# Patient Record
Sex: FEMALE | Race: WHITE | Employment: PART TIME | URBAN - METROPOLITAN AREA
[De-identification: names, ages, dates, MRNs, and addresses within clinical notes are randomized per-mention and may not be internally consistent; named-entity substitution may affect disease eponyms.]

---

## 2017-11-02 ENCOUNTER — OFFICE VISIT (OUTPATIENT)
Dept: FAMILY MEDICINE | Facility: CLINIC | Age: 47
End: 2017-11-02
Payer: COMMERCIAL

## 2017-11-02 VITALS
WEIGHT: 148 LBS | HEIGHT: 63 IN | OXYGEN SATURATION: 97 % | BODY MASS INDEX: 26.22 KG/M2 | HEART RATE: 120 BPM | TEMPERATURE: 98.5 F | SYSTOLIC BLOOD PRESSURE: 130 MMHG | DIASTOLIC BLOOD PRESSURE: 80 MMHG

## 2017-11-02 DIAGNOSIS — F41.1 GAD (GENERALIZED ANXIETY DISORDER): ICD-10-CM

## 2017-11-02 DIAGNOSIS — Z23 NEED FOR PROPHYLACTIC VACCINATION AND INOCULATION AGAINST INFLUENZA: ICD-10-CM

## 2017-11-02 DIAGNOSIS — E55.9 VITAMIN D DEFICIENCY: ICD-10-CM

## 2017-11-02 DIAGNOSIS — J45.30 MILD PERSISTENT ASTHMA WITHOUT COMPLICATION: Primary | ICD-10-CM

## 2017-11-02 DIAGNOSIS — F33.9 RECURRENT MAJOR DEPRESSIVE DISORDER, REMISSION STATUS UNSPECIFIED (H): ICD-10-CM

## 2017-11-02 PROCEDURE — 90471 IMMUNIZATION ADMIN: CPT | Performed by: FAMILY MEDICINE

## 2017-11-02 PROCEDURE — 99203 OFFICE O/P NEW LOW 30 MIN: CPT | Mod: 25 | Performed by: FAMILY MEDICINE

## 2017-11-02 PROCEDURE — 90686 IIV4 VACC NO PRSV 0.5 ML IM: CPT | Performed by: FAMILY MEDICINE

## 2017-11-02 RX ORDER — ALBUTEROL SULFATE 90 UG/1
2 AEROSOL, METERED RESPIRATORY (INHALATION) EVERY 6 HOURS
COMMUNITY
End: 2017-11-02

## 2017-11-02 RX ORDER — ALBUTEROL SULFATE 90 UG/1
2 AEROSOL, METERED RESPIRATORY (INHALATION) EVERY 6 HOURS
Qty: 1 INHALER | Refills: 3 | Status: SHIPPED | OUTPATIENT
Start: 2017-11-02 | End: 2018-03-06

## 2017-11-02 RX ORDER — BUSPIRONE HYDROCHLORIDE 5 MG/1
TABLET ORAL
Qty: 150 TABLET | Refills: 0 | Status: SHIPPED | OUTPATIENT
Start: 2017-11-02 | End: 2018-03-06

## 2017-11-02 RX ORDER — CHOLECALCIFEROL (VITAMIN D3) 50 MCG
2000 TABLET ORAL DAILY
Qty: 100 TABLET | Refills: 3 | Status: SHIPPED | OUTPATIENT
Start: 2017-11-02 | End: 2019-09-10

## 2017-11-02 RX ORDER — BUDESONIDE AND FORMOTEROL FUMARATE DIHYDRATE 160; 4.5 UG/1; UG/1
2 AEROSOL RESPIRATORY (INHALATION) 2 TIMES DAILY
Qty: 1 INHALER | Refills: 3 | Status: SHIPPED | OUTPATIENT
Start: 2017-11-02 | End: 2018-03-06

## 2017-11-02 ASSESSMENT — ANXIETY QUESTIONNAIRES
1. FEELING NERVOUS, ANXIOUS, OR ON EDGE: NEARLY EVERY DAY
5. BEING SO RESTLESS THAT IT IS HARD TO SIT STILL: NEARLY EVERY DAY
2. NOT BEING ABLE TO STOP OR CONTROL WORRYING: NEARLY EVERY DAY
IF YOU CHECKED OFF ANY PROBLEMS ON THIS QUESTIONNAIRE, HOW DIFFICULT HAVE THESE PROBLEMS MADE IT FOR YOU TO DO YOUR WORK, TAKE CARE OF THINGS AT HOME, OR GET ALONG WITH OTHER PEOPLE: EXTREMELY DIFFICULT
7. FEELING AFRAID AS IF SOMETHING AWFUL MIGHT HAPPEN: NEARLY EVERY DAY
GAD7 TOTAL SCORE: 21
3. WORRYING TOO MUCH ABOUT DIFFERENT THINGS: NEARLY EVERY DAY
6. BECOMING EASILY ANNOYED OR IRRITABLE: NEARLY EVERY DAY

## 2017-11-02 ASSESSMENT — PATIENT HEALTH QUESTIONNAIRE - PHQ9
5. POOR APPETITE OR OVEREATING: NEARLY EVERY DAY
SUM OF ALL RESPONSES TO PHQ QUESTIONS 1-9: 20

## 2017-11-02 NOTE — MR AVS SNAPSHOT
After Visit Summary   11/2/2017    Mojgan Jimenez    MRN: [id number]           Patient Information     Date Of Birth          1970        Visit Information        Provider Department      11/2/2017 10:20 AM Katie Hernandez MD [facility]        Today's Diagnoses     Mild persistent asthma without complication    -  1    Vitamin D deficiency        Recurrent major depressive disorder, remission status unspecified (H)        LINA (generalized anxiety disorder)          Care Instructions      Preventive Health Recommendations  Female Ages 40 to 49    Yearly exam:     See your health care provider every year in order to  1. Review health changes.   2. Discuss preventive care.    3. Review your medicines if your doctor prescribed any.      Get a Pap test every three years (unless you have an abnormal result and your provider advises testing more often).      If you get Pap tests with HPV test, you only need to test every 5 years, unless you have an abnormal result. You do not need a Pap test if your uterus was removed (hysterectomy) and you have not had cancer.      You should be tested each year for STDs (sexually transmitted diseases), if you're at risk.       Ask your doctor if you should have a mammogram.      Have a colonoscopy (test for colon cancer) if someone in your family has had colon cancer or polyps before age 50.       Have a cholesterol test every 5 years.       Have a diabetes test (fasting glucose) after age 45. If you are at risk for diabetes, you should have this test every 3 years.    Shots: Get a flu shot each year. Get a tetanus shot every 10 years.     Nutrition:     Eat at least 5 servings of fruits and vegetables each day.    Eat whole-grain bread, whole-wheat pasta and brown rice instead of white grains and rice.    Talk to your provider about Calcium and Vitamin D.     Lifestyle    Exercise at least 150 minutes a week (an average of 30 minutes a day, 5  "days a week). This will help you control your weight and prevent disease.    Limit alcohol to one drink per day.    No smoking.     Wear sunscreen to prevent skin cancer.    See your dentist every six months for an exam and cleaning.          Follow-ups after your visit        Who to contact     If you have questions or need follow up information about today's clinic visit or your schedule please contact [facility] directly at [phone].  Normal or non-critical lab and imaging results will be communicated to you by Deporvillagehart, letter or phone within 4 business days after the clinic has received the results. If you do not hear from us within 7 days, please contact the clinic through [employer] or phone. If you have a critical or abnormal lab result, we will notify you by phone as soon as possible.  Submit refill requests through Dig[employer]oo or call your pharmacy and they will forward the refill request to us. Please allow 3 business days for your refill to be completed.          Additional Information About Your Visit        Digiboo Information     Digiboo lets you send messages to your doctor, view your test results, renew your prescriptions, schedule appointments and more. To sign up, go to [email or web address] . Click on \"Log in\" on the left side of the screen, which will take you to the Welcome page. Then click on \"Sign up Now\" on the right side of the page.     You will be asked to enter the access code listed below, as well as some personal information. Please follow the directions to create your username and password.     Your access code is: [email or web address]  Expires: 2018 11:24 AM     Your access code will  in 90 days. If you need help or a new code, please call your [facility] clinic or [phone].        Care EveryWhere ID     This is your Care EveryWhere ID. This could be used by other organizations to access your [facility] medical records  [id number]        Your Vitals Were     " "Pulse Temperature Height Last Period Pulse Oximetry Breastfeeding?    120 98.5  F (36.9  C) (Oral) 5' 3\" (1.6 m) 11/01/2017 (Exact Date) 97% No    BMI (Body Mass Index)                   26.22 kg/m2            Blood Pressure from Last 3 Encounters:   11/02/17 130/80    Weight from Last 3 Encounters:   11/02/17 148 lb (67.1 kg)              Today, you had the following     No orders found for display         Today's Medication Changes          These changes are accurate as of: 11/2/17 11:24 AM.  If you have any questions, ask your nurse or doctor.               Start taking these medicines.        Dose/Directions    busPIRone 5 MG tablet   Commonly known as:  BUSPAR   Used for:  LINA (generalized anxiety disorder)   Started by:  Katie Hernandez MD        Start at 5 mg twice daily for 3 days, then 7.5 mg (1.5 tabs) twice daily for 3 days, then 10 mg (2 tabs) twice daily for 3 days, then 12.5 mg (2.5 tabs) twice daily for 3 days, then 15 mg (3 tabs) twice daily and stay at that dose   Quantity:  150 tablet   Refills:  0       vitamin D 2000 UNITS tablet   Used for:  Vitamin D deficiency   Started by:  Katie Hernandez MD        Dose:  2000 Units   Take 2,000 Units by mouth daily   Quantity:  100 tablet   Refills:  3         These medicines have changed or have updated prescriptions.        Dose/Directions    budesonide-formoterol 160-4.5 MCG/ACT Inhaler   Commonly known as:  SYMBICORT   This may have changed:    - medication strength  - how much to take  - when to take this   Used for:  Mild persistent asthma without complication   Changed by:  Katie Hernandez MD        Dose:  2 puff   Inhale 2 puffs into the lungs 2 times daily   Quantity:  1 Inhaler   Refills:  3         Stop taking these medicines if you haven't already. Please contact your care team if you have questions.     PROZAC PO   Stopped by:  Katie Hernandez MD                Where to get your medicines      These " medications were sent to [id number] - [place], [place] - [address] AT [facility] [address]  [address], [place] [address]     Phone:  [phone]     albuterol 108 (90 BASE) MCG/ACT Inhaler    budesonide-formoterol 160-4.5 MCG/ACT Inhaler    vitamin D 2000 UNITS tablet         Some of these will need a paper prescription and others can be bought over the counter.  Ask your nurse if you have questions.     Bring a paper prescription for each of these medications     busPIRone 5 MG tablet                Primary Care Provider Office Phone # Fax #    [facility] [phone]       [address] [address]        Equal Access to Services     TIM CHRISTIE : Caitlin lee Sotuyet, waaxda luqadaha, qaybta kaalmada adeegyada, magalie garza. So [facility] [phone].    ATENCIÓN: Si habla español, tiene a smith disposición servicios gratuitos de asistencia lingüística. Llame al [phone].    We comply with applicable federal civil rights laws and [place] laws. We do not discriminate on the basis of race, color, national origin, age, disability, sex, sexual orientation, or gender identity.            Thank you!     Thank you for choosing [facility]  for your care. Our goal is always to provide you with excellent care. Hearing back from our patients is one way we can continue to improve our services. Please take a few minutes to complete the written survey that you may receive in the mail after your visit with us. Thank you!             Your Updated Medication List - Protect others around you: Learn how to safely use, store and throw away your medicines at www.disposemymeds.org.          This list is accurate as of: 11/2/17 11:24 AM.  Always use your most recent med list.                   Brand Name Dispense Instructions for use Diagnosis    albuterol 108 (90 BASE)  MCG/ACT Inhaler    PROAIR HFA/PROVENTIL HFA/VENTOLIN HFA    1 Inhaler    Inhale 2 puffs into the lungs every 6 hours    Mild persistent asthma without complication       budesonide-formoterol 160-4.5 MCG/ACT Inhaler    SYMBICORT    1 Inhaler    Inhale 2 puffs into the lungs 2 times daily    Mild persistent asthma without complication       busPIRone 5 MG tablet    BUSPAR    150 tablet    Start at 5 mg twice daily for 3 days, then 7.5 mg (1.5 tabs) twice daily for 3 days, then 10 mg (2 tabs) twice daily for 3 days, then 12.5 mg (2.5 tabs) twice daily for 3 days, then 15 mg (3 tabs) twice daily and stay at that dose    LINA (generalized anxiety disorder)       LAMICTAL PO      Take 50 mg by mouth daily        vitamin D 2000 UNITS tablet     100 tablet    Take 2,000 Units by mouth daily    Vitamin D deficiency

## 2017-11-02 NOTE — PROGRESS NOTES
HPI        Review of Systems  Physical Exam      SUBJECTIVE:   Mojgan Jimenez is a 47 year old female who presents to clinic today for the following health issues:    Pt was initially scheduled for Physical exam however the visit was changed to Office visit to discuss about her depression and asthma.     Pt is here with her friend. Information about today's symptoms is given by pt and friend both.     Mood symptoms: pt used to go to [facility] for her care. She was on Prozac for many years for her depression.    Lamictal was added few months ago for severe depression, PMDD, LINA. Her dose was recently increased to 75 mg daily.   Per pt it helps with some of her symptoms.   Pt was not following with her provider as recommended hence she was dismissed from her previous clinic and needs to establish care at different location.   Today pt says that she does not think depression is much of her issue however anxiety goes out of control. She was told that Prozac should help with anxiety however it does not help. As she was taking prozac for many years she decided to stop taking prozac on her own few days ago. Denies any withdrawal symptoms today. Denies SI or HI.       Asthma: using her inhalers as prescribed. It has kept her asthma under good control. She needs me to listen to her lungs and refill her inhalers.     Requests flu shot.     Need refill on vitamin D.     Problem list and histories reviewed & adjusted, as indicated.  Additional history: as documented    Patient Active Problem List   Diagnosis     Mild persistent asthma without complication     Vitamin D deficiency     LINA (generalized anxiety disorder)     Recurrent major depressive disorder, remission status unspecified (H)     History reviewed. No pertinent surgical history.    Social History   Substance Use Topics     Smoking status: Never Smoker     Smokeless tobacco: Never Used     Alcohol use Yes      Comment: 1 drink a week     Family History  "  Problem Relation Age of Onset     CANCER Mother      Unknown/Adopted Father      DIABETES No family hx of      Hypertension No family hx of          Current Outpatient Prescriptions   Medication Sig Dispense Refill     budesonide-formoterol (SYMBICORT) 160-4.5 MCG/ACT Inhaler Inhale 2 puffs into the lungs 2 times daily 1 Inhaler 3     Cholecalciferol (VITAMIN D) 2000 UNITS tablet Take 2,000 Units by mouth daily 100 tablet 3     busPIRone (BUSPAR) 5 MG tablet Start at 5 mg twice daily for 3 days, then 7.5 mg (1.5 tabs) twice daily for 3 days, then 10 mg (2 tabs) twice daily for 3 days, then 12.5 mg (2.5 tabs) twice daily for 3 days, then 15 mg (3 tabs) twice daily and stay at that dose 150 tablet 0     albuterol (PROAIR HFA/PROVENTIL HFA/VENTOLIN HFA) 108 (90 BASE) MCG/ACT Inhaler Inhale 2 puffs into the lungs every 6 hours 1 Inhaler 3     LamoTRIgine (LAMICTAL PO) Take 50 mg by mouth daily       BP Readings from Last 3 Encounters:   11/02/17 130/80    Wt Readings from Last 3 Encounters:   11/02/17 148 lb (67.1 kg)                  Labs reviewed in EPIC        Reviewed and updated as needed this visit by clinical staffTobacco  Allergies  Med Hx  Surg Hx  Fam Hx  Soc Hx      Reviewed and updated as needed this visit by Provider         ROS:  Constitutional, HEENT, cardiovascular, pulmonary, gi and gu systems are negative, except as otherwise noted.      OBJECTIVE:   /80  Pulse 120  Temp 98.5  F (36.9  C) (Oral)  Ht 5' 3\" (1.6 m)  Wt 148 lb (67.1 kg)  LMP 11/01/2017 (Exact Date)  SpO2 97%  Breastfeeding? No  BMI 26.22 kg/m2  Body mass index is 26.22 kg/(m^2).  GENERAL: healthy, alert and no distress  NECK: no adenopathy, no asymmetry, masses, or scars and thyroid normal to palpation  RESP: lungs clear to auscultation - no rales, rhonchi or wheezes  CV: regular rate and rhythm, normal S1 S2, no S3 or S4  PSYCH: anxious mood, pressured speech, affect is congruent to mood.     PHQ-9 SCORE 11/2/2017 "   Total Score 20     LINA-7 SCORE 11/2/2017   Total Score 21       ASSESSMENT/PLAN:       ICD-10-CM    1. Mild persistent asthma without complication J45.30 budesonide-formoterol (SYMBICORT) 160-4.5 MCG/ACT Inhaler     albuterol (PROAIR HFA/PROVENTIL HFA/VENTOLIN HFA) 108 (90 BASE) MCG/ACT Inhaler   2. Vitamin D deficiency E55.9 Cholecalciferol (VITAMIN D) 2000 UNITS tablet   3. Recurrent major depressive disorder, remission status unspecified (H) F33.9    4. LINA (generalized anxiety disorder) F41.1 busPIRone (BUSPAR) 5 MG tablet   5. Need for prophylactic vaccination and inoculation against influenza Z23 FLU VAC, SPLIT VIRUS IM > 3 YO (QUADRIVALENT) [33951]     Vaccine Administration, Initial [42280]     New to me and clinic. MOY signed to get previous medical records.   F/u with me in 4 weeks on mood symptoms.      Katie Hernandez MD  [facility]

## 2017-11-02 NOTE — NURSING NOTE
"Chief Complaint   Patient presents with     Physical       Initial /80  Pulse 120  Temp 98.5  F (36.9  C) (Oral)  Ht 5' 3\" (1.6 m)  Wt 148 lb (67.1 kg)  LMP 11/01/2017 (Exact Date)  SpO2 97%  Breastfeeding? No  BMI 26.22 kg/m2 Estimated body mass index is 26.22 kg/(m^2) as calculated from the following:    Height as of this encounter: 5' 3\" (1.6 m).    Weight as of this encounter: 148 lb (67.1 kg).  Medication Reconciliation: complete    "

## 2017-11-03 ASSESSMENT — ASTHMA QUESTIONNAIRES: ACT_TOTALSCORE: 17

## 2017-11-03 ASSESSMENT — ANXIETY QUESTIONNAIRES: GAD7 TOTAL SCORE: 21

## 2018-03-06 ENCOUNTER — TELEPHONE (OUTPATIENT)
Dept: FAMILY MEDICINE | Facility: CLINIC | Age: 48
End: 2018-03-06

## 2018-03-06 ENCOUNTER — TELEPHONE (OUTPATIENT)
Dept: ADDICTION MEDICINE | Facility: CLINIC | Age: 48
End: 2018-03-06

## 2018-03-06 ENCOUNTER — OFFICE VISIT (OUTPATIENT)
Dept: FAMILY MEDICINE | Facility: CLINIC | Age: 48
End: 2018-03-06
Payer: COMMERCIAL

## 2018-03-06 VITALS
WEIGHT: 159 LBS | BODY MASS INDEX: 28.17 KG/M2 | SYSTOLIC BLOOD PRESSURE: 120 MMHG | HEART RATE: 100 BPM | TEMPERATURE: 98.4 F | DIASTOLIC BLOOD PRESSURE: 74 MMHG | HEIGHT: 63 IN

## 2018-03-06 DIAGNOSIS — Z12.31 VISIT FOR SCREENING MAMMOGRAM: ICD-10-CM

## 2018-03-06 DIAGNOSIS — Z23 NEED FOR PROPHYLACTIC VACCINATION WITH TETANUS-DIPHTHERIA (TD): ICD-10-CM

## 2018-03-06 DIAGNOSIS — Z12.4 SCREENING FOR MALIGNANT NEOPLASM OF CERVIX: ICD-10-CM

## 2018-03-06 DIAGNOSIS — F39 MOOD DISORDER (H): ICD-10-CM

## 2018-03-06 DIAGNOSIS — J45.30 MILD PERSISTENT ASTHMA WITHOUT COMPLICATION: ICD-10-CM

## 2018-03-06 DIAGNOSIS — F41.1 GAD (GENERALIZED ANXIETY DISORDER): ICD-10-CM

## 2018-03-06 DIAGNOSIS — F15.10 METHAMPHETAMINE ABUSE, EPISODIC (H): Primary | ICD-10-CM

## 2018-03-06 PROCEDURE — 99215 OFFICE O/P EST HI 40 MIN: CPT | Performed by: FAMILY MEDICINE

## 2018-03-06 RX ORDER — LOXAPINE SUCCINATE 25 MG/1
25 TABLET ORAL 3 TIMES DAILY
COMMUNITY
End: 2018-05-08

## 2018-03-06 RX ORDER — BUSPIRONE HYDROCHLORIDE 5 MG/1
TABLET ORAL
Qty: 90 TABLET | Refills: 0 | Status: SHIPPED | OUTPATIENT
Start: 2018-03-06 | End: 2019-04-02

## 2018-03-06 RX ORDER — LOXAPINE SUCCINATE 25 MG/1
25 TABLET ORAL 3 TIMES DAILY
Status: CANCELLED | OUTPATIENT
Start: 2018-03-06

## 2018-03-06 RX ORDER — BUDESONIDE AND FORMOTEROL FUMARATE DIHYDRATE 160; 4.5 UG/1; UG/1
2 AEROSOL RESPIRATORY (INHALATION) 2 TIMES DAILY
Qty: 1 INHALER | Refills: 3 | Status: SHIPPED | OUTPATIENT
Start: 2018-03-06 | End: 2019-04-02

## 2018-03-06 RX ORDER — ALBUTEROL SULFATE 90 UG/1
2 AEROSOL, METERED RESPIRATORY (INHALATION) EVERY 6 HOURS
Qty: 1 INHALER | Refills: 3 | Status: SHIPPED | OUTPATIENT
Start: 2018-03-06 | End: 2018-11-06

## 2018-03-06 ASSESSMENT — ANXIETY QUESTIONNAIRES
3. WORRYING TOO MUCH ABOUT DIFFERENT THINGS: NEARLY EVERY DAY
2. NOT BEING ABLE TO STOP OR CONTROL WORRYING: NEARLY EVERY DAY
1. FEELING NERVOUS, ANXIOUS, OR ON EDGE: NEARLY EVERY DAY
5. BEING SO RESTLESS THAT IT IS HARD TO SIT STILL: SEVERAL DAYS
7. FEELING AFRAID AS IF SOMETHING AWFUL MIGHT HAPPEN: NEARLY EVERY DAY
IF YOU CHECKED OFF ANY PROBLEMS ON THIS QUESTIONNAIRE, HOW DIFFICULT HAVE THESE PROBLEMS MADE IT FOR YOU TO DO YOUR WORK, TAKE CARE OF THINGS AT HOME, OR GET ALONG WITH OTHER PEOPLE: EXTREMELY DIFFICULT
6. BECOMING EASILY ANNOYED OR IRRITABLE: NEARLY EVERY DAY
GAD7 TOTAL SCORE: 18

## 2018-03-06 ASSESSMENT — PATIENT HEALTH QUESTIONNAIRE - PHQ9: 5. POOR APPETITE OR OVEREATING: MORE THAN HALF THE DAYS

## 2018-03-06 NOTE — MR AVS SNAPSHOT
After Visit Summary   3/6/2018    Mojgan Jimenez    MRN: [id number]           Patient Information     Date Of Birth          1970        Visit Information        Provider Department      3/6/2018 1:00 PM Chetan Pérez DO [facility]        Today's Diagnoses     Methamphetamine abuse, episodic    -  1    Visit for screening mammogram        Screening for malignant neoplasm of cervix        Need for prophylactic vaccination with tetanus-diphtheria (TD)        Mild persistent asthma without complication        Mood disorder (H)        LINA (generalized anxiety disorder)          Care Instructions    Please follow up with addiction medicine as planned/psych.  Restart Prozac and Buspar.      [facility]   Discharged by : Hortencia MAYS CMA (AAMA)    Paper scripts provided to patient : none      If you have any questions regarding your visit please contact your care team:     Team Gold                Clinic Hours Telephone Number     Dr. Jennifer Maier NP 7am-7pm  Monday - Thursday   7am-5pm  Fridays  [phone]   (Appointment scheduling available 24/7)     RN Line  [phone] option 2     [facility] [place] [facility] - 11am-9pm Monday-Friday Saturday-Sunday- 9am-5pm     [place] -   5pm-9pm Monday-Friday Saturday-Sunday- 9am-5pm    [phone] - [place]    [phone] - [place]       For a Price Quote for your services, please call our Consumer Price Line at [phone].     What options do I have for visits at the clinic other than the traditional office visit?     To expand how we care for you, many of our providers are utilizing electronic visits (e-visits) and telephone visits, when medically appropriate, for interactions with their patients rather than a visit in the clinic. We also offer nurse visits for many medical concerns. Just  like any other service, we will bill your insurance company for this type of visit based on time spent on the phone with your provider. Not all insurance companies cover these visits. Please check with your medical insurance if this type of visit is covered. You will be responsible for any charges that are not paid by your insurance.   E-visits via LyricFindhart: generally incur a $35.00 fee.     Telephone visits:   Time spent on the phone: *charged based on time that is spent on the phone in increments of 10 minutes. Estimated cost:   5-10 mins $30.00   11-20 mins. $59.00   21-30 mins. $85.00     Use Movaris (secure email communication and access to your chart) to send your primary care provider a message or make an appointment. Ask someone on your Team how to sign up for Movaris.     As always, Thank you for trusting us with your health care needs!      [place] [place]:    Scheduling Appointments  [facility]  Call: [phone]    [facility]  Call: [phone]    [facility]  Call: [phone]    For Gastroenterology referrals   [facility] Gastroenterology   Clinics and Surgery Center, 4th Floor   [address] [place] [address]   Appointments: [phone]    WHERE TO GO FOR CARE?  Clinic    Make an appointment if you:       Are sick (cold, cough, flu, sore throat, earache or in pain).       Have a small injury (sprain, small cut, burn or broken bone).       Need a physical exam, Pap smear, vaccine or prescription refill.       Have questions about your health or medicines.    To reach us:      Call [phone] ([phone]). Open 24 hours every day. (For counseling services, call [phone].)    Log into Movaris at [email or web address]. (Visit [email or web address] to create an account.) Hospital emergency room    An emergency is a serious or life- threatening problem that must be treated right away.    Call [phone]  or get to the hospital if you have:      Very bad or sudden:            - Chest pain or pressure         - Bleeding         - Head or belly pain         - Dizziness or trouble seeing, walking or                          Speaking      Problems breathing      Blood in your vomit or you are coughing up blood      A major injury (knocked out, loss of a finger or limb, rape, broken bone protruding from skin)    A mental health crisis. (Or call the Mental Health Crisis line at 1-190.611.4420 or Suicide Prevention Hotline at 1-202.998.3674.)    Open 24 hours every day. You don't need an appointment.     Urgent care    Visit urgent care for sickness or small injuries when the clinic is closed. You don't need an appointment. To check hours or find an urgent care near you, visit [email or web address]. Online care    Get online care from [facility] for more than 70 common problems, like colds, allergies and infections. Open 24 hours every day at:   [email or web address]   Need help deciding?    For advice about where to be seen, you may call your clinic and ask to speak with a nurse. We're here for you 24 hours every day.         If you are deaf or hard of hearing, please let us know. We provide many free services including sign language interpreters, oral interpreters, TTYs, telephone amplifiers, note takers and written materials.                   Follow-ups after your visit        Additional Services     ADDICTION MEDICINE REFERRAL       The Addiction Medicine Service is prepared to provide consultation for and, if necessary, ongoing care for patients with the disease of addiction, ages 16 and up.     For acute detox, please send your patient to the ER or contact [facility] at [phone].     Common problems that may warrant referral to the Addiction Medicine Service are:  Alcohol use disorder - diagnosis, treatment referral, acute and protracted withdrawal management, and ongoing medication assisted treatment including  Antabuse and Naltrexone.  Opioid Use Disorder - medication assisted treatment including Buprenorphine (Suboxone) or extended release Naltrexone (Vivitrol)  Benzodiazepine dependence - extended outpatient detoxification  Many other issues pertaining to addiction, relapse, and recovery    Please contact our scheduling staff at [phone] with any questions.    Referrals to the Addiction Medicine Service assume that the referring provider has discussed the referral with the patient.  Referral will be reviewed and if appropriate, the patient will be contacted to schedule appointment.  If not appropriate, the provider will be contacted with further information.    Please answer the following questions so we can better service your patient:    Drug of choice: street drugs  Do they have a [facility] PCP:  No(estblishing care now)     Please bring the following to your appointment:  >>   List of current medications   >>   Any relevant history                  Who to contact     If you have questions or need follow up information about today's clinic visit or your schedule please contact [facility] directly at [phone].  Normal or non-critical lab and imaging results will be communicated to you by Nanalihart, letter or phone within 4 business days after the clinic has received the results. If you do not hear from us within 7 days, please contact the clinic through Nanalihart or phone. If you have a critical or abnormal lab result, we will notify you by phone as soon as possible.  Submit refill requests through [employer] or call your pharmacy and they will forward the refill request to us. Please allow 3 business days for your refill to be completed.          Additional Information About Your Visit        NanaliharBiOxyDyn Information     [employer] lets you send messages to your doctor, view your test results, renew your prescriptions, schedule appointments and more. To sign up, go to [email or web address] . Click on  "\"Log in\" on the left side of the screen, which will take you to the Welcome page. Then click on \"Sign up Now\" on the right side of the page.     You will be asked to enter the access code listed below, as well as some personal information. Please follow the directions to create your username and password.     Your access code is: [email or web address]  Expires: 2018  6:29 PM     Your access code will  in 90 days. If you need help or a new code, please call your [place] clinic or [phone].        Care EveryWhere ID     This is your Care EveryWhere ID. This could be used by other organizations to access your [place] medical records  [id number]        Your Vitals Were     Pulse Temperature Height Last Period Breastfeeding? BMI (Body Mass Index)    100 98.4  F (36.9  C) (Oral) 5' 3\" (1.6 m) 2018 (Approximate) No 28.17 kg/m2       Blood Pressure from Last 3 Encounters:   18 120/74   17 130/80    Weight from Last 3 Encounters:   18 159 lb (72.1 kg)   17 148 lb (67.1 kg)              We Performed the Following     ADDICTION MEDICINE REFERRAL          Today's Medication Changes          These changes are accurate as of 3/6/18  1:49 PM.  If you have any questions, ask your nurse or doctor.               Start taking these medicines.        Dose/Directions    FLUoxetine 20 MG capsule   Commonly known as:  PROzac   Used for:  Mood disorder (H), LINA (generalized anxiety disorder)   Started by:  Chetan Pérez DO        Dose:  20 mg   Take 1 capsule (20 mg) by mouth daily   Quantity:  15 capsule   Refills:  0         These medicines have changed or have updated prescriptions.        Dose/Directions    busPIRone 5 MG tablet   Commonly known as:  BUSPAR   This may have changed:  additional instructions   Used for:  LINA (generalized anxiety disorder)   Changed by:  Chetan Pérez DO        Start at 5 mg TID   Quantity:  90 tablet   Refills:  0            Where to get your " medicines      These medications were sent to [id number] - [place], [place] - [address]  [address], [place] [address]     Phone:  [phone]     albuterol 108 (90 BASE) MCG/ACT Inhaler    budesonide-formoterol 160-4.5 MCG/ACT Inhaler    busPIRone 5 MG tablet    FLUoxetine 20 MG capsule                Primary Care Provider Office Phone # Fax #    [facility] [phone] [address] [facility] [address]        Equal Access to Services     TIM CHRISTIE : Hadii aad ku hadasho Sojayshreeali, waaxda luqadaha, qaybta kaalmada adeegyada, waxóscar fentonin amy garza. So [facility] [phone].    ATENCIÓN: Si habla español, tiene a smith disposición servicios gratuitos de asistencia lingüística. [facility] [phone].    We comply with applicable federal civil rights laws and [place] laws. We do not discriminate on the basis of race, color, national origin, age, disability, sex, sexual orientation, or gender identity.            Thank you!     Thank you for choosing [facility]  for your care. Our goal is always to provide you with excellent care. Hearing back from our patients is one way we can continue to improve our services. Please take a few minutes to complete the written survey that you may receive in the mail after your visit with us. Thank you!             Your Updated Medication List - Protect others around you: Learn how to safely use, store and throw away your medicines at www.disposemymeds.org.          This list is accurate as of 3/6/18  1:49 PM.  Always use your most recent med list.                   Brand Name Dispense Instructions for use Diagnosis    albuterol 108 (90 BASE) MCG/ACT Inhaler    PROAIR HFA/PROVENTIL HFA/VENTOLIN HFA    1 Inhaler    Inhale 2 puffs into the lungs every 6 hours    Mild persistent asthma without complication        budesonide-formoterol 160-4.5 MCG/ACT Inhaler    SYMBICORT    1 Inhaler    Inhale 2 puffs into the lungs 2 times daily    Mild persistent asthma without complication       busPIRone 5 MG tablet    BUSPAR    90 tablet    Start at 5 mg TID    LINA (generalized anxiety disorder)       FLUoxetine 20 MG capsule    PROzac    15 capsule    Take 1 capsule (20 mg) by mouth daily    Mood disorder (H), LINA (generalized anxiety disorder)       LAMICTAL PO      Take 50 mg by mouth daily        loxapine 25 MG capsule    LOXITANE     Take 25 mg by mouth 3 times daily        vitamin D 2000 UNITS tablet     100 tablet    Take 2,000 Units by mouth daily    Vitamin D deficiency

## 2018-03-06 NOTE — PATIENT INSTRUCTIONS
Please follow up with addiction medicine as planned/psych.  Restart Prozac and Buspar.      [facility]   Discharged by : Hortencia MAYS CMA ([facility])    Paper scripts provided to patient : none      If you have any questions regarding your visit please contact your care team:     Team Gold                Clinic Hours Telephone Number     Dr. Jennifer Maier, NP 7am-7pm  Monday - Thursday   7am-5pm  Fridays  [phone]   (Appointment scheduling available 24/7)     RN Line  [phone] option 2     Urgent Care - [place] - 11am-9pm Monday-Friday Saturday-Sunday- 9am-5pm     [place] -   5pm-9pm Monday-Friday Saturday-Sunday- 9am-5pm    [phone] - [place]    [phone] - [place]       For a Price Quote for your services, please call our Consumer Price Line at [phone].     What options do I have for visits at the clinic other than the traditional office visit?     To expand how we care for you, many of our providers are utilizing electronic visits (e-visits) and telephone visits, when medically appropriate, for interactions with their patients rather than a visit in the clinic. We also offer nurse visits for many medical concerns. Just like any other service, we will bill your insurance company for this type of visit based on time spent on the phone with your provider. Not all insurance companies cover these visits. Please check with your medical insurance if this type of visit is covered. You will be responsible for any charges that are not paid by your insurance.   E-visits via Solarmass: generally incur a $35.00 fee.     Telephone visits:   Time spent on the phone: *charged based on time that is spent on the phone in increments of 10 minutes. Estimated cost:   5-10 mins $30.00   11-20 mins. $59.00   21-30 mins. $85.00     Use Solarmass (secure email communication and access to  your chart) to send your primary care provider a message or make an appointment. Ask someone on your Team how to sign up for [employer].     As always, Thank you for trusting us with your health care needs!      [place] [facility]:    Scheduling Appointments  [place]  Call: [phone]    [facility]  Call: [phone]    [facility]  Call: [phone]    For Gastroenterology referrals   [facility] Gastroenterology   Clinics and Surgery [place], 4th Floor   [address] [place] [address]   Appointments: [phone]    WHERE TO GO FOR CARE?  Clinic    Make an appointment if you:       Are sick (cold, cough, flu, sore throat, earache or in pain).       Have a small injury (sprain, small cut, burn or broken bone).       Need a physical exam, Pap smear, vaccine or prescription refill.       Have questions about your health or medicines.    To reach us:      Call [phone] ([phone]). Open 24 hours every day. (For counseling services, call [phone].)    Log into [employer] at [email or web address]. (Visit [email or web address] to create an account.) Hospital emergency room    An emergency is a serious or life- threatening problem that must be treated right away.    Call 307 or get to the hospital if you have:      Very bad or sudden:            - Chest pain or pressure         - Bleeding         - Head or belly pain         - Dizziness or trouble seeing, walking or                          Speaking      Problems breathing      Blood in your vomit or you are coughing up blood      A major injury (knocked out, loss of a finger or limb, rape, broken bone protruding from skin)    A mental health crisis. (Or call the Mental Health Crisis line at 1-264.243.5181 or Suicide Prevention Hotline at 1-224.285.2402.)    Open 24 hours every day. You don't need an appointment.     Urgent care    Visit urgent care for sickness or  small injuries when the clinic is closed. You don't need an appointment. To check hours or find an urgent care near you, visit [email or web address]. Online care    Get online care from [facility] for more than 70 common problems, like colds, allergies and infections. Open 24 hours every day at:   [email or web address]   Need help deciding?    For advice about where to be seen, you may call your clinic and ask to speak with a nurse. We're here for you 24 hours every day.         If you are deaf or hard of hearing, please let us know. We provide many free services including sign language interpreters, oral interpreters, TTYs, telephone amplifiers, note takers and written materials.

## 2018-03-06 NOTE — PROGRESS NOTES
SUBJECTIVE:   Mojgan Jimenez is a 47 year old female who presents to clinic today for the following health issues:      Anxiety Follow-Up    Status since last visit: Worsened     Other associated symptoms: panic attacks, worries a lot    Complicating factors:   Significant life event: No   Current substance abuse: None  Depression symptoms: Yes-  Would like to discuss meds   LINA-7 SCORE 11/2/2017   Total Score 21       LINA-7  Asthma Follow-Up    Was ACT completed today?    Yes    ACT Total Scores 11/2/2017   ACT TOTAL SCORE (Goal Greater than or Equal to 20) 17   In the past 12 months, how many times did you visit the emergency room for your asthma without being admitted to the hospital? 0   In the past 12 months, how many times were you hospitalized overnight because of your asthma? 0       Recent asthma triggers that patient is dealing with: wheezing in the middle of the night         Amount of exercise or physical activity: None    Problems taking medications regularly: No    Medication side effects: none    Diet: regular (no restrictions)    New to this clinic , no records, no   Patient has history of mood disorder, meth abuse and has been in and out of treatements, she reports that she can not see psych at [facility] as she has missed too many appoint, she was also let go of [facility] for missing appointments  She has had rule 25 done and is waiting for treatment center  She denies paranoia and meth use for the last month  She wants to be back on her depression and anxiety med  She has done well in the past with prozac and buspar  She will try to establish care here  She is treatful today and wants her boyfriend in the room to understand what is going on from today's visit  Denies suicidal or homicidal ideation      She has history of drug use-meth  Clean for a month  Hard time to remember  Loxapine in the hospital has not been on it for months  [facility]  She is set up to  "go into inpatient clinic        Problem list and histories reviewed & adjusted, as indicated.  Additional history: as documented    Patient Active Problem List   Diagnosis     Mild persistent asthma without complication     Vitamin D deficiency     LINA (generalized anxiety disorder)     Recurrent major depressive disorder, remission status unspecified (H)     Methamphetamine abuse, episodic     History reviewed. No pertinent surgical history.    Social History   Substance Use Topics     Smoking status: Never Smoker     Smokeless tobacco: Never Used     Alcohol use Yes      Comment: 1 drink a week     Family History   Problem Relation Age of Onset     CANCER Mother      Unknown/Adopted Father      DIABETES No family hx of      Hypertension No family hx of            Reviewed and updated as needed this visit by clinical staff  Tobacco  Allergies  Meds  Med Hx  Surg Hx  Fam Hx  Soc Hx      Reviewed and updated as needed this visit by Provider         ROS:  Constitutional, HEENT, cardiovascular, pulmonary, GI, , musculoskeletal, neuro, skin, endocrine and psych systems are negative, except as otherwise noted.    OBJECTIVE:     /74  Pulse 100  Temp 98.4  F (36.9  C) (Oral)  Ht 5' 3\" (1.6 m)  Wt 159 lb (72.1 kg)  LMP 02/06/2018 (Approximate)  Breastfeeding? No  BMI 28.17 kg/m2  Body mass index is 28.17 kg/(m^2).  GENERAL: healthy, alert and no distress  NECK: no adenopathy, no asymmetry, masses, or scars and thyroid normal to palpation  RESP: lungs clear to auscultation - no rales, rhonchi or wheezes  CV: regular rate and rhythm, normal S1 S2, no S3 or S4, no murmur, click or rub, no peripheral edema and peripheral pulses strong  ABDOMEN: soft, nontender, no hepatosplenomegaly, no masses and bowel sounds normal  MS: no gross musculoskeletal defects noted, no edema    Diagnostic Test Results:  none     ASSESSMENT/PLAN:       ICD-10-CM    1. Methamphetamine abuse, episodic F15.10 ADDICTION MEDICINE " REFERRAL   2. Visit for screening mammogram Z12.31    3. Screening for malignant neoplasm of cervix Z12.4    4. Need for prophylactic vaccination with tetanus-diphtheria (TD) Z23    5. Mild persistent asthma without complication J45.30 budesonide-formoterol (SYMBICORT) 160-4.5 MCG/ACT Inhaler     albuterol (PROAIR HFA/PROVENTIL HFA/VENTOLIN HFA) 108 (90 BASE) MCG/ACT Inhaler   6. Mood disorder (H) F39 FLUoxetine (PROZAC) 20 MG capsule     ADDICTION MEDICINE REFERRAL   7. LINA (generalized anxiety disorder) F41.1 busPIRone (BUSPAR) 5 MG tablet     FLUoxetine (PROZAC) 20 MG capsule     ADDICTION MEDICINE REFERRAL     New patient here to refill meds  Reviewed chart in care everywhere  Patient with history of meth use, depression, anxiety and mood disorder  No suicidal ideation,   Please follow up with addiction medicine as planned/psych.  Restart Prozac and Buspar.  Follow up in one week here    Spent  45 min greater than 50% of counseling and coordination of care for the conditions documented above.        See Patient Instructions    Chetan Pérez DO  [facility]

## 2018-03-06 NOTE — LETTER
[facility]  [address] [place] [address]  [phone]                                                                                                March 27, 2018    Mojgan Jimenez  [address]   [facility] [facility] [address]        Dear Ms. Jimenez,    The clinic has been trying to contact you. Please call the clinic in regards to insurance coverage of your Symbicort, thank you.       Sincerely,      Chetan Pérez DO/hl

## 2018-03-07 ASSESSMENT — PATIENT HEALTH QUESTIONNAIRE - PHQ9: SUM OF ALL RESPONSES TO PHQ QUESTIONS 1-9: 22

## 2018-03-07 ASSESSMENT — ANXIETY QUESTIONNAIRES: GAD7 TOTAL SCORE: 18

## 2018-03-07 ASSESSMENT — ASTHMA QUESTIONNAIRES: ACT_TOTALSCORE: 14

## 2018-03-07 NOTE — TELEPHONE ENCOUNTER
Prior Authorization Retail Medication Request    Updated Specialty Medication:  Medication/Dose:  ICD code (if different than what is on RX):  Previously Tried and Failed:    Important Lab Values:  Rationale:    Insurance Name: Unk?  Insurance ID: [id number]  Insurance Phone Number: [phone]    Pharmacy Information (if different than what is on RX)  Name:  Phone:      Updated Retail Medication Request:  Medication/Dose:  ICD code (if different than what is on RX):  Previously Tried and Failed:  Rationale:    Insurance Name:   Insurance ID:       Pharmacy Information (if different than what is on RX)  Name:  Phone:

## 2018-03-08 NOTE — TELEPHONE ENCOUNTER
----- Message from Jose Arana sent at 3/6/2018  4:46 PM CST -----  Regarding: Addiction Med Referral   Please review.   
----- Message from Jose Arana sent at 3/6/2018  4:46 PM CST -----  Regarding: Addiction Med Referral   Please review.   
Please schedule appointment for patient with Addiction Medicine provider for methamphetamine use.   
Pt is scheduled to see Dr. Apple 3/22/18 @ 9:40 am.  Closing encounter no follow up needed.     Jose Arana         
Writer attempted to reach pt; pt was unavailable. Spoke with pt's friend as pt's phone is not service. Left a non-detailed message to have pt call us back at [phone]. Two more attempts will be made.     Светлана Lopez       
145.2 pounds/current

## 2018-03-12 NOTE — TELEPHONE ENCOUNTER
Central Prior Authorization Team   Phone: [phone]    PA Initiation    Medication: budesonide-formoterol (SYMBICORT) 160-4.5 MCG/ACT Inhaler  Insurance Company: [facility] - Phone [phone] Fax [phone]  Pharmacy Filling the Rx: [id number] - [place], [place] - [address] AT [facility] [address] [facility]  Filling Pharmacy Phone: [phone]  Filling Pharmacy Fax:    Start Date: 3/12/2018

## 2018-03-13 NOTE — TELEPHONE ENCOUNTER
See message below-- Okay for alternative Air Duo or do you want to Appeal?    Kristina Whittaker MA

## 2018-03-13 NOTE — TELEPHONE ENCOUNTER
PRIOR AUTHORIZATION DENIED    Medication: budesonide-formoterol (SYMBICORT) 160-4.5 MCG/ACT Inhaler    Denial Date: 3/13/2018    Denial Rational: PATIENT MUST TRY/FAIL AIR DUO IN LAST 180 DAYS TO BE COVERED           Appeal Information:  If provider would like to appeal please provide a letter of medical necessity and route back to PA team.

## 2018-03-19 NOTE — TELEPHONE ENCOUNTER
Patient new to this clinic, please all her and ask her, it was a refill for her.   Chetan Pérez D.O.

## 2018-03-19 NOTE — TELEPHONE ENCOUNTER
Called patient and left message with friend that answered. She stated she will have Mojgan call clinic when she is able.     Aneta Buitrago MA

## 2018-03-21 NOTE — TELEPHONE ENCOUNTER
Called again, no answer. Did not leave voicemail, last time I contacted patient at this number a friend answered phone and she stated patient gave her phone number as contact.     Aneta Buitrago MA

## 2018-05-08 ENCOUNTER — OFFICE VISIT (OUTPATIENT)
Dept: FAMILY MEDICINE | Facility: CLINIC | Age: 48
End: 2018-05-08
Payer: COMMERCIAL

## 2018-05-08 ENCOUNTER — RADIANT APPOINTMENT (OUTPATIENT)
Dept: GENERAL RADIOLOGY | Facility: CLINIC | Age: 48
End: 2018-05-08
Attending: FAMILY MEDICINE
Payer: COMMERCIAL

## 2018-05-08 ENCOUNTER — PATIENT OUTREACH (OUTPATIENT)
Dept: CARE COORDINATION | Facility: CLINIC | Age: 48
End: 2018-05-08

## 2018-05-08 VITALS
TEMPERATURE: 98.2 F | BODY MASS INDEX: 32.85 KG/M2 | HEART RATE: 81 BPM | WEIGHT: 185.4 LBS | HEIGHT: 63 IN | RESPIRATION RATE: 20 BRPM | SYSTOLIC BLOOD PRESSURE: 126 MMHG | OXYGEN SATURATION: 98 % | DIASTOLIC BLOOD PRESSURE: 80 MMHG

## 2018-05-08 DIAGNOSIS — R60.0 LOWER EXTREMITY EDEMA: ICD-10-CM

## 2018-05-08 DIAGNOSIS — J14 PNEUMONIA DUE TO HAEMOPHILUS INFLUENZAE, UNSPECIFIED LATERALITY, UNSPECIFIED PART OF LUNG (H): ICD-10-CM

## 2018-05-08 DIAGNOSIS — R60.0 LOWER EXTREMITY EDEMA: Primary | ICD-10-CM

## 2018-05-08 DIAGNOSIS — T50.904D: ICD-10-CM

## 2018-05-08 DIAGNOSIS — J45.30 MILD PERSISTENT ASTHMA WITHOUT COMPLICATION: ICD-10-CM

## 2018-05-08 LAB
BASOPHILS # BLD AUTO: 0 10E9/L (ref 0–0.2)
BASOPHILS NFR BLD AUTO: 0 %
BETA HCG QUAL IFA URINE: NEGATIVE
DIFFERENTIAL METHOD BLD: ABNORMAL
EOSINOPHIL # BLD AUTO: 0.4 10E9/L (ref 0–0.7)
EOSINOPHIL NFR BLD AUTO: 6.6 %
ERYTHROCYTE [DISTWIDTH] IN BLOOD BY AUTOMATED COUNT: 12.8 % (ref 10–15)
HCT VFR BLD AUTO: 33.1 % (ref 35–47)
HGB BLD-MCNC: 10.9 G/DL (ref 11.7–15.7)
LYMPHOCYTES # BLD AUTO: 1.1 10E9/L (ref 0.8–5.3)
LYMPHOCYTES NFR BLD AUTO: 18.8 %
MCH RBC QN AUTO: 30.7 PG (ref 26.5–33)
MCHC RBC AUTO-ENTMCNC: 32.9 G/DL (ref 31.5–36.5)
MCV RBC AUTO: 93 FL (ref 78–100)
MONOCYTES # BLD AUTO: 0.7 10E9/L (ref 0–1.3)
MONOCYTES NFR BLD AUTO: 12 %
NEUTROPHILS # BLD AUTO: 3.6 10E9/L (ref 1.6–8.3)
NEUTROPHILS NFR BLD AUTO: 62.6 %
PLATELET # BLD AUTO: 205 10E9/L (ref 150–450)
RBC # BLD AUTO: 3.55 10E12/L (ref 3.8–5.2)
WBC # BLD AUTO: 5.7 10E9/L (ref 4–11)

## 2018-05-08 PROCEDURE — 99495 TRANSJ CARE MGMT MOD F2F 14D: CPT | Performed by: FAMILY MEDICINE

## 2018-05-08 PROCEDURE — 80053 COMPREHEN METABOLIC PANEL: CPT | Performed by: FAMILY MEDICINE

## 2018-05-08 PROCEDURE — 71046 X-RAY EXAM CHEST 2 VIEWS: CPT | Mod: FY

## 2018-05-08 PROCEDURE — 84443 ASSAY THYROID STIM HORMONE: CPT | Performed by: FAMILY MEDICINE

## 2018-05-08 PROCEDURE — 36415 COLL VENOUS BLD VENIPUNCTURE: CPT | Performed by: FAMILY MEDICINE

## 2018-05-08 PROCEDURE — 85025 COMPLETE CBC W/AUTO DIFF WBC: CPT | Performed by: FAMILY MEDICINE

## 2018-05-08 PROCEDURE — 84703 CHORIONIC GONADOTROPIN ASSAY: CPT | Performed by: FAMILY MEDICINE

## 2018-05-08 RX ORDER — TRAZODONE HYDROCHLORIDE 50 MG/1
50 TABLET, FILM COATED ORAL
COMMUNITY
Start: 2018-05-05 | End: 2019-04-02

## 2018-05-08 RX ORDER — CEFDINIR 300 MG/1
300 CAPSULE ORAL 2 TIMES DAILY
COMMUNITY
Start: 2018-05-05 | End: 2018-05-13

## 2018-05-08 RX ORDER — BUSPIRONE HYDROCHLORIDE 5 MG/1
5 TABLET ORAL
COMMUNITY
Start: 2018-05-05 | End: 2018-05-08

## 2018-05-08 ASSESSMENT — ENCOUNTER SYMPTOMS
SORE THROAT: 1
CONSTIPATION: 0
PALPITATIONS: 0
NAUSEA: 0
FEVER: 0
CHILLS: 0
CONFUSION: 1
APPETITE CHANGE: 0
DIARRHEA: 1
NERVOUS/ANXIOUS: 1
ABDOMINAL PAIN: 0
BLOOD IN STOOL: 0
SHORTNESS OF BREATH: 1
LIGHT-HEADEDNESS: 0
VOMITING: 0
HEADACHES: 1
CHEST TIGHTNESS: 1
DIZZINESS: 0
DYSURIA: 0

## 2018-05-08 ASSESSMENT — ANXIETY QUESTIONNAIRES
2. NOT BEING ABLE TO STOP OR CONTROL WORRYING: NEARLY EVERY DAY
1. FEELING NERVOUS, ANXIOUS, OR ON EDGE: NEARLY EVERY DAY
IF YOU CHECKED OFF ANY PROBLEMS ON THIS QUESTIONNAIRE, HOW DIFFICULT HAVE THESE PROBLEMS MADE IT FOR YOU TO DO YOUR WORK, TAKE CARE OF THINGS AT HOME, OR GET ALONG WITH OTHER PEOPLE: EXTREMELY DIFFICULT
6. BECOMING EASILY ANNOYED OR IRRITABLE: NEARLY EVERY DAY
7. FEELING AFRAID AS IF SOMETHING AWFUL MIGHT HAPPEN: NEARLY EVERY DAY
3. WORRYING TOO MUCH ABOUT DIFFERENT THINGS: NEARLY EVERY DAY
5. BEING SO RESTLESS THAT IT IS HARD TO SIT STILL: NEARLY EVERY DAY
GAD7 TOTAL SCORE: 21

## 2018-05-08 ASSESSMENT — PATIENT HEALTH QUESTIONNAIRE - PHQ9: 5. POOR APPETITE OR OVEREATING: NEARLY EVERY DAY

## 2018-05-08 NOTE — MR AVS SNAPSHOT
"              After Visit Summary   5/8/2018    Mojgan Jimenez    MRN: [id number]           Patient Information     Date Of Birth          1970        Visit Information        Provider Department      5/8/2018 12:20 PM Aneta Reynolds MD [facility]        Today's Diagnoses     Lower extremity edema    -  1    Polysubstance overdose, undetermined intent, subsequent encounter        Pneumonia due to Haemophilus influenzae, unspecified laterality, unspecified part of lung (H)        Mild persistent asthma without complication           Follow-ups after your visit        Who to contact     If you have questions or need follow up information about today's clinic visit or your schedule please contact [facility] directly at [phone].  Normal or non-critical lab and imaging results will be communicated to you by MyChart, letter or phone within 4 business days after the clinic has received the results. If you do not hear from us within 7 days, please contact the clinic through MyChart or phone. If you have a critical or abnormal lab result, we will notify you by phone as soon as possible.  Submit refill requests through [employer] or call your pharmacy and they will forward the refill request to us. Please allow 3 business days for your refill to be completed.          Additional Information About Your Visit        MyChart Information     [employer] lets you send messages to your doctor, view your test results, renew your prescriptions, schedule appointments and more. To sign up, go to [email or web address] . Click on \"Log in\" on the left side of the screen, which will take you to the Welcome page. Then click on \"Sign up Now\" on the right side of the page.     You will be asked to enter the access code listed below, as well as some personal information. Please follow the directions to create your username and password.     Your access code is: " "[email or web address]  Expires: 2018  7:29 PM     Your access code will  in 90 days. If you need help or a new code, please call your [facility] or [phone].        Care EveryWhere ID     This is your Care EveryWhere ID. This could be used by other organizations to access your [place] medical records  [id number]        Your Vitals Were     Pulse Temperature Respirations Height Last Period Pulse Oximetry    81 98.2  F (36.8  C) (Oral) 20 5' 3\" (1.6 m) 2018 98%    BMI (Body Mass Index)                   32.84 kg/m2            Blood Pressure from Last 3 Encounters:   18 126/80   18 120/74   17 130/80    Weight from Last 3 Encounters:   18 185 lb 6.4 oz (84.1 kg)   18 159 lb (72.1 kg)   17 148 lb (67.1 kg)              We Performed the Following     Beta HCG qual IFA urine     CBC with platelets differential     Comprehensive metabolic panel     TSH with free T4 reflex          Today's Medication Changes          These changes are accurate as of 18 11:59 PM.  If you have any questions, ask your nurse or doctor.               These medicines have changed or have updated prescriptions.        Dose/Directions    FLUoxetine 20 MG capsule   Commonly known as:  PROzac   This may have changed:  how much to take   Changed by:  Aneta Reynolds MD        Dose:  40 mg   Take 2 capsules (40 mg) by mouth daily   Refills:  0                Primary Care Provider Office Phone # Fax #    [facility] [phone]       [facility] [facility] [address]        Goals        Lifestyle    Patient to be seen at [facility] for crisis needs, if medications needed before 5/10/2018 psychiatry visit      Notes - Note created  2018  4:03 PM by Neisha Edward BSW    Goal Statement: Patient to be seen at [facility] for crisis needs, if medications needed before 5/10/2018 " psychiatry visit   Measure of Success: Patient will seek crisis resources if in crisis, will attend scheduled psychiatry visit   Supportive Steps to Achieve: Attend scheduled psychiatry visit  Barriers: Patient has no current medications due to overdose attempt, she cannot have filled until end of the month   Strengths: Patient states she has a supportive family, friend/significant other   Date to Achieve By: 5/10/2018         Equal Access to Services     TIM CHRISTIE AH: Hadii aad ku hadkelsiesaul Hobbs, waaxda luqadaha, qaybta kaalmada faiza, magalie hiltonstefaniepreston salcido . So [facility] [phone].    ATENCIÓN: Si habla español, tiene a smith disposición servicios gratuitos de asistencia lingüística. Llame al [phone].    We comply with applicable federal civil rights laws and [place] laws. We do not discriminate on the basis of race, color, national origin, age, disability, sex, sexual orientation, or gender identity.            Thank you!     Thank you for choosing [facility]  for your care. Our goal is always to provide you with excellent care. Hearing back from our patients is one way we can continue to improve our services. Please take a few minutes to complete the written survey that you may receive in the mail after your visit with us. Thank you!             Your Updated Medication List - Protect others around you: Learn how to safely use, store and throw away your medicines at www.disposemymeds.org.          This list is accurate as of 5/8/18 11:59 PM.  Always use your most recent med list.                   Brand Name Dispense Instructions for use Diagnosis    albuterol 108 (90 Base) MCG/ACT Inhaler    PROAIR HFA/PROVENTIL HFA/VENTOLIN HFA    1 Inhaler    Inhale 2 puffs into the lungs every 6 hours    Mild persistent asthma without complication       budesonide-formoterol 160-4.5 MCG/ACT Inhaler    SYMBICORT    1 Inhaler    Inhale 2 puffs into the lungs 2 times daily    Mild  persistent asthma without complication       busPIRone 5 MG tablet    BUSPAR    90 tablet    Start at 5 mg TID    LINA (generalized anxiety disorder)       cefdinir 300 MG capsule    OMNICEF     Take 300 mg by mouth 2 times daily        FLUoxetine 20 MG capsule    PROzac     Take 2 capsules (40 mg) by mouth daily        traZODone 50 MG tablet    DESYREL     Take 50 mg by mouth nightly as needed        vitamin D 2000 units tablet     100 tablet    Take 2,000 Units by mouth daily    Vitamin D deficiency

## 2018-05-08 NOTE — PROGRESS NOTES
"Clinic Care Coordination Contact--Social Work Initial Call/Assessment  Care Team Conversations    Psychosocial: Provider requested SW see Patient to assess needs after recent suicide attempt via overdose.  SW met with Patient to introduce self and clinic role.  Patient is very pleasant and willing to engage in conversation with SW.  Patient was dressed appropriately and made good eye contact.  Patient was very fidgety in visit, she could not stop wringing her hands throughout the visit.  Patient reports her awareness of this, stating this is an \"after effect\" of using meth.  Patient reports having filled her medications (Trazodone, Lamictal, Prozac, and Buspar) on 4/26/2018 and took all of them in effort to end her life.  Patient reported due to fairly recent meth use, she was hallucinating that others wanted to kill her, so she did this herself.  Patient reports her friend/significant other Shaq found her and call the ambulance who transported her to [facility] ED.  Patient states she cannot recall being taken by ambulance or anything regarding her hospital stay.  Per chart review, Patient also drank 409 with her medications.  Patient reports having completed CD treatment at [facility] 2 months ago for meth use.   She reports process of starting outpatient CD/MH program at [facility] and [facility].  She reports having an upcoming psychiatry appointment at [facility] om 5/10/2018.  Patient reports she cannot fill medications until the end of the month per her insurance.  Patient reports having lost her license due to previous drug use.  Family member brought her to appointment today.  Patient also has medical transport.  Patient reports family have recently assisted her to apply for General Assistance.  Patient reports having emotional support from best friend, aunt, sister and friend/significant other Shaq.  SW offered Patient option of being seen at [facility] to discuss resources in interim of " "psychiatry visit and as unable to fill most recent medications as too early for insurance.  Patient declined but accepted this brochure.  ARTEMIO inquired if Patient has a primary physician as Patient is seeing interim clinic physician today.  Patient states she does not, desires a female and someone very calm who will listen to her.  ARTEMIO discussed with MA, who assisted Patient to coordinate future appointment with Joy Maier NP.  This is scheduled for 5/11/2018.  ARTEMIO will attend this visit.  ARTEMIO provided her contact information to Patient.  Patient is accepting of return call from ARTEMIO.  Patient states she is not suicidal and stated she \"would never put her family through that again\".  Patient states having obtained crisis number from recent ED visit, she would call # if needed.  Patient stated someone is always with her as they were very worried about her and did not realize she would \"go to that extreme\".  ARTEMIO discussed the importance of Patient attending upcoming psychiatry appointment.  We agreed on goal that Patient will attend this scheduled appointment.    ARTEMIO contacted [facility] ([phone]) and spoke with Sagrario.  ARTEMIO updated Sagrario on above with Patient's medications, to please relay to the psychiatrist for Patient's upcoming appointment this week.  ARTEMIO provided her contact information should this be needed.  Per Sagrario, Patient is scheduled to see Calixto Godfrey for psychiatry on 5/10/2018.      ARTEMIO discussed this Patient with provider after visit.      Plan:   1) RATEMIO will send introduction letter and Access Plan to Patient  2) ARTEMIO will see Patient in clinic on 5/11/2018 for further assessment and update, ARTEMIO will update provider that is scheduled to see Patient on 5/11/2018    HILDA Pichardo, CLOTILDE   [facility]   [phone]  5/9/2018 5:08 PM          "

## 2018-05-08 NOTE — PROGRESS NOTES
"  SUBJECTIVE:   Mojgan Jimenez is a 47 year old female presenting with a chief complaint of   Chief Complaint   Patient presents with     Hospital F/U     [facility]     She is an established patient of [place].    Note:  Patient was originally scheduled for a routine physical, but this was changed to a Hospital Follow-up, based on her recent hospitalization and current symptoms/concerns.     Hospital Follow-up Visit:    Hospital: [facility]  Date of Admission: 5/1/18  Date of Discharge: 5/5/18  Reason(s) for Admission: Polysubstance overdose (\"I took all of my medications and 409 ,\" per patient.)  Boyfriend called 911 after he found her unconscious.  Discharge Diagnosis:  Polysubstance overdose, Acute Encephalopathy, Acute Respiratory Failure with Hypoxia, and Haemophilus Influenza Pneumonia.      Problems taking medications regularly:  Yes.  Patient is out of medications, as she took them all during her overdose.  Strattera and Zyprexa are being held, pending her Psychiatry follow up on 5/10/18.    Medication changes since discharge: Updated the patient's med list today (verified on [facility]), but patient only has her inhalers available currently.         Summary of hospitalization:  [facility] information was obtained and reviewed.  Labs, medications, and tests were reviewed with patient at time of exam.  Discharge summary was reviewed 5/9/18, which was not available at time of patient visit.  Patient was intubated due to respiratory distress and hypoxia, with concern for aspiration pneumonia, per discharge summary.  Patient was treated empirically with Ceftriaxone and Metronidazole, discharged on Omnicef x 7 days to cover for Haemophilus Influenza.    Diagnostic Tests/Treatments reviewed in Care Everywhere--CBC, BMP, Magnesium, Phosphorus, EKG, Chest X-ray's.  Potassium normalized prior to discharge.    Other Healthcare Providers Involved in Patient s Care:         [facility] - " "Appointment 5/10/18.    Update since discharge:     Patient plans to follow up with Psychiatry Thursday, 5/10/18, 2 days from now.  Patient is concerned that she is off her medications, as \"I took them all\".  Patient states that her insurance won't allow her to obtain early refills.  Off Prozac, Buspar, and Trazodone, per patient.  Patient notes edema involving her legs and abdomen since discharge, mild.  Calves are painful with walking (5/10) occasionally, but no calf erythema or history of DVT reported.  Toes look red with prolonged standing, but patient denies this currently.  Cough (\"likes there's dust back there\"), but no significant sputum.  No orthopnea or hemoptysis reported.  Throat feels sore with swallowing.  No current thoughts of suicide or suicide plan.  Verified Question #9 on the PHQ-9 form, which references symptoms in the past 2 weeks.  Patient is able to contract for safety, with family monitoring her condition currently.  Feels anxious and jittery, with an occasional \"numb and raw\" sensation involving her cheeks.  Words seem \"jittery when I try to talk\", per patient.  Recently in treatment for Methamphetamine Abuse.  ACT Score is 21 today, as noted in the Flowsheet section of Epic.  See ROS below.     Coding guidelines for this visit:  Type of Medical   Decision Making Face-to-Face Visit       within 7 Days of discharge Face-to-Face Visit        within 14 days of discharge   Moderate Complexity [id number]   High Complexity [id number]            Review of Systems   Constitutional: Negative for appetite change, chills and fever.   HENT: Positive for sore throat.    Respiratory: Positive for chest tightness (Slight x 2-3 days, resolves with Albuterol.  Used Albuterol once in the past 2 days.) and shortness of breath (Slight x 2-3 days, resolves with Albuterol.).    Cardiovascular: Positive for leg swelling. Negative for palpitations.   Gastrointestinal: Positive for diarrhea. Negative for " "abdominal pain, blood in stool, constipation, nausea and vomiting.   Genitourinary: Negative for dysuria.   Neurological: Positive for headaches. Negative for dizziness and light-headedness.   Psychiatric/Behavioral: Positive for confusion (improving-\"feels like I lost a few days\"). The patient is nervous/anxious.      Patient Active Problem List   Diagnosis     Mild persistent asthma without complication     Vitamin D deficiency     LINA (generalized anxiety disorder)     Recurrent major depressive disorder, remission status unspecified (H)     Methamphetamine abuse, episodic     Family History   Problem Relation Age of Onset     CANCER Mother      Unknown/Adopted Father      DIABETES No family hx of      Hypertension No family hx of      Current Outpatient Prescriptions   Medication Sig Dispense Refill     albuterol (PROAIR HFA/PROVENTIL HFA/VENTOLIN HFA) 108 (90 BASE) MCG/ACT Inhaler Inhale 2 puffs into the lungs every 6 hours 1 Inhaler 3     budesonide-formoterol (SYMBICORT) 160-4.5 MCG/ACT Inhaler Inhale 2 puffs into the lungs 2 times daily 1 Inhaler 3     cefdinir (OMNICEF) 300 MG capsule Take 300 mg by mouth 2 times daily       FLUoxetine (PROZAC) 20 MG capsule Take 2 capsules (40 mg) by mouth daily  0     busPIRone (BUSPAR) 5 MG tablet Start at 5 mg TID (Patient not taking: Reported on 5/8/2018) 90 tablet 0     Cholecalciferol (VITAMIN D) 2000 UNITS tablet Take 2,000 Units by mouth daily (Patient not taking: Reported on 5/8/2018) 100 tablet 3     traZODone (DESYREL) 50 MG tablet Take 50 mg by mouth nightly as needed       Social History   Substance Use Topics     Smoking status: Never Smoker     Smokeless tobacco: Never Used     Alcohol use Yes      Comment: 1 drink a week       OBJECTIVE  /80 (BP Location: Right arm, Patient Position: Chair, Cuff Size: Adult Regular)  Pulse 81  Temp 98.2  F (36.8  C) (Oral)  Resp 20  Ht 5' 3\" (1.6 m)  Wt 185 lb 6.4 oz (84.1 kg)  LMP 04/08/2018  SpO2 98%  BMI " 32.84 kg/m2    Physical Exam    GENERAL APPEARANCE:  Awake, alert, and oriented x 3.  PSYCHIATRIC:  Anxious affect and somewhat rapid speech initially, but calms over the course of the appointment.  Smiles at times throughout visit.  No suicidal ideation, homicidal ideation, or yasemin.  Able to contract for safety.  PHQ-9 form answers verified, as noted above.  HEENT:  Sclera anicteric.  No conjunctivitis.  PERRLA.  Extraocular movements are intact.  Bilateral TM's and canals are within normal limits.  No obvious nasal congestion.  No erythema, edema, or exudates of the oral mucosa or posterior pharynx.  Mucous membranes moist.  NECK:  Spontaneous full range of motion.  No thyromegaly or mass.  No lymphadenopathy.  HEART:  Normal S1, S2.  Regular rate and rhythm.  No murmurs, rubs, or gallops.  LUNGS:  No respiratory distress.  No wheezes, rales, or rhonchi.  ABDOMEN:  Not distended.  Soft.  Not tender.  No mass.  EXTREMITIES:  Moves 4 extremities.   1+ edema to mid calf level.  Calves are symmetric, without tenderness or erythema.  NEUROLOGIC:  Gait within normal limits.  No facial droop or acute neurologic deficits.  SKIN:  No rash, pallor, or diaphoresis.    Labs:  Results for orders placed or performed in visit on 05/08/18 (from the past 24 hour(s))   CBC with platelets differential   Result Value Ref Range    WBC 5.7 4.0 - 11.0 10e9/L    RBC Count 3.55 (L) 3.8 - 5.2 10e12/L    Hemoglobin 10.9 (L) 11.7 - 15.7 g/dL    Hematocrit 33.1 (L) 35.0 - 47.0 %    MCV 93 78 - 100 fl    MCH 30.7 26.5 - 33.0 pg    MCHC 32.9 31.5 - 36.5 g/dL    RDW 12.8 10.0 - 15.0 %    Platelet Count 205 150 - 450 10e9/L    Diff Method Automated Method     % Neutrophils 62.6 %    % Lymphocytes 18.8 %    % Monocytes 12.0 %    % Eosinophils 6.6 %    % Basophils 0.0 %    Absolute Neutrophil 3.6 1.6 - 8.3 10e9/L    Absolute Lymphocytes 1.1 0.8 - 5.3 10e9/L    Absolute Monocytes 0.7 0.0 - 1.3 10e9/L    Absolute Eosinophils 0.4 0.0 - 0.7 10e9/L     Absolute Basophils 0.0 0.0 - 0.2 10e9/L   Beta HCG qual IFA urine   Result Value Ref Range    Beta HCG Qual IFA Urine Negative NEG^Negative        Patient declines D-Dimer post risks and benefits discussion.    Chest X-ray negative for acute changes, infiltrate, effusion, or pneumothorax.  Formal Radiology report was reviewed in Epic.    ASSESSMENT:      ICD-10-CM    1. Lower extremity edema (mild) post recent polysubstance overdose and ingestion of 409 .  Doubt DVT/PE, given the: bilateral edema, stable vitals, and exam, but this is on the differential, given the patient's recent hospitalization.  Repeat Chest X-ray was negative today.  Labs are pending at the time of discharge, as noted. R60.0 Beta HCG qual IFA urine     Comprehensive metabolic panel     CBC with platelets differential     TSH with free T4 reflex     XR Chest 2 Views   2. Polysubstance overdose, undetermined intent, subsequent encounter, currently off her usual depression and anxiety medications.  Patient is followed by [facility]. T50.904D    3. Pneumonia due to Haemophilus influenzae, unspecified laterality, unspecified part of lung (H), on Omnicef post recent hospitalization.  Chest X-ray today is within normal limits. J14 XR Chest 2 Views   4. Mild persistent asthma without complication, with current ACT score 21.  Patient is using Albuterol as needed, with good symptom control. J45.30         PLAN:    -Care Coordination met with patient (per provider request), as patient had questions about how to obtain her medications.   -Appreciate Care Coordination seeing patient today.   -Patient declines referral to [facility] for further evaluation of her anxiety symptoms today, as recommended.   -Patient prefers to follow up with Psychiatry in 2 days, as previously scheduled.   -Patient will establish care with Dr. Maier Friday, 5/11/18, as arranged today.  -Complete Omnicef, as discussed.  -Keep extremities elevated  and consider compression socks/stockings, only as appropriate.  -Patient declines D-Dimer or further evaluation of her lower extremity edema today, but she agrees to follow up in the ER immediately should she develop:  worsening edema, hemoptysis, chest pain/shortness unresponsive to Albuterol, increasing Albuterol use, escalating anxiety, or suicidal ideation/plan.  -Patient was able to contract for safety with examiner and Care Coordination today, so she was discharged ambulatory and in stable condition post discussion of recommended follow up.  -Care Coordination confirms that patient has a good social support system in place.    Aneta Reynolds MD  [facility] [place]

## 2018-05-09 ENCOUNTER — TELEPHONE (OUTPATIENT)
Dept: FAMILY MEDICINE | Facility: CLINIC | Age: 48
End: 2018-05-09

## 2018-05-09 LAB
ALBUMIN SERPL-MCNC: 3.5 G/DL (ref 3.4–5)
ALP SERPL-CCNC: 63 U/L (ref 40–150)
ALT SERPL W P-5'-P-CCNC: 71 U/L (ref 0–50)
ANION GAP SERPL CALCULATED.3IONS-SCNC: 4 MMOL/L (ref 3–14)
AST SERPL W P-5'-P-CCNC: 55 U/L (ref 0–45)
BILIRUB SERPL-MCNC: 0.2 MG/DL (ref 0.2–1.3)
BUN SERPL-MCNC: 10 MG/DL (ref 7–30)
CALCIUM SERPL-MCNC: 8.7 MG/DL (ref 8.5–10.1)
CHLORIDE SERPL-SCNC: 105 MMOL/L (ref 94–109)
CO2 SERPL-SCNC: 31 MMOL/L (ref 20–32)
CREAT SERPL-MCNC: 0.57 MG/DL (ref 0.52–1.04)
GFR SERPL CREATININE-BSD FRML MDRD: >90 ML/MIN/1.7M2
GLUCOSE SERPL-MCNC: 67 MG/DL (ref 70–99)
POTASSIUM SERPL-SCNC: 4.1 MMOL/L (ref 3.4–5.3)
PROT SERPL-MCNC: 7.3 G/DL (ref 6.8–8.8)
SODIUM SERPL-SCNC: 140 MMOL/L (ref 133–144)
TSH SERPL DL<=0.005 MIU/L-ACNC: 1.63 MU/L (ref 0.4–4)

## 2018-05-09 ASSESSMENT — ANXIETY QUESTIONNAIRES: GAD7 TOTAL SCORE: 21

## 2018-05-09 ASSESSMENT — ASTHMA QUESTIONNAIRES: ACT_TOTALSCORE: 21

## 2018-05-09 ASSESSMENT — PATIENT HEALTH QUESTIONNAIRE - PHQ9: SUM OF ALL RESPONSES TO PHQ QUESTIONS 1-9: 24

## 2018-05-09 NOTE — TELEPHONE ENCOUNTER
Please call patient.  Her glucose is slightly low (67), and her ALT/AST (liver tests) are slightly increased, as compared with her labs from 5/2/18.  The patient's electrolytes have normalized, and her Creatinine (kidney test) remains stable.  Her TSH (thyroid) test is within normal limits.  Patient should follow up later this week, as advised at the time of her visit.  Patient should be seen in the ER with any worsening swelling or symptoms (e.g. shortness of breath, not responsive to Albuterol), as discussed during her appointment yesterday.  Thank you.     Forwarding to Team for RN to call.       Jesica AMAYA

## 2018-05-10 NOTE — TELEPHONE ENCOUNTER
Called patient and provided message below as per Dr. Delarosa.  Patient verbalized understanding.  She reports swelling is better today and breathing improving also.  Reviewed warning signs and when to seek urgent medical attention.  Patient verbalized understanding.  Follow up as planned with Joy Maier NP tomorrow.    Angelito Freedman RN

## 2018-05-15 ENCOUNTER — PATIENT OUTREACH (OUTPATIENT)
Dept: CARE COORDINATION | Facility: CLINIC | Age: 48
End: 2018-05-15

## 2018-05-15 PROBLEM — F33.9 RECURRENT MAJOR DEPRESSIVE DISORDER, REMISSION STATUS UNSPECIFIED (H): Status: ACTIVE | Noted: 2017-11-02

## 2018-05-15 NOTE — LETTER
Health Care Home - Access Care Plan    About Me  Patient Name:  Mojgan Jimenez    YOB: 1970  Age:                             47 year old   [place] MRN:            [id number] Telephone Information:     Home Phone [phone]   Mobile [phone]       Address:    [address]   [place] [place] [address] Email address:  No e-mail address on record      Emergency Contact(s)  Name Relationship Lgl Grd Work Phone Home Phone Mobile Phone   1. SEPIDEH KELSEY Friend   [phone]             Health Maintenance:  Not assessed     My Access Plan  Medical Emergency 911   Questions or concerns during clinic hours Primary Clinic Line, I will call the clinic directly: [facility] [phone]   24 Hour Appointment Line [phone] or  [phone]) (toll free)   24 Hour Nurse Line [phone] (toll free)   Questions or concerns outside clinic hours 24 Hour Appointment Line, I will call the after-hours on-call line:   [facility] [phone] or [phone]) (toll-free)   Preferred Urgent Care     Preferred Hospital     Preferred Pharmacy [id number] - [place], [place] - [address] AT [facility] & [facility]     Behavioral Health Crisis Line The National Suicide Prevention Lifeline at 1-224.636.6334 or 911     My Care Team Members  Patient Care Team       Relationship Specialty Notifications Start End    [facility] PCP - General Clinic  10/30/17     Phone: [phone] Fax: [phone]         [address] [facility] [address]    Neisha Edward BSW Clinic Care Coordinator Primary Care - CC  5/8/18     Phone: [phone] Fax: [phone]               My Medical and Care Information  Problem List   Patient Active Problem List   Diagnosis     Mild persistent asthma without complication     Vitamin D deficiency     LINA (generalized anxiety disorder)     Recurrent major  depressive disorder, remission status unspecified (H)     Methamphetamine abuse, episodic      Current Medications and Allergies:  See printed Medication Report

## 2018-05-15 NOTE — LETTER
[facility] Home  Complex Care Plan  About Me  Patient Name:  Mojgan Jimenez    YOB: 1970  Age:     47 year old   [place] MRN:   [id number] Telephone Information:    Home Phone [phone]   Mobile [phone]       Address:    [address]   [facility] [address] Email address:  No e-mail address on record      Emergency Contact(s)  Name Relationship Lgl Grd Work Phone Home Phone Mobile Phone   1. SEPIDEH KELSEY Friend   [phone]           Primary language:  English     needed? No   [place] [facility]:  [phone] op. 1  Other communication barriers:  Needs further assessment   Preferred Method of Communication: In person, phone   Current living arrangement:  Not assessed   Mobility Status/ Medical Equipment:  None    Health Maintenance  Health Maintenance Reviewed:      My Access Plan  Medical Emergency 911   Primary Clinic Line [facility] [phone]   24 Hour Appointment Line [phone] or  [phone]) (toll-free)   24 Hour Nurse Line [phone] (toll-free)   Preferred Urgent Care  Not assessed    Preferred Hospital  [facility]   Preferred Pharmacy [id number] - [place], [place] - [address] AT [facility] & [facility]     Behavioral Health Crisis Line The National Suicide Prevention Lifeline at 1-508.363.9027 or 911     My Care Team Members    Patient Care Team       Relationship Specialty Notifications Start End    Clinic, [facility] PCP - General Clinic  10/30/17     Phone: [phone] Fax: [phone]         [address] [facility] [address]    Neisha Edward BSW Clinic Care Coordinator Primary Care - CC  5/8/18     Phone: [phone] Fax: [phone]                My Care Plans  Self Management and Treatment Plan  Goals and (Comments)  Goals        General    I will attend upcoming scheduled psychiatry appointment  (pt-stated)      Notes - Note created  5/15/2018 11:05 AM by Neisha Edward BSW      Goal Statement: I will attend upcoming scheduled psychiatry appointment  Measure of Success:  Patient will attend above appointment   Supportive Steps to Achieve: Coordinate transportation and attend appointment   Barriers: Transportation, Patient may not attend as cannot fill prescriptions til end of month  Strengths: Patient appears motivated to attend psychiatry appointment   Date to Achieve By: May 10, 2018      Medication 1 (pt-stated)     Mental Health Management (pt-stated)        Lifestyle    Patient to be seen at [facility] for crisis needs, if medications needed before 5/10/2018 psychiatry visit      Notes - Note created  5/8/2018  4:03 PM by Neisha Edward BSW    Goal Statement: Patient to be seen at [facility] for crisis needs, if medications needed before 5/10/2018 psychiatry visit   Measure of Success: Patient will seek crisis resources if in crisis, will attend scheduled psychiatry visit   Supportive Steps to Achieve: Attend scheduled psychiatry visit  Barriers: Patient has no current medications due to overdose attempt, she cannot have filled until end of the month   Strengths: Patient states she has a supportive family, friend/significant other   Date to Achieve By: 5/10/2018       Patient will take medications as prescribed      Notes - Note created  5/15/2018 11:15 AM by Neisha Edward BSW    Goal Statement: Patient will take medications as prescribed   Measure of Success: Patient will take as prescribed   Supportive Steps to Achieve: Patient will take only prescribed amount, will set up reminder system as needed   Barriers: Patient took all medications for the month in overdose attempt, she cannot fill again until end of the month.  She currently has no medications related to mood   Strengths: Patient appears motivated, she expressed being regretful for having take all  medications and to understand the importance of taking as prescribed  Date to Achieve By: Ongoing              Action Plans on File: None       Advance Care Plans/Directives Type:        My Medical and Care Information  Problem List   Patient Active Problem List   Diagnosis     Mild persistent asthma without complication     Vitamin D deficiency     LINA (generalized anxiety disorder)     Recurrent major depressive disorder, remission status unspecified (H)     Methamphetamine abuse, episodic      Current Medications and Allergies:  See printed Medication Report.    Care Coordination Start Date: 5/8/2018   Frequency of Care Coordination: 2 weeks   Form Last Updated: 05/15/2018

## 2018-05-15 NOTE — LETTER
[facility] CARE COORDINATION    May 15, 2018    Mojgan Jimenez  [address]   [facility] [place] [facility] [address]      Dear Mojgan,    Thank you for meeting with me recently in the clinic.  I left a message today with your emergency contact Teressa requesting you call and provide an update.  This is the only listed phone number.      The clinic care coordinator is a registered nurse and/or  who understand the health care system. The goal of clinic care coordination is to help you manage your health and improve access to the [facility] system in the most efficient manner. The registered nurse can assist you in meeting your health care goals by providing education, coordinating services, and strengthening the communication among your providers. The  can assist you with financial, behavioral, psychosocial, chemical dependency, counseling, and/or psychiatric resources.    Please feel free to contact me at [phone] with any questions or concerns. We at [facility] are focused on providing you with the highest-quality healthcare experience possible and that all starts with you.     Sincerely,     Neisha Edward, WILLIAMW, MSW    Clinical Care Coordination  [facility]  [phone]    Enclosed: I have enclosed a copy of the Complex Care Plan. This has helpful information and goals that we have talked about. Please keep this in an easy to access place to use as needed.

## 2018-05-15 NOTE — LETTER
[facility] Home  Complex Care Plan  About Me  Patient Name:  Mojgan Jimenez    YOB: 1970  Age:     47 year old   [place] MRN:   [id number] Telephone Information:    Home Phone [phone]   Mobile [phone]       Address:    [address]   [facility] [address] Email address:  No e-mail address on record      Emergency Contact(s)  Name Relationship Lgl Grd Work Phone Home Phone Mobile Phone   1. SEPIDEH KELSEY Friend   [phone]           Primary language:  English     needed? No   [place] [employer]:  [phone] op. 1  Other communication barriers:    Preferred Method of Communication:  Mail  Current living arrangement:    Mobility Status/ Medical Equipment:      Health Maintenance  Health Maintenance Reviewed:  Not assessed     My Access Plan  Medical Emergency 911   Primary Clinic Line [facility] [phone]   24 Hour Appointment Line [phone] or  [phone] (toll-free)   24 Hour Nurse Line [phone] (toll-free)   Preferred Urgent Care     Preferred Hospital     Preferred Pharmacy [facility] [id number] - [place], [place] - [address] AT [facility] & [facility]     Behavioral Health Crisis Line The National Suicide Prevention Lifeline at 1-131.284.5164 or 911     My Care Team Members    Patient Care Team       Relationship Specialty Notifications Start End    [facility] PCP - General Clinic  10/30/17     Phone: [phone] Fax: [phone]         [address] [facility] [address]    Neisha Edward BSW Clinic Care Coordinator Primary Care - CC  5/8/18     Phone: [phone] Fax: [phone]                My Care Plans  Self Management and Treatment Plan  Goals and (Comments)  Goals        General    I will attend upcoming scheduled psychiatry appointment  (pt-stated)     Notes - Note created  5/15/2018 11:05 AM by Neisha Edward BSW       Goal Statement: I will attend upcoming scheduled psychiatry appointment  Measure of Success:  Patient will attend above appointment   Supportive Steps to Achieve: Coordinate transportation and attend appointment   Barriers: Transportation, Patient may not attend as cannot fill prescriptions til end of month  Strengths: Patient appears motivated to attend psychiatry appointment   Date to Achieve By: May 10, 2018      Medication 1 (pt-stated)     Mental Health Management (pt-stated)        Lifestyle    Patient to be seen at [facility] for crisis needs, if medications needed before 5/10/2018 psychiatry visit      Notes - Note created  5/8/2018  4:03 PM by Neisha Edwrad BSW    Goal Statement: Patient to be seen at [facility] for crisis needs, if medications needed before 5/10/2018 psychiatry visit   Measure of Success: Patient will seek crisis resources if in crisis, will attend scheduled psychiatry visit   Supportive Steps to Achieve: Attend scheduled psychiatry visit  Barriers: Patient has no current medications due to overdose attempt, she cannot have filled until end of the month   Strengths: Patient states she has a supportive family, friend/significant other   Date to Achieve By: 5/10/2018       Patient will take medications as prescribed      Notes - Note created  5/15/2018 11:15 AM by Neisha Edward BSW    Goal Statement: Patient will take medications as prescribed   Measure of Success: Patient will take as prescribed   Supportive Steps to Achieve: Patient will take only prescribed amount, will set up reminder system as needed   Barriers: Patient took all medications for the month in overdose attempt, she cannot fill again until end of the month.  She currently has no medications related to mood   Strengths: Patient appears motivated, she expressed being regretful for having take all medications and to understand the importance of taking as prescribed  Date  to Achieve By: Ongoing              Action Plans on File:       Advance Care Plans/Directives Type:        My Medical and Care Information  Problem List   Patient Active Problem List   Diagnosis     Mild persistent asthma without complication     Vitamin D deficiency     LINA (generalized anxiety disorder)     Recurrent major depressive disorder, remission status unspecified (H)     Methamphetamine abuse, episodic      Current Medications and Allergies:  See printed Medication Report.    Care Coordination Start Date: No linked episodes   Frequency of Care Coordination: 2 weeks   Form Last Updated: 05/15/2018

## 2018-05-20 ENCOUNTER — HEALTH MAINTENANCE LETTER (OUTPATIENT)
Age: 48
End: 2018-05-20

## 2018-06-20 ENCOUNTER — PATIENT OUTREACH (OUTPATIENT)
Dept: CARE COORDINATION | Facility: CLINIC | Age: 48
End: 2018-06-20

## 2018-06-20 NOTE — PROGRESS NOTES
Clinic Care Coordination Contact--Social Work Follow up Call/Assessment  Care Team Conversations    Psychosocial: SW previously met with Patient.  Patient informed SW she took all medications in effort to end her life.  Patient cannot fill medications until end of the month. Goal established with Patient in initial visit that she attend scheduled psychiatry appointment on 5/10/2018. SW called Patient for update on this and current status.  SW called [facility] ([phone])  spoke with Massiel, who informed that Patient did attend scheduled psychiatry visit.  Per chart review, Patient is scheduled to see Joy Livier on 5/24/2018 to establish care.     Call to Patient for update.  She reports she is attending day treatment at [facility] 3 days per week and meets 1:1 with addiction therapist once per week.  She plans to start therapy in July.  She reports having her medications were filled at previous [facility] appointment (see above).  Patient states plan to establish care with Joy Maier but had to cancel previously scheduled appointment as was on house arrest.  She reports having completed house arrest and plan to call to schedule a physical at the clinic.  SW offered warm transfer.  Patient declined as would rather call once she looks at her schedule.  She also must coordinate transport with her sister.  Patient reports her mood has been stable but is aware of crisis resource if needed and would utilize.      Plan:   1) Patient to utilize crisis resources when in crisis   2) SW will update PCP and call Patient in 2 weeks     Neisha Edward, HILDA, MSW   [facility]   [phone]  6/20/2018 4:53 PM      Clinic Care Coordination Contact--Social Work Follow up Call/Assessment  Care Team Conversations    Psychosocial: SW previously met with Patient.  Patient informed SW she took all medications in effort to end her life.  Patient cannot fill  medications until end of the month. Goal established with Patient in initial visit that she attend scheduled psychiatry appointment on 5/10/2018. SW called Patient for update on this and current status.  SW called [facility] ([phone])  spoke with Massiel, who informed that Patient did attend scheduled psychiatry visit.  Per chart review, Patient is scheduled to see Joy Maier on 5/24/2018 to establish care.     Call to Patient for update.  She reports she is attending day treatment at [facility] 3 days per week and meets 1:1 with addiction therapist once per week.  She plans to start therapy in July.  She reports having her medications were filled at previous [facility] appointment (see above).  Patient states plan to establish care with Joy Maier but had to cancel previously scheduled appointment as was on house arrest.  She reports having completed house arrest and plan to call to schedule a physical at the clinic.  SW offered warm transfer.  Patient declined as would rather call once she looks at her schedule.  She also must coordinate transport with her sister.  Patient reports her mood has been stable but is aware of crisis resource if needed and would utilize.      SW will close as could not previously reach Patient.  It has also been several months since last outreach attempt with Patient.    Neisha Edward, WILLIAMW, MSW   [facility]   [phone]  9/5/2018 4:31 PM

## 2018-11-06 DIAGNOSIS — J45.30 MILD PERSISTENT ASTHMA WITHOUT COMPLICATION: ICD-10-CM

## 2018-11-06 NOTE — TELEPHONE ENCOUNTER
"Requested Prescriptions   Pending Prescriptions Disp Refills     VENTOLIN  (90 Base) MCG/ACT inhaler [Pharmacy Med Name: VENTOLIN HFA  AER]  Last Written Prescription Date:  3/6/2018  Last Fill Quantity: 1 inhaler,  # refills: 3   Last office visit: 5/8/2018 with prescribing provider:  LEW Pérez   Future Office Visit:     18 g      Sig: INHALE 2 PUFFS INTO LUNGS EVERY SIX HOURS    Asthma Maintenance Inhalers - Anticholinergics Failed    11/6/2018 12:00 PM       Failed - Asthma control assessment score within normal limits in last 6 months    Please review ACT score.   ACT Total Scores 11/2/2017 3/6/2018 5/8/2018   ACT TOTAL SCORE (Goal Greater than or Equal to 20) 17 14 21   In the past 12 months, how many times did you visit the emergency room for your asthma without being admitted to the hospital? 0 0 0   In the past 12 months, how many times were you hospitalized overnight because of your asthma? 0 0 0          Failed - Recent (6 mo) or future (30 days) visit within the authorizing provider's specialty    Patient had office visit in the last 6 months or has a visit in the next 30 days with authorizing provider or within the authorizing provider's specialty.  See \"Patient Info\" tab in inbasket, or \"Choose Columns\" in Meds & Orders section of the refill encounter.           Passed - Patient is age 12 years or older          "

## 2018-11-09 RX ORDER — ALBUTEROL SULFATE 90 UG/1
AEROSOL, METERED RESPIRATORY (INHALATION)
Qty: 18 G | Refills: 0 | Status: SHIPPED | OUTPATIENT
Start: 2018-11-09 | End: 2019-04-02

## 2018-11-09 NOTE — TELEPHONE ENCOUNTER
Route refill request for ventolin to provider. You last saw patient on 3/6/18, and patient was new to you at that time, then she saw Dr. Delarosa in May.  RN notified patient that she is due for a visit.  Would you like to provide a emilia?    Brandie Piper RN

## 2018-11-09 NOTE — TELEPHONE ENCOUNTER
I have only seen her once, Ok to refill, but needs to establish care with a new provider as planned  Chetan Pérez D.O.

## 2018-11-30 ENCOUNTER — MEDICAL CORRESPONDENCE (OUTPATIENT)
Dept: HEALTH INFORMATION MANAGEMENT | Facility: CLINIC | Age: 48
End: 2018-11-30

## 2018-12-03 DIAGNOSIS — F10.20 ALCOHOLISM (H): Primary | ICD-10-CM

## 2018-12-03 DIAGNOSIS — F33.0 MILD EPISODE OF RECURRENT MAJOR DEPRESSIVE DISORDER (H): ICD-10-CM

## 2018-12-03 DIAGNOSIS — F41.1 GENERALIZED ANXIETY DISORDER: ICD-10-CM

## 2018-12-03 DIAGNOSIS — F33.3 MAJOR DEPRESSIVE DISORDER, RECURRENT EPISODE, SEVERE, WITH PSYCHOSIS (H): ICD-10-CM

## 2018-12-03 DIAGNOSIS — F15.20: ICD-10-CM

## 2018-12-03 LAB
BASOPHILS # BLD AUTO: 0 10E9/L (ref 0–0.2)
BASOPHILS NFR BLD AUTO: 0 %
DIFFERENTIAL METHOD BLD: NORMAL
EOSINOPHIL # BLD AUTO: 0.4 10E9/L (ref 0–0.7)
EOSINOPHIL NFR BLD AUTO: 7.5 %
ERYTHROCYTE [DISTWIDTH] IN BLOOD BY AUTOMATED COUNT: 12.9 % (ref 10–15)
HBA1C MFR BLD: 4.9 % (ref 0–5.6)
HCT VFR BLD AUTO: 38.7 % (ref 35–47)
HGB BLD-MCNC: 12.6 G/DL (ref 11.7–15.7)
LYMPHOCYTES # BLD AUTO: 1.5 10E9/L (ref 0.8–5.3)
LYMPHOCYTES NFR BLD AUTO: 27.2 %
MCH RBC QN AUTO: 29.7 PG (ref 26.5–33)
MCHC RBC AUTO-ENTMCNC: 32.6 G/DL (ref 31.5–36.5)
MCV RBC AUTO: 91 FL (ref 78–100)
MONOCYTES # BLD AUTO: 0.4 10E9/L (ref 0–1.3)
MONOCYTES NFR BLD AUTO: 7.5 %
NEUTROPHILS # BLD AUTO: 3.1 10E9/L (ref 1.6–8.3)
NEUTROPHILS NFR BLD AUTO: 57.8 %
PLATELET # BLD AUTO: 230 10E9/L (ref 150–450)
RBC # BLD AUTO: 4.24 10E12/L (ref 3.8–5.2)
WBC # BLD AUTO: 5.4 10E9/L (ref 4–11)

## 2018-12-03 PROCEDURE — 80053 COMPREHEN METABOLIC PANEL: CPT

## 2018-12-03 PROCEDURE — 84443 ASSAY THYROID STIM HORMONE: CPT

## 2018-12-03 PROCEDURE — 85025 COMPLETE CBC W/AUTO DIFF WBC: CPT

## 2018-12-03 PROCEDURE — 80307 DRUG TEST PRSMV CHEM ANLYZR: CPT | Mod: 90

## 2018-12-03 PROCEDURE — 83036 HEMOGLOBIN GLYCOSYLATED A1C: CPT

## 2018-12-03 PROCEDURE — 84439 ASSAY OF FREE THYROXINE: CPT

## 2018-12-03 PROCEDURE — 99000 SPECIMEN HANDLING OFFICE-LAB: CPT

## 2018-12-03 PROCEDURE — 36415 COLL VENOUS BLD VENIPUNCTURE: CPT

## 2018-12-03 PROCEDURE — 80061 LIPID PANEL: CPT

## 2018-12-04 LAB
ALBUMIN SERPL-MCNC: 3.6 G/DL (ref 3.4–5)
ALP SERPL-CCNC: 77 U/L (ref 40–150)
ALT SERPL W P-5'-P-CCNC: 25 U/L (ref 0–50)
AMPHETAMINES UR QL: NEGATIVE NG/ML
ANION GAP SERPL CALCULATED.3IONS-SCNC: 10 MMOL/L (ref 3–14)
AST SERPL W P-5'-P-CCNC: 22 U/L (ref 0–45)
BARBITURATES SERPLBLD QL: NEGATIVE UG/ML
BENZODIAZ SERPL QL: NEGATIVE NG/ML
BILIRUB SERPL-MCNC: 0.3 MG/DL (ref 0.2–1.3)
BUN SERPL-MCNC: 16 MG/DL (ref 7–30)
CALCIUM SERPL-MCNC: 8.8 MG/DL (ref 8.5–10.1)
CANNABINOIDS SERPL QL: NEGATIVE NG/ML
CHLORIDE SERPL-SCNC: 106 MMOL/L (ref 94–109)
CHOLEST SERPL-MCNC: 217 MG/DL
CO2 SERPL-SCNC: 22 MMOL/L (ref 20–32)
COCAINE SERPL QL: NEGATIVE NG/ML
CREAT SERPL-MCNC: 0.65 MG/DL (ref 0.52–1.04)
ETHANOL BLD GC-MCNC: NEGATIVE GM/DL
GFR SERPL CREATININE-BSD FRML MDRD: >90 ML/MIN/1.7M2
GLUCOSE SERPL-MCNC: 91 MG/DL (ref 70–99)
HDLC SERPL-MCNC: 48 MG/DL
LDLC SERPL CALC-MCNC: 152 MG/DL
METHADONE SERPL QL: NEGATIVE NG/ML
NONHDLC SERPL-MCNC: 169 MG/DL
OPIATES SERPL QL: NEGATIVE NG/ML
OXYCODONE SERPL QL: NEGATIVE NG/ML
PCP SERPL QL: NEGATIVE NG/ML
POTASSIUM SERPL-SCNC: 4.1 MMOL/L (ref 3.4–5.3)
PROPOXYPH SERPL QL: NEGATIVE NG/ML
PROT SERPL-MCNC: 7.4 G/DL (ref 6.8–8.8)
SODIUM SERPL-SCNC: 138 MMOL/L (ref 133–144)
T4 FREE SERPL-MCNC: 0.82 NG/DL (ref 0.76–1.46)
TRIGL SERPL-MCNC: 83 MG/DL
TSH SERPL DL<=0.005 MIU/L-ACNC: 2.64 MU/L (ref 0.4–4)

## 2019-01-20 PROBLEM — E78.5 HYPERLIPIDEMIA LDL GOAL <160: Status: ACTIVE | Noted: 2019-01-20

## 2019-04-02 ENCOUNTER — OFFICE VISIT (OUTPATIENT)
Dept: FAMILY MEDICINE | Facility: CLINIC | Age: 49
End: 2019-04-02
Payer: COMMERCIAL

## 2019-04-02 VITALS
HEIGHT: 63 IN | DIASTOLIC BLOOD PRESSURE: 76 MMHG | WEIGHT: 240.2 LBS | TEMPERATURE: 98.4 F | OXYGEN SATURATION: 97 % | BODY MASS INDEX: 42.56 KG/M2 | HEART RATE: 86 BPM | SYSTOLIC BLOOD PRESSURE: 126 MMHG

## 2019-04-02 DIAGNOSIS — F33.9 RECURRENT MAJOR DEPRESSIVE DISORDER, REMISSION STATUS UNSPECIFIED (H): ICD-10-CM

## 2019-04-02 DIAGNOSIS — E66.01 MORBID OBESITY (H): ICD-10-CM

## 2019-04-02 DIAGNOSIS — Z00.00 ROUTINE HISTORY AND PHYSICAL EXAMINATION OF ADULT: Primary | ICD-10-CM

## 2019-04-02 DIAGNOSIS — Z12.4 SCREENING FOR MALIGNANT NEOPLASM OF CERVIX: ICD-10-CM

## 2019-04-02 DIAGNOSIS — J45.30 MILD PERSISTENT ASTHMA WITHOUT COMPLICATION: ICD-10-CM

## 2019-04-02 DIAGNOSIS — Z12.31 VISIT FOR SCREENING MAMMOGRAM: ICD-10-CM

## 2019-04-02 DIAGNOSIS — F41.1 GAD (GENERALIZED ANXIETY DISORDER): ICD-10-CM

## 2019-04-02 PROBLEM — T50.901A POLYSUBSTANCE OVERDOSE: Status: ACTIVE | Noted: 2018-05-01

## 2019-04-02 PROBLEM — F15.950 AMPHETAMINE AND PSYCHOSTIMULANT-INDUCED PSYCHOSIS WITH DELUSIONS (H): Status: ACTIVE | Noted: 2018-07-10

## 2019-04-02 PROBLEM — F29 PSYCHOSIS (H): Status: ACTIVE | Noted: 2018-03-21

## 2019-04-02 PROBLEM — F22 PARANOIA (H): Status: ACTIVE | Noted: 2017-11-28

## 2019-04-02 PROBLEM — F33.3 DEPRESSION, MAJOR, RECURRENT, SEVERE WITH PSYCHOSIS (H): Status: ACTIVE | Noted: 2017-11-02

## 2019-04-02 PROBLEM — R45.851 SUICIDAL IDEATION: Status: ACTIVE | Noted: 2018-05-05

## 2019-04-02 PROCEDURE — G0145 SCR C/V CYTO,THINLAYER,RESCR: HCPCS | Performed by: FAMILY MEDICINE

## 2019-04-02 PROCEDURE — G0476 HPV COMBO ASSAY CA SCREEN: HCPCS | Performed by: FAMILY MEDICINE

## 2019-04-02 PROCEDURE — 99396 PREV VISIT EST AGE 40-64: CPT | Performed by: FAMILY MEDICINE

## 2019-04-02 PROCEDURE — G0124 SCREEN C/V THIN LAYER BY MD: HCPCS | Performed by: FAMILY MEDICINE

## 2019-04-02 RX ORDER — FLUOXETINE 40 MG/1
40 CAPSULE ORAL DAILY
COMMUNITY
End: 2019-04-02

## 2019-04-02 RX ORDER — ALBUTEROL SULFATE 90 UG/1
2 AEROSOL, METERED RESPIRATORY (INHALATION) EVERY 4 HOURS PRN
Qty: 18 G | Refills: 11 | Status: SHIPPED | OUTPATIENT
Start: 2019-04-02 | End: 2020-01-02

## 2019-04-02 RX ORDER — COPPER 313.4 MG/1
1 INTRAUTERINE DEVICE INTRAUTERINE ONCE
COMMUNITY
Start: 2019-04-02 | End: 2023-01-27

## 2019-04-02 RX ORDER — FLUOXETINE 40 MG/1
40 CAPSULE ORAL DAILY
Qty: 90 CAPSULE | Refills: 0 | Status: SHIPPED | OUTPATIENT
Start: 2019-04-02 | End: 2019-08-09

## 2019-04-02 RX ORDER — TRAZODONE HYDROCHLORIDE 50 MG/1
50 TABLET, FILM COATED ORAL
Qty: 90 TABLET | Refills: 0 | Status: SHIPPED | OUTPATIENT
Start: 2019-04-02 | End: 2019-08-09

## 2019-04-02 SDOH — HEALTH STABILITY: MENTAL HEALTH: HOW OFTEN DO YOU HAVE A DRINK CONTAINING ALCOHOL?: NEVER

## 2019-04-02 ASSESSMENT — ANXIETY QUESTIONNAIRES
IF YOU CHECKED OFF ANY PROBLEMS ON THIS QUESTIONNAIRE, HOW DIFFICULT HAVE THESE PROBLEMS MADE IT FOR YOU TO DO YOUR WORK, TAKE CARE OF THINGS AT HOME, OR GET ALONG WITH OTHER PEOPLE: EXTREMELY DIFFICULT
7. FEELING AFRAID AS IF SOMETHING AWFUL MIGHT HAPPEN: MORE THAN HALF THE DAYS
1. FEELING NERVOUS, ANXIOUS, OR ON EDGE: NEARLY EVERY DAY
2. NOT BEING ABLE TO STOP OR CONTROL WORRYING: NEARLY EVERY DAY
5. BEING SO RESTLESS THAT IT IS HARD TO SIT STILL: SEVERAL DAYS
GAD7 TOTAL SCORE: 17
6. BECOMING EASILY ANNOYED OR IRRITABLE: MORE THAN HALF THE DAYS
3. WORRYING TOO MUCH ABOUT DIFFERENT THINGS: NEARLY EVERY DAY

## 2019-04-02 ASSESSMENT — MIFFLIN-ST. JEOR: SCORE: 1688.67

## 2019-04-02 ASSESSMENT — PATIENT HEALTH QUESTIONNAIRE - PHQ9
SUM OF ALL RESPONSES TO PHQ QUESTIONS 1-9: 15
5. POOR APPETITE OR OVEREATING: NEARLY EVERY DAY

## 2019-04-02 NOTE — PROGRESS NOTES
SUBJECTIVE:   CC: Mojgan Jimenez is an 48 year old woman who presents for preventive health visit.     Healthy Habits:    Do you get at least three servings of calcium containing foods daily (dairy, green leafy vegetables, etc.)? no, taking calcium and/or vitamin D supplement: no    Amount of exercise or daily activities, outside of work: 2-3 day(s) per week    Problems taking medications regularly No    Medication side effects: No    Have you had an eye exam in the past two years? no    Do you see a dentist twice per year? no    Do you have sleep apnea, excessive snoring or daytime drowsiness? no    Depression and Anxiety Follow-Up    Status since last visit: No change    Other associated symptoms: Anxiety    Complicating factors: None    Significant life event: Yes-       Current substance abuse: None.  History of methamphetamine abuse    Patient previously seen at [facility].  Recently dismissed 2/2 missing appts.  Currently in the appeal process to get back in with them.  Needs refills of trazodone and prozac until she can get back in    Patient is experiencing lack of motivation.  Finds it difficult to motivate herself to go to work     PHQ 3/6/2018 5/8/2018 4/2/2019   PHQ-9 Total Score 22 24 15   Q9: Thoughts of better off dead/self-harm past 2 weeks Several days Nearly every day Not at all     LINA-7 SCORE 3/6/2018 5/8/2018 4/2/2019   Total Score 18 21 17     In the past two weeks have you had thoughts of suicide or self-harm?  No.    Do you have concerns about your personal safety or the safety of others?   No  PHQ-9  English  PHQ-9   Any Language  LINA-7  Suicide Assessment Five-step Evaluation and Treatment (SAFE-T)    Asthma Follow-Up    Was ACT completed today?    Yes    ACT Total Scores 4/2/2019   ACT TOTAL SCORE (Goal Greater than or Equal to 20) 18   In the past 12 months, how many times did you visit the emergency room for your asthma without being admitted to the hospital? 0   In the past 12 months, how  many times were you hospitalized overnight because of your asthma? 0       Recent asthma triggers that patient is dealing with: upper respiratory infections        Today's PHQ-2 Score:   PHQ-2 ( 1999 Pfizer) 5/8/2018 11/2/2017   Q1: Little interest or pleasure in doing things 3 1   Q2: Feeling down, depressed or hopeless 3 1   PHQ-2 Score 6 2       Abuse: Current or Past(Physical, Sexual or Emotional)- No  Do you feel safe in your environment? Yes    Social History     Tobacco Use     Smoking status: Never Smoker     Smokeless tobacco: Never Used   Substance Use Topics     Alcohol use: No     Frequency: Never     Comment: Sober x 8 months     If you drink alcohol do you typically have >3 drinks per day or >7 drinks per week? No                     Reviewed orders with patient.  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 No pertinent surgical history.    Social History     Tobacco Use     Smoking status: Never Smoker     Smokeless tobacco: Never Used   Substance Use Topics     Alcohol use: No     Frequency: Never     Comment: Sober x 8 months     Family History   Problem Relation Age of Onset     Cancer Mother      Unknown/Adopted Father      Diabetes No family hx of      Hypertension No family hx of            Mammogram Screening: Patient under age 50, mutual  "decision reflected in health maintenance.      Pertinent mammograms are reviewed under the imaging tab.  History of abnormal Pap smear: NO - age 30-65 PAP every 5 years with negative HPV co-testing recommended     Reviewed and updated as needed this visit by clinical staff  Tobacco  Allergies  Meds  Med Hx  Surg Hx  Fam Hx  Soc Hx        Reviewed and updated as needed this visit by Provider            ROS:  CONSTITUTIONAL: NEGATIVE for fever, chills, change in weight  INTEGUMENTARU/SKIN: NEGATIVE for worrisome rashes, moles or lesions  EYES: NEGATIVE for vision changes or irritation  ENT: NEGATIVE for ear, mouth and throat problems  RESP: NEGATIVE for significant cough or SOB  BREAST: NEGATIVE for masses, tenderness or discharge  CV: NEGATIVE for chest pain, palpitations or peripheral edema  GI: NEGATIVE for nausea, abdominal pain, heartburn, or change in bowel habits  : NEGATIVE for unusual urinary or vaginal symptoms. Periods are regular.  MUSCULOSKELETAL: NEGATIVE for significant arthralgias or myalgia  NEURO: NEGATIVE for weakness, dizziness or paresthesias  PSYCHIATRIC: NEGATIVE for changes in mood or affect    OBJECTIVE:   /76 (BP Location: Right arm, Patient Position: Sitting, Cuff Size: Adult Large)   Pulse 86   Temp 98.4  F (36.9  C) (Oral)   Ht 1.6 m (5' 3\")   Wt 109 kg (240 lb 3.2 oz)   LMP 03/26/2019   SpO2 97%   BMI 42.55 kg/m    EXAM:  GENERAL: healthy, alert and no distress  EYES: Eyes grossly normal to inspection, PERRL and conjunctivae and sclerae normal  HENT: ear canals and TM's normal, nose and mouth without ulcers or lesions  NECK: no adenopathy, no asymmetry, masses, or scars and thyroid normal to palpation  RESP: lungs clear to auscultation - no rales, rhonchi or wheezes  BREAST: normal without masses, tenderness or nipple discharge and no palpable axillary masses or adenopathy  CV: regular rate and rhythm, normal S1 S2, no S3 or S4, no murmur, click or rub, no peripheral " edema and peripheral pulses strong  ABDOMEN: soft, nontender, no hepatosplenomegaly, no masses and bowel sounds normal   (female): normal female external genitalia, normal urethral meatus, vaginal mucosa pink, moist, well rugated, and normal cervix/adnexa/uterus without masses or discharge  MS: no gross musculoskeletal defects noted, no edema  SKIN: no suspicious lesions or rashes  NEURO: Normal strength and tone, mentation intact and speech normal  PSYCH: mentation appears normal, affect normal/bright    ASSESSMENT/PLAN:   1. Routine history and physical examination of adult    2. Visit for screening mammogram  - MA SCREENING DIGITAL BILAT - Future  (s+30); Future    3. Screening for malignant neoplasm of cervix  - Pap imaged thin layer screen with HPV - recommended age 30 - 65 years (select HPV order below)  - HPV High Risk Types DNA Cervical    4. Mild persistent asthma without complication  - Previously used Symbicort, but has been out due to insurance coverage  - Will see if Breo is covered   - fluticasone-vilanterol (BREO ELLIPTA) 100-25 MCG/INH inhaler; Inhale 1 puff into the lungs daily  Dispense: 1 Inhaler; Refill: 11  - albuterol (VENTOLIN HFA) 108 (90 Base) MCG/ACT inhaler; Inhale 2 puffs into the lungs every 4 hours as needed for shortness of breath / dyspnea or wheezing  Dispense: 18 g; Refill: 11    5. Morbid obesity (H)  - Patient requesting referral for weight management   - BARIATRIC ADULT REFERRAL    6. Recurrent major depressive disorder, remission status unspecified (H)  - Ok with refilling meds temporarily while she appeals to get back into [facility]   - traZODone (DESYREL) 50 MG tablet; Take 1 tablet (50 mg) by mouth nightly as needed for sleep  Dispense: 90 tablet; Refill: 0  - FLUoxetine (PROZAC) 40 MG capsule; Take 1 capsule (40 mg) by mouth daily  Dispense: 90 capsule; Refill: 0    7. LINA (generalized anxiety disorder)  - traZODone (DESYREL) 50 MG tablet; Take 1 tablet (50 mg) by mouth  "nightly as needed for sleep  Dispense: 90 tablet; Refill: 0  - FLUoxetine (PROZAC) 40 MG capsule; Take 1 capsule (40 mg) by mouth daily  Dispense: 90 capsule; Refill: 0      COUNSELING:   Reviewed preventive health counseling, as reflected in patient instructions    BP Readings from Last 1 Encounters:   04/02/19 126/76     Estimated body mass index is 42.55 kg/m  as calculated from the following:    Height as of this encounter: 1.6 m (5' 3\").    Weight as of this encounter: 109 kg (240 lb 3.2 oz).      Weight management plan: Patient referred to endocrine and/or weight management specialty     reports that  has never smoked. she has never used smokeless tobacco.      Counseling Resources:  ATP IV Guidelines  Pooled Cohorts Equation Calculator  Breast Cancer Risk Calculator  FRAX Risk Assessment  ICSI Preventive Guidelines  Dietary Guidelines for Americans, 2010  USDA's MyPlate  ASA Prophylaxis  Lung CA Screening    Marlen Babin DO  [facility]  "

## 2019-04-03 ASSESSMENT — ANXIETY QUESTIONNAIRES: GAD7 TOTAL SCORE: 17

## 2019-04-03 ASSESSMENT — ASTHMA QUESTIONNAIRES: ACT_TOTALSCORE: 18

## 2019-04-05 LAB
COPATH REPORT: NORMAL
PAP: NORMAL

## 2019-04-08 LAB
FINAL DIAGNOSIS: NORMAL
HPV HR 12 DNA CVX QL NAA+PROBE: NEGATIVE
HPV16 DNA SPEC QL NAA+PROBE: NEGATIVE
HPV18 DNA SPEC QL NAA+PROBE: NEGATIVE
SPECIMEN DESCRIPTION: NORMAL
SPECIMEN SOURCE CVX/VAG CYTO: NORMAL

## 2019-05-14 ENCOUNTER — OFFICE VISIT (OUTPATIENT)
Dept: FAMILY MEDICINE | Facility: CLINIC | Age: 49
End: 2019-05-14
Payer: COMMERCIAL

## 2019-05-14 VITALS
WEIGHT: 231 LBS | TEMPERATURE: 98.8 F | HEART RATE: 77 BPM | DIASTOLIC BLOOD PRESSURE: 70 MMHG | SYSTOLIC BLOOD PRESSURE: 120 MMHG | OXYGEN SATURATION: 98 % | HEIGHT: 63 IN | BODY MASS INDEX: 40.93 KG/M2

## 2019-05-14 DIAGNOSIS — F33.9 RECURRENT MAJOR DEPRESSIVE DISORDER, REMISSION STATUS UNSPECIFIED (H): ICD-10-CM

## 2019-05-14 DIAGNOSIS — F41.1 GAD (GENERALIZED ANXIETY DISORDER): Primary | ICD-10-CM

## 2019-05-14 DIAGNOSIS — F15.10 METHAMPHETAMINE ABUSE, EPISODIC (H): ICD-10-CM

## 2019-05-14 DIAGNOSIS — J45.30 MILD PERSISTENT ASTHMA WITHOUT COMPLICATION: ICD-10-CM

## 2019-05-14 PROCEDURE — 99213 OFFICE O/P EST LOW 20 MIN: CPT | Performed by: FAMILY MEDICINE

## 2019-05-14 ASSESSMENT — ANXIETY QUESTIONNAIRES
6. BECOMING EASILY ANNOYED OR IRRITABLE: MORE THAN HALF THE DAYS
GAD7 TOTAL SCORE: 10
IF YOU CHECKED OFF ANY PROBLEMS ON THIS QUESTIONNAIRE, HOW DIFFICULT HAVE THESE PROBLEMS MADE IT FOR YOU TO DO YOUR WORK, TAKE CARE OF THINGS AT HOME, OR GET ALONG WITH OTHER PEOPLE: SOMEWHAT DIFFICULT
3. WORRYING TOO MUCH ABOUT DIFFERENT THINGS: MORE THAN HALF THE DAYS
1. FEELING NERVOUS, ANXIOUS, OR ON EDGE: SEVERAL DAYS
7. FEELING AFRAID AS IF SOMETHING AWFUL MIGHT HAPPEN: MORE THAN HALF THE DAYS
5. BEING SO RESTLESS THAT IT IS HARD TO SIT STILL: NOT AT ALL
2. NOT BEING ABLE TO STOP OR CONTROL WORRYING: MORE THAN HALF THE DAYS

## 2019-05-14 ASSESSMENT — ASTHMA QUESTIONNAIRES
QUESTION_3 LAST FOUR WEEKS HOW OFTEN DID YOUR ASTHMA SYMPTOMS (WHEEZING, COUGHING, SHORTNESS OF BREATH, CHEST TIGHTNESS OR PAIN) WAKE YOU UP AT NIGHT OR EARLIER THAN USUAL IN THE MORNING: ONCE OR TWICE
ACT_TOTALSCORE: 19
QUESTION_5 LAST FOUR WEEKS HOW WOULD YOU RATE YOUR ASTHMA CONTROL: SOMEWHAT CONTROLLED
QUESTION_2 LAST FOUR WEEKS HOW OFTEN HAVE YOU HAD SHORTNESS OF BREATH: ONCE OR TWICE A WEEK
QUESTION_1 LAST FOUR WEEKS HOW MUCH OF THE TIME DID YOUR ASTHMA KEEP YOU FROM GETTING AS MUCH DONE AT WORK, SCHOOL OR AT HOME: NONE OF THE TIME
QUESTION_4 LAST FOUR WEEKS HOW OFTEN HAVE YOU USED YOUR RESCUE INHALER OR NEBULIZER MEDICATION (SUCH AS ALBUTEROL): TWO OR THREE TIMES PER WEEK

## 2019-05-14 ASSESSMENT — MIFFLIN-ST. JEOR: SCORE: 1646.94

## 2019-05-14 ASSESSMENT — PATIENT HEALTH QUESTIONNAIRE - PHQ9
SUM OF ALL RESPONSES TO PHQ QUESTIONS 1-9: 8
5. POOR APPETITE OR OVEREATING: SEVERAL DAYS

## 2019-05-14 NOTE — Clinical Note
Wound you mind calling pts pharmacy regarding her Breo inhaler?  Patient states that her insurance wouldn't cover it.  Can we get a list of preferred inhalers?

## 2019-05-14 NOTE — PROGRESS NOTES
SUBJECTIVE:   Mojgan Jimenez is a 48 year old female who presents to clinic today for the following health issues:    Depression and Anxiety Follow-Up    Status since last visit: No change    Other associated symptoms: Too much on her mind- flash backs    Complicating factors:     Significant life event: No     Current substance abuse:  Meth relapse two weeks ago.  Her CD treatment place knows about this    Unable to to get back in with [facility], so needs a new psychiatrist.      PHQ 3/6/2018 5/8/2018 4/2/2019   PHQ-9 Total Score 22 24 15   Q9: Thoughts of better off dead/self-harm past 2 weeks Several days Nearly every day Not at all     LINA-7 SCORE 3/6/2018 5/8/2018 4/2/2019   Total Score 18 21 17     In the past two weeks have you had thoughts of suicide or self-harm?  No.    Do you have concerns about your personal safety or the safety of others?   No  PHQ-9  English  PHQ-9   Any Language  LINA-7  Suicide Assessment Five-step Evaluation and Treatment (SAFE-T)    Asthma Follow-Up    Was ACT completed today?    Yes    ACT Total Scores 5/14/2019   ACT TOTAL SCORE (Goal Greater than or Equal to 20) 19   In the past 12 months, how many times did you visit the emergency room for your asthma without being admitted to the hospital? 0   In the past 12 months, how many times were you hospitalized overnight because of your asthma? 0       Recent asthma triggers that patient is dealing with: None        Amount of exercise or physical activity: None    Problems taking medications regularly: No    Medication side effects: none    Diet: regular (no restrictions)    Patient Active Problem List   Diagnosis     Mild persistent asthma without complication     Vitamin D deficiency     LINA (generalized anxiety disorder)     Recurrent major depressive disorder, remission status unspecified (H)     Methamphetamine abuse, episodic (H)     Hyperlipidemia LDL goal <160     Amphetamine and psychostimulant-induced psychosis with delusions  "(H)     Depression, major, recurrent, severe with psychosis (H)     IUD (intrauterine device) in place     Paranoia (H)     PMDD (premenstrual dysphoric disorder)     Polysubstance overdose     Psychosis (H)     Suicidal ideation     Morbid obesity (H)     History reviewed. No pertinent surgical history.    Social History     Tobacco Use     Smoking status: Never Smoker     Smokeless tobacco: Never Used   Substance Use Topics     Alcohol use: No     Frequency: Never     Comment: Sober x 8 months     Family History   Problem Relation Age of Onset     Cancer Mother      Unknown/Adopted Father      Diabetes No family hx of      Hypertension No family hx of            ROS:  Constitutional, HEENT, cardiovascular, pulmonary, gi and gu systems are negative, except as otherwise noted.    OBJECTIVE:     /70 (BP Location: Right arm, Patient Position: Sitting, Cuff Size: Adult Large)   Pulse 77   Temp 98.8  F (37.1  C) (Oral)   Ht 1.6 m (5' 3\")   Wt 104.8 kg (231 lb)   LMP 04/23/2019   SpO2 98%   BMI 40.92 kg/m    Body mass index is 40.92 kg/m .  GENERAL: healthy, alert and no distress  RESP: lungs clear to auscultation - no rales, rhonchi or wheezes  CV: regular rate and rhythm, normal S1 S2, no S3 or S4, no murmur, click or rub, no peripheral edema and peripheral pulses strong  PSYCH: mentation appears normal and affect flat    ASSESSMENT/PLAN:     1. LINA (generalized anxiety disorder)  2. Recurrent major depressive disorder, remission status unspecified (H)  3. Methamphetamine abuse, episodic (H)  - Referred to a new psychiatrist.  Patient unable to get back in with [facility] 2/2 missed appts   - MENTAL HEALTH REFERRAL  - Adult; Psychiatry and Medication Management; Psychiatry; Other: [facility] [phone]; We will contact you to schedule the appointment or please call with any questions    4. Mild persistent asthma without complication  - Patient is unable to afford her inhalers  - Will " have RN call pharmacy to check on formulary options     Follow up in 3 months PRN     Marlen Babin,   [facility]

## 2019-05-15 ENCOUNTER — TELEPHONE (OUTPATIENT)
Dept: FAMILY MEDICINE | Facility: CLINIC | Age: 49
End: 2019-05-15

## 2019-05-15 DIAGNOSIS — J45.30 MILD PERSISTENT ASTHMA WITHOUT COMPLICATION: Primary | ICD-10-CM

## 2019-05-15 ASSESSMENT — ASTHMA QUESTIONNAIRES: ACT_TOTALSCORE: 19

## 2019-05-15 ASSESSMENT — ANXIETY QUESTIONNAIRES: GAD7 TOTAL SCORE: 10

## 2019-05-15 NOTE — TELEPHONE ENCOUNTER
Per Dr. Babin:          Wound you mind calling pts pharmacy regarding her Breo inhaler?  Patient states that her insurance wouldn't cover it.  Can we get a list of preferred inhalers?

## 2019-05-15 NOTE — TELEPHONE ENCOUNTER
Routing to Dr. Babin to advise.  Insurance will not cover Breo inhaler.       Per [facility] pharmacy, we need to send in an RX for one of the followin.  Advair     2   Dulera     3.  Symbicort     Then they will be able to see if it is covered by insurance.        Prabhjot Martinez RN

## 2019-05-16 NOTE — TELEPHONE ENCOUNTER
Sent [facility] since pt reported at a previous visit that Symbicort was not covered.  Please call pt to let her know.  Also remind her that sometimes drug companies provide coupons for the inhalers.  She should look on the Advair website to see if she can find one.

## 2019-05-16 NOTE — TELEPHONE ENCOUNTER
Called [facility] and spoke with Teressa. Teressa states [facility] is not available. I advised that we sent a medication and to have her call us.  Sana Dang RN

## 2019-05-17 NOTE — TELEPHONE ENCOUNTER
Called patient's phone and patient's friend, Teressa, answered.  No consent to communicate on file.      Teressa stated that Teressa is at work and cannot be reached.  Stated she is aware an RX was sent to her pharmacy.      Instructed Teressa that a coupon may be available on line for the RX.  Did not specify RX.    Prabhjot Martinez RN

## 2019-05-21 ENCOUNTER — TELEPHONE (OUTPATIENT)
Dept: FAMILY MEDICINE | Facility: CLINIC | Age: 49
End: 2019-05-21

## 2019-05-21 NOTE — TELEPHONE ENCOUNTER
Prior Authorization Retail Medication Request    Medication/Dose: fluticasone-vilanterol (BREO ELLIPTA) 100-25 MCG/INH inhaler  ICD code (if different than what is on RX):  alicia  Previously Tried and Failed:  alicia  Rationale:  na    Insurance Name:  alicia  Insurance ID:  [id number]      Pharmacy Information (if different than what is on RX)  Name:  [facility]  Phone:  alicia

## 2019-05-23 NOTE — TELEPHONE ENCOUNTER
Central Prior Authorization Team   Phone: [phone]      PA Initiation    Medication: fluticasone-vilanterol (BREO ELLIPTA) 100-25 MCG/INH inhaler  Insurance Company: [facility] - Phone [phone] Fax [phone]  Pharmacy Filling the Rx: [id number] - [place], [place] - [address] AT [facility] [address] [facility]  Filling Pharmacy Phone: [phone]  Filling Pharmacy Fax:    Start Date: 5/23/2019

## 2019-05-23 NOTE — TELEPHONE ENCOUNTER
PRIOR AUTHORIZATION DENIED    Medication: fluticasone-vilanterol (BREO ELLIPTA) 100-25 MCG/INH inhaler    Denial Date: 5/23/2019    Denial Rational:  Must try/fail one more covered alternative.     Appeal Information:    If you would like to appeal, please supply P/A team with a letter of medical necessity with clinical reason.

## 2019-05-28 NOTE — TELEPHONE ENCOUNTER
Patient previously used Symbicort, but it wasn't covered.  I tried sending Breo on 4/2 and they wouldn't cover that either.  So I sent in Advair based on what Pam told me.  No need to do a PA for the Breo

## 2019-05-28 NOTE — TELEPHONE ENCOUNTER
See message below-  Looks like Advair was sent in on 5/15/19- was this to replace the Breo?    Kristina Whittaker MA

## 2019-06-28 ENCOUNTER — TELEPHONE (OUTPATIENT)
Dept: FAMILY MEDICINE | Facility: CLINIC | Age: 49
End: 2019-06-28

## 2019-06-28 NOTE — TELEPHONE ENCOUNTER
Prior Authorization Retail Medication Request    Medication/Dose: WIXELA INHUB DISKUS 100/50MCG 60S  ICD code (if different than what is on RX):  alicia  Previously Tried and Failed:  alicia  Rationale:  na    Insurance Name:  na  Insurance ID:  [id number]      Pharmacy Information (if different than what is on RX)  Name:  [facility]  Phone:  na

## 2019-07-03 NOTE — TELEPHONE ENCOUNTER
Central Prior Authorization Team   Phone: [phone]      PA Initiation    Medication: WIXELA INHUB DISKUS 100/50MCG 60S  Insurance Company: [facility] - Phone [phone] Fax [phone]  Pharmacy Filling the Rx: [id number] - [place], [place] - [address] AT [facility] [address]  Filling Pharmacy Phone: [phone]  Filling Pharmacy Fax:    Start Date: 7/3/2019

## 2019-07-05 NOTE — TELEPHONE ENCOUNTER
PRIOR AUTHORIZATION DENIED    Medication: WIXELA INHUB DISKUS 100/50MCG 60S    Denial Date: 7/4/2019    Denial Rational:          Appeal Information:   If you would like to appeal, please supply P/A team with a letter of medical necessity with clinical reason.

## 2019-07-08 NOTE — TELEPHONE ENCOUNTER
I have never heard of Wixela before.  Can we check with the pharmacy on that?  Maybe the wrong Rx was sent?

## 2019-07-08 NOTE — TELEPHONE ENCOUNTER
See message below- Is Wixela the same as Advair? I see Advair on her med list but not Wixela. Is this something you want to appeal?    Kristina Whittaker MA

## 2019-07-09 NOTE — TELEPHONE ENCOUNTER
Spoke with [facility] pharmacy- apparently Wixela is the generic of Advair. It wasn't covered before- both Advair and the generic but BCBSformulary list just updated again July 1st so they ran the script again and it now went through- so they will let patient know. Nothing else needed from us at this time.    Kristina Whittaker MA

## 2019-08-05 ENCOUNTER — MYC MEDICAL ADVICE (OUTPATIENT)
Dept: FAMILY MEDICINE | Facility: CLINIC | Age: 49
End: 2019-08-05

## 2019-08-05 NOTE — TELEPHONE ENCOUNTER
Panel Management Review      Patient has the following on her problem list:     Asthma review     ACT Total Scores 5/14/2019   ACT TOTAL SCORE (Goal Greater than or Equal to 20) 19   In the past 12 months, how many times did you visit the emergency room for your asthma without being admitted to the hospital? 0   In the past 12 months, how many times were you hospitalized overnight because of your asthma? 0      1. Is Asthma diagnosis on the Problem List? Yes    2. Is Asthma listed on Health Maintenance? Yes    3. Patient is due for:  ACT and AAP      Composite cancer screening  Chart review shows that this patient is due/due soon for the following Mammogram  Summary:    Patient is due/failing the following:   ACT    Action needed:   Patient needs to do ACT.    Type of outreach:    Sent [employer] message. and Copy of ACT mailed to patient, will reach out in 5 days.    Questions for provider review:    None                                                                                                                                    Kristina Whittaker MA       Chart routed to Care Team .

## 2019-08-05 NOTE — LETTER
[address] [address]  [phone]                                                                                                August 19, 2019    Mojgan Barbara  [address]   [facility] [place] [facility] [address]        Dear Ms. Jimenez,    We have tried to contact you about your health, but have been unable to reach you.  Please call us as soon as possible so we can provide you with the best care possible.  We will continue to check in with you throughout the year to complete these items of care, if you are not able to complete these items at this time.  If you would like to complete the missing items for your care, please contact us at [phone].    We recommend the following:  -Complete and return the attached ASTHMA CONTROL TEST.  If your total score is 19 or less or you have been to the ER or urgent care for your asthma, then please schedule an asthma followup appointment.      Sincerely,     Your Care Team

## 2019-08-05 NOTE — LETTER
[address] [address]  [phone]                                                                                                August 5, 2019    Mojgan Barbara  [address]   [place] [address]        Dear Ms. Jimenez,    Your [facility] Team works hard to make sure that you and your family receive exceptional care. Enclosed you will find a copy of the Asthma Control Test (ACT) that our clinic uses to monitor and manage your asthma. This test is an assessment tool that we use to determine how well your asthma is controlled.      Please complete the enclosed questionnaire and mail it back to us in the self-addressed stamped envelope. We can also complete the questions over the phone if you keep a copy of the ACT for your reference when we call you.     Healthy regards,    Your [facility] Care Team

## 2019-08-09 ENCOUNTER — TELEPHONE (OUTPATIENT)
Dept: FAMILY MEDICINE | Facility: CLINIC | Age: 49
End: 2019-08-09

## 2019-08-09 DIAGNOSIS — J45.30 MILD PERSISTENT ASTHMA WITHOUT COMPLICATION: ICD-10-CM

## 2019-08-09 DIAGNOSIS — E55.9 VITAMIN D DEFICIENCY: ICD-10-CM

## 2019-08-09 DIAGNOSIS — F41.1 GAD (GENERALIZED ANXIETY DISORDER): ICD-10-CM

## 2019-08-09 DIAGNOSIS — F33.9 RECURRENT MAJOR DEPRESSIVE DISORDER, REMISSION STATUS UNSPECIFIED (H): ICD-10-CM

## 2019-08-09 RX ORDER — CHOLECALCIFEROL (VITAMIN D3) 50 MCG
2000 TABLET ORAL DAILY
Qty: 100 TABLET | Refills: 3 | Status: CANCELLED | OUTPATIENT
Start: 2019-08-09

## 2019-08-09 RX ORDER — FLUOXETINE 40 MG/1
40 CAPSULE ORAL DAILY
Qty: 30 CAPSULE | Refills: 0 | Status: SHIPPED | OUTPATIENT
Start: 2019-08-09 | End: 2019-09-10

## 2019-08-09 RX ORDER — TRAZODONE HYDROCHLORIDE 50 MG/1
50 TABLET, FILM COATED ORAL
Qty: 30 TABLET | Refills: 0 | Status: SHIPPED | OUTPATIENT
Start: 2019-08-09 | End: 2019-09-10

## 2019-08-09 RX ORDER — ALBUTEROL SULFATE 90 UG/1
2 AEROSOL, METERED RESPIRATORY (INHALATION) EVERY 4 HOURS PRN
Qty: 18 G | Refills: 11 | Status: CANCELLED | OUTPATIENT
Start: 2019-08-09

## 2019-08-09 RX ORDER — COPPER 313.4 MG/1
1 INTRAUTERINE DEVICE INTRAUTERINE ONCE
Qty: 1 EACH | Refills: 0 | Status: CANCELLED
Start: 2019-08-09 | End: 2019-08-09

## 2019-08-09 NOTE — TELEPHONE ENCOUNTER
Patient is due for an office visit for refills. She really only needs her antidepressants. Sent a 30 day supply to give her time to make an appointment.  Andres Martinez RN

## 2019-08-09 NOTE — TELEPHONE ENCOUNTER
Reason for Call:  Medication or medication refill:    Do you use a [facility]?  Name of the pharmacy and phone number for the current request:  [facility] [facility], [facility] [phone]    Name of the medication requested: All medications on med list    Other request: Requests refills of all medications    Can we leave a detailed message on this number? YES    Phone number patient can be reached at: Home number on file [phone] (home)    Best Time: any    Call taken on 8/9/2019 at 3:15 PM by Alicia Patel

## 2019-09-10 ENCOUNTER — OFFICE VISIT (OUTPATIENT)
Dept: FAMILY MEDICINE | Facility: CLINIC | Age: 49
End: 2019-09-10
Payer: COMMERCIAL

## 2019-09-10 VITALS
OXYGEN SATURATION: 96 % | SYSTOLIC BLOOD PRESSURE: 128 MMHG | BODY MASS INDEX: 41.11 KG/M2 | HEIGHT: 63 IN | DIASTOLIC BLOOD PRESSURE: 80 MMHG | TEMPERATURE: 98.9 F | WEIGHT: 232 LBS

## 2019-09-10 DIAGNOSIS — F41.1 GAD (GENERALIZED ANXIETY DISORDER): ICD-10-CM

## 2019-09-10 DIAGNOSIS — J45.30 MILD PERSISTENT ASTHMA WITHOUT COMPLICATION: ICD-10-CM

## 2019-09-10 DIAGNOSIS — F33.9 RECURRENT MAJOR DEPRESSIVE DISORDER, REMISSION STATUS UNSPECIFIED (H): ICD-10-CM

## 2019-09-10 DIAGNOSIS — H57.9 ITCHY EYES: Primary | ICD-10-CM

## 2019-09-10 DIAGNOSIS — E55.9 VITAMIN D DEFICIENCY: ICD-10-CM

## 2019-09-10 PROCEDURE — 99214 OFFICE O/P EST MOD 30 MIN: CPT | Performed by: FAMILY MEDICINE

## 2019-09-10 RX ORDER — FLUOXETINE 40 MG/1
40 CAPSULE ORAL DAILY
Qty: 90 CAPSULE | Refills: 0 | Status: SHIPPED | OUTPATIENT
Start: 2019-09-10 | End: 2019-12-10

## 2019-09-10 RX ORDER — CHOLECALCIFEROL (VITAMIN D3) 50 MCG
2000 TABLET ORAL DAILY
Qty: 100 TABLET | Refills: 3 | Status: SHIPPED | OUTPATIENT
Start: 2019-09-10 | End: 2019-12-10

## 2019-09-10 RX ORDER — TRAZODONE HYDROCHLORIDE 50 MG/1
50 TABLET, FILM COATED ORAL
Qty: 90 TABLET | Refills: 0 | Status: SHIPPED | OUTPATIENT
Start: 2019-09-10 | End: 2019-12-10

## 2019-09-10 RX ORDER — OLOPATADINE HYDROCHLORIDE 2 MG/ML
1 SOLUTION/ DROPS OPHTHALMIC DAILY
Qty: 1 BOTTLE | Refills: 1 | Status: SHIPPED | OUTPATIENT
Start: 2019-09-10 | End: 2019-12-10

## 2019-09-10 ASSESSMENT — ANXIETY QUESTIONNAIRES
6. BECOMING EASILY ANNOYED OR IRRITABLE: SEVERAL DAYS
1. FEELING NERVOUS, ANXIOUS, OR ON EDGE: SEVERAL DAYS
GAD7 TOTAL SCORE: 6
5. BEING SO RESTLESS THAT IT IS HARD TO SIT STILL: SEVERAL DAYS
7. FEELING AFRAID AS IF SOMETHING AWFUL MIGHT HAPPEN: NOT AT ALL
3. WORRYING TOO MUCH ABOUT DIFFERENT THINGS: SEVERAL DAYS
IF YOU CHECKED OFF ANY PROBLEMS ON THIS QUESTIONNAIRE, HOW DIFFICULT HAVE THESE PROBLEMS MADE IT FOR YOU TO DO YOUR WORK, TAKE CARE OF THINGS AT HOME, OR GET ALONG WITH OTHER PEOPLE: SOMEWHAT DIFFICULT
2. NOT BEING ABLE TO STOP OR CONTROL WORRYING: SEVERAL DAYS

## 2019-09-10 ASSESSMENT — PATIENT HEALTH QUESTIONNAIRE - PHQ9
5. POOR APPETITE OR OVEREATING: SEVERAL DAYS
SUM OF ALL RESPONSES TO PHQ QUESTIONS 1-9: 11

## 2019-09-10 ASSESSMENT — MIFFLIN-ST. JEOR: SCORE: 1646.48

## 2019-09-10 NOTE — PROGRESS NOTES
Subjective     Mojgan Jimenez is a 49 year old female who presents to clinic today for the following health issues:    HPI   Depression and Anxiety Follow-Up    How are you doing with your depression since your last visit? Improved     How are you doing with your anxiety since your last visit?  Pretty good     Are you having other symptoms that might be associated with depression or anxiety? Lazy, low energy. Going to menopause too hot flashes wieght gain bloating    Have you had a significant life event? No     Do you have any concerns with your use of alcohol or other drugs? No    Social History     Tobacco Use     Smoking status: Never Smoker     Smokeless tobacco: Never Used   Substance Use Topics     Alcohol use: No     Frequency: Never     Comment: Sober x 8 months     Drug use: No     Comment: in remision      PHQ 5/8/2018 4/2/2019 5/14/2019   PHQ-9 Total Score 24 15 8   Q9: Thoughts of better off dead/self-harm past 2 weeks Nearly every day Not at all Not at all     LINA-7 SCORE 5/8/2018 4/2/2019 5/14/2019   Total Score 21 17 10     No flowsheet data found.  none    Suicide Assessment Five-step Evaluation and Treatment (SAFE-T)      How many servings of fruits and vegetables do you eat daily?  2-3    On average, how many sweetened beverages do you drink each day (soda, juice, sweet tea, etc)?   2-3    How many days per week do you miss taking your medication? 0      She has history of meth  Clean for 2 months    Chem dep treatment, meetings mondays    trazadone nigthly and prozac, no hallucination, no psychosis  Doing well, does not want to see psych , wants to see us here      Eye(s) Problem  Onset: a month or 2     Description:   Location: more the right but would like to have both checked out  Pain: no   Redness: no   Itchy: yes    Accompanying Signs & Symptoms:  Discharge/mattering: YES- in the corner of the eye  Swelling: no   Visual changes: YES- will make it a little blurry when it acts up  Fever: no    Nasal Congestion: no   Bothered by bright lights: YES    History:   Trauma: no   Foreign body exposure: no     Precipitating factors:   Wearing contacts: no just glasses    Alleviating factors:  Improved by:     Therapies Tried and outcome: tried allergies drop really didn't work. 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    Social History     Tobacco Use     Smoking status: Never Smoker     Smokeless tobacco: Never Used   Substance Use Topics     Alcohol use: No     Frequency: Never     Comment: Sober x 8 months     Family History   Problem Relation Age of Onset     Cancer Mother      Unknown/Adopted Father      Diabetes No family hx of      Hypertension No family hx of            Reviewed and updated as needed this visit by Provider         Review of Systems   ROS COMP: Constitutional, HEENT, cardiovascular, pulmonary, GI, , musculoskeletal, neuro, skin, endocrine and psych systems are negative, except as otherwise noted.      Objective    There were no vitals taken for this visit.  There is no height or weight on file to calculate BMI.  Physical Exam   GENERAL: healthy, alert and no distress  EYES: Eyes grossly normal to inspection, PERRL and  "conjunctivae and sclerae normal  HENT: ear canals and TM's normal, nose and mouth without ulcers or lesions  NECK: no adenopathy, no asymmetry, masses, or scars and thyroid normal to palpation  RESP: lungs clear to auscultation - no rales, rhonchi or wheezes  CV: regular rate and rhythm, normal S1 S2, no S3 or S4, no murmur, click or rub, no peripheral edema and peripheral pulses strong  ABDOMEN: soft, nontender, no hepatosplenomegaly, no masses and bowel sounds normal  MS: no gross musculoskeletal defects noted, no edema  SKIN: no suspicious lesions or rashes  NEURO: Normal strength and tone, mentation intact and speech normal  PSYCH: mentation appears normal, affect normal/bright    Diagnostic Test Results:  Labs reviewed in Epic        Assessment & Plan       ICD-10-CM    1. Itchy eyes R68.89 OPHTHALMOLOGY ADULT REFERRAL     olopatadine (PATADAY) 0.2 % ophthalmic solution   2. Recurrent major depressive disorder, remission status unspecified (H) F33.9 FLUoxetine (PROZAC) 40 MG capsule     traZODone (DESYREL) 50 MG tablet   3. LINA (generalized anxiety disorder) F41.1 FLUoxetine (PROZAC) 40 MG capsule     traZODone (DESYREL) 50 MG tablet   4. Vitamin D deficiency E55.9 vitamin D3 (CHOLECALCIFEROL) 2000 units (50 mcg) tablet   5. Mild persistent asthma without complication J45.30      Itchy eyes-history of allergies-Use drops, if not resolving followup with eye care provider  Anxiety-stable, patient wants to follow up with pcp, does not want to see psych, refilled fro 3 months, Cont all meds  Chem dep-meth use, has seen addiction med, clean for 2 months now, goes to meetings  Asthma-stable per patient, uses inhalers  Follow up here in 3 months as advised     BMI:   Estimated body mass index is 41.1 kg/m  as calculated from the following:    Height as of this encounter: 1.6 m (5' 3\").    Weight as of this encounter: 105.2 kg (232 lb).   Weight management plan: Discussed healthy diet and exercise guidelines        See " Patient Instructions    Return in about 3 months (around 12/10/2019) for office visit.    Chetan Pérez DO  [facility]

## 2019-09-10 NOTE — PATIENT INSTRUCTIONS
Use drops, if not resolving followup with eye care provider  Cont all meds  Follow up here in 3 months as advised  Chetan Pérez D.O.    
The patient is a 56y Female complaining of fall.

## 2019-09-11 ASSESSMENT — ANXIETY QUESTIONNAIRES: GAD7 TOTAL SCORE: 6

## 2019-12-10 ENCOUNTER — OFFICE VISIT (OUTPATIENT)
Dept: FAMILY MEDICINE | Facility: CLINIC | Age: 49
End: 2019-12-10
Payer: COMMERCIAL

## 2019-12-10 VITALS
DIASTOLIC BLOOD PRESSURE: 90 MMHG | SYSTOLIC BLOOD PRESSURE: 142 MMHG | BODY MASS INDEX: 41.77 KG/M2 | TEMPERATURE: 98.5 F | WEIGHT: 235.8 LBS | HEART RATE: 80 BPM

## 2019-12-10 DIAGNOSIS — F15.10 METHAMPHETAMINE ABUSE, EPISODIC (H): ICD-10-CM

## 2019-12-10 DIAGNOSIS — E55.9 VITAMIN D DEFICIENCY: ICD-10-CM

## 2019-12-10 DIAGNOSIS — F33.9 RECURRENT MAJOR DEPRESSIVE DISORDER, REMISSION STATUS UNSPECIFIED (H): Primary | ICD-10-CM

## 2019-12-10 DIAGNOSIS — H57.9 ITCHY EYES: ICD-10-CM

## 2019-12-10 DIAGNOSIS — F15.950: ICD-10-CM

## 2019-12-10 DIAGNOSIS — F41.1 GAD (GENERALIZED ANXIETY DISORDER): ICD-10-CM

## 2019-12-10 DIAGNOSIS — F32.81 PMDD (PREMENSTRUAL DYSPHORIC DISORDER): ICD-10-CM

## 2019-12-10 PROCEDURE — 99214 OFFICE O/P EST MOD 30 MIN: CPT | Mod: 25 | Performed by: FAMILY MEDICINE

## 2019-12-10 PROCEDURE — 90686 IIV4 VACC NO PRSV 0.5 ML IM: CPT | Performed by: FAMILY MEDICINE

## 2019-12-10 PROCEDURE — 96127 BRIEF EMOTIONAL/BEHAV ASSMT: CPT | Mod: 59 | Performed by: FAMILY MEDICINE

## 2019-12-10 PROCEDURE — 90471 IMMUNIZATION ADMIN: CPT | Performed by: FAMILY MEDICINE

## 2019-12-10 RX ORDER — CHOLECALCIFEROL (VITAMIN D3) 50 MCG
2000 TABLET ORAL DAILY
Qty: 90 TABLET | Refills: 3 | Status: SHIPPED | OUTPATIENT
Start: 2019-12-10 | End: 2022-01-28

## 2019-12-10 RX ORDER — TRAZODONE HYDROCHLORIDE 100 MG/1
100 TABLET ORAL
Qty: 90 TABLET | Refills: 3 | Status: SHIPPED | OUTPATIENT
Start: 2019-12-10 | End: 2020-03-10

## 2019-12-10 RX ORDER — MULTIPLE VITAMINS W/ MINERALS TAB 9MG-400MCG
1 TAB ORAL DAILY
COMMUNITY
End: 2022-08-30

## 2019-12-10 RX ORDER — PAROXETINE 10 MG/1
TABLET, FILM COATED ORAL
Qty: 90 TABLET | Refills: 1 | Status: SHIPPED | OUTPATIENT
Start: 2019-12-10 | End: 2020-11-17 | Stop reason: ALTCHOICE

## 2019-12-10 RX ORDER — OLOPATADINE HYDROCHLORIDE 2 MG/ML
1 SOLUTION/ DROPS OPHTHALMIC DAILY
Qty: 1 BOTTLE | Refills: 11 | Status: SHIPPED | OUTPATIENT
Start: 2019-12-10 | End: 2020-10-29

## 2019-12-10 RX ORDER — FLUOXETINE 40 MG/1
40 CAPSULE ORAL DAILY
Qty: 90 CAPSULE | Refills: 3 | Status: SHIPPED | OUTPATIENT
Start: 2019-12-10 | End: 2020-10-06

## 2019-12-10 RX ORDER — FUROSEMIDE 20 MG
TABLET ORAL
Qty: 90 TABLET | Refills: 1 | Status: SHIPPED | OUTPATIENT
Start: 2019-12-10 | End: 2020-03-10

## 2019-12-10 ASSESSMENT — ANXIETY QUESTIONNAIRES
5. BEING SO RESTLESS THAT IT IS HARD TO SIT STILL: NOT AT ALL
3. WORRYING TOO MUCH ABOUT DIFFERENT THINGS: SEVERAL DAYS
7. FEELING AFRAID AS IF SOMETHING AWFUL MIGHT HAPPEN: NOT AT ALL
GAD7 TOTAL SCORE: 1
IF YOU CHECKED OFF ANY PROBLEMS ON THIS QUESTIONNAIRE, HOW DIFFICULT HAVE THESE PROBLEMS MADE IT FOR YOU TO DO YOUR WORK, TAKE CARE OF THINGS AT HOME, OR GET ALONG WITH OTHER PEOPLE: NOT DIFFICULT AT ALL
2. NOT BEING ABLE TO STOP OR CONTROL WORRYING: NOT AT ALL
6. BECOMING EASILY ANNOYED OR IRRITABLE: NOT AT ALL
1. FEELING NERVOUS, ANXIOUS, OR ON EDGE: NOT AT ALL

## 2019-12-10 ASSESSMENT — PATIENT HEALTH QUESTIONNAIRE - PHQ9
5. POOR APPETITE OR OVEREATING: NOT AT ALL
SUM OF ALL RESPONSES TO PHQ QUESTIONS 1-9: 2

## 2019-12-10 ASSESSMENT — PAIN SCALES - GENERAL: PAINLEVEL: NO PAIN (0)

## 2019-12-10 NOTE — PROGRESS NOTES
Subjective     Mojgan Jimenez is a 49 year old female who presents to clinic today for the following health issues:    HPI   Patient is getting swollen around when she gets her period and is wondering if there is something she can do to prevent that   Is feeling more anxious around when she gets her period and says she is getting paranoid around that time of month wondering if there is something she could take just during that time   Patient had period twice last month   Asthma symptoms are worse during her period she thinks due to all the boating in her abdomen     Depression Followup    How are you doing with your depression since your last visit? Improved prozac seems to be working well    Are you having other symptoms that might be associated with depression? No    Have you had a significant life event?  No     Are you feeling anxious or having panic attacks?   Yes:  around that time of the month     Do you have any concerns with your use of alcohol or other drugs? No     Patient was last seen by me in May.  At that time we were trying to get her established with a new psychiatric provider.  She decided that she doesn't want to see a psychiatrist because she's generally feeling better.   She has a complicated psych history, including depression, LINA and polysubstance abuse.  She admitted to using meth at her last visit.  No drug use since then.     Social History     Tobacco Use     Smoking status: Never Smoker     Smokeless tobacco: Never Used   Substance Use Topics     Alcohol use: No     Frequency: Never     Comment: Sober x 8 months     Drug use: No     Comment: in remision      PHQ 5/14/2019 9/10/2019 12/10/2019   PHQ-9 Total Score 8 11 2   Q9: Thoughts of better off dead/self-harm past 2 weeks Not at all Not at all Not at all     LINA-7 SCORE 5/14/2019 9/10/2019 12/10/2019   Total Score 10 6 1     In the past two weeks have you had thoughts of suicide or self-harm?  No.    Do you have concerns about your  personal safety or the safety of others?   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 No pertinent surgical history.    Social History     Tobacco Use     Smoking status: Never Smoker     Smokeless tobacco: Never Used   Substance Use Topics     Alcohol use: No     Frequency: Never     Comment: Sober x 8 months     Family History   Problem Relation Age of Onset     Cancer Mother      Unknown/Adopted Father      Diabetes No family hx of      Hypertension No family hx of            Review of Systems   ROS COMP: Constitutional, HEENT, cardiovascular, pulmonary, gi and gu systems are negative, except as otherwise noted.      Objective    BP (!) 142/90 (BP Location: Right arm, Patient Position: Sitting, Cuff Size: Adult Large)   Pulse 80   Temp 98.5  F (36.9  C) (Oral)   Wt 107 kg (235 lb 12.8 oz)   LMP 11/30/2019   BMI 41.77 kg/m    Body mass index is 41.77 kg/m .  Physical Exam   GENERAL: healthy, alert and no distress  RESP: lungs clear to auscultation - no rales, rhonchi or wheezes  CV: regular rates and rhythm, normal S1 S2, no S3 or S4 and no murmur, click or rub  PSYCH: mentation appears normal and affect flat          Assessment & Plan     1. Recurrent major depressive disorder, remission status unspecified (H)  2. LINA (generalized anxiety disorder)  3. Amphetamine and psychostimulant-induced psychosis with  delusions (H)  4. Methamphetamine abuse, episodic (H)  5. PMDD (premenstrual dysphoric disorder)  - Overall, her moods have been stable and she'd like to continue fluoxetine at her current dose  - However, she notes that her anxiety tends to worsen around her period and also notes a lot of bloating during that time  - Discussed considering an OCP like Heather to help with these symptoms, but she's hesitant to go on birth control  - She prefers to trial taking low dose Paxil only during her periods.  She has taken Paxil in the past and did well on it  - She may also use Lasix as needed during her period to help with bloating   - PARoxetine (PAXIL) 10 MG tablet; Take one tablet daily only when on your period  Dispense: 90 tablet; Refill: 1  - furosemide (LASIX) 20 MG tablet; Take 1 pill daily as needed for bloating during periods  Dispense: 90 tablet; Refill: 1  - traZODone (DESYREL) 100 MG tablet; Take 1 tablet (100 mg) by mouth nightly as needed for sleep  Dispense: 90 tablet; Refill: 3  - FLUoxetine (PROZAC) 40 MG capsule; Take 1 capsule (40 mg) by mouth daily  Dispense: 90 capsule; Refill: 3    6. Itchy eyes  - Stable, just needs refills   - olopatadine (PATADAY) 0.2 % ophthalmic solution; Place 1 drop into both eyes daily  Dispense: 1 Bottle; Refill: 11    7. Vitamin D deficiency  - Stable, just needs refills   - vitamin D3 (CHOLECALCIFEROL) 2000 units (50 mcg) tablet; Take 1 tablet (2,000 Units) by mouth daily  Dispense: 90 tablet; Refill: 3      Return in about 3 months (around 3/10/2020).    Marlen Babin,   [facility]

## 2019-12-11 ASSESSMENT — ASTHMA QUESTIONNAIRES: ACT_TOTALSCORE: 11

## 2019-12-11 ASSESSMENT — ANXIETY QUESTIONNAIRES: GAD7 TOTAL SCORE: 1

## 2019-12-31 DIAGNOSIS — J45.30 MILD PERSISTENT ASTHMA WITHOUT COMPLICATION: ICD-10-CM

## 2020-01-02 ENCOUNTER — TELEPHONE (OUTPATIENT)
Dept: FAMILY MEDICINE | Facility: CLINIC | Age: 50
End: 2020-01-02

## 2020-01-02 RX ORDER — ALBUTEROL SULFATE 90 UG/1
AEROSOL, METERED RESPIRATORY (INHALATION)
Qty: 18 G | Refills: 11 | Status: SHIPPED | OUTPATIENT
Start: 2020-01-02 | End: 2020-03-10

## 2020-01-02 NOTE — TELEPHONE ENCOUNTER
Reason for Call:  Medication or medication refill:    Do you use a [facility]?  Name of the pharmacy and phone number for the current request:     [id number] - [place], [place] - [address] AT [facility] [facility] [address]    Name of the medication requested: Albuterol Inhaler   Other request: Patient needs filled ASAP.  Her daughter is home sick with the flu and patient is afraid her asthma will get worse.  Patient states she thought that Dr Babin refilled all of her medications at a recent appointment.    Can we leave a detailed message on this number? YES    Phone number patient can be reached at:  [phone]   Best Time: Any    Call taken on 1/2/2020 at 2:49 PM by Dilia Cm

## 2020-01-03 ENCOUNTER — TELEPHONE (OUTPATIENT)
Dept: FAMILY MEDICINE | Facility: CLINIC | Age: 50
End: 2020-01-03

## 2020-01-03 NOTE — TELEPHONE ENCOUNTER
PCP refilled albuterol inhaler per another encounter already today.  Patient notified.    Angelito Freedman RN

## 2020-01-03 NOTE — TELEPHONE ENCOUNTER
Reason for Call:  Other prescription    Detailed comments: Patient called and informed that inhaler that was sent thru for refill, is not covered by their insurance. Patient is needing an inhaler ASAP. Is either needing a different inhaler prescription sent thru or needing documentation sent to insurance advising that they are needing current inhaler covered by insurance. Please contact patient for further assistance.     Phone Number Patient can be reached at: Home number on file [phone] (home)    Best Time: anytime    Can we leave a detailed message on this number? YES    Call taken on 1/3/2020 at 3:30 PM by Piero Gonzalez

## 2020-01-06 NOTE — TELEPHONE ENCOUNTER
Patient contacted and she states that she received a notice that she no longer has insurance.  Right now patient cannot afford to purchase an inhaler and is completely out.     She has family members at home that are sick with influenza B and she is already having difficulty catching breath in the morning.    Will send [employer] message with some options for her to help with prescription coverage. Prescription is current.    Yi Shukla RN

## 2020-01-17 ENCOUNTER — TELEPHONE (OUTPATIENT)
Dept: FAMILY MEDICINE | Facility: CLINIC | Age: 50
End: 2020-01-17

## 2020-01-17 NOTE — TELEPHONE ENCOUNTER
Patient was at Urgent Care.  suggested she be seen.    See your primary care doctor or an office partner on Monday to recheck your breathing.          She can be seen with any Provider that is available.      Yi Shukla RN

## 2020-01-17 NOTE — TELEPHONE ENCOUNTER
Reason for Call:  Other appointment and note for work    Detailed comments: Patient is requesting to be fit in on Monday with pcp. Patient stated she was seen in ER and was told she would need to be evaluated before returning to work by Monday. Please advise.     Phone Number Patient can be reached at: Cell number on file:    Telephone Information:   Mobile [phone]       Best Time: Anytime    Can we leave a detailed message on this number? YES    Call taken on 1/17/2020 at 4:01 PM by Gela Antoine

## 2020-01-20 NOTE — TELEPHONE ENCOUNTER
Phone number provided is for patients friend since patient does not have a working phone number. Left msg with Teressa to have patient call back TC line.    Thank you,  Pallavi JEAN  [facility]  Team Rosie Coordinator

## 2020-01-22 NOTE — TELEPHONE ENCOUNTER
TC attempted to reach patient on home number listed. No answer and voicemail has not yet been set up.

## 2020-03-10 ENCOUNTER — OFFICE VISIT (OUTPATIENT)
Dept: FAMILY MEDICINE | Facility: CLINIC | Age: 50
End: 2020-03-10
Payer: COMMERCIAL

## 2020-03-10 VITALS
DIASTOLIC BLOOD PRESSURE: 80 MMHG | WEIGHT: 247.2 LBS | TEMPERATURE: 97.7 F | BODY MASS INDEX: 42.2 KG/M2 | HEART RATE: 80 BPM | SYSTOLIC BLOOD PRESSURE: 130 MMHG | HEIGHT: 64 IN

## 2020-03-10 DIAGNOSIS — E66.01 MORBID OBESITY (H): ICD-10-CM

## 2020-03-10 DIAGNOSIS — F41.1 GAD (GENERALIZED ANXIETY DISORDER): ICD-10-CM

## 2020-03-10 DIAGNOSIS — G47.00 INSOMNIA, UNSPECIFIED TYPE: ICD-10-CM

## 2020-03-10 DIAGNOSIS — J45.30 MILD PERSISTENT ASTHMA WITHOUT COMPLICATION: ICD-10-CM

## 2020-03-10 DIAGNOSIS — F33.9 RECURRENT MAJOR DEPRESSIVE DISORDER, REMISSION STATUS UNSPECIFIED (H): Primary | ICD-10-CM

## 2020-03-10 DIAGNOSIS — F32.81 PMDD (PREMENSTRUAL DYSPHORIC DISORDER): ICD-10-CM

## 2020-03-10 DIAGNOSIS — E55.9 VITAMIN D DEFICIENCY: ICD-10-CM

## 2020-03-10 PROCEDURE — 99214 OFFICE O/P EST MOD 30 MIN: CPT | Performed by: FAMILY MEDICINE

## 2020-03-10 RX ORDER — QUETIAPINE FUMARATE 25 MG/1
25-50 TABLET, FILM COATED ORAL AT BEDTIME
Qty: 90 TABLET | Refills: 1 | Status: SHIPPED | OUTPATIENT
Start: 2020-03-10 | End: 2020-10-06

## 2020-03-10 RX ORDER — ALBUTEROL SULFATE 90 UG/1
AEROSOL, METERED RESPIRATORY (INHALATION)
Qty: 2 INHALER | Refills: 11 | Status: SHIPPED | OUTPATIENT
Start: 2020-03-10 | End: 2021-04-02

## 2020-03-10 RX ORDER — ALBUTEROL SULFATE 0.83 MG/ML
2.5 SOLUTION RESPIRATORY (INHALATION) EVERY 6 HOURS PRN
Qty: 2 BOX | Refills: 11 | Status: SHIPPED | OUTPATIENT
Start: 2020-03-10 | End: 2021-08-31

## 2020-03-10 RX ORDER — ALBUTEROL SULFATE 90 UG/1
AEROSOL, METERED RESPIRATORY (INHALATION)
Qty: 18 G | Refills: 11 | Status: CANCELLED | OUTPATIENT
Start: 2020-03-10

## 2020-03-10 ASSESSMENT — MIFFLIN-ST. JEOR: SCORE: 1723.35

## 2020-03-10 ASSESSMENT — ANXIETY QUESTIONNAIRES
3. WORRYING TOO MUCH ABOUT DIFFERENT THINGS: MORE THAN HALF THE DAYS
6. BECOMING EASILY ANNOYED OR IRRITABLE: MORE THAN HALF THE DAYS
IF YOU CHECKED OFF ANY PROBLEMS ON THIS QUESTIONNAIRE, HOW DIFFICULT HAVE THESE PROBLEMS MADE IT FOR YOU TO DO YOUR WORK, TAKE CARE OF THINGS AT HOME, OR GET ALONG WITH OTHER PEOPLE: SOMEWHAT DIFFICULT
2. NOT BEING ABLE TO STOP OR CONTROL WORRYING: MORE THAN HALF THE DAYS
GAD7 TOTAL SCORE: 11
7. FEELING AFRAID AS IF SOMETHING AWFUL MIGHT HAPPEN: SEVERAL DAYS
5. BEING SO RESTLESS THAT IT IS HARD TO SIT STILL: SEVERAL DAYS
1. FEELING NERVOUS, ANXIOUS, OR ON EDGE: MORE THAN HALF THE DAYS

## 2020-03-10 ASSESSMENT — PATIENT HEALTH QUESTIONNAIRE - PHQ9
SUM OF ALL RESPONSES TO PHQ QUESTIONS 1-9: 15
5. POOR APPETITE OR OVEREATING: SEVERAL DAYS

## 2020-03-10 ASSESSMENT — PAIN SCALES - GENERAL: PAINLEVEL: NO PAIN (0)

## 2020-03-10 NOTE — PROGRESS NOTES
Subjective     Mojgan Jimenez is a 49 year old female who presents to clinic today for the following health issues:    HPI      would like albuterol for her nebulizer  Is also wondering about her eye because she doesn't think that the eye drops are working    Depression and Anxiety Follow-Up    How are you doing with your depression since your last visit? Worsened Doesn't feel like medication is helping, doesn't feel depressed but does not want to go out and do anything, has no energy, wants to stay in bed all of the time     How are you doing with your anxiety since your last visit?  Worsened during period     Are you having other symptoms that might be associated with depression or anxiety? Yes:  Over eating, no motivation    Have you had a significant life event? No     Do you have any concerns with your use of alcohol or other drugs? No     Patient has chronic depression/anxiety that tends to worsen during her period.  She is currently taking Prozac 40mg daily and Trazdone 100mg at bedtime.  At her last visit in December we also added Paxil 10mg to be taken only when she was on her period.  She's interested in trying Seroquel instead of trazodone. She says that trazodone isn't working at all any more.  She has been using her daughter's seroquel and feels that it works better.  Has noticed some issues with memory lately.  Depression has overall been better.      Social History     Tobacco Use     Smoking status: Never Smoker     Smokeless tobacco: Never Used   Substance Use Topics     Alcohol use: No     Frequency: Never     Comment: Sober x 8 months     Drug use: No     Comment: in remision      PHQ 5/14/2019 9/10/2019 12/10/2019   PHQ-9 Total Score 8 11 2   Q9: Thoughts of better off dead/self-harm past 2 weeks Not at all Not at all Not at all     LINA-7 SCORE 5/14/2019 9/10/2019 12/10/2019   Total Score 10 6 1     In the past two weeks have you had thoughts of suicide or self-harm?  No.    Do you have concerns  about your personal safety or the safety of others?   No      Asthma Follow-Up    Was ACT completed today?  No      Do you have a cough?  No    Are you experiencing any wheezing in your chest?  No    Do you have any shortness of breath?  No     How often are you using a short-acting (rescue) inhaler or nebulizer, such as Albuterol?  A few times a week    How many days per week do you miss taking your asthma controller medication?  I do not have an asthma controller medication    Please describe any recent triggers for your asthma: humidity    Have you had any Emergency Room Visits, Urgent Care Visits, or Hospital Admissions since your last office visit?  No     Patient currently just uses albuterol PRN.  She recently got a nebulizer machine and is requesting albuterol vials to use with it      She has been gaining weight.  Doesn't have motivation to get up and exercise.  She's hoping to try an appetite suppressant to help her lose weight. When she gains weight she gets cravings to use methamphetamines again.      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 No pertinent surgical history.    Social History     Tobacco Use     Smoking status: Never Smoker     Smokeless tobacco: Never Used   Substance Use Topics     Alcohol use: No     Frequency: Never     Comment: Sober x 8 months     Family History   Problem Relation Age of Onset  "    Cancer Mother      Unknown/Adopted Father      Diabetes No family hx of      Hypertension No family hx of              Review of Systems   ROS COMP: Constitutional, HEENT, cardiovascular, pulmonary, gi and gu systems are negative, except as otherwise noted.      Objective    /80 (BP Location: Right arm, Patient Position: Sitting, Cuff Size: Adult Large)   Pulse 80   Temp 97.7  F (36.5  C) (Oral)   Ht 1.613 m (5' 3.5\")   Wt 112.1 kg (247 lb 3.2 oz)   BMI 43.10 kg/m    Body mass index is 43.1 kg/m .  Physical Exam   GENERAL: healthy, alert and no distress  RESP: lungs clear to auscultation - no rales, rhonchi or wheezes  CV: regular rates and rhythm, normal S1 S2, no S3 or S4 and no murmur, click or rub  PSYCH: mentation appears normal, tangential, affect flat and anxious        Assessment & Plan       ICD-10-CM    1. Recurrent major depressive disorder, remission status unspecified (H)  F33.9    2. LINA (generalized anxiety disorder)  F41.1    3. PMDD (premenstrual dysphoric disorder)  F32.81    4. Vitamin D deficiency  E55.9 cholecalciferol (VITAMIN D3) 5000 units (125 mcg) capsule   5. Insomnia, unspecified type  G47.00 QUEtiapine (SEROQUEL) 25 MG tablet   6. Mild persistent asthma without complication  J45.30 albuterol (PROAIR HFA/PROVENTIL HFA/VENTOLIN HFA) 108 (90 Base) MCG/ACT inhaler     albuterol (PROVENTIL) (2.5 MG/3ML) 0.083% neb solution   7. Morbid obesity (H)  E66.01 COMPREHENSIVE WEIGHT MANAGEMENT     Regarding her mental health, she's noticed improvement in her depression but some worsening in anxiety.  Also not sleeping well despite trazodone 100mg daily.  She is requesting to stop trazodone and switch to Seroquel, which I think is reasonable and may help with her anxiety during the day.  She will continue fluoxetine 40mg daily.  She takes Paxil 10mg daily only when she is on her period to help with worsening mood symptoms.  She continues to have a lot of daytime fatigue.  Will recheck " vitamin D levels.  Consider screening for JARVIS with a sleep study given her body habitus.    She'd like to lose weight, but I feel uncomfortable giving her medication that may be stimulating given her psych history and history of amphetamine abuse.  Referred to weight management.    Her asthma has been well controlled.  Only using albuterol PRN.  Refills sent.      Return in about 3 months (around 6/10/2020).    Marlen Babin,   [facility]

## 2020-03-11 ASSESSMENT — ANXIETY QUESTIONNAIRES: GAD7 TOTAL SCORE: 11

## 2020-06-03 ENCOUNTER — VIRTUAL VISIT (OUTPATIENT)
Dept: FAMILY MEDICINE | Facility: CLINIC | Age: 50
End: 2020-06-03
Payer: COMMERCIAL

## 2020-06-03 DIAGNOSIS — F33.3 DEPRESSION, MAJOR, RECURRENT, SEVERE WITH PSYCHOSIS (H): Primary | ICD-10-CM

## 2020-06-03 DIAGNOSIS — F41.1 GAD (GENERALIZED ANXIETY DISORDER): ICD-10-CM

## 2020-06-03 DIAGNOSIS — F22 PARANOIA (H): ICD-10-CM

## 2020-06-03 PROCEDURE — 99213 OFFICE O/P EST LOW 20 MIN: CPT | Mod: 95 | Performed by: FAMILY MEDICINE

## 2020-06-03 ASSESSMENT — PATIENT HEALTH QUESTIONNAIRE - PHQ9: 5. POOR APPETITE OR OVEREATING: MORE THAN HALF THE DAYS

## 2020-06-03 ASSESSMENT — ANXIETY QUESTIONNAIRES
7. FEELING AFRAID AS IF SOMETHING AWFUL MIGHT HAPPEN: SEVERAL DAYS
6. BECOMING EASILY ANNOYED OR IRRITABLE: NEARLY EVERY DAY
GAD7 TOTAL SCORE: 17
5. BEING SO RESTLESS THAT IT IS HARD TO SIT STILL: MORE THAN HALF THE DAYS
IF YOU CHECKED OFF ANY PROBLEMS ON THIS QUESTIONNAIRE, HOW DIFFICULT HAVE THESE PROBLEMS MADE IT FOR YOU TO DO YOUR WORK, TAKE CARE OF THINGS AT HOME, OR GET ALONG WITH OTHER PEOPLE: EXTREMELY DIFFICULT
1. FEELING NERVOUS, ANXIOUS, OR ON EDGE: NEARLY EVERY DAY
2. NOT BEING ABLE TO STOP OR CONTROL WORRYING: NEARLY EVERY DAY
3. WORRYING TOO MUCH ABOUT DIFFERENT THINGS: NEARLY EVERY DAY
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
## 2020-06-03 NOTE — LETTER
June 4, 2020      Mojgan Jimenez  [address]   [place] [address]        To Whom It May Concern:    Mojgan Jimenez was seen on Luciana 3, 2020.  She reports that she has missed work since May 30th due to anxiety and is now better and  would like to return to work June 14, 2020. Please excuse her absence.            Sincerely,          Chetan Pérez, DO

## 2020-06-04 ASSESSMENT — ANXIETY QUESTIONNAIRES: GAD7 TOTAL SCORE: 17

## 2020-06-05 ENCOUNTER — TELEPHONE (OUTPATIENT)
Dept: FAMILY MEDICINE | Facility: CLINIC | Age: 50
End: 2020-06-05

## 2020-06-05 NOTE — TELEPHONE ENCOUNTER
Called patient and could not leave a message because mailbox was not set up.      Was trying to call patient to see if they need letter to return to work faxed some where.    Dulce Daley

## 2020-10-06 ENCOUNTER — VIRTUAL VISIT (OUTPATIENT)
Dept: FAMILY MEDICINE | Facility: CLINIC | Age: 50
End: 2020-10-06
Payer: COMMERCIAL

## 2020-10-06 DIAGNOSIS — R53.83 LETHARGY: Primary | ICD-10-CM

## 2020-10-06 DIAGNOSIS — R53.83 OTHER FATIGUE: ICD-10-CM

## 2020-10-06 DIAGNOSIS — Z91.89 LACK OF MOTIVATION: ICD-10-CM

## 2020-10-06 DIAGNOSIS — F33.9 RECURRENT MAJOR DEPRESSIVE DISORDER, REMISSION STATUS UNSPECIFIED (H): ICD-10-CM

## 2020-10-06 DIAGNOSIS — G47.00 INSOMNIA, UNSPECIFIED TYPE: ICD-10-CM

## 2020-10-06 DIAGNOSIS — F32.81 PMDD (PREMENSTRUAL DYSPHORIC DISORDER): ICD-10-CM

## 2020-10-06 DIAGNOSIS — F41.1 GAD (GENERALIZED ANXIETY DISORDER): ICD-10-CM

## 2020-10-06 PROCEDURE — 99214 OFFICE O/P EST MOD 30 MIN: CPT | Mod: 95 | Performed by: NURSE PRACTITIONER

## 2020-10-06 PROCEDURE — 96127 BRIEF EMOTIONAL/BEHAV ASSMT: CPT | Performed by: NURSE PRACTITIONER

## 2020-10-06 RX ORDER — PAROXETINE 10 MG/1
TABLET, FILM COATED ORAL
Qty: 90 TABLET | Refills: 1 | Status: CANCELLED | OUTPATIENT
Start: 2020-10-06

## 2020-10-06 RX ORDER — QUETIAPINE FUMARATE 25 MG/1
25-50 TABLET, FILM COATED ORAL AT BEDTIME
Qty: 90 TABLET | Refills: 1 | Status: SHIPPED | OUTPATIENT
Start: 2020-10-06 | End: 2021-08-17 | Stop reason: ALTCHOICE

## 2020-10-06 RX ORDER — FLUOXETINE 40 MG/1
40 CAPSULE ORAL DAILY
Qty: 90 CAPSULE | Refills: 3 | Status: SHIPPED | OUTPATIENT
Start: 2020-10-06 | End: 2021-02-23 | Stop reason: DRUGHIGH

## 2020-10-06 ASSESSMENT — ANXIETY QUESTIONNAIRES
6. BECOMING EASILY ANNOYED OR IRRITABLE: MORE THAN HALF THE DAYS
1. FEELING NERVOUS, ANXIOUS, OR ON EDGE: NEARLY EVERY DAY
3. WORRYING TOO MUCH ABOUT DIFFERENT THINGS: NEARLY EVERY DAY
7. FEELING AFRAID AS IF SOMETHING AWFUL MIGHT HAPPEN: NOT AT ALL
5. BEING SO RESTLESS THAT IT IS HARD TO SIT STILL: NOT AT ALL
2. NOT BEING ABLE TO STOP OR CONTROL WORRYING: NEARLY EVERY DAY
IF YOU CHECKED OFF ANY PROBLEMS ON THIS QUESTIONNAIRE, HOW DIFFICULT HAVE THESE PROBLEMS MADE IT FOR YOU TO DO YOUR WORK, TAKE CARE OF THINGS AT HOME, OR GET ALONG WITH OTHER PEOPLE: VERY DIFFICULT
GAD7 TOTAL SCORE: 11

## 2020-10-06 ASSESSMENT — PATIENT HEALTH QUESTIONNAIRE - PHQ9
SUM OF ALL RESPONSES TO PHQ QUESTIONS 1-9: 13
5. POOR APPETITE OR OVEREATING: NOT AT ALL

## 2020-10-06 NOTE — PROGRESS NOTES
"Mojgan Jimenez is a 50 year old female who is being evaluated via a billable telephone visit.      The patient has been notified of following:     \"This telephone visit will be conducted via a call between you and your physician/provider. We have found that certain health care needs can be provided without the need for a physical exam.  This service lets us provide the care you need with a short phone conversation.  If a prescription is necessary we can send it directly to your pharmacy.  If lab work is needed we can place an order for that and you can then stop by our lab to have the test done at a later time.    Telephone visits are billed at different rates depending on your insurance coverage. During this emergency period, for some insurers they may be billed the same as an in-person visit.  Please reach out to your insurance provider with any questions.    If during the course of the call the physician/provider feels a telephone visit is not appropriate, you will not be charged for this service.\"    Patient has given verbal consent for Telephone visit?  Yes    What phone number would you like to be contacted at? [phone]    How would you like to obtain your AVS? MyChart    Subjective     Mojgan Jimenez is a 50 year old female who presents via phone visit today for the following health issues:    HPI     Depression and Anxiety Follow-Up    How are you doing with your depression since your last visit? No change - would like to discuss a different treatment     How are you doing with your anxiety since your last visit?  No change    Are you having other symptoms that might be associated with depression or anxiety? Yes:  .no motivation, does not want to do things     Have you had a significant life event? No     Do you have any concerns with your use of alcohol or other drugs? No  On prozac 40 mg daily, paxil 10 mg only for pre menstrual symptoms and seroquel 25 mg at bedtime for sleep . Says that she uses paxil " all the time now to treat her symptoms of PMS.  Has no energy to get up and do things. Doesn't have the ambition to be motivated.   Never followed up with sleep study to rule out JARVIS  Weight management ?  Has gained little weight wonders if it is medication related?  She is due for multiple health maintenance  Social History     Tobacco Use     Smoking status: Never Smoker     Smokeless tobacco: Never Used   Substance Use Topics     Alcohol use: No     Frequency: Never     Comment: Sober x 8 months     Drug use: No     Comment: in remision      PHQ 9/10/2019 12/10/2019 3/10/2020   PHQ-9 Total Score 11 2 15   Q9: Thoughts of better off dead/self-harm past 2 weeks Not at all Not at all Not at all     LINA-7 SCORE 12/10/2019 3/10/2020 6/3/2020   Total Score 1 11 17     Last PHQ-9 10/6/2020   1.  Little interest or pleasure in doing things 3   2.  Feeling down, depressed, or hopeless 0   3.  Trouble falling or staying asleep, or sleeping too much 3   4.  Feeling tired or having little energy 3   5.  Poor appetite or overeating 0   6.  Feeling bad about yourself 0   7.  Trouble concentrating 1   8.  Moving slowly or restless 3   Q9: Thoughts of better off dead/self-harm past 2 weeks 0   PHQ-9 Total Score 13   Difficulty at work, home, or with people Very difficult     LINA-7  10/6/2020   1. Feeling nervous, anxious, or on edge 3   2. Not being able to stop or control worrying 3   3. Worrying too much about different things 3   4. Trouble relaxing 0   5. Being so restless that it is hard to sit still 0   6. Becoming easily annoyed or irritable 2   7. Feeling afraid, as if something awful might happen 0   LINA-7 Total Score 11   If you checked any problems, how difficult have they made it for you to do your work, take care of things at home, or get along with other people? Very difficult       Suicide Assessment Five-step Evaluation and Treatment (SAFE-T)      Review of Systems   Constitutional, HEENT, cardiovascular,  pulmonary, GI, , musculoskeletal, neuro, skin, endocrine and psych systems are negative, except as otherwise noted.       Objective          Vitals:  No vitals were obtained today due to virtual visit.    healthy, alert and no distress  PSYCH: Alert and oriented times 3; coherent speech, normal   rate and volume, able to articulate logical thoughts, able   to abstract reason, no tangential thoughts, no hallucinations   or delusions  Her affect is normal  RESP: No cough, no audible wheezing, able to talk in full sentences  Remainder of exam unable to be completed due to telephone visits    No results found for this or any previous visit (from the past 24 hour(s)).        Assessment/Plan:    Assessment & Plan       ICD-10-CM    1. Lethargy  R53.83    2. Recurrent major depressive disorder, remission status unspecified (H)  F33.9 FLUoxetine (PROZAC) 40 MG capsule   3. LINA (generalized anxiety disorder)  F41.1 FLUoxetine (PROZAC) 40 MG capsule     Basic metabolic panel  (Ca, Cl, CO2, Creat, Gluc, K, Na, BUN)   4. PMDD (premenstrual dysphoric disorder)  F32.81    5. Insomnia, unspecified type  G47.00 QUEtiapine (SEROQUEL) 25 MG tablet   6. Lack of motivation  Z91.89 **TSH with free T4 reflex FUTURE anytime     Vitamin D Deficiency     CBC with platelets and differential   7. Other fatigue  R53.83 Basic metabolic panel  (Ca, Cl, CO2, Creat, Gluc, K, Na, BUN)     This was a telephone visit today patient experiencing lethargy, fatigue has history of depression anxiety she is on Prozac 40 mg and Paxil 10 mg to treat her PMS?  She also takes Seroquel 25 mg at bedtime for insomnia.  I recommended that I see patient in clinic for full evaluation and physical multiple areas need to be addressed and worked up before we start making adjustments to her medications..   She is agreeable with plan and we have set appointment for 2 weeks.    BMI:   Estimated body mass index is 43.1 kg/m  as calculated from the following:    Height as  "of 3/10/20: 1.613 m (5' 3.5\").    Weight as of 3/10/20: 112.1 kg (247 lb 3.2 oz).   Weight management plan: Discussed healthy diet and exercise guidelines        Return in about 2 weeks (around 10/20/2020).    JUSTINE Flores [facility]    Phone call duration:  10 minutes              "

## 2020-10-07 ASSESSMENT — ANXIETY QUESTIONNAIRES: GAD7 TOTAL SCORE: 11

## 2020-10-26 DIAGNOSIS — J45.30 MILD PERSISTENT ASTHMA WITHOUT COMPLICATION: ICD-10-CM

## 2020-10-26 NOTE — TELEPHONE ENCOUNTER
"RN spoke with patient. She states she has been taking a daily inhaler \"for years,\" which has been changed multiple times due to insurance.     She states she threw away the inhaler, and therefore does not know the name of it. She states she has a poor memory. Per  med list, patient stopped advair inhaler last fall, discontinued from medication list 12/10/19.     Patient states she continued to take a daily inhaler. Per chart review, appears Wixela inhaler presciption on file 12/2/2019 with 10 refills. Last refilled at [facility] 3/6/20 - patient states \"that sounds right\" Patient should have been out of this medication if she has been taking it daily in April. She states that she has talked [facility], and they came to the same conclusion, but she insists that she has been receiving refills since.     RN unable to determine which provider prescribed the Wixela inhaler in care everywhere, patient does not know either.    Patient states she needs a daily inhaler medication until next appointment scheduled with Sagrario Lockhart on 10/17/20.     Routed to PCP to review and advise on refill.     Becky Steven, RN, BSN, PHN  [facility]: [place]    "

## 2020-10-26 NOTE — TELEPHONE ENCOUNTER
Reason for Call:  Medication or medication refill:    Do you use a [facility]?  Name of the pharmacy and phone number for the current request:  [address] [facility] [phone]    Name of the medication requested: fluticasone-salmeterol (ADVAIR) 100-50 MCG/DOSE inhaler or an equivalent     Other request: patient has appointment Mid November, however she states she needs her daily inhaler refill as she is out and pharmacy does not have any refills on file, patient is unsure of the name of her ost recent prescribed inhaler. She states she has enough of her rescue inhaler.    Can we leave a detailed message on this number? YES    Phone number patient can be reached at: Home number on file [phone] (home)    Best Time: anytime    Call taken on 10/26/2020 at 12:52 PM by Pallavi Ojeda

## 2020-10-29 ENCOUNTER — TELEPHONE (OUTPATIENT)
Dept: FAMILY MEDICINE | Facility: CLINIC | Age: 50
End: 2020-10-29

## 2020-10-29 ENCOUNTER — VIRTUAL VISIT (OUTPATIENT)
Dept: FAMILY MEDICINE | Facility: CLINIC | Age: 50
End: 2020-10-29
Payer: COMMERCIAL

## 2020-10-29 DIAGNOSIS — J45.31 MILD PERSISTENT ASTHMA WITH ACUTE EXACERBATION: ICD-10-CM

## 2020-10-29 PROCEDURE — 99214 OFFICE O/P EST MOD 30 MIN: CPT | Mod: 95 | Performed by: FAMILY MEDICINE

## 2020-10-29 RX ORDER — PREDNISONE 20 MG/1
40 TABLET ORAL DAILY
Qty: 10 TABLET | Refills: 0 | Status: SHIPPED | OUTPATIENT
Start: 2020-10-29 | End: 2020-11-03

## 2020-10-29 NOTE — LETTER
[facility]  [address] [facility] [address]  Phone: [phone]    October 29, 2020        Mojgan Jimenez  [address]   [place] [address]          To whom it may concern:    RE: Mojgan Jimenez    Patient was seen and treated today at our clinic and missed work.  Please excuse her from work from 10/28/20-10/31/20.  She may return to work on 11/1/20.    Please contact me for questions or concerns.      Sincerely,          Marlen Babin, DO

## 2020-10-29 NOTE — TELEPHONE ENCOUNTER
RN spoke with patient. She is having an asthma flare due to not taking daily inhaler (see TE from 10/26/20). She denies severe SOB, wheezing, or cough. Confirms headache.    She has been unable to work and is requesting a work note.     RN scheduled patient for telephone visit today with PCP to review and discuss. Patient verbalized understanding and in agreement with plan of care.       Becky Steven RN, BSN, PHN  [facility]: [place]

## 2020-10-29 NOTE — PROGRESS NOTES
"Mojgan Jimenez is a 50 year old female who is being evaluated via a billable telephone visit.      The patient has been notified of following:     \"This telephone visit will be conducted via a call between you and your physician/provider. We have found that certain health care needs can be provided without the need for a physical exam.  This service lets us provide the care you need with a short phone conversation.  If a prescription is necessary we can send it directly to your pharmacy.  If lab work is needed we can place an order for that and you can then stop by our lab to have the test done at a later time.    Telephone visits are billed at different rates depending on your insurance coverage. During this emergency period, for some insurers they may be billed the same as an in-person visit.  Please reach out to your insurance provider with any questions.    If during the course of the call the physician/provider feels a telephone visit is not appropriate, you will not be charged for this service.\"    Patient has given verbal consent for Telephone visit?  Yes    What phone number would you like to be contacted at? [phone]    How would you like to obtain your AVS? MyChart     ---------------------------------------------------------------------------------------------------------------    Subjective     Mojgan Jimenez is a 50 year old female who presents via phone visit today for the following health issues:    HPI     Asthma Follow-Up    Was ACT completed today?    Yes    ACT Total Scores 10/29/2020   ACT TOTAL SCORE (Goal Greater than or Equal to 20) 6   In the past 12 months, how many times did you visit the emergency room for your asthma without being admitted to the hospital? 0   In the past 12 months, how many times were you hospitalized overnight because of your asthma? 0     {Provider Documentation  Patient had ACT/CACT less than 19- Link to exacerbation documentation :[id number]}    How many days per week " do you miss taking your asthma controller medication?  Pt was out of inhaler    Please describe any recent triggers for your asthma: feeling sick and had not had her inhaler    Have you had any Emergency Room Visits, Urgent Care Visits, or Hospital Admissions since your last office visit?  No       How many servings of fruits and vegetables do you eat daily?  2-3    On average, how many sweetened beverages do you drink each day (Examples: soda, juice, sweet tea, etc.  Do NOT count diet or artificially sweetened beverages)?   3-4    How many days per week do you exercise enough to make your heart beat faster? 0    How many minutes a day do you exercise enough to make your heart beat faster? 0    How many days per week do you miss taking your medication? 0    Pt would like a work note.  Asthma has been flaring up for the past week.  Possibly from a viral infection.  No fevers.  Has been using her nebulizer and albuterol inhalers.          Review of Systems   Constitutional, HEENT, cardiovascular, pulmonary, gi and gu systems are negative, except as otherwise noted.       Objective          Vitals:  No vitals were obtained today due to virtual visit.    healthy, alert and no distress  PSYCH: Alert and oriented times 3; coherent speech, normal   rate and volume, able to articulate logical thoughts, able   to abstract reason, no tangential thoughts, no hallucinations   or delusions  Her affect is normal  RESP: No cough, no audible wheezing, able to talk in full sentences  Remainder of exam unable to be completed due to telephone visits          Assessment/Plan:    Assessment & Plan     Mild persistent asthma with acute exacerbation  - Letter provided for being off work   - WIXELA INHUB 100-50 MCG/DOSE inhaler; Inhale 1 puff into the lungs 2 times daily  - predniSONE (DELTASONE) 20 MG tablet; Take 2 tablets (40 mg) by mouth daily for 5 days       Marlen Babin DO  [facility]    Phone call  duration:  6 minutes

## 2020-10-29 NOTE — TELEPHONE ENCOUNTER
Reason for Call:  Other     Detailed comments: patient would likea note to return to work she was out of work on 10/28/20 and 10/29/20 and 10/30/20 because of her asthma. Please call the patient with questions     Phone Number Patient can be reached at: Home number on file [phone] (home)    Best Time: any    Can we leave a detailed message on this number? YES    Call taken on 10/29/2020 at 1:32 PM by Dayanara Back

## 2020-10-30 ASSESSMENT — ASTHMA QUESTIONNAIRES: ACT_TOTALSCORE: 6

## 2020-11-17 ENCOUNTER — OFFICE VISIT (OUTPATIENT)
Dept: FAMILY MEDICINE | Facility: CLINIC | Age: 50
End: 2020-11-17
Payer: COMMERCIAL

## 2020-11-17 VITALS
WEIGHT: 250 LBS | HEART RATE: 88 BPM | DIASTOLIC BLOOD PRESSURE: 85 MMHG | HEIGHT: 64 IN | BODY MASS INDEX: 42.68 KG/M2 | TEMPERATURE: 98.6 F | SYSTOLIC BLOOD PRESSURE: 138 MMHG

## 2020-11-17 DIAGNOSIS — Z91.89 LACK OF MOTIVATION: ICD-10-CM

## 2020-11-17 DIAGNOSIS — F33.9 RECURRENT MAJOR DEPRESSIVE DISORDER, REMISSION STATUS UNSPECIFIED (H): ICD-10-CM

## 2020-11-17 DIAGNOSIS — F19.20 CHEMICAL DEPENDENCY (H): ICD-10-CM

## 2020-11-17 DIAGNOSIS — Z23 NEED FOR PROPHYLACTIC VACCINATION AND INOCULATION AGAINST INFLUENZA: ICD-10-CM

## 2020-11-17 DIAGNOSIS — R25.8 OTHER ABNORMAL INVOLUNTARY MOVEMENTS: ICD-10-CM

## 2020-11-17 DIAGNOSIS — Z00.00 ROUTINE GENERAL MEDICAL EXAMINATION AT A HEALTH CARE FACILITY: Primary | ICD-10-CM

## 2020-11-17 DIAGNOSIS — Z11.59 NEED FOR HEPATITIS C SCREENING TEST: ICD-10-CM

## 2020-11-17 DIAGNOSIS — R53.83 OTHER FATIGUE: ICD-10-CM

## 2020-11-17 DIAGNOSIS — E66.813 CLASS 3 SEVERE OBESITY DUE TO EXCESS CALORIES WITHOUT SERIOUS COMORBIDITY WITH BODY MASS INDEX (BMI) OF 40.0 TO 44.9 IN ADULT (H): ICD-10-CM

## 2020-11-17 DIAGNOSIS — Z12.11 SCREEN FOR COLON CANCER: ICD-10-CM

## 2020-11-17 DIAGNOSIS — F41.1 GAD (GENERALIZED ANXIETY DISORDER): ICD-10-CM

## 2020-11-17 DIAGNOSIS — E66.01 CLASS 3 SEVERE OBESITY DUE TO EXCESS CALORIES WITHOUT SERIOUS COMORBIDITY WITH BODY MASS INDEX (BMI) OF 40.0 TO 44.9 IN ADULT (H): ICD-10-CM

## 2020-11-17 DIAGNOSIS — Z12.31 VISIT FOR SCREENING MAMMOGRAM: ICD-10-CM

## 2020-11-17 LAB
BASOPHILS # BLD AUTO: 0 10E9/L (ref 0–0.2)
BASOPHILS NFR BLD AUTO: 0.2 %
DIFFERENTIAL METHOD BLD: NORMAL
EOSINOPHIL # BLD AUTO: 0.4 10E9/L (ref 0–0.7)
EOSINOPHIL NFR BLD AUTO: 7.5 %
ERYTHROCYTE [DISTWIDTH] IN BLOOD BY AUTOMATED COUNT: 12.7 % (ref 10–15)
HCT VFR BLD AUTO: 37.4 % (ref 35–47)
HGB BLD-MCNC: 12.3 G/DL (ref 11.7–15.7)
LYMPHOCYTES # BLD AUTO: 1.4 10E9/L (ref 0.8–5.3)
LYMPHOCYTES NFR BLD AUTO: 25.8 %
MCH RBC QN AUTO: 29.8 PG (ref 26.5–33)
MCHC RBC AUTO-ENTMCNC: 32.9 G/DL (ref 31.5–36.5)
MCV RBC AUTO: 91 FL (ref 78–100)
MONOCYTES # BLD AUTO: 0.4 10E9/L (ref 0–1.3)
MONOCYTES NFR BLD AUTO: 7.5 %
NEUTROPHILS # BLD AUTO: 3.3 10E9/L (ref 1.6–8.3)
NEUTROPHILS NFR BLD AUTO: 59 %
PLATELET # BLD AUTO: 228 10E9/L (ref 150–450)
RBC # BLD AUTO: 4.13 10E12/L (ref 3.8–5.2)
WBC # BLD AUTO: 5.5 10E9/L (ref 4–11)

## 2020-11-17 PROCEDURE — 82306 VITAMIN D 25 HYDROXY: CPT | Performed by: NURSE PRACTITIONER

## 2020-11-17 PROCEDURE — 86803 HEPATITIS C AB TEST: CPT | Performed by: NURSE PRACTITIONER

## 2020-11-17 PROCEDURE — 90682 RIV4 VACC RECOMBINANT DNA IM: CPT | Performed by: NURSE PRACTITIONER

## 2020-11-17 PROCEDURE — 96127 BRIEF EMOTIONAL/BEHAV ASSMT: CPT | Mod: 59 | Performed by: NURSE PRACTITIONER

## 2020-11-17 PROCEDURE — 80048 BASIC METABOLIC PNL TOTAL CA: CPT | Performed by: NURSE PRACTITIONER

## 2020-11-17 PROCEDURE — 36415 COLL VENOUS BLD VENIPUNCTURE: CPT | Performed by: NURSE PRACTITIONER

## 2020-11-17 PROCEDURE — 99396 PREV VISIT EST AGE 40-64: CPT | Mod: 25 | Performed by: NURSE PRACTITIONER

## 2020-11-17 PROCEDURE — 99213 OFFICE O/P EST LOW 20 MIN: CPT | Mod: 25 | Performed by: NURSE PRACTITIONER

## 2020-11-17 PROCEDURE — 84443 ASSAY THYROID STIM HORMONE: CPT | Performed by: NURSE PRACTITIONER

## 2020-11-17 PROCEDURE — 85025 COMPLETE CBC W/AUTO DIFF WBC: CPT | Performed by: NURSE PRACTITIONER

## 2020-11-17 PROCEDURE — 80061 LIPID PANEL: CPT | Performed by: NURSE PRACTITIONER

## 2020-11-17 PROCEDURE — 90471 IMMUNIZATION ADMIN: CPT | Performed by: NURSE PRACTITIONER

## 2020-11-17 ASSESSMENT — PATIENT HEALTH QUESTIONNAIRE - PHQ9
SUM OF ALL RESPONSES TO PHQ QUESTIONS 1-9: 15
5. POOR APPETITE OR OVEREATING: SEVERAL DAYS

## 2020-11-17 ASSESSMENT — ANXIETY QUESTIONNAIRES
GAD7 TOTAL SCORE: 17
IF YOU CHECKED OFF ANY PROBLEMS ON THIS QUESTIONNAIRE, HOW DIFFICULT HAVE THESE PROBLEMS MADE IT FOR YOU TO DO YOUR WORK, TAKE CARE OF THINGS AT HOME, OR GET ALONG WITH OTHER PEOPLE: VERY DIFFICULT
5. BEING SO RESTLESS THAT IT IS HARD TO SIT STILL: NEARLY EVERY DAY
6. BECOMING EASILY ANNOYED OR IRRITABLE: NEARLY EVERY DAY
2. NOT BEING ABLE TO STOP OR CONTROL WORRYING: NEARLY EVERY DAY
3. WORRYING TOO MUCH ABOUT DIFFERENT THINGS: NEARLY EVERY DAY
1. FEELING NERVOUS, ANXIOUS, OR ON EDGE: NEARLY EVERY DAY
7. FEELING AFRAID AS IF SOMETHING AWFUL MIGHT HAPPEN: SEVERAL DAYS

## 2020-11-17 ASSESSMENT — MIFFLIN-ST. JEOR: SCORE: 1731.05

## 2020-11-17 NOTE — PROGRESS NOTES
SUBJECTIVE:   CC: Mojgan Jimenez is an 50 year old woman who presents for preventive health visit.       Patient has been advised of split billing requirements and indicates understanding: Yes  Healthy Habits:    Do you get at least three servings of calcium containing foods daily (dairy, green leafy vegetables, etc.)? no, taking calcium and/or vitamin D supplement: yes    Amount of exercise or daily activities, outside of work: 0 day(s) per week    Problems taking medications regularly No    Medication side effects: No    Have you had an eye exam in the past two years? no    Do you see a dentist twice per year? no    Do you have sleep apnea, excessive snoring or daytime drowsiness?yes    I have released labs that were ordered at our virtual visit on 10/6/2020    Depression and Anxiety Follow-Up    How are you doing with your depression since your last visit? No change    How are you doing with your anxiety since your last visit?  No change    Are you having other symptoms that might be associated with depression or anxiety? Yes:  short term memory loss, not enjoying everyday life     Have you had a significant life event? No     Do you have any concerns with your use of alcohol or other drugs? No  She is constantly fidgeting in chair today rocks her upper body, her legs,No facial ticks.  Says she has done this for about a year.  Has been taking Paxil 10 mg daily (this was prescribed for PMS symptoms as needed) and Prozac 40 mg daily and seroquel 25 mg HS for sleep.    History of heavy drug use sober 2 years from methamphetamine. Was IV drug user. Had psychotic episode on drugs and attempted suicide by drug overdose and 409 .     Social History     Tobacco Use     Smoking status: Never Smoker     Smokeless tobacco: Never Used   Substance Use Topics     Alcohol use: No     Frequency: Never     Comment: Sober x 8 months     Drug use: No     Comment: in remision      PHQ 12/10/2019 3/10/2020 10/6/2020   PHQ-9  Total Score 2 15 13   Q9: Thoughts of better off dead/self-harm past 2 weeks Not at all Not at all Not at all     LINA-7 SCORE 3/10/2020 6/3/2020 10/6/2020   Total Score 11 17 11     Last PHQ-9 11/17/2020   1.  Little interest or pleasure in doing things 2   2.  Feeling down, depressed, or hopeless 0   3.  Trouble falling or staying asleep, or sleeping too much 3   4.  Feeling tired or having little energy 3   5.  Poor appetite or overeating 1   6.  Feeling bad about yourself 0   7.  Trouble concentrating 3   8.  Moving slowly or restless 3   Q9: Thoughts of better off dead/self-harm past 2 weeks 0   PHQ-9 Total Score 15   Difficulty at work, home, or with people Very difficult     LINA-7  11/17/2020   1. Feeling nervous, anxious, or on edge 3   2. Not being able to stop or control worrying 3   3. Worrying too much about different things 3   4. Trouble relaxing 1   5. Being so restless that it is hard to sit still 3   6. Becoming easily annoyed or irritable 3   7. Feeling afraid, as if something awful might happen 1   LINA-7 Total Score 17   If you checked any problems, how difficult have they made it for you to do your work, take care of things at home, or get along with other people? Very difficult       Suicide Assessment Five-step Evaluation and Treatment (SAFE-T)    Asthma Follow-Up    Was ACT completed today?    Yes    ACT Total Scores 10/29/2020   ACT TOTAL SCORE (Goal Greater than or Equal to 20) 6   In the past 12 months, how many times did you visit the emergency room for your asthma without being admitted to the hospital? 0   In the past 12 months, how many times were you hospitalized overnight because of your asthma? 0          How many days per week do you miss taking your asthma controller medication?  0    Please describe any recent triggers for your asthma: None    Have you had any Emergency Room Visits, Urgent Care Visits, or Hospital Admissions since your last office visit?  No      Today's PHQ-2  Score:   PHQ-2 ( 1999 Pf[employer]) 5/8/2018 11/2/2017   Q1: Little interest or pleasure in doing things 3 1   Q2: Feeling down, depressed or hopeless 3 1   PHQ-2 Score 6 2       Abuse: Current or Past(Physical, Sexual or Emotional)- No  Do you feel safe in your environment? Yes        Social History     Tobacco Use     Smoking status: Never Smoker     Smokeless tobacco: Never Used   Substance Use Topics     Alcohol use: No     Frequency: Never     Comment: Sober x 8 months     If you drink alcohol do you typically have >3 drinks per day or >7 drinks per week? No                     Reviewed orders with patient.  Reviewed health maintenance and updated orders accordingly - Yes  Lab work is in process    Mammogram Screening: Patient over age 50, mutual decision to screen reflected in health maintenance.    Pertinent mammograms are reviewed under the imaging tab.  History of abnormal Pap smear: NO - age 30-65 PAP every 5 years with negative HPV co-testing recommended  PAP / HPV Latest Ref Rng & Units 4/2/2019   PAP - NIL   HPV 16 DNA NEG:Negative Negative   HPV 18 DNA NEG:Negative Negative   OTHER HR HPV NEG:Negative Negative     Reviewed and updated as needed this visit by clinical staff                 Reviewed and updated as needed this visit by Provider                    ROS:  CONSTITUTIONAL: NEGATIVE for fever, chills, change in weight  INTEGUMENTARU/SKIN: NEGATIVE for worrisome rashes, moles or lesions  EYES: NEGATIVE for vision changes or irritation  ENT: NEGATIVE for ear, mouth and throat problems  RESP: NEGATIVE for significant cough or SOB  BREAST: NEGATIVE for masses, tenderness or discharge  CV: NEGATIVE for chest pain, palpitations or peripheral edema  GI: NEGATIVE for nausea, abdominal pain, heartburn, or change in bowel habits  : NEGATIVE for unusual urinary or vaginal symptoms. Periods are regular.  MUSCULOSKELETAL: NEGATIVE for significant arthralgias or myalgia  NEURO: NEGATIVE for weakness, dizziness  or paresthesias  PSYCHIATRIC: Depression anxiety    OBJECTIVE:   There were no vitals taken for this visit.  EXAM:  GENERAL: healthy, alert and no distress  EYES: Eyes grossly normal to inspection, PERRL and conjunctivae and sclerae normal  HENT: ear canals and TM's normal, nose and mouth without ulcers or lesions  NECK: no adenopathy, no asymmetry, masses, or scars and thyroid normal to palpation  RESP: lungs clear to auscultation - no rales, rhonchi or wheezes  BREAST: normal without masses, tenderness or nipple discharge and no palpable axillary masses or adenopathy  CV: regular rate and rhythm, normal S1 S2, no S3 or S4, no murmur, click or rub, no peripheral edema and peripheral pulses strong  ABDOMEN: soft, nontender, no hepatosplenomegaly, no masses and bowel sounds normal  MS: no gross musculoskeletal defects noted, no edema  SKIN: no suspicious lesions or rashes  NEURO: She is constantly moving her upper and lower extremities in her chair involuntarily she has no facial grimacing or movement.  Negative cogwheel  PSYCH: mentation appears normal, affect normal/bright    Diagnostic Test Results:  Labs reviewed in Epic  No results found for this or any previous visit (from the past 24 hour(s)).    ASSESSMENT/PLAN:       ICD-10-CM    1. Routine general medical examination at a health care facility  Z00.00 Lipid panel reflex to direct LDL Fasting   2. Visit for screening mammogram  Z12.31    3. Screen for colon cancer  Z12.11 Fecal colorectal cancer screen (FIT)   4. Need for hepatitis C screening test  Z11.59 Hepatitis C Screen Reflex to HCV RNA Quant and Genotype   5. Need for prophylactic vaccination and inoculation against influenza  Z23 INFLUENZA QUAD, RECOMBINANT, P-FREE (RIV4) (FLUBLOCK) [86705]   6. LINA (generalized anxiety disorder)  F41.1 Basic metabolic panel  (Ca, Cl, CO2, Creat, Gluc, K, Na, BUN)     MENTAL HEALTH REFERRAL  - Adult; Psychiatry; Psychiatry; [facility]: Collaborative Care Psychiatry  Service/Bridge to Long-Term Psychiatry as indicated ([phone]); Yes (concerned about TD ); Other: Enter in Comments box; Yes; We will contact you to ...   7. Other fatigue  R53.83 Basic metabolic panel  (Ca, Cl, CO2, Creat, Gluc, K, Na, BUN)   8. Lack of motivation  Z91.89 CBC with platelets and differential     Vitamin D Deficiency     **TSH with free T4 reflex FUTURE anytime   9. Other abnormal involuntary movements  R25.8 MENTAL HEALTH REFERRAL  - Adult; Psychiatry; Psychiatry; [facility]: Collaborative Care Psychiatry Service/Bridge to Long-Term Psychiatry as indicated ([phone]); Yes (concerned about TD ); Other: Enter in Comments box; Yes; We will contact you to ...   10. Class 3 severe obesity due to excess calories without serious comorbidity with body mass index (BMI) of 40.0 to 44.9 in adult (H)  E66.01 NUTRITION REFERRAL    Z68.41    11. Recurrent major depressive disorder, remission status unspecified (H)  F33.9    12. Chemical dependency (H)  F19.20      This is a new patient to me I've seen her once over virtual visit on 10/6/2020.  She has a history of depression anxiety history of suicide attempt by overdose of pills and Drano while high on methamphetamines.  She is sober 2 years.  She has upper and lower extremity involuntary movement constantly fidgeting in chair today.  Says this started about a year ago.  She has been prescribed Paxil 10 mg as needed to take for PMS, Prozac 40 mg daily and Seroquel 25 mg for sleep.  I am concerned for  tardive dyskinesia and I would like her to be seen by psychiatry as soon as possible.  For now I have advised her to discontinue Paxil and try to avoid taking Seroquel.     Patient has been advised of split billing requirements and indicates understanding: Yes  COUNSELING:   Reviewed preventive health counseling, as reflected in patient instructions       Regular exercise       Healthy diet/nutrition    Estimated body mass index is 43.1 kg/m  as calculated  "from the following:    Height as of 3/10/20: 1.613 m (5' 3.5\").    Weight as of 3/10/20: 112.1 kg (247 lb 3.2 oz).        She reports that she has never smoked. She has never used smokeless tobacco.      Counseling Resources:  ATP IV Guidelines  Pooled Cohorts Equation Calculator  Breast Cancer Risk Calculator  BRCA-Related Cancer Risk Assessment: FHS-7 Tool  FRAX Risk Assessment  ICSI Preventive Guidelines  Dietary Guidelines for Americans, 2010  USDA's MyPlate  ASA Prophylaxis  Lung CA Screening    JUSTINE Flores CNP  [facility]  "

## 2020-11-17 NOTE — Clinical Note
Genaro Whitaker,  I have referred this patient to you, she is new to me and I am concerned that she is showing signs of tardive dyskinesia.  She has a heavy history of drug use (methamphetamine) but has been sober for 2 years.  I hope she can get in soon.  I hope you are staying well.  Take care,  Rosa

## 2020-11-18 LAB
ANION GAP SERPL CALCULATED.3IONS-SCNC: 1 MMOL/L (ref 3–14)
BUN SERPL-MCNC: 9 MG/DL (ref 7–30)
CALCIUM SERPL-MCNC: 8.9 MG/DL (ref 8.5–10.1)
CHLORIDE SERPL-SCNC: 109 MMOL/L (ref 94–109)
CHOLEST SERPL-MCNC: 225 MG/DL
CO2 SERPL-SCNC: 31 MMOL/L (ref 20–32)
CREAT SERPL-MCNC: 0.79 MG/DL (ref 0.52–1.04)
DEPRECATED CALCIDIOL+CALCIFEROL SERPL-MC: 45 UG/L (ref 20–75)
GFR SERPL CREATININE-BSD FRML MDRD: 88 ML/MIN/{1.73_M2}
GLUCOSE SERPL-MCNC: 84 MG/DL (ref 70–99)
HCV AB SERPL QL IA: NONREACTIVE
HDLC SERPL-MCNC: 35 MG/DL
LDLC SERPL CALC-MCNC: 162 MG/DL
NONHDLC SERPL-MCNC: 190 MG/DL
POTASSIUM SERPL-SCNC: 4.1 MMOL/L (ref 3.4–5.3)
SODIUM SERPL-SCNC: 141 MMOL/L (ref 133–144)
TRIGL SERPL-MCNC: 139 MG/DL
TSH SERPL DL<=0.005 MIU/L-ACNC: 1.2 MU/L (ref 0.4–4)

## 2020-11-18 ASSESSMENT — ASTHMA QUESTIONNAIRES: ACT_TOTALSCORE: 20

## 2020-11-18 ASSESSMENT — ANXIETY QUESTIONNAIRES: GAD7 TOTAL SCORE: 17

## 2020-11-26 DIAGNOSIS — Z12.11 SCREEN FOR COLON CANCER: ICD-10-CM

## 2020-11-26 PROCEDURE — 82274 ASSAY TEST FOR BLOOD FECAL: CPT | Performed by: NURSE PRACTITIONER

## 2020-11-30 ENCOUNTER — TELEPHONE (OUTPATIENT)
Dept: FAMILY MEDICINE | Facility: CLINIC | Age: 50
End: 2020-11-30

## 2020-11-30 DIAGNOSIS — R25.9 ABNORMAL INVOLUNTARY MOVEMENT: ICD-10-CM

## 2020-11-30 DIAGNOSIS — F41.1 GAD (GENERALIZED ANXIETY DISORDER): ICD-10-CM

## 2020-11-30 DIAGNOSIS — F33.3 DEPRESSION, MAJOR, RECURRENT, SEVERE WITH PSYCHOSIS (H): Primary | ICD-10-CM

## 2020-11-30 NOTE — TELEPHONE ENCOUNTER
Reason for Call:  Other     Detailed comments: the patient would like to talk a nurse about some of her medications and getting in to see someone for metal health    Phone Number Patient can be reached at: Home number on file [phone] (home)    Best Time: any    Can we leave a detailed message on this number? YES    Call taken on 11/30/2020 at 1:17 PM by Dayanara Back

## 2020-11-30 NOTE — TELEPHONE ENCOUNTER
11/17/20 OV note: I am concerned for  tardive dyskinesia and I would like her to be seen by psychiatry as soon as possible.  For now I have advised her to discontinue Paxil and try to avoid taking Seroquel.     Patient states that she cannot get in with psychiatry until February 2021.     She is wondering if PCP can refer to somewhere else, where she could be seen sooner? If not, she is wondering if she can continue to take the Paxil medication until seen? She states her anxiety has been unmanageable without it these last two weeks.     Routing to PCP to review and advise.     Yolanda Johnson RN on 11/30/2020 at 1:47 PM

## 2020-12-01 LAB — HEMOCCULT STL QL IA: NEGATIVE

## 2020-12-01 NOTE — TELEPHONE ENCOUNTER
Please call patient to advise that I am very sorry to hear that she cannot get into our collaborative care psychiatry department until January.  I also try to get her in sooner.  I have placed another referral for the outside community mental health and I recommend she try calling [facility] that is near where she lives.

## 2020-12-01 NOTE — TELEPHONE ENCOUNTER
Routing to provider.  Please see below.  Is there anything patient can have for anxiety until she sees psychiatry       Spoke with patient.  Patient given information on additional referral placed to psychiatry.  Phone number given. Instructed patient per provider, she should avoid Paxil, but can use the Seroquel.  Patient has been off Paxil since visit 11-17  Patient questioning if there is anything for anxiety she is able to have until she is able to see a psychiatrist.  Having difficulty functioning (ie. Shopping, working) due to increase in anxiety since being off Paxil..  Increase in Prozac a number of years ago from 40 mg per day to 80 mg per day did not work well.  Patient denies any suicidal ideation or plan.  Instructed to call back to clinic or go to ER if symptoms worsen.

## 2020-12-01 NOTE — TELEPHONE ENCOUNTER
Ivan davison PCP.  Thanks for the response.  Would you still like her to discontinue the Paxil and avoid Seroquel considering it may be a bit before she can get in?  Please advise    Brock Johnson RN, BSN, PHN  [facility]

## 2020-12-02 RX ORDER — HYDROXYZINE HYDROCHLORIDE 25 MG/1
25 TABLET, FILM COATED ORAL EVERY 6 HOURS PRN
Qty: 30 TABLET | Refills: 0 | Status: SHIPPED | OUTPATIENT
Start: 2020-12-02 | End: 2021-02-23

## 2020-12-02 NOTE — TELEPHONE ENCOUNTER
I have prescribed hydroxyzine 25 mg q6h prn. I cannot do anything more. She needs to get in with psychiatry. If her symptoms are unmanageable and she is unable to function or is at risk of self harm she will need to go to the ED.

## 2020-12-02 NOTE — TELEPHONE ENCOUNTER
Called and spoke to patient.  Informed her provider had sent RX for Hydroxyzine to her pharmacy.    Reviewed medication use. Patient asked if she could take Zertec with it.  Instructed to review with pharmacist if there were contraindications.  Patient verbalized understanding. Also suggested contacting [facility] to schedule an appointment.  Stated she had been there in the past and they would not take her back due to missing appointments..  However,  It has been several years so she will contact them.  Also instructed if anxiety worsen or thoughts of suicide or a plan, she should go to the ER.  Patient verbalized understanding.    Brock Johnson, RN, BSN, PHN  [facility]

## 2020-12-26 ENCOUNTER — VIRTUAL VISIT (OUTPATIENT)
Dept: URGENT CARE | Facility: CLINIC | Age: 50
End: 2020-12-26
Payer: COMMERCIAL

## 2020-12-26 DIAGNOSIS — Z20.822 SUSPECTED COVID-19 VIRUS INFECTION: Primary | ICD-10-CM

## 2020-12-26 DIAGNOSIS — J06.9 VIRAL URI WITH COUGH: ICD-10-CM

## 2020-12-26 DIAGNOSIS — J45.31 MILD PERSISTENT ASTHMA WITH EXACERBATION: ICD-10-CM

## 2020-12-26 PROCEDURE — 99214 OFFICE O/P EST MOD 30 MIN: CPT | Mod: 95 | Performed by: INTERNAL MEDICINE

## 2020-12-26 ASSESSMENT — ENCOUNTER SYMPTOMS
FATIGUE: 0
CHILLS: 0
SLEEP DISTURBANCE: 0
HEADACHES: 1
EYE DISCHARGE: 1
MYALGIAS: 1
WHEEZING: 1
ADENOPATHY: 0
FEVER: 0
DIAPHORESIS: 0
COUGH: 1
SHORTNESS OF BREATH: 1
RHINORRHEA: 1

## 2020-12-26 NOTE — PROGRESS NOTES
SUBJECTIVE:   Mojgan Jimenez is a 50 year old female presenting with a chief complaint of asthma    She is an established patient of [facility].    URI Adult    Onset of symptoms was 2 day(s) ago.    Current and Associated symptoms:   Developing head & chest cold  Wheezing & sob  runny nose and cough - non-productive  Treatment measures tried include OTC Cough med.  Rescue inhaler was not working  Alb neb   Predisposing factors include ill contact: Work.   at [employer]. Covid has been at work      Review of Systems   Constitutional: Negative for chills, diaphoresis, fatigue and fever.   HENT: Positive for rhinorrhea. Sore throat: scratchy.         No Loss taste/smell   Eyes: Positive for discharge (r eye watery for months).   Respiratory: Positive for cough, shortness of breath and wheezing.         Shortness of breath & wheezing improved as neb & inhalers refilled.  Neb few times day.   Cardiovascular: Negative for chest pain.   Gastrointestinal:        No change in stools   Genitourinary: Negative for decreased urine volume.   Musculoskeletal: Positive for myalgias (little).   Skin: Negative for rash.   Neurological: Positive for headaches.   Hematological: Negative for adenopathy.   Psychiatric/Behavioral: Negative for sleep disturbance.       Past Medical History:   Diagnosis Date     LINA (generalized anxiety disorder) 11/2/2017     Hyperlipidemia LDL goal <160 1/20/2019     Major depressive disorder      Methanol abuse (H)      Mild persistent asthma without complication 11/2/2017     Vitamin D deficiency 11/2/2017     Family History   Problem Relation Age of Onset     Cancer Mother      Unknown/Adopted Father      Diabetes No family hx of      Hypertension No family hx of      Current Outpatient Medications   Medication Sig Dispense Refill     albuterol (PROAIR HFA/PROVENTIL HFA/VENTOLIN HFA) 108 (90 Base) MCG/ACT inhaler INHALE 2 PUFFS EVERY 4 HOURS AS NEEDED FOR SHORTNESS OF BREATH/ DYSPNEA/ WHEEZING 2  Inhaler 11     albuterol (PROVENTIL) (2.5 MG/3ML) 0.083% neb solution Take 1 vial (2.5 mg) by nebulization every 6 hours as needed for shortness of breath / dyspnea or wheezing 2 Box 11     cholecalciferol (VITAMIN D3) 5000 units (125 mcg) capsule Take 1 capsule (5,000 Units) by mouth daily 90 capsule 3     FLUoxetine (PROZAC) 40 MG capsule Take 1 capsule (40 mg) by mouth daily 90 capsule 3     hydrOXYzine (ATARAX) 25 MG tablet Take 1 tablet (25 mg) by mouth every 6 hours as needed for anxiety 30 tablet 0     multivitamin w/minerals (MULTI-VITAMIN) tablet Take 1 tablet by mouth daily       paragard intrauterine copper device 1 each by Intrauterine route once for 1 dose       QUEtiapine (SEROQUEL) 25 MG tablet Take 1-2 tablets (25-50 mg) by mouth At Bedtime 90 tablet 1     vitamin D3 (CHOLECALCIFEROL) 2000 units (50 mcg) tablet Take 1 tablet (2,000 Units) by mouth daily 90 tablet 3     WIXELA INHUB 100-50 MCG/DOSE inhaler Inhale 1 puff into the lungs 2 times daily 1 Inhaler 11     Social History     Tobacco Use     Smoking status: Never Smoker     Smokeless tobacco: Never Used   Substance Use Topics     Alcohol use: No     Frequency: Never     Comment: Sober x 8 months       OBJECTIVE    GENERAL: no distress over phone.   Hoarse voice   RESP: cough noted.  Able to speak in full sentences  NEURO:  Mentation and speech appropriate for age.  PSYCH: Mentation appears normal, affect normal/bright, judgement and insight intact, normal speech         ASSESSMENT:      ICD-10-CM    1. Suspected COVID-19 virus infection  Z20.828    2. Viral URI with cough  J06.9    3. Mild persistent asthma with exacerbation  J45.31         Medical Decision Making:  Patient mainly interested in return to work note.  She and her family members all have respiratory illnesses.  Covid has been at work at [employer] but she states she has not been around anyone with covid  She would like a return to work note as does not think she has Covid  She has a  mail-in saliva kit for Covid ordered off the Internet and I encouraged her to have our curbside PCR Covid test  She is concerned about missing work  I reviewed my work note with her over the phone that I drafted discussing 10 days of work absence with Covid test pending.  Patient decided she did not want a work note and stated she decided not to get Covid tested.    PLAN:  Alb neb up to 4 times  Consider oral prednisone but decided against this since she improved with albuterol neb fluids.      17 minutes telephone visit  CC PCP

## 2020-12-26 NOTE — LETTER
[facility]  [address] [address]  Phone: [phone]    December 26, 2020        Mojgan Jimenez  [address]   [facility] [address]          To whom it may concern:    RE: Mojgan Jimenez has been evaluated and needs to remain out of work to isolate for 10 days from when symptoms started AND 72 hours after fever resolves (without fever reducing medications) AND improvement of respiratory symptoms (whichever is longer).  Covid test will be done with home kit and will mailed in.  Results pending.    Please contact me for questions or concerns.      Sincerely,      Hellen Bar MD   Virtual Urgent Care

## 2021-01-20 ENCOUNTER — TELEPHONE (OUTPATIENT)
Dept: FAMILY MEDICINE | Facility: CLINIC | Age: 51
End: 2021-01-20

## 2021-01-20 NOTE — TELEPHONE ENCOUNTER
Patient works at [employer] as a . She states that she struggles more with her mental health when they schedule her different, various shifts without consistency.     Her employer is requestings a provider's note, that states patient needs to work consistent hours, in order to accommodate her schedule request.     Per last OV with PCP on 1/17/20, patient was to follow up with psychiatry. Patient is scheduled for soonest available appointment on 2/23/21.     She is wondering if PCP would be willing to provider letter with above request until she is able to be seen?     She also notes that she has discontinued the Paxil 10mg as directed at last visit, and her symptoms of fidgeting have greatly improved.     Routing to PCP to consider letter request, or advise telephone visit if needed.     Becky Steven, RN, BSN, PHN  [facility] [place]

## 2021-01-21 NOTE — TELEPHONE ENCOUNTER
Spoke to patient over the phone. Video visit with Rosa Lockhart scheduled for 1/22/2021.  Hortencia Mantilla CMA ([facility])

## 2021-01-22 ENCOUNTER — VIRTUAL VISIT (OUTPATIENT)
Dept: FAMILY MEDICINE | Facility: CLINIC | Age: 51
End: 2021-01-22
Payer: COMMERCIAL

## 2021-01-22 DIAGNOSIS — F41.1 GAD (GENERALIZED ANXIETY DISORDER): ICD-10-CM

## 2021-01-22 DIAGNOSIS — R25.9 ABNORMAL INVOLUNTARY MOVEMENT: ICD-10-CM

## 2021-01-22 DIAGNOSIS — F33.3 DEPRESSION, MAJOR, RECURRENT, SEVERE WITH PSYCHOSIS (H): Primary | ICD-10-CM

## 2021-01-22 PROCEDURE — 96127 BRIEF EMOTIONAL/BEHAV ASSMT: CPT | Mod: 59 | Performed by: NURSE PRACTITIONER

## 2021-01-22 PROCEDURE — 99214 OFFICE O/P EST MOD 30 MIN: CPT | Mod: 95 | Performed by: NURSE PRACTITIONER

## 2021-01-22 ASSESSMENT — ANXIETY QUESTIONNAIRES
IF YOU CHECKED OFF ANY PROBLEMS ON THIS QUESTIONNAIRE, HOW DIFFICULT HAVE THESE PROBLEMS MADE IT FOR YOU TO DO YOUR WORK, TAKE CARE OF THINGS AT HOME, OR GET ALONG WITH OTHER PEOPLE: SOMEWHAT DIFFICULT
6. BECOMING EASILY ANNOYED OR IRRITABLE: MORE THAN HALF THE DAYS
GAD7 TOTAL SCORE: 10
2. NOT BEING ABLE TO STOP OR CONTROL WORRYING: NEARLY EVERY DAY
3. WORRYING TOO MUCH ABOUT DIFFERENT THINGS: MORE THAN HALF THE DAYS
7. FEELING AFRAID AS IF SOMETHING AWFUL MIGHT HAPPEN: SEVERAL DAYS
5. BEING SO RESTLESS THAT IT IS HARD TO SIT STILL: NOT AT ALL
1. FEELING NERVOUS, ANXIOUS, OR ON EDGE: MORE THAN HALF THE DAYS

## 2021-01-22 ASSESSMENT — PATIENT HEALTH QUESTIONNAIRE - PHQ9
5. POOR APPETITE OR OVEREATING: NOT AT ALL
SUM OF ALL RESPONSES TO PHQ QUESTIONS 1-9: 7

## 2021-01-22 NOTE — PROGRESS NOTES
Mojgan is a 50 year old who is being evaluated via a billable telephone visit.      What phone number would you like to be contacted at? [phone]  How would you like to obtain your AVS? [facility]  Assessment & Plan   Problem List Items Addressed This Visit     LINA (generalized anxiety disorder)    Depression, major, recurrent, severe with psychosis (H) - Primary      Other Visit Diagnoses     Abnormal involuntary movement             Work accomodation written pt will pick it up today at .   TD has resolved with stopping paxil  Anxiety ongoing keep appointment with psych     10 minutes spent on the date of the encounter doing chart review, history and exam, documentation and further activities as noted above         No follow-ups on file.    JUSTINE Flores [facility]     Mojgan is a 50 year old who presents to clinic today for the following health issues   HPI       Depression and Anxiety Follow-Up    How are you doing with your depression since your last visit? Improved     How are you doing with your anxiety since your last visit?  No change    Are you having other symptoms that might be associated with depression or anxiety? No    Have you had a significant life event? No     Do you have any concerns with your use of alcohol or other drugs? No  Says that she is struggling with work as her schedule changes all the time and her employer would like a medial letter of necessity to keep the same routine and schedule.     She has also noticed that her fidgeting and movement disorder has improved since discontinuing paxil.     Says she is still getting bad anxiety but it has improved a bit. She is taking hydroxyzine PRN   Social History     Tobacco Use     Smoking status: Never Smoker     Smokeless tobacco: Never Used   Substance Use Topics     Alcohol use: No     Frequency: Never     Comment: Sober x 8 months     Drug use: No     Comment: in remision      PHQ  3/10/2020 10/6/2020 11/17/2020   PHQ-9 Total Score 15 13 15   Q9: Thoughts of better off dead/self-harm past 2 weeks Not at all Not at all Not at all     LINA-7 SCORE 6/3/2020 10/6/2020 11/17/2020   Total Score 17 11 17     Last PHQ-9 1/22/2021   1.  Little interest or pleasure in doing things 2   2.  Feeling down, depressed, or hopeless 0   3.  Trouble falling or staying asleep, or sleeping too much 2   4.  Feeling tired or having little energy 2   5.  Poor appetite or overeating 0   6.  Feeling bad about yourself 0   7.  Trouble concentrating 1   8.  Moving slowly or restless 0   Q9: Thoughts of better off dead/self-harm past 2 weeks 0   PHQ-9 Total Score 7   Difficulty at work, home, or with people Somewhat difficult     LINA-7  1/22/2021   1. Feeling nervous, anxious, or on edge 2   2. Not being able to stop or control worrying 3   3. Worrying too much about different things 2   4. Trouble relaxing 0   5. Being so restless that it is hard to sit still 0   6. Becoming easily annoyed or irritable 2   7. Feeling afraid, as if something awful might happen 1   LINA-7 Total Score 10   If you checked any problems, how difficult have they made it for you to do your work, take care of things at home, or get along with other people? Somewhat difficult       Suicide Assessment Five-step Evaluation and Treatment (SAFE-T)      Review of Systems   Constitutional, HEENT, cardiovascular, pulmonary, GI, , musculoskeletal, neuro, skin, endocrine and psych systems are negative, except as otherwise noted.      Objective           Vitals:  No vitals were obtained today due to virtual visit.    Physical Exam   healthy, alert and no distress  PSYCH: Alert and oriented times 3; coherent speech, normal   rate and volume, able to articulate logical thoughts, able   to abstract reason, no tangential thoughts, no hallucinations   or delusions  Her affect is normal  RESP: No cough, no audible wheezing, able to talk in full  sentences  Remainder of exam unable to be completed due to telephone visits    Orders Only on 11/26/2020   Component Date Value Ref Range Status     Occult Blood Scn FIT 11/26/2020 Negative  NEG^Negative Final             Phone call duration:  minutes

## 2021-01-22 NOTE — LETTER
[facility]  [address] [facility] [address]  Phone: [phone]    January 22, 2021        Mojgan Jimenez  [address]   [facility] [address]          To whom it may concern:    RE: Mojgan Ulrich is under my medical care. Please accommodate a structured and regular schedule for her so that she can excel at work.     Please contact me for questions or concerns.      Sincerely,        JUSTINE Flores CNP

## 2021-01-23 ASSESSMENT — ANXIETY QUESTIONNAIRES: GAD7 TOTAL SCORE: 10

## 2021-02-22 ENCOUNTER — TELEPHONE (OUTPATIENT)
Dept: FAMILY MEDICINE | Facility: CLINIC | Age: 51
End: 2021-02-22

## 2021-02-22 DIAGNOSIS — F32.81 PMDD (PREMENSTRUAL DYSPHORIC DISORDER): ICD-10-CM

## 2021-02-22 RX ORDER — FUROSEMIDE 20 MG
TABLET ORAL
Qty: 90 TABLET | Refills: 1 | OUTPATIENT
Start: 2021-02-22

## 2021-02-22 NOTE — TELEPHONE ENCOUNTER
Pam faxed Refill Request    furosemide (LASIX) 20 MG tablet   DISCONTINUED        Last Written Prescription Date:  12/10/2019  Last Fill Quantity: 90 tablet,   # refills: 1  Last Office Visit: 10/29/2020 DENNIS Cline    Future Office visit:    Next 5 appointments (look out 90 days)    Feb 23, 2021  2:45 PM  Telephone Visit with LELE Bess  [facility] ([facility] ) [address] [place] [address] [phone]   Feb 23, 2021  3:15 PM  Telephone Visit with Aditi Valenzuela DO  [facility] [place] ([address]  [place] [address]  [phone]           Routing refill request to provider for review/approval because:  Drug not active on patient's medication list

## 2021-02-22 NOTE — PROGRESS NOTES
"PATIENT'S NAME: Mojgan Jimenez  PREFERRED NAME: Mojgan   PREFERRED PRONOUNS:  she/hers  MRN:   [id number]  :   1970   ACCT. NUMBER: [id number]  DATE OF SERVICE: 21  START TIME: 245pm  END TIME: 312pm    BRIEF ADULT DIAGNOSTIC ASSESSMENT    Telemedicine Visit: The patient's condition can be safely assessed and treated via synchronous audio and visual telemedicine encounter.      Reason for Telemedicine Visit: Services only offered telehealth    Originating Site (Patient Location): Patient's home    Distant Site (Provider Location): [facility]: [place]    Consent:  The patient/guardian has verbally consented to: the potential risks and benefits of telemedicine (video visit) versus in person care; bill my insurance or make self-payment for services provided; and responsibility for payment of non-covered services.     Mode of Communication:  Telephone      The patient has been notified of the following:      \"We have found that certain health care needs can be provided without the need for a face to face visit.  This service lets us provide the care you need with a phone conversation.       I will have full access to your [place] medical record during this entire phone call.   I will be taking notes for your medical record.      Since this is like an office visit, we will bill your insurance company for this service.       There are potential benefits and risks of telephone visits (e.g. limits to patient confidentiality) that differ from in-person visits.?  Confidentiality still applies for telephone services, and nobody will record the visit.  It is important to be in a quiet, private space that is free of distractions (including cell phone or other devices) during the visit.??      If during the course of the call I believe a telephone visit is not appropriate, you will not be charged for this service\"     Consent has been obtained for this service by care team member: Yes     As the " "provider I attest to compliance with applicable laws and regulations related to telemedicine.    Identifying Information:  Patient is a 50 year old, .  The pronoun use throughout this assessment reflects the patient's chosen pronoun.  Patient was referred for an assessment by primary care provider.  Patient attended the session alone.     Chief Complaint:   The reason for seeking services at this time is: \"depression and major anxiety since 25 yo daughter was born \" depression is stable but anxiety is worse.  Symptoms correspond with when she would have her period (perimenopausal). Treated for PMDD? The problem(s) began many years ago. Patient has attempted to resolve these concerns in the past through medication.     Does the client have any condition that is currently presenting as a potential to harm themselves or others (severe withdrawal, serious medical condition, severe emotional/behavioral problem)? No.  Proceed with assessment.    Review of Symptoms per patient report:  Depression: Lack of interest, Change in energy level, Difficulties concentrating, Low self-worth, Ruminations, Irritability, Feeling sad, down, or depressed, Poor hygeine and Frequent crying  Rita:  No Symptoms  Psychosis: No Symptoms  Anxiety: Excessive worry, Nervousness, Physical complaints, such as headaches, stomachaches, muscle tension, Ruminations, Poor concentration and Irritability  Panic:  Tingling, Numbness and rocks when she's anxious  Post Traumatic Stress Disorder:  No Symptoms   Eating Disorder: No Symptoms  ADD / ADHD:  Inattentive, Difficulties listening, Poor task completion, Poor organizational skills, Distractibility and Forgetful  Conduct Disorder: No symptoms  Autism Spectrum Disorder: No symptoms  Obsessive Compulsive Disorder: No Symptoms    Sleep: takes meds to help with sleep    Caffeine: none  Tobacco: none    Current alcohol use: none  Current drug use: sober for 2 1/2 years +hx of meth use; used for " many years would have episodes of paranoia    Rating Scales:  PHQ-9:   [facility] Follow-up to PHQ 10/6/2020 11/17/2020 1/22/2021   PHQ-9 9. Suicide Ideation past 2 weeks Not at all Not at all Not at all      GAD7:    LINA-7 SCORE 10/6/2020 11/17/2020 1/22/2021   Total Score 11 17 10     CGI:  First:  No data recorded  Most recent:  No data recorded    WHODAS: No flowsheet data found.     AUDIT  No flowsheet data found.    Personal Medical History:  Past Medical History:   Diagnosis Date     LINA (generalized anxiety disorder) 11/2/2017     Hyperlipidemia LDL goal <160 1/20/2019     Major depressive disorder      Methanol abuse (H)      Mild persistent asthma without complication 11/2/2017     Vitamin D deficiency 11/2/2017       Patient has received mental health services in the past: after having her daughter 24 years ago.  Psychiatric Hospitalizations: multiple hospitalizations while using meth due to paranoia.  Patient denies a history of civil commitment. Currently, patient is not receiving other mental health services.  These include none.     Patient does not report a history of head injury / trauma / cognitive impairment / seizures.      Current Medications:  Current Outpatient Medications   Medication Sig Dispense Refill     albuterol (PROAIR HFA/PROVENTIL HFA/VENTOLIN HFA) 108 (90 Base) MCG/ACT inhaler INHALE 2 PUFFS EVERY 4 HOURS AS NEEDED FOR SHORTNESS OF BREATH/ DYSPNEA/ WHEEZING 2 Inhaler 11     albuterol (PROVENTIL) (2.5 MG/3ML) 0.083% neb solution Take 1 vial (2.5 mg) by nebulization every 6 hours as needed for shortness of breath / dyspnea or wheezing 2 Box 11     cholecalciferol (VITAMIN D3) 5000 units (125 mcg) capsule Take 1 capsule (5,000 Units) by mouth daily 90 capsule 3     hydrOXYzine (ATARAX) 25 MG tablet Take 1 tablet (25 mg) by mouth every 6 hours as needed for anxiety 30 tablet 0     multivitamin w/minerals (MULTI-VITAMIN) tablet Take 1 tablet by mouth daily       paragard intrauterine copper  device 1 each by Intrauterine route once for 1 dose       QUEtiapine (SEROQUEL) 25 MG tablet Take 1-2 tablets (25-50 mg) by mouth At Bedtime 90 tablet 1     vitamin D3 (CHOLECALCIFEROL) 2000 units (50 mcg) tablet Take 1 tablet (2,000 Units) by mouth daily 90 tablet 3     WIXELA INHUB 100-50 MCG/DOSE inhaler Inhale 1 puff into the lungs 2 times daily 1 Inhaler 11     FLUoxetine (PROZAC) 10 MG capsule Take one cap by mouth daily WITH your 40 mg cap for total daily dose of 50 mg. 30 capsule 1     FLUoxetine (PROZAC) 40 MG capsule Take 40 mg by mouth daily Take WITH 10 mg cap for total daily dose of 50 mg.           Allergies:  No Known Allergies    Family Psychiatric History:  Patient did not report a family history of mental health concerns.     Family History     Problem (# of Occurrences) Relation (Name,Age of Onset)    Alcoholism (1) Father    Cancer (1) Mother    Substance Abuse (1) Sister    Unknown/Adopted (1) Father       Negative family history of: Diabetes, Hypertension          Social/Family History:  Patient reported they grew up in [place], [place].  They were raised by biological mother.  when she was 3. No contact with father. Mother  16 years ago. Siblings: one sister. Patient reported that   childhood was good.  Patient denied experiencing childhood abuse/neglect. Patient described their current relationships with family of origin (sister) as okay but she's (sister) using.      The patient has been  and  1 times and has 1 children.   described the relationship with   spouse as, NA Patient reported having few good friends. Not part of recovering community    Cultural influences and impact on patient's life structure, values, norms, and healthcare: Racial or Ethnic Self-Identification white, Immigration History and Status: born in , citizen, Level of Acculturation: good, Time Orientation: US 12 hour clock, CST, Social Orientation: unable to assess, Verbal / Non-verbal  Communication Style: unremarkable, Locus of Control: unable to assess, Spiritual Beliefs: none identified, Health Beliefs and the endorsement of OR engagement in Culturally Specific Healing Practices: none and Cultural Bias none. Patient identified their preferred language to be English. Patient reported they does not need the assistance of an  or other support involved in treatment.       Educational/Occupational History:  Patient reported   highest education level was high school graduate. The patient did not serve in the . Patient is currently employed part time and reports they are not able to function appropriately at work.. Has missed a lot of work.      Social History     Socioeconomic History     Marital status: Single     Spouse name: Not on file     Number of children: Not on file     Years of education: Not on file     Highest education level: Not on file   Occupational History     Not on file   Social Needs     Financial resource strain: Not on file     Food insecurity     Worry: Not on file     Inability: Not on file     Transportation needs     Medical: Not on file     Non-medical: Not on file   Tobacco Use     Smoking status: Never Smoker     Smokeless tobacco: Never Used   Substance and Sexual Activity     Alcohol use: No     Frequency: Never     Comment: Sober x 8 months     Drug use: No     Comment: in remision      Sexual activity: Not Currently     Partners: Male   Lifestyle     Physical activity     Days per week: Not on file     Minutes per session: Not on file     Stress: Not on file   Relationships     Social connections     Talks on phone: Not on file     Gets together: Not on file     Attends Worship service: Not on file     Active member of club or organization: Not on file     Attends meetings of clubs or organizations: Not on file     Relationship status: Not on file     Intimate partner violence     Fear of current or ex partner: Not on file     Emotionally abused:  Not on file     Physically abused: Not on file     Forced sexual activity: Not on file   Other Topics Concern     Parent/sibling w/ CABG, MI or angioplasty before 65F 55M? No   Social History Narrative     Not on file       Patient reported that they have been involved with the legal system.  DUI in 2017 Patient denies being on probation / parole / under the jurisdiction of the court.    Current Mental Status Exam:   Appearance:   unable to assess (phone)    Eye Contact:   unable to assess (phone)   Psychomotor:   unable to assess (phone)   Attitude / Demeanor:  Cooperative   Speech      Rate / Production:  Normal/ Responsive      Volume:   Normal  volume      Language:   intact  Mood:    Normal  Affect:    unable to assess (phone)    Thought Content:  Clear   Thought Process:  Coherent  Logical       Associations:  No loosening of associations  Insight:    Good   Judgment:   Intact   Orientation:   All  Attention/concentration: Good      Safety Assessment:   Current Safety Concerns:  [place] Suicide Severity Rating Scale (Short Version)  [place] Suicide Severity Rating (Short Version) 2/24/2021   Over the past 2 weeks have you felt down, depressed, or hopeless? yes   Over the past 2 weeks have you had thoughts of killing yourself? no   Have you ever attempted to kill yourself? no     Patient denies current homicidal ideation and behaviors.  Patient denies current self-injurious ideation and behaviors.    Patient denied risk behaviors associated with substance use.  Patient denies any high risk behaviors associated with mental health symptoms.  Patient reports the following current concerns for their personal safety: None.  Patient reports there 0 firearms in the house.     History of Safety Concerns:  Patient denied a history of homicidal ideation.     Patient denied a history of personal safety concerns.    Patient denied a history of assaultive behaviors.    Patient denied a history of sexual assault behaviors.      Patient denied a history of risk behaviors associated with substance use.  Patient denies any history of high risk behaviors associated with mental health symptoms.  Patient reports the following protective factors: positive relationships positive social network and positive family connections, dedication to family/friends and abstinence from substances    Risk Plan:  See Preliminary Treatment Plan for Safety and Risk Management Plan    Diagnosis:  Diagnostic Criteria:   A. Excessive anxiety and worry about a number of events or activities (such as work or school performance).   B. The person finds it difficult to control the worry.  C. Select 3 or more symptoms (required for diagnosis). Only one item is required in children.   - Restlessness or feeling keyed up or on edge.    - Difficulty concentrating or mind going blank.    - Muscle tension.    - Sleep disturbance (difficulty falling or staying asleep, or restless unsatisfying sleep).   D. The focus of the anxiety and worry is not confined to features of an Axis I disorder.  E. The anxiety, worry, or physical symptoms cause clinically significant distress or impairment in social, occupational, or other important areas of functioning.   F. The disturbance is not due to the direct physiological effects of a substance (e.g., a drug of abuse, a medication) or a general medical condition (e.g., hyperthyroidism) and does not occur exclusively during a Mood Disorder, a Psychotic Disorder, or a Pervasive Developmental Disorder.  A) Recurrent episode(s) - symptoms have been present during the same 2-week period and represent a change from previous functioning 5 or more symptoms (required for diagnosis)   - Depressed mood. Note: In children and adolescents, can be irritable mood.     - Diminished interest or pleasure in all, or almost all, activities.    - Decreased sleep.    - Fatigue or loss of energy.    - Diminished ability to think or concentrate, or  indecisiveness.   B) The symptoms cause clinically significant distress or impairment in social, occupational, or other important areas of functioning  C) The episode is not attributable to the physiological effects of a substance or to another medical condition  D) The occurence of major depressive episode is not better explained by other thought / psychotic disorders  E) There has never been a manic episode or hypomanic episode      Patient's Strengths and Limitations:  Patient identified the following strengths or resources that will help them succeed in treatment: commitment to health and well being, family support and positive work environment. Things that may interfere with the patient's success in treatment include: few friends.     Recommendations:     1. Plan for Safety and Risk Management:Recommended that patient call 911 or go to the local ED should there be a change in any of these risk factors..  Report to child / adult protection services was NA.      2. Resources/Service Plan:       services are not indicated.     Modifications to assist communication are not indicated.     Additional disability accommodations are not indicated.      3. Collaboration:  Collaboration / coordination of treatment will be initiated with the following support professionals: none.      4.  Referrals:   The following referral(s) will be initiated: none.       Staff Name/Credentials:  Mary Bethea [facility]  February 23, 2021

## 2021-02-22 NOTE — TELEPHONE ENCOUNTER
Medication discontinued. Refused.     Becky Steven, RN, BSN, PHN  [facility] [place]

## 2021-02-23 ENCOUNTER — VIRTUAL VISIT (OUTPATIENT)
Dept: BEHAVIORAL HEALTH | Facility: CLINIC | Age: 51
End: 2021-02-23
Payer: COMMERCIAL

## 2021-02-23 ENCOUNTER — VIRTUAL VISIT (OUTPATIENT)
Dept: PSYCHIATRY | Facility: CLINIC | Age: 51
End: 2021-02-23
Attending: NURSE PRACTITIONER
Payer: COMMERCIAL

## 2021-02-23 DIAGNOSIS — F41.1 GAD (GENERALIZED ANXIETY DISORDER): ICD-10-CM

## 2021-02-23 DIAGNOSIS — F41.0 PANIC ATTACK: ICD-10-CM

## 2021-02-23 DIAGNOSIS — F33.0 DEPRESSION, MAJOR, RECURRENT, MILD (H): Primary | ICD-10-CM

## 2021-02-23 DIAGNOSIS — F41.1 GENERALIZED ANXIETY DISORDER: ICD-10-CM

## 2021-02-23 DIAGNOSIS — F15.21: ICD-10-CM

## 2021-02-23 DIAGNOSIS — F33.1 MODERATE EPISODE OF RECURRENT MAJOR DEPRESSIVE DISORDER (H): Primary | ICD-10-CM

## 2021-02-23 PROCEDURE — 99204 OFFICE O/P NEW MOD 45 MIN: CPT | Mod: 95 | Performed by: PSYCHIATRY & NEUROLOGY

## 2021-02-23 PROCEDURE — 90791 PSYCH DIAGNOSTIC EVALUATION: CPT | Mod: 95 | Performed by: SOCIAL WORKER

## 2021-02-23 RX ORDER — FLUOXETINE 40 MG/1
40 CAPSULE ORAL DAILY
COMMUNITY
End: 2021-04-06

## 2021-02-23 RX ORDER — HYDROXYZINE HYDROCHLORIDE 25 MG/1
25 TABLET, FILM COATED ORAL EVERY 6 HOURS PRN
Qty: 30 TABLET | Refills: 0 | Status: SHIPPED | OUTPATIENT
Start: 2021-02-23 | End: 2021-04-02

## 2021-02-23 RX ORDER — FLUOXETINE 10 MG/1
CAPSULE ORAL
Qty: 30 CAPSULE | Refills: 1 | Status: SHIPPED | OUTPATIENT
Start: 2021-02-23 | End: 2021-04-06 | Stop reason: DRUGHIGH

## 2021-02-23 NOTE — PROGRESS NOTES
"Mojgan Jimenez is a 50 year old who is being evaluated via a billable telephone visit.      What phone number would you like to be contacted at? See Epic     How would you like to obtain your AVS? [facility]                                                             Outpatient Psychiatric Evaluation- Standard  Adult    Name:  Mojgan Jimenez  : 1970    Source of Referral:  Primary Care Provider: JUSTINE Flores CNP   Current Psychotherapist: None     Identifying Data:  Patient is a 50 year old,   White  female  who presents for initial visit with me.  Patient is currently employed. Patient attended the phone/viseo session alone. Patient prefers to be called: \"Mojgan\"    Chief Complaint:  Consult    HPI:  Mojgan Jimenez is a 50 year old female with past history including depression, anxiety, and methamphetmaine use-in sustained remission (with hx of stimulant-induced psychotic episodes) who presents today for psychiatric assessment.     Patient with long history of mental health symptoms.  Suffered from depression and anxiety for over 20 years.  Also has long history of methamphetamine use, now sober for over 2 years.  While using meth she had multiple psychiatric hospitalizations for stimulant induced psychosis.  She would experience delusions and paranoia.  She reports no longer suffering from any of those symptoms since being sober.  She feels her depression really started to be bad after she gave birth to her daughter about 24 years ago.  She started Paxil which was helpful for a period of time but then had to be changed since it stopped working.  She was then taking fluoxetine and this is been the medication she has been on for years now.  Has been sober from all mood altering substances, including alcohol.    Patient presenting today with some worsening depression symptoms.  High anxiety at times, but anxiety seems to be more manageable than her depression at this time.  She has " "been missing some work due to her mental health symptoms.  Poor energy and poor motivation.  She is struggling to even shower.  Regarding self cares she reports it is \"so much work to do.\"  She is not doing as much around the house currently.  Reports a lot of irritability around the time she should be getting her menses.  She also reports that right before period will get a burst of energy but then seems to crash right back down.  She is currently perimenopausal.  Sleeping pretty good on half a tablet of Seroquel.  A full tablet will make her feel too tired in the morning.  She really has not taken hydroxyzine much since just started and so does not know how helpful it is going to be.  Does feel Prozac has been quite helpful, just does not feel like it is working as well at this time.  Has had some increased psychosocial stressors.  Denies any thoughts of suicide/self-harm.    Per [facility], [facility], during today's team-based visit:  The reason for seeking services at this time is: \"depression and major anxiety since 25 yo daughter was born \" depression is stable but anxiety is worse.  Symptoms correspond with when she would have her period (perimenopausal). Treated for PMDD? The problem(s) began many years ago. Patient has attempted to resolve these concerns in the past through medication.     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   Medication side effects: Denies  Current stressors include: Symptoms  Coping mechanisms and supports include: Family and Hobbies    Psychiatric Review of Symptoms Per [facility] " LELE Bethea, during today's team-based visit:  Depression:     Lack of interest, Change in energy level, Difficulties concentrating, Low self-worth, Ruminations, Irritability, Feeling sad, down, or depressed, Poor hygeine and Frequent crying  Rita:             No Symptoms  Psychosis:       No Symptoms  Anxiety:           Excessive worry, Nervousness, Physical complaints, such as headaches, stomachaches, muscle tension, Ruminations, Poor concentration and Irritability  Panic:              Tingling, Numbness and rocks when she's anxious  Post Traumatic Stress Disorder:  No Symptoms   Eating Disorder:          No Symptoms  ADD / ADHD:              Inattentive, Difficulties listening, Poor task completion, Poor organizational skills, Distractibility and Forgetful  Conduct Disorder:       No symptoms  Autism Spectrum Disorder:     No symptoms  Obsessive Compulsive Disorder:       No Symptoms     Sleep: takes meds to help with sleep  Caffeine: none    All other ROS negative.     PHQ-9 scores:   PHQ-9 SCORE 10/6/2020 11/17/2020 1/22/2021   PHQ-9 Total Score 13 15 7       LINA-7 scores:    LINA-7 SCORE 10/6/2020 11/17/2020 1/22/2021   Total Score 11 17 10       Medical Review of Systems:  10 systems (general, cardiovascular, respiratory, eyes, ENT, endocrine, GI, , M/S, neurological) were reviewed. Most pertinent finding(s) is/are: denies fever, cough, headaches, shortness of breath, chest pain, N/V, constipation/diarrhea, trouble urinating, aches and pains. The remaining systems are all unremarkable.    A 12-item WHODAS 2.0 assessment was not completed.    Psychiatric History:   Hospitalizations:  multiple hospitalizations while using meth due to paranoia.   History of Commitment? No   Past Treatment: counseling, inpatient mental health services, medication(s) from physician / PCP and psychiatry  Suicide Attempts: No   Current Suicide Risk: Suicide Assessment Completed Today.  Self-injurious Behavior:  Denies  Electroconvulsive Therapy (ECT) or Transcranial Magnetic Stimulation (TMS): No   GeneSight Genetic Testing: No     Past medication trials include but are not limited to:   Past:   Loxitane  Fluoxetine-current  buspar  Trazodone-ineffective  paxil-effective about 20 years ago, recent retrial with some movement side effects  Wellbutrin-paranoia    Substance Use History:  Current Use of Drugs/Alcohol: Denies   Past Use of Drugs/Alcohol: Hx of meth use-used many years, would have episodes of paranoia; did use IV; she did not necessarily plan on quitting drinking, but has not drank in months-had a couple beers on her birthday last August in 2020.  Patient reported the following problems as a result of drug use: Multiple psychiatric hospitalizations, relationship challenges, DUI 2017.   Patient has received chemical dependency treatment in the past at [facility] inpatient followed by [facility] outpatient a couple years ago  Recovery Programming Involvement: None    Tobacco use: No    Based on the clinical interview, there  are indications of drug or alcohol abuse. Historical, not current. Continue to monitor.   Discussed effect of substance use on overall health.     Past Medical History:  Past Medical History:   Diagnosis Date     LINA (generalized anxiety disorder) 11/2/2017     Hyperlipidemia LDL goal <160 1/20/2019     Major depressive disorder      Methanol abuse (H)      Mild persistent asthma without complication 11/2/2017     Vitamin D deficiency 11/2/2017      Surgery: No past surgical history on file.  Food and Medicine Allergies:   No Known Allergies  Seizures or Head Injury: No  Diet: No Restrictions  Exercise: No regular exercise program  Supplements: Reviewed per Electronic Medical Record Today    Current Medications:    Current Outpatient Medications:      albuterol (PROAIR HFA/PROVENTIL HFA/VENTOLIN HFA) 108 (90 Base) MCG/ACT inhaler, INHALE 2 PUFFS EVERY 4 HOURS AS NEEDED FOR  SHORTNESS OF BREATH/ DYSPNEA/ WHEEZING, Disp: 2 Inhaler, Rfl: 11     albuterol (PROVENTIL) (2.5 MG/3ML) 0.083% neb solution, Take 1 vial (2.5 mg) by nebulization every 6 hours as needed for shortness of breath / dyspnea or wheezing, Disp: 2 Box, Rfl: 11     cholecalciferol (VITAMIN D3) 5000 units (125 mcg) capsule, Take 1 capsule (5,000 Units) by mouth daily, Disp: 90 capsule, Rfl: 3     FLUoxetine (PROZAC) 40 MG capsule, Take 1 capsule (40 mg) by mouth daily, Disp: 90 capsule, Rfl: 3     hydrOXYzine (ATARAX) 25 MG tablet, Take 1 tablet (25 mg) by mouth every 6 hours as needed for anxiety, Disp: 30 tablet, Rfl: 0     multivitamin w/minerals (MULTI-VITAMIN) tablet, Take 1 tablet by mouth daily, Disp: , Rfl:      paragard intrauterine copper device, 1 each by Intrauterine route once for 1 dose, Disp: , Rfl:      QUEtiapine (SEROQUEL) 25 MG tablet, Take 1-2 tablets (25-50 mg) by mouth At Bedtime, Disp: 90 tablet, Rfl: 1     vitamin D3 (CHOLECALCIFEROL) 2000 units (50 mcg) tablet, Take 1 tablet (2,000 Units) by mouth daily, Disp: 90 tablet, Rfl: 3     WIXELA INHUB 100-50 MCG/DOSE inhaler, Inhale 1 puff into the lungs 2 times daily, Disp: 1 Inhaler, Rfl: 11    Vital Signs:  None since this is a phone/video visit.     Labs:  Most recent laboratory results reviewed and the pertinent results include:   Orders Only on 11/26/2020   Component Date Value Ref Range Status     Occult Blood Scn FIT 11/26/2020 Negative  NEG^Negative Final   Office Visit on 11/17/2020   Component Date Value Ref Range Status     Hepatitis C Antibody 11/17/2020 Nonreactive  NR^Nonreactive Final    Comment: Assay performance characteristics have not been established for newborns,   infants, and children       Sodium 11/17/2020 141  133 - 144 mmol/L Final     Potassium 11/17/2020 4.1  3.4 - 5.3 mmol/L Final     Chloride 11/17/2020 109  94 - 109 mmol/L Final     Carbon Dioxide 11/17/2020 31  20 - 32 mmol/L Final     Anion Gap 11/17/2020 1* 3 - 14 mmol/L  Final     Glucose 11/17/2020 84  70 - 99 mg/dL Final    Fasting specimen     Urea Nitrogen 11/17/2020 9  7 - 30 mg/dL Final     Creatinine 11/17/2020 0.79  0.52 - 1.04 mg/dL Final     GFR Estimate 11/17/2020 88  >60 mL/min/[1.73_m2] Final    Comment: Non  GFR Calc  Starting 12/18/2018, serum creatinine based estimated GFR (eGFR) will be   calculated using the Chronic Kidney Disease Epidemiology Collaboration   (CKD-EPI) equation.       GFR Estimate If Black 11/17/2020 >90  >60 mL/min/[1.73_m2] Final    Comment:  GFR Calc  Starting 12/18/2018, serum creatinine based estimated GFR (eGFR) will be   calculated using the Chronic Kidney Disease Epidemiology Collaboration   (CKD-EPI) equation.       Calcium 11/17/2020 8.9  8.5 - 10.1 mg/dL Final     WBC 11/17/2020 5.5  4.0 - 11.0 10e9/L Final     RBC Count 11/17/2020 4.13  3.8 - 5.2 10e12/L Final     Hemoglobin 11/17/2020 12.3  11.7 - 15.7 g/dL Final     Hematocrit 11/17/2020 37.4  35.0 - 47.0 % Final     MCV 11/17/2020 91  78 - 100 fl Final     MCH 11/17/2020 29.8  26.5 - 33.0 pg Final     MCHC 11/17/2020 32.9  31.5 - 36.5 g/dL Final     RDW 11/17/2020 12.7  10.0 - 15.0 % Final     Platelet Count 11/17/2020 228  150 - 450 10e9/L Final     % Neutrophils 11/17/2020 59.0  % Final     % Lymphocytes 11/17/2020 25.8  % Final     % Monocytes 11/17/2020 7.5  % Final     % Eosinophils 11/17/2020 7.5  % Final     % Basophils 11/17/2020 0.2  % Final     Absolute Neutrophil 11/17/2020 3.3  1.6 - 8.3 10e9/L Final     Absolute Lymphocytes 11/17/2020 1.4  0.8 - 5.3 10e9/L Final     Absolute Monocytes 11/17/2020 0.4  0.0 - 1.3 10e9/L Final     Absolute Eosinophils 11/17/2020 0.4  0.0 - 0.7 10e9/L Final     Absolute Basophils 11/17/2020 0.0  0.0 - 0.2 10e9/L Final     Diff Method 11/17/2020 Automated Method   Final     Vitamin D Deficiency screening 11/17/2020 45  20 - 75 ug/L Final    Comment: Season, race, dietary intake, and treatment affect the  concentration of   25-hydroxy-Vitamin D. Values may decrease during winter months and increase   during summer months. Values 20-29 ug/L may indicate Vitamin D insufficiency   and values <20 ug/L may indicate Vitamin D deficiency.  Vitamin D determination is routinely performed by an immunoassay specific for   25 hydroxyvitamin D3.  If an individual is on vitamin D2 (ergocalciferol)   supplementation, please specify 25 OH vitamin D2 and D3 level determination by   LCMSMS test [id number].       TSH 2020 1.20  0.40 - 4.00 mU/L Final     Cholesterol 2020 225* <200 mg/dL Final    Desirable:       <200 mg/dl     Triglycerides 2020 139  <150 mg/dL Final    Fasting specimen     HDL Cholesterol 2020 35* >49 mg/dL Final     LDL Cholesterol Calculated 2020 162* <100 mg/dL Final    Comment: Above desirable:  100-129 mg/dl  Borderline High:  130-159 mg/dL  High:             160-189 mg/dL  Very high:       >189 mg/dl       Non HDL Cholesterol 2020 190* <130 mg/dL Final    Comment: Above Desirable:  130-159 mg/dl  Borderline high:  160-189 mg/dl  High:             190-219 mg/dl  Very high:       >219 mg/dl       No EKG on file.     Family History:   Patient reported family history includes:   Family History   Problem Relation Age of Onset     Cancer Mother      Unknown/Adopted Father      Diabetes No family hx of      Hypertension No family hx of      Mental Illness History: Unknown  Substance Abuse History: Yes: sister, father  Suicide History: Unknown  Medications: Unknown     Social History Per [facility], [facility], during today's team-based visit:   Patient reported they grew up in [place], [place].  They were raised by biological mother.  when she was 3. No contact with father. Mother  16 years ago. Siblings: one sister. Patient reported that   childhood was good.  Patient denied experiencing childhood abuse/neglect. Patient described their current relationships with  family of origin as okay but she's using.       The patient has been  and  1 times and has 1 children.   described the relationship with   spouse as, NA Patient reported having few good friends. Not part of recovering community     Patient reported   highest education level was high school graduate. The patient did not serve in the . Patient is currently employed part time and reports they are not able to function appropriately at work.. Has missed a lot of work.    Firearms/Weapons Access: No: Patient denies   Service: No    Legal History:  Yes: HILARY in 2017    Significant Losses / Trauma / Abuse / Neglect Issues:  There are indications or report of significant loss, trauma, abuse or neglect issues related to: see HPI and social hx above.   Issues of possible neglect are not present.     Comprehensive Examination (limited due to virtual visit format, phone):  Vital Signs:  Vitals: There were no vitals taken for this visit.  General/Constitutional:  Appearance: unable to assess  Attitude:  Cooperative, pleasant   Eye Contact:  unable to assess  Musculoskeletal:  Muscle Strength and Tone: unable to assess  Psychomotor Behavior:  unable to assess  Gait and Station: unable to assess  Psychiatric:  Speech:  clear, coherent, regular rate, rhythm, and volume  Associations:  no loose associations  Thought Process:  Circumstantial   Thought Content:  no evidence of suicidal ideation or homicidal ideation, no evidence of psychotic thought, no auditory hallucinations present and no visual hallucinations present  Mood:  anxious and depressed  Affect:  Heightened energy, anxious  Insight:  good  Judgment:  intact, adequate for safety  Impulse Control:  intact  Neurological:  Oriented to:  person, place, time, and situation  Attention Span and Concentration:  A little distractible  Language: intact  Recent and Remote Memory:  Intact to interview. Not formally assessed. No amnesia.  [facility]  Knowledge: appropriate    Strengths and Opportunities:   Mojgan Jimenez identified the following strengths or resources that may help she succeed in counseling: commitment to health and well being, family support and motivation. Things that may interfere with the patient's success include:  none noted at this time.    There are no language or communication issues or need for modification in treatment.   There are no ethnic, cultural or Baptist factors that may be relevant for therapy.  Client identified their preferred language to be English.  Client does not need the assistance of an  or other support involved in therapy.    Suicide Risk Assessment:  Today Mojgan Jimenez reports no suicidal ideation. Based on all available evidence including the factors cited above, Mojgan Jimenez does not appear to be at imminent risk for self-harm, does not meet criteria for a 72-hr hold, and therefore remains appropriate for ongoing outpatient level of care.  A thorough assessment of risk factors related to suicide and self-harm have been reviewed and are noted above. The patient convincingly denies acute suicidality on several occasions. Local community safety resources reviewed and printed for patient to use if needed. There was no deceit detected, and the patient presented in a manner that was believable.     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) 07/10/2018      Priority: Medium     Suicidal ideation 05/05/2018     Priority: Medium     Polysubstance overdose 05/01/2018     Priority: Medium     Psychosis (H) 03/21/2018     Priority: Medium     Methamphetamine abuse, episodic (H) 03/06/2018     Priority: Medium     Day treatment at [facility]       Paranoia (H) 11/28/2017     Priority: Medium     Mild persistent asthma without complication 11/02/2017     Priority: Medium     Vitamin D deficiency 11/02/2017     Priority: Medium     LINA (generalized anxiety disorder) 11/02/2017     Priority: Medium     Recurrent major depressive disorder, remission status unspecified (H) 11/02/2017     Priority: Medium     5/10/18:  Initial Psychiatry appointment at [facility] ([phone])       Depression, major, recurrent, severe with psychosis (H) 11/02/2017     Priority: Medium     IUD (intrauterine device) in place 02/04/2014     Priority: Medium     Overview:   paragard placed 2/4/14       PMDD (premenstrual dysphoric disorder) 11/05/2010     Priority: Medium       Impression:  Mojgan Jimenez is a 50-year-old female with past psychiatric history including depression, anxiety, 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  The patient has been on psychiatric medications for over 20 years.  She initially started with Paxil but then transitioned to Prozac/fluoxetine when Paxil burned out.  She has been on a handful of other medication trials, but fluoxetine has seemed to work the best.  She is currently taking 40 mg daily.  Was inadvertently prescribed 80 mg a day which was a big jump from 40 mg daily and gave her severe anxiety and panic symptoms.  She is currently struggling with some depression symptoms.  Anxiety high at times but overall more manageable than the depression symptoms.  She has been sober from  methamphetamine for 2+ years.  Also sober from all other mood altering substances.      At this time I recommend further optimization of her fluoxetine.  We will increase her dose more slowly to ensure tolerability.  We will increase fluoxetine to 50 mg daily.  If some additional efficacy at 50 mg and good tolerability, we will further optimize as clinically indicated.  Unfortunately history of Wellbutrin use made her paranoid.  She seems to be sensitive to the stimulating properties of drugs and medications.  Could consider augmentation with Abilify, but currently taking Seroquel at bedtime for sleep.  Could also consider transition to venlafaxine/Effexor-XR which could potentially be more helpful for energy and also perimenopausal symptoms.  Patient could also consider individual psychotherapy for additional support and development of nonpharmacologic coping skills and strategies.  Patient currently with copper IUD.     Last lipid panel elevated and fasting glucose wnl in 11/2020. Vit D wnl. TSH wnl. Will need to continue to monitor while on Seroquel.     Medication side effects and alternatives reviewed. Health promotion activities recommended and reviewed today. All questions addressed. Education and counseling completed regarding risks and benefits of medications and psychotherapy options. Recommend therapy for additional support.     Treatment Plan:    Continue hydroxyzine 25-50 mg up to three times a day as needed for anxiety/sleep. Can break a tablet in half and take just 12.5 mg if 25 mg too sedating.     Continue Seroquel/quitiapine 12.5-25 mg at bedtime for sleep as needed.     Increase fluoxetine to 50 mg daily for mood, anxiety.     Consider therapy for additional support and development of nonmedicinal coping skills and strategies.    Continue to maintain sobriety from all mood altering substances.     Continue all other medications as reviewed per electronic medical record today.     Safety plan  reviewed. To the Emergency Department as needed or call after hours crisis line at [phone] or [phone]. [place] Crisis Text Line: Text [place] to 319742  or  Suicide LifeLine Chat: suicidepreGordianTec.org/chat    Schedule an appointment with me in 4-6 weeks or sooner as needed.  Call [facility] at [phone] to schedule.    Follow up with primary care provider as planned or sooner if needed for acute medical concerns.    Call the psychiatric nurse line with medication questions or concerns at [phone].    Fishidyhart may be used to communicate with your provider, but this is not intended to be used for emergencies.    Community Resources:    National Suicide Prevention Lifeline: 829.415.6200 (TTY: 910.772.8142). Call anytime for help.  (www.suicidepreventionlifeline.org)  National [place] on Mental Illness (www.rogerio.org): 870.389.6089 or 530-799-8789.   Mental Health Association (www.mentalhealth.org): 908.893.5243 or 123-720-2062.  [place] Crisis Text Line: Text [place] to 143862  Suicide LifeLine Chat: suicideCignis.org/chat    Administrative Billing:   Phone Call/Video Duration: 21 Minutes  Start: 3:25p  Stop: 3:46p    Complexity of Care: Patient with multiple psychiatric diagnoses and medication change adding to complexity of care.    Patient Status:  Patient will continue to be seen for ongoing consultation and stabilization.    Signed:   Aditi Valenzuela DO  [facility]

## 2021-02-23 NOTE — Clinical Note
Saw pt today for intake.  I am always very impressed by the resiliency of some folks when it comes to getting sober, particularly when it involves many years on methamphetamine. It was a pleasure to meet Mojgan today.  We are going to further titrate her fluoxetine.  If she does not tolerate or it is not effective, may consider transitioning to venlafaxine.  Venlafaxine could potentially be more beneficial for some of her perimenopausal symptoms.  I might also consider augmenting with Abilify.  Unfortunately she became paranoid on Wellbutrin.  She seems to be sensitive to stimulating properties/activation of dopamine receptors.  I will see her back for follow-up.  Let me know if you have any questions or concerns.  See my note/impression for more details.

## 2021-02-23 NOTE — PATIENT INSTRUCTIONS
Treatment Plan:    Continue hydroxyzine 25-50 mg up to three times a day as needed for anxiety/sleep. Can break a tablet in half and take just 12.5 mg if 25 mg too sedating.     Continue Seroquel/quitiapine 12.5-25 mg at bedtime for sleep as needed.     Increase fluoxetine to 50 mg daily for mood, anxiety.     Consider therapy for additional support and development of nonmedicinal coping skills and strategies.    Continue to maintain sobriety from all mood altering substances.     Continue all other medications as reviewed per electronic medical record today.     Safety plan reviewed. To the Emergency Department as needed or call after hours crisis line at [phone] or [phone]. [place] Crisis Text Line: Text [place] to 876618  or  Suicide LifeLine Chat: suicide[place].org/chat    Schedule an appointment with me in 4-6 weeks or sooner as needed.  Call [facility] at [phone] to schedule.    Follow up with primary care provider as planned or sooner if needed for acute medical concerns.    Call the psychiatric nurse line with medication questions or concerns at [phone].    [employer] may be used to communicate with your provider, but this is not intended to be used for emergencies.    Community Resources:    National Suicide Prevention Lifeline: 543.415.1609 (TTY: 197.664.2822). Call anytime for help.  (www.suicidepreventionlifeline.org)  National [place] on Mental Illness (www.rogerio.org): 714.930.8487 or 691-297-1894.   Mental Health Association (www.mentalhealth.org): 787.552.5694 or 370-032-7241.  [place] Crisis Text [place]: Text [place] to 735224  Suicide LifeLine Chat: suicide[place].org/chat

## 2021-02-23 NOTE — TELEPHONE ENCOUNTER
Pt is out of medication-- hydrOXYzine (ATARAX) 25 MG tablet      Was going to wait for psychiatrist to see if they wanted pt to take something different but she doesn't have an appointment yet so she needs refill for now.     Call back [phone] with any questions-- has to be after 3pm on 2/23/21  Or before 2pm on 2/24/21

## 2021-03-03 ENCOUNTER — TELEPHONE (OUTPATIENT)
Dept: FAMILY MEDICINE | Facility: CLINIC | Age: 51
End: 2021-03-03

## 2021-03-03 ENCOUNTER — VIRTUAL VISIT (OUTPATIENT)
Dept: FAMILY MEDICINE | Facility: CLINIC | Age: 51
End: 2021-03-03
Payer: COMMERCIAL

## 2021-03-03 DIAGNOSIS — F41.9 ANXIETY: Primary | ICD-10-CM

## 2021-03-03 PROCEDURE — 99213 OFFICE O/P EST LOW 20 MIN: CPT | Mod: 95 | Performed by: NURSE PRACTITIONER

## 2021-03-03 ASSESSMENT — PATIENT HEALTH QUESTIONNAIRE - PHQ9
SUM OF ALL RESPONSES TO PHQ QUESTIONS 1-9: 13
5. POOR APPETITE OR OVEREATING: NOT AT ALL

## 2021-03-03 ASSESSMENT — ANXIETY QUESTIONNAIRES
1. FEELING NERVOUS, ANXIOUS, OR ON EDGE: MORE THAN HALF THE DAYS
6. BECOMING EASILY ANNOYED OR IRRITABLE: NEARLY EVERY DAY
IF YOU CHECKED OFF ANY PROBLEMS ON THIS QUESTIONNAIRE, HOW DIFFICULT HAVE THESE PROBLEMS MADE IT FOR YOU TO DO YOUR WORK, TAKE CARE OF THINGS AT HOME, OR GET ALONG WITH OTHER PEOPLE: EXTREMELY DIFFICULT
2. NOT BEING ABLE TO STOP OR CONTROL WORRYING: MORE THAN HALF THE DAYS
5. BEING SO RESTLESS THAT IT IS HARD TO SIT STILL: NOT AT ALL
GAD7 TOTAL SCORE: 9
3. WORRYING TOO MUCH ABOUT DIFFERENT THINGS: MORE THAN HALF THE DAYS
7. FEELING AFRAID AS IF SOMETHING AWFUL MIGHT HAPPEN: NOT AT ALL

## 2021-03-03 NOTE — PROGRESS NOTES
"Mojgan is a 50 year old who is being evaluated via a billable telephone visit.      What phone number would you like to be contacted at? [phone]  How would you like to obtain your AVS? MyChart    Assessment & Plan     Anxiety  Note provided to excuse her from work today. Pt to follow up with psychiatry in the coming weeks.       Return in about 6 months (around 9/3/2021) for Medication Follow up Visit.    Mary Lopez NP  [facility]    Subjective   Mojgan is a 50 year old who presents for the following health issues     HPI       Depression and Anxiety Follow-Up    How are you doing with your depression since your last visit? Little down lately. Doesn't feel depressed however     How are you doing with your anxiety since your last visit? \"may rocketing\"     Are you having other symptoms that might be associated with depression or anxiety? sleeping too much & being more edgy/snappy. Lack of motivation.       Have you had a significant life event? No Daughter(24 years) trying to get a new job. daughter & grandson live with pt    Do you have any concerns with your use of alcohol or other drugs? No has been clean for the past 2.5 years now.     Social History     Tobacco Use     Smoking status: Never Smoker     Smokeless tobacco: Never Used   Substance Use Topics     Alcohol use: No     Frequency: Never     Comment: Sober x 8 months     Drug use: No     Comment: in remision      PHQ 11/17/2020 1/22/2021 3/3/2021   PHQ-9 Total Score 15 7 13   Q9: Thoughts of better off dead/self-harm past 2 weeks Not at all Not at all Not at all     LINA-7 SCORE 11/17/2020 1/22/2021 3/3/2021   Total Score 17 10 9       Needs work note so she doesn't get fired. She missed work today and is scheduled to work next on Friday. Works at [employer] as a . She states that she was on a 6 days stretch at work however work has been trying to get a more consistent schedule for her. She states there are a lot of " younger kids that work at her job and are on their phones a lot and are not being held accountable so work is not getting done, this frustrates her.     Has been on Prozac for 25 years. She is currently taking 50 mg total which was increased from 40mg by her psychiatrist on 2/23/21. She is to follow up with them in 4-6 weeks. She states she has been avoiding hydroxyzine recently as shes wanting to know if the increased dose of prozac is helping or not.       Review of Systems   Constitutional, HEENT, cardiovascular, pulmonary, gi and gu systems are negative, except anxious       Objective           Vitals:  No vitals were obtained today due to virtual visit.    Physical Exam   healthy, alert and no distress  PSYCH: Alert and oriented times 3; coherent speech, normal   rate and volume, able to articulate logical thoughts, able   to abstract reason, no tangential thoughts, no hallucinations   or delusions  Her affect is anxious   RESP: No cough, no audible wheezing, able to talk in full sentences  Remainder of exam unable to be completed due to telephone visits      Phone call duration: 10 minutes

## 2021-03-03 NOTE — LETTER
[facility]  [address] [address]  [phone]    March 3, 2021    Re: Mojgan Jimenez  [address]   [place] [address]  [phone] (home)     : 1970      To Whom It May Concern:      Please excuse Mojgan Jimenez from work on 3/3/21. She may return to work on 3/5/21 as scheduled.       Sincerely,        Mary Lopez NP

## 2021-03-03 NOTE — TELEPHONE ENCOUNTER
Reason for Call:  Other     Detailed comments: Patient needs to speak with provider regarding anxiety and missing work. Please advise    Phone Number Patient can be reached at: Home number on file [phone] (home)    Best Time:     Can we leave a detailed message on this number? YES    Call taken on 3/3/2021 at 11:32 AM by Smita Chatterjee

## 2021-03-03 NOTE — TELEPHONE ENCOUNTER
Called pt and she states she had to take off of work today d/t her anxiety and that she requires a letter from her doctor or she can lose her job. Sagrario Lockhart NP usually helps her with this. Notified pt that Sagrario is on medical leave and would need her to discuss this with another provider. Pt scheduled for a telephone visit with Mary Lopez NP this afternoon at 5:20. Pt was grateful for the help.    Deana Lucia RN

## 2021-03-04 ENCOUNTER — TELEPHONE (OUTPATIENT)
Dept: FAMILY MEDICINE | Facility: CLINIC | Age: 51
End: 2021-03-04

## 2021-03-04 ASSESSMENT — ANXIETY QUESTIONNAIRES: GAD7 TOTAL SCORE: 9

## 2021-03-05 ENCOUNTER — NURSE TRIAGE (OUTPATIENT)
Dept: FAMILY MEDICINE | Facility: CLINIC | Age: 51
End: 2021-03-05

## 2021-03-05 NOTE — TELEPHONE ENCOUNTER
"Disposition: To urgent care for intermittent back pain since last night.  Rating pain a 10 at times.    Patient states she will have daughter drive her to an  now.     Additional Information    Negative: Passed out (i.e., fainted, collapsed and was not responding)    Negative: Shock suspected (e.g., cold/pale/clammy skin, too weak to stand, low BP, rapid pulse)    Negative: Sounds like a life-threatening emergency to the triager    Negative: Major injury to the back (e.g., MVA, fall > 10 feet or 3 meters, penetrating injury, etc.)    Negative: Pain in the upper back over the ribs (rib cage) that radiates (travels) into the chest    Negative: Pain in the upper back over the ribs (rib cage) and worsened by coughing (or clearly increases with breathing)    Negative: SEVERE back pain of sudden onset and age > 60    Negative: SEVERE abdominal pain (e.g., excruciating)    Negative: Abdominal pain and age > 60    Negative: Unable to urinate (or only a few drops) and bladder feels very full    Negative: Loss of bladder or bowel control (urine or bowel incontinence; wetting self, leaking stool) of new onset    Negative: Numbness (loss of sensation) in groin or rectal area    Negative: Pain radiates into groin, scrotum    Negative: Blood in urine (red, pink, or tea-colored)    Negative: Vomiting and pain over lower ribs of back (i.e., flank - kidney area)    Negative: Weakness of a leg or foot (e.g., unable to bear weight, dragging foot)    Negative: Patient sounds very sick or weak to the triager    Negative: Fever > 100.5 F (38.1 C) and flank pain    Negative: Pain or burning with passing urine (urination)    SEVERE back pain (e.g., excruciating, unable to do any normal activities) and not improved after pain medicine and CARE ADVICE    Answer Assessment - Initial Assessment Questions  1. ONSET: \"When did the pain begin?\"       Last night  2. LOCATION: \"Where does it hurt?\" (upper, mid or lower back)      Upper back, " "bilateral.   3. SEVERITY: \"How bad is the pain?\"  (e.g., Scale 1-10; mild, moderate, or severe)    - MILD (1-3): doesn't interfere with normal activities     - MODERATE (4-7): interferes with normal activities or awakens from sleep     - SEVERE (8-10): excruciating pain, unable to do any normal activities       At times, pain is a 10/10.   4. PATTERN: \"Is the pain constant?\" (e.g., yes, no; constant, intermittent)       Comes and goes.   5. RADIATION: \"Does the pain shoot into your legs or elsewhere?\"      No.  6. CAUSE:  \"What do you think is causing the back pain?\"       Lifting grandson yesterday.    7. BACK OVERUSE:  \"Any recent lifting of heavy objects, strenuous work or exercise?\"      Yes, lifting grandson.   8. MEDICATIONS: \"What have you taken so far for the pain?\" (e.g., nothing, acetaminophen, NSAIDS)      Ibuprofen x 1.   9. NEUROLOGIC SYMPTOMS: \"Do you have any weakness, numbness, or problems with bowel/bladder control?\"     No.   10. OTHER SYMPTOMS: \"Do you have any other symptoms?\" (e.g., fever, abdominal pain, burning with urination, blood in urine)        No.   11. PREGNANCY: \"Is there any chance you are pregnant?\" (e.g., yes, no; LMP)        No.    Protocols used: BACK PAIN-A-OH    Prabhjot Martinez RN    "

## 2021-04-02 ENCOUNTER — TELEPHONE (OUTPATIENT)
Dept: FAMILY MEDICINE | Facility: CLINIC | Age: 51
End: 2021-04-02

## 2021-04-02 NOTE — TELEPHONE ENCOUNTER
Patient request:    Patient's daughter calling on behalf of mother. Patient would like message sent to Mary Lopez NP requesting letter for work stating she cannot come in today.     She has lost her voice and has difficulty speaking. She tried to speak on the phone with me but was very hoarse and daughter had to speak for her.     She has not been seen for this issue.     Aneta NUÑEZ RN  [facility]

## 2021-04-02 NOTE — LETTER
[facility]  [address] [address]  [phone]    2021    Re: Mojgan Jimenez  [address]   [place] [address]  [phone] (home)     : 1970      To Whom It May Concern:      Please excuse Mojgan Jimenez from work today 21.       Sincerely,        Mary Lopez NP

## 2021-04-02 NOTE — TELEPHONE ENCOUNTER
Called and notified pt. Either the pt or her daughter will come and pick it up at the  of [facility].    Deana Lucia RN

## 2021-04-02 NOTE — TELEPHONE ENCOUNTER
Note provided excusing pt from work today due to symptoms. Please inform pt that if symptoms persist she should schedule a virtual visit for further evaluation and if further work note is needed.

## 2021-04-05 ENCOUNTER — TELEPHONE (OUTPATIENT)
Dept: FAMILY MEDICINE | Facility: CLINIC | Age: 51
End: 2021-04-05

## 2021-04-05 NOTE — TELEPHONE ENCOUNTER
Routing to providers    Patient requesting letter to return to work today at 2pm.  Had letter to return Friday, but did not make it in    Brock Johnson, RN, BSN, PHN  [facility]

## 2021-04-05 NOTE — LETTER
[facility]  [address] [address]  [phone]          April 5, 2021    RE:  Mojgan COLLINS Kellensamantha                                                                                                                                                       [address]   [place] [address]            To whom it may concern:    Please excuse Mojgan from work for Friday (4/2) and for this morning - she will return at 2:00PM     Sincerely,        Jhon Antoine, MPAS, PA-C

## 2021-04-05 NOTE — TELEPHONE ENCOUNTER
RN called patient and relayed provider's message. Patient declined follow up, states she feels better today and symptoms have resolved.     RN informed patient that a visit will be needed for any further work excuse letter request. Patient verbalized understanding.     Becky Steven RN, BSN, PHN  [facility] [place]

## 2021-04-05 NOTE — PROGRESS NOTES
"Collaborative Care Psychiatry Service (CCPS)  April 6, 2021    Behavioral Health Clinician Progress Note    Patient Name: Mojgan Jimenez      Telemedicine Visit: The patient's condition can be safely assessed and treated via synchronous audio and visual telemedicine encounter.      Reason for Telemedicine Visit: Services only offered telehealth    Originating Site (Patient Location): Patient's home    Distant Site (Provider Location): [facility]: [place]    Consent:  The patient/guardian has verbally consented to: the potential risks and benefits of telemedicine (video visit) versus in person care; bill my insurance or make self-payment for services provided; and responsibility for payment of non-covered services.     Mode of Communication:  telephone         As the provider I attest to compliance with applicable laws and regulations related to telemedicine.         Service Type:  Individual      Service Location:   Phone call (patient / identified key support person reached)       The patient has been notified of the following:      \"We have found that certain health care needs can be provided without the need for a face to face visit.  This service lets us provide the care you need with a phone conversation.       I will have full access to your [facility] medical record during this entire phone call.   I will be taking notes for your medical record.      Since this is like an office visit, we will bill your insurance company for this service.       There are potential benefits and risks of telephone visits (e.g. limits to patient confidentiality) that differ from in-person visits.?  Confidentiality still applies for telephone services, and nobody will record the visit.  It is important to be in a quiet, private space that is free of distractions (including cell phone or other devices) during the visit.??      If during the course of the call I believe a telephone visit is not appropriate, you " "will not be charged for this service\"     Consent has been obtained for this service by care team member: Yes      Session Start Time: 915am  Session End Time: 933am      Session Length: 16 - 37      Attendees: Client    Visit Activities (Refresh list every visit): [facility] Only    Diagnostic Assessment Date: 02/23/2021  See Flowsheets for today's PHQ-9 and LINA-7 results  Previous PHQ-9:   PHQ-9 SCORE 11/17/2020 1/22/2021 3/3/2021   PHQ-9 Total Score 15 7 13       Previous LINA-7:   LINA-7 SCORE 11/17/2020 1/22/2021 3/3/2021   Total Score 17 10 9       WHODAS  WHODAS 2.0 Total Score 2/24/2021   Total Score 20        AUDIT  No flowsheet data found.    DATA  Extended Session (60+ minutes): No  Interactive Complexity: No  Crisis: No    Medication Compliance:  Yes      Chemical Use Review:   Substance Use: Chemical use reviewed, no active concerns identified      Tobacco Use: No current tobacco use.      Current Stressors / Issues:  Ongoing fatigue, moodiness, easily overwhelmed, poor short term memory. Historically has noticed that her mood changes coincide with her cycle but since starting menopause it's been worse. Taking hydroxyzine (1)  every day, decreased anxiety. Sleeping too much. She's concerned about weight gain. Has had a couple of panic attacks but  not as severe. Gets anxious prior to going to work but once she's there she's fine. Not seeing therapist but would like to begin when she gets her drivers license back. She's working on it. No urges to use. Denies any family or work conflicts. No ongoing 12 step programs but willing to go again.          Review of Symptoms per patient report: (02/23/2021)  Depression:     Lack of interest, Change in energy level, Difficulties concentrating, Low self-worth, Ruminations, Irritability, Feeling sad, down, or depressed, Poor hygeine and Frequent crying  Rita:             No Symptoms  Psychosis:       No Symptoms  Anxiety:           Excessive worry, Nervousness, Physical " complaints, such as headaches, stomachaches, muscle tension, Ruminations, Poor concentration and Irritability  Panic:              Tingling, Numbness and rocks when she's anxious  Post Traumatic Stress Disorder:  No Symptoms   Eating Disorder:          No Symptoms  ADD / ADHD:              Inattentive, Difficulties listening, Poor task completion, Poor organizational skills, Distractibility and Forgetful  Conduct Disorder:       No symptoms  Autism Spectrum Disorder:     No symptoms  Obsessive Compulsive Disorder:       No Symptoms    Changes in Health Issues:   None reported    Assessment: Current Emotional / Mental Status (status of significant symptoms):  Risk status (Self / Other harm or suicidal ideation)  Patient denies a history of suicidal ideation, suicide attempts, self-injurious behavior, homicidal ideation, homicidal behavior and and other safety concerns  Patient denies current fears or concerns for personal safety.  Patient denies current or recent suicidal ideation or behaviors.  Patient denies current or recent homicidal ideation or behaviors.  Patient denies current or recent self injurious behavior or ideation.  Patient denies other safety concerns.  A safety and risk management plan has not been developed at this time, however patient was encouraged to call [place] [phone] should there be a change in any of these risk factors.    Appearance:   unable to assess (phone)   Eye Contact:   unable to assess (phone)   Psychomotor Behavior: unable to assess (phone)   Attitude:   Cooperative   Orientation:   All  Speech   Rate / Production: Normal    Volume:  Normal   Mood:    Depressed   Affect:    unable to assess (phone)   Thought Content:  Clear   Thought Form:  Coherent  Logical   Insight:    Good     Diagnoses:  1. Depression, major, recurrent, mild (H)    2. Generalized anxiety disorder        Collateral Reports Completed:  Communicated with: Dr Valenzuela    Plan: (Homework, other):  Patient was  given information about behavioral services and encouraged to schedule a follow up appointment with the clinic [facility] in conjunction with next [facility]PS appointment.  She was also given information about mental health symptoms and treatment options .  CD Recommendations: No indications of CD issues.  Mary Bethea [facility]      Mary Bethea [facility]  April 6, 2021

## 2021-04-05 NOTE — TELEPHONE ENCOUNTER
Patient requesting note ok for her to return to work today. Letter was done for patient to return to work 4/2/2021.Patient was not able to go to work on Friday. Please call patient when letter is done. Patient starts work at 2 pm today.

## 2021-04-05 NOTE — TELEPHONE ENCOUNTER
Letter in epic.    I would suggest she have some kind of follow up - if her symptoms are so persistent that she needs continued excuse time from work she should be reevaluated.    Thanks.  Jhon Antoine, MPAS, PA-C

## 2021-04-06 ENCOUNTER — VIRTUAL VISIT (OUTPATIENT)
Dept: BEHAVIORAL HEALTH | Facility: CLINIC | Age: 51
End: 2021-04-06
Payer: COMMERCIAL

## 2021-04-06 ENCOUNTER — VIRTUAL VISIT (OUTPATIENT)
Dept: PSYCHIATRY | Facility: CLINIC | Age: 51
End: 2021-04-06
Payer: COMMERCIAL

## 2021-04-06 DIAGNOSIS — F33.1 MODERATE EPISODE OF RECURRENT MAJOR DEPRESSIVE DISORDER (H): ICD-10-CM

## 2021-04-06 DIAGNOSIS — F41.1 GAD (GENERALIZED ANXIETY DISORDER): Primary | ICD-10-CM

## 2021-04-06 DIAGNOSIS — F33.0 DEPRESSION, MAJOR, RECURRENT, MILD (H): Primary | ICD-10-CM

## 2021-04-06 DIAGNOSIS — F41.1 GENERALIZED ANXIETY DISORDER: ICD-10-CM

## 2021-04-06 PROCEDURE — 99214 OFFICE O/P EST MOD 30 MIN: CPT | Mod: 95 | Performed by: PSYCHIATRY & NEUROLOGY

## 2021-04-06 PROCEDURE — 90832 PSYTX W PT 30 MINUTES: CPT | Mod: 95 | Performed by: SOCIAL WORKER

## 2021-04-06 RX ORDER — VENLAFAXINE HYDROCHLORIDE 150 MG/1
150 CAPSULE, EXTENDED RELEASE ORAL DAILY
Qty: 30 CAPSULE | Refills: 1 | Status: SHIPPED | OUTPATIENT
Start: 2021-04-20 | End: 2021-06-28

## 2021-04-06 RX ORDER — GABAPENTIN 300 MG/1
300 CAPSULE ORAL 2 TIMES DAILY PRN
Qty: 60 CAPSULE | Refills: 0 | Status: SHIPPED | OUTPATIENT
Start: 2021-04-06 | End: 2021-05-19

## 2021-04-06 RX ORDER — VENLAFAXINE HYDROCHLORIDE 75 MG/1
75 CAPSULE, EXTENDED RELEASE ORAL DAILY
Qty: 14 CAPSULE | Refills: 0 | Status: SHIPPED | OUTPATIENT
Start: 2021-04-06 | End: 2021-06-15

## 2021-04-06 NOTE — PATIENT INSTRUCTIONS
Treatment Plan:    Discontinue fluoxetine: Take 20 mg daily for 1 week, then decrease to 10 mg daily for 1 week and then discontinue.    Start venlafaxine/Effexor-XR for mood and anxiety.  Start 75 mg daily for 2 weeks and then increase to 150 mg daily.  You can start this medication while decreasing/tapering off of fluoxetine.    Discontinue hydroxyzine since causing sedation and increased hunger    Start gabapentin 300 mg up to 2 times a day as needed for anxiety    Continue all other cares per primary care provider.     Continue all other medications as reviewed per electronic medical record today.     Safety plan reviewed. To the Emergency Department as needed or call after hours crisis line at [phone] or [phone]. [place] Crisis Text Line. Text [place] to 170958 or Suicide LifeLine Chat: suicidepreventionlifeline.org/chat    Continue to consider individual psychotherapy for additional support and development of nonpharmacologic coping skills and strategies.    Schedule an appointment with me in 3-4 weeks or sooner as needed. Call [facility] at [phone] to schedule.    Follow up with primary care provider as planned or for acute medical concerns.    Call the psychiatric nurse line with medication questions or concerns at [phone].    [employer] may be used to communicate with your provider, but this is not intended to be used for emergencies.

## 2021-04-06 NOTE — PROGRESS NOTES
"Mojgan Jimenez is a 50 year old who is being evaluated via a billable telephone visit.      What phone number would you like to be contacted at? See Epic     How would you like to obtain your AVS? [facility]        Outpatient Psychiatric Progress Note    Name: Mojgan Jimenez   : 1970                    Primary Care Provider: JUSTINE Flores CNP   Therapist: None     PHQ-9 scores:  PHQ-9 SCORE 2020 2021 3/3/2021   PHQ-9 Total Score 15 7 13       LINA-7 scores:  LINA-7 SCORE 2020 2021 3/3/2021   Total Score 17 10 9       Patient Identification:  Patient is a 50 year old,   White  female  who presents for return visit with me.  Patient is currently employed. Patient attended the phone/video session alone. Patient prefers to be called: \"Mojgan\".    Interim History:  Mojgan Jimenez is a 50 year old female with past history including depression, anxiety, and methamphetmaine use-in sustained remission (with hx of stimulant-induced psychotic episodes).     I last saw Mojgan Jimenez for outpatient psychiatry Consultation on 2021. During that appointment, w:      Continue hydroxyzine 25-50 mg up to three times a day as needed for anxiety/sleep. Can break a tablet in half and take just 12.5 mg if 25 mg too sedating.     Continue Seroquel/quitiapine 12.5-25 mg at bedtime for sleep as needed.     Increase fluoxetine to 50 mg daily for mood, anxiety.     Consider therapy for additional support and development of nonmedicinal coping skills and strategies.    Continue to maintain sobriety from all mood altering substances.     4/5: Pt reports she is still feeling about they same. Still feeling quite irritable and easily frustrated.  Continues to struggle with energy and motivation.  Feeling more tired taking hydroxyzine and increased dose of fluoxetine.  Feels like she is sleeping too much.  Worries about weight.  Does admit that panic attacks have not been as severe.  Denies " thoughts of suicide today.  Continues to maintain sobriety.    Per [facility], Mary Bethea, [facility], during today's team-based visit:  Ongoing fatigue, moodiness, easily overwhelmed, poor short term memory. Historically has noticed that her mood changes coincide with her cycle but since starting menopause it's been worse. Taking hydroxyzine (1)  every day, decreased anxiety. Sleeping too much. She's concerned about weight gain. Has had a couple of panic attacks but  not as severe. Gets anxious prior to going to work but once she's there she's fine. Not seeing therapist but would like to begin when she gets her drivers license back. She's working on it. No urges to use. Denies any family or work conflicts. No ongoing 12 step programs but willing to go again.     Psychiatric ROS:  Mojgan Jimenez reports mood has been: About the same, up and down  Anxiety has been: Only slightly improved, still quite severe at times  Sleep has been: Pretty good on Seroquel, if anything feels like she is sleeping too much  Rita sxs: None  Psychosis sxs: None  ADHD/ADD sxs: None  PTSD sxs: None  PHQ9 and GAD7 scores were reviewed today if completed today.   Medication side effects: Yes: Sedation and maybe some weight gain from hydroxyzine/Seroquel  Current stressors include: Symptoms and See HPI above  Coping mechanisms and supports include: Family, Hobbies and Friends    Current medications include:   Current Outpatient Medications   Medication Sig     albuterol (PROAIR HFA/PROVENTIL HFA/VENTOLIN HFA) 108 (90 Base) MCG/ACT inhaler INHALE 2 PUFFS EVERY 4 HOURS AS NEEDED FOR SHORTNESS OF BREATH/ DYSPNEA/ WHEEZING     albuterol (PROVENTIL) (2.5 MG/3ML) 0.083% neb solution Take 1 vial (2.5 mg) by nebulization every 6 hours as needed for shortness of breath / dyspnea or wheezing     cholecalciferol (VITAMIN D3) 5000 units (125 mcg) capsule Take 1 capsule (5,000 Units) by mouth daily     FLUoxetine (PROZAC) 10 MG capsule Take one cap by mouth  daily WITH your 40 mg cap for total daily dose of 50 mg.     FLUoxetine (PROZAC) 40 MG capsule Take 40 mg by mouth daily Take WITH 10 mg cap for total daily dose of 50 mg.      hydrOXYzine (ATARAX) 25 MG tablet Take 1 tablet (25 mg) by mouth every 6 hours as needed for anxiety     multivitamin w/minerals (MULTI-VITAMIN) tablet Take 1 tablet by mouth daily     paragard intrauterine copper device 1 each by Intrauterine route once for 1 dose     QUEtiapine (SEROQUEL) 25 MG tablet Take 1-2 tablets (25-50 mg) by mouth At Bedtime     vitamin D3 (CHOLECALCIFEROL) 2000 units (50 mcg) tablet Take 1 tablet (2,000 Units) by mouth daily     WIXELA INHUB 100-50 MCG/DOSE inhaler Inhale 1 puff into the lungs 2 times daily     No current facility-administered medications for this visit.      Past Medical/Surgical History:  Past Medical History:   Diagnosis Date     LINA (generalized anxiety disorder) 11/2/2017     Hyperlipidemia LDL goal <160 1/20/2019     Major depressive disorder      Methanol abuse (H)      Mild persistent asthma without complication 11/2/2017     Vitamin D deficiency 11/2/2017      has a past medical history of LINA (generalized anxiety disorder) (11/2/2017), Hyperlipidemia LDL goal <160 (1/20/2019), Major depressive disorder, Methanol abuse (H), Mild persistent asthma without complication (11/2/2017), and Vitamin D deficiency (11/2/2017).    Social History:  Reviewed. No changes to social history, except as noted above in HPI.    Vital Signs:   None. This is phone/video visit.     Labs:  Most recent laboratory results reviewed and no new labs.     Review of Systems:  10 systems (general, cardiovascular, respiratory, eyes, ENT, endocrine, GI, , M/S, neurological) were reviewed. Most pertinent finding(s) is/are: Chronic pain. The remaining systems are all unremarkable.    Mental Status Examination (limited as this is by phone/video):  Attitude:  cooperative   Oriented to:  person, place, time, and  situation  Attention Span and Concentration:  normal  Speech:  clear, coherent, regular rate, rhythm, and volume  Language: intact  Mood:  anxious and depressed  Affect:  appropriate and in normal range and mood congruent  Associations:  no loose associations  Thought Process:  logical, linear and goal oriented  Thought Content:  no evidence of suicidal ideation or homicidal ideation, no evidence of psychotic thought, no auditory hallucinations present and no visual hallucinations present  Recent and Remote Memory:  Intact to interview. Not formally assessed. No amnesia.  Fund of Knowledge: appropriate  Insight:  good  Judgment:  intact, adequate for safety  Impulse Control:  intact    Suicide Risk Assessment:  Today Mojgan Jimenez reports no suicidal ideation. Based on all available evidence including the factors cited above, Mojgan Jimenez does not appear to be at imminent risk for self-harm, does not meet criteria for a 72-hr hold, and therefore remains appropriate for ongoing outpatient level of care.  A thorough assessment of risk factors related to suicide and self-harm have been reviewed and are noted above. The patient convincingly denies suicidality on several occasions. Local community safety resources printed and reviewed for patient to use if needed. There was no deceit detected, and the patient presented in a manner that was believable.     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) 07/10/2018     Priority: Medium     Suicidal ideation 05/05/2018     Priority: Medium     Polysubstance overdose 05/01/2018     Priority: Medium     Psychosis (H) 03/21/2018     Priority: Medium     Methamphetamine abuse, episodic (H) 03/06/2018     Priority: Medium     Day treatment at [facility]       Paranoia (H) 11/28/2017     Priority: Medium     Mild persistent asthma without complication 11/02/2017     Priority: Medium     Vitamin D deficiency 11/02/2017     Priority: Medium     LINA (generalized anxiety disorder) 11/02/2017     Priority: Medium     Recurrent major depressive disorder, remission status unspecified (H) 11/02/2017     Priority: Medium     5/10/18:  Initial Psychiatry appointment at [facility] ([phone])       Depression, major, recurrent, severe with psychosis (H) 11/02/2017     Priority: Medium     IUD (intrauterine device) in place 02/04/2014     Priority: Medium     Overview:   paragard placed 2/4/14       PMDD (premenstrual dysphoric disorder) 11/05/2010     Priority: Medium       Psychosocial & Contextual Factors:  See HPI above    Assessment:  Mojgan Jimenez reports overall feeling about the same as she did at intake when we increased her fluoxetine.  Since there has been very limited to no improvement on increased dose of fluoxetine I recommend trialing a new agent.  She has had a few failed SSRI trials and so I recommend a trial of venlafaxine/Effexor-XR.  Could potentially be more helpful with energy and also her perimenopausal symptoms.  We will start a cross taper/titration.  Hydroxyzine has been quite sedating and she has needed to take it every day for her anxiety.  She also may be experiencing some increased hunger leading to weight gain.  She is agreeable to a trial with gabapentin to be used off label as discussed for her anxiety.  This may be more helpful for some of her irritability as well.    Patient could also consider  individual psychotherapy for additional support and development of nonpharmacologic coping skills and strategies.      Patient currently with copper IUD.     Medication side effects and alternatives were reviewed. Health promotion activities recommended and reviewed today. All questions addressed. Education and counseling completed regarding risks and benefits of medications and psychotherapy options. Recommend therapy for additional support.     Treatment Plan:    Discontinue fluoxetine: Take 20 mg daily for 1 week, then decrease to 10 mg daily for 1 week and then discontinue.    Start venlafaxine/Effexor-XR for mood and anxiety.  Start 75 mg daily for 2 weeks and then increase to 150 mg daily.  You can start this medication while decreasing/tapering off of fluoxetine.    Discontinue hydroxyzine since causing sedation and increased hunger    Start gabapentin 300 mg up to 2 times a day as needed for anxiety    Continue all other cares per primary care provider.     Continue all other medications as reviewed per electronic medical record today.     Safety plan reviewed. To the Emergency Department as needed or call after hours crisis line at [phone] or [phone]. [place] Crisis Text Line. Text [place] to 221386 or Suicide LifeLine Chat: suicidepreventionlifeline.org/chat    Continue to consider individual psychotherapy for additional support and development of nonpharmacologic coping skills and strategies.    Schedule an appointment with me in 3-4 weeks or sooner as needed. Call [facility] at [phone] to schedule.    Follow up with primary care provider as planned or for acute medical concerns.    Call the psychiatric nurse line with medication questions or concerns at [phone].    MyChart may be used to communicate with your provider, but this is not intended to be used for emergencies.    Administrative Billing:   Phone Call/Video Duration: 14 Minutes  Start: 9:49a  Stop:  10:03a    Time spent with patient was 14 minutes and greater than 50% of time or 8 minutes was spent in counseling and coordination of care regarding above diagnoses and treatment plan. Patient with multiple psychiatric diagnoses and multiple medication changes adding to complexity of care.    Patient Status:  Patient will continue to be seen for ongoing consultation and stabilization.    Signed:   Aditi Valenzuela DO  [facility]    Disclaimer: This note consists of symbols derived from keyboarding, dictation and/or voice recognition software. As a result, there may be errors in the script that have gone undetected. Please consider this when interpreting information found in this chart.

## 2021-04-06 NOTE — Clinical Note
Please call this patient to get them scheduled for a follow-up visit in 3-4 weeks. Please schedule with me and the [facility]. Thanks!

## 2021-04-23 ENCOUNTER — VIRTUAL VISIT (OUTPATIENT)
Dept: FAMILY MEDICINE | Facility: CLINIC | Age: 51
End: 2021-04-23
Payer: COMMERCIAL

## 2021-04-23 ENCOUNTER — TELEPHONE (OUTPATIENT)
Dept: FAMILY MEDICINE | Facility: CLINIC | Age: 51
End: 2021-04-23

## 2021-04-23 DIAGNOSIS — G44.209 ACUTE NON INTRACTABLE TENSION-TYPE HEADACHE: Primary | ICD-10-CM

## 2021-04-23 PROCEDURE — 99212 OFFICE O/P EST SF 10 MIN: CPT | Mod: 95 | Performed by: PHYSICIAN ASSISTANT

## 2021-04-23 NOTE — PROGRESS NOTES
Mojgan is a 50 year old who is being evaluated via a billable telephone visit.      What phone number would you like to be contacted at? [phone]  How would you like to obtain your AVS? Farhana        Subjective   Mojgan is a 50 year old who presents for the following health issuesHPI     Headaches  - missed work today due to headache.  Frontal and occipital ha this am more pressure-like. Band like . Not the worst headache she's ever had. No focal neuro symptoms. Some neck and shoulder pain.   Mild nausea. No cold symptoms. No loss of taste or smell.   - needs doctors note          Review of Systems   Constitutional, HEENT, cardiovascular, pulmonary, GI, , musculoskeletal, neuro, skin, endocrine and psych systems are negative, except as otherwise noted.      Objective           Vitals:  No vitals were obtained today due to virtual visit.    Physical Exam   healthy, alert and no distress  PSYCH: Alert and oriented times 3; coherent speech, normal   rate and volume, able to articulate logical thoughts, able   to abstract reason, no tangential thoughts, no hallucinations   or delusions  Her affect is normal  RESP: No cough, no audible wheezing, able to talk in full sentences  Remainder of exam unable to be completed due to telephone visits    Mojgan was seen today for headache.    Diagnoses and all orders for this visit:    Acute non intractable tension-type headache      Advised supportive and symptomatic treatment.  Follow up with Provider - if condition persists or worsens.   Letter written for patient         Phone call duration: 8 minutes

## 2021-04-23 NOTE — TELEPHONE ENCOUNTER
"Called and spoke with pt and reviewed Mary Lopez NP last telephone note \"Note provided excusing pt from work today due to symptoms. Please inform pt that if symptoms persist she should schedule a virtual visit for further evaluation and if further work note is needed.\"    Notified pt would need a visit to discuss symptoms for a letter. Not appts available at [facility]. Pt scheduled with Parish parekh today at 10:20am  Pt was thankful for the help.    Deana Lucia RN    "

## 2021-04-23 NOTE — LETTER
[facility]  [id number] [address] [facility]  [place] [address]  Phone: [phone]    April 23, 2021        Mojgan Jimenez  [address]   [place] [address]          To whom it may concern:    RE: Mojgan Jimenez    Patient was seen and treated today via a virtual visit. Please excuse her from work on 4/23/21. She may return to work on 4/24/21.    Please contact me for questions or concerns.      Sincerely,        Jayson Steven PA-C

## 2021-04-23 NOTE — TELEPHONE ENCOUNTER
Reason for Call:  Other      Detailed comments: Patient requesting left for missing work today for migraine and sick to stomach. Please advise   patient would like to  letter.  Phone Number Patient can be reached at: Home number on file [phone] (home)     Best Time:      Can we leave a detailed message on this number? voicemail not set up

## 2021-05-17 ENCOUNTER — NURSE TRIAGE (OUTPATIENT)
Dept: FAMILY MEDICINE | Facility: CLINIC | Age: 51
End: 2021-05-17

## 2021-05-17 ENCOUNTER — VIRTUAL VISIT (OUTPATIENT)
Dept: FAMILY MEDICINE | Facility: CLINIC | Age: 51
End: 2021-05-17
Payer: COMMERCIAL

## 2021-05-17 DIAGNOSIS — K59.00 CONSTIPATION, UNSPECIFIED CONSTIPATION TYPE: ICD-10-CM

## 2021-05-17 DIAGNOSIS — R10.84 ABDOMINAL PAIN, GENERALIZED: Primary | ICD-10-CM

## 2021-05-17 PROCEDURE — 99213 OFFICE O/P EST LOW 20 MIN: CPT | Mod: 95 | Performed by: FAMILY MEDICINE

## 2021-05-17 NOTE — PROGRESS NOTES
Mojgan is a 50 year old who is being evaluated via a billable telephone visit.      What phone number would you like to be contacted at? [phone]  How would you like to obtain your AVS? MyChart     =========================================================================    Assessment & Plan     Abdominal pain, generalized  Constipation, unspecified constipation type  - Intermittent abdominal pain and constipation for the past 2-3 days  - Now improving after BMs  - Patient needs a note for missed work due to these symptoms which was provided  - Advised her to call if pain returns or if she develops fevers or other concerning symptoms    Return in about 1 week (around 5/24/2021) for if symptoms worsen or fail to improve.    Marlen Noland, [facility]    ========================================================================    Subjective   Mojgan is a 50 year old who presents for the following health issues:    HPI     Patient is requesting work letter. She was having some abdomen pain that improved with having a bowel movement and did not go to work for 2 days.    Abdominal/Flank Pain  Onset/Duration: 2-3 days  Description:   Character: Stabbing, intermittent   Location: generalized  Radiation: None  Intensity: 8/10  Progression of Symptoms:  improving  Accompanying Signs & Symptoms:  Fever/Chills: no  Gas/Bloating: YES  Nausea: no  Vomiting: no  Diarrhea: no  Constipation: YES  Dysuria or Hematuria: no  History:   Trauma: no  Previous similar pain: YES  Previous tests done: none  Precipitating factors:   Does the pain change with:     Food: no    Bowel Movement: YES    Urination: no   Other factors:  no  Therapies tried and outcome: None  No LMP recorded. (Menstrual status: IUD).    Review of Systems   Constitutional, HEENT, cardiovascular, pulmonary, gi and gu systems are negative, except as otherwise noted.      Objective       Vitals:  No vitals were obtained today due to  virtual visit.    Physical Exam   healthy, alert and no distress  PSYCH: Alert and oriented times 3; coherent speech, normal   rate and volume, able to articulate logical thoughts, able   to abstract reason, no tangential thoughts, no hallucinations   or delusions  Her affect is normal  RESP: No cough, no audible wheezing, able to talk in full sentences  Remainder of exam unable to be completed due to telephone visits        Phone call duration: 6 minutes

## 2021-05-17 NOTE — LETTER
[facility]  [address] [address]  Phone: [phone]    May 17, 2021        Mojgan Jimenez  [address]   [place] [address]          To whom it may concern:    RE: Mojgan Jimenez    Patient was seen and treated today at our clinic and missed work.  Please excuse her from work on 5/16/21 and 5/17/21.      Please contact me for questions or concerns.      Sincerely,        Marlen Noland, DO

## 2021-05-17 NOTE — TELEPHONE ENCOUNTER
Patient called the clinic.  She reports intermittent abdominal pain to all over her abdomen that improves and/or resolves after a bowel movement.  Patient reports history of these symptoms for several years after giving birth.  Patient reports that the pain prevented her from going to work 5/16/21 and 5/17/21.  She continues to have the intermittent abdominal pain.  Patient denies any other symptoms or concerns at this time.    Patient is requesting a work note for missing work the last 2 days.  Assisted patient with scheduling an virtual visit for later today.    Patient was advised to call the clinic [phone] or seek medical assistance if the symptoms worsen before scheduled appointment.    Patient verbalized understanding and has no further questions or concerns at this time.        Reason for Disposition    MODERATE OR MILD pain that comes and goes (cramps) lasts > 24 hours    Additional Information    Negative: Passed out (i.e., fainted, collapsed and was not responding)    Negative: Shock suspected (e.g., cold/pale/clammy skin, too weak to stand, low BP, rapid pulse)    Negative: Sounds like a life-threatening emergency to the triager    Negative: Chest pain    Negative: Pain is mainly in upper abdomen (if needed ask: 'is it mainly above the belly button?')    Negative: Abdominal pain and pregnant > 20 weeks    Negative: Abdominal pain and pregnant < 20 weeks    Negative: SEVERE abdominal pain (e.g., excruciating)    Negative: Vomiting red blood or black (coffee ground) material    Negative: Bloody, black, or tarry bowel movements (Exception: chronic-unchanged black-grey bowel movements and is taking iron pills or Pepto-bismol)    Negative: Constant abdominal pain lasting > 2 hours    Negative: Vomiting bile (green color)    Negative: Patient sounds very sick or weak to the triager    Negative: Vomiting and abdomen looks much more swollen than usual    Negative: White of the eyes have turned yellow (i.e.,  "jaundice)    Negative: Blood in urine (red, pink, or tea-colored)    Negative: Fever > 103 F (39.4 C)    Negative: Fever > 101 F (38.3 C) and over 60 years of age    Negative: Fever > 100.0 F (37.8 C) and has diabetes mellitus or a weak immune system (e.g., HIV positive, cancer chemotherapy, organ transplant, splenectomy, chronic steroids)    Negative: Fever > 100.0 F (37.8 C) and bedridden (e.g., nursing home patient, stroke, chronic illness, recovering from surgery)    Negative: Pregnant or could be pregnant (i.e., missed last menstrual period)    Negative: Unusual vaginal discharge    Negative: Age > 60 years    Negative: Patient wants to be seen    Answer Assessment - Initial Assessment Questions  1. LOCATION: \"Where does it hurt?\"       All around  2. RADIATION: \"Does the pain shoot anywhere else?\" (e.g., chest, back)      No  3. ONSET: \"When did the pain begin?\" (e.g., minutes, hours or days ago)       5/16/21  4. SUDDEN: \"Gradual or sudden onset?\"     gradual  5. PATTERN \"Does the pain come and go, or is it constant?\"     - If constant: \"Is it getting better, staying the same, or worsening?\"       (Note: Constant means the pain never goes away completely; most serious pain is constant and it progresses)      - If intermittent: \"How long does it last?\" \"Do you have pain now?\"      (Note: Intermittent means the pain goes away completely between bouts)      intermittent  6. SEVERITY: \"How bad is the pain?\"  (e.g., Scale 1-10; mild, moderate, or severe)    - MILD (1-3): doesn't interfere with normal activities, abdomen soft and not tender to touch     - MODERATE (4-7): interferes with normal activities or awakens from sleep, tender to touch     - SEVERE (8-10): excruciating pain, doubled over, unable to do any normal activities       Moderate  7. RECURRENT SYMPTOM: \"Have you ever had this type of abdominal pain before?\" If so, ask: \"When was the last time?\" and \"What happened that time?\"       yes  8. CAUSE: " "\"What do you think is causing the abdominal pain?\"      unknown  9. RELIEVING/AGGRAVATING FACTORS: \"What makes it better or worse?\" (e.g., movement, antacids, bowel movement)      No  10. OTHER SYMPTOMS: \"Has there been any vomiting, diarrhea, constipation, or urine problems?\"        No  11. PREGNANCY: \"Is there any chance you are pregnant?\" \"When was your last menstrual period?\"        No    Protocols used: ABDOMINAL PAIN - FEMALE-A-OH    "

## 2021-05-19 DIAGNOSIS — F41.1 GAD (GENERALIZED ANXIETY DISORDER): ICD-10-CM

## 2021-05-19 RX ORDER — GABAPENTIN 300 MG/1
300 CAPSULE ORAL 2 TIMES DAILY PRN
Qty: 60 CAPSULE | Refills: 0 | Status: SHIPPED | OUTPATIENT
Start: 2021-05-19 | End: 2021-08-17

## 2021-05-19 NOTE — TELEPHONE ENCOUNTER
Date of Last Office Visit: 4-6-21  Date of Next Office Visit: 6-22-21  No shows since last visit: 1  Cancellations since last visit: 0    Medication requested: neurontin Date last ordered: 4-6-21 Qty: 60 Refills: 0         Lapse in medication adherence greater than 5 days?: yes    Medication refill request verified as identical to current order?: yes  Result of Last DAM, VPA, Li+ Level, CBC, or Carbamazepine Level (at or since last visit): NA    []Medication refilled per  Medication Refill in Ambulatory Care  policy.  [x]Medication unable to be refilled by RN due to criteria not met as indicated below:    []Eligibility - not seen in the last year   []Supervision - no future appointment   [x]Compliance - no shows, cancellations or lapse in therapy   []Verification - order discrepancy   []Controlled medication   []Medication not included in policy   []90-day supply request   []Other

## 2021-06-15 ENCOUNTER — VIRTUAL VISIT (OUTPATIENT)
Dept: FAMILY MEDICINE | Facility: CLINIC | Age: 51
End: 2021-06-15
Payer: COMMERCIAL

## 2021-06-15 DIAGNOSIS — M25.512 CHRONIC LEFT SHOULDER PAIN: ICD-10-CM

## 2021-06-15 DIAGNOSIS — G44.209 TENSION HEADACHE: Primary | ICD-10-CM

## 2021-06-15 DIAGNOSIS — M54.2 NECK PAIN: ICD-10-CM

## 2021-06-15 DIAGNOSIS — G89.29 CHRONIC LEFT SHOULDER PAIN: ICD-10-CM

## 2021-06-15 PROCEDURE — 99213 OFFICE O/P EST LOW 20 MIN: CPT | Mod: 95 | Performed by: NURSE PRACTITIONER

## 2021-06-15 NOTE — PROGRESS NOTES
Mojgan is a 50 year old who is being evaluated via a billable telephone visit.      What phone number would you like to be contacted at? [phone]  How would you like to obtain your AVS? MyChart    Assessment & Plan     Tension headache  Resolved. She states her headache was not the worst headache of her life and adds that it was similar to past headaches. No recent head injuries     Neck pain  Discussed referral to physical therapy, pt declined at this time as she doesn't know if insurance will cover it. Encouraged pt to check with her insurance to determine if this would be covered or not. She adds she may try icy hot as she has this at home.     Chronic left shoulder pain  See above       Return in about 1 week (around 6/22/2021) for If symptoms worsen or fail to improve.    Mary Lopez NP  [facility]   Mojgan is a 50 year old who presents for the following health issues     HPI     Headache  Onset/Duration: started Wed 06/09. worse thursday but she had the day off  Description  Location: frontal right side  Character: pounding pain  Frequency:  On/off   Duration:   Few days   Wake with headaches: not usually   Able to do daily activities when headache present: Depends on how bad they are  Intensity:    Progression of Symptoms:  Resolved   Accompanying signs and symptoms:  Stiff neck: no  Neck or upper back pain: no  Sinus or URI symptoms no   Fever: no  Nausea or vomiting: no  Dizziness: no  Numbness/tingling: YES tingling to left side of neck and shoulder down to mid upper arm, symptoms are worsening. This has been going on for a long time per report. She is right handed and works as a . She reports having tennis elbow which she wears a brace for at work which seems to help.   Weakness: no  Visual changes: none  History  Head trauma: no  Family history of migraines: no  Daily pain medication use: no  Previous tests for headaches: no  Neurologist evaluation:  no  Precipitating or Alleviating factors (light/sound/sleep/caffeine): stress causes headaches   Therapies tried and outcome: Ibuprofen            She states she tends to get headaches due to stress and around the time she would typically get her period. She states she has been postmenopausal for about 1 year, denies recent vaginal bleeding but states she continues to get other symptoms of period regularly. She adds she gets stressed/anxious with changes. She had been outside working around the flowers which was nice but then was told she would be having to go back inside to work which seemed to stress her out.     Patient is requesting a note excusing her for missing work the following days: 06/11-06/13. She wishes to pick this letter up at the clinic as she hasnt gotten my chart to work for her yet.     She adds that she has an appointment with Psychology on 06/22       Review of Systems   Constitutional, HEENT, cardiovascular, pulmonary, gi and gu systems are negative, except neck/left shoulder pain       Objective           Vitals:  No vitals were obtained today due to virtual visit.    Physical Exam   healthy, alert and no distress  PSYCH: Alert and oriented times 3; coherent speech, rapid   rate and volume, able to articulate logical thoughts, able   to abstract reason, no tangential thoughts, no hallucinations   or delusions  Her affect is normal  RESP: No cough, no audible wheezing, able to talk in full sentences  Remainder of exam unable to be completed due to telephone visits      Phone call duration: 9 minutes

## 2021-06-21 NOTE — PROGRESS NOTES
"Collaborative Care Psychiatry Service ([facility])  June 22, 2021      Behavioral Health Clinician Progress Note    Patient Name: Mojgan Jimenez is a 50 year old female who is being evaluated via a telephone visit.      The patient has been notified of the following:     \"We have found that certain health care needs can be provided without the need for a face to face visit.  This service lets us provide the care you need with a short phone conversation.      I will have full access to your [facility] medical record during this entire phone call.   I will be taking notes for your medical record.     Since this is like an office visit, we will bill your insurance company for this service.  Please check with your medical insurance if this type of telephone visit/virtual care is covered.  You may be responsible for the cost of this service if insurance coverage is denied.      There are potential benefits and risks of telephone visits (e.g. limits to patient confidentiality) that differ from in-person visits.?  Confidentiality still applies for telephone services, and nobody will record the visit.  It is important to be in a quiet, private space that is free of distractions (including cell phone or other devices) during the visit.??     If during the course of the call I believe a telephone visit is not appropriate, you will not be charged for this service\"    Consent has been obtained for this service by care team member: yes.       Service Type:  Individual      Service Location:   Phone call (patient / identified key support person reached)       The patient has been notified of the following:       Session Start Time: 11:15am  Session End Time: 11:35am      Session Length: 16 - 37      Attendees: Client    Visit Activities (Refresh list every visit): [facility] Only    Diagnostic Assessment Date: 02/23/2021  See Flowsheets for today's PHQ-9 and LINA-7 results  Previous PHQ-9:   PHQ-9 SCORE 11/17/2020 1/22/2021 " "3/3/2021   PHQ-9 Total Score 15 7 13       Previous LINA-7:   LINA-7 SCORE 11/17/2020 1/22/2021 3/3/2021   Total Score 17 10 9       WHODAS  WHODAS 2.0 Total Score 2/24/2021   Total Score 20        AUDIT  No flowsheet data found.    DATA  Extended Session (60+ minutes): No  Interactive Complexity: No  Crisis: No    Medication Compliance:  Yes      Chemical Use Review:   Substance Use: Chemical use reviewed, no active concerns identified      Tobacco Use: No current tobacco use.      Current Stressors / Issues:  \"I'm doing a lot better with these meds.\" She She noted that her mood is better and she is more motivated to complete tasks. She still has some anxiety but it is more manageable. She has been having some unusual experiences the last 10 days such as sore throat, mouth sores, headaches, has been fidgety, and has been rocking her body when . Yesterday, she felt like people were looking at her funny which is how she felt when she had been using. She has been having trouble sleeping and last night had a dream about watching someone using meth. She insists that she has maintained her sobriety for the last 3 years.       Review of Symptoms per patient report: (02/23/2021)  Depression:     Lack of interest, Change in energy level, Difficulties concentrating, Low self-worth, Ruminations, Irritability, Feeling sad, down, or depressed, Poor hygeine and Frequent crying  Rita:             No Symptoms  Psychosis:       No Symptoms  Anxiety:           Excessive worry, Nervousness, Physical complaints, such as headaches, stomachaches, muscle tension, Ruminations, Poor concentration and Irritability  Panic:              Tingling, Numbness and rocks when she's anxious  Post Traumatic Stress Disorder:  No Symptoms   Eating Disorder:          No Symptoms  ADD / ADHD:              Inattentive, Difficulties listening, Poor task completion, Poor organizational skills, Distractibility and Forgetful  Conduct Disorder:       No " symptoms  Autism Spectrum Disorder:     No symptoms  Obsessive Compulsive Disorder:       No Symptoms    Changes in Health Issues:   None reported    Assessment: Current Emotional / Mental Status (status of significant symptoms):  Risk status (Self / Other harm or suicidal ideation)  Patient denies a history of suicidal ideation, suicide attempts, self-injurious behavior, homicidal ideation, homicidal behavior and and other safety concerns  Patient denies current fears or concerns for personal safety.  Patient denies current or recent suicidal ideation or behaviors.  Patient denies current or recent homicidal ideation or behaviors.  Patient denies current or recent self injurious behavior or ideation.  Patient denies other safety concerns.  A safety and risk management plan has not been developed at this time, however patient was encouraged to call [place] [phone] should there be a change in any of these risk factors.    Appearance:   unable to assess (phone)   Eye Contact:   unable to assess (phone)   Psychomotor Behavior: unable to assess (phone)   Attitude:   Cooperative   Orientation:   All  Speech   Rate / Production: Hyperverbal  Pressured    Volume:  Normal   Mood:    Euphoric   Affect:    unable to assess (phone)   Thought Content:  Clear   Thought Form:  Coherent  Logical   Insight:    Good     Diagnoses:  1. LINA (generalized anxiety disorder)    2. Moderate episode of recurrent major depressive disorder (H)        Collateral Reports Completed:  Communicated with: Dr Valenzuela    Plan: (Homework, other):  Patient was given information about behavioral services and encouraged to schedule a follow up appointment with the clinic [facility] in conjunction with [facility].  She was also given information about mental health symptoms and treatment options .  CD Recommendations: No indications of CD issues.      Alan Cortez PsyD, LP      Zheng Cortez PsyD  June 22, 2021

## 2021-06-22 ENCOUNTER — OFFICE VISIT (OUTPATIENT)
Dept: FAMILY MEDICINE | Facility: CLINIC | Age: 51
End: 2021-06-22
Payer: COMMERCIAL

## 2021-06-22 ENCOUNTER — VIRTUAL VISIT (OUTPATIENT)
Dept: PSYCHIATRY | Facility: CLINIC | Age: 51
End: 2021-06-22
Payer: COMMERCIAL

## 2021-06-22 ENCOUNTER — VIRTUAL VISIT (OUTPATIENT)
Dept: PSYCHOLOGY | Facility: CLINIC | Age: 51
End: 2021-06-22
Payer: COMMERCIAL

## 2021-06-22 VITALS
SYSTOLIC BLOOD PRESSURE: 130 MMHG | HEART RATE: 85 BPM | WEIGHT: 267 LBS | DIASTOLIC BLOOD PRESSURE: 80 MMHG | BODY MASS INDEX: 45.58 KG/M2 | TEMPERATURE: 99.4 F | HEIGHT: 64 IN | OXYGEN SATURATION: 97 % | RESPIRATION RATE: 21 BRPM

## 2021-06-22 DIAGNOSIS — B37.0 THRUSH: Primary | ICD-10-CM

## 2021-06-22 DIAGNOSIS — F33.1 MODERATE EPISODE OF RECURRENT MAJOR DEPRESSIVE DISORDER (H): ICD-10-CM

## 2021-06-22 DIAGNOSIS — F15.950: ICD-10-CM

## 2021-06-22 DIAGNOSIS — E66.813 CLASS 3 SEVERE OBESITY DUE TO EXCESS CALORIES WITHOUT SERIOUS COMORBIDITY WITH BODY MASS INDEX (BMI) OF 40.0 TO 44.9 IN ADULT (H): ICD-10-CM

## 2021-06-22 DIAGNOSIS — Z12.31 ENCOUNTER FOR SCREENING MAMMOGRAM FOR BREAST CANCER: ICD-10-CM

## 2021-06-22 DIAGNOSIS — F29 PSYCHOSIS, UNSPECIFIED PSYCHOSIS TYPE (H): ICD-10-CM

## 2021-06-22 DIAGNOSIS — E66.01 CLASS 3 SEVERE OBESITY DUE TO EXCESS CALORIES WITHOUT SERIOUS COMORBIDITY WITH BODY MASS INDEX (BMI) OF 40.0 TO 44.9 IN ADULT (H): ICD-10-CM

## 2021-06-22 DIAGNOSIS — F39 MOOD DISORDER (H): Primary | ICD-10-CM

## 2021-06-22 DIAGNOSIS — G47.00 INSOMNIA, UNSPECIFIED TYPE: ICD-10-CM

## 2021-06-22 DIAGNOSIS — J04.0 LARYNGITIS: ICD-10-CM

## 2021-06-22 DIAGNOSIS — F15.21: ICD-10-CM

## 2021-06-22 DIAGNOSIS — F41.1 GAD (GENERALIZED ANXIETY DISORDER): Primary | ICD-10-CM

## 2021-06-22 DIAGNOSIS — F41.1 GAD (GENERALIZED ANXIETY DISORDER): ICD-10-CM

## 2021-06-22 PROCEDURE — 90834 PSYTX W PT 45 MINUTES: CPT | Mod: 95 | Performed by: PSYCHOLOGIST

## 2021-06-22 PROCEDURE — 99214 OFFICE O/P EST MOD 30 MIN: CPT | Performed by: NURSE PRACTITIONER

## 2021-06-22 PROCEDURE — 99214 OFFICE O/P EST MOD 30 MIN: CPT | Mod: 95 | Performed by: PSYCHIATRY & NEUROLOGY

## 2021-06-22 RX ORDER — PREDNISONE 20 MG/1
40 TABLET ORAL DAILY
Qty: 6 TABLET | Refills: 0 | Status: SHIPPED | OUTPATIENT
Start: 2021-06-22 | End: 2021-06-25

## 2021-06-22 RX ORDER — CETIRIZINE HYDROCHLORIDE 10 MG/1
10 TABLET ORAL DAILY
COMMUNITY
End: 2022-08-25

## 2021-06-22 RX ORDER — OLANZAPINE 10 MG/1
5-10 TABLET ORAL
Qty: 30 TABLET | Refills: 1 | Status: SHIPPED | OUTPATIENT
Start: 2021-06-22 | End: 2021-08-17

## 2021-06-22 RX ORDER — CLOTRIMAZOLE 10 MG/1
10 LOZENGE ORAL
Qty: 70 LOZENGE | Refills: 0 | Status: SHIPPED | OUTPATIENT
Start: 2021-06-22 | End: 2021-07-06

## 2021-06-22 ASSESSMENT — MIFFLIN-ST. JEOR: SCORE: 1808.16

## 2021-06-22 NOTE — PROGRESS NOTES
{PROVIDER CHARTING PREFERENCE:416670}    Subjective   Mojgan is a 50 year old who presents for the following health issues {ACCOMPANIED BY STATEMENT (Optional):849837}    HPI     Loss of Voice      {additonal problems for provider to add (Optional):632815}    Review of Systems   {ROS COMP (Optional):864697}      Objective    There were no vitals taken for this visit.  There is no height or weight on file to calculate BMI.  Physical Exam   {Exam List (Optional):565708}    {Diagnostic Test Results (Optional):670729}    {AMBULATORY ATTESTATION (Optional):924922}

## 2021-06-22 NOTE — PATIENT INSTRUCTIONS
Please call to schedule your screening Mammogram.  [facility] (xray, mammogram, bone density, and ultrasound) [phone]

## 2021-06-22 NOTE — Clinical Note
I think she might be a little manic.  She adamantly denies using any drugs.  I don't know her well, but I do believe her.  See my note for more details.  I gave her olanzapine to try to get her sleeping better and bring her down some.  I will also be completing [facility] paperwork for her.  Let me know if you have any questions or concerns.

## 2021-06-22 NOTE — PROGRESS NOTES
"Mojgan Jimenez is a 50 year old who is being evaluated via a billable telephone visit.      What phone number would you like to be contacted at? See Epic     How would you like to obtain your AVS? [facility]        Outpatient Psychiatric Progress Note    Name: Mojgan Jimenez   : 1970                    Primary Care Provider: JUSTINE Flores CNP   Therapist: None     PHQ-9 scores:  PHQ-9 SCORE 2020 2021 3/3/2021   PHQ-9 Total Score 15 7 13       LINA-7 scores:  LINA-7 SCORE 2020 2021 3/3/2021   Total Score 17 10 9       Patient Identification:  Patient is a 50 year old,   White  female  who presents for return visit with me.  Patient is currently employed. Patient attended the phone/video session alone. Patient prefers to be called: \"Mojgan\".    Interim History:  Mojgan Jimenez is a 50 year old female with past history including depression, anxiety, and methamphetmaine use-in sustained remission (with hx of stimulant-induced psychotic episodes).     I last saw Mojgan Jimenez for outpatient psychiatry return visit on 2021. During that appointment, we:      Discontinue fluoxetine: Take 20 mg daily for 1 week, then decrease to 10 mg daily for 1 week and then discontinue.    Start venlafaxine/Effexor-XR for mood and anxiety.  Start 75 mg daily for 2 weeks and then increase to 150 mg daily.  You can start this medication while decreasing/tapering off of fluoxetine.    Discontinue hydroxyzine since causing sedation and increased hunger    Start gabapentin 300 mg up to 2 times a day as needed for anxiety    : \"I've gotten so much better.\" More motivation. Goes to work 8-4p. \"I'm not in bed hardly at all anymore.\" Has had some struggles at work. Feels much more motivated on Effexor-XR. Has opened a checking account, cleaned more. Reports she has been rocking back and forth more. This last week has not been sleeping well. Daughter told pt she is acting the way she used " "to act when using meth. Only a few hours of sleep the past few days. Some paranoia. Fears losing her job. Would like to get on FMLA. Requested FMLA 5/5/21 but needs forms filled out by provider. No SI. No safety concerns. Denies any meth, drug, alcohol use.     Per [facility], Dr. Alan Cortez, during today's team-based visit:  \"I'm doing a lot better with these meds.\" She She noted that her mood is better and she is more motivated to complete tasks. She still has some anxiety but it is more manageable. She has been having some unusual experiences the last 10 days such as sore throat, mouth sores, headaches, has been fidgety, and has been rocking her body when . Yesterday, she felt like people were looking at her funny which is how she felt when she had been using. She has been having trouble sleeping and last night had a dream about watching someone using meth. She insists that she has maintained her sobriety for the last 3 years.     Per hospital discharge note 4/3/2018:  Medication started at time of admission to manage mood and psychosis. Combination of fluoxetine and olanzapine as well as buspirone for anxiety. Patient was concerned regarding increased hunger and possible weight gain with olanzapine, trial of Abilify. Patient reporting increased agitation and irritability with Abilify as well as increase in paranoid thoughts. DC Abilify and restarted olanzapine as this medication did appear to improve paranoid and delusional thoughts. Atomoxetine was started yesterday related to patient's concerns about difficulty concentrating, racing thoughts, difficulty staying on task.     Psychiatric ROS:  Mojgan Jimenez reports mood has been: a lot better  Anxiety has been: pretty intense lately  Sleep has been: see HPI above, sleep poor  Rita sxs: racing thoughts, increased anxiety, insomnia, increased activity, elevated mood  Psychosis sxs: paranoia  ADHD/ADD sxs: None  PTSD sxs: None  PHQ9 and GAD7 scores were reviewed today if " completed today.   Medication side effects: denies  Current stressors include: Symptoms and See HPI above  Coping mechanisms and supports include: Family, Hobbies and Friends    Current medications include:   Current Outpatient Medications   Medication Sig     albuterol (PROAIR HFA/PROVENTIL HFA/VENTOLIN HFA) 108 (90 Base) MCG/ACT inhaler INHALE 2 PUFFS EVERY 4 HOURS AS NEEDED FOR SHORTNESS OF BREATH/ DYSPNEA/ WHEEZING     albuterol (PROVENTIL) (2.5 MG/3ML) 0.083% neb solution Take 1 vial (2.5 mg) by nebulization every 6 hours as needed for shortness of breath / dyspnea or wheezing     cholecalciferol (VITAMIN D3) 5000 units (125 mcg) capsule Take 1 capsule (5,000 Units) by mouth daily     gabapentin (NEURONTIN) 300 MG capsule Take 1 capsule (300 mg) by mouth 2 times daily as needed (anxiety/agitation)     multivitamin w/minerals (MULTI-VITAMIN) tablet Take 1 tablet by mouth daily     paragard intrauterine copper device 1 each by Intrauterine route once for 1 dose     QUEtiapine (SEROQUEL) 25 MG tablet Take 1-2 tablets (25-50 mg) by mouth At Bedtime     venlafaxine (EFFEXOR-XR) 150 MG 24 hr capsule Take 1 capsule (150 mg) by mouth daily     vitamin D3 (CHOLECALCIFEROL) 2000 units (50 mcg) tablet Take 1 tablet (2,000 Units) by mouth daily     WIXELA INHUB 100-50 MCG/DOSE inhaler Inhale 1 puff into the lungs 2 times daily     No current facility-administered medications for this visit.      Past Medical/Surgical History:  Past Medical History:   Diagnosis Date     LINA (generalized anxiety disorder) 11/2/2017     Hyperlipidemia LDL goal <160 1/20/2019     Major depressive disorder      Methanol abuse (H)      Mild persistent asthma without complication 11/2/2017     Vitamin D deficiency 11/2/2017      has a past medical history of LINA (generalized anxiety disorder) (11/2/2017), Hyperlipidemia LDL goal <160 (1/20/2019), Major depressive disorder, Methanol abuse (H), Mild persistent asthma without complication  (11/2/2017), and Vitamin D deficiency (11/2/2017).    Social History:  Reviewed. No changes to social history, except as noted above in HPI.    Vital Signs:   None. This is phone/video visit.     Labs:  Most recent laboratory results reviewed and no new labs.     Review of Systems:  10 systems (general, cardiovascular, respiratory, eyes, ENT, endocrine, GI, , M/S, neurological) were reviewed. Most pertinent finding(s) is/are: Chronic pain, headaches, mouth sores. The remaining systems are all unremarkable.    Mental Status Examination (limited as this is by phone/video):  Attitude:  Cooperative, pleasant   Oriented to:  person, place, time, and situation  Attention Span and Concentration:  distractible  Speech:  Hyperverbal, pressured  Language: intact  Mood:  Mood better, more anxious  Affect:  Elevated, anxious  Associations:  no loose associations  Thought Process:  Rambling, tangential  Thought Content:  no evidence of suicidal ideation or homicidal ideation, reports feeling a little more paranoid than usual, no auditory hallucinations present and no visual hallucinations present  Recent and Remote Memory:  Intact to interview. Not formally assessed. No amnesia.  Fund of Knowledge: appropriate  Insight:  fair  Judgment:  intact, adequate for safety  Impulse Control:  fair    Suicide Risk Assessment:  Today Mojgan Jimenez reports no suicidal ideation. Based on all available evidence including the factors cited above, Mojgan Jimenez does not appear to be at imminent risk for self-harm, does not meet criteria for a 72-hr hold, and therefore remains appropriate for ongoing outpatient level of care.  A thorough assessment of risk factors related to suicide and self-harm have been reviewed and are noted above. The patient convincingly denies suicidality on several occasions. Local community safety resources printed and reviewed for patient to use if needed. There was no deceit detected, and the patient presented in  a manner that was believable.     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 and [facility]       Paranoia (H) 11/28/2017     Priority: Medium     Mild persistent asthma without complication 11/02/2017     Priority: Medium     Vitamin D deficiency 11/02/2017     Priority: Medium     LINA (generalized anxiety disorder) 11/02/2017     Priority: Medium     Recurrent major depressive disorder, remission status unspecified (H) 11/02/2017     Priority: Medium     5/10/18:  Initial Psychiatry appointment at [facility] and [facility] ([phone])       Depression, major, recurrent, severe with psychosis (H) 11/02/2017     Priority: Medium     IUD (intrauterine device) in place 02/04/2014     Priority: Medium     Overview:   paragard placed 2/4/14       PMDD (premenstrual dysphoric disorder) 11/05/2010     Priority: Medium       Psychosocial & Contextual Factors:  See HPI above    Assessment:  Mojgan Jimenez reports some significant improvement in her mood, motivation, energy but with some worsening anxiety, insomnia, and other concerning symptoms indicative of possible  yasemin.  Patient with increased goal-directed activity, decreased need for sleep/insomnia, hyperverbal and pressured in speech today, and also paranoid.  Patient adamantly denies using any stimulants.  Also concerning her daughter made the comment that the patient is acting like she would when using methamphetamine.  This makes me quite worried/suspicious about possible bipolar diagnosis.  When psychiatrically hospitalized in 2018 she had some similar symptoms.  However, that hospitalization was much more proximal to her methamphetamine use.  At this time, I am more than happy to fill out FMLA forms for the patient.  We also discussed starting olanzapine at bedtime to help with her current instability and to get her sleeping better.  We will need to consider the fact that venlafaxine might have precipitated this episode.  Might be able to stay on venlafaxine if also on a mood stabilizer because she is incredibly happy with venlafaxine.  She had been on venlafaxine 2 months before now and has been very pleased.  She continues to be hypomanic/manic and/or does not want to be on olanzapine, may need to get her off the SNRI.    Patient could also consider individual psychotherapy for additional support and development of nonpharmacologic coping skills and strategies.      Patient currently with copper IUD.     Medication side effects and alternatives were reviewed. Health promotion activities recommended and reviewed today. All questions addressed. Education and counseling completed regarding risks and benefits of medications and psychotherapy options. Recommend therapy for additional support.     Treatment Plan:    Continue venlafaxine/Effexor- mg daily for mood and anxiety.    Continue gabapentin 300 mg up to 2 times a day as needed for anxiety    Start olanzapine 5-10 mg at bedtime as needed for sleep and unstable mood, possible yasemin.  Discussed taking every night at bedtime for at least the next couple of  weeks.    Continue all other cares per primary care provider.     Continue all other medications as reviewed per electronic medical record today.     Safety plan reviewed. To the Emergency Department as needed or call after hours crisis line at [phone] or [phone]. [place] Crisis Text Line. Text [place] to 830931 or Suicide LifeLine Chat: suicidepreventionlifeline.org/chat    Continue to consider individual psychotherapy for additional support and development of nonpharmacologic coping skills and strategies.    Schedule an appointment with me in 3-4 weeks or sooner as needed. Call [facility] at [phone] to schedule.    Follow up with primary care provider as planned or for acute medical concerns.    Call the psychiatric nurse line with medication questions or concerns at [phone].    [employer] may be used to communicate with your provider, but this is not intended to be used for emergencies.    Administrative Billing:   Phone Call/Video Duration: 25 Minutes    Time spent with patient was 25 minutes and greater than 50% of time or 13 minutes was spent in counseling and coordination of care regarding above diagnoses and treatment plan. Patient with multiple psychiatric diagnoses and multiple medication changes adding to complexity of care.    Patient Status:  Patient will continue to be seen for ongoing consultation and stabilization.    Signed:   Aditi Valenzuela DO  [facility]    Disclaimer: This note consists of symbols derived from keyboarding, dictation and/or voice recognition software. As a result, there may be errors in the script that have gone undetected. Please consider this when interpreting information found in this chart.

## 2021-06-22 NOTE — Clinical Note
Please call this patient to get them scheduled for a follow-up visit in 3-4 weeks. Please schedule with me and the [facility]. Thanks!

## 2021-06-22 NOTE — PROGRESS NOTES
"    Assessment & Plan     Thrush  -Patient is on steroid controller inhaler and advised that she should rinse mouth thoroughly after inhaler use  - clotrimazole (MYCELEX) 10 MG lozenge; Place 1 lozenge (10 mg) inside cheek 5 times daily for 14 days    Laryngitis  Suspect this will clear up with time  - predniSONE (DELTASONE) 20 MG tablet; Take 2 tablets (40 mg) by mouth daily for 3 days    Encounter for screening mammogram for breast cancer  Encourage patient to call to get screening mammogram completed  - MA SCREENING DIGITAL BILAT - Future  (s+30); Future    Psychosis, unspecified psychosis type (H)  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  Patient followed closely by Psychiatry               BMI:   Estimated body mass index is 46.56 kg/m  as calculated from the following:    Height as of this encounter: 1.613 m (5' 3.5\").    Weight as of this encounter: 121.1 kg (267 lb).     Return in about 2 weeks (around 7/6/2021) for if symptoms worsen or fail to improve.    Joy Maier NP  [facility]    Curtis Ulrich is a 50 year old who presents for the following health issues     HPI     Acute Illness  Acute illness concerns: HA. Ear pain, mouth sores and jaw pain   Onset/Duration: 3 weeks   Symptoms: Mouth is sore-no lesions but she feels like the middle of her tongue is pink and the sides of her tongue is white.  Fever: no  Chills/Sweats: no  Headache (location?): YES, and jaw pain   Sinus Pressure: no  Conjunctivitis:  no  Ear Pain: YES: bilateral  Rhinorrhea: no  Congestion: no  Sore Throat: YES  Cough: no  Wheeze: no  Decreased Appetite: no  Nausea: YES  Vomiting: no  Diarrhea: no  Dysuria/Freq.: no  Dysuria or " "Hematuria: no  Fatigue/Achiness: YES  Sick/Strep Exposure: no  Therapies tried and outcome: nyquil, throat spray, peroxide, and salt water   She also has lost her voice off and on for the past 3 weeks.  Denies any fever.  No congestion, sneezing, cough.    Asthma-she was on Wixela inhaler but states the pharmacy changed her to a different one but she does not know the name of it.  Suspect she was switched to a generic.  She states that overall her asthma has been good, ACT completed today with a score of 19.      Review of Systems   Constitutional, HEENT, cardiovascular, pulmonary, gi and gu systems are negative, except as otherwise noted.      Objective    /80 (BP Location: Right arm)   Pulse 85   Temp 99.4  F (37.4  C) (Oral)   Resp 21   Ht 1.613 m (5' 3.5\")   Wt 121.1 kg (267 lb)   SpO2 97%   BMI 46.56 kg/m    Body mass index is 46.56 kg/m .  Physical Exam   GENERAL: healthy, alert, no distress and obese  EYES: Eyes grossly normal to inspection, PERRL and conjunctivae and sclerae normal  HENT: normal cephalic/atraumatic, ear canals and TM's normal, nose and mouth without ulcers or lesions, oral mucous membranes moist and tongue-dry and white plaques  No erythema or exudate to posterior pharynx or tonsils.  NECK: no adenopathy appreciated but patient does have mild tenderness with palpation under the jaw bilateral  RESP: lungs clear to auscultation - no rales, rhonchi or wheezes  CV: regular rates and rhythm, normal S1 S2, no S3 or S4 and no murmur, click or rub  PSYCH: mentation appears normal, anxious, speech pressured, judgement and insight intact and appearance well groomed         "

## 2021-06-22 NOTE — PATIENT INSTRUCTIONS
Treatment Plan:    Continue venlafaxine/Effexor- mg daily for mood and anxiety.    Continue gabapentin 300 mg up to 2 times a day as needed for anxiety    Start olanzapine 5-10 mg at bedtime as needed for sleep and unstable mood, possible yasemin.  Discussed taking every night at bedtime for at least the next couple of weeks.    Continue all other cares per primary care provider.     Continue all other medications as reviewed per electronic medical record today.     Safety plan reviewed. To the Emergency Department as needed or call after hours crisis line at [phone] or [phone]. [place] Crisis Text Line. Text [place] to 953234 or Suicide LifeLine Chat: suicidepreventionlifeline.org/chat    Continue to consider individual psychotherapy for additional support and development of nonpharmacologic coping skills and strategies.    Schedule an appointment with me in 3-4 weeks or sooner as needed. Call [facility] at [phone] to schedule.    Follow up with primary care provider as planned or for acute medical concerns.    Call the psychiatric nurse line with medication questions or concerns at [phone].    MyChart may be used to communicate with your provider, but this is not intended to be used for emergencies.

## 2021-06-24 ENCOUNTER — TELEPHONE (OUTPATIENT)
Dept: PSYCHOLOGY | Facility: CLINIC | Age: 51
End: 2021-06-24

## 2021-06-24 NOTE — TELEPHONE ENCOUNTER
Pt looking for update on [facility] paperwork pt had faxed into Dr. Valenzuela on 06/22. Stated she was given the fax# from Dr. Valenzuela and faxed it over same day. Pt needs it by tomorrow to hand into work. Looking for a call back to coordinate.

## 2021-06-28 DIAGNOSIS — F41.1 GAD (GENERALIZED ANXIETY DISORDER): ICD-10-CM

## 2021-06-28 DIAGNOSIS — F33.1 MODERATE EPISODE OF RECURRENT MAJOR DEPRESSIVE DISORDER (H): ICD-10-CM

## 2021-06-28 RX ORDER — VENLAFAXINE HYDROCHLORIDE 150 MG/1
150 CAPSULE, EXTENDED RELEASE ORAL DAILY
Qty: 30 CAPSULE | Refills: 0 | Status: SHIPPED | OUTPATIENT
Start: 2021-06-28 | End: 2021-07-30

## 2021-06-28 NOTE — TELEPHONE ENCOUNTER
Date of Last Office Visit: 6/22/21  Date of Next Office Visit: 7/22/21  No shows since last visit: 0  Cancellations since last visit: 0    Medication requested: venlafaxine 150 mg Date last ordered: 4/6/21 Qty: 30 Refills: 1     Review of [place] ?: N/A    Lapse in medication adherence greater than 5 days?: Yes  If yes, call patient and gather details: No answer.  Medication refill request verified as identical to current order?: Yes  Result of Last DAM, VPA, Li+ Level, CBC, or Carbamazepine Level (at or since last visit): N/A    []Medication refilled per  Medication Refill in Ambulatory Care  policy.  [x]Medication unable to be refilled by RN due to criteria not met as indicated below:    []Eligibility - not seen in the last year   []Supervision - no future appointment   [x]Compliance - no shows, cancellations or lapse in therapy   []Verification - order discrepancy   []Controlled medication   []Medication not included in policy   []90-day supply request   []Other

## 2021-07-07 DIAGNOSIS — J45.30 MILD PERSISTENT ASTHMA WITHOUT COMPLICATION: ICD-10-CM

## 2021-07-07 NOTE — TELEPHONE ENCOUNTER
Routing refill request to provider for review/approval because:  Asthma control assessment score WNL in last 6 months    ACT Total Scores 10/29/2020 11/17/2020 6/22/2021   ACT TOTAL SCORE (Goal Greater than or Equal to 20) 6 20 19   In the past 12 months, how many times did you visit the emergency room for your asthma without being admitted to the hospital? 0 0 -   In the past 12 months, how many times were you hospitalized overnight because of your asthma? 0 0 -     **Patient is asking for a Qty of 2 inhalers to be sent in    Deana Lucia RN

## 2021-07-07 NOTE — TELEPHONE ENCOUNTER
Reason for Call:  Medication refill: albuterol rescue inhaler    Name of the pharmacy and phone number for the current request:  [facility]  [phone]    Name of the medication requested: albuterol (PROAIR HFA/PROVENTIL HFA/VENTOLIN HFA) 108 (90 Base) MCG/ACT inhaler      Patient is asking for a Qty of 2 inhalers to be sent in    Phone number patient can be reached at: [phone]      Call taken on 7/7/2021 at 4:34 PM by ROCK ARCEO

## 2021-07-08 RX ORDER — ALBUTEROL SULFATE 90 UG/1
AEROSOL, METERED RESPIRATORY (INHALATION)
Qty: 18 G | Refills: 3 | Status: SHIPPED | OUTPATIENT
Start: 2021-07-08 | End: 2021-08-04

## 2021-07-29 ENCOUNTER — TELEPHONE (OUTPATIENT)
Dept: PSYCHOLOGY | Facility: CLINIC | Age: 51
End: 2021-07-29

## 2021-07-29 NOTE — TELEPHONE ENCOUNTER
Pt is out of med and need refill. Pt will like refill to be sent to [facility] on [address]. Her last prescription was send to her old pharmacy and the new one doesn't have any refill.   Also, pt will like her meds to be filled more than every month.

## 2021-07-30 DIAGNOSIS — F41.1 GAD (GENERALIZED ANXIETY DISORDER): ICD-10-CM

## 2021-07-30 DIAGNOSIS — F33.1 MODERATE EPISODE OF RECURRENT MAJOR DEPRESSIVE DISORDER (H): ICD-10-CM

## 2021-07-30 RX ORDER — VENLAFAXINE HYDROCHLORIDE 150 MG/1
150 CAPSULE, EXTENDED RELEASE ORAL DAILY
Qty: 30 CAPSULE | Refills: 0 | Status: SHIPPED | OUTPATIENT
Start: 2021-07-30 | End: 2021-08-17

## 2021-07-30 NOTE — TELEPHONE ENCOUNTER
Date of Last Office Visit: 6/22/2021  Date of Next Office Visit: 8/17/2021  No shows since last visit: none  Cancellations since last visit: 1    Medication requested: Venlafaxine 150 mg capsule Date last ordered: 6/28/2021 Qty: 30 Refills: 0     Review of [place] ?: n/a    Lapse in medication adherence greater than 5 days?: Patient is out of refill.  RD    7/29/21 5:58 PM  Kisha Tuttle routed this conversation to Aditi Valenzuela DO [place], Rose     RD    7/29/21 5:57 PM  Note     Pt is out of med and need refill. Pt will like refill to be sent to [facility] on [address]. Her last prescription was send to her old pharmacy and the new one doesn't have any refill.   Also, pt will like her meds to be filled more than every month          If yes, call patient and gather details: no  Medication refill request verified as identical to current order?: yes  Result of Last DAM, VPA, Li+ Level, CBC, or Carbamazepine Level (at or since last visit): N/A    []Medication refilled per  Medication Refill in Ambulatory Care  policy.  [x]Medication unable to be refilled by RN due to criteria not met as indicated below:    []Eligibility - not seen in the last year   []Supervision - no future appointment   []Compliance - no shows, cancellations or lapse in therapy   []Verification - order discrepancy   []Controlled medication   [x]Medication not included in policy   []90-day supply request   []Other

## 2021-08-03 DIAGNOSIS — J45.30 MILD PERSISTENT ASTHMA WITHOUT COMPLICATION: ICD-10-CM

## 2021-08-03 NOTE — TELEPHONE ENCOUNTER
Reason for Call:  Medication or medication refill:    Do you use a [facility]?  Name of the pharmacy and phone number for the current request: [facility] [place]    Name of the medication requested: gabapentin (NEURONTIN) 300 MG capsule    Patient just got a refill on her inhaler, unfortunately lost it yesterday and now needs a refill.  Is also requesting 2 inhalers so she has one at work and one at home.    Can we leave a detailed message on this number? Not able    Phone number patient can be reached at: Cell number on file:    Telephone Information:   Mobile [phone]     Best Time: anytime    Call taken on 8/3/2021 at 3:42 PM by Noa Barrow

## 2021-08-03 NOTE — LETTER
August 4, 2021      Mojgan Jimenez  [address] [facility] [address]  [facility] [place] [address]        Dear Mojgan,       We recently received a call from your pharmacy requesting a refill of your Albuterol medication.     A review of your chart indicates that an appointment is required with your provider. Please call the clinic at [phone] to schedule your appointment.     We have authorized one refill of your medication to allow time for you to schedule. If you have a history of diabetes or high cholesterol, please come fasting to the appointment. Fasting entails nothing to eat or drink 8 hours prior to your appointment; with the exception of water. You may take your medication the day of the appointment.      Sincerely,    Sagrario Lockhart NP

## 2021-08-04 RX ORDER — ALBUTEROL SULFATE 90 UG/1
AEROSOL, METERED RESPIRATORY (INHALATION)
Qty: 18 G | Refills: 0 | Status: SHIPPED | OUTPATIENT
Start: 2021-08-04 | End: 2021-08-24

## 2021-08-04 NOTE — TELEPHONE ENCOUNTER
Letter mailed to patient's home address to call back and schedule a follow-up appointment, per Sagrario Lockhart's recommendations.  Hortencia Mantilla CMA ([facility])

## 2021-08-04 NOTE — TELEPHONE ENCOUNTER
Routing refill request to provider for review/approval because:  Patient lost inhaler, refilled 3 weeks ago. Needs new Rx for insurance to cover refill.    Becky Steven, RN, BSN, PHN  [facility]: [place]

## 2021-08-17 ENCOUNTER — VIRTUAL VISIT (OUTPATIENT)
Dept: PSYCHOLOGY | Facility: CLINIC | Age: 51
End: 2021-08-17
Payer: COMMERCIAL

## 2021-08-17 ENCOUNTER — VIRTUAL VISIT (OUTPATIENT)
Dept: PSYCHIATRY | Facility: CLINIC | Age: 51
End: 2021-08-17
Payer: COMMERCIAL

## 2021-08-17 DIAGNOSIS — G47.00 INSOMNIA, UNSPECIFIED TYPE: ICD-10-CM

## 2021-08-17 DIAGNOSIS — F41.1 GAD (GENERALIZED ANXIETY DISORDER): Primary | ICD-10-CM

## 2021-08-17 DIAGNOSIS — F33.1 MODERATE EPISODE OF RECURRENT MAJOR DEPRESSIVE DISORDER (H): ICD-10-CM

## 2021-08-17 DIAGNOSIS — F39 MOOD DISORDER (H): Primary | ICD-10-CM

## 2021-08-17 DIAGNOSIS — F15.21: ICD-10-CM

## 2021-08-17 DIAGNOSIS — R06.83 SNORING: ICD-10-CM

## 2021-08-17 DIAGNOSIS — F41.1 GAD (GENERALIZED ANXIETY DISORDER): ICD-10-CM

## 2021-08-17 PROCEDURE — 99214 OFFICE O/P EST MOD 30 MIN: CPT | Mod: 95 | Performed by: PSYCHIATRY & NEUROLOGY

## 2021-08-17 PROCEDURE — 90832 PSYTX W PT 30 MINUTES: CPT | Mod: 95 | Performed by: PSYCHOLOGIST

## 2021-08-17 RX ORDER — VENLAFAXINE HYDROCHLORIDE 75 MG/1
225 CAPSULE, EXTENDED RELEASE ORAL DAILY
Qty: 90 CAPSULE | Refills: 2 | Status: SHIPPED | OUTPATIENT
Start: 2021-08-17 | End: 2021-12-14

## 2021-08-17 RX ORDER — VENLAFAXINE HYDROCHLORIDE 150 MG/1
150 CAPSULE, EXTENDED RELEASE ORAL DAILY
Qty: 90 CAPSULE | Refills: 0 | Status: SHIPPED | OUTPATIENT
Start: 2021-08-17 | End: 2021-08-17

## 2021-08-17 RX ORDER — GABAPENTIN 300 MG/1
300 CAPSULE ORAL 2 TIMES DAILY PRN
Qty: 180 CAPSULE | Refills: 1 | Status: SHIPPED | OUTPATIENT
Start: 2021-08-17 | End: 2021-09-21

## 2021-08-17 RX ORDER — OLANZAPINE 10 MG/1
5-10 TABLET ORAL
Qty: 30 TABLET | Refills: 1 | Status: SHIPPED | OUTPATIENT
Start: 2021-08-17 | End: 2021-09-21

## 2021-08-17 NOTE — Clinical Note
Please call this patient to get them scheduled for a follow-up visit in 4-6 weeks. Please schedule with me and the [facility]. Thanks!

## 2021-08-17 NOTE — Clinical Note
Just FYI. No action needed at this time.  Encouraged patient to reach out to you regarding possible weight management clinic referral.  She reports gaining around 100-150 pounds since giving up methamphetamine.  Weight gain not due to olanzapine since she is only taken 1 dose.  Some of the weight gain could be from Effexor but I would not expect 100+ pound weight gain from an SNRI.  Hoping she might be able to work with the weight clinic.:) Let me know if you have any questions or concerns.    Aditi Valenzuela, DO  [facility]  [facility]

## 2021-08-17 NOTE — PROGRESS NOTES
"Mojgan Jimenez is a 51 year old who is being evaluated via a billable telephone visit.      What phone number would you like to be contacted at? See Epic     How would you like to obtain your AVS? [facility]          Outpatient Psychiatric Progress Note    Name: Mojgan Jimenez   : 1970                    Primary Care Provider: JUSTINE Flores CNP   Therapist: None     PHQ-9 scores:  PHQ-9 SCORE 2020 2021 3/3/2021   PHQ-9 Total Score 15 7 13       LINA-7 scores:  LINA-7 SCORE 2020 2021 3/3/2021   Total Score 17 10 9       Patient Identification:  Patient is a 51 year old,   White Choose not to answer female  who presents for return visit with me.  Patient is currently employed. Patient attended the phone/video session alone. Patient prefers to be called: \"Mojgan\".    Interim History:  Mojgan Jimenez is a 50 year old female with past history including depression, anxiety, and methamphetmaine use-in sustained remission (with hx of stimulant-induced psychotic episodes).     I last saw Mojgan Jimenez for outpatient psychiatry return visit on 2021. During that appointment, we:      Continue venlafaxine/Effexor- mg daily for mood and anxiety.    Continue gabapentin 300 mg up to 2 times a day as needed for anxiety    Start olanzapine 5-10 mg at bedtime as needed for sleep and unstable mood, possible yasemin.  Discussed taking every night at bedtime for at least the next couple of weeks.    :Pt overall reports doing relatively well. Unfortunately ran out of gabapentin for the past couple of weeks. Hoping to get back to work once med situation straightened out. July was tough month since her mom passed away 17 years ago in the month of July.  Feels like she has been doing better now in August but still some residual symptoms from July.  Only took olanzapine 1 night but found it very helpful for sleep.  Questions if it would continue to be helpful if she kept taking it on a " nightly basis.  Hoping to be able to attend to work more consistently without having to take so many mental health days.  Overall though is feeling better than she did at her last visit with me.  No safety concerns or SI.  Maintain sobriety.    Per [facility], Dr. Alan Cortez, during today's team-based visit:  Reported that the most recent changes have been helpful. She was having difficulty getting a refill of gabapentin. She spoke about how she has noticed increased energy just prior to her menses. Now she is feeling down because of weight gain and would like to speak with a dietician. She noted that she needed some time off work during July because that was when her mother  and it is difficult for her during that time. She is now wanting to get back to work but needs her medications. She has been out of gabapentin.     Psychiatric ROS:  Mojgan Jimenez reports mood has been: up and down  Anxiety has been: up and down  Sleep has been: see HPI above, sleep poor - snoring, snort arrousals  Rita sxs: denies  Psychosis sxs: denies  ADHD/ADD sxs: None  PTSD sxs: None  PHQ9 and GAD7 scores were reviewed today if completed today.   Medication side effects: denies  Current stressors include: Symptoms and See HPI above  Coping mechanisms and supports include: Family, Hobbies and Friends    Current medications include:   Current Outpatient Medications   Medication Sig     albuterol (PROAIR HFA/PROVENTIL HFA/VENTOLIN HFA) 108 (90 Base) MCG/ACT inhaler INHALE 2 PUFFS EVERY 4 HOURS AS NEEDED FOR SHORTNESS OF BREATH/ DYSPNEA/ WHEEZING     albuterol (PROVENTIL) (2.5 MG/3ML) 0.083% neb solution Take 1 vial (2.5 mg) by nebulization every 6 hours as needed for shortness of breath / dyspnea or wheezing     cetirizine (ZYRTEC) 10 MG tablet Take 10 mg by mouth daily     cholecalciferol (VITAMIN D3) 5000 units (125 mcg) capsule Take 1 capsule (5,000 Units) by mouth daily     gabapentin (NEURONTIN) 300 MG capsule Take 1 capsule (300 mg) by  mouth 2 times daily as needed (anxiety/agitation)     multivitamin w/minerals (MULTI-VITAMIN) tablet Take 1 tablet by mouth daily     OLANZapine (ZYPREXA) 10 MG tablet Take 0.5-1 tablets (5-10 mg) by mouth nightly as needed (sleep, agitation, anxiety, mood)     paragard intrauterine copper device 1 each by Intrauterine route once for 1 dose     QUEtiapine (SEROQUEL) 25 MG tablet Take 1-2 tablets (25-50 mg) by mouth At Bedtime (Patient not taking: Reported on 6/22/2021)     venlafaxine (EFFEXOR-XR) 150 MG 24 hr capsule Take 1 capsule (150 mg) by mouth daily     vitamin D3 (CHOLECALCIFEROL) 2000 units (50 mcg) tablet Take 1 tablet (2,000 Units) by mouth daily     WIXELA INHUB 100-50 MCG/DOSE inhaler Inhale 1 puff into the lungs 2 times daily (Patient not taking: Reported on 6/22/2021)     No current facility-administered medications for this visit.     Past Medical/Surgical History:  Past Medical History:   Diagnosis Date     LINA (generalized anxiety disorder) 11/2/2017     Hyperlipidemia LDL goal <160 1/20/2019     Major depressive disorder      Methanol abuse (H)      Mild persistent asthma without complication 11/2/2017     Vitamin D deficiency 11/2/2017      has a past medical history of LINA (generalized anxiety disorder) (11/2/2017), Hyperlipidemia LDL goal <160 (1/20/2019), Major depressive disorder, Methanol abuse (H), Mild persistent asthma without complication (11/2/2017), and Vitamin D deficiency (11/2/2017).    Social History:  Reviewed. No changes to social history, except as noted above in HPI.    Vital Signs:   None. This is phone/video visit.     Labs:  Most recent laboratory results reviewed and no new labs.     Review of Systems:  10 systems (general, cardiovascular, respiratory, eyes, ENT, endocrine, GI, , M/S, neurological) were reviewed. Most pertinent finding(s) is/are: Chronic pain, headaches. The remaining systems are all unremarkable.    Mental Status Examination (limited as this is by  phone/video):  Attitude:  Cooperative, pleasant   Oriented to:  person, place, time, and situation  Attention Span and Concentration:  distractible  Speech:  Hyperverbal, but less pressured than previous visit  Language: intact  Mood:  Mood better  Affect:  A little elevated but less than previous visit  Associations:  no loose associations  Thought Process:  Overall linear and goal directed  Thought Content:  no evidence of suicidal ideation or homicidal ideation, reports feeling a little more paranoid than usual, no auditory hallucinations present and no visual hallucinations present  Recent and Remote Memory:  Intact to interview. Not formally assessed. No amnesia.  Fund of Knowledge: appropriate  Insight:  fair  Judgment:  intact, adequate for safety  Impulse Control:  fair    Suicide Risk Assessment:  Today Mojgan Jimenez reports no suicidal ideation. Based on all available evidence including the factors cited above, Mojgan Jimenez does not appear to be at imminent risk for self-harm, does not meet criteria for a 72-hr hold, and therefore remains appropriate for ongoing outpatient level of care.  A thorough assessment of risk factors related to suicide and self-harm have been reviewed and are noted above. The patient convincingly denies suicidality on several occasions. Local community safety resources printed and reviewed for patient to use if needed. There was no deceit detected, and the patient presented in a manner that was believable.     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) 07/10/2018     Priority: Medium     Suicidal ideation 05/05/2018     Priority: Medium     Polysubstance overdose 05/01/2018     Priority: Medium     Psychosis (H) 03/21/2018     Priority: Medium     Methamphetamine abuse, episodic (H) 03/06/2018     Priority: Medium     Day treatment at [facility]       Paranoia (H) 11/28/2017     Priority: Medium     Mild persistent asthma without complication 11/02/2017     Priority: Medium     Vitamin D deficiency 11/02/2017     Priority: Medium     LINA (generalized anxiety disorder) 11/02/2017     Priority: Medium     Recurrent major depressive disorder, remission status unspecified (H) 11/02/2017     Priority: Medium     5/10/18:  Initial Psychiatry appointment at [facility] ([phone])       Depression, major, recurrent, severe with psychosis (H) 11/02/2017     Priority: Medium     IUD (intrauterine device) in place 02/04/2014     Priority: Medium     Overview:   paragard placed 2/4/14       PMDD (premenstrual dysphoric disorder) 11/05/2010     Priority: Medium       Psychosocial & Contextual Factors:  See HPI above    Assessment:  6/22/2021:  Mojgan Jimenez reports some significant improvement in her mood, motivation, energy but with some worsening anxiety, insomnia, and other concerning symptoms indicative of possible yasemin.  Patient with increased goal-directed activity, decreased need for sleep/insomnia, hyperverbal and pressured in speech today, and also paranoid.  Patient adamantly denies using any stimulants.  Also concerning her daughter made the comment that the patient is acting like she would when using methamphetamine.  This makes me quite worried/suspicious about possible bipolar diagnosis.  When psychiatrically hospitalized in 2018 she had some similar symptoms.  However, that hospitalization  was much more proximal to her methamphetamine use.  At this time, I am more than happy to fill out FMLA forms for the patient.  We also discussed starting olanzapine at bedtime to help with her current instability and to get her sleeping better.  We will need to consider the fact that venlafaxine might have precipitated this episode.  Might be able to stay on venlafaxine if also on a mood stabilizer because she is incredibly happy with venlafaxine.  She had been on venlafaxine 2 months before now and has been very pleased.  She continues to be hypomanic/manic and/or does not want to be on olanzapine, may need to get her off the SNRI.    Patient could also consider individual psychotherapy for additional support and development of nonpharmacologic coping skills and strategies.      Patient currently with copper IUD.     8/17/2021:  Patient overall doing better than last visit.  July tougher month since that is the month her mom passed away 17 years ago.  Continues to struggle with this loss.  Declines to engage in therapy.  We will trial increased dose of venlafaxine ER/Effexor-XR.  She will watch for signs/symptoms of yasemin.  I did recommend if we increase this dose of Effexor-XR that she take her olanzapine on a nightly basis.  She will reach out to her primary care provider regarding weight concerns.  Some of weight gain could be due to Effexor-XR but she is only taken 1 dose of olanzapine so not due to neuroleptic.  She admits she has gained 100-150 pounds since sobriety from methamphetamine.  If she were taking olanzapine every night and having weight gain could consider Metformin use.  Sleep medicine referral placed due to snoring, snort arousals, weight gain, and likely poor sleep.  Other medications refilled.    Medication side effects and alternatives were reviewed. Health promotion activities recommended and reviewed today. All questions addressed. Education and counseling completed regarding risks and  benefits of medications and psychotherapy options. Recommend therapy for additional support.     Treatment Plan:    Increase venlafaxine/Effexor-XR to 225 mg daily for mood and anxiety.    Continue gabapentin 300 mg up to 2 times a day as needed for anxiety    Continue olanzapine 5-10 mg at bedtime as needed for sleep and unstable mood.      Sleep Medicine referral placed to rule out sleep apnea.     Talk with your primary care provider about a weight management clinic referral. We could also consider starting metformin if you were taking olanzapine every night. Will not start today since you have only tried olanzapine one night.     Continue all other cares per primary care provider.     Continue all other medications as reviewed per electronic medical record today.     Safety plan reviewed. To the Emergency Department as needed or call after hours crisis line at [phone] or [phone]. [place] Crisis Text Line. Text [place] to 390597 or Suicide LifeLine Chat: suicidepreventionDuraSweeperline.org/chat    Continue to consider individual psychotherapy for additional support and development of nonpharmacologic coping skills and strategies.    Schedule an appointment with me in 4-6 weeks or sooner as needed. Call [facility] at [phone] to schedule.    Follow up with primary care provider as planned or for acute medical concerns.    Call the psychiatric nurse line with medication questions or concerns at [phone].    [employer] may be used to communicate with your provider, but this is not intended to be used for emergencies.    Administrative Billing:   Phone Call/Video Duration: 21 Minutes    Time spent with patient was 21 minutes and greater than 50% of time or 12 minutes was spent in counseling and coordination of care regarding above diagnoses and treatment plan. Patient with multiple psychiatric diagnoses and multiple medication changes adding to complexity of care.    Patient Status:  Patient  will continue to be seen for ongoing consultation and stabilization.    Signed:   Aditi Valenzuela,   [facility]    Disclaimer: This note consists of symbols derived from keyboarding, dictation and/or voice recognition software. As a result, there may be errors in the script that have gone undetected. Please consider this when interpreting information found in this chart.

## 2021-08-17 NOTE — PATIENT INSTRUCTIONS
Treatment Plan:    Increase venlafaxine/Effexor-XR to 225 mg daily for mood and anxiety.    Continue gabapentin 300 mg up to 2 times a day as needed for anxiety    Continue olanzapine 5-10 mg at bedtime as needed for sleep and unstable mood.      Sleep Medicine referral placed to rule out sleep apnea.     Talk with your primary care provider about a weight management clinic referral. We could also consider starting metformin if you were taking olanzapine every night. Will not start today since you have only tried olanzapine one night.     Continue all other cares per primary care provider.     Continue all other medications as reviewed per electronic medical record today.     Safety plan reviewed. To the Emergency Department as needed or call after hours crisis line at [phone] or [phone]. [place] Crisis Text Line. Text [place] to 728719 or Suicide LifeLine Chat: suicidepreventionlifeline.org/chat    Continue to consider individual psychotherapy for additional support and development of nonpharmacologic coping skills and strategies.    Schedule an appointment with me in 4-6 weeks or sooner as needed. Call [facility] at [phone] to schedule.    Follow up with primary care provider as planned or for acute medical concerns.    Call the psychiatric nurse line with medication questions or concerns at [phone].    MyChart may be used to communicate with your provider, but this is not intended to be used for emergencies.

## 2021-08-17 NOTE — PROGRESS NOTES
"Collaborative Care Psychiatry Service (CCPS)  August 17, 2021      Behavioral Health Clinician Progress Note    Patient Name: Mojgan Jimenez is a 51 year old female who is being evaluated via a telephone visit.      The patient has been notified of the following:     \"We have found that certain health care needs can be provided without the need for a face to face visit.  This service lets us provide the care you need with a short phone conversation.      I will have full access to your [facility] medical record during this entire phone call.   I will be taking notes for your medical record.     Since this is like an office visit, we will bill your insurance company for this service.  Please check with your medical insurance if this type of telephone visit/virtual care is covered.  You may be responsible for the cost of this service if insurance coverage is denied.      There are potential benefits and risks of telephone visits (e.g. limits to patient confidentiality) that differ from in-person visits.?  Confidentiality still applies for telephone services, and nobody will record the visit.  It is important to be in a quiet, private space that is free of distractions (including cell phone or other devices) during the visit.??     If during the course of the call I believe a telephone visit is not appropriate, you will not be charged for this service\"    Consent has been obtained for this service by care team member: yes.    As the provider I attest to compliance with applicable laws and regulations related to telemedicine.         Service Type:  Individual      Service Location:   Phone call (patient / identified key support person reached)       The patient has been notified of the following:       Session Start Time: 11:00am  Session End Time: 11:30am      Session Length: 16 - 37      Attendees: Client    Visit Activities (Refresh list every visit): [facility] Only    Diagnostic Assessment Date: " 2021 by LELE Bess  See Flowsheets for today's PHQ-9 and LINA-7 results  Previous PHQ-9:   PHQ-9 SCORE 2020 2021 3/3/2021   PHQ-9 Total Score 15 7 13       Previous LINA-7:   LINA-7 SCORE 2020 2021 3/3/2021   Total Score 17 10 9       WHODAS  WHODAS 2.0 Total Score 2021   Total Score 20        AUDIT  No flowsheet data found.    DATA  Extended Session (60+ minutes): No  Interactive Complexity: No  Crisis: No    Medication Compliance:  Yes      Chemical Use Review:   Substance Use: Chemical use reviewed, no active concerns identified      Tobacco Use: No current tobacco use.      Current Stressors / Issues:  Reported that the most recent changes have been helpful. She was having difficulty getting a refill of gabapentin. She spoke about how she has noticed increased energy just prior to her menses. Now she is feeling down because of weight gain and would like to speak with a dietician. She noted that she needed some time off work during July because that was when her mother  and it is difficult for her during that time. She is now wanting to get back to work but needs her medications. She has been out of gabapentin.       Progress on Treatment Objective(s) / Homework:  Minimal progress - ACTION (Actively working towards change); Intervened by reinforcing change plan / affirming steps taken    Motivational Interviewing    MI Intervention: Expressed Empathy/Understanding, Supported Autonomy, Collaboration, Evocation, Permission to raise concern or advise, Open-ended questions and Reframe     Change Talk Expressed by the Patient: Activation Taking steps    Provider Response to Change Talk: E - Evoked more info from patient about behavior change, A - Affirmed patient's thoughts, decisions, or attempts at behavior change, R - Reflected patient's change talk and S - Summarized patient's change talk statements    Also provided psychoeducation about behavioral health condition,  symptoms, and treatment options      Review of Symptoms per patient report: (02/23/2021)  Depression:     Lack of interest, Change in energy level, Difficulties concentrating, Low self-worth, Ruminations, Irritability, Feeling sad, down, or depressed, Poor hygeine and Frequent crying  Rita:             No Symptoms  Psychosis:       No Symptoms  Anxiety:           Excessive worry, Nervousness, Physical complaints, such as headaches, stomachaches, muscle tension, Ruminations, Poor concentration and Irritability  Panic:              Tingling, Numbness and rocks when she's anxious  Post Traumatic Stress Disorder:  No Symptoms   Eating Disorder:          No Symptoms  ADD / ADHD:              Inattentive, Difficulties listening, Poor task completion, Poor organizational skills, Distractibility and Forgetful  Conduct Disorder:       No symptoms  Autism Spectrum Disorder:     No symptoms  Obsessive Compulsive Disorder:       No Symptoms    Changes in Health Issues:   None reported    Assessment: Current Emotional / Mental Status (status of significant symptoms):  Risk status (Self / Other harm or suicidal ideation)  Patient denies a history of suicidal ideation, suicide attempts, self-injurious behavior, homicidal ideation, homicidal behavior and and other safety concerns  Patient denies current fears or concerns for personal safety.  Patient denies current or recent suicidal ideation or behaviors.  Patient denies current or recent homicidal ideation or behaviors.  Patient denies current or recent self injurious behavior or ideation.  Patient denies other safety concerns.  A safety and risk management plan has not been developed at this time, however patient was encouraged to call [place] [phone] should there be a change in any of these risk factors.    Appearance:   unable to assess (phone)   Eye Contact:   unable to assess (phone)   Psychomotor Behavior: unable to assess (phone)   Attitude:   Cooperative    Orientation:   All  Speech   Rate / Production: Hyperverbal  Pressured    Volume:  Normal   Mood:    Euphoric   Affect:    unable to assess (phone)   Thought Content:  Clear   Thought Form:  Coherent  Logical   Insight:    Good     Diagnoses:  1. LINA (generalized anxiety disorder)    2. Moderate episode of recurrent major depressive disorder (H)        Collateral Reports Completed:  Communicated with: Dr Valenzuela    Plan: (Homework, other):  Patient was given information about behavioral services and encouraged to schedule a follow up appointment with the clinic [facility] in conjunction with next CCPS appointment.  She was also given information about mental health symptoms and treatment options .  CD Recommendations: No indications of CD issues.      Alan Cortez PsyD, LP      Zheng Cortez PsyD  August 17, 2021

## 2021-08-18 ENCOUNTER — TELEPHONE (OUTPATIENT)
Dept: FAMILY MEDICINE | Facility: CLINIC | Age: 51
End: 2021-08-18

## 2021-08-18 NOTE — TELEPHONE ENCOUNTER
Tried to call patient but she has a voicemail that is not set up.  Need to see if patient can move her appointment to 1:00 versus 1:20 and then make it a 40 minute appointment. .    STAR Sam  [facility]

## 2021-08-19 NOTE — TELEPHONE ENCOUNTER
Tried to call patient but she has a voicemail that is not set up.  Need to see if patient can move her appointment to 1:00 versus 1:20 and then make it a 40 minute appointment. .    STAR Sam  [facility]

## 2021-08-20 NOTE — TELEPHONE ENCOUNTER
Tried to call patient but she has a voicemail that is not set up.  Need to see if patient can move her appointment to 1:00 versus 1:20 and then make it a 40 minute appointment. .    STAR Sam  [facility]

## 2021-08-23 ENCOUNTER — TELEPHONE (OUTPATIENT)
Dept: PSYCHOLOGY | Facility: CLINIC | Age: 51
End: 2021-08-23

## 2021-08-23 NOTE — TELEPHONE ENCOUNTER
"Wtr attempted to call pt back at both [phone] and [phone] dt phone comment that pt's phone isn't working & to call \"Teressa's phone\" - wtr LVM encouraging Mojgan to call back.    Flaquita Martinez RN on 8/23/2021 at 11:37 AM    "

## 2021-08-23 NOTE — TELEPHONE ENCOUNTER
Pt stated her body is swelling up, she is not sure if it is the new meds. Pt will like a call back please.

## 2021-08-23 NOTE — TELEPHONE ENCOUNTER
"Wtr attempted to reach pt via phone again at both [phone] and [phone] dt phone comment that pt's phone isn't working & to call \"Teressa's phone\" - no answer on either line. Wtr sent MyChart msg encouraging pt to either call back or seek medical attention.    Flaquita Martinez RN on 8/23/2021 at 1:36 PM    "

## 2021-08-23 NOTE — TELEPHONE ENCOUNTER
"Pt called back ad reported her whole body is swollen a lot - that she \"has no knees\" and her skin feels very tight. She reported it's very uncomfortable, that her knees are \"cracking a lot.\" Wtr prompted her to go to Urgent Care right away to be assessed. She initially declined this and identified she doesn't have the patience for it. Wtr prompted her to call her PCP to see if she could get in today to be seen, reiterated if there is not an apt there that she should go to UC, that it's important for her to be assessed today. She asked if wtr thought this might be a se of her meds and wtr informed it does not seem likely as she's not started anything new and her current med regimen would likely not cause this, prompted her to get assessed today and reiterated importance which she verbalized understanding.    Flaquita Martinez RN on 8/23/2021 at 2:06 PM    "

## 2021-08-24 NOTE — TELEPHONE ENCOUNTER
Sent a [employer] message asking if patient can come on 8/31 at 1:00 versus 1:20 -- please make sure you make the appointment a 40 minute appointment.    STAR Sam  [facility]

## 2021-08-24 NOTE — TELEPHONE ENCOUNTER
Tried to call patient but she has a voicemail that is not set up.  Need to see if patient can move her appointment to 1:00 versus 1:20 and then make it a 40 minute appointment. .    STAR Sam  [facility]

## 2021-08-25 NOTE — TELEPHONE ENCOUNTER
Tried to call patient but she has a voicemail that is not set up.  Need to see if patient can move her appointment to 1:00 versus 1:20 and then make it a 40 minute appointment. .    Also checked [employer] messages and she did not read my [employer] message.    STAR Sam  [facility]

## 2021-08-27 NOTE — TELEPHONE ENCOUNTER
Changed appointment to 1pm.  Patient does get reminders to her phone, so she'll be reminded that her appointment is at 1pm.  Writer did schedule it a 40 minute exam     Jennifer Fernandez/  [place]

## 2021-08-31 ENCOUNTER — OFFICE VISIT (OUTPATIENT)
Dept: FAMILY MEDICINE | Facility: CLINIC | Age: 51
End: 2021-08-31
Payer: COMMERCIAL

## 2021-08-31 VITALS
BODY MASS INDEX: 49.85 KG/M2 | HEIGHT: 64 IN | TEMPERATURE: 98.8 F | DIASTOLIC BLOOD PRESSURE: 80 MMHG | HEART RATE: 80 BPM | SYSTOLIC BLOOD PRESSURE: 128 MMHG | WEIGHT: 292 LBS

## 2021-08-31 DIAGNOSIS — F33.9 RECURRENT MAJOR DEPRESSIVE DISORDER, REMISSION STATUS UNSPECIFIED (H): ICD-10-CM

## 2021-08-31 DIAGNOSIS — B37.2 CANDIDAL INTERTRIGO: ICD-10-CM

## 2021-08-31 DIAGNOSIS — E78.5 HYPERLIPIDEMIA LDL GOAL <160: ICD-10-CM

## 2021-08-31 DIAGNOSIS — J34.9 NOSE SYMPTOM: ICD-10-CM

## 2021-08-31 DIAGNOSIS — R49.0 HOARSENESS: ICD-10-CM

## 2021-08-31 DIAGNOSIS — J45.40 MODERATE PERSISTENT ASTHMA WITHOUT COMPLICATION: ICD-10-CM

## 2021-08-31 DIAGNOSIS — E66.01 MORBID OBESITY (H): ICD-10-CM

## 2021-08-31 DIAGNOSIS — Z00.00 ROUTINE GENERAL MEDICAL EXAMINATION AT A HEALTH CARE FACILITY: Primary | ICD-10-CM

## 2021-08-31 DIAGNOSIS — Z12.11 COLON CANCER SCREENING: ICD-10-CM

## 2021-08-31 LAB — HBA1C MFR BLD: 5.5 % (ref 0–5.6)

## 2021-08-31 PROCEDURE — 83036 HEMOGLOBIN GLYCOSYLATED A1C: CPT | Performed by: NURSE PRACTITIONER

## 2021-08-31 PROCEDURE — 99396 PREV VISIT EST AGE 40-64: CPT | Performed by: NURSE PRACTITIONER

## 2021-08-31 PROCEDURE — 36415 COLL VENOUS BLD VENIPUNCTURE: CPT | Performed by: NURSE PRACTITIONER

## 2021-08-31 PROCEDURE — 80061 LIPID PANEL: CPT | Performed by: NURSE PRACTITIONER

## 2021-08-31 PROCEDURE — 99213 OFFICE O/P EST LOW 20 MIN: CPT | Mod: 25 | Performed by: NURSE PRACTITIONER

## 2021-08-31 PROCEDURE — 96127 BRIEF EMOTIONAL/BEHAV ASSMT: CPT | Mod: 59 | Performed by: NURSE PRACTITIONER

## 2021-08-31 PROCEDURE — 80053 COMPREHEN METABOLIC PANEL: CPT | Performed by: NURSE PRACTITIONER

## 2021-08-31 RX ORDER — ALBUTEROL SULFATE 90 UG/1
1-2 AEROSOL, METERED RESPIRATORY (INHALATION) EVERY 4 HOURS PRN
Qty: 8.5 G | Refills: 3 | Status: SHIPPED | OUTPATIENT
Start: 2021-08-31 | End: 2022-01-25

## 2021-08-31 RX ORDER — ALBUTEROL SULFATE 0.83 MG/ML
2.5 SOLUTION RESPIRATORY (INHALATION) EVERY 6 HOURS PRN
Qty: 3 ML | Refills: 3 | Status: SHIPPED | OUTPATIENT
Start: 2021-08-31 | End: 2022-05-17

## 2021-08-31 RX ORDER — FLUTICASONE PROPIONATE AND SALMETEROL XINAFOATE 115; 21 UG/1; UG/1
2 AEROSOL, METERED RESPIRATORY (INHALATION) 2 TIMES DAILY
Qty: 8 G | Refills: 3 | Status: SHIPPED | OUTPATIENT
Start: 2021-08-31 | End: 2022-03-22

## 2021-08-31 RX ORDER — NYSTATIN 100000 [USP'U]/G
POWDER TOPICAL
Qty: 30 G | Refills: 1 | Status: SHIPPED | OUTPATIENT
Start: 2021-08-31 | End: 2023-08-09

## 2021-08-31 ASSESSMENT — ENCOUNTER SYMPTOMS
PARESTHESIAS: 0
CHILLS: 0
ABDOMINAL PAIN: 0
DIARRHEA: 0
HEARTBURN: 0
NERVOUS/ANXIOUS: 0
SORE THROAT: 0
WEAKNESS: 0
HEMATURIA: 0
CONSTIPATION: 0
NAUSEA: 0
FEVER: 0
DYSURIA: 0
PALPITATIONS: 0
MYALGIAS: 0
DIZZINESS: 0
ARTHRALGIAS: 0
BREAST MASS: 0
EYE PAIN: 0
JOINT SWELLING: 1
COUGH: 0
HEMATOCHEZIA: 0
SHORTNESS OF BREATH: 0
HEADACHES: 0
FREQUENCY: 0

## 2021-08-31 ASSESSMENT — ANXIETY QUESTIONNAIRES
6. BECOMING EASILY ANNOYED OR IRRITABLE: MORE THAN HALF THE DAYS
1. FEELING NERVOUS, ANXIOUS, OR ON EDGE: NEARLY EVERY DAY
3. WORRYING TOO MUCH ABOUT DIFFERENT THINGS: NOT AT ALL
GAD7 TOTAL SCORE: 8
2. NOT BEING ABLE TO STOP OR CONTROL WORRYING: MORE THAN HALF THE DAYS
7. FEELING AFRAID AS IF SOMETHING AWFUL MIGHT HAPPEN: NOT AT ALL
5. BEING SO RESTLESS THAT IT IS HARD TO SIT STILL: SEVERAL DAYS

## 2021-08-31 ASSESSMENT — MIFFLIN-ST. JEOR: SCORE: 1916.56

## 2021-08-31 ASSESSMENT — PATIENT HEALTH QUESTIONNAIRE - PHQ9
SUM OF ALL RESPONSES TO PHQ QUESTIONS 1-9: 9
5. POOR APPETITE OR OVEREATING: NOT AT ALL

## 2021-08-31 NOTE — PATIENT INSTRUCTIONS
Return cologuard   Get mammogram  Weight management referral   Nutritional referral   Labs today       Preventive Health Recommendations  Female Ages 50 - 64    Yearly exam: See your health care provider every year in order to  o Review health changes.   o Discuss preventive care.    o Review your medicines if your doctor has prescribed any.      Get a Pap test every three years (unless you have an abnormal result and your provider advises testing more often).    If you get Pap tests with HPV test, you only need to test every 5 years, unless you have an abnormal result.     You do not need a Pap test if your uterus was removed (hysterectomy) and you have not had cancer.    You should be tested each year for STDs (sexually transmitted diseases) if you're at risk.     Have a mammogram every 1 to 2 years.    Have a colonoscopy at age 50, or have a yearly FIT test (stool test). These exams screen for colon cancer.      Have a cholesterol test every 5 years, or more often if advised.    Have a diabetes test (fasting glucose) every three years. If you are at risk for diabetes, you should have this test more often.     If you are at risk for osteoporosis (brittle bone disease), think about having a bone density scan (DEXA).    Shots: Get a flu shot each year. Get a tetanus shot every 10 years.    Nutrition:     Eat at least 5 servings of fruits and vegetables each day.    Eat whole-grain bread, whole-wheat pasta and brown rice instead of white grains and rice.    Get adequate Calcium and Vitamin D.     Lifestyle    Exercise at least 150 minutes a week (30 minutes a day, 5 days a week). This will help you control your weight and prevent disease.    Limit alcohol to one drink per day.    No smoking.     Wear sunscreen to prevent skin cancer.     See your dentist every six months for an exam and cleaning.    See your eye doctor every 1 to 2 years.

## 2021-08-31 NOTE — PROGRESS NOTES
SUBJECTIVE:   CC: Mojgan Jimenez is an 51 year old woman who presents for preventive health visit.       Patient has been advised of split billing requirements and indicates understanding: Yes  Healthy Habits:     Getting at least 3 servings of Calcium per day:  NO    Bi-annual eye exam:  Yes    Dental care twice a year:  NO    Sleep apnea or symptoms of sleep apnea:  Daytime drowsiness and Excessive snoring    Diet:  Regular (no restrictions)    Frequency of exercise:  None    Taking medications regularly:  Yes    Medication side effects:  Not applicable and None    PHQ-2 Total Score: 2    Additional concerns today:  Yes      Mentions that she gets bilateral  lower extremity swelling at the end of the day, even in her hands. She works as a  at [employer].   Some numbness and tingling in her hands.     Referral for sleep medicine has appointment in November     Depression and Anxiety Follow-Up    How are you doing with your depression since your last visit? No change, doing well    How are you doing with your anxiety since your last visit?  No change    Are you having other symptoms that might be associated with depression or anxiety? No    Have you had a significant life event? No     Do you have any concerns with your use of alcohol or other drugs? No  Patient begins to cry says that she still unhappy with her appearance and her weight.  She is almost 300 pounds.  She has never been this heavy.  She is on olanzapine known to cause weight gain but she says she cannot stop eating.  Snatches caused by the medication.  She needs help.  She continues to cry.    Patient is horsed when speaking  Says that it comes and goes.   Never smoked cigarettes.   She can hardly catch her breath when she is speaking today.  She also mentions that last night when she had lasagna and she lied down she regurgitated the lasagna and it came out of her nose.    She says she is so overweight that she smells.  She does not wash as much  as she should because she has trouble washing herself in the shower.  She lives with her daughter.  Says that she gets a rash under her abdominal folds and in her groin area.    Social History     Tobacco Use     Smoking status: Never Smoker     Smokeless tobacco: Never Used   Substance Use Topics     Alcohol use: No     Comment: Sober x 8 months     Drug use: No     Comment: in remision      PHQ 11/17/2020 1/22/2021 3/3/2021   PHQ-9 Total Score 15 7 13   Q9: Thoughts of better off dead/self-harm past 2 weeks Not at all Not at all Not at all     LINA-7 SCORE 11/17/2020 1/22/2021 3/3/2021   Total Score 17 10 9     Last PHQ-9 8/31/2021   1.  Little interest or pleasure in doing things 1   2.  Feeling down, depressed, or hopeless 0   3.  Trouble falling or staying asleep, or sleeping too much 2   4.  Feeling tired or having little energy 1   5.  Poor appetite or overeating 3   6.  Feeling bad about yourself 0   7.  Trouble concentrating 2   8.  Moving slowly or restless 0   Q9: Thoughts of better off dead/self-harm past 2 weeks 0   PHQ-9 Total Score 9   Difficulty at work, home, or with people -     LINA-7  8/31/2021   1. Feeling nervous, anxious, or on edge 3   2. Not being able to stop or control worrying 2   3. Worrying too much about different things 0   4. Trouble relaxing 0   5. Being so restless that it is hard to sit still 1   6. Becoming easily annoyed or irritable 2   7. Feeling afraid, as if something awful might happen 0   LINA-7 Total Score 8   If you checked any problems, how difficult have they made it for you to do your work, take care of things at home, or get along with other people? -       Suicide Assessment Five-step Evaluation and Treatment (SAFE-T)    Asthma Follow-Up    Was ACT completed today?    Yes    ACT Total Scores 6/22/2021   ACT TOTAL SCORE (Goal Greater than or Equal to 20) 19   In the past 12 months, how many times did you visit the emergency room for your asthma without being admitted to  the hospital? -   In the past 12 months, how many times were you hospitalized overnight because of your asthma? -            How many days per week do you miss taking your asthma controller medication?  0    Please describe any recent triggers for your asthma: None    Have you had any Emergency Room Visits, Urgent Care Visits, or Hospital Admissions since your last office visit?  No      Today's PHQ-2 Score:   PHQ-2 ( 1999 [employer]) 8/31/2021   Q1: Little interest or pleasure in doing things 1   Q2: Feeling down, depressed or hopeless 1   PHQ-2 Score 2   Q1: Little interest or pleasure in doing things Several days   Q2: Feeling down, depressed or hopeless Several days   PHQ-2 Score 2       Abuse: Current or Past (Physical, Sexual or Emotional) - No  Do you feel safe in your environment? Yes    Have you ever done Advance Care Planning? (For example, a Health Directive, POLST, or a discussion with a medical provider or your loved ones about your wishes): No, advance care planning information given to patient to review.  Patient plans to discuss their wishes with loved ones or provider.      Social History     Tobacco Use     Smoking status: Never Smoker     Smokeless tobacco: Never Used   Substance Use Topics     Alcohol use: No     Comment: Sober x 8 months     If you drink alcohol do you typically have >3 drinks per day or >7 drinks per week? No    Alcohol Use 8/31/2021   Prescreen: >3 drinks/day or >7 drinks/week? Not Applicable   Prescreen: >3 drinks/day or >7 drinks/week? -   No flowsheet data found.    Reviewed orders with patient.  Reviewed health maintenance and updated orders accordingly - Yes  Lab work is in process    Breast Cancer Screening:  Any new diagnosis of family breast, ovarian, or bowel cancer? No    FHS-7: No flowsheet data found.    Mammogram Screening: Recommended annual mammography  Pertinent mammograms are reviewed under the imaging tab.    History of abnormal Pap smear: NO - age 30-65 PAP  "every 5 years with negative HPV co-testing recommended  PAP / HPV Latest Ref Rng & Units 4/2/2019   PAP (Historical) - NIL   HPV16 NEG:Negative Negative   HPV18 NEG:Negative Negative   HRHPV NEG:Negative Negative     Reviewed and updated as needed this visit by clinical staff  Tobacco  Allergies  Meds   Med Hx  Surg Hx  Fam Hx  Soc Hx        Reviewed and updated as needed this visit by Provider                    Review of Systems   Constitutional: Negative for chills and fever.   HENT: Negative for congestion, ear pain, hearing loss and sore throat.    Eyes: Negative for pain and visual disturbance.   Respiratory: Negative for cough and shortness of breath.    Cardiovascular: Positive for peripheral edema. Negative for chest pain and palpitations.   Gastrointestinal: Negative for abdominal pain, constipation, diarrhea, heartburn, hematochezia and nausea.   Breasts:  Negative for tenderness, breast mass and discharge.   Genitourinary: Negative for dysuria, frequency, genital sores, hematuria, pelvic pain, urgency, vaginal bleeding and vaginal discharge.   Musculoskeletal: Positive for joint swelling. Negative for arthralgias and myalgias.   Skin: Negative for rash.   Neurological: Negative for dizziness, weakness, headaches and paresthesias.   Psychiatric/Behavioral: Negative for mood changes. The patient is not nervous/anxious.           OBJECTIVE:   /80   Pulse 80   Temp 98.8  F (37.1  C) (Oral)   Ht 1.613 m (5' 3.5\")   Wt 132.5 kg (292 lb)   BMI 50.91 kg/m    Physical Exam  GENERAL: Morbidly obese, tearful throughout encounter, and hoarseness  EYES: Eyes grossly normal to inspection, PERRL and conjunctivae and sclerae normal  HENT: ear canals and TM's normal, nose and mouth without ulcers or lesions  NECK: no adenopathy, no asymmetry, masses, or scars and thyroid normal to palpation  RESP: lungs clear to auscultation - no rales, rhonchi or wheezes  BREAST: normal without masses, tenderness or " nipple discharge and no palpable axillary masses or adenopathy  CV: regular rate and rhythm, normal S1 S2, no S3 or S4, no murmur, click or rub, no peripheral edema and peripheral pulses strong  ABDOMEN: Obese, soft, nontender, no hepatosplenomegaly, no masses and bowel sounds normal  MS: no gross musculoskeletal defects noted, no edema  SKIN: no suspicious lesions or rashes  NEURO: Normal strength and tone, mentation intact and speech normal  PSYCH: Crying throughout encounter, sad, anxious    Diagnostic Test Results:  Labs reviewed in Epic  Results for orders placed or performed in visit on 08/31/21 (from the past 24 hour(s))   Hemoglobin A1c   Result Value Ref Range    Hemoglobin A1C 5.5 0.0 - 5.6 %       ASSESSMENT/PLAN:   (Z00.00) Routine general medical examination at a health care facility  (primary encounter diagnosis)  Comment: This was intended on being a physical patient has not been seen in clinic in some time multiple uncontrolled chronic and acute conditions addressed today  Plan: Lipid panel reflex to direct LDL Non-fasting,         *MA Screening Digital Bilateral, Comprehensive         Weight Management      (J45.40) Moderate persistent asthma without complication  Comment: Uncontrolled recommend daily inhaler plus rescue nebulizer solution refilled  Plan: albuterol (PROAIR HFA/PROVENTIL HFA/VENTOLIN         HFA) 108 (90 Base) MCG/ACT inhaler, albuterol         (PROVENTIL) (2.5 MG/3ML) 0.083% neb solution,         fluticasone-salmeterol (ADVAIR-HFA) 115-21         MCG/ACT inhaler      (E66.01) Morbid obesity (H)  Comment: Significant weight gain she is on olanzapine known for some weight gain but I would not attribute all of this to the medication.  Possible eating disorder.  I recommend a nutritional referral and refer to weight management clinic to start on medication assisted therapy.  Patient was very emotional during today's visit crying throughout.  Provider provided active listening and  "compassion.  Plan: Hemoglobin A1c, Nutrition Referral,         Comprehensive metabolic panel (BMP + Alb, Alk         Phos, ALT, AST, Total. Bili, TP), CANCELED:         Basic metabolic panel  (Ca, Cl, CO2, Creat,         Gluc, K, Na, BUN)      (F33.9) Recurrent major depressive disorder, remission status unspecified (H)  Comment: Worsened on account of her weight gain  Plan: Continue follow-up with psychiatry    (Z12.11) Colon cancer screening  Comment:  Plan: REVIEW OF HEALTH MAINTENANCE PROTOCOL ORDERS,         ESTHER(EX[employer] SCIENCES)      (B37.2) Candidal intertrigo  Comment: None seen on exam today recommend washing daily drying area well avoid tight garments  Plan: nystatin (MYCOSTATIN) [id number] UNIT/GM external         powder      (R49.0) Hoarseness  Comment: Extremely hoarsed today during encounter at times provider had trouble understanding patient..  No recent illness.  Plan: Otolaryngology Referral      (J34.9) Nose symptom  Comment: As above  Plan: Otolaryngology Referral        Patient has been advised of split billing requirements and indicates understanding: Yes  COUNSELING:  Reviewed preventive health counseling, as reflected in patient instructions       Regular exercise       Healthy diet/nutrition       Referrals as above    Estimated body mass index is 46.56 kg/m  as calculated from the following:    Height as of 6/22/21: 1.613 m (5' 3.5\").    Weight as of 6/22/21: 121.1 kg (267 lb).    Weight management plan: Patient referred to endocrine and/or weight management specialty    She reports that she has never smoked. She has never used smokeless tobacco.      Counseling Resources:  ATP IV Guidelines  Pooled Cohorts Equation Calculator  Breast Cancer Risk Calculator  BRCA-Related Cancer Risk Assessment: FHS-7 Tool  FRAX Risk Assessment  ICSI Preventive Guidelines  Dietary Guidelines for Americans, 2010  USDA's MyPlate  ASA Prophylaxis  Lung CA Screening    Additional 45 minutes spent with patient " discussing her uncontrolled chronic and acute conditions as well as providing active listening.  Patient was tearful throughout encounter.  JUSTINE Flores CNP [facility]

## 2021-09-01 LAB
ALBUMIN SERPL-MCNC: 3.5 G/DL (ref 3.4–5)
ALP SERPL-CCNC: 86 U/L (ref 40–150)
ALT SERPL W P-5'-P-CCNC: 31 U/L (ref 0–50)
ANION GAP SERPL CALCULATED.3IONS-SCNC: 3 MMOL/L (ref 3–14)
AST SERPL W P-5'-P-CCNC: 19 U/L (ref 0–45)
BILIRUB SERPL-MCNC: 0.4 MG/DL (ref 0.2–1.3)
BUN SERPL-MCNC: 16 MG/DL (ref 7–30)
CALCIUM SERPL-MCNC: 9.1 MG/DL (ref 8.5–10.1)
CHLORIDE BLD-SCNC: 107 MMOL/L (ref 94–109)
CHOLEST SERPL-MCNC: 277 MG/DL
CO2 SERPL-SCNC: 30 MMOL/L (ref 20–32)
CREAT SERPL-MCNC: 0.72 MG/DL (ref 0.52–1.04)
FASTING STATUS PATIENT QL REPORTED: NO
GFR SERPL CREATININE-BSD FRML MDRD: >90 ML/MIN/1.73M2
GLUCOSE BLD-MCNC: 90 MG/DL (ref 70–99)
HDLC SERPL-MCNC: 39 MG/DL
LDLC SERPL CALC-MCNC: 194 MG/DL
NONHDLC SERPL-MCNC: 238 MG/DL
POTASSIUM BLD-SCNC: 4.2 MMOL/L (ref 3.4–5.3)
PROT SERPL-MCNC: 7.7 G/DL (ref 6.8–8.8)
SODIUM SERPL-SCNC: 140 MMOL/L (ref 133–144)
TRIGL SERPL-MCNC: 220 MG/DL

## 2021-09-01 RX ORDER — ROSUVASTATIN CALCIUM 10 MG/1
10 TABLET, COATED ORAL DAILY
Qty: 90 TABLET | Refills: 3 | Status: SHIPPED | OUTPATIENT
Start: 2021-09-01 | End: 2022-03-22

## 2021-09-01 ASSESSMENT — ASTHMA QUESTIONNAIRES: ACT_TOTALSCORE: 17

## 2021-09-01 ASSESSMENT — ANXIETY QUESTIONNAIRES: GAD7 TOTAL SCORE: 8

## 2021-09-20 ASSESSMENT — ANXIETY QUESTIONNAIRES
GAD7 TOTAL SCORE: 19
3. WORRYING TOO MUCH ABOUT DIFFERENT THINGS: NEARLY EVERY DAY
7. FEELING AFRAID AS IF SOMETHING AWFUL MIGHT HAPPEN: SEVERAL DAYS
1. FEELING NERVOUS, ANXIOUS, OR ON EDGE: NEARLY EVERY DAY
2. NOT BEING ABLE TO STOP OR CONTROL WORRYING: NEARLY EVERY DAY
5. BEING SO RESTLESS THAT IT IS HARD TO SIT STILL: NEARLY EVERY DAY
GAD7 TOTAL SCORE: 19
7. FEELING AFRAID AS IF SOMETHING AWFUL MIGHT HAPPEN: SEVERAL DAYS
8. IF YOU CHECKED OFF ANY PROBLEMS, HOW DIFFICULT HAVE THESE MADE IT FOR YOU TO DO YOUR WORK, TAKE CARE OF THINGS AT HOME, OR GET ALONG WITH OTHER PEOPLE?: VERY DIFFICULT
GAD7 TOTAL SCORE: 19
6. BECOMING EASILY ANNOYED OR IRRITABLE: NEARLY EVERY DAY
4. TROUBLE RELAXING: NEARLY EVERY DAY

## 2021-09-20 ASSESSMENT — PATIENT HEALTH QUESTIONNAIRE - PHQ9
SUM OF ALL RESPONSES TO PHQ QUESTIONS 1-9: 22
10. IF YOU CHECKED OFF ANY PROBLEMS, HOW DIFFICULT HAVE THESE PROBLEMS MADE IT FOR YOU TO DO YOUR WORK, TAKE CARE OF THINGS AT HOME, OR GET ALONG WITH OTHER PEOPLE: EXTREMELY DIFFICULT
SUM OF ALL RESPONSES TO PHQ QUESTIONS 1-9: 22

## 2021-09-21 ENCOUNTER — VIRTUAL VISIT (OUTPATIENT)
Dept: BEHAVIORAL HEALTH | Facility: CLINIC | Age: 51
End: 2021-09-21
Payer: COMMERCIAL

## 2021-09-21 ENCOUNTER — VIRTUAL VISIT (OUTPATIENT)
Dept: PSYCHIATRY | Facility: CLINIC | Age: 51
End: 2021-09-21
Payer: COMMERCIAL

## 2021-09-21 DIAGNOSIS — F39 MOOD DISORDER (H): ICD-10-CM

## 2021-09-21 DIAGNOSIS — F15.21: ICD-10-CM

## 2021-09-21 DIAGNOSIS — F41.1 GAD (GENERALIZED ANXIETY DISORDER): Primary | ICD-10-CM

## 2021-09-21 DIAGNOSIS — F39 MOOD DISORDER (H): Primary | ICD-10-CM

## 2021-09-21 DIAGNOSIS — Z79.899 ENCOUNTER FOR LONG-TERM (CURRENT) USE OF MEDICATIONS: ICD-10-CM

## 2021-09-21 DIAGNOSIS — R06.83 SNORING: ICD-10-CM

## 2021-09-21 DIAGNOSIS — G47.00 INSOMNIA, UNSPECIFIED TYPE: ICD-10-CM

## 2021-09-21 DIAGNOSIS — F41.1 GAD (GENERALIZED ANXIETY DISORDER): ICD-10-CM

## 2021-09-21 PROCEDURE — 99213 OFFICE O/P EST LOW 20 MIN: CPT | Mod: 95 | Performed by: PSYCHIATRY & NEUROLOGY

## 2021-09-21 PROCEDURE — 90832 PSYTX W PT 30 MINUTES: CPT | Mod: 95 | Performed by: MARRIAGE & FAMILY THERAPIST

## 2021-09-21 RX ORDER — OLANZAPINE 10 MG/1
5-10 TABLET ORAL AT BEDTIME
Qty: 90 TABLET | Refills: 0 | Status: SHIPPED | OUTPATIENT
Start: 2021-09-21 | End: 2022-01-28

## 2021-09-21 RX ORDER — GABAPENTIN 300 MG/1
300 CAPSULE ORAL 2 TIMES DAILY PRN
Qty: 180 CAPSULE | Refills: 1 | Status: SHIPPED | OUTPATIENT
Start: 2021-09-21 | End: 2022-01-28

## 2021-09-21 ASSESSMENT — ANXIETY QUESTIONNAIRES: GAD7 TOTAL SCORE: 19

## 2021-09-21 ASSESSMENT — PATIENT HEALTH QUESTIONNAIRE - PHQ9: SUM OF ALL RESPONSES TO PHQ QUESTIONS 1-9: 22

## 2021-09-21 NOTE — Clinical Note
Pt is interested in [facility]. Struggles with paperwork.  I told her you were out but that when you got back you would reach out and explain the services and introduce yourself to her.  Viridiana

## 2021-09-21 NOTE — PROGRESS NOTES
"Mojgan Jimenez is a 51 year old who is being evaluated via a billable telephone visit.      What phone number would you like to be contacted at? See Epic     How would you like to obtain your AVS? [place]          Outpatient Psychiatric Progress Note    Name: Mojgan Jimenez   : 1970                    Primary Care Provider: JUSTINE Flores CNP   Therapist: None     PHQ-9 scores:  PHQ-9 SCORE 3/3/2021 2021 2021   PHQ-9 Total Score [facility] - - 22 (Severe depression)   PHQ-9 Total Score 13 9 22       LINA-7 scores:  LINA-7 SCORE 3/3/2021 2021 2021   Total Score - - 19 (severe anxiety)   Total Score 9 8 19       Patient Identification:  Patient is a 51 year old,   White Choose not to answer female  who presents for return visit with me.  Patient is currently employed. Patient attended the phone/video session alone. Patient prefers to be called: \"Mojgan\".    Interim History:  Mojgan Jimenez is a 51 year old female with past history including depression, anxiety, and methamphetmaine use-in sustained remission (with hx of stimulant-induced psychotic episodes).     I last saw Mojgan Jimenez for outpatient psychiatry return visit on 2021. During that appointment, we:      Increase venlafaxine/Effexor-XR to 225 mg daily for mood and anxiety.    Continue gabapentin 300 mg up to 2 times a day as needed for anxiety    Continue olanzapine 5-10 mg at bedtime as needed for sleep and unstable mood.      Sleep Medicine referral placed to rule out sleep apnea.     Talk with your primary care provider about a weight management clinic referral. We could also consider starting metformin if you were taking olanzapine every night. Will not start today since you have only tried olanzapine one night.     : Pt doing well. Taking olanzapine 10 mg at every bedtime. Mood and anxiety overall stable. Sometimes gets a little crabby at work but is able to change her mood and talk herself out of the " crabby feelings.  This last week of work went particularly well.  Sleeping really well on olanzapine.  Feels well rested in the morning.  Feels like she has had some swelling of her extremities.  Reports this was going on even before starting olanzapine.  Has been talking to her doctor.  Overall feels quite a bit better now than when she first started meeting with me.  No safety concerns or SI.  No drug or alcohol use.    Per [facility], BROOKE Shore, during today's team-based visit:  Feels like medication is working. Zyprexa is working. Is sleeping through the night. Does not feel drowsy on it. Wondering if she has to do the sleep study since medication is working. Reviewed safety with Patient. Patient denies any current SI,plans or intentions.   Denied any substance abuse problems. Daughter feels like she has been a little harrison. Patient states that she does not feel like that. She did state that it is her time of the month and is a little harrison but feels like it is normal. Does not see a therapist. Talked about how that might be helpful. Pt declined. States it causes her more anxiety.   She is going to be starting a weight management plan. Got the referrals. Struggling to get paperwork completed. Talked about [facility] services. Will have Carmencita reach out and talk about the service. Has attended work for 4 days in a row. Feeling overwhelmed with chores and work. She works at [employer].     Psychiatric ROS:  Mojgan Jimenez reports mood has been: Better  Anxiety has been: Better  Sleep has been: Better, but I feel she should still be seen by sleep medicine due to snoring and snort arousals; she is likely not noticing it as much now that she is taking olanzapine at bedtime; several risk factors for obstructive sleep apnea and also medical conditions that would benefit from treating any existing sleep apnea  Rita sxs: denies  Psychosis sxs: denies  ADHD/ADD sxs: None  PTSD sxs: None  PHQ9 and GAD7 scores were reviewed today if  completed today.   Medication side effects: denies  Current stressors include: Symptoms and See HPI above  Coping mechanisms and supports include: Family, Hobbies and Friends    Current medications include:   Current Outpatient Medications   Medication Sig     albuterol (PROAIR HFA/PROVENTIL HFA/VENTOLIN HFA) 108 (90 Base) MCG/ACT inhaler Inhale 1-2 puffs into the lungs every 4 hours as needed for shortness of breath / dyspnea or wheezing     albuterol (PROVENTIL) (2.5 MG/3ML) 0.083% neb solution Take 1 vial (2.5 mg) by nebulization every 6 hours as needed for shortness of breath / dyspnea or wheezing     cetirizine (ZYRTEC) 10 MG tablet Take 10 mg by mouth daily     cholecalciferol (VITAMIN D3) 5000 units (125 mcg) capsule Take 1 capsule (5,000 Units) by mouth daily     fluticasone-salmeterol (ADVAIR-HFA) 115-21 MCG/ACT inhaler Inhale 2 puffs into the lungs 2 times daily     gabapentin (NEURONTIN) 300 MG capsule Take 1 capsule (300 mg) by mouth 2 times daily as needed (anxiety/agitation)     multivitamin w/minerals (MULTI-VITAMIN) tablet Take 1 tablet by mouth daily     nystatin (MYCOSTATIN) 537774 UNIT/GM external powder Apply to affected area twice daily as needed     OLANZapine (ZYPREXA) 10 MG tablet Take 0.5-1 tablets (5-10 mg) by mouth nightly as needed (sleep, agitation, anxiety, mood)     paragard intrauterine copper device 1 each by Intrauterine route once for 1 dose     rosuvastatin (CRESTOR) 10 MG tablet Take 1 tablet (10 mg) by mouth daily     venlafaxine (EFFEXOR-XR) 75 MG 24 hr capsule Take 3 capsules (225 mg) by mouth daily     vitamin D3 (CHOLECALCIFEROL) 2000 units (50 mcg) tablet Take 1 tablet (2,000 Units) by mouth daily     WIXELA INHUB 100-50 MCG/DOSE inhaler Inhale 1 puff into the lungs 2 times daily (Patient not taking: Reported on 6/22/2021)     No current facility-administered medications for this visit.     Past Medical/Surgical History:  Past Medical History:   Diagnosis Date     LINA  (generalized anxiety disorder) 11/2/2017     Hyperlipidemia LDL goal <160 1/20/2019     Major depressive disorder      Methanol abuse (H)      Mild persistent asthma without complication 11/2/2017     Vitamin D deficiency 11/2/2017      has a past medical history of LINA (generalized anxiety disorder) (11/2/2017), Hyperlipidemia LDL goal <160 (1/20/2019), Major depressive disorder, Methanol abuse (H), Mild persistent asthma without complication (11/2/2017), and Vitamin D deficiency (11/2/2017).    Social History:  Reviewed. No changes to social history, except as noted above in HPI.    Vital Signs:   None. This is phone/video visit.     Labs:  Most recent laboratory results reviewed:   Last Comprehensive Metabolic Panel:  Sodium   Date Value Ref Range Status   08/31/2021 140 133 - 144 mmol/L Final   11/17/2020 141 133 - 144 mmol/L Final     Potassium   Date Value Ref Range Status   08/31/2021 4.2 3.4 - 5.3 mmol/L Final   11/17/2020 4.1 3.4 - 5.3 mmol/L Final     Chloride   Date Value Ref Range Status   08/31/2021 107 94 - 109 mmol/L Final   11/17/2020 109 94 - 109 mmol/L Final     Carbon Dioxide   Date Value Ref Range Status   11/17/2020 31 20 - 32 mmol/L Final     Carbon Dioxide (CO2)   Date Value Ref Range Status   08/31/2021 30 20 - 32 mmol/L Final     Anion Gap   Date Value Ref Range Status   08/31/2021 3 3 - 14 mmol/L Final   11/17/2020 1 (L) 3 - 14 mmol/L Final     Glucose   Date Value Ref Range Status   08/31/2021 90 70 - 99 mg/dL Final   11/17/2020 84 70 - 99 mg/dL Final     Comment:     Fasting specimen     Urea Nitrogen   Date Value Ref Range Status   08/31/2021 16 7 - 30 mg/dL Final   11/17/2020 9 7 - 30 mg/dL Final     Creatinine   Date Value Ref Range Status   08/31/2021 0.72 0.52 - 1.04 mg/dL Final   11/17/2020 0.79 0.52 - 1.04 mg/dL Final     GFR Estimate   Date Value Ref Range Status   08/31/2021 >90 >60 mL/min/1.73m2 Final     Comment:     As of July 11, 2021, eGFR is calculated by the CKD-EPI  creatinine equation, without race adjustment. eGFR can be influenced by muscle mass, exercise, and diet. The reported eGFR is an estimation only and is only applicable if the renal function is stable.   11/17/2020 88 >60 mL/min/[1.73_m2] Final     Comment:     Non  GFR Calc  Starting 12/18/2018, serum creatinine based estimated GFR (eGFR) will be   calculated using the Chronic Kidney Disease Epidemiology Collaboration   (CKD-EPI) equation.       Calcium   Date Value Ref Range Status   08/31/2021 9.1 8.5 - 10.1 mg/dL Final   11/17/2020 8.9 8.5 - 10.1 mg/dL Final     Bilirubin Total   Date Value Ref Range Status   08/31/2021 0.4 0.2 - 1.3 mg/dL Final   12/03/2018 0.3 0.2 - 1.3 mg/dL Final     Alkaline Phosphatase   Date Value Ref Range Status   08/31/2021 86 40 - 150 U/L Final   12/03/2018 77 40 - 150 U/L Final     ALT   Date Value Ref Range Status   08/31/2021 31 0 - 50 U/L Final   12/03/2018 25 0 - 50 U/L Final     AST   Date Value Ref Range Status   08/31/2021 19 0 - 45 U/L Final   12/03/2018 22 0 - 45 U/L Final     Recent Labs   Lab Test 08/31/21  1415 11/17/20  1317   CHOL 277* 225*   HDL 39* 35*   * 162*   TRIG 220* 139     Lab Results   Component Value Date    A1C 5.5 08/31/2021    A1C 4.9 12/03/2018     Review of Systems:  10 systems (general, cardiovascular, respiratory, eyes, ENT, endocrine, GI, , M/S, neurological) were reviewed. Most pertinent finding(s) is/are: Chronic pain, headaches. The remaining systems are all unremarkable.    Mental Status Examination (limited as this is by phone/video):  Attitude:  Cooperative, pleasant   Oriented to:  person, place, time, and situation  Attention Span and Concentration:  distractible  Speech: Overall normal volume, rate; not pressured  Language: intact  Mood:  Mood better  Affect: Overall appropriate and mood congruent  Associations:  no loose associations  Thought Process:  Overall linear and goal directed  Thought Content:  no evidence  of suicidal ideation or homicidal ideation, reports feeling a little more paranoid than usual, no auditory hallucinations present and no visual hallucinations present  Recent and Remote Memory:  Intact to interview. Not formally assessed. No amnesia.  Fund of Knowledge: appropriate  Insight:  fair  Judgment:  intact, adequate for safety  Impulse Control:  fair    Suicide Risk Assessment:  Today Mojgan Jimenez reports no suicidal ideation. Based on all available evidence including the factors cited above, Mojgan Jimenez does not appear to be at imminent risk for self-harm, does not meet criteria for a 72-hr hold, and therefore remains appropriate for ongoing outpatient level of care.  A thorough assessment of risk factors related to suicide and self-harm have been reviewed and are noted above. The patient convincingly denies suicidality on several occasions. Local community safety resources printed and reviewed for patient to use if needed. There was no deceit detected, and the patient presented in a manner that was believable.     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       Paranoia (H) 11/28/2017     Priority: Medium     Mild persistent asthma without complication 11/02/2017     Priority: Medium     Vitamin D deficiency 11/02/2017     Priority: Medium     LINA (generalized anxiety disorder) 11/02/2017     Priority: Medium     Recurrent major depressive disorder, remission status unspecified (H) 11/02/2017     Priority: Medium     5/10/18:  Initial Psychiatry appointment at [facility] and [facility] ([phone])       Depression, major, recurrent, severe with psychosis (H) 11/02/2017     Priority: Medium     IUD (intrauterine device) in place 02/04/2014     Priority: Medium     Overview:   paragard placed 2/4/14       PMDD (premenstrual dysphoric disorder) 11/05/2010     Priority: Medium       Psychosocial & Contextual Factors:  See HPI above    Assessment:  6/22/2021:  Mojgan Jimenez reports some significant improvement in her mood, motivation, energy but with some worsening anxiety, insomnia, and other concerning symptoms indicative of possible yasemin.  Patient with increased goal-directed activity, decreased need for sleep/insomnia, hyperverbal and pressured in speech today, and also paranoid.  Patient adamantly denies using any stimulants.  Also concerning her daughter made the comment that the patient is acting like she would when using methamphetamine.  This makes me quite worried/suspicious about possible bipolar diagnosis.  When psychiatrically hospitalized in 2018 she had some similar symptoms.  However, that hospitalization was much more proximal to her methamphetamine use.  At this time, I am more than happy to fill out FMLA forms for the patient.  We also discussed starting olanzapine at bedtime to help with her current instability and to get her sleeping better.  We will need to consider the fact that venlafaxine might have precipitated this episode.  Might be able to stay on venlafaxine if also on a mood stabilizer because she is  incredibly happy with venlafaxine.  She had been on venlafaxine 2 months before now and has been very pleased.  She continues to be hypomanic/manic and/or does not want to be on olanzapine, may need to get her off the SNRI.    Patient could also consider individual psychotherapy for additional support and development of nonpharmacologic coping skills and strategies.      Patient currently with copper IUD.     8/17/2021:  Patient overall doing better than last visit.  July tougher month since that is the month her mom passed away 17 years ago.  Continues to struggle with this loss.  Declines to engage in therapy.  We will trial increased dose of venlafaxine ER/Effexor-XR.  She will watch for signs/symptoms of yasemin.  I did recommend if we increase this dose of Effexor-XR that she take her olanzapine on a nightly basis.  She will reach out to her primary care provider regarding weight concerns.  Some of weight gain could be due to Effexor-XR but she is only taken 1 dose of olanzapine so not due to neuroleptic.  She admits she has gained 100-150 pounds since sobriety from methamphetamine.  If she were taking olanzapine every night and having weight gain could consider Metformin use.  Sleep medicine referral placed due to snoring, snort arousals, weight gain, and likely poor sleep.  Other medications refilled.    9/21/2021:  Overall mood and anxiety has stabilized significantly on current medication regimen.  We will need to keep an eye on cholesterol and weight while on olanzapine.  She is sleeping much better and there are no signs of yasemin today.  Paranoia improved as well.  New fasting labs ordered for 3 months from now.  I do still recommend she follow through with meeting with sleep medicine for possible sleep study to rule out sleep apnea.  She is quite obese and has been having snoring and snort arousals. She is likely not noticing it as much now that she is taking olanzapine at bedtime; several risk factors for  obstructive sleep apnea and also medical conditions that would benefit from treating any existing sleep apnea diagnosis.  No major safety concerns or SI.  No drug or alcohol use.    Medication side effects and alternatives were reviewed. Health promotion activities recommended and reviewed today. All questions addressed. Education and counseling completed regarding risks and benefits of medications and psychotherapy options. Recommend therapy for additional support.     Treatment Plan:    Continue venlafaxine/Effexor- mg daily for mood and anxiety.    Continue gabapentin 300 mg up to 2 times a day as needed for anxiety    Continue olanzapine 5-10 mg at bedtime for sleep and mood/possible bipolar disorder.    Keep sleep Medicine appointment to discuss ruling out sleep apnea.     Continue to work with weight management team/program.  We could also consider starting metformin if you were taking olanzapine every night.    Have fasting labs completed at any [facility] lab. Need to call your clinic ahead of time to schedule an appointment for lab due to limited hours and no walk-ins due to Covid-19 restrictions/changes.     Continue all other cares per primary care provider.     Continue all other medications as reviewed per electronic medical record today.     Safety plan reviewed. To the Emergency Department as needed or call after hours crisis line at [phone] or [phone]. [place] Crisis Text Line. Text [place] to 399532 or Suicide LifeLine Chat: suicidepreventionlifeline.org/chat    Continue to consider individual psychotherapy for additional support and development of nonpharmacologic coping skills and strategies.    Schedule an appointment with me in 6 weeks or sooner as needed. Call [facility] at [phone] to schedule.    Follow up with primary care provider as planned or for acute medical concerns.    Call the psychiatric nurse line with medication questions or concerns at  [phone].    Enefgyhart may be used to communicate with your provider, but this is not intended to be used for emergencies.    Administrative Billing:   Phone Call/Video Duration: 12 Minutes    Time spent with patient was 12 minutes and greater than 50% of time or 7 minutes was spent in counseling and coordination of care regarding above diagnoses and treatment plan.     Patient Status:  Patient will continue to be seen for ongoing consultation and stabilization.    Signed:   Aditi Valenzuela DO  [facility]    Disclaimer: This note consists of symbols derived from keyboarding, dictation and/or voice recognition software. As a result, there may be errors in the script that have gone undetected. Please consider this when interpreting information found in this chart.

## 2021-09-21 NOTE — PATIENT INSTRUCTIONS
Treatment Plan:    Continue venlafaxine/Effexor- mg daily for mood and anxiety.    Continue gabapentin 300 mg up to 2 times a day as needed for anxiety    Continue olanzapine 5-10 mg at bedtime for sleep and mood/possible bipolar disorder.    Keep sleep Medicine appointment to discuss ruling out sleep apnea.     Continue to work with weight management team/program.  We could also consider starting metformin if you were taking olanzapine every night.    Have fasting labs completed at any [facility] lab. Need to call your clinic ahead of time to schedule an appointment for lab due to limited hours and no walk-ins due to Covid-19 restrictions/changes.     Continue all other cares per primary care provider.     Continue all other medications as reviewed per electronic medical record today.     Safety plan reviewed. To the Emergency Department as needed or call after hours crisis line at [phone] or [phone]. [place] Crisis Text Line. Text [place] to 519511 or Suicide LifeLine Chat: suicidepreventionlifeline.org/chat    Continue to consider individual psychotherapy for additional support and development of nonpharmacologic coping skills and strategies.    Schedule an appointment with me in 6 weeks or sooner as needed. Call [facility] at [phone] to schedule.    Follow up with primary care provider as planned or for acute medical concerns.    Call the psychiatric nurse line with medication questions or concerns at [phone].    MyChart may be used to communicate with your provider, but this is not intended to be used for emergencies.

## 2021-09-21 NOTE — PROGRESS NOTES
Collaborative Care Psychiatry Service (CCPS)  September 21, 2021    Behavioral Health Clinician Progress Note    Patient Name: Mojgan Jimenez      Telemedicine Visit: The patient's condition can be safely assessed and treated via synchronous audio and visual telemedicine encounter.      Reason for Telemedicine Visit: Services only offered telehealth    Originating Site (Patient Location): Patient's home    Distant Site (Provider Location): [facility]: [place]    Consent:  The patient/guardian has verbally consented to: the potential risks and benefits of telemedicine (video visit) versus in person care; bill my insurance or make self-payment for services provided; and responsibility for payment of non-covered services.     Mode of Communication:  Video Conference via [employer]    As the provider I attest to compliance with applicable laws and regulations related to telemedicine.         Service Type:  Individual      Service Location:   Face to Face in Home / Community     Session Start Time: 11:04 am  Session End Time: 11:27 pm      Session Length: 16 - 37      Attendees: Client    Visit Activities (Refresh list every visit): [facility] Only    Diagnostic Assessment Date: : 02/23/2021 Mary Bethea  See Flowsheets for today's PHQ-9 and LINA-7 results  Previous PHQ-9:   PHQ-9 SCORE 3/3/2021 8/31/2021 9/20/2021   PHQ-9 Total Score MyChart - - 22 (Severe depression)   PHQ-9 Total Score 13 9 22       Previous LINA-7:   LINA-7 SCORE 3/3/2021 8/31/2021 9/20/2021   Total Score - - 19 (severe anxiety)   Total Score 9 8 19       WHODAS  WHODAS 2.0 Total Score 2/24/2021   Total Score 20        CAGE  CAGE-AID Flowsheet 2/24/2021   Have you ever felt you should Cut down on your drinking or drug use? 1   Have people Annoyed you by criticizing your drinking or drug use? 0   Have you ever felt bad or Guilty about your drinking or drug use? 1   Have you ever had a drink or used drugs first thing in the morning to  steady your nerves or to get rid of a hangover? (Eye opener) 0   CAGE-AID SCORE 2         DATA  Extended Session (60+ minutes): No  Interactive Complexity: No  Crisis: No    Medication Compliance:  Yes      Chemical Use Review:   Substance Use: Chemical use reviewed, no active concerns identified      Tobacco Use: No current tobacco use.      Current Stressors / Issues:  Feels like medication is working. Zyprexa is working. Is sleeping through the night. Does not feel drowsy on it. Wondering if she has to do the sleep study since medication is working. Reviewed safety with Patient. Patient denies any current SI,plans or intentions.   Denied any substance abuse problems. Daughter feels like she has been a little harrisno. Patient states that she does not feel like that. She did state that it is her time of the month and is a little harrison but feels like it is normal. Does not see a therapist. Talked about how that might be helpful. Pt declined. States it causes her more anxiety.   She is going to be starting a weight management plan. Got the referrals. Struggling to get paperwork completed. Talked about [facility] services. Will have Carmencita reach out and talk about the service. Has attended work for 4 days in a row. Feeling overwhelmed with chores and work. She works at [employer].     Progress on Treatment Objective(s) / Homework:  New Objective established this session - ACTION (Actively working towards change); Intervened by reinforcing change plan / affirming steps taken    Motivational Interviewing    MI Intervention: Expressed Empathy/Understanding, Supported Autonomy, Collaboration, Evocation, Permission to raise concern or advise, Open-ended questions and Reflections: simple and complex     Change Talk Expressed by the Patient: Desire to change    Provider Response to Change Talk: E - Evoked more info from patient about behavior change, A - Affirmed patient's thoughts, decisions, or attempts at behavior change, R - Reflected  patient's change talk and S - Summarized patient's change talk statements    Also provided psychoeducation about behavioral health condition, symptoms, and treatment options      Review of Symptoms per patient report:  Depression: Lack of interest, Excessive or inappropriate guilt, Change in energy level, Change in appetite, Feelings of hopelessness, Feelings of helplessness, Ruminations and Irritability  Rita:  No Symptoms  Psychosis: No Symptoms  Anxiety: Excessive worry, Nervousness, Psychomotor agitation, Ruminations, Poor concentration and Irritability  Panic:  No symptoms  Post Traumatic Stress Disorder:  No Symptoms   Eating Disorder: No Symptoms  ADD / ADHD:  No symptoms  Conduct Disorder: No symptoms  Autism Spectrum Disorder: No symptoms  Obsessive Compulsive Disorder: No Symptoms      Changes in Health Issues:   None reported    Assessment: Current Emotional / Mental Status (status of significant symptoms):  Risk status (Self / Other harm or suicidal ideation)  Patient denies a history of suicidal ideation, suicide attempts, self-injurious behavior, homicidal ideation, homicidal behavior and and other safety concerns  Patient denies current fears or concerns for personal safety.  Patient denies current or recent suicidal ideation or behaviors.  Patient denies current or recent homicidal ideation or behaviors.  Patient denies current or recent self injurious behavior or ideation.  Patient denies other safety concerns.  A safety and risk management plan has not been developed at this time, however patient was encouraged to call [place] [phone] should there be a change in any of these risk factors.    Appearance:   video did not work was red   Eye Contact:   video did not work was red    Psychomotor Behavior: video did not work was red    Attitude:   Cooperative   Orientation:   All  Speech   Rate / Production: Normal    Volume:  Normal   Mood:    Normal  Affect:    video did not work was red    Thought  Content:  Clear   Thought Form:  Coherent  Logical   Insight:    Fair     Diagnoses:  1. LINA (generalized anxiety disorder)    2. Mood disorder (H)        Collateral Reports Completed:  Communicated with: Dr. Valenzuela    Plan: (Homework, other):  Patient was given information about behavioral services and encouraged to schedule a follow up appointment with the clinic [facility].  She was also given information about mental health symptoms and treatment options .  CD Recommendations: Maintain Sobriety.  BROOKE Shore, [facility]      BROOKE Baez  September 21, 2021

## 2021-09-21 NOTE — Clinical Note
Please call this patient to get them scheduled for a follow-up visit in 6 weeks. Please schedule with me and the [facility]. Thanks!

## 2021-10-15 ENCOUNTER — VIRTUAL VISIT (OUTPATIENT)
Dept: FAMILY MEDICINE | Facility: CLINIC | Age: 51
End: 2021-10-15
Payer: COMMERCIAL

## 2021-10-15 DIAGNOSIS — J45.30 MILD PERSISTENT ASTHMA WITHOUT COMPLICATION: Primary | ICD-10-CM

## 2021-10-15 PROCEDURE — 99213 OFFICE O/P EST LOW 20 MIN: CPT | Mod: 95 | Performed by: PHYSICIAN ASSISTANT

## 2021-10-15 NOTE — LETTER
[facility]  [address] [address]  [phone]          October 15, 2021    RE:  Mojgan Jimenez                                                                                                                                                       [address]   [place] [address]            To whom it may concern:    Mojgan had a mild asthma attack this morning and needed to call in. She can return to work today this afternoon as she has recovered. She has no infectious concerns and does not pose a risk to her co-workers.     Sincerely,        Jhon Antoine

## 2021-10-15 NOTE — Clinical Note
Team. Please print off the letter I did today and have available for patient to  before her 230 work shift. Thank you!

## 2021-10-15 NOTE — PROGRESS NOTES
Mojgan is a 51 year old who is being evaluated via a billable telephone visit.      What phone number would you like to be contacted at? [phone]  How would you like to obtain your AVS? MyChart    Assessment & Plan     Mild persistent asthma without complication  Symptoms resolved with home therapies. I wrote the letter as requested. Symptomatic measures and suggestions for self care given.  Follow up if not improving or symptoms change as discussed.  All questions were answered.     No follow-ups on file.    SOFIA ANGELES PA-C  [facility]    Subjective   Mojgan is a 51 year old who presents for the following health issues     HPI     Patient is requesting return to work letter today at 2:30. Missed work this morning due to bad allergies but after using inhaler and neb, she feels better.     Patient is wanting to  letter today if possible. She feels completely fine she says. Was exposed to her daughter's cat. Notes that she has no concerns otherwise. No symptoms. No URI symptoms suggesting anything infectious.       Review of Systems   Constitutional, HEENT, cardiovascular, pulmonary, GI, , musculoskeletal, neuro, skin, endocrine and psych systems are negative, except as otherwise noted.      Objective           Vitals:  No vitals were obtained today due to virtual visit.    Physical Exam   healthy, alert and no distress  PSYCH: Alert and oriented times 3; coherent speech, normal   rate and volume, able to articulate logical thoughts, able   to abstract reason, no tangential thoughts, no hallucinations   or delusions  Her affect is normal  RESP: No cough, no audible wheezing, able to talk in full sentences  Remainder of exam unable to be completed due to telephone visits          Phone call duration: 6 minutes

## 2021-10-22 ENCOUNTER — TELEPHONE (OUTPATIENT)
Dept: BEHAVIORAL HEALTH | Facility: CLINIC | Age: 51
End: 2021-10-22

## 2021-10-22 NOTE — LETTER
October 22, 2021    [facility]  Behavioral Health Home  [address], [address]  [place] [address]    Dear Mojgan,    My name is Carmencita Bentley and I am a Behavioral Health Home ([facility])  at [facility]. Viridiana Pastor had requested I reach out to you to discuss the [facility] Program and see if you were interested in enrollment. I have provided a brief explanation of Behavioral Health Home ([facility]) services below. [facility] is a voluntary program that is free of charge to you; it is paid by your Medical Assistance.     Patients qualify for Behavioral Health Home ([facility]) services if they have all of the following:  - Medical Assistance/Medicaid  - They have an [facility] Primary Care Provider (PCP)  - They have at least one mental health diagnosis  - Are not receiving any other Social Work and/or Case Management Services such as Targeted/Mental Health Case Management - OR - ACT Team Services    If you are to enroll in [facility], I can provide a wide variety of support to you. Some of these examples include, but are not limited to, the following:  - Mental Health Resources and/or Referrals   - Housing Coordination (Housing Stabilization Services, Subsidized housing information, etc)  - Transportation Resources  - Financial Resources  - Coordination with the [facility] for Benefits (MA, SNAP benefits, etc)  - Community Services Information and Referrals (Waiver Services, [facility], etc)  - Disability Eligibility and Benefits  - Medical Appointments and Medication Costs  - Employment and Education Coordination  - Disability Related Information and Education Resources  - Food  - Clothing  - Have any other large stressors? Feel free to ask your Behavioral Health Home ([facility]) team for support. We will try and help directly or at least point you in the right direction to a resource that can help.      What does involvement look like once you are enrolled?  Once you are enrolled, it is a  requirement that the Behavioral Health Home ([facility]) team make contact with you on a monthly basis, either by phone,video/virtual visit, or in-person (not currently an option due to COVID19). Every six months, we would then review the goals that we have set, monitor our progress with those goals, and set new goals for the next six month time period. This program is designed to give you as much or as little support as you would like.    If you would be interested in enrolling into Behavioral Health Home ([facility]) services, please call me so that we can discuss more about how I can assist you. My contact information is listed below. I look forward to hearing from you!    Sincerely,          Carmencita Bentley, [facility]  Behavioral [facility] ([facility])   [phone]       Postop Diagnosis: same

## 2021-10-22 NOTE — TELEPHONE ENCOUNTER
Behavioral Health Home Services  No data recorded      Social Work Care Navigator Note      Patient: Mojgan Jimenez  Date: October 22, 2021  Preferred Name: Mojgan    Previous PHQ-9:   PHQ-9 SCORE 3/3/2021 8/31/2021 9/20/2021   PHQ-9 Total Score MyChart - - 22 (Severe depression)   PHQ-9 Total Score 13 9 22     Previous LINA-7:   LINA-7 SCORE 3/3/2021 8/31/2021 9/20/2021   Total Score - - 19 (severe anxiety)   Total Score 9 8 19     YULISA LEVEL:  No flowsheet data found.    Preferred Contact:  No data recorded    Type of Contact Today: Phone call (not reached/unavailable)      Data: (subjective / Objective):  Per Behavioral Health Clinician's request, [facility] called patient and attempted to leave a message regarding Behavioral Health Home ([facility]) services. Voicemail box had not been setup and worker was unable to leave a message.    [facility] sent patient the [facility] Offer Letter via mail requesting patient call this worker back if she is interested in enrollment.    HILDA Hardy  Behavioral Health Home ([facility])   Supporting the [facility] and [facility]  [phone]          Next 5 appointments (look out 90 days)    Nov 30, 2021  1:00 PM  SHORT with JUSTINE Addison CNP  [facility] ([facility] ) [address] [place] [id number] [phone]

## 2021-10-29 PROBLEM — F15.10 METHAMPHETAMINE ABUSE, EPISODIC (H): Chronic | Status: ACTIVE | Noted: 2018-03-06

## 2021-10-29 PROBLEM — J45.30 MILD PERSISTENT ASTHMA WITHOUT COMPLICATION: Chronic | Status: ACTIVE | Noted: 2017-11-02

## 2021-11-15 ENCOUNTER — TELEPHONE (OUTPATIENT)
Dept: FAMILY MEDICINE | Facility: CLINIC | Age: 51
End: 2021-11-15
Payer: COMMERCIAL

## 2021-11-15 NOTE — TELEPHONE ENCOUNTER
Central Prior Authorization Team   Phone: [phone]    PA Initiation    Medication: WIXELA INHUB 100-50 MCG/DOSE inhaler  Insurance Company: [facility] - Phone [phone] Fax [phone]  Pharmacy Filling the Rx: [facility] [address]  Filling Pharmacy Phone: [phone]  Filling Pharmacy Fax:    Start Date: 11/15/2021

## 2021-11-15 NOTE — TELEPHONE ENCOUNTER
Prior Authorization Retail Medication Request    Medication/Dose: WIXELA INHUB 100-50 MCG/DOSE inhaler    ICD code (if different than what is on RX):  na  Previously Tried and Failed:  na  Rationale:  na    Insurance Name:  na  Insurance ID:  key: [id number]      Pharmacy Information (if different than what is on RX)  Name:  same  Phone:  same    A Prior Authorization has been started.  To submit the PA, please follow the instructions below:    [email or web address]  key: [email or web address]

## 2021-11-16 NOTE — TELEPHONE ENCOUNTER
Prior Authorization Approval    Authorization Effective Date: 8/15/2021  Authorization Expiration Date: 11/15/2022  Medication: WIXELA INHUB 100-50 MCG/DOSE inhaler  Approved Dose/Quantity:   Reference #:     Insurance Company: [employer] Plus [employer]AP - Phone [phone] Fax [phone]  Expected CoPay:       CoPay Card Available:      Foundation Assistance Needed:    Which Pharmacy is filling the prescription (Not needed for infusion/clinic administered): [facility] [address]  Pharmacy Notified: Yes  Patient Notified: Yes

## 2021-11-17 ENCOUNTER — TELEPHONE (OUTPATIENT)
Dept: FAMILY MEDICINE | Facility: CLINIC | Age: 51
End: 2021-11-17
Payer: COMMERCIAL

## 2021-11-18 ENCOUNTER — VIRTUAL VISIT (OUTPATIENT)
Dept: FAMILY MEDICINE | Facility: CLINIC | Age: 51
End: 2021-11-18
Payer: COMMERCIAL

## 2021-11-18 DIAGNOSIS — R19.7 DIARRHEA, UNSPECIFIED TYPE: Primary | ICD-10-CM

## 2021-11-18 PROCEDURE — 99213 OFFICE O/P EST LOW 20 MIN: CPT | Mod: 95 | Performed by: FAMILY MEDICINE

## 2021-11-18 NOTE — PROGRESS NOTES
Mojgan is a 51 year old who is being evaluated via a billable telephone visit.      What phone number would you like to be contacted at? [phone]  How would you like to obtain your AVS? MyChart    Assessment & Plan     Diarrhea, unspecified type  The patient's diarrhea started after eating out.  She denies any nausea or vomiting.  She has no Covid contacts and she wears a mask at work.  I think it is reasonable not to test for Covid at this time.  As she has had 2 vaccines for Covid.  She is cleared to return to work.  I did suggest she get a booster shot for Covid and remember her flu shot.      7 ntin for last 1 day minutes spent on the date of the encounter doing chart review, patient visit and documentation          No follow-ups on file.    Beckie Rivers [facility]   Mojgan is a 51 year old who presents for the following health issues     HPI     Patient missed work on Wednesday- her next scheduled shift is tomorrow-Friday. She is doing much better and needs a note to return to work    Diarrhea  Onset/Duration: Tuesday  Description:       Consistency of stool: loose       Blood in stool: no       Number of loose stools past 24 hours: 1  Progression of Symptoms: improving  Accompanying signs and symptoms:       Fever: no       Nausea/Vomiting: no       Abdominal pain: no       Weight loss: no       Episodes of constipation: no  History   Ill contacts: no  Recent use of antibiotics: no  Recent travels: no  Recent medication-new or changes(Rx or OTC): no  Precipitating or alleviating factors: None  Therapies tried and outcome: None      Review of Systems   Constitutional, HEENT, cardiovascular, pulmonary, gi and gu systems are negative, except as otherwise noted.      Objective           Vitals:  No vitals were obtained today due to virtual visit.    Physical Exam   healthy, alert and no distress  PSYCH: Alert and oriented times 3; coherent speech, normal   rate and  volume, able to articulate logical thoughts, able   to abstract reason, no tangential thoughts, no hallucinations   or delusions  Her affect is normal  RESP: No cough, no audible wheezing, able to talk in full sentences  Remainder of exam unable to be completed due to telephone visits        Phone call duration: 8 minutes

## 2021-11-18 NOTE — LETTER
[facility]  [address] [address]  Phone: [phone]    November 18, 2021        Mojgan Jimenez  [address]   [place] [address]          To whom it may concern:    RE: Mojgan Jimenez    Patient was seen and treated today at our clinic and missed work 11/17/21.  She is okay to return on nov 19.      Please contact me for questions or concerns.      Sincerely,          Beckie Rivers, DO

## 2021-12-14 DIAGNOSIS — F41.1 GAD (GENERALIZED ANXIETY DISORDER): ICD-10-CM

## 2021-12-14 RX ORDER — VENLAFAXINE HYDROCHLORIDE 75 MG/1
225 CAPSULE, EXTENDED RELEASE ORAL DAILY
Qty: 90 CAPSULE | Refills: 0 | Status: SHIPPED | OUTPATIENT
Start: 2021-12-14 | End: 2022-01-28

## 2021-12-14 NOTE — TELEPHONE ENCOUNTER
Date of Last Office Visit: 9/21/21  Date of Next Office Visit: none--intake will be advised to set follow-up  No shows since last visit: no  Cancellations since last visit: no    Medication requested: venlafaxine (EFFEXOR-XR) 75 MG 24 hr capsule. Date last ordered: 8/17/21 Qty: 90 Refills: 3     Review of [place] ?: n/a  Lapse in medication adherence greater than 5 days?: yes  If yes, call patient and gather details: reports using left overs  Medication refill request verified as identical to current order?: yes  Result of Last DAM, VPA, Li+ Level, CBC, or Carbamazepine Level (at or since last visit): N/A    Last visit treatment plan:   Treatment Plan:    Continue venlafaxine/Effexor- mg daily for mood and anxiety.    Continue gabapentin 300 mg up to 2 times a day as needed for anxiety    Continue olanzapine 5-10 mg at bedtime for sleep and mood/possible bipolar disorder.    Keep sleep Medicine appointment to discuss ruling out sleep apnea.     Continue to work with weight management team/program.  We could also consider starting metformin if you were taking olanzapine every night.    []Medication refilled per  Medication Refill in Ambulatory Care  policy.  []Medication unable to be refilled by RN due to criteria not met as indicated below:    []Eligibility - not seen in the last year   [x]Supervision - no future appointment   []Compliance - no shows, cancellations or lapse in therapy   []Verification - order discrepancy   []Controlled medication   [x]Medication not included in policy   []90-day supply request   []Other

## 2021-12-15 NOTE — TELEPHONE ENCOUNTER
Reason for Call:  Other     Detailed comments: Patient needs a note for work that states she needs routine in her job. She stated that due to her anxiety she needs structure and her work needs this in writing. Please contact patient when this has been completed.    Phone Number Patient can be reached at: Home number on file [phone] (home)    Best Time:     Can we leave a detailed message on this number? YES    Call taken on 1/20/2021 at 1:16 PM by Smita Chatterjee       General

## 2022-01-22 DIAGNOSIS — J45.40 MODERATE PERSISTENT ASTHMA WITHOUT COMPLICATION: ICD-10-CM

## 2022-01-24 NOTE — TELEPHONE ENCOUNTER
Routing refill request to provider for review/approval because:  ACT above range.     ACT Total Scores 11/17/2020 6/22/2021 8/31/2021   ACT TOTAL SCORE (Goal Greater than or Equal to 20) 20 19 17   In the past 12 months, how many times did you visit the emergency room for your asthma without being admitted to the hospital? 0 - 0   In the past 12 months, how many times were you hospitalized overnight because of your asthma? 0 - 0       Becky Steven, RN, BSN, PHN  [facility]

## 2022-01-25 ENCOUNTER — TELEPHONE (OUTPATIENT)
Dept: PSYCHOLOGY | Facility: CLINIC | Age: 52
End: 2022-01-25
Payer: COMMERCIAL

## 2022-01-25 RX ORDER — ALBUTEROL SULFATE 90 UG/1
AEROSOL, METERED RESPIRATORY (INHALATION)
Qty: 8.5 G | Refills: 0 | Status: SHIPPED | OUTPATIENT
Start: 2022-01-25 | End: 2022-02-16

## 2022-01-25 NOTE — TELEPHONE ENCOUNTER
"Refill request r'cd from [facility] via fax for Venlafaxine 75 mg denied due to PER PROVIDER NOTE ON SCRIPT \"Need appt for refills.\"    . Faxed \"not authorized\" back to pharmacy.    Radha Gallardo RN January 25, 2022 10:51 AM      "

## 2022-01-27 DIAGNOSIS — F41.1 GAD (GENERALIZED ANXIETY DISORDER): ICD-10-CM

## 2022-01-27 RX ORDER — VENLAFAXINE HYDROCHLORIDE 75 MG/1
225 CAPSULE, EXTENDED RELEASE ORAL DAILY
Qty: 90 CAPSULE | Refills: 0 | OUTPATIENT
Start: 2022-01-27

## 2022-01-27 NOTE — TELEPHONE ENCOUNTER
LMTCB    Please ask patient questions below and send answers to provider as requested    Sent a [employer] message as well      Asthma Control Test    1) In the past 4 weeks, how much of the time did your asthma keep you from getting as much done at work, school or home?    All of the time (1)    Most of the time (2)    Some of the time (3)    A little of the time (4)    None of the time (5)    Score __    2) During the past 4 weeks, how often have you had shortness of breath?    More than once a day (1)    Once a day (2)    3 to 6 times per week (3)    Once or twice a week (4)    Not at all (5)    Score __      3) During the past 4 weeks, how often did your asthma symptoms (wheezing, coughing, shortness of breath, chest tightness or pain) wake you up at night or earlier than usual in the morning.     4 or more nights a week (1)    2 to 3 nights a week (2)    Once a week (3)    Once or twice (4)    Not at all (5)    Score __      4) During the past 4 weeks, how often have you used your rescue inhaler or nebulizer medication (such as albuterol)    3 or more times per day (1)    1 to 2 times per day (2)    2 or 3 times per day (3)    Once a week or less (4)    Not at all (5)    Score __    5) How would you rate your asthma control during the past four weeks?    Not controlled at all (1)    Poorly controlled (2)    Somewhat controlled (3)    Well controlled (4)    Completely controlled (5)    Score __    In the past 12 months, how many emergency department visits have you had due to asthma (that did not result in hospitalization)? ___    In the past 12 months, how many inpatient hospitalizations have you had due to asthma? __

## 2022-01-27 NOTE — TELEPHONE ENCOUNTER
Medication requested: venlafaxine (EFFEXOR-XR) 75 MG 24 hr capsule Date last ordered: 12/14/21 Qty: 90 Refills: 0    Deny. Provider requesting appointment. Last seen 8/23/21

## 2022-01-28 ENCOUNTER — VIRTUAL VISIT (OUTPATIENT)
Dept: PSYCHIATRY | Facility: CLINIC | Age: 52
End: 2022-01-28
Payer: COMMERCIAL

## 2022-01-28 ENCOUNTER — TELEPHONE (OUTPATIENT)
Dept: PSYCHOLOGY | Facility: CLINIC | Age: 52
End: 2022-01-28
Payer: COMMERCIAL

## 2022-01-28 ENCOUNTER — VIRTUAL VISIT (OUTPATIENT)
Dept: BEHAVIORAL HEALTH | Facility: CLINIC | Age: 52
End: 2022-01-28
Payer: COMMERCIAL

## 2022-01-28 DIAGNOSIS — F15.21: ICD-10-CM

## 2022-01-28 DIAGNOSIS — G47.00 INSOMNIA, UNSPECIFIED TYPE: ICD-10-CM

## 2022-01-28 DIAGNOSIS — F39 MOOD DISORDER (H): ICD-10-CM

## 2022-01-28 DIAGNOSIS — F39 MOOD DISORDER (H): Primary | ICD-10-CM

## 2022-01-28 DIAGNOSIS — E55.9 VITAMIN D DEFICIENCY: ICD-10-CM

## 2022-01-28 DIAGNOSIS — F41.1 GAD (GENERALIZED ANXIETY DISORDER): Primary | ICD-10-CM

## 2022-01-28 DIAGNOSIS — F41.1 GAD (GENERALIZED ANXIETY DISORDER): ICD-10-CM

## 2022-01-28 PROCEDURE — 99207 PR CDG-MDM COMPONENT: MEETS MODERATE - DOWN CODED: CPT | Performed by: PSYCHIATRY & NEUROLOGY

## 2022-01-28 PROCEDURE — 90832 PSYTX W PT 30 MINUTES: CPT | Mod: 95 | Performed by: SOCIAL WORKER

## 2022-01-28 PROCEDURE — 99213 OFFICE O/P EST LOW 20 MIN: CPT | Mod: 95 | Performed by: PSYCHIATRY & NEUROLOGY

## 2022-01-28 RX ORDER — OLANZAPINE 10 MG/1
5-10 TABLET ORAL AT BEDTIME
Qty: 90 TABLET | Refills: 0 | Status: SHIPPED | OUTPATIENT
Start: 2022-01-28 | End: 2022-06-15

## 2022-01-28 RX ORDER — CHOLECALCIFEROL (VITAMIN D3) 50 MCG
50 TABLET ORAL DAILY
Qty: 90 TABLET | Refills: 3 | Status: SHIPPED | OUTPATIENT
Start: 2022-01-28 | End: 2023-10-06

## 2022-01-28 RX ORDER — GABAPENTIN 300 MG/1
300 CAPSULE ORAL 2 TIMES DAILY PRN
Qty: 180 CAPSULE | Refills: 1 | Status: SHIPPED | OUTPATIENT
Start: 2022-01-28 | End: 2022-06-15

## 2022-01-28 RX ORDER — VENLAFAXINE HYDROCHLORIDE 75 MG/1
225 CAPSULE, EXTENDED RELEASE ORAL DAILY
Qty: 270 CAPSULE | Refills: 0 | Status: SHIPPED | OUTPATIENT
Start: 2022-01-28 | End: 2022-03-22

## 2022-01-28 NOTE — PROGRESS NOTES
"[facility] ([facility])  January 28th, 2022    Behavioral Health Clinician Progress Note    Patient Name: Mojgan Jimenez      Telemedicine Visit: The patient's condition can be safely assessed and treated via synchronous audio and visual telemedicine encounter.      Reason for Telemedicine Visit: Services only offered telehealth    Originating Site (Patient Location): Patient's home    Distant Site (Provider Location): Provider Remote Setting- Home Office    Consent:  The patient/guardian has verbally consented to: the potential risks and benefits of telemedicine (video visit) versus in person care; bill my insurance or make self-payment for services provided; and responsibility for payment of non-covered services.     Mode of Communication:  Video Conference via [employer]    As the provider I attest to compliance with applicable laws and regulations related to telemedicine.         Service Type:  Individual      Service Location:   Phone call (patient / identified key support person reached) Pt reports she is unable to do a video visit     Session Start Time: 12:30pm  Session End Time:12:50pm      Session Length: 16 - 37      Attendees: Client    Visit Activities (Refresh list every visit): [facility] Only    Diagnostic Assessment Date: : 02/23/2021 Mary Bethea  Treatment plan review date: 4/28/22    The patient has been notified of the following:      \"We have found that certain health care needs can be provided without the need for a face to face visit.  This service lets us provide the care you need with a phone conversation.       I will have full access to your [facility] medical record during this entire phone call.   I will be taking notes for your medical record.      Since this is like an office visit, we will bill your insurance company for this service.       There are potential benefits and risks of telephone visits (e.g. limits to patient confidentiality) that differ from in-person " "visits.?  Confidentiality still applies for telephone services, and nobody will record the visit.  It is important to be in a quiet, private space that is free of distractions (including cell phone or other devices) during the visit.??      If during the course of the call I believe a telephone visit is not appropriate, you will not be charged for this service\"     Consent has been obtained for this service by care team member: Yes     See Flowsheets for today's PHQ-9 and LINA-7 results  Previous PHQ-9:   PHQ-9 SCORE 3/3/2021 8/31/2021 9/20/2021   PHQ-9 Total Score MyChart - - 22 (Severe depression)   PHQ-9 Total Score 13 9 22       Previous LINA-7:   LINA-7 SCORE 3/3/2021 8/31/2021 9/20/2021   Total Score - - 19 (severe anxiety)   Total Score 9 8 19       WHODAS  WHODAS 2.0 Total Score 2/24/2021   Total Score 20        CAGE  CAGE-AID Flowsheet 2/24/2021   Have you ever felt you should Cut down on your drinking or drug use? 1   Have people Annoyed you by criticizing your drinking or drug use? 0   Have you ever felt bad or Guilty about your drinking or drug use? 1   Have you ever had a drink or used drugs first thing in the morning to steady your nerves or to get rid of a hangover? (Eye opener) 0   CAGE-AID SCORE 2         DATA  Extended Session (60+ minutes): No  Interactive Complexity: No  Crisis: No    Medication Compliance:  Yes      Chemical Use Review:   Substance Use: Chemical use reviewed, no active concerns identified      Tobacco Use: No current tobacco use.      Current Stressors / Issues:    Patient feels her medication is overall in a good spot and has helped stabilize her mood when she is taking it as prescribed.  She feels it has helped significantly with her depression but still is experiencing anxiety symptoms.  She reports that she is out of venlafaxine and needs a refill today and has stopped taking vitamin D for some time.  Patient feels that her mood has dropped since stopping her vitamin D, she " feels less motivation and increased exhaustion each day and feels adding Vitamin D is an important component of her medication regimen.  She did just  vitamin D and feels hopeful that this will get her back into a place that feels good.  Patient denies that she wants medication changes today, just a refill.  Patient feels that the venlafaxine decreased her depression significantly.  She reports that her appetite has increased slightly.  She has not followed through with weight management clinic due to it being virtual and wanting in person options.  Patient does share that she has concerns about her memory and became emotional when talking about it, stating that she will forget things right away after someone says something. Her daughter has noticed her memory is worse as well. Denies suicidal thoughts, contracts for safety.       Progress on Treatment Objective(s) / Homework:  Minimal progress - ACTION (Actively working towards change); Intervened by reinforcing change plan / affirming steps taken    Motivational Interviewing    MI Intervention: Expressed Empathy/Understanding, Supported Autonomy, Collaboration, Evocation, Permission to raise concern or advise, Open-ended questions and Reflections: simple and complex     Change Talk Expressed by the Patient: Desire to change Reasons to change Need to change Taking steps    Provider Response to Change Talk: E - Evoked more info from patient about behavior change, A - Affirmed patient's thoughts, decisions, or attempts at behavior change, R - Reflected patient's change talk and S - Summarized patient's change talk statements    Also provided psychoeducation about behavioral health condition, symptoms, and treatment options      Review of Symptoms per patient report:  Depression: Lack of interest, Excessive or inappropriate guilt, Change in energy level, Change in appetite, Feelings of hopelessness, Feelings of helplessness, Ruminations and  Irritability  Rita:  No Symptoms  Psychosis: No Symptoms  Anxiety: Excessive worry, Nervousness, Psychomotor agitation, Ruminations, Poor concentration and Irritability  Panic:  No symptoms  Post Traumatic Stress Disorder:  No Symptoms   Eating Disorder: No Symptoms  ADD / ADHD:  No symptoms  Conduct Disorder: No symptoms  Autism Spectrum Disorder: No symptoms  Obsessive Compulsive Disorder: No Symptoms      Changes in Health Issues:   None reported    Assessment: Current Emotional / Mental Status (status of significant symptoms):  Risk status (Self / Other harm or suicidal ideation)  Patient denies a history of suicidal ideation, suicide attempts, self-injurious behavior, homicidal ideation, homicidal behavior and and other safety concerns  Patient denies current fears or concerns for personal safety.  Patient denies current or recent suicidal ideation or behaviors.  Patient denies current or recent homicidal ideation or behaviors.  Patient denies current or recent self injurious behavior or ideation.  Patient denies other safety concerns.  A safety and risk management plan has not been developed at this time, however patient was encouraged to call [place] [phone] should there be a change in any of these risk factors.    Appearance:   Pt unable to do video visit, unable to assess    Eye Contact:   Pt unable to do video visit, unable to assess    Psychomotor Behavior: Pt unable to do video visit, unable to assess    Attitude:   Cooperative   Orientation:   All  Speech   Rate / Production: Normal    Volume:  Normal   Mood:    Anxious   Affect:    Pt unable to do video visit, unable to assess    Thought Content:  Clear   Thought Form:  Coherent  Logical   Insight:    Fair     Diagnoses:  1. LINA (generalized anxiety disorder)    2. Mood disorder (H)        Collateral Reports Completed:  Communicated with: Dr. Valenzuela    Plan: (Homework, other):  Patient was given information about behavioral services and encouraged  to schedule a follow up appointment with the clinic [facility] in conjunction with next [facility] appointment.  She was also given information about mental health symptoms and treatment options .  CD Recommendations: Maintain Sobriety.  LELE Bacon       MeasurableTreatment Goal(s) related to diagnosis / functional impairment(s)  Goal 1: Patient will stabilize and manage anxiety and depression symptoms    I will know I've met my goal when my symptoms no longer impact by ability to function with my daily activities.      Objective #A (Patient Action)    Patient will continue medication management with  .  Status: New - Date: 1/28/22     Objective #B  Patient will use at least 4 coping skills for anxiety management in the next 5 weeks.  Status: New - Date: 1/28/22     Intervention(s)  [facility] will teach emotional recognition/identification. to increase self awareness and implementation of coping skills.    Objective #C  Patient will sign up for weight management classes.  Status: New - Date: 1/28/22     Intervention(s)  Therapist will provide additional resources for weight management services if needed. .    Patient has reviewed and agreed to the above plan.      LELE Bacon  January 28, 2022

## 2022-01-28 NOTE — TELEPHONE ENCOUNTER
Reason for Call:  Medication refill:    Do you use a [facility]? No    Name of the pharmacy and phone number for the current request:  [facility] [phone]    Name of the medication requested: Zenlafaxine    Other request: Per patient, have been without medication for about 4 days.     Can we leave a detailed message on this number? Yes    Phone number patient can be reached at: [phone]    Best Time: Anytime     Call taken on 1/28/2022 at 11:07 AM by Cr Cortes

## 2022-01-28 NOTE — TELEPHONE ENCOUNTER
Pt has appt in 1 hour so this request can be addressed at appt.    Flaquita Martinez RN on 1/28/2022 at 11:34 AM

## 2022-01-28 NOTE — PROGRESS NOTES
"Mojgan Jimenez is a 51 year old who is being evaluated via a billable telephone visit.      What phone number would you like to be contacted at? See Epic     How would you like to obtain your AVS? [facility]          Outpatient Psychiatric Progress Note    Name: Mojgan Jimenez   : 1970                    Primary Care Provider: JUSTINE Flores CNP   Therapist: None     PHQ-9 scores:  PHQ-9 SCORE 3/3/2021 2021 2021   PHQ-9 Total Score [facility] - - 22 (Severe depression)   PHQ-9 Total Score 13 9 22       LINA-7 scores:  LINA-7 SCORE 3/3/2021 2021 2021   Total Score - - 19 (severe anxiety)   Total Score 9 8 19       Patient Identification:  Patient is a 51 year old,   White Choose not to answer female  who presents for return visit with me.  Patient is currently employed. Patient attended the phone/video session alone. Patient prefers to be called: \"Mojgan\".    Interim History:  Mojgan Jimenez is a 51 year old female with past history including depression, anxiety, and methamphetmaine use-in sustained remission (with hx of stimulant-induced psychotic episodes).     I last saw Mojgan Jimenez for outpatient psychiatry return visit on 2021. During that appointment, we:      Continue venlafaxine/Effexor- mg daily for mood and anxiety.    Continue gabapentin 300 mg up to 2 times a day as needed for anxiety    Continue olanzapine 5-10 mg at bedtime for sleep and mood/possible bipolar disorder.    Keep sleep Medicine appointment to discuss ruling out sleep apnea.     Continue to work with weight management team/program.  We could also consider starting metformin if you were taking olanzapine every night.    Have fasting labs completed at any [facility] [place] [facility] lab. Need to call your clinic ahead of time to schedule an appointment for lab due to limited hours and no walk-ins due to Covid-19 restrictions/changes.     Today is pt's 6th [facility] visit.     : Patient overall doing well.  " Has remained stable on current medication regimen.  Mood has been stable without any extreme ups and downs.  Work has been going okay.  Sleep relatively stable.  No acute safety concerns or SI.  Tolerating medications well.  No problematic drug or alcohol use.    Per [facility], Marlen Griffin [facility], during today's team-based visit:  Patient feels her medication is overall in a good spot and has helped stabilize her mood when she is taking it as prescribed.  She feels it has helped significantly with her depression but still is experiencing anxiety symptoms.  She reports that she is out of venlafaxine and needs a refill today and has stopped taking vitamin D for some time.  Patient feels that her mood has dropped since stopping her vitamin D, she feels less motivation and increased exhaustion each day and feels adding Vitamin D is an important component of her medication regimen.  She did just  vitamin D and feels hopeful that this will get her back into a place that feels good.  Patient denies that she wants medication changes today, just a refill.  Patient feels that the venlafaxine decreased her depression significantly.  She reports that her appetite has increased slightly.  She has not followed through with weight management clinic due to it being virtual and wanting in person options.  Patient does share that she has concerns about her memory and became emotional when talking about it, stating that she will forget things right away after someone says something. Her daughter has noticed her memory is worse as well. Denies suicidal thoughts, contracts for safety.     Psychiatric ROS:  Mojgan Jimenez reports mood has been: Better/stable for many months  Anxiety has been: Better/stable for many months  Sleep has been: Better/stable, but I feel she should still be seen by sleep medicine due to snoring and snort arousals; she is likely not noticing it as much now that she is taking olanzapine at bedtime; several risk  factors for obstructive sleep apnea and also medical conditions that would benefit from treating any existing sleep apnea  Rita sxs: denies  Psychosis sxs: denies  ADHD/ADD sxs: None  PTSD sxs: None  PHQ9 and GAD7 scores were reviewed today if completed today.   Medication side effects: denies  Current stressors include: Symptoms and See HPI above  Coping mechanisms and supports include: Family, Hobbies and Friends    Current medications include:   Current Outpatient Medications   Medication Sig     albuterol (PROVENTIL) (2.5 MG/3ML) 0.083% neb solution Take 1 vial (2.5 mg) by nebulization every 6 hours as needed for shortness of breath / dyspnea or wheezing     cetirizine (ZYRTEC) 10 MG tablet Take 10 mg by mouth daily     cholecalciferol (VITAMIN D3) 5000 units (125 mcg) capsule Take 1 capsule (5,000 Units) by mouth daily     fluticasone-salmeterol (ADVAIR-HFA) 115-21 MCG/ACT inhaler Inhale 2 puffs into the lungs 2 times daily     gabapentin (NEURONTIN) 300 MG capsule Take 1 capsule (300 mg) by mouth 2 times daily as needed (anxiety/agitation)     multivitamin w/minerals (MULTI-VITAMIN) tablet Take 1 tablet by mouth daily     nystatin (MYCOSTATIN) 578964 UNIT/GM external powder Apply to affected area twice daily as needed     OLANZapine (ZYPREXA) 10 MG tablet Take 0.5-1 tablets (5-10 mg) by mouth At Bedtime     paragard intrauterine copper device 1 each by Intrauterine route once for 1 dose     PROAIR  (90 Base) MCG/ACT inhaler Inhale 1-2 puffs into the lungs every 4 hours as needed for shortness of breath / dyspnea or wheezing     rosuvastatin (CRESTOR) 10 MG tablet Take 1 tablet (10 mg) by mouth daily     venlafaxine (EFFEXOR-XR) 75 MG 24 hr capsule Take 3 capsules (225 mg) by mouth daily Need appt for refills.     vitamin D3 (CHOLECALCIFEROL) 2000 units (50 mcg) tablet Take 1 tablet (2,000 Units) by mouth daily     WIXELA INHUB 100-50 MCG/DOSE inhaler Inhale 1 puff into the lungs 2 times daily     No  current facility-administered medications for this visit.     Past Medical/Surgical History:  Past Medical History:   Diagnosis Date     LINA (generalized anxiety disorder) 11/2/2017     Hyperlipidemia LDL goal <160 1/20/2019     Major depressive disorder      Methanol abuse (H)      Mild persistent asthma without complication 11/2/2017     Vitamin D deficiency 11/2/2017      has a past medical history of LINA (generalized anxiety disorder) (11/2/2017), Hyperlipidemia LDL goal <160 (1/20/2019), Major depressive disorder, Methanol abuse (H), Mild persistent asthma without complication (11/2/2017), and Vitamin D deficiency (11/2/2017).    Social History:  Reviewed. No changes to social history, except as noted above in HPI.    Vital Signs:   None. This is phone/video visit.     Labs:  Most recent laboratory results reviewed:   Last Comprehensive Metabolic Panel:  Sodium   Date Value Ref Range Status   08/31/2021 140 133 - 144 mmol/L Final   11/17/2020 141 133 - 144 mmol/L Final     Potassium   Date Value Ref Range Status   08/31/2021 4.2 3.4 - 5.3 mmol/L Final   11/17/2020 4.1 3.4 - 5.3 mmol/L Final     Chloride   Date Value Ref Range Status   08/31/2021 107 94 - 109 mmol/L Final   11/17/2020 109 94 - 109 mmol/L Final     Carbon Dioxide   Date Value Ref Range Status   11/17/2020 31 20 - 32 mmol/L Final     Carbon Dioxide (CO2)   Date Value Ref Range Status   08/31/2021 30 20 - 32 mmol/L Final     Anion Gap   Date Value Ref Range Status   08/31/2021 3 3 - 14 mmol/L Final   11/17/2020 1 (L) 3 - 14 mmol/L Final     Glucose   Date Value Ref Range Status   08/31/2021 90 70 - 99 mg/dL Final   11/17/2020 84 70 - 99 mg/dL Final     Comment:     Fasting specimen     Urea Nitrogen   Date Value Ref Range Status   08/31/2021 16 7 - 30 mg/dL Final   11/17/2020 9 7 - 30 mg/dL Final     Creatinine   Date Value Ref Range Status   08/31/2021 0.72 0.52 - 1.04 mg/dL Final   11/17/2020 0.79 0.52 - 1.04 mg/dL Final     GFR Estimate   Date  Value Ref Range Status   08/31/2021 >90 >60 mL/min/1.73m2 Final     Comment:     As of July 11, 2021, eGFR is calculated by the CKD-EPI creatinine equation, without race adjustment. eGFR can be influenced by muscle mass, exercise, and diet. The reported eGFR is an estimation only and is only applicable if the renal function is stable.   11/17/2020 88 >60 mL/min/[1.73_m2] Final     Comment:     Non  GFR Calc  Starting 12/18/2018, serum creatinine based estimated GFR (eGFR) will be   calculated using the Chronic Kidney Disease Epidemiology Collaboration   (CKD-EPI) equation.       Calcium   Date Value Ref Range Status   08/31/2021 9.1 8.5 - 10.1 mg/dL Final   11/17/2020 8.9 8.5 - 10.1 mg/dL Final     Bilirubin Total   Date Value Ref Range Status   08/31/2021 0.4 0.2 - 1.3 mg/dL Final   12/03/2018 0.3 0.2 - 1.3 mg/dL Final     Alkaline Phosphatase   Date Value Ref Range Status   08/31/2021 86 40 - 150 U/L Final   12/03/2018 77 40 - 150 U/L Final     ALT   Date Value Ref Range Status   08/31/2021 31 0 - 50 U/L Final   12/03/2018 25 0 - 50 U/L Final     AST   Date Value Ref Range Status   08/31/2021 19 0 - 45 U/L Final   12/03/2018 22 0 - 45 U/L Final     Recent Labs   Lab Test 08/31/21  1415 11/17/20  1317   CHOL 277* 225*   HDL 39* 35*   * 162*   TRIG 220* 139     Lab Results   Component Value Date    A1C 5.5 08/31/2021    A1C 4.9 12/03/2018     Review of Systems:  10 systems (general, cardiovascular, respiratory, eyes, ENT, endocrine, GI, , M/S, neurological) were reviewed. Most pertinent finding(s) is/are: Chronic pain, headaches. The remaining systems are all unremarkable.    Mental Status Examination (limited as this is by phone/video):  Attitude:  Cooperative, pleasant   Oriented to:  person, place, time, and situation  Attention Span and Concentration:  distractible  Speech: Overall normal volume, rate; not pressured  Language: intact  Mood: Good  Affect: Overall appropriate and mood  congruent  Associations:  no loose associations  Thought Process:  Overall linear and goal directed  Thought Content:  no evidence of suicidal ideation or homicidal ideation, no paranoia, no auditory hallucinations present and no visual hallucinations present  Recent and Remote Memory:  Intact to interview.  Reports subjective issues.  Not formally tested.  Fund of Knowledge: appropriate  Insight:  fair  Judgment:  intact, adequate for safety  Impulse Control:  fair    Suicide Risk Assessment:  Today Mojgan Jimenez reports no suicidal ideation. Based on all available evidence including the factors cited above, Mojgan Jimenez does not appear to be at imminent risk for self-harm, does not meet criteria for a 72-hr hold, and therefore remains appropriate for ongoing outpatient level of care.  A thorough assessment of risk factors related to suicide and self-harm have been reviewed and are noted above. The patient convincingly denies suicidality on several occasions. Local community safety resources printed and reviewed for patient to use if needed. There was no deceit detected, and the patient presented in a manner that was believable.     DSM5 Diagnosis:  Mood Disorder, Unspecified (Bipolar Disorder -high suspicions) -in remission  300.02 (F41.1) Generalized Anxiety Disorder  Substance-Related & Addictive Disorders Stimulant Use Disorder:  In sustained remission, Specify current severity:  Severe  304.40 (F15.20) Severe, Amphetamine type substance  Insomnia, Unspecified    Medical comorbidities include:   Patient Active Problem List    Diagnosis Date Noted     Mood disorder (H) 09/21/2021     Priority: Medium     Morbid obesity (H) 04/02/2019     Priority: Medium     Hyperlipidemia LDL goal <160 01/20/2019     Priority: Medium     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) 07/10/2018     Priority: Medium     Suicidal ideation 05/05/2018     Priority: Medium     Polysubstance overdose  05/01/2018     Priority: Medium     Psychosis (H) 03/21/2018     Priority: Medium     Methamphetamine abuse, episodic (H) 03/06/2018     Priority: Medium     Day treatment at [facility]       Paranoia (H) 11/28/2017     Priority: Medium     Mild persistent asthma without complication 11/02/2017     Priority: Medium     Vitamin D deficiency 11/02/2017     Priority: Medium     LINA (generalized anxiety disorder) 11/02/2017     Priority: Medium     Recurrent major depressive disorder, remission status unspecified (H) 11/02/2017     Priority: Medium     5/10/18:  Initial Psychiatry appointment at [facility] ([phone])       Depression, major, recurrent, severe with psychosis (H) 11/02/2017     Priority: Medium     IUD (intrauterine device) in place 02/04/2014     Priority: Medium     Overview:   paragard placed 2/4/14       PMDD (premenstrual dysphoric disorder) 11/05/2010     Priority: Medium       Psychosocial & Contextual Factors:  See HPI above    Assessment:  6/22/2021:  Mojgan Jimenez reports some significant improvement in her mood, motivation, energy but with some worsening anxiety, insomnia, and other concerning symptoms indicative of possible yasemin.  Patient with increased goal-directed activity, decreased need for sleep/insomnia, hyperverbal and pressured in speech today, and also paranoid.  Patient adamantly denies using any stimulants.  Also concerning her daughter made the comment that the patient is acting like she would when using methamphetamine.  This makes me quite worried/suspicious about possible bipolar diagnosis.  When psychiatrically hospitalized in 2018 she had some similar symptoms.  However, that hospitalization was much more proximal to her methamphetamine use.  At this time, I am more than happy to fill out FMLA forms for the patient.  We also discussed starting olanzapine at bedtime to help with her current instability and to get her sleeping better.  We will need  to consider the fact that venlafaxine might have precipitated this episode.  Might be able to stay on venlafaxine if also on a mood stabilizer because she is incredibly happy with venlafaxine.  She had been on venlafaxine 2 months before now and has been very pleased.  She continues to be hypomanic/manic and/or does not want to be on olanzapine, may need to get her off the SNRI.    Patient could also consider individual psychotherapy for additional support and development of nonpharmacologic coping skills and strategies.      Patient currently with copper IUD.     8/17/2021:  Patient overall doing better than last visit.  July tougher month since that is the month her mom passed away 17 years ago.  Continues to struggle with this loss.  Declines to engage in therapy.  We will trial increased dose of venlafaxine ER/Effexor-XR.  She will watch for signs/symptoms of yasemin.  I did recommend if we increase this dose of Effexor-XR that she take her olanzapine on a nightly basis.  She will reach out to her primary care provider regarding weight concerns.  Some of weight gain could be due to Effexor-XR but she is only taken 1 dose of olanzapine so not due to neuroleptic.  She admits she has gained 100-150 pounds since sobriety from methamphetamine.  If she were taking olanzapine every night and having weight gain could consider Metformin use.  Sleep medicine referral placed due to snoring, snort arousals, weight gain, and likely poor sleep.  Other medications refilled.    9/21/2021:  Overall mood and anxiety has stabilized significantly on current medication regimen.  We will need to keep an eye on cholesterol and weight while on olanzapine.  She is sleeping much better and there are no signs of yasemin today.  Paranoia improved as well.  New fasting labs ordered for 3 months from now.  I do still recommend she follow through with meeting with sleep medicine for possible sleep study to rule out sleep apnea.  She is quite  obese and has been having snoring and snort arousals. She is likely not noticing it as much now that she is taking olanzapine at bedtime; several risk factors for obstructive sleep apnea and also medical conditions that would benefit from treating any existing sleep apnea diagnosis.  No major safety concerns or SI.  No drug or alcohol use.    1/28/2022:   Patient overall has remained stable with current medication regimen.  She has had good stability of her symptoms now for several months.  Her care will be returned back to her primary care provider for ongoing management since there have been no medication changes for several months.  I still recommend sleep medicine referral/study if that has not yet been completed.  Could consider metformin if any weight gain or metabolic derailments suspected to be due from olanzapine.  There is significant evidence to show can be helpful when combined with neuroleptic use to prevent weight gain and improved metabolic status.  Watch for any signs of abnormal involuntary movements while on olanzapine.  This was difficult while I was seeing the patient since she has not been able to do video or in person visits.  If primary care provider uncomfortable managing the patient's psychiatric medication regimen, consideration should be given for a long-term psychiatric care referral.  Refills provided today.  No acute safety concerns today.  No problematic drug or alcohol use.    Medication side effects and alternatives were reviewed. Health promotion activities recommended and reviewed today. All questions addressed. Education and counseling completed regarding risks and benefits of medications and psychotherapy options. Recommend therapy for additional support.     Treatment Plan:    Continue venlafaxine/Effexor- mg daily for mood and anxiety.    Continue gabapentin 300 mg up to 2 times a day as needed for anxiety    Continue olanzapine 5-10 mg at bedtime for sleep and  mood/possible bipolar disorder.    Consider sleep medicine appointment to rule out obstructive sleep apnea.    Primary care provider could consider starting metformin if you continue to struggle with metabolic derailments/weight gain attributed to olanzapine.  There is a significant amount of literature to support the use of metformin with neuroleptics.  Also monitor yearly or bi-yearly lipid panel and fasting glucose/hemoglobin A1c.    Make sure to have fasting labs completed at any [facility] lab after today's visit within the next week or 2. Need to call your clinic ahead of time to schedule an appointment for lab due to limited hours and no walk-ins due to Covid-19 restrictions/changes.     Continue all other cares per primary care provider.     Continue all other medications as reviewed per electronic medical record today.     Safety plan reviewed. To the Emergency Department as needed or call after hours crisis line at [phone] or [phone]. [place] Crisis Text Line. Text [place] to 886708 or Suicide LifeLine Chat: suicidepreventionlifeline.org/chat    Continue to consider individual psychotherapy for additional support and development of nonpharmacologic coping skills and strategies.    Follow up with primary care provider in 2-3 months for follow-up visit, as planned, or for acute medical concerns.    [employer] may be used to communicate with your provider, but this is not intended to be used for emergencies.    Thank you for our work together in the Psychiatry Collaborative Care Model at [facility]. This is our last visit and I am returning your care back to your Primary Care Provider JUSTINE Flores CNP . If you are not doing well, please contact your Primary Care Provider office.     Administrative Billing:   Phone Call/Video Duration: 7 Minutes  1:04p- 1:11p    Time spent with patient was 7 minutes and greater than 50% of time or 5 minutes was spent in counseling and  coordination of care regarding above diagnoses and treatment plan.     Patient Status:  The patient is being returned to the referring provider for ongoing care and medication prescribing.  The patient can be re-referred back to this service for further consultation if needed in the future.    Signed:   Aditi Valenzuela DO  [facility]    Disclaimer: This note consists of symbols derived from keyboarding, dictation and/or voice recognition software. As a result, there may be errors in the script that have gone undetected. Please consider this when interpreting information found in this chart.

## 2022-01-28 NOTE — Clinical Note
Psychiatry Update/Consult. I am returning Mojgan Jimenez's psychiatric care back to you for ongoing management and medication prescribing.  [facility] to ongoing stability and readiness to return to primary care provider. Future medication refills will come from you (I gave 3 months today). I recommended that the patient follow up with you in about 2-3 months for a mental health visit. More details about treatment plan are in my note. If you have any questions or concerns, please don't hesitate to reach out. The patient can be re-referred back to this service for further consultation in the future if needed but a new referral will need to be placed.  If the patient is likely to have ongoing chronic and complex psychiatric needs, consider mental health referral for community/long-term psychiatric care vs [facility] referral.  Most patients are able to get scheduled with a community psychiatric provider within 3-6 weeks of referral.    Aditi Valenzuela, DO  [facility]

## 2022-01-28 NOTE — PATIENT INSTRUCTIONS
Treatment Plan:    Continue venlafaxine/Effexor- mg daily for mood and anxiety.    Continue gabapentin 300 mg up to 2 times a day as needed for anxiety    Continue olanzapine 5-10 mg at bedtime for sleep and mood/possible bipolar disorder.    Consider sleep medicine appointment to rule out obstructive sleep apnea.    Primary care provider could consider starting metformin if you continue to struggle with metabolic derailments/weight gain attributed to olanzapine.  There is a significant amount of literature to support the use of metformin with neuroleptics.  Also monitor yearly or bi-yearly lipid panel and fasting glucose/hemoglobin A1c.    Make sure to have fasting labs completed at any [facility] lab after today's visit within the next week or 2. Need to call your clinic ahead of time to schedule an appointment for lab due to limited hours and no walk-ins due to Covid-19 restrictions/changes.     Continue all other cares per primary care provider.     Continue all other medications as reviewed per electronic medical record today.     Safety plan reviewed. To the Emergency Department as needed or call after hours crisis line at [phone] or [phone]. [place] Crisis Text Line. Text [place] to 308185 or Suicide LifeLine Chat: suicidepreventionlifeline.org/chat    Continue to consider individual psychotherapy for additional support and development of nonpharmacologic coping skills and strategies.    Follow up with primary care provider in 2-3 months for follow-up visit, as planned, or for acute medical concerns.    [employer] may be used to communicate with your provider, but this is not intended to be used for emergencies.    Thank you for our work together in the Psychiatry Collaborative Care Model at [facility]. This is our last visit and I am returning your care back to your Primary Care Provider JUSTINE Flores CNP . If you are not doing well, please contact your Primary Care  Provider office.

## 2022-01-31 NOTE — TELEPHONE ENCOUNTER
LMTCB 2nd attempt      Please ask patient questions below and send answers to provider as requested     Sent a [facility] message as well      Asthma Control Test     1) In the past 4 weeks, how much of the time did your asthma keep you from getting as much done at work, school or home?     All of the time (1)     Most of the time (2)     Some of the time (3)     A little of the time (4)     None of the time (5)     Score __     2) During the past 4 weeks, how often have you had shortness of breath?     More than once a day (1)     Once a day (2)     3 to 6 times per week (3)     Once or twice a week (4)     Not at all (5)     Score __        3) During the past 4 weeks, how often did your asthma symptoms (wheezing, coughing, shortness of breath, chest tightness or pain) wake you up at night or earlier than usual in the morning.      4 or more nights a week (1)     2 to 3 nights a week (2)     Once a week (3)     Once or twice (4)     Not at all (5)     Score __        4) During the past 4 weeks, how often have you used your rescue inhaler or nebulizer medication (such as albuterol)     3 or more times per day (1)     1 to 2 times per day (2)     2 or 3 times per day (3)     Once a week or less (4)     Not at all (5)     Score __     5) How would you rate your asthma control during the past four weeks?     Not controlled at all (1)     Poorly controlled (2)     Somewhat controlled (3)     Well controlled (4)     Completely controlled (5)     Score __     In the past 12 months, how many emergency department visits have you had due to asthma (that did not result in hospitalization)? ___     In the past 12 months, how many inpatient hospitalizations have you had due to asthma? __

## 2022-02-13 DIAGNOSIS — J45.40 MODERATE PERSISTENT ASTHMA WITHOUT COMPLICATION: ICD-10-CM

## 2022-02-14 DIAGNOSIS — J45.31 MILD PERSISTENT ASTHMA WITH ACUTE EXACERBATION: ICD-10-CM

## 2022-02-14 NOTE — TELEPHONE ENCOUNTER
Routing refill request to provider for review/approval because:  ACT    ACT Total Scores 11/17/2020 6/22/2021 8/31/2021   ACT TOTAL SCORE (Goal Greater than or Equal to 20) 20 19 17   In the past 12 months, how many times did you visit the emergency room for your asthma without being admitted to the hospital? 0 - 0   In the past 12 months, how many times were you hospitalized overnight because of your asthma? 0 - 0              Pending Prescriptions:                       Disp   Refills    PROAIR  (90 Base) MCG/ACT inhaler [*8.5 g  0        Sig: Inhale 1-2 puffs into the lungs every 4 hours as           needed for shortness of breath / dyspnea or           wheezing        Kit JERO Martinez

## 2022-02-14 NOTE — TELEPHONE ENCOUNTER
Routing refill request to provider for review/approval because:   Asthma control assessment score within normal limits in last 6 months    Order for Serevent, Striverdi, or Foradil and pt has steroid inhaler     ACT Total Scores 11/17/2020 6/22/2021 8/31/2021   ACT TOTAL SCORE (Goal Greater than or Equal to 20) 20 19 17   In the past 12 months, how many times did you visit the emergency room for your asthma without being admitted to the hospital? 0 - 0   In the past 12 months, how many times were you hospitalized overnight because of your asthma? 0 - 0            Pending Prescriptions:                       Disp   Refills    WIXELA INHUB 100-50 MCG/DOSE inhaler       60 each0        Sig: Inhale 1 puff into the lungs 2 times daily        Prabhjot Martinez RN

## 2022-02-14 NOTE — TELEPHONE ENCOUNTER
Routing refill request to provider for review/approval because:  Labs out of range:  ACT  Kyle Henderson RN, BSN           14-Feb-2022 17:27

## 2022-02-16 RX ORDER — ALBUTEROL SULFATE 90 UG/1
AEROSOL, METERED RESPIRATORY (INHALATION)
Qty: 8.5 G | Refills: 0 | Status: SHIPPED | OUTPATIENT
Start: 2022-02-16 | End: 2022-03-14

## 2022-02-16 NOTE — TELEPHONE ENCOUNTER
Patient called to question why she is only getting a 1 month supply of her inhalers at a time.  She stated that she had also requested 2 of her scheduled inhaler previously so that she can keep 1 at work but she has only been getting 1 inhaler at a time.  She was without her inhaler today at work.  Explained that her inhalers were refilled today by covering providers.  Offered to send a message to provider to request a greater quantity.  Patient then decided to schedule an appointment as she also wants to talk to provider about her depression meds since she will no longer be following up with psychiatry.  Appointment scheduled with PCP for 3/1/2022.  Patient stated that she is fine with her current supply of inhalers until then    Ludwig Matias RN  [facility]

## 2022-03-03 ENCOUNTER — TELEPHONE (OUTPATIENT)
Dept: FAMILY MEDICINE | Facility: CLINIC | Age: 52
End: 2022-03-03
Payer: COMMERCIAL

## 2022-03-03 NOTE — TELEPHONE ENCOUNTER
Patient Quality Outreach    Patient is due for the following:   Asthma  -  ACT needed and AAP  Colon Cancer Screening -  FIT  Breast Cancer Screening - Mammogram  Depression  -  PHQ-9 Needed  Immunizations  -  Hepatitis B, Zoster and Influenza    NEXT STEPS:   Patient has upcoming appointment, these items will be addressed at that time.    Type of outreach:    Sent [email or web address] message.      Questions for provider review:    None     Boogie Whittaker MA  Chart routed to Care Team.

## 2022-03-14 DIAGNOSIS — J45.40 MODERATE PERSISTENT ASTHMA WITHOUT COMPLICATION: ICD-10-CM

## 2022-03-14 NOTE — LETTER
March 15, 2022      Mojgan Jimenez  [address]   [facility] [address]        Dear ,    We are writing to inform you of your test results.    {results letter list:[id number]}    No results found from the In Basket message.    If you have any questions or concerns, please call the clinic at the number listed above.       Sincerely,

## 2022-03-15 RX ORDER — ALBUTEROL SULFATE 90 UG/1
AEROSOL, METERED RESPIRATORY (INHALATION)
Qty: 8.5 G | Refills: 3 | Status: SHIPPED | OUTPATIENT
Start: 2022-03-15 | End: 2022-05-17

## 2022-03-15 NOTE — TELEPHONE ENCOUNTER
**Scheduled for next visit 3/22/22    Routing refill request to provider for review/approval because:  Asthma control assessment score within normal limits in last 6 months    ACT Total Scores 11/17/2020 6/22/2021 8/31/2021   ACT TOTAL SCORE (Goal Greater than or Equal to 20) 20 19 17   In the past 12 months, how many times did you visit the emergency room for your asthma without being admitted to the hospital? 0 - 0   In the past 12 months, how many times were you hospitalized overnight because of your asthma? 0 - 0     Deana Lucia RN

## 2022-03-22 ENCOUNTER — OFFICE VISIT (OUTPATIENT)
Dept: FAMILY MEDICINE | Facility: CLINIC | Age: 52
End: 2022-03-22
Payer: COMMERCIAL

## 2022-03-22 VITALS
DIASTOLIC BLOOD PRESSURE: 70 MMHG | HEIGHT: 64 IN | SYSTOLIC BLOOD PRESSURE: 130 MMHG | TEMPERATURE: 97 F | HEART RATE: 95 BPM | WEIGHT: 289 LBS | OXYGEN SATURATION: 96 % | RESPIRATION RATE: 20 BRPM | BODY MASS INDEX: 49.34 KG/M2

## 2022-03-22 DIAGNOSIS — Z12.11 SCREEN FOR COLON CANCER: ICD-10-CM

## 2022-03-22 DIAGNOSIS — Z23 FLU VACCINE NEED: ICD-10-CM

## 2022-03-22 DIAGNOSIS — E66.01 MORBID OBESITY (H): ICD-10-CM

## 2022-03-22 DIAGNOSIS — J45.40 MODERATE PERSISTENT ASTHMA WITHOUT COMPLICATION: ICD-10-CM

## 2022-03-22 DIAGNOSIS — J45.31 MILD PERSISTENT ASTHMA WITH ACUTE EXACERBATION: Primary | ICD-10-CM

## 2022-03-22 DIAGNOSIS — E78.5 HYPERLIPIDEMIA LDL GOAL <160: ICD-10-CM

## 2022-03-22 DIAGNOSIS — F41.1 GAD (GENERALIZED ANXIETY DISORDER): ICD-10-CM

## 2022-03-22 PROCEDURE — 90471 IMMUNIZATION ADMIN: CPT | Performed by: NURSE PRACTITIONER

## 2022-03-22 PROCEDURE — 99214 OFFICE O/P EST MOD 30 MIN: CPT | Mod: 25 | Performed by: NURSE PRACTITIONER

## 2022-03-22 PROCEDURE — 90686 IIV4 VACC NO PRSV 0.5 ML IM: CPT | Performed by: NURSE PRACTITIONER

## 2022-03-22 RX ORDER — ROSUVASTATIN CALCIUM 10 MG/1
10 TABLET, COATED ORAL DAILY
Qty: 90 TABLET | Refills: 9 | Status: SHIPPED | OUTPATIENT
Start: 2022-03-22

## 2022-03-22 RX ORDER — VENLAFAXINE HYDROCHLORIDE 75 MG/1
225 CAPSULE, EXTENDED RELEASE ORAL DAILY
Qty: 270 CAPSULE | Refills: 0 | Status: SHIPPED | OUTPATIENT
Start: 2022-03-22 | End: 2022-06-15

## 2022-03-22 ASSESSMENT — ANXIETY QUESTIONNAIRES
6. BECOMING EASILY ANNOYED OR IRRITABLE: NEARLY EVERY DAY
GAD7 TOTAL SCORE: 8
5. BEING SO RESTLESS THAT IT IS HARD TO SIT STILL: NOT AT ALL
1. FEELING NERVOUS, ANXIOUS, OR ON EDGE: NEARLY EVERY DAY
4. TROUBLE RELAXING: NOT AT ALL
GAD7 TOTAL SCORE: 8
2. NOT BEING ABLE TO STOP OR CONTROL WORRYING: SEVERAL DAYS
GAD7 TOTAL SCORE: 8
7. FEELING AFRAID AS IF SOMETHING AWFUL MIGHT HAPPEN: NOT AT ALL
7. FEELING AFRAID AS IF SOMETHING AWFUL MIGHT HAPPEN: NOT AT ALL
3. WORRYING TOO MUCH ABOUT DIFFERENT THINGS: SEVERAL DAYS

## 2022-03-22 ASSESSMENT — PATIENT HEALTH QUESTIONNAIRE - PHQ9
SUM OF ALL RESPONSES TO PHQ QUESTIONS 1-9: 13
10. IF YOU CHECKED OFF ANY PROBLEMS, HOW DIFFICULT HAVE THESE PROBLEMS MADE IT FOR YOU TO DO YOUR WORK, TAKE CARE OF THINGS AT HOME, OR GET ALONG WITH OTHER PEOPLE: VERY DIFFICULT
SUM OF ALL RESPONSES TO PHQ QUESTIONS 1-9: 13

## 2022-03-22 ASSESSMENT — ASTHMA QUESTIONNAIRES: ACT_TOTALSCORE: 19

## 2022-03-22 ASSESSMENT — PAIN SCALES - GENERAL: PAINLEVEL: NO PAIN (0)

## 2022-03-22 NOTE — PROGRESS NOTES
Assessment & Plan     (J45.31) Mild persistent asthma with acute exacerbation  (primary encounter diagnosis)  Comment: Uncontrolled but recent URI and was not taking daily inhaler medication appropriately.  Plan: Asthma Action Plan (AAP), Miscellaneous Order         for DME - ONLY FOR DME, WIXELA INHUB 100-50         MCG/DOSE inhaler, DISCONTINUED: WIXELA INHUB         100-50 MCG/DOSE inhaler  She will start Advair 1 puff twice daily and use albuterol as needed  Nebulizer DME ordered she already has neb albuterol at home  (Z12.11) Screen for colon cancer  Comment: Please return  Plan: COLOGUARD([employer]), Fecal colorectal         cancer screen FIT - Future (S+30), CANCELED:         Fecal colorectal cancer screen FIT - Future         (S+30)      (F41.1) LINA (generalized anxiety disorder)  Comment: Up-and-down a lot of stressors 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  She will monitor her symptoms over the next month if her anxiety continues she will message me and I will speak with her previous psychiatrist about options  Plan: venlafaxine (EFFEXOR-XR) 75 MG 24 hr capsule      (J45.40) Moderate persistent asthma without complication  Comment: As above        (E78.5) Hyperlipidemia LDL goal <160  Comment: She had not been taking her Crestor asked her to please start taking this.  She will return for fasting labs in 8 weeks  Plan: rosuvastatin (CRESTOR) 10 MG tablet, Lipid         panel reflex to direct LDL Fasting,         Comprehensive Weight Management, CANCELED:         Lipid panel reflex to direct LDL Fasting      (E66.01) Morbid obesity (H)  Comment: She has been referred to weight management center in the past says that they only had an online option?  She is interested in gastric sleeve.  She can discuss this further with them if she would be a candidate  Plan: Comprehensive metabolic panel (BMP + Alb, Alk         Phos, ALT, AST, Total. Bili, TP), Hemoglobin          "A1c, CBC with platelets and differential,         Comprehensive Weight Management, CANCELED:         Hemoglobin A1c, CANCELED: Comprehensive         metabolic panel (BMP + Alb, Alk Phos, ALT, AST,        Total. Bili, TP)      (Z23) Flu vaccine need  Comment: Today  Plan: FLU VACCINE, 3 YRS +, IM (FLUZONE)          Ordering of each unique test  Prescription drug management  25 minutes spent on the date of the encounter doing chart review, history and exam, documentation and further activities per the note       BMI:   Estimated body mass index is 50.39 kg/m  as calculated from the following:    Height as of this encounter: 1.613 m (5' 3.5\").    Weight as of this encounter: 131.1 kg (289 lb).   Weight management plan: Discussed healthy diet and exercise guidelines wants to get gstric sleeve         No follow-ups on file.    JUSTINE Flores [facility]    Curtis Ulrich is a 51 year old who presents for the following health issues  accompanied by her grandson.    History of Present Illness     Asthma:  She presents for follow up of asthma.  She has no cough, no wheezing, and no shortness of breath. She is using a relief medication a few times a week. She typically misses taking her controller medication 1 time(s) per week.Patient is aware of the following triggers: cold air and upper respiratory infections. The patient has not had a visit to the Emergency Room, Urgent Care or Hospital due to asthma since the last clinic visit.     Mental Health Follow-up:  Patient presents to follow-up on Depression & Anxiety.Patient's depression since last visit has been:  Medium  The patient is not having other symptoms associated with depression.  Patient's anxiety since last visit has been:  Medium  The patient is not having other symptoms associated with anxiety.  Any significant life events: job concerns and other  Patient is not feeling anxious or having panic attacks.  Patient has no " "concerns about alcohol or drug use.       Today's PHQ-9         PHQ-9 Total Score: 13  PHQ-9 Q9 Thoughts of better off dead/self-harm past 2 weeks :   (P) Not at all    How difficult have these problems made it for you to do your work, take care of things at home, or get along with other people: Very difficult    Today's LINA-7 Score: 8    She eats 0-1 servings of fruits and vegetables daily.She consumes 3 sweetened beverage(s) daily.She exercises with enough effort to increase her heart rate 9 or less minutes per day.  She exercises with enough effort to increase her heart rate 3 or less days per week.   She is taking medications regularly.     Depression/anxiety - psychiatrist discharged her and now she is to be followed by PCP    Says that she is more depressed now but this is because she will move to night shift at [employer] to help her daughter out with her son during the day.   Says she took another gabapentin pill by daiana and she felt awful.  She is on gabapentin 300 mg twice daily as needed for anxiety, venlafaxine 225 mg daily, Zyprexa 5 to 10 mg at at at bedtime  Clean and sober for 4 years     Hyperlipidemia  Not on Crestor didn't know she was suppose to take it     Review of Systems   Constitutional, HEENT, cardiovascular, pulmonary, GI, , musculoskeletal, neuro, skin, endocrine and psych systems are negative, except as otherwise noted.      Objective    BP (!) 142/90 (BP Location: Right arm, Patient Position: Chair, Cuff Size: Adult Large)   Pulse 95   Temp 97  F (36.1  C) (Tympanic)   Resp 20   Ht 1.613 m (5' 3.5\")   Wt 131.1 kg (289 lb)   SpO2 96%   Breastfeeding No   BMI 50.39 kg/m    There is no height or weight on file to calculate BMI.  Physical Exam   GENERAL: healthy, alert and no distress  NECK: no adenopathy, no asymmetry, masses, or scars and thyroid normal to palpation  RESP: lungs clear to auscultation - no rales, rhonchi or wheezes  CV: regular rate and rhythm, normal S1 S2, no S3 " or S4, no murmur, click or rub, no peripheral edema and peripheral pulses strong  ABDOMEN: soft, nontender, no hepatosplenomegaly, no masses and bowel sounds normal  MS: no gross musculoskeletal defects noted, no edema    Office Visit on 08/31/2021   Component Date Value Ref Range Status     Cholesterol 08/31/2021 277 (A) <200 mg/dL Final    Age 0-19 years  Desirable: <170 mg/dL  Borderline high:  170-199 mg/dl  High:            >199 mg/dl    Age 20 years and older  Desirable: <200 mg/dL     Triglycerides 08/31/2021 220 (A) <150 mg/dL Final    0-9 years:  Normal:    Less than 75 mg/dL  Borderline high:  75-99 mg/dL  High:             Greater than or equal to 100 mg/dL    0-19 years:  Normal:    Less than 90 mg/dL  Borderline high:   mg/dL  High:             Greater than or equal to 130 mg/dL    20 years and older:  Normal:    Less than 150 mg/dL  Borderline high:  150-199 mg/dL  High:             200-499 mg/dL  Very high:   Greater than or equal to 500 mg/dL     Direct Measure HDL 08/31/2021 39 (A) >=50 mg/dL Final    0-19 years:       Greater than or equal to 45 mg/dL   Low: Less than 40 mg/dL   Borderline low: 40-44 mg/dL     20 years and older:   Female: Greater than or equal to 50 mg/dL   Male:   Greater than or equal to 40 mg/dL          LDL Cholesterol Calculated 08/31/2021 194 (A) <=100 mg/dL Final    Age 0-19 years:  Desirable: 0-110 mg/dL   Borderline high: 110-129 mg/dL   High: >= 130 mg/dL    Age 20 years and older:  Desirable: <100mg/dL  Above desirable: 100-129 mg/dL   Borderline high: 130-159 mg/dL   High: 160-189 mg/dL   Very high: >= 190 mg/dL     Non HDL Cholesterol 08/31/2021 238 (A) <130 mg/dL Final    0-19 years:  Desirable:          Less than 120 mg/dL  Borderline high:   120-144 mg/dL  High:                   Greater than or equal to 145 mg/dL    20 years and older:  Desirable:          130 mg/dL  Above Desirable: 130-159 mg/dL  Borderline high:   160-189 mg/dL  High:                190-219 mg/dL  Very high:     Greater than or equal to 220 mg/dL     Patient Fasting > 8hrs? 08/31/2021 No   Final     Hemoglobin A1C 08/31/2021 5.5  0.0 - 5.6 % Final    Normal <5.7%   Prediabetes 5.7-6.4%    Diabetes 6.5% or higher     Note: Adopted from ADA consensus guidelines.     Sodium 08/31/2021 140  133 - 144 mmol/L Final     Potassium 08/31/2021 4.2  3.4 - 5.3 mmol/L Final     Chloride 08/31/2021 107  94 - 109 mmol/L Final     Carbon Dioxide (CO2) 08/31/2021 30  20 - 32 mmol/L Final     Anion Gap 08/31/2021 3  3 - 14 mmol/L Final     Urea Nitrogen 08/31/2021 16  7 - 30 mg/dL Final     Creatinine 08/31/2021 0.72  0.52 - 1.04 mg/dL Final     Calcium 08/31/2021 9.1  8.5 - 10.1 mg/dL Final     Glucose 08/31/2021 90  70 - 99 mg/dL Final     Alkaline Phosphatase 08/31/2021 86  40 - 150 U/L Final     AST 08/31/2021 19  0 - 45 U/L Final     ALT 08/31/2021 31  0 - 50 U/L Final     Protein Total 08/31/2021 7.7  6.8 - 8.8 g/dL Final     Albumin 08/31/2021 3.5  3.4 - 5.0 g/dL Final     Bilirubin Total 08/31/2021 0.4  0.2 - 1.3 mg/dL Final     GFR Estimate 08/31/2021 >90  >60 mL/min/1.73m2 Final    As of July 11, 2021, eGFR is calculated by the CKD-EPI creatinine equation, without race adjustment. eGFR can be influenced by muscle mass, exercise, and diet. The reported eGFR is an estimation only and is only applicable if the renal function is stable.

## 2022-03-23 ASSESSMENT — PATIENT HEALTH QUESTIONNAIRE - PHQ9: SUM OF ALL RESPONSES TO PHQ QUESTIONS 1-9: 13

## 2022-03-23 ASSESSMENT — ANXIETY QUESTIONNAIRES: GAD7 TOTAL SCORE: 8

## 2022-05-03 ENCOUNTER — TELEPHONE (OUTPATIENT)
Dept: FAMILY MEDICINE | Facility: CLINIC | Age: 52
End: 2022-05-03

## 2022-05-03 NOTE — TELEPHONE ENCOUNTER
Patient called and is coming to  a nebulizer machine.    Boogie is bring machine and DME order to sign to .    STAR Sam  [facility]

## 2022-05-17 ENCOUNTER — OFFICE VISIT (OUTPATIENT)
Dept: FAMILY MEDICINE | Facility: CLINIC | Age: 52
End: 2022-05-17
Payer: COMMERCIAL

## 2022-05-17 ENCOUNTER — ANCILLARY PROCEDURE (OUTPATIENT)
Dept: GENERAL RADIOLOGY | Facility: CLINIC | Age: 52
End: 2022-05-17
Attending: NURSE PRACTITIONER
Payer: COMMERCIAL

## 2022-05-17 VITALS
HEART RATE: 99 BPM | WEIGHT: 290.6 LBS | BODY MASS INDEX: 49.61 KG/M2 | DIASTOLIC BLOOD PRESSURE: 80 MMHG | OXYGEN SATURATION: 97 % | RESPIRATION RATE: 16 BRPM | TEMPERATURE: 98.1 F | HEIGHT: 64 IN | SYSTOLIC BLOOD PRESSURE: 122 MMHG

## 2022-05-17 DIAGNOSIS — R73.03 PRE-DIABETES: ICD-10-CM

## 2022-05-17 DIAGNOSIS — M25.562 ACUTE PAIN OF BOTH KNEES: ICD-10-CM

## 2022-05-17 DIAGNOSIS — E66.01 MORBID OBESITY (H): ICD-10-CM

## 2022-05-17 DIAGNOSIS — F33.9 RECURRENT MAJOR DEPRESSIVE DISORDER, REMISSION STATUS UNSPECIFIED (H): Primary | ICD-10-CM

## 2022-05-17 DIAGNOSIS — N95.1 PERIMENOPAUSAL SYMPTOMS: ICD-10-CM

## 2022-05-17 DIAGNOSIS — Z12.11 SCREEN FOR COLON CANCER: ICD-10-CM

## 2022-05-17 DIAGNOSIS — J45.40 MODERATE PERSISTENT ASTHMA WITHOUT COMPLICATION: ICD-10-CM

## 2022-05-17 DIAGNOSIS — E78.5 HYPERLIPIDEMIA LDL GOAL <160: ICD-10-CM

## 2022-05-17 DIAGNOSIS — M25.561 ACUTE PAIN OF BOTH KNEES: ICD-10-CM

## 2022-05-17 DIAGNOSIS — F39 MOOD DISORDER (H): ICD-10-CM

## 2022-05-17 DIAGNOSIS — M25.562 LEFT KNEE PAIN, UNSPECIFIED CHRONICITY: ICD-10-CM

## 2022-05-17 LAB
BASOPHILS # BLD AUTO: 0 10E3/UL (ref 0–0.2)
BASOPHILS NFR BLD AUTO: 0 %
EOSINOPHIL # BLD AUTO: 0.3 10E3/UL (ref 0–0.7)
EOSINOPHIL NFR BLD AUTO: 3 %
ERYTHROCYTE [DISTWIDTH] IN BLOOD BY AUTOMATED COUNT: 13.5 % (ref 10–15)
HBA1C MFR BLD: 5.7 % (ref 0–5.6)
HCT VFR BLD AUTO: 44 % (ref 35–47)
HGB BLD-MCNC: 14.3 G/DL (ref 11.7–15.7)
LYMPHOCYTES # BLD AUTO: 2.1 10E3/UL (ref 0.8–5.3)
LYMPHOCYTES NFR BLD AUTO: 22 %
MCH RBC QN AUTO: 29.7 PG (ref 26.5–33)
MCHC RBC AUTO-ENTMCNC: 32.5 G/DL (ref 31.5–36.5)
MCV RBC AUTO: 91 FL (ref 78–100)
MONOCYTES # BLD AUTO: 0.8 10E3/UL (ref 0–1.3)
MONOCYTES NFR BLD AUTO: 8 %
NEUTROPHILS # BLD AUTO: 6.4 10E3/UL (ref 1.6–8.3)
NEUTROPHILS NFR BLD AUTO: 67 %
PLATELET # BLD AUTO: 267 10E3/UL (ref 150–450)
RBC # BLD AUTO: 4.82 10E6/UL (ref 3.8–5.2)
WBC # BLD AUTO: 9.5 10E3/UL (ref 4–11)

## 2022-05-17 PROCEDURE — 85025 COMPLETE CBC W/AUTO DIFF WBC: CPT | Performed by: NURSE PRACTITIONER

## 2022-05-17 PROCEDURE — 36415 COLL VENOUS BLD VENIPUNCTURE: CPT | Performed by: NURSE PRACTITIONER

## 2022-05-17 PROCEDURE — 80053 COMPREHEN METABOLIC PANEL: CPT | Performed by: NURSE PRACTITIONER

## 2022-05-17 PROCEDURE — 73562 X-RAY EXAM OF KNEE 3: CPT | Mod: TC | Performed by: RADIOLOGY

## 2022-05-17 PROCEDURE — 83036 HEMOGLOBIN GLYCOSYLATED A1C: CPT | Performed by: NURSE PRACTITIONER

## 2022-05-17 PROCEDURE — 99215 OFFICE O/P EST HI 40 MIN: CPT | Performed by: NURSE PRACTITIONER

## 2022-05-17 PROCEDURE — 80061 LIPID PANEL: CPT | Performed by: NURSE PRACTITIONER

## 2022-05-17 RX ORDER — ALBUTEROL SULFATE 0.83 MG/ML
2.5 SOLUTION RESPIRATORY (INHALATION) EVERY 6 HOURS PRN
Qty: 3 ML | Refills: 3 | Status: SHIPPED | OUTPATIENT
Start: 2022-05-17 | End: 2022-08-25

## 2022-05-17 RX ORDER — ALBUTEROL SULFATE 90 UG/1
AEROSOL, METERED RESPIRATORY (INHALATION)
Qty: 8.5 G | Refills: 3 | Status: SHIPPED | OUTPATIENT
Start: 2022-05-17 | End: 2022-08-25

## 2022-05-17 ASSESSMENT — PAIN SCALES - GENERAL: PAINLEVEL: NO PAIN (0)

## 2022-05-17 NOTE — LETTER
[facility]  [address] [address]  Phone: [phone]    May 17, 2022        Mojgan Jimenez  [address]   [place] [address]          To whom it may concern:    RE: Mojgan Jimenez    Patient was seen and treated today at our clinic and missed work. She was ill from Friday May 13 th - May 17 th. Please excuse this absence.     Please contact me for questions or concerns.      Sincerely,        JUSTINE Flores CNP

## 2022-05-17 NOTE — PROGRESS NOTES
Pre diab  Assessment & Plan     Recurrent major depressive disorder, remission status unspecified (H)  Significantly worsened, missed work, lack of hygiene care, isolation. Recommend return to psych for medication review. Irene menopause may also being exacerbating her symptoms. We will try low dose BCP for short course to see if her mood swings improve. We reviewed No hormonal contraceptive risk factors:  Thrombophlebitis or thromboembolic disorder (DVT, PE, stroke); cerebrovascular or coronary artery disease; valvular heart disease with thrombogenic complications; ischemic heart disease; hypertension; migraine headaches; breast cancer or any other estrogen-dependent cancer; undiagnosed genital bleeding; acute or chronic liver disease; liver cancer; symptomatic gallbladder disease; vascular disease or diabetes of longer than 20 years duration; diabetes with complications of nephropathy/retinopathy/neuropathy; malabsorptive bariatric procedures; or lupus.    - Adult Mental Health  Referral; Future    Screen for colon cancer  Needs to return   - Fecal colorectal cancer screen FIT - Future (S+30); Future    Morbid obesity (H)  Also exacerbating MH  Recommend clean eating   - CBC with platelets and differential  - Hemoglobin A1c  - Comprehensive metabolic panel (BMP + Alb, Alk Phos, ALT, AST, Total. Bili, TP)    Hyperlipidemia LDL goal <160  Labs today diet discussed   - Lipid panel reflex to direct LDL Fasting    Moderate persistent asthma without complication  stable   - albuterol (PROVENTIL) (2.5 MG/3ML) 0.083% neb solution; Take 1 vial (2.5 mg) by nebulization every 6 hours as needed for shortness of breath / dyspnea or wheezing  - albuterol (PROAIR HFA) 108 (90 Base) MCG/ACT inhaler; Inhale 1-2 puffs into the lungs every 4 hours as needed for shortness of breath / dyspnea or wheezing    Acute pain of both knees  Right knee- Medial joint line tenderness on exam, mil effusion, concern for ligamentous injury.  Proceed with xray initially recommend MRI if no improvement with conservative management   For now RICE. Referral to ortho if no improvement.       XRAY                                                              IMPRESSION:  Mild narrowing and hypertrophic change in the medial  compartments. Minimal hypertrophic change of the patellofemoral  compartments. Normal patellar alignment. No fractures are evident.      SONAM POSEY MD     Pre-diabetes  A1c 5.7 -discussed diet and exercise.     Mood disorder (H)  Worsened as above plan to return to [facility] for medication evaluation       Ordering of each unique test  Prescription drug management  40 minutes spent on the date of the encounter doing chart review, history and exam, documentation and further activities per the note           No follow-ups on file.    JUSTINE Flores [facility]    Curtis Ulrich is a 51 year old who presents for the following health issues     HPI     Hyperlipidemia Follow-Up      Are you regularly taking any medication or supplement to lower your cholesterol?   Yes- Rosuvastatin    Are you having muscle aches or other side effects that you think could be caused by your cholesterol lowering medication?  No    Anxiety Follow-Up    How are you doing with your anxiety since your last visit? Worsened     Are you having other symptoms that might be associated with anxiety? Yes:  irritability, short tempered    Have you had a significant life event? OTHER: Hormones, medications     Are you feeling depressed? Yes:  patient thinks it is due to hormones    Do you have any concerns with your use of alcohol or other drugs? No     Patient has been out of work since Friday, increased irritability. Needs a doctor's note.  Says her MH is suffering. She is extremely irritable, depressed,  Didn't leave bedroom in days, didn't shower, no SI. She thinks it is her hormones. Irene menopause. Mild hot flashes. This is  not the issue. No quees to relapse, no craving but does have dreams.   Is no longer followed by psych clinic.     Has para guard IUD. She has a period every month. It did stop for a few months.       Patient has right knee, swelling behind the knee. Clicking sound inside. Doing ice and taking ibuprofen. No helping. Left knee also swells and has pain but not as bad as right.       Asthma is controlled.     She has cologuard kit at home needs to send this in.         Social History     Tobacco Use     Smoking status: Never Smoker     Smokeless tobacco: Never Used   Vaping Use     Vaping Use: Never used   Substance Use Topics     Alcohol use: No     Comment: Sober x 8 months     Drug use: No     Comment: in remision      LINA-7 SCORE 8/31/2021 9/20/2021 3/22/2022   Total Score - 19 (severe anxiety) 8 (mild anxiety)   Total Score 8 19 8     PHQ 8/31/2021 9/20/2021 3/22/2022   PHQ-9 Total Score 9 22 13   Q9: Thoughts of better off dead/self-harm past 2 weeks Not at all Not at all Not at all     Last PHQ-9 3/22/2022   1.  Little interest or pleasure in doing things 3   2.  Feeling down, depressed, or hopeless 1   3.  Trouble falling or staying asleep, or sleeping too much 2   4.  Feeling tired or having little energy 3   5.  Poor appetite or overeating 2   6.  Feeling bad about yourself 0   7.  Trouble concentrating 2   8.  Moving slowly or restless 0   Q9: Thoughts of better off dead/self-harm past 2 weeks 0   PHQ-9 Total Score 13   Difficulty at work, home, or with people -     LINA-7  3/22/2022   1. Feeling nervous, anxious, or on edge 3   2. Not being able to stop or control worrying 1   3. Worrying too much about different things 1   4. Trouble relaxing 0   5. Being so restless that it is hard to sit still 0   6. Becoming easily annoyed or irritable 3   7. Feeling afraid, as if something awful might happen 0   LINA-7 Total Score 8   If you checked any problems, how difficult have they made it for you to do your work,  "take care of things at home, or get along with other people? -         How many servings of fruits and vegetables do you eat daily?  0-1    On average, how many sweetened beverages do you drink each day (Examples: soda, juice, sweet tea, etc.  Do NOT count diet or artificially sweetened beverages)?   3    How many days per week do you exercise enough to make your heart beat faster? 3 or less    How many minutes a day do you exercise enough to make your heart beat faster? 9 or less    How many days per week do you miss taking your medication? 0        Review of Systems   Constitutional, HEENT, cardiovascular, pulmonary, GI, , musculoskeletal, neuro, skin, endocrine and psych systems are negative, except as otherwise noted.      Objective    /80 (BP Location: Right arm, Patient Position: Chair, Cuff Size: Adult Large)   Pulse 99   Temp 98.1  F (36.7  C) (Tympanic)   Resp 16   Ht 1.613 m (5' 3.5\")   Wt 131.8 kg (290 lb 9.6 oz)   SpO2 97%   Breastfeeding No   BMI 50.67 kg/m    There is no height or weight on file to calculate BMI.  Physical Exam   GENERAL: healthy, alert and no distress  EYES: Eyes grossly normal to inspection, PERRL and conjunctivae and sclerae normal  RESP: lungs clear to auscultation - no rales, rhonchi or wheezes  CV: regular rate and rhythm, normal S1 S2, no S3 or S4, no murmur, click or rub, no peripheral edema and peripheral pulses strong  MS: right knee - medial joint line tenderness.   SKIN: no suspicious lesions or rashes  NEURO: Normal strength and tone, mentation intact and speech normal  PSYCH: mentation appears normal, affect normal/bright, tearful, anxious and speech pressured    Office Visit on 08/31/2021   Component Date Value Ref Range Status     Cholesterol 08/31/2021 277 (A) <200 mg/dL Final    Age 0-19 years  Desirable: <170 mg/dL  Borderline high:  170-199 mg/dl  High:            >199 mg/dl    Age 20 years and older  Desirable: <200 mg/dL     Triglycerides " 08/31/2021 220 (A) <150 mg/dL Final    0-9 years:  Normal:    Less than 75 mg/dL  Borderline high:  75-99 mg/dL  High:             Greater than or equal to 100 mg/dL    0-19 years:  Normal:    Less than 90 mg/dL  Borderline high:   mg/dL  High:             Greater than or equal to 130 mg/dL    20 years and older:  Normal:    Less than 150 mg/dL  Borderline high:  150-199 mg/dL  High:             200-499 mg/dL  Very high:   Greater than or equal to 500 mg/dL     Direct Measure HDL 08/31/2021 39 (A) >=50 mg/dL Final    0-19 years:       Greater than or equal to 45 mg/dL   Low: Less than 40 mg/dL   Borderline low: 40-44 mg/dL     20 years and older:   Female: Greater than or equal to 50 mg/dL   Male:   Greater than or equal to 40 mg/dL          LDL Cholesterol Calculated 08/31/2021 194 (A) <=100 mg/dL Final    Age 0-19 years:  Desirable: 0-110 mg/dL   Borderline high: 110-129 mg/dL   High: >= 130 mg/dL    Age 20 years and older:  Desirable: <100mg/dL  Above desirable: 100-129 mg/dL   Borderline high: 130-159 mg/dL   High: 160-189 mg/dL   Very high: >= 190 mg/dL     Non HDL Cholesterol 08/31/2021 238 (A) <130 mg/dL Final    0-19 years:  Desirable:          Less than 120 mg/dL  Borderline high:   120-144 mg/dL  High:                   Greater than or equal to 145 mg/dL    20 years and older:  Desirable:          130 mg/dL  Above Desirable: 130-159 mg/dL  Borderline high:   160-189 mg/dL  High:               190-219 mg/dL  Very high:     Greater than or equal to 220 mg/dL     Patient Fasting > 8hrs? 08/31/2021 No   Final     Hemoglobin A1C 08/31/2021 5.5  0.0 - 5.6 % Final    Normal <5.7%   Prediabetes 5.7-6.4%    Diabetes 6.5% or higher     Note: Adopted from ADA consensus guidelines.     Sodium 08/31/2021 140  133 - 144 mmol/L Final     Potassium 08/31/2021 4.2  3.4 - 5.3 mmol/L Final     Chloride 08/31/2021 107  94 - 109 mmol/L Final     Carbon Dioxide (CO2) 08/31/2021 30  20 - 32 mmol/L Final     Anion Gap  08/31/2021 3  3 - 14 mmol/L Final     Urea Nitrogen 08/31/2021 16  7 - 30 mg/dL Final     Creatinine 08/31/2021 0.72  0.52 - 1.04 mg/dL Final     Calcium 08/31/2021 9.1  8.5 - 10.1 mg/dL Final     Glucose 08/31/2021 90  70 - 99 mg/dL Final     Alkaline Phosphatase 08/31/2021 86  40 - 150 U/L Final     AST 08/31/2021 19  0 - 45 U/L Final     ALT 08/31/2021 31  0 - 50 U/L Final     Protein Total 08/31/2021 7.7  6.8 - 8.8 g/dL Final     Albumin 08/31/2021 3.5  3.4 - 5.0 g/dL Final     Bilirubin Total 08/31/2021 0.4  0.2 - 1.3 mg/dL Final     GFR Estimate 08/31/2021 >90  >60 mL/min/1.73m2 Final    As of July 11, 2021, eGFR is calculated by the CKD-EPI creatinine equation, without race adjustment. eGFR can be influenced by muscle mass, exercise, and diet. The reported eGFR is an estimation only and is only applicable if the renal function is stable.

## 2022-05-17 NOTE — Clinical Note
Genaro Valenzuela, Mojgan is not doing well. She's really depressed and anxious, isolating, and lack of hygiene care.Could you see her for a tel/in clinic visit anytime soon to review her meds? Irene menopause is definitely a factor in this and we are trying a low dose BCP to see if this can help her moods as well.   Also to note, I am leaving , I am moving to the [facility]. I encouraged her to establish care right away with one of my colleagues given her vulnerable state.  Thanks, Rosa

## 2022-05-18 ENCOUNTER — TELEPHONE (OUTPATIENT)
Dept: FAMILY MEDICINE | Facility: CLINIC | Age: 52
End: 2022-05-18
Payer: COMMERCIAL

## 2022-05-18 LAB
ALBUMIN SERPL-MCNC: 4 G/DL (ref 3.4–5)
ALP SERPL-CCNC: 96 U/L (ref 40–150)
ALT SERPL W P-5'-P-CCNC: 22 U/L (ref 0–50)
ANION GAP SERPL CALCULATED.3IONS-SCNC: 2 MMOL/L (ref 3–14)
AST SERPL W P-5'-P-CCNC: 16 U/L (ref 0–45)
BILIRUB SERPL-MCNC: 0.6 MG/DL (ref 0.2–1.3)
BUN SERPL-MCNC: 16 MG/DL (ref 7–30)
CALCIUM SERPL-MCNC: 9.6 MG/DL (ref 8.5–10.1)
CHLORIDE BLD-SCNC: 102 MMOL/L (ref 94–109)
CHOLEST SERPL-MCNC: 176 MG/DL
CO2 SERPL-SCNC: 32 MMOL/L (ref 20–32)
CREAT SERPL-MCNC: 0.73 MG/DL (ref 0.52–1.04)
FASTING STATUS PATIENT QL REPORTED: YES
GFR SERPL CREATININE-BSD FRML MDRD: >90 ML/MIN/1.73M2
GLUCOSE BLD-MCNC: 110 MG/DL (ref 70–99)
HDLC SERPL-MCNC: 36 MG/DL
LDLC SERPL CALC-MCNC: 101 MG/DL
NONHDLC SERPL-MCNC: 140 MG/DL
POTASSIUM BLD-SCNC: 4.5 MMOL/L (ref 3.4–5.3)
PROT SERPL-MCNC: 8.2 G/DL (ref 6.8–8.8)
SODIUM SERPL-SCNC: 136 MMOL/L (ref 133–144)
TRIGL SERPL-MCNC: 196 MG/DL

## 2022-05-18 NOTE — TELEPHONE ENCOUNTER
Reason for Call:  Other     Detailed comments:  Patient saw Sagrario Lockhart yesterday and she had written a letter excusing her from work. Sagrario said that if she needed more time off that she would write a new letter. Patient is needing the letter to state that she is off from 5/13/22 to 5/19/22. Patient will be returning to work on Friday 5/20/22. Patient will  the letter at the  once it is ready.  Patient is needing the letter before she returns to work on Friday.  I explained that Rosa is out until next Tuesday.    Phone Number Patient can be reached at: Home number on file [phone] (home)    Best Time: any    Can we leave a detailed message on this number? YES    Call taken on 5/18/2022 at 9:52 AM by Dulce Daley

## 2022-05-18 NOTE — LETTER
[employer]  [address] [facility] [facility] [address]  Phone: [phone]    May 18, 2022        Mojgan Jimenez  [address]   [facility] [address]          To whom it may concern:    RE: Mojgan Jimenez    To whom it may concern:     RE: Mojgan Jimenez     Patient was seen and treated today at our clinic and missed work. She was ill from Friday May 13 th,, 2022 - May 19 th, 2022. Please excuse this absence.      Please contact me for questions or concerns.        Sincerely,      JUSTINE Flores CNP

## 2022-05-18 NOTE — TELEPHONE ENCOUNTER
Called patient and let her know that letter has been completed and she can  letter at .    Walked letter to  and logged into book.    STAR Sam  [facility]

## 2022-05-18 NOTE — TELEPHONE ENCOUNTER
Routing to PCP     Patient wanting return to work letter extended addend letter    Needing letter to state out from 5-13 to 5-19.  Returning to work Friday    Patient had visit 5/17    Brock Johnson, RN, BSN, PHN  [facility]

## 2022-05-24 ENCOUNTER — VIRTUAL VISIT (OUTPATIENT)
Dept: FAMILY MEDICINE | Facility: CLINIC | Age: 52
End: 2022-05-24
Payer: COMMERCIAL

## 2022-05-24 DIAGNOSIS — F41.1 GAD (GENERALIZED ANXIETY DISORDER): Primary | ICD-10-CM

## 2022-05-24 DIAGNOSIS — F33.9 RECURRENT MAJOR DEPRESSIVE DISORDER, REMISSION STATUS UNSPECIFIED (H): ICD-10-CM

## 2022-05-24 DIAGNOSIS — N95.1 PERIMENOPAUSAL SYMPTOMS: ICD-10-CM

## 2022-05-24 PROBLEM — F29 PSYCHOSIS (H): Status: RESOLVED | Noted: 2018-03-21 | Resolved: 2022-05-24

## 2022-05-24 PROBLEM — F15.950 AMPHETAMINE AND PSYCHOSTIMULANT-INDUCED PSYCHOSIS WITH DELUSIONS (H): Status: RESOLVED | Noted: 2018-07-10 | Resolved: 2022-05-24

## 2022-05-24 PROCEDURE — 99213 OFFICE O/P EST LOW 20 MIN: CPT | Mod: 95 | Performed by: NURSE PRACTITIONER

## 2022-05-24 NOTE — Clinical Note
Genaro Pennington, Thanks for your follow up and help! .  I spoke to Mojgan today, she was crying throughout our telephone visit she is having a real tough time.  I have placed a referral for you to see her one time and I  also discussed long-term psychiatry.  She does not want to return to [facility].  I thought when you saw her you could give her some suggestions on where to go long-term? Low, Rosa

## 2022-05-24 NOTE — PROGRESS NOTES
Mojgan is a 51 year old who is being evaluated via a billable telephone visit.      What phone number would you like to be contacted at? [phone]   How would you like to obtain your AVS? MyChart    Assessment & Plan       ICD-10-CM    1. LINA (generalized anxiety disorder)  F41.1 Adult Mental Health  Referral   2. Recurrent major depressive disorder, remission status unspecified (H)  F33.9 Adult Mental Health  Referral   3. Perimenopausal symptoms  N95.1      Her symptoms of irritability, crying, depressed mood, anxiety could be worsened by hormonal changes in perimenopause.  Patient has IUD in place I recommended we start a birth control pill last visit but she did not start this because she was still bleeding.  Advised her to start this today.  I did speak with Dr. Valenzuela and she is happy to see patient again for medication review there is a referral placed for this.  Dr. Valenzuela does recommend long-term psych involvement as do I.  She will need to establish care with a new PCP since I am moving out of [facility] and leaving [place] and she will need to establish care with a new psychiatrist.  We contracted for safety today.               No follow-ups on file.    JUSTINE Flores [facility]    Subjective   Mojgan is a 51 year old who presents for the following health issues     HPI     Birth control follow up, but patient has not started the contraception.     LMP: 05/04/2022, bleeding just stopped about yesterday.     Interval hx significant MH history.  Symptoms have worsened recently.  patient is crying on the phone today, irritable, depressed mood, anxious.  She missed work.  She was seen by Dr. Valenzuela in collaborative care psych for review medications I reached out to her she will see patient again I did place a referral at her last encounter I placed another 1 today she recommends long-term psychiatrist.  Patient does not want to return to [facility]  is that she has gone there in the past.  Advised her to discuss this with Dr. Valenzuela to see what her options are.    Contracted for safety today.      Review of Systems   Constitutional, HEENT, cardiovascular, pulmonary, gi and gu systems are negative, except as otherwise noted.      Objective           Vitals:  No vitals were obtained today due to virtual visit.    Physical Exam   alert and no distress  PSYCH: Alert and oriented times 3; coherent speech, normal   rate and volume, able to articulate logical thoughts, able   to abstract reason, no tangential thoughts, no hallucinations   or delusions  Her affect is normal  RESP: No cough, no audible wheezing, able to talk in full sentences  Remainder of exam unable to be completed due to telephone visits    Office Visit on 05/17/2022   Component Date Value Ref Range Status     Cholesterol 05/17/2022 176  <200 mg/dL Final     Triglycerides 05/17/2022 196 (A) <150 mg/dL Final     Direct Measure HDL 05/17/2022 36 (A) >=50 mg/dL Final     LDL Cholesterol Calculated 05/17/2022 101 (A) <=100 mg/dL Final     Non HDL Cholesterol 05/17/2022 140 (A) <130 mg/dL Final     Patient Fasting > 8hrs? 05/17/2022 Yes   Final     Hemoglobin A1C 05/17/2022 5.7 (A) 0.0 - 5.6 % Final    Normal <5.7%   Prediabetes 5.7-6.4%    Diabetes 6.5% or higher     Note: Adopted from ADA consensus guidelines.     Sodium 05/17/2022 136  133 - 144 mmol/L Final     Potassium 05/17/2022 4.5  3.4 - 5.3 mmol/L Final     Chloride 05/17/2022 102  94 - 109 mmol/L Final     Carbon Dioxide (CO2) 05/17/2022 32  20 - 32 mmol/L Final     Anion Gap 05/17/2022 2 (A) 3 - 14 mmol/L Final     Urea Nitrogen 05/17/2022 16  7 - 30 mg/dL Final     Creatinine 05/17/2022 0.73  0.52 - 1.04 mg/dL Final     Calcium 05/17/2022 9.6  8.5 - 10.1 mg/dL Final     Glucose 05/17/2022 110 (A) 70 - 99 mg/dL Final     Alkaline Phosphatase 05/17/2022 96  40 - 150 U/L Final     AST 05/17/2022 16  0 - 45 U/L Final     ALT 05/17/2022 22  0 - 50  U/L Final     Protein Total 05/17/2022 8.2  6.8 - 8.8 g/dL Final     Albumin 05/17/2022 4.0  3.4 - 5.0 g/dL Final     Bilirubin Total 05/17/2022 0.6  0.2 - 1.3 mg/dL Final     GFR Estimate 05/17/2022 >90  >60 mL/min/1.73m2 Final    Effective December 21, 2021 eGFRcr in adults is calculated using the 2021 CKD-EPI creatinine equation which includes age and gender (Jamal et al., NE, DOI: 10.1056/IQBPqe9803972)     WBC Count 05/17/2022 9.5  4.0 - 11.0 10e3/uL Final     RBC Count 05/17/2022 4.82  3.80 - 5.20 10e6/uL Final     Hemoglobin 05/17/2022 14.3  11.7 - 15.7 g/dL Final     Hematocrit 05/17/2022 44.0  35.0 - 47.0 % Final     MCV 05/17/2022 91  78 - 100 fL Final     MCH 05/17/2022 29.7  26.5 - 33.0 pg Final     MCHC 05/17/2022 32.5  31.5 - 36.5 g/dL Final     RDW 05/17/2022 13.5  10.0 - 15.0 % Final     Platelet Count 05/17/2022 267  150 - 450 10e3/uL Final     % Neutrophils 05/17/2022 67  % Final     % Lymphocytes 05/17/2022 22  % Final     % Monocytes 05/17/2022 8  % Final     % Eosinophils 05/17/2022 3  % Final     % Basophils 05/17/2022 0  % Final     Absolute Neutrophils 05/17/2022 6.4  1.6 - 8.3 10e3/uL Final     Absolute Lymphocytes 05/17/2022 2.1  0.8 - 5.3 10e3/uL Final     Absolute Monocytes 05/17/2022 0.8  0.0 - 1.3 10e3/uL Final     Absolute Eosinophils 05/17/2022 0.3  0.0 - 0.7 10e3/uL Final     Absolute Basophils 05/17/2022 0.0  0.0 - 0.2 10e3/uL Final               Phone call duration: 15 minutes

## 2022-06-08 ENCOUNTER — TELEPHONE (OUTPATIENT)
Dept: FAMILY MEDICINE | Facility: CLINIC | Age: 52
End: 2022-06-08
Payer: COMMERCIAL

## 2022-06-08 NOTE — TELEPHONE ENCOUNTER
Reason for Call:  Form, our goal is to have forms completed with 72 hours, however, some forms may require a visit or additional information.    Type of letter, form or note:  medical    Who is the form from?: Patient    Where did the form come from: Patient or family brought in       What clinic location was the form placed at?: [facility])    Where the form was placed: [employer] Box/Folder    What number is listed as a contact on the form?: [phone]       Additional comments: Please call patient to  completed forms .     Call taken on 6/8/2022 at 1:51 PM by Светлана Bartlett

## 2022-06-09 NOTE — TELEPHONE ENCOUNTER
"Called patient to let her know that she will need to establish care with a new provider so that they can complete FMLA form for her. Patient did not want to schedule an appointment right now to establish care.  I asked patient what she would want us to do with form and she said she would have to call her employer and ask them.  Placed form in [employer] \"holding forms folder\".    STAR Sam  [facility]    "

## 2022-06-09 NOTE — TELEPHONE ENCOUNTER
Patient called back and is asking that Dr Terrazas complete form at her upcoming visit with him.  Placed form in Dr Terrazas sign me folder.    STAR Sam  [facility]

## 2022-06-15 ENCOUNTER — OFFICE VISIT (OUTPATIENT)
Dept: FAMILY MEDICINE | Facility: CLINIC | Age: 52
End: 2022-06-15
Payer: COMMERCIAL

## 2022-06-15 ENCOUNTER — TELEPHONE (OUTPATIENT)
Dept: FAMILY MEDICINE | Facility: CLINIC | Age: 52
End: 2022-06-15
Payer: COMMERCIAL

## 2022-06-15 VITALS
RESPIRATION RATE: 24 BRPM | OXYGEN SATURATION: 97 % | TEMPERATURE: 98.1 F | SYSTOLIC BLOOD PRESSURE: 134 MMHG | WEIGHT: 291 LBS | HEART RATE: 105 BPM | BODY MASS INDEX: 49.68 KG/M2 | HEIGHT: 64 IN | DIASTOLIC BLOOD PRESSURE: 80 MMHG

## 2022-06-15 DIAGNOSIS — N95.1 PERIMENOPAUSAL SYMPTOMS: ICD-10-CM

## 2022-06-15 DIAGNOSIS — F41.1 GAD (GENERALIZED ANXIETY DISORDER): Primary | ICD-10-CM

## 2022-06-15 PROCEDURE — 99214 OFFICE O/P EST MOD 30 MIN: CPT | Performed by: FAMILY MEDICINE

## 2022-06-15 PROCEDURE — 96127 BRIEF EMOTIONAL/BEHAV ASSMT: CPT | Mod: 59 | Performed by: FAMILY MEDICINE

## 2022-06-15 RX ORDER — VENLAFAXINE HYDROCHLORIDE 75 MG/1
225 CAPSULE, EXTENDED RELEASE ORAL DAILY
Qty: 270 CAPSULE | Refills: 3 | Status: SHIPPED | OUTPATIENT
Start: 2022-06-15 | End: 2022-08-25

## 2022-06-15 RX ORDER — GABAPENTIN 300 MG/1
300 CAPSULE ORAL 2 TIMES DAILY PRN
Qty: 180 CAPSULE | Refills: 3 | Status: SHIPPED | OUTPATIENT
Start: 2022-06-15 | End: 2022-08-25

## 2022-06-15 ASSESSMENT — ANXIETY QUESTIONNAIRES
GAD7 TOTAL SCORE: 13
1. FEELING NERVOUS, ANXIOUS, OR ON EDGE: NEARLY EVERY DAY
7. FEELING AFRAID AS IF SOMETHING AWFUL MIGHT HAPPEN: SEVERAL DAYS
2. NOT BEING ABLE TO STOP OR CONTROL WORRYING: MORE THAN HALF THE DAYS
GAD7 TOTAL SCORE: 13
3. WORRYING TOO MUCH ABOUT DIFFERENT THINGS: NEARLY EVERY DAY
5. BEING SO RESTLESS THAT IT IS HARD TO SIT STILL: NOT AT ALL
6. BECOMING EASILY ANNOYED OR IRRITABLE: NEARLY EVERY DAY
IF YOU CHECKED OFF ANY PROBLEMS ON THIS QUESTIONNAIRE, HOW DIFFICULT HAVE THESE PROBLEMS MADE IT FOR YOU TO DO YOUR WORK, TAKE CARE OF THINGS AT HOME, OR GET ALONG WITH OTHER PEOPLE: VERY DIFFICULT

## 2022-06-15 ASSESSMENT — ASTHMA QUESTIONNAIRES: ACT_TOTALSCORE: 20

## 2022-06-15 ASSESSMENT — PATIENT HEALTH QUESTIONNAIRE - PHQ9
5. POOR APPETITE OR OVEREATING: SEVERAL DAYS
SUM OF ALL RESPONSES TO PHQ QUESTIONS 1-9: 18

## 2022-06-15 ASSESSMENT — PAIN SCALES - GENERAL: PAINLEVEL: NO PAIN (0)

## 2022-06-15 NOTE — COMMUNITY RESOURCES LIST (ENGLISH)
06/15/2022   [facility] - Outpatient Clinics  Kristina Whittaker  For questions about this resource list or additional care needs, please contact your primary care clinic or care manager.  Phone: [phone]   Email: N/A   Address: [facility] [place] [address]   Hours: N/A        Hotlines and Helplines       Hotline - Crisis help  1  [facility] Distance: 1.89 miles      COVID-19 Status: Phone/Virtual   [address] [facility] [address] [place] [address]  Language: English, Romansh, Indonesian  Hours: Mon - Sun 10:00 AM - 10:00 PM   Phone: (343) [phone] Email: [email or web address] Website: [email or web address]/     2  [facility] - Emergency  Communications Distance: 5.28 miles      COVID-19 Status: Phone/Virtual   [address] [place] [address]  Language: English, Irish  Hours: Mon - Sun Open 24 Hours   Phone: (478) [phone] Email: [email or web address] Website: [email or web address]://[email or web address]/local/mn-nd-sd/[email or web address]-us/locations/[email or web address].html          Mental Health       Individual counseling  3  [employer] [facility], [facility] - [facility] Distance: 1.3 miles      COVID-19 Status: Phone/Virtual   [address] [place] [address]  Language: English  Hours: Mon - Fri 9:00 AM - 5:00 PM Appt. Only  Fees: Insurance, Self Pay   Phone: (404) 204-6464 Email: [email or web address] Website: https://[email or web address]/     4  [facility] Distance: 1.45 miles      COVID-19 Status: Regular Operations, COVID-19 Status: Phone/Virtual   [address] [place] [address]  Language: English  Hours: Mon - Fri 8:00 AM - 5:00 PM  Fees: Insurance, Self Pay   Phone: (993) 659-8168 Website: [email or web address]://www.pediatricVidible.[email or web address]     Mental health crisis care  5  [facility] Distance: 5.09 miles      COVID-19 Status: Regular Operations, COVID-19 Status: Phone/Virtual   [address]  [facility] [address] [place] [address]  Language: English, Macanese  Hours: Mon - Fri 9:00 AM - 5:00 PM  Fees: Insurance, Self Pay   Phone: (447) 421-4154 Website: http://[employer]/index.php     6  [facility] Distance: 5.95 miles      COVID-19 Status: Regular Operations   [address] [place] [address]  Language: English  Hours: Mon - Sun Open 24 Hours  Fees: Insurance, Self Pay, Sliding Fee   Phone: (191) 242-7780 Website: [email or web address]/specialty/psychiatry/acute-psychiatry-services/     Mental health support group  7  [facility] Distance: 5.67 miles      COVID-19 Status: Phone/Virtual   [address] [facility] [address] [place] [address]  Language: English  Hours: Mon - Fri 9:00 AM - 5:00 PM  Fees: Free   Phone: (319) 681-4329 Email: info@mentalhealthmn.org Website: http://www.mentalhealthmn.org     8  [facility] Distance: 5.72 miles      COVID-19 Status: Regular Operations   [address] [place] [address]  Language: English  Hours: Tue 7:30 PM - 9:30 PM  Fees: Free   Phone: (539) 894-4666 Email: irina@[employer] Website: http://www.ppsupportmn.org/multiples          Important Numbers & Websites       Emergency Services   911  [facility]   311  Poison Control   (409) 658-7708  Suicide Prevention Lifeline   (655) 596-1563 (TALK)  Child Abuse Hotline   (897) 762-2146 (4-A-Child)  Sexual Assault Hotline   (567) 100-8040 (HOPE)  National Runaway Safeline   (957) 728-9083 (RUNAWAY)  All-Options Talkline   (298) 796-4754  Substance Abuse Referral   (316) 673-1596 (HELP)

## 2022-06-15 NOTE — PROGRESS NOTES
Assessment & Plan     LINA (generalized anxiety disorder)  Because of increased anxiety, patient is unable to work as a   She works as a  at a local grocery store.  Her increased Anxiety causes more stress, she is unable to focus at work.  Forms, she has 2 forms, both were filled, made a copy and give her back to her.    Continue with current medications,   If continue to have symptoms, then she may need to be referred to therapist and psychiatry.    She will be off work from 2 months fifth until August 1, 2020  - venlafaxine (EFFEXOR XR) 75 MG 24 hr capsule; Take 3 capsules (225 mg) by mouth daily  - gabapentin (NEURONTIN) 300 MG capsule; Take 1 capsule (300 mg) by mouth 2 times daily as needed (anxiety/agitation)    Perimenopausal symptoms    - norgestrel-ethinyl estradiol (LO/OVRAL) 0.3-30 MG-MCG tablet; Take 1 tablet by mouth daily    Spent over 20  minutes spent reviewing chart, reviewing test results, talking with  patient, formulating plan, filling FMLA forms, and documentation on the day of the encounter.      No follow-ups on file.    Chip Terrazas MD  [facility]    Curtis Ulrich is a 51 year old who presents for the following health issues     HPI     -- FMLA forms to be filled out    Depression and Anxiety Follow-Up    How are you doing with your depression since your last visit? No change    How are you doing with your anxiety since your last visit?  No change    Are you having other symptoms that might be associated with depression or anxiety? Yes:  anxiety    Have you had a significant life event? No     Do you have any concerns with your use of alcohol or other drugs? No    Social History     Tobacco Use     Smoking status: Never Smoker     Smokeless tobacco: Never Used   Vaping Use     Vaping Use: Never used   Substance Use Topics     Alcohol use: No     Comment: Sober x 8 months     Drug use: No     Comment: in remision      PHQ 9/20/2021 3/22/2022  6/15/2022   PHQ-9 Total Score 22 13 18   Q9: Thoughts of better off dead/self-harm past 2 weeks Not at all Not at all Not at all     LINA-7 SCORE 9/20/2021 3/22/2022 6/15/2022   Total Score 19 (severe anxiety) 8 (mild anxiety) -   Total Score 19 8 13     Last PHQ-9 6/15/2022   1.  Little interest or pleasure in doing things 3   2.  Feeling down, depressed, or hopeless 2   3.  Trouble falling or staying asleep, or sleeping too much 2   4.  Feeling tired or having little energy 3   5.  Poor appetite or overeating 2   6.  Feeling bad about yourself 2   7.  Trouble concentrating 3   8.  Moving slowly or restless 1   Q9: Thoughts of better off dead/self-harm past 2 weeks 0   PHQ-9 Total Score 18   Difficulty at work, home, or with people Very difficult     LINA-7  6/15/2022   1. Feeling nervous, anxious, or on edge 3   2. Not being able to stop or control worrying 2   3. Worrying too much about different things 3   4. Trouble relaxing 1   5. Being so restless that it is hard to sit still 0   6. Becoming easily annoyed or irritable 3   7. Feeling afraid, as if something awful might happen 1   LINA-7 Total Score 13   If you checked any problems, how difficult have they made it for you to do your work, take care of things at home, or get along with other people? Very difficult       Suicide Assessment Five-step Evaluation and Treatment (SAFE-T)          How many servings of fruits and vegetables do you eat daily?  0-1    On average, how many sweetened beverages do you drink each day (Examples: soda, juice, sweet tea, etc.  Do NOT count diet or artificially sweetened beverages)?   2    How many days per week do you exercise enough to make your heart beat faster? 3 or less    How many minutes a day do you exercise enough to make your heart beat faster? 9 or less    How many days per week do you miss taking your medication? 0    Review of Systems   Constitutional, HEENT, cardiovascular, pulmonary, GI, , musculoskeletal, neuro,  "skin, endocrine and psych systems are negative, except as otherwise noted.      Objective    /80 (BP Location: Right arm, Patient Position: Sitting, Cuff Size: Adult Large)   Pulse 105   Temp 98.1  F (36.7  C) (Oral)   Resp 24   Ht 1.613 m (5' 3.5\")   Wt 132 kg (291 lb)   SpO2 97%   BMI 50.74 kg/m    Body mass index is 50.74 kg/m .  Physical Exam   GENERAL: healthy, alert and no distress  PSYCH: mentation appears normal, affect normal/bright        Chip Terrazas MD        "

## 2022-07-15 ENCOUNTER — ANCILLARY PROCEDURE (OUTPATIENT)
Dept: MAMMOGRAPHY | Facility: CLINIC | Age: 52
End: 2022-07-15
Attending: NURSE PRACTITIONER
Payer: COMMERCIAL

## 2022-07-15 DIAGNOSIS — Z12.31 VISIT FOR SCREENING MAMMOGRAM: ICD-10-CM

## 2022-07-15 DIAGNOSIS — Z00.00 ROUTINE GENERAL MEDICAL EXAMINATION AT A HEALTH CARE FACILITY: ICD-10-CM

## 2022-07-15 PROCEDURE — 77067 SCR MAMMO BI INCL CAD: CPT | Mod: TC | Performed by: RADIOLOGY

## 2022-08-25 ENCOUNTER — OFFICE VISIT (OUTPATIENT)
Dept: FAMILY MEDICINE | Facility: CLINIC | Age: 52
End: 2022-08-25
Payer: COMMERCIAL

## 2022-08-25 VITALS
BODY MASS INDEX: 49.58 KG/M2 | DIASTOLIC BLOOD PRESSURE: 86 MMHG | HEART RATE: 111 BPM | RESPIRATION RATE: 24 BRPM | TEMPERATURE: 98.7 F | OXYGEN SATURATION: 96 % | HEIGHT: 64 IN | WEIGHT: 290.4 LBS | SYSTOLIC BLOOD PRESSURE: 132 MMHG

## 2022-08-25 DIAGNOSIS — N92.4 EXCESSIVE BLEEDING IN PREMENOPAUSAL PERIOD: Primary | ICD-10-CM

## 2022-08-25 DIAGNOSIS — Z12.11 SCREEN FOR COLON CANCER: ICD-10-CM

## 2022-08-25 DIAGNOSIS — J45.40 MODERATE PERSISTENT ASTHMA WITHOUT COMPLICATION: ICD-10-CM

## 2022-08-25 DIAGNOSIS — F41.1 GAD (GENERALIZED ANXIETY DISORDER): ICD-10-CM

## 2022-08-25 DIAGNOSIS — E66.01 MORBID OBESITY (H): Chronic | ICD-10-CM

## 2022-08-25 LAB — HGB BLD-MCNC: 13.4 G/DL (ref 11.7–15.7)

## 2022-08-25 PROCEDURE — 85018 HEMOGLOBIN: CPT

## 2022-08-25 PROCEDURE — 36415 COLL VENOUS BLD VENIPUNCTURE: CPT

## 2022-08-25 PROCEDURE — 99214 OFFICE O/P EST MOD 30 MIN: CPT

## 2022-08-25 RX ORDER — CETIRIZINE HYDROCHLORIDE 10 MG/1
10 TABLET ORAL DAILY
Qty: 90 TABLET | Refills: 3 | Status: SHIPPED | OUTPATIENT
Start: 2022-08-25 | End: 2023-08-20

## 2022-08-25 RX ORDER — GABAPENTIN 300 MG/1
300 CAPSULE ORAL 2 TIMES DAILY PRN
Qty: 60 CAPSULE | Refills: 5 | Status: SHIPPED | OUTPATIENT
Start: 2022-08-25 | End: 2023-10-06

## 2022-08-25 RX ORDER — ALBUTEROL SULFATE 0.83 MG/ML
2.5 SOLUTION RESPIRATORY (INHALATION) EVERY 6 HOURS PRN
Qty: 3 ML | Refills: 4 | Status: SHIPPED | OUTPATIENT
Start: 2022-08-25 | End: 2022-12-28

## 2022-08-25 RX ORDER — VENLAFAXINE HYDROCHLORIDE 75 MG/1
225 CAPSULE, EXTENDED RELEASE ORAL DAILY
Qty: 270 CAPSULE | Refills: 3 | Status: SHIPPED | OUTPATIENT
Start: 2022-08-25 | End: 2023-10-06

## 2022-08-25 RX ORDER — ALBUTEROL SULFATE 90 UG/1
AEROSOL, METERED RESPIRATORY (INHALATION)
Qty: 8.5 G | Refills: 11 | Status: SHIPPED | OUTPATIENT
Start: 2022-08-25 | End: 2023-05-24

## 2022-08-25 ASSESSMENT — ANXIETY QUESTIONNAIRES
GAD7 TOTAL SCORE: 6
2. NOT BEING ABLE TO STOP OR CONTROL WORRYING: SEVERAL DAYS
1. FEELING NERVOUS, ANXIOUS, OR ON EDGE: MORE THAN HALF THE DAYS
5. BEING SO RESTLESS THAT IT IS HARD TO SIT STILL: NOT AT ALL
3. WORRYING TOO MUCH ABOUT DIFFERENT THINGS: SEVERAL DAYS
7. FEELING AFRAID AS IF SOMETHING AWFUL MIGHT HAPPEN: NOT AT ALL
IF YOU CHECKED OFF ANY PROBLEMS ON THIS QUESTIONNAIRE, HOW DIFFICULT HAVE THESE PROBLEMS MADE IT FOR YOU TO DO YOUR WORK, TAKE CARE OF THINGS AT HOME, OR GET ALONG WITH OTHER PEOPLE: EXTREMELY DIFFICULT
6. BECOMING EASILY ANNOYED OR IRRITABLE: MORE THAN HALF THE DAYS
GAD7 TOTAL SCORE: 6

## 2022-08-25 ASSESSMENT — PATIENT HEALTH QUESTIONNAIRE - PHQ9
SUM OF ALL RESPONSES TO PHQ QUESTIONS 1-9: 15
5. POOR APPETITE OR OVEREATING: NOT AT ALL

## 2022-08-25 ASSESSMENT — PAIN SCALES - GENERAL: PAINLEVEL: NO PAIN (0)

## 2022-08-25 ASSESSMENT — ASTHMA QUESTIONNAIRES: ACT_TOTALSCORE: 13

## 2022-08-25 NOTE — PROGRESS NOTES
Assessment & Plan     Excessive bleeding in premenopausal period  Acute/exacerbation/medication affect. Pt requesting to take an oral estrogen alone instead of ELVIN,  Patient concerned about knowing when she is through menopause, wanting FSH level checked discussed possibility of continuous withdrawal bleeds while taking an OCP even after menopause would have occurred and variability of hormone levels.  Defer further management to OBGYN? Better options for menopause management with severe mood symptoms?   - change how you take LO/OVRAL (take placebo pills for 3 days to allow withdrawal bleed, then restart).  - Ob/Gyn Referral; Future  - Hemoglobin    LINA (generalized anxiety disorder)  Chronic, variable/exacerbated. Improved with ELVIN started in June but severe with placebo week of pills.  - gabapentin (NEURONTIN) 300 MG capsule; Take 1 capsule (300 mg) by mouth 2 times daily as needed (anxiety/agitation)  - venlafaxine (EFFEXOR XR) 75 MG 24 hr capsule; Take 3 capsules (225 mg) by mouth daily for 360 days    Morbid obesity (H)  Chronic. BMI > 50. No changes today, patient would like to work on weight loss. Increases risk of DVT/PE especially with use of ELVIN. Appreciate OBGYN input on recommendations for menopause management in this patient.     Moderate persistent asthma without complication  Meds refilled for 1 year. Not discussed in detail today due to complexity of anxiety/menopause symptom conversation.   - albuterol (PROAIR HFA) 108 (90 Base) MCG/ACT inhaler; Inhale 1-2 puffs into the lungs every 4 hours as needed for shortness of breath / dyspnea or wheezing  - albuterol (PROVENTIL) (2.5 MG/3ML) 0.083% neb solution; Take 1 vial (2.5 mg) by nebulization every 6 hours as needed for shortness of breath / dyspnea or wheezing  - cetirizine (ZYRTEC) 10 MG tablet; Take 1 tablet (10 mg) by mouth daily for 360 days    Screen for colon cancer  - Fecal colorectal cancer screen FIT - Future (S+30)             See Patient  Instructions    Return if symptoms worsen or fail to improve.    JUSTINE Chaves CNP  [facility]        Declined Covid booster, Zoster vaccine (edu about pharmacy administration given).    Curtis Ulrich is a 52 year old, presenting for the following health issues:  Establish Care, Abnormal Bleeding Problem, Depression (/), and Asthma      HPI     Establish care, Renew medications    Had covid first week of August.  Discuss Birth control, menopause     Allergy medication, takes generic zyrtec, allergies has been acting up, affecting asthma.    Advair inhaler     Menopause: Symptoms started with constant/daily increased anxiety, irritability, mood changes - severe/debilitating she was missing work/FMLA. + night sweats. Periods irregular and longer cycles in between with occasional light spotting in between - has Paragard IUD since 04/2019 for contraception.     Started OCPs (LO/OVRAL) 06/2022 to treat mood changes associated with hormonal changes of juancho-menopause. Since starting: mood symptoms, hot flashes,  significantly improved. Was taking placebo week as directed, had having heavy bleeding, severe fatigue, mood swings, anxiety and irritability during withdrawal week. Most recent change - skip withdrawal week at end of last pack and had 3 weeks of continual cramping, blood clots that are large, dark red, has to wear 2 maxi-pads to prevent leaking, and increasing fatigue lately.     Denies urinary symptoms.     Depression and Anxiety Follow-Up    How are you doing with your depression since your last visit? Worsened, pt is bleeding a lot and feels more depressed about it. Has been bleeding with clots for 3 weeks. Pt is going to through menopause. FSH labs? Irregular periods, pt reports previous hormones abnormal. Been on birth control for 4 months.    LMP: approx 08/08/22     How are you doing with your anxiety since your last visit?  Worsened     Are you having other symptoms  that might be associated with depression or anxiety? No    Have you had a significant life event? No     Do you have any concerns with your use of alcohol or other drugs? No    Social History     Tobacco Use     Smoking status: Never Smoker     Smokeless tobacco: Never Used   Vaping Use     Vaping Use: Never used   Substance Use Topics     Alcohol use: No     Comment: Sober x 8 months     Drug use: No     Comment: in remision      PHQ 9/20/2021 3/22/2022 6/15/2022   PHQ-9 Total Score 22 13 18   Q9: Thoughts of better off dead/self-harm past 2 weeks Not at all Not at all Not at all     LINA-7 SCORE 9/20/2021 3/22/2022 6/15/2022   Total Score 19 (severe anxiety) 8 (mild anxiety) -   Total Score 19 8 13         Suicide Assessment Five-step Evaluation and Treatment (SAFE-T)      Asthma Follow-Up    Was ACT completed today?    Yes    ACT Total Scores 8/25/2022   ACT TOTAL SCORE (Goal Greater than or Equal to 20) 13   In the past 12 months, how many times did you visit the emergency room for your asthma without being admitted to the hospital? 0   In the past 12 months, how many times were you hospitalized overnight because of your asthma? 0       How many days per week do you miss taking your asthma controller medication?  0    Please describe any recent triggers for your asthma: allergies    Have you had any Emergency Room Visits, Urgent Care Visits, or Hospital Admissions since your last office visit?  No      How many servings of fruits and vegetables do you eat daily?  0-1    On average, how many sweetened beverages do you drink each day (Examples: soda, juice, sweet tea, etc.  Do NOT count diet or artificially sweetened beverages)?   2    How many days per week do you exercise enough to make your heart beat faster? 3 or less    How many minutes a day do you exercise enough to make your heart beat faster? 9 or less    How many days per week do you miss taking your medication? 0        Review of Systems  "  Constitutional, HEENT, cardiovascular, pulmonary, gi and gu systems are negative, except as otherwise noted.      Objective    /86 (BP Location: Right arm, Patient Position: Chair, Cuff Size: Adult Large)   Pulse 111   Temp 98.7  F (37.1  C) (Oral)   Resp 24   Ht 1.613 m (5' 3.5\")   Wt 131.7 kg (290 lb 6.4 oz)   SpO2 96%   Breastfeeding No   BMI 50.64 kg/m    Body mass index is 50.64 kg/m .  Physical Exam   GENERAL: healthy, alert and no distress  CV/RESP: breathing unlabored, no cough, wheezing. No peripheral edema.  MS: no gross musculoskeletal defects noted, no edema  SKIN: no suspicious lesions or rashes  NEURO: Normal strength and tone, mentation intact and speech normal  PSYCH: POSITIVE for significant signs of anxiety - tearful, restless, varbalizing anxious thoughts. Otherwise mentation appears normal, affect normal/bright    Results for orders placed or performed in visit on 08/25/22 (from the past 24 hour(s))   Hemoglobin   Result Value Ref Range    Hemoglobin 13.4 11.7 - 15.7 g/dL                   .  ..  "

## 2022-08-30 ENCOUNTER — TELEPHONE (OUTPATIENT)
Dept: SURGERY | Facility: CLINIC | Age: 52
End: 2022-08-30

## 2022-08-30 ENCOUNTER — OFFICE VISIT (OUTPATIENT)
Dept: SURGERY | Facility: CLINIC | Age: 52
End: 2022-08-30
Attending: NURSE PRACTITIONER
Payer: COMMERCIAL

## 2022-08-30 ENCOUNTER — DOCUMENTATION ONLY (OUTPATIENT)
Dept: SURGERY | Facility: CLINIC | Age: 52
End: 2022-08-30

## 2022-08-30 VITALS
BODY MASS INDEX: 51.79 KG/M2 | WEIGHT: 292.3 LBS | SYSTOLIC BLOOD PRESSURE: 122 MMHG | HEIGHT: 63 IN | DIASTOLIC BLOOD PRESSURE: 74 MMHG

## 2022-08-30 DIAGNOSIS — E78.5 HYPERLIPIDEMIA LDL GOAL <160: ICD-10-CM

## 2022-08-30 DIAGNOSIS — E66.01 MORBID OBESITY (H): Primary | Chronic | ICD-10-CM

## 2022-08-30 DIAGNOSIS — N39.3 FEMALE STRESS INCONTINENCE: ICD-10-CM

## 2022-08-30 DIAGNOSIS — J45.30 MILD PERSISTENT ASTHMA WITHOUT COMPLICATION: Chronic | ICD-10-CM

## 2022-08-30 PROCEDURE — 99205 OFFICE O/P NEW HI 60 MIN: CPT | Performed by: FAMILY MEDICINE

## 2022-08-30 NOTE — LETTER
"    2022         RE: Mojgan Jimenez  [address]  [place] [address]        Dear Colleague,    Thank you for referring your patient, Mojgan Jimenez, to the [facility] [place]. Please see a copy of my visit note below.    New Bariatric Surgery Consultation Note    2022    RE: Mojgan Jimenez  MR#: [id number]  : 1970      Referring provider:       2022   Who referred you? Dr. Sagrario Lockhart       Chief Complaint/Reason for visit: evaluation for possible weight loss surgery    Dear No primary care provider on file.,    I had the pleasure of seeing your patient, Mojgan Jimenez, to evaluate her obesity and consider her for possible weight loss surgery. As you know, Mojgan Jimenez is 52 year old.  She has a height of 5' 3\", a weight of 292 lbs 4.8 oz, and calculated Body mass index is 51.78 kg/m .        HISTORY OF PRESENT ILLNESS:  Weight Loss History Reviewed with Patient 2022   How long have you been overweight? Since late 20's to early 40's   What is the most that you have ever weighed? 290   What is the most weight you have lost? 120   I have tried the following methods to lose weight Watching portions or calories, Weight Watchers, OTC supplements   I have tried the following weight loss medications? (Check all that apply) None   Have you ever had weight loss surgery? No       CO-MORBIDITIES OF OBESITY INCLUDE:     2022   I have the following health issues associated with obesity: High Cholesterol, Asthma       PAST MEDICAL HISTORY:  Past Medical History:   Diagnosis Date     LINA (generalized anxiety disorder) 2017     Hyperlipidemia LDL goal <160 2019     Major depressive disorder      Methanol abuse (H)      Mild persistent asthma without complication 2017     Vitamin D deficiency 2017       PAST SURGICAL HISTORY:  Past Surgical History:   Procedure Laterality Date     MAMMOPLASTY REDUCTION      " reduction       FAMILY HISTORY:   Family History   Problem Relation Age of Onset     Cancer Mother      Unknown/Adopted Father      Alcoholism Father      Substance Abuse Sister      Diabetes No family hx of      Hypertension No family hx of        SOCIAL HISTORY:   Social History Questions Reviewed With Patient 8/29/2022   Which best describes your employment status (select all that apply) I work full-time   If you work, what is your occupation?    Which best describes your marital status:    Do you have children? Yes   Who do you have in your support network that can be available to help you for the first 2 weeks after surgery? Teressa   Who can you count on for support throughout your weight loss surgery journey? Teressa   Can you afford 3 meals a day?  Yes   Can you afford 50-60 dollars a month for vitamins? No       HABITS:     8/29/2022   How often do you drink alcohol? Monthly or less   If you do drink alcohol, how many drinks might you have in a day? (one drink = 5 oz. wine, 1 can/bottle of beer, 1 shot liquor) 1 or 2   Have you ever used any of the following nicotine products? No   Have you or are you currently using street drugs or prescription strength medication for which you do not have a prescription for? Not currently- was using meth- clean for 4 years now   Do you have a history of chemical dependency (alcohol or drug abuse)? Yes       PSYCHOLOGICAL HISTORY:   Psychological History Reviewed With Patient 8/29/2022   Have you ever attempted suicide?  5 years ago   Have you had thoughts of suicide in the past year? No   Have you ever been hospitalized for mental illness or a suicide attempt? In the last 5 to 10 years.   Do you have a history of chronic pain? No   Have you ever been diagnosed with fibromyalgia? No   Are you currently being treated for any of the following? (select all that apply) Depression, Anxiety   Are you currently seeing a therapist or counselor?  No   Are you currently  seeing a psychiatrist? No       ROS:     8/29/2022   Skin:  None of the above   HEENT: 2 cracked molars, does not see the dentist   Musculoskeletal: Joint Pain   Cardiovascular: None of the above   Pulmonary: Shortness of breath with activity, weight, deconditioned   Gastrointestinal: None of the above   Genitourinary: Stress incontinence (losing urine when coughing, sneezing, etc.)   Hematological: None of the above   Neurological: None of the above   Female only: Excessive menstrual bleeding-clots, Irregular menstrual cycles, Birth control pill caused consistent bleeding       EATING BEHAVIORS:     8/29/2022   Have you or anyone else thought that you had an eating disorder? No   Do you currently binge eat (eat a large amount of food in a short time)? No   Are you an emotional eater? Yes   Do you get up to eat after falling asleep? Yes       EXERCISE:     8/29/2022   How often do you exercise? Never   What keeps you from being more active?  My ability to walk or move around is limited, Shortness of breath, Too tired       MEDICATIONS:  Current Outpatient Medications   Medication Sig Dispense Refill     albuterol (PROAIR HFA) 108 (90 Base) MCG/ACT inhaler Inhale 1-2 puffs into the lungs every 4 hours as needed for shortness of breath / dyspnea or wheezing 8.5 g 11     albuterol (PROVENTIL) (2.5 MG/3ML) 0.083% neb solution Take 1 vial (2.5 mg) by nebulization every 6 hours as needed for shortness of breath / dyspnea or wheezing 3 mL 4     cetirizine (ZYRTEC) 10 MG tablet Take 1 tablet (10 mg) by mouth daily for 360 days 90 tablet 3     gabapentin (NEURONTIN) 300 MG capsule Take 1 capsule (300 mg) by mouth 2 times daily as needed (anxiety/agitation) 60 capsule 5     norgestrel-ethinyl estradiol (LO/OVRAL) 0.3-30 MG-MCG tablet Take 1 tablet by mouth daily 28 tablet 6     nystatin (MYCOSTATIN) 414535 UNIT/GM external powder Apply to affected area twice daily as needed 30 g 1     paragard intrauterine copper device 1  "each by Intrauterine route once for 1 dose       rosuvastatin (CRESTOR) 10 MG tablet Take 1 tablet (10 mg) by mouth daily 90 tablet 9     venlafaxine (EFFEXOR XR) 75 MG 24 hr capsule Take 3 capsules (225 mg) by mouth daily for 360 days 270 capsule 3     vitamin D3 (CHOLECALCIFEROL) 50 mcg (2000 units) tablet Take 1 tablet (50 mcg) by mouth daily 90 tablet 3     WIXELA INHUB 100-50 MCG/DOSE inhaler Inhale 1 puff into the lungs 2 times daily 60 each 9       ALLERGIES:  No Known Allergies    LABS/IMAGING/MEDICAL RECORDS REVIEW: nl recent hgb, A1C 5.7.     PHYSICAL EXAM:  /74 (BP Location: Right arm)   Ht 1.6 m (5' 3\")   Wt 132.6 kg (292 lb 4.8 oz)   BMI 51.78 kg/m    Physical Exam:    GEN: Alert and oriented in no acute distress.   HEENT: mucous membranes moist, no missing teeth  NECK: supple with LAD or thyromegaly  LUNGS: CTA without wheezes or crackles, good air movement throughout  CV: RRR no MRG  ABDOMEN: moderate protuberance, soft, BT  SKIN: no rashes, no skin tags, no acanthosis nigricans      In summary, Mojgan Jimenez has Class III obesity with a body mass index of Body mass index is 51.78 kg/m . kg/m2 and the comorbidities stated above. She completed an informational seminar and is a candidate for the laparoscopic gastric sleeve.  She will have to complete the following pre-requisites:    Received weight loss goal of 292 lb prior to surgery.  Achieve clearance from dietitian to see surgeon.  Have preoperative laboratory tests drawn.  Psychological Evaluation with MMPI and clearance for weight loss surgery.  Sleep consult ordered.    Today in the office we discussed gastric sleeve surgery. Preoperative, perioperative, and postoperative processes, management, and follow up were addressed.  Risks and benefits were outlined including the risk of death, staple line leak (1-2%), PE, DVT, ulcer, worsening GERD, N/V, stricture, hernia, wound infection, weight regain, and vitamin deficiencies. I " emphasized exercise and activity along with appropriate food choice as the main foundation for weight loss with surgery providing surgical reinforcement of this.  All questions were answered.  A goal sheet and support group handout were given to the patient.    Once the patient has completed the requirements in their task list and there are no further recommendations, the pt will be allowed to see the surgeon of her choice for consultation on the laparoscopic gastric sleeve surgery. Patient verbalizes understanding of the process to surgery and expectations for the postoperative period including the need for lifelong lifestyle changes, vitamin supplementation, and laboratory monitoring.  Sincerely,     GIRISH Ash MD    Total time spent on the date of this encounter doing: chart review, review of test results, patient visit, physical exam, education, counseling, developing plan of care and documenting = 68 minutes.      Again, thank you for allowing me to participate in the care of your patient.        Sincerely,        GIRISH Ash MD

## 2022-08-30 NOTE — PROGRESS NOTES
Mojgan has [employer].  No specific SWL requirements.  Cleared by psych, nutrition and complete her needs list

## 2022-08-30 NOTE — PROGRESS NOTES
"New Bariatric Surgery Consultation Note    2022    RE: Mojgan Jimenez  MR#: [id number]  : 1970      Referring provider:       2022   Who referred you? Dr. Sagrario Lockhart       Chief Complaint/Reason for visit: evaluation for possible weight loss surgery    Dear No primary care provider on file.,    I had the pleasure of seeing your patient, Mojgan Jimenez, to evaluate her obesity and consider her for possible weight loss surgery. As you know, Mojgan Jimenez is 52 year old.  She has a height of 5' 3\", a weight of 292 lbs 4.8 oz, and calculated Body mass index is 51.78 kg/m .        HISTORY OF PRESENT ILLNESS:  Weight Loss History Reviewed with Patient 2022   How long have you been overweight? Since late 20's to early 40's   What is the most that you have ever weighed? 290   What is the most weight you have lost? 120   I have tried the following methods to lose weight Watching portions or calories, Weight Watchers, OTC supplements   I have tried the following weight loss medications? (Check all that apply) None   Have you ever had weight loss surgery? No       CO-MORBIDITIES OF OBESITY INCLUDE:     2022   I have the following health issues associated with obesity: High Cholesterol, Asthma       PAST MEDICAL HISTORY:  Past Medical History:   Diagnosis Date     LINA (generalized anxiety disorder) 2017     Hyperlipidemia LDL goal <160 2019     Major depressive disorder      Methanol abuse (H)      Mild persistent asthma without complication 2017     Vitamin D deficiency 2017       PAST SURGICAL HISTORY:  Past Surgical History:   Procedure Laterality Date     MAMMOPLASTY REDUCTION      reduction       FAMILY HISTORY:   Family History   Problem Relation Age of Onset     Cancer Mother      Unknown/Adopted Father      Alcoholism Father      Substance Abuse Sister      Diabetes No family hx of      Hypertension No family hx of        SOCIAL HISTORY:   Social History " Questions Reviewed With Patient 8/29/2022   Which best describes your employment status (select all that apply) I work full-time   If you work, what is your occupation?    Which best describes your marital status:    Do you have children? Yes   Who do you have in your support network that can be available to help you for the first 2 weeks after surgery? Teressa   Who can you count on for support throughout your weight loss surgery journey? Teressa   Can you afford 3 meals a day?  Yes   Can you afford 50-60 dollars a month for vitamins? No       HABITS:     8/29/2022   How often do you drink alcohol? Monthly or less   If you do drink alcohol, how many drinks might you have in a day? (one drink = 5 oz. wine, 1 can/bottle of beer, 1 shot liquor) 1 or 2   Have you ever used any of the following nicotine products? No   Have you or are you currently using street drugs or prescription strength medication for which you do not have a prescription for? Not currently- was using meth- clean for 4 years now   Do you have a history of chemical dependency (alcohol or drug abuse)? Yes       PSYCHOLOGICAL HISTORY:   Psychological History Reviewed With Patient 8/29/2022   Have you ever attempted suicide?  5 years ago   Have you had thoughts of suicide in the past year? No   Have you ever been hospitalized for mental illness or a suicide attempt? In the last 5 to 10 years.   Do you have a history of chronic pain? No   Have you ever been diagnosed with fibromyalgia? No   Are you currently being treated for any of the following? (select all that apply) Depression, Anxiety   Are you currently seeing a therapist or counselor?  No   Are you currently seeing a psychiatrist? No       ROS:     8/29/2022   Skin:  None of the above   HEENT: 2 cracked molars, does not see the dentist   Musculoskeletal: Joint Pain   Cardiovascular: None of the above   Pulmonary: Shortness of breath with activity, weight, deconditioned    Gastrointestinal: None of the above   Genitourinary: Stress incontinence (losing urine when coughing, sneezing, etc.)   Hematological: None of the above   Neurological: None of the above   Female only: Excessive menstrual bleeding-clots, Irregular menstrual cycles, Birth control pill caused consistent bleeding       EATING BEHAVIORS:     8/29/2022   Have you or anyone else thought that you had an eating disorder? No   Do you currently binge eat (eat a large amount of food in a short time)? No   Are you an emotional eater? Yes   Do you get up to eat after falling asleep? Yes       EXERCISE:     8/29/2022   How often do you exercise? Never   What keeps you from being more active?  My ability to walk or move around is limited, Shortness of breath, Too tired       MEDICATIONS:  Current Outpatient Medications   Medication Sig Dispense Refill     albuterol (PROAIR HFA) 108 (90 Base) MCG/ACT inhaler Inhale 1-2 puffs into the lungs every 4 hours as needed for shortness of breath / dyspnea or wheezing 8.5 g 11     albuterol (PROVENTIL) (2.5 MG/3ML) 0.083% neb solution Take 1 vial (2.5 mg) by nebulization every 6 hours as needed for shortness of breath / dyspnea or wheezing 3 mL 4     cetirizine (ZYRTEC) 10 MG tablet Take 1 tablet (10 mg) by mouth daily for 360 days 90 tablet 3     gabapentin (NEURONTIN) 300 MG capsule Take 1 capsule (300 mg) by mouth 2 times daily as needed (anxiety/agitation) 60 capsule 5     norgestrel-ethinyl estradiol (LO/OVRAL) 0.3-30 MG-MCG tablet Take 1 tablet by mouth daily 28 tablet 6     nystatin (MYCOSTATIN) 270440 UNIT/GM external powder Apply to affected area twice daily as needed 30 g 1     paragard intrauterine copper device 1 each by Intrauterine route once for 1 dose       rosuvastatin (CRESTOR) 10 MG tablet Take 1 tablet (10 mg) by mouth daily 90 tablet 9     venlafaxine (EFFEXOR XR) 75 MG 24 hr capsule Take 3 capsules (225 mg) by mouth daily for 360 days 270 capsule 3     vitamin D3  "(CHOLECALCIFEROL) 50 mcg (2000 units) tablet Take 1 tablet (50 mcg) by mouth daily 90 tablet 3     WIXELA INHUB 100-50 MCG/DOSE inhaler Inhale 1 puff into the lungs 2 times daily 60 each 9       ALLERGIES:  No Known Allergies    LABS/IMAGING/MEDICAL RECORDS REVIEW: nl recent hgb, A1C 5.7.     PHYSICAL EXAM:  /74 (BP Location: Right arm)   Ht 1.6 m (5' 3\")   Wt 132.6 kg (292 lb 4.8 oz)   BMI 51.78 kg/m    Physical Exam:    GEN: Alert and oriented in no acute distress.   HEENT: mucous membranes moist, no missing teeth  NECK: supple with LAD or thyromegaly  LUNGS: CTA without wheezes or crackles, good air movement throughout  CV: RRR no MRG  ABDOMEN: moderate protuberance, soft, BT  SKIN: no rashes, no skin tags, no acanthosis nigricans      In summary, Mojgan Jimenez has Class III obesity with a body mass index of Body mass index is 51.78 kg/m . kg/m2 and the comorbidities stated above. She completed an informational seminar and is a candidate for the laparoscopic gastric sleeve.  She will have to complete the following pre-requisites:    Received weight loss goal of 292 lb prior to surgery.  Achieve clearance from dietitian to see surgeon.  Have preoperative laboratory tests drawn.  Psychological Evaluation with MMPI and clearance for weight loss surgery.  Sleep consult ordered.    Today in the office we discussed gastric sleeve surgery. Preoperative, perioperative, and postoperative processes, management, and follow up were addressed.  Risks and benefits were outlined including the risk of death, staple line leak (1-2%), PE, DVT, ulcer, worsening GERD, N/V, stricture, hernia, wound infection, weight regain, and vitamin deficiencies. I emphasized exercise and activity along with appropriate food choice as the main foundation for weight loss with surgery providing surgical reinforcement of this.  All questions were answered.  A goal sheet and support group handout were given to the patient.    Once the " patient has completed the requirements in their task list and there are no further recommendations, the pt will be allowed to see the surgeon of her choice for consultation on the laparoscopic gastric sleeve surgery. Patient verbalizes understanding of the process to surgery and expectations for the postoperative period including the need for lifelong lifestyle changes, vitamin supplementation, and laboratory monitoring.  Sincerely,     GIRISH Ash MD    Total time spent on the date of this encounter doing: chart review, review of test results, patient visit, physical exam, education, counseling, developing plan of care and documenting = 68 minutes.

## 2022-08-30 NOTE — PATIENT INSTRUCTIONS
Before being submitted for insurance approval, you will need the following:    -Clearance by the Psychologist  -Clearance by the dietitian  -Routine Health Care Maintenance should be up to date (mammograms yearly after age 50, paps as recommended by your primary provider,  colonoscopy after age 50, earlier if high risk.  -Establish a Primary Care Provider if you do not already have one  -Pre Operative Lab work-ordered today  -Achieve weight loss goals prior to surgery if given  -Structured weight loss visits IF mandated by your insurance carrier  -Surgeon consult  -You will need to be using CPAP for at least one month before surgery if you have sleep apnea. Make sure to bring your CPAP or BiPAP to the hospital at the time of surgery.  -You will need to be tobacco free for 2 months before surgery and remain a non-smoker thereafter. If you are currently smoking or have recently quit, your urine will be evaluated for tobacco metabolites pre-operatively.  -If you are on insulin, you might be referred to an endocrinologist who will manage your insulin during the liquid diet and around the time of surgery. This endocrinologist does not replace your primary provider or your endocrinologist.   -You will need an exercise plan which includes MOVE, ie., walking and MUSCLE, ie.,calisthenics, bands, weight, machines, etc...  ______________________________________________________________________    Remember that after your bariatric surgery, vitamin supplementation is a lifelong need.    You will take:    B-12 1000mcg or higher sublingual (under the tongue) daily or by injection 1-2X/month  D3 5000U daily   Multivitamin containing 18mg of iron twice a day  Calcium citrate 1 or 2 daily  Iron with vitamin C if you are a menstruating female  B Complex 50 mg every day or thiamine 100 mg once a week may be indicated    To keep your weight off and your vitamin levels up, follow-up is important.    Your labs will be monitored every 6  months for the first two years and yearly thereafter.    To avoid ulcers in your stomach avoid tobacco forever, alcohol in excess, caffeine in excess and anti-inflammatories (NSAIDS)  (Aspirin, Ibuprofen, Naproxen and similar medications). Tylenol is fine.  If you are told by your physician take Aspirin to protect your heart or for another reason, make sure to take omeprazole or similar medication (protonix, nexium, prevacid) to protect your stomach.    Remember that alcohol affects you differently after bariatric surgery. If you have even ONE drink DO NOT DRIVE.      Bariatric Task List    Fax:  Please fax all paperwork to: [phone] -     Status:  Is patient a candidate for bariatric surgery?:  patient is a candidate for bariatric surgery -     Cleared to schedule surgeon consult?:  not cleared to schedule surgeon consult -     Status:  surgery evaluation in process -     Surgeon: Any -        Insurance: Insurance:  [facility] -     Other:  Please call Mandi Wheeler at [phone] with insurance coverage questions. -        Patient Info: Initial Weight:  292.3#, 5'3, BMI 51.78 - Face to Face   Date of Initial Weight/Height:  8/30/2022 -     Required Weight Loss:  No weight gain prior to surgery -     Surgery Type:  sleeve gastrectomy -        Dietician Visits: Structured weight loss required by insurance?:  no structured weight loss required -     Clearance from dietician to see surgeon?:  Needed -     Dietician Notes:    -        Psychological Evaluation: Psych eval:  Needed -     Other:    -        Lab Work: Comprehensive Metabolic Panel:  Needed -     Vitamin D:  Needed -     PTH:  Needed -      TSH ([facility] [place]): Needed -       Ferritin: Needed -       Folate: Needed -     Thiamine: Needed -     Vitamin A: Needed -     Vitamin B12: Needed -     Zinc: Needed -     Other:  Needed - routine labs - call nurses if need help scheduling this      Consults/ Clearance Sleep Medicine:  Needed - sleep consult ordered  "  Other:    -     Other:    -     Other:    -        Testing: Sleep Study: Needed - Please call [facility] at [phone] to set up appointment   Other:    -        PCP: Establish care with PCP:  Completed -        Stopping Smoking/ Alcohol Use: Quit tobacco use (3 months smoke free)?:    - never   Other: Meth -     Quit date:   - Quit 2018      Patient Education:  Information Session:  Completed - Online   Given \"Making your decision\" handout?:  Yes -     Given \"A Roadmap to you Weight Loss Surgery\" handout?: Yes -     Given support group information?:    -  Yes   Attended support group?:  Needed - must attend support group at least once prior to surgery   Support plan in place?:  Completed -        Additional Surgery Requirements: Review Coag plan:  Completed - standard   Birth control plan:  Completed -     Gallstone prevention plan (Actigall for 6 months postop): Needed -     Other:   -     Other: Work with gynecologist on getting vaginal bleeding under control. -        Final Tasks:  Before surgery online class:  Needed -     After surgery online class:  Needed -     History and Physical per clinic:   -  Needed -  Within one month of surgery once scheduled   Final labs per clinic: Needed -  Within one month of surgery once scheduled   Chest xray per clinic:   -  Needed -  Within one month of surgery once scheduled   Electrocardiogram (ECG) per clinic:   -  Needed -  Within one month of surgery once scheduled   Other:   -        Notes:   -            "

## 2022-09-07 ENCOUNTER — VIRTUAL VISIT (OUTPATIENT)
Dept: FAMILY MEDICINE | Facility: CLINIC | Age: 52
End: 2022-09-07
Payer: COMMERCIAL

## 2022-09-07 DIAGNOSIS — N92.4 EXCESSIVE BLEEDING IN PREMENOPAUSAL PERIOD: ICD-10-CM

## 2022-09-07 DIAGNOSIS — F41.1 GAD (GENERALIZED ANXIETY DISORDER): ICD-10-CM

## 2022-09-07 DIAGNOSIS — F33.9 RECURRENT MAJOR DEPRESSIVE DISORDER, REMISSION STATUS UNSPECIFIED (H): ICD-10-CM

## 2022-09-07 DIAGNOSIS — J45.31 MILD PERSISTENT ASTHMA WITH ACUTE EXACERBATION: Primary | ICD-10-CM

## 2022-09-07 PROCEDURE — 99213 OFFICE O/P EST LOW 20 MIN: CPT | Mod: 95

## 2022-09-07 NOTE — LETTER
September 7, 2022      Mojgan Jimenez  [address]   [place] [address]        To Whom It May Concern:    Mojgan Jimenez  was seen on 8/25 and again 9/7. Please excuse her from work 8/28/22-09/04/11 due to illness.        Sincerely,        JUSTINE Chaves CNP

## 2022-09-07 NOTE — PROGRESS NOTES
"Mojgan is a 52 year old who is being evaluated via a billable telephone visit.      What phone number would you like to be contacted at? [phone]  How would you like to obtain your AVS? [employer]    Assessment & Plan   Recommend continue current treatment plan for asthma, anxiety and depression. I am hopeful that improvement in the heavy bleeding she was experiencing will lead to better control of irritability/anxiety related to menopause.     Filled out work excuse note with dates reasonable to have been affected by her heavy bleeding/exacerbation of anxiety. Will leave at desk for patient to  if she cannot print from [employer].     Mild persistent asthma with acute exacerbation  Chronic, no changes to asthma plan. Controlling chronic allergies have improved her asthma symptoms.   - offered flonase for seasonal allergies, patient declined, she \"hates nose things\".     LINA (generalized anxiety disorder)  Chronic. No changes. Recent exacerbation caused by menopause / hormonal imbalance.     Recurrent major depressive disorder, remission status unspecified (H)  Chronic. No changes. Recent exacerbation caused by menopause.     Excessive bleeding in premenopausal period  Subacute/chronic. Improving since last visit.                See Patient Instructions    No follow-ups on file.    JUSTINE Chaves [facility]    Subjective   Mojgan is a 52 year old, presenting for the following health issues:  Forms (Letter to return to work/)      HPI     Patient will like to  letter for work.    Patient would like letter to return to work and for days off due to anxiety and asthma. Patient does not want to lose job.   Pt missed work days last week 08/28/22-09/04/22   Last Sunday, Monday, Tuesday (Normal off) Wednesday, Thursday (Normal off),  Friday and this past sunday.    Patient says anxiety and asthma has been improving.   Vaginal bleeding has improved.     Seen 8/25 for heavy vaginal " bleeding/severe anxiety related to menopause. Heavy bleeding was causing significant anxiety, cramping, pelvic pain. She has continued taking her oral contraceptive and took placebo pills for 3 days then started her next pack, she did have some moderate bleeding during the 3 day period of placebo, but that has improved.   She did schedule appointment with OB as discussed at last visit, in November, she is okay with continuing the plan of 3 days placebo pills until she sees OB.     She also had an asthma exacerbation recently due to summer allergies. She has been using her rescue inhaler more frequently and reports she is still taking her daily steroid inhaler. She started taking PO cetirizine and her allergy symptoms have now started to improve. She is back to using her rescue inhaler < 1-2 times per day, and continues improving.     She forgot to get a work note at her visit on 8/25.           Review of Systems   Constitutional, HEENT, cardiovascular, pulmonary, gi and gu systems are negative, except as otherwise noted.      Objective           Vitals:  No vitals were obtained today due to virtual visit.    Physical Exam   healthy, alert and no distress  PSYCH: Alert and oriented times 3; coherent speech, normal   rate and volume, able to articulate logical thoughts, able   to abstract reason, no tangential thoughts, no hallucinations   or delusions  Her affect is normal  RESP: No cough, no audible wheezing, able to talk in full sentences  Remainder of exam unable to be completed due to telephone visits                Phone call duration: 8 minutes    [unfilled]  [unfilled]

## 2022-09-07 NOTE — PATIENT INSTRUCTIONS
Continue daily Zyrtec for allergies - this will improve asthma.     Continue taking 3 days of placebo pills to have withdrawal bleed/period, this minimizes your time off hormones and minimizes the amount of time you will be bleeding/prevents long periods of spotting.     Follow up if other concerns arise.     Schedule routine preventative exam.

## 2022-09-15 ENCOUNTER — LAB (OUTPATIENT)
Dept: LAB | Facility: CLINIC | Age: 52
End: 2022-09-15
Payer: COMMERCIAL

## 2022-09-15 DIAGNOSIS — E66.01 MORBID OBESITY (H): Chronic | ICD-10-CM

## 2022-09-15 LAB
ALBUMIN SERPL BCG-MCNC: 3.8 G/DL (ref 3.5–5.2)
ALP SERPL-CCNC: 68 U/L (ref 35–104)
ALT SERPL W P-5'-P-CCNC: 13 U/L (ref 10–35)
ANION GAP SERPL CALCULATED.3IONS-SCNC: 7 MMOL/L (ref 7–15)
AST SERPL W P-5'-P-CCNC: 27 U/L (ref 10–35)
BILIRUB SERPL-MCNC: 0.3 MG/DL
BUN SERPL-MCNC: 9.4 MG/DL (ref 6–20)
CALCIUM SERPL-MCNC: 9 MG/DL (ref 8.6–10)
CHLORIDE SERPL-SCNC: 102 MMOL/L (ref 98–107)
CREAT SERPL-MCNC: 0.73 MG/DL (ref 0.51–0.95)
DEPRECATED HCO3 PLAS-SCNC: 28 MMOL/L (ref 22–29)
FERRITIN SERPL-MCNC: 26 NG/ML (ref 11–328)
FOLATE SERPL-MCNC: 12.6 NG/ML (ref 4.6–34.8)
GFR SERPL CREATININE-BSD FRML MDRD: >90 ML/MIN/1.73M2
GLUCOSE SERPL-MCNC: 96 MG/DL (ref 70–99)
POTASSIUM SERPL-SCNC: 4.3 MMOL/L (ref 3.4–5.3)
PROT SERPL-MCNC: 7.1 G/DL (ref 6.4–8.3)
PTH-INTACT SERPL-MCNC: 59 PG/ML (ref 15–65)
SODIUM SERPL-SCNC: 137 MMOL/L (ref 136–145)
TSH SERPL DL<=0.005 MIU/L-ACNC: 2.56 UIU/ML (ref 0.3–4.2)
VIT B12 SERPL-MCNC: 214 PG/ML (ref 232–1245)

## 2022-09-15 PROCEDURE — 84630 ASSAY OF ZINC: CPT | Mod: 90

## 2022-09-15 PROCEDURE — 82746 ASSAY OF FOLIC ACID SERUM: CPT

## 2022-09-15 PROCEDURE — 80053 COMPREHEN METABOLIC PANEL: CPT

## 2022-09-15 PROCEDURE — 82728 ASSAY OF FERRITIN: CPT

## 2022-09-15 PROCEDURE — 99000 SPECIMEN HANDLING OFFICE-LAB: CPT

## 2022-09-15 PROCEDURE — 84443 ASSAY THYROID STIM HORMONE: CPT

## 2022-09-15 PROCEDURE — 36415 COLL VENOUS BLD VENIPUNCTURE: CPT

## 2022-09-15 PROCEDURE — 83970 ASSAY OF PARATHORMONE: CPT

## 2022-09-15 PROCEDURE — 82306 VITAMIN D 25 HYDROXY: CPT

## 2022-09-15 PROCEDURE — 82607 VITAMIN B-12: CPT

## 2022-09-15 PROCEDURE — 84425 ASSAY OF VITAMIN B-1: CPT | Mod: 90

## 2022-09-15 PROCEDURE — 84590 ASSAY OF VITAMIN A: CPT | Mod: 90

## 2022-09-16 LAB — DEPRECATED CALCIDIOL+CALCIFEROL SERPL-MC: 37 UG/L (ref 20–75)

## 2022-09-17 LAB
ANNOTATION COMMENT IMP: NORMAL
RETINYL PALMITATE SERPL-MCNC: <0.02 MG/L
VIT A SERPL-MCNC: 0.55 MG/L

## 2022-09-18 LAB — ZINC SERPL-MCNC: 56.7 UG/DL

## 2022-09-20 LAB — VIT B1 PYROPHOSHATE BLD-SCNC: 114 NMOL/L

## 2022-10-26 ENCOUNTER — TELEPHONE (OUTPATIENT)
Dept: SURGERY | Facility: CLINIC | Age: 52
End: 2022-10-26

## 2022-10-26 NOTE — TELEPHONE ENCOUNTER
Mojgan was not present on the telehealth platform for 11 AM. Her 2nd appointment will need to be cancelled as well if we do not hear from her by the end of the day.

## 2022-11-01 ASSESSMENT — SLEEP AND FATIGUE QUESTIONNAIRES
HOW LIKELY ARE YOU TO NOD OFF OR FALL ASLEEP WHILE WATCHING TV: WOULD NEVER DOZE
HOW LIKELY ARE YOU TO NOD OFF OR FALL ASLEEP WHILE SITTING AND TALKING TO SOMEONE: WOULD NEVER DOZE
HOW LIKELY ARE YOU TO NOD OFF OR FALL ASLEEP WHILE SITTING INACTIVE IN A PUBLIC PLACE: WOULD NEVER DOZE
HOW LIKELY ARE YOU TO NOD OFF OR FALL ASLEEP WHEN YOU ARE A PASSENGER IN A CAR FOR AN HOUR WITHOUT A BREAK: WOULD NEVER DOZE
HOW LIKELY ARE YOU TO NOD OFF OR FALL ASLEEP WHILE LYING DOWN TO REST IN THE AFTERNOON WHEN CIRCUMSTANCES PERMIT: SLIGHT CHANCE OF DOZING
HOW LIKELY ARE YOU TO NOD OFF OR FALL ASLEEP IN A CAR, WHILE STOPPED FOR A FEW MINUTES IN TRAFFIC: WOULD NEVER DOZE
HOW LIKELY ARE YOU TO NOD OFF OR FALL ASLEEP WHILE SITTING AND READING: MODERATE CHANCE OF DOZING
HOW LIKELY ARE YOU TO NOD OFF OR FALL ASLEEP WHILE SITTING QUIETLY AFTER LUNCH WITHOUT ALCOHOL: WOULD NEVER DOZE

## 2022-11-07 ENCOUNTER — OFFICE VISIT (OUTPATIENT)
Dept: SLEEP MEDICINE | Facility: CLINIC | Age: 52
End: 2022-11-07
Attending: FAMILY MEDICINE
Payer: COMMERCIAL

## 2022-11-07 VITALS
SYSTOLIC BLOOD PRESSURE: 112 MMHG | BODY MASS INDEX: 50.5 KG/M2 | OXYGEN SATURATION: 95 % | HEART RATE: 92 BPM | HEIGHT: 63 IN | WEIGHT: 285 LBS | DIASTOLIC BLOOD PRESSURE: 73 MMHG

## 2022-11-07 DIAGNOSIS — G47.33 OBSTRUCTIVE SLEEP APNEA: Primary | ICD-10-CM

## 2022-11-07 DIAGNOSIS — E66.01 MORBID OBESITY (H): Chronic | ICD-10-CM

## 2022-11-07 PROCEDURE — 99203 OFFICE O/P NEW LOW 30 MIN: CPT | Performed by: STUDENT IN AN ORGANIZED HEALTH CARE EDUCATION/TRAINING PROGRAM

## 2022-11-07 RX ORDER — ZOLPIDEM TARTRATE 5 MG/1
5 TABLET ORAL
Qty: 1 TABLET | Refills: 0 | Status: SHIPPED | OUTPATIENT
Start: 2022-11-07 | End: 2023-08-09

## 2022-11-07 RX ORDER — BENZONATATE 200 MG/1
CAPSULE ORAL
COMMUNITY
Start: 2022-09-25 | End: 2023-01-27

## 2022-11-07 RX ORDER — BENZONATATE 200 MG/1
200 CAPSULE ORAL
COMMUNITY
Start: 2022-09-25 | End: 2023-01-27

## 2022-11-07 RX ORDER — ZOLPIDEM TARTRATE 5 MG/1
5 TABLET ORAL
Qty: 1 TABLET | Refills: 0 | Status: SHIPPED | OUTPATIENT
Start: 2022-11-07 | End: 2022-11-07

## 2022-11-07 NOTE — PATIENT INSTRUCTIONS
"MY INFORMATION ON SLEEP APNEA-  Mojgan Jimenez    DOCTOR : Jeronimo Dumont MD  SLEEP CENTER :      MY CONTACT NUMBER:    [facility]  [phone]        Christensen Points:  1. What is Obstructive Sleep Apnea (JARVIS)? JARVIS is the most common type of sleep apnea. Apnea literally means, \"without breath.\" It is characterized by repetitive pauses in breathing, despite continued effort to breathe, and is usually associated with a reduction in blood oxygen saturation. Apneas can last 10 to over 60 seconds. It is caused by narrowing or collapse of the upper airway as muscles relax during sleep.       2. What are the consequences of JARVIS? Symptoms include: daytime sleepiness- possibly increasing the risk of falling asleep while driving, unrefreshing/restless sleep, snoring, insomnia, waking frequently to urinate, waking with heartburn or reflux, reduced concentration and memory, and morning headaches. Other health consequences may include development of high blood pressure. Untreated JARVIS also can contribute to heart disease, stroke and diabetes.   3. What are the treatment options? In most situations, sleep apnea is a lifelong disease that must be managed with daily therapy. Continuous Positive Airway (CPAP) is the most reliable treatment. A mouthguard to hold your jaw forward is usually the next most reliable option. Other options include postioning devices (to keep you off your back), nasal valves, tongue retaining device, weight loss, surgery. There is more detail about these options toward the end of this document.  4. What are the most important things to remember about using CPAP?     WHERE CAN I FIND MORE INFORMATION?    American Academy of Sleep Medicine Patient information on sleep disorders:  http://yoursleep.aasmnet.org    CPAP -  WHY AND HOW?                 Continuous positive airway pressure, or CPAP, is the most effective treatment for obstructive sleep apnea. It works by blowing room air, " "through a mask, to hold your throat open. A decision to use CPAP is a major step forward in the pursuit of a healthier life. The successful use of CPAP will help you breathe easier, sleep better and live healthier. Using CPAP can be a positive experience if you keep these preston points in mind:  Commitment  CPAP is not a quick fix for your problem. It involves a long-term commitment to improve your sleep and your health.    Communication  Stay in close communication with both your sleep doctor and your CPAP supplier. Ask lots of questions and seek help when you need it.    Consistency  Use CPAP all night, every night and for every nap. You will receive the maximum health benefits from CPAP when you use it every time that you sleep. This will also make it easier for your body to adjust to the treatment.    Correction  The first machine and mask that you try may not be the best ones for you. Work with your sleep doctor and your CPAP supplier to make corrections to your equipment selection. Ask about trying a different type of machine or mask if you have ongoing problems. Make sure that your mask is a good fit and learn to use your equipment properly.    Challenge  Tell a family member or close friend to ask you each morning if you used your CPAP the previous night. Have someone to challenge you to give it your best effort.    Connection   Your adjustment to CPAP will be easier if you are able to connect with others who use the same treatment. Ask your sleep doctor if there is a support group in your area for people who have sleep apnea, or look for one on the Internet.  Comfort   Increase your level of comfort by using a saline spray, decongestant or heated humidifier if CPAP irritates your nose, mouth or throat. Use your unit's \"ramp\" setting to slowly get used to the air pressure level. There may be soft pads you can buy that will fit over your mask straps. Look on www.CPAP.com for accessories that can help make CPAP " use more comfortable.  Cleaning   Clean your mask, tubing and headgear on a regular basis. Put this time in your schedule so that you don't forget to do it. Check and replace the filters for your CPAP unit and humidifier.    Completion   Although you are never finished with CPAP therapy, you should reward yourself by celebrating the completion of your first month of treatment. Expect this first month to be your hardest period of adjustment. It will involve some trial and error as you find the machine, mask and pressure settings that are right for you.    Continuation  After your first month of treatment, continue to make a daily commitment to use your CPAP all night, every night and for every nap.    CPAP-Tips to starting with success:  Begin using your CPAP for short periods of time during the day while you watch TV or read.    Use CPAP every night and for every nap. Using it less often reduces the health benefits and makes it harder for your body to get used to it.    Newer CPAP models are virtually silent; however, if you find the sound of your CPAP machine to be bothersome, place the unit under your bed to dampen the sound.     Make small adjustments to your mask, tubing, straps and headgear until you get the right fit. Tightening the mask may actually worsen the leak.  If it leaves significant marks on your face or irritates the bridge of your nose, it may not be the best mask for you.  Speak with the person who supplied the mask and consider trying other masks. Insurances will allow you to try different masks during the first month of starting CPAP.  Insurance also covers a new mask, hose and filter about every 6 months.    Use a saline nasal spray to ease mild nasal congestion. Neti-Pot or saline nasal rinses may also help. Nasal gel sprays can help reduce nasal dryness.  Biotene mouthwash can be helpful to protect your teeth if you experience frequent dry mouth.  Dry mouth may be a sign of air escaping out  of your mouth or out of the mask in the case of a full face mask.  Speak with your provider if you expect that is the case.     Take a nasal decongestant to relieve more severe nasal or sinus congestion.  Do not use Afrin (oxymetazoline) nasal spray more than 3 days in a row.  Speak with your sleep doctor if your nasal congestion is chronic.    Use a heated humidifier that fits your CPAP model to enhance your breathing comfort. Adjust the heat setting up if you get a dry nose or throat, down if you get condensation in the hose or mask.  Position the CPAP lower than you so that any condensation in the hose drains back into the machine rather than towards the mask.    Try a system that uses nasal pillows if traditional masks give you problems.    Clean your mask, tubing and headgear once a week. Make sure the equipment dries fully.    Regularly check and replace the filters for your CPAP unit and humidifier.    Work closely with your sleep provider and your CPAP supplier to make sure that you have the machine, mask and air pressure setting that works best for you. It is better to stop using it and call your provider to solve problems than to lay awake all night frustrated with the device.    BESIDES CPAP, WHAT OTHER THERAPIES ARE THERE?  Postioning devices if you only have the snoring or apnea while on your back  Dental devices if your condition is mild  Nasal valves may be effective though experience is limited  Weight loss if you are overweight  Surgery in limited cases where devices are not acceptable or there are problems with structures in the nose and throat  If treated with one of these alternative options, further evaluation is necessary to ensure that the therapy is effective. This may require some form of repeat testing.      Healthy Lifestyle:  Healthy diet, exercise and limit alcohol: Not only will excessive alcohol increase your weight over time, but it irritates the throat tissues and make them swell,  shrinking the airway and causing snoring. Drinking alcohol should be limited and stopped within 3-4 hours before going to bed.   Stop smoking: (Red swollen throat, heat, nicotine), also irritates and swells the airway, among numerous other negative health consequences.    Positioning Device  This example shows a pillow that straps around the waist. It may be appropriate for those whose sleep study shows milder sleep apnea that occurs primarily when lying flat on one's back. Preliminary studies have shown benefit but effectiveness at home should be verified.                      Oral Appliance  These are examples of two of many custom-made devices that are more likely to work in mild sleep apnea   Oral appliances are dental mouth pieces that fit very much like a sports mouth guards or removable orthodontic retainers. They are used to treat snoring and obstructive sleep apnea . The device prevents the airway from collapsing by either supporting the jaw in a forward position. Since oral appliances are non-invasive and easy to use, they may be considered as an early treatment option. Oral appliance therapy (OAT) involves the customization, selection, fabrication, fitting, adjustments and long-term follow-up care.  Custom made oral appliances are proven to be more effective than over-the-counter devices. Therefore, the over-the-counter devices are recommended not to be used as a screening tool nor as a therapeutic option.     Who gets a dental device?  Oral appliance therapy can be used as an alternative to CPAP therapy for the treatment of mild to moderate sleep apnea and for those patients who prefer OAT to CPAP. Oral appliance therapy is a first line therapy for the treatment of primary snoring. Additionally, OAT is an option for those that cannot tolerate CPAP as therapy or who have experienced insufficient surgical results.                 Possible side effects?  Frequent but minor side effects include: excessive  salivation, dry mouth, discomfort of teeth and jaw and temporary changes in the patient s bite.  Potential complications include: jaw pain, permanent occlusal changes and TMJ symptoms.  The above mentioned side effects and complications can be recognized and managed by dentists trained in dental sleep medicine.   Finding a dentist that practices dental sleep medicine  Specific training is available through the American Academy of Dental Sleep Medicine for dentists interested in working in the field of sleep. To find a dentist who is educated in the field of sleep and the use of oral appliances, near you, visit the Web site of the American Academy of Dental Sleep Medicine; also see http://www.accpstorage.org/newOrganization/patients/oralAppliances.pdf   To search for a dentist certified in these practices:  Http://aadsm.org/FindADentist.aspx?1  Nasal Valves              Nasal valves may be an option for mild apnea if other options are not well tolerated. The efficacy of these devices is generally less than CPAP or oral appliances.They may not be effective if you have frequent nasal congestion or have difficulty breathing through your nose.   Weight Loss:    Weight loss decreases severity of sleep apnea in most people with obesity. For those with mild obesity who have developed snoring with weight gain, even 15-30 pound weight loss can improve and occasionally eliminate sleep apnea.  Structured and life-long dietary and health habits are necessary to lose weight and keep healthier weight levels.     Though there are significant health benefits from weight loss, long-term weight loss is very difficult to achieve- studies show success with dietary management in less than 10% of people. In addition, substantial weight loss may require years of dietary control and may be difficult if patients have severe obesity. In these cases, surgical management may be considered.    If you are interested in methods for weight loss,  you should review the options discussed at the National Institutes of Health patient information sites:     http://win.niddk.nih.gov/publications/index.htm  http:/www.health.nih.gov/topic/WeightLossDieting    Bariatric programs offer counseling in all methods of weight loss:    Http:/[email or web address]/htm    Your BMI is Body mass index is 50.49 kg/m .        Body mass index (BMI) is one way to tell whether you are at a healthy weight, overweight, or obese. It measures your weight in relation to your height.  A BMI of 18.5 to 24.9 is in the healthy range. A person with a BMI of 25 to 29.9 is considered overweight, and someone with a BMI of 30 or greater is considered obese.  Another way to find out if you are at risk for health problems caused by overweight and obesity is to measure your waist. If you are a woman and your waist is more than 35 inches, or if you are a man and your waist is more than 40 inches, your risk of disease may be higher.  More than two-thirds of American adults are considered overweight or obese. Being overweight or obese increases the risk for further weight gain.  Excess weight may lead to heart disease and diabetes. Creating and following plans for healthy eating and physical activity may help you improve your health.    Methods for maintaining or losing weight.    Weight control is part of healthy lifestyle and includes exercise, emotional health, and healthy eating habits.  Careful eating habits lifelong is the mainstay of weight control.  Though there are significant health benefits from weight loss, long-term weight loss with diet alone may be very difficult to achieve- studies show long-term success with dietary management in less than 10% of people. Attaining a healthy weight may be especially difficult to achieve in those with severe obesity. In some cases, medications, devices and surgical management might be considered.    What  "can you do?    If you are overweight or obese and are interested in methods for weight loss, you should discuss this with your provider. In addition, we recommend that you review healthy life styles and methods for weight loss available through the National Institutes of Health patient information sites:     http://win.niddk.nih.gov/publications/index.htm      Surgery:  There are a number of surgeries that have been attempted to treat apnea. In general, surgical options are usually reserved for cases in which there is a physical abnormality contributing to obstruction or other treatment options are ineffective or not tolerated. Most surgical options are either unreliable or quite invasive. One of the more common procedures is:  Uvulopalatopharyngoplasty: In this procedure, the uvula (the finger-like tissue that hangs in the back of the throat), part of the soft palate (the tissue that the uvula is attached to), and sometimes the tonsils or adenoids are removed. The efficacy of this surgery is around 30-50% .  After surgery, complications may include:  Sleepiness and sleep apnea related to post-surgery medication   Swelling, infection and bleeding   A sore throat and/or difficulty swallowing   Drainage of secretions into the nose and a nasal quality to the voice. English language speech does not seem to be affected by this surgery.   Narrowing of the airway in the nose and throat (hence constricting breathing) snoring and even iatrogenically caused sleep apnea. By cutting the tissues, excess scar tissue can \"tighten\" the airway and make it even smaller than it was before UPPP.  Patients who have had the uvula removed will become unable to correctly speak [place] or any other language that has a uvular 'r' phoneme.    Surgeries to help resolve nasal congestion may help reduce the severity of apnea slightly. Nasal congestion does not cause apnea on its own, so these surgeries are usually not performed just for JARVIS.  " They may be worth considering if the nasal congestion is significantly bothersome independent of apnea.

## 2022-11-07 NOTE — ASSESSMENT & PLAN NOTE
4 9We discussed weight management with healthy diet and exercise to reduce weight. We also dicussed the link between obesity and sleep related breathing disorders.  Additionally patient is at risk for obesity hypoventilation syndrome given her BMI of 50.5 and serum bicarbonate of 28 on CMP.    Patient follows with bariatric surgery.  This appointment is a part of her preop evaluation for gastric sleeve.    Recommend full night diagnostic PSG with TCM to evaluate for obesity hypoventilation syndrome in addition to obstructive sleep apnea

## 2022-11-07 NOTE — PROGRESS NOTES
[facility]  Consultation Note    Name: Mojgan Jimenez MRN#: [id number]   Age: 52 year old YOB: 1970     Date of Consultation: 11/07/22  Consultation is requested by: TABATHA Ash  Primary care provider: JUSTINE Chaves CNP    History of Present Illness:   Mojgan Jimenez is a 52 year old female patient with MDD, LINA, GERD, asthma, and morbid obesity  She is sent by TABATHA Ash for a sleep consultation regarding Sleep Study (Snores loud, Talks in sleep, wakes multiple times a night wakes with dry mouth sore throat and headaches, doesn't feel rested upon waking and daytime fatigue.)  .    Mojgan Jimenez main reason for visit: I need to do it for my sleeve appointment  Patient states problem(s) started: 6 months  Mojgan Jimenez's goals for this visit: To get answers    She has not had a previous sleep study.    CPAP: No    SLEEP-WAKE SCHEDULE:   Work/School Days: Patient goes to school/work: Yes   Usually gets into bed at 11pm  Takes patient about About 30min to fall asleep  Has trouble falling asleep None nights per week  Wakes up in the middle of the night 3 times a night or more times.  Wakes up due to External stimuli (bed partner, pets, noise, etc);Use the bathroom;Anxiety;Nightmares;Uncertain  She has trouble falling back asleep None times a week.   It usually takes 10mins to get back to sleep  Patient is usually up at 9am then go back to bed until 12:15  Uses alarm: Yes  Sleep Inertia: Yes    Weekends/Non-work Days/All Other Days:  Usually gets into bed at 11p   Takes patient about 30min to fall asleep  Patient is usually up at 9am then go back to bed and wake up at 12  Uses alarm: Yes    Sleep Need  Patient gets  A lot , like 12+ sleep on average   Patient thinks She needs about About 8 hours sleep    Mojgan Jimenez prefers to sleep in this position(s): Back;Side   Patient states they do the following activities in bed: Eat;Watch TV;Use phone, computer, or  tablet    Naps  Patient takes a purposeful nap Everyday times a week and naps are usually 2 hours in duration  She feels better after a nap: No  She dozes off unintentionally Never days per week  Patient has had a driving accident or near-miss due to sleepiness/drowsiness: No      SLEEP DISRUPTIONS:  Breathing/Snoring  Patient snores:Yes  Other people complain about Her snoring: Yes  Patient has been told She stops breathing in Her sleep:No  She has issues with the following: Morning headaches;Morning mouth dryness;Stuffy nose when you wake up;Getting up to urinate more than once      Movement:  Patient gets pain, discomfort, with an urge to move:  Yes  It happens when She is resting:  Yes  It happens more at night:  Yes  Patient has been told Mojgan Jimenez kicks Her legs at night:  Yes     Behaviors in Sleep:  Mojgan Jimenez has experienced the following behaviors while sleeping: Recurring Nightmares;Eating;Sleep-talking;Teeth grinding  She has experienced sudden muscle weakness during the day: Yes    Is there anything else you would like your sleep provider to know:        CAFFEINE AND OTHER SUBSTANCES:  Patient consumes caffeinated beverages per day:  2  Last caffeine use is usually: 7p  List of any prescribed or over the counter stimulants that patient takes: N/A  List of any prescribed or over the counter sleep medication patient takes: None  List of previous sleep medications that patient has tried: Dont remember the name of them  Patient drinks alcohol to help them sleep: No  Patient drinks alcohol near bedtime: No    Family History:  Patient has a family member been diagnosed with a sleep disorder: No            SCALES:    EPWORTH SLEEPINESS SCALE    [place] Sleepiness Scale ( SUSY Moss  [id number]<br>ESS - [place]/English - Final version - 21 Nov 07 - [facility] [place].) 11/1/2022   Sitting and reading Moderate chance of dozing   Watching TV Would never doze   Sitting, inactive in a public place (e.g. a  theatre or a meeting) Would never doze   As a passenger in a car for an hour without a break Would never doze   Lying down to rest in the afternoon when circumstances permit Slight chance of dozing   Sitting and talking to someone Would never doze   Sitting quietly after a lunch without alcohol Would never doze   In a car, while stopped for a few minutes in traffic Would never doze   Babbitt Score (MC) 3   Babbitt Score (Sleep) 3         INSOMNIA SEVERITY INDEX (ALETA)    Insomnia Severity Index (ALETA) 11/1/2022   Difficulty falling asleep 1   Difficulty staying asleep 2   Problems waking up too early 1   How SATISFIED/DISSATISFIED are you with your CURRENT sleep pattern? 2   How NOTICEABLE to others do you think your sleep problem is in terms of impairing the quality of your life? 4   How WORRIED/DISTRESSED are you about your current sleep problem? 2   To what extent do you consider your sleep problem to INTERFERE with your daily functioning (e.g. daytime fatigue, mood, ability to function at work/daily chores, concentration, memory, mood, etc.) CURRENTLY? 4   ALETA Total Score 16     Guidelines for Scoring/Interpretation:  Total score categories:  0-7 = No clinically significant insomnia   8-14 = Subthreshold insomnia   15-21 = Clinical insomnia (moderate severity)  22-28 = Clinical insomnia (severe)  Used via courtesy of www.Contorionealth.va.gov with permission from Alexander Mar PhD., [facility]      STOP BANG   STOP BANG Questionnaire (  2008, the American Society of Anesthesiologists, Inc. [employer] 11/7/2022   1. Snoring - Do you snore loudly (louder than talking or loud enough to be heard through closed doors)? -   2. Tired - Do you often feel tired, fatigued, or sleepy during daytime? -   3. Observed - Has anyone observed you stop breathing during your sleep? -   4. Blood pressure - Do you have or are you being treated for high blood pressure? -   5. BMI - BMI more than 35 kg/m2? -  "  6. Age - Age over 50 yr old? -   7. Neck circumference - Neck circumference greater than 40 cm? -   8. Gender - Gender male? -   STOP BANG Score (MC): -   STOP BANG Score (Sleep) 5   Neck Cir (cm) Clinic: 44   B/P Clinic: 112/73   BMI Clinic: 50.48   Some encounter information is confidential and restricted. Go to Review Flowsheets activity to see all data.         GAD7  LINA-7  8/25/2022   1. Feeling nervous, anxious, or on edge 2   2. Not being able to stop or control worrying 1   3. Worrying too much about different things 1   4. Trouble relaxing 0   5. Being so restless that it is hard to sit still 0   6. Becoming easily annoyed or irritable 2   7. Feeling afraid, as if something awful might happen 0   LINA-7 Total Score 6   If you checked any problems, how difficult have they made it for you to do your work, take care of things at home, or get along with other people? Extremely difficult         CAGE-AID  CAGE-AID Flowsheet 2/24/2021   Have you ever felt you should Cut down on your drinking or drug use? 1   Have people Annoyed you by criticizing your drinking or drug use? 0   Have you ever felt bad or Guilty about your drinking or drug use? 1   Have you ever had a drink or used drugs first thing in the morning to steady your nerves or to get rid of a hangover? (Eye opener) 0   CAGE-AID SCORE 2       CAGE-AID reprinted with permission from the [place] Medical Journal, DANIE Fernandez. and ADAMARIS Pulido, \"Conjoint screening questionnaires for alcohol and drug abuse\" [place] Medical Journal 94: 135-140, 1995.      PATIENT HEALTH QUESTIONNAIRE-9 (PHQ - 9)  PHQ-9 ([employer]) 8/25/2022   1.  Little interest or pleasure in doing things 3   2.  Feeling down, depressed, or hopeless 2   3.  Trouble falling or staying asleep, or sleeping too much 2   4.  Feeling tired or having little energy 3   5.  Poor appetite or overeating 1   6.  Feeling bad about yourself 0   7.  Trouble concentrating 2   8.  Moving slowly or restless 2 "   9.  Suicidal or self-harm thoughts 0   PHQ-9 Total Score 15   Difficulty at work, home, or with people Extremely dIfficult   1.  Little interest or pleasure in doing things -   2.  Feeling down, depressed, or hopeless -   3.  Trouble falling or staying asleep, or sleeping too much -   4.  Feeling tired or having little energy -   5.  Poor appetite or overeating -   6.  Feeling bad about yourself -   7.  Trouble concentrating -   8.  Moving slowly or restless -   9.  Suicidal or self-harm thoughts -   PHQ-9 via MyChart TOTAL SCORE-----> -   Difficulty at work, home, or with people -     Developed by Titi Dunbar, Joy Velasquez, Herman Atkins and colleagues, with an educational bree from [employer] No permission required to reproduce, translate, display or distribute.        Allergies:    No Known Allergies    Medications:    Current Outpatient Medications   Medication Sig Dispense Refill     albuterol (PROAIR HFA) 108 (90 Base) MCG/ACT inhaler Inhale 1-2 puffs into the lungs every 4 hours as needed for shortness of breath / dyspnea or wheezing 8.5 g 11     albuterol (PROVENTIL) (2.5 MG/3ML) 0.083% neb solution Take 1 vial (2.5 mg) by nebulization every 6 hours as needed for shortness of breath / dyspnea or wheezing 3 mL 4     benzonatate (TESSALON) 200 MG capsule Take 1 Capsule (200 mg) by mouth 3 times daily if needed for Cough.       benzonatate (TESSALON) 200 MG capsule Take 200 mg by mouth       cetirizine (ZYRTEC) 10 MG tablet Take 1 tablet (10 mg) by mouth daily for 360 days 90 tablet 3     gabapentin (NEURONTIN) 300 MG capsule Take 1 capsule (300 mg) by mouth 2 times daily as needed (anxiety/agitation) 60 capsule 5     norgestrel-ethinyl estradiol (LO/OVRAL) 0.3-30 MG-MCG tablet Take 1 tablet by mouth daily 28 tablet 6     nystatin (MYCOSTATIN) 276176 UNIT/GM external powder Apply to affected area twice daily as needed 30 g 1     paragard intrauterine copper device 1 each by Intrauterine  route once for 1 dose       rosuvastatin (CRESTOR) 10 MG tablet Take 1 tablet (10 mg) by mouth daily 90 tablet 9     venlafaxine (EFFEXOR XR) 75 MG 24 hr capsule Take 3 capsules (225 mg) by mouth daily for 360 days 270 capsule 3     vitamin D3 (CHOLECALCIFEROL) 50 mcg (2000 units) tablet Take 1 tablet (50 mcg) by mouth daily 90 tablet 3     zolpidem (AMBIEN) 5 MG tablet Take 1 tablet (5 mg) by mouth nightly as needed for sleep (for sleep study) 1 tablet 0     WIXELA INHUB 100-50 MCG/DOSE inhaler Inhale 1 puff into the lungs 2 times daily (Patient not taking: Reported on 11/7/2022) 60 each 9       Problem List:  Patient Active Problem List    Diagnosis Date Noted     Obstructive sleep apnea 11/07/2022     Priority: Medium     Mood disorder (H) 09/21/2021     Priority: Medium     Morbid obesity (H) 04/02/2019     Priority: Medium     Hyperlipidemia LDL goal <160 01/20/2019     Priority: Medium     Suicidal ideation 05/05/2018     Priority: Medium     Polysubstance overdose 05/01/2018     Priority: Medium     Methamphetamine abuse, episodic (H) 03/06/2018     Priority: Medium     Day treatment at [facility] and [facility]       Paranoia (H) 11/28/2017     Priority: Medium     Mild persistent asthma without complication 11/02/2017     Priority: Medium     Vitamin D deficiency 11/02/2017     Priority: Medium     LINA (generalized anxiety disorder) 11/02/2017     Priority: Medium     Recurrent major depressive disorder, remission status unspecified (H) 11/02/2017     Priority: Medium     5/10/18:  Initial Psychiatry appointment at [facility] and [facility] ([phone])       Depression, major, recurrent, severe with psychosis (H) 11/02/2017     Priority: Medium     IUD (intrauterine device) in place 02/04/2014     Priority: Medium     Overview:   paragard placed 2/4/14       PMDD (premenstrual dysphoric disorder) 11/05/2010     Priority: Medium        Past Medical/Surgical History:  Past Medical History:   Diagnosis Date      LINA (generalized anxiety disorder) 11/2/2017     Hyperlipidemia LDL goal <160 1/20/2019     Major depressive disorder      Methanol abuse (H)      Mild persistent asthma without complication 11/2/2017     Vitamin D deficiency 11/2/2017     Past Surgical History:   Procedure Laterality Date     MAMMOPLASTY REDUCTION      reduction       Social History:  Social History     Socioeconomic History     Marital status: Single     Spouse name: Not on file     Number of children: Not on file     Years of education: Not on file     Highest education level: Not on file   Occupational History     Not on file   Tobacco Use     Smoking status: Never     Smokeless tobacco: Never   Vaping Use     Vaping Use: Never used   Substance and Sexual Activity     Alcohol use: No     Comment: Sober x 8 months     Drug use: No     Comment: in remision      Sexual activity: Not Currently     Partners: Male   Other Topics Concern     Parent/sibling w/ CABG, MI or angioplasty before 65F 55M? No   Social History Narrative     Not on file     Social Determinants of Health     Financial Resource Strain: Not on file   Food Insecurity: Not on file   Transportation Needs: Not on file   Physical Activity: Not on file   Stress: Not on file   Social Connections: Not on file   Intimate Partner Violence: Not on file   Housing Stability: Not on file       Family History:  Family History   Problem Relation Age of Onset     Cancer Mother      Unknown/Adopted Father      Alcoholism Father      Substance Abuse Sister      Diabetes No family hx of      Hypertension No family hx of        Review of Systems:   A complete review of systems reviewed by me is negative with the exeption of what has been mentioned in the history of present illness.  In the last TWO WEEKS have you experienced any of the following symptoms?  Fevers: No  Night Sweats: Yes  Weight Gain: Yes  Pain at Night: Yes  Double Vision: No  Changes in Vision: No  Difficulty Breathing through Nose:  "Yes  Sore Throat in Morning: Yes  Dry Mouth in the Morning: Yes  Shortness of Breath Lying Flat: Yes  Shortness of Breath With Activity: Yes  Awakening with Shortness of Breath: Yes  Increased Cough: No  Heart Racing at Night: Yes  Swelling in Feet or Legs: No  Diarrhea at Night: No  Heartburn at Night: No  Urinating More than Once at Night: Yes  Losing Control of Urine at Night: No  Joint Pains at Night: Yes  Headaches in Morning: Yes  Weakness in Arms or Legs: No  Depressed Mood: No  Anxiety: Yes     Physical Exam:   Vitals: /73   Pulse 92   Ht 1.6 m (5' 3\")   Wt 129.3 kg (285 lb)   SpO2 95%   BMI 50.49 kg/m    BMI= Body mass index is 50.49 kg/m .  Neck Cir (cm): 44 cm  Mandibular profile: mild retrognathia  Mallampati Class: II.  Tonsillar Stage: 3  extending beyond pillars.  GENERAL: Healthy, alert and no distress  EYES: Eyes grossly normal to inspection.  No discharge or erythema, or obvious scleral/conjunctival abnormalities.  RESP: No audible wheeze, cough, or visible cyanosis.  No visible retractions or increased work of breathing.    SKIN: Visible skin clear. No significant rash, abnormal pigmentation or lesions.  NEURO: Cranial nerves grossly intact.  Mentation and speech appropriate for age.  PSYCH: Mentation appears normal, affect normal/bright, judgement and insight intact, normal speech and appearance well-groomed.         Data: All pertinent previous laboratory data reviewed     Recent Labs   Lab Test 09/15/22  1351 05/17/22  1052    136   POTASSIUM 4.3 4.5   CHLORIDE 102 102   CO2 28 32   ANIONGAP 7 2*   GLC 96 110*   BUN 9.4 16   CR 0.73 0.73   PALAK 9.0 9.6       Recent Labs   Lab Test 08/25/22  1420 05/17/22  1052   WBC  --  9.5   RBC  --  4.82   HGB 13.4 14.3   HCT  --  44.0   MCV  --  91   MCH  --  29.7   MCHC  --  32.5   RDW  --  13.5   PLT  --  267       Recent Labs   Lab Test 09/15/22  1351   PROTTOTAL 7.1   ALBUMIN 3.8   BILITOTAL 0.3   ALKPHOS 68   AST 27   ALT 13       TSH "   Date Value   09/15/2022 2.56 uIU/mL   11/17/2020 1.20 mU/L   12/03/2018 2.64 mU/L       Amphetamines Urine (no units)   Date Value   07/08/2018 (A)    Screen Positive (Confirmation available on request)     Barbiturates Urine (no units)   Date Value   07/08/2018 Screen Negative     Cannabinoids Urine (no units)   Date Value   07/08/2018 Screen Negative     Cocaine Urine (no units)   Date Value   07/08/2018 Screen Negative     Opiates Urine (no units)   Date Value   07/08/2018 Screen Negative     PCP Urine (no units)   Date Value   07/08/2018 Screen Negative       Ferritin   Date/Time Value Ref Range Status   09/15/2022 01:51 PM 26 11 - 328 ng/mL Final       No results found for: PH, PHARTERIAL, PO2, [id number], SAT, PCO2, HCO3, BASEEXCESS, LAUREN, BEB    Echocardiology: No results found for this or any previous visit (from the past 4320 hour(s)).    Chest x-ray: No results found for this or any previous visit from the past 365 days.      Chest CT: No results found for this or any previous visit from the past 365 days.      PFT: No results found for this or any previous visit from the past 365 days.      Assessment and Plan:     Problem List Items Addressed This Visit        Respiratory    Obstructive sleep apnea - Primary     STOP BANG score is 5/8. Patient likely has sleep apnea based on clinical history of Snoring, subjective daytime sleepiness, increased BMI, age, neck circumference, frequent nocturia, unexplained arousals, fragmented sleep, middle of the night alertness, sleep inertia, morning dry mouth, and morning headaches.   Additionally patient is at risk for obesity hypoventilation syndrome given her BMI of 50.5 and serum bicarbonate of 28 on CMP.  Obstructive sleep apnea reviewed. Complications of Untreated Sleep Apnea Reviewed.  HST/ Polysomnography reviewed. Information provided regarding treatment options for JARVIS.    Recommend in-lab sleep study for full night diagnostic PSG with TCM to evaluate for  obstructive sleep apnea and obesity hypoventilation syndrome           Relevant Medications    zolpidem (AMBIEN) 5 MG tablet    Other Relevant Orders    Comprehensive Sleep Study       Digestive    Morbid obesity (H) (Chronic)     4 9We discussed weight management with healthy diet and exercise to reduce weight. We also dicussed the link between obesity and sleep related breathing disorders.  Additionally patient is at risk for obesity hypoventilation syndrome given her BMI of 50.5 and serum bicarbonate of 28 on CMP.    Patient follows with bariatric surgery.  This appointment is a part of her preop evaluation for gastric sleeve.    Recommend full night diagnostic PSG with TCM to evaluate for obesity hypoventilation syndrome in addition to obstructive sleep apnea              Patient was strongly advised to avoid driving, operating any heavy machinery or other hazardous situations while drowsy or sleepy.  Patient was counseled on the importance of driving while alert, to pull over if drowsy, or nap before getting into the vehicle if sleepy.      Plan is for Mojgan Jimenez to follow up: TBD after PSG results.     The above note was dictated using voice recognition software. Although reviewed after completion, some word and grammatical error may remain . Please contact the author for any clarifications.    Total time spent reviewing medical records, history and physical examination, review of previous testing and interpretation as well as documentation on this date, 11/07/22: 41 Minutes    Jeronimo Dumont MD on 11/7/2022   [facility]   Floor 1, [address]   [address] [place], [place] [address]. [place] [address]   Appointments: [phone]    CC: TABATHA Ash

## 2022-11-07 NOTE — ASSESSMENT & PLAN NOTE
STOP BANG score is 5/8. Patient likely has sleep apnea based on clinical history of Snoring, subjective daytime sleepiness, increased BMI, age, neck circumference, frequent nocturia, unexplained arousals, fragmented sleep, middle of the night alertness, sleep inertia, morning dry mouth, and morning headaches.   Additionally patient is at risk for obesity hypoventilation syndrome given her BMI of 50.5 and serum bicarbonate of 28 on CMP.  Obstructive sleep apnea reviewed. Complications of Untreated Sleep Apnea Reviewed.  HST/ Polysomnography reviewed. Information provided regarding treatment options for JARVIS.    Recommend in-lab sleep study for full night diagnostic PSG with TCM to evaluate for obstructive sleep apnea and obesity hypoventilation syndrome

## 2022-11-07 NOTE — NURSING NOTE
Scheduled patient PSG diagnostic, results will be communicated through [facility].    Evan Antoine CMA

## 2022-11-10 ENCOUNTER — OFFICE VISIT (OUTPATIENT)
Dept: OBGYN | Facility: CLINIC | Age: 52
End: 2022-11-10
Payer: COMMERCIAL

## 2022-11-10 VITALS
TEMPERATURE: 98.5 F | DIASTOLIC BLOOD PRESSURE: 91 MMHG | HEART RATE: 106 BPM | SYSTOLIC BLOOD PRESSURE: 138 MMHG | WEIGHT: 285 LBS | BODY MASS INDEX: 50.49 KG/M2

## 2022-11-10 DIAGNOSIS — Z23 HIGH PRIORITY FOR 2019-NCOV VACCINE: ICD-10-CM

## 2022-11-10 DIAGNOSIS — N92.1 MENORRHAGIA WITH IRREGULAR CYCLE: Primary | ICD-10-CM

## 2022-11-10 DIAGNOSIS — Z23 NEED FOR PROPHYLACTIC VACCINATION AND INOCULATION AGAINST INFLUENZA: ICD-10-CM

## 2022-11-10 DIAGNOSIS — Z30.432 ENCOUNTER FOR IUD REMOVAL: ICD-10-CM

## 2022-11-10 PROCEDURE — 91313 COVID-19,PF,MODERNA BIVALENT: CPT | Performed by: OBSTETRICS & GYNECOLOGY

## 2022-11-10 PROCEDURE — 0134A COVID-19,PF,MODERNA BIVALENT: CPT | Performed by: OBSTETRICS & GYNECOLOGY

## 2022-11-10 PROCEDURE — 99202 OFFICE O/P NEW SF 15 MIN: CPT | Mod: 25 | Performed by: OBSTETRICS & GYNECOLOGY

## 2022-11-10 PROCEDURE — 58301 REMOVE INTRAUTERINE DEVICE: CPT | Performed by: OBSTETRICS & GYNECOLOGY

## 2022-11-10 PROCEDURE — 90471 IMMUNIZATION ADMIN: CPT | Performed by: OBSTETRICS & GYNECOLOGY

## 2022-11-10 PROCEDURE — 90682 RIV4 VACC RECOMBINANT DNA IM: CPT | Performed by: OBSTETRICS & GYNECOLOGY

## 2022-11-10 NOTE — PROGRESS NOTES
"  Assessment & Plan     Menorrhagia with irregular cycle  Recommend pelvic ultrasound, f/u after  - US Transvaginal Pelvic Non-OB; Future    Encounter for IUD removal  Recommend paragard removal as it  May be worsening bleeding.   - REMOVE INTRAUTERINE DEVICE    COVID and flu shots today.     Ordering of each unique test         BMI:   Estimated body mass index is 50.49 kg/m  as calculated from the following:    Height as of 11/7/22: 1.6 m (5' 3\").    Weight as of this encounter: 129.3 kg (285 lb).           No follow-ups on file.    Yi Steven MD  [facility]    Curtis Ulrich is a 52 year old, presenting for the following health issues:  Referral      HPI   Having menopausal symptoms   Started getting really harrison.   Started on birth control pills in August 8/22  Within first three months had some really heavy periods. Has been doing better with taking only 4 placebo pills.   Periods up until OCPs were started but getting more irregular.   Mood symptoms improving but wants to avoid heavy periods.   Paragard in place since 2014.     Past Medical History:   Diagnosis Date     LINA (generalized anxiety disorder) 11/2/2017     Hyperlipidemia LDL goal <160 1/20/2019     Major depressive disorder      Methanol abuse (H)      Mild persistent asthma without complication 11/2/2017     Vitamin D deficiency 11/2/2017       Past Surgical History:   Procedure Laterality Date     MAMMOPLASTY REDUCTION      reduction       Family History   Problem Relation Age of Onset     Cancer Mother      Unknown/Adopted Father      Alcoholism Father      Substance Abuse Sister      Diabetes No family hx of      Hypertension No family hx of        Social History     Socioeconomic History     Marital status: Single     Spouse name: Not on file     Number of children: Not on file     Years of education: Not on file     Highest education level: Not on file   Occupational History     Not on file   Tobacco " Use     Smoking status: Never     Smokeless tobacco: Never   Vaping Use     Vaping Use: Never used   Substance and Sexual Activity     Alcohol use: No     Comment: Sober x 8 months     Drug use: No     Comment: in remision      Sexual activity: Not Currently     Partners: Male   Other Topics Concern     Parent/sibling w/ CABG, MI or angioplasty before 65F 55M? No   Social History Narrative     Not on file     Social Determinants of Health     Financial Resource Strain: Not on file   Food Insecurity: Not on file   Transportation Needs: Not on file   Physical Activity: Not on file   Stress: Not on file   Social Connections: Not on file   Intimate Partner Violence: Not on file   Housing Stability: Not on file       Current Outpatient Medications   Medication     albuterol (PROAIR HFA) 108 (90 Base) MCG/ACT inhaler     albuterol (PROVENTIL) (2.5 MG/3ML) 0.083% neb solution     benzonatate (TESSALON) 200 MG capsule     benzonatate (TESSALON) 200 MG capsule     cetirizine (ZYRTEC) 10 MG tablet     gabapentin (NEURONTIN) 300 MG capsule     norgestrel-ethinyl estradiol (LO/OVRAL) 0.3-30 MG-MCG tablet     nystatin (MYCOSTATIN) 032147 UNIT/GM external powder     paragard intrauterine copper device     rosuvastatin (CRESTOR) 10 MG tablet     venlafaxine (EFFEXOR XR) 75 MG 24 hr capsule     vitamin D3 (CHOLECALCIFEROL) 50 mcg (2000 units) tablet     WIXELA INHUB 100-50 MCG/DOSE inhaler     zolpidem (AMBIEN) 5 MG tablet     No current facility-administered medications for this visit.        No Known Allergies      Review of Systems   Constitutional, HEENT, cardiovascular, pulmonary, gi and gu systems are negative, except as otherwise noted.      Objective    BP (!) 138/91   Pulse 106   Temp 98.5  F (36.9  C)   Wt 129.3 kg (285 lb)   BMI 50.49 kg/m    Body mass index is 50.49 kg/m .  Physical Exam   GENERAL: healthy, alert and no distress  ABDOMEN: soft, nontender, no hepatosplenomegaly, no masses and bowel sounds normal    (female): normal female external genitalia, normal urethral meatus, vaginal mucosa, normal cervix/adnexa/uterus without masses or discharge  MS: no gross musculoskeletal defects noted, no edema  PSYCH: mentation appears normal, affect normal/bright      Component      Latest Ref Rng & Units 8/25/2022 9/15/2022   Hemoglobin      11.7 - 15.7 g/dL 13.4    Ferritin      11 - 328 ng/mL  26   TSH      0.30 - 4.20 uIU/mL  2.56           Procedure IUD REMOVAL:    Bimanual exam was performed.  The IUD strings were palpable.  Speculum introduced with patient in the dorsal lithotomy position.  The IUD string was visualized and grasped.  The IUD was removed easily.  Pt tolerated well.

## 2022-11-16 ENCOUNTER — VIRTUAL VISIT (OUTPATIENT)
Dept: SURGERY | Facility: CLINIC | Age: 52
End: 2022-11-16
Payer: COMMERCIAL

## 2022-11-16 DIAGNOSIS — E66.01 MORBID OBESITY (H): Primary | Chronic | ICD-10-CM

## 2022-11-16 NOTE — LETTER
11/16/2022         RE: Mojgan Jimenez  [address]  [place] [facility] [place] [address]        Dear Colleague,    Thank you for referring your patient, Mojgan Jimenez, to the [facility] [place]. Please see a copy of my visit note below.    Video-Visit Details    Type of service:  Video Visit    Video Start Time (time video started): 10:25 AM    Video End Time (time video stopped): 11:17 AM    Originating Location (pt. Location): Home    Distant Location (provider location):  Off-site    Mode of Communication:  Video Conference via [place]    Physician has received verbal consent for a Video Visit from the patient? Yes    Mojgan would like to follow through with bariatric surgery for health reasons. She has struggled with her weight for much of her life and now is concerned about various comorbidities. She has a history of depression and anxiety and has been sober from meth and alcohol for over 3 years (went to treatment multiple times). She is a stress, emotional and boredom eater. She has good knowledge of surgery and decent support. She will follow up and complete psychological testing. F32.9; F41.9; meth use disorder and alcohol use disorder in full sustained remission; E66.01    Ben Keyes, PhD          Again, thank you for allowing me to participate in the care of your patient.        Sincerely,        Ben Keyes, PhD

## 2022-11-16 NOTE — PROGRESS NOTES
Video-Visit Details    Type of service:  Video Visit    Video Start Time (time video started): 10:25 AM    Video End Time (time video stopped): 11:17 AM    Originating Location (pt. Location): Home    Distant Location (provider location):  Off-site    Mode of Communication:  Video Conference via [employer]    Physician has received verbal consent for a Video Visit from the patient? Yes    Mojgan would like to follow through with bariatric surgery for health reasons. She has struggled with her weight for much of her life and now is concerned about various comorbidities. She has a history of depression and anxiety and has been sober from meth and alcohol for over 3 years (went to treatment multiple times). She is a stress, emotional and boredom eater. She has good knowledge of surgery and decent support. She will follow up and complete psychological testing. F32.9; F41.9; meth use disorder and alcohol use disorder in full sustained remission; E66.01    Ben Keyes, PhD

## 2022-12-26 ENCOUNTER — HEALTH MAINTENANCE LETTER (OUTPATIENT)
Age: 52
End: 2022-12-26

## 2022-12-26 DIAGNOSIS — J45.40 MODERATE PERSISTENT ASTHMA WITHOUT COMPLICATION: ICD-10-CM

## 2022-12-28 RX ORDER — ALBUTEROL SULFATE 0.83 MG/ML
2.5 SOLUTION RESPIRATORY (INHALATION) EVERY 6 HOURS PRN
Qty: 3 ML | Refills: 4 | Status: SHIPPED | OUTPATIENT
Start: 2022-12-28

## 2023-01-23 DIAGNOSIS — N95.1 PERIMENOPAUSAL SYMPTOMS: ICD-10-CM

## 2023-01-26 ENCOUNTER — TELEPHONE (OUTPATIENT)
Dept: SURGERY | Facility: CLINIC | Age: 53
End: 2023-01-26
Payer: COMMERCIAL

## 2023-01-26 NOTE — TELEPHONE ENCOUNTER
Patient inquiring as to where she is in the process. She would like to know if she is behind & if there is anything else she needs to schedule. Please advise.    [phone] okay to leave VM

## 2023-01-27 ENCOUNTER — DOCUMENTATION ONLY (OUTPATIENT)
Dept: OTHER | Age: 53
End: 2023-01-27

## 2023-01-27 NOTE — TELEPHONE ENCOUNTER
Called pt and went over her tasklist. She has her 2nd appt with the psychologist next week and will ask him about an invite to support group, has upcoming appts with the dietitian and is having a sleep study on 2/19/2023. Pt verbalized understanding and was relieved to hear that she is on the right track.    Carmencita Suarez RN, CBN  [facility]  P [phone]  F [phone]

## 2023-02-14 ASSESSMENT — SLEEP AND FATIGUE QUESTIONNAIRES
HOW LIKELY ARE YOU TO NOD OFF OR FALL ASLEEP WHILE SITTING INACTIVE IN A PUBLIC PLACE: WOULD NEVER DOZE
HOW LIKELY ARE YOU TO NOD OFF OR FALL ASLEEP WHILE SITTING AND READING: WOULD NEVER DOZE
HOW LIKELY ARE YOU TO NOD OFF OR FALL ASLEEP WHILE WATCHING TV: WOULD NEVER DOZE
HOW LIKELY ARE YOU TO NOD OFF OR FALL ASLEEP WHILE SITTING QUIETLY AFTER LUNCH WITHOUT ALCOHOL: SLIGHT CHANCE OF DOZING
HOW LIKELY ARE YOU TO NOD OFF OR FALL ASLEEP IN A CAR, WHILE STOPPED FOR A FEW MINUTES IN TRAFFIC: WOULD NEVER DOZE
HOW LIKELY ARE YOU TO NOD OFF OR FALL ASLEEP WHEN YOU ARE A PASSENGER IN A CAR FOR AN HOUR WITHOUT A BREAK: WOULD NEVER DOZE
HOW LIKELY ARE YOU TO NOD OFF OR FALL ASLEEP WHILE LYING DOWN TO REST IN THE AFTERNOON WHEN CIRCUMSTANCES PERMIT: HIGH CHANCE OF DOZING
HOW LIKELY ARE YOU TO NOD OFF OR FALL ASLEEP WHILE SITTING AND TALKING TO SOMEONE: WOULD NEVER DOZE

## 2023-02-19 ENCOUNTER — THERAPY VISIT (OUTPATIENT)
Dept: SLEEP MEDICINE | Facility: CLINIC | Age: 53
End: 2023-02-19
Payer: COMMERCIAL

## 2023-02-19 DIAGNOSIS — G47.33 OBSTRUCTIVE SLEEP APNEA: ICD-10-CM

## 2023-02-19 PROCEDURE — 95810 POLYSOM 6/> YRS 4/> PARAM: CPT | Performed by: STUDENT IN AN ORGANIZED HEALTH CARE EDUCATION/TRAINING PROGRAM

## 2023-02-20 NOTE — PATIENT INSTRUCTIONS
[place] [facility]    1. Your sleep study will be reviewed by a sleep physician within the next few days.     2. Please follow up in the sleep clinic as scheduled, or, make an appointment with your sleep provider to be seen within two weeks to discuss the results of the sleep study.    3. If you have any questions or problems with your treatment plan, please contact your sleep clinic provider at [phone] to further manage your condition.    4. Please review your attached medication list, and, at your follow-up appointment advise your sleep clinic provider about any changes.    5. Go to [email or web address] for more information about your sleep problems.    Evan Kearns, PARTH, RPSGT  February 20, 2023

## 2023-02-20 NOTE — PROGRESS NOTES
Diagnostic PSG completed per provider order.  Patient did not meet criteria for PAP therapy.    transcutaneous C02 monitoring

## 2023-02-27 ENCOUNTER — TELEPHONE (OUTPATIENT)
Dept: PSYCHIATRY | Facility: CLINIC | Age: 53
End: 2023-02-27
Payer: COMMERCIAL

## 2023-02-27 NOTE — TELEPHONE ENCOUNTER
"Refill request r'cd from [facility] via fax for Vit D denied due to no longer under provider care. Faxed \"not authorized\" back to pharmacy.    Last evaluated on 1/28/2022. Transferred back to PCP      "

## 2023-03-01 ENCOUNTER — VIRTUAL VISIT (OUTPATIENT)
Dept: SURGERY | Facility: CLINIC | Age: 53
End: 2023-03-01
Payer: COMMERCIAL

## 2023-03-01 DIAGNOSIS — E66.813 CLASS 3 SEVERE OBESITY DUE TO EXCESS CALORIES WITH SERIOUS COMORBIDITY AND BODY MASS INDEX (BMI) OF 50.0 TO 59.9 IN ADULT (H): ICD-10-CM

## 2023-03-01 DIAGNOSIS — E66.01 CLASS 3 SEVERE OBESITY DUE TO EXCESS CALORIES WITH SERIOUS COMORBIDITY AND BODY MASS INDEX (BMI) OF 50.0 TO 59.9 IN ADULT (H): ICD-10-CM

## 2023-03-01 DIAGNOSIS — E78.5 HYPERLIPIDEMIA LDL GOAL <160: Primary | ICD-10-CM

## 2023-03-01 DIAGNOSIS — Z71.3 NUTRITIONAL COUNSELING: ICD-10-CM

## 2023-03-01 PROCEDURE — 97802 MEDICAL NUTRITION INDIV IN: CPT | Mod: VID | Performed by: DIETITIAN, REGISTERED

## 2023-03-01 NOTE — PROGRESS NOTES
Mojgan Jimenez is a 52 year old who is being evaluated via a billable video visit.      How would you like to obtain your AVS? MyChart  If the video visit is dropped, the invitation should be resent by: Send to e-mail at: [email or web address]  Will anyone else be joining your video visit? No        Initial Structured Weight Loss Supervised Diet Evaluation     Assessment:  Mojgan is being seen today for initial RD nutritional evaluation. Patient has been unsuccessful with non-surgical weight loss methods and is interested in bariatric surgery. Today we reviewed current eating habits and level of physical activity, and instructed on the changes that are required for successful bariatric outcomes.    Surgery of interest per pt: LSG.    Workflow review:  Support Group: Not completed.  Psychology:In progress.  Lab work:Completed.  SWL:No   Sleep Study-follow-up in March    Weight goal: At or below initial.    Anthropometrics:  Pt's weight is 282 lbs  Initial weight: 292 lbs  Weight change: 10 lbs (down)  BMI: There is no height or weight on file to calculate BMI.   Patient weight not recorded    Estimated RMR ([place]-St Jeor equation):  1862 kcals x 1.2 (sedentary) = 2234 kcals (for weight maintenance)      Medical History:  Patient Active Problem List   Diagnosis     Mild persistent asthma without complication     Vitamin D deficiency     LINA (generalized anxiety disorder)     Recurrent major depressive disorder, remission status unspecified (H)     Methamphetamine abuse, episodic (H)     Hyperlipidemia LDL goal <160     Depression, major, recurrent, severe with psychosis (H)     IUD (intrauterine device) in place     Paranoia (H)     PMDD (premenstrual dysphoric disorder)     Polysubstance overdose     Suicidal ideation     Morbid obesity (H)     Mood disorder (H)     Obstructive sleep apnea      Diabetes: No  HbA1c:  No results found for: HGBA1C      Nutrition History  Food allergies/intolerances/cultural or religous  food customs: Yes Apples and Tomatoes-Blisters    Vitamins/Mineral currently taking: MVI with Iron and Zinc, Vitamin B12, Vitamin D3    Socioeconomic Status  Who does the grocery shopping for your household? daughter  Who prepares your meals at home? Shared with her daughter    Diet Recall/Time  Breakfast: skipped or cereal   Lunch: ham sandwich, occasional salami, lettuce, onion, blanchard, occasional chips or PB and Jelly Pickering   Dinner: pizza with salad or spaghetti with meat sauce or caesar salad or sandwich or chicken or tacos or pork chops    Typical Snacks: has been working on reduced consumption, especially sweets    Overnight eating: No    Eating out: [employer]-trying to reduce frequency    Beverages  64 oz/day of Water, Sprite/Coke 0-1 time/day (has been working on reduced consumption), occasional Sukumar-Aid     Exercise  No set regimen-some swimming in the summer months    Nutrition Diagnosis (PES statement)  (NI-1.3) Excessive energy intake related to Food and nutrition related knowledge deficit concerning excessive energy/oral intake as evidenced by Intake of high caloric density foods/beverages (juice, soda, alcohol) at meals and/or snacks; Estimated intake that exceeds estimated daily energy intake; Frequent excessive fast food or restaurant intake; and BMI 50.1 kg/m2.      Intervention    Nutrition Education:   1. Provided general overview of diet and lifestyle modifications needed to be a deemed a safe candidate for bariatric surgery.   2. Educated patient on how to read a food label: choosing foods with than 10 grams fat and 10 grams sugar per serving to avoid dumping syndrome.  3. Dumping Syndrome: Described the mechanisms of syndrome, symptoms, and prevention tools from a dietary perspective.   4. Vitamins: Educated on post-op vitamin regimen including MVI+ 18 mg Fe two times a day, calcium citrate 400-600 mg two times a day, 0177-3431 mcg sublingual B12 daily, 5000 IU vitamin D3 daily.      Food/Nutrient Delivery:  5. Educated patient on eating three meals, with cutting out snacking.  6. Bariatric Plate: Patient and I discussed the importance of including a lean protein source (20-30 grams/meal), vegetables (included at lunch and dinner), one serving (15g) of carbohydrate, and limited added fat (1 tb/day) at each meal.   7. Educated patient on using a protein powder drink as a meal replacement and/or supplement after bariatric surgery.   8. Discussed importance of adequate hydration after surgery, with goal of at least 64 oz of fluids/day.  9. Addressed avoiding all carbonated, caffeinated and sweetened drinks to prepare for bariatric surgery.     Nutrition Counseling:  10. Mindful eating techniques: Encouraged slow meal pace, chewing foods to applesauce consistency for 20-30 minutes/meal.   11. Discussed  fluids 30 minutes before, during, and after meal to prevent dumping syndrome and discomfort post bariatric surgery.   12. Discussed pre/post operative diet progression, post op vitamin regimen, gave review of surgery process.     Instructions/Goals:     1. Start implementing bariatric surgery lifestyle modifications.      Handouts Provided:   [facility] Bariatric Surgery Nutrition Info  Protein supplement list    Monitor/Evaluation:  Pt. s target weight: no gain from initial visit, patient verbalized understanding.     Plan for next visit:     (Final Supervised Diet visit with RD) pre/post-op  diet progression, give review of surgery process.      Video-Visit Details    Type of service:  Video Visit    Video Start Time (time video started): 9:49 am     Video End Time (time video stopped): 10:16 am     Originating Location (pt. Location): Home        Distant Location (provider location):  Off-site    Mode of Communication:  Video Conference via [facility]    Physician has received verbal consent for a Video Visit from the patient? Yes      Kelly Ceballos RD

## 2023-03-01 NOTE — LETTER
3/1/2023         RE: Mojgan Jimenez  [address]  [place] [place] [address]        Dear Colleague,    Thank you for referring your patient, Mojgan Jimenez, to the [facility] BARIATRICS [facility] [place]. Please see a copy of my visit note below.    Mojgan Jimenez is a 52 year old who is being evaluated via a billable video visit.      How would you like to obtain your AVS? MyChart  If the video visit is dropped, the invitation should be resent by: Send to e-mail at: [email or web address]  Will anyone else be joining your video visit? No        Initial Structured Weight Loss Supervised Diet Evaluation     Assessment:  Mojgan is being seen today for initial RD nutritional evaluation. Patient has been unsuccessful with non-surgical weight loss methods and is interested in bariatric surgery. Today we reviewed current eating habits and level of physical activity, and instructed on the changes that are required for successful bariatric outcomes.    Surgery of interest per pt: LSG.    Workflow review:  Support Group: Not completed.  Psychology:In progress.  Lab work:Completed.  SWL:No   Sleep Study-follow-up in March    Weight goal: At or below initial.    Anthropometrics:  Pt's weight is 282 lbs  Initial weight: 292 lbs  Weight change: 10 lbs (down)  BMI: There is no height or weight on file to calculate BMI.   Patient weight not recorded    Estimated RMR ([place]-St Jeor equation):  1862 kcals x 1.2 (sedentary) = 2234 kcals (for weight maintenance)      Medical History:  Patient Active Problem List   Diagnosis     Mild persistent asthma without complication     Vitamin D deficiency     LINA (generalized anxiety disorder)     Recurrent major depressive disorder, remission status unspecified (H)     Methamphetamine abuse, episodic (H)     Hyperlipidemia LDL goal <160     Depression, major, recurrent, severe with psychosis (H)     IUD (intrauterine device) in place     Paranoia (H)     PMDD  (premenstrual dysphoric disorder)     Polysubstance overdose     Suicidal ideation     Morbid obesity (H)     Mood disorder (H)     Obstructive sleep apnea      Diabetes: No  HbA1c:  No results found for: HGBA1C      Nutrition History  Food allergies/intolerances/cultural or religous food customs: Yes Apples and Tomatoes-Blisters    Vitamins/Mineral currently taking: MVI with Iron and Zinc, Vitamin B12, Vitamin D3    Socioeconomic Status  Who does the grocery shopping for your household? daughter  Who prepares your meals at home? Shared with her daughter    Diet Recall/Time  Breakfast: skipped or cereal   Lunch: ham sandwich, occasional salami, lettuce, onion, blanchard, occasional chips or PB and Jelly Durant   Dinner: pizza with salad or spaghetti with meat sauce or caesar salad or sandwich or chicken or tacos or pork chops    Typical Snacks: has been working on reduced consumption, especially sweets    Overnight eating: No    Eating out: [employer], [employer]-trying to reduce frequency    Beverages  64 oz/day of Water, Sprite/Coke 0-1 time/day (has been working on reduced consumption), occasional Sukumar-Aid     Exercise  No set regimen-some swimming in the summer months    Nutrition Diagnosis (PES statement)  (NI-1.3) Excessive energy intake related to Food and nutrition related knowledge deficit concerning excessive energy/oral intake as evidenced by Intake of high caloric density foods/beverages (juice, soda, alcohol) at meals and/or snacks; Estimated intake that exceeds estimated daily energy intake; Frequent excessive fast food or restaurant intake; and BMI 50.1 kg/m2.      Intervention    Nutrition Education:   1. Provided general overview of diet and lifestyle modifications needed to be a deemed a safe candidate for bariatric surgery.   2. Educated patient on how to read a food label: choosing foods with than 10 grams fat and 10 grams sugar per serving to avoid dumping syndrome.  3. Dumping Syndrome: Described the  mechanisms of syndrome, symptoms, and prevention tools from a dietary perspective.   4. Vitamins: Educated on post-op vitamin regimen including MVI+ 18 mg Fe two times a day, calcium citrate 400-600 mg two times a day, 4146-2329 mcg sublingual B12 daily, 5000 IU vitamin D3 daily.     Food/Nutrient Delivery:  5. Educated patient on eating three meals, with cutting out snacking.  6. Bariatric Plate: Patient and I discussed the importance of including a lean protein source (20-30 grams/meal), vegetables (included at lunch and dinner), one serving (15g) of carbohydrate, and limited added fat (1 tb/day) at each meal.   7. Educated patient on using a protein powder drink as a meal replacement and/or supplement after bariatric surgery.   8. Discussed importance of adequate hydration after surgery, with goal of at least 64 oz of fluids/day.  9. Addressed avoiding all carbonated, caffeinated and sweetened drinks to prepare for bariatric surgery.     Nutrition Counseling:  10. Mindful eating techniques: Encouraged slow meal pace, chewing foods to applesauce consistency for 20-30 minutes/meal.   11. Discussed  fluids 30 minutes before, during, and after meal to prevent dumping syndrome and discomfort post bariatric surgery.   12. Discussed pre/post operative diet progression, post op vitamin regimen, gave review of surgery process.     Instructions/Goals:     1. Start implementing bariatric surgery lifestyle modifications.      Handouts Provided:   [facility] Bariatric Surgery Nutrition Info  Protein supplement list    Monitor/Evaluation:  Pt. s target weight: no gain from initial visit, patient verbalized understanding.     Plan for next visit:     (Final Supervised Diet visit with RD) pre/post-op  diet progression, give review of surgery process.      Video-Visit Details    Type of service:  Video Visit    Video Start Time (time video started): 9:49 am     Video End Time (time video stopped): 10:16 am      Originating Location (pt. Location): Home        Distant Location (provider location):  Off-site    Mode of Communication:  Video Conference via [facility]    Physician has received verbal consent for a Video Visit from the patient? Yes      Kelly Ceballos RD          Again, thank you for allowing me to participate in the care of your patient.        Sincerely,        Kelly Ceballos RD

## 2023-03-02 LAB — SLPCOMP: NORMAL

## 2023-03-07 ASSESSMENT — SLEEP AND FATIGUE QUESTIONNAIRES
HOW LIKELY ARE YOU TO NOD OFF OR FALL ASLEEP WHILE WATCHING TV: SLIGHT CHANCE OF DOZING
HOW LIKELY ARE YOU TO NOD OFF OR FALL ASLEEP WHILE SITTING AND READING: MODERATE CHANCE OF DOZING
HOW LIKELY ARE YOU TO NOD OFF OR FALL ASLEEP WHILE SITTING QUIETLY AFTER LUNCH WITHOUT ALCOHOL: WOULD NEVER DOZE
HOW LIKELY ARE YOU TO NOD OFF OR FALL ASLEEP WHILE LYING DOWN TO REST IN THE AFTERNOON WHEN CIRCUMSTANCES PERMIT: SLIGHT CHANCE OF DOZING
HOW LIKELY ARE YOU TO NOD OFF OR FALL ASLEEP WHEN YOU ARE A PASSENGER IN A CAR FOR AN HOUR WITHOUT A BREAK: WOULD NEVER DOZE
HOW LIKELY ARE YOU TO NOD OFF OR FALL ASLEEP WHILE SITTING INACTIVE IN A PUBLIC PLACE: WOULD NEVER DOZE
HOW LIKELY ARE YOU TO NOD OFF OR FALL ASLEEP WHILE SITTING AND TALKING TO SOMEONE: WOULD NEVER DOZE
HOW LIKELY ARE YOU TO NOD OFF OR FALL ASLEEP IN A CAR, WHILE STOPPED FOR A FEW MINUTES IN TRAFFIC: WOULD NEVER DOZE

## 2023-03-13 ENCOUNTER — VIRTUAL VISIT (OUTPATIENT)
Dept: SURGERY | Facility: CLINIC | Age: 53
End: 2023-03-13
Payer: COMMERCIAL

## 2023-03-13 ENCOUNTER — OFFICE VISIT (OUTPATIENT)
Dept: SLEEP MEDICINE | Facility: CLINIC | Age: 53
End: 2023-03-13
Payer: COMMERCIAL

## 2023-03-13 VITALS
WEIGHT: 284.8 LBS | SYSTOLIC BLOOD PRESSURE: 120 MMHG | HEIGHT: 63 IN | OXYGEN SATURATION: 97 % | DIASTOLIC BLOOD PRESSURE: 75 MMHG | BODY MASS INDEX: 50.46 KG/M2 | HEART RATE: 87 BPM

## 2023-03-13 DIAGNOSIS — E66.01 MORBID OBESITY (H): Primary | Chronic | ICD-10-CM

## 2023-03-13 DIAGNOSIS — G47.33 OBSTRUCTIVE SLEEP APNEA: Primary | ICD-10-CM

## 2023-03-13 PROCEDURE — 99213 OFFICE O/P EST LOW 20 MIN: CPT | Performed by: STUDENT IN AN ORGANIZED HEALTH CARE EDUCATION/TRAINING PROGRAM

## 2023-03-13 NOTE — PROGRESS NOTES
Video-Visit Details    Type of service:  Video Visit    Video Start Time (time video started): 11:45 AM    Video End Time (time video stopped): 12:28 PM    Originating Location (pt. Location): Home    Distant Location (provider location):  Off-site    Mode of Communication:  Video Conference via [employer]    Mojgan has made some changes in eating and lifestyle, but still needs to be more mindful about her eating. She will follow up with a list of activities and mindful eating strategies. She will also need to complete the packet of questionnaires. F32.9; F41.9; amphetamine use disorder in full sustained remission; E66.01

## 2023-03-13 NOTE — PROGRESS NOTES
[facility]  Sleep Study Follow-Up Visit:    Date on this visit: 3/13/2023    Primary Physician: Franca Mishra     History of present illness:  Mojgan Jimenez is a 52 year old female patient with  MDD, LINA, GERD, asthma, and morbid obesity who comes in today for follow-up of Her sleep study done on 2/19/2023 at the [facility] for possible sleep apnea.    Sleep study showed Severe obstructive sleep apnea. Sleep related hypoxemia was present.  Additional findings from the sleep study can be discussed in more detail at your next visit/ showed abnormal findings including AHI of 41.7, supine AHI 41.6, REM AHI 85.7, and 19.8 minutes with O2 saturations less than 88%.    These findings were reviewed with patient.     SCALES:  EPWORTH SLEEPINESS SCALE    [place] Sleepiness Scale ( SUSY Moss  [id number]<br>ESS - [place]/English - Final version - 21 Nov 07 - [facility].) 3/7/2023   Sitting and reading Moderate chance of dozing   Watching TV Slight chance of dozing   Sitting, inactive in a public place (e.g. a theatre or a meeting) Would never doze   As a passenger in a car for an hour without a break Would never doze   Lying down to rest in the afternoon when circumstances permit Slight chance of dozing   Sitting and talking to someone Would never doze   Sitting quietly after a lunch without alcohol Would never doze   In a car, while stopped for a few minutes in traffic Would never doze   [place] Score (MC) 4   [place] Score (Sleep) 4       INSOMNIA SEVERITY INDEX (ALETA)    Insomnia Severity Index (ALETA) 3/7/2023   Difficulty falling asleep 0   Difficulty staying asleep 2   Problems waking up too early 0   How SATISFIED/DISSATISFIED are you with your CURRENT sleep pattern? 2   How NOTICEABLE to others do you think your sleep problem is in terms of impairing the quality of your life? 4   How WORRIED/DISTRESSED are you about your current sleep problem? 2   To what extent do you  consider your sleep problem to INTERFERE with your daily functioning (e.g. daytime fatigue, mood, ability to function at work/daily chores, concentration, memory, mood, etc.) CURRENTLY? 3   ALETA Total Score 13     Guidelines for Scoring/Interpretation:  Total score categories:  0-7 = No clinically significant insomnia   8-14 = Subthreshold insomnia   15-21 = Clinical insomnia (moderate severity)  22-28 = Clinical insomnia (severe)  Used via courtesy of www.Dropmysite.va.gov with permission from Alexander Mar PhD., [facility]      Allergies:    No Known Allergies    Medications:    Current Outpatient Medications   Medication Sig Dispense Refill     albuterol (PROAIR HFA) 108 (90 Base) MCG/ACT inhaler Inhale 1-2 puffs into the lungs every 4 hours as needed for shortness of breath / dyspnea or wheezing 8.5 g 11     albuterol (PROVENTIL) (2.5 MG/3ML) 0.083% neb solution Take 1 vial (2.5 mg) by nebulization every 6 hours as needed for shortness of breath or wheezing 3 mL 4     cetirizine (ZYRTEC) 10 MG tablet Take 1 tablet (10 mg) by mouth daily for 360 days 90 tablet 3     norgestrel-ethinyl estradiol (LO/OVRAL) 0.3-30 MG-MCG tablet Take 1 tablet by mouth daily 28 tablet 6     rosuvastatin (CRESTOR) 10 MG tablet Take 1 tablet (10 mg) by mouth daily 90 tablet 9     venlafaxine (EFFEXOR XR) 75 MG 24 hr capsule Take 3 capsules (225 mg) by mouth daily for 360 days 270 capsule 3     vitamin D3 (CHOLECALCIFEROL) 50 mcg (2000 units) tablet Take 1 tablet (50 mcg) by mouth daily 90 tablet 3     WIXELA INHUB 100-50 MCG/DOSE inhaler Inhale 1 puff into the lungs 2 times daily 60 each 9     gabapentin (NEURONTIN) 300 MG capsule Take 1 capsule (300 mg) by mouth 2 times daily as needed (anxiety/agitation) 60 capsule 5     nystatin (MYCOSTATIN) 945042 UNIT/GM external powder Apply to affected area twice daily as needed (Patient not taking: Reported on 3/13/2023) 30 g 1     zolpidem (AMBIEN) 5 MG tablet Take 1 tablet (5 mg) by  mouth nightly as needed for sleep (for sleep study) (Patient not taking: Reported on 3/13/2023) 1 tablet 0       Problem List:  Patient Active Problem List    Diagnosis Date Noted     Obstructive sleep apnea 11/07/2022     Priority: Medium     2/19/2023 [facility] (285.0 lbs) - AHI 41.7, RDI 43.5, Supine AHI 41.6, REM AHI 85.7, Low O2 80.0%, Time Spent ?88% 19.8 minutes / Time Spent ?89% 27.4 minutes       Mood disorder (H) 09/21/2021     Priority: Medium     Morbid obesity (H) 04/02/2019     Priority: Medium     Hyperlipidemia LDL goal <160 01/20/2019     Priority: Medium     Suicidal ideation 05/05/2018     Priority: Medium     Polysubstance overdose 05/01/2018     Priority: Medium     Methamphetamine abuse, episodic (H) 03/06/2018     Priority: Medium     [facility] at [facility]       Paranoia (H) 11/28/2017     Priority: Medium     Mild persistent asthma without complication 11/02/2017     Priority: Medium     Vitamin D deficiency 11/02/2017     Priority: Medium     LINA (generalized anxiety disorder) 11/02/2017     Priority: Medium     Recurrent major depressive disorder, remission status unspecified (H) 11/02/2017     Priority: Medium     5/10/18:  Initial Psychiatry appointment at [facility] ([phone])       Depression, major, recurrent, severe with psychosis (H) 11/02/2017     Priority: Medium     IUD (intrauterine device) in place 02/04/2014     Priority: Medium     Overview:   paragard placed 2/4/14       PMDD (premenstrual dysphoric disorder) 11/05/2010     Priority: Medium        Past Medical/Surgical History:  Past Medical History:   Diagnosis Date     LINA (generalized anxiety disorder) 11/2/2017     Hyperlipidemia LDL goal <160 1/20/2019     Major depressive disorder      Methanol abuse (H)      Mild persistent asthma without complication 11/2/2017     Vitamin D deficiency 11/2/2017     Past Surgical History:   Procedure Laterality Date      "MAMMOPLASTY REDUCTION      reduction       Social History:  Social History     Socioeconomic History     Marital status: Single     Spouse name: Not on file     Number of children: Not on file     Years of education: Not on file     Highest education level: Not on file   Occupational History     Not on file   Tobacco Use     Smoking status: Never     Smokeless tobacco: Never   Vaping Use     Vaping Use: Never used   Substance and Sexual Activity     Alcohol use: No     Comment: Sober x 8 months     Drug use: No     Comment: in remision      Sexual activity: Not Currently     Partners: Male   Other Topics Concern     Parent/sibling w/ CABG, MI or angioplasty before 65F 55M? No   Social History Narrative     Not on file     Social Determinants of Health     Financial Resource Strain: Not on file   Food Insecurity: Not on file   Transportation Needs: Not on file   Physical Activity: Not on file   Stress: Not on file   Social Connections: Not on file   Intimate Partner Violence: Not on file   Housing Stability: Not on file       Family History:  Family History   Problem Relation Age of Onset     Cancer Mother      Unknown/Adopted Father      Alcoholism Father      Substance Abuse Sister      Diabetes No family hx of      Hypertension No family hx of        Review of systems  A complete review of systems reviewed by me is negative with the exeption of what has been mentioned in the history of present illness.    Physical Examination:  Vitals: /75   Pulse 87   Ht 1.6 m (5' 3\")   Wt 129.2 kg (284 lb 12.8 oz)   SpO2 97%   BMI 50.45 kg/m    BMI= Body mass index is 50.45 kg/m .     GENERAL: Healthy, alert and no distress  EYES: Eyes grossly normal to inspection.  No discharge or erythema, or obvious scleral/conjunctival abnormalities.  RESP: No audible wheeze, cough, or visible cyanosis.  No visible retractions or increased work of breathing.    SKIN: Visible skin clear. No significant rash, abnormal pigmentation " or lesions.  NEURO: Cranial nerves grossly intact.  Mentation and speech appropriate for age.  PSYCH: Mentation appears normal, affect normal/bright, judgement and insight intact, normal speech and appearance well-groomed.          Other tests/labs:   I have reviewed the labs and personally reviewed the imaging below and made my comment in the assessment and plan.    Assessment and Plan:  Problem List Items Addressed This Visit        Respiratory    Obstructive sleep apnea - Primary     Sleep study showed Severe obstructive sleep apnea. Sleep related hypoxemia was present.  Additional findings from the sleep study showed abnormal findings including AHI 41.7, supine AHI 41.6, REM AHI 85.7, and 19.8 minutes with O2 saturations less than 88%.    Following discussion with patient a shared decision was made to begin therapy with auto-CPAP at 5-15 cmH2O     Given the patient's significant concern for CPAP causing anxiety may trial short course of zolpidem at bedtime while the patient is adjusting to the CPAP         Relevant Orders    COMPREHENSIVE DME (Completed)       Patient was strongly advised to avoid driving, operating any heavy machinery or other hazardous situations while drowsy or sleepy.  Patient was counseled on the importance of driving while alert, to pull over if drowsy, or nap before getting into the vehicle if sleepy.      Plan is for Mojgan COLLINS Kellensamantha to follow up in about 1 month(s) following CPAP initiation.     The above note was dictated using voice recognition software. Although reviewed after completion, some word and grammatical error may remain . Please contact the author for any clarifications.    Total time spent reviewing medical records, history and physical examination, review of previous testing and interpretation as well as documentation on this date, 03/13/23: 20 minutes    Jeronimo Dumont MD on 10/20/2022   [facility]    Floor 1, [address]   [address] [facility] [place] [address]   Appointments: [phone] [facility]   Floor 1, [address]   [address] [place] [address]   Appointments: [phone]     CC: No ref. provider found

## 2023-03-13 NOTE — ASSESSMENT & PLAN NOTE
Sleep study showed Severe obstructive sleep apnea. Sleep related hypoxemia was present.  Additional findings from the sleep study showed abnormal findings including AHI 41.7, supine AHI 41.6, REM AHI 85.7, and 19.8 minutes with O2 saturations less than 88%.    Following discussion with patient a shared decision was made to begin therapy with auto-CPAP at 5-15 cmH2O     Given the patient's significant concern for CPAP causing anxiety may trial short course of zolpidem at bedtime while the patient is adjusting to the CPAP

## 2023-03-13 NOTE — NURSING NOTE
"Chief Complaint   Patient presents with     Study Results     Sleep study 2/19       Initial /75   Pulse 87   Ht 1.6 m (5' 3\")   Wt 129.2 kg (284 lb 12.8 oz)   SpO2 97%   BMI 50.45 kg/m   Estimated body mass index is 50.45 kg/m  as calculated from the following:    Height as of this encounter: 1.6 m (5' 3\").    Weight as of this encounter: 129.2 kg (284 lb 12.8 oz).    Medication Reconciliation: complete    Neck circumference:     DME: n/a    Patient will follow up 4 weeks after receiving machine.    Dianne Stanford MA  "

## 2023-03-13 NOTE — PATIENT INSTRUCTIONS
MY INFORMATION ON SLEEP APNEA-  Mojgan Jimenez    DOCTOR : Jeronimo Dumont MD  SLEEP CENTER :      MY CONTACT NUMBER:    [facility]  [phone]  [facility]      For general sleep health questions:   http://sleepeducation.org    For tips about PAP and COVID-19:  https://www.thoracic.org/patients/patient-resources/resources/covid-19-and-home-pap-therapy.pdf    For general info about COVID-19 including vaccines:  https://[employer] Groupfaview.org/covid19      Continue PAP therapy every night, for all hours that you are sleeping (including naps.)  As always, try to get at least 8 hours of sleep or more each day, keep a regular sleep schedule, and avoid sleep deprivation. Avoid alcohol.  Reasons that you might need a change to your pressure therapy would be weight gain or loss, waking having inadvertently removed your PAP overnight, having previously felt refreshed by sleep with CPAP use and now waking un-refreshed, and return of daytime sleepiness. Also, the development of new medical problems  (such as heart failure, stroke, medications such as narcotics) can sometimes affect breathing at night and change your PAP therapy needs.  Please bring PAP with you if you are hospitalized.  If anticipating surgery be sure to discuss with your surgeon that you have sleep apnea and use PAP therapy.    Maintain your equipment as recommended which includes routine cleaning and replacement of supplies.      Call DME for any questions regarding supplies or maintenance.    [facility], [facility] [phone]    Do not drive on engage in potentially dangerous activities if feeling sleepy.  Please follow up in sleep clinic again in 1 month after CPAP initiation.        Tips for your PAP use-    Mask fitting tips  Mask fitting exercise:    To improve your mask seal and your mobility at night, put mask on and secure in place.  Lie down in bed with  full pressure and roll to one side, adjust headgear while in that position to eliminate any leaks. Repeat process rolling to other side.     The mask seal does not have to be perfect:   CPAP machines are designed to make up for small leaks. However, you will not tolerate leaks blowing in your eyes so you will need to adjust.   Any leak should only be near or at the bottom of the mask.  We expect your mask to leak slightly at night.    Do not over-tighten the headgear straps, tighter IS NOT better, we expect minimal leak.    First try re-positioning the mask or headgear before tightening the headgear straps.  Mask leaks are expected due to changing sleeping positions. Try pulling the mask away from your skin allowing the cushion to re-inflate will minimize the leak.  If you struggle for a good fit, try turning the CPAP off and then readjust the mask by pulling it away from your face and then turning back on the CPAP.        Humidifier tips  Humidifiers can be adjusted to increase or decrease the amount of moisture according to your comfort level. You may need to adjust this frequently at first, but then might only change it with seasonal weather changes.     Try INCREASING the humidity if:  You experience a dry, irritated nasal passage or throat.  You have a runny, drippy nose or sneezing fits after using CPAP.  You experience nasal congestion during or after CPAP use.    Try DECREASING the humidity if:  You have excessive condensation or  rain out  in the tubing or mask.  Otherwise keep the tubing warm during the night by running it underneath the blankets or pillow.      Clinic visit after initial PAP set-up   Bring your equipment with you to your 5-8 week follow up clinic visit.  We will be extracting your data from the machine if not available from the cloud based [employer].        Travel  Always take your equipment with you when you travel.  If you fly with your equipment bring it on with you as a carry on.  Medical  equipment does not count as a carry on.    If you travel international the machines take 110-240v.  The only adapter needed is the adapter that will fit into the receptacle (outlet).    You may also want to bring an extension cord as many hotel rooms have limited outlets at the bedside.  Do not travel with water in your humidifier chamber.     Cleaning and Maintenance Guidelines    Equipment Frequency Cleaning Method   Mask First Day    Daily      Weekly Soak mask in hot soapy water for 30 minutes, rinse and air dry.  Wipe nasal cushion with a hot soapy (Ivory, baby shampoo) cloth and rinse.  Baby wipes may also be used.  Do not use anti-bacterial soaps,Trang  liquid soap, rubbing alcohol, bleach or ammonia.  Wash frame in hot soapy water (Ivory, baby shampoo) rinse and let air dry   Headgear Biweekly Wash in hot soapy water, rinse and air dry   Reusable Gray Filter Weekly Wash in hot soapy water, rinse, put in towel squeeze moisture out, let air dry   Disposable White Filter Check Weekly Replace when brown or gray in color; at least every 2 to 3 months   Humidifier Chamber Daily    Weekly Empty distilled water from humidifier and let air dry    Hand wash in hot soapy water, rinse and air dry   Tubing Weekly Wash in hot soapy water, rinse and let air dry   Mask, Tubing and Humidifier Chamber As needed Disinfect: Soak in 1 part distilled white vinegar to 3 parts hot water for 30 minutes, rinse well and air dry  Not the material headgear        MASK AND SUPPLY REORDERING and EQUIPMENT NEEDS through your DME and per your insurance  Reminder: Most insurance companies will allow for a new mask, headgear, tubing, and reusable gray filter every six months.  Disposable white ultra-fine filters are covered monthly.      HOME AND SAFETY INSTRUCTIONS  Do not use frayed or cracked electrical cords, multi plug adaptors, or switched receptacles  Do not immerse electrical equipment into water  Assure that electrical cords do not  become a tripping hazard

## 2023-04-04 ENCOUNTER — VIRTUAL VISIT (OUTPATIENT)
Dept: SURGERY | Facility: CLINIC | Age: 53
End: 2023-04-04
Payer: COMMERCIAL

## 2023-04-04 DIAGNOSIS — E66.01 CLASS 3 SEVERE OBESITY DUE TO EXCESS CALORIES WITH SERIOUS COMORBIDITY AND BODY MASS INDEX (BMI) OF 50.0 TO 59.9 IN ADULT (H): ICD-10-CM

## 2023-04-04 DIAGNOSIS — E66.813 CLASS 3 SEVERE OBESITY DUE TO EXCESS CALORIES WITH SERIOUS COMORBIDITY AND BODY MASS INDEX (BMI) OF 50.0 TO 59.9 IN ADULT (H): ICD-10-CM

## 2023-04-04 DIAGNOSIS — Z71.3 NUTRITIONAL COUNSELING: ICD-10-CM

## 2023-04-04 DIAGNOSIS — E78.5 HYPERLIPIDEMIA LDL GOAL <160: Primary | ICD-10-CM

## 2023-04-04 PROCEDURE — 97803 MED NUTRITION INDIV SUBSEQ: CPT | Mod: VID | Performed by: DIETITIAN, REGISTERED

## 2023-04-04 NOTE — PROGRESS NOTES
Mojgan Jimenez is a 52 year old who is being evaluated via a billable video visit.      How would you like to obtain your AVS? MyChart  If the video visit is dropped, the invitation should be resent by: Send to e-mail at: [email or web address]  Will anyone else be joining your video visit? No          Follow Up Surgical Weight Loss Supervised Diet Evaluation    Assessment:  Pt. is being seen today for a follow up RD nutritional evaluation. Pt. has been unsuccessful with non-surgical weight loss methods and is interested in bariatric surgery. Today we reviewed current eating habits and level of physical activity, and instructed on the changes that are required for successful bariatric outcomes.    Surgery of interest per pt: LSG.    Workflow review:  Support Group: Completed.-through [facility] (does have a certificate in regards to completing it)  Psychology:In progress.-has another scheduled on 4/13/2023  Lab work:Completed.  SWL:No   CPap needed-starting on 4/19/2023    Weight goal: At or below initial.    Anthropometrics:  Pt's weight is 282 lbs  Initial weight: 292 lbs  Weight change: 10 lbs (down)   BMI: There is no height or weight on file to calculate BMI.   Ideal body weight: 52.4 kg (115 lb 8.3 oz)  Adjusted ideal body weight: 83.1 kg (183 lb 3.7 oz)    Estimated RMR ([place]-St Jeor equation):  1862 kcals x 1.3 (light active) = 2421 kcals (for weight maintenance)    Medical History:  Patient Active Problem List   Diagnosis     Mild persistent asthma without complication     Vitamin D deficiency     LINA (generalized anxiety disorder)     Recurrent major depressive disorder, remission status unspecified (H)     Methamphetamine abuse, episodic (H)     Hyperlipidemia LDL goal <160     Depression, major, recurrent, severe with psychosis (H)     IUD (intrauterine device) in place     Paranoia (H)     PMDD (premenstrual dysphoric disorder)     Polysubstance overdose     Suicidal ideation     Morbid obesity (H)      Mood disorder (H)     Obstructive sleep apnea      Diabetes: No   HbA1c:  No results found for: HGBA1C    Progress over past month: Feels that she is doing better in regards to being consistent with eating 3 meals/day (utilizing a chart to stay on track).  Has been avoiding high fat, high sugared foods (I.e. chips, chocolate, etc...).  Patient reports that she continues to work on reduced pop consumption, noting that it is less than daily for sure.     Diet Recall/Time  Breakfast: cereal   Lunch: sandwich with ham  Dinner: salad or spaghetti or eggs or casserole     Typical Snacks: fruit (berries or grapes)     Eating out: improved significantly     Meal duration: has been working on it more consistently along with chewing her food well.      fluids by 30 minutes before, during meal, and waiting 30 minutes after meal before drinking fluids: No-needs to start working more consistently with    Beverages  Water-at least 64 oz/day     Exercise  Walking up and down the stairs, sit ups, swimming with her grandson, some walking with her sister    PES statement:   Overweight/Obesity (NC 3.3) related to overeating and poor lifestyle habits as evidenced by patient's subjective statements (h/o excessive energy intake) and BMI of 50.1 kg/m2.       Intervention    Nutrition Education:   1. Provided general overview of diet and lifestyle modifications needed to be a deemed a safe candidate for bariatric surgery.     Food/Nutrient Delivery:  2. Educated patient on eating three meals, with cutting out snacking.  3. Bariatric Plate: Patient and I discussed the importance of including a lean protein source (20-30 grams/meal), vegetables (included at lunch and dinner), one serving (15g) of carbohydrate, and limited added fat (1 tb/day) at each meal.   4. Discussed importance of adequate hydration after surgery, with goal of at least 64 oz of fluids/day.  5. Addressed avoiding all carbonated, caffeinated and sweetened drinks  to prepare for bariatric surgery.     Nutrition Counselin. Mindful eating techniques: Encouraged slow meal pace, chewing foods to applesauce consistency for 20-30 minutes/meal.   7. Discussed  fluids 30 minutes before, during, and after meal to prevent dumping syndrome and discomfort post bariatric surgery.       Instructions/Goals:     1. Continue implementing bariatric surgery lifestyle modifications.      Handouts Provided:   [facility] Bariatric Surgery Nutrition Info      Monitor/Evaluation:  Pt. s target weight:  no gain from initial visit, pt. verbalized understanding.       Plan for next visit:     (Final supervised diet visit with RD) pre/post-op  diet progression, give review of surgery process.        Video-Visit Details    Type of service:  Video Visit    Video Start Time (time video started): 9:46 am    Video End Time (time video stopped): 10:02 am    Originating Location (pt. Location): Home        Distant Location (provider location):  Off-site    Mode of Communication:  Video Conference via [facility]    Physician has received verbal consent for a Video Visit from the patient? Yes      Kelly Ceballos RD

## 2023-04-04 NOTE — LETTER
4/4/2023         RE: Mojgan Jimenez  [address]  [facility] [address]        Dear Colleague,    Thank you for referring your patient, Mojgan Jimenez, to the [facility] [place]. Please see a copy of my visit note below.    Mojgan Jimenez is a 52 year old who is being evaluated via a billable video visit.      How would you like to obtain your AVS? MyChart  If the video visit is dropped, the invitation should be resent by: Send to e-mail at: [email or web address]  Will anyone else be joining your video visit? No          Follow Up Surgical Weight Loss Supervised Diet Evaluation    Assessment:  Pt. is being seen today for a follow up RD nutritional evaluation. Pt. has been unsuccessful with non-surgical weight loss methods and is interested in bariatric surgery. Today we reviewed current eating habits and level of physical activity, and instructed on the changes that are required for successful bariatric outcomes.    Surgery of interest per pt: LSG.    Workflow review:  Support Group: Completed.-through [employer] (does have a certificate in regards to completing it)  Psychology:In progress.-has another scheduled on 4/13/2023  Lab work:Completed.  SWL:No   CPap needed-starting on 4/19/2023    Weight goal: At or below initial.    Anthropometrics:  Pt's weight is 282 lbs  Initial weight: 292 lbs  Weight change: 10 lbs (down)   BMI: There is no height or weight on file to calculate BMI.   Ideal body weight: 52.4 kg (115 lb 8.3 oz)  Adjusted ideal body weight: 83.1 kg (183 lb 3.7 oz)    Estimated RMR ([place]-St Jeor equation):  1862 kcals x 1.3 (light active) = 2421 kcals (for weight maintenance)    Medical History:  Patient Active Problem List   Diagnosis     Mild persistent asthma without complication     Vitamin D deficiency     LINA (generalized anxiety disorder)     Recurrent major depressive disorder, remission status unspecified (H)     Methamphetamine  abuse, episodic (H)     Hyperlipidemia LDL goal <160     Depression, major, recurrent, severe with psychosis (H)     IUD (intrauterine device) in place     Paranoia (H)     PMDD (premenstrual dysphoric disorder)     Polysubstance overdose     Suicidal ideation     Morbid obesity (H)     Mood disorder (H)     Obstructive sleep apnea      Diabetes: No   HbA1c:  No results found for: HGBA1C    Progress over past month: Feels that she is doing better in regards to being consistent with eating 3 meals/day (utilizing a chart to stay on track).  Has been avoiding high fat, high sugared foods (I.e. chips, chocolate, etc...).  Patient reports that she continues to work on reduced pop consumption, noting that it is less than daily for sure.     Diet Recall/Time  Breakfast: cereal   Lunch: sandwich with ham  Dinner: salad or spaghetti or eggs or casserole     Typical Snacks: fruit (berries or grapes)     Eating out: improved significantly     Meal duration: has been working on it more consistently along with chewing her food well.      fluids by 30 minutes before, during meal, and waiting 30 minutes after meal before drinking fluids: No-needs to start working more consistently with    Beverages  Water-at least 64 oz/day     Exercise  Walking up and down the stairs, sit ups, swimming with her grandson, some walking with her sister    PES statement:   Overweight/Obesity (NC 3.3) related to overeating and poor lifestyle habits as evidenced by patient's subjective statements (h/o excessive energy intake) and BMI of 50.1 kg/m2.       Intervention    Nutrition Education:   1. Provided general overview of diet and lifestyle modifications needed to be a deemed a safe candidate for bariatric surgery.     Food/Nutrient Delivery:  2. Educated patient on eating three meals, with cutting out snacking.  3. Bariatric Plate: Patient and I discussed the importance of including a lean protein source (20-30 grams/meal), vegetables  (included at lunch and dinner), one serving (15g) of carbohydrate, and limited added fat (1 tb/day) at each meal.   4. Discussed importance of adequate hydration after surgery, with goal of at least 64 oz of fluids/day.  5. Addressed avoiding all carbonated, caffeinated and sweetened drinks to prepare for bariatric surgery.     Nutrition Counselin. Mindful eating techniques: Encouraged slow meal pace, chewing foods to applesauce consistency for 20-30 minutes/meal.   7. Discussed  fluids 30 minutes before, during, and after meal to prevent dumping syndrome and discomfort post bariatric surgery.       Instructions/Goals:     1. Continue implementing bariatric surgery lifestyle modifications.      Handouts Provided:    [email or web address] [place] [facility] Nutrition Info      Monitor/Evaluation:  Pt. s target weight:  no gain from initial visit, pt. verbalized understanding.       Plan for next visit:     (Final supervised diet visit with RD) pre/post-op  diet progression, give review of surgery process.        Video-Visit Details    Type of service:  Video Visit    Video Start Time (time video started): 9:46 am    Video End Time (time video stopped): 10:02 am    Originating Location (pt. Location): Home        Distant Location (provider location):  Off-site    Mode of Communication:  Video Conference via [facility]    Physician has received verbal consent for a Video Visit from the patient? Yes      Kelly Ceballos RD              Again, thank you for allowing me to participate in the care of your patient.        Sincerely,        Kelly Ceballos RD

## 2023-04-13 ENCOUNTER — VIRTUAL VISIT (OUTPATIENT)
Dept: SURGERY | Facility: CLINIC | Age: 53
End: 2023-04-13
Payer: COMMERCIAL

## 2023-04-13 ENCOUNTER — TRANSFERRED RECORDS (OUTPATIENT)
Dept: HEALTH INFORMATION MANAGEMENT | Facility: CLINIC | Age: 53
End: 2023-04-13

## 2023-04-13 DIAGNOSIS — E66.01 MORBID OBESITY (H): Primary | Chronic | ICD-10-CM

## 2023-04-13 NOTE — PROGRESS NOTES
Virtual Visit Details    Type of service:  Video Visit   Video Start Time: 10:18 AM  Video End Time:10:41 AM    Originating Location (pt. Location): Home  Distant Location (provider location):  Off-site  Platform used for Video Visit: Odell Ulrich has made quality changes in eating and lifestyle and appears more mindful about her eating. She is now ready to proceed with surgery. A report was sent to the clinic. F32.9; F41.9; amphetamine use disorder in full sustained remission; E66.01

## 2023-04-19 ENCOUNTER — DOCUMENTATION ONLY (OUTPATIENT)
Dept: SLEEP MEDICINE | Facility: CLINIC | Age: 53
End: 2023-04-19
Payer: COMMERCIAL

## 2023-04-19 ENCOUNTER — DOCUMENTATION ONLY (OUTPATIENT)
Dept: SLEEP MEDICINE | Facility: CLINIC | Age: 53
End: 2023-04-19

## 2023-04-19 DIAGNOSIS — G47.33 OBSTRUCTIVE SLEEP APNEA (ADULT) (PEDIATRIC): Primary | ICD-10-CM

## 2023-04-19 NOTE — PROGRESS NOTES
Patient was offered choice of vendor and chose [facility].  Patient Mojgan Jimenez was set up at [facility] on April 19, 2023. Patient received a Resmed Airsense 11 Pressures were set at 5-15 cm H2O.   Patient s ramp is 5 cm H2O for Auto and FLEX/EPR is EPR, 2.  Patient received a Resmed Mask name: AIRFIT F20 FOR HER  Full Face mask size Medium, heated tubing and heated humidifier.  Patient has the following compliance requirements: usage only.  Jayson Beebe

## 2023-04-24 ENCOUNTER — DOCUMENTATION ONLY (OUTPATIENT)
Dept: SLEEP MEDICINE | Facility: CLINIC | Age: 53
End: 2023-04-24
Payer: COMMERCIAL

## 2023-04-24 NOTE — PROGRESS NOTES
3 day Sleep therapy management telephone visit    Diagnostic AHI: 41.7  PSG    Confirmed with patient at time of call- Yes Patient is still interested in STM service       Subjective measures:  Patient slowly getting used to CPAP. She was glad to hear that her data was looking good.         Objective data     Order Settings for PAP  CPAP min     CPAP max              Device settings from machine CPAP min 5.0     CPAP max 15.0           EPR Setting TWO    RESMED soft response  OFF     Assessment: Nightly usage over four hours      Action plan: Patient to have 14 day STM visit. Patient has a follow up visit scheduled:   no    Replacement device: No  STM ordered by provider: Yes     Total time spent on accessing and  interpreting remote patient PAP therapy data  10 minutes    Total time spent counseling, coaching  and reviewing PAP therapy data with patient  4 minutes    [id number] no

## 2023-04-28 ENCOUNTER — VIRTUAL VISIT (OUTPATIENT)
Dept: SURGERY | Facility: CLINIC | Age: 53
End: 2023-04-28
Payer: COMMERCIAL

## 2023-04-28 DIAGNOSIS — Z71.3 NUTRITIONAL COUNSELING: ICD-10-CM

## 2023-04-28 DIAGNOSIS — E66.01 CLASS 3 SEVERE OBESITY DUE TO EXCESS CALORIES WITH SERIOUS COMORBIDITY AND BODY MASS INDEX (BMI) OF 50.0 TO 59.9 IN ADULT (H): ICD-10-CM

## 2023-04-28 DIAGNOSIS — E78.5 HYPERLIPIDEMIA LDL GOAL <160: Primary | ICD-10-CM

## 2023-04-28 DIAGNOSIS — E66.813 CLASS 3 SEVERE OBESITY DUE TO EXCESS CALORIES WITH SERIOUS COMORBIDITY AND BODY MASS INDEX (BMI) OF 50.0 TO 59.9 IN ADULT (H): ICD-10-CM

## 2023-04-28 PROCEDURE — 97803 MED NUTRITION INDIV SUBSEQ: CPT | Mod: VID | Performed by: DIETITIAN, REGISTERED

## 2023-04-28 NOTE — LETTER
4/28/2023         RE: Mojgan Jimenez  [address]  [facility] [address]        Dear Colleague,    Thank you for referring your patient, Mojgan Jimenez, to the [facility] SURGERY CLINIC AND BARIATRICS CARE [place]. Please see a copy of my visit note below.    Mojgan Jimenez is a 52 year old who is being evaluated via a billable video visit.      How would you like to obtain your AVS? MyChart  If the video visit is dropped, the invitation should be resent by: Send to e-mail at: [email or web address]"  Will anyone else be joining your video visit? No          Follow Up Surgical Weight Loss Supervised Diet Evaluation    Assessment:  Pt. is being seen today for a follow up RD nutritional evaluation. Pt. has been unsuccessful with non-surgical weight loss methods and is interested in bariatric surgery. Today we reviewed current eating habits and level of physical activity, and instructed on the changes that are required for successful bariatric outcomes.    Surgery of interest per pt: LSG.    Workflow review:  Support Group: Completed.  Psychology:Completed.  Lab work:Completed.  SWL:No   CPap-going OK, has had it for about a week now, wearing it for at least 4 hours a night.     Weight goal: At or below initial.    Anthropometrics:  Pt's weight is 282 lbs  Initial weight: 292 lbs   Weight change: 10 lbs (down)  BMI: There is no height or weight on file to calculate BMI.   Ideal body weight: 52.4 kg (115 lb 8.3 oz)  Adjusted ideal body weight: 83.1 kg (183 lb 3.7 oz)    Estimated RMR ([place]-St Jeor equation):  1862 kcals x 1.3 (light active) = 2421 kcals (for weight maintenance)    Medical History:  Patient Active Problem List   Diagnosis     Mild persistent asthma without complication     Vitamin D deficiency     LINA (generalized anxiety disorder)     Recurrent major depressive disorder, remission status unspecified (H)     Methamphetamine abuse, episodic (H)     Hyperlipidemia LDL goal <160      Depression, major, recurrent, severe with psychosis (H)     IUD (intrauterine device) in place     Paranoia (H)     PMDD (premenstrual dysphoric disorder)     Polysubstance overdose     Suicidal ideation     Morbid obesity (H)     Mood disorder (H)     Obstructive sleep apnea      Diabetes: No  HbA1c:  No results found for: HGBA1C   Vitamin: Vitamin B12, Vitamin D, MVI    Progress over past month: Has been focusing on protein first at each meal, consuming foods such as chicken, hamburger, eggs, greek yogurt, etc...  Continues to push the fluids, especially water throughout the day, aiming for at least 64 oz/day. May have some Gatorade Zero.  Has been looking into the [employer] water bottle to purchase.       Meal duration: feels that she is improving on, is trying to be consistent with.      fluids by 30 minutes before, during meal, and waiting 30 minutes after meal before drinking fluids: Yes-feels that it is going well, needs to remind herself to stay on track, which helps    Exercise  Walking up and down stairs, swimming with her grandson    PES statement:   Obesity (NC 3.3) related to overeating and poor lifestyle habits as evidenced by patient's subjective statements and BMI 50.1 kg/m2.       Intervention    Nutrition Education:   1. Provided general overview of diet and lifestyle modifications needed to be a deemed a safe candidate for bariatric surgery.       Food/Nutrient Delivery:  2. Educated patient on eating three meals, with cutting out snacking.  3. Bariatric Plate: Patient and I discussed the importance of including a lean protein source (20-30 grams/meal), vegetables (included at lunch and dinner), one serving (15g) of carbohydrate, and limited added fat (1 tb/day) at each meal.    4. Educated patient on using a protein powder drink as a meal replacement and/or supplement after bariatric surgery.   5. Discussed importance of adequate hydration after surgery, with goal of at least 64 oz of  fluids/day.  6. Addressed avoiding all carbonated, caffeinated and sweetened drinks to prepare for bariatric surgery.     Nutrition Counselin. Mindful eating techniques: Encouraged slow meal pace, chewing foods to applesauce consistency for 20-30 minutes/meal.   8. Discussed  fluids 30 minutes before, during, and after meal to prevent dumping syndrome and discomfort post bariatric surgery.   9. Discussed pre/post operative diet progression, post op vitamin regimen, gave review of surgery process.     Instructions/Goals:     1. Continue implementing bariatric surgery lifestyle modifications.      Handouts Provided:   [facility] Nutrition Info    Monitor/Evaluation:  Pt. s target weight:  no gain from initial visit, pt. verbalized understanding.     Pt has completed all nutrition requirements and is well-informed of the dietary and physical activity requirements that are necessary for successful bariatric outcomes. This pt is an appropriate candidate for surgery from a nutrition standpoint at this time. The patient understands that surgery is a tool, not a cure, and post operative follow up is essential.     Plan for next visit:   Post Op Nutrition       Video-Visit Details    Type of service:  Video Visit    Video Start Time (time video started): 9:39 am    Video End Time (time video stopped): 9:53 am    Originating Location (pt. Location): Home        Distant Location (provider location):  Off-site    Mode of Communication:  Video Conference via [facility]    Physician has received verbal consent for a Video Visit from the patient? Yes      Kelly Ceballos RD              Again, thank you for allowing me to participate in the care of your patient.        Sincerely,        Kelly Ceballos RD

## 2023-04-28 NOTE — PROGRESS NOTES
Mojgan Jimenez is a 52 year old who is being evaluated via a billable video visit.      How would you like to obtain your AVS? MyChart  If the video visit is dropped, the invitation should be resent by: Send to e-mail at: [email or web address]  Will anyone else be joining your video visit? No          Follow Up Surgical Weight Loss Supervised Diet Evaluation    Assessment:  Pt. is being seen today for a follow up RD nutritional evaluation. Pt. has been unsuccessful with non-surgical weight loss methods and is interested in bariatric surgery. Today we reviewed current eating habits and level of physical activity, and instructed on the changes that are required for successful bariatric outcomes.    Surgery of interest per pt: LSG.    Workflow review:  Support Group: Completed.  Psychology:Completed.  Lab work:Completed.  SWL:No   CPap-going OK, has had it for about a week now, wearing it for at least 4 hours a night.     Weight goal: At or below initial.    Anthropometrics:  Pt's weight is 282 lbs  Initial weight: 292 lbs   Weight change: 10 lbs (down)  BMI: There is no height or weight on file to calculate BMI.   Ideal body weight: 52.4 kg (115 lb 8.3 oz)  Adjusted ideal body weight: 83.1 kg (183 lb 3.7 oz)    Estimated RMR ([place]-St Jeor equation):  1862 kcals x 1.3 (light active) = 2421 kcals (for weight maintenance)    Medical History:  Patient Active Problem List   Diagnosis     Mild persistent asthma without complication     Vitamin D deficiency     LINA (generalized anxiety disorder)     Recurrent major depressive disorder, remission status unspecified (H)     Methamphetamine abuse, episodic (H)     Hyperlipidemia LDL goal <160     Depression, major, recurrent, severe with psychosis (H)     IUD (intrauterine device) in place     Paranoia (H)     PMDD (premenstrual dysphoric disorder)     Polysubstance overdose     Suicidal ideation     Morbid obesity (H)     Mood disorder (H)     Obstructive sleep apnea       Diabetes: No  HbA1c:  No results found for: HGBA1C   Vitamin: Vitamin B12, Vitamin D, MVI    Progress over past month: Has been focusing on protein first at each meal, consuming foods such as chicken, hamburger, eggs, greek yogurt, etc...  Continues to push the fluids, especially water throughout the day, aiming for at least 64 oz/day. May have some Gatorade Zero.  Has been looking into the [email or web address] water bottle to purchase.       Meal duration: feels that she is improving on, is trying to be consistent with.      fluids by 30 minutes before, during meal, and waiting 30 minutes after meal before drinking fluids: Yes-feels that it is going well, needs to remind herself to stay on track, which helps    Exercise  Walking up and down stairs, swimming with her grandson    PES statement:   Obesity (NC 3.3) related to overeating and poor lifestyle habits as evidenced by patient's subjective statements and BMI 50.1 kg/m2.       Intervention    Nutrition Education:   1. Provided general overview of diet and lifestyle modifications needed to be a deemed a safe candidate for bariatric surgery.       Food/Nutrient Delivery:  2. Educated patient on eating three meals, with cutting out snacking.  3. Bariatric Plate: Patient and I discussed the importance of including a lean protein source (20-30 grams/meal), vegetables (included at lunch and dinner), one serving (15g) of carbohydrate, and limited added fat (1 tb/day) at each meal.    4. Educated patient on using a protein powder drink as a meal replacement and/or supplement after bariatric surgery.   5. Discussed importance of adequate hydration after surgery, with goal of at least 64 oz of fluids/day.  6. Addressed avoiding all carbonated, caffeinated and sweetened drinks to prepare for bariatric surgery.     Nutrition Counselin. Mindful eating techniques: Encouraged slow meal pace, chewing foods to applesauce consistency for 20-30 minutes/meal.   8. Discussed   fluids 30 minutes before, during, and after meal to prevent dumping syndrome and discomfort post bariatric surgery.   9. Discussed pre/post operative diet progression, post op vitamin regimen, gave review of surgery process.     Instructions/Goals:     1. Continue implementing bariatric surgery lifestyle modifications.      Handouts Provided:   [facility] Bariatric Surgery Nutrition Info    Monitor/Evaluation:  Pt. s target weight:  no gain from initial visit, pt. verbalized understanding.     Pt has completed all nutrition requirements and is well-informed of the dietary and physical activity requirements that are necessary for successful bariatric outcomes. This pt is an appropriate candidate for surgery from a nutrition standpoint at this time. The patient understands that surgery is a tool, not a cure, and post operative follow up is essential.     Plan for next visit:   Post Op Nutrition       Video-Visit Details    Type of service:  Video Visit    Video Start Time (time video started): 9:39 am    Video End Time (time video stopped): 9:53 am    Originating Location (pt. Location): Home        Distant Location (provider location):  Off-site    Mode of Communication:  Video Conference via [facility]    Physician has received verbal consent for a Video Visit from the patient? Yes      Kelly Ceballos, RD

## 2023-05-10 ENCOUNTER — DOCUMENTATION ONLY (OUTPATIENT)
Dept: SLEEP MEDICINE | Facility: CLINIC | Age: 53
End: 2023-05-10
Payer: COMMERCIAL

## 2023-05-10 NOTE — PROGRESS NOTES
14  DAY STM VISIT    Diagnostic AHI: 41.7  PSG    Subjective measures: Patient reports things are going well with CPAP, other than the mask leaks sometimes. She is considering switching to a nasal mask. She will try running mask fit.     Assessment: Pt not meeting objective benchmarks for compliance  Patient failing following subjective benchmarks: leak issues     Action plan: pt to have 30 day STM visit.      Device type: Auto-CPAP    PAP settings:  DEVICE TYPE CPAP MIN CPAP MAX 95TH % PRESSURE EPR MASK DISPENSED   Auto-CPAP    5.0 cm  H20 15.0 cm  H20 14.7 cm  H20  TWO Full Face Mask     Mask type:  Full Face Mask    Objective measures: 14 day rolling measures   COMPLIANCE LEAK AHI AVERAGE USE IN MINUTES   50 % 14.2 4.12 217   GOAL >70% GOAL < 24 LPM GOAL <5 GOAL >240          Total time spent on accessing and interpreting remote patient PAP therapy data  10 minutes    Total time spent counseling, coaching  and reviewing PAP therapy data with patient  3 minutes    [id number]  [id number]  no (3 day STM)

## 2023-05-22 DIAGNOSIS — J45.40 MODERATE PERSISTENT ASTHMA WITHOUT COMPLICATION: ICD-10-CM

## 2023-05-23 ENCOUNTER — DOCUMENTATION ONLY (OUTPATIENT)
Dept: SLEEP MEDICINE | Facility: CLINIC | Age: 53
End: 2023-05-23
Payer: COMMERCIAL

## 2023-05-23 NOTE — PROGRESS NOTES
30 DAY STM VISIT    Diagnostic AHI: 41.7  PSG    PAP settings:  CPAP MIN CPAP MAX 95TH % PRESSURE EPR RESMED SOFT RESPONSE SETTING   5.0 cm  H20 15.0 cm  H20 14.7 cm  H20  TWO OFF     Device type: Auto-CPAP  Mask type:  Full Face Mask    Objective measures: 14 day rolling measures:    COMPLIANCE LEAK AHI AVERAGE USE IN MINUTES   42 % 29.7 4 220   GOAL >70% GOAL < 24 LPM GOAL <5 GOAL >240        Assessment: Pt not meeting objective benchmarks for leak and compliance     Message left for patient to return call   Action plan: waiting for patient to return call.  Patient has the following upcoming sleep appts:    Total time spent on accessing and interpreting remote patient PAP therapy data  10 minutes    Total time spent counseling, coaching  and reviewing PAP therapy data with patient  1 minutes     [id number] this call  [id number] no  at 3 or 14 day [facility]

## 2023-05-24 RX ORDER — ALBUTEROL SULFATE 90 UG/1
AEROSOL, METERED RESPIRATORY (INHALATION)
Qty: 8.5 G | Refills: 11 | Status: ON HOLD | OUTPATIENT
Start: 2023-05-24 | End: 2024-01-12

## 2023-06-01 ENCOUNTER — TELEPHONE (OUTPATIENT)
Dept: SURGERY | Facility: CLINIC | Age: 53
End: 2023-06-01
Payer: COMMERCIAL

## 2023-06-01 NOTE — TELEPHONE ENCOUNTER
I spoke with Mojgan this afternoon about where she is at in the process for surgery.  I did let her know that we are still waiting for her to attend the support group meeting.  She said that she has been attending a monthly support group with [employer].  I'm not sure what this is but she says she has proof.  Then she told me that she was on the edge to lose her [facility] insurance due to her job and her pay increase.  She is now no longer working and is reaching out to [facility] tomorrow to make sure she can stay on with them.  I let her know that while she is waiting on them she has time to attend our support group and she agreed.  So she will email Ben for an invite.

## 2023-07-05 ENCOUNTER — OFFICE VISIT (OUTPATIENT)
Dept: SURGERY | Facility: CLINIC | Age: 53
End: 2023-07-05
Payer: COMMERCIAL

## 2023-07-05 VITALS
DIASTOLIC BLOOD PRESSURE: 82 MMHG | WEIGHT: 286 LBS | BODY MASS INDEX: 50.68 KG/M2 | SYSTOLIC BLOOD PRESSURE: 132 MMHG | HEIGHT: 63 IN

## 2023-07-05 DIAGNOSIS — E66.01 MORBID OBESITY (H): Primary | ICD-10-CM

## 2023-07-05 PROCEDURE — 99204 OFFICE O/P NEW MOD 45 MIN: CPT | Performed by: SURGERY

## 2023-07-05 RX ORDER — CEFAZOLIN SODIUM/WATER 3 G/30 ML
3 SYRINGE (ML) INTRAVENOUS SEE ADMIN INSTRUCTIONS
Status: CANCELLED | OUTPATIENT
Start: 2023-10-09

## 2023-07-05 RX ORDER — ONDANSETRON 2 MG/ML
4 INJECTION INTRAMUSCULAR; INTRAVENOUS
Status: CANCELLED | OUTPATIENT
Start: 2023-10-09

## 2023-07-05 RX ORDER — ENOXAPARIN SODIUM 100 MG/ML
40 INJECTION SUBCUTANEOUS ONCE
Status: CANCELLED | OUTPATIENT
Start: 2023-07-05 | End: 2023-07-05

## 2023-07-05 RX ORDER — ACETAMINOPHEN 325 MG/1
975 TABLET ORAL ONCE
Status: CANCELLED | OUTPATIENT
Start: 2023-10-09 | End: 2023-07-05

## 2023-07-05 RX ORDER — PROPRANOLOL HYDROCHLORIDE 10 MG/1
TABLET ORAL
Status: ON HOLD | COMMUNITY
Start: 2023-05-23 | End: 2023-11-21

## 2023-07-05 RX ORDER — GABAPENTIN 300 MG/1
600 CAPSULE ORAL
Status: CANCELLED | OUTPATIENT
Start: 2023-10-09

## 2023-07-05 RX ORDER — FLUTICASONE PROPIONATE AND SALMETEROL XINAFOATE 115; 21 UG/1; UG/1
AEROSOL, METERED RESPIRATORY (INHALATION)
COMMUNITY
Start: 2023-06-28

## 2023-07-05 RX ORDER — CEFAZOLIN SODIUM/WATER 3 G/30 ML
3 SYRINGE (ML) INTRAVENOUS
Status: CANCELLED | OUTPATIENT
Start: 2023-10-09

## 2023-07-05 NOTE — PROGRESS NOTES
"I was consulted by Franca Mishra to evaluate this pt for Bariatric Surgery.    HPI: Mojgan Jimenez is a 52 year old female here today for consideration of metabolic and bariatric surgery. she has had weight problems going back to her teenage years.  She really started to accumulate weight in the last 20 years.   She Has tried multiple weight loss programs in the past with moderate success.  30 lbs.     This pt does not have Hypertention.   This pt does not have GERD.   The pt has sleep apnea which is being treated with a CPAP machine.   This pt does not have diabetes.   She feels that her Asthma is worse as her weight gets higher.   She has high Cholesterol.  .      Allergies:Patient has no known allergies.    Past Medical History:   Diagnosis Date     LINA (generalized anxiety disorder) 11/2/2017     Hyperlipidemia LDL goal <160 1/20/2019     Major depressive disorder      Methanol abuse (H)      Mild persistent asthma without complication 11/2/2017     Vitamin D deficiency 11/2/2017       Past Surgical History:   Procedure Laterality Date     MAMMOPLASTY REDUCTION      reduction       CURRENT MEDS:      Family History   Problem Relation Age of Onset     Cancer Mother      Unknown/Adopted Father      Alcoholism Father      Substance Abuse Sister      Diabetes No family hx of      Hypertension No family hx of         reports that she has never smoked. She has never used smokeless tobacco. She reports that she does not drink alcohol and does not use drugs.    Review of Systems - 12 point Review of Systems is negative except for the issues mentioned above.    PSYCHIATRIC: she has undergone a lifestyle assessment and has been deemed a good candidate for bariatric surgery by the psychologist.    /82   Ht 1.6 m (5' 3\")   Wt 129.7 kg (286 lb)   BMI 50.66 kg/m    Body mass index is 50.66 kg/m .    EXAM:  GENERAL: This is a well-developed 52 year old female who appears her stated age  HEAD & NECK: Grossly " normal.  No palpable thyroid lesions  CARDIAC: RRR without murmur  CHEST/LUNG:  Clear to auscultation  ABDOMEN: Obese.  Nontender.  No hernias or masses appreciated.  LYMPHATIC:  No significant adenopathy appreciated.    EXTREMITIES: Grossly normal.  No evidence of chronic venous stasis.    NEUROLOGIC: Focally intact  INTEGUMENT: No open lesions or ulcers  PSYCHIATRIC: Normal affect. she has a good grasp on the nature of her obesity and the treatment options.    LABS:  Lab Results   Component Value Date    WBC 9.5 05/17/2022    WBC 5.5 11/17/2020    HGB 13.4 08/25/2022    HGB 12.3 11/17/2020    HCT 44.0 05/17/2022    HCT 37.4 11/17/2020    MCV 91 05/17/2022    MCV 91 11/17/2020     05/17/2022     11/17/2020       Lab Results   Component Value Date    ALT 13 09/15/2022    AST 27 09/15/2022    ALKPHOS 68 09/15/2022       Component Ref Range & Units 1 yr ago  (5/17/22) 1 yr ago  (8/31/21) 4 yr ago  (12/3/18)    Hemoglobin A1C 0.0 - 5.6 % 5.7 High   5.5 CM  4.9 R, CM    Comment: Normal <5.7%   Prediabetes 5.7-6.4%     Diabetes 6.5% or higher          Assessment/Plan: 52 year old female who is an excellent candidate for bariatric and metabolic surgery.  After a careful conversation with the patient it was decided that given her starting BMI of 51, the single anastamosis duodenal switch.. would be her best option.       I went over the surgery in detail with her.  I went over the nature of the operation and some of the potential consequences of the surgery.  I went over the expected hospital course and discussed laparoscopic versus open surgery, understanding that we will plan on doing this laparoscopically with the possibility of having to convert to an open operation.  I went over some of the risks and complications of the operation including, but not limited to, DVT, pulmonary emboli, pneumonia, postoperative bleeding, wound infection, staple line leak, intra-abdominal sepsis, and possible death.  I also  went over some of the potential nutritional concerns such as vitamin B-12, iron, vitamin D, vitamin A, calcium and protein deficiencies.  I will also went over the need for lifelong nutritional surveillance.  The patient understands and wants to proceed with surgery.  We will submit for prior authorization.      Hoang Worthy MD ,MD  [facility] Department of Surgery  [phone]

## 2023-07-05 NOTE — LETTER
7/5/2023         RE: Mojgan Jimenez  [address]  [place] [address]        Dear Colleague,    Thank you for referring your patient, Mojgan Jimenez, to the [facility] [place]. Please see a copy of my visit note below.    I was consulted by Franca Mishra to evaluate this pt for Bariatric Surgery.    HPI: Mojgan Jimenez is a 52 year old female here today for consideration of metabolic and bariatric surgery. she has had weight problems going back to her teenage years.  She really started to accumulate weight in the last 20 years.   She Has tried multiple weight loss programs in the past with moderate success.  30 lbs.     This pt does not have Hypertention.   This pt does not have GERD.   The pt has sleep apnea which is being treated with a CPAP machine.   This pt does not have diabetes.   She feels that her Asthma is worse as her weight gets higher.   She has high Cholesterol.  .      Allergies:Patient has no known allergies.    Past Medical History:   Diagnosis Date     LINA (generalized anxiety disorder) 11/2/2017     Hyperlipidemia LDL goal <160 1/20/2019     Major depressive disorder      Methanol abuse (H)      Mild persistent asthma without complication 11/2/2017     Vitamin D deficiency 11/2/2017       Past Surgical History:   Procedure Laterality Date     MAMMOPLASTY REDUCTION      reduction       CURRENT MEDS:      Family History   Problem Relation Age of Onset     Cancer Mother      Unknown/Adopted Father      Alcoholism Father      Substance Abuse Sister      Diabetes No family hx of      Hypertension No family hx of         reports that she has never smoked. She has never used smokeless tobacco. She reports that she does not drink alcohol and does not use drugs.    Review of Systems - 12 point Review of Systems is negative except for the issues mentioned above.    PSYCHIATRIC: she has undergone a lifestyle assessment and has been deemed a  "good candidate for bariatric surgery by the psychologist.    /82   Ht 1.6 m (5' 3\")   Wt 129.7 kg (286 lb)   BMI 50.66 kg/m    Body mass index is 50.66 kg/m .    EXAM:  GENERAL: This is a well-developed 52 year old female who appears her stated age  HEAD & NECK: Grossly normal.  No palpable thyroid lesions  CARDIAC: RRR without murmur  CHEST/LUNG:  Clear to auscultation  ABDOMEN: Obese.  Nontender.  No hernias or masses appreciated.  LYMPHATIC:  No significant adenopathy appreciated.    EXTREMITIES: Grossly normal.  No evidence of chronic venous stasis.    NEUROLOGIC: Focally intact  INTEGUMENT: No open lesions or ulcers  PSYCHIATRIC: Normal affect. she has a good grasp on the nature of her obesity and the treatment options.    LABS:  Lab Results   Component Value Date    WBC 9.5 05/17/2022    WBC 5.5 11/17/2020    HGB 13.4 08/25/2022    HGB 12.3 11/17/2020    HCT 44.0 05/17/2022    HCT 37.4 11/17/2020    MCV 91 05/17/2022    MCV 91 11/17/2020     05/17/2022     11/17/2020       Lab Results   Component Value Date    ALT 13 09/15/2022    AST 27 09/15/2022    ALKPHOS 68 09/15/2022       Component Ref Range & Units 1 yr ago  (5/17/22) 1 yr ago  (8/31/21) 4 yr ago  (12/3/18)    Hemoglobin A1C 0.0 - 5.6 % 5.7 High   5.5 CM  4.9 R, CM    Comment: Normal <5.7%   Prediabetes 5.7-6.4%     Diabetes 6.5% or higher          Assessment/Plan: 52 year old female who is an excellent candidate for bariatric and metabolic surgery.  After a careful conversation with the patient it was decided that given her starting BMI of 51, the single anastamosis duodenal switch.. would be her best option.       I went over the surgery in detail with her.  I went over the nature of the operation and some of the potential consequences of the surgery.  I went over the expected hospital course and discussed laparoscopic versus open surgery, understanding that we will plan on doing this laparoscopically with the possibility of " having to convert to an open operation.  I went over some of the risks and complications of the operation including, but not limited to, DVT, pulmonary emboli, pneumonia, postoperative bleeding, wound infection, staple line leak, intra-abdominal sepsis, and possible death.  I also went over some of the potential nutritional concerns such as vitamin B-12, iron, vitamin D, vitamin A, calcium and protein deficiencies.  I will also went over the need for lifelong nutritional surveillance.  The patient understands and wants to proceed with surgery.  We will submit for prior authorization.      Hoang Worthy MD ,MD  [facility] Department of Surgery  [phone]      Again, thank you for allowing me to participate in the care of your patient.        Sincerely,        Hoang Worthy MD

## 2023-07-11 ENCOUNTER — DOCUMENTATION ONLY (OUTPATIENT)
Dept: SURGERY | Facility: CLINIC | Age: 53
End: 2023-07-11
Payer: COMMERCIAL

## 2023-07-11 NOTE — TELEPHONE ENCOUNTER
Tasklist updated.    Carmencita Suarez RN, CBN  [facility]  P [phone]  F [phone]  \

## 2023-07-14 ENCOUNTER — DOCUMENTATION ONLY (OUTPATIENT)
Dept: SURGERY | Facility: CLINIC | Age: 53
End: 2023-07-14
Payer: COMMERCIAL

## 2023-07-14 NOTE — PROGRESS NOTES
Mojgan is scheduled for a KO ([id number])  with Dr. Hoang Worthy on Monday, August 28, 2023 @ 9:00 am.  Scheduled for pre op class on Wednesday, August 9, 2023 @ 12:30 pm.  She will have her pre op and testing done at [facility].    [facility]# [id number]  07/11/2023 - 01/10/2024

## 2023-08-03 NOTE — PROGRESS NOTES
Patient attended group class to review pre-op instructions for upcoming surgery.    Discussed admission process, COVID restrictions and hospital course.  Pharmacy information packet given and explained. Patient was given exercises to work on post-op for maintaining muscle mass and strengthening.  Bariatric quiz reviewed in class. Discharge instructions and follow up appointments given and reviewed with pt at this time and patient verbalized understanding. She will have her H&P at [facility] in [place] on 8/16/2023 with Alexandria Buitrago MD and do her pre-op testing at that visit. Orders faxed to [phone].  Prescriptions for Omeprazole and Actigall as well as vitamins sent to the patient's pharmacy to be started after surgery.      Carmencita Suarez RN, CBN  [facility]  P [phone]  F [phone]

## 2023-08-03 NOTE — PATIENT INSTRUCTIONS
Patient name: Mojgan Jimenez  Surgery:  Single Anastomosis Duodenal Switch  Surgery Date:  8/28/2023  Surgery Time: 9:25 am (*This time is just a tentative time and may end up changing.*)  Arrival Time to Hospital: 7:25 am (two hours prior to surgery time)  Surgeon: Hoang Worthy MD       MONTH BEFORE SURGERY    Schedule and have Pre-operative History and Physical with your primary care in addition to the labs, CXR and EKG.  These should be completed within a month of surgery and no closer than 5 days.  ALL of the following tests must be completed per anesthesia and your surgeon prior to your surgery:    Chest Xray  EKG  Complete Blood Count with Differential  Complete Metabolic Panel  INR  APTT(PTT)  Urine Analysis (UA) with Urine Culture if UA abnormal  Hemaglobin A1c if hasn't been done in the last 3 months AND you are diabetic.  Urine Cotinine with Metabolites if you quit smoking within the last 6 months.    * COVID testing- is no longer required prior to your surgery.  However, please let us know if you aren't feeling well or have tested positive for Covid-19 between now and your surgery date. Let us know if you have any questions regarding this.      2 WEEKS BEFORE SURGERY    Start Preoperative Liquid Diet per dietitian instructions      WEEK BEFORE SURGERY CHECKLIST       Continue Full Liquid Diet per dietitian instructions    2.   Purchase diet components for after surgery      3.   Arrange for someone to stay with you the first 1-2 nights you return home.     4.   Arrange for a ride home from the hospital.  We can't give an exact time for  because it depends on how you are doing with your drinking and pain control.  Most people are reaching their goals for discharge by lunch time and you will need to have a ride ready and waiting by 11 am.    5.   Do your grocery/vitamin shopping for before AND after surgery.    6.   Make sure you have a bowel movement if you haven t gone in a while. There is no  need for a bowel prep before surgery.       7.   Make sure you have picked up the prescriptions/supplements that were sent to your pharmacy - [facility] [address] [place] [address] - [address] [place] [address] [[id number]]      NEW PRESCRIPTIONS:  In preparation for surgery, we have prescribed some medication that you will need to  as soon as possible     A. Vitamins: Chewable Multivitamin and Vitamin B12 (if you weren't taking already)  - You can start taking the Multivitamin and B12 as soon as you pick this up from the pharmacy.  Some insurance companies will not cover the vitamins because they are available over the counter, so in those cases you will need to purchase them yourselves.  Do not take the morning of surgery.    B. Acid Reducer:  Omeprazole (Prilosec) - If not already taking, you will get a prescription to start when you get home from the hospital.  Tablets cannot be cut or crushed.  If a capsule, entire capsule contents may be sprinkled on a spoonful of applesauce and taken immediately without chewing.  If only on a full liquid diet, dump capsule contents into a spoonful of liquid and take without chewing.  May swallow whole again starting 6 weeks after surgery but can try swallowing sooner if having issues with opening into food.  Continue after surgery for at least 3 months even without symptoms of acid reflux. Do not take the morning of surgery.     C. Gallstone Reduction: Actigall (if needed) - Start two weeks after surgery.  Swallow whole with warm water, do not cut, crush, or open capsule up.  This is a medication that will help to prevent gallstones from forming during the first 6 months post-op of rapid weight loss.      BEFORE Surgery Medication Considerations:  Certain medications may need to be stopped before major surgery. Some medications may increase your risk of bleeding, others may change the way your blood clots, and other medications can affect your lab work. The bariatric clinic will give  you specific instructions about when to stop these medications.    This timeline shows when certain medications should be stopped before surgery: This is a general timeline, if your bariatric nurse or surgeon gives you other instructions, follow those specific instructions.    30 Days (One Month) Before Surgery  Birth control pills & patches that contain estrogen  You must use alternative forms of contraception  Hormone replacement therapy   Premarin  Testosterone  14 Days (Two Weeks) Before Surgery  Appetite control medications   Phentermine  Other: __________  Diuretics ( water pills )   Talk to your primary doctor.  You may need an alternative medication,  Hydrochlorothiazide (HCTZ)  Furosemide (Lasix )  Bumetanide (Bumex )  Spironolactone (Aldactone )  Chlorthalidone  Any blood pressure medication that is combined with a diuretic  Herbal supplements  Fish oil or Omega 3   Homeopathic remedies  7 Days (One Week) Before Surgery  Anti-platelet medications and medications that help to prevent blood clots:  Clopidogrel (Plavix ), Prasugrel (Effient ), Ticagrelor (Brilinta )  Aspirin/Dipyridamole (Aggrenox ), Dipyridamole (Persantine )  Cilostazol (Pletal )  All Non-Steroidal Anti-Inflammatory Drugs (NSAIDs):   Non-prescription: Ibuprofen (Advil , Motrin , Nuprin ), Naproxen (Naprosyn , Aleve , Anaprox ),   Prescription: Piroxicam (Feldene ), Sulindac (Clinoril ), Tolmentin (Tolectin ), Celecoxib (Celebrex ), Diclofenac (Voltaren ), Indomethacin (Indocin ), Nambumetone (Relafen ), Flurbiprofen (Ansaid ), Ketoprofen (Orudis ), Etodolac (Lodine ), Meloxicam (Mobic ), Oxaprozin (Daypro )  Aspirin (including low-dose (81mg) and chewable  baby aspirin )  Warfarin (Coumadin , Jantoven )  When warfarin is restarted (usually 24-48 hrs after surgery), dosing and INR monitoring will be managed by the Bariatric Clinic for 4-6 weeks after your surgery date.   The Day Before Surgery  Diabetes medications: Follow the instructions  on the  Diabetes Management for the Bariatric Surgery Patient  form.  The Day of Surgery  Diabetes medications: Follow the instructions on the  Diabetes Management for the Bariatric Surgery Patient  form.      HOME MEDICATION RECOMMENDATIONS:  Below you will see your specific medication instructions.  Please share this information with your primary care so they can make adjustments to your medications if necessary.    Albuterol Inhaler or Nebulizer (Brand Name: Pro-Air Inhaler/Ventolin)    Ok to use.  You are encouraged to bring to the hospital on the day of surgery.    Birth Control Pills    Stop one month before surgery.  May restart 30 days after surgery if ok with your surgeon.     Cetirizine (Brand Name: Zyrtec)   Ok to cut/crush. Do not take the morning of surgery.     Cholecalciferol (Brand Name: Vitamin D)   Ok to cut or crush tablet; if a softgel will likely need to swallow whole if small enough.  If capsule is too large, may need to take tablet form until able to swallow larger pills again.  Do not take the morning of surgery and don t restart again until instructed by the dietitian.  This must be 5000 units (125 mcg) daily after surgery.    Fluticasone/Salmeterol (Brand Name: Advair)    Ok to use. You are encouraged to bring to the hospital on the day of surgery.    Gabapentin (Brand Name: Neurontin)    Ok to cut/crush if a tablet, or ok to open capsule.  It also comes in a liquid form if needed.      Nystatin   Ok to use. You are encouraged to bring to the hospital on the day of surgery if needed.    Propranolol (Brand Name: Inderal)    Ok to cut/crush immediate release tablets.      Rosuvastatin (Brand Name: Crestor)    Tablet should be swallowed whole. Do not cut/crush.     Venlafaxine (Brand Name: Effexor)   Ok to cut/crush. However, this medication may not be absorbed as well after bariatric surgery.  Watch for changes in symptom control.  Talk with primary care or mental health provider if you  notice any changes in how well this medication is working after surgery. Take as directed the morning of surgery with a sip of water.      Vitamin B12   Continue your vitamin B12 up until surgery. Do not take the morning of surgery.  After surgery it will need to be 1000mcg daily sublingual (under the tongue) daily, or injections monthly.    Wixela  Ok to use. You are encouraged to bring to the hospital on the day of surgery.    Zolpidem (Brand Name: Ambien)    Ok to cut/crush regular release tablets. May swallow whole if small enough.       Packing for the Hospital  When packing for the hospital, anticipate staying overnight. Make a checklist so that you don't forget things you really want to have with you.    Bring a folder with your printed patient handouts to keep handy and add discharge paperwork to if you want.  Bring your insurance card.  Bring a current medication list OR update list in [facility] (including vitamins, over-the-counter medication, eye drops, inhalers, patches, ointments and herbal products). Include the name, correct dose and the times you take them each day. List all allergies.  If you use a CPAP or BIPAP, bring it with you.  You may also want to bring a water bottle of the water you use in your machine.  Bring a copy of your health care or advanced directive document if you have one.  You may bring your own gown/pajamas and robe if you don't want to wear the hospital-supplied items once IV is disconnected.  Slippers or shoes should have a non-slip sole.  You may bring your own personal care items such as toothbrush, toothpaste, shampoo, denture cleanser, comb, skin care products, deodorant, make-up, and hair dryer.  If you wear a hearing aid, bring a labeled storage container and extra batteries.  If you wear glasses or contacts, bring a labeled case and saline for contacts. Do not plan to wear contact lenses the day of surgery. It is best to bring your glasses so that you can wear them in the  "recovery room. You cannot wear contact lenses during surgery.  Bring loose-fitting clothing to wear home. Bring telephone numbers (including work numbers) of family and friends.  You may want to bring a journal to document your thoughts and feelings.  You may not wear any jewelry on or in any area of your body to the operating room.   Leave money, jewelry, or any valuables at home.        DAY BEFORE SURGERY CHECKLIST      Clear liquid diet until 4 hours prior to your surgery     2.  Nothing to eat or drink 4 hours prior to your surgery time.       3.  Pack a couple preferred protein shakes to have available in the hospital -See approved list of liquids from dietitian      4.  Hibiclens or Exidine shower. Use a fresh, clean towel to dry off. See \"Showering Before Surgery handout\" below.       MORNING OF SURGERY CHECKLIST      Hibiclens or Exidine shower. Use a fresh, clean towel to dry off. See \"Showering Before Surgery handout\" below.     2.   Remove all jewelry and piercings.      3.   Take any medications you have been advised to take the morning of surgery with a sip of water. (See med list above)     4.   Arrive 2 hours prior to your scheduled surgery time.    Showering Before Surgery  [email or web address]     Preparing Your Skin  [email or web address]    HOSPITAL STAY    Park in the [facility] Visitor Parking Lot in [place] and check in at the information desk where they will escort you to the SKYE.     You will be allowed to have 2 support people with you in the pre-operative area at the hospital.  After surgery on the recovery unit, you can have up to 4 visitors and they must leave when visitor hours are over.     Meet your surgical team which includes an RN, CRNA, anesthesiologist and your surgeon.    IV will be started for fluid management, pain medication and possible antibiotics.    Anticoagulant therapy (blood thinner) will be given and continued until you are " discharged.    Pneumatic compression stockings will be placed on your legs before surgery to help prevent blood clots. You can also move your feet up and down frequently to aid in circulation.    An incentive spirometer will be used to promote good lung expansion and prevent pneumonia. This must be within arm's reach of you, and you should be doing it ten times every hour while awake.      PAIN MEDICATION IN THE HOSPITAL  Treating pain and discomfort is important to your recovery.  Your surgeon will order pain medication for you in the hospital.  You will also get a take home prescription for pain medication after surgery.  Our goal is not pain free, but an acceptable level of discomfort; you should be able to move without pain limiting your activity    In the Preoperative area, you will receive additional pain treatment   Intrathecal Spinae Block (Duramorph)- which is an ultrasound guided injection for pain medication   Ketorolac (Toradol )  This is an anti-inflammatory medication used to treat swelling and moderate pain  It is used only for 24 hours after surgery to decrease inflammation and pain  Given through your IV  Takes about 30 minutes to take effect  Common side effects: nausea, upset stomach  If your pain is not well controlled with this, you may be given a narcotic pain medication in addition to ketorolac    In Recovery, you will receive additional IV pain medication as needed and then you will be switched over to things you can take by mouth in preparation for going home.   Fentanyl   Hydromorphone (Dilaudid )    Common side effects: sleepiness, dizziness, upset stomach, constipation   Narcotic medications can alter your judgment, coordination and reaction time.  Do NOT drive or do anything that requires clear thinking and coordination until you have been completely off narcotic medications for at least 24 hours.    Use only to treat moderate to severe pain.  Ultram (Tramadol)  This is a non-narcotic  prescription pain medication used for moderate to severe pain if needed.  Immediate release tablets can be cut or crushed.  Tramadol can cause or worsen seizures. Your risk of seizures is higher if you're taking other certain medications. These drugs include other opioid pain drugs or certain medications for depression, other mood disorders, or psychosis.  Tramadol oral tablet may cause drowsiness. You should not drive, use heavy machinery, or perform any dangerous activities until you know how this drug affects you.  Acetaminophen (Tylenol)   This will be giving by IV to start and then by mouth once you are ready  Use for mild pain or discomfort  Available without a prescription and comes in liquid, tablets, capsules and powder.  The adult strength powder packs can be helpful right after surgery and can be found online.  Maximum daily dosage: 3,000mg per 24 hours  Works best as a scheduled dose for the first three days once you get home in addition to the gabapentin as the inflammation goes down  Gabapentin  Used together with the acetaminophen to provide pain relief.  Can be taken every 8 hours as needed  The medication works by calming the nerve cells that transmit pain signals to the brain.  This is best in liquid form right after surgery but can have a unpleasant taste.    * Important Reminder: Do NOT take NSAID or aspirin medications that are available without a prescription (examples: Ibuprofen, Motrin , Advil , Aleve , Naproxen)      OPERATING ROOM    Hover Mat will be used to move you from one surface to another.    Raiza Hugger Gowns/Blankets are used to keep you warm.    Urinary catheters are not usually needed.    Surgery takes 45 minutes-3 hours depending on the procedure.    *Remember: How you go to sleep tends to be how you wake up - so pleasant thoughts are important!    RECOVERY ROOM    The surgeon will give an update to your family or support people as you are on your way to the recovery  "room.    You will wake up with a blood pressure cuff, oxygen and pulse monitor (pulse oximeter).    Frequent vital signs.    Pain Scale.      HOSPITAL ROOM    You will stay overnight on Patient Care Unit P2.    You will have a private room.     has been awarded an \"American College [employer] Center of Excellence\" partnered with the American Society for Metabolic and Bariatric Surgery and has a proven track records for quality care and good outcomes.     The staff has specialty training in the care of weight-loss surgery patients.       WHAT TO EXPECT AFTER SURGERY    You will start walking the day of surgery and continue several times a day, especially once you get home.    Drinking and eating will follow plan discussed with the dietitian.    Shower in the hospital.    Incentive Spirometer use and deep breathing/cough    Splinting your incision and wearing the abdominal binder when moving may help with discomfort.    Discharge the day after surgery is expected for laparoscopic procedures after you are seen by your surgeon, nurse practitioner or physician s assistant, anesthesia, and possibly a pharmacist.    A nurse from the clinic will call you within 3 days after discharge.      ONCE YOU ARE HOME CHECKLIST      Continue your liquid intake daily and track     Oral intake:      12-4pm         4pm-8pm         8pm-Midnight         6am-10am       2.   Walks      12-4pm      4pm-8pm      8pm-Midnight      6am-10am       3.   Incentive Spirometer/ Deep Breaths and Coughing      ACTIVITY AFTER SURGERY    Walking several times a day, increasing endurance and energy level over time.    No lifting over 20 lbs. for the first 2 weeks (6 weeks for open procedure) and then let your body be your guide.    Can be the    in terms of chores at home.    Pushing and pulling uses the same core muscles and those activities may cause pain or discomfort.    Do each exercise 10-15 times, twice a day and increase " weight when you can consistently do 12 repetitions of activity.    No swimming, bathing, or hot tub use until incisions are completely healed (scars).    Do not plan to fly or take a road trip within 1 to 3 months after surgery.    See handouts below for exercises that can be done after surgery.    Seated Exercises for Arms and Legs (can be done before or after surgery)  [email or web address]    Exercise Guidelines after Weight Loss Surgery (1st 4-6 weeks)  [email or web address]    Exercises after Weight Loss Surgery (strengthening, when no weight lifting restrictions - after 4-6 weeks)  [email or web address]      MEDICATIONS AFTER SURGERY    Here is a simple graph that might help makes things more clear for when to start certain medications after surgery.  (Zofran, Tylenol, Gabapentin and Hyoscyamine will be sent home with you from the hospital as needed)    Medication Dose When to Start   Vitamin B12  1000 mcg Once a day under the tongue (sublingual), weekly nasal spray, or monthly injections Day after surgery   Chewable Multivitamin with   18 mg Iron  (Must also contain Vitamin A and Zinc)  NO GUMMIES 1 tablet twice daily Day after surgery   Omeprazole  20 mg 1 capsule daily - open and sprinkle on food or swallow with fluid Day after surgery. Take even if no acid reflux symptoms.   Zofran 4 mg  (if ordered) 4mg tablet every 8 hours as needed for nausea As needed after surgery   Tylenol 1000 mg every 6 hours for three days after surgery.  After first three days after surgery, switch to as needed and do not take more than 3000mg daily Once you get home   (you will be sent home from the hospital with this)   Hyoscyamine  (if ordered) 0.125 mg tablet every 4 hours as needed for stomach spasm pain As needed after surgery   Liquid Gabapentin  Must be kept in fridge   250 mg liquid 3 times a day for at least 3 days after surgery Once you get home   (you will be sent home from the  [facility] with this)   Actigall (Ursodiol)- 300 mg for gallbladder if you still have Twice daily - swallow whole with warm water Two weeks after surgery   Chewable Calcium Citrate 2 tablets twice daily Three weeks after surgery   Vitamin D - (125 mcg)  5000 units 1 daily Three weeks after surgery        LIFELONG VITAMINS:    First 3 Months after Surgery  Choose chewable, liquid or crushable forms of Multivitamin and Calcium Citrate and then you can switch to tablets you can swallow whole if you would like.    1.)   Sublingual Vitamin B12: Take 5358-5563 mcg 1 time daily    Also available in injectable form monthly.  Discuss with provider if interested.  2.)   Multivitamin with Iron: Take 1 multivitamin 2 times daily  Needs 18 mg of IRON per dose along with Vitamin A and Zinc in them  Generic Children's Chewable multivitamin with iron (Equate, Up & Up brand, etc.)  Centrum   Chewable  Centrum, Women's One-A-Day or Generic (after 3 months)  NO GUMMY Vitamins (these do not contain iron)  3.)   Calcium Citrate with Vitamin D: Take 400-600 mg 2 times daily (Start 3 weeks after surgery)  Bariatric Advantage: Citrate Lozenges (500mg)--2 Daily, or Chewy Bites (250 mg)--4 Daily  www.bariatricadAeropostaleage.[employer]  or 1-710.218.3361  Celebrate Calcium: Calcium Plus 500 (500mg)--2 Daily, or Calcet Creamy Bites (500 mg)--2 Daily, or Calcium Citrate Chews (250mg) - 4 Daily   www.[employer]  or 1-964.105.8974  UNJURY Opurity: Calcium Citrate Plus (300mg)--3 Daily  www.[employer]  or 1-816.245.9570  Up-James D: Nutrition Direct: Flavorless Calcium Powder (500 mg with 250 IU Vitamin D)  www.amazon.com  or 1-436.229.5359--3 Scoops Daily  Wellesse Liquid Calcium Citrate--1 Tbsp.  (500 mg)--2 Times Daily  NanoTune  After 3 months post op:  Citracal Petites (or Generic version) (200mg) - 4 daily  Any grocery store or Pharmacy  4.)   Vitamin D3 : Take 5000 IU Daily (Start 3 weeks after surgery)  This can remain a small gel  cap    AFTER Surgery Medication Considerations:    The 3 main ideas:  Cut or crush all meds for 6 weeks after surgery  Long acting medications are not the best option anymore  Avoid NSAID medications for the rest of your life    1.  Cutting or crushing meds:  Before surgery, tablet size does not matter.  After surgery there will be swelling around your new stomach pouch or sleeve.  Therefore, it is important to limit the size of medications for the first six weeks after surgery.      What this means for you:  For the first six weeks after surgery, you will need to cut or crush tablets that are larger than   inch (about the size of an eraser on a standard pencil)  Some capsules may be opened and the contents sprinkled onto soft food.  Once sprinkled on food, eat immediately being careful not to chew.    You will be given a pill cutter at the hospital  Refer to your medication list. This list will tell you which medications can be cut or crushed, and which capsules can be opened.     * Important Reminder: Discuss ALL of your medications with your primary care provider.    Your pre-op history and physical appointment is a good time to review your current medications.     2.  Long acting medications are not the best option anymore  Many types of bariatric surgery involve removing a portion of the small intestine. Long acting or extended release medications release slowly throughout the entire small intestine. Because your surgery has changed your stomach and/or intestine, you may not get the full effect of the long acting medication.  Short acting medications may work better for you after surgery. Talk with your doctor at your pre-op history and physical appointment if you are taking long acting medications. Your doctor can change prescriptions for long acting medications to short acting.   3.  Avoid NSAIDs for the rest of your life  NSAIDs are Non-Steroidal Anti-Inflammatory Drugs  The NSAID class of medications is  used to relieve minor aches, pains or to treat fever. They are commonly used to treat pain caused by a cold, flu, sore throat, headache, and arthritis.  Some NSAIDs are available over-the-counter while others need a prescription from your doctor.     NSAIDs should be avoided after bariatric surgery because they can cause stomach ulcers, scarring or bleeding.  You will not be able to take any NSAID mediation for the rest of your life after bariatric surgery.  Below is a list of common NSAID medications:    OVER-THE-COUNTER NSAIDs    Ibuprofen  - Brand names Advil   , Motrin  , Nuprin     Naproxen - Brand names Aleve  , Naprosyn  , Anaprox      PRESCRIPTION NSAIDs   Celebrex   (Celecoxib)                                       Orudis   (Ketoprofen)  Mobic   (Meloxicam)                                           Lodine   (Etodolac)  Voltaren  (Diclofenac)                                        Ansaid   (Flurbiprofen)  Relafen   Nabumetone                                       Clinoril   (Sulindac)  Feldene   (Piroxicam)                                         Tolectin   Tolmentin  Indocin   (Indomethacin)                                    Daypro  (Oxaprozin)       After surgery, the only safe non-prescription pain reliever is acetaminophen (Tylenol ).  Acetaminophen is available in 325mg and 500mg tablets.  If acetaminophen does not control your pain, call your primary care provider to discuss other options.    4. Other medications you may have to avoid  Aspirin  Do not use aspirin for headaches, body aches, or muscle aches after bariatric surgery.  This is because aspirin can also cause stomach ulcers.  However, your primary care provider may tell you to take aspirin for certain conditions.  A maximum of 81mg of enteric coated aspirin (ex: Ecotrin ) once daily is usually okay to take if directed to take aspirin by your primary care doctor.   Be sure to take with food or milk.    Alendronate (Fosamax ), risedronate  (Actonel ) (risedronate), ibandronate (Boniva ):  These are common osteoporosis medications that can cause erosions or ulcers in the esophagus and stomach.   Remind your primary care provider that you have had bariatric surgery and discuss other medication options.    Diuretics ( water pills )  These medications are used to treat high blood pressure.  They can also reduce swelling and water retention caused by various medical conditions.  Diuretics should not be used for patients who are having weight loss surgery.  Your diuretic will not be restarted immediately after surgery.  This is because it is often difficult to drink enough fluids after surgery to keep up with the fluid loss caused by the diuretic/ water pill .  Call your primary care provider to discuss alternative medications to treat high blood pressure.      5. Medications for chronic conditions  It is likely that the need for some of your medications will change after weight loss surgery.    High Blood Pressure Medications   As you lose weight, your blood pressure may change. Talk with your primary care provider about how to manage your high blood pressure after surgery. You may need the dose of your blood pressure medication(s) adjusted.  Keep track of your blood pressure, and call your primary doctor if you have low blood pressure symptoms, such as: dizziness, faintness, weakness, light-headedness (especially when going from sitting to standing)    Diabetes Medications  As you lose weight, your blood sugars may change. Talk with your primary care provider about how to monitor and manage your diabetes after surgery. You may need the dose of your diabetes medication(s) adjusted.  Call your primary doctor if you have any of these symptoms.  Keep track of your blood sugars, and call your primary doctor if you have low blood sugar symptoms, such as: sweating, shaking, nervousness, headache, light-headedness, dizziness, weakness, or fainting      6. Other  information    Medication Absorption  Some medications may not be absorbed as well after bariatric surgery.  Watch for changes in symptoms.  It is important to discuss your medications with your doctor if you think your medications are not working as well as they were before.  You may need to have medication doses adjusted.    Ursodiol (Actigall )  With rapid weight loss, your risk of gallstones increases.  If your still have your gallbladder after surgery, you will be given a prescription for Actigall  that you should begin taking 2 weeks after surgery.  Actigall  will help prevent gallstones from forming.  If you are prescribed this medication, you will usually take it for six months. We encourage you to take this tablet or capsule whole with warm or hot liquid to help it dissolve before it reaches your stomach pouch.  This is medication is bigger than the   inch size restriction, but we will not have you cut/crush it due to it's unpleasant metallic taste. A pharmacist will speak with you more about this medication if it is ordered after surgery.    Common Side Effects: constipation, diarrhea, upset stomach, nausea, dizziness    Non-prescription medications:  You may need medication for seasonal allergies, colds, headaches, or minor aches and pains.  It is important to read the package label carefully.  Below are some helpful hints for choosing the right medication:   Look at the active ingredient(s) list to be sure the medication does not contain caffeine, NSAIDs or aspirin (maximum aspirin dose: 81 mg daily).  Avoid long-acting medication options.  Immediate release medications may work better because they are absorbed better.  It is okay to use liquid medications with the following cautions:  Watch the sugar concentration.  High sugar content in medications could cause dumping syndrome.  Diluting the liquid medication with an equal amount of water will help prevent dumping syndrome.  Do not use products that  contain high fructose corn syrup, corn syrup, sugar, or sorbitol.  Look for sugar-free products as an alternative.  Chewable tablets or tablets that dissolve on your tongue ( oral-dissolving tablet ) are generally safe to use.      Supplements  Refer to the Vitamins & Supplements Handout provided to you by the Bariatric Dietitian for recommended doses and products.       After Bariatric Surgery Discharge Instructions  [facility]     Note: Ask your nurse to order your medications from the pharmacy. Be sure you have your medications with you when you leave.    What should my diet be for the first 2 weeks after surgery?  Follow the bariatric diet the dietitian discussed with you.    How much fluid should I drink?  Strive for 48-64 ounces daily.  Carry a water bottle with you without a straw or sports top. Drink from it often.  Keep track of how much fluid you drink in a day.  Remember:  -Do not use straws, chew gum or suck on hard candies. They may cause painful gas.    -Sip, don't slurp when you drink.    -Practice small sips using a medicine cup for the first week postop.   -No ice or cold drinks. This could cause gas or spasms.   -No coffee, soda pop or drinks with caffeine. These may cause stomach pain.   -No alcohol. It is bad for your liver and will cause stomach pain.    How often should I do my deep breathing and coughing?  Use your incentive spirometer (small plastic breathing device) every hour while awake after you get home. Using the incentive spirometer helps you deep breath. Continuing to cough and deep breath will help prevent fevers and pneumonia.   If you do not have a fever after one week, you can stop using the incentive spirometer and discard it.     You can continue to take deep breaths without the incentive spirometer every one to two hours while awake for the month after surgery.    What kinds of activity can I do?  Get plenty of rest the first few days  after surgery and try to balance rest and activity. You will need some time to recover - you may be more tired than you realize at first. You'll feel better and heal faster if you take good care of yourself.  For 2 weeks after surgery (Please review restrictions at your two week visit, they could change based on how well you are doing):   -Don't lift more than 20 pounds.   -Take 4-5 short walks every day.  -Don't jog, run, or do belly exercises.  Don't swim, bathe or use a hot tub until your cuts are healed (scabs are gone).    You may shower right away after surgery.  Don't plan to fly or take a road trip within the first 1 to 3 months after surgery.  You could get a blood clot in your legs. If you must travel, get up and move around every hour for at least 5 minutes before continuing on your journey.  Your care team can help you decide when it's safe for you to travel.     What can I do for pain control?  You had major belly surgery that involves all layers of your belly muscles. Pain is expected, even for some as far out as 6-8 weeks after surgery. Moving, sneezing, coughing, and breathing will cause discomfort because these activities use your belly muscles.   Please see your after visit summary medication review for what pain medication will be continued, discontinued and newly started for you.    You can take opioid pain medicine if prescribed and if needed. Try to wean off from it as soon as you feel comfortable.   Do not drive while you are taking opioid pain medicine. This is dangerous.  The first three days after surgery, take your acetaminophen (Tylenol) scheduled every 6 hours.  After that you can take it as needed.  -Acetaminophen formulation options:   -Liquid   -Caplet (Cut caplet in half before taking)   -We will have you on a higher dose of Tylenol for the first three days post-op but then but then you should not exceed 3000mg per day.  -You will also take Tylenol for pain in place of the opioid  pain medicine (check with your care team for specifics).   You can apply ice or heat to the affected area(s). Just remember to wrap the ice in something and limit icing sessions to 20 minutes. Excessive icing can irritate the skin or cause skin damage.  You can apply heat with a hot, wet towel or heating pad. Just like cold therapy, limit heat application to 20 minutes. Never sleep with a heating pad on. It could cause severe burns to your skin.  Wear your binder to support your belly muscles if you have one.  Take this off a little more each day and try to be off completely by 2 weeks after surgery. If you don't need your binder for comfort or support, you don't need to wear it.   You may not be able to sleep in a comfortable position for a few weeks after surgery. This is normal. You may be more comfortable sleeping in a recliner or propped up with 3 pillows for the first couple of weeks after surgery.  You may feel gas pains in the upper chest or between your shoulder blades from the laparoscopic surgery which should get better with walking around, warm/cold packs and pain medication.  Do not take NSAID's (Non-Steroidal Anti-Inflammatory Drugs) (examples: ibuprofen, Motrin, Advil, Aleve, and Naproxen), aspirin, or use pain patches with NSAID's. They will increase your risk of bleeding or getting an ulcer.    Please call the clinic for any of the following pain concerns, we would like to talk to you:  -pain that does not improve with rest  -pain that gets worse and worse  -pain that is not controlled by your pain medicine  -a sudden severe increase in pain    What medications will I need to take after surgery?  You may be discharged with the following types of medications: antacids, pain medications, anti-nausea medications, etc.  Please see your after visit summary medication review for what will be continued, discontinued and newly started.  It is important to reduce the amount of acid in your new stomach for a  couple of months after surgery while it is still healing. We will prescribe an acid reducer or antacid. Take it as directed. This will help prevent ulcers, heartburn and acid reflux.  If you took an acid reducer before you had surgery, your care team will let you know what acid reducer you will take after surgery.   It is okay to swallow any medications smaller than   inch in size.   -If it is larger than   inch, it may need to be cut, crushed, or in a liquid form. See the above medication section for what is most appropriate for you and your medications.    What should I know about my incisions (cuts)?  Your incisions are covered with white steri strips or butterfly tape and have band aids or gauze over the top. If you have gauze or band aids, they can come off in the hospital.  Leave your steri strips on until they fall off on their own. If the steri strips don't fall off after 1 week, you can take them off. If they fall off earlier, replace them with clean band aids trying to avoid touching the incision itself.   If you have gauze covered by a clear dressing, remove 2-3 days after surgery or as directed by your care team.  You may shower in the hospital after surgery and can get your incision coverings wet.  Do not submerge in water (e.g. No baths, swimming pools, hot tubs) until your incisions are completely healed (scabs are gone).    Call the clinic if you have any of these signs or symptoms of infection:  -Redness around the site.  -Drainage that smells bad.  -Drainage that is thick yellow or green.  -An increase in pain around the incision site  -An increase in swelling around the incision site  -Heat or warmth around incision site   -Fever of 101.5  F (38.3  C) or higher when taken under the tongue.    -Chills    Will my urine or bowel movements change?   Your first bowel movements (stools) will likely be liquid. You may also notice old blood or a darker color (black or maroon color) in your bowel  movements.  This is not unusual and usually goes away after the first week, if not sooner. You may not have a bowel movement for a week.   If you have not had a bowel movement for at least three days after your surgery date and are passing gas, you can use over the counter stool softeners.  Please stop taking the stool softeners and laxatives if your stools are loose.  Increasing fluids and activity as well as getting off narcotics will help prevent constipation.    Call the clinic if:   -You have stomach pain.   -You continue to have constipation.  -You have excessive bloating after walking and passing gas.  -You are having 3 or more loose stools a day.  You may have an infection that we need to treat.    How can I prevent dehydration if I feel nauseated (sick to my stomach) and vomit (throw up)?   Vomiting is not normal after surgery. If you continue to have nausea and vomiting, call the clinic.   Nausea can be a sign of dehydration. That is why it is very important to track your fluids. Do not nap more than one hour during the day. Set a timer to wake yourself up, if needed. Too much sleep will keep you from drinking enough fluid during the day and lead to dehydration.  No outside activity in hot, humid weather until you can drink 48 to 64 ounces of fluid in 24 hours. If you sweat a lot, your body may lose too much water.  If having difficulty getting in your fluids, try to take a 1 ounce sip of water (one medicine cup) every 15 minutes.  Set a timer to remind yourself.    If you notice any of the following symptoms, please don't delay in calling the clinic.  Early identification gives us more options for treatment:  -Dark colored urine  -Urinating (pass water) less than 2-3 times per day  -Lack of energy  -Nausea  -Dizziness  -Headache  -Metallic taste in your mouth    Call the clinic ANY TIME at [phone] if:  -Your pain medicine is not working.  -You have a fever ? 101.5 F.  -You have belly or left shoulder  "pain that gets worse and worse.  -You have a swollen leg with redness, warmth, or pain behind the knee or calf.  -You have chest pain   -You feel very short of breath.  -You have a sudden severe increase in heart rate.  -You have vomiting that gets worse and worse.  -You have constant nausea (feeling sick to your stomach) that does not go away with medication.  -You have trouble swallowing.  -You have an increasing feeling that \"something is not right\".  -You have hiccups that do not stop.  -You have any questions or concerns.    If you have to go to the Emergency Room, we prefer you go to the hospital that did your surgery. Please let them know that you had bariatric surgery and to notify your surgeon.    When should I go back to the clinic?  Follow up with your care team in 1 week.   If this appointment was not already made, please call: [phone]    These are some additional handouts that are very important to read through and use after surgery.  They are good tools for after your surgery as you recover.      After Your Weight Loss Surgery  [email or web address]      Mindfulness Meditation  [email or web address]    Conscious Breathing  [email or web address]    Keeping Track of Your Fluids (for after surgery)  [email or web address]      AFTER SURGERY APPOINTMENT SCHEDULE    1 week with Dietitian (ADELA)  Dietitian Virtual Group Class scheduled for Tuesday 9/5/2023 from 1-2 pm with one of the dietitians. She will send you an email invitation to join the week of this class and the purpose of it is to check in with you and give you the next set of dietary instructions.  It will be using [employer] Teams, NOT in person or through [employer].    2 weeks with Surgeon  Surgeon video follow-up visit that will be done through [employer] which is already scheduled for you.    1 month with RD    3 months with RD    6 months with Bariatrician with labs    9 months with RD     12 " months with Bariatrician with labs    18 months with RD or Bariatrician-possible labs    Annually after that with Bariatrician with labs and RD if needed      POP QUIZ    1.  How long do you need to be on full liquids after surgery? ________    2.  Name the 4 vitamin/mineral supplements you ll need to take for the rest of your life.    __________  _________  _________  __________    3.  How many grams of protein should you get in a day? ________    4.   Which meal would give you the most protein?    a.   1 oz. Cheese on 1 saltine cracker and 3 Tbsp applesauce.    b.     cup mashed potatoes and gravy with 2 Tablespoons applesauce    c.   3 Reduced Fat Wheat Thins, 1 oz. green beans, and 2 peeled grapes     5.   Concentrated urine, lack of energy, nausea, dizziness, headache, and a bad taste in your mouth are signs of:    a. Dumping    b.  Dehydration   c. Clogging    d. None of the above    6.     What is the minimum amount of time recommended for exercise?    a.  30 minutes 7 days a week    b.  30 minutes 3 days per week    c.   1 hour 5 days per week    d.  None of the above    7.     Drinking liquids with meals can cause:    a.  Vomiting     b.   Weight gain   c.   Desire to Snack    d.   All of the above    8.     The long-term success of my weight loss surgery depends on:    a.      Me      b.   My Surgeon     c.  My Support Group     d.  My Family    9.      If you receive ice, carbonation or straws in the hospital post-op, you should:    a.      Eat and drink whatever is given to you by the hospital staff    b.      Remind the hospital staff you are not to have ice, straws or carbonation.    c.      Take the ice but not the carbonation or straws    d.      Call 911    10.  Name two possible complications after bariatric surgery:    ________________________   ______________________    11. Name two habits of healthy bariatric surgery patients:    _______________________  ________________________    True or  False    12.  This operation for obesity will require routine visits with my surgeon for the first year, and then I will be okay on my own once I lose my weight and change my diet.    13.  Obesity is a disease that can be managed with a variety of tools.  However, some individuals fail to lose weight or regain their weight because they resume snacking, binging, take in high fat or carbohydrate food & lack sufficient liquids and exercise.    14.  Women should avoid becoming pregnant for at least 18 months to 2 years following bariatric surgery.    15.  I can still drink 2-3 cans of soda or carbonated juices, water or other beverages after my surgery, as long as it is in moderation.    16.   Re-operation is sometimes necessary due to bleeding, hernias, ulceration, breakdown of stitches or staples, leakage and blockage of the intestines.    17.   I should call the clinic if I develop increased redness or swelling in or around my incision, an oral temperature of 101.5 or greater, increasing severe abdominal or shoulder pain, increasing heart rate or anytime I just don t feel right.    18.   Gaining weight prior to surgery is dangerous because it can enlarge the liver, increase surgical risk and extend your recovery period.    19.   Once I lose my weight, I can drink alcohol as long as it is in moderation.    20.  It is possible I will have more emotional difficulties after surgery.    21.  I can take Aleve but not Ibuprofen or Aspirin after surgery.    22.  If I eat yogurt and drink milk daily, I don t have to take the recommended dose of calcium supplements    23.  Dumping Syndrome only occurs in gastric sleeve patients.    24.  It is possible you will gain weight or not lose much weight immediately after surgery.       We wish you the best and please let us know if you have any questions or concerns!    Shameka Guardado RN and Carmencita Suarez RN    [facility]  [address] [place] [address]  [address]  Phone: [phone]  Fax:  [phone]

## 2023-08-09 ENCOUNTER — ALLIED HEALTH/NURSE VISIT (OUTPATIENT)
Dept: SURGERY | Facility: CLINIC | Age: 53
End: 2023-08-09
Payer: COMMERCIAL

## 2023-08-09 VITALS — WEIGHT: 293 LBS | BODY MASS INDEX: 51.91 KG/M2 | HEIGHT: 63 IN

## 2023-08-09 DIAGNOSIS — Z71.89 ENCOUNTER FOR PRE-BARIATRIC SURGERY COUNSELING AND EDUCATION: Primary | ICD-10-CM

## 2023-08-09 DIAGNOSIS — R63.4 RAPID WEIGHT LOSS: ICD-10-CM

## 2023-08-09 DIAGNOSIS — E66.01 MORBID OBESITY (H): ICD-10-CM

## 2023-08-09 DIAGNOSIS — K21.9 ACID REFLUX: ICD-10-CM

## 2023-08-09 DIAGNOSIS — Z01.818 PRE-OP TESTING: ICD-10-CM

## 2023-08-09 DIAGNOSIS — K90.9 INTESTINAL MALABSORPTION, UNSPECIFIED TYPE: ICD-10-CM

## 2023-08-09 DIAGNOSIS — Z98.84 S/P BARIATRIC SURGERY: ICD-10-CM

## 2023-08-09 PROCEDURE — 99207 PR PREOP VISIT IN GLOBAL PKG: CPT

## 2023-08-09 RX ORDER — URSODIOL 300 MG/1
300 CAPSULE ORAL 2 TIMES DAILY
Qty: 180 CAPSULE | Refills: 1 | Status: SHIPPED | OUTPATIENT
Start: 2023-09-11 | End: 2024-03-09

## 2023-08-09 RX ORDER — MULTIVIT-MIN/FOLIC/VIT K/LYCOP 400-300MCG
1 TABLET ORAL 2 TIMES DAILY
Qty: 180 TABLET | Refills: 3 | Status: SHIPPED | OUTPATIENT
Start: 2023-08-29

## 2023-08-09 NOTE — PROGRESS NOTES
Pt attended the pre-surgery class for bariatric surgery class and was educated on the pre and post surgery liquid diets. Patient received information via [employer] for the pre-op liquid diet and the post-op liquid diet. Discussed appropriate liquids and discussed portions. Reviewed appropriate calories, protein, and fluid goals for each stage before and after surgery. Educated on correct vitamins/minerals, dosage, and frequency to take after surgery. Provided grocery list and sample menu plan for each diet stage.      Pt will begin pre-op liquid diet 8/14/2023 and will do clear liquids the day before surgery. Pt is scheduled for KO on 8/28/2023 and will then follow 1 week post-op liquid diet. Pt will follow up with RD 1-week post-op for education on the pureed and soft/regular diet stages.    Sarah Wayne RD

## 2023-08-11 ENCOUNTER — TELEPHONE (OUTPATIENT)
Dept: SURGERY | Facility: CLINIC | Age: 53
End: 2023-08-11
Payer: COMMERCIAL

## 2023-08-11 NOTE — TELEPHONE ENCOUNTER
Pt called in to report that she broke her ankle yesterday. She was seen in the ER and a splint was placed. She was told not to bear any weight on her foot, is using crutches and will be seeing an orthopedic doctor on 8/14. Discussed with  who like the pt to postpone her upcoming bariatric surgery (8/28/2023) until this is all healed up. Called pt back to let her know and will have Mandi call her on Monday to get that rescheduled.  Pt is disappointed but understands.    Carmencita Suarez RN, CBN  [facility]  P [phone]  F [phone]

## 2023-08-14 ENCOUNTER — TELEPHONE (OUTPATIENT)
Dept: SURGERY | Facility: CLINIC | Age: 53
End: 2023-08-14
Payer: COMMERCIAL

## 2023-08-14 ENCOUNTER — DOCUMENTATION ONLY (OUTPATIENT)
Dept: SURGERY | Facility: CLINIC | Age: 53
End: 2023-08-14
Payer: COMMERCIAL

## 2023-08-14 NOTE — TELEPHONE ENCOUNTER
Tasklist and schedule updated.    Carmencita Suarez RN, CBN  [facility]  P [phone]  F [phone]

## 2023-08-14 NOTE — TELEPHONE ENCOUNTER
Tasklist updated.    Carmencita Suarez RN, CBN  [facility]  P [phone]  F [phone]

## 2023-08-14 NOTE — PROGRESS NOTES
Mojgan's KO has been changed from August 28, 2023 to Monday, October 9, 2023 per Dr. Worthy who would like her to be ambulatory prior to surgery.  Mojgan had her ortho appointment this morning and will be in a cast for 4 weeks and then moved to a boot for an additional 4 weeks.

## 2023-08-14 NOTE — TELEPHONE ENCOUNTER
Mojgan called back today after being rescheduled to let me know that her PCP at [facility] in [place] has made her a pre op physical on September 14.

## 2023-09-14 ENCOUNTER — TRANSFERRED RECORDS (OUTPATIENT)
Dept: MULTI SPECIALTY CLINIC | Facility: CLINIC | Age: 53
End: 2023-09-14

## 2023-09-14 LAB
ALT SERPL-CCNC: 29 IU/L (ref 10–35)
AST SERPL-CCNC: 33 IU/L (ref 10–35)
CREATININE (EXTERNAL): 0.79 MG/DL (ref 0.5–0.9)
GFR ESTIMATED (EXTERNAL): 90 ML/MIN/1.73M2
GLUCOSE (EXTERNAL): 91 MG/DL (ref 70–99)
INR (EXTERNAL): 1.1
POTASSIUM (EXTERNAL): 4.5 MMOL/L (ref 3.5–5.1)

## 2023-09-17 ENCOUNTER — HEALTH MAINTENANCE LETTER (OUTPATIENT)
Age: 53
End: 2023-09-17

## 2023-09-19 ENCOUNTER — TELEPHONE (OUTPATIENT)
Dept: SURGERY | Facility: CLINIC | Age: 53
End: 2023-09-19
Payer: COMMERCIAL

## 2023-09-19 NOTE — TELEPHONE ENCOUNTER
General Call    Contacts         Type Contact Phone/Fax    09/19/2023 03:23 PM CDT Phone (Incoming) Mojgan Jimenez DENNIS (Self) [phone] (M)          Reason for Call:  Upcoming surg    What are your questions or concerns:  pt has questions about upcoming surg and the boot she is in for broken leg    Could we send this information to you in [facility] or would you prefer to receive a phone call?:   Patient would prefer a phone call   Okay to leave a detailed message?: Yes at Cell number on file:    Telephone Information:   Mobile [phone]

## 2023-09-19 NOTE — TELEPHONE ENCOUNTER
Pt called to report that she's in a walking boot now and is walking great. She also says that by the time she has surgery, she won't be using the boot anymore and will be wearing a velcro wrap. She just wanted to let us know and keep us informed of how things are going. She was encouraged to call if she has any questions or concerns leading up to surgery about anything and she says she will.     Carmencita Suarez RN, CBN  [facility]  P [phone]  F [phone]

## 2023-09-22 ENCOUNTER — TELEPHONE (OUTPATIENT)
Dept: SURGERY | Facility: CLINIC | Age: 53
End: 2023-09-22
Payer: COMMERCIAL

## 2023-09-22 NOTE — TELEPHONE ENCOUNTER
Reason for Call:  Other call back    Detailed comments: patient missed call from nurse, requesting a call back    Phone Number Patient can be reached at: Cell number on file:    Telephone Information:   Mobile [phone]       Best Time: Any    Can we leave a detailed message on this number? YES    Call taken on 9/22/2023 at 2:53 PM by Liberty Maloney

## 2023-09-22 NOTE — TELEPHONE ENCOUNTER
Reason for Call:  Other med list    Detailed comments: patient called requesting someone call her to go over her med list with her    Phone Number Patient can be reached at: Cell number on file:    Telephone Information:   Mobile [phone]       Best Time: any    Can we leave a detailed message on this number? YES    Call taken on 9/22/2023 at 2:16 PM by Liberty Maloney

## 2023-09-22 NOTE — TELEPHONE ENCOUNTER
Went through the medications with the patient as she had some various questions.  Patient verbalized understanding.  Shameka Guardado RN

## 2023-10-02 ENCOUNTER — TELEPHONE (OUTPATIENT)
Dept: SURGERY | Facility: CLINIC | Age: 53
End: 2023-10-02
Payer: COMMERCIAL

## 2023-10-02 NOTE — TELEPHONE ENCOUNTER
Spoke to patient over the phone in regards to questions that she had pertaining to protein powders post surgery.  Patient looking for options in regards to an unflavored protein powder and what to look for in regards to nutrients.  Answered patients questions in detail, patient verbalized understanding.     Kelly Ceballos RD on 10/2/2023 at 2:54 PM

## 2023-10-06 ENCOUNTER — ANESTHESIA EVENT (OUTPATIENT)
Dept: SURGERY | Facility: HOSPITAL | Age: 53
End: 2023-10-06
Payer: COMMERCIAL

## 2023-10-06 RX ORDER — VENLAFAXINE 75 MG/1
75 TABLET ORAL 3 TIMES DAILY
Status: ON HOLD | COMMUNITY
End: 2023-11-28

## 2023-10-06 RX ORDER — CETIRIZINE HYDROCHLORIDE 10 MG/1
10 TABLET ORAL DAILY
COMMUNITY

## 2023-10-06 RX ORDER — ACETAMINOPHEN 500 MG
500-1000 TABLET ORAL EVERY 6 HOURS PRN
COMMUNITY

## 2023-10-06 RX ORDER — GABAPENTIN 300 MG/1
300 CAPSULE ORAL DAILY
Status: ON HOLD | COMMUNITY
End: 2023-11-28

## 2023-10-06 RX ORDER — VITAMIN B COMPLEX
1 TABLET ORAL DAILY
Status: ON HOLD | COMMUNITY
End: 2023-10-09

## 2023-10-09 ENCOUNTER — ANESTHESIA (OUTPATIENT)
Dept: SURGERY | Facility: HOSPITAL | Age: 53
End: 2023-10-09
Payer: COMMERCIAL

## 2023-10-09 ENCOUNTER — HOSPITAL ENCOUNTER (INPATIENT)
Facility: HOSPITAL | Age: 53
LOS: 43 days | Discharge: NURSING, REHAB OR LONG-TERM CARE | End: 2023-11-21
Attending: SURGERY | Admitting: SURGERY
Payer: COMMERCIAL

## 2023-10-09 DIAGNOSIS — K65.1 INTRA-ABDOMINAL ABSCESS (H): Primary | ICD-10-CM

## 2023-10-09 DIAGNOSIS — I42.9 CARDIOMYOPATHY, UNSPECIFIED TYPE (H): ICD-10-CM

## 2023-10-09 DIAGNOSIS — L30.4 INTERTRIGO: ICD-10-CM

## 2023-10-09 DIAGNOSIS — I10 BENIGN ESSENTIAL HYPERTENSION: ICD-10-CM

## 2023-10-09 DIAGNOSIS — N95.1 PERIMENOPAUSAL SYMPTOMS: ICD-10-CM

## 2023-10-09 PROBLEM — E66.01 MORBID (SEVERE) OBESITY DUE TO EXCESS CALORIES (H): Status: ACTIVE | Noted: 2023-10-09

## 2023-10-09 LAB — GLUCOSE BLDC GLUCOMTR-MCNC: 113 MG/DL (ref 70–99)

## 2023-10-09 PROCEDURE — 250N000011 HC RX IP 250 OP 636: Mod: JZ | Performed by: SURGERY

## 2023-10-09 PROCEDURE — 250N000011 HC RX IP 250 OP 636: Performed by: SURGERY

## 2023-10-09 PROCEDURE — 258N000003 HC RX IP 258 OP 636: Performed by: ANESTHESIOLOGY

## 2023-10-09 PROCEDURE — 258N000003 HC RX IP 258 OP 636: Performed by: NURSE ANESTHETIST, CERTIFIED REGISTERED

## 2023-10-09 PROCEDURE — 258N000001 HC RX 258: Performed by: SURGERY

## 2023-10-09 PROCEDURE — 250N000013 HC RX MED GY IP 250 OP 250 PS 637: Performed by: NURSE PRACTITIONER

## 2023-10-09 PROCEDURE — 88307 TISSUE EXAM BY PATHOLOGIST: CPT | Mod: TC | Performed by: SURGERY

## 2023-10-09 PROCEDURE — 250N000011 HC RX IP 250 OP 636: Mod: JZ | Performed by: ANESTHESIOLOGY

## 2023-10-09 PROCEDURE — 250N000011 HC RX IP 250 OP 636: Mod: JZ | Performed by: NURSE PRACTITIONER

## 2023-10-09 PROCEDURE — 88305 TISSUE EXAM BY PATHOLOGIST: CPT | Mod: 26 | Performed by: PATHOLOGY

## 2023-10-09 PROCEDURE — 360N000077 HC SURGERY LEVEL 4, PER MIN: Performed by: SURGERY

## 2023-10-09 PROCEDURE — 250N000011 HC RX IP 250 OP 636: Mod: JZ | Performed by: NURSE ANESTHETIST, CERTIFIED REGISTERED

## 2023-10-09 PROCEDURE — 120N000001 HC R&B MED SURG/OB

## 2023-10-09 PROCEDURE — 710N000009 HC RECOVERY PHASE 1, LEVEL 1, PER MIN: Performed by: SURGERY

## 2023-10-09 PROCEDURE — 250N000011 HC RX IP 250 OP 636: Performed by: ANESTHESIOLOGY

## 2023-10-09 PROCEDURE — 49659 UNLSTD LAPS PX HRNAP HRNRPHY: CPT | Mod: AS | Performed by: NURSE PRACTITIONER

## 2023-10-09 PROCEDURE — 49659 UNLSTD LAPS PX HRNAP HRNRPHY: CPT | Performed by: SURGERY

## 2023-10-09 PROCEDURE — 272N000001 HC OR GENERAL SUPPLY STERILE: Performed by: SURGERY

## 2023-10-09 PROCEDURE — 0DB64Z3 EXCISION OF STOMACH, PERCUTANEOUS ENDOSCOPIC APPROACH, VERTICAL: ICD-10-PCS | Performed by: SURGERY

## 2023-10-09 PROCEDURE — 250N000009 HC RX 250: Performed by: NURSE ANESTHETIST, CERTIFIED REGISTERED

## 2023-10-09 PROCEDURE — 258N000003 HC RX IP 258 OP 636: Performed by: NURSE PRACTITIONER

## 2023-10-09 PROCEDURE — 999N000141 HC STATISTIC PRE-PROCEDURE NURSING ASSESSMENT: Performed by: SURGERY

## 2023-10-09 PROCEDURE — 250N000025 HC SEVOFLURANE, PER MIN: Performed by: SURGERY

## 2023-10-09 PROCEDURE — 370N000017 HC ANESTHESIA TECHNICAL FEE, PER MIN: Performed by: SURGERY

## 2023-10-09 PROCEDURE — 999N000157 HC STATISTIC RCP TIME EA 10 MIN

## 2023-10-09 PROCEDURE — 250N000013 HC RX MED GY IP 250 OP 250 PS 637: Performed by: SURGERY

## 2023-10-09 RX ORDER — HYDROMORPHONE HYDROCHLORIDE 1 MG/ML
0.4 INJECTION, SOLUTION INTRAMUSCULAR; INTRAVENOUS; SUBCUTANEOUS EVERY 5 MIN PRN
Status: DISCONTINUED | OUTPATIENT
Start: 2023-10-09 | End: 2023-10-09 | Stop reason: HOSPADM

## 2023-10-09 RX ORDER — TRAMADOL HYDROCHLORIDE 50 MG/1
100 TABLET ORAL EVERY 6 HOURS PRN
Status: DISCONTINUED | OUTPATIENT
Start: 2023-10-09 | End: 2023-10-12

## 2023-10-09 RX ORDER — ACETAMINOPHEN 325 MG/1
975 TABLET ORAL ONCE
Status: COMPLETED | OUTPATIENT
Start: 2023-10-09 | End: 2023-10-09

## 2023-10-09 RX ORDER — LIDOCAINE 40 MG/G
CREAM TOPICAL
Status: DISCONTINUED | OUTPATIENT
Start: 2023-10-09 | End: 2023-11-21 | Stop reason: HOSPADM

## 2023-10-09 RX ORDER — KETOROLAC TROMETHAMINE 30 MG/ML
15 INJECTION, SOLUTION INTRAMUSCULAR; INTRAVENOUS ONCE
Status: COMPLETED | OUTPATIENT
Start: 2023-10-09 | End: 2023-10-09

## 2023-10-09 RX ORDER — PROCHLORPERAZINE MALEATE 10 MG
10 TABLET ORAL EVERY 6 HOURS PRN
Status: DISCONTINUED | OUTPATIENT
Start: 2023-10-09 | End: 2023-11-21 | Stop reason: HOSPADM

## 2023-10-09 RX ORDER — GABAPENTIN 300 MG/1
600 CAPSULE ORAL
Status: COMPLETED | OUTPATIENT
Start: 2023-10-09 | End: 2023-10-09

## 2023-10-09 RX ORDER — ONDANSETRON 2 MG/ML
4 INJECTION INTRAMUSCULAR; INTRAVENOUS EVERY 30 MIN PRN
Status: DISCONTINUED | OUTPATIENT
Start: 2023-10-09 | End: 2023-10-09 | Stop reason: HOSPADM

## 2023-10-09 RX ORDER — MORPHINE SULFATE 1 MG/ML
INJECTION, SOLUTION EPIDURAL; INTRATHECAL; INTRAVENOUS
Status: COMPLETED | OUTPATIENT
Start: 2023-10-09 | End: 2023-10-09

## 2023-10-09 RX ORDER — NALOXONE HYDROCHLORIDE 1 MG/ML
0.4 INJECTION INTRAMUSCULAR; INTRAVENOUS; SUBCUTANEOUS
Status: DISCONTINUED | OUTPATIENT
Start: 2023-10-09 | End: 2023-10-09

## 2023-10-09 RX ORDER — SODIUM CHLORIDE, SODIUM LACTATE, POTASSIUM CHLORIDE, CALCIUM CHLORIDE 600; 310; 30; 20 MG/100ML; MG/100ML; MG/100ML; MG/100ML
INJECTION, SOLUTION INTRAVENOUS CONTINUOUS
Status: DISCONTINUED | OUTPATIENT
Start: 2023-10-09 | End: 2023-10-09 | Stop reason: HOSPADM

## 2023-10-09 RX ORDER — LORAZEPAM 2 MG/ML
.5-1 INJECTION INTRAMUSCULAR EVERY 6 HOURS PRN
Status: DISCONTINUED | OUTPATIENT
Start: 2023-10-09 | End: 2023-10-15

## 2023-10-09 RX ORDER — LIDOCAINE 40 MG/G
CREAM TOPICAL
Status: DISCONTINUED | OUTPATIENT
Start: 2023-10-09 | End: 2023-10-09 | Stop reason: HOSPADM

## 2023-10-09 RX ORDER — CEFAZOLIN SODIUM/WATER 3 G/30 ML
3 SYRINGE (ML) INTRAVENOUS SEE ADMIN INSTRUCTIONS
Status: DISCONTINUED | OUTPATIENT
Start: 2023-10-09 | End: 2023-10-09 | Stop reason: HOSPADM

## 2023-10-09 RX ORDER — ALBUTEROL SULFATE 0.83 MG/ML
2.5 SOLUTION RESPIRATORY (INHALATION) EVERY 6 HOURS PRN
Status: DISCONTINUED | OUTPATIENT
Start: 2023-10-09 | End: 2023-10-12 | Stop reason: DRUGHIGH

## 2023-10-09 RX ORDER — NALOXONE HYDROCHLORIDE 0.4 MG/ML
0.4 INJECTION, SOLUTION INTRAMUSCULAR; INTRAVENOUS; SUBCUTANEOUS
Status: DISCONTINUED | OUTPATIENT
Start: 2023-10-09 | End: 2023-11-21 | Stop reason: HOSPADM

## 2023-10-09 RX ORDER — NALBUPHINE HYDROCHLORIDE 20 MG/ML
5 INJECTION, SOLUTION INTRAMUSCULAR; INTRAVENOUS; SUBCUTANEOUS EVERY 4 HOURS PRN
Status: DISCONTINUED | OUTPATIENT
Start: 2023-10-09 | End: 2023-10-12

## 2023-10-09 RX ORDER — ALBUTEROL SULFATE 90 UG/1
1-2 AEROSOL, METERED RESPIRATORY (INHALATION) EVERY 4 HOURS PRN
Status: DISCONTINUED | OUTPATIENT
Start: 2023-10-09 | End: 2023-11-21 | Stop reason: HOSPADM

## 2023-10-09 RX ORDER — MAGNESIUM SULFATE 4 G/50ML
4 INJECTION INTRAVENOUS ONCE
Status: COMPLETED | OUTPATIENT
Start: 2023-10-09 | End: 2023-10-09

## 2023-10-09 RX ORDER — ENALAPRILAT 1.25 MG/ML
1.25 INJECTION INTRAVENOUS EVERY 6 HOURS PRN
Status: DISCONTINUED | OUTPATIENT
Start: 2023-10-09 | End: 2023-10-12

## 2023-10-09 RX ORDER — NALOXONE HYDROCHLORIDE 0.4 MG/ML
0.2 INJECTION, SOLUTION INTRAMUSCULAR; INTRAVENOUS; SUBCUTANEOUS
Status: DISCONTINUED | OUTPATIENT
Start: 2023-10-09 | End: 2023-11-21 | Stop reason: HOSPADM

## 2023-10-09 RX ORDER — ENOXAPARIN SODIUM 100 MG/ML
40 INJECTION SUBCUTANEOUS EVERY 24 HOURS
Status: DISCONTINUED | OUTPATIENT
Start: 2023-10-09 | End: 2023-10-12

## 2023-10-09 RX ORDER — GABAPENTIN 250 MG/5ML
250 SOLUTION ORAL EVERY 8 HOURS SCHEDULED
Status: DISCONTINUED | OUTPATIENT
Start: 2023-10-09 | End: 2023-10-12

## 2023-10-09 RX ORDER — ONDANSETRON 2 MG/ML
4 INJECTION INTRAMUSCULAR; INTRAVENOUS EVERY 6 HOURS PRN
Status: DISCONTINUED | OUTPATIENT
Start: 2023-10-09 | End: 2023-11-21 | Stop reason: HOSPADM

## 2023-10-09 RX ORDER — FLUTICASONE FUROATE AND VILANTEROL 100; 25 UG/1; UG/1
1 POWDER RESPIRATORY (INHALATION) DAILY
Status: DISCONTINUED | OUTPATIENT
Start: 2023-10-10 | End: 2023-10-18

## 2023-10-09 RX ORDER — HYDROMORPHONE HYDROCHLORIDE 1 MG/ML
0.2 INJECTION, SOLUTION INTRAMUSCULAR; INTRAVENOUS; SUBCUTANEOUS EVERY 5 MIN PRN
Status: DISCONTINUED | OUTPATIENT
Start: 2023-10-09 | End: 2023-10-09 | Stop reason: HOSPADM

## 2023-10-09 RX ORDER — FENTANYL CITRATE 50 UG/ML
INJECTION, SOLUTION INTRAMUSCULAR; INTRAVENOUS PRN
Status: DISCONTINUED | OUTPATIENT
Start: 2023-10-09 | End: 2023-10-09

## 2023-10-09 RX ORDER — ONDANSETRON 4 MG/1
4 TABLET, ORALLY DISINTEGRATING ORAL EVERY 30 MIN PRN
Status: DISCONTINUED | OUTPATIENT
Start: 2023-10-09 | End: 2023-10-09 | Stop reason: HOSPADM

## 2023-10-09 RX ORDER — PROPRANOLOL HYDROCHLORIDE 10 MG/1
10 TABLET ORAL 3 TIMES DAILY PRN
Status: DISCONTINUED | OUTPATIENT
Start: 2023-10-09 | End: 2023-10-22

## 2023-10-09 RX ORDER — FENTANYL CITRATE 50 UG/ML
25 INJECTION, SOLUTION INTRAMUSCULAR; INTRAVENOUS
Status: COMPLETED | OUTPATIENT
Start: 2023-10-09 | End: 2023-10-09

## 2023-10-09 RX ORDER — NALOXONE HYDROCHLORIDE 1 MG/ML
0.2 INJECTION INTRAMUSCULAR; INTRAVENOUS; SUBCUTANEOUS
Status: DISCONTINUED | OUTPATIENT
Start: 2023-10-09 | End: 2023-10-09

## 2023-10-09 RX ORDER — ACETAMINOPHEN 10 MG/ML
1000 INJECTION, SOLUTION INTRAVENOUS EVERY 6 HOURS
Status: DISCONTINUED | OUTPATIENT
Start: 2023-10-09 | End: 2023-10-12

## 2023-10-09 RX ORDER — VENLAFAXINE 75 MG/1
75 TABLET ORAL 3 TIMES DAILY
Status: DISCONTINUED | OUTPATIENT
Start: 2023-10-09 | End: 2023-10-18

## 2023-10-09 RX ORDER — KETOROLAC TROMETHAMINE 15 MG/ML
15 INJECTION, SOLUTION INTRAMUSCULAR; INTRAVENOUS EVERY 6 HOURS
Status: DISCONTINUED | OUTPATIENT
Start: 2023-10-09 | End: 2023-10-11

## 2023-10-09 RX ORDER — DEXAMETHASONE SODIUM PHOSPHATE 10 MG/ML
INJECTION, SOLUTION INTRAMUSCULAR; INTRAVENOUS PRN
Status: DISCONTINUED | OUTPATIENT
Start: 2023-10-09 | End: 2023-10-09

## 2023-10-09 RX ORDER — MORPHINE SULFATE 1 MG/ML
150 INJECTION, SOLUTION EPIDURAL; INTRATHECAL; INTRAVENOUS ONCE
Status: DISCONTINUED | OUTPATIENT
Start: 2023-10-09 | End: 2023-10-09 | Stop reason: HOSPADM

## 2023-10-09 RX ORDER — FENTANYL CITRATE 50 UG/ML
25 INJECTION, SOLUTION INTRAMUSCULAR; INTRAVENOUS EVERY 5 MIN PRN
Status: DISCONTINUED | OUTPATIENT
Start: 2023-10-09 | End: 2023-10-09 | Stop reason: HOSPADM

## 2023-10-09 RX ORDER — HEPARIN SODIUM 5000 [USP'U]/.5ML
5000 INJECTION, SOLUTION INTRAVENOUS; SUBCUTANEOUS ONCE
Status: COMPLETED | OUTPATIENT
Start: 2023-10-09 | End: 2023-10-09

## 2023-10-09 RX ORDER — ROSUVASTATIN CALCIUM 10 MG/1
10 TABLET, COATED ORAL DAILY
Status: DISCONTINUED | OUTPATIENT
Start: 2023-10-10 | End: 2023-10-18

## 2023-10-09 RX ORDER — HYDRALAZINE HYDROCHLORIDE 20 MG/ML
10 INJECTION INTRAMUSCULAR; INTRAVENOUS ONCE
Status: COMPLETED | OUTPATIENT
Start: 2023-10-09 | End: 2023-10-09

## 2023-10-09 RX ORDER — FENTANYL CITRATE 50 UG/ML
50 INJECTION, SOLUTION INTRAMUSCULAR; INTRAVENOUS EVERY 5 MIN PRN
Status: DISCONTINUED | OUTPATIENT
Start: 2023-10-09 | End: 2023-10-09 | Stop reason: HOSPADM

## 2023-10-09 RX ORDER — CETIRIZINE HYDROCHLORIDE 10 MG/1
10 TABLET ORAL DAILY
Status: DISCONTINUED | OUTPATIENT
Start: 2023-10-10 | End: 2023-10-18

## 2023-10-09 RX ORDER — PROPOFOL 10 MG/ML
INJECTION, EMULSION INTRAVENOUS PRN
Status: DISCONTINUED | OUTPATIENT
Start: 2023-10-09 | End: 2023-10-09

## 2023-10-09 RX ORDER — ACETAMINOPHEN 325 MG/10.15ML
975 LIQUID ORAL EVERY 6 HOURS
Status: DISCONTINUED | OUTPATIENT
Start: 2023-10-09 | End: 2023-10-12

## 2023-10-09 RX ORDER — HEPARIN SODIUM 5000 [USP'U]/.5ML
INJECTION, SOLUTION INTRAVENOUS; SUBCUTANEOUS
Status: DISCONTINUED
Start: 2023-10-09 | End: 2023-10-09 | Stop reason: HOSPADM

## 2023-10-09 RX ORDER — CEFAZOLIN SODIUM/WATER 3 G/30 ML
3 SYRINGE (ML) INTRAVENOUS
Status: COMPLETED | OUTPATIENT
Start: 2023-10-09 | End: 2023-10-09

## 2023-10-09 RX ORDER — ONDANSETRON 4 MG/1
4 TABLET, ORALLY DISINTEGRATING ORAL EVERY 6 HOURS PRN
Status: DISCONTINUED | OUTPATIENT
Start: 2023-10-09 | End: 2023-11-21 | Stop reason: HOSPADM

## 2023-10-09 RX ORDER — LIDOCAINE HYDROCHLORIDE 10 MG/ML
INJECTION, SOLUTION INFILTRATION; PERINEURAL PRN
Status: DISCONTINUED | OUTPATIENT
Start: 2023-10-09 | End: 2023-10-09

## 2023-10-09 RX ORDER — BUPIVACAINE HYDROCHLORIDE 2.5 MG/ML
INJECTION, SOLUTION EPIDURAL; INFILTRATION; INTRACAUDAL PRN
Status: DISCONTINUED | OUTPATIENT
Start: 2023-10-09 | End: 2023-10-09 | Stop reason: HOSPADM

## 2023-10-09 RX ORDER — ENOXAPARIN SODIUM 100 MG/ML
40 INJECTION SUBCUTANEOUS ONCE
Status: DISCONTINUED | OUTPATIENT
Start: 2023-10-09 | End: 2023-10-09 | Stop reason: HOSPADM

## 2023-10-09 RX ORDER — ACETAMINOPHEN 325 MG/1
975 TABLET ORAL EVERY 6 HOURS
Status: DISCONTINUED | OUTPATIENT
Start: 2023-10-09 | End: 2023-10-12

## 2023-10-09 RX ORDER — ONDANSETRON 2 MG/ML
4 INJECTION INTRAMUSCULAR; INTRAVENOUS
Status: COMPLETED | OUTPATIENT
Start: 2023-10-09 | End: 2023-10-09

## 2023-10-09 RX ORDER — SODIUM CHLORIDE, SODIUM LACTATE, POTASSIUM CHLORIDE, CALCIUM CHLORIDE 600; 310; 30; 20 MG/100ML; MG/100ML; MG/100ML; MG/100ML
INJECTION, SOLUTION INTRAVENOUS CONTINUOUS
Status: DISCONTINUED | OUTPATIENT
Start: 2023-10-09 | End: 2023-10-11 | Stop reason: SINTOL

## 2023-10-09 RX ADMIN — MAGNESIUM SULFATE HEPTAHYDRATE 4 G: 80 INJECTION, SOLUTION INTRAVENOUS at 07:04

## 2023-10-09 RX ADMIN — GABAPENTIN 250 MG: 250 SOLUTION ORAL at 14:39

## 2023-10-09 RX ADMIN — PHENYLEPHRINE HYDROCHLORIDE 100 MCG: 10 INJECTION INTRAVENOUS at 07:39

## 2023-10-09 RX ADMIN — FENTANYL CITRATE 50 MCG: 50 INJECTION, SOLUTION INTRAMUSCULAR; INTRAVENOUS at 10:14

## 2023-10-09 RX ADMIN — PHENYLEPHRINE HYDROCHLORIDE 150 MCG: 10 INJECTION INTRAVENOUS at 08:07

## 2023-10-09 RX ADMIN — KETOROLAC TROMETHAMINE 15 MG: 30 INJECTION INTRAMUSCULAR; INTRAVENOUS at 10:18

## 2023-10-09 RX ADMIN — FENTANYL CITRATE 50 MCG: 50 INJECTION, SOLUTION INTRAMUSCULAR; INTRAVENOUS at 10:56

## 2023-10-09 RX ADMIN — ROCURONIUM BROMIDE 10 MG: 50 INJECTION, SOLUTION INTRAVENOUS at 09:03

## 2023-10-09 RX ADMIN — ALBUTEROL SULFATE 2 PUFF: 90 AEROSOL, METERED RESPIRATORY (INHALATION) at 22:25

## 2023-10-09 RX ADMIN — GABAPENTIN 250 MG: 250 SOLUTION ORAL at 21:01

## 2023-10-09 RX ADMIN — SODIUM CHLORIDE, POTASSIUM CHLORIDE, SODIUM LACTATE AND CALCIUM CHLORIDE: 600; 310; 30; 20 INJECTION, SOLUTION INTRAVENOUS at 20:25

## 2023-10-09 RX ADMIN — FENTANYL CITRATE 50 MCG: 50 INJECTION, SOLUTION INTRAMUSCULAR; INTRAVENOUS at 11:26

## 2023-10-09 RX ADMIN — SODIUM CHLORIDE, POTASSIUM CHLORIDE, SODIUM LACTATE AND CALCIUM CHLORIDE: 600; 310; 30; 20 INJECTION, SOLUTION INTRAVENOUS at 13:02

## 2023-10-09 RX ADMIN — LORAZEPAM 0.5 MG: 2 INJECTION INTRAMUSCULAR; INTRAVENOUS at 22:25

## 2023-10-09 RX ADMIN — FENTANYL CITRATE 50 MCG: 50 INJECTION, SOLUTION INTRAMUSCULAR; INTRAVENOUS at 06:49

## 2023-10-09 RX ADMIN — KETOROLAC TROMETHAMINE 15 MG: 15 INJECTION INTRAMUSCULAR; INTRAVENOUS at 21:01

## 2023-10-09 RX ADMIN — FENTANYL CITRATE 100 MCG: 50 INJECTION INTRAMUSCULAR; INTRAVENOUS at 07:32

## 2023-10-09 RX ADMIN — PHENYLEPHRINE HYDROCHLORIDE 0.5 MCG/KG/MIN: 10 INJECTION INTRAVENOUS at 08:08

## 2023-10-09 RX ADMIN — ACETAMINOPHEN 975 MG: 325 TABLET ORAL at 06:14

## 2023-10-09 RX ADMIN — MIDAZOLAM 2 MG: 1 INJECTION INTRAMUSCULAR; INTRAVENOUS at 07:28

## 2023-10-09 RX ADMIN — ROCURONIUM BROMIDE 50 MG: 50 INJECTION, SOLUTION INTRAVENOUS at 07:34

## 2023-10-09 RX ADMIN — HYOSCYAMINE SULFATE 125 MCG: 0.12 TABLET SUBLINGUAL at 19:09

## 2023-10-09 RX ADMIN — MIDAZOLAM HYDROCHLORIDE 1 MG: 1 INJECTION, SOLUTION INTRAMUSCULAR; INTRAVENOUS at 06:48

## 2023-10-09 RX ADMIN — HYOSCYAMINE SULFATE 125 MCG: 0.12 TABLET SUBLINGUAL at 15:21

## 2023-10-09 RX ADMIN — ENOXAPARIN SODIUM 40 MG: 40 INJECTION SUBCUTANEOUS at 21:01

## 2023-10-09 RX ADMIN — KETOROLAC TROMETHAMINE 15 MG: 15 INJECTION INTRAMUSCULAR; INTRAVENOUS at 17:07

## 2023-10-09 RX ADMIN — HYOSCYAMINE SULFATE 125 MCG: 0.12 TABLET SUBLINGUAL at 22:54

## 2023-10-09 RX ADMIN — PROPOFOL 200 MG: 10 INJECTION, EMULSION INTRAVENOUS at 07:33

## 2023-10-09 RX ADMIN — Medication 3 G: at 07:23

## 2023-10-09 RX ADMIN — SUGAMMADEX 260 MG: 100 INJECTION, SOLUTION INTRAVENOUS at 09:56

## 2023-10-09 RX ADMIN — ONDANSETRON 4 MG: 2 INJECTION INTRAMUSCULAR; INTRAVENOUS at 07:03

## 2023-10-09 RX ADMIN — FAMOTIDINE 20 MG: 10 INJECTION, SOLUTION INTRAVENOUS at 07:04

## 2023-10-09 RX ADMIN — Medication 0.15 MG: at 06:55

## 2023-10-09 RX ADMIN — SODIUM CHLORIDE, POTASSIUM CHLORIDE, SODIUM LACTATE AND CALCIUM CHLORIDE: 600; 310; 30; 20 INJECTION, SOLUTION INTRAVENOUS at 06:41

## 2023-10-09 RX ADMIN — HEPARIN SODIUM 5000 UNITS: 5000 INJECTION, SOLUTION INTRAVENOUS; SUBCUTANEOUS at 07:41

## 2023-10-09 RX ADMIN — FENTANYL CITRATE 50 MCG: 50 INJECTION, SOLUTION INTRAMUSCULAR; INTRAVENOUS at 10:28

## 2023-10-09 RX ADMIN — HYOSCYAMINE SULFATE 125 MCG: 0.12 TABLET SUBLINGUAL at 12:23

## 2023-10-09 RX ADMIN — SODIUM CHLORIDE, POTASSIUM CHLORIDE, SODIUM LACTATE AND CALCIUM CHLORIDE: 600; 310; 30; 20 INJECTION, SOLUTION INTRAVENOUS at 08:03

## 2023-10-09 RX ADMIN — DEXAMETHASONE SODIUM PHOSPHATE 10 MG: 10 INJECTION, SOLUTION INTRAMUSCULAR; INTRAVENOUS at 07:52

## 2023-10-09 RX ADMIN — ACETAMINOPHEN 975 MG: 325 SOLUTION ORAL at 19:07

## 2023-10-09 RX ADMIN — ONDANSETRON 4 MG: 2 INJECTION INTRAMUSCULAR; INTRAVENOUS at 10:15

## 2023-10-09 RX ADMIN — PHENYLEPHRINE HYDROCHLORIDE 150 MCG: 10 INJECTION INTRAVENOUS at 08:03

## 2023-10-09 RX ADMIN — PHENYLEPHRINE HYDROCHLORIDE 100 MCG: 10 INJECTION INTRAVENOUS at 08:01

## 2023-10-09 RX ADMIN — PROCHLORPERAZINE EDISYLATE 5 MG: 5 INJECTION INTRAMUSCULAR; INTRAVENOUS at 10:45

## 2023-10-09 RX ADMIN — ACETAMINOPHEN 1000 MG: 10 INJECTION, SOLUTION INTRAVENOUS at 12:22

## 2023-10-09 RX ADMIN — LIDOCAINE HYDROCHLORIDE 5 ML: 10 INJECTION, SOLUTION INFILTRATION; PERINEURAL at 07:32

## 2023-10-09 RX ADMIN — HYDRALAZINE HYDROCHLORIDE 10 MG: 20 INJECTION INTRAMUSCULAR; INTRAVENOUS at 10:33

## 2023-10-09 RX ADMIN — VENLAFAXINE 75 MG: 75 TABLET ORAL at 21:01

## 2023-10-09 ASSESSMENT — ACTIVITIES OF DAILY LIVING (ADL)
ADLS_ACUITY_SCORE: 19

## 2023-10-09 NOTE — ANESTHESIA PROCEDURE NOTES
Airway       Patient location during procedure: OR       Procedure Start/Stop Times: 10/9/2023 7:36 AM  Staff -        CRNA: Chitra Saenz APRN CRNA       Performed By: CRNAIndications and Patient Condition       Indications for airway management: juancho-procedural       Induction type:intravenous       Mask difficulty assessment: 2 - vent by mask + OA or adjuvant +/- NMBA (easy mask without an OAW)    Final Airway Details       Final airway type: endotracheal airway       Successful airway: ETT - single and Oral  Endotracheal Airway Details        ETT size (mm): 7.0       Cuffed: yes       Cuff volume (mL): 5       Successful intubation technique: direct laryngoscopy       DL Blade Type: Szymanski 2       Grade View of Cords: 1       Adjucts: stylet       Position: Right       Measured from: lips       Secured at (cm): 22       Bite block used: None    Post intubation assessment        Placement verified by: capnometry, equal breath sounds and chest rise        Number of attempts at approach: 1       Number of other approaches attempted: 0       Secured with: silk tape       Ease of procedure: easy       Dentition: Intact and Unchanged    Medication(s) Administered   Medication Administration Time: 10/9/2023 7:36 AM

## 2023-10-09 NOTE — OR NURSING
Pt is holding in PACU while waiting for a bed on floor. She has started fluid challenge and ambulating around PACU/ SKYE. She is slow, recent ankle injury, independent with standby assist.

## 2023-10-09 NOTE — ANESTHESIA CARE TRANSFER NOTE
Patient: Mojgan Jimenez    Procedure: Procedure(s):  GASTRECTOMY, SLEEVE, LAPAROSCOPIC, WITH SINGLE ANASTAMOSIS DUODENAL SWITCH       Diagnosis: Morbid obesity (H) [E66.01]  Diagnosis Additional Information: No value filed.    Anesthesia Type:   General     Note:    Oropharynx: oropharynx clear of all foreign objects and spontaneously breathing  Level of Consciousness: awake  Oxygen Supplementation: face mask  Level of Supplemental Oxygen (L/min / FiO2): 5  Independent Airway: airway patency satisfactory and stable  Dentition: dentition unchanged  Vital Signs Stable: post-procedure vital signs reviewed and stable  Report to RN Given: handoff report given  Patient transferred to: PACU  Comments: C/o pain  Handoff Report: Identifed the Patient, Identified the Reponsible Provider, Reviewed the pertinent medical history, Discussed the surgical course, Reviewed Intra-OP anesthesia mangement and issues during anesthesia, Set expectations for post-procedure period and Allowed opportunity for questions and acknowledgement of understanding  Vitals:  Vitals Value Taken Time   /92 10/09/23 1007   Temp     Pulse 81 10/09/23 1009   Resp 15 10/09/23 1009   SpO2 98 % 10/09/23 1009   Vitals shown include unvalidated device data.    Electronically Signed By: JUSTINE Wallace CRNA  October 9, 2023  10:11 AM

## 2023-10-09 NOTE — PROGRESS NOTES
Spoke with patient in regards to her CPAP machine.  Pt did not bring home machine.  Pt stated that there is something wrong with it and she needs to call the DME company.  Offered hospital Cpap machine for tonight.  Pt declined use tonight.

## 2023-10-09 NOTE — H&P
HISTORY AND PHYSICAL UPDATE:    I have assessed the patient and evaluated the chart and and verify the patient's clinical status has not changed since our last documentation.  The patient is ready to move forward with the planned surgery.  Lungs: Clear to auscultation  Heart: Regular rate and rhythm      HPI: Mojgan Jimenez is a 52 year old female here today for consideration of metabolic and bariatric surgery. she has had weight problems going back to her teenage years.  She really started to accumulate weight in the last 20 years.   She Has tried multiple weight loss programs in the past with moderate success.  30 lbs.      This pt does not have Hypertention.   This pt does not have GERD.   The pt has sleep apnea which is being treated with a CPAP machine.   This pt does not have diabetes.   She feels that her Asthma is worse as her weight gets higher.   She has high Cholesterol.  .       Allergies:Patient has no known allergies.     Past Medical History        Past Medical History:   Diagnosis Date    LINA (generalized anxiety disorder) 11/2/2017    Hyperlipidemia LDL goal <160 1/20/2019    Major depressive disorder      Methanol abuse (H)      Mild persistent asthma without complication 11/2/2017    Vitamin D deficiency 11/2/2017            Past Surgical History         Past Surgical History:   Procedure Laterality Date    MAMMOPLASTY REDUCTION         reduction            CURRENT MEDS:        Family History         Family History   Problem Relation Age of Onset    Cancer Mother      Unknown/Adopted Father      Alcoholism Father      Substance Abuse Sister      Diabetes No family hx of      Hypertension No family hx of               reports that she has never smoked. She has never used smokeless tobacco. She reports that she does not drink alcohol and does not use drugs.     Review of Systems - 12 point Review of Systems is negative except for the issues mentioned above.     PSYCHIATRIC: she has undergone a  lifestyle assessment and has been deemed a good candidate for bariatric surgery by the psychologist.     Vitals: BP (!) 146/69   Pulse 75   Temp 98.3  F (36.8  C) (Oral)   Resp 20   Wt 130.5 kg (287 lb 12.8 oz)   LMP 09/09/2023 (Exact Date)   SpO2 96%   BMI 50.99 kg/m    BMI= Body mass index is 50.99 kg/m .      EXAM:  GENERAL: This is a well-developed 52 year old female who appears her stated age  HEAD & NECK: Grossly normal.  No palpable thyroid lesions  CARDIAC: RRR without murmur  CHEST/LUNG:  Clear to auscultation  ABDOMEN: Obese.  Nontender.  No hernias or masses appreciated.  LYMPHATIC:  No significant adenopathy appreciated.    EXTREMITIES: Grossly normal.  No evidence of chronic venous stasis.    NEUROLOGIC: Focally intact  INTEGUMENT: No open lesions or ulcers  PSYCHIATRIC: Normal affect. she has a good grasp on the nature of her obesity and the treatment options.     LABS:        Lab Results   Component Value Date     WBC 9.5 05/17/2022     WBC 5.5 11/17/2020     HGB 13.4 08/25/2022     HGB 12.3 11/17/2020     HCT 44.0 05/17/2022     HCT 37.4 11/17/2020     MCV 91 05/17/2022     MCV 91 11/17/2020      05/17/2022      11/17/2020               Lab Results   Component Value Date     ALT 13 09/15/2022     AST 27 09/15/2022     ALKPHOS 68 09/15/2022        Component Ref Range & Units 1 yr ago  (5/17/22) 1 yr ago  (8/31/21) 4 yr ago  (12/3/18)    Hemoglobin A1C 0.0 - 5.6 % 5.7 High   5.5 CM  4.9 R, CM    Comment: Normal <5.7%   Prediabetes 5.7-6.4%     Diabetes 6.5% or higher             Assessment/Plan: 52 year old female who is an excellent candidate for bariatric and metabolic surgery.  After a careful conversation with the patient it was decided that given her starting BMI of 51, the single anastamosis duodenal switch.. would be her best option.       Hoang Worthy  [facility]; surgeons  [phone]

## 2023-10-09 NOTE — OP NOTE
Operative Note    Name:  Mojgan Jimenez  PCP:  Franca Mishra  Procedure Date:  10/9/2023      Laparoscopic Sleeve with Single Anastomosis Duodenal Switch    Pre-Procedure Diagnosis:  Morbid obesity (H) [E66.01]     Past Medical History:   Diagnosis Date    LINA (generalized anxiety disorder) 11/02/2017    Hyperlipidemia LDL goal <160 01/20/2019    Major depressive disorder     Methanol abuse (H)     Mild persistent asthma without complication 11/02/2017    Obese     JARVIS on CPAP     Cpap not working    Paranoia (H)     resolved due to drugs    Vitamin D deficiency 11/02/2017       BMI at time of introduction into the Bariatric Surgery Program: 51  Obesity related comorbid conditions: Hyperlipidemia, JARVIS  1. The patient's body mass index (BMI) is or has been greater than or equal to 35 kg/m2  .  2. The patient has at least one co-morbidity related to obesity (as outlined above).  3. The patient has been previously unsuccessful with medical treatment for obesity.    Post-Procedure Diagnosis:    Morbid Obesity    Surgeon(s):  Hoang Worthy MD Kelly Hedblad, CNP;  her assistance was required for exposure and visualization     Anesthesia Type:  GET ITN and Local      Findings:  Morbid Obesity    Operative Report:    The patient is brought to the operating room placed in the supine position and given general endotracheal anesthesia.  She is sterilely prepped and draped in the usual surgical fashion.  A timeout was undertaken before starting the surgery.    A 12 mm trocar was advanced into the supraumbilical area under direct visualization of the surgery laparoscope and taken into the intra-peritoneal cavity.  A pneumoperitoneum was brought up to 15 mmHg. I placed a 5 mm trocar in the subxiphoid position removed the trocar and replaced with a Rabia liver retractor which was then attached to the iron intern to stabilize and retract the left lobe of the liver up and away from the upper aspect of the  stomach.  A 5 mm trochar was placed in the right upper quadrant a 15 millimeter trocar in the right supraumbilical region and another 5 mm and 15 mm trochars placed in the left upper abdomen all under direct visualization of the laparoscope.    The Esophageal Hiatus was dissected along the L side of the Carol.  A fat pad along the left lateral side of the gastroesophageal junction was taken down to expose the anterior wall of the stomach.   The gastro-Leinal ligament was taken down off of the greater curvature of the stomach with the harmonic scalple.  I started 5 cm proximal to the pylorus and extended this dissection right up to the L Carol.  I lifted the stomach and took down all attachments within the lesser sac to completely mobilized the stomach except for its attachments along the lesser curve.  With use of the harmonic scalpel and blunt dissection.  I made a window under the duodenum about 3 cm distal to the pylorus.     We then went about performing the gastric sleeve.  A 40 Puerto Rican OG tube was advanced under direct visualization of the laparoscope.  I started stapling parallel to the lesser curvature of the stomach staying well lateral to the OG tube.  The staple line was made with a Green, Gold and Blue loads with Seamgard,     The duodenum was then divided using a white load of the Endo ELIE 60 mm stapler.  We extended that staple line as far out on the duodenal bulb as we could manage.      The visualization was directed towards the cecum.  We identified the ileocecal valve region and then counted the small intestine back 300 cm.   We then performed the anastomosis between the small bowel where it was marked 300 mm proximal to the ileocecal valve and the duodenal stump as it comes out from the stomach.  The small bowel was sutured across the duodenal stump staple line with a running 2-0 Polysorb suture.  I advanced the Red Rubber 24 Puerto [place]ican OG tube and  then made an enterotomy in the duodenum and in the  associated segment of the small intestine and advanced a Endo ELIE 60 mm white load into the lumen of both segments of bowel.  He had approximately 30 mm of staple line as these 2 segments of bowel were drawn together.  The stapler was removed and the OG tube was advanced across the pylorus and the anastomosis.   The enterotomy was closed with a 3.0 Stratafix suture.  The entire staple line was then oversewn with a running 3-0 Stratafix suture.  This completed a 2 layered closure with partial linear stapler anastomosis between the duodenal bulb and the small intestine.      The small intestine was sutured to the new greater curvature of the stomach about 8 cm proximal to the anastomosis which makes for a nice straight region of small bowel as it leads up and into the duodenal anastomosis.    The sleeve gastrectomy segment was taken out through the 15 mm trocar site.  That 15 mm trocar was then closed at the fascial level with a figure-of-eight 0 Vicryl suture using the Endo fascial closure device under direct physician laparoscope.  The abdominal cavity was evaluated for hemostasis and found to be clean and dry.  Pneumoperitoneum was removed after taking out the Rabia liver retractor the trochars were removed and each of the trocar sites were closed with 4-0 subcuticular Monocryl sutures.  The wounds are dressed Telfa and Tegaderm.  The patient was extubated taken to recovery there is no complications with this procedure        Estimated Blood Loss:   10 cc    Specimens:    ID Type Source Tests Collected by Time Destination   1 : Sleeve gastrectomy Tissue Stomach SURGICAL PATHOLOGY EXAM Hoang Worthy MD 10/9/2023  8:45 AM           Drains:        Complications:    None    Hoang Worthy MD     Date: 10/9/2023  Time: 10:52 AM

## 2023-10-09 NOTE — PHARMACY-ADMISSION MEDICATION HISTORY
Pharmacist Admission Medication History    Admission medication history is complete. The information provided in this note is only as accurate as the sources available at the time of the update.    Information Source(s): Patient, Clinic records, and CareEverywhere/SureScripts via in-person    Pertinent Information: none       Allergies reviewed with patient and updates made in EHR: yes    Medication History Completed By: Bhanu Arroyo [facility] 10/9/2023 6:28 AM    PTA Med List   Medication Sig Last Dose    acetaminophen (TYLENOL) 500 MG tablet Take 500-1,000 mg by mouth every 6 hours as needed for mild pain Unknown    ADVAIR -21 MCG/ACT inhaler Inhale 2 Puffs by mouth two times daily.* 10/9/2023    albuterol (PROAIR HFA) 108 (90 Base) MCG/ACT inhaler Inhale 1-2 puffs into the lungs every 4 hours as needed for shortness of breath / dyspnea or wheezing 10/9/2023    albuterol (PROVENTIL) (2.5 MG/3ML) 0.083% neb solution Take 1 vial (2.5 mg) by nebulization every 6 hours as needed for shortness of breath or wheezing Past Week    cetirizine (ZYRTEC) 10 MG tablet Take 10 mg by mouth daily 10/9/2023    cholecalciferol (VITAMIN D3) 125 mcg (5000 units) capsule Take 125 mcg by mouth daily 10/8/2023    cyanocobalamin (VITAMIN B-12) 1000 MCG sublingual tablet Place 1,000 mcg under the tongue 10/8/2023    gabapentin (NEURONTIN) 300 MG capsule Take 300 mg by mouth daily 10/9/2023    norgestrel-ethinyl estradiol (LO/OVRAL) 0.3-30 MG-MCG tablet Take 1 tablet by mouth daily More than a month    omeprazole (PRILOSEC) 20 MG DR capsule Take 1 capsule (20 mg) by mouth daily for 90 days Start day after surgery, open contents and sprinkle on food for first 6 weeks. Take daily for 3 months after surgery. Has not started    Pediatric Multivitamins-Iron (MULTIVITAMINS PLUS IRON CHILD) 18 MG CHEW Take 1 chew tab by mouth 2 times daily Ok to substitute with any chewable that contains 18 mg of iron, Vitamin A, Thiamine and Zinc.  10/8/2023    propranolol (INDERAL) 10 MG tablet MAY USE 1 TABLET BY MOUTH UP TO THREE TIMES DAILY AS NEEDED FOR ANXIETY. 10/9/2023 at 0400    rosuvastatin (CRESTOR) 10 MG tablet Take 1 tablet (10 mg) by mouth daily 10/9/2023    ursodiol (ACTIGALL) 300 MG capsule Take 1 capsule (300 mg) by mouth 2 times daily for 180 days Start 2 weeks after surgery, do not open-take with warm liquid. Take twice a day for 6 months. Has not started    venlafaxine (EFFEXOR) 75 MG tablet Take 75 mg by mouth 3 times daily 10/9/2023 at x 1

## 2023-10-09 NOTE — ANESTHESIA PROCEDURE NOTES
"Intrathecal injection Procedure Note    Pre-Procedure   Staff -        Anesthesiologist:  Felix Gardiner MD       Performed By: anesthesiologist       Location: pre-op       Procedure Start/Stop Times: 10/9/2023 6:55 AM and 10/9/2023 6:58 AM       Pre-Anesthestic Checklist: patient identified, IV checked, risks and benefits discussed, informed consent, monitors and equipment checked, pre-op evaluation, at physician/surgeon's request and post-op pain management  Timeout:       Correct Patient: Yes        Correct Procedure: Yes        Correct Site: Yes        Correct Position: Yes   Procedure Documentation  Procedure: intrathecal injection       Patient Position: sitting       Skin prep: Chloraprep       Insertion Site: L3-4. (midline approach).       Needle Gauge: 25.        Needle Length (Inches): 5        Spinal Needle Type: Pencan       Introducer used       # of attempts: 1 and  # of redirects:     Assessment/Narrative         Paresthesias: No.       CSF fluid: clear.       Opening pressure was cmH2O while  Sitting.      Medication(s) Administered   Morphine PF 1 mg/mL (Intrathecal) - Intrathecal   0.15 mg - 10/9/2023 6:55:00 AM  Medication Administration Time: 10/9/2023 6:55 AM      FOR [facility] ([facility]) ONLY:   Pain Team Contact information: please page the Pain Team Via [employer]. Search \"Pain\". During daytime hours, please page the attending first. At night please page the resident first.      "

## 2023-10-09 NOTE — ANESTHESIA POSTPROCEDURE EVALUATION
Patient: Mojgan Jimenez    Procedure: Procedure(s):  GASTRECTOMY, SLEEVE, LAPAROSCOPIC, WITH SINGLE ANASTAMOSIS DUODENAL SWITCH       Anesthesia Type:  General    Note:  Disposition: Inpatient   Postop Pain Control: Uneventful            Sign Out: Well controlled pain   PONV: No   Neuro/Psych: Uneventful            Sign Out: Acceptable/Baseline neuro status   Airway/Respiratory: Uneventful            Sign Out: Acceptable/Baseline resp. status   CV/Hemodynamics: Uneventful            Sign Out: Acceptable CV status; No obvious hypovolemia; No obvious fluid overload   Other NRE: NONE   DID A NON-ROUTINE EVENT OCCUR? No           Last vitals:  Vitals Value Taken Time   /68 10/09/23 1215   Temp 36.7  C (98  F) 10/09/23 1140   Pulse 92 10/09/23 1216   Resp 14 10/09/23 1216   SpO2 95 % 10/09/23 1216   Vitals shown include unvalidated device data.    Electronically Signed By: WENDY TINAJERO MD  October 9, 2023  12:18 PM

## 2023-10-09 NOTE — ANESTHESIA PREPROCEDURE EVALUATION
Anesthesia Pre-Procedure Evaluation    Patient: Mojgan Jimenez   MRN: [id number] : 1970        Procedure : Procedure(s):  GASTRECTOMY, SLEEVE, LAPAROSCOPIC, WITH SINGLE ANASTAMOSIS DUODENAL SWITCH          Past Medical History:   Diagnosis Date    LINA (generalized anxiety disorder) 2017    Hyperlipidemia LDL goal <160 2019    Major depressive disorder     Methanol abuse (H)     Mild persistent asthma without complication 2017    Obese     JARVIS on CPAP     Cpap not working    Paranoia (H)     resolved due to drugs    Vitamin D deficiency 2017      Past Surgical History:   Procedure Laterality Date    MAMMOPLASTY REDUCTION      reduction      No Known Allergies   Social History     Tobacco Use    Smoking status: Never    Smokeless tobacco: Never   Substance Use Topics    Alcohol use: Not Currently     Comment: Sober x 8 months      Wt Readings from Last 1 Encounters:   10/09/23 130.5 kg (287 lb 12.8 oz)        Anesthesia Evaluation            ROS/MED HX  ENT/Pulmonary:     (+) sleep apnea, moderate, uses CPAP,                  Intermittent, asthma                  Neurologic:  - neg neurologic ROS     Cardiovascular:  - neg cardiovascular ROS     METS/Exercise Tolerance:     Hematologic:       Musculoskeletal:  - neg musculoskeletal ROS     GI/Hepatic:  - neg GI/hepatic ROS     Renal/Genitourinary:       Endo:     (+)               Obesity,       Psychiatric/Substance Use:  - neg psychiatric ROS     Infectious Disease:       Malignancy:       Other:            Physical Exam    Airway        Mallampati: I   TM distance: > 3 FB   Neck ROM: full     Respiratory Devices and Support         Dental           Cardiovascular   cardiovascular exam normal          Pulmonary   pulmonary exam normal                OUTSIDE LABS:  CBC:   Lab Results   Component Value Date    WBC 9.5 2022    WBC 5.5 2020    HGB 13.4 2022    HGB 14.3 2022    HCT 44.0 2022    HCT 37.4  11/17/2020     05/17/2022     11/17/2020     BMP:   Lab Results   Component Value Date     09/15/2022     05/17/2022    POTASSIUM 4.3 09/15/2022    POTASSIUM 4.5 05/17/2022    CHLORIDE 102 09/15/2022    CHLORIDE 102 05/17/2022    CO2 28 09/15/2022    CO2 32 05/17/2022    BUN 9.4 09/15/2022    BUN 16 05/17/2022    CR 0.73 09/15/2022    CR 0.73 05/17/2022     (H) 10/09/2023    GLC 96 09/15/2022     COAGS: No results found for: PTT, INR, FIBR  POC:   Lab Results   Component Value Date    HCG Negative 04/03/2018    HCGS Negative 03/21/2018     HEPATIC:   Lab Results   Component Value Date    ALBUMIN 3.8 09/15/2022    PROTTOTAL 7.1 09/15/2022    ALT 13 09/15/2022    AST 27 09/15/2022    ALKPHOS 68 09/15/2022    BILITOTAL 0.3 09/15/2022     OTHER:   Lab Results   Component Value Date    A1C 5.7 (H) 05/17/2022    PALAK 9.0 09/15/2022    TSH 2.56 09/15/2022    T4 0.82 12/03/2018       Anesthesia Plan    ASA Status:  3       Anesthesia Type: General.     - Airway: ETT   Induction: Intravenous.   Maintenance: Inhalation.        Consents    Anesthesia Plan(s) and associated risks, benefits, and realistic alternatives discussed. Questions answered and patient/representative(s) expressed understanding.     - Discussed: Risks, Benefits and Alternatives for BOTH SEDATION and the PROCEDURE were discussed     - Discussed with:  Patient      - Extended Intubation/Ventilatory Support Discussed: No.      - Patient is DNR/DNI Status: No     Use of blood products discussed: No .     Postoperative Care    Pain management: intrathecal morphine.        Comments:                WENDY TINAJERO MD

## 2023-10-10 ENCOUNTER — APPOINTMENT (OUTPATIENT)
Dept: RADIOLOGY | Facility: HOSPITAL | Age: 53
End: 2023-10-10
Attending: SURGERY
Payer: COMMERCIAL

## 2023-10-10 LAB
PATH REPORT.COMMENTS IMP SPEC: NORMAL
PATH REPORT.COMMENTS IMP SPEC: NORMAL
PATH REPORT.FINAL DX SPEC: NORMAL
PATH REPORT.GROSS SPEC: NORMAL
PATH REPORT.MICROSCOPIC SPEC OTHER STN: NORMAL
PATH REPORT.RELEVANT HX SPEC: NORMAL
PHOTO IMAGE: NORMAL

## 2023-10-10 PROCEDURE — 250N000011 HC RX IP 250 OP 636: Performed by: SURGERY

## 2023-10-10 PROCEDURE — 999N000157 HC STATISTIC RCP TIME EA 10 MIN

## 2023-10-10 PROCEDURE — 120N000001 HC R&B MED SURG/OB

## 2023-10-10 PROCEDURE — 74240 X-RAY XM UPR GI TRC 1CNTRST: CPT

## 2023-10-10 PROCEDURE — 250N000011 HC RX IP 250 OP 636: Performed by: NURSE PRACTITIONER

## 2023-10-10 PROCEDURE — 258N000003 HC RX IP 258 OP 636: Performed by: NURSE PRACTITIONER

## 2023-10-10 PROCEDURE — 250N000013 HC RX MED GY IP 250 OP 250 PS 637: Performed by: NURSE PRACTITIONER

## 2023-10-10 RX ORDER — GABAPENTIN 250 MG/5ML
250 SOLUTION ORAL EVERY 8 HOURS PRN
Qty: 60 ML | Refills: 0 | Status: CANCELLED | OUTPATIENT
Start: 2023-10-10

## 2023-10-10 RX ORDER — HYDROMORPHONE HYDROCHLORIDE 1 MG/ML
.5-1 INJECTION, SOLUTION INTRAMUSCULAR; INTRAVENOUS; SUBCUTANEOUS EVERY 4 HOURS PRN
Status: DISCONTINUED | OUTPATIENT
Start: 2023-10-10 | End: 2023-10-12

## 2023-10-10 RX ORDER — ACETAMINOPHEN 500 MG
500-1000 TABLET ORAL EVERY 6 HOURS PRN
Status: CANCELLED | COMMUNITY
Start: 2023-10-10

## 2023-10-10 RX ADMIN — VENLAFAXINE 75 MG: 75 TABLET ORAL at 13:18

## 2023-10-10 RX ADMIN — KETOROLAC TROMETHAMINE 15 MG: 15 INJECTION INTRAMUSCULAR; INTRAVENOUS at 10:04

## 2023-10-10 RX ADMIN — Medication 1 TABLET: at 22:17

## 2023-10-10 RX ADMIN — DIATRIZOATE MEGLUMINE AND DIATRIZOATE SODIUM 120 ML: 660; 100 SOLUTION ORAL; RECTAL at 16:28

## 2023-10-10 RX ADMIN — ALBUTEROL SULFATE 2 PUFF: 90 AEROSOL, METERED RESPIRATORY (INHALATION) at 19:00

## 2023-10-10 RX ADMIN — TRAMADOL HYDROCHLORIDE 100 MG: 50 TABLET, COATED ORAL at 16:09

## 2023-10-10 RX ADMIN — HYOSCYAMINE SULFATE 125 MCG: 0.12 TABLET SUBLINGUAL at 22:31

## 2023-10-10 RX ADMIN — GABAPENTIN 250 MG: 250 SOLUTION ORAL at 13:18

## 2023-10-10 RX ADMIN — ENOXAPARIN SODIUM 40 MG: 40 INJECTION SUBCUTANEOUS at 22:17

## 2023-10-10 RX ADMIN — GABAPENTIN 250 MG: 250 SOLUTION ORAL at 06:40

## 2023-10-10 RX ADMIN — HYOSCYAMINE SULFATE 125 MCG: 0.12 TABLET SUBLINGUAL at 15:56

## 2023-10-10 RX ADMIN — Medication 1 TABLET: at 10:05

## 2023-10-10 RX ADMIN — HYOSCYAMINE SULFATE 125 MCG: 0.12 TABLET SUBLINGUAL at 06:40

## 2023-10-10 RX ADMIN — CETIRIZINE HYDROCHLORIDE 10 MG: 10 TABLET, FILM COATED ORAL at 10:06

## 2023-10-10 RX ADMIN — KETOROLAC TROMETHAMINE 15 MG: 15 INJECTION INTRAMUSCULAR; INTRAVENOUS at 15:56

## 2023-10-10 RX ADMIN — ROSUVASTATIN CALCIUM 10 MG: 10 TABLET, FILM COATED ORAL at 10:06

## 2023-10-10 RX ADMIN — HYOSCYAMINE SULFATE 125 MCG: 0.12 TABLET SUBLINGUAL at 03:54

## 2023-10-10 RX ADMIN — ACETAMINOPHEN 975 MG: 325 SOLUTION ORAL at 12:15

## 2023-10-10 RX ADMIN — ACETAMINOPHEN 975 MG: 325 SOLUTION ORAL at 19:00

## 2023-10-10 RX ADMIN — HYOSCYAMINE SULFATE 125 MCG: 0.12 TABLET SUBLINGUAL at 19:00

## 2023-10-10 RX ADMIN — HYOSCYAMINE SULFATE 125 MCG: 0.12 TABLET SUBLINGUAL at 12:15

## 2023-10-10 RX ADMIN — ALBUTEROL SULFATE 2 PUFF: 90 AEROSOL, METERED RESPIRATORY (INHALATION) at 03:58

## 2023-10-10 RX ADMIN — Medication: at 10:07

## 2023-10-10 RX ADMIN — KETOROLAC TROMETHAMINE 15 MG: 15 INJECTION INTRAMUSCULAR; INTRAVENOUS at 03:54

## 2023-10-10 RX ADMIN — FLUTICASONE FUROATE AND VILANTEROL TRIFENATATE 1 PUFF: 100; 25 POWDER RESPIRATORY (INHALATION) at 10:07

## 2023-10-10 RX ADMIN — ACETAMINOPHEN 975 MG: 325 SOLUTION ORAL at 06:40

## 2023-10-10 RX ADMIN — OMEPRAZOLE 20 MG: 20 CAPSULE, DELAYED RELEASE ORAL at 10:05

## 2023-10-10 RX ADMIN — KETOROLAC TROMETHAMINE 15 MG: 15 INJECTION INTRAMUSCULAR; INTRAVENOUS at 22:17

## 2023-10-10 RX ADMIN — HYDROMORPHONE HYDROCHLORIDE 1 MG: 1 INJECTION, SOLUTION INTRAMUSCULAR; INTRAVENOUS; SUBCUTANEOUS at 18:56

## 2023-10-10 RX ADMIN — PROPRANOLOL HYDROCHLORIDE 10 MG: 10 TABLET ORAL at 00:45

## 2023-10-10 RX ADMIN — VENLAFAXINE 75 MG: 75 TABLET ORAL at 10:04

## 2023-10-10 RX ADMIN — ALBUTEROL SULFATE 2 PUFF: 90 AEROSOL, METERED RESPIRATORY (INHALATION) at 13:35

## 2023-10-10 RX ADMIN — ACETAMINOPHEN 975 MG: 325 SOLUTION ORAL at 00:45

## 2023-10-10 RX ADMIN — VENLAFAXINE 75 MG: 75 TABLET ORAL at 22:17

## 2023-10-10 RX ADMIN — SODIUM CHLORIDE, POTASSIUM CHLORIDE, SODIUM LACTATE AND CALCIUM CHLORIDE: 600; 310; 30; 20 INJECTION, SOLUTION INTRAVENOUS at 03:58

## 2023-10-10 RX ADMIN — GABAPENTIN 250 MG: 250 SOLUTION ORAL at 22:17

## 2023-10-10 ASSESSMENT — ACTIVITIES OF DAILY LIVING (ADL)
ADLS_ACUITY_SCORE: 19

## 2023-10-10 NOTE — PROGRESS NOTES
General Surgery Progress Note    POST OP DAY  # 1, status post Sleeve Gastrectomy with SADS    Subjective:   Pt is complaining of pain across her whole abdomin. The pain seems to come in waves and is made worse with movement.   She is drinking without pain.  She is walking and passing urine OK.      Vitals:    10/09/23 2044 10/09/23 2303 10/10/23 0309 10/10/23 0717   BP: 136/83 133/81 130/76 132/72   BP Location: Left arm Left arm Left arm Left arm   Pulse: 94 98 95 95   Resp: 18 18 20 18   Temp: 98.5  F (36.9  C) 98.8  F (37.1  C) 99.3  F (37.4  C) 98.6  F (37  C)   TempSrc: Oral Oral Oral Oral   SpO2: 94% 92% 90% 91%   Weight:       Height:           Physical Exam:  Lungs:  CTA  CV:       RRR  Ab:       Soft, + BS,     Assessment:  Pt is having more post op pain then I would expect.  I will eval to rule out an anastomotic leak.    Plan: Gastrograffin Upper GI study.    Hoang Worthy MD  [facility]  [phone]

## 2023-10-10 NOTE — PROGRESS NOTES
Education was provided on the following prior to discharge:    You will remain on a full liquid diet until you follow up in the post op class with the dietician.  It is extremely important to follow the liquid diet to allow for optimal healing and to prevent food from becoming lodged at the pouch outlet.     Protein is an important part of the diet after surgery; therefore, it is necessary to drink fluids that are high in protein.  Proteins are building blocks that help you heal after surgery and help preserve your muscle mass while you are losing weight, aim for 40-50g daily for the first week      Remember to take 1 Multivitamin with Iron 2 times daily and 1000 mcg of Sublingual B-12 daily.     Sip 48-64 ounces of liquid per day in addition to full liquid meals/supplements, increase liquids slowly using 1oz measuring cup. After day 4 you are able to drink normally.      After 1 week of the full liquid diet, you will advance to the pureed diet, as instructed.      Mary Phipps RD

## 2023-10-10 NOTE — PLAN OF CARE
Goal Outcome Evaluation:             Care Plan Progress Note:    Name: Mojgan Jimenez  :   1970  MRN:   [id number]    Problem: Bariatric Procedure  Goal: Prevent post-op bariatric complications.  Appropriate oral intake.  Discharge needs are met.  Intervention:   Post Op Day 0/1 (progress as patient tolerates)   -Notify MD of excessive nausea   -NPO per MD orders; read exceptions to the order   -Toothette with 1/2 inch warm water at bedside until able to take PO   -Do not give ice chips, straws, or carbonation    -Whenever drinking liquids, patient must be upright with both feet on the floor   -No more than 30mL per sip   -All liquids must be at approximately room temperature (no extreme temperatures).   -After 2 hours of 30mL PO q30min, you may take 30mL as tolerated and advance to a clear liquid diet    -No oral pills until after the patient tolerates the 2 hours of 30mL sips (exceptions: sublingual medications can be given anytime; liquid gabapentin can be given after 1 hours of sips)   -Strict intake and output   -Bladder scan every 4 hours until voiding freely   -If tolerating sips, start bariatric clear liquid diet   -If tolerating bariatric clears, advance to a bariatric full liquid diet  Discharge Planning   -Educate patient about pill size   -Patient should take no pill greater than 1/4 inch   -Send pill cutter home with patient   -Nurse to review home discharge instructions  Outcome:    Shift  Intake: 510 ml. Tolerating full liquid diet.   Output: 200 ml. Voiding with no issues.  Bladder Scan: N/A  Pain: Increased pain/shooting/cramping pain today. Scheduled medications given and prn tramadol x 1.  Incision: intact. Dried drainage/  Nausea: Denies   Activity: Ambulating with SBA/independent in the hallway. Ambulation encouraged.  Incentive Spirometry: up to 750. Fair tolerance.  Abdominal Binder: in place.    Minnie Alba RN  10/10/2023  4:21 PM

## 2023-10-10 NOTE — PROGRESS NOTES
"    Care Plan Progress Note:    Name: Mojgan Jimenez  :   1970  MRN:   [id number]    Problem: Bariatric Procedure  Goal: Prevent post-op bariatric complications.  Appropriate oral intake.  Discharge needs are met.  Intervention:   Post Op Day 0/1 (progress as patient tolerates)   -Notify MD of excessive nausea   -NPO per MD orders; read exceptions to the order   -Toothette with 1/2 inch warm water at bedside until able to take PO   -Do not give ice chips, straws, or carbonation    -Whenever drinking liquids, patient must be upright with both feet on the floor   -No more than 30mL per sip   -All liquids must be at approximately room temperature (no extreme temperatures).   -After 2 hours of 30mL PO q30min, you may take 30mL as tolerated and advance to a clear liquid diet    -No oral pills until after the patient tolerates the 2 hours of 30mL sips (exceptions: sublingual medications can be given anytime; liquid gabapentin can be given after 1 hours of sips)   -Strict intake and output   -Bladder scan every 4 hours until voiding freely   -If tolerating sips, start bariatric clear liquid diet   -If tolerating bariatric clears, advance to a bariatric full liquid diet  Discharge Planning   -Educate patient about pill size   -Patient should take no pill greater than 1/4 inch   -Send pill cutter home with patient   -Nurse to review home discharge instructions  Outcome: Pt very anxious overnight, concerned about numerous things including missing hat when voiding, feeling full after sips and waking up in pain. PRN ativan given x1 and PRN propranolol given x1, helpful per pt although pt reported only getting intermittent sleep.    Shift  Intake: 450mL PO and 1,500mL IVF  Output: Voiding freely, missed hat  Bladder Scan: N/A  Pain: Rating 6-7/10, pain improved with scheduled medications.  Incision: Drainage, otherwise intact  Nausea: Denies although pt reported feeling \"too full\" after having a number of sips. Pt " educated on going slow and listening to her body when she is feeling full.   Activity: Pt up independently, ambulated in room multiple times and in hallways x1  Incentive Spirometry: 750  Abdominal Binder: In place    Rolando Kahn RN  10/10/2023  6:01 AM

## 2023-10-10 NOTE — PROGRESS NOTES
"Bariatric Surgery Progress Note    1 Day Post-Op    Procedure(s):  GASTRECTOMY, SLEEVE, LAPAROSCOPIC, WITH SINGLE ANASTAMOSIS DUODENAL SWITCH    Subjective:   Patient reports pain is not being controlled at all times. Patient reports that she is having pain that starts across her lower abdomen and then radiates (at times \"shooting\") across her lower and mid abdomen. Patient is not passing flatus and has not walked much today. She reports that the pain was so bad earlier that she could not get up from the chair and this caused her to have an anxiety attack. When the pain comes on she finds it hard to take a deep breath but denies any difficulty breathing otherwise. She is not comfortable going home like this and states that she would not know what to do if this occurred at home. Patient is tolerating bariatric full liquid diet and voiding. Patient denies fever, chills, dizziness, blurred vision, headache, nausea, vomiting, SOB, CP, and leg pain.    Patient Vitals for the past 24 hrs:   BP Temp Temp src Pulse Resp SpO2 Height Weight   10/10/23 1116 132/74 98.2  F (36.8  C) Oral 101 20 94 % -- --   10/10/23 0717 132/72 98.6  F (37  C) Oral 95 18 91 % -- --   10/10/23 0309 130/76 99.3  F (37.4  C) Oral 95 20 90 % -- --   10/09/23 2303 133/81 98.8  F (37.1  C) Oral 98 18 92 % -- --   10/09/23 2044 136/83 98.5  F (36.9  C) Oral 94 18 94 % -- --   10/09/23 2042 136/83 -- Oral 96 -- 94 % -- --   10/09/23 1457 (!) 121/90 98.2  F (36.8  C) Oral 93 20 91 % 1.6 m (5' 3\") 130.7 kg (288 lb 3.2 oz)   10/09/23 1440 138/80 98  F (36.7  C) Temporal 78 -- 94 % -- --       Physical Exam:  General: A/O, NAD  Ab:       Soft, + BS, expected ttp POD 1; The wound/ dressings are clean, dry, and intact  :      Patient is voiding adequate amount    Admission on 10/09/2023   Component Date Value    GLUCOSE BY METER POCT 10/09/2023 113 (H)     Case Report 10/09/2023                      Value:Surgical Pathology Report                         " Case: [id number]                                  Authorizing Provider:  Hoang Worthy MD Collected:           10/09/2023 08:45 AM          Ordering Location:     [facility]      Received:            10/09/2023 10:31 AM                                 [facility]                                                               Pathologist:           Bettie Marino MD                                                           Specimen:    Stomach, Sleeve gastrectomy                                                                Final Diagnosis 10/09/2023                      Value:This result contains rich text formatting which cannot be displayed here.    Clinical Information 10/09/2023                      Value:This result contains rich text formatting which cannot be displayed here.    Gross Description 10/09/2023                      Value:This result contains rich text formatting which cannot be displayed here.    Microscopic Description 10/09/2023                      Value:This result contains rich text formatting which cannot be displayed here.    Performing Labs 10/09/2023                      Value:This result contains rich text formatting which cannot be displayed here.       Assessment/Plan:   Patient is progressing well after surgery with her oral intake, but is having pain that is rather unexpected. HR is in the 90's and her blood pressure is stable. Given that she has not passed flatus, this could represent gas pain as her bowels are trying wake up from surgery. However, we will monitor her overnight and get labs in the morning to make sure this is pain is not representative of something more serious. She can continue on full liquids as she is not having any increased pain with oral intake or nausea. She also has tramadol ordered that may help with better pain control.    Felecia Henderson, CNP  [phone]  [facility] General [facility] Bariatric Surgery

## 2023-10-10 NOTE — PLAN OF CARE
Problem: Bariatric Surgery  Goal: Optimal Pain Control and Function  Intervention: Prevent or Manage Pain  Recent Flowsheet Documentation  Taken 10/9/2023 1900 by Minnie Clement, RN  Pain Management Interventions: medication (see MAR)  Taken 10/9/2023 1707 by Minnie Clement, RN  Pain Management Interventions: medication (see MAR)   Goal Outcome Evaluation:             Care Plan Progress Note:    Name: Mojgan Jimenez  :   1970  MRN:   [id number]    Problem: Bariatric Procedure  Goal: Prevent post-op bariatric complications.  Appropriate oral intake.  Discharge needs are met.  Intervention:   Post Op Day 0/1 (progress as patient tolerates)   -Notify MD of excessive nausea   -NPO per MD orders; read exceptions to the order   -Toothette with 1/2 inch warm water at bedside until able to take PO   -Do not give ice chips, straws, or carbonation    -Whenever drinking liquids, patient must be upright with both feet on the floor   -No more than 30mL per sip   -All liquids must be at approximately room temperature (no extreme temperatures).   -After 2 hours of 30mL PO q30min, you may take 30mL as tolerated and advance to a clear liquid diet    -No oral pills until after the patient tolerates the 2 hours of 30mL sips (exceptions: sublingual medications can be given anytime; liquid gabapentin can be given after 1 hours of sips)   -Strict intake and output   -Bladder scan every 4 hours until voiding freely   -If tolerating sips, start bariatric clear liquid diet   -If tolerating bariatric clears, advance to a bariatric full liquid diet  Discharge Planning   -Educate patient about pill size   -Patient should take no pill greater than 1/4 inch   -Send pill cutter home with patient   -Nurse to review home discharge instructions  Outcome:    Shift  Intake: 520 ml. Tolerating clears well.   Output: 200 ml mak urine.  Bladder Scan: n/a  Pain: minimal pain 1-3/10, managed with scheduled medications  Incision:  Intact, dried drainage in midline incision.   Nausea: Denies  Activity: Ambulating in the hallway with SBA  Incentive Spirometry: up to 750.  Abdominal Binder: in place.     Minnie Alba RN  10/9/2023  7:34 PM

## 2023-10-11 LAB
ANION GAP SERPL CALCULATED.3IONS-SCNC: 14 MMOL/L (ref 7–15)
ANION GAP SERPL CALCULATED.3IONS-SCNC: 17 MMOL/L (ref 7–15)
BUN SERPL-MCNC: 34 MG/DL (ref 6–20)
BUN SERPL-MCNC: 41 MG/DL (ref 6–20)
CALCIUM SERPL-MCNC: 10.2 MG/DL (ref 8.6–10)
CALCIUM SERPL-MCNC: 9.6 MG/DL (ref 8.6–10)
CHLORIDE SERPL-SCNC: 100 MMOL/L (ref 98–107)
CHLORIDE SERPL-SCNC: 99 MMOL/L (ref 98–107)
CREAT SERPL-MCNC: 3.75 MG/DL (ref 0.51–0.95)
CREAT SERPL-MCNC: 4.59 MG/DL (ref 0.51–0.95)
DEPRECATED HCO3 PLAS-SCNC: 19 MMOL/L (ref 22–29)
DEPRECATED HCO3 PLAS-SCNC: 23 MMOL/L (ref 22–29)
EGFRCR SERPLBLD CKD-EPI 2021: 11 ML/MIN/1.73M2
EGFRCR SERPLBLD CKD-EPI 2021: 14 ML/MIN/1.73M2
ERYTHROCYTE [DISTWIDTH] IN BLOOD BY AUTOMATED COUNT: 13.3 % (ref 10–15)
GLUCOSE SERPL-MCNC: 100 MG/DL (ref 70–99)
GLUCOSE SERPL-MCNC: 100 MG/DL (ref 70–99)
HCT VFR BLD AUTO: 41.7 % (ref 35–47)
HGB BLD-MCNC: 13.2 G/DL (ref 11.7–15.7)
MCH RBC QN AUTO: 29.5 PG (ref 26.5–33)
MCHC RBC AUTO-ENTMCNC: 31.7 G/DL (ref 31.5–36.5)
MCV RBC AUTO: 93 FL (ref 78–100)
PLATELET # BLD AUTO: 193 10E3/UL (ref 150–450)
POTASSIUM SERPL-SCNC: 4.4 MMOL/L (ref 3.4–5.3)
POTASSIUM SERPL-SCNC: 4.7 MMOL/L (ref 3.4–5.3)
RBC # BLD AUTO: 4.48 10E6/UL (ref 3.8–5.2)
SODIUM SERPL-SCNC: 135 MMOL/L (ref 135–145)
SODIUM SERPL-SCNC: 137 MMOL/L (ref 135–145)
WBC # BLD AUTO: 13.1 10E3/UL (ref 4–11)

## 2023-10-11 PROCEDURE — 99222 1ST HOSP IP/OBS MODERATE 55: CPT | Performed by: INTERNAL MEDICINE

## 2023-10-11 PROCEDURE — 120N000001 HC R&B MED SURG/OB

## 2023-10-11 PROCEDURE — 36415 COLL VENOUS BLD VENIPUNCTURE: CPT | Performed by: NURSE PRACTITIONER

## 2023-10-11 PROCEDURE — 36415 COLL VENOUS BLD VENIPUNCTURE: CPT | Performed by: INTERNAL MEDICINE

## 2023-10-11 PROCEDURE — 85027 COMPLETE CBC AUTOMATED: CPT | Performed by: NURSE PRACTITIONER

## 2023-10-11 PROCEDURE — 80048 BASIC METABOLIC PNL TOTAL CA: CPT | Performed by: INTERNAL MEDICINE

## 2023-10-11 PROCEDURE — 250N000013 HC RX MED GY IP 250 OP 250 PS 637: Performed by: NURSE PRACTITIONER

## 2023-10-11 PROCEDURE — 258N000003 HC RX IP 258 OP 636: Performed by: SURGERY

## 2023-10-11 PROCEDURE — 250N000011 HC RX IP 250 OP 636: Mod: JZ | Performed by: NURSE PRACTITIONER

## 2023-10-11 PROCEDURE — 80048 BASIC METABOLIC PNL TOTAL CA: CPT | Performed by: NURSE PRACTITIONER

## 2023-10-11 PROCEDURE — 258N000003 HC RX IP 258 OP 636: Performed by: NURSE PRACTITIONER

## 2023-10-11 RX ORDER — SODIUM CHLORIDE 9 MG/ML
INJECTION, SOLUTION INTRAVENOUS CONTINUOUS
Status: DISCONTINUED | OUTPATIENT
Start: 2023-10-11 | End: 2023-10-12

## 2023-10-11 RX ADMIN — HYOSCYAMINE SULFATE 125 MCG: 0.12 TABLET SUBLINGUAL at 15:49

## 2023-10-11 RX ADMIN — Medication 1 TABLET: at 20:58

## 2023-10-11 RX ADMIN — ENOXAPARIN SODIUM 40 MG: 40 INJECTION SUBCUTANEOUS at 20:58

## 2023-10-11 RX ADMIN — KETOROLAC TROMETHAMINE 15 MG: 15 INJECTION INTRAMUSCULAR; INTRAVENOUS at 04:49

## 2023-10-11 RX ADMIN — Medication 1 TABLET: at 08:26

## 2023-10-11 RX ADMIN — ACETAMINOPHEN 975 MG: 325 SOLUTION ORAL at 19:52

## 2023-10-11 RX ADMIN — ROSUVASTATIN CALCIUM 10 MG: 10 TABLET, FILM COATED ORAL at 08:26

## 2023-10-11 RX ADMIN — GABAPENTIN 250 MG: 250 SOLUTION ORAL at 06:58

## 2023-10-11 RX ADMIN — HYOSCYAMINE SULFATE 125 MCG: 0.12 TABLET SUBLINGUAL at 06:58

## 2023-10-11 RX ADMIN — FLUTICASONE FUROATE AND VILANTEROL TRIFENATATE 1 PUFF: 100; 25 POWDER RESPIRATORY (INHALATION) at 08:27

## 2023-10-11 RX ADMIN — SODIUM CHLORIDE 1000 ML: 9 INJECTION, SOLUTION INTRAVENOUS at 08:45

## 2023-10-11 RX ADMIN — SODIUM CHLORIDE: 9 INJECTION, SOLUTION INTRAVENOUS at 21:06

## 2023-10-11 RX ADMIN — Medication 1000 MCG: at 08:26

## 2023-10-11 RX ADMIN — ACETAMINOPHEN 975 MG: 325 SOLUTION ORAL at 01:26

## 2023-10-11 RX ADMIN — HYOSCYAMINE SULFATE 125 MCG: 0.12 TABLET SUBLINGUAL at 20:58

## 2023-10-11 RX ADMIN — ALBUTEROL SULFATE 2 PUFF: 90 AEROSOL, METERED RESPIRATORY (INHALATION) at 04:58

## 2023-10-11 RX ADMIN — ACETAMINOPHEN 975 MG: 325 SOLUTION ORAL at 06:58

## 2023-10-11 RX ADMIN — VENLAFAXINE 75 MG: 75 TABLET ORAL at 13:39

## 2023-10-11 RX ADMIN — ACETAMINOPHEN 975 MG: 325 TABLET ORAL at 12:09

## 2023-10-11 RX ADMIN — HYOSCYAMINE SULFATE 125 MCG: 0.12 TABLET SUBLINGUAL at 12:09

## 2023-10-11 RX ADMIN — HYOSCYAMINE SULFATE 125 MCG: 0.12 TABLET SUBLINGUAL at 04:49

## 2023-10-11 RX ADMIN — OMEPRAZOLE 20 MG: 20 CAPSULE, DELAYED RELEASE ORAL at 08:48

## 2023-10-11 RX ADMIN — PROPRANOLOL HYDROCHLORIDE 10 MG: 10 TABLET ORAL at 13:46

## 2023-10-11 RX ADMIN — VENLAFAXINE 75 MG: 75 TABLET ORAL at 21:02

## 2023-10-11 RX ADMIN — SODIUM CHLORIDE, POTASSIUM CHLORIDE, SODIUM LACTATE AND CALCIUM CHLORIDE: 600; 310; 30; 20 INJECTION, SOLUTION INTRAVENOUS at 08:32

## 2023-10-11 RX ADMIN — VENLAFAXINE 75 MG: 75 TABLET ORAL at 08:26

## 2023-10-11 RX ADMIN — CETIRIZINE HYDROCHLORIDE 10 MG: 10 TABLET, FILM COATED ORAL at 08:26

## 2023-10-11 RX ADMIN — ALBUTEROL SULFATE 2 PUFF: 90 AEROSOL, METERED RESPIRATORY (INHALATION) at 21:10

## 2023-10-11 RX ADMIN — SODIUM CHLORIDE: 9 INJECTION, SOLUTION INTRAVENOUS at 10:48

## 2023-10-11 ASSESSMENT — ACTIVITIES OF DAILY LIVING (ADL)
ADLS_ACUITY_SCORE: 19

## 2023-10-11 NOTE — CONSULTS
"[facility]  [facility]   Nephrology Consultation/Initial Inpatient Care    Mojgan Jimenez   MRN: [id number]  : 1970   DOA: 10/9/2023     REASON FOR CONSULTATION: We are asked to see pt by Dr. Worthy    HISTORY OF PRESENT ILLNESS:53 year old female had gastric bypass surgery on Monday  Uncomplicated but pt says she had a lot of post op pain; pt was getting gabapentin and narcotics and IV toradol (at reduced doses); pre op Cr normal (in Sept) and no known renal issues; CR checked today and quite elevated and we are asked to see; pt says she did feel weak and dizzy with moving around; her bp has been at time soft in the 90s; she was noting some myoclonus  No nausea currently  Pain much better controlled   No urine issues; feels she is emptying bladder      REVIEW OF SYSTEMS:  ROS was completely reviewed and otherwise negative and non-contributory    Past Medical History:   Diagnosis Date    LINA (generalized anxiety disorder) 2017    Hyperlipidemia LDL goal <160 2019    Major depressive disorder     Methanol abuse (H)     Mild persistent asthma without complication 2017    Obese     JARVIS on CPAP     Cpap not working    Paranoia (H)     resolved due to drugs    Vitamin D deficiency 2017       Social History     Socioeconomic History    Marital status: Single     Spouse name: Not on file    Number of children: Not on file    Years of education: Not on file    Highest education level: Not on file   Occupational History    Not on file   Tobacco Use    Smoking status: Never    Smokeless tobacco: Never   Vaping Use    Vaping Use: Never used   Substance and Sexual Activity    Alcohol use: Not Currently     Comment: Sober x 8 months    Drug use: Not Currently     Types: Methamphetamines, \"Crack\" cocaine     Comment: in remision     Sexual activity: Not Currently     Partners: Male   Other Topics Concern    Parent/sibling w/ CABG, MI or " "angioplasty before 65F 55M? No   Social History Narrative    Not on file     Social Determinants of Health     Financial Resource Strain: Not on file   Food Insecurity: Not on file   Transportation Needs: Not on file   Physical Activity: Not on file   Stress: Not on file   Social Connections: Not on file   Interpersonal Safety: Not on file   Housing Stability: Not on file       Family History   Problem Relation Age of Onset    Cancer Mother     Unknown/Adopted Father     Alcoholism Father     Substance Abuse Sister     Diabetes No family hx of     Hypertension No family hx of        No Known Allergies    MEDICATIONS:   acetaminophen  1,000 mg Intravenous Q6H    Or    acetaminophen  975 mg Oral Q6H    Or    acetaminophen  975 mg Oral Q6H    cetirizine  10 mg Oral Daily    childrens multivitamin with iron  1 tablet Oral BID    cyanocobalamin  1,000 mcg Sublingual Daily    enoxaparin ANTICOAGULANT  40 mg Subcutaneous Q24H    fluticasone-vilanterol  1 puff Inhalation Daily    gabapentin  250 mg Oral Q8H KAYLA    hyoscyamine  125 mcg Sublingual Q4H    omeprazole  20 mg Oral QAM AC    rosuvastatin  10 mg Oral Daily    sodium chloride (PF)  3 mL Intracatheter Q8H    venlafaxine  75 mg Oral TID         PHYSICAL EXAM    BP 93/51 (BP Location: Left arm)   Pulse (!) 125   Temp 97.4  F (36.3  C) (Oral)   Resp 22   Ht 1.6 m (5' 3\")   Wt 130.7 kg (288 lb 3.2 oz)   LMP 09/09/2023 (Exact Date)   SpO2 92%   BMI 51.05 kg/m        Intake/Output Summary (Last 24 hours) at 10/11/2023 1002  Last data filed at 10/11/2023 0700  Gross per 24 hour   Intake 1860 ml   Output 600 ml   Net 1260 ml       Alert/oriented x 3; awake and NAD  HEENT NC/AT; perrla; OP clear without lesions; mmm  Neck supple without LAD, TM  CV; RRR without rub or murmur  Lung: clear and equal; no extra sounds  Ab: soft and NT; not distended; normal bs  Ext: no edema and well perfused  Skin; no rash  Neuro; grossly intact    LABORATORIES    Last Renal " Panel:  Sodium   Date Value Ref Range Status   10/11/2023 137 135 - 145 mmol/L Final     Comment:     Reference intervals for this test were updated on 09/26/2023 to more accurately reflect our healthy population. There may be differences in the flagging of prior results with similar values performed with this method. Interpretation of those prior results can be made in the context of the updated reference intervals.    11/17/2020 141 133 - 144 mmol/L Final     Potassium   Date Value Ref Range Status   10/11/2023 4.7 3.4 - 5.3 mmol/L Final   05/17/2022 4.5 3.4 - 5.3 mmol/L Final   11/17/2020 4.1 3.4 - 5.3 mmol/L Final     Chloride   Date Value Ref Range Status   10/11/2023 100 98 - 107 mmol/L Final   05/17/2022 102 94 - 109 mmol/L Final   11/17/2020 109 94 - 109 mmol/L Final     Carbon Dioxide   Date Value Ref Range Status   11/17/2020 31 20 - 32 mmol/L Final     Carbon Dioxide (CO2)   Date Value Ref Range Status   10/11/2023 23 22 - 29 mmol/L Final   05/17/2022 32 20 - 32 mmol/L Final     Anion Gap   Date Value Ref Range Status   10/11/2023 14 7 - 15 mmol/L Final   05/17/2022 2 (L) 3 - 14 mmol/L Final   11/17/2020 1 (L) 3 - 14 mmol/L Final     Glucose   Date Value Ref Range Status   10/11/2023 100 (H) 70 - 99 mg/dL Final   05/17/2022 110 (H) 70 - 99 mg/dL Final   11/17/2020 84 70 - 99 mg/dL Final     Comment:     Fasting specimen     GLUCOSE BY METER POCT   Date Value Ref Range Status   10/09/2023 113 (H) 70 - 99 mg/dL Final     Urea Nitrogen   Date Value Ref Range Status   10/11/2023 34.0 (H) 6.0 - 20.0 mg/dL Final   05/17/2022 16 7 - 30 mg/dL Final   11/17/2020 9 7 - 30 mg/dL Final     Creatinine   Date Value Ref Range Status   10/11/2023 3.75 (H) 0.51 - 0.95 mg/dL Final   11/17/2020 0.79 0.52 - 1.04 mg/dL Final     GFR Estimate   Date Value Ref Range Status   10/11/2023 14 (L) >60 mL/min/1.73m2 Final   11/17/2020 88 >60 mL/min/[1.73_m2] Final     Comment:     Non  GFR Calc  Starting 12/18/2018,  "serum creatinine based estimated GFR (eGFR) will be   calculated using the Chronic Kidney Disease Epidemiology Collaboration   (CKD-EPI) equation.       Calcium   Date Value Ref Range Status   10/11/2023 10.2 (H) 8.6 - 10.0 mg/dL Final   11/17/2020 8.9 8.5 - 10.1 mg/dL Final     Albumin   Date Value Ref Range Status   09/15/2022 3.8 3.5 - 5.2 g/dL Final   05/17/2022 4.0 3.4 - 5.0 g/dL Final   12/03/2018 3.6 3.4 - 5.0 g/dL Final     No components found for: \"URINE\"   Lab Results   Component Value Date    WBC 13.1 10/11/2023    WBC 5.5 11/17/2020     Lab Results   Component Value Date    RBC 4.48 10/11/2023    RBC 4.13 11/17/2020     Lab Results   Component Value Date    HGB 13.2 10/11/2023    HGB 12.3 11/17/2020     Lab Results   Component Value Date    HCT 41.7 10/11/2023    HCT 37.4 11/17/2020     No components found for: \"MCT\"  Lab Results   Component Value Date    MCV 93 10/11/2023    MCV 91 11/17/2020     Lab Results   Component Value Date    MCH 29.5 10/11/2023    MCH 29.8 11/17/2020     Lab Results   Component Value Date    MCHC 31.7 10/11/2023    MCHC 32.9 11/17/2020     Lab Results   Component Value Date    RDW 13.3 10/11/2023    RDW 12.7 11/17/2020     Lab Results   Component Value Date     10/11/2023     11/17/2020         I reviewed all labs    ASSESSMENT/PLAN:  53 year old female    ARF; seems likely to be hemodynamic exacerbated by IV NSAIDS (lowish bp, vasodilatory drugs, mild intravascular depletion); will follow and manage expectantly; will ensure she is emptying bladder (bladder scan) and consider imaging to rule out any obstruction; check urine  S/p gastric bypass; uncomplicated; more than typical pain but improved; imaging without issues/leaks  Fluid status; total body up but likely to be intravascular dry (high HR, vasodilatory meds); agree with IVF  Post op pain; avoid further NSAIDS and try to minimize other meds cleared renally; some myoclonus prob related to narc and gabpentin " metabolite build up so will hold gabapentin  Hyperlipid; on statin    Francesca Cruz MD  Nephrology

## 2023-10-11 NOTE — PROGRESS NOTES
Pt unable to void. Bladder scan 34ml. Nephrology ordered a recheck BMP.  Cr recheck was 4.59; Unable to get a UA; pt not voiding since noon. BP 95/57 and ; Denies pain.   Update nephrology Dr. Garnica and Felecia Henderson.   Making pt NPO after midnight for possible procedure by nephrology.    Nursing doesn't need to call nephrology overnight for low UOP.

## 2023-10-11 NOTE — PROGRESS NOTES
General Surgery Progress Note    POST OP DAY  # 2 s/p KO    Subjective:   Pt is feeling much less pain, she is drinking OK    Vitals:    10/10/23 1536 10/10/23 2319 10/11/23 0421 10/11/23 0718   BP: 127/67 100/56 131/58 93/51   BP Location: Left arm Left arm Left arm Left arm   Pulse: 114 109 115 (!) 125   Resp: 18 20 20 22   Temp: 98.2  F (36.8  C) 98.5  F (36.9  C) 98.5  F (36.9  C) 97.4  F (36.3  C)   TempSrc: Oral Oral Oral Oral   SpO2: 97% 93% 91% 92%   Weight:       Height:           Physical Exam:  Lungs:  CTA  CV:       RRR  Ab:       Soft, + BS,     Recent Labs   Lab 10/11/23  0634   WBC 13.1*   HGB 13.2   HCT 41.7                       Component  Ref Range & Units  6:34 AM  (10/11/23) 1 yr ago  (9/15/22) 1 yr ago  (5/17/22) 2 yr ago  (8/31/21) 2 yr ago  (11/17/20) 4 yr ago  (12/3/18) 5 yr ago  (7/7/18)     Sodium  135 - 145 mmol/L 137 137 R 136 R 140 R 141 R 138 R 141 R   Comment: Reference intervals for this test were updated on 09/26/2023 to more accurately reflect our healthy population. There may be differences in the flagging of prior results with similar values performed with this method. Interpretation of those prior results can be made in the context of the updated reference intervals.    Potassium  3.4 - 5.3 mmol/L 4.7 4.3         Chloride  98 - 107 mmol/L 100 102         Carbon Dioxide (CO2)  22 - 29 mmol/L 23 28         Anion Gap  7 - 15 mmol/L 14 7 2 Low  R 3 R 1 Low  R 10 R 9 R    Urea Nitrogen  6.0 - 20.0 mg/dL 34.0 High  9.4         Creatinine  0.51 - 0.95 mg/dL 3.75 High  0.73 0.73 R 0.72 R 0.79 R 0.65 R 0.61 R    GFR Estimate  >60 mL/min/1.73m2 14 Low  >90 CM >90 CM >90 CM 88 R, CM >90 R, CM >60    Calcium  8.6 - 10.0 mg/dL 10.2 High  9.0 9.6 R 9.1 R 8.9 R 8.8 R 9.0 R    Glucose  70 - 99 mg/dL 100 High  96            Assessment:  POD 2 after a KO.  She had more pain than I would expect yesterday, so I did a swallow study and No Leak was seen.   Her labs this morning show a  dramatic increase in her Creatinine.  She is urinating OK on her own.      Plan:   Stop her Toradol  Bolus with IV fluids and restart maintenance IV  Renal Consult    Hoang Worthy MD  [facility]  [phone] 2

## 2023-10-11 NOTE — PLAN OF CARE
Goal Outcome Evaluation:      Care Plan Progress Note:    Name: Mojgan Jimenez  :   1970  MRN:   [id number]    Problem: Bariatric Procedure  Goal: Prevent post-op bariatric complications.  Appropriate oral intake.  Discharge needs are met.  Intervention:   Post Op Day 0/1 (progress as patient tolerates)   -Notify MD of excessive nausea   -NPO per MD orders; read exceptions to the order   -Toothette with 1/2 inch warm water at bedside until able to take PO   -Do not give ice chips, straws, or carbonation    -Whenever drinking liquids, patient must be upright with both feet on the floor   -No more than 30mL per sip   -All liquids must be at approximately room temperature (no extreme temperatures).   -After 2 hours of 30mL PO q30min, you may take 30mL as tolerated and advance to a clear liquid diet    -No oral pills until after the patient tolerates the 2 hours of 30mL sips (exceptions: sublingual medications can be given anytime; liquid gabapentin can be given after 1 hours of sips)   -Strict intake and output   -Bladder scan every 4 hours until voiding freely   -If tolerating sips, start bariatric clear liquid diet   -If tolerating bariatric clears, advance to a bariatric full liquid diet  Discharge Planning   -Educate patient about pill size   -Patient should take no pill greater than 1/4 inch   -Send pill cutter home with patient   -Nurse to review home discharge instructions  Outcome:    Shift  Intake: 270 ml on evening. Tolerating FULL liquid diet.  Output: 200 ml  Bladder Scan: n/a  Pain: Gastrografin done on evening. New orders for prn iv dilaudid. Prn iv dilaudid 1 mg given once.   Incision: Intact. Dried drainage.  Nausea: Denies  Activity: Up ambulating in the hallways independently.   Incentive Spirometry: up to 750  Abdominal Binder: in place.    Minnie Alba RN  10/10/2023  7:03 PM

## 2023-10-11 NOTE — PROGRESS NOTES
Care Plan Progress Note:    Name: Mojgan Jimenez  :   1970  MRN:   [id number]    Problem: Bariatric Procedure  Goal: Prevent post-op bariatric complications.  Appropriate oral intake.  Discharge needs are met.  Intervention:   Post Op Day 0/1 (progress as patient tolerates)   -Notify MD of excessive nausea   -NPO per MD orders; read exceptions to the order   -Toothette with 1/2 inch warm water at bedside until able to take PO   -Do not give ice chips, straws, or carbonation    -Whenever drinking liquids, patient must be upright with both feet on the floor   -No more than 30mL per sip   -All liquids must be at approximately room temperature (no extreme temperatures).   -After 2 hours of 30mL PO q30min, you may take 30mL as tolerated and advance to a clear liquid diet    -No oral pills until after the patient tolerates the 2 hours of 30mL sips (exceptions: sublingual medications can be given anytime; liquid gabapentin can be given after 1 hours of sips)   -Strict intake and output   -Bladder scan every 4 hours until voiding freely   -If tolerating sips, start bariatric clear liquid diet   -If tolerating bariatric clears, advance to a bariatric full liquid diet  Discharge Planning   -Educate patient about pill size   -Patient should take no pill greater than 1/4 inch   -Send pill cutter home with patient   -Nurse to review home discharge instructions  Outcome:    Shift  Intake: 1,080mL PO  Output: 200mL, voiding freely  Bladder Scan: N/A  Pain: 10, pt reported pain is much better. Pain managed with scheduled medications.  Incision: Dried drainage, intact  Nausea: Denied  Activity: Ambulated in hallways x2, ambulated to bathroom x1  Incentive Spirometry: 500  Abdominal Binder: In place    Rolando Kahn RN  10/11/2023  7:02 AM

## 2023-10-11 NOTE — PLAN OF CARE
Shift from 0700 to 1530-  Goal Outcome Evaluation:      Care Plan Progress Note:    Name: Mojgan Jimenez  :   1970  MRN:   [id number]    Problem: Bariatric Procedure  Goal: Prevent post-op bariatric complications.  Appropriate oral intake.  Discharge needs are met.  Intervention:   Post Op Day 0/1 (progress as patient tolerates)   -Notify MD of excessive nausea   -NPO per MD orders; read exceptions to the order   -Toothette with 1/2 inch warm water at bedside until able to take PO   -Do not give ice chips, straws, or carbonation    -Whenever drinking liquids, patient must be upright with both feet on the floor   -No more than 30mL per sip   -All liquids must be at approximately room temperature (no extreme temperatures).   -After 2 hours of 30mL PO q30min, you may take 30mL as tolerated and advance to a clear liquid diet    -No oral pills until after the patient tolerates the 2 hours of 30mL sips (exceptions: sublingual medications can be given anytime; liquid gabapentin can be given after 1 hours of sips)   -Strict intake and output   -Bladder scan every 4 hours until voiding freely   -If tolerating sips, start bariatric clear liquid diet   -If tolerating bariatric clears, advance to a bariatric full liquid diet  Discharge Planning   -Educate patient about pill size   -Patient should take no pill greater than 1/4 inch   -Send pill cutter home with patient   -Nurse to review home discharge instructions  Outcome:    Shift  Intake:750ml po; IVF 1285 ml with the NS bolus  Output:550ml  Bladder Scan: Voiding without difficulty  Pain:2/10; controlled.  Incision: Clean with scant drainage.   Nausea: denies  Activity: Independently ambulating.  Incentive Spirometry:500  Abdominal Binder: In place.    Echo Anguiano RN  10/11/2023  10:27 AM

## 2023-10-12 ENCOUNTER — APPOINTMENT (OUTPATIENT)
Dept: RADIOLOGY | Facility: HOSPITAL | Age: 53
End: 2023-10-12
Attending: INTERNAL MEDICINE
Payer: COMMERCIAL

## 2023-10-12 ENCOUNTER — ANESTHESIA (OUTPATIENT)
Dept: SURGERY | Facility: HOSPITAL | Age: 53
End: 2023-10-12
Payer: COMMERCIAL

## 2023-10-12 ENCOUNTER — ANESTHESIA EVENT (OUTPATIENT)
Dept: SURGERY | Facility: HOSPITAL | Age: 53
End: 2023-10-12
Payer: COMMERCIAL

## 2023-10-12 LAB
ALBUMIN SERPL BCG-MCNC: 2.8 G/DL (ref 3.5–5.2)
ALLEN'S TEST: ABNORMAL
ALP SERPL-CCNC: 87 U/L (ref 35–104)
ALT SERPL W P-5'-P-CCNC: 177 U/L (ref 0–50)
AMMONIA PLAS-SCNC: 51 UMOL/L (ref 11–51)
ANION GAP SERPL CALCULATED.3IONS-SCNC: 15 MMOL/L (ref 7–15)
ANION GAP SERPL CALCULATED.3IONS-SCNC: 16 MMOL/L (ref 7–15)
ANION GAP SERPL CALCULATED.3IONS-SCNC: 16 MMOL/L (ref 7–15)
AST SERPL W P-5'-P-CCNC: 97 U/L (ref 0–45)
ATRIAL RATE - MUSE: 135 BPM
BASE EXCESS BLDA CALC-SCNC: -11 MMOL/L
BASE EXCESS BLDA CALC-SCNC: -13 MMOL/L
BASE EXCESS BLDA CALC-SCNC: -9.3 MMOL/L
BILIRUB SERPL-MCNC: 0.5 MG/DL
BUN SERPL-MCNC: 50.4 MG/DL (ref 6–20)
BUN SERPL-MCNC: 55.4 MG/DL (ref 6–20)
BUN SERPL-MCNC: 55.8 MG/DL (ref 6–20)
CALCIUM SERPL-MCNC: 8.4 MG/DL (ref 8.6–10)
CALCIUM SERPL-MCNC: 8.5 MG/DL (ref 8.6–10)
CALCIUM SERPL-MCNC: 9.5 MG/DL (ref 8.6–10)
CHLORIDE SERPL-SCNC: 101 MMOL/L (ref 98–107)
CHLORIDE SERPL-SCNC: 103 MMOL/L (ref 98–107)
CHLORIDE SERPL-SCNC: 103 MMOL/L (ref 98–107)
COHGB MFR BLD: 100 % (ref 96–97)
COHGB MFR BLD: 97.9 % (ref 96–97)
COHGB MFR BLD: 99.7 % (ref 96–97)
CREAT SERPL-MCNC: 5.66 MG/DL (ref 0.51–0.95)
CREAT SERPL-MCNC: 5.97 MG/DL (ref 0.51–0.95)
CREAT SERPL-MCNC: 6.05 MG/DL (ref 0.51–0.95)
DEPRECATED HCO3 PLAS-SCNC: 14 MMOL/L (ref 22–29)
DEPRECATED HCO3 PLAS-SCNC: 14 MMOL/L (ref 22–29)
DEPRECATED HCO3 PLAS-SCNC: 18 MMOL/L (ref 22–29)
DIASTOLIC BLOOD PRESSURE - MUSE: NORMAL MMHG
EGFRCR SERPLBLD CKD-EPI 2021: 8 ML/MIN/1.73M2
ERYTHROCYTE [DISTWIDTH] IN BLOOD BY AUTOMATED COUNT: 13.3 % (ref 10–15)
GLUCOSE BLDC GLUCOMTR-MCNC: 122 MG/DL (ref 70–99)
GLUCOSE BLDC GLUCOMTR-MCNC: 146 MG/DL (ref 70–99)
GLUCOSE BLDC GLUCOMTR-MCNC: 64 MG/DL (ref 70–99)
GLUCOSE BLDC GLUCOMTR-MCNC: 81 MG/DL (ref 70–99)
GLUCOSE BLDC GLUCOMTR-MCNC: 87 MG/DL (ref 70–99)
GLUCOSE SERPL-MCNC: 119 MG/DL (ref 70–99)
GLUCOSE SERPL-MCNC: 125 MG/DL (ref 70–99)
GLUCOSE SERPL-MCNC: 71 MG/DL (ref 70–99)
GRAM STAIN RESULT: NORMAL
GRAM STAIN RESULT: NORMAL
HCO3 BLD-SCNC: 15 MMOL/L (ref 23–29)
HCO3 BLD-SCNC: 15 MMOL/L (ref 23–29)
HCO3 BLD-SCNC: 17 MMOL/L (ref 23–29)
HCT VFR BLD AUTO: 39.8 % (ref 35–47)
HGB BLD-MCNC: 12.8 G/DL (ref 11.7–15.7)
HOLD SPECIMEN: NORMAL
HOLD SPECIMEN: NORMAL
INTERPRETATION ECG - MUSE: NORMAL
LACTATE SERPL-SCNC: 1.9 MMOL/L (ref 0.7–2)
MAGNESIUM SERPL-MCNC: 2.2 MG/DL (ref 1.7–2.3)
MCH RBC QN AUTO: 29.5 PG (ref 26.5–33)
MCHC RBC AUTO-ENTMCNC: 32.2 G/DL (ref 31.5–36.5)
MCV RBC AUTO: 92 FL (ref 78–100)
OXYHGB MFR BLD: 96.3 % (ref 96–97)
OXYHGB MFR BLD: >98.5 % (ref 96–97)
OXYHGB MFR BLD: >98.5 % (ref 96–97)
P AXIS - MUSE: 48 DEGREES
PCO2 BLD: 31 MM HG (ref 35–45)
PCO2 BLD: 33 MM HG (ref 35–45)
PCO2 BLD: 42 MM HG (ref 35–45)
PH BLD: 7.18 [PH] (ref 7.37–7.44)
PH BLD: 7.3 [PH] (ref 7.37–7.44)
PH BLD: 7.31 [PH] (ref 7.37–7.44)
PLATELET # BLD AUTO: 189 10E3/UL (ref 150–450)
PLATELET # BLD AUTO: 213 10E3/UL (ref 150–450)
PO2 BLD: 133 MM HG (ref 80–90)
PO2 BLD: 140 MM HG (ref 80–90)
PO2 BLD: 93 MM HG (ref 80–90)
POTASSIUM SERPL-SCNC: 4.1 MMOL/L (ref 3.4–5.3)
POTASSIUM SERPL-SCNC: 5.2 MMOL/L (ref 3.4–5.3)
POTASSIUM SERPL-SCNC: 5.3 MMOL/L (ref 3.4–5.3)
PR INTERVAL - MUSE: 164 MS
PROT SERPL-MCNC: 6.5 G/DL (ref 6.4–8.3)
QRS DURATION - MUSE: 90 MS
QT - MUSE: 260 MS
QTC - MUSE: 390 MS
R AXIS - MUSE: 13 DEGREES
RBC # BLD AUTO: 4.34 10E6/UL (ref 3.8–5.2)
SODIUM SERPL-SCNC: 133 MMOL/L (ref 135–145)
SODIUM SERPL-SCNC: 133 MMOL/L (ref 135–145)
SODIUM SERPL-SCNC: 134 MMOL/L (ref 135–145)
SYSTOLIC BLOOD PRESSURE - MUSE: NORMAL MMHG
T AXIS - MUSE: 33 DEGREES
TEMPERATURE: 37 DEGREES C
VENTRICULAR RATE- MUSE: 135 BPM
WBC # BLD AUTO: 4 10E3/UL (ref 4–11)

## 2023-10-12 PROCEDURE — 87075 CULTR BACTERIA EXCEPT BLOOD: CPT | Performed by: SURGERY

## 2023-10-12 PROCEDURE — 0DQ80ZZ REPAIR SMALL INTESTINE, OPEN APPROACH: ICD-10-PCS | Performed by: SURGERY

## 2023-10-12 PROCEDURE — 258N000001 HC RX 258

## 2023-10-12 PROCEDURE — 87205 SMEAR GRAM STAIN: CPT | Performed by: SURGERY

## 2023-10-12 PROCEDURE — 250N000011 HC RX IP 250 OP 636: Performed by: INTERNAL MEDICINE

## 2023-10-12 PROCEDURE — 82140 ASSAY OF AMMONIA: CPT

## 2023-10-12 PROCEDURE — 250N000011 HC RX IP 250 OP 636: Performed by: NURSE PRACTITIONER

## 2023-10-12 PROCEDURE — 999N000065 XR CHEST PORT 1 VIEW

## 2023-10-12 PROCEDURE — 44603 SUTURE SMALL INTESTINE: CPT | Mod: 80 | Performed by: SURGERY

## 2023-10-12 PROCEDURE — 3E043XZ INTRODUCTION OF VASOPRESSOR INTO CENTRAL VEIN, PERCUTANEOUS APPROACH: ICD-10-PCS | Performed by: INTERNAL MEDICINE

## 2023-10-12 PROCEDURE — 250N000025 HC SEVOFLURANE, PER MIN: Performed by: SURGERY

## 2023-10-12 PROCEDURE — 250N000009 HC RX 250: Performed by: NURSE PRACTITIONER

## 2023-10-12 PROCEDURE — 360N000076 HC SURGERY LEVEL 3, PER MIN: Performed by: SURGERY

## 2023-10-12 PROCEDURE — 250N000009 HC RX 250: Performed by: INTERNAL MEDICINE

## 2023-10-12 PROCEDURE — 44603 SUTURE SMALL INTESTINE: CPT | Performed by: SURGERY

## 2023-10-12 PROCEDURE — 200N000001 HC R&B ICU

## 2023-10-12 PROCEDURE — 999N000185 HC STATISTIC TRANSPORT TIME EA 15 MIN

## 2023-10-12 PROCEDURE — 250N000011 HC RX IP 250 OP 636: Mod: JZ | Performed by: SURGERY

## 2023-10-12 PROCEDURE — 93010 ELECTROCARDIOGRAM REPORT: CPT | Performed by: INTERNAL MEDICINE

## 2023-10-12 PROCEDURE — 272N000001 HC OR GENERAL SUPPLY STERILE: Performed by: SURGERY

## 2023-10-12 PROCEDURE — 80048 BASIC METABOLIC PNL TOTAL CA: CPT | Performed by: INTERNAL MEDICINE

## 2023-10-12 PROCEDURE — 83735 ASSAY OF MAGNESIUM: CPT | Performed by: NURSE PRACTITIONER

## 2023-10-12 PROCEDURE — 258N000003 HC RX IP 258 OP 636: Performed by: SURGERY

## 2023-10-12 PROCEDURE — 250N000011 HC RX IP 250 OP 636: Mod: JZ | Performed by: NURSE ANESTHETIST, CERTIFIED REGISTERED

## 2023-10-12 PROCEDURE — 80053 COMPREHEN METABOLIC PANEL: CPT

## 2023-10-12 PROCEDURE — 85049 AUTOMATED PLATELET COUNT: CPT | Performed by: NURSE PRACTITIONER

## 2023-10-12 PROCEDURE — 99232 SBSQ HOSP IP/OBS MODERATE 35: CPT | Performed by: INTERNAL MEDICINE

## 2023-10-12 PROCEDURE — 5A1955Z RESPIRATORY VENTILATION, GREATER THAN 96 CONSECUTIVE HOURS: ICD-10-PCS | Performed by: SURGERY

## 2023-10-12 PROCEDURE — 87070 CULTURE OTHR SPECIMN AEROBIC: CPT | Performed by: SURGERY

## 2023-10-12 PROCEDURE — 87077 CULTURE AEROBIC IDENTIFY: CPT | Performed by: SURGERY

## 2023-10-12 PROCEDURE — 36556 INSERT NON-TUNNEL CV CATH: CPT | Performed by: STUDENT IN AN ORGANIZED HEALTH CARE EDUCATION/TRAINING PROGRAM

## 2023-10-12 PROCEDURE — P9047 ALBUMIN (HUMAN), 25%, 50ML: HCPCS | Performed by: INTERNAL MEDICINE

## 2023-10-12 PROCEDURE — 0WCG0ZZ EXTIRPATION OF MATTER FROM PERITONEAL CAVITY, OPEN APPROACH: ICD-10-PCS | Performed by: SURGERY

## 2023-10-12 PROCEDURE — 258N000003 HC RX IP 258 OP 636: Performed by: NURSE PRACTITIONER

## 2023-10-12 PROCEDURE — 36415 COLL VENOUS BLD VENIPUNCTURE: CPT

## 2023-10-12 PROCEDURE — 370N000017 HC ANESTHESIA TECHNICAL FEE, PER MIN: Performed by: SURGERY

## 2023-10-12 PROCEDURE — 250N000013 HC RX MED GY IP 250 OP 250 PS 637: Performed by: SURGERY

## 2023-10-12 PROCEDURE — 83605 ASSAY OF LACTIC ACID: CPT

## 2023-10-12 PROCEDURE — C9113 INJ PANTOPRAZOLE SODIUM, VIA: HCPCS | Mod: JZ | Performed by: NURSE PRACTITIONER

## 2023-10-12 PROCEDURE — 36600 WITHDRAWAL OF ARTERIAL BLOOD: CPT

## 2023-10-12 PROCEDURE — 258N000003 HC RX IP 258 OP 636: Performed by: NURSE ANESTHETIST, CERTIFIED REGISTERED

## 2023-10-12 PROCEDURE — 99291 CRITICAL CARE FIRST HOUR: CPT | Mod: 25 | Performed by: INTERNAL MEDICINE

## 2023-10-12 PROCEDURE — 82805 BLOOD GASES W/O2 SATURATION: CPT | Performed by: INTERNAL MEDICINE

## 2023-10-12 PROCEDURE — 999N000009 HC STATISTIC AIRWAY CARE

## 2023-10-12 PROCEDURE — 258N000003 HC RX IP 258 OP 636: Performed by: INTERNAL MEDICINE

## 2023-10-12 PROCEDURE — 250N000009 HC RX 250: Performed by: NURSE ANESTHETIST, CERTIFIED REGISTERED

## 2023-10-12 PROCEDURE — 94002 VENT MGMT INPAT INIT DAY: CPT

## 2023-10-12 PROCEDURE — 02HV33Z INSERTION OF INFUSION DEVICE INTO SUPERIOR VENA CAVA, PERCUTANEOUS APPROACH: ICD-10-PCS | Performed by: STUDENT IN AN ORGANIZED HEALTH CARE EDUCATION/TRAINING PROGRAM

## 2023-10-12 PROCEDURE — P9045 ALBUMIN (HUMAN), 5%, 250 ML: HCPCS | Mod: JZ | Performed by: INTERNAL MEDICINE

## 2023-10-12 PROCEDURE — 999N000157 HC STATISTIC RCP TIME EA 10 MIN

## 2023-10-12 PROCEDURE — 85027 COMPLETE CBC AUTOMATED: CPT | Performed by: SURGERY

## 2023-10-12 PROCEDURE — 99207 PR APP CREDIT; MD BILLING SHARED VISIT: CPT | Mod: AS | Performed by: NURSE PRACTITIONER

## 2023-10-12 PROCEDURE — 36620 INSERTION CATHETER ARTERY: CPT | Performed by: STUDENT IN AN ORGANIZED HEALTH CARE EDUCATION/TRAINING PROGRAM

## 2023-10-12 PROCEDURE — 0DJ04ZZ INSPECTION OF UPPER INTESTINAL TRACT, PERCUTANEOUS ENDOSCOPIC APPROACH: ICD-10-PCS | Performed by: SURGERY

## 2023-10-12 PROCEDURE — 999N000178 HC STATISTIC SUCTION SPUTUM

## 2023-10-12 PROCEDURE — 999N000055 HC STATISTIC END TITIAL CO2 MONITORING

## 2023-10-12 PROCEDURE — 93005 ELECTROCARDIOGRAM TRACING: CPT

## 2023-10-12 PROCEDURE — 250N000009 HC RX 250: Performed by: SURGERY

## 2023-10-12 PROCEDURE — 250N000013 HC RX MED GY IP 250 OP 250 PS 637: Performed by: NURSE PRACTITIONER

## 2023-10-12 PROCEDURE — 31500 INSERT EMERGENCY AIRWAY: CPT | Performed by: STUDENT IN AN ORGANIZED HEALTH CARE EDUCATION/TRAINING PROGRAM

## 2023-10-12 PROCEDURE — 250N000009 HC RX 250

## 2023-10-12 PROCEDURE — 250N000013 HC RX MED GY IP 250 OP 250 PS 637: Performed by: INTERNAL MEDICINE

## 2023-10-12 RX ORDER — SODIUM CHLORIDE 9 MG/ML
INJECTION, SOLUTION INTRAVENOUS CONTINUOUS
Status: DISCONTINUED | OUTPATIENT
Start: 2023-10-12 | End: 2023-10-13

## 2023-10-12 RX ORDER — DIPHENHYDRAMINE HCL 25 MG
25 CAPSULE ORAL EVERY 6 HOURS PRN
Status: DISCONTINUED | OUTPATIENT
Start: 2023-10-12 | End: 2023-10-30

## 2023-10-12 RX ORDER — NOREPINEPHRINE BITARTRATE 0.02 MG/ML
INJECTION, SOLUTION INTRAVENOUS
Status: COMPLETED
Start: 2023-10-12 | End: 2023-10-12

## 2023-10-12 RX ORDER — MAGNESIUM HYDROXIDE 1200 MG/15ML
LIQUID ORAL PRN
Status: DISCONTINUED | OUTPATIENT
Start: 2023-10-12 | End: 2023-10-12 | Stop reason: HOSPADM

## 2023-10-12 RX ORDER — ALBUTEROL SULFATE 0.83 MG/ML
2.5 SOLUTION RESPIRATORY (INHALATION)
Status: DISCONTINUED | OUTPATIENT
Start: 2023-10-12 | End: 2023-11-21 | Stop reason: HOSPADM

## 2023-10-12 RX ORDER — ONDANSETRON 2 MG/ML
INJECTION INTRAMUSCULAR; INTRAVENOUS PRN
Status: DISCONTINUED | OUTPATIENT
Start: 2023-10-12 | End: 2023-10-12

## 2023-10-12 RX ORDER — ONDANSETRON 2 MG/ML
4 INJECTION INTRAMUSCULAR; INTRAVENOUS EVERY 6 HOURS PRN
Status: DISCONTINUED | OUTPATIENT
Start: 2023-10-12 | End: 2023-10-12

## 2023-10-12 RX ORDER — SODIUM CHLORIDE, SODIUM LACTATE, POTASSIUM CHLORIDE, CALCIUM CHLORIDE 600; 310; 30; 20 MG/100ML; MG/100ML; MG/100ML; MG/100ML
INJECTION, SOLUTION INTRAVENOUS CONTINUOUS PRN
Status: DISCONTINUED | OUTPATIENT
Start: 2023-10-12 | End: 2023-10-12

## 2023-10-12 RX ORDER — FUROSEMIDE 10 MG/ML
80 INJECTION INTRAMUSCULAR; INTRAVENOUS ONCE
Status: COMPLETED | OUTPATIENT
Start: 2023-10-12 | End: 2023-10-12

## 2023-10-12 RX ORDER — AMOXICILLIN 250 MG
1 CAPSULE ORAL 2 TIMES DAILY
Status: DISCONTINUED | OUTPATIENT
Start: 2023-10-12 | End: 2023-10-12

## 2023-10-12 RX ORDER — CHLORHEXIDINE GLUCONATE ORAL RINSE 1.2 MG/ML
15 SOLUTION DENTAL EVERY 12 HOURS
Status: DISCONTINUED | OUTPATIENT
Start: 2023-10-12 | End: 2023-10-27

## 2023-10-12 RX ORDER — SODIUM CHLORIDE 9 MG/ML
INJECTION, SOLUTION INTRAVENOUS CONTINUOUS
Status: DISCONTINUED | OUTPATIENT
Start: 2023-10-12 | End: 2023-10-12

## 2023-10-12 RX ORDER — NOREPINEPHRINE BITARTRATE 0.02 MG/ML
INJECTION, SOLUTION INTRAVENOUS CONTINUOUS PRN
Status: DISCONTINUED | OUTPATIENT
Start: 2023-10-12 | End: 2023-10-12

## 2023-10-12 RX ORDER — DEXMEDETOMIDINE HYDROCHLORIDE 4 UG/ML
.1-1.2 INJECTION, SOLUTION INTRAVENOUS CONTINUOUS
Status: DISCONTINUED | OUTPATIENT
Start: 2023-10-12 | End: 2023-10-14

## 2023-10-12 RX ORDER — HYDROMORPHONE HYDROCHLORIDE 1 MG/ML
0.5 INJECTION, SOLUTION INTRAMUSCULAR; INTRAVENOUS; SUBCUTANEOUS
Status: DISCONTINUED | OUTPATIENT
Start: 2023-10-12 | End: 2023-10-22

## 2023-10-12 RX ORDER — ALBUMIN (HUMAN) 12.5 G/50ML
25 SOLUTION INTRAVENOUS ONCE
Status: COMPLETED | OUTPATIENT
Start: 2023-10-12 | End: 2023-10-12

## 2023-10-12 RX ORDER — INDOCYANINE GREEN AND WATER 25 MG
KIT INJECTION PRN
Status: DISCONTINUED | OUTPATIENT
Start: 2023-10-12 | End: 2023-10-12

## 2023-10-12 RX ORDER — ACETAMINOPHEN 325 MG/10.15ML
975 LIQUID ORAL EVERY 8 HOURS
Status: DISPENSED | OUTPATIENT
Start: 2023-10-12 | End: 2023-10-15

## 2023-10-12 RX ORDER — DEXTROSE MONOHYDRATE 25 G/50ML
50 INJECTION, SOLUTION INTRAVENOUS ONCE
Status: COMPLETED | OUTPATIENT
Start: 2023-10-12 | End: 2023-10-12

## 2023-10-12 RX ORDER — CEFEPIME HYDROCHLORIDE 2 G/1
2 INJECTION, POWDER, FOR SOLUTION INTRAVENOUS EVERY 24 HOURS
Status: DISCONTINUED | OUTPATIENT
Start: 2023-10-12 | End: 2023-10-13

## 2023-10-12 RX ORDER — TRAMADOL HYDROCHLORIDE 50 MG/1
50 TABLET ORAL EVERY 6 HOURS PRN
Status: DISCONTINUED | OUTPATIENT
Start: 2023-10-12 | End: 2023-10-22

## 2023-10-12 RX ORDER — ACETAMINOPHEN 650 MG/1
650 SUPPOSITORY RECTAL EVERY 4 HOURS PRN
Status: DISCONTINUED | OUTPATIENT
Start: 2023-10-12 | End: 2023-10-12

## 2023-10-12 RX ORDER — DIPHENHYDRAMINE HYDROCHLORIDE 50 MG/ML
25 INJECTION INTRAMUSCULAR; INTRAVENOUS EVERY 6 HOURS PRN
Status: DISCONTINUED | OUTPATIENT
Start: 2023-10-12 | End: 2023-10-28 | Stop reason: ALTCHOICE

## 2023-10-12 RX ORDER — POLYETHYLENE GLYCOL 3350 17 G/17G
17 POWDER, FOR SOLUTION ORAL DAILY
Status: DISCONTINUED | OUTPATIENT
Start: 2023-10-13 | End: 2023-10-27

## 2023-10-12 RX ORDER — HYDROMORPHONE HCL IN WATER/PF 6 MG/30 ML
0.2 PATIENT CONTROLLED ANALGESIA SYRINGE INTRAVENOUS
Status: DISCONTINUED | OUTPATIENT
Start: 2023-10-12 | End: 2023-10-22

## 2023-10-12 RX ORDER — ONDANSETRON 4 MG/1
4 TABLET, ORALLY DISINTEGRATING ORAL EVERY 6 HOURS PRN
Status: DISCONTINUED | OUTPATIENT
Start: 2023-10-12 | End: 2023-10-12

## 2023-10-12 RX ORDER — ACETAMINOPHEN 650 MG/1
650 SUPPOSITORY RECTAL EVERY 6 HOURS
Status: DISCONTINUED | OUTPATIENT
Start: 2023-10-12 | End: 2023-10-12

## 2023-10-12 RX ORDER — METRONIDAZOLE 500 MG/100ML
500 INJECTION, SOLUTION INTRAVENOUS EVERY 12 HOURS
Status: DISCONTINUED | OUTPATIENT
Start: 2023-10-12 | End: 2023-10-12

## 2023-10-12 RX ORDER — HEPARIN SODIUM 5000 [USP'U]/.5ML
5000 INJECTION, SOLUTION INTRAVENOUS; SUBCUTANEOUS EVERY 8 HOURS
Status: DISCONTINUED | OUTPATIENT
Start: 2023-10-13 | End: 2023-11-10

## 2023-10-12 RX ORDER — NOREPINEPHRINE BITARTRATE 0.02 MG/ML
INJECTION, SOLUTION INTRAVENOUS
Status: DISCONTINUED
Start: 2023-10-12 | End: 2023-10-13 | Stop reason: HOSPADM

## 2023-10-12 RX ORDER — DEXTROSE MONOHYDRATE 25 G/50ML
INJECTION, SOLUTION INTRAVENOUS
Status: COMPLETED
Start: 2023-10-12 | End: 2023-10-12

## 2023-10-12 RX ORDER — ACETAMINOPHEN 325 MG/1
650 TABLET ORAL EVERY 4 HOURS PRN
Status: DISCONTINUED | OUTPATIENT
Start: 2023-10-12 | End: 2023-11-21 | Stop reason: HOSPADM

## 2023-10-12 RX ORDER — NOREPINEPHRINE BITARTRATE 0.02 MG/ML
.01-.6 INJECTION, SOLUTION INTRAVENOUS CONTINUOUS
Status: DISCONTINUED | OUTPATIENT
Start: 2023-10-12 | End: 2023-10-27

## 2023-10-12 RX ORDER — SODIUM CHLORIDE, SODIUM LACTATE, POTASSIUM CHLORIDE, AND CALCIUM CHLORIDE .6; .31; .03; .02 G/100ML; G/100ML; G/100ML; G/100ML
IRRIGANT IRRIGATION PRN
Status: DISCONTINUED | OUTPATIENT
Start: 2023-10-12 | End: 2023-10-14

## 2023-10-12 RX ORDER — ACETAMINOPHEN 650 MG/1
SUPPOSITORY RECTAL
Status: COMPLETED
Start: 2023-10-12 | End: 2023-10-12

## 2023-10-12 RX ORDER — ACETAMINOPHEN 650 MG/1
650 SUPPOSITORY RECTAL EVERY 4 HOURS PRN
Status: DISCONTINUED | OUTPATIENT
Start: 2023-10-12 | End: 2023-11-21 | Stop reason: HOSPADM

## 2023-10-12 RX ORDER — DEXAMETHASONE SODIUM PHOSPHATE 10 MG/ML
INJECTION, SOLUTION INTRAMUSCULAR; INTRAVENOUS PRN
Status: DISCONTINUED | OUTPATIENT
Start: 2023-10-12 | End: 2023-10-12

## 2023-10-12 RX ORDER — PROCHLORPERAZINE MALEATE 10 MG
10 TABLET ORAL EVERY 6 HOURS PRN
Status: DISCONTINUED | OUTPATIENT
Start: 2023-10-12 | End: 2023-10-12

## 2023-10-12 RX ADMIN — SODIUM BICARBONATE: 84 INJECTION, SOLUTION INTRAVENOUS at 16:36

## 2023-10-12 RX ADMIN — HYDROMORPHONE HYDROCHLORIDE 0.5 MG: 1 INJECTION, SOLUTION INTRAMUSCULAR; INTRAVENOUS; SUBCUTANEOUS at 14:17

## 2023-10-12 RX ADMIN — MICAFUNGIN SODIUM 100 MG: 50 INJECTION, POWDER, LYOPHILIZED, FOR SOLUTION INTRAVENOUS at 23:45

## 2023-10-12 RX ADMIN — NOREPINEPHRINE BITARTRATE 0.04 MCG/KG/MIN: 0.02 INJECTION, SOLUTION INTRAVENOUS at 23:32

## 2023-10-12 RX ADMIN — METRONIDAZOLE 500 MG: 500 INJECTION, SOLUTION INTRAVENOUS at 12:39

## 2023-10-12 RX ADMIN — ACETAMINOPHEN 650 MG: 650 SUPPOSITORY RECTAL at 17:14

## 2023-10-12 RX ADMIN — HYOSCYAMINE SULFATE 125 MCG: 0.12 TABLET SUBLINGUAL at 04:28

## 2023-10-12 RX ADMIN — ONDANSETRON 2 MG: 2 INJECTION INTRAMUSCULAR; INTRAVENOUS at 12:42

## 2023-10-12 RX ADMIN — PHENYLEPHRINE HYDROCHLORIDE 200 MCG: 10 INJECTION INTRAVENOUS at 15:16

## 2023-10-12 RX ADMIN — ACETAMINOPHEN 1000 MG: 10 INJECTION, SOLUTION INTRAVENOUS at 09:13

## 2023-10-12 RX ADMIN — NALOXONE HYDROCHLORIDE 0.4 MG: 0.4 INJECTION, SOLUTION INTRAMUSCULAR; INTRAVENOUS; SUBCUTANEOUS at 09:40

## 2023-10-12 RX ADMIN — ROCURONIUM BROMIDE 50 MG: 50 INJECTION, SOLUTION INTRAVENOUS at 12:57

## 2023-10-12 RX ADMIN — DEXAMETHASONE SODIUM PHOSPHATE 4 MG: 10 INJECTION, SOLUTION INTRAMUSCULAR; INTRAVENOUS at 12:42

## 2023-10-12 RX ADMIN — PHENYLEPHRINE HYDROCHLORIDE 100 MCG: 10 INJECTION INTRAVENOUS at 13:03

## 2023-10-12 RX ADMIN — SODIUM CHLORIDE: 9 INJECTION, SOLUTION INTRAVENOUS at 11:00

## 2023-10-12 RX ADMIN — DEXMEDETOMIDINE HYDROCHLORIDE 0.2 MCG/KG/HR: 400 INJECTION INTRAVENOUS at 11:04

## 2023-10-12 RX ADMIN — ROCURONIUM BROMIDE 50 MG: 50 INJECTION, SOLUTION INTRAVENOUS at 12:28

## 2023-10-12 RX ADMIN — DEXTROSE MONOHYDRATE 50 ML: 25 INJECTION, SOLUTION INTRAVENOUS at 09:39

## 2023-10-12 RX ADMIN — Medication 50 MCG/HR: at 11:18

## 2023-10-12 RX ADMIN — INDOCYANINE GREEN AND WATER 7.5 MG: KIT at 13:11

## 2023-10-12 RX ADMIN — NOREPINEPHRINE BITARTRATE 0.1 MCG/KG/MIN: 0.02 INJECTION, SOLUTION INTRAVENOUS at 16:17

## 2023-10-12 RX ADMIN — HYOSCYAMINE SULFATE 125 MCG: 0.12 TABLET SUBLINGUAL at 01:02

## 2023-10-12 RX ADMIN — PHENYLEPHRINE HYDROCHLORIDE 50 MCG: 10 INJECTION INTRAVENOUS at 14:56

## 2023-10-12 RX ADMIN — DEXMEDETOMIDINE HYDROCHLORIDE 0.6 MCG/KG/HR: 400 INJECTION INTRAVENOUS at 20:14

## 2023-10-12 RX ADMIN — ACETAMINOPHEN 650 MG: 650 SUPPOSITORY RECTAL at 20:21

## 2023-10-12 RX ADMIN — NALOXONE HYDROCHLORIDE 0.4 MG: 0.4 INJECTION, SOLUTION INTRAMUSCULAR; INTRAVENOUS; SUBCUTANEOUS at 09:38

## 2023-10-12 RX ADMIN — Medication 0.1 MCG/KG/MIN: at 12:14

## 2023-10-12 RX ADMIN — VANCOMYCIN HYDROCHLORIDE 2250 MG: 1 INJECTION, POWDER, LYOPHILIZED, FOR SOLUTION INTRAVENOUS at 12:58

## 2023-10-12 RX ADMIN — SODIUM CHLORIDE 2000 ML: 9 INJECTION, SOLUTION INTRAVENOUS at 11:59

## 2023-10-12 RX ADMIN — SODIUM CHLORIDE, POTASSIUM CHLORIDE, SODIUM LACTATE AND CALCIUM CHLORIDE: 600; 310; 30; 20 INJECTION, SOLUTION INTRAVENOUS at 12:14

## 2023-10-12 RX ADMIN — HYDROMORPHONE HYDROCHLORIDE 0.5 MG: 1 INJECTION, SOLUTION INTRAMUSCULAR; INTRAVENOUS; SUBCUTANEOUS at 14:04

## 2023-10-12 RX ADMIN — NOREPINEPHRINE BITARTRATE 4000 MCG: 0.02 INJECTION, SOLUTION INTRAVENOUS at 11:00

## 2023-10-12 RX ADMIN — FUROSEMIDE 80 MG: 10 INJECTION, SOLUTION INTRAMUSCULAR; INTRAVENOUS at 09:14

## 2023-10-12 RX ADMIN — ACETAMINOPHEN 1000 MG: 10 INJECTION, SOLUTION INTRAVENOUS at 02:14

## 2023-10-12 RX ADMIN — CEFEPIME HYDROCHLORIDE 2 G: 2 INJECTION, POWDER, FOR SOLUTION INTRAVENOUS at 11:30

## 2023-10-12 RX ADMIN — TRAMADOL HYDROCHLORIDE 100 MG: 50 TABLET, COATED ORAL at 01:17

## 2023-10-12 RX ADMIN — PANTOPRAZOLE SODIUM 40 MG: 40 INJECTION, POWDER, FOR SOLUTION INTRAVENOUS at 11:55

## 2023-10-12 RX ADMIN — SODIUM CHLORIDE: 9 INJECTION, SOLUTION INTRAVENOUS at 16:28

## 2023-10-12 RX ADMIN — HYDROMORPHONE HYDROCHLORIDE 0.5 MG: 1 INJECTION, SOLUTION INTRAMUSCULAR; INTRAVENOUS; SUBCUTANEOUS at 14:43

## 2023-10-12 RX ADMIN — LORAZEPAM 1 MG: 2 INJECTION INTRAMUSCULAR; INTRAVENOUS at 02:45

## 2023-10-12 RX ADMIN — ONDANSETRON 2 MG: 2 INJECTION INTRAMUSCULAR; INTRAVENOUS at 12:48

## 2023-10-12 RX ADMIN — SODIUM CHLORIDE: 9 INJECTION, SOLUTION INTRAVENOUS at 07:21

## 2023-10-12 RX ADMIN — ALBUMIN HUMAN 25 G: 0.05 INJECTION, SOLUTION INTRAVENOUS at 16:16

## 2023-10-12 RX ADMIN — Medication 25 MCG: at 23:20

## 2023-10-12 RX ADMIN — LORAZEPAM 1 MG: 2 INJECTION INTRAMUSCULAR; INTRAVENOUS at 23:28

## 2023-10-12 RX ADMIN — PHENYLEPHRINE HYDROCHLORIDE 200 MCG: 10 INJECTION INTRAVENOUS at 15:13

## 2023-10-12 RX ADMIN — Medication 25 MCG: at 20:30

## 2023-10-12 RX ADMIN — ALBUMIN HUMAN 25 G: 0.25 SOLUTION INTRAVENOUS at 09:13

## 2023-10-12 RX ADMIN — CHLORHEXIDINE GLUCONATE 15 ML: 1.2 RINSE ORAL at 21:03

## 2023-10-12 ASSESSMENT — ACTIVITIES OF DAILY LIVING (ADL)
ADLS_ACUITY_SCORE: 19
ADLS_ACUITY_SCORE: 19
ADLS_ACUITY_SCORE: 24
ADLS_ACUITY_SCORE: 19

## 2023-10-12 NOTE — ANESTHESIA PREPROCEDURE EVALUATION
Anesthesia Pre-Procedure Evaluation    Patient: Mojgan Jimenez   MRN: [id number] : 1970        Procedure : Procedure(s):  GASTRECTOMY, SLEEVE, LAPAROSCOPIC, WITH SINGLE ANASTAMOSIS DUODENAL SWITCH          Past Medical History:   Diagnosis Date    LINA (generalized anxiety disorder) 2017    Hyperlipidemia LDL goal <160 2019    Major depressive disorder     Methanol abuse (H)     Mild persistent asthma without complication 2017    Obese     JARVIS on CPAP     Cpap not working    Paranoia (H)     resolved due to drugs    Vitamin D deficiency 2017      Past Surgical History:   Procedure Laterality Date    GASTRECTOMY, SLEEVE, LAPAROSCOPIC, WITH DUODENAL SWITCH N/A 10/9/2023    Procedure: GASTRECTOMY, SLEEVE, LAPAROSCOPIC, WITH SINGLE ANASTAMOSIS DUODENAL SWITCH;  Surgeon: Hoang Worthy MD;  Location: [facility] OR    MAMMOPLASTY REDUCTION      reduction      No Known Allergies   Social History     Tobacco Use    Smoking status: Never    Smokeless tobacco: Never   Substance Use Topics    Alcohol use: Not Currently     Comment: Sober x 8 months      Wt Readings from Last 1 Encounters:   10/09/23 130.7 kg (288 lb 3.2 oz)        Anesthesia Evaluation   Pt has had prior anesthetic.     No history of anesthetic complications       ROS/MED HX  ENT/Pulmonary:     (+) sleep apnea, moderate, uses CPAP,                  Intermittent, asthma                  Neurologic:  - neg neurologic ROS     Cardiovascular: Comment: hypotensive      METS/Exercise Tolerance:     Hematologic:       Musculoskeletal:  - neg musculoskeletal ROS     GI/Hepatic: Comment: Hx bariatric surgery 10/9/23      Renal/Genitourinary:       Endo:     (+)               Obesity,       Psychiatric/Substance Use:  - neg psychiatric ROS     Infectious Disease:       Malignancy:       Other:            Physical Exam    Airway   unable to assess          Respiratory Devices and Support    ETT:  FiO2: 60; %     Dental      "      Cardiovascular       Comment: Tachycardia since admission 120 - 130   Rhythm and rate: tachycardia     Pulmonary           breath sounds clear to auscultation           OUTSIDE LABS:  CBC:   Lab Results   Component Value Date    WBC 4.0 10/12/2023    WBC 13.1 (H) 10/11/2023    HGB 12.8 10/12/2023    HGB 13.2 10/11/2023    HCT 39.8 10/12/2023    HCT 41.7 10/11/2023     10/12/2023     10/11/2023     BMP:   Lab Results   Component Value Date     (L) 10/12/2023     10/11/2023    POTASSIUM 4.1 10/12/2023    POTASSIUM 4.4 10/11/2023    CHLORIDE 101 10/12/2023    CHLORIDE 99 10/11/2023    CO2 18 (L) 10/12/2023    CO2 19 (L) 10/11/2023    BUN 50.4 (H) 10/12/2023    BUN 41.0 (H) 10/11/2023    CR 5.66 (H) 10/12/2023    CR 4.59 (H) 10/11/2023     (H) 10/12/2023    GLC 87 10/12/2023     COAGS: No results found for: \"PTT\", \"INR\", \"FIBR\"  POC:   Lab Results   Component Value Date    HCG Negative 04/03/2018    HCGS Negative 03/21/2018     HEPATIC:   Lab Results   Component Value Date    ALBUMIN 2.8 (L) 10/12/2023    PROTTOTAL 6.5 10/12/2023     (H) 10/12/2023    AST 97 (H) 10/12/2023    ALKPHOS 87 10/12/2023    BILITOTAL 0.5 10/12/2023    EMIL 51 10/12/2023     OTHER:   Lab Results   Component Value Date    LACT 1.9 10/12/2023    A1C 5.7 (H) 05/17/2022    PALAK 9.5 10/12/2023    MAG 2.2 10/12/2023    TSH 2.56 09/15/2022    T4 0.82 12/03/2018       Anesthesia Plan    ASA Status:  4, emergent       Anesthesia Type: General.     - Airway: ETT   Induction: Inhalation.   Maintenance: Inhalation.   Techniques and Equipment:     - Lines/Monitors: Arterial Line, Central Line, CVP     Consents            Postoperative Care    Pain management: Multi-modal analgesia.        Comments:    Other Comments:     Will take back to ICU intubated and sedated    Patient currently on fentanyl and precedex gtt for sedation.  Levophed gtt at 0.1 mcg/kg/min.  Received a total of 0.8 of narcan today.       "            Vibha Goins MD

## 2023-10-12 NOTE — PROCEDURES
Procedure: Ultrasound guided  arterial line placement    Indication: Blood pressure monitoring for hemodynamic instability / shock  Consent: Emergent   Performed by: AUNG To MD  Medications: 5 mL Lidocaine 1%    Universal Protocol: Patient Identification was verified. Time out was performed. Site was identified. The skin overlying the artery was sterilized with Chlorhexidine. Sterile barrier precautions were utilized: hands washed, gloves, sterile drape, and eye protection was applied.     Summary: The left radial artery was palpated and visualized on ultrasound.  The artery was successfully cannulated on the first pass. Pulsatile, arterial blood was visualized and the artery was then threaded with a guidewire using the Seldinger technique.  The needle was removed and ultrasound visualized the wire in the artery. A catheter was threaded over the wire. The wire was removed. The catheter was sutured into place. Adequate wave-form was observed. The patient tolerated the procedure well without immediate complications. Minimal estimated blood loss. The area was cleansed and a sterile dressing was applied.     AUNG To MD  Critical Care Medicine

## 2023-10-12 NOTE — PROGRESS NOTES
Patient was intubated at 1020 with 1 attempt. ETT 7.5 secured at 22 cm at the teeth. ETT placement confirmed with chest rise, ETCO2 positive color change, and equal bilaterally breath sounds. CXR to follow.    Peggy Whittaker, RT

## 2023-10-12 NOTE — PLAN OF CARE
Shift from  to -  Goal Outcome Evaluation:      Care Plan Progress Note:    Name: Mojgan Jimenez  :   1970  MRN:   [id number]    Problem: Bariatric Procedure  Goal: Prevent post-op bariatric complications.  Appropriate oral intake.  Discharge needs are met.  Intervention:   Post Op Day 0/1 (progress as patient tolerates)   -Notify MD of excessive nausea   -NPO per MD orders; read exceptions to the order   -Toothette with 1/2 inch warm water at bedside until able to take PO   -Do not give ice chips, straws, or carbonation    -Whenever drinking liquids, patient must be upright with both feet on the floor   -No more than 30mL per sip   -All liquids must be at approximately room temperature (no extreme temperatures).   -After 2 hours of 30mL PO q30min, you may take 30mL as tolerated and advance to a clear liquid diet    -No oral pills until after the patient tolerates the 2 hours of 30mL sips (exceptions: sublingual medications can be given anytime; liquid gabapentin can be given after 1 hours of sips)   -Strict intake and output   -Bladder scan every 4 hours until voiding freely   -If tolerating sips, start bariatric clear liquid diet   -If tolerating bariatric clears, advance to a bariatric full liquid diet  Discharge Planning   -Educate patient about pill size   -Patient should take no pill greater than 1/4 inch   -Send pill cutter home with patient   -Nurse to review home discharge instructions  Outcome:    Shift from  to -  Intake:360 ml po  Output: zero; updated renal and surgery  Bladder Scan: 34ml; updated renal and surgery  Pain: 2/10  Incision: clean, scant drainage  Nausea: none  Activity: walked 5 times today independ.  Incentive Spirometry: 500  Abdominal Binder: in place    Echo Anguiano RN  10/11/2023  7:14 PM

## 2023-10-12 NOTE — PROGRESS NOTES
Pt is alert and only oriented to self. Pt having increased hallucinations. These consist of seeing and hearing people in her room. Speech is mostly incoherent. House officer notified and evaluated pt for worsening altered mental status.

## 2023-10-12 NOTE — PROGRESS NOTES
10/12/23 0036   Home O2/Equipment Set-Up   Home O2 Device CPAP   Pt Owned Device Yes;Clean;Functional;Pt. Demonstrates Use

## 2023-10-12 NOTE — ANESTHESIA POSTPROCEDURE EVALUATION
Patient: Mojgan Jimenez    Procedure: Procedure(s):  LAPAROSCOPY, LAPAROTOMY, CLOSURE OF TWO SMALL BOWEL INTEROTOMIES, DRAIN PLACEMENT, INTRA-ABDOMINAL CLEAN OUT       Anesthesia Type:  General    Note:  Disposition: Inpatient   Postop Pain Control: Uneventful            Sign Out: Well controlled pain   PONV: No   Neuro/Psych: Uneventful            Sign Out: Acceptable/Baseline neuro status   Airway/Respiratory: Uneventful            Sign Out: AIRWAY IN SITU/Resp. Support               Airway in situ/Resp. Support: ETT   CV/Hemodynamics: Uneventful            Sign Out: Acceptable CV status; No obvious hypovolemia; No obvious fluid overload   Other NRE:    DID A NON-ROUTINE EVENT OCCUR?            Last vitals:  Vitals Value Taken Time   /53 10/12/23 1533   Temp 39.7  C (103.46  F) 10/12/23 1701   Pulse 114 10/12/23 1701   Resp 26 10/12/23 1701   SpO2 99 % 10/12/23 1701   Vitals shown include unfiled device data.    Electronically Signed By: David Clark MD  October 12, 2023  5:03 PM

## 2023-10-12 NOTE — PROCEDURES
Procedure: Ultrasound guided central venous catheter placement: triple lumen     Indication: Central venous access for administration of vasoactive medications due to shock  Consent: Emergent   Performed by: AUNG To MD  Medications: 5 mL Lidocaine 1%    Universal Protocol: Patient Identification was verified. Time out was performed. Site was identified. The skin overlying the vein was sterilized with Chlorhexidine. Sterile barrier precautions were utilized: hands washed, sterile gloves, gown, drape, bouffant cap, and eye protection were applied.     Summary: The left internal jugular vein was visualized on ultrasound. The vein was successfully cannulated on the first pass. Dark, low flow venous blood was visualized and the vein was then threaded with a guidewire using the Seldinger technique.  The needle was removed and ultrasound visualized the wire in the vein. A dilatator was threaded over the wire followed by the catheter. The wire was removed. The catheter was sutured into place. Blood returned through all ports & flushed. The patient tolerated the procedure well without immediate complications. Minimal estimated blood loss. The area was cleansed and a sterile dressing was applied.      AUNG To MD  Critical Care Medicine

## 2023-10-12 NOTE — PROGRESS NOTES
Updated Dr. Worthy and at bedside on change in LOC and delirium, increased liver enzymes and increased CR, BG 71; Elevated ammonia. CR elevated to 5.66; BG 71. No hospitalist consult at this time.

## 2023-10-12 NOTE — PROGRESS NOTES
Patient was transported to OR via parapac ventilator without complications.     Peggy Whittaker, RT

## 2023-10-12 NOTE — PROCEDURES
"Procedure: Rapid sequence orotracheal intubation with video laryngoscopy     Indication: Acute hypoxemic respiratory failure; Inability to protect airway; Increase metabolic load     Consent: Emergent   Performed by: AUNG To MD  Induction agent: 15 mg Etomidate   Neuromuscular blocking agent: 100 mg Rocuronium     Universal Protocol: Hand hygiene completed and appropriate PPE was donned.    Preoxygenation: bag-mask ventilation     Device used: CMAC 4 blade, tracheal tube introducer (\"bougie\")   Number of attempts: 1  Ventilation between attempts: Not applicable  Time to confirmed intubation: 0-30 seconds  Tube size: 7.5 mm   Tube Secured at:  22 at the teeth, deep on CXR, pulled back to 20 cm at the teeth    Post-intubation assessment: CO2 detector; Breath sounds; Chest rise; Tube fog, Chest X-ray   Cormack-Lehane grade (I-IV): Grade I - full view of the glottic aperture   Dentition: Unchanged     Post procedure CXR: Endotracheal tube was low & repositioned without follow up CXR      Complications:  No apparent complications at this time    AUNG To MD  Critical Care Medicine  "

## 2023-10-12 NOTE — PHARMACY-VANCOMYCIN DOSING SERVICE
Pharmacy Vancomycin Initial Note  Date of Service 2023  Patient's  1970  53 year old, female    Indication: Sepsis    Current estimated CrCl = Estimated Creatinine Clearance: 15.2 mL/min (A) (based on SCr of 5.66 mg/dL (H)).    Creatinine for last 3 days  10/11/2023:  6:34 AM Creatinine 3.75 mg/dL;  3:40 PM Creatinine 4.59 mg/dL  10/12/2023:  3:15 AM Creatinine 5.66 mg/dL    Recent Vancomycin Level(s) for last 3 days  No results found for requested labs within last 3 days.      Vancomycin IV Administrations (past 72 hours)        No vancomycin orders with administrations in past 72 hours.                    Nephrotoxins and other renal medications (From now, onward)      Start     Dose/Rate Route Frequency Ordered Stop    10/12/23 1230  vancomycin (VANCOCIN) 2,250 mg in sodium chloride 0.9 % 500 mL intermittent infusion         2,250 mg  over 120 Minutes Intravenous ONCE 10/12/23 1101      10/12/23 1102  vancomycin place alegria - receiving intermittent dosing         1 each Intravenous SEE ADMIN INSTRUCTIONS 10/12/23 1102      10/12/23 1030  vasopressin (VASOSTRICT) 20 Units in sodium chloride 0.9 % 100 mL standard conc infusion         2.4 Units/hr  12 mL/hr  Intravenous CONTINUOUS 10/12/23 1028              Contrast Orders - past 72 hours (72h ago, onward)      Start     Dose/Rate Route Frequency Stop    10/10/23 1630  diatrizoate meglumine-sodium (GASTROGRAFIN/GASTROVIEW) 66-10 % solution 120 mL         120 mL Oral ONCE 10/10/23 1628          Plan:  Start vancomycin  2250 mg IV once. 17.2 mg/kg actual  Vancomycin monitoring method: Trough (Method 2 = manual dose calculation) unless Scr improves then change over to AUC  Vancomycin therapeutic monitoring goal: 15-20 mg/L  Pharmacy will check vancomycin levels as appropriate in 1-3 Days.    BMP q12h already ordered.    Rebecca West, [facility]

## 2023-10-12 NOTE — PROGRESS NOTES
[facility] - ICU    RN Progress Note:            Pertinent Assessments:      Please refer to flowsheet rows for full assessment     -Sedated with Fentanyl and Precedex for RASS -2 TO -3.  - Levophed at 0.08 to maintain MAP > 65. Art line positional.   - NG to low int suction.   - Temp 103 MD aware,Tylenol suppository given and placed ICE pack.   - Low urine output.  - PH normalized after vent setting changed to AC 26/420/5/40% Fio2.              Key Events - This Shift:   Emergent Surgical intervention and requiring vent support.         DANIEL SAT (Sedation Awakening Trial): For use ONLY if intubated    SAT Safety Screen Pt intubated today    If FAILED why?    SAT Performed    If FAILED why?               Barriers to Discharge / Downgrade:     Intubated and sedated.          Point of Contact Update YES-OR-NO: Yes  If No, reason:   Name:Ernestine  Phone Number:[phone]  Summary of Conversation: update current care plan.

## 2023-10-12 NOTE — PLAN OF CARE
Problem: Bariatric Surgery  Goal: Effective Urinary Elimination  Outcome: Not Progressing     Problem: Risk for Delirium  Goal: Improved Behavioral Control  Outcome: Not Progressing  Goal: Improved Attention and Thought Clarity  Outcome: Not Progressing  Goal: Improved Sleep  Outcome: Not Progressing     Problem: Bariatric Surgery  Goal: Optimal Pain Control and Function  Outcome: Progressing  Goal: Nausea and Vomiting Relief  Outcome: Progressing  Goal: Effective Oxygenation and Ventilation  Outcome: Progressing  Intervention: Optimize Oxygenation and Ventilation  Recent Flowsheet Documentation  Taken 10/12/2023 0100 by Orlando Rudolph RN  Head of Bed (HOB) Positioning: HOB at 30 degrees   Goal Outcome Evaluation:       Vital signs stable with heart rate consistently tachycardic. Pt on 2 L via NC. Pt reported abdominal pain at a 7. PRN PO tramadol given. Pt is alert and oriented only to self. Pt exhibiting continuous confusion. Pt is hallucinating. Pt is seeing and hearing people in her room. Pt speech is incoherent most of the time. Pt appears to be very anxious at all times. Pt given PRN IV ativan. Pt able to sleep intermittently following administration of ativan. House officer notified and provided an update on pt change in neurological condition. See provider note. Liver enzymes and creatinine continually increasing. See results review for exact values. Pt NPO since midnight. Increased visualization of pt. Bed alarm on for increased safety.   Orlando Rudolph RN

## 2023-10-12 NOTE — ANESTHESIA CARE TRANSFER NOTE
Patient: Mojgan Jimenez    Procedure: Procedure(s):  LAPAROSCOPY, LAPAROTOMY, CLOSURE OF TWO SMALL BOWEL INTEROTOMIES, DRAIN PLACEMENT, INTRA-ABDOMINAL CLEAN OUT       Diagnosis: Status post gastric surgery [Z98.890]  Diagnosis Additional Information: No value filed.    Anesthesia Type:   General     Note:    Oropharynx: ventilatory support, endotracheal tube in place and nasal gatric tube in place  Level of Consciousness: iatrogenic sedation and unresponsive  Patient oxygen source: ETT/VEnt.    Independent Airway: airway patency not satisfactory and stable  Dentition: dentition unchanged  Vital Signs Stable: post-procedure vital signs reviewed and stable  Report to RN Given: handoff report given  Patient transferred to: ICU    ICU Handoff: Call for PAUSE to initiate/utilize ICU HANDOFF, Identified Patient, Identified Responsible Provider, Reviewed the Pertinent Medical History, Discussed Surgical Course, Reviewed Intra-OP Anesthesia Management and Issues during Anesthesia, Set Expectations for Post Procedure Period and Allowed Opportunity for Questions and Acknowledgement of Understanding      Vitals:  Vitals Value Taken Time   /53 10/12/23 1533   Temp     Pulse 115 10/12/23 1542   Resp 20 10/12/23 1542   SpO2 97 % 10/12/23 1542   Vitals shown include unfiled device data.    Pt transported to ICU on monitor and bag ventilated by CRNA, HANNAH, Circulator. VSS throughout with all LDA's intact and functioning on arrival. Report given to RN with all questions answered.     Electronically Signed By: JUSTINE Arriaga CRNA  October 12, 2023  3:44 PM

## 2023-10-12 NOTE — CONSULTS
"Critical Care consult note      10/12/2023    Name: Mojgan Jimenez MRN#: [id number]   Age: 53 year old YOB: 1970                    Problem List:   Principal Problem:    Morbid (severe) obesity due to excess calories (H)    Clinically Significant Risk Factors           # Hypercalcemia: Highest Ca = 10.2 mg/dL in last 2 days, will monitor as appropriate    # Hypoalbuminemia: Lowest albumin = 2.8 g/dL at 10/12/2023  3:15 AM, will monitor as appropriate    # Acute Kidney Injury, unspecified: based on a >150% or 0.3 mg/dL increase in last creatinine compared to past 90 day average, will monitor renal function         # Severe Obesity: Estimated body mass index is 51.05 kg/m  as calculated from the following:    Height as of this encounter: 1.6 m (5' 3\").    Weight as of this encounter: 130.7 kg (288 lb 3.2 oz)., PRESENT ON ADMISSION     # Asthma: noted on problem list                   HPI:     53-year-old female with history of obesity, JARVIS on CPAP, asthma, depression and methamphetamine dependence in remission.  Underwent laparoscopic sleeve with single anastomosis duodenal switch on 10/9.  Doing okay on the floor initially but yesterday started developing worsening renal failure and abdominal pain.  Overnight developed delirium.  This morning.  Was felt that she might be having an anastomotic leak and she was fairly tachypneic and febrile.  Transferred to the ICU where she was emergently intubated.  Started on vasopressors and taken back to the OR where she is right now.      Assessment and plan :       I have personally reviewed the labs, imaging studies, cultures and discussed the case with referring physician and consulting physicians.     My assessment and plan by system for this patient is as follows:    Neurology/Psychiatry:   Underlying history of depression and methamphetamine use in remission  Currently we will hold all oral meds    Sedated with Precedex and fentanyl    Prior to intubation " encephalopathic secondary to toxic metabolic encephalopathy      Cardiovascular:   Septic shock.  Received 2 L of saline in the ICU.  Continue aggressive fluid resuscitation.  Levophed at 0.1 mcg/kg/min prior to going to the OR.  Use vasopressin if nor epi needs are escalating      Pulmonary/Ventilator Management:   Acute hypoxic respiratory failure secondary to sepsis.  History of JARVIS secondary to obesity on CPAP at home    Intubated in the ICU.  Tidal volume 420 mL which is 8mL/kg, rate of 20, PEEP of 5.  Adjust FiO2.  ABG pending.  Chest x-ray showed ET tube to be a little bit deep and that was adjusted to 20 cm at the teeth      GI and Nutrition :   Underwent gastrectomy, sleeve and single anastomosis duodenal switch  Now concern for anastomotic leak.  In the OR.    Broad-spectrum antibiotics  Slight elevation in LFTs.    Renal/Fluids/Electrolytes:   Acute renal failure, multifactorial.  Nephrology following.  Needs aggressive fluid resuscitation.  I do not see any indication for hemodialysis at this point.    - monitor function and electrolytes as needed with replacement per ICU protocols. - generally avoid nephrotoxic agents such as NSAID, IV contrast unless specifically required  - adjust medications as needed for renal clearance  - follow I/O's as appropriate.    Infectious Disease:   Concern for anastomotic leak.  Started on vancomycin, cefepime, Flagyl and micafungin  Follow cultures    Endocrine:   Blood glucose was actually on the low side and she received D50.  Not a diabetic at baseline.  Plan  - ICU insulin protocol, goal sugar <180      ICU Prophylaxis:   1. DVT: LMWH  2. VAP: HOB 30 degrees, chlorhexidine rinse  3. Stress Ulcer: PPI  4. Restraints: Nonviolent soft two point restraints required and necessary for patient safety and continued cares and good effect as patient continues to pull at necessary lines, tubes despite education and distraction. Will readdress daily.     Category: Non-violent  "  Type of Restraint: Soft limb x2.   Behavior: Pulling at IVand arevalo catheter tubings.   Root cause of behavior: Critical illness.   Less-restrictive methods that have failed: Redirection, reorientation. 1:1 NA at bedside.   Response to restraint: Not actively pulling atcurrent restraints.   Criteria for release from restraint: Responds to redirection. Leaves medical devices in place.              Medical History:     Past Medical History:   Diagnosis Date    LINA (generalized anxiety disorder) 11/02/2017    Hyperlipidemia LDL goal <160 01/20/2019    Major depressive disorder     Methanol abuse (H)     Mild persistent asthma without complication 11/02/2017    Obese     JARVIS on CPAP     Cpap not working    Paranoia (H)     resolved due to drugs    Vitamin D deficiency 11/02/2017     Past Surgical History:   Procedure Laterality Date    GASTRECTOMY, SLEEVE, LAPAROSCOPIC, WITH DUODENAL SWITCH N/A 10/9/2023    Procedure: GASTRECTOMY, SLEEVE, LAPAROSCOPIC, WITH SINGLE ANASTAMOSIS DUODENAL SWITCH;  Surgeon: Hoang Worthy MD;  Location: [facility] OR    MAMMOPLASTY REDUCTION      reduction     Social History     Socioeconomic History    Marital status: Single     Spouse name: Not on file    Number of children: Not on file    Years of education: Not on file    Highest education level: Not on file   Occupational History    Not on file   Tobacco Use    Smoking status: Never    Smokeless tobacco: Never   Vaping Use    Vaping Use: Never used   Substance and Sexual Activity    Alcohol use: Not Currently     Comment: Sober x 8 months    Drug use: Not Currently     Types: Methamphetamines, \"Crack\" cocaine     Comment: in remision     Sexual activity: Not Currently     Partners: Male   Other Topics Concern    Parent/sibling w/ CABG, MI or angioplasty before 65F 55M? No   Social History Narrative    Not on file     Social Determinants of Health     Financial Resource Strain: Not on file   Food Insecurity: Not on file "   Transportation Needs: Not on file   Physical Activity: Not on file   Stress: Not on file   Social Connections: Not on file   Interpersonal Safety: Not on file   Housing Stability: Not on file      No Known Allergies           Key Medications:      [Auto Hold] ceFEPIme  2 g Intravenous Q24H    [Held by provider] cetirizine  10 mg Oral Daily    [Held by provider] childrens multivitamin with iron  1 tablet Oral BID    [Auto Hold] chlorhexidine  15 mL Mouth/Throat Q12H    [Held by provider] cyanocobalamin  1,000 mcg Sublingual Daily    [Auto Hold] enoxaparin ANTICOAGULANT  40 mg Subcutaneous Q24H    [Auto Hold] fluticasone-vilanterol  1 puff Inhalation Daily    [Auto Hold] metroNIDAZOLE  500 mg Intravenous Q12H    [Auto Hold] micafungin  100 mg Intravenous Q24H    [Auto Hold] pantoprazole  40 mg Intravenous QAM AC    [Held by provider] rosuvastatin  10 mg Oral Daily    [Auto Hold] sodium chloride (PF)  3 mL Intracatheter Q8H    [Auto Hold] vancomycin  2,250 mg Intravenous Once    [Auto Hold] vancomycin place alegria - receiving intermittent dosing  1 each Intravenous See Admin Instructions    [Held by provider] venlafaxine  75 mg Oral TID      dexmedeTOMIDine 0.2 mcg/kg/hr (10/12/23 1104)    fentaNYL 50 mcg/hr (10/12/23 1118)    sodium chloride 100 mL/hr at 10/12/23 1100    vasopressin Stopped (10/12/23 1145)        Home Meds  No current facility-administered medications on file prior to encounter.  acetaminophen (TYLENOL) 500 MG tablet, Take 500-1,000 mg by mouth every 6 hours as needed for mild pain  ADVAIR -21 MCG/ACT inhaler, Inhale 2 Puffs by mouth two times daily.*  albuterol (PROAIR HFA) 108 (90 Base) MCG/ACT inhaler, Inhale 1-2 puffs into the lungs every 4 hours as needed for shortness of breath / dyspnea or wheezing  albuterol (PROVENTIL) (2.5 MG/3ML) 0.083% neb solution, Take 1 vial (2.5 mg) by nebulization every 6 hours as needed for shortness of breath or wheezing  cetirizine (ZYRTEC) 10 MG tablet,  Take 10 mg by mouth daily  cholecalciferol (VITAMIN D3) 125 mcg (5000 units) capsule, Take 125 mcg by mouth daily  cyanocobalamin (VITAMIN B-12) 1000 MCG sublingual tablet, Place 1,000 mcg under the tongue  gabapentin (NEURONTIN) 300 MG capsule, Take 300 mg by mouth daily  norgestrel-ethinyl estradiol (LO/OVRAL) 0.3-30 MG-MCG tablet, Take 1 tablet by mouth daily  propranolol (INDERAL) 10 MG tablet, MAY USE 1 TABLET BY MOUTH UP TO THREE TIMES DAILY AS NEEDED FOR ANXIETY.  rosuvastatin (CRESTOR) 10 MG tablet, Take 1 tablet (10 mg) by mouth daily  venlafaxine (EFFEXOR) 75 MG tablet, Take 75 mg by mouth 3 times daily               Physical Examination:   Temp:  [97.8  F (36.6  C)-103.2  F (39.6  C)] 102.8  F (39.3  C)  Pulse:  [] 123  Resp:  [10-41] 20  BP: ()/(48-71) 113/64  MAP:  [68 mmHg-100 mmHg] 77 mmHg  Arterial Line BP: (130-148)/(42-55) 130/53  FiO2 (%):  [100 %] 100 %  SpO2:  [89 %-100 %] 99 %    Intake/Output Summary (Last 24 hours) at 10/12/2023 1340  Last data filed at 10/12/2023 1258  Gross per 24 hour   Intake 3557.67 ml   Output --   Net 3557.67 ml     Wt Readings from Last 4 Encounters:   10/09/23 130.7 kg (288 lb 3.2 oz)   08/09/23 133.4 kg (294 lb 3.2 oz)   07/05/23 129.7 kg (286 lb)   03/13/23 129.2 kg (284 lb 12.8 oz)     Arterial Line BP: (130-148)/(42-55) 130/53  MAP:  [68 mmHg-100 mmHg] 77 mmHg  BP - Mean:  [63-85] 83  Vent Mode: CMV/AC  (Continuous Mandatory Ventilation/ Assist Control)  FiO2 (%): 100 %  Resp Rate (Set): 20 breaths/min  Tidal Volume (Set, mL): 420 mL  PEEP (cm H2O): 5 cmH2O  Resp: 20    No lab results found in last 7 days.    GEN: no acute distress   HEENT: head ncat, sclera anicteric, OP patent, trachea midline   PULM: unlabored synchronous with vent, clear anteriorly    CV/COR: RRR S1S2 no gallop,  No rub, no murmur  ABD: soft nontender, hypoactive bowel sounds, no mass  EXT: Minimal edema  NEURO: Confused and encephalopathic prior to intubation.  Appears to be  "moving all extremities  SKIN: Bruise over her right knee  LINES: clean, dry intact         Data:   All data and imaging reviewed     ROUTINE ICU LABS (Last four results)  CMP  Recent Labs   Lab 10/12/23  1122 10/12/23  1030 10/12/23  0933 10/12/23  0315 10/11/23  1540 10/11/23  0634   NA  --   --   --  134* 135 137   POTASSIUM  --   --   --  4.1 4.4 4.7   CHLORIDE  --   --   --  101 99 100   CO2  --   --   --  18* 19* 23   ANIONGAP  --   --   --  15 17* 14   * 87 64* 71 100* 100*   BUN  --   --   --  50.4* 41.0* 34.0*   CR  --   --   --  5.66* 4.59* 3.75*   GFRESTIMATED  --   --   --  8* 11* 14*   PALAK  --   --   --  9.5 9.6 10.2*   MAG  --   --   --  2.2  --   --    PROTTOTAL  --   --   --  6.5  --   --    ALBUMIN  --   --   --  2.8*  --   --    BILITOTAL  --   --   --  0.5  --   --    ALKPHOS  --   --   --  87  --   --    AST  --   --   --  97*  --   --    ALT  --   --   --  177*  --   --      CBC  Recent Labs   Lab 10/12/23  0315 10/11/23  0634   WBC 4.0 13.1*   RBC 4.34 4.48   HGB 12.8 13.2   HCT 39.8 41.7   MCV 92 93   MCH 29.5 29.5   MCHC 32.2 31.7   RDW 13.3 13.3    193     INRNo lab results found in last 7 days.  Arterial Blood GasNo lab results found in last 7 days.    All cultures:  No results for input(s): \"CULT\" in the last 168 hours.  Recent Results (from the past 24 hour(s))   XR Chest Port 1 View    Narrative    EXAM: XR CHEST PORT 1 VIEW  LOCATION: [facility]  DATE: 10/12/2023    INDICATION: intubation  COMPARISON: X-ray 09/14/2023      Impression    IMPRESSION: Endotracheal tube tip located 1.9 cm above the emilia. Left IJ central catheter with the tip in the upper SVC. No pneumothorax. Moderate asymmetric elevation of the right hemidiaphragm. Mild bibasilar pulmonary opacities likely reflect   atelectasis. No definite pleural effusion. Normal heart size. Spinal degenerative changes.         Billing: This patient is critically ill: Yes. Total critical care time " today 35 min exclusive of procedures or teaching.

## 2023-10-12 NOTE — PROGRESS NOTES
Patient was initially placed on the following vent settings AC/20/420/+5/100%. Respiratory rate was increased to 26 per MD post VBG results. ABG to follow. Patient remains on full mechanical ventilator support with the following settings.    Vent Mode: CMV/AC  (Continuous Mandatory Ventilation/ Assist Control)  FiO2 (%): 40 %  Resp Rate (Set): (S) 26 breaths/min  Tidal Volume (Set, mL): (S) 420 mL  PEEP (cm H2O): 5 cmH2O  Resp: 26    Peggy Whittaker, RT

## 2023-10-12 NOTE — PROGRESS NOTES
[facility]  [facility]   Follow up    Mojgan Jimenez   MRN: [id number]  : 1970   DOA: 10/9/2023   CC: ARF      Assessment and Plan:  53 year old female      ARF; seems likely to be hemodynamic exacerbated by IV NSAIDS (lowish bp, vasodilatory drugs, mild intravascular depletion) and CR continues to rise in anephic fashion without urine output; bladder scan without PVR; follow and managing expectantly--she is trending toward dialysis; plan to revisit pt after goes to OR to assess if she needs to start dialysis today   S/p gastric bypass; uncomplicated; more than typical pain but improved; imaging without issues/leaks but with decompensation (fevers, tachycardia, MS change) worried about leak/infection so plan to take back to OR today; d/w Dr Worthy   Fluid status; total body but not in vascular space; will give colloid for bp support;  MS change; consistent with build up of narcotic and gabapentin metabolites due to ARF; some response to narcan; ?also contribution from infection vs ??  Hyperlipid; on statin  Elevated LFTs; trend; again worried about a post op complication causing systemic sxs     Francesca Cruz MD    Subjective  Rapid response this am; sent to ICU and intubated  More confused overnoc; continued with tachycardia and developed fever; given some narcan prior to intubation with some response    Objective    Vital signs in last 24 hours  Temp:  [97.8  F (36.6  C)-98.6  F (37  C)] 98.1  F (36.7  C)  Pulse:  [109-134] 134  Resp:  [16-20] 20  BP: ()/(53-71) 108/60  SpO2:  [91 %-94 %] 91 %      Intake/Output last 3 shifts  I/O last 3 completed shifts:  In: 5976 [P.O.:3090; I.V.:1886; IV Piggyback:1000]  Out: 450 [Urine:450]  Intake/Output this shift:  I/O this shift:  In: 96.67 [I.V.:96.67]  Out: -     Physical Exam  Sleepy; myoclonus noted  CV: RRR without murmur or rub  Lung: clear and equal; no extra sounds  Ab: + distended and  diffusely tender  Ext: some edema and well perfused  Skin; no rash    Pertinent Labs     Last Renal Panel:  Sodium   Date Value Ref Range Status   10/12/2023 134 (L) 135 - 145 mmol/L Final     Comment:     Reference intervals for this test were updated on 09/26/2023 to more accurately reflect our healthy population. There may be differences in the flagging of prior results with similar values performed with this method. Interpretation of those prior results can be made in the context of the updated reference intervals.    11/17/2020 141 133 - 144 mmol/L Final     Potassium   Date Value Ref Range Status   10/12/2023 4.1 3.4 - 5.3 mmol/L Final   05/17/2022 4.5 3.4 - 5.3 mmol/L Final   11/17/2020 4.1 3.4 - 5.3 mmol/L Final     Chloride   Date Value Ref Range Status   10/12/2023 101 98 - 107 mmol/L Final   05/17/2022 102 94 - 109 mmol/L Final   11/17/2020 109 94 - 109 mmol/L Final     Carbon Dioxide   Date Value Ref Range Status   11/17/2020 31 20 - 32 mmol/L Final     Carbon Dioxide (CO2)   Date Value Ref Range Status   10/12/2023 18 (L) 22 - 29 mmol/L Final   05/17/2022 32 20 - 32 mmol/L Final     Anion Gap   Date Value Ref Range Status   10/12/2023 15 7 - 15 mmol/L Final   05/17/2022 2 (L) 3 - 14 mmol/L Final   11/17/2020 1 (L) 3 - 14 mmol/L Final     Glucose   Date Value Ref Range Status   10/12/2023 71 70 - 99 mg/dL Final   05/17/2022 110 (H) 70 - 99 mg/dL Final   11/17/2020 84 70 - 99 mg/dL Final     Comment:     Fasting specimen     GLUCOSE BY METER POCT   Date Value Ref Range Status   10/09/2023 113 (H) 70 - 99 mg/dL Final     Urea Nitrogen   Date Value Ref Range Status   10/12/2023 50.4 (H) 6.0 - 20.0 mg/dL Final   05/17/2022 16 7 - 30 mg/dL Final   11/17/2020 9 7 - 30 mg/dL Final     Creatinine   Date Value Ref Range Status   10/12/2023 5.66 (H) 0.51 - 0.95 mg/dL Final   11/17/2020 0.79 0.52 - 1.04 mg/dL Final     GFR Estimate   Date Value Ref Range Status   10/12/2023 8 (L) >60 mL/min/1.73m2 Final    11/17/2020 88 >60 mL/min/[1.73_m2] Final     Comment:     Non  GFR Calc  Starting 12/18/2018, serum creatinine based estimated GFR (eGFR) will be   calculated using the Chronic Kidney Disease Epidemiology Collaboration   (CKD-EPI) equation.       Calcium   Date Value Ref Range Status   10/12/2023 9.5 8.6 - 10.0 mg/dL Final   11/17/2020 8.9 8.5 - 10.1 mg/dL Final     Albumin   Date Value Ref Range Status   10/12/2023 2.8 (L) 3.5 - 5.2 g/dL Final   05/17/2022 4.0 3.4 - 5.0 g/dL Final   12/03/2018 3.6 3.4 - 5.0 g/dL Final     Recent Labs   Lab 10/12/23  0315 10/11/23  0634   HGB 12.8 13.2          I reviewed all lab results  Francesca Cruz MD

## 2023-10-12 NOTE — CODE/RAPID RESPONSE
Responded to Rapid Response. Pt with decreased LOC. Opens eyes to voice. SpO2 89% on 8L NC. BS clear and diminished. Placed on a 15L non-rebreather for SpO2 > 90%. After narcan admin pt became more responsive/agitated. Transferred to ICU. Assisted with intubation.    Liberty Castro, RT

## 2023-10-12 NOTE — PROGRESS NOTES
Nurse in room at 0925. Trying  to arouse pt. See neuro assessment. VS checked. Elevated temp, sats decreasing, increase oxygen needs, hypotensive. Called rapid response and surgery. EKG done; BG was 64 and given D50. Given narcan twice per order. Albumin infused. Dr. Cruz at bedside. Tylenol IV given.   Transferred to ICU and report given to Marichuy Yan. Surgeon to update pt per Felecia Henderson.

## 2023-10-12 NOTE — PLAN OF CARE
Problem: Plan of Care - These are the overarching goals to be used throughout the patient stay.    Goal: Optimal Comfort and Wellbeing  Outcome: Progressing     Problem: Bariatric Surgery  Goal: Optimal Coping with Surgery  Outcome: Progressing  Goal: Effective Gastrointestinal Motility and Elimination  Outcome: Not Progressing  Goal: Anesthesia/Sedation Recovery  Intervention: Optimize Anesthesia Recovery  Recent Flowsheet Documentation  Taken 10/11/2023 2037 by Mira Whittaker, RN  Safety Promotion/Fall Prevention:   assistive device/personal items within reach   clutter free environment maintained   nonskid shoes/slippers when out of bed   patient and family education  Administration (IS): self-administered  Level Incentive Spirometer (mL): 500  Number of Repetitions (IS): 10  Patient Tolerance (IS): poor  Goal: Effective Urinary Elimination  Outcome: Not Progressing  Goal: Effective Oxygenation and Ventilation  Intervention: Optimize Oxygenation and Ventilation  Recent Flowsheet Documentation  Taken 10/11/2023 2037 by Mira Whittaker, RN  Head of Bed (HOB) Positioning: HOB at 30 degrees     Goal Outcome Evaluation: Pt rated pain 3/10. Pain was manage with schedule tylenol. Has not pass gas and has not had a BM yet. Has not voided also. Has NS running at 100 ml/hr. No N/V. VSS. Afebrile. Can make needs be known.

## 2023-10-12 NOTE — PLAN OF CARE
Patient going down for emergent surgery @ this time. Pt accompanied by CRNA, OR Nurse, ICU East RT.

## 2023-10-12 NOTE — PROGRESS NOTES
House officer evaluation requested for this patient with worsening altered mental status in the post op period. Patient underwent bariatric surgery 10/9/23.     On my physical exam, neuro exam is grossly normal and she is able to move all extremities symmetrically with encouragement and verbal redirection. She does not have slurred speech but subject matter of speech is incoherent. Oriented only to self, not time or place. Unclear what her baseline is. Overall, low suspicion for acute intracranial process leading to this collection of symptoms. Does have a documented history of drug use, unclear how remote. UDS not on file, but certainly methamphetamine withdrawal could contribute to symptoms she is exhibiting. Patient also has an extensive mental health history, so perhaps this is a presentation of post op delirium layered with her underlying mental health diagnoses.     Patient noted to have increased creatinine yesterday, surgery team consulted nephrology. I will repeat a CMP at this time to evaluate for any electrolyte abnormality that could be contributing. Will also eval for infectious symptoms with lactic acid and CBC. Ammonia added on as well.     Will follow results.     Liberty Lopez MD PGY-2  [facility]

## 2023-10-13 ENCOUNTER — APPOINTMENT (OUTPATIENT)
Dept: RADIOLOGY | Facility: HOSPITAL | Age: 53
End: 2023-10-13
Attending: STUDENT IN AN ORGANIZED HEALTH CARE EDUCATION/TRAINING PROGRAM
Payer: COMMERCIAL

## 2023-10-13 LAB
ANION GAP SERPL CALCULATED.3IONS-SCNC: 15 MMOL/L (ref 7–15)
ANION GAP SERPL CALCULATED.3IONS-SCNC: 16 MMOL/L (ref 7–15)
BASE EXCESS BLDA CALC-SCNC: -6.2 MMOL/L
BASE EXCESS BLDA CALC-SCNC: -6.3 MMOL/L
BASE EXCESS BLDA CALC-SCNC: -7.1 MMOL/L
BASO+EOS+MONOS # BLD AUTO: ABNORMAL 10*3/UL
BASO+EOS+MONOS NFR BLD AUTO: ABNORMAL %
BASOPHILS # BLD AUTO: ABNORMAL 10*3/UL
BASOPHILS # BLD MANUAL: 0 10E3/UL (ref 0–0.2)
BASOPHILS NFR BLD AUTO: ABNORMAL %
BASOPHILS NFR BLD MANUAL: 0 %
BUN SERPL-MCNC: 62.7 MG/DL (ref 6–20)
BUN SERPL-MCNC: 72 MG/DL (ref 6–20)
BURR CELLS BLD QL SMEAR: ABNORMAL
CALCIUM SERPL-MCNC: 8.1 MG/DL (ref 8.6–10)
CALCIUM SERPL-MCNC: 8.3 MG/DL (ref 8.6–10)
CHLORIDE SERPL-SCNC: 100 MMOL/L (ref 98–107)
CHLORIDE SERPL-SCNC: 100 MMOL/L (ref 98–107)
COHGB MFR BLD: 95.6 % (ref 96–97)
COHGB MFR BLD: 97.8 % (ref 96–97)
COHGB MFR BLD: 98.4 % (ref 96–97)
CREAT SERPL-MCNC: 6.51 MG/DL (ref 0.51–0.95)
CREAT SERPL-MCNC: 6.64 MG/DL (ref 0.51–0.95)
DEPRECATED HCO3 PLAS-SCNC: 18 MMOL/L (ref 22–29)
DEPRECATED HCO3 PLAS-SCNC: 20 MMOL/L (ref 22–29)
EGFRCR SERPLBLD CKD-EPI 2021: 7 ML/MIN/1.73M2
EGFRCR SERPLBLD CKD-EPI 2021: 7 ML/MIN/1.73M2
EOSINOPHIL # BLD AUTO: ABNORMAL 10*3/UL
EOSINOPHIL # BLD MANUAL: 0 10E3/UL (ref 0–0.7)
EOSINOPHIL NFR BLD AUTO: ABNORMAL %
EOSINOPHIL NFR BLD MANUAL: 0 %
ERYTHROCYTE [DISTWIDTH] IN BLOOD BY AUTOMATED COUNT: 13.7 % (ref 10–15)
GLUCOSE BLDC GLUCOMTR-MCNC: 148 MG/DL (ref 70–99)
GLUCOSE BLDC GLUCOMTR-MCNC: 149 MG/DL (ref 70–99)
GLUCOSE BLDC GLUCOMTR-MCNC: 174 MG/DL (ref 70–99)
GLUCOSE BLDC GLUCOMTR-MCNC: 175 MG/DL (ref 70–99)
GLUCOSE SERPL-MCNC: 145 MG/DL (ref 70–99)
GLUCOSE SERPL-MCNC: 178 MG/DL (ref 70–99)
HCO3 BLD-SCNC: 17 MMOL/L (ref 23–29)
HCO3 BLD-SCNC: 19 MMOL/L (ref 23–29)
HCO3 BLD-SCNC: 19 MMOL/L (ref 23–29)
HCT VFR BLD AUTO: 32.3 % (ref 35–47)
HGB BLD-MCNC: 10.2 G/DL (ref 11.7–15.7)
IMM GRANULOCYTES # BLD: ABNORMAL 10*3/UL
IMM GRANULOCYTES NFR BLD: ABNORMAL %
LYMPHOCYTES # BLD AUTO: ABNORMAL 10*3/UL
LYMPHOCYTES # BLD MANUAL: 1.3 10E3/UL (ref 0.8–5.3)
LYMPHOCYTES NFR BLD AUTO: ABNORMAL %
LYMPHOCYTES NFR BLD MANUAL: 26 %
MCH RBC QN AUTO: 29.3 PG (ref 26.5–33)
MCHC RBC AUTO-ENTMCNC: 31.6 G/DL (ref 31.5–36.5)
MCV RBC AUTO: 93 FL (ref 78–100)
MONOCYTES # BLD AUTO: ABNORMAL 10*3/UL
MONOCYTES # BLD MANUAL: 0.3 10E3/UL (ref 0–1.3)
MONOCYTES NFR BLD AUTO: ABNORMAL %
MONOCYTES NFR BLD MANUAL: 6 %
NEUTROPHILS # BLD AUTO: ABNORMAL 10*3/UL
NEUTROPHILS # BLD MANUAL: 3.3 10E3/UL (ref 1.6–8.3)
NEUTROPHILS NFR BLD AUTO: ABNORMAL %
NEUTROPHILS NFR BLD MANUAL: 68 %
NRBC # BLD AUTO: 0 10E3/UL
NRBC # BLD AUTO: 0 10E3/UL
NRBC BLD AUTO-RTO: 0 /100
NRBC BLD MANUAL-RTO: 1 %
OXYHGB MFR BLD: 94.6 % (ref 96–97)
OXYHGB MFR BLD: 96.6 % (ref 96–97)
OXYHGB MFR BLD: 98.3 % (ref 96–97)
PCO2 BLD: 27 MM HG (ref 35–45)
PCO2 BLD: 31 MM HG (ref 35–45)
PCO2 BLD: 33 MM HG (ref 35–45)
PH BLD: 7.37 [PH] (ref 7.37–7.44)
PH BLD: 7.39 [PH] (ref 7.37–7.44)
PH BLD: 7.42 [PH] (ref 7.37–7.44)
PLAT MORPH BLD: ABNORMAL
PLATELET # BLD AUTO: 162 10E3/UL (ref 150–450)
PO2 BLD: 107 MM HG (ref 80–90)
PO2 BLD: 70 MM HG (ref 80–90)
PO2 BLD: 91 MM HG (ref 80–90)
POTASSIUM SERPL-SCNC: 4.5 MMOL/L (ref 3.4–5.3)
POTASSIUM SERPL-SCNC: 4.5 MMOL/L (ref 3.4–5.3)
RBC # BLD AUTO: 3.48 10E6/UL (ref 3.8–5.2)
RBC MORPH BLD: ABNORMAL
SODIUM SERPL-SCNC: 134 MMOL/L (ref 135–145)
SODIUM SERPL-SCNC: 135 MMOL/L (ref 135–145)
TEMPERATURE: 37 DEGREES C
TOXIC GRANULES BLD QL SMEAR: PRESENT
WBC # BLD AUTO: 4.9 10E3/UL (ref 4–11)

## 2023-10-13 PROCEDURE — 250N000011 HC RX IP 250 OP 636: Performed by: NURSE PRACTITIONER

## 2023-10-13 PROCEDURE — 258N000003 HC RX IP 258 OP 636: Performed by: NURSE PRACTITIONER

## 2023-10-13 PROCEDURE — 999N000178 HC STATISTIC SUCTION SPUTUM

## 2023-10-13 PROCEDURE — 999N000055 HC STATISTIC END TITIAL CO2 MONITORING

## 2023-10-13 PROCEDURE — 999N000287 HC ICU ADULT ROUNDING, EACH 10 MINS

## 2023-10-13 PROCEDURE — 250N000011 HC RX IP 250 OP 636: Mod: JZ | Performed by: NURSE PRACTITIONER

## 2023-10-13 PROCEDURE — 36600 WITHDRAWAL OF ARTERIAL BLOOD: CPT

## 2023-10-13 PROCEDURE — 250N000011 HC RX IP 250 OP 636: Performed by: INTERNAL MEDICINE

## 2023-10-13 PROCEDURE — 85007 BL SMEAR W/DIFF WBC COUNT: CPT | Performed by: NURSE PRACTITIONER

## 2023-10-13 PROCEDURE — 999N000253 HC STATISTIC WEANING TRIALS

## 2023-10-13 PROCEDURE — 999N000065 XR CHEST PORT 1 VIEW

## 2023-10-13 PROCEDURE — 200N000001 HC R&B ICU

## 2023-10-13 PROCEDURE — C9113 INJ PANTOPRAZOLE SODIUM, VIA: HCPCS | Mod: JZ | Performed by: NURSE PRACTITIONER

## 2023-10-13 PROCEDURE — P9047 ALBUMIN (HUMAN), 25%, 50ML: HCPCS | Performed by: INTERNAL MEDICINE

## 2023-10-13 PROCEDURE — 99232 SBSQ HOSP IP/OBS MODERATE 35: CPT | Performed by: INTERNAL MEDICINE

## 2023-10-13 PROCEDURE — 999N000009 HC STATISTIC AIRWAY CARE

## 2023-10-13 PROCEDURE — 999N000157 HC STATISTIC RCP TIME EA 10 MIN

## 2023-10-13 PROCEDURE — 250N000009 HC RX 250: Performed by: NURSE PRACTITIONER

## 2023-10-13 PROCEDURE — 250N000013 HC RX MED GY IP 250 OP 250 PS 637: Performed by: SURGERY

## 2023-10-13 PROCEDURE — 94003 VENT MGMT INPAT SUBQ DAY: CPT

## 2023-10-13 PROCEDURE — 250N000009 HC RX 250: Performed by: INTERNAL MEDICINE

## 2023-10-13 PROCEDURE — 85027 COMPLETE CBC AUTOMATED: CPT | Performed by: NURSE PRACTITIONER

## 2023-10-13 PROCEDURE — 82310 ASSAY OF CALCIUM: CPT | Performed by: STUDENT IN AN ORGANIZED HEALTH CARE EDUCATION/TRAINING PROGRAM

## 2023-10-13 PROCEDURE — 250N000013 HC RX MED GY IP 250 OP 250 PS 637: Performed by: NURSE PRACTITIONER

## 2023-10-13 PROCEDURE — 82805 BLOOD GASES W/O2 SATURATION: CPT | Performed by: INTERNAL MEDICINE

## 2023-10-13 PROCEDURE — 250N000011 HC RX IP 250 OP 636: Mod: JZ | Performed by: STUDENT IN AN ORGANIZED HEALTH CARE EDUCATION/TRAINING PROGRAM

## 2023-10-13 PROCEDURE — 80048 BASIC METABOLIC PNL TOTAL CA: CPT | Performed by: INTERNAL MEDICINE

## 2023-10-13 PROCEDURE — 258N000003 HC RX IP 258 OP 636: Performed by: INTERNAL MEDICINE

## 2023-10-13 RX ORDER — CHLOROTHIAZIDE SODIUM 500 MG/1
500 INJECTION INTRAVENOUS ONCE
Qty: 20 ML | Refills: 0 | Status: COMPLETED | OUTPATIENT
Start: 2023-10-13 | End: 2023-10-13

## 2023-10-13 RX ORDER — PIPERACILLIN SODIUM, TAZOBACTAM SODIUM 3; .375 G/15ML; G/15ML
3.38 INJECTION, POWDER, LYOPHILIZED, FOR SOLUTION INTRAVENOUS EVERY 12 HOURS
Status: DISCONTINUED | OUTPATIENT
Start: 2023-10-13 | End: 2023-10-16

## 2023-10-13 RX ORDER — PIPERACILLIN SODIUM, TAZOBACTAM SODIUM 3; .375 G/15ML; G/15ML
3.38 INJECTION, POWDER, LYOPHILIZED, FOR SOLUTION INTRAVENOUS EVERY 8 HOURS
Status: DISCONTINUED | OUTPATIENT
Start: 2023-10-13 | End: 2023-10-13 | Stop reason: ALTCHOICE

## 2023-10-13 RX ORDER — FUROSEMIDE 10 MG/ML
120 INJECTION INTRAMUSCULAR; INTRAVENOUS ONCE
Status: COMPLETED | OUTPATIENT
Start: 2023-10-13 | End: 2023-10-13

## 2023-10-13 RX ORDER — ALBUMIN (HUMAN) 12.5 G/50ML
25 SOLUTION INTRAVENOUS ONCE
Status: COMPLETED | OUTPATIENT
Start: 2023-10-13 | End: 2023-10-13

## 2023-10-13 RX ORDER — BUMETANIDE 0.25 MG/ML
4 INJECTION INTRAMUSCULAR; INTRAVENOUS ONCE
Status: COMPLETED | OUTPATIENT
Start: 2023-10-13 | End: 2023-10-13

## 2023-10-13 RX ORDER — PIPERACILLIN SODIUM, TAZOBACTAM SODIUM 3; .375 G/15ML; G/15ML
3.38 INJECTION, POWDER, LYOPHILIZED, FOR SOLUTION INTRAVENOUS ONCE
Status: COMPLETED | OUTPATIENT
Start: 2023-10-13 | End: 2023-10-13

## 2023-10-13 RX ADMIN — DEXMEDETOMIDINE HYDROCHLORIDE 0.8 MCG/KG/HR: 400 INJECTION INTRAVENOUS at 00:15

## 2023-10-13 RX ADMIN — Medication 100 MCG/HR: at 18:38

## 2023-10-13 RX ADMIN — DEXMEDETOMIDINE HYDROCHLORIDE 0.6 MCG/KG/HR: 400 INJECTION INTRAVENOUS at 16:08

## 2023-10-13 RX ADMIN — FUROSEMIDE 120 MG: 10 INJECTION, SOLUTION INTRAMUSCULAR; INTRAVENOUS at 17:40

## 2023-10-13 RX ADMIN — HEPARIN SODIUM 5000 UNITS: 5000 INJECTION, SOLUTION INTRAVENOUS; SUBCUTANEOUS at 10:52

## 2023-10-13 RX ADMIN — CHLORHEXIDINE GLUCONATE 15 ML: 1.2 RINSE ORAL at 21:25

## 2023-10-13 RX ADMIN — DEXMEDETOMIDINE HYDROCHLORIDE 0.8 MCG/KG/HR: 400 INJECTION INTRAVENOUS at 04:50

## 2023-10-13 RX ADMIN — MICAFUNGIN SODIUM 100 MG: 50 INJECTION, POWDER, LYOPHILIZED, FOR SOLUTION INTRAVENOUS at 13:13

## 2023-10-13 RX ADMIN — HEPARIN SODIUM 5000 UNITS: 5000 INJECTION, SOLUTION INTRAVENOUS; SUBCUTANEOUS at 17:57

## 2023-10-13 RX ADMIN — CHLOROTHIAZIDE SODIUM 500 MG: 500 INJECTION, POWDER, LYOPHILIZED, FOR SOLUTION INTRAVENOUS at 18:40

## 2023-10-13 RX ADMIN — PIPERACILLIN AND TAZOBACTAM 3.38 G: 3; .375 INJECTION, POWDER, LYOPHILIZED, FOR SOLUTION INTRAVENOUS at 21:24

## 2023-10-13 RX ADMIN — PANTOPRAZOLE SODIUM 40 MG: 40 INJECTION, POWDER, FOR SOLUTION INTRAVENOUS at 06:33

## 2023-10-13 RX ADMIN — PIPERACILLIN AND TAZOBACTAM 3.38 G: 3; .375 INJECTION, POWDER, LYOPHILIZED, FOR SOLUTION INTRAVENOUS at 14:39

## 2023-10-13 RX ADMIN — SODIUM BICARBONATE: 84 INJECTION, SOLUTION INTRAVENOUS at 03:10

## 2023-10-13 RX ADMIN — DEXMEDETOMIDINE HYDROCHLORIDE 0.8 MCG/KG/HR: 400 INJECTION INTRAVENOUS at 08:50

## 2023-10-13 RX ADMIN — ALBUMIN HUMAN 25 G: 0.25 SOLUTION INTRAVENOUS at 13:41

## 2023-10-13 RX ADMIN — BUMETANIDE 4 MG: 0.25 INJECTION INTRAMUSCULAR; INTRAVENOUS at 13:47

## 2023-10-13 RX ADMIN — DEXMEDETOMIDINE HYDROCHLORIDE 0.6 MCG/KG/HR: 400 INJECTION INTRAVENOUS at 21:25

## 2023-10-13 RX ADMIN — CHLORHEXIDINE GLUCONATE 15 ML: 1.2 RINSE ORAL at 08:50

## 2023-10-13 RX ADMIN — Medication 100 MCG: at 23:48

## 2023-10-13 RX ADMIN — CEFEPIME HYDROCHLORIDE 2 G: 2 INJECTION, POWDER, FOR SOLUTION INTRAVENOUS at 10:52

## 2023-10-13 ASSESSMENT — ACTIVITIES OF DAILY LIVING (ADL)
ADLS_ACUITY_SCORE: 33
ADLS_ACUITY_SCORE: 33
ADLS_ACUITY_SCORE: 24
ADLS_ACUITY_SCORE: 33
ADLS_ACUITY_SCORE: 33
DEPENDENT_IADLS:: INDEPENDENT
ADLS_ACUITY_SCORE: 33

## 2023-10-13 NOTE — PROGRESS NOTES
[facility]  Critical Care Progress Note    Summary:   Mojgan Jimenez 52 yo F PMH obesity, JARVIS on CPAP, asthma (no PFTs), stimulant use disorder, stimulant induced psychosis, mood & anxiety disorders      Presented 10/9/23 for scheduled laparoscopic sleeve gastrectomy with single anastomosis duodenal switch. She was later found to have two small bowel injury with peritonitis, encephalopathy, septic shock, acute respiratory failure due to loss of protective airway reflexes & increased metabolic load requiring intubation (10/12-current), & oliguric SUSSY. 10/12/23 returned to OR for small bowel interotomy closure, clean-out, & drain placement.     Interval Events:  No acute events. Improved hemodynamics. FiO2 40%, performs well on SBT. Minimal UO. BMP with progressive SUSSY, no acute indication of HD. ABG with adequate respiratory compensation. CBC with mild worsening anemia with interval surgery. 10/12 micro pending     Diuretic challenge today. Apprehensive to extubate with progressive SUSSY, fluid status, & significant agitation with weaning sedation.     Assessment & Plan:   Goals of Care:  Life prolonging without limits       Neurology, Psychiatry, Sedation, Analgesia:  Acute metabolic encephalopathy secondary to sepsis, shock, renal failure     Sedation & Analgesia   - daily spontaneous awakening trials as able, RASS goal 0 to -1  - dexmedetomidine & fentanyl infusions    - consider antipsychotic given agitation with weaning 10/13     Cardiovascular:  Septic shock - improved    Warm. Wide PP. 10/13 cardiac POCUS grossly preserved biventricular function, poor window inhibited LVOT VTI, EPSS, TAPSE assessment   - NE & vasopressin goal MAP>65    Respiratory, Airway:  Acute respiratory failure   Increased metabolic load & loss of protective airway reflexes   10/12/23 radiograph demonstrated bibasilar opacities concerning for atelectasis & ETT 1.9 above the emilia which was retracted 2 cm  -  supplemental O2 to maintain spO2 >= 90-96% & PaO2 >= 60 mmHg  - lung protective ventilation (Pplat <30, driving pressure <15)  - daily spontaneous breathing trials as able    History of JARVIS, allusions to asthma in the chart  - NIPPV at extubation with sleep/naps     Gastrointestinal:  10/9 s/p sleeve gastrectomy complicated by small bowel injury & peritonitis   10/12/23 returned to OR for closure, clean-out, & drain placement   - surgery consulted  - peritoneal drains X3 in place     Renal, Acid-base, Electrolytes, Volume:  Oliguric SUSSY    - nephrology was consulted   - diuretic challenge    - stop sodium bicarb    Infectious Disease:  Small bowel injury with peritonitis s/p closure, clean-out, & drain placement   - 10/12 - pending intra-operative cultures   - continue vanc, piptazo, metronidazole      Hematology, Oncology:  Anemia - mild  Suspect multifactorial secondary to sepsis & surgical blood loss   - monitor     Endocrine:  No active issues     Checklist:  FEN: NOP  VTE ppx: sqh  GI ppx: pantoprazole   Bowel regimen: PEG & senna   VAP ppx: Head of bed >30 degrees, daily oral care  Lines/tube-size: L-internal jugular CVC (10/12), PIVs, arevalo (10/12), ETT 7.5 (10/12)  Skin: no lesions    PT/OT/SLP, early mobility: not consulted   Code Status: Full     Physical Exam:  Neurologic: Sedated.Opens eyes, tracks, & follows commands in all extremities.   HEENT: Head and face normal. No nasal discharge. Oropharynx normal. Eyelids, conjunctiva, & sclera normal.   Neck: Neck appearance normal. No neck masses. Thyroid not enlarged.  Respiratory: Lungs clear bilaterally. No wheezes, crackles, or rhonchi.   Cardiovascular: Regular rate & rhythm  Normal S1 & S2. No murmurs, rubs. or gallops. No elevated JVP.    Gastrointestinal: Obese. Abdominal dressing clean, dry, intact. MICKIE drains X2  Musculoskeletal: Skeletal configuration normal and muscle mass normal for age. Joint appearance overall normal.  Skin, Hair, & Nails: Skin  color normal. No skin lesions. Hair & nails normal.  Extremities: 2+ lower extremity pitting edema     All pertinent vital signs, ventilator settings, I&Os, laboratory, microbiology, ECGs, & imaging data has been personally reviewed. Total Critical Care time, excluding procedures, was =50 minutes     AUNG To MD  Critical Care Medicine

## 2023-10-13 NOTE — PROGRESS NOTES
[facility]  [facility]   Follow up    Mojgan Jimenez   MRN: [id number]  : 1970   DOA: 10/9/2023   CC: ARF      Assessment and Plan:  53 year old female    ARF; seems likely to be hemodynamic exacerbated by IV NSAIDS (lowish bp, vasodilatory drugs, mild intravascular depletion) and CR continues to rise and not making much urine but no absolute indication for dialysis today so will continue to defer and eval daily; will try diuretic challenge to see if can convert to non-oliguric state  S/p gastric bypass; complicated by leak; back to OR for repair and wash out of bilious fluid; on abx; supportive cares  HoTN; infection/SIIRS; on low dose pressors  ID; on abx for peritonitis  Fluid status; total body elevated but not in vascular space; will give colloid prior to diuretic challenge and reduced MNT IVF  MS change; consistent with build up of narcotic and gabapentin metabolites due to ARF; now sedated for being on vent  Hyperlipid; on statin  Elevated LFTs; following   Resp failure; juancho-op; on vent; wean as able  Acidosis; improving on IV bicarb--reduce rate a bit to give less fluid and follow  Francesca Cruz MD    Subjective  Events of yesterday noted; to OR and found to have large amount of bilious material in ab cavity; post op has been generally stable; on low dose pressors; poor urine output    Objective    Vital signs in last 24 hours  Temp:  [100.2  F (37.9  C)-103.5  F (39.7  C)] 100.2  F (37.9  C)  Pulse:  [] 76  Resp:  [10-52] 26  BP: ()/(48-73) 102/58  MAP:  [68 mmHg-243 mmHg] 76 mmHg  Arterial Line BP: ()/() 80/72  FiO2 (%):  [40 %-100 %] 40 %  SpO2:  [90 %-100 %] 95 %      Intake/Output last 3 shifts  I/O last 3 completed shifts:  In: 3915.49 [I.V.:3315.49; IV Piggyback:600]  Out: 920 [Urine:310; Emesis/NG output:300; Drains:310]  Intake/Output this shift:  No intake/output data recorded.    Physical Exam  Intubated +  facial edema  CV: RRR without murmur or rub  Lung: clear and equal; no extra sounds  Ab: + distended and diffusely tender  Ext: some edema and well perfused  Skin; no rash    Pertinent Labs     Last Renal Panel:  Sodium   Date Value Ref Range Status   10/13/2023 134 (L) 135 - 145 mmol/L Final     Comment:     Reference intervals for this test were updated on 09/26/2023 to more accurately reflect our healthy population. There may be differences in the flagging of prior results with similar values performed with this method. Interpretation of those prior results can be made in the context of the updated reference intervals.    11/17/2020 141 133 - 144 mmol/L Final     Potassium   Date Value Ref Range Status   10/13/2023 4.5 3.4 - 5.3 mmol/L Final   05/17/2022 4.5 3.4 - 5.3 mmol/L Final   11/17/2020 4.1 3.4 - 5.3 mmol/L Final     Chloride   Date Value Ref Range Status   10/13/2023 100 98 - 107 mmol/L Final   05/17/2022 102 94 - 109 mmol/L Final   11/17/2020 109 94 - 109 mmol/L Final     Carbon Dioxide   Date Value Ref Range Status   11/17/2020 31 20 - 32 mmol/L Final     Carbon Dioxide (CO2)   Date Value Ref Range Status   10/13/2023 18 (L) 22 - 29 mmol/L Final   05/17/2022 32 20 - 32 mmol/L Final     Anion Gap   Date Value Ref Range Status   10/13/2023 16 (H) 7 - 15 mmol/L Final   05/17/2022 2 (L) 3 - 14 mmol/L Final   11/17/2020 1 (L) 3 - 14 mmol/L Final     Glucose   Date Value Ref Range Status   10/13/2023 178 (H) 70 - 99 mg/dL Final   05/17/2022 110 (H) 70 - 99 mg/dL Final   11/17/2020 84 70 - 99 mg/dL Final     Comment:     Fasting specimen     GLUCOSE BY METER POCT   Date Value Ref Range Status   10/13/2023 175 (H) 70 - 99 mg/dL Final     Urea Nitrogen   Date Value Ref Range Status   10/13/2023 62.7 (H) 6.0 - 20.0 mg/dL Final   05/17/2022 16 7 - 30 mg/dL Final   11/17/2020 9 7 - 30 mg/dL Final     Creatinine   Date Value Ref Range Status   10/13/2023 6.51 (H) 0.51 - 0.95 mg/dL Final   11/17/2020 0.79 0.52 - 1.04  mg/dL Final     GFR Estimate   Date Value Ref Range Status   10/13/2023 7 (L) >60 mL/min/1.73m2 Final   11/17/2020 88 >60 mL/min/[1.73_m2] Final     Comment:     Non  GFR Calc  Starting 12/18/2018, serum creatinine based estimated GFR (eGFR) will be   calculated using the Chronic Kidney Disease Epidemiology Collaboration   (CKD-EPI) equation.       Calcium   Date Value Ref Range Status   10/13/2023 8.3 (L) 8.6 - 10.0 mg/dL Final   11/17/2020 8.9 8.5 - 10.1 mg/dL Final     Albumin   Date Value Ref Range Status   10/12/2023 2.8 (L) 3.5 - 5.2 g/dL Final   05/17/2022 4.0 3.4 - 5.0 g/dL Final   12/03/2018 3.6 3.4 - 5.0 g/dL Final     Recent Labs   Lab 10/13/23  0426 10/12/23  0315 10/11/23  0634   HGB 10.2* 12.8 13.2          I reviewed all lab results  Francesca Cruz MD

## 2023-10-13 NOTE — PROGRESS NOTES
General Surgery Progress Note    POST OP DAY  # 1 from her small bowel enterotomy repair, and 4 from KO .     Subjective:   Pt remains intubated but has been stable through the night.  She remains on a very small amount of Levophed which they are weaning off.    Vitals:    10/13/23 0515 10/13/23 0530 10/13/23 0545 10/13/23 0600   BP: 119/66 122/70 128/69 111/58   BP Location:    Right arm   Pulse: 83 83 83 80   Resp: 27 26 26 26   Temp:    100.2  F (37.9  C)   TempSrc:    Esophageal   SpO2: 99% 100% 99% 99%   Weight:       Height:           Physical Exam:  Lungs:  CTA  CV:       RRR  Ab:       Soft, + BS,       Component  Ref Range & Units  5:25 AM  (10/13/23) 1 d ago  (10/12/23) 1 d ago  (10/12/23) 1 d ago  (10/12/23)    pH Arterial  7.37 - 7.44 7.42 7.31 Low  7.30 Low  7.18 Low Panic     pCO2 Arterial  35 - 45 mm Hg 27 Low  33 Low  31 Low  42    pO2 Arterial  80 - 90 mm Hg 107 High  140 High  133 High  93 High     Bicarbonate Arterial  23 - 29 mmol/L 17 Low  17 Low  15 Low  15 Low     O2 Sat, Arterial  96.0 - 97.0 % 98.4 High  99.7 High  100.0 High  97.9 High     Oxyhemoglobin  96.0 - 97.0 % 98.3 High  >98.5 High  >98.5 High  96.3    Base Excess/Deficit    mmol/L -7.1 -9.3 -11.0 -13.0    Sample Stabilized Temperature  degrees C 37.0 37.0 37.0 37.0       Recent Labs   Lab 10/13/23  0426   WBC 4.9   HGB 10.2*   HCT 32.3*          Component  Ref Range & Units  4:26 AM  (10/13/23) 1 d ago  (10/12/23) 1 d ago  (10/12/23) 1 d ago  (10/12/23) 2 d ago  (10/11/23) 2 d ago  (10/11/23) 1 yr ago  (9/15/22)    Sodium  135 - 145 mmol/L 134 Low  133 Low   Low   Low     R   Comment: Reference intervals for this test were updated on 09/26/2023 to more accurately reflect our healthy population. There may be differences in the flagging of prior results with similar values performed with this method. Interpretation of those prior results can be made in the context of the updated reference  intervals.    Potassium  3.4 - 5.3 mmol/L 4.5 5.2 5.3 4.1 4.4 4.7 4.3    Chloride  98 - 107 mmol/L 100 103 103 101 99 100 102    Carbon Dioxide (CO2)  22 - 29 mmol/L 18 Low  14 Low  14 Low  18 Low  19 Low  23 28    Anion Gap  7 - 15 mmol/L 16 High  16 High  16 High  15 17 High  14 7    Urea Nitrogen  6.0 - 20.0 mg/dL 62.7 High  55.8 High  55.4 High  50.4 High  41.0 High  34.0 High  9.4    Creatinine  0.51 - 0.95 mg/dL 6.51 High  5.97 High  6.05 High  5.66 High  4.59 High  3.75 High  0.73    GFR Estimate  >60 mL/min/1.73m2 7 Low  8 Low  8 Low  8 Low  11 Low  14 Low  >90 CM    Calcium  8.6 - 10.0 mg/dL 8.3 Low  8.4 Low  8.5 Low  9.5 9.6 10.2 High  9.0    Glucose  70 - 99 mg/dL 178 High  125 High  119 High  71 100 High  100 High  96   Resulting Agency [facility]         Assessment:  Pt is progressing well after her laparotomy with clean up of the intra-abdominal cavity.  She had 2 very small enterotomies that were likely traction injuries that were closed with her surgery yesterday.  Her drains are clear I do not see any evidence of further drainage in either of her intra peritoneal drains.  She has not had any dialysis runs but her potassium is down from yesterday.  Her creatinine continues to rise currently at 6.51 today.    Plan:   Continue to work with the critical care team on timing for extubation and hemodynamic support.  Continue with IV antibiotics to cover the infectious disease issues that have arisen from the small bowel enterotomy.  We will keep n.p.o. until her postoperative ileus resolves.  We are working with nephrology to optimize the acute renal failure which is thought to be secondary to acute tubular necrosis.    Hoang Worthy MD  [facility]  [phone]

## 2023-10-13 NOTE — PROGRESS NOTES
"RESPIRATORY CARE NOTE     Patient Name: Mojgan Jimenez  Today's Date: 10/13/2023          Plateau pressure: 28 cm H2O     Pt is intubated with  # 7.5 ETT secured  20 at the teeth. Pt weaned today on PS of 5, peep 5, FiO2 40% for a total of 208 minutes. Pt placed back on AC/VC mode due to increased CO2. Pt has a strong cough with suction. RT suctioned pt for small amt thick clear. Patient did wake up around 1600 and pulled the suction apart.  Suction cath replaced and patient sedated. Pt's respiratory status is stable. RT will continue to follow per MD's orders.     /55   Pulse 76   Temp 98.3  F (36.8  C) (Esophageal)   Resp 26   Ht 1.6 m (5' 3\")   Wt 130.4 kg (287 lb 6.4 oz)   LMP 09/09/2023 (Exact Date)   SpO2 100%   BMI 50.91 kg/m      Samson Espinoza, RT    "

## 2023-10-13 NOTE — PROGRESS NOTES
"Vent Mode: Other (see comments) (VC AC)  FiO2 (%): 100 %  Resp Rate (Set): 26 breaths/min  Tidal Volume (Set, mL): 420 mL  PEEP (cm H2O): 5 cmH2O  Pressure Support (cm H2O): 5 cmH2O  Resp: 26    /55   Pulse 76   Temp 98.3  F (36.8  C) (Esophageal)   Resp 26   Ht 1.6 m (5' 3\")   Wt 130.4 kg (287 lb 6.4 oz)   LMP 09/09/2023 (Exact Date)   SpO2 100%   BMI 50.91 kg/m      S/p pt arousal, pulled on ventilator circuit. Pulled apart inline suction catheter, Inline suction catheter changed. Pt sedated. RT suctioned inline after. FiO2 decreased to .40. iTime decreased to .60, I:E 1:2.8 after. RT to monitor.   "

## 2023-10-13 NOTE — PROGRESS NOTES
Changes are not made. Pt remains on vent/full support; PIP 30 cmH2O, Pplat 26 cmH2O. Sats 100%,    Juan Daniel Parnell, RT

## 2023-10-13 NOTE — PLAN OF CARE
Goal Outcome Evaluation:  [facility] - ICU    RN Progress Note:            Pertinent Assessments:      Please refer to flowsheet rows for full assessment     Fever Tmax 103.4. Tachycardia. Restless/agitated during cares. Low OUP MD aware. Coarse LS. Hypoactive Bowel sounds with small loose BM x1.           Key Events - This Shift:       Left radial a-line removed per MD order d/t poor waveform, dampened, and no blood return. PRN Tylenol suppository given for fever, cooling blanket applied as well. Improved temp this .2. Precedex/fentanyl titrated to maintain RASS goal for sedation and pain. PRN ativan given x1 for severe agitation when patient was suctioned. Improved Bp with levophed titrated down to 0.01.            Barriers to Discharge / Downgrade:     On vent. On pressor. SUSSY.        Point of Contact Update YES-OR-NO: Yes  If No, reason: n/a  Name:Ernestine  Phone Number: Pls see chart  Summary of Conversation: updated on POC

## 2023-10-13 NOTE — PROGRESS NOTES
Provider Restraint for Non-violent Behavior Face to Face Evaluation     Patient's Immediate Situation:  Patient demonstrated the following behaviors: Pulling at lines & tubes putting safety at risk     Patient's Reaction to the intervention:  Does patient understand the reason for restraint/seclusion? No, the patient is unable to express     Medical Condition:  Is there any evidence of compromise of Skin integrity, Respiratory, Cardiovascular, Musculoskeletal, Hydration?   No    Behavioral Condition:  In consultation with the RN, is there a need to continue this restraint or seclusion? No    See Restraint Flowsheet for complete restraint documentation and assessment.    AUNG To MD  Critical Care Medicine

## 2023-10-13 NOTE — CONSULTS
Care Management Initial Consult    General Information  Assessment completed with: Family, Sister, Ernestine  Type of CM/SW Visit: Initial Assessment    Primary Care Provider verified and updated as needed: Yes   Readmission within the last 30 days: no previous admission in last 30 days      Reason for Consult: discharge planning  Advance Care Planning:     None, Ernestine declined info     Communication Assessment  Patient's communication style: spoken language (English or Bilingual)    Hearing Difficulty or Deaf: no   Wear Glasses or Blind: no    Cognitive  Cognitive/Neuro/Behavioral: .WDL except, all  Level of Consciousness: sedated  Arousal Level: arouses to vigorous stimulation  Orientation: other (see comments) (KITTY - intubated/sedated)  Mood/Behavior: other (see comments) (KITTY - sedated)  Best Language: 0 - No aphasia  Speech: endotracheal tube    Living Environment:   People in home: child(jean claude), adult  Karla  Current living Arrangements: apartment      Able to return to prior arrangements: yes     Family/Social Support:  Care provided by: self  Provides care for: no one  Marital Status:   Children          Description of Support System: Supportive    Support Assessment: Adequate family and caregiver support    Current Resources:   Patient receiving home care services:  No     Community Resources:  None  Equipment currently used at home: other (see comments) (Neb)  Supplies currently used at home:  Neb    Employment/Financial:  Employment Status: unemployed        Financial Concerns:   No, Ernestine reported Mojgan have saved up for the procedure. Once she recover, then Mojgan will return to work.     Does the patient's insurance plan have a 3 day qualifying hospital stay waiver?  Yes     Which insurance plan 3 day waiver is available? Alternative insurance waiver    Will the waiver be used for post-acute placement? Undetermined at this time    Lifestyle & Psychosocial Needs:  Social Determinants of Health      Food Insecurity: Not on file   Depression: At risk (8/25/2022)    PHQ-2     PHQ-2 Score: 5   Housing Stability: Not on file   Tobacco Use: Low Risk  (10/12/2023)    Patient History     Smoking Tobacco Use: Never     Smokeless Tobacco Use: Never     Passive Exposure: Not on file   Financial Resource Strain: Not on file   Alcohol Use: Not At Risk (4/2/2019)    AUDIT-C     Frequency of Alcohol Consumption: Never     Average Number of Drinks: Not on file     Frequency of Binge Drinking: Not on file   Transportation Needs: Not on file   Physical Activity: Not on file   Interpersonal Safety: Not on file   Stress: Not on file   Social Connections: Not on file       Functional Status:  Prior to admission patient needed assistance:   Dependent ADLs:: Independent  Dependent IADLs:: Independent     Additional Information:  RNCM met with patient at bedside, but she is intubated and not awake. RNCM called patient's daughter, but no answer. RNCM called patient's sister, Ernestine, to review role of care management services, discuss goals of care and assess need for any possible services at discharge. Ernestine reported patient does not have a HCD and declined info. Patient is  and lives with her adult daughter; and grandchild in an apartment. Baseline independent with ADLs/IADLs. Patient drives. Patient is currently unemployed. She saved up for the procedures and will resume work when she recover. No community programs. Patient has a neb machine for Asthma, no other DME. Goal is for Mojgan to return home. Family will transport.     Ernestine reported she and patient's daughter will be at the hospital this afternoon.    Discussed patient in ICU rounds. Has MICKIE drain and is day 2 on vent.    Peggy Encinas RN

## 2023-10-14 ENCOUNTER — APPOINTMENT (OUTPATIENT)
Dept: CARDIOLOGY | Facility: HOSPITAL | Age: 53
End: 2023-10-14
Attending: INTERNAL MEDICINE
Payer: COMMERCIAL

## 2023-10-14 ENCOUNTER — APPOINTMENT (OUTPATIENT)
Dept: RADIOLOGY | Facility: HOSPITAL | Age: 53
End: 2023-10-14
Attending: INTERNAL MEDICINE
Payer: COMMERCIAL

## 2023-10-14 LAB
ALBUMIN SERPL BCG-MCNC: 2.7 G/DL (ref 3.5–5.2)
ALP SERPL-CCNC: 83 U/L (ref 35–104)
ALT SERPL W P-5'-P-CCNC: 207 U/L (ref 0–50)
ANION GAP SERPL CALCULATED.3IONS-SCNC: 15 MMOL/L (ref 7–15)
ANION GAP SERPL CALCULATED.3IONS-SCNC: 18 MMOL/L (ref 7–15)
AST SERPL W P-5'-P-CCNC: 367 U/L (ref 0–45)
BASE EXCESS BLDA CALC-SCNC: -4.5 MMOL/L
BASE EXCESS BLDA CALC-SCNC: -5.5 MMOL/L
BILIRUB DIRECT SERPL-MCNC: 0.21 MG/DL (ref 0–0.3)
BILIRUB SERPL-MCNC: 0.4 MG/DL
BUN SERPL-MCNC: 79.5 MG/DL (ref 6–20)
BUN SERPL-MCNC: 85.2 MG/DL (ref 6–20)
CALCIUM SERPL-MCNC: 8.2 MG/DL (ref 8.6–10)
CALCIUM SERPL-MCNC: 8.2 MG/DL (ref 8.6–10)
CHLORIDE SERPL-SCNC: 100 MMOL/L (ref 98–107)
CHLORIDE SERPL-SCNC: 99 MMOL/L (ref 98–107)
COHGB MFR BLD: 94.5 % (ref 96–97)
COHGB MFR BLD: 98.5 % (ref 96–97)
CREAT SERPL-MCNC: 6.86 MG/DL (ref 0.51–0.95)
CREAT SERPL-MCNC: 6.97 MG/DL (ref 0.51–0.95)
DEPRECATED HCO3 PLAS-SCNC: 19 MMOL/L (ref 22–29)
DEPRECATED HCO3 PLAS-SCNC: 20 MMOL/L (ref 22–29)
EGFRCR SERPLBLD CKD-EPI 2021: 7 ML/MIN/1.73M2
EGFRCR SERPLBLD CKD-EPI 2021: 7 ML/MIN/1.73M2
GLUCOSE BLDC GLUCOMTR-MCNC: 108 MG/DL (ref 70–99)
GLUCOSE BLDC GLUCOMTR-MCNC: 89 MG/DL (ref 70–99)
GLUCOSE BLDC GLUCOMTR-MCNC: 99 MG/DL (ref 70–99)
GLUCOSE SERPL-MCNC: 105 MG/DL (ref 70–99)
GLUCOSE SERPL-MCNC: 131 MG/DL (ref 70–99)
HCO3 BLD-SCNC: 19 MMOL/L (ref 23–29)
HCO3 BLD-SCNC: 21 MMOL/L (ref 23–29)
LVEF ECHO: NORMAL
MAGNESIUM SERPL-MCNC: 2.6 MG/DL (ref 1.7–2.3)
OXYHGB MFR BLD: 94 % (ref 96–97)
OXYHGB MFR BLD: 97.6 % (ref 96–97)
PCO2 BLD: 32 MM HG (ref 35–45)
PCO2 BLD: 39 MM HG (ref 35–45)
PH BLD: 7.34 [PH] (ref 7.37–7.44)
PH BLD: 7.39 [PH] (ref 7.37–7.44)
PHOSPHATE SERPL-MCNC: 5.3 MG/DL (ref 2.5–4.5)
PO2 BLD: 72 MM HG (ref 80–90)
PO2 BLD: 94 MM HG (ref 80–90)
POTASSIUM SERPL-SCNC: 4.4 MMOL/L (ref 3.4–5.3)
POTASSIUM SERPL-SCNC: 4.6 MMOL/L (ref 3.4–5.3)
PROT SERPL-MCNC: 6 G/DL (ref 6.4–8.3)
SODIUM SERPL-SCNC: 135 MMOL/L (ref 135–145)
SODIUM SERPL-SCNC: 136 MMOL/L (ref 135–145)
TEMPERATURE: 37 DEGREES C
TEMPERATURE: 37 DEGREES C
TRIGL SERPL-MCNC: 339 MG/DL
VANCOMYCIN SERPL-MCNC: 16.9 UG/ML

## 2023-10-14 PROCEDURE — 71045 X-RAY EXAM CHEST 1 VIEW: CPT

## 2023-10-14 PROCEDURE — 99024 POSTOP FOLLOW-UP VISIT: CPT | Performed by: PHYSICIAN ASSISTANT

## 2023-10-14 PROCEDURE — 250N000011 HC RX IP 250 OP 636: Performed by: SURGERY

## 2023-10-14 PROCEDURE — 83735 ASSAY OF MAGNESIUM: CPT | Performed by: SURGERY

## 2023-10-14 PROCEDURE — 258N000003 HC RX IP 258 OP 636: Performed by: NURSE PRACTITIONER

## 2023-10-14 PROCEDURE — 250N000009 HC RX 250: Performed by: INTERNAL MEDICINE

## 2023-10-14 PROCEDURE — 80202 ASSAY OF VANCOMYCIN: CPT | Performed by: NURSE PRACTITIONER

## 2023-10-14 PROCEDURE — 250N000013 HC RX MED GY IP 250 OP 250 PS 637: Performed by: NURSE PRACTITIONER

## 2023-10-14 PROCEDURE — 250N000011 HC RX IP 250 OP 636: Performed by: NURSE PRACTITIONER

## 2023-10-14 PROCEDURE — 250N000011 HC RX IP 250 OP 636: Mod: JZ | Performed by: NURSE PRACTITIONER

## 2023-10-14 PROCEDURE — 82248 BILIRUBIN DIRECT: CPT | Performed by: INTERNAL MEDICINE

## 2023-10-14 PROCEDURE — C9113 INJ PANTOPRAZOLE SODIUM, VIA: HCPCS | Mod: JZ | Performed by: NURSE PRACTITIONER

## 2023-10-14 PROCEDURE — 255N000002 HC RX 255 OP 636: Performed by: SURGERY

## 2023-10-14 PROCEDURE — 93306 TTE W/DOPPLER COMPLETE: CPT | Mod: 26 | Performed by: INTERNAL MEDICINE

## 2023-10-14 PROCEDURE — 999N000157 HC STATISTIC RCP TIME EA 10 MIN

## 2023-10-14 PROCEDURE — 999N000055 HC STATISTIC END TITIAL CO2 MONITORING

## 2023-10-14 PROCEDURE — 84134 ASSAY OF PREALBUMIN: CPT | Performed by: SURGERY

## 2023-10-14 PROCEDURE — 258N000003 HC RX IP 258 OP 636: Performed by: SURGERY

## 2023-10-14 PROCEDURE — 99232 SBSQ HOSP IP/OBS MODERATE 35: CPT | Performed by: INTERNAL MEDICINE

## 2023-10-14 PROCEDURE — 84478 ASSAY OF TRIGLYCERIDES: CPT | Performed by: SURGERY

## 2023-10-14 PROCEDURE — 999N000178 HC STATISTIC SUCTION SPUTUM

## 2023-10-14 PROCEDURE — 99207 PR APP CREDIT; MD BILLING SHARED VISIT: CPT | Performed by: INTERNAL MEDICINE

## 2023-10-14 PROCEDURE — 82805 BLOOD GASES W/O2 SATURATION: CPT | Performed by: INTERNAL MEDICINE

## 2023-10-14 PROCEDURE — 200N000001 HC R&B ICU

## 2023-10-14 PROCEDURE — 99291 CRITICAL CARE FIRST HOUR: CPT | Performed by: INTERNAL MEDICINE

## 2023-10-14 PROCEDURE — 36600 WITHDRAWAL OF ARTERIAL BLOOD: CPT

## 2023-10-14 PROCEDURE — 999N000009 HC STATISTIC AIRWAY CARE

## 2023-10-14 PROCEDURE — 999N000287 HC ICU ADULT ROUNDING, EACH 10 MINS

## 2023-10-14 PROCEDURE — 3E0436Z INTRODUCTION OF NUTRITIONAL SUBSTANCE INTO CENTRAL VEIN, PERCUTANEOUS APPROACH: ICD-10-PCS | Performed by: SURGERY

## 2023-10-14 PROCEDURE — 250N000011 HC RX IP 250 OP 636: Mod: JZ | Performed by: STUDENT IN AN ORGANIZED HEALTH CARE EDUCATION/TRAINING PROGRAM

## 2023-10-14 PROCEDURE — 250N000011 HC RX IP 250 OP 636: Mod: JZ | Performed by: INTERNAL MEDICINE

## 2023-10-14 PROCEDURE — 94003 VENT MGMT INPAT SUBQ DAY: CPT

## 2023-10-14 PROCEDURE — 84100 ASSAY OF PHOSPHORUS: CPT | Performed by: SURGERY

## 2023-10-14 PROCEDURE — 250N000011 HC RX IP 250 OP 636: Performed by: INTERNAL MEDICINE

## 2023-10-14 RX ORDER — BUMETANIDE 0.25 MG/ML
3 INJECTION INTRAMUSCULAR; INTRAVENOUS EVERY 8 HOURS
Status: DISCONTINUED | OUTPATIENT
Start: 2023-10-14 | End: 2023-10-15

## 2023-10-14 RX ORDER — PROPOFOL 10 MG/ML
5-75 INJECTION, EMULSION INTRAVENOUS CONTINUOUS
Status: DISCONTINUED | OUTPATIENT
Start: 2023-10-14 | End: 2023-10-15

## 2023-10-14 RX ORDER — DEXTROSE MONOHYDRATE 100 MG/ML
INJECTION, SOLUTION INTRAVENOUS CONTINUOUS PRN
Status: DISCONTINUED | OUTPATIENT
Start: 2023-10-14 | End: 2023-10-21

## 2023-10-14 RX ADMIN — BUMETANIDE 3 MG: 0.25 INJECTION INTRAMUSCULAR; INTRAVENOUS at 20:03

## 2023-10-14 RX ADMIN — PIPERACILLIN AND TAZOBACTAM 3.38 G: 3; .375 INJECTION, POWDER, LYOPHILIZED, FOR SOLUTION INTRAVENOUS at 19:55

## 2023-10-14 RX ADMIN — PERFLUTREN 2 ML: 6.52 INJECTION, SUSPENSION INTRAVENOUS at 14:50

## 2023-10-14 RX ADMIN — PIPERACILLIN AND TAZOBACTAM 3.38 G: 3; .375 INJECTION, POWDER, LYOPHILIZED, FOR SOLUTION INTRAVENOUS at 07:55

## 2023-10-14 RX ADMIN — HEPARIN SODIUM 5000 UNITS: 5000 INJECTION, SOLUTION INTRAVENOUS; SUBCUTANEOUS at 01:08

## 2023-10-14 RX ADMIN — VANCOMYCIN HYDROCHLORIDE 1500 MG: 5 INJECTION, POWDER, LYOPHILIZED, FOR SOLUTION INTRAVENOUS at 12:36

## 2023-10-14 RX ADMIN — AMIODARONE HYDROCHLORIDE 1 MG/MIN: 1.8 INJECTION, SOLUTION INTRAVENOUS at 15:31

## 2023-10-14 RX ADMIN — PROPOFOL 10 MCG/KG/MIN: 10 INJECTION, EMULSION INTRAVENOUS at 00:19

## 2023-10-14 RX ADMIN — PROPOFOL 20 MCG/KG/MIN: 10 INJECTION, EMULSION INTRAVENOUS at 12:36

## 2023-10-14 RX ADMIN — MAGNESIUM SULFATE HEPTAHYDRATE: 500 INJECTION, SOLUTION INTRAMUSCULAR; INTRAVENOUS at 20:03

## 2023-10-14 RX ADMIN — PANTOPRAZOLE SODIUM 40 MG: 40 INJECTION, POWDER, FOR SOLUTION INTRAVENOUS at 07:55

## 2023-10-14 RX ADMIN — CHLORHEXIDINE GLUCONATE 15 ML: 1.2 RINSE ORAL at 07:57

## 2023-10-14 RX ADMIN — Medication 75 MCG/HR: at 18:33

## 2023-10-14 RX ADMIN — Medication 50 MCG: at 10:37

## 2023-10-14 RX ADMIN — HEPARIN SODIUM 5000 UNITS: 5000 INJECTION, SOLUTION INTRAVENOUS; SUBCUTANEOUS at 17:14

## 2023-10-14 RX ADMIN — HEPARIN SODIUM 5000 UNITS: 5000 INJECTION, SOLUTION INTRAVENOUS; SUBCUTANEOUS at 10:16

## 2023-10-14 RX ADMIN — AMIODARONE HYDROCHLORIDE 150 MG: 1.5 INJECTION, SOLUTION INTRAVENOUS at 15:15

## 2023-10-14 RX ADMIN — PROPOFOL 15 MCG/KG/MIN: 10 INJECTION, EMULSION INTRAVENOUS at 18:32

## 2023-10-14 RX ADMIN — BUMETANIDE 3 MG: 0.25 INJECTION INTRAMUSCULAR; INTRAVENOUS at 12:43

## 2023-10-14 RX ADMIN — AMIODARONE HYDROCHLORIDE 0.5 MG/MIN: 1.8 INJECTION, SOLUTION INTRAVENOUS at 21:11

## 2023-10-14 RX ADMIN — PROPOFOL 20 MCG/KG/MIN: 10 INJECTION, EMULSION INTRAVENOUS at 05:53

## 2023-10-14 RX ADMIN — CHLORHEXIDINE GLUCONATE 15 ML: 1.2 RINSE ORAL at 19:55

## 2023-10-14 RX ADMIN — MICAFUNGIN SODIUM 100 MG: 50 INJECTION, POWDER, LYOPHILIZED, FOR SOLUTION INTRAVENOUS at 11:00

## 2023-10-14 ASSESSMENT — ACTIVITIES OF DAILY LIVING (ADL)
ADLS_ACUITY_SCORE: 33

## 2023-10-14 NOTE — PROGRESS NOTES
PROVIDER RESTRAINT FOR NON-VIOLENT BEHAVIOR FACE TO FACE EVALUATION  Pt intubated, lightly sedated  Impulsive behavior, grabbing at support tubes/lines.   Does patient understand the reason for restraint/seclusion? NO  Bilateral soft wrist restraints in place.   Ongoing reassessment for restraint need.   In consultation with the RN, is there a need to continue this restraint or seclusion? YES  See Restraint Flowsheet for complete restraint documentation and assessment.  Will d/c restraints when able to safely maintain support tubes in place with verbal redirection.    Maria Rhoades MD

## 2023-10-14 NOTE — PROGRESS NOTES
[facility]  Critical Care Progress Note    Summary:   Mojgan Jimenez 52 yo F PMH obesity, JARVIS on CPAP, asthma (no PFTs), stimulant use disorder, stimulant induced psychosis, mood & anxiety disorders      Presented 10/9/23 for scheduled laparoscopic sleeve gastrectomy with single anastomosis duodenal switch. She was later found to have two small bowel injury with peritonitis, encephalopathy, septic shock, acute respiratory failure due to loss of protective airway reflexes & increased metabolic load requiring intubation (10/12-current), & oliguric SUSSY. 10/12/23 returned to OR for small bowel interotomy closure, clean-out, & drain placement.     Interval Events:  Elevated peak pressures overnight.  Patient was bagged. RASS goal was changed -4. Her precedex was changed to propofol.  Will change to -1 to -2.  FiO2 35-40%    Assessment & Plan:   Goals of Care:  Life prolonging without limits       Neurology, Psychiatry, Sedation, Analgesia:  Acute metabolic encephalopathy secondary to sepsis, shock, renal failure     Sedation & Analgesia   - daily spontaneous awakening trials as able, RASS goal  -1 --> changed to - 4 on 10/14 due to elevated PIP.  - dexmedetomidine & fentanyl infusions  --> changed to fentanyl and propofol  - consider antipsychotic given agitation with weaning 10/13     Cardiovascular:  Septic shock - improved    Warm. Wide PP. 10/13 cardiac POCUS grossly preserved biventricular function, poor window inhibited LVOT VTI, EPSS, TAPSE assessment   - NE & vasopressin goal MAP>65  - now off pressors.    Respiratory, Airway:  Acute respiratory failure   Increased metabolic load & loss of protective airway reflexes   10/12/23 radiograph demonstrated bibasilar opacities concerning for atelectasis & ETT 1.9 above the emilia which was retracted 2 cm  - supplemental O2 to maintain spO2 >= 90-96% & PaO2 >= 60 mmHg  - lung protective ventilation (Pplat <30, driving pressure <15)  - daily  spontaneous breathing trials as able    History of JARVIS, allusions to asthma in the chart  - NIPPV at extubation with sleep/naps     Gastrointestinal:  10/9 s/p sleeve gastrectomy complicated by small bowel injury & peritonitis   10/12/23 returned to OR for closure, clean-out, & drain placement   - surgery consulted  - peritoneal drains X3 in place     Renal, Acid-base, Electrolytes, Volume:  Oliguric SUSSY    - nephrology was consulted   - diuretic challenge  - on bumex 3 mg iv q8h  - sodium bicarb stopped    Infectious Disease:  Small bowel injury with peritonitis s/p closure, clean-out, & drain placement   - 10/12 - pending intra-operative cultures   - continue vanc, piptazo, metronidazole    Hematology, Oncology:  Anemia - mild  Suspect multifactorial secondary to sepsis & surgical blood loss   - monitor     Endocrine:  No active issues     Checklist:  FEN: NOP  VTE ppx: sqh  GI ppx: pantoprazole   Bowel regimen: PEG & senna   VAP ppx: Head of bed >30 degrees, daily oral care  Lines/tube-size: L-internal jugular CVC (10/12), PIVs, arevalo (10/12), ETT 7.5 (10/12)  Skin: no lesions    PT/OT/SLP, early mobility: not consulted   Code Status: Full     Physical Exam:  Neurologic: Sedated.Opens eyes, tracks, & follows commands in all extremities.   HEENT: Head and face normal. No nasal discharge. Oropharynx normal. Eyelids, conjunctiva, & sclera normal.   Neck: Neck appearance normal. No neck masses. Thyroid not enlarged.  Respiratory: Lungs clear bilaterally. No wheezes, crackles, or rhonchi.   Cardiovascular: Regular rate & rhythm  Normal S1 & S2. No murmurs, rubs. or gallops. No elevated JVP.    Gastrointestinal: Obese. Abdominal dressing clean, dry, intact. MICKIE drains X2  Musculoskeletal: Skeletal configuration normal and muscle mass normal for age. Joint appearance overall normal.  Skin, Hair, & Nails: Skin color normal. No skin lesions. Hair & nails normal.  Extremities: 2+ lower extremity pitting edema     All  pertinent vital signs, ventilator settings, I&Os, laboratory, microbiology, ECGs, & imaging data has been personally reviewed. Total Critical Care time, excluding procedures, 60 minutes     Maria Rhoades MD

## 2023-10-14 NOTE — PROGRESS NOTES
[facility]  [facility]   Follow up    Mojgan Jimenez   MRN: [id number]  : 1970   DOA: 10/9/2023   CC: ARF      Assessment and Plan:  53 year old female    ARF;  hemodynamic exacerbated by IV NSAIDS (lowish bp, vasodilatory drugs, mild intravascular depletion)==> ATN; CR had been rising in anephric fashion and now rate of rise is less and she is still making urine and has no absolute indication for dialysis again today; managing expectantly; d/w bedside RN  S/p gastric bypass; complicated by leak; back to OR for repair and wash out of bilious fluid; on abx; supportive cares  HoTN; infection/SIIRS; on low dose pressors and now weaned off  ID; on abx for peritonitis  Fluid status; total body elevated but not in vascular space; making more urine now and some response to diuretics; will continue some bumex to maintain non-oliguric state  MS change; consistent with build up of narcotic and gabapentin metabolites due to ARF; now sedated for being on vent  Hyperlipid; on statin  Elevated LFTs; following; some increase today  Resp failure; juancho-op; on vent; wean as able  Acidosis; resolved; off bicarb IVF  Nutrition; will start TPN at 50 ml/hour and follow; appreciate pharm D and RD consults    Subjective  Urine output increasing some; off pressors  Some issues with ventilation and adjustments made overnoc    Objective    Vital signs in last 24 hours  Temp:  [97.7  F (36.5  C)-99.3  F (37.4  C)] 97.7  F (36.5  C)  Pulse:  [70-82] 71  Resp:  [15-34] 26  BP: ()/(48-81) 100/67  FiO2 (%):  [35 %-100 %] 35 %  SpO2:  [93 %-100 %] 97 %      Intake/Output last 3 shifts  I/O last 3 completed shifts:  In: 1741.14 [I.V.:1641.14]  Out: 1045 [Urine:690; Emesis/NG output:205; Drains:150]  Intake/Output this shift:  I/O this shift:  In: 414 [I.V.:414]  Out: 128 [Urine:120; Drains:8]    Physical Exam  Intubated + facial edema  CV: RRR without murmur or rub  Lung:  clear and equal; no extra sounds  Ab: + distended and diffusely tender  Ext: some edema and well perfused  Skin; no rash    Pertinent Labs     Last Renal Panel:  Sodium   Date Value Ref Range Status   10/14/2023 136 135 - 145 mmol/L Final     Comment:     Reference intervals for this test were updated on 09/26/2023 to more accurately reflect our healthy population. There may be differences in the flagging of prior results with similar values performed with this method. Interpretation of those prior results can be made in the context of the updated reference intervals.    11/17/2020 141 133 - 144 mmol/L Final     Potassium   Date Value Ref Range Status   10/14/2023 4.4 3.4 - 5.3 mmol/L Final   05/17/2022 4.5 3.4 - 5.3 mmol/L Final   11/17/2020 4.1 3.4 - 5.3 mmol/L Final     Chloride   Date Value Ref Range Status   10/14/2023 99 98 - 107 mmol/L Final   05/17/2022 102 94 - 109 mmol/L Final   11/17/2020 109 94 - 109 mmol/L Final     Carbon Dioxide   Date Value Ref Range Status   11/17/2020 31 20 - 32 mmol/L Final     Carbon Dioxide (CO2)   Date Value Ref Range Status   10/14/2023 19 (L) 22 - 29 mmol/L Final   05/17/2022 32 20 - 32 mmol/L Final     Anion Gap   Date Value Ref Range Status   10/14/2023 18 (H) 7 - 15 mmol/L Final   05/17/2022 2 (L) 3 - 14 mmol/L Final   11/17/2020 1 (L) 3 - 14 mmol/L Final     Glucose   Date Value Ref Range Status   10/14/2023 105 (H) 70 - 99 mg/dL Final   05/17/2022 110 (H) 70 - 99 mg/dL Final   11/17/2020 84 70 - 99 mg/dL Final     Comment:     Fasting specimen     GLUCOSE BY METER POCT   Date Value Ref Range Status   10/13/2023 174 (H) 70 - 99 mg/dL Final     Comment:     /RN Notified     Urea Nitrogen   Date Value Ref Range Status   10/14/2023 85.2 (H) 6.0 - 20.0 mg/dL Final   05/17/2022 16 7 - 30 mg/dL Final   11/17/2020 9 7 - 30 mg/dL Final     Creatinine   Date Value Ref Range Status   10/14/2023 6.97 (H) 0.51 - 0.95 mg/dL Final   11/17/2020 0.79 0.52 - 1.04 mg/dL Final     GFR  Estimate   Date Value Ref Range Status   10/14/2023 7 (L) >60 mL/min/1.73m2 Final   11/17/2020 88 >60 mL/min/[1.73_m2] Final     Comment:     Non  GFR Calc  Starting 12/18/2018, serum creatinine based estimated GFR (eGFR) will be   calculated using the Chronic Kidney Disease Epidemiology Collaboration   (CKD-EPI) equation.       Calcium   Date Value Ref Range Status   10/14/2023 8.2 (L) 8.6 - 10.0 mg/dL Final   11/17/2020 8.9 8.5 - 10.1 mg/dL Final     Albumin   Date Value Ref Range Status   10/14/2023 2.7 (L) 3.5 - 5.2 g/dL Final   05/17/2022 4.0 3.4 - 5.0 g/dL Final   12/03/2018 3.6 3.4 - 5.0 g/dL Final     Recent Labs   Lab 10/13/23  0426 10/12/23  0315 10/11/23  0634   HGB 10.2* 12.8 13.2          I reviewed all lab results  Francesca Cruz MD

## 2023-10-14 NOTE — CONSULTS
CLINICAL NUTRITION SERVICES - ASSESSMENT NOTE     Nutrition Prescription    RECOMMENDATIONS FOR MDs/PROVIDERS TO ORDER:    Malnutrition Status:    Does not meet criteria    Recommendations already ordered by Registered Dietitian (RD):  Initiate custom TPN: dextrose 125 gm, AA 63 gm at 50 mL/hr to provide 1200 mL, 677 kcals, 125 gm carb, 63 gm protein.   No lipids-on propofol  TPN + propofol = 1088 kcals (~95% of estimated kcal needs)    Future/Additional Recommendations:  Monitor labs, propofol  Increase dextrose to meet nutrition needs  Add lipids if off profofol     REASON FOR ASSESSMENT  Mojgan Jimenez is a/an 53 year old female assessed by the dietitian for Pharmacy/Nutrition to Start and Manage PN  Current order for clinimix at 50 mL/hr-spoke with pharmacist-not started yet, goal to keep rate of 50 mL/hr d/t fluid status    NUTRITION HISTORY  No interview while intubated    PMH: asthma, vitamin D deficiency, LINA, meth abuse, hyperlipidemia, depression, morbid obesity, sleep apnea    S/p Laparoscopic Sleeve with Single Anastomosis Duodenal Switch 10/9  S/p LAPAROSCOPY, LAPAROTOMY, CLOSURE OF TWO SMALL BOWEL INTEROTOMIES, DRAIN PLACEMENT, INTRA-ABDOMINAL CLEAN OUT 10/12    CURRENT NUTRITION ORDERS  Diet: NPO  Intake/Tolerance: na    LABS  Labs reviewed   (H)   (H)  Glucose   BUN 85.2 (H)  Creatinine 6.97 (H)   Triglycerides 339 (H)    MEDICATIONS   [Held by provider] acetaminophen  975 mg Oral or Feeding Tube Q8H    bumetanide  3 mg Intravenous Q8H    [Held by provider] cetirizine  10 mg Oral Daily    [Held by provider] childrens multivitamin with iron  1 tablet Oral BID    chlorhexidine  15 mL Mouth/Throat Q12H    [Held by provider] cyanocobalamin  1,000 mcg Sublingual Daily    sennosides  5 mL Oral or Feeding Tube BID    And    docusate  50 mg Oral or Feeding Tube BID    [Held by provider] fluticasone-vilanterol  1 puff Inhalation Daily    heparin ANTICOAGULANT  5,000 Units Subcutaneous Q8H  "   micafungin  100 mg Intravenous Q24H    pantoprazole  40 mg Intravenous QAM AC    piperacillin-tazobactam  3.375 g Intravenous Q12H    polyethylene glycol  17 g Oral or Feeding Tube Daily    [Held by provider] rosuvastatin  10 mg Oral Daily    sodium chloride (PF)  3 mL Intracatheter Q8H    vancomycin  1,500 mg Intravenous Once    vancomycin place alegria - receiving intermittent dosing  1 each Intravenous See Admin Instructions    [Held by provider] venlafaxine  75 mg Oral TID       dextrose      fentaNYL 100 mcg/hr (10/14/23 0400)    norepinephrine Stopped (10/13/23 1814)    parenteral nutrition - Clinimix E      propofol 20 mcg/kg/min (10/14/23 0553)    vasopressin Stopped (10/12/23 1145)   Propofol at 15.6 mL/hr provides ~ 411 kcals       ANTHROPOMETRICS  Height: 160 cm (5' 3\")  Most Recent Weight: 130.4 kg (287 lb 6.4 oz)    IBW: 52.3 kg  BMI: Obesity Grade III BMI >40  Weight History:   Wt Readings from Last 20 Encounters:   10/13/23 130.4 kg (287 lb 6.4 oz)   08/09/23 133.4 kg (294 lb 3.2 oz)   07/05/23 129.7 kg (286 lb)   03/13/23 129.2 kg (284 lb 12.8 oz)   11/10/22 129.3 kg (285 lb)   11/07/22 129.3 kg (285 lb)   08/30/22 132.6 kg (292 lb 4.8 oz)   08/25/22 131.7 kg (290 lb 6.4 oz)   06/15/22 132 kg (291 lb)   05/17/22 131.8 kg (290 lb 9.6 oz)   03/22/22 131.1 kg (289 lb)   08/31/21 132.5 kg (292 lb)   06/22/21 121.1 kg (267 lb)   11/17/20 113.4 kg (250 lb)   03/10/20 112.1 kg (247 lb 3.2 oz)   12/10/19 107 kg (235 lb 12.8 oz)   09/10/19 105.2 kg (232 lb)   05/14/19 104.8 kg (231 lb)   04/02/19 109 kg (240 lb 3.2 oz)   05/08/18 84.1 kg (185 lb 6.4 oz)     Dosing Weight: 52.3 kg (IBW)    ASSESSED NUTRITION NEEDS  Estimated Energy Needs: 9731-6873 kcals/day (22 - 25 kcals/kg)  Justification: Obese, Post-op, and Vented  Estimated Protein Needs: 63-78 grams protein/day (1.2 - 1.5 grams of pro/kg)  Justification: Obesity guidelines and Post-op  Estimated Fluid Needs: 3314-6122 mL/day (1 mL/kcal) "   Justification: Maintenance    PHYSICAL FINDINGS  See malnutrition section below.  Incisional wounds  NGT to LIS    MALNUTRITION:  % Weight Loss:  Weight loss does not meet criteria for malnutrition   % Intake:  Decreased intake does not meet criteria for malnutrition- on a clear liquid diet for planned gastric bypass surgery  Subcutaneous Fat Loss:  not done-vented  Muscle Loss:  not done-vented  Fluid Retention:  None noted    Malnutrition Diagnosis: Patient does not meet two of the above criteria necessary for diagnosing malnutrition    NUTRITION DIAGNOSIS  Inadequate oral intake related to intubated and sedated as evidenced by NPO and need for TPN      INTERVENTIONS  Implementation  Nutrition Education: No education needs assessed at this time   Parenteral Nutrition/IV Fluids - Initiate     Goals  Meet nutrition needs with TPN     Monitoring/Evaluation  Progress toward goals will be monitored and evaluated per protocol.

## 2023-10-14 NOTE — PROGRESS NOTES
RT PROGRESS NOTE    VENT DAY# Ventilation Day(s): 3    CURRENT SETTINGS:   Vent Mode: CMV/AC  (Continuous Mandatory Ventilation/ Assist Control)  FiO2 (%): 35 %  Resp Rate (Set): 20 breaths/min  Tidal Volume (Set, mL): 360 mL  PEEP (cm H2O): 8 cmH2O  Pressure Support (cm H2O): 5 cmH2O  Resp: 13      PATIENT PARAMETERS:  Ventilator - Patient   Patient Resp Rate : 25 breaths/min  Expiratory Vt (ml): 445  Minute Volume (ml): 6.84 L/min  Peak Inspiratory Pressure (cm H2O): 30 cmH2O  Mean Airway Pressure (cm H2O): 14 cmH2O  Plateau Pressure (cm H2O): 22 cmH2O  Dynamic Compliance (mL/cm H2O): 124 mL/cm H2O  Airway Resistance: 24  Auto/ Intrinsic PEEP (cm H2O): 0.1 cmH2O     SBT completed No  Secretions: small, thick, yellow  Breath Sounds: diminished    ETT Cuffed Oral 7.5 mm-Secured at (cm): 20 cm at teeth/gums  Emergency Ambu bag, mask and peep valve at bedside.         ABG: @1248 pH 7.34; pCO2 39; pO2 72; HCO3 21, %O2 Sat 94, BE -4.5 on AC RR 20, Vt 360, PEEP 8, 35%      HARRISON CASTANEDA, RT

## 2023-10-14 NOTE — PHARMACY-CONSULT NOTE
"Pharmacy Note: Parenteral Nutrition (PN) Management    Pharmacist consulted to dose PN for Mojgan Jimenez, a 53 year old female by Dr. Cruz (desired by surgery, initiation deferred to nephrology due fluid status).    Subjective:    The patient is a new PN start.    The patient was started on PN in the hospital on 10/14/23.    Indication for PN therapy:  peritonitis    Inadequate nutrition anticipated for > 7 days.     Enteral nutrition contraindicated due to: peritonitis.    Pertinent diseases and other special considerations  POD #5 sleeve gastrectomy    Social History     Tobacco Use    Smoking status: Never    Smokeless tobacco: Never   Vaping Use    Vaping Use: Never used   Substance Use Topics    Alcohol use: Not Currently     Comment: Sober x 8 months    Drug use: Not Currently     Types: Methamphetamines, \"Crack\" cocaine     Comment: in remision      Objective:    Ht Readings from Last 1 Encounters:   10/09/23 1.6 m (5' 3\")     Wt Readings from Last 1 Encounters:   10/13/23 130.4 kg (287 lb 6.4 oz)       Body mass index is 50.91 kg/m .    Patient Vitals for the past 96 hrs:   Weight   10/13/23 0400 130.4 kg (287 lb 6.4 oz)       Labs:  Last 3 days:  Recent Labs     10/11/23  1540 10/12/23  0315 10/12/23  0315 10/12/23  1712 10/12/23  1803 10/13/23  0426 10/13/23  1724 10/13/23  2338 10/14/23  0629    134*  --  133* 133* 134* 135 135 136   POTASSIUM 4.4 4.1  --  5.3 5.2 4.5 4.5 4.6 4.4   CHLORIDE 99 101  --  103 103 100 100 100 99   CO2 19* 18*  --  14* 14* 18* 20* 20* 19*   BUN 41.0* 50.4*  --  55.4* 55.8* 62.7* 72.0* 79.5* 85.2*   CR 4.59* 5.66*  --  6.05* 5.97* 6.51* 6.64* 6.86* 6.97*   PALAK 9.6 9.5  --  8.5* 8.4* 8.3* 8.1* 8.2* 8.2*   MAG  --  2.2  --   --   --   --   --   --  2.6*   PHOS  --   --   --   --   --   --   --   --  5.3*   PROTTOTAL  --  6.5  --   --   --   --   --   --  6.0*   ALBUMIN  --  2.8*  --   --   --   --   --   --  2.7*   TRIG  --   --   --   --   --   --   --   --  339*   HGB  " --  12.8  --   --   --  10.2*  --   --   --    HCT  --  39.8  --   --   --  32.3*  --   --   --    PLT  --  213  --  189  --  162  --   --   --    BILITOTAL  --  0.5  --   --   --   --   --   --  0.4   AST  --  97*  --   --   --   --   --   --  367*   ALT  --  177*  --   --   --   --   --   --  207*   ALKPHOS  --  87   < >  --   --   --   --   --  83    < > = values in this interval not displayed.       Glucose (past 48 hours):   Recent Labs     10/12/23  1803 10/12/23  2026 10/13/23  0026 10/13/23  0424 10/13/23  0426 10/13/23  1619 10/13/23  1724 10/13/23  2037 10/13/23  2338 10/14/23  0629   * 122* 148* 175* 178* 149* 145* 174* 131* 105*       Intake/Output (last 24 hours): I/O last 3 completed shifts:  In: 1741.14 [I.V.:1641.14]  Out: 1045 [Urine:690; Emesis/NG output:205; Drains:150]    Estimated CrCl: Estimated Creatinine Clearance: 12.3 mL/min (A) (based on SCr of 6.97 mg/dL (H)).    Assessment:    Initiate patient on PN therapy as a continuous central therapy.     Given the patient's current condition/oral intake, PN is still indicated.    Lab results reviewed:   10/14 max fluid rate per nephrology, mag and phos elevated    Plan:  Rate of PN: 50 mL/hr  Formula:   Amino Acids 63 grams  Dextrose 125 grams  Sodium 40 mEq/day  Potassium 30 mEq/day  Calcium 4 mEq/day  Magnesium 4 mEq/day  Phosphorus 3 mMol/day  Chloride: Acetate Ratio 1:1  Standard Multivitamins w/Vitamin K  Trace Elements  Vitamin B12 100 mcg  Zinc 5 mg  Fat Emulsion: No lipids at this time due to propofol infusion and trigs = 339.  Check hyperal lytes and ionized calcium labs tomorrow.  Pharmacist will continue to follow the patient's lab results, clinical status and blood glucose results and make adjustments as appropriate.    Thank you for the consult.  Sagrario Cervantes [facility]  10/14/2023 12:19 PM

## 2023-10-14 NOTE — PROGRESS NOTES
Due to high peak pressure, changes are made. Current vent settings: AC 26 360 35% 10. ABG drawn. PIP 37 cmH2O, Pplat 26 cmH2O. RT following.    Juan Daniel Parnell, RT

## 2023-10-14 NOTE — PROGRESS NOTES
[facility] - ICU    RN Progress Note:            Pertinent Assessments:      Please refer to flowsheet rows for full assessment     Vent support and sedation.          Key Events - This Shift:       High PIP, changes in sedation and RASS goal, Precedex off and propofol started. current RASS -4      [facility] SAT (Sedation Awakening Trial): For use ONLY if intubated    SAT Safety Screen Not Applicable   If FAILED why?    SAT Performed Not Applicable   If FAILED why?               Barriers to Discharge / Downgrade:     Vent support and renal function.

## 2023-10-14 NOTE — PHARMACY-VANCOMYCIN DOSING SERVICE
"Pharmacy Vancomycin Note  Date of Service 2023  Patient's  1970   53 year old, female    Indication: Sepsis  Day of Therapy: 3  Current vancomycin regimen:  intermittent dosing due to severe SUSSY  Current vancomycin monitoring method: Trough (Method 2 = manual dose calculation)  Current vancomycin therapeutic monitoring goal: 15-20 mg/L    Current estimated CrCl = Estimated Creatinine Clearance: 12.3 mL/min (A) (based on SCr of 6.97 mg/dL (H)).    Creatinine for last 3 days  10/11/2023:  3:40 PM Creatinine 4.59 mg/dL  10/12/2023:  3:15 AM Creatinine 5.66 mg/dL;  5:12 PM Creatinine 6.05 mg/dL;  6:03 PM Creatinine 5.97 mg/dL  10/13/2023:  4:26 AM Creatinine 6.51 mg/dL;  5:24 PM Creatinine 6.64 mg/dL; 11:38 PM Creatinine 6.86 mg/dL  10/14/2023:  6:29 AM Creatinine 6.97 mg/dL    Recent Vancomycin Levels (past 3 days)  10/14/2023:  6:29 AM Vancomycin 16.9 ug/mL    Vancomycin IV Administrations (past 72 hours)                     vancomycin (VANCOCIN) 2,250 mg in sodium chloride 0.9 % 500 mL intermittent infusion (mg) 2,250 mg New Bag 10/12/23 1258                    Nephrotoxins and other renal medications (From now, onward)      Start     Dose/Rate Route Frequency Ordered Stop    10/13/23 2000  piperacillin-tazobactam (ZOSYN) 3.375 g vial to attach to  mL bag        Note to Pharmacy: For [facility], [facility] and [facility]: For Zosyn-naive patients, use the \"Zosyn initial dose + extended infusion\" order panel.    3.375 g  over 240 Minutes Intravenous EVERY 12 HOURS 10/13/23 1345      10/12/23 1630  norepinephrine (LEVOPHED) 4 mg in  mL infusion PREMIX         0.01-0.6 mcg/kg/min × 130.7 kg  4.9-294.1 mL/hr  Intravenous CONTINUOUS 10/12/23 1614      10/12/23 1102  vancomycin place alegria - receiving intermittent dosing         1 each Intravenous SEE ADMIN INSTRUCTIONS 10/12/23 1102      10/12/23 1030  vasopressin (VASOSTRICT) 20 Units in sodium chloride 0.9 % 100 mL standard conc infusion         2.4 " Units/hr  12 mL/hr  Intravenous CONTINUOUS 10/12/23 1028                 Contrast Orders - past 72 hours (72h ago, onward)      None            Interpretation of levels and current regimen:  Vancomycin level is reflective of therapeutic level after a single dose    Urine output:  improving    Renal Function:  creatinine continues to rise    Plan:  With renal function continuing to change, redose vancomycin once: 1500 mg (11.5 mg/kg)  and recheck level in 2 days  Vancomycin monitoring method: Trough (Method 2 = manual dose calculation)  Vancomycin therapeutic monitoring goal: 15-20 mg/L  Pharmacy will check vancomycin levels as appropriate in 1-3 Days.  Serum creatinine levels will be ordered daily for the first week of therapy and at least twice weekly for subsequent weeks.    Sagrario Cervantes, [facility]

## 2023-10-14 NOTE — PROGRESS NOTES
Care Management Follow Up    Length of Stay (days): 5    Expected Discharge Date: 10/16/2023     Concerns to be Addressed:  Medical-Requires ICU level of care due to intubation, ventilatory support, and IV medications. Nephrology following. Surgery Following.   Patient plan of care discussed at interdisciplinary rounds: Yes    Anticipated Discharge Disposition:  To be determined, goal to return home     Anticipated Discharge Services:  To be determined pending progression and recommendations.   Anticipated Discharge DME:  To be determined closer to time of discharge    Patient/family educated on Medicare website which has current facility and service quality ratings:  Not at this time  Education Provided on the Discharge Plan:  Yes, per team  Patient/Family in Agreement with the Plan:  Yes    Referrals Placed by CM/SW:  None at this time  Private pay costs discussed: Not applicable    Additional Information:  Chart Reviewed. Per discussion in rounds cultures negative to date, continues IV antibiotics. Has a MICKIE drain in place.     History:  From home with adult daughter and grandchild in an apartment. Independent at baseline and drives. Patient currently unemployed; saved up for procedures, plans to resume work once recovered. No community programs. Has a neb machine available for use due to Asthma. Goal to return home with family support. Family to transport if able.     Sagrario Matta RN

## 2023-10-14 NOTE — PROGRESS NOTES
General Surgery Progress Note:    Hospital Day # 5    ASSESSMENT:  1. Perimenopausal symptoms        Mojgan Jimenez is a 53 year old female who is s/p laparoscopic sleeve gastrectomy with KO on 10/9 post-op course complicated by small enterotomy x2 s/p laparoscopy converted to laparotomy with washout and enterotomy closure x2 on 10/12. Patient remains sedated and intubated post-operatively. Now weaned off vasopressors though remains in critical condition. Creatinine 6.97 < 6.86 and urine output slightly improved. Surgical drains remain serous or serosanguinous and without signs of continued leak.     PLAN:  - Continue supportive cares per ICU team  - Continue IV antibiotics  - NPO, await return of bowel function  - Nephrology recommendations reviewed and appreciated  - Okay to start TPN per nephrology with close monitoring    SUBJECTIVE:   She is intubated and sedated.      Patient Vitals for the past 24 hrs:   BP Temp Temp src Pulse Resp SpO2   10/14/23 1215 111/74 -- -- 99 16 92 %   10/14/23 1200 109/75 -- -- 100 19 92 %   10/14/23 1145 108/72 -- -- 100 18 92 %   10/14/23 1100 111/69 -- -- 101 18 93 %   10/14/23 1030 100/63 -- -- 88 20 99 %   10/14/23 1015 98/60 -- -- 74 20 96 %   10/14/23 1000 97/59 99  F (37.2  C) warmer 72 20 96 %   10/14/23 0945 92/57 -- -- 71 20 97 %   10/14/23 0930 96/57 -- -- 71 20 97 %   10/14/23 0921 100/60 -- -- 71 -- 97 %   10/14/23 0915 -- -- -- 71 26 97 %   10/14/23 0900 98/61 -- -- 71 26 97 %   10/14/23 0830 98/66 -- -- 71 26 97 %   10/14/23 0800 100/67 97.7  F (36.5  C) Oral 71 26 97 %   10/14/23 0730 100/67 -- -- 71 26 98 %   10/14/23 0700 100/66 -- -- 71 26 98 %   10/14/23 0600 100/67 98  F (36.7  C) Oral 71 26 97 %   10/14/23 0530 99/69 -- -- 71 26 97 %   10/14/23 0500 98/66 -- -- 71 26 97 %   10/14/23 0430 96/66 -- -- 71 26 97 %   10/14/23 0400 93/63 98.7  F (37.1  C) Esophageal 71 24 97 %   10/14/23 0300 95/65 -- -- 71 24 97 %   10/14/23 0230 100/68 -- -- 71 26 97 %    10/14/23 0200 104/68 98.5  F (36.9  C) Esophageal 71 26 97 %   10/14/23 0130 102/69 -- -- 71 26 97 %   10/14/23 0100 104/72 -- -- 71 26 98 %   10/14/23 0030 100/68 -- -- 71 26 98 %   10/14/23 0000 111/70 98.7  F (37.1  C) Esophageal 71 26 99 %   10/13/23 2300 104/68 -- -- 72 28 98 %   10/13/23 2200 103/71 -- -- 70 26 98 %   10/13/23 2100 105/68 -- -- 71 26 98 %   10/13/23 2000 101/67 98.5  F (36.9  C) Bladder 71 26 97 %   10/13/23 1900 110/68 -- -- 72 26 99 %   10/13/23 1800 105/71 98.6  F (37  C) Esophageal 73 26 98 %   10/13/23 1700 104/67 -- -- 74 27 98 %   10/13/23 1608 -- -- -- 76 26 100 %   10/13/23 1605 -- -- -- 76 -- 100 %   10/13/23 1600 107/55 98.3  F (36.8  C) Esophageal 82 (!) 34 100 %   10/13/23 1515 95/60 -- -- 72 26 97 %   10/13/23 1500 99/59 -- -- 73 26 97 %   10/13/23 1445 101/59 -- -- 73 26 98 %   10/13/23 1430 105/60 98.2  F (36.8  C) Esophageal 75 26 96 %   10/13/23 1415 115/60 -- -- 75 26 93 %   10/13/23 1400 134/81 -- -- 79 26 94 %   10/13/23 1345 118/68 -- -- 79 26 98 %   10/13/23 1330 105/59 -- -- 75 17 96 %   10/13/23 1315 120/71 -- -- 74 24 98 %   10/13/23 1300 110/67 -- -- 74 23 98 %   10/13/23 1245 111/64 -- -- 74 17 98 %   10/13/23 1230 108/58 -- -- 74 21 98 %         PHYSICAL EXAM:  General: patient seen resting in bed, no acute distress  Resp: intubated  Abdomen: Incisions clean/dry/intact with sterile dressings in place.   Drains: Surgical MICKIE drains x3 with serous and serosanguinous output.   Output by Drain (mL) 10/12/23 0700 - 10/12/23 1459 10/12/23 1500 - 10/12/23 2259 10/12/23 2300 - 10/13/23 0659 10/13/23 0700 - 10/13/23 1459 10/13/23 1500 - 10/13/23 2259 10/13/23 2300 - 10/14/23 0659 10/14/23 0700 - 10/14/23 1229   Closed/Suction Drain 1 Right RUQ Bulb 19 [place]  80 60 25 25 5 17   Closed/Suction Drain 2 Left LUQ Bulb 19 [place]  65 50 35 35 10 9   Closed/Suction Drain 3 Left LLQ Bulb 19 [place]  40 15 10 5 0 5   : arevalo catheter in place  Extremities: warm and well  perfused    10/13 0700 - 10/14 0659  In: 1741.14 [I.V.:1641.14]  Out: 1045 [Urine:690; Drains:150]    No results displayed because visit has over 200 results.           Rafaela West PA-C  [facility] General Surgery  [address] [address]

## 2023-10-14 NOTE — PROGRESS NOTES
ICU Update:    Notified by RT that peak pressures are elevated. In review of ventilator flow sheet, also elevated at 7:15pm.    At bedside, Peak inspiratory pressure is 40, Tv was <400.    There was no evidence of mucus plugging, suctioning was unhelpful. She was easy to bag suggesting compliance of lungs not an issue.    I dropped her Tv to 400 from 420 with no significant change in tidal volume or peak pressure.    We boosted her in bed and elevated HOB past 30 degress without change.    Both lungs are clear without rales or wheezing.    I suspect this is a combination of body habitus and peritonitis.  Repeat CXR  Change RASS goal to -4  Change precedex to propofol to prevent any splinting     Madison Dodge MD    Critical Care time: 30 minutes, patient critically ill requiring mechanical ventilation for hypoxia.

## 2023-10-15 LAB
ALBUMIN SERPL BCG-MCNC: 2.8 G/DL (ref 3.5–5.2)
ALLEN'S TEST: YES
ALP SERPL-CCNC: 96 U/L (ref 35–104)
ALT SERPL W P-5'-P-CCNC: 173 U/L (ref 0–50)
ANION GAP SERPL CALCULATED.3IONS-SCNC: 18 MMOL/L (ref 7–15)
AST SERPL W P-5'-P-CCNC: 171 U/L (ref 0–45)
ATRIAL RATE - MUSE: 70 BPM
BACTERIA PRT CULT: ABNORMAL
BACTERIA PRT CULT: ABNORMAL
BACTERIA PRT CULT: NORMAL
BASE EXCESS BLDA CALC-SCNC: -4.5 MMOL/L
BASE EXCESS BLDV CALC-SCNC: -1.7 MMOL/L
BILIRUB DIRECT SERPL-MCNC: 0.21 MG/DL (ref 0–0.3)
BILIRUB SERPL-MCNC: 0.3 MG/DL
BUN SERPL-MCNC: 95.6 MG/DL (ref 6–20)
CALCIUM SERPL-MCNC: 8.9 MG/DL (ref 8.6–10)
CALCIUM, IONIZED MEASURED: 1.04 MMOL/L (ref 1.11–1.3)
CHLORIDE SERPL-SCNC: 96 MMOL/L (ref 98–107)
COHGB MFR BLD: 95.6 % (ref 96–97)
CREAT SERPL-MCNC: 6.07 MG/DL (ref 0.51–0.95)
DEPRECATED HCO3 PLAS-SCNC: 22 MMOL/L (ref 22–29)
DIASTOLIC BLOOD PRESSURE - MUSE: NORMAL MMHG
EGFRCR SERPLBLD CKD-EPI 2021: 8 ML/MIN/1.73M2
GLUCOSE BLDC GLUCOMTR-MCNC: 134 MG/DL (ref 70–99)
GLUCOSE BLDC GLUCOMTR-MCNC: 134 MG/DL (ref 70–99)
GLUCOSE BLDC GLUCOMTR-MCNC: 143 MG/DL (ref 70–99)
GLUCOSE BLDC GLUCOMTR-MCNC: 147 MG/DL (ref 70–99)
GLUCOSE BLDC GLUCOMTR-MCNC: 148 MG/DL (ref 70–99)
GLUCOSE BLDC GLUCOMTR-MCNC: 156 MG/DL (ref 70–99)
GLUCOSE SERPL-MCNC: 154 MG/DL (ref 70–99)
HBA1C MFR BLD: 5.8 %
HCO3 BLD-SCNC: 23 MMOL/L (ref 23–29)
HCO3 BLDV-SCNC: 24 MMOL/L (ref 24–30)
INR PPP: 1.19 (ref 0.85–1.15)
INTERPRETATION ECG - MUSE: NORMAL
ION CA PH 7.4: 0.99 MMOL/L (ref 1.11–1.3)
MAGNESIUM SERPL-MCNC: 2.7 MG/DL (ref 1.7–2.3)
NT-PROBNP SERPL-MCNC: 4534 PG/ML (ref 0–900)
O2/TOTAL GAS SETTING VFR VENT: 35 %
OXYHGB MFR BLD: 94.8 % (ref 96–97)
OXYHGB MFR BLDV: 64.4 % (ref 70–75)
P AXIS - MUSE: 54 DEGREES
PCO2 BLD: 56 MM HG (ref 35–45)
PCO2 BLDV: 43 MM HG (ref 35–50)
PEEP: 8 CM H2O
PH BLD: 7.23 [PH] (ref 7.37–7.44)
PH BLDV: 7.35 [PH] (ref 7.35–7.45)
PH: 7.31 (ref 7.35–7.45)
PHOSPHATE SERPL-MCNC: 6.3 MG/DL (ref 2.5–4.5)
PLATELET # BLD AUTO: 179 10E3/UL (ref 150–450)
PO2 BLD: 87 MM HG (ref 80–90)
PO2 BLDV: 36 MM HG (ref 25–47)
POTASSIUM SERPL-SCNC: 3.9 MMOL/L (ref 3.4–5.3)
PR INTERVAL - MUSE: 198 MS
PROT SERPL-MCNC: 6.5 G/DL (ref 6.4–8.3)
QRS DURATION - MUSE: 104 MS
QT - MUSE: 398 MS
QTC - MUSE: 429 MS
R AXIS - MUSE: 95 DEGREES
RATE: 20 RR/MIN
SAO2 % BLDV: 65.3 % (ref 70–75)
SODIUM SERPL-SCNC: 136 MMOL/L (ref 135–145)
SYSTOLIC BLOOD PRESSURE - MUSE: NORMAL MMHG
T AXIS - MUSE: 1 DEGREES
TEMPERATURE: 37 DEGREES C
TRIGL SERPL-MCNC: 568 MG/DL
TROPONIN T SERPL HS-MCNC: 429 NG/L
TROPONIN T SERPL HS-MCNC: 543 NG/L
VENTILATION MODE: ABNORMAL
VENTILATOR TIDAL VOLUME: 360 ML
VENTRICULAR RATE- MUSE: 70 BPM

## 2023-10-15 PROCEDURE — 84100 ASSAY OF PHOSPHORUS: CPT | Performed by: INTERNAL MEDICINE

## 2023-10-15 PROCEDURE — 250N000009 HC RX 250: Performed by: INTERNAL MEDICINE

## 2023-10-15 PROCEDURE — 83036 HEMOGLOBIN GLYCOSYLATED A1C: CPT | Performed by: INTERNAL MEDICINE

## 2023-10-15 PROCEDURE — 999N000287 HC ICU ADULT ROUNDING, EACH 10 MINS

## 2023-10-15 PROCEDURE — 200N000001 HC R&B ICU

## 2023-10-15 PROCEDURE — 250N000011 HC RX IP 250 OP 636: Performed by: NURSE PRACTITIONER

## 2023-10-15 PROCEDURE — 999N000009 HC STATISTIC AIRWAY CARE

## 2023-10-15 PROCEDURE — C9113 INJ PANTOPRAZOLE SODIUM, VIA: HCPCS | Mod: JZ | Performed by: NURSE PRACTITIONER

## 2023-10-15 PROCEDURE — 258N000003 HC RX IP 258 OP 636: Performed by: NURSE PRACTITIONER

## 2023-10-15 PROCEDURE — 99222 1ST HOSP IP/OBS MODERATE 55: CPT | Mod: 25 | Performed by: INTERNAL MEDICINE

## 2023-10-15 PROCEDURE — 80053 COMPREHEN METABOLIC PANEL: CPT | Performed by: INTERNAL MEDICINE

## 2023-10-15 PROCEDURE — 999N000157 HC STATISTIC RCP TIME EA 10 MIN

## 2023-10-15 PROCEDURE — 82330 ASSAY OF CALCIUM: CPT | Performed by: SURGERY

## 2023-10-15 PROCEDURE — 250N000011 HC RX IP 250 OP 636: Mod: JZ | Performed by: STUDENT IN AN ORGANIZED HEALTH CARE EDUCATION/TRAINING PROGRAM

## 2023-10-15 PROCEDURE — 36600 WITHDRAWAL OF ARTERIAL BLOOD: CPT

## 2023-10-15 PROCEDURE — 85049 AUTOMATED PLATELET COUNT: CPT | Performed by: NURSE PRACTITIONER

## 2023-10-15 PROCEDURE — 250N000011 HC RX IP 250 OP 636: Mod: JZ | Performed by: INTERNAL MEDICINE

## 2023-10-15 PROCEDURE — 99232 SBSQ HOSP IP/OBS MODERATE 35: CPT | Performed by: INTERNAL MEDICINE

## 2023-10-15 PROCEDURE — 99024 POSTOP FOLLOW-UP VISIT: CPT | Performed by: PHYSICIAN ASSISTANT

## 2023-10-15 PROCEDURE — 83735 ASSAY OF MAGNESIUM: CPT | Performed by: INTERNAL MEDICINE

## 2023-10-15 PROCEDURE — 94003 VENT MGMT INPAT SUBQ DAY: CPT

## 2023-10-15 PROCEDURE — 82805 BLOOD GASES W/O2 SATURATION: CPT | Performed by: INTERNAL MEDICINE

## 2023-10-15 PROCEDURE — 85610 PROTHROMBIN TIME: CPT | Performed by: INTERNAL MEDICINE

## 2023-10-15 PROCEDURE — 250N000012 HC RX MED GY IP 250 OP 636 PS 637: Performed by: INTERNAL MEDICINE

## 2023-10-15 PROCEDURE — 83880 ASSAY OF NATRIURETIC PEPTIDE: CPT | Performed by: INTERNAL MEDICINE

## 2023-10-15 PROCEDURE — 250N000011 HC RX IP 250 OP 636: Mod: JZ | Performed by: NURSE PRACTITIONER

## 2023-10-15 PROCEDURE — 93005 ELECTROCARDIOGRAM TRACING: CPT | Performed by: INTERNAL MEDICINE

## 2023-10-15 PROCEDURE — 99291 CRITICAL CARE FIRST HOUR: CPT | Performed by: INTERNAL MEDICINE

## 2023-10-15 PROCEDURE — 250N000011 HC RX IP 250 OP 636: Performed by: INTERNAL MEDICINE

## 2023-10-15 PROCEDURE — 84484 ASSAY OF TROPONIN QUANT: CPT | Performed by: INTERNAL MEDICINE

## 2023-10-15 PROCEDURE — 84478 ASSAY OF TRIGLYCERIDES: CPT | Performed by: INTERNAL MEDICINE

## 2023-10-15 PROCEDURE — 250N000013 HC RX MED GY IP 250 OP 250 PS 637: Performed by: NURSE PRACTITIONER

## 2023-10-15 PROCEDURE — 93005 ELECTROCARDIOGRAM TRACING: CPT

## 2023-10-15 PROCEDURE — 93010 ELECTROCARDIOGRAM REPORT: CPT | Performed by: INTERNAL MEDICINE

## 2023-10-15 PROCEDURE — 82248 BILIRUBIN DIRECT: CPT | Performed by: INTERNAL MEDICINE

## 2023-10-15 RX ORDER — DEXMEDETOMIDINE HYDROCHLORIDE 4 UG/ML
.1-1.2 INJECTION, SOLUTION INTRAVENOUS CONTINUOUS
Status: DISCONTINUED | OUTPATIENT
Start: 2023-10-15 | End: 2023-10-27

## 2023-10-15 RX ORDER — CALCIUM GLUCONATE 20 MG/ML
2 INJECTION, SOLUTION INTRAVENOUS ONCE
Status: COMPLETED | OUTPATIENT
Start: 2023-10-15 | End: 2023-10-15

## 2023-10-15 RX ORDER — DEXTROSE MONOHYDRATE 25 G/50ML
25-50 INJECTION, SOLUTION INTRAVENOUS
Status: DISCONTINUED | OUTPATIENT
Start: 2023-10-15 | End: 2023-10-25

## 2023-10-15 RX ORDER — NICOTINE POLACRILEX 4 MG
15-30 LOZENGE BUCCAL
Status: DISCONTINUED | OUTPATIENT
Start: 2023-10-15 | End: 2023-10-25

## 2023-10-15 RX ORDER — BUMETANIDE 0.25 MG/ML
2 INJECTION INTRAMUSCULAR; INTRAVENOUS EVERY 8 HOURS
Status: DISCONTINUED | OUTPATIENT
Start: 2023-10-15 | End: 2023-10-17

## 2023-10-15 RX ADMIN — AMIODARONE HYDROCHLORIDE 0.5 MG/MIN: 1.8 INJECTION, SOLUTION INTRAVENOUS at 08:10

## 2023-10-15 RX ADMIN — Medication 50 MCG/HR: at 19:51

## 2023-10-15 RX ADMIN — PROPOFOL 40 MCG/KG/MIN: 10 INJECTION, EMULSION INTRAVENOUS at 05:44

## 2023-10-15 RX ADMIN — HEPARIN SODIUM 5000 UNITS: 5000 INJECTION, SOLUTION INTRAVENOUS; SUBCUTANEOUS at 11:15

## 2023-10-15 RX ADMIN — INSULIN ASPART 1 UNITS: 100 INJECTION, SOLUTION INTRAVENOUS; SUBCUTANEOUS at 16:39

## 2023-10-15 RX ADMIN — DEXMEDETOMIDINE HYDROCHLORIDE 1.2 MCG/KG/HR: 4 INJECTION, SOLUTION INTRAVENOUS at 23:56

## 2023-10-15 RX ADMIN — MIDAZOLAM 2 MG: 1 INJECTION INTRAMUSCULAR; INTRAVENOUS at 22:11

## 2023-10-15 RX ADMIN — MIDAZOLAM 2 MG: 1 INJECTION INTRAMUSCULAR; INTRAVENOUS at 19:19

## 2023-10-15 RX ADMIN — DEXMEDETOMIDINE HYDROCHLORIDE 0.2 MCG/KG/HR: 4 INJECTION, SOLUTION INTRAVENOUS at 06:00

## 2023-10-15 RX ADMIN — BUMETANIDE 2 MG: 0.25 INJECTION INTRAMUSCULAR; INTRAVENOUS at 22:15

## 2023-10-15 RX ADMIN — PANTOPRAZOLE SODIUM 40 MG: 40 INJECTION, POWDER, FOR SOLUTION INTRAVENOUS at 07:55

## 2023-10-15 RX ADMIN — HEPARIN SODIUM 5000 UNITS: 5000 INJECTION, SOLUTION INTRAVENOUS; SUBCUTANEOUS at 02:30

## 2023-10-15 RX ADMIN — INSULIN ASPART 1 UNITS: 100 INJECTION, SOLUTION INTRAVENOUS; SUBCUTANEOUS at 23:30

## 2023-10-15 RX ADMIN — CALCIUM GLUCONATE 2 G: 20 INJECTION, SOLUTION INTRAVENOUS at 06:46

## 2023-10-15 RX ADMIN — Medication 50 MCG: at 15:24

## 2023-10-15 RX ADMIN — HEPARIN SODIUM 5000 UNITS: 5000 INJECTION, SOLUTION INTRAVENOUS; SUBCUTANEOUS at 18:34

## 2023-10-15 RX ADMIN — Medication 50 MCG: at 03:00

## 2023-10-15 RX ADMIN — Medication 50 MCG: at 02:50

## 2023-10-15 RX ADMIN — PIPERACILLIN AND TAZOBACTAM 3.38 G: 3; .375 INJECTION, POWDER, LYOPHILIZED, FOR SOLUTION INTRAVENOUS at 19:54

## 2023-10-15 RX ADMIN — Medication 100 MCG: at 18:10

## 2023-10-15 RX ADMIN — INSULIN ASPART 1 UNITS: 100 INJECTION, SOLUTION INTRAVENOUS; SUBCUTANEOUS at 20:20

## 2023-10-15 RX ADMIN — Medication 50 MCG: at 14:19

## 2023-10-15 RX ADMIN — DEXMEDETOMIDINE HYDROCHLORIDE 0.8 MCG/KG/HR: 4 INJECTION, SOLUTION INTRAVENOUS at 18:10

## 2023-10-15 RX ADMIN — MAGNESIUM SULFATE HEPTAHYDRATE: 500 INJECTION, SOLUTION INTRAMUSCULAR; INTRAVENOUS at 20:34

## 2023-10-15 RX ADMIN — Medication 50 MCG: at 12:33

## 2023-10-15 RX ADMIN — MICAFUNGIN SODIUM 100 MG: 50 INJECTION, POWDER, LYOPHILIZED, FOR SOLUTION INTRAVENOUS at 12:48

## 2023-10-15 RX ADMIN — PIPERACILLIN AND TAZOBACTAM 3.38 G: 3; .375 INJECTION, POWDER, LYOPHILIZED, FOR SOLUTION INTRAVENOUS at 07:55

## 2023-10-15 RX ADMIN — DEXMEDETOMIDINE HYDROCHLORIDE 1 MCG/KG/HR: 4 INJECTION, SOLUTION INTRAVENOUS at 21:03

## 2023-10-15 RX ADMIN — Medication 100 MCG: at 22:04

## 2023-10-15 RX ADMIN — INSULIN ASPART 1 UNITS: 100 INJECTION, SOLUTION INTRAVENOUS; SUBCUTANEOUS at 07:57

## 2023-10-15 RX ADMIN — CHLORHEXIDINE GLUCONATE 15 ML: 1.2 RINSE ORAL at 07:55

## 2023-10-15 RX ADMIN — Medication 50 MCG: at 19:21

## 2023-10-15 RX ADMIN — DEXMEDETOMIDINE HYDROCHLORIDE 0.3 MCG/KG/HR: 4 INJECTION, SOLUTION INTRAVENOUS at 12:27

## 2023-10-15 RX ADMIN — CHLORHEXIDINE GLUCONATE 15 ML: 1.2 RINSE ORAL at 19:54

## 2023-10-15 RX ADMIN — PROPOFOL 15 MCG/KG/MIN: 10 INJECTION, EMULSION INTRAVENOUS at 02:29

## 2023-10-15 ASSESSMENT — ACTIVITIES OF DAILY LIVING (ADL)
ADLS_ACUITY_SCORE: 35
ADLS_ACUITY_SCORE: 33
ADLS_ACUITY_SCORE: 35
ADLS_ACUITY_SCORE: 33
ADLS_ACUITY_SCORE: 35
ADLS_ACUITY_SCORE: 35
ADLS_ACUITY_SCORE: 33
ADLS_ACUITY_SCORE: 35

## 2023-10-15 NOTE — PROGRESS NOTES
[facility]  Critical Care Progress Note    Summary:   Mojgan Jimenez 54 yo F PMH obesity, JARVIS on CPAP, asthma (no PFTs), stimulant use disorder, stimulant induced psychosis, mood & anxiety disorders      Presented 10/9/23 for scheduled laparoscopic sleeve gastrectomy with single anastomosis duodenal switch. She was later found to have two small bowel injury with peritonitis, encephalopathy, septic shock, acute respiratory failure due to loss of protective airway reflexes & increased metabolic load requiring intubation (10/12-current), & oliguric SUSSY. 10/12/23 returned to OR for small bowel interotomy closure, clean-out, & drain placement.     Interval Events:  Elevated peak pressures overnight. RASS goal changed overnight. Addition, overnight sedation changed from propofol to precedex with high TG  FiO2 35-40%    Assessment & Plan:   Goals of Care:  Life prolonging without limits       Neurology, Psychiatry, Sedation, Analgesia:  Acute metabolic encephalopathy secondary to sepsis, shock, renal failure     Sedation & Analgesia   - daily spontaneous awakening trials as able, RASS goal  0 to -1  - dexmedetomidine & fentanyl infusions and PRN midazolam. Propofol discontinued due to high TG.  - consider antipsychotic given agitation with weaning 10/13   - lighten sedation.  RASS goal changed    Cardiovascular:  Septic shock - improved . Off pressors now  Frequent PVCs  -Warm. Wide PP. 10/13 cardiac POCUS grossly preserved biventricular function, poor window inhibited LVOT VTI, EPSS, TAPSE assessment   -Formal echo however show depressed EF 30-35% which is new. No comment on Takotsubo's but suspect could be related to stress  -Troponin mildly elevated but suspect due to end-stage renal disease.  Check serial troponins  -Diuresis  -Amiodarone drip x 24 hours for PVC  -cardiology consult  -Map > 65    Respiratory, Airway:  Acute respiratory failure   Increased metabolic load & loss of protective  airway reflexes   10/12/23 radiograph demonstrated bibasilar opacities concerning for atelectasis & ETT 1.9 above the emilia which was retracted 2 cm  - supplemental O2 to maintain spO2 >= 90-96% & PaO2 >= 60 mmHg  - lung protective ventilation (Pplat <30, driving pressure <15). PEEP at 8, oxygenating well   - daily spontaneous breathing trials as able  - pt is up 10 liters which will likely limit us from extubation at this time     History of JARVIS, allusions to asthma in the chart  - NIPPV at extubation with sleep/naps     Gastrointestinal:  10/9 s/p sleeve gastrectomy complicated by small bowel injury & peritonitis   10/12/23 returned to OR for closure, clean-out, & drain placement   - peritoneal drains  in place   - LFT's trending down.   - Started on TPN    Renal, Acid-base, Electrolytes, Volume:  Oliguric SUSSY    - nephrology was consulted   - diuretic challenge  - on bumex 2 mg h2miula per nephrology.   - sodium bicarb stopped    Infectious Disease:  Small bowel injury with peritonitis s/p closure, clean-out, & drain placement   - 10/12 - pending intra-operative cultures --> streptococcus anginosis  - on vanc, piptazo, micafungin    Hematology, Oncology:  Anemia - mild  Suspect multifactorial secondary to sepsis & surgical blood loss   - monitor   DVT Ppx: heparin    Endocrine:  No active issues     Checklist:  FEN: NOP  VTE ppx: sqh  GI ppx: pantoprazole   Bowel regimen: PEG & senna   VAP ppx: Head of bed >30 degrees, daily oral care  Lines/tube-size: L-internal jugular CVC (10/12), PIVs, arevalo (10/12), ETT 7.5 (10/12)  Skin: no lesions    PT/OT/SLP, early mobility: not consulted   Code Status: Full     Physical Exam:  Neurologic: Sedated.Opens eyes  HEENT: Head and face normal. No nasal discharge. intubated. Eyelids, conjunctiva, & sclera normal.   Neck: Neck appearance normal. No neck masses. Thyroid not enlarged.  Respiratory: Lungs clear bilaterally. No wheezes, crackles, or rhonchi.   Cardiovascular:  Regular rate & rhythm  Normal S1 & S2. No murmurs, rubs. or gallops. No elevated JVP.    Gastrointestinal: Obese. Abdominal binder in place. MICKIE drains in place  Musculoskeletal: Skeletal configuration normal and muscle mass normal for age. Joint appearance overall normal.  Skin, Hair, & Nails: Skin color normal. No skin lesions. Hair & nails normal.  Extremities: 2+ lower extremity pitting edema     All pertinent vital signs, ventilator settings, I&Os, laboratory, microbiology, ECGs, & imaging data has been personally reviewed. Total Critical Care time, excluding procedures, 45 minutes     Maria Rhoades MD

## 2023-10-15 NOTE — PROGRESS NOTES
CLINICAL NUTRITION SERVICES - REASSESSMENT NOTE     Nutrition Prescription    RECOMMENDATIONS FOR MDs/PROVIDERS TO ORDER:    Malnutrition Status:    Does not meet criteria    Recommendations already ordered by Registered Dietitian (RD):  Continue same custom TPN: D 125 gm, AA 63 gm at 50 mL/hr to provide 1200 mL, 677 kcals, 125 gm carb, 63 gm protein.   Provides: ~59% of kcal and 100% of protein needs    Future/Additional Recommendations:  Monitor triglycerides, labs  Add lipids 5x/week and increase dextrose to 160 gm to meet estimated nutrition needs     EVALUATION OF THE PROGRESS TOWARD GOALS   Diet: NPO  Nutrition Support: Custom TPN: D 125 gm, AA 63 gm at 50 mL/hr to provide 1200 mL, 677 kcals, 125 gm carb, 63 gm protein.   No lipids-on propofol  TPN + propofol = 1088 kcals (~95% of estimated kcal needs)  MVI and B12 added to TPN bag  Intake: na      NEW FINDINGS  Triglycerides still high and rising   10/14: 339  10/15: 568  Propofol off    GI CONCERNS  Last BM 10/14  Hypoactive bowel sounds  I/O 2271/5133 10/14   mL    LABS  BUN 95.6 (H)  Creatinine 6.07 (H)  Mg 2.7 (H)  Phos 6.3 (H)  Triglycerides 568 (H)    MEDICATIONS  Reviewed    ASSESSED NUTRITION NEEDS  Estimated Energy Needs: 7639-4995 kcals/day (22 - 25 kcals/kg)  Justification: Obese, Post-op, and Vented  Estimated Protein Needs: 63-78 grams protein/day (1.2 - 1.5 grams of pro/kg)  Justification: Obesity guidelines and Post-op  Estimated Fluid Needs: 0306-8686 mL/day (1 mL/kcal)   Justification: Maintenance    CURRENT NUTRITION DIAGNOSIS  Inadequate oral intake related to intubated and sedated as evidenced by NPO and need for TPN       INTERVENTIONS  Implementation  Continue same TPN-will not increase dextrose or add lipids today d/t rising triglycerides    Goals  Meet nutrition needs with TPN -not progressing    Monitoring/Evaluation  Progress toward goals will be monitored and evaluated per protocol.

## 2023-10-15 NOTE — PLAN OF CARE
[facility] - ICU    RN Progress Note:            Key Events - This Shift:     - RASS of -2 or +1. Increased fentanyl and propofol drip. RASS now of -3 to -4. Challenge to meet RASS goal. M.D aware.  - End tidal C02 monitoring reading of 50's. Breathstacking around 2am. ABG ordered. pH of 7.23. Tidal volume increased to 380, RR of 24. Increased sedation as well. GLENDA gaines.  - Cooling blanket now on standby. Temperature has been on 97F to 98F.  - Bumex held at 4am. Urine output of >1000 ml for last 8 hrs.  - TPN started on this shift. Blood sugar stable.          Barriers to Discharge / Downgrade:     - remains on mechanical ventilator.

## 2023-10-15 NOTE — CONSULTS
Thank you, Dr. Sibley ref. provider found, for asking the [facility] team to see Mojgan Jimenez to evaluate cardiomyopathy.      Assessment/Recommendations   Assessment:    Cardiomyopathy with moderately depressed LV systolic function, newly diagnosed, possibly new onset related to acute ICU illness although I cannot rule out possibility of methamphetamine related cardiomyopathy which was never diagnosed due to absence of preop cardiac testing, fluid status difficult to  but oxygenation is adequate  Frequent PVCs, probably related to increased adrenergic tone,suppressed with IV amiodarone  Complicated gastric bypass surgery with bile leak  Postoperative respiratory failure on mechanical ventilation  Acute renal failure  Morbid obesity  History of methamphetamine use    Plan:  Wean of amiodarone  Use IV metoprolol as needed    Reassess LV function with echo in a few days. if no improvement, could consider diagnostic coronary angiogram and right heart catheterization  She is unable to receive standard oral CHF medications at present time.       History of Present Illness/Subjective    Ms. Mojgan Jimenez is a 53 year old female who was admitted for elective gastric bypass surgery and developed complications requiring reexploration of the abdomen and mechanical ventilation.  Yesterday she was noted to have frequent PVCs and runs of nonsustained VT and she was started on amiodarone.  Echo was performed and showed decreased LV systolic function.  The patient is unable to contribute into her history.  Her daughter is present at bedside.  She reports that her mother never had any heart issues before the surgery.  I have reviewed her old records and found no evaluation of LV systolic function or coronary artery disease.  She has history of mental phentermine use but reportedly has been sober prior to surgery      ECG: Personally reviewed.  Normal sinus rhythm with poor progression no acute ischemic ST segment  "changes.    ECHO (personnaly Reviewed):   Limited views were obtained.  The left ventricle is normal in size.  Left ventricular function is decreased. The ejection fraction is 30-35%  (moderately reduced).  There is moderate global hypokinesia of the left ventricle.       Physical Examination Review of Systems   /73   Pulse 77   Temp 99.7  F (37.6  C)   Resp 24   Ht 1.6 m (5' 3\")   Wt 130.4 kg (287 lb 6.4 oz)   LMP 09/09/2023 (Exact Date)   SpO2 98%   BMI 50.91 kg/m    Body mass index is 50.91 kg/m .  Wt Readings from Last 3 Encounters:   10/13/23 130.4 kg (287 lb 6.4 oz)   08/09/23 133.4 kg (294 lb 3.2 oz)   07/05/23 129.7 kg (286 lb)       Intake/Output Summary (Last 24 hours) at 10/15/2023 1256  Last data filed at 10/15/2023 1250  Gross per 24 hour   Intake 1581.89 ml   Output 5770 ml   Net -4188.11 ml     General Appearance:   Intubated and sedated   Head/ENT: Normocephalic, without obvious abnormality. Membranes moist.      EYES:  No scleral icterus   Neck: Supple, symmetrical, trachea midline, no adenopathy, thyroid: not enlarged, symmetric, no carotid bruit JVD is not visible   Chest/Lungs:   Distant breath sounds   Cardiovascular:   Regular rhythm, S1, S2 normal, no murmur, rub or gallop.   Abdomen:  Soft, non-tender, bowel sounds active all four quadrants,  no masses, no organomegaly   Extremities: no cyanosis or clubbing. No edema   Skin: Skin color, texture, turgor normal, no rashes or lesions.             10 system review of systems completed see history of present illness and inpatient H&P (reviewed) for further details.          Lab Results    Chemistry/lipid CBC Cardiac Enzymes/BNP/TSH/INR   Recent Labs   Lab Test 10/15/23  0431 10/14/23  0629 05/17/22  1052   CHOL  --   --  176   HDL  --   --  36*   LDL  --   --  101*   TRIG 568*   < > 196*    < > = values in this interval not displayed.     Recent Labs   Lab Test 05/17/22  1052 08/31/21  1415 11/17/20  1317   * 194* 162* " "    Recent Labs   Lab Test 10/15/23  1201 10/15/23  0754 10/15/23  0431   NA  --   --  136   POTASSIUM  --   --  3.9   CHLORIDE  --   --  96*   CO2  --   --  22   *   < > 154*   BUN  --   --  95.6*   CR  --   --  6.07*   GFRESTIMATED  --   --  8*   PALAK  --   --  8.9    < > = values in this interval not displayed.     Recent Labs   Lab Test 10/15/23  0431 10/14/23  0629 10/13/23  2338   CR 6.07* 6.97* 6.86*     Recent Labs   Lab Test 10/15/23  0431 05/17/22  1052 08/31/21  1415   A1C 5.8* 5.7* 5.5          Recent Labs   Lab Test 10/15/23  0431 10/13/23  0426   WBC  --  4.9   HGB  --  10.2*   HCT  --  32.3*   MCV  --  93    162     Recent Labs   Lab Test 10/13/23  0426 10/12/23  0315 10/11/23  0634   HGB 10.2* 12.8 13.2    No results for input(s): \"TROPONINI\" in the last 06109 hours.  Recent Labs   Lab Test 10/15/23  0431   NTBNPI 4,534*     Recent Labs   Lab Test 09/15/22  1351   TSH 2.56     Recent Labs   Lab Test 10/15/23  0554   INR 1.19*        Medical History  Surgical History Family History Social History   Past Medical History:   Diagnosis Date     LINA (generalized anxiety disorder) 11/02/2017     Hyperlipidemia LDL goal <160 01/20/2019     Major depressive disorder      Methanol abuse (H)      Mild persistent asthma without complication 11/02/2017     Obese      JARVIS on CPAP     Cpap not working     Paranoia (H)     resolved due to drugs     Vitamin D deficiency 11/02/2017     Past Surgical History:   Procedure Laterality Date     GASTRECTOMY, SLEEVE, LAPAROSCOPIC, WITH DUODENAL SWITCH N/A 10/9/2023    Procedure: GASTRECTOMY, SLEEVE, LAPAROSCOPIC, WITH SINGLE ANASTAMOSIS DUODENAL SWITCH;  Surgeon: Hoang Worthy MD;  Location: [facility]     LAPAROSCOPY DIAGNOSTIC (GYN) N/A 10/12/2023    Procedure: LAPAROSCOPY,;  Surgeon: Hoang Worthy MD;  Location: [facility] OR     LAPAROTOMY EXPLORATORY N/A 10/12/2023    Procedure: LAPAROTOMY, CLOSURE OF TWO SMALL BOWEL " "INTEROTOMIES, DRAIN PLACEMENT, INTRA-ABDOMINAL CLEAN OUT;  Surgeon: Hoang Worthy MD;  Location: [facility]     MAMMOPLASTY REDUCTION      reduction     No premature CAD, SCD,cardiomyopathy   Social History     Socioeconomic History     Marital status: Single     Spouse name: Not on file     Number of children: Not on file     Years of education: Not on file     Highest education level: Not on file   Occupational History     Not on file   Tobacco Use     Smoking status: Never     Smokeless tobacco: Never   Vaping Use     Vaping Use: Never used   Substance and Sexual Activity     Alcohol use: Not Currently     Comment: Sober x 8 months     Drug use: Not Currently     Types: Methamphetamines, \"Crack\" cocaine     Comment: in remision      Sexual activity: Not Currently     Partners: Male   Other Topics Concern     Parent/sibling w/ CABG, MI or angioplasty before 65F 55M? No   Social History Narrative     Not on file     Social Determinants of Health     Financial Resource Strain: Not on file   Food Insecurity: Not on file   Transportation Needs: Not on file   Physical Activity: Not on file   Stress: Not on file   Social Connections: Not on file   Interpersonal Safety: Not on file   Housing Stability: Not on file         Medications  Allergies   Current Facility-Administered Medications Ordered in Epic   Medication Dose Route Frequency Provider Last Rate Last Admin     [Held by provider] acetaminophen (TYLENOL) solution 975 mg  975 mg Oral or Feeding Tube Q8H Felecia Henderson APRN CNP         acetaminophen (TYLENOL) tablet 650 mg  650 mg Oral Q4H PRN Pancho Villalba MD        Or     acetaminophen (TYLENOL) Suppository 650 mg  650 mg Rectal Q4H PRN Pancho Villalba MD   650 mg at 10/12/23 2021     albuterol (PROVENTIL HFA/VENTOLIN HFA) inhaler  1-2 puff Inhalation Q4H PRN Felecia Henderson APRN CNP   2 puff at 10/11/23 2110     albuterol (PROVENTIL) neb solution 2.5 mg  2.5 mg Nebulization Q2H PRN Beatriz, " JUSTINE Villalobos CNP         amiodarone (NEXTERONE) 1.8 mg/mL in dextrose 5% 200 mL ADULT STANDARD infusion  0.5 mg/min Intravenous Continuous Maria Rhoades MD 16.7 mL/hr at 10/15/23 0810 0.5 mg/min at 10/15/23 0810     bumetanide (BUMEX) injection 2 mg  2 mg Intravenous Q8H Francesca Cruz MD         [Held by provider] cetirizine (zyrTEC) tablet 10 mg  10 mg Oral Daily Felecia Henderson APRN CNP   10 mg at 10/11/23 0826     [Held by provider] childrens multivitamin with iron (FLINTSTONES COMPLETE) chewable tablet 1 tablet  1 tablet Oral BID Felecia Henderson APRN CNP   1 tablet at 10/11/23 2058     chlorhexidine (PERIDEX) 0.12 % solution 15 mL  15 mL Mouth/Throat Q12H Felecia Henderson APRN CNP   15 mL at 10/15/23 0755     [Held by provider] cyanocobalamin (VITAMIN B-12) sublingual tablet 1,000 mcg  1,000 mcg Sublingual Daily Felecia Henderson APRN CNP   1,000 mcg at 10/11/23 0826     dexmedeTOMIDine (PRECEDEX) 4 mcg/mL in NS infusion  0.1-1.2 mcg/kg/hr Intravenous Continuous Toy oSliz MD 9.8 mL/hr at 10/15/23 1227 0.3 mcg/kg/hr at 10/15/23 1227     dextrose 10% infusion   Intravenous Continuous PRN Francesca Cruz MD         glucose gel 15-30 g  15-30 g Oral Q15 Min PRN Toy Soliz MD        Or     dextrose 50 % injection 25-50 mL  25-50 mL Intravenous Q15 Min PRN Toy Soliz MD        Or     glucagon injection 1 mg  1 mg Subcutaneous Q15 Min PRN Toy Soliz MD         diphenhydrAMINE (BENADRYL) capsule 25 mg  25 mg Oral Q6H PRN Felecia Henderson APRN CNP        Or     diphenhydrAMINE (BENADRYL) injection 25 mg  25 mg Intravenous Q6H PRN Hedblad, Felecia Elijah, APRN CNP         sennosides (SENOKOT) syrup 5 mL  5 mL Oral or Feeding Tube BID Hoang Worthy MD        And     docusate (COLACE) 50 MG/5ML liquid 50 mg  50 mg Oral or Feeding Tube BID Hoang Worthy MD         fentaNYL (SUBLIMAZE) 50 mcg/mL bolus from pump   mcg Intravenous  Q1H PRN Madison Dodge MD   50 mcg at 10/15/23 1233     fentaNYL (SUBLIMAZE) infusion   mcg/hr Intravenous Continuous Felecia Henderson APRN CNP 1 mL/hr at 10/15/23 1020 50 mcg/hr at 10/15/23 1020     [Held by provider] fluticasone-vilanterol (BREO ELLIPTA) 100-25 MCG/ACT inhaler 1 puff  1 puff Inhalation Daily Felecia Henderson APRN CNP   1 puff at 10/11/23 0827     heparin ANTICOAGULANT injection 5,000 Units  5,000 Units Subcutaneous Q8H Felecia Henderson APRN CNP   5,000 Units at 10/15/23 1115     HYDROmorphone (DILAUDID) injection 0.2 mg  0.2 mg Intravenous Q2H PRN Felecia Henderson APRN CNP        Or     HYDROmorphone (PF) (DILAUDID) injection 0.5 mg  0.5 mg Intravenous Q2H PRN Felecia Henderson APRN CNP         insulin aspart (NovoLOG) injection (RAPID ACTING)  1-12 Units Subcutaneous Q4H Toy Soliz MD   1 Units at 10/15/23 0757     lidocaine (LMX4) cream   Topical Q1H PRN Felecia Henderson APRN CNP         lidocaine 1 % 0.1-1 mL  0.1-1 mL Other Q1H PRN Felecia Henderson APRN CNP         micafungin (MYCAMINE) 100 mg in sodium chloride 0.9 % 100 mL intermittent infusion  100 mg Intravenous Q24H Felecia Henderson APRN  mL/hr at 10/15/23 1248 100 mg at 10/15/23 1248     midazolam (VERSED) injection 1-2 mg  1-2 mg Intravenous Q2H PRN Toy Soliz MD         naloxone (NARCAN) injection 0.2 mg  0.2 mg Intravenous Q2 Min PRN Felecia Henderson APRN CNP        Or     naloxone (NARCAN) injection 0.4 mg  0.4 mg Intravenous Q2 Min PRN Felecia Henderson APRN CNP        Or     naloxone (NARCAN) injection 0.2 mg  0.2 mg Intramuscular Q2 Min PRN Hedblad, Felecia Elijah, APRN CNP        Or     naloxone (NARCAN) injection 0.4 mg  0.4 mg Intramuscular Q2 Min PRN Felecia Henderson APRN CNP   0.4 mg at 10/12/23 0940     norepinephrine (LEVOPHED) 4 mg in  mL infusion PREMIX  0.01-0.6 mcg/kg/min Intravenous Continuous IvGil emerson MD   Stopped at 10/13/23  1814     ondansetron (ZOFRAN ODT) ODT tab 4 mg  4 mg Oral Q6H PRN Felecia Henderson APRN CNP        Or     ondansetron (ZOFRAN) injection 4 mg  4 mg Intravenous Q6H PRN Felceia Henderson APRN CNP         pantoprazole (PROTONIX) IV push injection 40 mg  40 mg Intravenous QAM AC Felecia Henderson APRN CNP   40 mg at 10/15/23 0755     parenteral nutrition - ADULT compounded formula   CENTRAL LINE IV TPN CONTINUOUS Francesca Cruz MD         parenteral nutrition - ADULT compounded formula   CENTRAL LINE IV TPN CONTINUOUS Francesca Cruz MD 50 mL/hr at 10/15/23 0730 Rate Verify at 10/15/23 0730     phenol (CHLORASEPTIC) 1.4 % spray 1-2 mL  1-2 spray Mouth/Throat Q2H PRN Felecia Henderson APRN CNP         piperacillin-tazobactam (ZOSYN) 3.375 g vial to attach to  mL bag  3.375 g Intravenous Q12H Cr To MD   3.375 g at 10/15/23 0755     polyethylene glycol (MIRALAX) Packet 17 g  17 g Oral or Feeding Tube Daily Felecia Henderson APRN CNP         prochlorperazine (COMPAZINE) injection 10 mg  10 mg Intravenous Q6H PRN Felecia Henderson APRN CNP        Or     prochlorperazine (COMPAZINE) tablet 10 mg  10 mg Oral Q6H PRN Felecia Henderson APRN CNP         propranolol (INDERAL) tablet 10 mg  10 mg Oral TID PRN Felecia Henderson APRN CNP   10 mg at 10/11/23 1346     [Held by provider] rosuvastatin (CRESTOR) tablet 10 mg  10 mg Oral Daily Felecia Henderson APRN CNP   10 mg at 10/11/23 0826     sodium chloride (PF) 0.9% PF flush 3 mL  3 mL Intracatheter Q8H Felecia Henderson APRN CNP   3 mL at 10/15/23 0757     sodium chloride (PF) 0.9% PF flush 3 mL  3 mL Intracatheter q1 min prn Felecia Henderson APRN CNP         traMADol (ULTRAM) tablet 50 mg  50 mg Oral Q6H PRN Felecia Hendesron APRN CNP         vancomycin place alegria - receiving intermittent dosing  1 each Intravenous See Admin Instructions Felecia Henderson APRN CNP         vasopressin (VASOSTRICT) 20 Units in sodium chloride  0.9 % 100 mL standard conc infusion  2.4 Units/hr Intravenous Continuous Felecia Henderson APRN CNP   Held at 10/12/23 1145     [Held by provider] venlafaxine (EFFEXOR) tablet 75 mg  75 mg Oral TID Felecia Henderson APRN CNP   75 mg at 10/11/23 2434     No current [facility]-ordered outpatient medications on file.     No Known Allergies

## 2023-10-15 NOTE — PROGRESS NOTES
General Surgery Progress Note:    Hospital Day # 6    ASSESSMENT:  1. Perimenopausal symptoms        Mojgan Jimenez is a 53 year old female who is s/p laparoscopic sleeve gastrectomy with KO on 10/9 post-op course complicated by small enterotomy x2 s/p laparoscopy converted to laparotomy with washout and enterotomy closure x2 on 10/12. Patient remains sedated and intubated post-operatively. Now weaned off vasopressors though remains in critical condition. Creatinine peaked at 6.97 and now 6.07. TPN initiated on 10/14. Awaiting return of bowel function.    PLAN:  - NPO with NG tube, await return of bowel function  - Continue supportive cares per ICU team  - Continue IV antibiotics  - Continue TPN per nephrology and RD  - Monitor drain output    SUBJECTIVE:   She is intubated and sedated.      Patient Vitals for the past 24 hrs:   BP Temp Temp src Pulse Resp SpO2   10/15/23 0900 110/72 -- -- 67 24 100 %   10/15/23 0818 -- -- -- 67 -- 100 %   10/15/23 0700 120/79 -- -- 71 24 100 %   10/15/23 0600 122/61 97.6  F (36.4  C) Rectal 71 24 98 %   10/15/23 0500 117/80 -- -- 72 24 96 %   10/15/23 0429 -- -- -- 75 -- 96 %   10/15/23 0400 132/79 97.5  F (36.4  C) Rectal 82 23 96 %   10/15/23 0300 (!) 162/112 97.8  F (36.6  C) Rectal 85 18 95 %   10/15/23 0200 (!) 182/90 97.8  F (36.6  C) Rectal 84 18 96 %   10/15/23 0100 (!) 171/98 -- -- 85 19 96 %   10/15/23 0000 (!) 143/92 98.9  F (37.2  C) Rectal 82 21 97 %   10/14/23 2326 -- -- -- 82 -- 98 %   10/14/23 2300 135/78 -- -- 80 20 98 %   10/14/23 2200 133/83 98.8  F (37.1  C) Rectal 80 20 98 %   10/14/23 2054 -- -- -- 80 -- 97 %   10/14/23 2000 130/82 98.9  F (37.2  C) Rectal 80 16 97 %   10/14/23 1900 124/78 -- -- 80 17 97 %   10/14/23 1845 120/77 -- -- 79 14 97 %   10/14/23 1833 121/77 -- -- 80 16 97 %   10/14/23 1815 121/77 -- -- 80 13 96 %   10/14/23 1800 -- 98.7  F (37.1  C) Skin 79 16 97 %   10/14/23 1632 110/78 -- -- 97 -- 95 %   10/14/23 1615 114/79 -- -- 98 30 95 %    10/14/23 1600 114/61 99.3  F (37.4  C) Skin 96 23 95 %   10/14/23 1530 113/63 -- -- 100 (!) 34 95 %   10/14/23 1515 124/69 -- -- 104 22 95 %   10/14/23 1500 116/63 -- -- 104 (!) 34 96 %   10/14/23 1445 124/72 -- -- 103 24 95 %   10/14/23 1430 120/72 -- -- 104 23 95 %   10/14/23 1415 114/63 98.2  F (36.8  C) -- 99 (!) 39 96 %   10/14/23 1400 113/63 -- -- 100 (!) 34 95 %   10/14/23 1345 115/71 -- -- 101 25 94 %   10/14/23 1332 118/75 -- -- 102 23 94 %   10/14/23 1303 115/70 -- -- 101 19 94 %   10/14/23 1259 -- -- -- 101 -- 99 %   10/14/23 1215 111/74 -- -- 99 16 92 %   10/14/23 1200 109/75 -- -- 100 19 92 %   10/14/23 1145 108/72 -- -- 100 18 92 %   10/14/23 1100 111/69 -- -- 101 18 93 %   10/14/23 1030 100/63 -- -- 88 20 99 %   10/14/23 1015 98/60 -- -- 74 20 96 %         PHYSICAL EXAM:  General: patient seen resting in bed, no acute distress  HEENT: NG tube with bilious output  Resp: intubated  Abdomen: Abdominal binder in place. Obese, non-tender. Midline incision clean/dry/intact with sterile dressing in place.  Drains: Surgical MICKIE drains x3 with serous and serosanguinous output.    Output by Drain (mL) 10/13/23 0700 - 10/13/23 1459 10/13/23 1500 - 10/13/23 2259 10/13/23 2300 - 10/14/23 0659 10/14/23 0700 - 10/14/23 1459 10/14/23 1500 - 10/14/23 2259 10/14/23 2300 - 10/15/23 0659 10/15/23 0700 - 10/15/23 1013   Closed/Suction Drain 1 Right RUQ Bulb 19 Samoan 25 25 5 18 32 15    Closed/Suction Drain 2 Left LUQ Bulb 19 Samoan 35 35 10 10 27 7    Closed/Suction Drain 3 Left LLQ Bulb 19 Samoan 10 5 0 5 17 10    : arevalo catheter in place  Extremities: warm and well perfused    10/14 0700 - 10/15 0659  In: 2271.29 [I.V.:1773.79]  Out: 5133 [Urine:4580; Drains:141]    No results displayed because visit has over 200 results.           Rafaela West PA-C  [facility] General Surgery  [address]  [address] [place] [id number]

## 2023-10-15 NOTE — PROGRESS NOTES
"Respiratory Care Note    VENT DAY# Ventilation Day(s): 4    Remains on full ventilator support via  ETT 7.5/20 at teeth/gum. Exhibited ventilator asynchrony; PC02 increased to 56. VT and RR increased to 380 and 24, respectively. Repeat ABG pending. Rest of settings as follows: Vent Mode: CMV/AC  (Continuous Mandatory Ventilation/ Assist Control)  FiO2 (%): 35 %  Resp Rate (Set): 24 breaths/min  Tidal Volume (Set, mL): 380 mL  PEEP (cm H2O): 8 cmH2O  Resp: 24     /80   Pulse 72   Temp 97.5  F (36.4  C) (Rectal)   Resp 24   Ht 1.6 m (5' 3\")   Wt 130.4 kg (287 lb 6.4 oz)   LMP 09/09/2023 (Exact Date)   SpO2 96%   BMI 50.91 kg/m       Reg Coronel, RRT  "

## 2023-10-15 NOTE — PROGRESS NOTES
RT PROGRESS NOTE    VENT DAY# Ventilation Day(s): 4    CURRENT SETTINGS:   Vent Mode: CMV/AC  (Continuous Mandatory Ventilation/ Assist Control)  FiO2 (%): 35 %  Resp Rate (Set): 24 breaths/min  Tidal Volume (Set, mL): 380 mL  PEEP (cm H2O): 8 cmH2O  Inspiratory Pressure (cm H2O) (Drager Sabiha): 10  Resp: 29      PATIENT PARAMETERS:  Ventilator - Patient   Patient Resp Rate : 17 breaths/min  Expiratory Vt (ml): 480  Minute Volume (ml): 8 L/min  Peak Inspiratory Pressure (cm H2O): 21 cmH2O  Mean Airway Pressure (cm H2O): 16 cmH2O  Plateau Pressure (cm H2O): 26 cmH2O  Dynamic Compliance (mL/cm H2O): 21.2 mL/cm H2O  Airway Resistance: 27  Auto/ Intrinsic PEEP (cm H2O): 1.4 cmH2O     SBT completed Yes , Total Time 170 min, RSBI 35  Secretions: small, thick,   Breath Sounds: diminished    ETT Cuffed Oral 7.5 mm-Secured at (cm): 20 cm at teeth/gums  Emergency Ambu bag, mask and peep valve at bedside.       VBG: @0554 pH 7.35; pCO2 43; pO2 65; HCO3 24, %O2 Sat 65, BE -1.7 on RR 24, 380, PEEP 8, 35%      HARRISON CASTANEDA, RT

## 2023-10-15 NOTE — PROGRESS NOTES
Care Management Follow Up    Length of Stay (days): 6    Expected Discharge Date: 10/19/2023     Concerns to be Addressed: no discharge needs identified     Patient plan of care discussed at interdisciplinary rounds: Yes    Anticipated Discharge Disposition:       Anticipated Discharge Services:    Anticipated Discharge DME:      Patient/family educated on Medicare website which has current facility and service quality ratings:    Education Provided on the Discharge Plan:    Patient/Family in Agreement with the Plan:      Referrals Placed by CM/SW:    Private pay costs discussed: Not applicable    Additional Information:  Chart Review. Per discussion in rounds sedation change to Precedex, no other changes at this time. Remains intubated on ventilatory support. NPO at this time, getting IV TPN.     History:  From home with adult daughter and grandchild in an apartment. Independent at baseline and drives. Patient currently unemployed; saved up for procedures, plans to resume work once recovered. No community programs. Has a neb machine available for use due to Asthma. Goal to return home with family support. Family to transport if able.     Sagrario Matta RN

## 2023-10-15 NOTE — PHARMACY-CONSULT NOTE
"Pharmacy Note: Parenteral Nutrition (PN) Management    Pharmacist consulted to dose PN for Mojgan Jimenez, a 53 year old female by Dr. Cruz (desired by surgery, initiation deferred to nephrology due fluid status).    Subjective:    The patient is a new PN start.    The patient was started on PN in the hospital on 10/14/23.    Indication for PN therapy:  peritonitis    Inadequate nutrition anticipated for > 7 days.     Enteral nutrition contraindicated due to: peritonitis.    Pertinent diseases and other special considerations  POD #5 sleeve gastrectomy    Social History     Tobacco Use    Smoking status: Never    Smokeless tobacco: Never   Vaping Use    Vaping Use: Never used   Substance Use Topics    Alcohol use: Not Currently     Comment: Sober x 8 months    Drug use: Not Currently     Types: Methamphetamines, \"Crack\" cocaine     Comment: in remision      Objective:    Ht Readings from Last 1 Encounters:   10/09/23 1.6 m (5' 3\")     Wt Readings from Last 1 Encounters:   10/13/23 130.4 kg (287 lb 6.4 oz)       Body mass index is 50.91 kg/m .    Patient Vitals for the past 96 hrs:   Weight   10/13/23 0400 130.4 kg (287 lb 6.4 oz)       Labs:  Last 3 days:  Recent Labs     10/12/23  1712 10/12/23  1803 10/13/23  0426 10/13/23  1724 10/13/23  2338 10/14/23  0629 10/14/23  0629 10/15/23  0431 10/15/23  0554   * 133* 134* 135 135 136  --  136  --    POTASSIUM 5.3 5.2 4.5 4.5 4.6 4.4  --  3.9  --    CHLORIDE 103 103 100 100 100 99  --  96*  --    CO2 14* 14* 18* 20* 20* 19*  --  22  --    BUN 55.4* 55.8* 62.7* 72.0* 79.5* 85.2*  --  95.6*  --    CR 6.05* 5.97* 6.51* 6.64* 6.86* 6.97*  --  6.07*  --    PALAK 8.5* 8.4* 8.3* 8.1* 8.2* 8.2*  --  8.9  --    DUFRVGI21  --   --   --   --   --   --   --  0.99*  --    MAG  --   --   --   --   --  2.6*  --  2.7*  --    PHOS  --   --   --   --   --  5.3*  --  6.3*  --    PROTTOTAL  --   --   --   --   --  6.0*  --  6.5  --    ALBUMIN  --   --   --   --   --  2.7*  --  2.8*  --  "   TRIG  --   --   --   --   --  339*  --  568*  --    HGB  --   --  10.2*  --   --   --   --   --   --    HCT  --   --  32.3*  --   --   --   --   --   --      --  162  --   --   --   --  179  --    BILITOTAL  --   --   --   --   --  0.4  --  0.3  --    AST  --   --   --   --   --  367*  --  171*  --    ALT  --   --   --   --   --  207*  --  173*  --    ALKPHOS  --   --   --   --   --  83   < > 96  --    INR  --   --   --   --   --   --   --   --  1.19*    < > = values in this interval not displayed.       Glucose (past 48 hours):   Recent Labs     10/13/23  1724 10/13/23  2037 10/13/23  2338 10/14/23  0629 10/14/23  1340 10/14/23  1722 10/14/23  2324 10/15/23  0414 10/15/23  0431 10/15/23  0754   * 174* 131* 105* 89 99 108* 134* 154* 147*       Intake/Output (last 24 hours): I/O last 3 completed shifts:  In: 2271.29 [I.V.:1773.79]  Out: 5133 [Urine:4580; Emesis/NG output:412; Drains:141]    Estimated CrCl: Estimated Creatinine Clearance: 14.1 mL/min (A) (based on SCr of 6.07 mg/dL (H)).    Assessment:    Initiate patient on PN therapy as a continuous central therapy.     Given the patient's current condition/oral intake, PN is still indicated.    Lab results reviewed:   10/14 max fluid rate per nephrology, mag and phos elevated; dietician request addition of B12 in TPN d/t recent bariatric surgery; zinc ordered by nephrologist  10/15 phos increased further -- remove from TPN at this time, mag also remains high -- decrease; ionized Ca low -- increase    Plan:  Rate of PN: 50 mL/hr  Formula:   Amino Acids 63 grams  Dextrose 125 grams  Sodium 40 mEq/day  Potassium 30 mEq/day  Calcium 5 mEq/day  Magnesium 1 mEq/day  Phosphorus 0 mMol/day  Chloride: Acetate Ratio 1:1  Standard Multivitamins w/Vitamin K  Trace Elements  Vitamin B12 100 mcg  Zinc 5 mg  Fat Emulsion: No lipids at this time -- elevated trigs.  Check hyperal lytes labs tomorrow.  Pharmacist will continue to follow the patient's lab results,  clinical status and blood glucose results and make adjustments as appropriate.    Thank you for the consult.  Sagrario Cervantes formerly [facility]  10/15/2023 10:09 AM

## 2023-10-15 NOTE — PROGRESS NOTES
[facility]  [facility]   Follow up    Mojgan Jimenez   MRN: [id number]  : 1970   DOA: 10/9/2023   CC: ARF      Assessment and Plan:  53 year old female    ARF;  hemodynamic exacerbated by IV NSAIDS (lowish bp, vasodilatory drugs, mild intravascular depletion)==> ATN; today seeing some improvement in her CR and good urine output; continue to manage without dialysis and hoping for continued improvement  S/p gastric bypass; complicated by leak; back to OR for repair and wash out of bilious fluid; on abx; supportive cares; on abx for positive cx from surgical washout  HoTN; infection/SIIRS; on low dose pressors and now weaned off; bp stable today  ID; on abx for peritonitis  Fluid status; total body elevated but also third spacing; making more urine now and good response to diuretics will use based on urine output; anticipate pt may have some post ATN diuresis and want to avoid dropping filling pressure; can use colloid prn as well  MS change; consistent with build up of narcotic and gabapentin metabolites due to ARF; now sedated for being on vent  Hyperlipid; on statin  Elevated LFTs; following;levels improved today  Resp failure; juancho-op; on vent; wean as able  Acidosis; resolved; off bicarb IVF  Nutrition; on TPN; no lipids due to being on propofol; TG are elevated so change to precedex  Depressed EF on ECHO    Subjective  Stable overnoc; no events; on TPN; n o pressors    Objective    Vital signs in last 24 hours  Temp:  [97.5  F (36.4  C)-99.3  F (37.4  C)] 97.6  F (36.4  C)  Pulse:  [] 67  Resp:  [13-39] 24  BP: ()/() 110/72  FiO2 (%):  [35 %] 35 %  SpO2:  [92 %-100 %] 100 %      Intake/Output last 3 shifts  I/O last 3 completed shifts:  In: 2271.29 [I.V.:1773.79]  Out: 5133 [Urine:4580; Emesis/NG output:412; Drains:141]  Intake/Output this shift:  I/O this shift:  In: -   Out: 700 [Urine:700]    Physical Exam  Intubated +  facial edema  CV: RRR without murmur or rub  Lung: clear and equal; no extra sounds  Ab: + distended and diffusely tender  Ext: some edema and well perfused  Skin; no rash    Pertinent Labs     Last Renal Panel:  Sodium   Date Value Ref Range Status   10/15/2023 136 135 - 145 mmol/L Final     Comment:     Reference intervals for this test were updated on 09/26/2023 to more accurately reflect our healthy population. There may be differences in the flagging of prior results with similar values performed with this method. Interpretation of those prior results can be made in the context of the updated reference intervals.    11/17/2020 141 133 - 144 mmol/L Final     Potassium   Date Value Ref Range Status   10/15/2023 3.9 3.4 - 5.3 mmol/L Final   05/17/2022 4.5 3.4 - 5.3 mmol/L Final   11/17/2020 4.1 3.4 - 5.3 mmol/L Final     Chloride   Date Value Ref Range Status   10/15/2023 96 (L) 98 - 107 mmol/L Final   05/17/2022 102 94 - 109 mmol/L Final   11/17/2020 109 94 - 109 mmol/L Final     Carbon Dioxide   Date Value Ref Range Status   11/17/2020 31 20 - 32 mmol/L Final     Carbon Dioxide (CO2)   Date Value Ref Range Status   10/15/2023 22 22 - 29 mmol/L Final   05/17/2022 32 20 - 32 mmol/L Final     Anion Gap   Date Value Ref Range Status   10/15/2023 18 (H) 7 - 15 mmol/L Final   05/17/2022 2 (L) 3 - 14 mmol/L Final   11/17/2020 1 (L) 3 - 14 mmol/L Final     Glucose   Date Value Ref Range Status   10/15/2023 154 (H) 70 - 99 mg/dL Final   05/17/2022 110 (H) 70 - 99 mg/dL Final   11/17/2020 84 70 - 99 mg/dL Final     Comment:     Fasting specimen     GLUCOSE BY METER POCT   Date Value Ref Range Status   10/15/2023 147 (H) 70 - 99 mg/dL Final     Urea Nitrogen   Date Value Ref Range Status   10/15/2023 95.6 (H) 6.0 - 20.0 mg/dL Final   05/17/2022 16 7 - 30 mg/dL Final   11/17/2020 9 7 - 30 mg/dL Final     Creatinine   Date Value Ref Range Status   10/15/2023 6.07 (H) 0.51 - 0.95 mg/dL Final   11/17/2020 0.79 0.52 - 1.04  60 y/o M pt with no pert. hx presents to ED c/o syncopal episode with subjective fever, with eye pain since Friday. Pt is also c/o generalized weakness. Per daughter states that pt synopsized while brushing his teeth this morning. Per daughter reports that pt has had some difficulty with consuming fluids. Pt's wife is currently sick with the flu at this moment. Pt is currently on PO vitamins prescribed by his PCP. Denies CP, and SOB. No further complaints at this time.  PCP: Name is unsure cording to pt mg/dL Final     GFR Estimate   Date Value Ref Range Status   10/15/2023 8 (L) >60 mL/min/1.73m2 Final   11/17/2020 88 >60 mL/min/[1.73_m2] Final     Comment:     Non  GFR Calc  Starting 12/18/2018, serum creatinine based estimated GFR (eGFR) will be   calculated using the Chronic Kidney Disease Epidemiology Collaboration   (CKD-EPI) equation.       Calcium   Date Value Ref Range Status   10/15/2023 8.9 8.6 - 10.0 mg/dL Final   11/17/2020 8.9 8.5 - 10.1 mg/dL Final     Phosphorus   Date Value Ref Range Status   10/15/2023 6.3 (H) 2.5 - 4.5 mg/dL Final     Albumin   Date Value Ref Range Status   10/15/2023 2.8 (L) 3.5 - 5.2 g/dL Final   05/17/2022 4.0 3.4 - 5.0 g/dL Final   12/03/2018 3.6 3.4 - 5.0 g/dL Final     Recent Labs   Lab 10/13/23  0426 10/12/23  0315 10/11/23  0634   HGB 10.2* 12.8 13.2          I reviewed all lab results  Francesca Cruz MD

## 2023-10-16 ENCOUNTER — APPOINTMENT (OUTPATIENT)
Dept: RADIOLOGY | Facility: HOSPITAL | Age: 53
End: 2023-10-16
Attending: INTERNAL MEDICINE
Payer: COMMERCIAL

## 2023-10-16 LAB
ALBUMIN SERPL BCG-MCNC: 2.8 G/DL (ref 3.5–5.2)
ALP SERPL-CCNC: 82 U/L (ref 35–104)
ALT SERPL W P-5'-P-CCNC: 114 U/L (ref 0–50)
ANION GAP SERPL CALCULATED.3IONS-SCNC: 17 MMOL/L (ref 7–15)
AST SERPL W P-5'-P-CCNC: 71 U/L (ref 0–45)
BILIRUB SERPL-MCNC: 0.4 MG/DL
BUN SERPL-MCNC: 99.7 MG/DL (ref 6–20)
CALCIUM SERPL-MCNC: 9.3 MG/DL (ref 8.6–10)
CHLORIDE SERPL-SCNC: 97 MMOL/L (ref 98–107)
CREAT SERPL-MCNC: 4.38 MG/DL (ref 0.51–0.95)
DEPRECATED HCO3 PLAS-SCNC: 23 MMOL/L (ref 22–29)
EGFRCR SERPLBLD CKD-EPI 2021: 11 ML/MIN/1.73M2
ERYTHROCYTE [DISTWIDTH] IN BLOOD BY AUTOMATED COUNT: 14.2 % (ref 10–15)
GLUCOSE BLDC GLUCOMTR-MCNC: 136 MG/DL (ref 70–99)
GLUCOSE BLDC GLUCOMTR-MCNC: 140 MG/DL (ref 70–99)
GLUCOSE BLDC GLUCOMTR-MCNC: 140 MG/DL (ref 70–99)
GLUCOSE BLDC GLUCOMTR-MCNC: 141 MG/DL (ref 70–99)
GLUCOSE BLDC GLUCOMTR-MCNC: 141 MG/DL (ref 70–99)
GLUCOSE BLDC GLUCOMTR-MCNC: 145 MG/DL (ref 70–99)
GLUCOSE BLDC GLUCOMTR-MCNC: 160 MG/DL (ref 70–99)
GLUCOSE BLDC GLUCOMTR-MCNC: 162 MG/DL (ref 70–99)
GLUCOSE SERPL-MCNC: 143 MG/DL (ref 70–99)
HCT VFR BLD AUTO: 30.4 % (ref 35–47)
HGB BLD-MCNC: 9.9 G/DL (ref 11.7–15.7)
INR PPP: 1.21 (ref 0.85–1.15)
MAGNESIUM SERPL-MCNC: 2.4 MG/DL (ref 1.7–2.3)
MCH RBC QN AUTO: 29.4 PG (ref 26.5–33)
MCHC RBC AUTO-ENTMCNC: 32.6 G/DL (ref 31.5–36.5)
MCV RBC AUTO: 90 FL (ref 78–100)
PHOSPHATE SERPL-MCNC: 4 MG/DL (ref 2.5–4.5)
PLATELET # BLD AUTO: 194 10E3/UL (ref 150–450)
POTASSIUM SERPL-SCNC: 3.5 MMOL/L (ref 3.4–5.3)
PREALB SERPL IA-MCNC: 12 MG/DL (ref 15–45)
PREALB SERPL IA-MCNC: 5 MG/DL (ref 15–45)
PROT SERPL-MCNC: 6.7 G/DL (ref 6.4–8.3)
RBC # BLD AUTO: 3.37 10E6/UL (ref 3.8–5.2)
SODIUM SERPL-SCNC: 137 MMOL/L (ref 135–145)
TROPONIN T SERPL HS-MCNC: 631 NG/L
VANCOMYCIN SERPL-MCNC: 20 UG/ML
WBC # BLD AUTO: 12.7 10E3/UL (ref 4–11)

## 2023-10-16 PROCEDURE — 83735 ASSAY OF MAGNESIUM: CPT | Performed by: INTERNAL MEDICINE

## 2023-10-16 PROCEDURE — 999N000157 HC STATISTIC RCP TIME EA 10 MIN

## 2023-10-16 PROCEDURE — 250N000009 HC RX 250: Performed by: NURSE PRACTITIONER

## 2023-10-16 PROCEDURE — 80202 ASSAY OF VANCOMYCIN: CPT | Performed by: SURGERY

## 2023-10-16 PROCEDURE — 84100 ASSAY OF PHOSPHORUS: CPT | Performed by: INTERNAL MEDICINE

## 2023-10-16 PROCEDURE — 999N000287 HC ICU ADULT ROUNDING, EACH 10 MINS

## 2023-10-16 PROCEDURE — 250N000009 HC RX 250: Performed by: INTERNAL MEDICINE

## 2023-10-16 PROCEDURE — 250N000013 HC RX MED GY IP 250 OP 250 PS 637: Performed by: INTERNAL MEDICINE

## 2023-10-16 PROCEDURE — 999N000009 HC STATISTIC AIRWAY CARE

## 2023-10-16 PROCEDURE — 94640 AIRWAY INHALATION TREATMENT: CPT

## 2023-10-16 PROCEDURE — 250N000011 HC RX IP 250 OP 636: Performed by: NURSE PRACTITIONER

## 2023-10-16 PROCEDURE — 250N000013 HC RX MED GY IP 250 OP 250 PS 637: Performed by: SURGERY

## 2023-10-16 PROCEDURE — 85027 COMPLETE CBC AUTOMATED: CPT | Performed by: INTERNAL MEDICINE

## 2023-10-16 PROCEDURE — 99232 SBSQ HOSP IP/OBS MODERATE 35: CPT | Performed by: INTERNAL MEDICINE

## 2023-10-16 PROCEDURE — B4185 PARENTERAL SOL 10 GM LIPIDS: HCPCS | Mod: JZ | Performed by: SURGERY

## 2023-10-16 PROCEDURE — 84134 ASSAY OF PREALBUMIN: CPT | Performed by: INTERNAL MEDICINE

## 2023-10-16 PROCEDURE — 250N000011 HC RX IP 250 OP 636: Mod: JZ | Performed by: SURGERY

## 2023-10-16 PROCEDURE — 999N000253 HC STATISTIC WEANING TRIALS

## 2023-10-16 PROCEDURE — 71045 X-RAY EXAM CHEST 1 VIEW: CPT

## 2023-10-16 PROCEDURE — 99291 CRITICAL CARE FIRST HOUR: CPT | Performed by: INTERNAL MEDICINE

## 2023-10-16 PROCEDURE — C9113 INJ PANTOPRAZOLE SODIUM, VIA: HCPCS | Mod: JZ | Performed by: NURSE PRACTITIONER

## 2023-10-16 PROCEDURE — 84484 ASSAY OF TROPONIN QUANT: CPT | Performed by: INTERNAL MEDICINE

## 2023-10-16 PROCEDURE — 94640 AIRWAY INHALATION TREATMENT: CPT | Mod: 76

## 2023-10-16 PROCEDURE — 80053 COMPREHEN METABOLIC PANEL: CPT | Performed by: INTERNAL MEDICINE

## 2023-10-16 PROCEDURE — 250N000011 HC RX IP 250 OP 636: Mod: JZ | Performed by: STUDENT IN AN ORGANIZED HEALTH CARE EDUCATION/TRAINING PROGRAM

## 2023-10-16 PROCEDURE — 250N000011 HC RX IP 250 OP 636: Mod: JZ | Performed by: NURSE PRACTITIONER

## 2023-10-16 PROCEDURE — 250N000013 HC RX MED GY IP 250 OP 250 PS 637: Performed by: NURSE PRACTITIONER

## 2023-10-16 PROCEDURE — 250N000009 HC RX 250: Mod: JZ | Performed by: SURGERY

## 2023-10-16 PROCEDURE — 200N000001 HC R&B ICU

## 2023-10-16 PROCEDURE — 250N000011 HC RX IP 250 OP 636: Mod: JZ | Performed by: INTERNAL MEDICINE

## 2023-10-16 PROCEDURE — 250N000011 HC RX IP 250 OP 636: Mod: JW | Performed by: INTERNAL MEDICINE

## 2023-10-16 PROCEDURE — 85610 PROTHROMBIN TIME: CPT | Performed by: INTERNAL MEDICINE

## 2023-10-16 PROCEDURE — 94003 VENT MGMT INPAT SUBQ DAY: CPT

## 2023-10-16 PROCEDURE — 258N000003 HC RX IP 258 OP 636: Performed by: NURSE PRACTITIONER

## 2023-10-16 RX ORDER — PIPERACILLIN SODIUM, TAZOBACTAM SODIUM 3; .375 G/15ML; G/15ML
3.38 INJECTION, POWDER, LYOPHILIZED, FOR SOLUTION INTRAVENOUS EVERY 8 HOURS
Status: DISCONTINUED | OUTPATIENT
Start: 2023-10-16 | End: 2023-10-22

## 2023-10-16 RX ORDER — QUETIAPINE FUMARATE 25 MG/1
50 TABLET, FILM COATED ORAL 2 TIMES DAILY
Status: DISCONTINUED | OUTPATIENT
Start: 2023-10-16 | End: 2023-10-21

## 2023-10-16 RX ADMIN — HEPARIN SODIUM 5000 UNITS: 5000 INJECTION, SOLUTION INTRAVENOUS; SUBCUTANEOUS at 17:01

## 2023-10-16 RX ADMIN — DEXMEDETOMIDINE HYDROCHLORIDE 1.2 MCG/KG/HR: 4 INJECTION, SOLUTION INTRAVENOUS at 07:33

## 2023-10-16 RX ADMIN — PANTOPRAZOLE SODIUM 40 MG: 40 INJECTION, POWDER, FOR SOLUTION INTRAVENOUS at 07:38

## 2023-10-16 RX ADMIN — HEPARIN SODIUM 5000 UNITS: 5000 INJECTION, SOLUTION INTRAVENOUS; SUBCUTANEOUS at 01:57

## 2023-10-16 RX ADMIN — ACETAMINOPHEN 650 MG: 325 TABLET ORAL at 20:54

## 2023-10-16 RX ADMIN — MICAFUNGIN SODIUM 100 MG: 50 INJECTION, POWDER, LYOPHILIZED, FOR SOLUTION INTRAVENOUS at 13:11

## 2023-10-16 RX ADMIN — MIDAZOLAM 2 MG: 1 INJECTION INTRAMUSCULAR; INTRAVENOUS at 03:41

## 2023-10-16 RX ADMIN — Medication 100 MCG: at 06:55

## 2023-10-16 RX ADMIN — DEXMEDETOMIDINE HYDROCHLORIDE 1.2 MCG/KG/HR: 4 INJECTION, SOLUTION INTRAVENOUS at 13:05

## 2023-10-16 RX ADMIN — MIDAZOLAM 1 MG: 1 INJECTION INTRAMUSCULAR; INTRAVENOUS at 05:51

## 2023-10-16 RX ADMIN — Medication 100 MCG: at 00:36

## 2023-10-16 RX ADMIN — DEXMEDETOMIDINE HYDROCHLORIDE 1.2 MCG/KG/HR: 4 INJECTION, SOLUTION INTRAVENOUS at 05:00

## 2023-10-16 RX ADMIN — INSULIN ASPART 1 UNITS: 100 INJECTION, SOLUTION INTRAVENOUS; SUBCUTANEOUS at 07:39

## 2023-10-16 RX ADMIN — INSULIN ASPART 1 UNITS: 100 INJECTION, SOLUTION INTRAVENOUS; SUBCUTANEOUS at 16:38

## 2023-10-16 RX ADMIN — DEXMEDETOMIDINE HYDROCHLORIDE 1.2 MCG/KG/HR: 4 INJECTION, SOLUTION INTRAVENOUS at 22:47

## 2023-10-16 RX ADMIN — INSULIN ASPART 1 UNITS: 100 INJECTION, SOLUTION INTRAVENOUS; SUBCUTANEOUS at 04:38

## 2023-10-16 RX ADMIN — ALBUTEROL SULFATE 2.5 MG: 2.5 SOLUTION RESPIRATORY (INHALATION) at 08:58

## 2023-10-16 RX ADMIN — OLIVE OIL AND SOYBEAN OIL 250 ML: 16; 4 INJECTION, EMULSION INTRAVENOUS at 19:30

## 2023-10-16 RX ADMIN — QUETIAPINE FUMARATE 50 MG: 25 TABLET ORAL at 20:43

## 2023-10-16 RX ADMIN — Medication 50 MCG: at 16:28

## 2023-10-16 RX ADMIN — ALBUTEROL SULFATE 2.5 MG: 2.5 SOLUTION RESPIRATORY (INHALATION) at 16:05

## 2023-10-16 RX ADMIN — DEXMEDETOMIDINE HYDROCHLORIDE 1.2 MCG/KG/HR: 4 INJECTION, SOLUTION INTRAVENOUS at 15:14

## 2023-10-16 RX ADMIN — QUETIAPINE FUMARATE 50 MG: 25 TABLET ORAL at 13:11

## 2023-10-16 RX ADMIN — MAGNESIUM SULFATE HEPTAHYDRATE: 500 INJECTION, SOLUTION INTRAMUSCULAR; INTRAVENOUS at 19:31

## 2023-10-16 RX ADMIN — INSULIN ASPART 1 UNITS: 100 INJECTION, SOLUTION INTRAVENOUS; SUBCUTANEOUS at 23:38

## 2023-10-16 RX ADMIN — DEXMEDETOMIDINE HYDROCHLORIDE 1.2 MCG/KG/HR: 4 INJECTION, SOLUTION INTRAVENOUS at 10:03

## 2023-10-16 RX ADMIN — CHLORHEXIDINE GLUCONATE 15 ML: 1.2 RINSE ORAL at 19:38

## 2023-10-16 RX ADMIN — DEXMEDETOMIDINE HYDROCHLORIDE 1.2 MCG/KG/HR: 4 INJECTION, SOLUTION INTRAVENOUS at 20:39

## 2023-10-16 RX ADMIN — Medication 125 MCG/HR: at 17:00

## 2023-10-16 RX ADMIN — HEPARIN SODIUM 5000 UNITS: 5000 INJECTION, SOLUTION INTRAVENOUS; SUBCUTANEOUS at 10:56

## 2023-10-16 RX ADMIN — PIPERACILLIN AND TAZOBACTAM 3.38 G: 3; .375 INJECTION, POWDER, LYOPHILIZED, FOR SOLUTION INTRAVENOUS at 16:30

## 2023-10-16 RX ADMIN — INSULIN ASPART 1 UNITS: 100 INJECTION, SOLUTION INTRAVENOUS; SUBCUTANEOUS at 20:43

## 2023-10-16 RX ADMIN — PIPERACILLIN AND TAZOBACTAM 3.38 G: 3; .375 INJECTION, POWDER, LYOPHILIZED, FOR SOLUTION INTRAVENOUS at 07:39

## 2023-10-16 RX ADMIN — Medication 50 MCG: at 20:40

## 2023-10-16 RX ADMIN — MAGNESIUM SULFATE HEPTAHYDRATE: 500 INJECTION, SOLUTION INTRAMUSCULAR; INTRAVENOUS at 19:34

## 2023-10-16 RX ADMIN — PIPERACILLIN AND TAZOBACTAM 3.38 G: 3; .375 INJECTION, POWDER, LYOPHILIZED, FOR SOLUTION INTRAVENOUS at 23:52

## 2023-10-16 RX ADMIN — SENNOSIDES 5 ML: 8.8 LIQUID ORAL at 20:43

## 2023-10-16 RX ADMIN — Medication 100 MCG: at 03:00

## 2023-10-16 RX ADMIN — DEXMEDETOMIDINE HYDROCHLORIDE 1.2 MCG/KG/HR: 4 INJECTION, SOLUTION INTRAVENOUS at 17:43

## 2023-10-16 RX ADMIN — BUMETANIDE 2 MG: 0.25 INJECTION INTRAMUSCULAR; INTRAVENOUS at 13:11

## 2023-10-16 RX ADMIN — CHLORHEXIDINE GLUCONATE 15 ML: 1.2 RINSE ORAL at 07:38

## 2023-10-16 RX ADMIN — DOCUSATE SODIUM 50 MG: 50 LIQUID ORAL at 20:43

## 2023-10-16 RX ADMIN — Medication 100 MCG: at 04:10

## 2023-10-16 RX ADMIN — DEXMEDETOMIDINE HYDROCHLORIDE 1.2 MCG/KG/HR: 4 INJECTION, SOLUTION INTRAVENOUS at 23:32

## 2023-10-16 RX ADMIN — Medication 50 MCG: at 20:43

## 2023-10-16 ASSESSMENT — ACTIVITIES OF DAILY LIVING (ADL)
ADLS_ACUITY_SCORE: 35

## 2023-10-16 NOTE — PROGRESS NOTES
"Care Management Follow Up    Length of Stay (days): 7    Expected Discharge Date: 10/19/2023     Concerns to be Addressed: discharge planning   Patient plan of care discussed at interdisciplinary rounds: Yes    Anticipated Discharge Disposition:  TBD     Anticipated Discharge Services:      Education Provided on the Discharge Plan:  Per Care Team   Patient/Family in Agreement with the Plan:  Yes    Referrals Placed by CM/SW:    Private pay costs discussed: Not applicable    Additional Information:  Chart reviewed.    Cm updates:  Vent Day #5  Per rounds pt on IV vanco, plans to discontinue, pt still on IV micafungin. On precedex and fentanyl gtt, and TPN.   Per rounds attempted to wean pt and pt become agitated.       Social Hx:  \"From home with adult daughter and grandchild in an apartment. Independent at baseline and drives. Patient currently unemployed; saved up for procedures, plans to resume work once recovered. No community programs. Has a neb machine available for use due to Asthma. Goal to return home with family support. Family to transport if able. \"      Cm will continue to follow plan of care, review recommendations, and assist with any discharge needs anticipated.     Whit Malik RN      "

## 2023-10-16 NOTE — PROGRESS NOTES
RT PROGRESS NOTE    VENT DAY# Ventilation Day(s): 5    CURRENT SETTINGS:   Vent Mode: CPAP/PS  (Continuous positive airway pressure with Pressure Support)  FiO2 (%): 35 %  Resp Rate (Set): 24 breaths/min  Tidal Volume (Set, mL): 380 mL  PEEP (cm H2O): 8 cmH2O  Inspiratory Pressure (cm H2O) (Drager Sabiha): 10  Resp: 22      PATIENT PARAMETERS:  Ventilator - Patient   Patient Resp Rate : 26 breaths/min  Expiratory Vt (ml): 406  Minute Volume (ml): 11.2 L/min  Peak Inspiratory Pressure (cm H2O): 29 cmH2O  Mean Airway Pressure (cm H2O): 16 cmH2O  Plateau Pressure (cm H2O): 27 cmH2O  Dynamic Compliance (mL/cm H2O): 25 mL/cm H2O  Airway Resistance: 32  Auto/ Intrinsic PEEP (cm H2O): 1.4 cmH2O     SBT completed Yes , RSBI 38 - 45  Secretions: moderate amount, thick, creamy/tan  Breath Sounds: course, diminished    ETT Cuffed Oral 7.5 mm-Secured at (cm): 20 cm at teeth/gums  Emergency Ambu bag, mask and peep valve at bedside.     (Emergency trach supplies including current size, downsize, and obturator at bedside)    Respiratory Medications: gave 2 PRN albuterol nebulizers       HARRISON CASTANEDA, RT

## 2023-10-16 NOTE — PLAN OF CARE
[facility] - ICU    RN Progress Note:            Pertinent Assessments:      Please refer to flowsheet rows for full assessment     - RASS of -3 or +1, +2. Gets irritated/agitated when she wakes up and biting ET tube.        Key Events - This Shift:     - PRN 2mg versed given for agitation + PRN fentanyl for pain and has been effective.  - Patient gets agitated when she needs to be repositioned and during cares.  - Held Bumex at 5am. Urine output of 1875ml for last 8hrs.  - Troponin elevated. Intensivist aware. Cardiology involved.           Barriers to Discharge / Downgrade:     - Remains on mechanical ventilator. Critically ill.

## 2023-10-16 NOTE — PROGRESS NOTES
General Surgery Progress Note    POST OP DAY  # 7 and 4    Subjective:    Mojgan Jimenez is a 53 year old female who is s/p laparoscopic sleeve gastrectomy with KO on 10/9 post-op course complicated by small enterotomy x2 s/p laparoscopy converted to laparotomy with washout and enterotomy closure x2 on 10/12. Patient remains sedated and intubated post-operatively. She has been off her vasopressors for a few days now.   She was having PVC's and runs of V-Tach and was found to have an elevation of her Troponin with an EF of 35 %. Cardiology is involved and thank you for that consult.   Making quite a lot of urine and Cr is trending down.  .     Vitals:    10/16/23 0420 10/16/23 0430 10/16/23 0500 10/16/23 0600   BP:   134/83 138/81   BP Location:       Pulse: 59 60 58 58   Resp: 24 24 24 24   Temp: 97.2  F (36.2  C) 97.2  F (36.2  C) 97  F (36.1  C) 97  F (36.1  C)   TempSrc: Esophageal      SpO2: 97% 96% 97% 97%   Weight:       Height:           Physical Exam:  Lungs:  CTA  CV:       RRR  Ab:       Soft, + BS,     Troponin:  631    Recent Labs   Lab 10/15/23  0431 10/13/23  0426   WBC  --  4.9   HGB  --  10.2*   HCT  --  32.3*    162                  Component  Ref Range & Units  4:36 AM  (10/16/23) 1 d ago  (10/15/23) 2 d ago  (10/14/23) 2 d ago  (10/14/23) 3 d ago  (10/13/23) 3 d ago  (10/13/23) 3 d ago  (10/13/23)    Sodium  135 - 145 mmol/L 137 136  CM  135   Low  CM   Comment: Reference intervals for this test were updated on 09/26/2023 to more accurately reflect our healthy population. There may be differences in the flagging of prior results with similar values performed with this method. Interpretation of those prior results can be made in the context of the updated reference intervals.    Potassium  3.4 - 5.3 mmol/L 3.5 3.9 4.4  4.6 4.5 4.5    Carbon Dioxide (CO2)  22 - 29 mmol/L 23 22 19 Low   20 Low  20 Low  18 Low     Anion Gap  7 - 15 mmol/L 17 High  18 High  18 High   15 15 16  High     Urea Nitrogen  6.0 - 20.0 mg/dL 99.7 High  95.6 High  85.2 High   79.5 High  72.0 High  62.7 High     Creatinine  0.51 - 0.95 mg/dL 4.38 High  6.07 High  6.97 High   6.86 High  6.64 High  6.51 High     GFR Estimate  >60 mL/min/1.73m2 11 Low  8 Low  7 Low   7 Low  7 Low  7 Low     Calcium  8.6 - 10.0 mg/dL 9.3 8.9 8.2 Low   8.2 Low  8.1 Low  8.3 Low     Chloride  98 - 107 mmol/L 97 Low  96 Low  99  100 100 100    Glucose  70 - 99 mg/dL 143 High  154 High  105 High   131 High  145 High  178 High     Alkaline Phosphatase  35 - 104 U/L 82 96  83       AST  0 - 45 U/L 71 High  171 High  CM  367 High  CM      Comment: Reference intervals for this test were updated on 6/12/2023 to more accurately reflect our healthy population. There may be differences in the flagging of prior results with similar values performed with this method. Interpretation of those prior results can be made in the context of the updated reference intervals.    ALT  0 - 50 U/L 114 High  173 High  CM  207 High  CM      Comment: Reference intervals for this test were updated on 6/12/2023 to more accurately reflect our healthy population. There may be differences in the flagging of prior results with similar values performed with this method. Interpretation of those prior results can be made in the context of the updated reference intervals.      Protein Total  6.4 - 8.3 g/dL 6.7 6.5  6.0 Low        Albumin  3.5 - 5.2 g/dL 2.8 Low  2.8 Low   2.7 Low        Bilirubin Total  <=1.2 mg/dL 0.4 0.3  0.4              Assessment:  Pt is progressing well.  She is a very anxious person and I do not think she will tolerated a standard withdrawal of sedation before extubation.  Hopefully we can extubate today.    Plan: Extubate today if possible per Critcal Care Staff.    Hoang Worthy MD  [facility]  [phone]

## 2023-10-16 NOTE — PROGRESS NOTES
[facility]  [facility]   Follow up    Mojgan Jimenez   MRN: [id number]  : 1970   DOA: 10/9/2023   CC: ARF      Assessment and Plan:  53 year old female    ARF;  hemodynamic exacerbated by IV NSAIDS (lowish bp, vasodilatory drugs, mild intravascular depletion)==>ATN, nice drop in creatinine today and good urine output and diuresing. No need for HD today. Cont bumex with prn hold order. Lytes ok   S/p gastric bypass; complicated by leak; back to OR for repair and wash out of bilious fluid; on abx; supportive cares; on abx for positive cx from surgical washout  HoTN; infection/SIIRS; bp ok now off pressors.  ID; on abx for peritonitis  Fluid status; total body elevated but also third spacing; making more urine now and good response to diuretics will use based on urine output; alb prn   MS change; consistent with build up of narcotic and gabapentin metabolites due to ARF; now sedated on vent  Hyperlipid; on statin  Elevated LFTs; following;levels improved today  Resp failure; juancho-op; on vent; wean as able  Acidosis; resolved; off bicarb IVF  Nutrition; on TPN; no lipids due to being on propofol; TG are elevated so change to precedex  Depressed EF on ECHO    Subjective  New to me, reviewed chart.   On vent, arouses some.  Stable overnoc; no events; on TPN; n o pressors    Objective    Vital signs in last 24 hours  Temp:  [97  F (36.1  C)-99.9  F (37.7  C)] 97.2  F (36.2  C)  Pulse:  [57-79] 66  Resp:  [21-29] 25  BP: (113-152)/(72-93) 129/79  FiO2 (%):  [35 %] 35 %  SpO2:  [95 %-100 %] 97 %      Intake/Output last 3 shifts  I/O last 3 completed shifts:  In: 2436.27 [I.V.:1236.27]  Out: 4525 [Urine:4275; Emesis/NG output:150; Drains:100]  Intake/Output this shift:  I/O this shift:  In: -   Out: 450 [Urine:450]    Physical Exam  Intubated + facial edema  CV: RRR without murmur or rub  Lung: clear and equal; no extra sounds  Ab: distended. Lower abd with  some pitting edema, binder in place and drains.   Ext: ++ edema and well perfused  Skin; no rash    Pertinent Labs     Last Renal Panel:  Sodium   Date Value Ref Range Status   10/16/2023 137 135 - 145 mmol/L Final     Comment:     Reference intervals for this test were updated on 09/26/2023 to more accurately reflect our healthy population. There may be differences in the flagging of prior results with similar values performed with this method. Interpretation of those prior results can be made in the context of the updated reference intervals.    11/17/2020 141 133 - 144 mmol/L Final     Potassium   Date Value Ref Range Status   10/16/2023 3.5 3.4 - 5.3 mmol/L Final   05/17/2022 4.5 3.4 - 5.3 mmol/L Final   11/17/2020 4.1 3.4 - 5.3 mmol/L Final     Chloride   Date Value Ref Range Status   10/16/2023 97 (L) 98 - 107 mmol/L Final   05/17/2022 102 94 - 109 mmol/L Final   11/17/2020 109 94 - 109 mmol/L Final     Carbon Dioxide   Date Value Ref Range Status   11/17/2020 31 20 - 32 mmol/L Final     Carbon Dioxide (CO2)   Date Value Ref Range Status   10/16/2023 23 22 - 29 mmol/L Final   05/17/2022 32 20 - 32 mmol/L Final     Anion Gap   Date Value Ref Range Status   10/16/2023 17 (H) 7 - 15 mmol/L Final   05/17/2022 2 (L) 3 - 14 mmol/L Final   11/17/2020 1 (L) 3 - 14 mmol/L Final     Glucose   Date Value Ref Range Status   10/16/2023 143 (H) 70 - 99 mg/dL Final   05/17/2022 110 (H) 70 - 99 mg/dL Final   11/17/2020 84 70 - 99 mg/dL Final     Comment:     Fasting specimen     GLUCOSE BY METER POCT   Date Value Ref Range Status   10/16/2023 145 (H) 70 - 99 mg/dL Final     Urea Nitrogen   Date Value Ref Range Status   10/16/2023 99.7 (H) 6.0 - 20.0 mg/dL Final   05/17/2022 16 7 - 30 mg/dL Final   11/17/2020 9 7 - 30 mg/dL Final     Creatinine   Date Value Ref Range Status   10/16/2023 4.38 (H) 0.51 - 0.95 mg/dL Final   11/17/2020 0.79 0.52 - 1.04 mg/dL Final     GFR Estimate   Date Value Ref Range Status   10/16/2023 11 (L)  >60 mL/min/1.73m2 Final   11/17/2020 88 >60 mL/min/[1.73_m2] Final     Comment:     Non  GFR Calc  Starting 12/18/2018, serum creatinine based estimated GFR (eGFR) will be   calculated using the Chronic Kidney Disease Epidemiology Collaboration   (CKD-EPI) equation.       Calcium   Date Value Ref Range Status   10/16/2023 9.3 8.6 - 10.0 mg/dL Final   11/17/2020 8.9 8.5 - 10.1 mg/dL Final     Phosphorus   Date Value Ref Range Status   10/16/2023 4.0 2.5 - 4.5 mg/dL Final     Albumin   Date Value Ref Range Status   10/16/2023 2.8 (L) 3.5 - 5.2 g/dL Final   05/17/2022 4.0 3.4 - 5.0 g/dL Final   12/03/2018 3.6 3.4 - 5.0 g/dL Final     Recent Labs   Lab 10/13/23  0426 10/12/23  0315 10/11/23  0634   HGB 10.2* 12.8 13.2          I reviewed all lab results  Simi Shin MD  [facility]  [phone]

## 2023-10-16 NOTE — PROGRESS NOTES
Nutrition Therapy  Parenteral Nutrition follow-up       Dietitian to Pharmacy    With new bag for this evening:  Rate of TPN: 50 ml/hr continuious  Grams Dextrose: 190 g  Grams AA:73 g    Lipids: 50 g clinolipid 3 days/wk.       Pt remains intubated.  Surgery ok with meds per NG otherwise keep NPO.    S/p gastrectomy sleeve-duodenal switch on 10/9 and S/p drain placed, closure of interotomies and NG placed on 10/12.   Propofol stopped 10/15 due to elevated Triglyceride.    Current Nutrition Intake:  Diet: NPO since 10/11/23.  Custom TPN per central line:  63g AA, 125 g DEX.  No lipids (was previously on propofol)    TPN provides 677 kcals, 63 g protein, 125 g carb, 1200 ml fluid  TPN not currently meeting needs since propofol stopped.      TPN start 10/14/23.  Propofol off on 10/15.        Weight Trends  Admission wt: 130.5 kg (287 lb 12.8 oz) 10/9/23  Current wt: 140.6 kg (309 lb 14.4 oz   -1.7 L/24 hrs  +10 L since admit.    Dosing Weight: 52.3 kg (IBW)     ASSESSED NUTRITION NEEDS  Estimated Energy Needs: 1785-8079 kcals/day (22 - 25 kcals/kg)  Justification: Obese, Post-op, and Vented  Estimated Protein Needs: 63-78 grams protein/day (1.2 - 1.5 grams of pro/kg)  Justification: Obesity guidelines and Post-op, elevated creatinine  Estimated Fluid Needs: 5194-0247+ mL/day (1 mL/kcal)   Justification: Maintenance    GI:  Last BM 10/16/23.     ml out 10/15.  Drains 117 ml 10/15.  Patient has surgical incisions.  I/Os:  7915/3529  10/15/23.    Labs:   CMP  Recent Labs   Lab 10/16/23  0737 10/16/23  0436 10/16/23  0422 10/15/23  2312 10/15/23  0754 10/15/23  0431 10/14/23  1340 10/14/23  0629 10/13/23  2338 10/12/23  0933 10/12/23  0315   NA  --  137  --   --   --  136  --  136 135   < > 134*   POTASSIUM  --  3.5  --   --   --  3.9  --  4.4 4.6   < > 4.1   * 143* 140* 143*   < > 154*   < > 105* 131*   < > 71   BUN  --  99.7*  --   --   --  95.6*  --  85.2* 79.5*   < > 50.4*   CR  --  4.38*  --   --   --   6.07*  --  6.97* 6.86*   < > 5.66*   PALAK  --  9.3  --   --   --  8.9  --  8.2* 8.2*   < > 9.5   MAG  --  2.4*  --   --   --  2.7*  --  2.6*  --   --  2.2   PHOS  --  4.0  --   --   --  6.3*  --  5.3*  --   --   --    ALBUMIN  --  2.8*  --   --   --  2.8*  --  2.7*  --   --  2.8*   BILITOTAL  --  0.4  --   --   --  0.3  --  0.4  --   --  0.5   ALKPHOS  --  82  --   --   --  96  --  83  --   --  87   AST  --  71*  --   --   --  171*  --  367*  --   --  97*   ALT  --  114*  --   --   --  173*  --  207*  --   --  177*    < > = values in this interval not displayed.   Triglyceride 568 (H) 10/15/23  Creatinine high though improving.   Labs reviewed by dietitian    Medications:    bumetanide  2 mg Intravenous Q8H    [Held by provider] cetirizine  10 mg Oral Daily    [Held by provider] childrens multivitamin with iron  1 tablet Oral BID    chlorhexidine  15 mL Mouth/Throat Q12H    [Held by provider] cyanocobalamin  1,000 mcg Sublingual Daily    sennosides  5 mL Oral or Feeding Tube BID    And    docusate  50 mg Oral or Feeding Tube BID    [Held by provider] fluticasone-vilanterol  1 puff Inhalation Daily    heparin ANTICOAGULANT  5,000 Units Subcutaneous Q8H    insulin aspart  1-12 Units Subcutaneous Q4H    micafungin  100 mg Intravenous Q24H    pantoprazole  40 mg Intravenous QAM AC    piperacillin-tazobactam  3.375 g Intravenous Q8H    polyethylene glycol  17 g Oral or Feeding Tube Daily    [Held by provider] rosuvastatin  10 mg Oral Daily    sodium chloride (PF)  3 mL Intracatheter Q8H    [Held by provider] venlafaxine  75 mg Oral TID       amiodarone Stopped (10/15/23 7971)    dexmedeTOMIDine 1.2 mcg/kg/hr (10/16/23 6883)    dextrose      fentaNYL 75 mcg/hr (10/16/23 0600)    norepinephrine Stopped (10/13/23 9764)    parenteral nutrition - ADULT compounded formula 50 mL/hr at 10/16/23 0600    vasopressin Stopped (10/12/23 5635)      acetaminophen **OR** acetaminophen, albuterol, albuterol, dextrose, glucose **OR**  dextrose **OR** glucagon, diphenhydrAMINE **OR** diphenhydrAMINE, fentaNYL, HYDROmorphone **OR** HYDROmorphone, lidocaine 4%, lidocaine (buffered or not buffered), midazolam, naloxone **OR** naloxone **OR** naloxone **OR** naloxone, ondansetron **OR** ondansetron, phenol, prochlorperazine **OR** prochlorperazine, propranolol, sodium chloride (PF), traMADol    Reviewed by dietitian       MALNUTRITION  % Intake: Decreased intake does not meet criteria, 3days NPO.  % Weight Loss: None noted, wt now up.  Subcutaneous Fat Loss: None observed  Muscle Loss: None observed  Fluid Accumulation/Edema: None noted  Malnutrition Diagnosis: Patient does not meet two of the established criteria necessary for diagnosing malnutrition    Nutrition Diagnosis  Inadequate oral intake related to intubated and sedated as evidenced by NPO and need for TPN     Evaluation: No change    Goals   Meet nutrition needs with TPN-progressing   New-electrolytes WNL  New-Tolerate TPN  New-Transition to po diet after extubation and when appropriate.       INTERVENTIONS  Implementation  Parenteral Nutrition/IV Fluids - adjust TPN to meet nutrition needs:  73 g AA, 190 g DEX at 50 ml/hr continuous.  This to provide 1152 kcals, 1200 ml fluid, 73 g protein, 190 g carb, 21.4 g lipid daily.  GIR= 0.93 mg CHO/kg/min    Monitor TPN daily.       Monitoring/Evaluation  Progress toward goals will be monitored and evaluated per protocol.

## 2023-10-16 NOTE — PHARMACY-CONSULT NOTE
"Pharmacy Note: Parenteral Nutrition (PN) Management    Pharmacist consulted to dose PN for Mojgan Jimenez, a 53 year old female by Dr. Cruz (desired by surgery, initiation deferred to nephrology due fluid status).    Subjective:    The patient is a new PN start.    The patient was started on PN in the hospital on 10/14/23.    Indication for PN therapy:  peritonitis    Inadequate nutrition anticipated for > 7 days.     Enteral nutrition contraindicated due to: peritonitis.    Pertinent diseases and other special considerations  s/p sleeve gastrectomy 10/9/23    Social History     Tobacco Use    Smoking status: Never    Smokeless tobacco: Never   Vaping Use    Vaping Use: Never used   Substance Use Topics    Alcohol use: Not Currently     Comment: Sober x 8 months    Drug use: Not Currently     Types: Methamphetamines, \"Crack\" cocaine     Comment: in remision      Objective:    Ht Readings from Last 1 Encounters:   10/09/23 1.6 m (5' 3\")     Wt Readings from Last 1 Encounters:   10/16/23 140.6 kg (309 lb 14.4 oz)       Body mass index is 54.9 kg/m .    Patient Vitals for the past 96 hrs:   Weight   10/16/23 0400 140.6 kg (309 lb 14.4 oz)   10/13/23 0400 130.4 kg (287 lb 6.4 oz)       Labs:  Last 3 days:  Recent Labs     10/13/23  1724 10/13/23  2338 10/14/23  0629 10/14/23  0629 10/15/23  0431 10/15/23  0554 10/16/23  0436    135 136  --  136  --  137   POTASSIUM 4.5 4.6 4.4  --  3.9  --  3.5   CHLORIDE 100 100 99  --  96*  --  97*   CO2 20* 20* 19*  --  22  --  23   BUN 72.0* 79.5* 85.2*  --  95.6*  --  99.7*   CR 6.64* 6.86* 6.97*  --  6.07*  --  4.38*   PALAK 8.1* 8.2* 8.2*  --  8.9  --  9.3   [id number]  --   --   --   --  0.99*  --   --    MAG  --   --  2.6*  --  2.7*  --  2.4*   PHOS  --   --  5.3*  --  6.3*  --  4.0   PROTTOTAL  --   --  6.0*  --  6.5  --  6.7   ALBUMIN  --   --  2.7*  --  2.8*  --  2.8*   PREALB  --   --  5*  --   --   --   --    TRIG  --   --  339*  --  568*  --   --    PLT  --   --   --   " --  179  --   --    BILITOTAL  --   --  0.4  --  0.3  --  0.4   AST  --   --  367*  --  171*  --  71*   ALT  --   --  207*  --  173*  --  114*   ALKPHOS  --   --  83   < > 96  --  82   INR  --   --   --   --   --  1.19* 1.21*    < > = values in this interval not displayed.       Glucose (past 48 hours):   Recent Labs     10/15/23  0414 10/15/23  0431 10/15/23  0754 10/15/23  1201 10/15/23  1608 10/15/23  2007 10/15/23  2312 10/16/23  0422 10/16/23  0436 10/16/23  0737   * 154* 147* 134* 156* 148* 143* 140* 143* 145*       Intake/Output (last 24 hours): I/O last 3 completed shifts:  In: 2436.27 [I.V.:1236.27]  Out: 4525 [Urine:4275; Emesis/NG output:150; Drains:100]    Estimated CrCl: Estimated Creatinine Clearance: 20.6 mL/min (A) (based on SCr of 4.38 mg/dL (H)).    Assessment:    Initiate patient on PN therapy as a continuous central therapy.     Given the patient's current condition/oral intake, PN is still indicated.    Lab results reviewed:   10/14 max fluid rate per nephrology, mag and phos elevated; dietician request addition of B12 in TPN d/t recent bariatric surgery; zinc ordered by nephrologist  10/15 phos increased further -- remove from TPN at this time, mag also remains high -- decrease; ionized Ca low -- increase  10/16 Potassium slowly dropping -- increase slightly; Mag remains elevated, but trending down -- continue same today; Phos dropped quite a bit -- add back a small amount into TPN; increase macronutrients per RD, glucose stable -- Novolog adequate; start giving some meds enterally    Plan:  Rate of PN: 50 mL/hr  Formula:   Amino Acids 73 grams  Dextrose 190 grams  Sodium 40 mEq/day  Potassium 40 mEq/day  Calcium 5 mEq/day  Magnesium 1 mEq/day  Phosphorus 3 mMol/day  Chloride: Acetate Ratio 1:1  Standard Multivitamins w/Vitamin K  Trace Elements  Vitamin B12 100 mcg  Zinc 5 mg  Fat Emulsion 20% 250 ml 3x/weekly.  Check hyperal lytes labs tomorrow.  Pharmacist will continue to follow the  patient's lab results, clinical status and blood glucose results and make adjustments as appropriate.    Thank you for the consult.  Sagrario Cervantes [facility]  10/16/2023 10:25 AM

## 2023-10-16 NOTE — PROGRESS NOTES
"Critical Care Progress Note      10/16/2023    Name: Mojgan Jimenez MRN#: [id number]   Age: 53 year old YOB: 1970     Hsptl Day# 7  ICU DAY #    MV DAY #             Problem List:   Principal Problem:    Morbid (severe) obesity due to excess calories (H)    Clinically Significant Risk Factors           # Hypercalcemia: corrected calcium is >10.1, will monitor as appropriate    # Hypoalbuminemia: Lowest albumin = 2.7 g/dL at 10/14/2023  6:29 AM, will monitor as appropriate  # Coagulation Defect: INR = 1.21 (Ref range: 0.85 - 1.15) and/or PTT = N/A, will monitor for bleeding     # Acute heart failure with reduced ejection fraction: last echo with EF <40% and receiving IV diuretics       # Severe Obesity: Estimated body mass index is 54.9 kg/m  as calculated from the following:    Height as of this encounter: 1.6 m (5' 3\").    Weight as of this encounter: 140.6 kg (309 lb 14.4 oz).      # Asthma: noted on problem list                          Summary/Hospital Course:     Presented 10/9/23 for scheduled laparoscopic sleeve gastrectomy with single anastomosis duodenal switch. She was later found to have two small bowel injury with peritonitis, encephalopathy, septic shock, acute respiratory failure due to loss of protective airway reflexes & increased metabolic load requiring intubation (10/12-current), & oliguric SUSSY. 10/12/23 returned to OR for small bowel interotomy closure, clean-out, & drain placement.       Assessment and plan :       I have personally reviewed the daily labs, imaging studies, cultures and discussed the case with referring physician and consulting physicians.     My assessment and plan by system for this patient is as follows:    Neurology/Psychiatry:   Sedated with precedex and fentanyl  Very anxious at baseline. Hx of meth abuse.    Start seroquel    Cardiovascular:   Septic shock resolved  Off pressors.    Volume overloaded.  Continue Lasix    Episodes of nonsustained VT this " weekend.  Started on amiodarone.  Echo shows EF of 30 to 35%  Neurology consulted and following plan is to repeat echo.        Pulmonary/Ventilator Management:   Acute respiratory failure  Continue mechanical ventilation.  Continue weaning PEEP    Doing okay on SBT but volume overloaded then still on PEEP of 5.  Likely volumes to be little bit better before we take the tube out specially in view of peritonitis and obese abdomen.  I am concerned that she will not do well if she gets extubated today.    Sputum with some purulence.  Question right lower lobe pneumonia      GI and Nutrition :   Laparoscopic sleeve with a single anastomosis and duodenal switch on 10/9  2 small areas of perforation of small bowel with peritonitis    Status post laparotomy and repair.  Currently on TPN.    Renal/Fluids/Electrolytes:   Acute renal failure secondary to sepsis  Nonoliguric.  Continue diuretics.  Creatinine improving.    - monitor function and electrolytes as needed with replacement per ICU protocols.  - generally avoid nephrotoxic agents such as NSAID, IV contrast unless specifically required  - adjust medications as needed for renal clearance  - follow I/O's as appropriate.    Infectious Disease:   Peritonitis due to perforated bowel  DV vanco  Continue zosyn/micafungin      Endocrine:     Plan  - ICU insulin protocol, goal sugar <180    ICU Prophylaxis:   1. DVT: Hep Subq  2. VAP: HOB 30 degrees, chlorhexidine rinse  3. Stress Ulcer: PPI  4. Restraints: Nonviolent soft two point restraints required and necessary for patient safety and continued cares and good effect as patient continues to pull at necessary lines, tubes despite education and distraction. Will readdress daily.     Category: Non-violent   Type of Restraint: Soft limb x2.   Behavior: Pulling at IVand arevalo catheter tubings.   Root cause of behavior: Critical illness.   Less-restrictive methods that have failed: Redirection, reorientation. 1:1 NA at bedside.    Response to restraint: Not actively pulling atcurrent restraints.   Criteria for release from restraint: Responds to redirection. Leaves medical devices in place.          Key Medications:      bumetanide  2 mg Intravenous Q8H    [Held by provider] cetirizine  10 mg Oral Daily    [Held by provider] childrens multivitamin with iron  1 tablet Oral BID    chlorhexidine  15 mL Mouth/Throat Q12H    [Held by provider] cyanocobalamin  1,000 mcg Sublingual Daily    sennosides  5 mL Oral or Feeding Tube BID    And    docusate  50 mg Oral or Feeding Tube BID    [Held by provider] fluticasone-vilanterol  1 puff Inhalation Daily    heparin ANTICOAGULANT  5,000 Units Subcutaneous Q8H    insulin aspart  1-12 Units Subcutaneous Q4H    micafungin  100 mg Intravenous Q24H    pantoprazole  40 mg Intravenous QAM AC    piperacillin-tazobactam  3.375 g Intravenous Q8H    polyethylene glycol  17 g Oral or Feeding Tube Daily    [Held by provider] rosuvastatin  10 mg Oral Daily    sodium chloride (PF)  3 mL Intracatheter Q8H    [Held by provider] venlafaxine  75 mg Oral TID      amiodarone Stopped (10/15/23 1526)    dexmedeTOMIDine 1.2 mcg/kg/hr (10/16/23 1003)    dextrose      fentaNYL 75 mcg/hr (10/16/23 0600)    norepinephrine Stopped (10/13/23 1814)    parenteral nutrition - ADULT compounded formula 50 mL/hr at 10/16/23 0600    vasopressin Stopped (10/12/23 1145)               Physical Examination:   Temp:  [97  F (36.1  C)-99.9  F (37.7  C)] 97.2  F (36.2  C)  Pulse:  [57-79] 66  Resp:  [21-29] 25  BP: (114-152)/(72-93) 129/79  FiO2 (%):  [35 %] 35 %  SpO2:  [95 %-100 %] 97 %    Intake/Output Summary (Last 24 hours) at 10/16/2023 1020  Last data filed at 10/16/2023 0932  Gross per 24 hour   Intake 2436.27 ml   Output 4275 ml   Net -1838.73 ml     Wt Readings from Last 4 Encounters:   10/16/23 140.6 kg (309 lb 14.4 oz)   08/09/23 133.4 kg (294 lb 3.2 oz)   07/05/23 129.7 kg (286 lb)   03/13/23 129.2 kg (284 lb 12.8 oz)     BP - Mean:   [] 99  Vent Mode: CPAP/PS  (Continuous positive airway pressure with Pressure Support)  FiO2 (%): 35 %  Resp Rate (Set): 24 breaths/min  Tidal Volume (Set, mL): 380 mL  PEEP (cm H2O): 5 cmH2O  Inspiratory Pressure (cm H2O) (Drager Sabiha): 10  Resp: 25    Recent Labs   Lab 10/15/23  0431 10/15/23  0334 10/14/23  1248 10/14/23  0534 10/13/23  1949   PH 7.31* 7.23* 7.34* 7.39 7.37   PCO2  --  56* 39 32* 33*   PO2  --  87 72* 94* 91*   HCO3  --  23 21* 19* 19*   O2PER  --  35  --   --   --        GEN: no acute distress   HEENT: head ncat, sclera anicteric, OP patent, trachea midline   PULM: unlabored synchronous with vent, clear anteriorly    CV/COR: RRR S1S2 no gallop,  No rub, no murmur  ABD: soft nontender, hypoactive bowel sounds, no mass  EXT: Extensive edema on extremities  NEURO: Wakes up.  Follows commands.  SKIN: no obvious rash  LINES: clean, dry intact         Data:   All data and imaging reviewed     ROUTINE ICU LABS (Last four results)  CMP  Recent Labs   Lab 10/16/23  0737 10/16/23  0436 10/16/23  0422 10/15/23  2312 10/15/23  0754 10/15/23  0431 10/14/23  1340 10/14/23  0629 10/13/23  2338 10/12/23  0933 10/12/23  0315   NA  --  137  --   --   --  136  --  136 135   < > 134*   POTASSIUM  --  3.5  --   --   --  3.9  --  4.4 4.6   < > 4.1   CHLORIDE  --  97*  --   --   --  96*  --  99 100   < > 101   CO2  --  23  --   --   --  22  --  19* 20*   < > 18*   ANIONGAP  --  17*  --   --   --  18*  --  18* 15   < > 15   * 143* 140* 143*   < > 154*   < > 105* 131*   < > 71   BUN  --  99.7*  --   --   --  95.6*  --  85.2* 79.5*   < > 50.4*   CR  --  4.38*  --   --   --  6.07*  --  6.97* 6.86*   < > 5.66*   GFRESTIMATED  --  11*  --   --   --  8*  --  7* 7*   < > 8*   PALAK  --  9.3  --   --   --  8.9  --  8.2* 8.2*   < > 9.5   MAG  --  2.4*  --   --   --  2.7*  --  2.6*  --   --  2.2   PHOS  --  4.0  --   --   --  6.3*  --  5.3*  --   --   --    PROTTOTAL  --  6.7  --   --   --  6.5  --  6.0*  --   --   "6.5   ALBUMIN  --  2.8*  --   --   --  2.8*  --  2.7*  --   --  2.8*   BILITOTAL  --  0.4  --   --   --  0.3  --  0.4  --   --  0.5   ALKPHOS  --  82  --   --   --  96  --  83  --   --  87   AST  --  71*  --   --   --  171*  --  367*  --   --  97*   ALT  --  114*  --   --   --  173*  --  207*  --   --  177*    < > = values in this interval not displayed.     CBC  Recent Labs   Lab 10/15/23  0431 10/13/23  0426 10/12/23  1712 10/12/23  0315 10/11/23  0634   WBC  --  4.9  --  4.0 13.1*   RBC  --  3.48*  --  4.34 4.48   HGB  --  10.2*  --  12.8 13.2   HCT  --  32.3*  --  39.8 41.7   MCV  --  93  --  92 93   MCH  --  29.3  --  29.5 29.5   MCHC  --  31.6  --  32.2 31.7   RDW  --  13.7  --  13.3 13.3    162 189 213 193     INR  Recent Labs   Lab 10/16/23  0436 10/15/23  0554   INR 1.21* 1.19*     Arterial Blood Gas  Recent Labs   Lab 10/15/23  0431 10/15/23  0334 10/14/23  1248 10/14/23  0534 10/13/23  1949   PH 7.31* 7.23* 7.34* 7.39 7.37   PCO2  --  56* 39 32* 33*   PO2  --  87 72* 94* 91*   HCO3  --  23 21* 19* 19*   O2PER  --  35  --   --   --        All cultures:  No results for input(s): \"CULT\" in the last 168 hours.  Recent Results (from the past 24 hour(s))   XR Chest Port 1 View    Narrative    EXAM: XR CHEST PORT 1 VIEW  LOCATION: [facility]  DATE: 10/16/2023    INDICATION: hypoxia  COMPARISON: Portable AP view of the chest 10/14/2023      Impression    IMPRESSION:     Tip of the endotracheal tube is in satisfactory position. Esophageal temperature probe is in the lower third of the esophagus. Gastric tube courses below the diaphragmatic hiatus and outside the field-of-view. Telemetry leads overlie the chest.    Persistent shallow lung inflation. The right hemidiaphragm is indistinct and there is graded opacity in the infrahilar right chest either related to adjacent basilar atelectasis and/or layering pleural fluid. Focal atelectasis in the medial left base is   not increased. " The vessels within the aerated portions of the lungs are normal. No clear change from 2 days earlier.    Unchanged heart and mediastinal contours.         Billing: This patient is critically ill: Yes. Total critical care time today 35 min exclusive of procedures or teaching.  Managing respiratory failure, volume overload.  Critically ill.

## 2023-10-16 NOTE — PROGRESS NOTES
HEART CARE NOTE          Assessment/Recommendations     1. Severe HFrEF c/b ADHF and septic shock  Assessment / Plan  Hypervolemic on physical exam; diuresing well on current regimen - no changes at this time; continue to monitor renal function/repeat BMP, UOP and hemodynamics closely   Patient is high risk of adverse cardiac events 2/2 severe LV dysfunction and hx of substance/methamphetamine abuse  Will need ischemia eval is LV systolic dysfunction remains depressed - repeat echo once near euvolemia and sepsis has resolved  GDMT on hold given the above    2. Frequent PVCs  Assessment / Plan  Continue amiodarone  Continue to monitor electrolytes and replete PRN; goal K 4 and Mg > 2    3. Acute hypoxic respiratory failure  Assessment / Plan  S/p mechanical ventilation -management per ICU team    4. SUSSY  Assessment / Plan  2/2 hemodynamic compromise Continue to monitor UOP and renal function closely    5. Gastric bypass surgery c/b bile leak  Assessment / Plan  Management per primary and surgical team      Patient remains critically ill in the ICU requiring mechanical ventilator support in the setting of acute hypoxic respiratory failure. 60 minutes spent on critical care.    History of Present Illness/Subjective    Ms. Mojgan Jimenez is a 53 year old female with a PMHx significant for obesity, JARVIS on CPAP, asthma (no PFTs), stimulant use disorder, stimulant induced psychosis, mood & anxiety disorders; Cardiology consulted regarding new decline in LVSF    Today, Mrs. Jimenez is intubated and sedated; Management plan as detailed above     ECG: personally reviewed; normal sinus rhythm, nonspecific ST and T waves changes.    ECHO (personnaly Reviewed on 10/16/23):   Technically difficult study.  Limited views were obtained.  The left ventricle is normal in size.  Left ventricular function is decreased. The ejection fraction is 30-35%  (moderately reduced).  There is moderate global hypokinesia of the left  "ventricle.    Telemetry: personally reviewed October 16, 2023; notable for sinus rhythm     Lab results: personally reviewed October 16, 2023; notable for resolving SUSSY    Medical history and pertinent documents reviewed in Care Everywhere please where applicable see details above        Physical Examination Review of Systems   /81   Pulse 58   Temp 97  F (36.1  C)   Resp 24   Ht 1.6 m (5' 3\")   Wt 140.6 kg (309 lb 14.4 oz)   LMP 09/09/2023 (Exact Date)   SpO2 97%   BMI 54.90 kg/m    Body mass index is 54.9 kg/m .  Wt Readings from Last 3 Encounters:   10/16/23 140.6 kg (309 lb 14.4 oz)   08/09/23 133.4 kg (294 lb 3.2 oz)   07/05/23 129.7 kg (286 lb)     General Appearance:   Intubated/sedated   ENT/Mouth: membranes moist, no oral lesions or bleeding gums.      EYES:  no scleral icterus, normal conjunctivae   Neck: no carotid bruits or thyromegaly   Chest/Lungs:   lungs are clear to auscultation, no rales or wheezing, equal chest wall expansion    Cardiovascular:   Regular. Normal first and second heart sounds with no murmurs, rubs, or gallops; the carotid, radial and posterior tibial pulses are intact, + LE edema bilaterally    Abdomen:  no organomegaly, masses, bruits, or tenderness; bowel sounds are present   Extremities: no cyanosis or clubbing   Skin: no xanthelasma, warm.    Neurologic: Intubated/sedated     Psychiatric: Intubated/sedated     A complete 10 systems ROS was reviewed  And is negative except what is listed in the HPI.          Medical History  Surgical History Family History Social History   Past Medical History:   Diagnosis Date     LINA (generalized anxiety disorder) 11/02/2017     Hyperlipidemia LDL goal <160 01/20/2019     Major depressive disorder      Methanol abuse (H)      Mild persistent asthma without complication 11/02/2017     Obese      JARVIS on CPAP     Cpap not working     Paranoia (H)     resolved due to drugs     Vitamin D deficiency 11/02/2017    Past Surgical History: " "  Procedure Laterality Date     GASTRECTOMY, SLEEVE, LAPAROSCOPIC, WITH DUODENAL SWITCH N/A 10/9/2023    Procedure: GASTRECTOMY, SLEEVE, LAPAROSCOPIC, WITH SINGLE ANASTAMOSIS DUODENAL SWITCH;  Surgeon: Hoang Worthy MD;  Location: [facility] [place]     LAPAROSCOPY DIAGNOSTIC (GYN) N/A 10/12/2023    Procedure: LAPAROSCOPY,;  Surgeon: Hoang Worthy MD;  Location: [facility] [place] [facility] OR     LAPAROTOMY EXPLORATORY N/A 10/12/2023    Procedure: LAPAROTOMY, CLOSURE OF TWO SMALL BOWEL INTEROTOMIES, DRAIN PLACEMENT, INTRA-ABDOMINAL CLEAN OUT;  Surgeon: Hoang Worthy MD;  Location: [facility] [place] [facility] OR     MAMMOPLASTY REDUCTION      reduction    no family history of premature coronary artery disease Social History     Socioeconomic History     Marital status: Single     Spouse name: Not on file     Number of children: Not on file     Years of education: Not on file     Highest education level: Not on file   Occupational History     Not on file   Tobacco Use     Smoking status: Never     Smokeless tobacco: Never   Vaping Use     Vaping Use: Never used   Substance and Sexual Activity     Alcohol use: Not Currently     Comment: Sober x 8 months     Drug use: Not Currently     Types: Methamphetamines, \"Crack\" cocaine     Comment: in remision      Sexual activity: Not Currently     Partners: Male   Other Topics Concern     Parent/sibling w/ CABG, MI or angioplasty before 65F 55M? No   Social History Narrative     Not on file     Social Determinants of Health     Financial Resource Strain: Not on file   Food Insecurity: Not on file   Transportation Needs: Not on file   Physical Activity: Not on file   Stress: Not on file   Social Connections: Not on file   Interpersonal Safety: Not on file   Housing Stability: Not on file           Lab Results    Chemistry/lipid CBC Cardiac Enzymes/BNP/TSH/INR   Lab Results   Component Value Date    CHOL 176 05/17/2022    HDL 36 (L) 05/17/2022    TRIG 568 (H) 10/15/2023    BUN " "99.7 (H) 10/16/2023     10/16/2023    CO2 23 10/16/2023    Lab Results   Component Value Date    WBC 4.9 10/13/2023    HGB 10.2 (L) 10/13/2023    HCT 32.3 (L) 10/13/2023    MCV 93 10/13/2023     10/15/2023    Lab Results   Component Value Date    TSH 2.56 09/15/2022    INR 1.21 (H) 10/16/2023     No results found for: \"CKTOTAL\", \"CKMB\", \"TROPONINI\"       Weight:    Wt Readings from Last 3 Encounters:   10/16/23 140.6 kg (309 lb 14.4 oz)   08/09/23 133.4 kg (294 lb 3.2 oz)   07/05/23 129.7 kg (286 lb)       Allergies  No Known Allergies      Surgical History  Past Surgical History:   Procedure Laterality Date     GASTRECTOMY, SLEEVE, LAPAROSCOPIC, WITH DUODENAL SWITCH N/A 10/9/2023    Procedure: GASTRECTOMY, SLEEVE, LAPAROSCOPIC, WITH SINGLE ANASTAMOSIS DUODENAL SWITCH;  Surgeon: Hoang Worthy MD;  Location: [facility] OR     LAPAROSCOPY DIAGNOSTIC (GYN) N/A 10/12/2023    Procedure: LAPAROSCOPY,;  Surgeon: Hoang Worthy MD;  Location: [facility] OR     LAPAROTOMY EXPLORATORY N/A 10/12/2023    Procedure: LAPAROTOMY, CLOSURE OF TWO SMALL BOWEL INTEROTOMIES, DRAIN PLACEMENT, INTRA-ABDOMINAL CLEAN OUT;  Surgeon: Hoang Worthy MD;  Location: [facility] OR     MAMMOPLASTY REDUCTION      reduction       Social History  Tobacco:   History   Smoking Status     Never   Smokeless Tobacco     Never    Alcohol:   Social History    Substance and Sexual Activity      Alcohol use: Not Currently        Comment: Sober x 8 months   Illicit Drugs:   History   Drug Use Unknown     Comment: in remision        Family History  Family History   Problem Relation Age of Onset     Cancer Mother      Unknown/Adopted Father      Alcoholism Father      Substance Abuse Sister      Diabetes No family hx of      Hypertension No family hx of           Do Schreiber MD on 10/16/2023      cc: Franca Mishra    "

## 2023-10-17 LAB
ANION GAP SERPL CALCULATED.3IONS-SCNC: 16 MMOL/L (ref 7–15)
BUN SERPL-MCNC: 99.7 MG/DL (ref 6–20)
CALCIUM SERPL-MCNC: 9.2 MG/DL (ref 8.6–10)
CHLORIDE SERPL-SCNC: 101 MMOL/L (ref 98–107)
CREAT SERPL-MCNC: 3.55 MG/DL (ref 0.51–0.95)
DEPRECATED HCO3 PLAS-SCNC: 24 MMOL/L (ref 22–29)
EGFRCR SERPLBLD CKD-EPI 2021: 15 ML/MIN/1.73M2
GLUCOSE BLDC GLUCOMTR-MCNC: 117 MG/DL (ref 70–99)
GLUCOSE BLDC GLUCOMTR-MCNC: 135 MG/DL (ref 70–99)
GLUCOSE BLDC GLUCOMTR-MCNC: 140 MG/DL (ref 70–99)
GLUCOSE BLDC GLUCOMTR-MCNC: 141 MG/DL (ref 70–99)
GLUCOSE BLDC GLUCOMTR-MCNC: 159 MG/DL (ref 70–99)
GLUCOSE SERPL-MCNC: 144 MG/DL (ref 70–99)
MAGNESIUM SERPL-MCNC: 2.1 MG/DL (ref 1.7–2.3)
PHOSPHATE SERPL-MCNC: 3.9 MG/DL (ref 2.5–4.5)
POTASSIUM SERPL-SCNC: 3.1 MMOL/L (ref 3.4–5.3)
SODIUM SERPL-SCNC: 141 MMOL/L (ref 135–145)

## 2023-10-17 PROCEDURE — 258N000003 HC RX IP 258 OP 636: Performed by: NURSE PRACTITIONER

## 2023-10-17 PROCEDURE — 99291 CRITICAL CARE FIRST HOUR: CPT | Performed by: INTERNAL MEDICINE

## 2023-10-17 PROCEDURE — 999N000009 HC STATISTIC AIRWAY CARE

## 2023-10-17 PROCEDURE — 999N000253 HC STATISTIC WEANING TRIALS

## 2023-10-17 PROCEDURE — 250N000011 HC RX IP 250 OP 636: Mod: JZ | Performed by: NURSE PRACTITIONER

## 2023-10-17 PROCEDURE — 999N000287 HC ICU ADULT ROUNDING, EACH 10 MINS

## 2023-10-17 PROCEDURE — 250N000011 HC RX IP 250 OP 636: Mod: JZ | Performed by: SURGERY

## 2023-10-17 PROCEDURE — 250N000011 HC RX IP 250 OP 636: Performed by: INTERNAL MEDICINE

## 2023-10-17 PROCEDURE — 250N000009 HC RX 250: Performed by: INTERNAL MEDICINE

## 2023-10-17 PROCEDURE — 250N000013 HC RX MED GY IP 250 OP 250 PS 637: Performed by: INTERNAL MEDICINE

## 2023-10-17 PROCEDURE — 99232 SBSQ HOSP IP/OBS MODERATE 35: CPT | Performed by: INTERNAL MEDICINE

## 2023-10-17 PROCEDURE — 94003 VENT MGMT INPAT SUBQ DAY: CPT

## 2023-10-17 PROCEDURE — 250N000009 HC RX 250: Performed by: SURGERY

## 2023-10-17 PROCEDURE — 83735 ASSAY OF MAGNESIUM: CPT | Performed by: INTERNAL MEDICINE

## 2023-10-17 PROCEDURE — 250N000013 HC RX MED GY IP 250 OP 250 PS 637: Performed by: SURGERY

## 2023-10-17 PROCEDURE — 80048 BASIC METABOLIC PNL TOTAL CA: CPT | Performed by: INTERNAL MEDICINE

## 2023-10-17 PROCEDURE — 999N000157 HC STATISTIC RCP TIME EA 10 MIN

## 2023-10-17 PROCEDURE — 250N000011 HC RX IP 250 OP 636: Performed by: NURSE PRACTITIONER

## 2023-10-17 PROCEDURE — P9047 ALBUMIN (HUMAN), 25%, 50ML: HCPCS | Performed by: INTERNAL MEDICINE

## 2023-10-17 PROCEDURE — C9113 INJ PANTOPRAZOLE SODIUM, VIA: HCPCS | Mod: JZ | Performed by: NURSE PRACTITIONER

## 2023-10-17 PROCEDURE — 200N000001 HC R&B ICU

## 2023-10-17 PROCEDURE — 250N000013 HC RX MED GY IP 250 OP 250 PS 637: Performed by: NURSE PRACTITIONER

## 2023-10-17 PROCEDURE — 250N000011 HC RX IP 250 OP 636: Mod: JZ | Performed by: INTERNAL MEDICINE

## 2023-10-17 PROCEDURE — 84100 ASSAY OF PHOSPHORUS: CPT | Performed by: INTERNAL MEDICINE

## 2023-10-17 RX ORDER — BUMETANIDE 0.25 MG/ML
3 INJECTION INTRAMUSCULAR; INTRAVENOUS EVERY 8 HOURS
Status: DISCONTINUED | OUTPATIENT
Start: 2023-10-17 | End: 2023-10-19

## 2023-10-17 RX ORDER — POTASSIUM CHLORIDE 29.8 MG/ML
20 INJECTION INTRAVENOUS
Status: COMPLETED | OUTPATIENT
Start: 2023-10-17 | End: 2023-10-17

## 2023-10-17 RX ORDER — ALBUMIN (HUMAN) 12.5 G/50ML
12.5 SOLUTION INTRAVENOUS EVERY 8 HOURS
Status: COMPLETED | OUTPATIENT
Start: 2023-10-17 | End: 2023-10-18

## 2023-10-17 RX ADMIN — DEXMEDETOMIDINE HYDROCHLORIDE 1.2 MCG/KG/HR: 4 INJECTION, SOLUTION INTRAVENOUS at 16:41

## 2023-10-17 RX ADMIN — DEXMEDETOMIDINE HYDROCHLORIDE 1.2 MCG/KG/HR: 4 INJECTION, SOLUTION INTRAVENOUS at 12:29

## 2023-10-17 RX ADMIN — PIPERACILLIN AND TAZOBACTAM 3.38 G: 3; .375 INJECTION, POWDER, LYOPHILIZED, FOR SOLUTION INTRAVENOUS at 08:34

## 2023-10-17 RX ADMIN — MIDAZOLAM 2 MG: 1 INJECTION INTRAMUSCULAR; INTRAVENOUS at 22:50

## 2023-10-17 RX ADMIN — Medication 50 MCG: at 16:11

## 2023-10-17 RX ADMIN — MIDAZOLAM 2 MG: 1 INJECTION INTRAMUSCULAR; INTRAVENOUS at 17:57

## 2023-10-17 RX ADMIN — MIDAZOLAM 2 MG: 1 INJECTION INTRAMUSCULAR; INTRAVENOUS at 13:58

## 2023-10-17 RX ADMIN — INSULIN ASPART 3 UNITS: 100 INJECTION, SOLUTION INTRAVENOUS; SUBCUTANEOUS at 23:49

## 2023-10-17 RX ADMIN — ALBUMIN HUMAN 12.5 G: 0.25 SOLUTION INTRAVENOUS at 20:16

## 2023-10-17 RX ADMIN — HEPARIN SODIUM 5000 UNITS: 5000 INJECTION, SOLUTION INTRAVENOUS; SUBCUTANEOUS at 10:01

## 2023-10-17 RX ADMIN — MIDAZOLAM 1 MG: 1 INJECTION INTRAMUSCULAR; INTRAVENOUS at 01:50

## 2023-10-17 RX ADMIN — DOCUSATE SODIUM 50 MG: 50 LIQUID ORAL at 20:16

## 2023-10-17 RX ADMIN — Medication 150 MCG/HR: at 20:07

## 2023-10-17 RX ADMIN — Medication 100 MCG: at 10:03

## 2023-10-17 RX ADMIN — BUMETANIDE 3 MG: 0.25 INJECTION INTRAMUSCULAR; INTRAVENOUS at 12:37

## 2023-10-17 RX ADMIN — ACETAMINOPHEN 650 MG: 325 TABLET ORAL at 12:31

## 2023-10-17 RX ADMIN — Medication 50 MCG: at 17:56

## 2023-10-17 RX ADMIN — MICAFUNGIN SODIUM 100 MG: 50 INJECTION, POWDER, LYOPHILIZED, FOR SOLUTION INTRAVENOUS at 12:20

## 2023-10-17 RX ADMIN — Medication 100 MCG: at 15:05

## 2023-10-17 RX ADMIN — INSULIN ASPART 1 UNITS: 100 INJECTION, SOLUTION INTRAVENOUS; SUBCUTANEOUS at 04:30

## 2023-10-17 RX ADMIN — MIDAZOLAM 1 MG: 1 INJECTION INTRAMUSCULAR; INTRAVENOUS at 02:00

## 2023-10-17 RX ADMIN — HEPARIN SODIUM 5000 UNITS: 5000 INJECTION, SOLUTION INTRAVENOUS; SUBCUTANEOUS at 02:05

## 2023-10-17 RX ADMIN — DEXMEDETOMIDINE HYDROCHLORIDE 1.2 MCG/KG/HR: 4 INJECTION, SOLUTION INTRAVENOUS at 20:02

## 2023-10-17 RX ADMIN — Medication 125 MCG/HR: at 08:06

## 2023-10-17 RX ADMIN — PANTOPRAZOLE SODIUM 40 MG: 40 INJECTION, POWDER, FOR SOLUTION INTRAVENOUS at 08:34

## 2023-10-17 RX ADMIN — Medication 100 MCG: at 06:15

## 2023-10-17 RX ADMIN — Medication 50 MCG: at 07:34

## 2023-10-17 RX ADMIN — POTASSIUM CHLORIDE 20 MEQ: 29.8 INJECTION, SOLUTION INTRAVENOUS at 14:55

## 2023-10-17 RX ADMIN — DEXMEDETOMIDINE HYDROCHLORIDE 1.2 MCG/KG/HR: 4 INJECTION, SOLUTION INTRAVENOUS at 22:49

## 2023-10-17 RX ADMIN — Medication 100 MCG: at 01:35

## 2023-10-17 RX ADMIN — DEXMEDETOMIDINE HYDROCHLORIDE 1.2 MCG/KG/HR: 4 INJECTION, SOLUTION INTRAVENOUS at 07:43

## 2023-10-17 RX ADMIN — QUETIAPINE FUMARATE 50 MG: 25 TABLET ORAL at 20:16

## 2023-10-17 RX ADMIN — ALBUMIN HUMAN 12.5 G: 0.25 SOLUTION INTRAVENOUS at 12:24

## 2023-10-17 RX ADMIN — CHLORHEXIDINE GLUCONATE 15 ML: 1.2 RINSE ORAL at 20:15

## 2023-10-17 RX ADMIN — CHLORHEXIDINE GLUCONATE 15 ML: 1.2 RINSE ORAL at 10:00

## 2023-10-17 RX ADMIN — DEXMEDETOMIDINE HYDROCHLORIDE 1.2 MCG/KG/HR: 4 INJECTION, SOLUTION INTRAVENOUS at 02:13

## 2023-10-17 RX ADMIN — PIPERACILLIN AND TAZOBACTAM 3.38 G: 3; .375 INJECTION, POWDER, LYOPHILIZED, FOR SOLUTION INTRAVENOUS at 16:09

## 2023-10-17 RX ADMIN — MIDAZOLAM 2 MG: 1 INJECTION INTRAMUSCULAR; INTRAVENOUS at 07:39

## 2023-10-17 RX ADMIN — QUETIAPINE FUMARATE 50 MG: 25 TABLET ORAL at 08:42

## 2023-10-17 RX ADMIN — CHLORHEXIDINE GLUCONATE 15 ML: 1.2 RINSE ORAL at 08:00

## 2023-10-17 RX ADMIN — DEXMEDETOMIDINE HYDROCHLORIDE 1.2 MCG/KG/HR: 4 INJECTION, SOLUTION INTRAVENOUS at 10:01

## 2023-10-17 RX ADMIN — MAGNESIUM SULFATE HEPTAHYDRATE: 500 INJECTION, SOLUTION INTRAMUSCULAR; INTRAVENOUS at 20:03

## 2023-10-17 RX ADMIN — SENNOSIDES 5 ML: 8.8 LIQUID ORAL at 20:16

## 2023-10-17 RX ADMIN — BUMETANIDE 3 MG: 0.25 INJECTION INTRAMUSCULAR; INTRAVENOUS at 20:16

## 2023-10-17 RX ADMIN — DEXMEDETOMIDINE HYDROCHLORIDE 1.2 MCG/KG/HR: 4 INJECTION, SOLUTION INTRAVENOUS at 14:58

## 2023-10-17 RX ADMIN — INSULIN ASPART 1 UNITS: 100 INJECTION, SOLUTION INTRAVENOUS; SUBCUTANEOUS at 20:15

## 2023-10-17 RX ADMIN — HEPARIN SODIUM 5000 UNITS: 5000 INJECTION, SOLUTION INTRAVENOUS; SUBCUTANEOUS at 17:47

## 2023-10-17 RX ADMIN — MIDAZOLAM 1 MG: 1 INJECTION INTRAMUSCULAR; INTRAVENOUS at 06:32

## 2023-10-17 RX ADMIN — PIPERACILLIN AND TAZOBACTAM 3.38 G: 3; .375 INJECTION, POWDER, LYOPHILIZED, FOR SOLUTION INTRAVENOUS at 23:41

## 2023-10-17 RX ADMIN — DEXMEDETOMIDINE HYDROCHLORIDE 1.2 MCG/KG/HR: 4 INJECTION, SOLUTION INTRAVENOUS at 04:35

## 2023-10-17 RX ADMIN — POTASSIUM CHLORIDE 20 MEQ: 29.8 INJECTION, SOLUTION INTRAVENOUS at 15:36

## 2023-10-17 ASSESSMENT — ACTIVITIES OF DAILY LIVING (ADL)
ADLS_ACUITY_SCORE: 35

## 2023-10-17 NOTE — PROGRESS NOTES
PROVIDER RESTRAINT FOR NON-VIOLENT BEHAVIOR FACE TO FACE EVALUATION    Patient's Immediate Situation:  Patient demonstrated the following behaviors: impulsive; pulls at critical lines and tubes. Does not respond to redirection.     Patient's Reaction to the intervention:  Does patient understand the reason for restraint/seclusion? Unable to express     Medical Condition:  Is there any evidence of compromise of Skin integrity, Respiratory, Cardiovascular, Musculoskeletal, Hydration?   No    Behavioral Condition:  In consultation with the RN, is there a need to continue this restraint or seclusion? Yes    See Restraint Flowsheet for complete restraint documentation and assessment.     Jennifer Mccray, CNP  [facility] Pulmonary/Critical Care

## 2023-10-17 NOTE — PROGRESS NOTES
"Care Management Follow Up    Length of Stay (days): 8    Expected Discharge Date: 10/20/2023     Concerns to be Addressed: discharge planning     Patient plan of care discussed at interdisciplinary rounds: Yes    Anticipated Discharge Disposition:       Anticipated Discharge Services:     Education Provided on the Discharge Plan:  Per Care Team   Patient/Family in Agreement with the Plan:  Yes    Referrals Placed by CM/SW:    Private pay costs discussed: Not applicable    Additional Information:  Chart reviewed.    Cm updates:  Vent Day #6  Per rounds pt on IV micafungin. On precedex and fentanyl gtt, and TPN.   Per rounds attempted to wean pt and pt became agitated.         Social Hx:  \"From home with adult daughter and grandchild in an apartment. Independent at baseline and drives. Patient currently unemployed; saved up for procedures, plans to resume work once recovered. No community programs. Has a neb machine available for use due to Asthma. Goal to return home with family support. Family to transport if able. \"        Cm will continue to follow plan of care, review recommendations, and assist with any discharge needs anticipated.     Whit Malik RN      "

## 2023-10-17 NOTE — PROGRESS NOTES
General Surgery Progress Note    POST OP DAY  # 8 and 4    Subjective:   Mojgan Jimenez is a 53 year old female who is s/p laparoscopic sleeve gastrectomy with KO on 10/9 post-op course complicated by small enterotomy x2 s/p laparoscopy converted to laparotomy with washout and enterotomy closure x2 on 10/12. Patient remains sedated and intubated post-operatively. She has been off her vasopressors for a few days now.   She was having PVC's and runs of V-Tach and was found to have an elevation of her Troponin with an EF of 35 %. Cardiology is involved and thank you for that consult.    Cr is trending down.     Vitals:    10/17/23 0720 10/17/23 0800 10/17/23 0900 10/17/23 1000   BP:  122/76 (!) 160/97 (!) 145/85   BP Location:       Pulse: 68 100 80 80   Resp:  (!) 33 26 22   Temp:  98.9  F (37.2  C)     TempSrc:  Axillary     SpO2: 97% 94% 95% 95%   Weight:       Height:           Physical Exam:  Lungs:  CTA  CV:       RRR  Ab:       Soft, + BS, drains putting out serous looking fluid      Component  Ref Range & Units  4:19 AM  (10/17/23) 1 d ago  (10/16/23) 2 d ago  (10/15/23) 3 d ago  (10/14/23) 4 d ago  (10/13/23) 4 d ago  (10/13/23) 4 d ago  (10/13/23)    Sodium  135 - 145 mmol/L 141 137      Low  CM   Comment: Reference intervals for this test were updated on 09/26/2023 to more accurately reflect our healthy population. There may be differences in the flagging of prior results with similar values performed with this method. Interpretation of those prior results can be made in the context of the updated reference intervals.    Potassium  3.4 - 5.3 mmol/L 3.1 Low  3.5 3.9 4.4 4.6 4.5 4.5    Chloride  98 - 107 mmol/L 101 97 Low  96 Low  99 100 100 100    Carbon Dioxide (CO2)  22 - 29 mmol/L 24 23 22 19 Low  20 Low  20 Low  18 Low     Anion Gap  7 - 15 mmol/L 16 High  17 High  18 High  18 High  15 15 16 High     Urea Nitrogen  6.0 - 20.0 mg/dL 99.7 High  99.7 High  95.6 High  85.2 High   79.5 High  72.0 High  62.7 High     Creatinine  0.51 - 0.95 mg/dL 3.55 High  4.38 High  6.07 High  6.97 High  6.86 High  6.64 High  6.51 High     GFR Estimate  >60 mL/min/1.73m2 15 Low  11 Low  8 Low  7 Low  7 Low  7 Low  7 Low     Calcium  8.6 - 10.0 mg/dL 9.2 9.3 8.9 8.2 Low  8.2 Low  8.1 Low  8.3 Low     Glucose  70 - 99 mg/dL 144 High  143 High  154 High  105 High  131 High  145 High  178 High    Resulting Agency [facility]       Assessment:  Pt is stable.  FEK: Creatinine is trending down as the patient recovers from her ATN.   She has been diuresing and yesterday was down 2050 cc.  CV: Hemodynamically stable, no pressors, troponins were up and ejection fraction was down from the patient's evaluation over the weekend.  I suspect these factors will improve as the patient's pulse through her critical illness.  GI: Patient is started to stool    Plan: Next goal is extubation.  Once patient is extubated we can start her diet as her GI tract appears to be working.    Hoang Worthy MD  [facility]  [phone]

## 2023-10-17 NOTE — PLAN OF CARE
Problem: Bariatric Surgery  Goal: Effective Gastrointestinal Motility and Elimination  Outcome: Progressing     Problem: Bariatric Surgery  Goal: Absence of Infection Signs and Symptoms  Outcome: Progressing     Problem: Bariatric Surgery  Goal: Anesthesia/Sedation Recovery  Outcome: Progressing   Goal Outcome Evaluation:    [facility] - ICU    RN Progress Note:            Pertinent Assessments:      Please refer to flowsheet rows for full assessment     Pt is intubated and sedated, RASS Goal -1,-2. RASS for this shift:-1, +1,+2. Pt had episodes of restlessness and agitation with turns and care.           Key Events - This Shift:     -PRN Fent bumps x3 given.  -Pt tolerating pressure support well.   -Pt spike temp=100.6: prn tylenol and ice packs applied.         [facility] SAT (Sedation Awakening Trial): For use ONLY if intubated    SAT Safety Screen PASSED   If FAILED why?    SAT Performed PASSED   If FAILED why?               Barriers to Discharge / Downgrade:     Intubated and sedated.         Point of Contact Update YES-OR-NO: Yes  If No, reason:   Name:Family  Phone Number:  Summary of Conversation: Updated on POC at bedside.

## 2023-10-17 NOTE — PROGRESS NOTES
[facility]  [facility]   Follow up    Mojgan Jimenez   MRN: [id number]  : 1970   DOA: 10/9/2023   CC: ARF      Assessment and Plan:  53 year old female    ARF;  hemodynamic exacerbated by IV NSAIDS (lowish bp, vasodilatory drugs, mild intravascular depletion)==>ATN, now in recovery phase and nice drop in creatinine daily and good urine output and diuresing. No need for HD. Cont bumex and will dose alb prior to diuretic dosing.   S/p gastric bypass; complicated by leak; back to OR for repair and wash out of bilious fluid; on abx; supportive cares; on abx for positive cx from surgical washout  HoTN; infection/SIIRS; bp ok now off pressors.  ID; on abx for peritonitis  Fluid status; total body elevated but also third spacing; making more urine now and good response to diuretics and plan cont diuresis. Bumex inc and alb before dosing.   MS change; consistent with build up of narcotic and gabapentin metabolites due to ARF; now more awake on vent.   Hyperlipid; on statin  Elevated LFTs; following;levels improved today  Resp failure; juancho-op; on vent; wean as able  Acidosis; resolved; off bicarb IVF  Nutrition; on TPN; no lipids due to being on propofol; TG are elevated so changed to precedex  Depressed EF on ECHO    Subjective  Awake on vent, irritated, mostly refuses to follow commands but appears attentive  Failed wean earlier today.   Objective    Vital signs in last 24 hours  Temp:  [98.1  F (36.7  C)-100.9  F (38.3  C)] 98.9  F (37.2  C)  Pulse:  [] 80  Resp:  [19-36] 22  BP: (120-177)/(65-97) 145/85  FiO2 (%):  [35 %] 35 %  SpO2:  [90 %-100 %] 95 %      Intake/Output last 3 shifts  I/O last 3 completed shifts:  In: 3257.73 [I.V.:1835.1]  Out: 3260 [Urine:3000; Emesis/NG output:150; Drains:110]  Intake/Output this shift:  I/O this shift:  In: 403.4 [I.V.:169.4]  Out: 591 [Urine:475; Emesis/NG output:100; Drains:16]    Physical Exam  Intubated  + facial edema  CV: RRR without murmur or rub  Lung: clear and equal; no extra sounds  Ab: distended. Lower abd with less pitting edema, binder in place and drains.   Ext: +edema but less  and well perfused  Skin; no rash    Pertinent Labs     Last Renal Panel:  Sodium   Date Value Ref Range Status   10/17/2023 141 135 - 145 mmol/L Final     Comment:     Reference intervals for this test were updated on 09/26/2023 to more accurately reflect our healthy population. There may be differences in the flagging of prior results with similar values performed with this method. Interpretation of those prior results can be made in the context of the updated reference intervals.    11/17/2020 141 133 - 144 mmol/L Final     Potassium   Date Value Ref Range Status   10/17/2023 3.1 (L) 3.4 - 5.3 mmol/L Final   05/17/2022 4.5 3.4 - 5.3 mmol/L Final   11/17/2020 4.1 3.4 - 5.3 mmol/L Final     Chloride   Date Value Ref Range Status   10/17/2023 101 98 - 107 mmol/L Final   05/17/2022 102 94 - 109 mmol/L Final   11/17/2020 109 94 - 109 mmol/L Final     Carbon Dioxide   Date Value Ref Range Status   11/17/2020 31 20 - 32 mmol/L Final     Carbon Dioxide (CO2)   Date Value Ref Range Status   10/17/2023 24 22 - 29 mmol/L Final   05/17/2022 32 20 - 32 mmol/L Final     Anion Gap   Date Value Ref Range Status   10/17/2023 16 (H) 7 - 15 mmol/L Final   05/17/2022 2 (L) 3 - 14 mmol/L Final   11/17/2020 1 (L) 3 - 14 mmol/L Final     Glucose   Date Value Ref Range Status   10/17/2023 144 (H) 70 - 99 mg/dL Final   05/17/2022 110 (H) 70 - 99 mg/dL Final   11/17/2020 84 70 - 99 mg/dL Final     Comment:     Fasting specimen     GLUCOSE BY METER POCT   Date Value Ref Range Status   10/17/2023 159 (H) 70 - 99 mg/dL Final     Urea Nitrogen   Date Value Ref Range Status   10/17/2023 99.7 (H) 6.0 - 20.0 mg/dL Final   05/17/2022 16 7 - 30 mg/dL Final   11/17/2020 9 7 - 30 mg/dL Final     Creatinine   Date Value Ref Range Status   10/17/2023 3.55 (H) 0.51 -  0.95 mg/dL Final   11/17/2020 0.79 0.52 - 1.04 mg/dL Final     GFR Estimate   Date Value Ref Range Status   10/17/2023 15 (L) >60 mL/min/1.73m2 Final   11/17/2020 88 >60 mL/min/[1.73_m2] Final     Comment:     Non  GFR Calc  Starting 12/18/2018, serum creatinine based estimated GFR (eGFR) will be   calculated using the Chronic Kidney Disease Epidemiology Collaboration   (CKD-EPI) equation.       Calcium   Date Value Ref Range Status   10/17/2023 9.2 8.6 - 10.0 mg/dL Final   11/17/2020 8.9 8.5 - 10.1 mg/dL Final     Phosphorus   Date Value Ref Range Status   10/17/2023 3.9 2.5 - 4.5 mg/dL Final     Albumin   Date Value Ref Range Status   10/16/2023 2.8 (L) 3.5 - 5.2 g/dL Final   05/17/2022 4.0 3.4 - 5.0 g/dL Final   12/03/2018 3.6 3.4 - 5.0 g/dL Final     Recent Labs   Lab 10/16/23  1055 10/13/23  0426 10/12/23  0315 10/11/23  0634   HGB 9.9* 10.2* 12.8 13.2          I reviewed all lab results  Simi Shin MD  [facility]  [phone]

## 2023-10-17 NOTE — PROGRESS NOTES
Nutrition Therapy  Parenteral Nutrition follow-up       Dietitian to Pharmacy  No changes today.    Rate of TPN: 50 ml/hr continuious  Grams Dextrose: 190 g  Grams AA:73 g    Lipids: 50 g clinolipid 3 days/wk.       Pt remains intubated.  Surgery ok with meds per NG otherwise keep NPO.  Bowels are functioning.  S/p gastrectomy sleeve-duodenal switch on 10/9 and S/p drain placed, closure of interotomies and NG placed on 10/12.   Propofol stopped 10/15 due to elevated Triglyceride.    Current Nutrition Intake:  Diet: NPO since 10/11/23.  Custom TPN per central line:  73 gm AA, 190 gm Dextrose, 50 gm lipid 3 times per week.      TPN provides 73 g AA, 190 g DEX at 50 ml/hr continuous.  This  provides 1152 kcal on daily average, 1200 ml fluid, 73 g protein, 190 g carb, 21.4 g lipid daily.  GIR= 0.93 mg CHO/kg/min    TPN start 10/14/23.  Propofol off on 10/15.        Weight Trends  Admission wt: 130.5 kg (287 lb 12.8 oz) 10/9/23  Current wt: 138 kg (304 lb 3.2 oz) 10/17/23 diuresing  10/16: 140.6 kg (309 lb 14.4 oz   -2.2L/24 hrs  +8.7 L since admit.    Dosing Weight: 52.3 kg (IBW)     ASSESSED NUTRITION NEEDS  Estimated Energy Needs: 2424-6228 kcals/day (22 - 25 kcals/kg)  Justification: Obese, Post-op, and Vented  Estimated Protein Needs: 63-78 grams protein/day (1.2 - 1.5 grams of pro/kg)  Justification: Obesity guidelines and Post-op, elevated creatinine  Estimated Fluid Needs: 0670-0444+ mL/day (1 mL/kcal)   Justification: Maintenance    GI:  Last BM 10/17/23.  Loose brown stools   ml out 10/16  Drains 110 ml 10/16  Patient has surgical incisions.  I/Os:  3257/3260 10/16/23.    Labs:   Creat 3.55 H, better   Glucose 144 H  FSB, 159  Labs reviewed by dietitian    Medications:   Reviewed   Bumex - dose increase  Novolog  Mycamine  Pantoprazole  IV abx  Miralax  Senokot  Colace  Cont, IV precedex, fentanyl    MALNUTRITION  % Intake: Decreased intake does not meet criteria, 3 days NPO.  % Weight Loss: None  noted, wt now up.  Subcutaneous Fat Loss: None observed  Muscle Loss: None observed  Fluid Accumulation/Edema: None noted  Malnutrition Diagnosis: Patient does not meet two of the established criteria necessary for diagnosing malnutrition    Nutrition Diagnosis  Inadequate oral intake related to intubated and sedated as evidenced by NPO and need for TPN     Evaluation: No change    Goals   Meet nutrition needs with TPN- met  New-electrolytes WNL  New-Tolerate TPN - progressing  New-Transition to po diet after extubation and when appropriate.       INTERVENTIONS  Implementation  Parenteral Nutrition/IV Fluids - adjust TPN to meet nutrition needs:      Monitor TPN daily.       Monitoring/Evaluation  Progress toward goals will be monitored and evaluated per protocol.

## 2023-10-17 NOTE — PROGRESS NOTES
HEART CARE NOTE          Assessment/Recommendations     1. Severe HFrEF c/b ADHF and septic shock  Assessment / Plan  Hypervolemic on physical exam; diuresing well on current regimen, but not significantly net negative - will increase IV bumex dose and continue to monitor; continue to monitor renal function/repeat BMP, UOP and hemodynamics closely   Patient is high risk of adverse cardiac events 2/2 severe LV dysfunction and hx of substance/methamphetamine abuse  Will need ischemia eval is LV systolic dysfunction remains depressed - repeat echo once near euvolemia and sepsis has resolved  GDMT on hold given the above     2. Frequent PVCs  Assessment / Plan  Continue amiodarone  Continue to monitor electrolytes and replete PRN; goal K 4 and Mg > 2     3. Acute hypoxic respiratory failure  Assessment / Plan  S/p mechanical ventilation -management per ICU team     4. SUSSY  Assessment / Plan  2/2 hemodynamic compromise Continue to monitor UOP and renal function closely - resolving     5. Gastric bypass surgery c/b bile leak  Assessment / Plan  Management per primary and surgical team      Patient remains critically ill in the ICU requiring mechanical ventilator support in the setting of acute hypoxic respiratory failure. 50 minutes of critical care time    History of Present Illness/Subjective    Ms. Mojgan Jimenez is a 53 year old female with a PMHx significant for obesity, JARVIS on CPAP, asthma (no PFTs), stimulant use disorder, stimulant induced psychosis, mood & anxiety disorders; Cardiology consulted regarding new decline in LVSF     Today, Mrs. Jimenez is intubated and sedated; Management plan as detailed above      ECG: personally reviewed; normal sinus rhythm, nonspecific ST and T waves changes.     ECHO (personnaly Reviewed on 10/16/23):   Technically difficult study.  Limited views were obtained.  The left ventricle is normal in size.  Left ventricular function is decreased. The ejection fraction is  "30-35%  (moderately reduced).  There is moderate global hypokinesia of the left ventricle.    Telemetry: personally reviewed October 17, 2023; notable for sinus rhythm     Lab results: personally reviewed October 17, 2023; notable for resolving SUSSY    Medical history and pertinent documents reviewed in Care Everywhere please where applicable see details above        Physical Examination Review of Systems   BP (!) 158/79   Pulse 67   Temp 99.7  F (37.6  C) (Esophageal)   Resp 24   Ht 1.6 m (5' 3\")   Wt 140.6 kg (309 lb 14.4 oz)   LMP 09/09/2023 (Exact Date)   SpO2 96%   BMI 54.90 kg/m    Body mass index is 54.9 kg/m .  Wt Readings from Last 3 Encounters:   10/16/23 140.6 kg (309 lb 14.4 oz)   08/09/23 133.4 kg (294 lb 3.2 oz)   07/05/23 129.7 kg (286 lb)     General Appearance:   Intubated/sedated   ENT/Mouth: membranes moist, no oral lesions or bleeding gums.      EYES:  no scleral icterus, normal conjunctivae   Neck: no carotid bruits or thyromegaly   Chest/Lungs:   lungs are clear to auscultation, no rales or wheezing, equal chest wall expansion    Cardiovascular:   Regular. Normal first and second heart sounds with no murmurs, rubs, or gallops; the carotid, radial and posterior tibial pulses are intact, + JVD and LE edema bilaterally    Abdomen:  no organomegaly, masses, bruits, or tenderness; bowel sounds are present   Extremities: no cyanosis or clubbing   Skin: no xanthelasma, warm.    Neurologic: Intubated/sedated     Psychiatric: Intubated/sedated     A complete 10 systems ROS was reviewed  And is negative except what is listed in the HPI.          Medical History  Surgical History Family History Social History   Past Medical History:   Diagnosis Date     LINA (generalized anxiety disorder) 11/02/2017     Hyperlipidemia LDL goal <160 01/20/2019     Major depressive disorder      Methanol abuse (H)      Mild persistent asthma without complication 11/02/2017     Obese      JARVIS on CPAP     Cpap not " "working     Paranoia (H)     resolved due to drugs     Vitamin D deficiency 11/02/2017    Past Surgical History:   Procedure Laterality Date     GASTRECTOMY, SLEEVE, LAPAROSCOPIC, WITH DUODENAL SWITCH N/A 10/9/2023    Procedure: GASTRECTOMY, SLEEVE, LAPAROSCOPIC, WITH SINGLE ANASTAMOSIS DUODENAL SWITCH;  Surgeon: Hoang Worthy MD;  Location: [facility] [place] OR     LAPAROSCOPY DIAGNOSTIC (GYN) N/A 10/12/2023    Procedure: LAPAROSCOPY,;  Surgeon: Hoang Worthy MD;  Location: [facility] [place] OR     LAPAROTOMY EXPLORATORY N/A 10/12/2023    Procedure: LAPAROTOMY, CLOSURE OF TWO SMALL BOWEL INTEROTOMIES, DRAIN PLACEMENT, INTRA-ABDOMINAL CLEAN OUT;  Surgeon: Hoang Worthy MD;  Location: [facility] [place] OR     MAMMOPLASTY REDUCTION      reduction    no family history of premature coronary artery disease Social History     Socioeconomic History     Marital status: Single     Spouse name: Not on file     Number of children: Not on file     Years of education: Not on file     Highest education level: Not on file   Occupational History     Not on file   Tobacco Use     Smoking status: Never     Smokeless tobacco: Never   Vaping Use     Vaping Use: Never used   Substance and Sexual Activity     Alcohol use: Not Currently     Comment: Sober x 8 months     Drug use: Not Currently     Types: Methamphetamines, \"Crack\" cocaine     Comment: in remision      Sexual activity: Not Currently     Partners: Male   Other Topics Concern     Parent/sibling w/ CABG, MI or angioplasty before 65F 55M? No   Social History Narrative     Not on file     Social Determinants of Health     Financial Resource Strain: Not on file   Food Insecurity: Not on file   Transportation Needs: Not on file   Physical Activity: Not on file   Stress: Not on file   Social Connections: Not on file   Interpersonal Safety: Not on file   Housing Stability: Not on file           Lab Results    Chemistry/lipid CBC Cardiac Enzymes/BNP/TSH/INR   Lab " "Results   Component Value Date    CHOL 176 05/17/2022    HDL 36 (L) 05/17/2022    TRIG 568 (H) 10/15/2023    BUN 99.7 (H) 10/17/2023     10/17/2023    CO2 24 10/17/2023    Lab Results   Component Value Date    WBC 12.7 (H) 10/16/2023    HGB 9.9 (L) 10/16/2023    HCT 30.4 (L) 10/16/2023    MCV 90 10/16/2023     10/16/2023    Lab Results   Component Value Date    TSH 2.56 09/15/2022    INR 1.21 (H) 10/16/2023     No results found for: \"CKTOTAL\", \"CKMB\", \"TROPONINI\"       Weight:    Wt Readings from Last 3 Encounters:   10/16/23 140.6 kg (309 lb 14.4 oz)   08/09/23 133.4 kg (294 lb 3.2 oz)   07/05/23 129.7 kg (286 lb)       Allergies  No Known Allergies      Surgical History  Past Surgical History:   Procedure Laterality Date     GASTRECTOMY, SLEEVE, LAPAROSCOPIC, WITH DUODENAL SWITCH N/A 10/9/2023    Procedure: GASTRECTOMY, SLEEVE, LAPAROSCOPIC, WITH SINGLE ANASTAMOSIS DUODENAL SWITCH;  Surgeon: Hoang Worthy MD;  Location: [facility] OR     LAPAROSCOPY DIAGNOSTIC (GYN) N/A 10/12/2023    Procedure: LAPAROSCOPY,;  Surgeon: Hoang Worthy MD;  Location: [facility] OR     LAPAROTOMY EXPLORATORY N/A 10/12/2023    Procedure: LAPAROTOMY, CLOSURE OF TWO SMALL BOWEL INTEROTOMIES, DRAIN PLACEMENT, INTRA-ABDOMINAL CLEAN OUT;  Surgeon: Hoang Worthy MD;  Location: [facility] OR     MAMMOPLASTY REDUCTION      reduction       Social History  Tobacco:   History   Smoking Status     Never   Smokeless Tobacco     Never    Alcohol:   Social History    Substance and Sexual Activity      Alcohol use: Not Currently        Comment: Sober x 8 months   Illicit Drugs:   History   Drug Use Unknown     Comment: in remision        Family History  Family History   Problem Relation Age of Onset     Cancer Mother      Unknown/Adopted Father      Alcoholism Father      Substance Abuse Sister      Diabetes No family hx of      Hypertension No family hx of           Do Schreiber MD on " 10/17/2023      cc: Franca Mishra

## 2023-10-17 NOTE — PLAN OF CARE
[facility] - ICU    RN Progress Note:            Pertinent Assessments:      Please refer to flowsheet rows for full assessment     - RASS of -2 or +1,+2.         Key Events - This Shift:     - Has been a challenge to meet RASS score. Patient gets restless when she wakes up and cares. PRN fentanyl and versed given.  - K of 3.1 today. NO PVC's noted. held bumex scheduled at 5am. On TPN. Intensivist notified. Creatinine remains high. Will monitor closely.  - Started to get feel warm/hot and sweating. Bedbath done and on cooling blanket.  - stopped weaning around 23:45 on this shift.         Barriers to Discharge / Downgrade:     - remains intubated/sedated.

## 2023-10-17 NOTE — PROGRESS NOTES
Patient remains on full mechanical ventilator support.     Vent Mode: (S) CMV/AC  (Continuous Mandatory Ventilation/ Assist Control)  FiO2 (%): 35 %  Resp Rate (Set): 24 breaths/min  Tidal Volume (Set, mL): 380 mL  PEEP (cm H2O): 8 cmH2O  Pressure Support (cm H2O): 5 cmH2O  Resp: 24    SBT x3 completed via PS 5/+8, fiO2 35%. The first trial lasted 2 minutes, second 5 minutes, and the 3rd lasted 1 hour and 5 minutes. All was terminated due to increased agitation/anxiety. Moderate to large thick cloudy/creamy secretions via inline suction. Dry circuit changed to heated circuit today. No plans for extubation due to fluid overload. Continue to assess for daily wean.     Peggy Whittaker, RT

## 2023-10-17 NOTE — PHARMACY-CONSULT NOTE
"Pharmacy Note: Parenteral Nutrition (PN) Management    Pharmacist consulted to dose PN for Mojgan Jimenez, a 53 year old female by Dr. Cruz.    Subjective:    The patient is a new PN start.    The patient was started on PN in the hospital on 10/14/23.    Indication for PN therapy:  peritonitis    Inadequate nutrition anticipated for > 7 days.     Enteral nutrition contraindicated due to:  peritonitis .    Pertinent diseases and other special considerations  s/p sleeve gastrectomy 10/9/23    Social History     Tobacco Use    Smoking status: Never    Smokeless tobacco: Never   Vaping Use    Vaping Use: Never used   Substance Use Topics    Alcohol use: Not Currently     Comment: Sober x 8 months    Drug use: Not Currently     Types: Methamphetamines, \"Crack\" cocaine     Comment: in remision      Objective:    Ht Readings from Last 1 Encounters:   10/09/23 1.6 m (5' 3\")     Wt Readings from Last 1 Encounters:   10/17/23 138 kg (304 lb 3.2 oz)       Body mass index is 53.89 kg/m .    Patient Vitals for the past 96 hrs:   Weight   10/17/23 0600 138 kg (304 lb 3.2 oz)   10/16/23 0400 140.6 kg (309 lb 14.4 oz)       Labs:  Last 3 days:  Recent Labs     10/15/23  0431 10/15/23  0554 10/16/23  0436 10/16/23  1055 10/17/23  0419     --  137  --  141   POTASSIUM 3.9  --  3.5  --  3.1*   CHLORIDE 96*  --  97*  --  101   CO2 22  --  23  --  24   BUN 95.6*  --  99.7*  --  99.7*   CR 6.07*  --  4.38*  --  3.55*   PALAK 8.9  --  9.3  --  9.2   [id number] 0.99*  --   --   --   --    MAG 2.7*  --  2.4*  --  2.1   PHOS 6.3*  --  4.0  --  3.9   PROTTOTAL 6.5  --  6.7  --   --    ALBUMIN 2.8*  --  2.8*  --   --    PREALB  --   --  12*  --   --    TRIG 568*  --   --   --   --    HGB  --   --   --  9.9*  --    HCT  --   --   --  30.4*  --      --   --  194  --    BILITOTAL 0.3  --  0.4  --   --    *  --  71*  --   --    *  --  114*  --   --    ALKPHOS 96  --  82  --   --    INR  --  1.19* 1.21*  --   --  "       Glucose (past 48 hours):   Recent Labs     10/16/23  1132 10/16/23  1311 10/16/23  1638 10/16/23  1938 10/16/23  2222 10/16/23  2338 10/17/23  0405 10/17/23  0419 10/17/23  0803 10/17/23  1154   * 136* 140* 141* 162* 160* 141* 144* 159* 117*       Intake/Output (last 24 hours): I/O last 3 completed shifts:  In: 3257.73 [I.V.:1835.1]  Out: 3260 [Urine:3000; Emesis/NG output:150; Drains:110]    Estimated CrCl: Estimated Creatinine Clearance: 25.1 mL/min (A) (based on SCr of 3.55 mg/dL (H)).    Assessment:    Continue patient on PN therapy as a continuous central therapy.     Given the patient's current condition/oral intake, PN is still indicated. Potentially able to introduce enteral feeds-will defer to nutrition/surgery    Lab results reviewed: Potassium low, nephro ordered K bumps today  Will increase in TPN tonight with increased bumetanide dosing  Good urine output     Plan:  Rate of PN: 50 mL/hr .  Formula:   Amino Acids 73 grams  Dextrose 190 grams  Sodium 40 mEq/day  Potassium 60 mEq/day  Calcium 5 mEq/day  Magnesium 1 mEq/day  Phosphorus 3 mMol/day  Chloride: Acetate Ratio 1:1  Standard Multivitamins w/Vitamin K  Trace Elements  Vitamin b12 100 mcg  Zinc 5mg  Fat Emulsion: 20%, 250 mL IV 3 times weekly   Check BMP and mag, phos  labs tomorrow.  Pharmacist will continue to follow the patient's lab results, clinical status and blood glucose results and make adjustments as appropriate.    Thank you for the consult.  Renee Bennett RPH  10/17/2023 12:52 PM

## 2023-10-18 ENCOUNTER — APPOINTMENT (OUTPATIENT)
Dept: CT IMAGING | Facility: HOSPITAL | Age: 53
End: 2023-10-18
Attending: INTERNAL MEDICINE
Payer: COMMERCIAL

## 2023-10-18 LAB
ANION GAP SERPL CALCULATED.3IONS-SCNC: 11 MMOL/L (ref 7–15)
BASE EXCESS BLDA CALC-SCNC: 1.5 MMOL/L
BASO+EOS+MONOS # BLD AUTO: ABNORMAL 10*3/UL
BASO+EOS+MONOS NFR BLD AUTO: ABNORMAL %
BASOPHILS # BLD AUTO: 0 10E3/UL (ref 0–0.2)
BASOPHILS NFR BLD AUTO: 0 %
BUN SERPL-MCNC: 97.4 MG/DL (ref 6–20)
CALCIUM SERPL-MCNC: 8.7 MG/DL (ref 8.6–10)
CHLORIDE SERPL-SCNC: 105 MMOL/L (ref 98–107)
COHGB MFR BLD: 96.5 % (ref 96–97)
CREAT SERPL-MCNC: 3.02 MG/DL (ref 0.51–0.95)
DEPRECATED HCO3 PLAS-SCNC: 27 MMOL/L (ref 22–29)
EGFRCR SERPLBLD CKD-EPI 2021: 18 ML/MIN/1.73M2
EOSINOPHIL # BLD AUTO: 0.5 10E3/UL (ref 0–0.7)
EOSINOPHIL NFR BLD AUTO: 3 %
ERYTHROCYTE [DISTWIDTH] IN BLOOD BY AUTOMATED COUNT: 14.1 % (ref 10–15)
GLUCOSE BLDC GLUCOMTR-MCNC: 120 MG/DL (ref 70–99)
GLUCOSE BLDC GLUCOMTR-MCNC: 142 MG/DL (ref 70–99)
GLUCOSE BLDC GLUCOMTR-MCNC: 145 MG/DL (ref 70–99)
GLUCOSE BLDC GLUCOMTR-MCNC: 150 MG/DL (ref 70–99)
GLUCOSE BLDC GLUCOMTR-MCNC: 163 MG/DL (ref 70–99)
GLUCOSE BLDC GLUCOMTR-MCNC: 166 MG/DL (ref 70–99)
GLUCOSE BLDC GLUCOMTR-MCNC: 168 MG/DL (ref 70–99)
GLUCOSE BLDC GLUCOMTR-MCNC: 199 MG/DL (ref 70–99)
GLUCOSE SERPL-MCNC: 153 MG/DL (ref 70–99)
HCO3 BLD-SCNC: 25 MMOL/L (ref 23–29)
HCT VFR BLD AUTO: 29.7 % (ref 35–47)
HGB BLD-MCNC: 9.2 G/DL (ref 11.7–15.7)
IMM GRANULOCYTES # BLD: 0.3 10E3/UL
IMM GRANULOCYTES NFR BLD: 2 %
LYMPHOCYTES # BLD AUTO: 1 10E3/UL (ref 0.8–5.3)
LYMPHOCYTES NFR BLD AUTO: 7 %
MAGNESIUM SERPL-MCNC: 1.8 MG/DL (ref 1.7–2.3)
MCH RBC QN AUTO: 29 PG (ref 26.5–33)
MCHC RBC AUTO-ENTMCNC: 31 G/DL (ref 31.5–36.5)
MCV RBC AUTO: 94 FL (ref 78–100)
MONOCYTES # BLD AUTO: 1.1 10E3/UL (ref 0–1.3)
MONOCYTES NFR BLD AUTO: 7 %
NEUTROPHILS # BLD AUTO: 12.4 10E3/UL (ref 1.6–8.3)
NEUTROPHILS NFR BLD AUTO: 81 %
NRBC # BLD AUTO: 0 10E3/UL
NRBC BLD AUTO-RTO: 0 /100
OXYHGB MFR BLD: 95.4 % (ref 96–97)
PCO2 BLD: 35 MM HG (ref 35–45)
PH BLD: 7.47 [PH] (ref 7.37–7.44)
PHOSPHATE SERPL-MCNC: 2.8 MG/DL (ref 2.5–4.5)
PLATELET # BLD AUTO: 298 10E3/UL (ref 150–450)
PLATELET # BLD AUTO: 298 10E3/UL (ref 150–450)
PO2 BLD: 74 MM HG (ref 80–90)
POTASSIUM SERPL-SCNC: 3.2 MMOL/L (ref 3.4–5.3)
RBC # BLD AUTO: 3.17 10E6/UL (ref 3.8–5.2)
SODIUM SERPL-SCNC: 143 MMOL/L (ref 135–145)
TEMPERATURE: 37 DEGREES C
TRIGL SERPL-MCNC: 278 MG/DL
WBC # BLD AUTO: 15.3 10E3/UL (ref 4–11)

## 2023-10-18 PROCEDURE — 94003 VENT MGMT INPAT SUBQ DAY: CPT

## 2023-10-18 PROCEDURE — 250N000013 HC RX MED GY IP 250 OP 250 PS 637: Performed by: NURSE PRACTITIONER

## 2023-10-18 PROCEDURE — 250N000009 HC RX 250: Performed by: INTERNAL MEDICINE

## 2023-10-18 PROCEDURE — C9113 INJ PANTOPRAZOLE SODIUM, VIA: HCPCS | Mod: JZ | Performed by: NURSE PRACTITIONER

## 2023-10-18 PROCEDURE — 36415 COLL VENOUS BLD VENIPUNCTURE: CPT | Performed by: INTERNAL MEDICINE

## 2023-10-18 PROCEDURE — 999N000253 HC STATISTIC WEANING TRIALS

## 2023-10-18 PROCEDURE — 74176 CT ABD & PELVIS W/O CONTRAST: CPT

## 2023-10-18 PROCEDURE — 200N000001 HC R&B ICU

## 2023-10-18 PROCEDURE — 94640 AIRWAY INHALATION TREATMENT: CPT | Mod: 76

## 2023-10-18 PROCEDURE — 85025 COMPLETE CBC W/AUTO DIFF WBC: CPT | Performed by: INTERNAL MEDICINE

## 2023-10-18 PROCEDURE — B4185 PARENTERAL SOL 10 GM LIPIDS: HCPCS | Mod: JZ | Performed by: SURGERY

## 2023-10-18 PROCEDURE — 250N000011 HC RX IP 250 OP 636: Mod: JZ | Performed by: INTERNAL MEDICINE

## 2023-10-18 PROCEDURE — 999N000287 HC ICU ADULT ROUNDING, EACH 10 MINS

## 2023-10-18 PROCEDURE — 87040 BLOOD CULTURE FOR BACTERIA: CPT | Performed by: INTERNAL MEDICINE

## 2023-10-18 PROCEDURE — 999N000254 HC STATISTIC VENTILATOR TRANSFER

## 2023-10-18 PROCEDURE — 999N000157 HC STATISTIC RCP TIME EA 10 MIN

## 2023-10-18 PROCEDURE — 250N000009 HC RX 250: Mod: JZ | Performed by: SURGERY

## 2023-10-18 PROCEDURE — 84478 ASSAY OF TRIGLYCERIDES: CPT | Performed by: SURGERY

## 2023-10-18 PROCEDURE — 83735 ASSAY OF MAGNESIUM: CPT | Performed by: INTERNAL MEDICINE

## 2023-10-18 PROCEDURE — 250N000011 HC RX IP 250 OP 636: Mod: JZ | Performed by: NURSE PRACTITIONER

## 2023-10-18 PROCEDURE — 250N000009 HC RX 250: Performed by: NURSE PRACTITIONER

## 2023-10-18 PROCEDURE — 250N000013 HC RX MED GY IP 250 OP 250 PS 637: Performed by: INTERNAL MEDICINE

## 2023-10-18 PROCEDURE — 84100 ASSAY OF PHOSPHORUS: CPT | Performed by: INTERNAL MEDICINE

## 2023-10-18 PROCEDURE — P9047 ALBUMIN (HUMAN), 25%, 50ML: HCPCS | Performed by: INTERNAL MEDICINE

## 2023-10-18 PROCEDURE — 99232 SBSQ HOSP IP/OBS MODERATE 35: CPT | Performed by: INTERNAL MEDICINE

## 2023-10-18 PROCEDURE — 87205 SMEAR GRAM STAIN: CPT | Performed by: NURSE PRACTITIONER

## 2023-10-18 PROCEDURE — 258N000003 HC RX IP 258 OP 636: Performed by: NURSE PRACTITIONER

## 2023-10-18 PROCEDURE — 250N000011 HC RX IP 250 OP 636: Performed by: NURSE PRACTITIONER

## 2023-10-18 PROCEDURE — 250N000011 HC RX IP 250 OP 636: Mod: JZ | Performed by: SURGERY

## 2023-10-18 PROCEDURE — 82805 BLOOD GASES W/O2 SATURATION: CPT | Performed by: INTERNAL MEDICINE

## 2023-10-18 PROCEDURE — 80048 BASIC METABOLIC PNL TOTAL CA: CPT | Performed by: INTERNAL MEDICINE

## 2023-10-18 PROCEDURE — 99291 CRITICAL CARE FIRST HOUR: CPT | Performed by: INTERNAL MEDICINE

## 2023-10-18 PROCEDURE — 250N000011 HC RX IP 250 OP 636: Performed by: INTERNAL MEDICINE

## 2023-10-18 PROCEDURE — 85049 AUTOMATED PLATELET COUNT: CPT | Performed by: NURSE PRACTITIONER

## 2023-10-18 RX ORDER — IPRATROPIUM BROMIDE AND ALBUTEROL SULFATE 2.5; .5 MG/3ML; MG/3ML
3 SOLUTION RESPIRATORY (INHALATION)
Status: DISCONTINUED | OUTPATIENT
Start: 2023-10-18 | End: 2023-10-29

## 2023-10-18 RX ORDER — POTASSIUM CHLORIDE 29.8 MG/ML
20 INJECTION INTRAVENOUS
Status: COMPLETED | OUTPATIENT
Start: 2023-10-18 | End: 2023-10-18

## 2023-10-18 RX ORDER — ALBUMIN (HUMAN) 12.5 G/50ML
50 SOLUTION INTRAVENOUS ONCE
Qty: 200 ML | Refills: 0 | Status: COMPLETED | OUTPATIENT
Start: 2023-10-18 | End: 2023-10-18

## 2023-10-18 RX ADMIN — INSULIN ASPART 1 UNITS: 100 INJECTION, SOLUTION INTRAVENOUS; SUBCUTANEOUS at 04:40

## 2023-10-18 RX ADMIN — POTASSIUM CHLORIDE 20 MEQ: 29.8 INJECTION, SOLUTION INTRAVENOUS at 09:13

## 2023-10-18 RX ADMIN — DEXMEDETOMIDINE HYDROCHLORIDE 1.2 MCG/KG/HR: 4 INJECTION, SOLUTION INTRAVENOUS at 22:07

## 2023-10-18 RX ADMIN — INSULIN ASPART 1 UNITS: 100 INJECTION, SOLUTION INTRAVENOUS; SUBCUTANEOUS at 15:55

## 2023-10-18 RX ADMIN — DEXMEDETOMIDINE HYDROCHLORIDE 1.2 MCG/KG/HR: 4 INJECTION, SOLUTION INTRAVENOUS at 14:22

## 2023-10-18 RX ADMIN — BUMETANIDE 3 MG: 0.25 INJECTION INTRAMUSCULAR; INTRAVENOUS at 13:14

## 2023-10-18 RX ADMIN — POTASSIUM CHLORIDE 20 MEQ: 29.8 INJECTION, SOLUTION INTRAVENOUS at 09:57

## 2023-10-18 RX ADMIN — MIDAZOLAM 2 MG: 1 INJECTION INTRAMUSCULAR; INTRAVENOUS at 21:31

## 2023-10-18 RX ADMIN — MICAFUNGIN SODIUM 100 MG: 50 INJECTION, POWDER, LYOPHILIZED, FOR SOLUTION INTRAVENOUS at 12:53

## 2023-10-18 RX ADMIN — CHLORHEXIDINE GLUCONATE 15 ML: 1.2 RINSE ORAL at 08:31

## 2023-10-18 RX ADMIN — HEPARIN SODIUM 5000 UNITS: 5000 INJECTION, SOLUTION INTRAVENOUS; SUBCUTANEOUS at 01:14

## 2023-10-18 RX ADMIN — PIPERACILLIN AND TAZOBACTAM 3.38 G: 3; .375 INJECTION, POWDER, LYOPHILIZED, FOR SOLUTION INTRAVENOUS at 08:31

## 2023-10-18 RX ADMIN — MIDAZOLAM 2 MG: 1 INJECTION INTRAMUSCULAR; INTRAVENOUS at 09:59

## 2023-10-18 RX ADMIN — INSULIN ASPART 1 UNITS: 100 INJECTION, SOLUTION INTRAVENOUS; SUBCUTANEOUS at 20:19

## 2023-10-18 RX ADMIN — PIPERACILLIN AND TAZOBACTAM 3.38 G: 3; .375 INJECTION, POWDER, LYOPHILIZED, FOR SOLUTION INTRAVENOUS at 15:59

## 2023-10-18 RX ADMIN — ALBUMIN HUMAN 12.5 G: 0.25 SOLUTION INTRAVENOUS at 04:31

## 2023-10-18 RX ADMIN — OLIVE OIL AND SOYBEAN OIL 250 ML: 16; 4 INJECTION, EMULSION INTRAVENOUS at 20:21

## 2023-10-18 RX ADMIN — MIDAZOLAM 2 MG: 1 INJECTION INTRAMUSCULAR; INTRAVENOUS at 17:29

## 2023-10-18 RX ADMIN — HEPARIN SODIUM 5000 UNITS: 5000 INJECTION, SOLUTION INTRAVENOUS; SUBCUTANEOUS at 18:32

## 2023-10-18 RX ADMIN — DEXMEDETOMIDINE HYDROCHLORIDE 1.2 MCG/KG/HR: 4 INJECTION, SOLUTION INTRAVENOUS at 17:04

## 2023-10-18 RX ADMIN — PIPERACILLIN AND TAZOBACTAM 3.38 G: 3; .375 INJECTION, POWDER, LYOPHILIZED, FOR SOLUTION INTRAVENOUS at 23:55

## 2023-10-18 RX ADMIN — IPRATROPIUM BROMIDE AND ALBUTEROL SULFATE 3 ML: .5; 3 SOLUTION RESPIRATORY (INHALATION) at 15:25

## 2023-10-18 RX ADMIN — DEXMEDETOMIDINE HYDROCHLORIDE 1.2 MCG/KG/HR: 4 INJECTION, SOLUTION INTRAVENOUS at 19:28

## 2023-10-18 RX ADMIN — PANTOPRAZOLE SODIUM 40 MG: 40 INJECTION, POWDER, FOR SOLUTION INTRAVENOUS at 06:25

## 2023-10-18 RX ADMIN — DEXMEDETOMIDINE HYDROCHLORIDE 1.2 MCG/KG/HR: 4 INJECTION, SOLUTION INTRAVENOUS at 06:25

## 2023-10-18 RX ADMIN — Medication 50 MCG: at 16:17

## 2023-10-18 RX ADMIN — Medication 150 MCG/HR: at 23:49

## 2023-10-18 RX ADMIN — CHLORHEXIDINE GLUCONATE 15 ML: 1.2 RINSE ORAL at 20:21

## 2023-10-18 RX ADMIN — QUETIAPINE FUMARATE 50 MG: 25 TABLET ORAL at 08:31

## 2023-10-18 RX ADMIN — BUMETANIDE 3 MG: 0.25 INJECTION INTRAMUSCULAR; INTRAVENOUS at 20:40

## 2023-10-18 RX ADMIN — ACETAMINOPHEN 650 MG: 325 TABLET ORAL at 21:37

## 2023-10-18 RX ADMIN — MIDAZOLAM 2 MG: 1 INJECTION INTRAMUSCULAR; INTRAVENOUS at 23:49

## 2023-10-18 RX ADMIN — BUMETANIDE 3 MG: 0.25 INJECTION INTRAMUSCULAR; INTRAVENOUS at 04:31

## 2023-10-18 RX ADMIN — ALBUTEROL SULFATE 2.5 MG: 2.5 SOLUTION RESPIRATORY (INHALATION) at 07:05

## 2023-10-18 RX ADMIN — Medication 150 MCG/HR: at 09:09

## 2023-10-18 RX ADMIN — Medication 50 MCG: at 20:41

## 2023-10-18 RX ADMIN — MIDAZOLAM 2 MG: 1 INJECTION INTRAMUSCULAR; INTRAVENOUS at 01:48

## 2023-10-18 RX ADMIN — IPRATROPIUM BROMIDE AND ALBUTEROL SULFATE 3 ML: .5; 3 SOLUTION RESPIRATORY (INHALATION) at 20:28

## 2023-10-18 RX ADMIN — DEXMEDETOMIDINE HYDROCHLORIDE 1.2 MCG/KG/HR: 4 INJECTION, SOLUTION INTRAVENOUS at 03:41

## 2023-10-18 RX ADMIN — ACETAMINOPHEN 650 MG: 325 TABLET ORAL at 08:33

## 2023-10-18 RX ADMIN — IPRATROPIUM BROMIDE AND ALBUTEROL SULFATE 3 ML: .5; 3 SOLUTION RESPIRATORY (INHALATION) at 11:17

## 2023-10-18 RX ADMIN — ALBUMIN HUMAN 50 G: 0.25 SOLUTION INTRAVENOUS at 07:02

## 2023-10-18 RX ADMIN — QUETIAPINE FUMARATE 50 MG: 25 TABLET ORAL at 20:40

## 2023-10-18 RX ADMIN — HEPARIN SODIUM 5000 UNITS: 5000 INJECTION, SOLUTION INTRAVENOUS; SUBCUTANEOUS at 10:01

## 2023-10-18 RX ADMIN — ACETAMINOPHEN 650 MG: 325 TABLET ORAL at 13:12

## 2023-10-18 RX ADMIN — DEXMEDETOMIDINE HYDROCHLORIDE 1.2 MCG/KG/HR: 4 INJECTION, SOLUTION INTRAVENOUS at 09:06

## 2023-10-18 RX ADMIN — DEXMEDETOMIDINE HYDROCHLORIDE 1.2 MCG/KG/HR: 4 INJECTION, SOLUTION INTRAVENOUS at 11:48

## 2023-10-18 RX ADMIN — DEXMEDETOMIDINE HYDROCHLORIDE 1.2 MCG/KG/HR: 4 INJECTION, SOLUTION INTRAVENOUS at 01:14

## 2023-10-18 RX ADMIN — MAGNESIUM SULFATE HEPTAHYDRATE: 500 INJECTION, SOLUTION INTRAMUSCULAR; INTRAVENOUS at 20:21

## 2023-10-18 ASSESSMENT — ACTIVITIES OF DAILY LIVING (ADL)
ADLS_ACUITY_SCORE: 35

## 2023-10-18 NOTE — PROGRESS NOTES
RT PROGRESS NOTE    VENT DAY# 7    CURRENT SETTINGS:  Vent Mode: CMV/AC  (Continuous Mandatory Ventilation/ Assist Control)  FiO2 (%): 35 %  Resp Rate (Set): 24 breaths/min  Tidal Volume (Set, mL): 380 mL  PEEP (cm H2O): 8 cmH2O  Pressure Support (cm H2O): 5 cmH2O  Resp: 24      PATIENT PARAMETERS:  PIP 33  Pplat:  25  Pmean:  15  Compliance: 25  Secretions:  moderate thick red-streaked  02 Sats:  97%  BS: decreased    ETT SIZE 7.5 Secured at 20 cm at teeth/gums      NOTE / SHIFT SUMMARY:   Pt remains on full vent support, no changes made.  Pt agitated with cares.  RT will continue to monitor.    Liliane Casillas, RT

## 2023-10-18 NOTE — PROGRESS NOTES
HEART CARE NOTE          Assessment/Recommendations     1. Severe HFrEF c/b ADHF and septic shock  Assessment / Plan  Diuresing well on current regimen - no changes at this time; continue to monitor renal function/repeat BMP, UOP and hemodynamics closely   Patient is high risk of adverse cardiac events 2/2 severe LV dysfunction and hx of substance/methamphetamine abuse  Will need ischemia eval is LV systolic dysfunction remains depressed - repeat echo once near euvolemia and infection has resolved  GDMT on hold given the above     2. Frequent PVCs  Assessment / Plan  Continue amiodarone  Continue to monitor electrolytes and replete PRN; goal K 4 and Mg > 2     3. Acute hypoxic respiratory failure  Assessment / Plan  S/p mechanical ventilation -management per ICU team     4. SUSSY  Assessment / Plan  2/2 hemodynamic compromise Continue to monitor UOP and renal function closely - resolving     5. Gastric bypass surgery c/b bile leak  Assessment / Plan  Management per primary and surgical team    Patient remains critically ill in the ICU requiring mechanical ventilator support in the setting of acute hypoxic respiratory failure. 50 minutes of critical care time      History of Present Illness/Subjective      Ms. Mojgan Jimenez is a 53 year old female with a PMHx significant for obesity, JARVIS on CPAP, asthma (no PFTs), stimulant use disorder, stimulant induced psychosis, mood & anxiety disorders; Cardiology consulted regarding new decline in LVSF     Today, Mrs. Jimenez is intubated and sedated; Management plan as detailed above      ECG: personally reviewed; normal sinus rhythm, nonspecific ST and T waves changes.     ECHO (personnaly Reviewed on 10/16/23):   Technically difficult study.  Limited views were obtained.  The left ventricle is normal in size.  Left ventricular function is decreased. The ejection fraction is 30-35%  (moderately reduced).  There is moderate global hypokinesia of the left ventricle.    Lab  "results: personally reviewed October 18, 2023; notable for resolving SUSSY    Medical history and pertinent documents reviewed in Care Everywhere please where applicable see details above        Physical Examination Review of Systems   BP (!) 154/82   Pulse 69   Temp 99.6  F (37.6  C) (Axillary)   Resp 24   Ht 1.6 m (5' 3\")   Wt 137.6 kg (303 lb 5.7 oz)   LMP 09/09/2023 (Exact Date)   SpO2 98%   BMI 53.74 kg/m    Body mass index is 53.74 kg/m .  Wt Readings from Last 3 Encounters:   10/18/23 137.6 kg (303 lb 5.7 oz)   08/09/23 133.4 kg (294 lb 3.2 oz)   07/05/23 129.7 kg (286 lb)     General Appearance:   Intubated/sedated   ENT/Mouth: membranes moist, no oral lesions or bleeding gums.      EYES:  no scleral icterus, normal conjunctivae   Neck: no carotid bruits or thyromegaly   Chest/Lungs:   lungs are clear to auscultation, no rales or wheezing, equal chest wall expansion    Cardiovascular:   Regular. Normal first and second heart sounds with no murmurs, rubs, or gallops; the carotid, radial and posterior tibial pulses are intact,  + JVD and LE  edema bilaterally    Abdomen:  no organomegaly, masses, bruits, or tenderness; bowel sounds are present   Extremities: no cyanosis or clubbing   Skin: no xanthelasma, warm.    Neurologic: Intubated/sedated     Psychiatric: Intubated/sedated     A complete 10 systems ROS was reviewed  And is negative except what is listed in the HPI.          Medical History  Surgical History Family History Social History   Past Medical History:   Diagnosis Date     LINA (generalized anxiety disorder) 11/02/2017     Hyperlipidemia LDL goal <160 01/20/2019     Major depressive disorder      Methanol abuse (H)      Mild persistent asthma without complication 11/02/2017     Obese      JARVIS on CPAP     Cpap not working     Paranoia (H)     resolved due to drugs     Vitamin D deficiency 11/02/2017    Past Surgical History:   Procedure Laterality Date     GASTRECTOMY, SLEEVE, LAPAROSCOPIC, " "WITH DUODENAL SWITCH N/A 10/9/2023    Procedure: GASTRECTOMY, SLEEVE, LAPAROSCOPIC, WITH SINGLE ANASTAMOSIS DUODENAL SWITCH;  Surgeon: Hoang Worthy MD;  Location: [facility] OR     LAPAROSCOPY DIAGNOSTIC (GYN) N/A 10/12/2023    Procedure: LAPAROSCOPY,;  Surgeon: Hoang Worthy MD;  Location: [facility] OR     LAPAROTOMY EXPLORATORY N/A 10/12/2023    Procedure: LAPAROTOMY, CLOSURE OF TWO SMALL BOWEL INTEROTOMIES, DRAIN PLACEMENT, INTRA-ABDOMINAL CLEAN OUT;  Surgeon: Hoang Worthy MD;  Location: [facility] OR     MAMMOPLASTY REDUCTION      reduction    no family history of premature coronary artery disease Social History     Socioeconomic History     Marital status: Single     Spouse name: Not on file     Number of children: Not on file     Years of education: Not on file     Highest education level: Not on file   Occupational History     Not on file   Tobacco Use     Smoking status: Never     Smokeless tobacco: Never   Vaping Use     Vaping Use: Never used   Substance and Sexual Activity     Alcohol use: Not Currently     Comment: Sober x 8 months     Drug use: Not Currently     Types: Methamphetamines, \"Crack\" cocaine     Comment: in remision      Sexual activity: Not Currently     Partners: Male   Other Topics Concern     Parent/sibling w/ CABG, MI or angioplasty before 65F 55M? No   Social History Narrative     Not on file     Social Determinants of Health     Financial Resource Strain: Not on file   Food Insecurity: Not on file   Transportation Needs: Not on file   Physical Activity: Not on file   Stress: Not on file   Social Connections: Not on file   Interpersonal Safety: Not on file   Housing Stability: Not on file           Lab Results    Chemistry/lipid CBC Cardiac Enzymes/BNP/TSH/INR   Lab Results   Component Value Date    CHOL 176 05/17/2022    HDL 36 (L) 05/17/2022    TRIG 278 (H) 10/18/2023    BUN 97.4 (H) 10/18/2023     10/18/2023    CO2 27 10/18/2023    Lab " "Results   Component Value Date    WBC 12.7 (H) 10/16/2023    HGB 9.9 (L) 10/16/2023    HCT 30.4 (L) 10/16/2023    MCV 90 10/16/2023     10/18/2023    Lab Results   Component Value Date    TSH 2.56 09/15/2022    INR 1.21 (H) 10/16/2023     No results found for: \"CKTOTAL\", \"CKMB\", \"TROPONINI\"       Weight:    Wt Readings from Last 3 Encounters:   10/18/23 137.6 kg (303 lb 5.7 oz)   08/09/23 133.4 kg (294 lb 3.2 oz)   07/05/23 129.7 kg (286 lb)       Allergies  No Known Allergies      Surgical History  Past Surgical History:   Procedure Laterality Date     GASTRECTOMY, SLEEVE, LAPAROSCOPIC, WITH DUODENAL SWITCH N/A 10/9/2023    Procedure: GASTRECTOMY, SLEEVE, LAPAROSCOPIC, WITH SINGLE ANASTAMOSIS DUODENAL SWITCH;  Surgeon: Hoang Worthy MD;  Location: [facility] OR     LAPAROSCOPY DIAGNOSTIC (GYN) N/A 10/12/2023    Procedure: LAPAROSCOPY,;  Surgeon: Hoang Worthy MD;  Location: [facility] OR     LAPAROTOMY EXPLORATORY N/A 10/12/2023    Procedure: LAPAROTOMY, CLOSURE OF TWO SMALL BOWEL INTEROTOMIES, DRAIN PLACEMENT, INTRA-ABDOMINAL CLEAN OUT;  Surgeon: Hoang Worthy MD;  Location: [facility] OR     MAMMOPLASTY REDUCTION      reduction       Social History  Tobacco:   History   Smoking Status     Never   Smokeless Tobacco     Never    Alcohol:   Social History    Substance and Sexual Activity      Alcohol use: Not Currently        Comment: Sober x 8 months   Illicit Drugs:   History   Drug Use Unknown     Comment: in remision        Family History  Family History   Problem Relation Age of Onset     Cancer Mother      Unknown/Adopted Father      Alcoholism Father      Substance Abuse Sister      Diabetes No family hx of      Hypertension No family hx of           Do Schreiber MD on 10/18/2023      cc: Franca Mishra    "

## 2023-10-18 NOTE — PROGRESS NOTES
ASSESSMENT:  1. Perimenopausal symptoms        Mojgan Jimenez is a 53 year old female who is s/p laparoscopic sleeve gastrectomy with KO on 10/9 post-op course complicated by small enterotomy x2 s/p laparoscopy converted to laparotomy with washout and enterotomy closure x2 on 10/12. ARF with improving creatinine and urine production. Leukocytosis increased today with continued fevers. CT is not convincing for abdominal source, but there is a small fluid collection above the liver with gas present. Pelvic fluid is likely incidental finding. Could also be pulmonary source. We will continue to monitor    PLAN:  Continue TPN  Continue NPO with NG to LIS  Appreciate ICU management of patient    SUBJECTIVE:   She is intubated but awake easily to touch and calling out of her name. She does not follow commands for me.      Patient Vitals for the past 24 hrs:   BP Temp Temp src Pulse Resp SpO2 Weight   10/18/23 1530 -- -- -- 76 -- 100 % --   10/18/23 1525 -- -- -- 77 (!) 43 96 % --   10/18/23 1404 (!) 145/67 -- -- 83 (!) 55 100 % --   10/18/23 1400 -- (!) 100.8  F (38.2  C) Axillary 80 (!) 33 95 % --   10/18/23 1300 (!) 147/70 -- -- 83 (!) 45 95 % --   10/18/23 1200 (!) 145/68 (!) 100.6  F (38.1  C) Axillary 86 (!) 55 94 % --   10/18/23 1134 -- -- -- 84 -- 95 % --   10/18/23 1100 (!) 149/77 -- -- 77 24 100 % --   10/18/23 1030 126/63 -- -- 75 -- 95 % --   10/18/23 1000 (!) 167/78 -- -- 84 (!) 31 94 % --   10/18/23 0950 (!) 161/75 (!) 101  F (38.3  C) Axillary 94 (!) 34 93 % --   10/18/23 0900 (!) 181/84 -- -- 83 (!) 34 95 % --   10/18/23 0852 -- -- -- 87 -- 96 % --   10/18/23 0822 -- -- -- 97 -- 94 % --   10/18/23 0800 (!) 142/85 (!) 101.2  F (38.4  C) Axillary 110 (!) 47 90 % --   10/18/23 0713 -- -- -- 77 28 96 % --   10/18/23 0706 (!) 187/103 -- -- 83 -- 97 % --   10/18/23 0700 (!) 200/104 -- -- 87 (!) 32 100 % --   10/18/23 0600 (!) 154/82 -- -- 69 24 98 % 137.6 kg (303 lb 5.7 oz)   10/18/23 0505 (!) 152/81 -- -- 70 24  99 % --   10/18/23 0500 (!) 187/90 -- -- 78 (!) 35 97 % --   10/18/23 0412 -- -- -- 68 -- 96 % --   10/18/23 0400 134/78 99.6  F (37.6  C) Axillary 66 24 97 % --   10/18/23 0300 (!) 162/86 -- -- 71 24 97 % --   10/18/23 0200 (!) 147/86 -- -- 91 24 99 % --   10/18/23 0100 (!) 157/77 -- -- 70 25 98 % --   10/18/23 0040 -- -- -- 71 -- 97 % --   10/18/23 0000 (!) 149/79 98.2  F (36.8  C) Axillary 70 24 97 % --   10/17/23 2300 (!) 151/77 -- -- 69 24 97 % --   10/17/23 2200 (!) 146/85 -- -- 68 24 97 % --   10/17/23 2100 (!) 146/80 -- -- 67 25 96 % --   10/17/23 2037 -- -- -- 73 -- 98 % --   10/17/23 2000 (!) 143/88 99.1  F (37.3  C) Axillary 71 24 96 % --   10/17/23 1900 (!) 147/85 -- -- 95 (!) 32 93 % --   10/17/23 1800 (!) 149/83 -- -- 71 21 98 % --   10/17/23 1757 (!) 149/83 -- -- 79 28 95 % --   10/17/23 1700 (!) 156/82 -- -- 70 26 98 % --   10/17/23 1645 -- -- -- 75 -- 99 % --         PHYSICAL EXAM:  GEN: No acute distress  CV:RRR  ABD:soft, incisions CDI, no peritoneal signs  Drains: serous   Output by Drain (mL) 10/16/23 0700 - 10/16/23 1459 10/16/23 1500 - 10/16/23 2259 10/16/23 2300 - 10/17/23 0659 10/17/23 0700 - 10/17/23 1459 10/17/23 1500 - 10/17/23 2259 10/17/23 2300 - 10/18/23 0659 10/18/23 0700 - 10/18/23 1459 10/18/23 1500 - 10/18/23 1604   Closed/Suction Drain 1 Right RUQ Bulb 19 Albanian 45 17 7 30 25 20 45    Closed/Suction Drain 2 Left LUQ Bulb 19 Albanian 0 24 4 25 20 15 20    Closed/Suction Drain 3 Left LLQ Bulb 19 Albanian 0 9 4 2 5 2 0         10/17 1500 - 10/18 1459  In: 2339.14 [I.V.:1027.47]  Out: 5377 [Urine:5075; Drains:152]    No results displayed because visit has over 200 results.             Felecia Henderson, JUSTINE CNP

## 2023-10-18 NOTE — PROGRESS NOTES
General Surgery Progress Note    POST OP DAY  # 9 and 5    Subjective:   Mojgan Jimenez is a 53 year old female who is s/p laparoscopic sleeve gastrectomy with KO on 10/9 post-op course complicated by small enterotomy x2 s/p laparoscopy converted to laparotomy with washout and enterotomy closure x2 on 10/12. Patient remains sedated and intubated post-operatively. She has been off her vasopressors for several days now.     She was having PVC's and runs of V-Tach over the weekend and was found to have an elevation of her Troponin with an EF of 35 %. She has been well controlled on amiodarone.  Cardiology is involved.      Cr is trending down and pt is being diuresed   Passing stool..     Vitals:    10/18/23 0706 10/18/23 0713 10/18/23 0800 10/18/23 0822   BP: (!) 187/103  (!) 142/85    BP Location:       Pulse: 83 77 110 97   Resp:  28 (!) 47    Temp:   (!) 101.2  F (38.4  C)    TempSrc:   Axillary    SpO2: 97% 96% 90% 94%   Weight:       Height:           Physical Exam:  Lungs:  CTA  CV:       RRR  Ab:       Soft, + BS, drains putting out serous looking fluid      Component  Ref Range & Units  4:39 AM  (10/18/23) 1 d ago  (10/17/23) 2 d ago  (10/16/23) 3 d ago  (10/15/23) 4 d ago  (10/14/23) 5 d ago  (10/13/23) 5 d ago  (10/13/23)    Sodium  135 - 145 mmol/L 143 141      CM   Comment: Reference intervals for this test were updated on 09/26/2023 to more accurately reflect our healthy population. There may be differences in the flagging of prior results with similar values performed with this method. Interpretation of those prior results can be made in the context of the updated reference intervals.    Potassium  3.4 - 5.3 mmol/L 3.2 Low  3.1 Low  3.5 3.9 4.4 4.6 4.5    Chloride  98 - 107 mmol/L 105 101 97 Low  96 Low  99 100 100    Carbon Dioxide (CO2)  22 - 29 mmol/L 27 24 23 22 19 Low  20 Low  20 Low     Anion Gap  7 - 15 mmol/L 11 16 High  17 High  18 High  18 High  15 15    Urea  Nitrogen  6.0 - 20.0 mg/dL 97.4 High  99.7 High  99.7 High  95.6 High  85.2 High  79.5 High  72.0 High     Creatinine  0.51 - 0.95 mg/dL 3.02 High  3.55 High  4.38 High  6.07 High  6.97 High  6.86 High  6.64 High     GFR Estimate  >60 mL/min/1.73m2 18 Low  15 Low  11 Low  8 Low  7 Low  7 Low  7 Low     Calcium  8.6 - 10.0 mg/dL 8.7 9.2 9.3 8.9 8.2 Low  8.2 Low  8.1 Low     Glucose  70 - 99 mg/dL 153 High  144 High  143 High  154 High  105 High  131 High  145 High        Component  Ref Range & Units  4:39 AM  (10/18/23)  4:39 AM  (10/18/23) 2 d ago  (10/16/23) 3 d ago  (10/15/23) 5 d ago  (10/13/23) 5 d ago  (10/13/23) 6 d ago  (10/12/23)    WBC Count  4.0 - 11.0 10e3/uL 15.3 High   12.7 High    4.9     RBC Count  3.80 - 5.20 10e6/uL 3.17 Low   3.37 Low    3.48 Low      Hemoglobin  11.7 - 15.7 g/dL 9.2 Low   9.9 Low    10.2 Low      Hematocrit  35.0 - 47.0 % 29.7 Low   30.4 Low    32.3 Low      MCV  78 - 100 fL 94  90   93     MCH  26.5 - 33.0 pg 29.0  29.4   29.3     MCHC  31.5 - 36.5 g/dL 31.0 Low   32.6   31.6     RDW  10.0 - 15.0 % 14.1  14.2   13.7     Platelet Count  150 - 450 10e3/uL 298 298 194 179  162 189     IMPRESSION:   1.  Sleeve gastrectomy and proximal duodenal to jejunal anastomosis, WELL-POSITIONED NASOGASTRIC TUBE, and upper anterior abdominal surgical drain from the right and a second left anterior abdominal and pelvic surgical drain.       Addended by Jose G Birmingham MD on 10/18/2023  3:58 PM     Study Result    Narrative & Impression   EXAM: CT ABDOMEN PELVIS W/O CONTRAST  LOCATION: [facility]  DATE: 10/18/2023     INDICATION: Perforated bowel, ongoing fever. Status post 10/09/2023 sleeve gastrectomy with single anastomosis duodenal switch complicated by bowel perforation, peritonitis and sepsis status post 10/12/2023 washout and enterotomy closure. Cardiomyopathy.   JARVIS.  COMPARISON: 10/16/2023 CXR and 10/10/2023 Gastrografin upper GI.  TECHNIQUE: CT scan of the abdomen  and pelvis was performed without IV contrast. Multiplanar reformats were obtained. Dose reduction techniques were used.  CONTRAST: None.     FINDINGS:   LOWER CHEST: Complete right lower lobe atelectasis, small volume of right-sided pleural fluid and mild elevation right hemidiaphragm. Trace left pleural effusion and mild platelike atelectasis left lower lobe. Heart size within normal limits with no   pericardial effusion.     HEPATOBILIARY: Liver is normal.  No calcified gallstones or bile duct dilatation.      PANCREAS: Normal.     SPLEEN: Spleen size normal.     ADRENAL GLANDS: Normal.     KIDNEYS/BLADDER: Bladder is decompressed by well-positioned Patiño catheter. Kidneys, ureters and bladder are otherwise normal.     BOWEL: Sleeve gastrectomy and proximal duodenal to jejunal anastomosis, a well-positioned nasogastric tube, and upper anterior abdominal surgical drain from the right and a second left anterior abdominal and pelvic surgical drain. A thin layer of fluid   about 1 - 2 cm radial thickness between the right hemidiaphragm and right hepatic lobe contains a few foci of gas and there is a small amount of nongas-containing fluid in pelvic cul-de-sac. Residual iodinated contrast material from 10/10/2023 upper GI   within the normal caliber appendix, colon and rectum.     LYMPH NODES: No lymphadenopathy.     VASCULATURE: Normal caliber abdominal aorta.       PELVIC ORGANS: A 5 cm fibroid.     MUSCULOSKELETAL: Unremarkable.                                                                      IMPRESSION:   1.  Sleeve gastrectomy and proximal duodenal to jejunal anastomosis, malpositioned nasogastric tube, and upper anterior abdominal surgical drain from the right and a second left anterior abdominal and pelvic surgical drain.   2.  A thin layer of fluid about 1 - 2 cm radial thickness between the right hemidiaphragm and right hepatic lobe contains a few foci of gas and there is a small amount of  nongas-containing fluid in pelvic cul-de-sac.   3.  Complete right lower lobe atelectasis, small volume of right-sided pleural fluid and mild elevation right hemidiaphragm.        Assessment:  Pt is stable but WBC is climbing.  I see a fluid collection btw the Liver and the diaphragm, I believe this should be drained in IR..  FEK: Creatinine is trending down as the patient recovers from her ATN.   She has been diuresing and yesterday was down another 2011 cc.  CV: Hemodynamically stable, no pressors, troponins were up and ejection fraction was down from the patient's evaluation over the weekend.  I suspect these factors will improve as the patient's pulse through her critical illness.  GI: Patient is started to stool, it would be OK to start tube feeds.      Plan:   Address fluid collection in the Abdomin in IR  Next goal is extubation.  Once patient is extubated we can start her diet as her GI tract appears to be working.  If extubation is not going to happen soon then we can start tube feeds into the GI tract.    The intra peritoneal drains can be removed.      Hoang Worthy MD  [facility]  [phone]

## 2023-10-18 NOTE — PROGRESS NOTES
Nutrition Therapy  Parenteral Nutrition follow-up       Dietitian to Pharmacy    No change to TPN this evening.  Rate of TPN: 50  Grams Dextrose: 190  Grams Amino Acid:73    Lipids: 50 g 3 days/week.        Pt remains intubated.  Patient with temp 101.2 degrees this morning.  Diuresing.  Surgery ok with meds per NG.  S/p gastrectomy sleeve-duodenal switch on 10/9.  S/p drain placed, closure of interotomies and NG placed on 10/12.   Propofol stopped 10/15 due to elevated Triglyceride.    Current Nutrition Intake:  Diet: NPO since 10/11/23  Custom TPN per central line:  73 gm AA, 190 gm Dextrose, 50 gm lipid 3 times per week.       TPN provides 73 g AA, 190 g DEX at 50 ml/hr continuous.  This  provides 1152 kcal on daily average, 1200 ml fluid, 73 g protein, 190 g carb, 21.4 g lipid daily.  GIR= 0.93 mg CHO/kg/min     TPN start 10/14/23.    Weight Trends  Admission wt: 130.5 kg (287 lb 12.8 oz) 10/9/23   Current wt: 137 kg (303 lb 5.7 oz) 10/18/23  -155 ml/24 hrs  +7.88 L since admit.    Dosing Weight: 52.3 kg (IBW)       ESTIMATED NUTRITION NEEDS   Energy: 5069-2943 kcals/day (22 - 25 kcals/kg)   Obese, Post-op, and Vented   Protein : 63-78 grams protein/day (1.2 - 1.5 grams of pro/kg)   Obesity guidelines and Post-op, elevated creatinine   Fluid: 3932-4147+ mL/day (1 mL/kcal)   Maintenance     GI:  Last BM 3-6 x 10/17, loose.  RN reports frequent stooling this morning. RN holding bowel meds this morning.  Hypoactive bowel sounds per chart.  NG placed 10/12/23.  Drains 122 ml  10/17/23  Surgical incisions  I/Os:  3166/3322    Labs:   Sodium 143  Potassium 3.2-replacing.  Suspect low with diuresis.  Creatinine 3.02-(H) Improving  Triglyceride 278 (H) improving  Glucose range 120-199  Labs reviewed by dietitian    Medications:    bumetanide  3 mg Intravenous Q8H    chlorhexidine  15 mL Mouth/Throat Q12H    sennosides  5 mL Oral or Feeding Tube BID    And    docusate  50 mg Oral or Feeding Tube BID    heparin  ANTICOAGULANT  5,000 Units Subcutaneous Q8H    insulin aspart  1-12 Units Subcutaneous Q4H    lipids plant base  250 mL Intravenous Once per day on Monday Wednesday Friday    micafungin  100 mg Intravenous Q24H    pantoprazole  40 mg Intravenous QAM AC    piperacillin-tazobactam  3.375 g Intravenous Q8H    polyethylene glycol  17 g Oral or Feeding Tube Daily    potassium chloride  20 mEq Intravenous Q1H    QUEtiapine  50 mg Oral or Feeding Tube BID    sodium chloride (PF)  3 mL Intracatheter Q8H       amiodarone Stopped (10/15/23 1526)    dexmedeTOMIDine 1.2 mcg/kg/hr (10/18/23 0906)    dextrose      fentaNYL 150 mcg/hr (10/18/23 0909)    norepinephrine Stopped (10/13/23 1814)    parenteral nutrition - ADULT compounded formula 50 mL/hr at 10/18/23 0600    vasopressin Stopped (10/12/23 1145)      acetaminophen **OR** acetaminophen, albuterol, albuterol, dextrose, glucose **OR** dextrose **OR** glucagon, diphenhydrAMINE **OR** diphenhydrAMINE, fentaNYL, HYDROmorphone **OR** HYDROmorphone, lidocaine 4%, lidocaine (buffered or not buffered), midazolam, naloxone **OR** naloxone **OR** naloxone **OR** naloxone, ondansetron **OR** ondansetron, phenol, prochlorperazine **OR** prochlorperazine, propranolol, sodium chloride (PF), traMADol   Reviewed by dietitian       Malnutrition Diagnosis: Patient does not meet two of the established criteria necessary for diagnosing malnutrition     Nutrition Diagnosis  Inadequate oral intake related to intubated and sedated as evidenced by NPO and need for TPN     Evaluation: No change    Goals   Meet nutrition needs with TPN- met  New-electrolytes WNL-progressing.  Potassium replacement today.  Tolerate TPN - progressing  Transition to po diet after extubation and when appropriate.-unable, pt remains intubated.     INTERVENTIONS  Parenteral Nutrition/IV Fluids - Continue same TPN at this time.  Consider trickle TF start when appropriate.      Monitoring/Evaluation  Progress toward goals  will be monitored and evaluated per protocol.

## 2023-10-18 NOTE — PROGRESS NOTES
PULMONARY / CRITICAL CARE PROGRESS NOTE    Date / Time of Admission:  10/9/2023  5:26 AM    Assessment:     Mojgan Jimenez is a 53 year old female with history of asthma, severe JARVIS, (AHI of 41.7, supine AHI 41.6, REM AHI 85.7, and 19.8 minutes with O2 saturations less than 88%), stimulant use disorder, generalized anxiety disorder, depression, GERD, morbid obesity Body mass index is 53.74 kg/m .  Presented on 10/9/23 for scheduled laparoscopic sleeve gastrectomy with single anastomosis duodenal switch.   Postprocedure, patient complained of abdominal pain. Gastrografin leak test was negative. Rapid response was called due to encephalopathy, acute renal failure. Patient was admitted to ICU, intubated and started on pressors on 10/12. Patient was taken to OR.   S/p closure of two small bowel enterotomies, drain placement and wash out. Peritoneal fluid culture (+) strep anginosus.     Acute respiratory failure s/p intubation 10/12  Sepsis due to peritonitis secondary to bowel perforation  Peritoneal fluid culture (+) streptococcus anginosus.   Minimal drain output.   Ongoing fever, raising WBC. Plan for abdomen CT scan   Continue zosyn and micafungin.   S/p closure of two small bowel enterotomies, drain placement and wash out on 10/12  Acute kidney injury   On TPN  Volume up   Cardiomyopathy   Echocardiogram showed EF 30-35%, moderate global hypokinesia of the left ventricle.   Frequent PVCs  Hypokalemia   Elevated TG  S/p laparoscopic sleeve gastrectomy with single anastomosis duodenal switch 10/9  Anemia   Hx stimulant use disorder, LINA, depression  JARVIS   Morbid obesity Body mass index is 53.74 kg/m .    Advance Directives:  Full code    Plan:   Titrate vent settings A/C 18/400/8/35%, keep SpO2 > 90%, plateau pressure < 30  Schedule bronchodilators  Sedation to keep RASS -1 to -2, precedex and fentanyl drip  Pressors to keep MAP > 65, currently off NE  On amiodarone drip   Get new set of blood cultures   Sputum  Cx  Abdomen/pelvis CT scan   Abx zosyn and micafungin   Monitor kidney function   IV diuresis   Supplement electrolytes   On TPN  PPI IV for GI prophylaxis   Glucose level monitoring   DVT prophylaxis SCDs, heparin SQ    Please contact me if you have any questions.  Total critical care time, not including separately billable procedure time: 45 minutes  This patient had a high probability of imminent or life threatening deterioration due to acute respiratory failure which required my direct attention, intervention and personal management.     James Borrero  Pulmonary / Critical Care  10/18/2023  8:13 AM          ICU DAILY CHECKLIST                           Can patient transfer out of MICU? no    FAST HUG:    Feeding:  Feeding: yes.  Patient is receiving TPN    Patiño: yes  Analgesia/Sedation:yes  precedex, fentanyl  Thromboembolic prophylaxis: Yes; Mode:  Heparin and SCDs  HOB>30:  Yes  Stress Ulcer Protocol Active: Yes; Mode: PPI  Glycemic Control: Any glucose > 180 no; Mode of Insulin Therapy: Sliding Scale Insulin    INTUBATED:  Can patient have daily waking:  No  Can patient have spontaneous breathing trial:  No    Restraints? yes    PHYSICAL THERAPY AND MOBILITY:  Can patient have PT and mobility trial: no  Activity: bedrest    Subjective:   HPI:  Mojgan Jimenez is a 53 year old female with history of asthma, severe JARVIS, (AHI of 41.7, supine AHI 41.6, REM AHI 85.7, and 19.8 minutes with O2 saturations less than 88%), stimulant use disorder, generalized anxiety disorder, depression, GERD, morbid obesity Body mass index is 53.74 kg/m .  Presented on 10/9/23 for scheduled laparoscopic sleeve gastrectomy with single anastomosis duodenal switch.   Postprocedure, patient complained of abdominal pain. Gastrografin leak test was negative. Rapid response was called due to encephalopathy, acute renal failure. Patient was admitted to ICU, intubated and started on pressors on 10/12. Patient was taken to OR.    S/p closure of two small bowel enterotomies, drain placement and wash out. Peritoneal fluid culture (+) strep anginosus.     Events overnight  - Febrile, tachypneic.   - On TPN.   - On bumex drip with improvement of urine output.     Allergies: Patient has no known allergies.     MEDS:  Current Facility-Administered Medications   Medication    acetaminophen (TYLENOL) tablet 650 mg    Or    acetaminophen (TYLENOL) Suppository 650 mg    albuterol (PROVENTIL HFA/VENTOLIN HFA) inhaler    albuterol (PROVENTIL) neb solution 2.5 mg    amiodarone (NEXTERONE) 1.8 mg/mL in dextrose 5% 200 mL ADULT STANDARD infusion    bumetanide (BUMEX) injection 3 mg    chlorhexidine (PERIDEX) 0.12 % solution 15 mL    dexmedeTOMIDine (PRECEDEX) 4 mcg/mL in NS infusion    dextrose 10% infusion    glucose gel 15-30 g    Or    dextrose 50 % injection 25-50 mL    Or    glucagon injection 1 mg    diphenhydrAMINE (BENADRYL) capsule 25 mg    Or    diphenhydrAMINE (BENADRYL) injection 25 mg    sennosides (SENOKOT) syrup 5 mL    And    docusate (COLACE) 50 MG/5ML liquid 50 mg    fentaNYL (SUBLIMAZE) 50 mcg/mL bolus from pump    fentaNYL (SUBLIMAZE) infusion    heparin ANTICOAGULANT injection 5,000 Units    HYDROmorphone (DILAUDID) injection 0.2 mg    Or    HYDROmorphone (PF) (DILAUDID) injection 0.5 mg    insulin aspart (NovoLOG) injection (RAPID ACTING)    lidocaine (LMX4) cream    lidocaine 1 % 0.1-1 mL    lipids plant base (CLINOLIPID) 20 % infusion 250 mL    micafungin (MYCAMINE) 100 mg in sodium chloride 0.9 % 100 mL intermittent infusion    midazolam (VERSED) injection 1-2 mg    naloxone (NARCAN) injection 0.2 mg    Or    naloxone (NARCAN) injection 0.4 mg    Or    naloxone (NARCAN) injection 0.2 mg    Or    naloxone (NARCAN) injection 0.4 mg    norepinephrine (LEVOPHED) 4 mg in  mL infusion PREMIX    ondansetron (ZOFRAN ODT) ODT tab 4 mg    Or    ondansetron (ZOFRAN) injection 4 mg    pantoprazole (PROTONIX) IV push injection 40 mg     "parenteral nutrition - ADULT compounded formula    phenol (CHLORASEPTIC) 1.4 % spray 1-2 mL    piperacillin-tazobactam (ZOSYN) 3.375 g vial to attach to  mL bag    polyethylene glycol (MIRALAX) Packet 17 g    potassium chloride 20 mEq in 50 mL intermittent infusion    prochlorperazine (COMPAZINE) injection 10 mg    Or    prochlorperazine (COMPAZINE) tablet 10 mg    propranolol (INDERAL) tablet 10 mg    QUEtiapine (SEROquel) tablet 50 mg    sodium chloride (PF) 0.9% PF flush 3 mL    sodium chloride (PF) 0.9% PF flush 3 mL    traMADol (ULTRAM) tablet 50 mg    vasopressin (VASOSTRICT) 20 Units in sodium chloride 0.9 % 100 mL standard conc infusion     Objective:   VITALS:  BP (!) 142/85   Pulse 110   Temp (!) 101.2  F (38.4  C) (Axillary)   Resp (!) 47   Ht 1.6 m (5' 3\")   Wt 137.6 kg (303 lb 5.7 oz)   LMP 09/09/2023 (Exact Date)   SpO2 90%   BMI 53.74 kg/m    VENT:  Vent Mode: CPAP/PS  (Continuous positive airway pressure with Pressure Support)  FiO2 (%): 35 %  Resp Rate (Set): 24 breaths/min  Tidal Volume (Set, mL): 380 mL  PEEP (cm H2O): 8 cmH2O  Pressure Support (cm H2O): 5 cmH2O  Resp: (!) 47    EXAM:   Gen: obese, sedated, vented  HEENT: pink conjunctiva, intubated  Neck: no thyromegaly, masses or JVD  Lungs: discrete ronchi both HT  CV: regular, tachycardic, no murmurs or gallops appreciated  Abdomen: distended, abdominal drain tubes, decrease bowel sounds  Ext: trace edema  Neuro: sedated, withdraws to painful stimulus    Data Review:  Recent Labs   Lab 10/18/23  0754 10/18/23  0439 10/18/23  0438 10/18/23  0242 10/17/23  2349 10/17/23  2014   * 153* 150* 142* 199* 140*      10/18/23 04:39   Sodium 143   Potassium 3.2 (L)   Chloride 105   Carbon Dioxide (CO2) 27   Urea Nitrogen 97.4 (H)   Creatinine 3.02 (H)   GFR Estimate 18 (L)   Calcium 8.7   Anion Gap 11   Magnesium 1.8   Phosphorus 2.8   Glucose 153 (H)   Triglycerides 278 (H)      10/16/23 10:55   WBC 12.7 (H)   Hemoglobin 9.9 (L) "   Hematocrit 30.4 (L)   Platelet Count 194   RBC Count 3.37 (L)   MCV 90   MCH 29.4   MCHC 32.6   RDW 14.2      10/18/23 04:39   WBC 15.3 (H)   Hemoglobin 9.2 (L)   Hematocrit 29.7 (L)   Platelet Count 298  298   RBC Count 3.17 (L)   MCV 94   MCH 29.0   MCHC 31.0 (L)   RDW 14.1   % Neutrophils 81   % Lymphocytes 7   % Monocytes 7   % Eosinophils 3   % Basophils 0     Peritoneal fluid Culture 1+ Streptococcus anginosus Abnormal         XR CHEST PORT 1 VIEW  LOCATION: [facility]  DATE: 10/16/2023  INDICATION: hypoxia  COMPARISON: Portable AP view of the chest 10/14/2023                                          IMPRESSION:   Tip of the endotracheal tube is in satisfactory position. Esophageal temperature probe is in the lower third of the esophagus. Gastric tube courses below the diaphragmatic hiatus and outside the field-of-view. Telemetry leads overlie the chest.  Persistent shallow lung inflation. The right hemidiaphragm is indistinct and there is graded opacity in the infrahilar right chest either related to adjacent basilar atelectasis and/or layering pleural fluid. Focal atelectasis in the medial left base is not increased. The vessels within the aerated portions of the lungs are normal. No clear change from 2 days earlier. Unchanged heart and mediastinal contours.    Echocardiogram 10/14/2023  Technically difficult study.  Limited views were obtained.  The left ventricle is normal in size.  Left ventricular function is decreased. The ejection fraction is 30-35%  (moderately reduced).  There is moderate global hypokinesia of the left ventricle.  __________________________________________________  By:  James Borrero MD, 10/18/2023  8:13 AM    Primary Care Physician:  Franca Mishra

## 2023-10-18 NOTE — PROGRESS NOTES
PROVIDER RESTRAINT FOR NON-VIOLENT BEHAVIOR FACE TO FACE EVALUATION    Patient's Immediate Situation:  Patient demonstrated the following behaviors: impulsive; pulls at critical lines and tubes. Does not respond to redirection.     Patient's Reaction to the intervention:  Does patient understand the reason for restraint/seclusion? Unable to express     Medical Condition:  Is there any evidence of compromise of Skin integrity, Respiratory, Cardiovascular, Musculoskeletal, Hydration?   No    Behavioral Condition:  In consultation with the RN, is there a need to continue this restraint or seclusion? Yes    See Restraint Flowsheet for complete restraint documentation and assessment.     Jennifer Mccray, CNP  [facility] Pulmonary/Critical Care

## 2023-10-18 NOTE — PHARMACY-CONSULT NOTE
"Pharmacy Note: Parenteral Nutrition (PN) Management    Pharmacist consulted to dose PN for Mojgan Jimenez, a 53 year old female by Dr. Cruz.    Subjective:    The patient is a new PN start.    The patient was started on PN in the hospital on 10/14/23.    Indication for PN therapy:  peritonitis    Inadequate nutrition anticipated for > 7 days.     Enteral nutrition contraindicated due to: peritonitis.    Pertinent diseases and other special considerations  10/9/23 s/p sleeve gastrectomy    Social History     Tobacco Use    Smoking status: Never    Smokeless tobacco: Never   Vaping Use    Vaping Use: Never used   Substance Use Topics    Alcohol use: Not Currently     Comment: Sober x 8 months    Drug use: Not Currently     Types: Methamphetamines, \"Crack\" cocaine     Comment: in remision      Objective:    Ht Readings from Last 1 Encounters:   10/09/23 1.6 m (5' 3\")     Wt Readings from Last 1 Encounters:   10/18/23 137.6 kg (303 lb 5.7 oz)       Body mass index is 53.74 kg/m .    Patient Vitals for the past 96 hrs:   Weight   10/18/23 0600 137.6 kg (303 lb 5.7 oz)   10/17/23 0600 138 kg (304 lb 3.2 oz)   10/16/23 0400 140.6 kg (309 lb 14.4 oz)       Labs:  Last 3 days:  Recent Labs     10/16/23  0436 10/16/23  1055 10/17/23  0419 10/18/23  0439     --  141 143   POTASSIUM 3.5  --  3.1* 3.2*   CHLORIDE 97*  --  101 105   CO2 23  --  24 27   BUN 99.7*  --  99.7* 97.4*   CR 4.38*  --  3.55* 3.02*   PALAK 9.3  --  9.2 8.7   MAG 2.4*  --  2.1 1.8   PHOS 4.0  --  3.9 2.8   PROTTOTAL 6.7  --   --   --    ALBUMIN 2.8*  --   --   --    PREALB 12*  --   --   --    TRIG  --   --   --  278*   HGB  --  9.9*  --  9.2*   HCT  --  30.4*  --  29.7*   PLT  --  194  --  298  298   BILITOTAL 0.4  --   --   --    AST 71*  --   --   --    *  --   --   --    ALKPHOS 82  --   --   --    INR 1.21*  --   --   --        Glucose (past 48 hours):   Recent Labs     10/17/23  0419 10/17/23  0803 10/17/23  1154 10/17/23  1746 " 10/17/23  2014 10/17/23  2349 10/18/23  0242 10/18/23  0438 10/18/23  0439 10/18/23  0754   * 159* 117* 135* 140* 199* 142* 150* 153* 120*       Intake/Output (last 24 hours): I/O last 3 completed shifts:  In: 2347.03 [I.V.:1013.03]  Out: 5119 [Urine:4775; Emesis/NG output:200; Drains:144]    Estimated CrCl: Estimated Creatinine Clearance: 29.4 mL/min (A) (based on SCr of 3.02 mg/dL (H)).    Assessment:    Continue patient on PN therapy as a continuous central therapy.     Given the patient's current condition/oral intake, PN is still indicated.    Lab results reviewed: K remains low.  Replaced as total of 40mEq bumps today.  Will add additional 20mEq potassium to next TPN bag  Mag trending down, will increase to 3 mEq/day in bag    Planning same diuresis with bumetanide today, good urine output    TG elevated, but trending down    Plan:  Rate of PN: 50 mL/hr   Formula:   Amino Acids 73 grams  Dextrose 190 grams  Sodium 40 mEq/day  Potassium 80 mEq/day  Calcium 5 mEq/day  Magnesium 3 mEq/day  Phosphorus 3 mMol/day  Chloride: Acetate Ratio 1:1  Standard Multivitamins w/Vitamin K  Trace Elements  Vitamin B12 100 mcg  Zinc 5mg  Fat Emulsion: 20%, 250 mL IV 3 times weekly   Check  CMP  labs tomorrow.  Pharmacist will continue to follow the patient's lab results, clinical status and blood glucose results and make adjustments as appropriate.    Thank you for the consult.  Renee Bennett RPH  10/18/2023 10:25 AM

## 2023-10-18 NOTE — PROGRESS NOTES
[facility]  [facility]   Follow up    Mojgan Jimenez   MRN: [id number]  : 1970   DOA: 10/9/2023   CC: ARF      Assessment and Plan:  53 year old female    ARF;  hemodynamic exacerbated by IV NSAIDS (lowish bp, vasodilatory drugs, mild intravascular depletion)==>ATN, now in recovery phase and nice drop in creatinine daily and diuresing.   Volume overload, now resolving, good response to diuretics /alb ordered by cards.   S/p gastric bypass; complicated by leak; s/p repair and wash out of bilious fluid   HoTN; infection/SIIRS; bp ok now off pressors.  ID; on abx for peritonitis  MS change; consistent with build up of narcotic and gabapentin metabolites due to ARF; now more awake on vent on sedation vacay.   Hyperlipid; on statin  Elevated LFTs; following;levels improved today  Resp failure; juancho-op; on vent; wean as able  Acidosis; resolved; off bicarb IVF  Nutrition; on TPN; no lipids due to being on propofol; TG are elevated so changed to precedex  Depressed EF on ECHO    Subjective  Seen earlier today.   Still on vent 35%  Sedated.   Febrile repeating cx.   On TPN  Good urine   Objective    Vital signs in last 24 hours  Temp:  [98.2  F (36.8  C)-101.2  F (38.4  C)] 100.8  F (38.2  C)  Pulse:  [] 76  Resp:  [21-55] 43  BP: (126-200)/() 145/67  FiO2 (%):  [35 %] 35 %  SpO2:  [90 %-100 %] 100 %      Intake/Output last 3 shifts  I/O last 3 completed shifts:  In: 2339.14 [I.V.:1027.47]  Out: 5377 [Urine:5075; Emesis/NG output:150; Drains:152]  Intake/Output this shift:  No intake/output data recorded.    Physical Exam  Intubated less facial edema  CV: RRR without murmur or rub  Lung: clear and equal; no extra sounds  Ab: distended. Lower abd with less pitting edema, binder in place and drains.   Ext:much less MARIA EUGENIA and well perfused  Skin; no rash    Pertinent Labs     Last Renal Panel:  Sodium   Date Value Ref Range Status   10/18/2023 143  135 - 145 mmol/L Final     Comment:     Reference intervals for this test were updated on 09/26/2023 to more accurately reflect our healthy population. There may be differences in the flagging of prior results with similar values performed with this method. Interpretation of those prior results can be made in the context of the updated reference intervals.    11/17/2020 141 133 - 144 mmol/L Final     Potassium   Date Value Ref Range Status   10/18/2023 3.2 (L) 3.4 - 5.3 mmol/L Final   05/17/2022 4.5 3.4 - 5.3 mmol/L Final   11/17/2020 4.1 3.4 - 5.3 mmol/L Final     Chloride   Date Value Ref Range Status   10/18/2023 105 98 - 107 mmol/L Final   05/17/2022 102 94 - 109 mmol/L Final   11/17/2020 109 94 - 109 mmol/L Final     Carbon Dioxide   Date Value Ref Range Status   11/17/2020 31 20 - 32 mmol/L Final     Carbon Dioxide (CO2)   Date Value Ref Range Status   10/18/2023 27 22 - 29 mmol/L Final   05/17/2022 32 20 - 32 mmol/L Final     Anion Gap   Date Value Ref Range Status   10/18/2023 11 7 - 15 mmol/L Final   05/17/2022 2 (L) 3 - 14 mmol/L Final   11/17/2020 1 (L) 3 - 14 mmol/L Final     Glucose   Date Value Ref Range Status   05/17/2022 110 (H) 70 - 99 mg/dL Final   11/17/2020 84 70 - 99 mg/dL Final     Comment:     Fasting specimen     GLUCOSE BY METER POCT   Date Value Ref Range Status   10/18/2023 163 (H) 70 - 99 mg/dL Final     Urea Nitrogen   Date Value Ref Range Status   10/18/2023 97.4 (H) 6.0 - 20.0 mg/dL Final   05/17/2022 16 7 - 30 mg/dL Final   11/17/2020 9 7 - 30 mg/dL Final     Creatinine   Date Value Ref Range Status   10/18/2023 3.02 (H) 0.51 - 0.95 mg/dL Final   11/17/2020 0.79 0.52 - 1.04 mg/dL Final     GFR Estimate   Date Value Ref Range Status   10/18/2023 18 (L) >60 mL/min/1.73m2 Final   11/17/2020 88 >60 mL/min/[1.73_m2] Final     Comment:     Non  GFR Calc  Starting 12/18/2018, serum creatinine based estimated GFR (eGFR) will be   calculated using the Chronic Kidney Disease  Epidemiology Collaboration   (CKD-EPI) equation.       Calcium   Date Value Ref Range Status   10/18/2023 8.7 8.6 - 10.0 mg/dL Final   11/17/2020 8.9 8.5 - 10.1 mg/dL Final     Phosphorus   Date Value Ref Range Status   10/18/2023 2.8 2.5 - 4.5 mg/dL Final     Albumin   Date Value Ref Range Status   10/16/2023 2.8 (L) 3.5 - 5.2 g/dL Final   05/17/2022 4.0 3.4 - 5.0 g/dL Final   12/03/2018 3.6 3.4 - 5.0 g/dL Final     Recent Labs   Lab 10/18/23  0439 10/16/23  1055 10/13/23  0426 10/12/23  0315   HGB 9.2* 9.9* 10.2* 12.8          I reviewed all lab results  Simi Shin MD  [facility]  [phone]

## 2023-10-18 NOTE — PROGRESS NOTES
RT PROGRESS NOTE    VENT DAY# Ventilation Day(s): 7    CURRENT SETTINGS:   Vent Mode: CMV/AC  (Continuous Mandatory Ventilation/ Assist Control)  FiO2 (%): 35 %  Resp Rate (Set): 18 breaths/min  Tidal Volume (Set, mL): 400 mL  PEEP (cm H2O): 8 cmH2O  Pressure Support (cm H2O): 5 cmH2O  Resp: 28      PATIENT PARAMETERS:  Ventilator - Patient   Patient Resp Rate : 27 breaths/min  Expiratory Vt (ml): 408  Minute Volume (ml): 10.1 L/min  Peak Inspiratory Pressure (cm H2O): 19 cmH2O  Mean Airway Pressure (cm H2O): 11 cmH2O  Plateau Pressure (cm H2O): 28 cmH2O  Dynamic Compliance (mL/cm H2O): 39 mL/cm H2O  Airway Resistance: 10  Auto/ Intrinsic PEEP (cm H2O): 19 cmH2O     SBT completed Yes , Total Time 6.6 hours, RSBI 56 - 83  Secretions: small - moderate, thick, blood tinged creamy  Breath Sounds: course    ETT Cuffed Oral 7.5 mm-Secured at (cm): 20 cm at teeth/gums  Emergency Ambu bag, mask and peep valve at bedside.     (Emergency trach supplies including current size, downsize, and obturator at bedside)    Respiratory Medications: Duoneb QID, albuterol PRN       ABG: @1357 pH 7.47; pCO2 35; pO2 74; HCO3 25, %O2 Sat 95, BE 1.5 on PS 6 PEEP 8, 35%      Skin checked and tube moved Q2, this responsibility is shared between RN and RT. Endotracheal tube cuff assessed and appropriate at the time of the assessment.     HARRISON CASTANEDA, RT

## 2023-10-18 NOTE — PLAN OF CARE
[facility] - ICU    RN Progress Note:            Pertinent Assessments:      Please refer to flowsheet rows for full assessment     VSS-pt BP does elevate when awake and anxious,able to talk her down and reorient and explain her hospital stay. Pt appears to be processing better situation-relaxing better and able to calm down. Low grade T max of 99.9-3 loose stools but tolerating turns and repositioning. Good UO-will continue to monitor           Key Events - This Shift:       See above                     Barriers to Discharge / Downgrade:     On vent and pressors         Point of Contact Update YES-OR-NO: Yes  If No, reason:   Name:  Phone Number:  Summary of Conversation:         Problem: Bariatric Surgery  Goal: Fluid and Electrolyte Balance  Outcome: Progressing     Problem: Bariatric Surgery  Goal: Effective Oxygenation and Ventilation  Intervention: Optimize Oxygenation and Ventilation  Recent Flowsheet Documentation  Taken 10/18/2023 0600 by Nisa Myers RN  Head of Bed (HOB) Positioning: HOB at 30 degrees  Taken 10/18/2023 0400 by Nisa Myers RN  Head of Bed (HOB) Positioning: HOB at 30 degrees  Taken 10/18/2023 0200 by Nisa Myers RN  Head of Bed (HOB) Positioning: HOB at 30 degrees  Taken 10/18/2023 0000 by Nisa Myers RN  Head of Bed (HOB) Positioning: HOB at 30 degrees  Taken 10/17/2023 2200 by Nisa Myers RN  Head of Bed (HOB) Positioning: HOB at 30 degrees  Taken 10/17/2023 2000 by Nisa Myers RN  Head of Bed (HOB) Positioning: HOB at 30 degrees     Problem: Delirium  Goal: Improved Behavioral Control  Intervention: Prevent and Manage Agitation  Recent Flowsheet Documentation  Taken 10/18/2023 0400 by Nisa Myers RN  Environment Familiarity/Consistency:   daily routine followed   familiar objects from home provided   personal clothing/items utilized  Taken 10/18/2023 0000 by Nisa Myers RN  Environment Familiarity/Consistency:   daily routine followed    familiar objects from home provided   personal clothing/items utilized  Taken 10/17/2023 2000 by Nisa Myers RN  Environment Familiarity/Consistency:   daily routine followed   familiar objects from home provided   personal clothing/items utilized   Goal Outcome Evaluation:

## 2023-10-18 NOTE — PROGRESS NOTES
"Care Management Follow Up    Length of Stay (days): 9    Expected Discharge Date: 10/24/2023     Concerns to be Addressed: Vent Day#7   Patient plan of care discussed at interdisciplinary rounds: Yes    Anticipated Discharge Disposition:  TBD     Anticipated Discharge Services:    Anticipated Discharge DME:      Patient/family educated on Medicare website which has current facility and service quality ratings:    Education Provided on the Discharge Plan:    Patient/Family in Agreement with the Plan:      Referrals Placed by CM/SW:  none at this time  Private pay costs discussed: Not applicable    Additional Information:  Social History per previous CM note:  \"From home with adult daughter and grandchild in an apartment. Independent at baseline and drives. Patient currently unemployed; saved up for procedures, plans to resume work once recovered. No community programs. Has a neb machine available for use due to Asthma. Goal to return home with family support. Family to transport if able. \"     Chart reviewed and plan of care discussed in ICU rounds. Pt presented on 10/9/23 for scheduled lap sleeve gastrectomy. Post procedure with complicated by abd pain. A rapid response was called due to encephalopathy, and ARF. Pt was admitted to ICU and intubated and started on pressors on 10/12. Pt was then taken to OR. S/p closure of two small bowel enterotomies, drain placement and wash out. Today in rounds plan is to titrate vent settings, ABD/pelvis CT, cont ABX, cont amiodarone gtt.    RNCM to follow for medical progression, recommendations, and final discharge plan.      Rosario Shea RN      "

## 2023-10-19 ENCOUNTER — APPOINTMENT (OUTPATIENT)
Dept: CT IMAGING | Facility: HOSPITAL | Age: 53
End: 2023-10-19
Attending: RADIOLOGY
Payer: COMMERCIAL

## 2023-10-19 PROBLEM — K65.1 INTRA-ABDOMINAL ABSCESS (H): Status: ACTIVE | Noted: 2023-10-19

## 2023-10-19 LAB
ALBUMIN SERPL BCG-MCNC: 3.1 G/DL (ref 3.5–5.2)
ALP SERPL-CCNC: 102 U/L (ref 35–104)
ALT SERPL W P-5'-P-CCNC: 40 U/L (ref 0–50)
ANION GAP SERPL CALCULATED.3IONS-SCNC: 16 MMOL/L (ref 7–15)
AST SERPL W P-5'-P-CCNC: 25 U/L (ref 0–45)
BASO+EOS+MONOS # BLD AUTO: ABNORMAL 10*3/UL
BASO+EOS+MONOS NFR BLD AUTO: ABNORMAL %
BASOPHILS # BLD AUTO: 0.1 10E3/UL (ref 0–0.2)
BASOPHILS NFR BLD AUTO: 0 %
BILIRUB SERPL-MCNC: 0.5 MG/DL
BUN SERPL-MCNC: 91.2 MG/DL (ref 6–20)
CALCIUM SERPL-MCNC: 9.2 MG/DL (ref 8.6–10)
CHLORIDE SERPL-SCNC: 107 MMOL/L (ref 98–107)
CREAT SERPL-MCNC: 2.84 MG/DL (ref 0.51–0.95)
DEPRECATED HCO3 PLAS-SCNC: 25 MMOL/L (ref 22–29)
EGFRCR SERPLBLD CKD-EPI 2021: 19 ML/MIN/1.73M2
EOSINOPHIL # BLD AUTO: 0.4 10E3/UL (ref 0–0.7)
EOSINOPHIL NFR BLD AUTO: 3 %
ERYTHROCYTE [DISTWIDTH] IN BLOOD BY AUTOMATED COUNT: 14.4 % (ref 10–15)
GLUCOSE BLDC GLUCOMTR-MCNC: 134 MG/DL (ref 70–99)
GLUCOSE BLDC GLUCOMTR-MCNC: 141 MG/DL (ref 70–99)
GLUCOSE BLDC GLUCOMTR-MCNC: 147 MG/DL (ref 70–99)
GLUCOSE BLDC GLUCOMTR-MCNC: 150 MG/DL (ref 70–99)
GLUCOSE BLDC GLUCOMTR-MCNC: 176 MG/DL (ref 70–99)
GLUCOSE BLDC GLUCOMTR-MCNC: 182 MG/DL (ref 70–99)
GLUCOSE SERPL-MCNC: 188 MG/DL (ref 70–99)
HCT VFR BLD AUTO: 28.9 % (ref 35–47)
HGB BLD-MCNC: 9.1 G/DL (ref 11.7–15.7)
IMM GRANULOCYTES # BLD: 0.4 10E3/UL
IMM GRANULOCYTES NFR BLD: 2 %
LYMPHOCYTES # BLD AUTO: 1.1 10E3/UL (ref 0.8–5.3)
LYMPHOCYTES NFR BLD AUTO: 6 %
MAGNESIUM SERPL-MCNC: 1.6 MG/DL (ref 1.7–2.3)
MCH RBC QN AUTO: 29.4 PG (ref 26.5–33)
MCHC RBC AUTO-ENTMCNC: 31.5 G/DL (ref 31.5–36.5)
MCV RBC AUTO: 94 FL (ref 78–100)
MONOCYTES # BLD AUTO: 1.2 10E3/UL (ref 0–1.3)
MONOCYTES NFR BLD AUTO: 7 %
NEUTROPHILS # BLD AUTO: 13.4 10E3/UL (ref 1.6–8.3)
NEUTROPHILS NFR BLD AUTO: 82 %
NRBC # BLD AUTO: 0 10E3/UL
NRBC BLD AUTO-RTO: 0 /100
PHOSPHATE SERPL-MCNC: 3.3 MG/DL (ref 2.5–4.5)
PLATELET # BLD AUTO: 337 10E3/UL (ref 150–450)
POTASSIUM SERPL-SCNC: 3.5 MMOL/L (ref 3.4–5.3)
PROT SERPL-MCNC: 6.9 G/DL (ref 6.4–8.3)
RBC # BLD AUTO: 3.09 10E6/UL (ref 3.8–5.2)
SODIUM SERPL-SCNC: 148 MMOL/L (ref 135–145)
WBC # BLD AUTO: 16.5 10E3/UL (ref 4–11)

## 2023-10-19 PROCEDURE — 250N000009 HC RX 250: Performed by: INTERNAL MEDICINE

## 2023-10-19 PROCEDURE — 250N000011 HC RX IP 250 OP 636: Mod: JZ | Performed by: INTERNAL MEDICINE

## 2023-10-19 PROCEDURE — 250N000013 HC RX MED GY IP 250 OP 250 PS 637: Performed by: INTERNAL MEDICINE

## 2023-10-19 PROCEDURE — 250N000011 HC RX IP 250 OP 636: Performed by: NURSE PRACTITIONER

## 2023-10-19 PROCEDURE — 258N000003 HC RX IP 258 OP 636: Performed by: NURSE PRACTITIONER

## 2023-10-19 PROCEDURE — 999N000157 HC STATISTIC RCP TIME EA 10 MIN

## 2023-10-19 PROCEDURE — 99291 CRITICAL CARE FIRST HOUR: CPT | Performed by: INTERNAL MEDICINE

## 2023-10-19 PROCEDURE — 999N000287 HC ICU ADULT ROUNDING, EACH 10 MINS

## 2023-10-19 PROCEDURE — 80053 COMPREHEN METABOLIC PANEL: CPT | Performed by: INTERNAL MEDICINE

## 2023-10-19 PROCEDURE — 49406 IMAGE CATH FLUID PERI/RETRO: CPT

## 2023-10-19 PROCEDURE — P9047 ALBUMIN (HUMAN), 25%, 50ML: HCPCS | Performed by: INTERNAL MEDICINE

## 2023-10-19 PROCEDURE — 999N000009 HC STATISTIC AIRWAY CARE

## 2023-10-19 PROCEDURE — 0W9G30Z DRAINAGE OF PERITONEAL CAVITY WITH DRAINAGE DEVICE, PERCUTANEOUS APPROACH: ICD-10-PCS | Performed by: RADIOLOGY

## 2023-10-19 PROCEDURE — 250N000009 HC RX 250: Performed by: SURGERY

## 2023-10-19 PROCEDURE — 200N000001 HC R&B ICU

## 2023-10-19 PROCEDURE — C1729 CATH, DRAINAGE: HCPCS

## 2023-10-19 PROCEDURE — 94003 VENT MGMT INPAT SUBQ DAY: CPT

## 2023-10-19 PROCEDURE — 87205 SMEAR GRAM STAIN: CPT | Performed by: RADIOLOGY

## 2023-10-19 PROCEDURE — 94640 AIRWAY INHALATION TREATMENT: CPT | Mod: 76

## 2023-10-19 PROCEDURE — 99232 SBSQ HOSP IP/OBS MODERATE 35: CPT | Performed by: INTERNAL MEDICINE

## 2023-10-19 PROCEDURE — 87077 CULTURE AEROBIC IDENTIFY: CPT | Performed by: RADIOLOGY

## 2023-10-19 PROCEDURE — 83735 ASSAY OF MAGNESIUM: CPT | Performed by: SURGERY

## 2023-10-19 PROCEDURE — 250N000013 HC RX MED GY IP 250 OP 250 PS 637: Performed by: NURSE PRACTITIONER

## 2023-10-19 PROCEDURE — 250N000011 HC RX IP 250 OP 636: Mod: JZ | Performed by: RADIOLOGY

## 2023-10-19 PROCEDURE — 250N000011 HC RX IP 250 OP 636: Mod: JZ | Performed by: SURGERY

## 2023-10-19 PROCEDURE — 999N000253 HC STATISTIC WEANING TRIALS

## 2023-10-19 PROCEDURE — 250N000011 HC RX IP 250 OP 636: Mod: JZ | Performed by: NURSE PRACTITIONER

## 2023-10-19 PROCEDURE — 84100 ASSAY OF PHOSPHORUS: CPT | Performed by: SURGERY

## 2023-10-19 PROCEDURE — 999N000254 HC STATISTIC VENTILATOR TRANSFER

## 2023-10-19 PROCEDURE — C9113 INJ PANTOPRAZOLE SODIUM, VIA: HCPCS | Mod: JZ | Performed by: NURSE PRACTITIONER

## 2023-10-19 PROCEDURE — 85004 AUTOMATED DIFF WBC COUNT: CPT | Performed by: INTERNAL MEDICINE

## 2023-10-19 RX ORDER — MAGNESIUM SULFATE HEPTAHYDRATE 40 MG/ML
2 INJECTION, SOLUTION INTRAVENOUS ONCE
Status: COMPLETED | OUTPATIENT
Start: 2023-10-19 | End: 2023-10-19

## 2023-10-19 RX ORDER — NALOXONE HYDROCHLORIDE 0.4 MG/ML
0.2 INJECTION, SOLUTION INTRAMUSCULAR; INTRAVENOUS; SUBCUTANEOUS
Status: DISCONTINUED | OUTPATIENT
Start: 2023-10-19 | End: 2023-10-19

## 2023-10-19 RX ORDER — NALOXONE HYDROCHLORIDE 0.4 MG/ML
0.4 INJECTION, SOLUTION INTRAMUSCULAR; INTRAVENOUS; SUBCUTANEOUS
Status: DISCONTINUED | OUTPATIENT
Start: 2023-10-19 | End: 2023-10-19

## 2023-10-19 RX ORDER — ALBUMIN (HUMAN) 12.5 G/50ML
50 SOLUTION INTRAVENOUS ONCE
Qty: 200 ML | Refills: 0 | Status: COMPLETED | OUTPATIENT
Start: 2023-10-19 | End: 2023-10-19

## 2023-10-19 RX ORDER — FLUMAZENIL 0.1 MG/ML
0.2 INJECTION, SOLUTION INTRAVENOUS
Status: DISCONTINUED | OUTPATIENT
Start: 2023-10-19 | End: 2023-10-19

## 2023-10-19 RX ORDER — FENTANYL CITRATE 50 UG/ML
25-50 INJECTION, SOLUTION INTRAMUSCULAR; INTRAVENOUS EVERY 5 MIN PRN
Status: DISCONTINUED | OUTPATIENT
Start: 2023-10-19 | End: 2023-10-19

## 2023-10-19 RX ORDER — BUMETANIDE 0.25 MG/ML
2 INJECTION INTRAMUSCULAR; INTRAVENOUS EVERY 12 HOURS
Status: DISCONTINUED | OUTPATIENT
Start: 2023-10-19 | End: 2023-10-20

## 2023-10-19 RX ADMIN — ACETAMINOPHEN 650 MG: 325 TABLET ORAL at 08:12

## 2023-10-19 RX ADMIN — MIDAZOLAM HYDROCHLORIDE 1 MG: 1 INJECTION, SOLUTION INTRAMUSCULAR; INTRAVENOUS at 13:30

## 2023-10-19 RX ADMIN — Medication 50 MCG: at 01:57

## 2023-10-19 RX ADMIN — ACETAMINOPHEN 650 MG: 325 TABLET ORAL at 03:18

## 2023-10-19 RX ADMIN — QUETIAPINE FUMARATE 50 MG: 25 TABLET ORAL at 20:22

## 2023-10-19 RX ADMIN — DEXMEDETOMIDINE HYDROCHLORIDE 1.2 MCG/KG/HR: 4 INJECTION, SOLUTION INTRAVENOUS at 23:55

## 2023-10-19 RX ADMIN — IPRATROPIUM BROMIDE AND ALBUTEROL SULFATE 3 ML: .5; 3 SOLUTION RESPIRATORY (INHALATION) at 19:54

## 2023-10-19 RX ADMIN — DEXMEDETOMIDINE HYDROCHLORIDE 1.2 MCG/KG/HR: 4 INJECTION, SOLUTION INTRAVENOUS at 08:32

## 2023-10-19 RX ADMIN — INSULIN ASPART 2 UNITS: 100 INJECTION, SOLUTION INTRAVENOUS; SUBCUTANEOUS at 00:13

## 2023-10-19 RX ADMIN — Medication 50 MCG: at 01:58

## 2023-10-19 RX ADMIN — FENTANYL CITRATE 50 MCG: 50 INJECTION INTRAMUSCULAR; INTRAVENOUS at 13:03

## 2023-10-19 RX ADMIN — IPRATROPIUM BROMIDE AND ALBUTEROL SULFATE 3 ML: .5; 3 SOLUTION RESPIRATORY (INHALATION) at 15:27

## 2023-10-19 RX ADMIN — ACETAMINOPHEN 650 MG: 325 TABLET ORAL at 12:17

## 2023-10-19 RX ADMIN — PIPERACILLIN AND TAZOBACTAM 3.38 G: 3; .375 INJECTION, POWDER, LYOPHILIZED, FOR SOLUTION INTRAVENOUS at 16:13

## 2023-10-19 RX ADMIN — DEXMEDETOMIDINE HYDROCHLORIDE 1.2 MCG/KG/HR: 4 INJECTION, SOLUTION INTRAVENOUS at 00:43

## 2023-10-19 RX ADMIN — DEXMEDETOMIDINE HYDROCHLORIDE 1.2 MCG/KG/HR: 4 INJECTION, SOLUTION INTRAVENOUS at 11:01

## 2023-10-19 RX ADMIN — MIDAZOLAM 2 MG: 1 INJECTION INTRAMUSCULAR; INTRAVENOUS at 17:51

## 2023-10-19 RX ADMIN — MIDAZOLAM 2 MG: 1 INJECTION INTRAMUSCULAR; INTRAVENOUS at 22:39

## 2023-10-19 RX ADMIN — MAGNESIUM SULFATE HEPTAHYDRATE: 500 INJECTION, SOLUTION INTRAMUSCULAR; INTRAVENOUS at 20:22

## 2023-10-19 RX ADMIN — FENTANYL CITRATE 50 MCG: 50 INJECTION INTRAMUSCULAR; INTRAVENOUS at 13:33

## 2023-10-19 RX ADMIN — PANTOPRAZOLE SODIUM 40 MG: 40 INJECTION, POWDER, FOR SOLUTION INTRAVENOUS at 08:33

## 2023-10-19 RX ADMIN — INSULIN ASPART 2 UNITS: 100 INJECTION, SOLUTION INTRAVENOUS; SUBCUTANEOUS at 23:57

## 2023-10-19 RX ADMIN — MICAFUNGIN SODIUM 100 MG: 50 INJECTION, POWDER, LYOPHILIZED, FOR SOLUTION INTRAVENOUS at 14:01

## 2023-10-19 RX ADMIN — PIPERACILLIN AND TAZOBACTAM 3.38 G: 3; .375 INJECTION, POWDER, LYOPHILIZED, FOR SOLUTION INTRAVENOUS at 23:05

## 2023-10-19 RX ADMIN — IPRATROPIUM BROMIDE AND ALBUTEROL SULFATE 3 ML: .5; 3 SOLUTION RESPIRATORY (INHALATION) at 07:58

## 2023-10-19 RX ADMIN — Medication 150 MCG/HR: at 13:57

## 2023-10-19 RX ADMIN — Medication 50 MCG: at 20:15

## 2023-10-19 RX ADMIN — FENTANYL CITRATE 50 MCG: 50 INJECTION INTRAMUSCULAR; INTRAVENOUS at 13:22

## 2023-10-19 RX ADMIN — HEPARIN SODIUM 5000 UNITS: 5000 INJECTION, SOLUTION INTRAVENOUS; SUBCUTANEOUS at 14:00

## 2023-10-19 RX ADMIN — MIDAZOLAM HYDROCHLORIDE 2 MG: 1 INJECTION, SOLUTION INTRAMUSCULAR; INTRAVENOUS at 12:45

## 2023-10-19 RX ADMIN — ALBUMIN HUMAN 50 G: 0.25 SOLUTION INTRAVENOUS at 08:33

## 2023-10-19 RX ADMIN — MIDAZOLAM 2 MG: 1 INJECTION INTRAMUSCULAR; INTRAVENOUS at 20:07

## 2023-10-19 RX ADMIN — MAGNESIUM SULFATE HEPTAHYDRATE 2 G: 40 INJECTION, SOLUTION INTRAVENOUS at 11:42

## 2023-10-19 RX ADMIN — IPRATROPIUM BROMIDE AND ALBUTEROL SULFATE 3 ML: .5; 3 SOLUTION RESPIRATORY (INHALATION) at 11:14

## 2023-10-19 RX ADMIN — BUMETANIDE 3 MG: 0.25 INJECTION INTRAMUSCULAR; INTRAVENOUS at 06:15

## 2023-10-19 RX ADMIN — DEXMEDETOMIDINE HYDROCHLORIDE 1.2 MCG/KG/HR: 4 INJECTION, SOLUTION INTRAVENOUS at 21:16

## 2023-10-19 RX ADMIN — MIDAZOLAM 2 MG: 1 INJECTION INTRAMUSCULAR; INTRAVENOUS at 02:39

## 2023-10-19 RX ADMIN — DEXMEDETOMIDINE HYDROCHLORIDE 1.2 MCG/KG/HR: 4 INJECTION, SOLUTION INTRAVENOUS at 16:03

## 2023-10-19 RX ADMIN — DEXMEDETOMIDINE HYDROCHLORIDE 1.2 MCG/KG/HR: 4 INJECTION, SOLUTION INTRAVENOUS at 03:13

## 2023-10-19 RX ADMIN — INSULIN ASPART 1 UNITS: 100 INJECTION, SOLUTION INTRAVENOUS; SUBCUTANEOUS at 20:21

## 2023-10-19 RX ADMIN — CHLORHEXIDINE GLUCONATE 15 ML: 1.2 RINSE ORAL at 20:21

## 2023-10-19 RX ADMIN — MIDAZOLAM 2 MG: 1 INJECTION INTRAMUSCULAR; INTRAVENOUS at 11:27

## 2023-10-19 RX ADMIN — HYDROMORPHONE HYDROCHLORIDE 0.5 MG: 1 INJECTION, SOLUTION INTRAMUSCULAR; INTRAVENOUS; SUBCUTANEOUS at 07:00

## 2023-10-19 RX ADMIN — HEPARIN SODIUM 5000 UNITS: 5000 INJECTION, SOLUTION INTRAVENOUS; SUBCUTANEOUS at 06:15

## 2023-10-19 RX ADMIN — DEXMEDETOMIDINE HYDROCHLORIDE 1.2 MCG/KG/HR: 4 INJECTION, SOLUTION INTRAVENOUS at 13:38

## 2023-10-19 RX ADMIN — FENTANYL CITRATE 100 MCG: 50 INJECTION INTRAMUSCULAR; INTRAVENOUS at 12:52

## 2023-10-19 RX ADMIN — MIDAZOLAM HYDROCHLORIDE 1 MG: 1 INJECTION, SOLUTION INTRAMUSCULAR; INTRAVENOUS at 13:08

## 2023-10-19 RX ADMIN — DEXMEDETOMIDINE HYDROCHLORIDE 1.2 MCG/KG/HR: 4 INJECTION, SOLUTION INTRAVENOUS at 05:35

## 2023-10-19 RX ADMIN — HEPARIN SODIUM 5000 UNITS: 5000 INJECTION, SOLUTION INTRAVENOUS; SUBCUTANEOUS at 22:39

## 2023-10-19 RX ADMIN — CHLORHEXIDINE GLUCONATE 15 ML: 1.2 RINSE ORAL at 08:33

## 2023-10-19 RX ADMIN — QUETIAPINE FUMARATE 50 MG: 25 TABLET ORAL at 08:13

## 2023-10-19 RX ADMIN — BUMETANIDE 2 MG: 0.25 INJECTION INTRAMUSCULAR; INTRAVENOUS at 17:54

## 2023-10-19 RX ADMIN — MIDAZOLAM 2 MG: 1 INJECTION INTRAMUSCULAR; INTRAVENOUS at 06:06

## 2023-10-19 RX ADMIN — INSULIN ASPART 1 UNITS: 100 INJECTION, SOLUTION INTRAVENOUS; SUBCUTANEOUS at 04:13

## 2023-10-19 RX ADMIN — DEXMEDETOMIDINE HYDROCHLORIDE 1.2 MCG/KG/HR: 4 INJECTION, SOLUTION INTRAVENOUS at 18:49

## 2023-10-19 RX ADMIN — ACETAMINOPHEN 650 MG: 325 TABLET ORAL at 20:21

## 2023-10-19 ASSESSMENT — ACTIVITIES OF DAILY LIVING (ADL)
ADLS_ACUITY_SCORE: 35
ADLS_ACUITY_SCORE: 27
ADLS_ACUITY_SCORE: 35
ADLS_ACUITY_SCORE: 35
ADLS_ACUITY_SCORE: 27
ADLS_ACUITY_SCORE: 35

## 2023-10-19 NOTE — PROGRESS NOTES
"Care Management Follow Up    Length of Stay (days): 10    Expected Discharge Date: 10/24/2023     Concerns to be Addressed: discharge planning   Patient plan of care discussed at interdisciplinary rounds: Yes    Anticipated Discharge Disposition:  TBD     Anticipated Discharge Services:    Education Provided on the Discharge Plan:  Per Care Team   Patient/Family in Agreement with the Plan:  Yes    Referrals Placed by CM/SW:    Private pay costs discussed: Not applicable    Additional Information:  Chart reviewed.    Cm updates:  Vent Day #8  Per rounds pt having increased fevers, cooling blanket on, WBCs trending up. Pt currently on zosyn and microfungin. On TPN, attempting to wean but agitation has been present.        Social Hx:  \"From home with adult daughter and grandchild in an apartment. Independent at baseline and drives. Patient currently unemployed; saved up for procedures, plans to resume work once recovered. No community programs. Has a neb machine available for use due to Asthma. Goal to return home with family support. Family to transport if able. \"        Cm will continue to follow plan of care, review recommendations, and assist with any discharge needs anticipated.     Whit Malik RN      "

## 2023-10-19 NOTE — PROGRESS NOTES
ASSESSMENT:  1. Perimenopausal symptoms        Mojgan Jimenez is a 53 year old female who is s/p laparoscopic sleeve gastrectomy with KO on 10/9 post-op course complicated by small enterotomy x2 s/p laparoscopy converted to laparotomy with washout and enterotomy closure x2 on 10/12. She continues have fevers with increasing leukocytosis. IR consulted to address small perihepatic fluid collection. Surgical drains will likely be removed this afternoon after she gets back from IR. Patient has return of bowel function, so we will slowly start tube feeds and wean TPN.     PLAN:  Start tube feeds via NG and taper TPN per RD protocol  Appreciate IR drainage of fluid collection  Appreciate ICU team management if complex patient  Continue to monitor VS and WBC  Bariatric surgery will follow closely with you    SUBJECTIVE:   She is intubated and responsive to voice and tactile stimulation. She does not follow commands for me      Patient Vitals for the past 24 hrs:   BP Temp Temp src Pulse Resp SpO2   10/19/23 1200 (!) 165/77 (!) 101.4  F (38.6  C) -- 90 28 97 %   10/19/23 1118 -- -- -- 97 -- 99 %   10/19/23 1010 (!) 152/75 -- -- 79 (!) 49 95 %   10/19/23 1000 -- 100.1  F (37.8  C) Esophageal 80 (!) 50 95 %   10/19/23 0900 -- -- -- 92 (!) 54 97 %   10/19/23 0802 (!) 163/82 -- -- 84 -- 97 %   10/19/23 0800 -- (!) 102.9  F (39.4  C) Axillary -- 28 97 %   10/19/23 0700 -- -- -- 107 (!) 51 97 %   10/19/23 0600 (!) 186/102 -- -- 95 (!) 39 97 %   10/19/23 0500 124/63 -- -- 70 22 95 %   10/19/23 0444 -- -- -- 72 -- 95 %   10/19/23 0400 124/64 (!) 100.8  F (38.2  C) Esophageal 74 22 95 %   10/19/23 0318 -- (!) 102.2  F (39  C) -- -- -- --   10/19/23 0300 (!) 155/73 (!) 102.2  F (39  C) Esophageal 83 25 96 %   10/19/23 0255 -- 99.6  F (37.6  C) Oral -- -- --   10/19/23 0200 (!) 165/94 (!) 101.1  F (38.4  C) -- 99 (!) 34 99 %   10/19/23 0100 (!) 160/75 (!) 101.5  F (38.6  C) -- 79 30 100 %   10/19/23 0049 -- -- -- 78 -- 97 %    10/19/23 0000 134/65 (!) 101.5  F (38.6  C) Esophageal 80 (!) 32 97 %   10/18/23 2300 132/61 (!) 101.5  F (38.6  C) -- 82 (!) 46 96 %   10/18/23 2200 (!) 152/70 (!) 102.4  F (39.1  C) Esophageal 91 28 97 %   10/18/23 2137 -- (!) 102.7  F (39.3  C) -- -- -- --   10/18/23 2100 (!) 166/82 (!) 101.5  F (38.6  C) -- 92 (!) 45 96 %   10/18/23 2028 -- (!) 101.8  F (38.8  C) -- 79 (!) 38 97 %   10/18/23 2000 (!) 165/80 (!) 101.3  F (38.5  C) Esophageal 79 (!) 38 96 %   10/18/23 1900 (!) 154/74 (!) 101.3  F (38.5  C) -- 78 30 95 %   10/18/23 1800 (!) 150/70 (!) 100.9  F (38.3  C) Esophageal 80 25 95 %   10/18/23 1729 -- (!) 101.1  F (38.4  C) -- 84 28 95 %   10/18/23 1700 (!) 153/72 (!) 100.6  F (38.1  C) Esophageal 83 (!) 46 95 %   10/18/23 1651 -- -- -- 84 -- 95 %   10/18/23 1600 (!) 153/72 100.4  F (38  C) Esophageal 88 (!) 43 95 %   10/18/23 1530 -- -- -- 76 -- 100 %   10/18/23 1525 -- -- -- 77 (!) 43 96 %   10/18/23 1404 (!) 145/67 -- -- 83 (!) 55 100 %   10/18/23 1400 -- (!) 100.8  F (38.2  C) Axillary 80 (!) 33 95 %   10/18/23 1300 (!) 147/70 -- -- 83 (!) 45 95 %         PHYSICAL EXAM:  GEN: No acute distress, comfortable  LUNGS: CTA bilaterally  CV:RRR  ABD:soft, not tender, incisions CDI  Drains: serous x3   Output by Drain (mL) 10/17/23 0700 - 10/17/23 1459 10/17/23 1500 - 10/17/23 2259 10/17/23 2300 - 10/18/23 0659 10/18/23 0700 - 10/18/23 1459 10/18/23 1500 - 10/18/23 2259 10/18/23 2300 - 10/19/23 0659 10/19/23 0700 - 10/19/23 1227   Closed/Suction Drain 1 Right RUQ Bulb 19 [place] 30 25 20 45 45 15 10   Closed/Suction Drain 2 Left LUQ Bulb 19 [place] 25 20 15 20 30 10 5   Closed/Suction Drain 3 Left LLQ Bulb 19 [place] 2 5 2 0 13 2 5      EXT:No cyanosis, edema or obvious abnormalities    10/18 0700 - 10/19 0659  In: 2743.03 [I.V.:1250.54]  Out: 5120 [Urine:4740; Drains:180]    No results displayed because visit has over 200 results.             Felecia Henderson, JUSTINE CNP

## 2023-10-19 NOTE — PROGRESS NOTES
PROVIDER RESTRAINT FOR NON-VIOLENT BEHAVIOR FACE TO FACE EVALUATION    Patient's Immediate Situation:  Patient demonstrated the following behaviors: pulling out lines    Patient's Reaction to the intervention:  Does patient understand the reason for restraint/seclusion? unable to express    Medical Condition:  Is there any evidence of compromise of Skin integrity, Respiratory, Cardiovascular, Musculoskeletal, Hydration?   Yes    Behavioral Condition:  In consultation with the RN, is there a need to continue this restraint or seclusion? Yes    See Restraint Flowsheet for complete restraint documentation and assessment.    James Borrero MD  Critical Care Medicine  10/19/23

## 2023-10-19 NOTE — CONSULTS
Chart and imaging reviewed. Will attempt CT guided juancho-hepatic collection aspiration or drain placement. The collection is small and thin / elongated; secondary to this, will bring the patient to CT, position the patient, and assess the feasibility of drain placement versus only needle guided aspiration of the fluid. Thank you.

## 2023-10-19 NOTE — PROGRESS NOTES
End of shift summary:  Pt was very anxious and at times, very agitated.  Precedex was maxed at 1.2, Fentanyl infusion at 150mcg/hr.  She was given three 50 mcg bumps of fentanyl PRN for pain and agitation, Versed 2 mg x4 doses PRN for extreme agitation/biting ETT/attempting to dislodge ETT, NGT.  She was also given dilaudid for extreme agitation this morning.  Versed did not cause sedation on the last 3 doses - pt awake and very agitated with RR 42-47 and alarms on vent constantly sounding.  Pt was coached multiple times in relaxation techniques, working with the ventilator instead of biting on tube, etc.      Patient's Tmax this shift was 39.4.  Tylenol given twice, ice packs placed to axilla and groin, and finally pt placed on cooling blanket.  Temp was as low as 100F when pt was calm around 0500.  Of note, when pt is extremely agitated, her temp will rise by 0.5 degrees within minutes.

## 2023-10-19 NOTE — PHARMACY-CONSULT NOTE
"Pharmacy Note: Parenteral Nutrition (PN) Management    Pharmacist consulted to dose PN for Mojgan Jimenez, a 53 year old female by Dr. Cruz.    Subjective:    The patient is a new PN start.    The patient was started on PN in the hospital on 10/14/23.    Indication for PN therapy:  peritonitis    Inadequate nutrition anticipated for > 7 days.     Enteral nutrition contraindicated due to: peritonitis.    Pertinent diseases and other special considerations  10/9/23 s/p sleeve gastrectomy    Social History     Tobacco Use    Smoking status: Never    Smokeless tobacco: Never   Vaping Use    Vaping Use: Never used   Substance Use Topics    Alcohol use: Not Currently     Comment: Sober x 8 months    Drug use: Not Currently     Types: Methamphetamines, \"Crack\" cocaine     Comment: in remision      Objective:    Ht Readings from Last 1 Encounters:   10/09/23 1.6 m (5' 3\")     Wt Readings from Last 1 Encounters:   10/18/23 137.6 kg (303 lb 5.7 oz)       Body mass index is 53.74 kg/m .    Patient Vitals for the past 96 hrs:   Weight   10/18/23 0600 137.6 kg (303 lb 5.7 oz)   10/17/23 0600 138 kg (304 lb 3.2 oz)   10/16/23 0400 140.6 kg (309 lb 14.4 oz)       Labs:  Last 3 days:  Recent Labs     10/16/23  1055 10/17/23  0419 10/18/23  0439 10/19/23  0553   NA  --  141 143 148*   POTASSIUM  --  3.1* 3.2* 3.5   CHLORIDE  --  101 105 107   CO2  --  24 27 25   BUN  --  99.7* 97.4* 91.2*   CR  --  3.55* 3.02* 2.84*   PALAK  --  9.2 8.7 9.2   MAG  --  2.1 1.8 1.6*   PHOS  --  3.9 2.8 3.3   PROTTOTAL  --   --   --  6.9   ALBUMIN  --   --   --  3.1*   TRIG  --   --  278*  --    HGB 9.9*  --  9.2* 9.1*   HCT 30.4*  --  29.7* 28.9*     --  298  298 337   BILITOTAL  --   --   --  0.5   AST  --   --   --  25   ALT  --   --   --  40   ALKPHOS  --   --   --  102       Glucose (past 48 hours):   Recent Labs     10/18/23  0439 10/18/23  0754 10/18/23  1139 10/18/23  1554 10/18/23  1835 10/18/23  2018 10/19/23  0006 10/19/23  0411 " 10/19/23  0553 10/19/23  0747   * 120* 168* 163* 166* 145* 176* 147* 188* 134*       Intake/Output (last 24 hours): I/O last 3 completed shifts:  In: 2743.03 [I.V.:1250.54; NG/GT:225]  Out: 5120 [Urine:4740; Emesis/NG output:200; Drains:180]    Estimated CrCl: Estimated Creatinine Clearance: 31.3 mL/min (A) (based on SCr of 2.84 mg/dL (H)).    Assessment:    Continue patient on PN therapy as a continuous central therapy.     Given the patient's current condition/oral intake, PN is still indicated.    Lab results reviewed: Na high, per dietician recs, will slightly increase TPN rate and adjust macronutrients to anticipate slow start to TF in near future, bumetanide dose decreasing.  Mag low, replace with 2 gram bump today & increase in TPN tonight    Plan:  Rate of PN: 60 mL/hr   Formula:   Amino Acids 70 grams  Dextrose 193 grams  Sodium 40 mEq/day  Potassium 80 mEq/day  Calcium 5 mEq/day  Magnesium 6 mEq/day  Phosphorus 3 mMol/day  Chloride: Acetate Ratio 1:1  Standard Multivitamins w/Vitamin K  Trace Elements  Vitamin b12 100 mcg  Zinc 5mg  Fat Emulsion: 20%, 250 mL IV 3 times weekly on MW  Check BMP and Mag, Phos  labs tomorrow.  Pharmacist will continue to follow the patient's lab results, clinical status and blood glucose results and make adjustments as appropriate.    Thank you for the consult.  Renee Bennett RPH  10/19/2023 10:46 AM

## 2023-10-19 NOTE — PROGRESS NOTES
Nutrition Therapy  Parenteral Nutrition follow-up       Dietitian to Pharmacy    With new TPN bag this evening:    Rate of TPN: 60 ml/hr  Grams Dextrose: 193  Grams Amino Acid:70    Lipids: 50 g 3 days/wk.        Pt remains intubated.  Patient with temp 102.9 degrees this morning.  Diuresing.  Surgery ok with meds per NG.  To have drain placed today for juancho-hepatic fluid.  RN reports patient chews ET tube when agitated.     S/p gastrectomy sleeve-duodenal switch on 10/9.  S/p drain placed, closure of interotomies and NG placed on 10/12.   Propofol stopped 10/15 due to elevated Triglyceride    Current Nutrition Intake:  Diet: NPO since 10/11/23  Custom TPN per central line:  73 gm AA, 190 gm Dextrose, 50 gm lipid 3 times per week.       TPN provides 73 g AA, 190 g DEX at 50 ml/hr continuous.  This  provides 1152 kcal on daily average, 1200 ml fluid, 73 g protein, 190 g carb, 21.4 g lipid daily.  GIR= 0.93 mg CHO/kg/min     TPN start 10/14/23.    Weight Trends  Admission wt: 130.5 kg (287 lb 12.8 oz) 10/9/23    Current wt: 137 kg (303 lb 5.7 oz) 10/18/23   -3.28L 10/18/23.  +4.597 L since admit    GI:  Last BM x1 yesterday, x 1 so far today.  + bowel sounds per chart.  NG placed 10/12/23.  Drains 190 ml  10/18/23  Surgical incisions  I/Os:  2682/5965  Outputs include 5675 ml urine out with diuresis.  10/18/23.    Labs:   Sodium 148 (H) - increased with diuresis and fever.   Magnesium 1.6 (L)  Creatinine 2.84 (H) though improved  Glucose range 134-176  Labs reviewed by dietitian    Medications:   Drips-precedex, fentanyl  Bumex, heparin, mycamine, protonix, zosyn, seroquel  Senokot-miralax-colace.  Bowel meds held.  Reviewed by dietitian     Malnutrition Diagnosis: Patient does not meet two of the established criteria necessary for diagnosing malnutrition     Nutrition Diagnosis  Inadequate oral intake related to intubated and sedated as evidenced by NPO and need for TPN     Evaluation: No change    Goals   Meet  nutrition needs with TPN- met  New-electrolytes WNL-Sodium elevated, magnesium low.   Tolerate TPN - progressing  Transition to po diet after extubation and when appropriate.-unable, pt remains intubated.    INTERVENTIONS    TPN:  in anticipation of trickle TF in near future will adjust TPN with this evening's ba g AA, 193 g DEX at 60 ml/hr.  This to provide avg 1150 kcals, 1440 ml fluid, 70 g protein, 193 g carb, avg 21.4 g lipid daily.  This meets estimated nutrition needs.  Fluid increased (240 ml/day) with hypernatremia noted.  Consider also decreasing sodium in TPN.    Monitor hydration/TF start.    When off of TPN will need bariatric vitamins restarted.        Monitoring/Evaluation  Progress toward goals will be monitored and evaluated per protocol.

## 2023-10-19 NOTE — PROGRESS NOTES
"  Interventional Radiology - Progress Note  Inpatient - [facility]  10/20/2023     S:  EMR reviewed. Patient alert, intubated/vented. Discussed plan of care for IR drain and follow up. No new issues overnight.     Culture:  NGTD  Antibiotic: micafungin, zosyn  Flushing: 10cc each shift    O:  BP (!) 142/68   Pulse 92   Temp (!) 102.8  F (39.3  C) (Oral)   Resp (!) 33   Ht 1.6 m (5' 3\")   Wt 129.8 kg (286 lb 2.5 oz)   LMP 09/09/2023 (Exact Date)   SpO2 95%   BMI 50.69 kg/m    General:  Stable.  In no acute distress.    Neuro:  A&O x 3. Moves all extremities equally.  Resp:  Lungs clear to auscultation bilaterally.  Cardio:  S1S2 and reg, without murmur, clicks or rubs  Abdomen:  Soft, non-distended, non-tender, positive bowel sounds.   Skin:  normal   MSK:  No gross motor weakness. Sensation intact.      IMAGING:  EXAM:  1. PERCUTANEOUS DRAIN PLACEMENT PERIHEPATIC COLLECTION  2. CT GUIDANCE  3. CONSCIOUS SEDATION     LOCATION: [facility]  DATE: 10/19/2023     INDICATION: Perihepatic colletion.   TECHNIQUE: Dose reduction techniques were used.     PROCEDURE: Informed consent obtained. Site marked. Prior images reviewed. Required items made available. Patient identity confirmed verbally and with arm band. Patient reevaluated immediately before administering sedation. Universal protocol was   followed. Time out performed. The site was prepped and draped in sterile fashion. 10 mL of 1% lidocaine was infused into the local soft tissues. Using standard technique and under direct CT guidance, a 10 [place] drainage catheter was inserted into the   fluid collection.       SPECIMEN: 10 mL of thick brownish-red fluid was aspirated and sent to lab for cultures and Gram stain.     BLOOD LOSS: Minimal.     The patient tolerated the procedure well. No immediate complications.     SEDATION: Versed 4 mg. Fentanyl 250 mcg. The procedure was performed with administration " intravenous conscious sedation with appropriate preoperative, intraoperative, and postoperative evaluation.     40 minutes of supervised face to face conscious sedation time was provided by a radiology nurse under my direct supervision.                                                                      IMPRESSION:  1.  Successful CT-guided drain placement into perihepatic collection.     Reference CPT Codes: 46537, 93897, 13591, 03437    LABS:  Recent Labs   Lab 10/20/23  0509 10/19/23  0553 10/18/23  0439 10/17/23  0419 10/16/23  1055 10/16/23  0436 10/15/23  0554 10/15/23  0431   WBC 17.4* 16.5* 15.3*  --  12.7*  --   --   --    HGB 9.6* 9.1* 9.2*  --  9.9*  --   --   --     337 298  298  --  194  --   --  179   INR  --   --   --   --   --  1.21* 1.19*  --    CR 2.87* 2.84* 3.02* 3.55*  --  4.38*  --  6.07*   BILITOTAL 0.6 0.5  --   --   --  0.4  --  0.3   ALKPHOS 90 102  --   --   --  82  --  96   AST 27 25  --   --   --  71*  --  171*   ALT 34 40  --   --   --  114*  --  173*       R IR Perihepatic drain  Drain Outputs (in mL):  10/19 9   10/20 14                   A:  53 year old yo female s/p laparoscopic sleeve gastrectomy with KO on 10/9 post-op course complicated by small enterotomy x2 s/p laparoscopy converted to laparotomy with washout and enterotomy closure x2 on 10/12.  Leukocytosis. 10/18 CT abd demonstrates perihepatic fluid collection. S/p CT guided drain placement. Stable post procedure. WBC 17 this AM    P:    - Continue to follow WBC and Cultures, drain OPs and patient's clinical signs/symptoms for improvement.   - Continue present drain cares. Continue drain to MICKIE drainage.  - Antibiotics per ICU/gen surgery.   - Drain care education provided to patient. All questions answered.   - Drain discharge instructions entered into D/C navigator.  - Patient to flush drain with 10mL NS daily upon discharge. NS flushes ordered in D/C navigator.  - Will continue to follow patient's clinical  status, imaging, drain(s) function and drain(s) OPs to determine drain follow up plan. Anticipating follow-up CT/abscessogram approximately 7-14 days from drain placement date, once drain outputs are <20mL/day for a consecutive 2 days, and/or pending patient's clinical status and drain function.   - Drain follow up can occur as inpatient or outpatient, most often the first drain check will occur as outpatient. Outpatient drain follow up orders have been entered.   - IR will continue to follow loosely. Please contact IR with drain related questions or concerns.       Total time spent on the date of the encounter: 45 minutes.    JUSTINE BRAUN [facility]  Interventional Radiology  [phone]

## 2023-10-19 NOTE — PRE-PROCEDURE
GENERAL PRE-PROCEDURE:   Procedure:  CT guided juancho-hepatic fluid collection aspiration or drain placement with moderate sedation  Date/Time:  10/19/2023 11:50 AM    Verbal consent obtained?: Yes    Written consent obtained?: Yes    Risks and benefits: Risks, benefits and alternatives were discussed    Consent given by:  Guardian  Patient states understanding of procedure being performed: Yes    Patient's understanding of procedure matches consent: Yes    Procedure consent matches procedure scheduled: Yes    Expected level of sedation:  Moderate  Appropriately NPO:  Yes  Mallampati  :  Grade 1- soft palate, uvula, tonsillar pillars, and posterior pharyngeal wall visible  Lungs:  Lungs clear with good breath sounds bilaterally  Heart:  Normal heart sounds and rate  History & Physical reviewed:  History and physical reviewed and no updates needed  Statement of review:  I have reviewed the lab findings, diagnostic data, medications, and the plan for sedation

## 2023-10-19 NOTE — PROGRESS NOTES
RT PROGRESS NOTE    VENT DAY# Ventilation Day(s): 8    CURRENT SETTINGS:   Vent Mode: CMV/AC  (Continuous Mandatory Ventilation/ Assist Control)  FiO2 (%): 35 %  Resp Rate (Set): 18 breaths/min  Tidal Volume (Set, mL): 400 mL  PEEP (cm H2O): 6 cmH2O  Pressure Support (cm H2O): 10 cmH2O  Resp: 27      PATIENT PARAMETERS:  Ventilator - Patient   Patient Resp Rate : 22 breaths/min  Expiratory Vt (ml): 402  Minute Volume (ml): 8.5 L/min  Peak Inspiratory Pressure (cm H2O): 29 cmH2O  Mean Airway Pressure (cm H2O): 15 cmH2O  Plateau Pressure (cm H2O): 23 cmH2O  Dynamic Compliance (mL/cm H2O): 17 mL/cm H2O  Airway Resistance: 12  Auto/ Intrinsic PEEP (cm H2O): 3.7 cmH2O     SBT completed Yes , Total Time 3hours, RSBI 47  Secretions: white, small  Breath Sounds: course    ETT Cuffed Oral 7.5 mm-Secured at (cm): 20 cm at teeth/gums  Emergency Ambu bag, mask and peep valve at bedside.     Respiratory Medications: Duoneb QID       Skin checked and tube moved Q2, this responsibility is shared between RN and RT. Endotracheal tube cuff assessed and appropriate at the time of the assessment.     HARRISON CASTANEDA, RT

## 2023-10-19 NOTE — PROGRESS NOTES
HEART CARE NOTE          Assessment/Recommendations     1. Severe HFrEF c/b ADHF and septic shock  Assessment / Plan  Diuresing well on current regimen - will reduce IV bumex frequency as we approach euvolemia; continue to monitor renal function/repeat BMP, UOP and hemodynamics closely   Patient is high risk of adverse cardiac events 2/2 severe LV dysfunction and hx of substance/methamphetamine abuse  Will need ischemia eval is LV systolic dysfunction remains depressed - repeat echo once near euvolemia and infection has resolved  GDMT on hold given the above     2. Frequent PVCs  Assessment / Plan  Continue amiodarone  Continue to monitor electrolytes and replete PRN; goal K 4 and Mg > 2     3. Acute hypoxic respiratory failure  Assessment / Plan  S/p mechanical ventilation -management per ICU team     4. SUSSY  Assessment / Plan  2/2 hemodynamic compromise Continue to monitor UOP and renal function closely - resolving     5. Gastric bypass surgery c/b bile leak  Assessment / Plan  Management per primary and surgical team    Patient remains critically ill in the ICU requiring mechanical ventilator support in the setting of acute hypoxic respiratory failure. 50 minutes of critical care time spent      History of Present Illness/Subjective    Ms. Mojgan Jimenez is a 53 year old female with a PMHx significant for obesity, JARVIS on CPAP, asthma (no PFTs), stimulant use disorder, stimulant induced psychosis, mood & anxiety disorders; Cardiology consulted regarding new decline in LVSF     Today, Mrs. Jimenez is intubated; Management plan as detailed above      ECG: personally reviewed; normal sinus rhythm, nonspecific ST and T waves changes.     ECHO (personnaly Reviewed on 10/16/23):   Technically difficult study.  Limited views were obtained.  The left ventricle is normal in size.  Left ventricular function is decreased. The ejection fraction is 30-35%  (moderately reduced).  There is moderate global hypokinesia of the  "left ventricle.    Telemetry: personally reviewed October 19, 2023; notable for sinus rhythm     Lab results: personally reviewed October 19, 2023; notable for resolving SUSSY    Medical history and pertinent documents reviewed in Care Everywhere please where applicable see details above        Physical Examination Review of Systems   /64 (BP Location: Left arm)   Pulse 72   Temp (!) 100.8  F (38.2  C) (Esophageal)   Resp 22   Ht 1.6 m (5' 3\")   Wt 137.6 kg (303 lb 5.7 oz)   LMP 09/09/2023 (Exact Date)   SpO2 95%   BMI 53.74 kg/m    Body mass index is 53.74 kg/m .  Wt Readings from Last 3 Encounters:   10/18/23 137.6 kg (303 lb 5.7 oz)   08/09/23 133.4 kg (294 lb 3.2 oz)   07/05/23 129.7 kg (286 lb)     General Appearance:   Intubated/NAD   ENT/Mouth: membranes moist, no oral lesions or bleeding gums.      EYES:  no scleral icterus, normal conjunctivae   Neck: no carotid bruits or thyromegaly   Chest/Lungs:   lungs are clear to auscultation, no rales or wheezing, equal chest wall expansion    Cardiovascular:   Regular. Normal first and second heart sounds with no murmurs, rubs, or gallops; the carotid, radial and posterior tibial pulses are intact, mild LE edema bilaterally    Abdomen:  no organomegaly, masses, bruits, or tenderness; bowel sounds are present   Extremities: no cyanosis or clubbing   Skin: no xanthelasma, warm.    Neurologic: Intubated/NAD     Psychiatric: Intubated/NAD     A complete 10 systems ROS was reviewed  And is negative except what is listed in the HPI.          Medical History  Surgical History Family History Social History   Past Medical History:   Diagnosis Date     LINA (generalized anxiety disorder) 11/02/2017     Hyperlipidemia LDL goal <160 01/20/2019     Major depressive disorder      Methanol abuse (H)      Mild persistent asthma without complication 11/02/2017     Obese      JARVIS on CPAP     Cpap not working     Paranoia (H)     resolved due to drugs     Vitamin D " "deficiency 11/02/2017    Past Surgical History:   Procedure Laterality Date     GASTRECTOMY, SLEEVE, LAPAROSCOPIC, WITH DUODENAL SWITCH N/A 10/9/2023    Procedure: GASTRECTOMY, SLEEVE, LAPAROSCOPIC, WITH SINGLE ANASTAMOSIS DUODENAL SWITCH;  Surgeon: Hoang Worthy MD;  Location: [facility] [place]     LAPAROSCOPY DIAGNOSTIC (GYN) N/A 10/12/2023    Procedure: LAPAROSCOPY,;  Surgeon: Hoang Worthy MD;  Location: [facility] [place]     LAPAROTOMY EXPLORATORY N/A 10/12/2023    Procedure: LAPAROTOMY, CLOSURE OF TWO SMALL BOWEL INTEROTOMIES, DRAIN PLACEMENT, INTRA-ABDOMINAL CLEAN OUT;  Surgeon: Hoang Worthy MD;  Location: [facility] [place] OR     MAMMOPLASTY REDUCTION      reduction    no family history of premature coronary artery disease Social History     Socioeconomic History     Marital status: Single     Spouse name: Not on file     Number of children: Not on file     Years of education: Not on file     Highest education level: Not on file   Occupational History     Not on file   Tobacco Use     Smoking status: Never     Smokeless tobacco: Never   Vaping Use     Vaping Use: Never used   Substance and Sexual Activity     Alcohol use: Not Currently     Comment: Sober x 8 months     Drug use: Not Currently     Types: Methamphetamines, \"Crack\" cocaine     Comment: in remision      Sexual activity: Not Currently     Partners: Male   Other Topics Concern     Parent/sibling w/ CABG, MI or angioplasty before 65F 55M? No   Social History Narrative     Not on file     Social Determinants of Health     Financial Resource Strain: Not on file   Food Insecurity: Not on file   Transportation Needs: Not on file   Physical Activity: Not on file   Stress: Not on file   Social Connections: Not on file   Interpersonal Safety: Not on file   Housing Stability: Not on file           Lab Results    Chemistry/lipid CBC Cardiac Enzymes/BNP/TSH/INR   Lab Results   Component Value Date    CHOL 176 05/17/2022    HDL 36 (L) " "05/17/2022    TRIG 278 (H) 10/18/2023    BUN 91.2 (H) 10/19/2023     (H) 10/19/2023    CO2 25 10/19/2023    Lab Results   Component Value Date    WBC 16.5 (H) 10/19/2023    HGB 9.1 (L) 10/19/2023    HCT 28.9 (L) 10/19/2023    MCV 94 10/19/2023     10/19/2023    Lab Results   Component Value Date    TSH 2.56 09/15/2022    INR 1.21 (H) 10/16/2023     No results found for: \"CKTOTAL\", \"CKMB\", \"TROPONINI\"       Weight:    Wt Readings from Last 3 Encounters:   10/18/23 137.6 kg (303 lb 5.7 oz)   08/09/23 133.4 kg (294 lb 3.2 oz)   07/05/23 129.7 kg (286 lb)       Allergies  No Known Allergies      Surgical History  Past Surgical History:   Procedure Laterality Date     GASTRECTOMY, SLEEVE, LAPAROSCOPIC, WITH DUODENAL SWITCH N/A 10/9/2023    Procedure: GASTRECTOMY, SLEEVE, LAPAROSCOPIC, WITH SINGLE ANASTAMOSIS DUODENAL SWITCH;  Surgeon: Hoang Worthy MD;  Location[place] [facility] [place]     LAPAROSCOPY DIAGNOSTIC (GYN) N/A 10/12/2023    Procedure: LAPAROSCOPY,;  Surgeon: Hoang Worthy MD;  Location: [facility] [place]     LAPAROTOMY EXPLORATORY N/A 10/12/2023    Procedure: LAPAROTOMY, CLOSURE OF TWO SMALL BOWEL INTEROTOMIES, DRAIN PLACEMENT, INTRA-ABDOMINAL CLEAN OUT;  Surgeon: Hoang Worthy MD;  Location: [facility] [place]     MAMMOPLASTY REDUCTION      reduction       Social History  Tobacco:   History   Smoking Status     Never   Smokeless Tobacco     Never    Alcohol:   Social History    Substance and Sexual Activity      Alcohol use: Not Currently        Comment: Sober x 8 months   Illicit Drugs:   History   Drug Use Unknown     Comment: in remision        Family History  Family History   Problem Relation Age of Onset     Cancer Mother      Unknown/Adopted Father      Alcoholism Father      Substance Abuse Sister      Diabetes No family hx of      Hypertension No family hx of           Do Schreiber MD on 10/19/2023      cc: Franca Mishra    "

## 2023-10-19 NOTE — PROGRESS NOTES
RT PROGRESS NOTE    VENT DAY# 8    CURRENT SETTINGS:  Vent Mode: CMV/AC  (Continuous Mandatory Ventilation/ Assist Control)  FiO2 (%): 35 %  Resp Rate (Set): 18 breaths/min  Tidal Volume (Set, mL): 400 mL  PEEP (cm H2O): 8 cmH2O  Resp: 22      PATIENT PARAMETERS:  PIP 25  Pplat:  23  Pmean:  12  Compliance: 20  Secretions:  smal thick creamy blood tinged  02 Sats:  97%  BS: decreased course    ETT SIZE 7.5 Secured at 20 cm at teeth/gums    Respiratory Medications: QID Duo       NOTE / SHIFT SUMMARY:   Pt stable on vent overnight, no changes made.  Pt agitated, having coughing spells.  QID Duo given as ordered.  RT will continue to monitor.    Liliane Casillas, RT

## 2023-10-19 NOTE — PROGRESS NOTES
PULMONARY / CRITICAL CARE PROGRESS NOTE    Date / Time of Admission:  10/9/2023  5:26 AM    Assessment:     Mojgan Jimenez is a 53 year old female with history of asthma, severe JARVIS, (AHI of 41.7, supine AHI 41.6, REM AHI 85.7, and 19.8 minutes with O2 saturations less than 88%), stimulant use disorder, generalized anxiety disorder, depression, GERD, morbid obesity Body mass index is 53.74 kg/m .  Presented on 10/9/23 for scheduled laparoscopic sleeve gastrectomy with single anastomosis duodenal switch.   Postprocedure, patient complained of abdominal pain. Gastrografin leak test was negative. Rapid response was called due to encephalopathy, acute renal failure. Patient was admitted to ICU, intubated and started on pressors on 10/12. Patient was taken to OR.   S/p closure of two small bowel enterotomies, drain placement and wash out. Peritoneal fluid culture (+) strep anginosus.     Acute respiratory failure s/p intubation 10/12  Sepsis due to peritonitis secondary to bowel perforation  Peritoneal fluid culture (+) streptococcus anginosus.   Ongoing fever, raising WBC. Abdomen CT scan showed a layer of fluid between between right hemidiaphragm and liver.   IR was consulted for drain tube placement.    Continue zosyn and micafungin.   S/p closure of two small bowel enterotomies, drain placement and wash out on 10/12  Acute kidney injury   On TPN  Volume up   Cardiomyopathy   Echocardiogram showed EF 30-35%, moderate global hypokinesia of the left ventricle.   Frequent PVCs  Hypernatremia  Elevated TG  S/p laparoscopic sleeve gastrectomy with single anastomosis duodenal switch 10/9  Anemia   Hx stimulant use disorder, LINA, depression  JARVIS   Morbid obesity Body mass index is 53.74 kg/m .    Advance Directives:  Full code    Plan:   Titrate vent settings A/C 18/400/8/35%, keep SpO2 > 90%, plateau pressure < 30  Schedule bronchodilators  Sedation to keep RASS -1 to -2, precedex and fentanyl drip  Pressors to keep MAP >  65, currently off NE  On amiodarone drip   Follow up blood cultures   Drain tube placement below right HD.   Abx zosyn and micafungin   Monitor kidney function   IV diuresis   Supplement electrolytes   On TPN  PPI IV for GI prophylaxis   Glucose level monitoring   DVT prophylaxis SCDs, heparin SQ    Please contact me if you have any questions.  Total critical care time, not including separately billable procedure time: 45 minutes  This patient had a high probability of imminent or life threatening deterioration due to acute respiratory failure which required my direct attention, intervention and personal management.     James Borrero  Pulmonary / Critical Care  10/19/2023  11:55 AM          ICU DAILY CHECKLIST                           Can patient transfer out of MICU? no    FAST HUG:    Feeding:  Feeding: yes.  Patient is receiving TPN    Patiño: yes  Analgesia/Sedation:yes  precedex, fentanyl  Thromboembolic prophylaxis: Yes; Mode:  Heparin and SCDs  HOB>30:  Yes  Stress Ulcer Protocol Active: Yes; Mode: PPI  Glycemic Control: Any glucose > 180 no; Mode of Insulin Therapy: Sliding Scale Insulin    INTUBATED:  Can patient have daily waking:  No  Can patient have spontaneous breathing trial:  No    Restraints? yes    PHYSICAL THERAPY AND MOBILITY:  Can patient have PT and mobility trial: no  Activity: bedrest    Subjective:   HPI:  Mojgan Jimenez is a 53 year old female with history of asthma, severe JARVIS, (AHI of 41.7, supine AHI 41.6, REM AHI 85.7, and 19.8 minutes with O2 saturations less than 88%), stimulant use disorder, generalized anxiety disorder, depression, GERD, morbid obesity Body mass index is 53.74 kg/m .  Presented on 10/9/23 for scheduled laparoscopic sleeve gastrectomy with single anastomosis duodenal switch.   Postprocedure, patient complained of abdominal pain. Gastrografin leak test was negative. Rapid response was called due to encephalopathy, acute renal failure. Patient was  admitted to ICU, intubated and started on pressors on 10/12. Patient was taken to OR.   S/p closure of two small bowel enterotomies, drain placement and wash out. Peritoneal fluid culture (+) strep anginosus.     Events overnight  - Febrile, tachypneic.   - On TPN.   - On bumex drip with improvement of urine output.   - Schedule for drain tube placement     Allergies: Patient has no known allergies.     MEDS:  Current Facility-Administered Medications   Medication    acetaminophen (TYLENOL) tablet 650 mg    Or    acetaminophen (TYLENOL) Suppository 650 mg    albuterol (PROVENTIL HFA/VENTOLIN HFA) inhaler    albuterol (PROVENTIL) neb solution 2.5 mg    amiodarone (NEXTERONE) 1.8 mg/mL in dextrose 5% 200 mL ADULT STANDARD infusion    bumetanide (BUMEX) injection 2 mg    chlorhexidine (PERIDEX) 0.12 % solution 15 mL    dexmedeTOMIDine (PRECEDEX) 4 mcg/mL in NS infusion    dextrose 10% infusion    glucose gel 15-30 g    Or    dextrose 50 % injection 25-50 mL    Or    glucagon injection 1 mg    diphenhydrAMINE (BENADRYL) capsule 25 mg    Or    diphenhydrAMINE (BENADRYL) injection 25 mg    sennosides (SENOKOT) syrup 5 mL    And    docusate (COLACE) 50 MG/5ML liquid 50 mg    fentaNYL (SUBLIMAZE) 50 mcg/mL bolus from pump    fentaNYL (SUBLIMAZE) infusion    heparin ANTICOAGULANT injection 5,000 Units    HYDROmorphone (DILAUDID) injection 0.2 mg    Or    HYDROmorphone (PF) (DILAUDID) injection 0.5 mg    insulin aspart (NovoLOG) injection (RAPID ACTING)    ipratropium - albuterol 0.5 mg/2.5 mg/3 mL (DUONEB) neb solution 3 mL    lidocaine (LMX4) cream    lidocaine 1 % 0.1-1 mL    lipids plant base (CLINOLIPID) 20 % infusion 250 mL    magnesium sulfate 2 g in 50 mL sterile water intermittent infusion    micafungin (MYCAMINE) 100 mg in sodium chloride 0.9 % 100 mL intermittent infusion    midazolam (VERSED) injection 1-2 mg    naloxone (NARCAN) injection 0.2 mg    Or    naloxone (NARCAN) injection 0.4 mg    Or    naloxone  "(NARCAN) injection 0.2 mg    Or    naloxone (NARCAN) injection 0.4 mg    norepinephrine (LEVOPHED) 4 mg in  mL infusion PREMIX    ondansetron (ZOFRAN ODT) ODT tab 4 mg    Or    ondansetron (ZOFRAN) injection 4 mg    pantoprazole (PROTONIX) IV push injection 40 mg    parenteral nutrition - ADULT compounded formula    parenteral nutrition - ADULT compounded formula    phenol (CHLORASEPTIC) 1.4 % spray 1-2 mL    piperacillin-tazobactam (ZOSYN) 3.375 g vial to attach to  mL bag    polyethylene glycol (MIRALAX) Packet 17 g    prochlorperazine (COMPAZINE) injection 10 mg    Or    prochlorperazine (COMPAZINE) tablet 10 mg    propranolol (INDERAL) tablet 10 mg    QUEtiapine (SEROquel) tablet 50 mg    sodium chloride (PF) 0.9% PF flush 3 mL    sodium chloride (PF) 0.9% PF flush 3 mL    traMADol (ULTRAM) tablet 50 mg    vasopressin (VASOSTRICT) 20 Units in sodium chloride 0.9 % 100 mL standard conc infusion     Objective:   VITALS:  BP (!) 152/75   Pulse 97   Temp 100.1  F (37.8  C) (Esophageal)   Resp (!) 49   Ht 1.6 m (5' 3\")   Wt 137.6 kg (303 lb 5.7 oz)   LMP 09/09/2023 (Exact Date)   SpO2 99%   BMI 53.74 kg/m    VENT:  Vent Mode: CMV/AC  (Continuous Mandatory Ventilation/ Assist Control)  FiO2 (%): 35 %  Resp Rate (Set): 18 breaths/min  Tidal Volume (Set, mL): 400 mL  PEEP (cm H2O): 8 cmH2O  Pressure Support (cm H2O): 8 cmH2O  Resp: (!) 49    EXAM:   Gen: obese, sedated, vented  HEENT: pink conjunctiva, intubated  Neck: no thyromegaly, masses or JVD  Lungs: discrete ronchi both HT  CV: regular, tachycardic, no murmurs or gallops appreciated  Abdomen: distended, abdominal drain tubes, decrease bowel sounds  Ext: trace edema  Neuro: sedated, withdraws to painful stimulus    Data Review:  Recent Labs   Lab 10/19/23  1147 10/19/23  0747 10/19/23  0553 10/19/23  0411 10/19/23  0006 10/18/23  2018   * 134* 188* 147* 176* 145*        10/19/23 05:53   Sodium 148 (H)   Potassium 3.5   Chloride 107 "   Carbon Dioxide (CO2) 25   Urea Nitrogen 91.2 (H)   Creatinine 2.84 (H)   GFR Estimate 19 (L)   Calcium 9.2   Anion Gap 16 (H)   Magnesium 1.6 (L)   Phosphorus 3.3   Albumin 3.1 (L)   Protein Total 6.9   Alkaline Phosphatase 102   ALT 40   AST 25   Bilirubin Total 0.5   Glucose 188 (H)      10/19/23 05:53   WBC 16.5 (H)   Hemoglobin 9.1 (L)   Hematocrit 28.9 (L)   Platelet Count 337   RBC Count 3.09 (L)   MCV 94   MCH 29.4   MCHC 31.5   RDW 14.4   % Neutrophils 82   % Lymphocytes 6   % Monocytes 7   % Eosinophils 3     Peritoneal fluid Culture 1+ Streptococcus anginosus Abnormal         XR CHEST PORT 1 VIEW  LOCATION: [facility]  DATE: 10/16/2023  INDICATION: hypoxia  COMPARISON: Portable AP view of the chest 10/14/2023                                          IMPRESSION:   Tip of the endotracheal tube is in satisfactory position. Esophageal temperature probe is in the lower third of the esophagus. Gastric tube courses below the diaphragmatic hiatus and outside the field-of-view. Telemetry leads overlie the chest.  Persistent shallow lung inflation. The right hemidiaphragm is indistinct and there is graded opacity in the infrahilar right chest either related to adjacent basilar atelectasis and/or layering pleural fluid. Focal atelectasis in the medial left base is not increased. The vessels within the aerated portions of the lungs are normal. No clear change from 2 days earlier. Unchanged heart and mediastinal contours.    Echocardiogram 10/14/2023  Technically difficult study.  Limited views were obtained.  The left ventricle is normal in size.  Left ventricular function is decreased. The ejection fraction is 30-35%  (moderately reduced).  There is moderate global hypokinesia of the left ventricle.    CT ABDOMEN PELVIS W/O CONTRAST  LOCATION: [facility]  DATE: 10/18/2023     INDICATION: Perforated bowel, ongoing fever. Status post 10/09/2023 sleeve gastrectomy with  single anastomosis duodenal switch complicated by bowel perforation, peritonitis and sepsis status post 10/12/2023 washout and enterotomy closure. Cardiomyopathy.   JARVIS.  COMPARISON: 10/16/2023 CXR and 10/10/2023 Gastrografin upper GI.  FINDINGS:   LOWER CHEST: Complete right lower lobe atelectasis, small volume of right-sided pleural fluid and mild elevation right hemidiaphragm. Trace left pleural effusion and mild platelike atelectasis left lower lobe. Heart size within normal limits with no pericardial effusion.  HEPATOBILIARY: Liver is normal.  No calcified gallstones or bile duct dilatation.   PANCREAS: Normal.  SPLEEN: Spleen size normal.  ADRENAL GLANDS: Normal.  KIDNEYS/BLADDER: Bladder is decompressed by well-positioned Patiño catheter. Kidneys, ureters and bladder are otherwise normal.  BOWEL: Sleeve gastrectomy and proximal duodenal to jejunal anastomosis, a well-positioned nasogastric tube, and upper anterior abdominal surgical drain from the right and a second left anterior abdominal and pelvic surgical drain. A thin layer of fluid   about 1 - 2 cm radial thickness between the right hemidiaphragm and right hepatic lobe contains a few foci of gas and there is a small amount of nongas-containing fluid in pelvic cul-de-sac. Residual iodinated contrast material from 10/10/2023 upper GI   within the normal caliber appendix, colon and rectum.  LYMPH NODES: No lymphadenopathy.  VASCULATURE: Normal caliber abdominal aorta.    PELVIC ORGANS: A 5 cm fibroid.  MUSCULOSKELETAL:Unremarkable.                                                      IMPRESSION:   1.  Sleeve gastrectomy and proximal duodenal to jejunal anastomosis, malpositioned nasogastric tube, and upper anterior abdominal surgical drain from the right and a second left anterior abdominal and pelvic surgical drain.   2.  A thin layer of fluid about 1 - 2 cm radial thickness between the right hemidiaphragm and right hepatic lobe contains a few foci of gas  and there is a small amount of nongas-containing fluid in pelvic cul-de-sac.   3.  Complete right lower lobe atelectasis, small volume of right-sided pleural fluid and mild elevation right hemidiaphragm.     By:  James Borrero MD, 10/19/2023  11:55 AM    Primary Care Physician:  Franca Mishra

## 2023-10-19 NOTE — PROGRESS NOTES
[facility]  [facility]   Follow up    Mojgan Jimenez   MRN: [id number]  : 1970   DOA: 10/9/2023   CC: ARF      Assessment and Plan:  53 year old female    ARF;  hemodynamic exacerbated by IV NSAIDS (lowish bp, vasodilatory drugs, mild intravascular depletion)==>ATN, now in recovery phase and creatinine better, now slowly dropping. Lytes ok but Na rising with diuresis and current obligatory IVF.   Volume overload, now resolving, good response to diuretics /alb ordered by cards. ?cut back on diuretics?  S/p gastric bypass; complicated by leak; s/p repair and wash out of bilious fluid. Peritonitis on abx.  Fevers and fluid collection, plan to tap in IR.   HoTN; infection/SIIRS; bp ok now off pressors.  Hyperlipid; on statin  Elevated LFTs; following;levels improved   Resp failure; juancho-op; on vent; wean as able  Acidosis; resolved; off bicarb IVF  Nutrition; on TPN; no lipids due to being on propofol; TG are elevated so changed to precedex  Depressed EF on ECHO    Subjective  Seen earlier today.   Still on vent 35%  Sedated, irritable.   Febrile   On TPN  Good urine     Small hepatic gutter fluid collection to be tapped later today in IR.   Objective    Vital signs in last 24 hours  Temp:  [99.6  F (37.6  C)-102.9  F (39.4  C)] 102.9  F (39.4  C)  Pulse:  [] 84  Resp:  [22-55] 28  BP: (124-186)/() 163/82  FiO2 (%):  [35 %] 35 %  SpO2:  [93 %-100 %] 97 %      Intake/Output last 3 shifts  I/O last 3 completed shifts:  In: 2743.03 [I.V.:1250.54; NG/GT:225]  Out: 5120 [Urine:4740; Emesis/NG output:200; Drains:180]  Intake/Output this shift:  I/O this shift:  In: 351.6 [I.V.:168.4]  Out: 475 [Urine:375; Emesis/NG output:100]    Physical Exam  Intubated less facial edema  CV: RRR without murmur or rub  Lung: clear and equal; no extra sounds  Ab: distended. Lower abd with less pitting edema, binder in place and drains.   Ext:much less MARIA EUGENIA and  well perfused  Skin; no rash    Pertinent Labs     Last Renal Panel:  Sodium   Date Value Ref Range Status   10/19/2023 148 (H) 135 - 145 mmol/L Final     Comment:     Reference intervals for this test were updated on 09/26/2023 to more accurately reflect our healthy population. There may be differences in the flagging of prior results with similar values performed with this method. Interpretation of those prior results can be made in the context of the updated reference intervals.    11/17/2020 141 133 - 144 mmol/L Final     Potassium   Date Value Ref Range Status   10/19/2023 3.5 3.4 - 5.3 mmol/L Final   05/17/2022 4.5 3.4 - 5.3 mmol/L Final   11/17/2020 4.1 3.4 - 5.3 mmol/L Final     Chloride   Date Value Ref Range Status   10/19/2023 107 98 - 107 mmol/L Final   05/17/2022 102 94 - 109 mmol/L Final   11/17/2020 109 94 - 109 mmol/L Final     Carbon Dioxide   Date Value Ref Range Status   11/17/2020 31 20 - 32 mmol/L Final     Carbon Dioxide (CO2)   Date Value Ref Range Status   10/19/2023 25 22 - 29 mmol/L Final   05/17/2022 32 20 - 32 mmol/L Final     Anion Gap   Date Value Ref Range Status   10/19/2023 16 (H) 7 - 15 mmol/L Final   05/17/2022 2 (L) 3 - 14 mmol/L Final   11/17/2020 1 (L) 3 - 14 mmol/L Final     Glucose   Date Value Ref Range Status   10/19/2023 188 (H) 70 - 99 mg/dL Final   05/17/2022 110 (H) 70 - 99 mg/dL Final   11/17/2020 84 70 - 99 mg/dL Final     Comment:     Fasting specimen     GLUCOSE BY METER POCT   Date Value Ref Range Status   10/19/2023 134 (H) 70 - 99 mg/dL Final     Urea Nitrogen   Date Value Ref Range Status   10/19/2023 91.2 (H) 6.0 - 20.0 mg/dL Final   05/17/2022 16 7 - 30 mg/dL Final   11/17/2020 9 7 - 30 mg/dL Final     Creatinine   Date Value Ref Range Status   10/19/2023 2.84 (H) 0.51 - 0.95 mg/dL Final   11/17/2020 0.79 0.52 - 1.04 mg/dL Final     GFR Estimate   Date Value Ref Range Status   10/19/2023 19 (L) >60 mL/min/1.73m2 Final   11/17/2020 88 >60 mL/min/[1.73_m2] Final      Comment:     Non  GFR Calc  Starting 12/18/2018, serum creatinine based estimated GFR (eGFR) will be   calculated using the Chronic Kidney Disease Epidemiology Collaboration   (CKD-EPI) equation.       Calcium   Date Value Ref Range Status   10/19/2023 9.2 8.6 - 10.0 mg/dL Final   11/17/2020 8.9 8.5 - 10.1 mg/dL Final     Phosphorus   Date Value Ref Range Status   10/19/2023 3.3 2.5 - 4.5 mg/dL Final     Albumin   Date Value Ref Range Status   10/19/2023 3.1 (L) 3.5 - 5.2 g/dL Final   05/17/2022 4.0 3.4 - 5.0 g/dL Final   12/03/2018 3.6 3.4 - 5.0 g/dL Final     Recent Labs   Lab 10/19/23  0553 10/18/23  0439 10/16/23  1055 10/13/23  0426   HGB 9.1* 9.2* 9.9* 10.2*          I reviewed all lab results  Simi Shin MD  [facility]  [phone]

## 2023-10-19 NOTE — IR NOTE
Patient Name: Mojgan Jimenez  Medical Record Number: [id number]  Today's Date: 10/19/2023    Procedure: CT abscess drain placement  Proceduralist: Phalen    Sedation medications administered: 4 mg midazolam and 250 mcg fentanyl   Sedation time: 40 minutes    Report given to: Phil ICU RN

## 2023-10-20 LAB
ALBUMIN SERPL BCG-MCNC: 3.4 G/DL (ref 3.5–5.2)
ALP SERPL-CCNC: 90 U/L (ref 35–104)
ALT SERPL W P-5'-P-CCNC: 34 U/L (ref 0–50)
ANION GAP SERPL CALCULATED.3IONS-SCNC: 14 MMOL/L (ref 7–15)
APPEARANCE FLD: ABNORMAL
AST SERPL W P-5'-P-CCNC: 27 U/L (ref 0–45)
BACTERIA SPT CULT: NORMAL
BASO+EOS+MONOS # BLD AUTO: ABNORMAL 10*3/UL
BASO+EOS+MONOS NFR BLD AUTO: ABNORMAL %
BASOPHILS # BLD AUTO: 0.1 10E3/UL (ref 0–0.2)
BASOPHILS NFR BLD AUTO: 0 %
BILIRUB SERPL-MCNC: 0.6 MG/DL
BUN SERPL-MCNC: 89.8 MG/DL (ref 6–20)
CALCIUM SERPL-MCNC: 9.5 MG/DL (ref 8.6–10)
CELL COUNT BODY FLUID SOURCE: ABNORMAL
CHLORIDE SERPL-SCNC: 110 MMOL/L (ref 98–107)
COLOR FLD: ABNORMAL
CREAT SERPL-MCNC: 2.87 MG/DL (ref 0.51–0.95)
CRP SERPL-MCNC: 341.2 MG/L
DEPRECATED HCO3 PLAS-SCNC: 23 MMOL/L (ref 22–29)
EGFRCR SERPLBLD CKD-EPI 2021: 19 ML/MIN/1.73M2
EOSINOPHIL # BLD AUTO: 0.4 10E3/UL (ref 0–0.7)
EOSINOPHIL NFR BLD AUTO: 2 %
ERYTHROCYTE [DISTWIDTH] IN BLOOD BY AUTOMATED COUNT: 14.3 % (ref 10–15)
GLUCOSE BLDC GLUCOMTR-MCNC: 141 MG/DL (ref 70–99)
GLUCOSE BLDC GLUCOMTR-MCNC: 149 MG/DL (ref 70–99)
GLUCOSE BLDC GLUCOMTR-MCNC: 154 MG/DL (ref 70–99)
GLUCOSE BLDC GLUCOMTR-MCNC: 155 MG/DL (ref 70–99)
GLUCOSE BLDC GLUCOMTR-MCNC: 176 MG/DL (ref 70–99)
GLUCOSE BLDC GLUCOMTR-MCNC: 226 MG/DL (ref 70–99)
GLUCOSE BLDC GLUCOMTR-MCNC: 255 MG/DL (ref 70–99)
GLUCOSE SERPL-MCNC: 146 MG/DL (ref 70–99)
GRAM STAIN RESULT: NORMAL
HCT VFR BLD AUTO: 30.9 % (ref 35–47)
HGB BLD-MCNC: 9.6 G/DL (ref 11.7–15.7)
IMM GRANULOCYTES # BLD: 0.3 10E3/UL
IMM GRANULOCYTES NFR BLD: 2 %
KOH PREPARATION: NORMAL
KOH PREPARATION: NORMAL
LYMPHOCYTES # BLD AUTO: 1.3 10E3/UL (ref 0.8–5.3)
LYMPHOCYTES NFR BLD AUTO: 8 %
LYMPHOCYTES NFR FLD MANUAL: 10 %
MAGNESIUM SERPL-MCNC: 1.9 MG/DL (ref 1.7–2.3)
MCH RBC QN AUTO: 29.4 PG (ref 26.5–33)
MCHC RBC AUTO-ENTMCNC: 31.1 G/DL (ref 31.5–36.5)
MCV RBC AUTO: 95 FL (ref 78–100)
MONOCYTES # BLD AUTO: 1.3 10E3/UL (ref 0–1.3)
MONOCYTES NFR BLD AUTO: 8 %
MONOS+MACROS NFR FLD MANUAL: 3 %
NEUTROPHILS # BLD AUTO: 14.1 10E3/UL (ref 1.6–8.3)
NEUTROPHILS NFR BLD AUTO: 80 %
NEUTS BAND NFR FLD MANUAL: 87 %
NRBC # BLD AUTO: 0 10E3/UL
NRBC BLD AUTO-RTO: 0 /100
PHOSPHATE SERPL-MCNC: 3.5 MG/DL (ref 2.5–4.5)
PLATELET # BLD AUTO: 318 10E3/UL (ref 150–450)
POTASSIUM SERPL-SCNC: 4.1 MMOL/L (ref 3.4–5.3)
PROT SERPL-MCNC: 7.6 G/DL (ref 6.4–8.3)
RBC # BLD AUTO: 3.27 10E6/UL (ref 3.8–5.2)
SODIUM SERPL-SCNC: 147 MMOL/L (ref 135–145)
WBC # BLD AUTO: 17.4 10E3/UL (ref 4–11)
WBC # FLD AUTO: 76 /UL

## 2023-10-20 PROCEDURE — 87077 CULTURE AEROBIC IDENTIFY: CPT | Performed by: INTERNAL MEDICINE

## 2023-10-20 PROCEDURE — 999N000009 HC STATISTIC AIRWAY CARE

## 2023-10-20 PROCEDURE — 83735 ASSAY OF MAGNESIUM: CPT | Performed by: INTERNAL MEDICINE

## 2023-10-20 PROCEDURE — 99232 SBSQ HOSP IP/OBS MODERATE 35: CPT | Performed by: INTERNAL MEDICINE

## 2023-10-20 PROCEDURE — 87210 SMEAR WET MOUNT SALINE/INK: CPT | Performed by: INTERNAL MEDICINE

## 2023-10-20 PROCEDURE — 250N000011 HC RX IP 250 OP 636: Mod: JZ | Performed by: SURGERY

## 2023-10-20 PROCEDURE — 94640 AIRWAY INHALATION TREATMENT: CPT

## 2023-10-20 PROCEDURE — 250N000009 HC RX 250: Performed by: INTERNAL MEDICINE

## 2023-10-20 PROCEDURE — 89051 BODY FLUID CELL COUNT: CPT | Performed by: INTERNAL MEDICINE

## 2023-10-20 PROCEDURE — 250N000013 HC RX MED GY IP 250 OP 250 PS 637: Performed by: INTERNAL MEDICINE

## 2023-10-20 PROCEDURE — 250N000011 HC RX IP 250 OP 636: Mod: JZ | Performed by: INTERNAL MEDICINE

## 2023-10-20 PROCEDURE — C9113 INJ PANTOPRAZOLE SODIUM, VIA: HCPCS | Mod: JZ | Performed by: NURSE PRACTITIONER

## 2023-10-20 PROCEDURE — 250N000011 HC RX IP 250 OP 636: Performed by: NURSE PRACTITIONER

## 2023-10-20 PROCEDURE — 250N000011 HC RX IP 250 OP 636: Performed by: INTERNAL MEDICINE

## 2023-10-20 PROCEDURE — 258N000003 HC RX IP 258 OP 636: Performed by: NURSE PRACTITIONER

## 2023-10-20 PROCEDURE — 94640 AIRWAY INHALATION TREATMENT: CPT | Mod: 76

## 2023-10-20 PROCEDURE — 88305 TISSUE EXAM BY PATHOLOGIST: CPT | Mod: 26 | Performed by: PATHOLOGY

## 2023-10-20 PROCEDURE — 80053 COMPREHEN METABOLIC PANEL: CPT | Performed by: INTERNAL MEDICINE

## 2023-10-20 PROCEDURE — 272N000220 HC BRONCHOSCOPE, DISPOSABLE

## 2023-10-20 PROCEDURE — 250N000013 HC RX MED GY IP 250 OP 250 PS 637: Performed by: SURGERY

## 2023-10-20 PROCEDURE — 88108 CYTOPATH CONCENTRATE TECH: CPT | Mod: 26 | Performed by: PATHOLOGY

## 2023-10-20 PROCEDURE — 87205 SMEAR GRAM STAIN: CPT | Performed by: INTERNAL MEDICINE

## 2023-10-20 PROCEDURE — 250N000009 HC RX 250: Performed by: SURGERY

## 2023-10-20 PROCEDURE — 87102 FUNGUS ISOLATION CULTURE: CPT | Performed by: INTERNAL MEDICINE

## 2023-10-20 PROCEDURE — 999N000253 HC STATISTIC WEANING TRIALS

## 2023-10-20 PROCEDURE — 250N000013 HC RX MED GY IP 250 OP 250 PS 637: Performed by: NURSE PRACTITIONER

## 2023-10-20 PROCEDURE — 87106 FUNGI IDENTIFICATION YEAST: CPT | Performed by: INTERNAL MEDICINE

## 2023-10-20 PROCEDURE — 87206 SMEAR FLUORESCENT/ACID STAI: CPT | Performed by: INTERNAL MEDICINE

## 2023-10-20 PROCEDURE — 250N000011 HC RX IP 250 OP 636: Mod: JZ | Performed by: NURSE PRACTITIONER

## 2023-10-20 PROCEDURE — 99291 CRITICAL CARE FIRST HOUR: CPT | Mod: 25 | Performed by: INTERNAL MEDICINE

## 2023-10-20 PROCEDURE — 87305 ASPERGILLUS AG IA: CPT | Performed by: INTERNAL MEDICINE

## 2023-10-20 PROCEDURE — 999N000157 HC STATISTIC RCP TIME EA 10 MIN

## 2023-10-20 PROCEDURE — P9047 ALBUMIN (HUMAN), 25%, 50ML: HCPCS | Performed by: INTERNAL MEDICINE

## 2023-10-20 PROCEDURE — 99291 CRITICAL CARE FIRST HOUR: CPT | Performed by: INTERNAL MEDICINE

## 2023-10-20 PROCEDURE — 88108 CYTOPATH CONCENTRATE TECH: CPT | Mod: TC | Performed by: INTERNAL MEDICINE

## 2023-10-20 PROCEDURE — B4185 PARENTERAL SOL 10 GM LIPIDS: HCPCS | Mod: JZ | Performed by: SURGERY

## 2023-10-20 PROCEDURE — 200N000001 HC R&B ICU

## 2023-10-20 PROCEDURE — 85025 COMPLETE CBC W/AUTO DIFF WBC: CPT | Performed by: INTERNAL MEDICINE

## 2023-10-20 PROCEDURE — 94003 VENT MGMT INPAT SUBQ DAY: CPT

## 2023-10-20 PROCEDURE — 31624 DX BRONCHOSCOPE/LAVAGE: CPT

## 2023-10-20 PROCEDURE — 31624 DX BRONCHOSCOPE/LAVAGE: CPT | Performed by: INTERNAL MEDICINE

## 2023-10-20 PROCEDURE — 0B968ZZ DRAINAGE OF RIGHT LOWER LOBE BRONCHUS, VIA NATURAL OR ARTIFICIAL OPENING ENDOSCOPIC: ICD-10-PCS | Performed by: INTERNAL MEDICINE

## 2023-10-20 PROCEDURE — 84100 ASSAY OF PHOSPHORUS: CPT | Performed by: INTERNAL MEDICINE

## 2023-10-20 PROCEDURE — 86140 C-REACTIVE PROTEIN: CPT | Performed by: INTERNAL MEDICINE

## 2023-10-20 PROCEDURE — 87116 MYCOBACTERIA CULTURE: CPT | Performed by: INTERNAL MEDICINE

## 2023-10-20 PROCEDURE — 87081 CULTURE SCREEN ONLY: CPT | Performed by: INTERNAL MEDICINE

## 2023-10-20 RX ORDER — METRONIDAZOLE 500 MG/100ML
500 INJECTION, SOLUTION INTRAVENOUS EVERY 8 HOURS
Status: DISCONTINUED | OUTPATIENT
Start: 2023-10-20 | End: 2023-10-22

## 2023-10-20 RX ORDER — METHYLPREDNISOLONE SODIUM SUCCINATE 40 MG/ML
40 INJECTION, POWDER, LYOPHILIZED, FOR SOLUTION INTRAMUSCULAR; INTRAVENOUS EVERY 12 HOURS
Status: COMPLETED | OUTPATIENT
Start: 2023-10-20 | End: 2023-10-21

## 2023-10-20 RX ORDER — AMIODARONE HYDROCHLORIDE 200 MG/1
400 TABLET ORAL 2 TIMES DAILY
Status: DISCONTINUED | OUTPATIENT
Start: 2023-10-20 | End: 2023-10-23

## 2023-10-20 RX ORDER — AMIODARONE HYDROCHLORIDE 200 MG/1
400 TABLET ORAL 2 TIMES DAILY
Status: DISCONTINUED | OUTPATIENT
Start: 2023-10-20 | End: 2023-10-20

## 2023-10-20 RX ORDER — DEXTROSE MONOHYDRATE 100 MG/ML
INJECTION, SOLUTION INTRAVENOUS CONTINUOUS PRN
Status: DISCONTINUED | OUTPATIENT
Start: 2023-10-20 | End: 2023-10-25

## 2023-10-20 RX ORDER — ACETAMINOPHEN 650 MG/20.3ML
650 LIQUID ORAL EVERY 4 HOURS PRN
Status: DISCONTINUED | OUTPATIENT
Start: 2023-10-20 | End: 2023-10-30

## 2023-10-20 RX ORDER — ONDANSETRON 4 MG/1
4 TABLET, ORALLY DISINTEGRATING ORAL EVERY 6 HOURS PRN
Status: DISCONTINUED | OUTPATIENT
Start: 2023-10-20 | End: 2023-10-20

## 2023-10-20 RX ORDER — ALBUMIN (HUMAN) 12.5 G/50ML
50 SOLUTION INTRAVENOUS ONCE
Status: COMPLETED | OUTPATIENT
Start: 2023-10-20 | End: 2023-10-20

## 2023-10-20 RX ORDER — BUMETANIDE 2 MG/1
2 TABLET ORAL DAILY
Status: DISCONTINUED | OUTPATIENT
Start: 2023-10-21 | End: 2023-10-22

## 2023-10-20 RX ORDER — ONDANSETRON 2 MG/ML
4 INJECTION INTRAMUSCULAR; INTRAVENOUS EVERY 6 HOURS PRN
Status: DISCONTINUED | OUTPATIENT
Start: 2023-10-20 | End: 2023-10-20

## 2023-10-20 RX ORDER — BUMETANIDE 2 MG/1
2 TABLET ORAL DAILY
Status: DISCONTINUED | OUTPATIENT
Start: 2023-10-21 | End: 2023-10-20

## 2023-10-20 RX ORDER — FLUMAZENIL 0.1 MG/ML
0.2 INJECTION, SOLUTION INTRAVENOUS
Status: ACTIVE | OUTPATIENT
Start: 2023-10-20 | End: 2023-10-21

## 2023-10-20 RX ADMIN — QUETIAPINE FUMARATE 50 MG: 25 TABLET ORAL at 09:42

## 2023-10-20 RX ADMIN — DEXMEDETOMIDINE HYDROCHLORIDE 1.2 MCG/KG/HR: 4 INJECTION, SOLUTION INTRAVENOUS at 22:44

## 2023-10-20 RX ADMIN — CHLORHEXIDINE GLUCONATE 15 ML: 1.2 RINSE ORAL at 09:43

## 2023-10-20 RX ADMIN — PIPERACILLIN AND TAZOBACTAM 3.38 G: 3; .375 INJECTION, POWDER, LYOPHILIZED, FOR SOLUTION INTRAVENOUS at 16:16

## 2023-10-20 RX ADMIN — HEPARIN SODIUM 5000 UNITS: 5000 INJECTION, SOLUTION INTRAVENOUS; SUBCUTANEOUS at 15:36

## 2023-10-20 RX ADMIN — ACETAMINOPHEN 650 MG: 325 TABLET ORAL at 05:29

## 2023-10-20 RX ADMIN — IPRATROPIUM BROMIDE AND ALBUTEROL SULFATE 3 ML: .5; 3 SOLUTION RESPIRATORY (INHALATION) at 19:40

## 2023-10-20 RX ADMIN — MICAFUNGIN SODIUM 100 MG: 50 INJECTION, POWDER, LYOPHILIZED, FOR SOLUTION INTRAVENOUS at 11:45

## 2023-10-20 RX ADMIN — MIDAZOLAM 2 MG: 1 INJECTION INTRAMUSCULAR; INTRAVENOUS at 17:17

## 2023-10-20 RX ADMIN — DEXMEDETOMIDINE HYDROCHLORIDE 1.2 MCG/KG/HR: 4 INJECTION, SOLUTION INTRAVENOUS at 05:10

## 2023-10-20 RX ADMIN — BUMETANIDE 2 MG: 0.25 INJECTION INTRAMUSCULAR; INTRAVENOUS at 05:10

## 2023-10-20 RX ADMIN — INSULIN ASPART 4 UNITS: 100 INJECTION, SOLUTION INTRAVENOUS; SUBCUTANEOUS at 19:46

## 2023-10-20 RX ADMIN — SENNOSIDES 5 ML: 8.8 LIQUID ORAL at 09:42

## 2023-10-20 RX ADMIN — INSULIN ASPART 1 UNITS: 100 INJECTION, SOLUTION INTRAVENOUS; SUBCUTANEOUS at 11:53

## 2023-10-20 RX ADMIN — AMIODARONE HYDROCHLORIDE 400 MG: 200 TABLET ORAL at 11:45

## 2023-10-20 RX ADMIN — DEXMEDETOMIDINE HYDROCHLORIDE 1.2 MCG/KG/HR: 4 INJECTION, SOLUTION INTRAVENOUS at 02:31

## 2023-10-20 RX ADMIN — DOCUSATE SODIUM 50 MG: 50 LIQUID ORAL at 09:42

## 2023-10-20 RX ADMIN — METRONIDAZOLE 500 MG: 500 INJECTION, SOLUTION INTRAVENOUS at 13:06

## 2023-10-20 RX ADMIN — PIPERACILLIN AND TAZOBACTAM 3.38 G: 3; .375 INJECTION, POWDER, LYOPHILIZED, FOR SOLUTION INTRAVENOUS at 09:42

## 2023-10-20 RX ADMIN — DEXMEDETOMIDINE HYDROCHLORIDE 1.2 MCG/KG/HR: 4 INJECTION, SOLUTION INTRAVENOUS at 17:22

## 2023-10-20 RX ADMIN — POLYETHYLENE GLYCOL 3350 17 G: 17 POWDER, FOR SOLUTION ORAL at 09:42

## 2023-10-20 RX ADMIN — MIDAZOLAM 2 MG: 1 INJECTION INTRAMUSCULAR; INTRAVENOUS at 19:24

## 2023-10-20 RX ADMIN — DEXMEDETOMIDINE HYDROCHLORIDE 1.2 MCG/KG/HR: 4 INJECTION, SOLUTION INTRAVENOUS at 07:53

## 2023-10-20 RX ADMIN — PANTOPRAZOLE SODIUM 40 MG: 40 INJECTION, POWDER, FOR SOLUTION INTRAVENOUS at 10:17

## 2023-10-20 RX ADMIN — Medication 200 MCG/HR: at 18:13

## 2023-10-20 RX ADMIN — Medication 100 MCG: at 16:32

## 2023-10-20 RX ADMIN — Medication 100 MCG: at 05:00

## 2023-10-20 RX ADMIN — Medication 50 MCG: at 01:25

## 2023-10-20 RX ADMIN — INSULIN ASPART 5 UNITS: 100 INJECTION, SOLUTION INTRAVENOUS; SUBCUTANEOUS at 23:30

## 2023-10-20 RX ADMIN — DEXMEDETOMIDINE HYDROCHLORIDE 1.2 MCG/KG/HR: 4 INJECTION, SOLUTION INTRAVENOUS at 12:37

## 2023-10-20 RX ADMIN — MAGNESIUM SULFATE HEPTAHYDRATE: 500 INJECTION, SOLUTION INTRAMUSCULAR; INTRAVENOUS at 19:32

## 2023-10-20 RX ADMIN — DEXMEDETOMIDINE HYDROCHLORIDE 1.2 MCG/KG/HR: 4 INJECTION, SOLUTION INTRAVENOUS at 19:25

## 2023-10-20 RX ADMIN — ACETAMINOPHEN 650 MG: 325 TABLET ORAL at 11:38

## 2023-10-20 RX ADMIN — QUETIAPINE FUMARATE 50 MG: 25 TABLET ORAL at 20:02

## 2023-10-20 RX ADMIN — AMIODARONE HYDROCHLORIDE 400 MG: 200 TABLET ORAL at 20:02

## 2023-10-20 RX ADMIN — Medication 150 MCG/HR: at 04:47

## 2023-10-20 RX ADMIN — ALBUMIN HUMAN 50 G: 0.25 SOLUTION INTRAVENOUS at 09:41

## 2023-10-20 RX ADMIN — IPRATROPIUM BROMIDE AND ALBUTEROL SULFATE 3 ML: .5; 3 SOLUTION RESPIRATORY (INHALATION) at 07:29

## 2023-10-20 RX ADMIN — IPRATROPIUM BROMIDE AND ALBUTEROL SULFATE 3 ML: .5; 3 SOLUTION RESPIRATORY (INHALATION) at 12:44

## 2023-10-20 RX ADMIN — OLIVE OIL AND SOYBEAN OIL 250 ML: 16; 4 INJECTION, EMULSION INTRAVENOUS at 19:38

## 2023-10-20 RX ADMIN — METRONIDAZOLE 500 MG: 500 INJECTION, SOLUTION INTRAVENOUS at 19:29

## 2023-10-20 RX ADMIN — MIDAZOLAM 2 MG: 1 INJECTION INTRAMUSCULAR; INTRAVENOUS at 15:31

## 2023-10-20 RX ADMIN — MIDAZOLAM 2 MG: 1 INJECTION INTRAMUSCULAR; INTRAVENOUS at 01:29

## 2023-10-20 RX ADMIN — ANORECTAL OINTMENT: 15.7; .44; 24; 20.6 OINTMENT TOPICAL at 16:17

## 2023-10-20 RX ADMIN — MIDAZOLAM 2 MG: 1 INJECTION INTRAMUSCULAR; INTRAVENOUS at 22:49

## 2023-10-20 RX ADMIN — IPRATROPIUM BROMIDE AND ALBUTEROL SULFATE 3 ML: .5; 3 SOLUTION RESPIRATORY (INHALATION) at 17:24

## 2023-10-20 RX ADMIN — CHLORHEXIDINE GLUCONATE 15 ML: 1.2 RINSE ORAL at 19:25

## 2023-10-20 RX ADMIN — Medication 100 MCG: at 15:22

## 2023-10-20 RX ADMIN — MICONAZOLE NITRATE: 20 POWDER TOPICAL at 16:17

## 2023-10-20 RX ADMIN — HEPARIN SODIUM 5000 UNITS: 5000 INJECTION, SOLUTION INTRAVENOUS; SUBCUTANEOUS at 05:10

## 2023-10-20 RX ADMIN — INSULIN ASPART 1 UNITS: 100 INJECTION, SOLUTION INTRAVENOUS; SUBCUTANEOUS at 16:30

## 2023-10-20 RX ADMIN — Medication 50 MCG: at 11:50

## 2023-10-20 RX ADMIN — METHYLPREDNISOLONE SODIUM SUCCINATE 40 MG: 40 INJECTION, POWDER, FOR SOLUTION INTRAMUSCULAR; INTRAVENOUS at 16:14

## 2023-10-20 RX ADMIN — DEXMEDETOMIDINE HYDROCHLORIDE 1.2 MCG/KG/HR: 4 INJECTION, SOLUTION INTRAVENOUS at 10:15

## 2023-10-20 RX ADMIN — DEXMEDETOMIDINE HYDROCHLORIDE 1.2 MCG/KG/HR: 4 INJECTION, SOLUTION INTRAVENOUS at 15:00

## 2023-10-20 RX ADMIN — PIPERACILLIN AND TAZOBACTAM 3.38 G: 3; .375 INJECTION, POWDER, LYOPHILIZED, FOR SOLUTION INTRAVENOUS at 23:21

## 2023-10-20 RX ADMIN — MIDAZOLAM 2 MG: 1 INJECTION INTRAMUSCULAR; INTRAVENOUS at 04:46

## 2023-10-20 RX ADMIN — Medication 100 MCG: at 13:21

## 2023-10-20 RX ADMIN — INSULIN ASPART 1 UNITS: 100 INJECTION, SOLUTION INTRAVENOUS; SUBCUTANEOUS at 05:11

## 2023-10-20 RX ADMIN — HEPARIN SODIUM 5000 UNITS: 5000 INJECTION, SOLUTION INTRAVENOUS; SUBCUTANEOUS at 22:07

## 2023-10-20 ASSESSMENT — ACTIVITIES OF DAILY LIVING (ADL)
ADLS_ACUITY_SCORE: 35
ADLS_ACUITY_SCORE: 36
ADLS_ACUITY_SCORE: 35
ADLS_ACUITY_SCORE: 36
ADLS_ACUITY_SCORE: 35

## 2023-10-20 NOTE — PROGRESS NOTES
[facility]  [facility]   Follow up    Mojgna Jimenez   MRN: [id number]  : 1970   DOA: 10/9/2023   CC: ARF      Assessment and Plan:  53 year old female    ARF;  hemodynamic exacerbated by IV NSAIDS (lowish bp, vasodilatory drugs, mild intravascular depletion)==>ATN, now in recovery phase and creatinine improved though leveling off today. Hope to see more improvement with time. Creat was normal (0.7) PTA.   Lytes ok but mild hypernatremia with diuresis and current obligatory IVF. Na down 147 today.   Volume overload, now resolving/prob near euvolemia, I=O last 24 hours, good response to diuretics /alb ordered by cards earlier. Diuretics reduced per cards.    S/p gastric bypass; complicated by leak; s/p repair and wash out of bilious fluid. Peritonitis on abx.  Fevers and fluid collection, plan to tap in IR.   HoTN; infection/SIIRS; bp ok now off pressors. BP up when awake/ agitated. No agents. Defer to cards.   Hyperlipid; on statin  Elevated LFTs; following;levels improved   Resp failure; juancho-op; on vent; wean as able  Acidosis; resolved   Nutrition; on TPN   Cardiomyopathy, depressed EF on ECHO per cards.    Subjective  Seen earlier today.   Still on vent 35%  Initially sleeping on weaning trial and comfortable x 45 minutes   Awakened with exam and immediately agitated and inc RR and chewing on tube.       Objective    Vital signs in last 24 hours  Temp:  [99.1  F (37.3  C)-102.8  F (39.3  C)] 101.9  F (38.8  C)  Pulse:  [] 116  Resp:  [22-72] 43  BP: (124-202)/(59-99) 147/73  FiO2 (%):  [35 %] 35 %  SpO2:  [91 %-100 %] 91 %      Intake/Output last 3 shifts  I/O last 3 completed shifts:  In: 2620.23 [I.V.:1299.03; NG/GT:60]  Out: 3510 [Urine:3300; Emesis/NG output:100; Drains:110]  Intake/Output this shift:  I/O this shift:  In: 260 [NG/GT:60]  Out: 1300 [Urine:1275; Drains:25]    Physical Exam  Intubated   CV: RRR without murmur or  rub  Lung: clear and equal; no extra sounds  Ab:soft binder on.  Ext:much less MARIA EUGENIA and well perfused  Skin; no rash    Pertinent Labs     Last Renal Panel:  Sodium   Date Value Ref Range Status   10/20/2023 147 (H) 135 - 145 mmol/L Final     Comment:     Reference intervals for this test were updated on 09/26/2023 to more accurately reflect our healthy population. There may be differences in the flagging of prior results with similar values performed with this method. Interpretation of those prior results can be made in the context of the updated reference intervals.    11/17/2020 141 133 - 144 mmol/L Final     Potassium   Date Value Ref Range Status   10/20/2023 4.1 3.4 - 5.3 mmol/L Final   05/17/2022 4.5 3.4 - 5.3 mmol/L Final   11/17/2020 4.1 3.4 - 5.3 mmol/L Final     Chloride   Date Value Ref Range Status   10/20/2023 110 (H) 98 - 107 mmol/L Final   05/17/2022 102 94 - 109 mmol/L Final   11/17/2020 109 94 - 109 mmol/L Final     Carbon Dioxide   Date Value Ref Range Status   11/17/2020 31 20 - 32 mmol/L Final     Carbon Dioxide (CO2)   Date Value Ref Range Status   10/20/2023 23 22 - 29 mmol/L Final   05/17/2022 32 20 - 32 mmol/L Final     Anion Gap   Date Value Ref Range Status   10/20/2023 14 7 - 15 mmol/L Final   05/17/2022 2 (L) 3 - 14 mmol/L Final   11/17/2020 1 (L) 3 - 14 mmol/L Final     Glucose   Date Value Ref Range Status   05/17/2022 110 (H) 70 - 99 mg/dL Final   11/17/2020 84 70 - 99 mg/dL Final     Comment:     Fasting specimen     GLUCOSE BY METER POCT   Date Value Ref Range Status   10/20/2023 155 (H) 70 - 99 mg/dL Final     Urea Nitrogen   Date Value Ref Range Status   10/20/2023 89.8 (H) 6.0 - 20.0 mg/dL Final   05/17/2022 16 7 - 30 mg/dL Final   11/17/2020 9 7 - 30 mg/dL Final     Creatinine   Date Value Ref Range Status   10/20/2023 2.87 (H) 0.51 - 0.95 mg/dL Final   11/17/2020 0.79 0.52 - 1.04 mg/dL Final     GFR Estimate   Date Value Ref Range Status   10/20/2023 19 (L) >60 mL/min/1.73m2  Final   11/17/2020 88 >60 mL/min/[1.73_m2] Final     Comment:     Non  GFR Calc  Starting 12/18/2018, serum creatinine based estimated GFR (eGFR) will be   calculated using the Chronic Kidney Disease Epidemiology Collaboration   (CKD-EPI) equation.       Calcium   Date Value Ref Range Status   10/20/2023 9.5 8.6 - 10.0 mg/dL Final   11/17/2020 8.9 8.5 - 10.1 mg/dL Final     Phosphorus   Date Value Ref Range Status   10/20/2023 3.5 2.5 - 4.5 mg/dL Final     Albumin   Date Value Ref Range Status   10/20/2023 3.4 (L) 3.5 - 5.2 g/dL Final   05/17/2022 4.0 3.4 - 5.0 g/dL Final   12/03/2018 3.6 3.4 - 5.0 g/dL Final     Recent Labs   Lab 10/20/23  0509 10/19/23  0553 10/18/23  0439 10/16/23  1055   HGB 9.6* 9.1* 9.2* 9.9*          I reviewed all lab results  Simi Shin MD  [facility]  [phone]

## 2023-10-20 NOTE — PROGRESS NOTES
Respiratory Care    MD placed pt on SBT this morning. Pts RR increased into the 40s, moving around in bed, pushing ETT around mouth, unable to redirect. RN at bedside. Placed back on full vent support for now. Weaned for about 45 min.      Guillermo Keyes, RT

## 2023-10-20 NOTE — PLAN OF CARE
[facility] - ICU    RN Progress Note:            Pertinent Assessments:      Please refer to flowsheet rows for full assessment     Agitation and restlessness has been issue overnight. Patient get agitated when she is awake and unable to redirect her. Needing frequent versed and Fentanyl boluses to calm her down. Running temp intermittently prn tylenol given along with cooling blanket.            Key Events - This Shift:       See above                 Barriers to Discharge / Downgrade:     Vent weaning             Problem: Plan of Care - These are the overarching goals to be used throughout the patient stay.    Goal: Plan of Care Review  Description: The Plan of Care Review/Shift note should be completed every shift.  The Outcome Evaluation is a brief statement about your assessment that the patient is improving, declining, or no change.  This information will be displayed automatically on your shift note.  Outcome: Not Progressing  Flowsheets (Taken 10/20/2023 0639)  Plan of Care Reviewed With: patient  Overall Patient Progress: no change   Goal Outcome Evaluation:      Plan of Care Reviewed With: patient    Overall Patient Progress: no changeOverall Patient Progress: no change

## 2023-10-20 NOTE — PHARMACY-CONSULT NOTE
"Pharmacy Note: Parenteral Nutrition (PN) Management    Pharmacist consulted to dose PN for Mojgna Jimenez, a 53 year old female by Dr. Cruz.     Subjective:     The patient is a new PN start.     The patient was started on PN in the hospital on 10/14/23.     Indication for PN therapy:  peritonitis     Inadequate nutrition anticipated for > 7 days.      Enteral nutrition contraindicated due to: peritonitis.     Pertinent diseases and other special considerations  10/9/23 s/p sleeve gastrectomy       Social History     Tobacco Use    Smoking status: Never    Smokeless tobacco: Never   Vaping Use    Vaping Use: Never used   Substance Use Topics    Alcohol use: Not Currently     Comment: Sober x 8 months    Drug use: Not Currently     Types: Methamphetamines, \"Crack\" cocaine     Comment: in remision      Objective:    Ht Readings from Last 1 Encounters:   10/09/23 1.6 m (5' 3\")     Wt Readings from Last 1 Encounters:   10/20/23 129.8 kg (286 lb 2.5 oz)       Body mass index is 50.69 kg/m .    Patient Vitals for the past 96 hrs:   Weight   10/20/23 0500 129.8 kg (286 lb 2.5 oz)   10/18/23 0600 137.6 kg (303 lb 5.7 oz)   10/17/23 0600 138 kg (304 lb 3.2 oz)       Labs:  Last 3 days:  Recent Labs     10/18/23  0439 10/19/23  0553 10/19/23  0553 10/20/23  0509    148*  --  147*   POTASSIUM 3.2* 3.5  --  4.1   CHLORIDE 105 107  --  110*   CO2 27 25  --  23   BUN 97.4* 91.2*  --  89.8*   CR 3.02* 2.84*  --  2.87*   PALAK 8.7 9.2  --  9.5   MAG 1.8 1.6*  --  1.9   PHOS 2.8 3.3  --  3.5   PROTTOTAL  --  6.9  --  7.6   ALBUMIN  --  3.1*  --  3.4*   TRIG 278*  --   --   --    HGB 9.2* 9.1*  --  9.6*   HCT 29.7* 28.9*  --  30.9*     298 337  --  318   BILITOTAL  --  0.5  --  0.6   AST  --  25  --  27   ALT  --  40  --  34   ALKPHOS  --  102   < > 90    < > = values in this interval not displayed.       Glucose (past 48 hours):   Recent Labs     10/19/23  0553 10/19/23  0747 10/19/23  1147 10/19/23  1627 10/19/23  2012 " 10/19/23  2356 10/20/23  0506 10/20/23  0509 10/20/23  0817 10/20/23  1123   * 134* 141* 182* 150* 176* 141* 146* 149* 155*       Intake/Output (last 24 hours): I/O last 3 completed shifts:  In: 2620.23 [I.V.:1299.03; NG/GT:60]  Out: 3510 [Urine:3300; Emesis/NG output:100; Drains:110]    Estimated CrCl: Estimated Creatinine Clearance: 29.8 mL/min (A) (based on SCr of 2.87 mg/dL (H)).    Assessment:  Continue patient on PN therapy as a continuous central therapy.      Given the patient's current condition/oral intake, PN is still indicated.     Lab results reviewed: Na high,will reduce again. Anticipate slow start to TF in near future,   Mag low, increase in TPN again tonight  Chloride high, will change to 1:2  Bumex reduced to daily, will monitor K and reduce tomorrow if necessary     Plan:  Rate of PN: 50 mL/hr   Formula:   Amino Acids 65 grams  Dextrose 150 grams  Sodium 20 mEq/day  Potassium 80 mEq/day  Calcium 5 mEq/day  Magnesium 8 mEq/day  Phosphorus 3 mMol/day  Chloride: Acetate Ratio 1:2  Standard Multivitamins w/Vitamin K  Trace Elements  Vitamin b12 100 mcg  Zinc 5mg  Fat Emulsion: 20%, 250 mL IV 3 times weekly on MWF  Check BMP and Mag, Phos  labs tomorrow.  Pharmacist will continue to follow the patient's lab results, clinical status and blood glucose results and make adjustments as appropriate.    Thank you for the consult.  Blaire Gray RPH  10/20/2023 11:42 AM

## 2023-10-20 NOTE — PROGRESS NOTES
HEART CARE NOTE          Assessment/Recommendations     1. Severe HFrEF c/b ADHF and septic shock  Assessment / Plan  Near euvolemia - transition to oral regimen; continue to monitor renal function/repeat BMP, UOP and hemodynamics closely   Patient is high risk of adverse cardiac events 2/2 severe LV dysfunction and hx of substance/methamphetamine abuse  Will need ischemia eval is LV systolic dysfunction remains depressed - repeat echo once near euvolemia and infection has resolved  GDMT on hold given the above     2. Frequent PVCs  Assessment / Plan  Resolved;vTransition to oral amiodarone   Continue to monitor electrolytes and replete PRN; goal K 4 and Mg > 2     3. Acute hypoxic respiratory failure  Assessment / Plan  S/p mechanical ventilation -management per ICU team     4. SUSSY  Assessment / Plan  2/2 hemodynamic compromise Continue to monitor UOP and renal function closely - resolving     5. Gastric bypass surgery c/b bile leak  Assessment / Plan  Management per primary and surgical team    Patient remains critically ill in the ICU requiring mechanical ventilator support in the setting of acute hypoxic respiratory failure. 50 minutes of critical care time spent     History of Present Illness/Subjective    Ms. Mjogan Jimenez is a 53 year old female with a PMHx significant for obesity, JARVIS on CPAP, asthma (no PFTs), stimulant use disorder, stimulant induced psychosis, mood & anxiety disorders; Cardiology consulted regarding new decline in LVSF     Today, Mrs. Jimenez is intubated; Management plan as detailed above      ECG: personally reviewed; normal sinus rhythm, nonspecific ST and T waves changes.     ECHO (personnaly Reviewed on 10/16/23):   Technically difficult study.  Limited views were obtained.  The left ventricle is normal in size.  Left ventricular function is decreased. The ejection fraction is 30-35%  (moderately reduced).  There is moderate global hypokinesia of the left ventricle.    Telemetry:  "personally reviewed October 20, 2023; notable for sinus rhythm     Lab results: personally reviewed October 20, 2023; notable for resolving SUSSY    Medical history and pertinent documents reviewed in Care Everywhere please where applicable see details above        Physical Examination Review of Systems   BP (!) 142/68   Pulse 92   Temp (!) 102.8  F (39.3  C) (Oral)   Resp (!) 33   Ht 1.6 m (5' 3\")   Wt 129.8 kg (286 lb 2.5 oz)   LMP 09/09/2023 (Exact Date)   SpO2 95%   BMI 50.69 kg/m    Body mass index is 50.69 kg/m .  Wt Readings from Last 3 Encounters:   10/20/23 129.8 kg (286 lb 2.5 oz)   08/09/23 133.4 kg (294 lb 3.2 oz)   07/05/23 129.7 kg (286 lb)     General Appearance:   Intubated/sedated   ENT/Mouth: membranes moist, no oral lesions or bleeding gums.      EYES:  no scleral icterus, normal conjunctivae   Neck: no carotid bruits or thyromegaly   Chest/Lungs:   lungs are clear to auscultation, no rales or wheezing, equal chest wall expansion    Cardiovascular:   Regular. Normal first and second heart sounds with no murmurs, rubs, or gallops; the carotid, radial and posterior tibial pulses are intact, no JVD and trace LE edema bilaterally    Abdomen:  no organomegaly, masses, bruits, or tenderness; bowel sounds are present   Extremities: no cyanosis or clubbing   Skin: no xanthelasma, warm.    Neurologic: Intubated/sedated     Psychiatric: Intubated/sedated    A complete 10 systems ROS was reviewed  And is negative except what is listed in the HPI.          Medical History  Surgical History Family History Social History   Past Medical History:   Diagnosis Date     LINA (generalized anxiety disorder) 11/02/2017     Hyperlipidemia LDL goal <160 01/20/2019     Major depressive disorder      Methanol abuse (H)      Mild persistent asthma without complication 11/02/2017     Obese      JARVIS on CPAP     Cpap not working     Paranoia (H)     resolved due to drugs     Vitamin D deficiency 11/02/2017    Past " "Surgical History:   Procedure Laterality Date     GASTRECTOMY, SLEEVE, LAPAROSCOPIC, WITH DUODENAL SWITCH N/A 10/9/2023    Procedure: GASTRECTOMY, SLEEVE, LAPAROSCOPIC, WITH SINGLE ANASTAMOSIS DUODENAL SWITCH;  Surgeon: Hoang Worthy MD;  Location: [facility] OR     LAPAROSCOPY DIAGNOSTIC (GYN) N/A 10/12/2023    Procedure: LAPAROSCOPY,;  Surgeon: Hoang Worthy MD;  Location: [facility] OR     LAPAROTOMY EXPLORATORY N/A 10/12/2023    Procedure: LAPAROTOMY, CLOSURE OF TWO SMALL BOWEL INTEROTOMIES, DRAIN PLACEMENT, INTRA-ABDOMINAL CLEAN OUT;  Surgeon: Hoang Worthy MD;  Location: [facility] OR     MAMMOPLASTY REDUCTION      reduction    no family history of premature coronary artery disease Social History     Socioeconomic History     Marital status: Single     Spouse name: Not on file     Number of children: Not on file     Years of education: Not on file     Highest education level: Not on file   Occupational History     Not on file   Tobacco Use     Smoking status: Never     Smokeless tobacco: Never   Vaping Use     Vaping Use: Never used   Substance and Sexual Activity     Alcohol use: Not Currently     Comment: Sober x 8 months     Drug use: Not Currently     Types: Methamphetamines, \"Crack\" cocaine     Comment: in remision      Sexual activity: Not Currently     Partners: Male   Other Topics Concern     Parent/sibling w/ CABG, MI or angioplasty before 65F 55M? No   Social History Narrative     Not on file     Social Determinants of Health     Financial Resource Strain: Not on file   Food Insecurity: Not on file   Transportation Needs: Not on file   Physical Activity: Not on file   Stress: Not on file   Social Connections: Not on file   Interpersonal Safety: Not on file   Housing Stability: Not on file           Lab Results    Chemistry/lipid CBC Cardiac Enzymes/BNP/TSH/INR   Lab Results   Component Value Date    CHOL 176 05/17/2022    HDL 36 (L) 05/17/2022    TRIG 278 (H) " "10/18/2023    BUN 89.8 (H) 10/20/2023     (H) 10/20/2023    CO2 23 10/20/2023    Lab Results   Component Value Date    WBC 17.4 (H) 10/20/2023    HGB 9.6 (L) 10/20/2023    HCT 30.9 (L) 10/20/2023    MCV 95 10/20/2023     10/20/2023    Lab Results   Component Value Date    TSH 2.56 09/15/2022    INR 1.21 (H) 10/16/2023     No results found for: \"CKTOTAL\", \"CKMB\", \"TROPONINI\"       Weight:    Wt Readings from Last 3 Encounters:   10/20/23 129.8 kg (286 lb 2.5 oz)   08/09/23 133.4 kg (294 lb 3.2 oz)   07/05/23 129.7 kg (286 lb)       Allergies  No Known Allergies      Surgical History  Past Surgical History:   Procedure Laterality Date     GASTRECTOMY, SLEEVE, LAPAROSCOPIC, WITH DUODENAL SWITCH N/A 10/9/2023    Procedure: GASTRECTOMY, SLEEVE, LAPAROSCOPIC, WITH SINGLE ANASTAMOSIS DUODENAL SWITCH;  Surgeon: Hoang Worthy MD;  Location: [facility] OR     LAPAROSCOPY DIAGNOSTIC (GYN) N/A 10/12/2023    Procedure: LAPAROSCOPY,;  Surgeon: Hoang Worthy MD;  Location: [facility] OR     LAPAROTOMY EXPLORATORY N/A 10/12/2023    Procedure: LAPAROTOMY, CLOSURE OF TWO SMALL BOWEL INTEROTOMIES, DRAIN PLACEMENT, INTRA-ABDOMINAL CLEAN OUT;  Surgeon: Hoang Worthy MD;  Location: [facility] OR     MAMMOPLASTY REDUCTION      reduction       Social History  Tobacco:   History   Smoking Status     Never   Smokeless Tobacco     Never    Alcohol:   Social History    Substance and Sexual Activity      Alcohol use: Not Currently        Comment: Sober x 8 months   Illicit Drugs:   History   Drug Use Unknown     Comment: in remision        Family History  Family History   Problem Relation Age of Onset     Cancer Mother      Unknown/Adopted Father      Alcoholism Father      Substance Abuse Sister      Diabetes No family hx of      Hypertension No family hx of           Do Schreiber MD on 10/20/2023      cc: Franca Mishra    "

## 2023-10-20 NOTE — PROGRESS NOTES
ICU NOTE    Increase tracheal secretions. Difficulty weaning from mechanical ventilation due to agitation.   Ongoing fever.     Recent CT scan showed right basal atelectasis.   Case was discussed with surgery. Plan for diagnostic bronchoscopy.      No cuff leak noted. Patient has a 7.5 mm ET tube. Patient is 5 ft 3 in.  Vocal cord edema vs small trachea.   Start solumedrol for ? vocal cord edema.     James Borrero MD  Critical Care Medicine   10/20/23

## 2023-10-20 NOTE — PROGRESS NOTES
Respiratory Care    Vent day 9    Vent Mode: CMV/AC  (Continuous Mandatory Ventilation/ Assist Control)  FiO2 (%): 35 %  Resp Rate (Set): 18 breaths/min  Tidal Volume (Set, mL): 400 mL  PEEP (cm H2O): 6 cmH2O  Pressure Support (cm H2O): 10 cmH2O  Resp: (!) 35    PIP 29  Pplat 27  Secretions: small to mod tan    Notes/plan: pt has no cuff leak vs large ETT small trachea. MD aware. Plan to continue full vent and reassess tomorrow morning. Pt had diagnostic bronch today. Tolerated well. BAL done and sent to lab for analysis.       Guillermo Keyes, RT

## 2023-10-20 NOTE — PROGRESS NOTES
RT PROGRESS NOTE    VENT DAY# 9    CURRENT SETTINGS:   Vent Mode: CMV/AC  (Continuous Mandatory Ventilation/ Assist Control)  FiO2 (%): 35 %  Resp Rate (Set): 18 breaths/min  Tidal Volume (Set, mL): 400 mL  PEEP (cm H2O): 6 cmH2O  Pressure Support (cm H2O): 10 cmH2O  Resp: (!) 43      PATIENT PARAMETERS:  PIP 33  Pplat:  22  Pmean:  17  Compliance: 21.3  Secretions:  small red streaked white thick  02 Sats:  96%  BS: coarse    ETT SIZE 7.5 Secured at 20 cm at teeth/gums    Respiratory Medications: Duoneb QID     ABG:10/18/23 @1357 pH 7.47; pCO2 35; pO2 74; HCO3 25, %O2 Sat 95.4, BE 1.5 on 35%    NOTE / SHIFT SUMMARY:   Patient on full support all night. Patient becomes very agitated with suctioning. Takes several minutes for WOB to calm down. RT will continue to follow.     Emmanuelle Hernández, RT

## 2023-10-20 NOTE — PROGRESS NOTES
PULMONARY / CRITICAL CARE PROGRESS NOTE    Date / Time of Admission:  10/9/2023  5:26 AM    Assessment:     Mojgan Jimenez is a 53 year old female with history of asthma, severe JARVIS, (AHI of 41.7, supine AHI 41.6, REM AHI 85.7, and 19.8 minutes with O2 saturations less than 88%), stimulant use disorder, generalized anxiety disorder, depression, GERD, morbid obesity Body mass index is 53.74 kg/m .  Presented on 10/9/23 for scheduled laparoscopic sleeve gastrectomy with single anastomosis duodenal switch.   Postprocedure, patient complained of abdominal pain. Gastrografin leak test was negative. Rapid response was called due to encephalopathy, acute renal failure. Patient was admitted to ICU, intubated and started on pressors on 10/12. Patient was taken to OR.   S/p closure of two small bowel enterotomies, drain placement and wash out. Peritoneal fluid culture (+) strep anginosus.     Acute respiratory failure s/p intubation 10/12  Sepsis due to peritonitis secondary to bowel perforation  Peritoneal fluid culture (+) streptococcus anginosus.   Ongoing fever, raising WBC. Abdomen CT scan showed a layer of fluid between between right hemidiaphragm and liver.   IR was consulted for drain tube placement. Procedure was done on 10/19. Cultures pending.   Abx zosyn and micafungin.   Case was discussed with ID, plan to add metronidazole until results of new fluid culture.    S/p closure of two small bowel enterotomies, drain placement and wash out on 10/12  Acute kidney injury   On TPN  Volume up   Cardiomyopathy   Echocardiogram showed EF 30-35%, moderate global hypokinesia of the left ventricle.   Frequent PVCs  Hypernatremia  S/p laparoscopic sleeve gastrectomy with single anastomosis duodenal switch 10/9  Anemia   Hx stimulant use disorder, LINA, depression  JARVIS   Morbid obesity Body mass index is 50.69 kg/m .    Advance Directives:  Full code    Plan:   Titrate vent settings A/C 16/400/5/35%, keep SpO2 > 90%, plateau  pressure < 30  Schedule bronchodilators  Sedation to keep RASS 0 to -1, precedex and fentanyl drip  Weaning trial per protocol  Pressors to keep MAP > 65, currently off NE  On enteric amiodarone   Follow up blood and peritoneal fluid cultures   Abx zosyn , micafungin, add metronidazole   Monitor kidney function   Diuresis   Supplement electrolytes   On TPN  PPI IV for GI prophylaxis   Glucose level monitoring   DVT prophylaxis SCDs, heparin SQ    Please contact me if you have any questions.  Total critical care time, not including separately billable procedure time: 45 minutes  This patient had a high probability of imminent or life threatening deterioration due to acute respiratory failure which required my direct attention, intervention and personal management.     James Borrero  Pulmonary / Critical Care  10/20/2023  11:07 AM          ICU DAILY CHECKLIST                           Can patient transfer out of MICU? no    FAST HUG:    Feeding:  Feeding: yes.  Patient is receiving TPN    Patiño: yes  Analgesia/Sedation:yes  precedex, fentanyl  Thromboembolic prophylaxis: Yes; Mode:  Heparin and SCDs  HOB>30:  Yes  Stress Ulcer Protocol Active: Yes; Mode: PPI  Glycemic Control: Any glucose > 180 no; Mode of Insulin Therapy: Sliding Scale Insulin    INTUBATED:  Can patient have daily waking:  No  Can patient have spontaneous breathing trial:  No    Restraints? yes    PHYSICAL THERAPY AND MOBILITY:  Can patient have PT and mobility trial: no  Activity: bedrest    Subjective:   HPI:  Mojgan Jimenez is a 53 year old female with history of asthma, severe JARVIS, (AHI of 41.7, supine AHI 41.6, REM AHI 85.7, and 19.8 minutes with O2 saturations less than 88%), stimulant use disorder, generalized anxiety disorder, depression, GERD, morbid obesity Body mass index is 50.69 kg/m .  Presented on 10/9/23 for scheduled laparoscopic sleeve gastrectomy with single anastomosis duodenal switch.   Postprocedure, patient  complained of abdominal pain. Gastrografin leak test was negative. Rapid response was called due to encephalopathy, acute renal failure. Patient was admitted to ICU, intubated and started on pressors on 10/12. Patient was taken to OR.   S/p closure of two small bowel enterotomies, drain placement and wash out. Peritoneal fluid culture (+) strep anginosus.     Events overnight  - Tolerating PS weaning trial   - Febrile  - On TPN.   - On bumex drip with improvement of urine output.       Allergies: Patient has no known allergies.     MEDS:  Current Facility-Administered Medications   Medication    acetaminophen (TYLENOL) tablet 650 mg    Or    acetaminophen (TYLENOL) Suppository 650 mg    albuterol (PROVENTIL HFA/VENTOLIN HFA) inhaler    albuterol (PROVENTIL) neb solution 2.5 mg    amiodarone (PACERONE) tablet 400 mg    [START ON 10/21/2023] bumetanide (BUMEX) tablet 2 mg    chlorhexidine (PERIDEX) 0.12 % solution 15 mL    dexmedeTOMIDine (PRECEDEX) 4 mcg/mL in NS infusion    dextrose 10% infusion    glucose gel 15-30 g    Or    dextrose 50 % injection 25-50 mL    Or    glucagon injection 1 mg    diphenhydrAMINE (BENADRYL) capsule 25 mg    Or    diphenhydrAMINE (BENADRYL) injection 25 mg    sennosides (SENOKOT) syrup 5 mL    And    docusate (COLACE) 50 MG/5ML liquid 50 mg    fentaNYL (SUBLIMAZE) 50 mcg/mL bolus from pump    fentaNYL (SUBLIMAZE) infusion    heparin ANTICOAGULANT injection 5,000 Units    HYDROmorphone (DILAUDID) injection 0.2 mg    Or    HYDROmorphone (PF) (DILAUDID) injection 0.5 mg    insulin aspart (NovoLOG) injection (RAPID ACTING)    ipratropium - albuterol 0.5 mg/2.5 mg/3 mL (DUONEB) neb solution 3 mL    lidocaine (LMX4) cream    lidocaine 1 % 0.1-1 mL    lipids plant base (CLINOLIPID) 20 % infusion 250 mL    micafungin (MYCAMINE) 100 mg in sodium chloride 0.9 % 100 mL intermittent infusion    midazolam (VERSED) injection 1-2 mg    naloxone (NARCAN) injection 0.2 mg    Or    naloxone (NARCAN)  "injection 0.4 mg    Or    naloxone (NARCAN) injection 0.2 mg    Or    naloxone (NARCAN) injection 0.4 mg    norepinephrine (LEVOPHED) 4 mg in  mL infusion PREMIX    ondansetron (ZOFRAN ODT) ODT tab 4 mg    Or    ondansetron (ZOFRAN) injection 4 mg    pantoprazole (PROTONIX) IV push injection 40 mg    parenteral nutrition - ADULT compounded formula    phenol (CHLORASEPTIC) 1.4 % spray 1-2 mL    piperacillin-tazobactam (ZOSYN) 3.375 g vial to attach to  mL bag    polyethylene glycol (MIRALAX) Packet 17 g    prochlorperazine (COMPAZINE) injection 10 mg    Or    prochlorperazine (COMPAZINE) tablet 10 mg    propranolol (INDERAL) tablet 10 mg    QUEtiapine (SEROquel) tablet 50 mg    sodium chloride (PF) 0.9% PF flush 3 mL    sodium chloride (PF) 0.9% PF flush 3 mL    traMADol (ULTRAM) tablet 50 mg    vasopressin (VASOSTRICT) 20 Units in sodium chloride 0.9 % 100 mL standard conc infusion     Objective:   VITALS:  BP (!) 142/68   Pulse 92   Temp (!) 102.8  F (39.3  C) (Oral)   Resp (!) 33   Ht 1.6 m (5' 3\")   Wt 129.8 kg (286 lb 2.5 oz)   LMP 09/09/2023 (Exact Date)   SpO2 95%   BMI 50.69 kg/m    VENT:  Vent Mode: CMV/AC  (Continuous Mandatory Ventilation/ Assist Control)  FiO2 (%): 35 %  Resp Rate (Set): 18 breaths/min  Tidal Volume (Set, mL): 400 mL  PEEP (cm H2O): 6 cmH2O  Pressure Support (cm H2O): 10 cmH2O  Resp: (!) 33    EXAM:   Gen: obese, sedated, arosuable, vented  HEENT: pink conjunctiva, intubated  Neck: no thyromegaly, masses or JVD  Lungs: discrete ronchi both HT  CV: regular, tachycardic, no murmurs or gallops appreciated  Abdomen: distended, abdominal drain tubes, decrease bowel sounds  Ext: trace edema  Neuro: sedated, arousable, withdraws to painful stimulus    Data Review:  Recent Labs   Lab 10/20/23  0817 10/20/23  6505 10/20/23  0506 10/19/23  2356 10/19/23  2012 10/19/23  1627   * 146* 141* 176* 150* 182*        10/19/23 05:53   Sodium 148 (H)   Potassium 3.5   Chloride 107 "   Carbon Dioxide (CO2) 25   Urea Nitrogen 91.2 (H)   Creatinine 2.84 (H)   GFR Estimate 19 (L)   Calcium 9.2   Anion Gap 16 (H)   Magnesium 1.6 (L)   Phosphorus 3.3   Albumin 3.1 (L)   Protein Total 6.9   Alkaline Phosphatase 102   ALT 40   AST 25   Bilirubin Total 0.5   Glucose 188 (H)      10/19/23 05:53   WBC 16.5 (H)   Hemoglobin 9.1 (L)   Hematocrit 28.9 (L)   Platelet Count 337   RBC Count 3.09 (L)   MCV 94   MCH 29.4   MCHC 31.5   RDW 14.4   % Neutrophils 82   % Lymphocytes 6   % Monocytes 7   % Eosinophils 3     RUQ drain 10/19  Gram Stain Result No organisms seen      3+ WBC seen   Predominantly PMNs        Peritoneal fluid Culture 1+ Streptococcus anginosus Abnormal         XR CHEST PORT 1 VIEW  LOCATION: [facility]  DATE: 10/16/2023  INDICATION: hypoxia  COMPARISON: Portable AP view of the chest 10/14/2023                                          IMPRESSION:   Tip of the endotracheal tube is in satisfactory position. Esophageal temperature probe is in the lower third of the esophagus. Gastric tube courses below the diaphragmatic hiatus and outside the field-of-view. Telemetry leads overlie the chest.  Persistent shallow lung inflation. The right hemidiaphragm is indistinct and there is graded opacity in the infrahilar right chest either related to adjacent basilar atelectasis and/or layering pleural fluid. Focal atelectasis in the medial left base is not increased. The vessels within the aerated portions of the lungs are normal. No clear change from 2 days earlier. Unchanged heart and mediastinal contours.    Echocardiogram 10/14/2023  Technically difficult study.  Limited views were obtained.  The left ventricle is normal in size.  Left ventricular function is decreased. The ejection fraction is 30-35%  (moderately reduced).  There is moderate global hypokinesia of the left ventricle.    CT ABDOMEN PELVIS W/O CONTRAST  LOCATION: [facility]  DATE:  10/18/2023     INDICATION: Perforated bowel, ongoing fever. Status post 10/09/2023 sleeve gastrectomy with single anastomosis duodenal switch complicated by bowel perforation, peritonitis and sepsis status post 10/12/2023 washout and enterotomy closure. Cardiomyopathy.   JARVIS.  COMPARISON: 10/16/2023 CXR and 10/10/2023 Gastrografin upper GI.  FINDINGS:   LOWER CHEST: Complete right lower lobe atelectasis, small volume of right-sided pleural fluid and mild elevation right hemidiaphragm. Trace left pleural effusion and mild platelike atelectasis left lower lobe. Heart size within normal limits with no pericardial effusion.  HEPATOBILIARY: Liver is normal.  No calcified gallstones or bile duct dilatation.   PANCREAS: Normal.  SPLEEN: Spleen size normal.  ADRENAL GLANDS: Normal.  KIDNEYS/BLADDER: Bladder is decompressed by well-positioned Patiño catheter. Kidneys, ureters and bladder are otherwise normal.  BOWEL: Sleeve gastrectomy and proximal duodenal to jejunal anastomosis, a well-positioned nasogastric tube, and upper anterior abdominal surgical drain from the right and a second left anterior abdominal and pelvic surgical drain. A thin layer of fluid   about 1 - 2 cm radial thickness between the right hemidiaphragm and right hepatic lobe contains a few foci of gas and there is a small amount of nongas-containing fluid in pelvic cul-de-sac. Residual iodinated contrast material from 10/10/2023 upper GI   within the normal caliber appendix, colon and rectum.  LYMPH NODES: No lymphadenopathy.  VASCULATURE: Normal caliber abdominal aorta.    PELVIC ORGANS: A 5 cm fibroid.  MUSCULOSKELETAL:Unremarkable.                                                      IMPRESSION:   1.  Sleeve gastrectomy and proximal duodenal to jejunal anastomosis, malpositioned nasogastric tube, and upper anterior abdominal surgical drain from the right and a second left anterior abdominal and pelvic surgical drain.   2.  A thin layer of fluid about 1  - 2 cm radial thickness between the right hemidiaphragm and right hepatic lobe contains a few foci of gas and there is a small amount of nongas-containing fluid in pelvic cul-de-sac.   3.  Complete right lower lobe atelectasis, small volume of right-sided pleural fluid and mild elevation right hemidiaphragm.     By:  aJmes Borrero MD, 10/20/2023  11:07 AM    Primary Care Physician:  Franca Mishra

## 2023-10-20 NOTE — PROGRESS NOTES
ASSESSMENT:  1. Intra-abdominal abscess (H)    2. Perimenopausal symptoms        Mojgan Jimenez is a 53 year old female who is s/p laparoscopic sleeve gastrectomy with KO on 10/9 post-op course complicated by small enterotomy x2 s/p laparoscopy converted to laparotomy with washout and enterotomy closure x2 on 10/12. She continues to have fevers with increasing leukocytosis. Very small perihepatic fluid collection was drained yesterday and this does not appear overly purulent. However, her fevers continue and her WBC continues to increase despite this collection being drained. She has no abdominal signs of infection so at this point we may need to investigate other systems for the source of her fevers and leukocytosis. Appreciate ICU help with this.    Patient can start some trickle feeds into her NG. She will likely need to keep TPN running even when she is extubated and start oral intake. With her decreased absorption of calories from her KO, she will likely need to supplement her oral intake with TPN to help with her caloric needs for healing her abdominal incision and fighting her infection.    PLAN:  We will remove her surgical drains this afternoon  IR drain management per radiology  Start trickle feeds into NG tube; continue TPN; RD consulted  Suggest looking for pulmonary or urologic causes of infection  Appreciate ICU team and nephrology for help with this complex patient    SUBJECTIVE:   She is intubated. Easily arousable and does follow simple commands today. Very anxious upon waking.      Patient Vitals for the past 24 hrs:   BP Temp Temp src Pulse Resp SpO2 Weight   10/20/23 1100 136/65 (!) 101.7  F (38.7  C) Esophageal 82 (!) 72 94 % --   10/20/23 0600 (!) 142/68 -- -- 92 (!) 33 95 % --   10/20/23 0500 -- (!) 102.8  F (39.3  C) Oral 111 (!) 50 98 % 129.8 kg (286 lb 2.5 oz)   10/20/23 0400 136/65 -- -- 72 (!) 43 96 % --   10/20/23 0330 -- -- -- 73 (!) 49 96 % --   10/20/23 0300 134/71 -- -- 73 (!) 45  95 % --   10/20/23 0230 -- -- -- 74 (!) 44 95 % --   10/20/23 0200 129/64 -- -- 74 (!) 44 95 % --   10/20/23 0130 -- -- -- 79 (!) 44 98 % --   10/20/23 0100 129/64 -- -- 74 (!) 59 95 % --   10/20/23 0030 -- -- -- 75 (!) 49 95 % --   10/20/23 0000 133/72 99.1  F (37.3  C) Oral 77 (!) 51 95 % --   10/19/23 2300 (!) 162/79 -- -- 94 (!) 69 93 % --   10/19/23 2200 124/59 -- -- 80 22 94 % --   10/19/23 2100 132/62 -- -- 90 26 94 % --   10/19/23 2021 -- 100.4  F (38  C) -- -- -- -- --   10/19/23 2000 (!) 202/99 100.4  F (38  C) -- 96 (!) 39 100 % --   10/19/23 1930 -- -- -- 79 24 96 % --   10/19/23 1915 -- -- -- 80 25 97 % --   10/19/23 1900 (!) 154/81 -- -- 81 24 96 % --   10/19/23 1845 -- -- -- 85 27 96 % --   10/19/23 1830 -- -- -- 104 (!) 48 92 % --   10/19/23 1825 -- -- -- 91 -- 99 % --   10/19/23 1815 -- -- -- 78 (!) 32 93 % --   10/19/23 1800 132/64 -- -- 80 27 96 % --   10/19/23 1745 -- -- -- 91 (!) 35 98 % --   10/19/23 1730 -- -- -- 86 29 97 % --   10/19/23 1715 -- -- -- 86 (!) 33 96 % --   10/19/23 1700 (!) 196/98 -- -- 83 (!) 31 96 % --   10/19/23 1645 -- -- -- 84 28 97 % --   10/19/23 1630 -- -- -- 92 (!) 48 96 % --   10/19/23 1615 -- -- -- 83 25 96 % --   10/19/23 1603 (!) 142/70 -- -- 81 -- 96 % --   10/19/23 1600 -- 99.2  F (37.3  C) Axillary 81 28 96 % --   10/19/23 1530 -- -- -- 74 -- 99 % --   10/19/23 1500 138/72 -- -- 69 30 97 % --   10/19/23 1400 131/67 99.1  F (37.3  C) Axillary 70 23 94 % --   10/19/23 1338 133/70 -- -- 69 (!) 49 97 % --   10/19/23 1335 137/70 -- -- 70 (!) 36 98 % --   10/19/23 1330 138/70 -- -- 70 (!) 40 -- --   10/19/23 1325 137/69 -- -- 72 (!) 38 97 % --   10/19/23 1316 136/78 -- -- 73 (!) 40 98 % --   10/19/23 1310 130/79 -- -- 74 (!) 36 97 % --   10/19/23 1305 133/73 -- -- 75 (!) 36 97 % --   10/19/23 1300 131/72 -- -- 73 (!) 36 98 % --   10/19/23 1255 138/78 -- -- 78 (!) 38 97 % --   10/19/23 1250 136/74 -- -- 78 (!) 40 97 % --         PHYSICAL EXAM:  GEN: No acute distress,  comfortable  LUNGS: CTA bilaterally  CV:RRR  ABD:soft, not tender, incisions CDI  Drains: IR drain with scant dark bloody bilious appearing fluid in bulb; surgical drains x3 serous  Output by Drain (mL) 10/18/23 0700 - 10/18/23 1459 10/18/23 1500 - 10/18/23 2259 10/18/23 2300 - 10/19/23 0659 10/19/23 0700 - 10/19/23 1459 10/19/23 1500 - 10/19/23 2259 10/19/23 2300 - 10/20/23 0659 10/20/23 0700 - 10/20/23 1243   Closed/Suction Drain 1 Right RUQ Bulb 19 Haitian 45 45 15 10 35 22 20   Closed/Suction Drain 2 Left LUQ Bulb 19 Haitian 20 30 10 5 5 4 0   Closed/Suction Drain 3 Left LLQ Bulb 19 Haitian 0 13 2 5 5 5 5   Closed/Suction Drain 4 Right;Anterior Abdomen Bulb 10 Haitian     5 9 0      EXT:No cyanosis, edema or obvious abnormalities    10/19 0700 - 10/20 0659  In: 2620.23 [I.V.:1299.03]  Out: 3510 [Urine:3300; Drains:110]    No results displayed because visit has over 200 results.             Felecia Henderson, JUSTINE CNP

## 2023-10-20 NOTE — PROGRESS NOTES
"Care Management Follow Up    Length of Stay (days): 11    Expected Discharge Date: 10/24/2023     Concerns to be Addressed: discharge planning    Patient plan of care discussed at interdisciplinary rounds: Yes    Anticipated Discharge Disposition:       Anticipated Discharge Services:      Education Provided on the Discharge Plan:  Per Care Team   Patient/Family in Agreement with the Plan:  Yes    Referrals Placed by CM/SW:    Private pay costs discussed: Not applicable    Additional Information:  Chart reviewed.    Cm updates:  Vent Day #9  Per rounds pt has low grade temp.Pt currently on zosyn and microfungin. On TPN, attempting to wean today. On insulin gtt, precedex, and fetanyl gtts.         Social Hx:  \"From home with adult daughter and grandchild in an apartment. Independent at baseline and drives. Patient currently unemployed; saved up for procedures, plans to resume work once recovered. No community programs. Has a neb machine available for use due to Asthma. Goal to return home with family support. Family to transport if able. \"        Cm will continue to follow plan of care, review recommendations, and assist with any discharge needs anticipated.     Whit Malik RN      "

## 2023-10-20 NOTE — PROGRESS NOTES
"Nutrition Therapy  Brief Parenteral Nutrition Note       Dietitian to Pharmacy    Changes for this evening's bag:    Rate of TPN: 50 ml/hr  Grams Dextrose: 150  Grams Amino Acid: 65    Lipids: 50 g 3 days/wk.    Enteral Nutrition:    Start Trickle Promote at 10 ml/hr with 30 ml free water flush every 4 hrs.    Monitor TPN and Enteral Nutrition tolerance and adjust daily.  Expect will need continued TPN even when starts po diet as patient has increased nutrition needs for healing s/p surgery but also will have malabsorption due to bariatric surgery.       Patient remains intubated this am, working on vent wean.  Irene-hepatic drain placed yesterday.      S/p gastrectomy sleeve-duodenal switch on 10/9.  S/p drain placed, closure of interotomies and NG placed on 10/12.   TPN start 10/14/23  Propofol stopped 10/15 due to elevated Triglyceride    Current Nutrition Intake:  Diet: NPO  Custom TPN per central line:  70 g AA, 193 g DEX at 60 ml/hr.  50 g lipid 3 days/wk.  This to provide avg 1150 kcals, 1440 ml fluid, 70 g protein, 193 g carb, avg 21.4 g lipid daily.     Dosing Weight: 52.3 kg (IBW)      ESTIMATED NUTRITION NEEDS   Energy: 0078-6384 kcals/day (22 - 25 kcals/kg)   Obese, Post-op, and Vented   Protein : 63-78 grams protein/day (1.2 - 1.5 grams of pro/kg)   Obesity guidelines and Post-op, elevated creatinine   Fluid: 8502-2502+ mL/day (1 mL/kcal)   Maintenance     Weight Trends  Admission wt: 130.5 kg (287 lb 12.8 oz) 10/9/23     Current wt: 129.8 kg (286 lb 2.5 oz) 10/20  -2.3 L on 10/19/23, +2.2 L since admit per I/Os    GI:  Last BM x1 yesterday, RN reports small BM overnight  + bowel sounds per chart.  NG placed 10/12/23.  200 ml out NG 10/19.  Per OP report 10/12 \"I then reevaluated the placement of the NG tube and could appreciate that it was nicely situated across the duodenal ileal anastomosis\"  Drains 97 ml  out 10/19/23  Surgical incisions    Labs:   Sodium 147 (H)-improved  Creatinine 2.87 (H)  CRP " inflammation 341.2 (H)  Glucose 146  Labs reviewed by dietitian    Medications:    chlorhexidine  15 mL Mouth/Throat Q12H    sennosides  5 mL Oral or Feeding Tube BID    And    docusate  50 mg Oral or Feeding Tube BID    heparin ANTICOAGULANT  5,000 Units Subcutaneous Q8H    insulin aspart  1-12 Units Subcutaneous Q4H    ipratropium - albuterol 0.5 mg/2.5 mg/3 mL  3 mL Nebulization 4x daily    lipids plant base  250 mL Intravenous Once per day on Monday Wednesday Friday    micafungin  100 mg Intravenous Q24H    pantoprazole  40 mg Intravenous QAM AC    piperacillin-tazobactam  3.375 g Intravenous Q8H    polyethylene glycol  17 g Oral or Feeding Tube Daily    QUEtiapine  50 mg Oral or Feeding Tube BID    sodium chloride (PF)  3 mL Intracatheter Q8H       amiodarone Stopped (10/15/23 1526)    dexmedeTOMIDine 1.2 mcg/kg/hr (10/20/23 0753)    dextrose      fentaNYL 200 mcg/hr (10/20/23 0600)    norepinephrine Stopped (10/13/23 1814)    parenteral nutrition - ADULT compounded formula 60 mL/hr at 10/20/23 0200    vasopressin Stopped (10/12/23 1145)      acetaminophen **OR** acetaminophen, albuterol, albuterol, dextrose, glucose **OR** dextrose **OR** glucagon, diphenhydrAMINE **OR** diphenhydrAMINE, fentaNYL, HYDROmorphone **OR** HYDROmorphone, lidocaine 4%, lidocaine (buffered or not buffered), midazolam, naloxone **OR** naloxone **OR** naloxone **OR** naloxone, ondansetron **OR** ondansetron, phenol, prochlorperazine **OR** prochlorperazine, propranolol, sodium chloride (PF), traMADol   Reviewed by dietitian       INTERVENTIONS  Implementation  Collaboration with other providers-Per surgery notes patient with return of bowel function with plan to start TF and wean TPN.  Spoke with Felecia Henderson CNP.  Plan to continue TPN, start trickle tube feeding today.  Plan to continue TPN even after extubation as patient s/p bariatric surgery with malabsorption and has increased nutrition needs for healing from surgery as well as  peritonitis.    Creatinine is elevated though improving.  Plan to increase protein provisions with improving renal function.    Received consult for Registered Dietitian to order TF per Medical Nutrition Therapy Guidelines     TPN adjust with anticipation of starting trickle feeding today:  65 g AA, 150 g DEX at 50 ml/hr continuous.  50 g lipid 3 days/wk.    This to provide avg 984 kcals, 65 g protein, 150 g carb, avg 21 g lipid, 1200 ml fluid daily    Enteral:  Start Promote at 10 ml/hr continuous, trickle rate.  Free water flush 30 ml every 4 hrs.  This to provide 240 ml formula, 240 kcals, 15g protein, 31 g carb, 0 fiber, 201 ml free water from formula, 180 ml flushes/day.      Monitoring/Evaluation  Progress toward goals will be monitored and evaluated per protocol.

## 2023-10-20 NOTE — PROCEDURES
FIBEROPTIC BRONCHOSCOPY PROCEDURE NOTE     Date of Procedure: 10/20/2023  Performing Physician: James Borrero MD  Pre-Procedure Diagnosis:     Acute respiratory failure   Right lower lobe atelectasis  Ongoing fever  Post-Procedure Diagnosis:    ET tube above emilia  Mild bloody tinge secretions aspirated from RLL, no endobronchial tumors  Pneumonia     Procedure:  Diagnostic Flexible Fiberoptic Bronchoscopy   Indications:  Mojgan Jimenez is a 53 year old female with history of morbid obesity , s/p gastric bypass, complicated with bowel perforation. Treated for peritonitis. Ongoing fever.   CT scan showed atelectasis right base. Diagnostic bronchoscopy was indicated.   Preop evaluation:  Procedure:  Intravenous Sedation.    Expected level:  Moderate sedation  Mallampati:  Patient is intubated  Anesthesia: precedex drip. 4 ml of 1% Xylocaine trachea.   Specimen:  BAL RLL  Estimated Blood Loss:  0 ml  Complications:  none    Findings:  Vocal Cords  not seen, patient is intubated  Trachea , ET tube above emilia, mild clear secretions  Emilia hyperemic mucosa, mild clear secretions   Right Bronchial Tree , hyperemic mucosa, mild bloody bronchial secretions aspirated from RLL, no endobronchial lesions   Left Bronchial Tree , hyperemic mucosa, no endobronchial lesions     Procedure Details:   Procedure was done as emergency.   The patient/alternate (see above) concurred with the proposed plan, giving informed consent.  The patient was identified as Mojgan Jimenez with Date of Birth 1970 and the procedure verified as Diagnostic Flexible Fiberoptic Bronchoscopy .  A Time Out was held and the above information confirmed.    The bronchoscope was passed through the ET tube. The scope was then passed into the trachea  Careful inspection of the tracheal lumen was accomplished. The scope was sequentially passed into the left main and then left upper and lower bronchi and segmental bronchi.   Findings and specimen  details recorded above.  The scope was then withdrawn and advanced into the right main bronchus and then into the RUL, RML, and RLL bronchi and segmental bronchi.   Findings and specimen details recorded above.     The patient tolerated the procedure well.      James Borrero MD, 10/20/2023 3:25 PM      Referring Physician: * No referring provider recorded for this case *  Attending Physician: Hoang Worthy, *  Primary Care Physician: Franca Mishra

## 2023-10-21 ENCOUNTER — APPOINTMENT (OUTPATIENT)
Dept: RADIOLOGY | Facility: HOSPITAL | Age: 53
End: 2023-10-21
Attending: INTERNAL MEDICINE
Payer: COMMERCIAL

## 2023-10-21 LAB
ALBUMIN SERPL BCG-MCNC: 3.5 G/DL (ref 3.5–5.2)
ALP SERPL-CCNC: 87 U/L (ref 35–104)
ALT SERPL W P-5'-P-CCNC: 30 U/L (ref 0–50)
ANION GAP SERPL CALCULATED.3IONS-SCNC: 13 MMOL/L (ref 7–15)
AST SERPL W P-5'-P-CCNC: 20 U/L (ref 0–45)
BASO+EOS+MONOS # BLD AUTO: ABNORMAL 10*3/UL
BASO+EOS+MONOS NFR BLD AUTO: ABNORMAL %
BASOPHILS # BLD AUTO: 0 10E3/UL (ref 0–0.2)
BASOPHILS NFR BLD AUTO: 0 %
BILIRUB SERPL-MCNC: 0.5 MG/DL
BUN SERPL-MCNC: 92 MG/DL (ref 6–20)
CALCIUM SERPL-MCNC: 8.9 MG/DL (ref 8.6–10)
CHLORIDE SERPL-SCNC: 110 MMOL/L (ref 98–107)
CREAT SERPL-MCNC: 2.84 MG/DL (ref 0.51–0.95)
DEPRECATED HCO3 PLAS-SCNC: 24 MMOL/L (ref 22–29)
EGFRCR SERPLBLD CKD-EPI 2021: 19 ML/MIN/1.73M2
EOSINOPHIL # BLD AUTO: 0 10E3/UL (ref 0–0.7)
EOSINOPHIL NFR BLD AUTO: 0 %
ERYTHROCYTE [DISTWIDTH] IN BLOOD BY AUTOMATED COUNT: 14.3 % (ref 10–15)
GLUCOSE BLDC GLUCOMTR-MCNC: 174 MG/DL (ref 70–99)
GLUCOSE BLDC GLUCOMTR-MCNC: 178 MG/DL (ref 70–99)
GLUCOSE BLDC GLUCOMTR-MCNC: 190 MG/DL (ref 70–99)
GLUCOSE BLDC GLUCOMTR-MCNC: 213 MG/DL (ref 70–99)
GLUCOSE BLDC GLUCOMTR-MCNC: 243 MG/DL (ref 70–99)
GLUCOSE SERPL-MCNC: 265 MG/DL (ref 70–99)
HCT VFR BLD AUTO: 26.8 % (ref 35–47)
HGB BLD-MCNC: 8.2 G/DL (ref 11.7–15.7)
IMM GRANULOCYTES # BLD: 0.2 10E3/UL
IMM GRANULOCYTES NFR BLD: 1 %
LYMPHOCYTES # BLD AUTO: 0.5 10E3/UL (ref 0.8–5.3)
LYMPHOCYTES NFR BLD AUTO: 3 %
MAGNESIUM SERPL-MCNC: 2 MG/DL (ref 1.7–2.3)
MCH RBC QN AUTO: 29.4 PG (ref 26.5–33)
MCHC RBC AUTO-ENTMCNC: 30.6 G/DL (ref 31.5–36.5)
MCV RBC AUTO: 96 FL (ref 78–100)
MONOCYTES # BLD AUTO: 0.6 10E3/UL (ref 0–1.3)
MONOCYTES NFR BLD AUTO: 4 %
NEUTROPHILS # BLD AUTO: 14.6 10E3/UL (ref 1.6–8.3)
NEUTROPHILS NFR BLD AUTO: 92 %
NRBC # BLD AUTO: 0 10E3/UL
NRBC BLD AUTO-RTO: 0 /100
PHOSPHATE SERPL-MCNC: 3 MG/DL (ref 2.5–4.5)
PLATELET # BLD AUTO: 340 10E3/UL (ref 150–450)
POTASSIUM SERPL-SCNC: 4.4 MMOL/L (ref 3.4–5.3)
PROT SERPL-MCNC: 7.4 G/DL (ref 6.4–8.3)
RBC # BLD AUTO: 2.79 10E6/UL (ref 3.8–5.2)
SODIUM SERPL-SCNC: 147 MMOL/L (ref 135–145)
WBC # BLD AUTO: 16 10E3/UL (ref 4–11)

## 2023-10-21 PROCEDURE — 999N000065 XR ABDOMEN PORT 1 VIEW

## 2023-10-21 PROCEDURE — 5A09357 ASSISTANCE WITH RESPIRATORY VENTILATION, LESS THAN 24 CONSECUTIVE HOURS, CONTINUOUS POSITIVE AIRWAY PRESSURE: ICD-10-PCS | Performed by: SURGERY

## 2023-10-21 PROCEDURE — 250N000013 HC RX MED GY IP 250 OP 250 PS 637: Performed by: INTERNAL MEDICINE

## 2023-10-21 PROCEDURE — 258N000003 HC RX IP 258 OP 636: Performed by: NURSE PRACTITIONER

## 2023-10-21 PROCEDURE — 250N000009 HC RX 250: Performed by: SURGERY

## 2023-10-21 PROCEDURE — 94640 AIRWAY INHALATION TREATMENT: CPT

## 2023-10-21 PROCEDURE — C9113 INJ PANTOPRAZOLE SODIUM, VIA: HCPCS | Mod: JZ | Performed by: NURSE PRACTITIONER

## 2023-10-21 PROCEDURE — 99233 SBSQ HOSP IP/OBS HIGH 50: CPT | Performed by: INTERNAL MEDICINE

## 2023-10-21 PROCEDURE — 250N000009 HC RX 250: Performed by: INTERNAL MEDICINE

## 2023-10-21 PROCEDURE — 250N000011 HC RX IP 250 OP 636: Performed by: NURSE PRACTITIONER

## 2023-10-21 PROCEDURE — 80053 COMPREHEN METABOLIC PANEL: CPT | Performed by: INTERNAL MEDICINE

## 2023-10-21 PROCEDURE — 250N000011 HC RX IP 250 OP 636: Mod: JZ | Performed by: INTERNAL MEDICINE

## 2023-10-21 PROCEDURE — 999N000253 HC STATISTIC WEANING TRIALS

## 2023-10-21 PROCEDURE — 250N000013 HC RX MED GY IP 250 OP 250 PS 637: Performed by: SURGERY

## 2023-10-21 PROCEDURE — 83735 ASSAY OF MAGNESIUM: CPT | Performed by: SURGERY

## 2023-10-21 PROCEDURE — 94640 AIRWAY INHALATION TREATMENT: CPT | Mod: 76

## 2023-10-21 PROCEDURE — 999N000157 HC STATISTIC RCP TIME EA 10 MIN

## 2023-10-21 PROCEDURE — 99232 SBSQ HOSP IP/OBS MODERATE 35: CPT | Performed by: INTERNAL MEDICINE

## 2023-10-21 PROCEDURE — 94003 VENT MGMT INPAT SUBQ DAY: CPT

## 2023-10-21 PROCEDURE — 250N000011 HC RX IP 250 OP 636: Mod: JZ | Performed by: SURGERY

## 2023-10-21 PROCEDURE — 250N000011 HC RX IP 250 OP 636: Mod: JZ | Performed by: NURSE PRACTITIONER

## 2023-10-21 PROCEDURE — 85025 COMPLETE CBC W/AUTO DIFF WBC: CPT | Performed by: INTERNAL MEDICINE

## 2023-10-21 PROCEDURE — 84100 ASSAY OF PHOSPHORUS: CPT | Performed by: SURGERY

## 2023-10-21 PROCEDURE — 250N000011 HC RX IP 250 OP 636: Performed by: INTERNAL MEDICINE

## 2023-10-21 PROCEDURE — 200N000001 HC R&B ICU

## 2023-10-21 PROCEDURE — 99291 CRITICAL CARE FIRST HOUR: CPT | Performed by: INTERNAL MEDICINE

## 2023-10-21 PROCEDURE — 250N000013 HC RX MED GY IP 250 OP 250 PS 637: Performed by: NURSE PRACTITIONER

## 2023-10-21 PROCEDURE — 999N000287 HC ICU ADULT ROUNDING, EACH 10 MINS

## 2023-10-21 PROCEDURE — 999N000009 HC STATISTIC AIRWAY CARE

## 2023-10-21 PROCEDURE — 250N000012 HC RX MED GY IP 250 OP 636 PS 637: Performed by: INTERNAL MEDICINE

## 2023-10-21 PROCEDURE — 999N000259 HC STATISTIC EXTUBATION

## 2023-10-21 RX ORDER — LABETALOL HYDROCHLORIDE 5 MG/ML
20 INJECTION, SOLUTION INTRAVENOUS EVERY 4 HOURS PRN
Status: DISCONTINUED | OUTPATIENT
Start: 2023-10-21 | End: 2023-10-23

## 2023-10-21 RX ORDER — BUMETANIDE 0.25 MG/ML
2 INJECTION INTRAMUSCULAR; INTRAVENOUS ONCE
Status: COMPLETED | OUTPATIENT
Start: 2023-10-21 | End: 2023-10-21

## 2023-10-21 RX ORDER — QUETIAPINE FUMARATE 25 MG/1
100 TABLET, FILM COATED ORAL 2 TIMES DAILY
Status: DISCONTINUED | OUTPATIENT
Start: 2023-10-21 | End: 2023-10-25

## 2023-10-21 RX ORDER — LORAZEPAM 2 MG/ML
1 CONCENTRATE ORAL EVERY 8 HOURS PRN
Status: DISCONTINUED | OUTPATIENT
Start: 2023-10-21 | End: 2023-10-22

## 2023-10-21 RX ORDER — HALOPERIDOL 5 MG/ML
5 INJECTION INTRAMUSCULAR EVERY 4 HOURS PRN
Status: DISCONTINUED | OUTPATIENT
Start: 2023-10-21 | End: 2023-10-22

## 2023-10-21 RX ORDER — HYDRALAZINE HYDROCHLORIDE 20 MG/ML
5 INJECTION INTRAMUSCULAR; INTRAVENOUS EVERY 8 HOURS PRN
Status: DISCONTINUED | OUTPATIENT
Start: 2023-10-21 | End: 2023-10-21

## 2023-10-21 RX ORDER — OLANZAPINE 10 MG/1
5 INJECTION, POWDER, LYOPHILIZED, FOR SOLUTION INTRAMUSCULAR EVERY 12 HOURS PRN
Status: DISCONTINUED | OUTPATIENT
Start: 2023-10-21 | End: 2023-10-22

## 2023-10-21 RX ORDER — HYDRALAZINE HYDROCHLORIDE 20 MG/ML
20 INJECTION INTRAMUSCULAR; INTRAVENOUS EVERY 4 HOURS PRN
Status: DISCONTINUED | OUTPATIENT
Start: 2023-10-21 | End: 2023-10-23

## 2023-10-21 RX ADMIN — PROPRANOLOL HYDROCHLORIDE 10 MG: 10 TABLET ORAL at 22:10

## 2023-10-21 RX ADMIN — OLANZAPINE 5 MG: 10 INJECTION, POWDER, LYOPHILIZED, FOR SOLUTION INTRAMUSCULAR at 23:04

## 2023-10-21 RX ADMIN — DEXMEDETOMIDINE HYDROCHLORIDE 1.2 MCG/KG/HR: 4 INJECTION, SOLUTION INTRAVENOUS at 11:48

## 2023-10-21 RX ADMIN — PIPERACILLIN AND TAZOBACTAM 3.38 G: 3; .375 INJECTION, POWDER, LYOPHILIZED, FOR SOLUTION INTRAVENOUS at 08:14

## 2023-10-21 RX ADMIN — INSULIN ASPART 2 UNITS: 100 INJECTION, SOLUTION INTRAVENOUS; SUBCUTANEOUS at 20:17

## 2023-10-21 RX ADMIN — IPRATROPIUM BROMIDE AND ALBUTEROL SULFATE 3 ML: .5; 3 SOLUTION RESPIRATORY (INHALATION) at 19:43

## 2023-10-21 RX ADMIN — MIDAZOLAM HYDROCHLORIDE 2 MG: 1 INJECTION, SOLUTION INTRAMUSCULAR; INTRAVENOUS at 10:38

## 2023-10-21 RX ADMIN — Medication 200 MCG/HR: at 05:12

## 2023-10-21 RX ADMIN — BUMETANIDE 2 MG: 2 TABLET ORAL at 08:14

## 2023-10-21 RX ADMIN — ACETAMINOPHEN 650 MG: 160 SOLUTION ORAL at 01:56

## 2023-10-21 RX ADMIN — IPRATROPIUM BROMIDE AND ALBUTEROL SULFATE 3 ML: .5; 3 SOLUTION RESPIRATORY (INHALATION) at 15:24

## 2023-10-21 RX ADMIN — ANORECTAL OINTMENT: 15.7; .44; 24; 20.6 OINTMENT TOPICAL at 17:52

## 2023-10-21 RX ADMIN — CHLORHEXIDINE GLUCONATE 15 ML: 1.2 RINSE ORAL at 19:57

## 2023-10-21 RX ADMIN — QUETIAPINE FUMARATE 100 MG: 25 TABLET ORAL at 20:08

## 2023-10-21 RX ADMIN — DEXMEDETOMIDINE HYDROCHLORIDE 1.2 MCG/KG/HR: 4 INJECTION, SOLUTION INTRAVENOUS at 14:09

## 2023-10-21 RX ADMIN — PROPRANOLOL HYDROCHLORIDE 10 MG: 10 TABLET ORAL at 05:00

## 2023-10-21 RX ADMIN — HYDRALAZINE HYDROCHLORIDE 5 MG: 20 INJECTION INTRAMUSCULAR; INTRAVENOUS at 14:48

## 2023-10-21 RX ADMIN — DEXMEDETOMIDINE HYDROCHLORIDE 1.2 MCG/KG/HR: 4 INJECTION, SOLUTION INTRAVENOUS at 01:13

## 2023-10-21 RX ADMIN — MICONAZOLE NITRATE: 20 POWDER TOPICAL at 17:52

## 2023-10-21 RX ADMIN — IPRATROPIUM BROMIDE AND ALBUTEROL SULFATE 3 ML: .5; 3 SOLUTION RESPIRATORY (INHALATION) at 12:08

## 2023-10-21 RX ADMIN — PIPERACILLIN AND TAZOBACTAM 3.38 G: 3; .375 INJECTION, POWDER, LYOPHILIZED, FOR SOLUTION INTRAVENOUS at 23:04

## 2023-10-21 RX ADMIN — AMIODARONE HYDROCHLORIDE 400 MG: 200 TABLET ORAL at 08:14

## 2023-10-21 RX ADMIN — PROPRANOLOL HYDROCHLORIDE 10 MG: 10 TABLET ORAL at 11:36

## 2023-10-21 RX ADMIN — LORAZEPAM 1 MG: 2 CONCENTRATE ORAL at 18:25

## 2023-10-21 RX ADMIN — MAGNESIUM SULFATE HEPTAHYDRATE: 500 INJECTION, SOLUTION INTRAMUSCULAR; INTRAVENOUS at 20:07

## 2023-10-21 RX ADMIN — DEXMEDETOMIDINE HYDROCHLORIDE 1.2 MCG/KG/HR: 4 INJECTION, SOLUTION INTRAVENOUS at 03:45

## 2023-10-21 RX ADMIN — METRONIDAZOLE 500 MG: 500 INJECTION, SOLUTION INTRAVENOUS at 11:48

## 2023-10-21 RX ADMIN — INSULIN ASPART 5 UNITS: 100 INJECTION, SOLUTION INTRAVENOUS; SUBCUTANEOUS at 03:45

## 2023-10-21 RX ADMIN — HALOPERIDOL LACTATE 5 MG: 5 INJECTION, SOLUTION INTRAMUSCULAR at 19:59

## 2023-10-21 RX ADMIN — HYDROMORPHONE HYDROCHLORIDE 0.5 MG: 1 INJECTION, SOLUTION INTRAMUSCULAR; INTRAVENOUS; SUBCUTANEOUS at 16:22

## 2023-10-21 RX ADMIN — BUMETANIDE 2 MG: 0.25 INJECTION INTRAMUSCULAR; INTRAVENOUS at 08:14

## 2023-10-21 RX ADMIN — METHYLPREDNISOLONE SODIUM SUCCINATE 40 MG: 40 INJECTION, POWDER, FOR SOLUTION INTRAMUSCULAR; INTRAVENOUS at 03:42

## 2023-10-21 RX ADMIN — METRONIDAZOLE 500 MG: 500 INJECTION, SOLUTION INTRAVENOUS at 20:03

## 2023-10-21 RX ADMIN — HEPARIN SODIUM 5000 UNITS: 5000 INJECTION, SOLUTION INTRAVENOUS; SUBCUTANEOUS at 22:10

## 2023-10-21 RX ADMIN — DEXMEDETOMIDINE HYDROCHLORIDE 1.2 MCG/KG/HR: 4 INJECTION, SOLUTION INTRAVENOUS at 19:26

## 2023-10-21 RX ADMIN — INSULIN ASPART 3 UNITS: 100 INJECTION, SOLUTION INTRAVENOUS; SUBCUTANEOUS at 07:52

## 2023-10-21 RX ADMIN — HYDROMORPHONE HYDROCHLORIDE 0.5 MG: 1 INJECTION, SOLUTION INTRAMUSCULAR; INTRAVENOUS; SUBCUTANEOUS at 18:25

## 2023-10-21 RX ADMIN — DEXMEDETOMIDINE HYDROCHLORIDE 1.2 MCG/KG/HR: 4 INJECTION, SOLUTION INTRAVENOUS at 22:16

## 2023-10-21 RX ADMIN — METRONIDAZOLE 500 MG: 500 INJECTION, SOLUTION INTRAVENOUS at 03:42

## 2023-10-21 RX ADMIN — AMIODARONE HYDROCHLORIDE 400 MG: 200 TABLET ORAL at 20:08

## 2023-10-21 RX ADMIN — PANTOPRAZOLE SODIUM 40 MG: 40 INJECTION, POWDER, FOR SOLUTION INTRAVENOUS at 08:14

## 2023-10-21 RX ADMIN — TRAMADOL HYDROCHLORIDE 50 MG: 50 TABLET ORAL at 20:34

## 2023-10-21 RX ADMIN — MIDAZOLAM 2 MG: 1 INJECTION INTRAMUSCULAR; INTRAVENOUS at 05:47

## 2023-10-21 RX ADMIN — DEXMEDETOMIDINE HYDROCHLORIDE 1.2 MCG/KG/HR: 4 INJECTION, SOLUTION INTRAVENOUS at 16:46

## 2023-10-21 RX ADMIN — DEXMEDETOMIDINE HYDROCHLORIDE 1.2 MCG/KG/HR: 4 INJECTION, SOLUTION INTRAVENOUS at 09:01

## 2023-10-21 RX ADMIN — METHYLPREDNISOLONE SODIUM SUCCINATE 40 MG: 40 INJECTION, POWDER, FOR SOLUTION INTRAMUSCULAR; INTRAVENOUS at 16:17

## 2023-10-21 RX ADMIN — INSULIN GLARGINE 10 UNITS: 100 INJECTION, SOLUTION SUBCUTANEOUS at 21:00

## 2023-10-21 RX ADMIN — HYDROMORPHONE HYDROCHLORIDE 0.5 MG: 1 INJECTION, SOLUTION INTRAMUSCULAR; INTRAVENOUS; SUBCUTANEOUS at 22:09

## 2023-10-21 RX ADMIN — PIPERACILLIN AND TAZOBACTAM 3.38 G: 3; .375 INJECTION, POWDER, LYOPHILIZED, FOR SOLUTION INTRAVENOUS at 16:17

## 2023-10-21 RX ADMIN — QUETIAPINE FUMARATE 50 MG: 25 TABLET ORAL at 08:14

## 2023-10-21 RX ADMIN — DEXMEDETOMIDINE HYDROCHLORIDE 1.2 MCG/KG/HR: 4 INJECTION, SOLUTION INTRAVENOUS at 06:22

## 2023-10-21 RX ADMIN — MIDAZOLAM HYDROCHLORIDE 2 MG: 1 INJECTION, SOLUTION INTRAMUSCULAR; INTRAVENOUS at 14:48

## 2023-10-21 RX ADMIN — HEPARIN SODIUM 5000 UNITS: 5000 INJECTION, SOLUTION INTRAVENOUS; SUBCUTANEOUS at 05:21

## 2023-10-21 RX ADMIN — INSULIN ASPART 2 UNITS: 100 INJECTION, SOLUTION INTRAVENOUS; SUBCUTANEOUS at 11:36

## 2023-10-21 RX ADMIN — INSULIN ASPART 3 UNITS: 100 INJECTION, SOLUTION INTRAVENOUS; SUBCUTANEOUS at 16:17

## 2023-10-21 RX ADMIN — MIDAZOLAM 2 MG: 1 INJECTION INTRAMUSCULAR; INTRAVENOUS at 02:41

## 2023-10-21 RX ADMIN — HYDROMORPHONE HYDROCHLORIDE 0.5 MG: 1 INJECTION, SOLUTION INTRAMUSCULAR; INTRAVENOUS; SUBCUTANEOUS at 20:00

## 2023-10-21 RX ADMIN — MICAFUNGIN SODIUM 100 MG: 50 INJECTION, POWDER, LYOPHILIZED, FOR SOLUTION INTRAVENOUS at 13:13

## 2023-10-21 RX ADMIN — HEPARIN SODIUM 5000 UNITS: 5000 INJECTION, SOLUTION INTRAVENOUS; SUBCUTANEOUS at 14:09

## 2023-10-21 RX ADMIN — PROPRANOLOL HYDROCHLORIDE 10 MG: 10 TABLET ORAL at 17:52

## 2023-10-21 RX ADMIN — DIPHENHYDRAMINE HCL 25 MG: 25 CAPSULE ORAL at 22:09

## 2023-10-21 RX ADMIN — IPRATROPIUM BROMIDE AND ALBUTEROL SULFATE 3 ML: .5; 3 SOLUTION RESPIRATORY (INHALATION) at 07:15

## 2023-10-21 RX ADMIN — CHLORHEXIDINE GLUCONATE 15 ML: 1.2 RINSE ORAL at 08:14

## 2023-10-21 ASSESSMENT — ACTIVITIES OF DAILY LIVING (ADL)
ADLS_ACUITY_SCORE: 36

## 2023-10-21 NOTE — PROGRESS NOTES
PULMONARY / CRITICAL CARE PROGRESS NOTE    Date / Time of Admission:  10/9/2023  5:26 AM    Assessment:     Mojgan Jimenez is a 53 year old female with history of asthma, severe JARVIS, (AHI of 41.7, supine AHI 41.6, REM AHI 85.7, and 19.8 minutes with O2 saturations less than 88%), stimulant use disorder, generalized anxiety disorder, depression, GERD, morbid obesity Body mass index is 53.74 kg/m .  Presented on 10/9/23 for scheduled laparoscopic sleeve gastrectomy with single anastomosis duodenal switch.   Postprocedure, patient complained of abdominal pain. Gastrografin leak test was negative. Rapid response was called due to encephalopathy, acute renal failure. Patient was admitted to ICU, intubated and started on pressors on 10/12. Patient was taken to OR.   S/p closure of two small bowel enterotomies, drain placement and wash out. Peritoneal fluid culture (+) strep anginosus.     Acute respiratory failure s/p intubation 10/12  Sepsis due to peritonitis secondary to bowel perforation  Peritoneal fluid culture (+) streptococcus anginosus.   Ongoing fever, raising WBC. Abdomen CT scan showed a layer of fluid between between right hemidiaphragm and liver.   IR was consulted for drain tube placement. Procedure was done on 10/19. Cultures pending.   Abx zosyn and micafungin.   Case was discussed with ID, plan to add metronidazole until results of new fluid culture.    S/p closure of two small bowel enterotomies, drain placement and wash out on 10/12  Pneumonia   CT scan showed infiltrate / atelectasis right base.   S/p diagnostic bronchoscopy 10/20, bloody secretions, cell count showed elevated Neutrophils.   Cultures pending. Continue Abx.   No cuff leak   Patient has a 7.5 mm ET tube. Patient is 5 ft 3 in.    Vocal cord edema vs small trachea.   Started on solumedrol.   No cuff leak in AM.   Plan to resume PS trial, and attempt to extubate   Acute kidney injury   On TPN  Volume up   Hypernatremia   Cardiomyopathy    Echocardiogram showed EF 30-35%, moderate global hypokinesia of the left ventricle.   Frequent PVCs  Hypernatremia  S/p laparoscopic sleeve gastrectomy with single anastomosis duodenal switch 10/9  Anemia   Anxiety / delirium   Plan for extubation on precedex drip. Continue seroquel.   Hx stimulant use disorder, LINA, depression  JARVIS   Morbid obesity Body mass index is 51.35 kg/m .    Advance Directives:  Full code    Plan:   Titrate vent settings A/C 16/400/5/35%, keep SpO2 > 90%, plateau pressure < 30  Schedule bronchodilators  Sedation to keep RASS 0 to -1, precedex and fentanyl drip  Seroquel  Weaning trial per protocol  Steroids for ? Vocal cord edema.   Pressors to keep MAP > 65, currently off NE  On enteric amiodarone   Follow up cultures   Abx zosyn , micafungin, metronidazole   Monitor kidney function   Diuresis   Supplement electrolytes   On TPN  PPI IV for GI prophylaxis   Glucose level monitoring   DVT prophylaxis SCDs, heparin SQ    Please contact me if you have any questions.  Total critical care time, not including separately billable procedure time: 45 minutes  This patient had a high probability of imminent or life threatening deterioration due to acute respiratory failure which required my direct attention, intervention and personal management.     James Borrero  Pulmonary / Critical Care  10/21/2023  8:17 AM          ICU DAILY CHECKLIST                           Can patient transfer out of MICU? no    FAST HUG:    Feeding:  Feeding: yes.  Patient is receiving TPN    Patiño: yes  Analgesia/Sedation:yes  precedex, fentanyl  Thromboembolic prophylaxis: Yes; Mode:  Heparin and SCDs  HOB>30:  Yes  Stress Ulcer Protocol Active: Yes; Mode: PPI  Glycemic Control: Any glucose > 180 no; Mode of Insulin Therapy: Sliding Scale Insulin    INTUBATED:  Can patient have daily waking:  No  Can patient have spontaneous breathing trial:  No    Restraints? yes    PHYSICAL THERAPY AND MOBILITY:  Can  patient have PT and mobility trial: no  Activity: bedrest    Subjective:   HPI:  Mojgan Jimenez is a 53 year old female with history of asthma, severe JARVIS, (AHI of 41.7, supine AHI 41.6, REM AHI 85.7, and 19.8 minutes with O2 saturations less than 88%), stimulant use disorder, generalized anxiety disorder, depression, GERD, morbid obesity Body mass index is 51.35 kg/m .  Presented on 10/9/23 for scheduled laparoscopic sleeve gastrectomy with single anastomosis duodenal switch.   Postprocedure, patient complained of abdominal pain. Gastrografin leak test was negative. Rapid response was called due to encephalopathy, acute renal failure. Patient was admitted to ICU, intubated and started on pressors on 10/12. Patient was taken to OR.   S/p closure of two small bowel enterotomies, drain placement and wash out. Peritoneal fluid culture (+) strep anginosus.     Events overnight  - No cuff leak, started on steroids.   - Afebrile  - On TPN. Started on tube feedings     Allergies: Patient has no known allergies.     MEDS:  Current Facility-Administered Medications   Medication    acetaminophen (TYLENOL) solution 650 mg    acetaminophen (TYLENOL) tablet 650 mg    Or    acetaminophen (TYLENOL) Suppository 650 mg    albuterol (PROVENTIL HFA/VENTOLIN HFA) inhaler    albuterol (PROVENTIL) neb solution 2.5 mg    amiodarone (PACERONE) tablet 400 mg    bumetanide (BUMEX) tablet 2 mg    chlorhexidine (PERIDEX) 0.12 % solution 15 mL    dexmedeTOMIDine (PRECEDEX) 4 mcg/mL in NS infusion    dextrose 10% infusion    dextrose 10% infusion    glucose gel 15-30 g    Or    dextrose 50 % injection 25-50 mL    Or    glucagon injection 1 mg    diphenhydrAMINE (BENADRYL) capsule 25 mg    Or    diphenhydrAMINE (BENADRYL) injection 25 mg    sennosides (SENOKOT) syrup 5 mL    And    docusate (COLACE) 50 MG/5ML liquid 50 mg    fentaNYL (SUBLIMAZE) 50 mcg/mL bolus from pump    fentaNYL (SUBLIMAZE) infusion    heparin ANTICOAGULANT injection 5,000  "Units    HYDROmorphone (DILAUDID) injection 0.2 mg    Or    HYDROmorphone (PF) (DILAUDID) injection 0.5 mg    insulin aspart (NovoLOG) injection (RAPID ACTING)    ipratropium - albuterol 0.5 mg/2.5 mg/3 mL (DUONEB) neb solution 3 mL    lidocaine (LMX4) cream    lidocaine 1 % 0.1-1 mL    lipids plant base (CLINOLIPID) 20 % infusion 250 mL    menthol-zinc oxide (CALMOSEPTINE) 0.44-20.6 % ointment OINT    methylPREDNISolone sodium succinate (solu-MEDROL) injection 40 mg    metroNIDAZOLE (FLAGYL) infusion 500 mg    micafungin (MYCAMINE) 100 mg in sodium chloride 0.9 % 100 mL intermittent infusion    miconazole (MICATIN) 2 % powder    midazolam (VERSED) injection 1-2 mg    midazolam (VERSED) injection 2 mg    naloxone (NARCAN) injection 0.2 mg    Or    naloxone (NARCAN) injection 0.4 mg    Or    naloxone (NARCAN) injection 0.2 mg    Or    naloxone (NARCAN) injection 0.4 mg    norepinephrine (LEVOPHED) 4 mg in  mL infusion PREMIX    ondansetron (ZOFRAN ODT) ODT tab 4 mg    Or    ondansetron (ZOFRAN) injection 4 mg    pantoprazole (PROTONIX) IV push injection 40 mg    parenteral nutrition - ADULT compounded formula    phenol (CHLORASEPTIC) 1.4 % spray 1-2 mL    piperacillin-tazobactam (ZOSYN) 3.375 g vial to attach to  mL bag    polyethylene glycol (MIRALAX) Packet 17 g    prochlorperazine (COMPAZINE) injection 10 mg    Or    prochlorperazine (COMPAZINE) tablet 10 mg    propranolol (INDERAL) tablet 10 mg    QUEtiapine (SEROquel) tablet 50 mg    sodium chloride (PF) 0.9% PF flush 3 mL    sodium chloride (PF) 0.9% PF flush 3 mL    traMADol (ULTRAM) tablet 50 mg    vasopressin (VASOSTRICT) 20 Units in sodium chloride 0.9 % 100 mL standard conc infusion     Objective:   VITALS:  BP (!) 155/111   Pulse 65   Temp 98.4  F (36.9  C) (Esophageal)   Resp (!) 44   Ht 1.6 m (5' 3\")   Wt 131.5 kg (289 lb 14.5 oz)   LMP 09/09/2023 (Exact Date)   SpO2 96%   BMI 51.35 kg/m    VENT:  Vent Mode: CMV/AC  (Continuous " Mandatory Ventilation/ Assist Control)  FiO2 (%): 35 %  Resp Rate (Set): 18 breaths/min  Tidal Volume (Set, mL): 400 mL  PEEP (cm H2O): 6 cmH2O  Resp: (!) 44    EXAM:   Gen: obese, sedated, arosuable, vented  HEENT: pink conjunctiva, intubated  Neck: no thyromegaly, masses or JVD  Lungs: discrete ronchi both HT  CV: regular, no murmurs or gallops appreciated  Abdomen: distended, abdominal drain tubes, decrease bowel sounds  Ext: trace edema  Neuro: sedated, arousable, withdraws to painful stimulus    Data Review:  Recent Labs   Lab 10/21/23  0738 10/21/23  0341 10/20/23  2321 10/20/23  1945 10/20/23  1621   * 243*  265* 255* 226* 154*      10/21/23 03:41   Sodium 147 (H)   Potassium 4.4   Chloride 110 (H)   Carbon Dioxide (CO2) 24   Urea Nitrogen 92.0 (H)   Creatinine 2.84 (H)   GFR Estimate 19 (L)   Calcium 8.9   Anion Gap 13   Albumin 3.5   Protein Total 7.4   Alkaline Phosphatase 87   ALT 30   AST 20   Bilirubin Total 0.5   Glucose 265 (H)   GLUCOSE BY METER POCT 243 (H)   WBC 16.0 (H)   Hemoglobin 8.2 (L)   Hematocrit 26.8 (L)   Platelet Count 340   RBC Count 2.79 (L)   MCV 96   MCH 29.4   MCHC 30.6 (L)   RDW 14.3   % Neutrophils 92   % Lymphocytes 3   % Monocytes 4   % Eosinophils 0   % Basophils 0     Blood Culture 10/18 No growth after 2 days        RUQ drain 10/19  Gram Stain Result No organisms seen      3+ WBC seen   Predominantly PMNs        Peritoneal fluid Culture 10/12 1+ Streptococcus anginosus Abnormal         XR CHEST PORT 1 VIEW  LOCATION: [facility]  DATE: 10/16/2023  INDICATION: hypoxia  COMPARISON: Portable AP view of the chest 10/14/2023                                          IMPRESSION:   Tip of the endotracheal tube is in satisfactory position. Esophageal temperature probe is in the lower third of the esophagus. Gastric tube courses below the diaphragmatic hiatus and outside the field-of-view. Telemetry leads overlie the chest.  Persistent shallow lung  inflation. The right hemidiaphragm is indistinct and there is graded opacity in the infrahilar right chest either related to adjacent basilar atelectasis and/or layering pleural fluid. Focal atelectasis in the medial left base is not increased. The vessels within the aerated portions of the lungs are normal. No clear change from 2 days earlier. Unchanged heart and mediastinal contours.    Echocardiogram 10/14/2023  Technically difficult study.  Limited views were obtained.  The left ventricle is normal in size.  Left ventricular function is decreased. The ejection fraction is 30-35%  (moderately reduced).  There is moderate global hypokinesia of the left ventricle.    CT ABDOMEN PELVIS W/O CONTRAST  LOCATION: [facility]  DATE: 10/18/2023     INDICATION: Perforated bowel, ongoing fever. Status post 10/09/2023 sleeve gastrectomy with single anastomosis duodenal switch complicated by bowel perforation, peritonitis and sepsis status post 10/12/2023 washout and enterotomy closure. Cardiomyopathy.   JARVIS.  COMPARISON: 10/16/2023 CXR and 10/10/2023 Gastrografin upper GI.  FINDINGS:   LOWER CHEST: Complete right lower lobe atelectasis, small volume of right-sided pleural fluid and mild elevation right hemidiaphragm. Trace left pleural effusion and mild platelike atelectasis left lower lobe. Heart size within normal limits with no pericardial effusion.  HEPATOBILIARY: Liver is normal.  No calcified gallstones or bile duct dilatation.   PANCREAS: Normal.  SPLEEN: Spleen size normal.  ADRENAL GLANDS: Normal.  KIDNEYS/BLADDER: Bladder is decompressed by well-positioned Patiño catheter. Kidneys, ureters and bladder are otherwise normal.  BOWEL: Sleeve gastrectomy and proximal duodenal to jejunal anastomosis, a well-positioned nasogastric tube, and upper anterior abdominal surgical drain from the right and a second left anterior abdominal and pelvic surgical drain. A thin layer of fluid   about 1 - 2 cm  radial thickness between the right hemidiaphragm and right hepatic lobe contains a few foci of gas and there is a small amount of nongas-containing fluid in pelvic cul-de-sac. Residual iodinated contrast material from 10/10/2023 upper GI   within the normal caliber appendix, colon and rectum.  LYMPH NODES: No lymphadenopathy.  VASCULATURE: Normal caliber abdominal aorta.    PELVIC ORGANS: A 5 cm fibroid.  MUSCULOSKELETAL:Unremarkable.                                                      IMPRESSION:   1.  Sleeve gastrectomy and proximal duodenal to jejunal anastomosis, malpositioned nasogastric tube, and upper anterior abdominal surgical drain from the right and a second left anterior abdominal and pelvic surgical drain.   2.  A thin layer of fluid about 1 - 2 cm radial thickness between the right hemidiaphragm and right hepatic lobe contains a few foci of gas and there is a small amount of nongas-containing fluid in pelvic cul-de-sac.   3.  Complete right lower lobe atelectasis, small volume of right-sided pleural fluid and mild elevation right hemidiaphragm.     By:  James Borrero MD, 10/21/2023  8:17 AM    Primary Care Physician:  Franca Mishra

## 2023-10-21 NOTE — PROGRESS NOTES
[facility]  [facility]   Follow up    Mojgan Jimenez   MRN: [id number]  : 1970   DOA: 10/9/2023   CC: ARF      Assessment and Plan:  53 year old female    ARF;  hemodynamic exacerbated by IV NSAIDS (lowish bp, vasodilatory drugs, mild intravascular depletion)==>ATN, now in recovery phase and creatinine improved though leveling off high 2's last several days. Hope to see more improvement with time. Creat was normal (0.7) PTA.   Lytes ok but mild hypernatremia with diuresis and current obligatory IVF. Na down 147 today. Will inc FWF now that tolerating TFs  Volume overload, now s/p diuresis, prob near euvolemia, I=O last 24 hours. Diuretics per cards bumex 2 mg/day is keeping her even.   S/p gastric bypass; complicated by leak; s/p repair and wash out of bilious fluid. Peritonitis on abx.  Fevers and fluid collection, plan to tap in IR.   HoTN; infection/SIIRS; bp ok now off pressors. BP up when awake/ agitated. No agents. Defer to cards.   Hyperlipid; on statin  Elevated LFTs; following;levels improved   Resp failure; plan to extubate today noted.   Acidosis; resolved   Nutrition; on TPN   Cardiomyopathy, depressed EF on ECHO per cards.    D/w Dr Knutson.    Subjective  Seen earlier today.   Still on vent 35%  Initially sleeping on weaning trial and comfortable x 45 minutes   Awakened with exam and immediately agitated and inc RR and chewing on tube.       Objective    Vital signs in last 24 hours  Temp:  [98.1  F (36.7  C)-102.3  F (39.1  C)] 98.1  F (36.7  C)  Pulse:  [] 79  Resp:  [21-72] 37  BP: ()/() 128/68  FiO2 (%):  [35 %] 35 %  SpO2:  [91 %-100 %] 96 %      Intake/Output last 3 shifts  I/O last 3 completed shifts:  In: 2789.21 [I.V.:1447.08; NG/GT:308]  Out: 3039 [Urine:3000; Drains:39]  Intake/Output this shift:  I/O this shift:  In: 287.8 [I.V.:86.2; NG/GT:60]  Out: 227 [Urine:225; Drains:2]    Physical Exam  Intubated    CV: RRR without murmur or rub  Lung: clear and equal; no extra sounds  Ab:soft binder on.  Ext:much less MARIA EUGENIA and well perfused  Skin; no rash    Pertinent Labs     Last Renal Panel:  Sodium   Date Value Ref Range Status   10/21/2023 147 (H) 135 - 145 mmol/L Final     Comment:     Reference intervals for this test were updated on 09/26/2023 to more accurately reflect our healthy population. There may be differences in the flagging of prior results with similar values performed with this method. Interpretation of those prior results can be made in the context of the updated reference intervals.    11/17/2020 141 133 - 144 mmol/L Final     Potassium   Date Value Ref Range Status   10/21/2023 4.4 3.4 - 5.3 mmol/L Final   05/17/2022 4.5 3.4 - 5.3 mmol/L Final   11/17/2020 4.1 3.4 - 5.3 mmol/L Final     Chloride   Date Value Ref Range Status   10/21/2023 110 (H) 98 - 107 mmol/L Final   05/17/2022 102 94 - 109 mmol/L Final   11/17/2020 109 94 - 109 mmol/L Final     Carbon Dioxide   Date Value Ref Range Status   11/17/2020 31 20 - 32 mmol/L Final     Carbon Dioxide (CO2)   Date Value Ref Range Status   10/21/2023 24 22 - 29 mmol/L Final   05/17/2022 32 20 - 32 mmol/L Final     Anion Gap   Date Value Ref Range Status   10/21/2023 13 7 - 15 mmol/L Final   05/17/2022 2 (L) 3 - 14 mmol/L Final   11/17/2020 1 (L) 3 - 14 mmol/L Final     Glucose   Date Value Ref Range Status   10/21/2023 265 (H) 70 - 99 mg/dL Final   05/17/2022 110 (H) 70 - 99 mg/dL Final   11/17/2020 84 70 - 99 mg/dL Final     Comment:     Fasting specimen     GLUCOSE BY METER POCT   Date Value Ref Range Status   10/21/2023 213 (H) 70 - 99 mg/dL Final     Urea Nitrogen   Date Value Ref Range Status   10/21/2023 92.0 (H) 6.0 - 20.0 mg/dL Final   05/17/2022 16 7 - 30 mg/dL Final   11/17/2020 9 7 - 30 mg/dL Final     Creatinine   Date Value Ref Range Status   10/21/2023 2.84 (H) 0.51 - 0.95 mg/dL Final   11/17/2020 0.79 0.52 - 1.04 mg/dL Final     GFR Estimate    Date Value Ref Range Status   10/21/2023 19 (L) >60 mL/min/1.73m2 Final   11/17/2020 88 >60 mL/min/[1.73_m2] Final     Comment:     Non  GFR Calc  Starting 12/18/2018, serum creatinine based estimated GFR (eGFR) will be   calculated using the Chronic Kidney Disease Epidemiology Collaboration   (CKD-EPI) equation.       Calcium   Date Value Ref Range Status   10/21/2023 8.9 8.6 - 10.0 mg/dL Final   11/17/2020 8.9 8.5 - 10.1 mg/dL Final     Phosphorus   Date Value Ref Range Status   10/20/2023 3.5 2.5 - 4.5 mg/dL Final     Albumin   Date Value Ref Range Status   10/21/2023 3.5 3.5 - 5.2 g/dL Final   05/17/2022 4.0 3.4 - 5.0 g/dL Final   12/03/2018 3.6 3.4 - 5.0 g/dL Final     Recent Labs   Lab 10/21/23  0341 10/20/23  0509 10/19/23  0553 10/18/23  0439 10/16/23  1055   HGB 8.2* 9.6* 9.1* 9.2* 9.9*          I reviewed all lab results  Simi Shin MD  [facility]  [phone]

## 2023-10-21 NOTE — PLAN OF CARE
Goal Outcome Evaluation:      Plan of Care Reviewed With: patient, family          Outcome Evaluation: attempt to have patient more calm and blood pressures controlled.  Remain safe.    [facility] - ICU    RN Progress Note:            Pertinent Assessments:      Please refer to flowsheet rows for full assessment     Lungs diminished at bases and some wheezing throughout- nebs scheduled and PRN administered.  Afebrile all day.  Normal sinus to sinus tach.  Mild edema in hands and lower extremities.  Rash on abdomen and under breast/abdomen folds- powder applied. BM x4- loose, dark brown.  Glucose remains elevated- will start lantus this evening.             Key Events - This Shift:       Extubated this morning around 10am to 4L nasal cannula.  Remains tachypneic but due to continued anxiety.  PRN's given for both blood pressures and anxiety but minimally effective.  Now on 1:1 due to pulling at tubes and behaviors.  She will hold her breath when she gets agitated and drop her sats. Has not slept all day.  Did report some pain this evening- dilaudid x2, IV.  Tolerating tube feeds at trickle rate.         SJN SAT (Sedation Awakening Trial): For use ONLY if intubated    SAT Safety Screen PASSED   If FAILED why?    SAT Performed PASSED   If FAILED why?               Barriers to Discharge / Downgrade:     Precedex drip; labile blood pressures.           Point of Contact Update YES-OR-NO: Yes  If No, reason:   Name:Karla  Phone Number:[phone]  Summary of Conversation: at bedside throughout the day and updated on plan of care.

## 2023-10-21 NOTE — PROGRESS NOTES
[facility] - ICU    RN Progress Note:            Pertinent Assessments:      Please refer to flowsheet rows for full assessment     Patient has frequent breakthrough anxiety despite continuous drips and PRN medications. Lung sounds coarse and diminished. Minimal secretions from suctioning. Patient able to nod yes/no but focuses on random things sometimes, such as computer in room.           Key Events - This Shift:     Patient NG tube was 5cm out from landmark, FTAD was on nose but loose on tube. MD notified and advanced back in to landmark of 70cm then abdominal xray performed. Tube ok to use.   Patient having frequent breakthrough anxiety, even staying awake and restless at times on max fentanyl drip and max precedex drip with PRN versed given recently. Patient will mouth that she wants tube out, wants to eat, drink, and leave. Staff would educate patient but any time you didn't do what she was asking patient would start slamming her hands against the bed in frustration.   Patient did acknowledge that she was anxious. However during one of the medication admins, RN asked patient if she needed something for her anxiety and she smiled and nodded.                Barriers to Discharge / Downgrade:     Patient remains intubated and on sedation drips.        Sister called for update on evening shift.

## 2023-10-21 NOTE — PHARMACY-CONSULT NOTE
"Pharmacy Note: Parenteral Nutrition (PN) Management    Pharmacist consulted to dose PN for Mojgan Jimenez, a 53 year old female by Dr. Cruz.     Subjective:     The patient is a new PN start.     The patient was started on PN in the hospital on 10/14/23.     Indication for PN therapy:  peritonitis     Inadequate nutrition anticipated for > 7 days.      Enteral nutrition contraindicated due to: peritonitis.     Pertinent diseases and other special considerations  10/9/23 s/p sleeve gastrectomy          Social History     Tobacco Use    Smoking status: Never    Smokeless tobacco: Never   Vaping Use    Vaping Use: Never used   Substance Use Topics    Alcohol use: Not Currently     Comment: Sober x 8 months    Drug use: Not Currently     Types: Methamphetamines, \"Crack\" cocaine     Comment: in remision      Objective:    Ht Readings from Last 1 Encounters:   10/09/23 1.6 m (5' 3\")     Wt Readings from Last 1 Encounters:   10/21/23 131.5 kg (289 lb 14.5 oz)       Body mass index is 51.35 kg/m .    Patient Vitals for the past 96 hrs:   Weight   10/21/23 0530 131.5 kg (289 lb 14.5 oz)   10/20/23 0500 129.8 kg (286 lb 2.5 oz)   10/18/23 0600 137.6 kg (303 lb 5.7 oz)       Labs:  Last 3 days:  Recent Labs     10/19/23  0553 10/20/23  0509 10/21/23  0341   * 147* 147*   POTASSIUM 3.5 4.1 4.4   CHLORIDE 107 110* 110*   CO2 25 23 24   BUN 91.2* 89.8* 92.0*   CR 2.84* 2.87* 2.84*   PALAK 9.2 9.5 8.9   MAG 1.6* 1.9 2.0   PHOS 3.3 3.5 3.0   PROTTOTAL 6.9 7.6 7.4   ALBUMIN 3.1* 3.4* 3.5   HGB 9.1* 9.6* 8.2*   HCT 28.9* 30.9* 26.8*    318 340   BILITOTAL 0.5 0.6 0.5   AST 25 27 20   ALT 40 34 30   ALKPHOS 102 90 87       Glucose (past 48 hours):   Recent Labs     10/20/23  0506 10/20/23  0509 10/20/23  0817 10/20/23  1123 10/20/23  1621 10/20/23  1945 10/20/23  2321 10/21/23  0341 10/21/23  0738   * 146* 149* 155* 154* 226* 255* 243*  265* 213*       Intake/Output (last 24 hours): I/O last 3 completed shifts:  In: " 2789.21 [I.V.:1447.08; NG/GT:308]  Out: 3039 [Urine:3000; Drains:39]    Estimated CrCl: Estimated Creatinine Clearance: 30.4 mL/min (A) (based on SCr of 2.84 mg/dL (H)).    Assessment:  Continue patient on PN therapy as a continuous central therapy.      Given the patient's current condition/oral intake, PN is still indicated.     Lab results reviewed: Na high,will reduce again. TF started yesterday.  Chloride high, will change to max acetate  Bumex reduced to daily, will reduce K in TPN  BG elevated but currently on 3 doses of methylprednisolone. Will not add insulin to TPN at this time     Plan:  Rate of PN: 50 mL/hr   Formula:   Amino Acids 65 grams  Dextrose 150 grams  Sodium 10 mEq/day  Potassium 70 mEq/day  Calcium 5 mEq/day  Magnesium 8 mEq/day  Phosphorus 3 mMol/day  Chloride: Acetate Ratio Max acetate  Standard Multivitamins w/Vitamin K  Trace Elements  Vitamin b12 100 mcg  Zinc 5mg  Fat Emulsion: 20%, 250 mL IV 3 times weekly on [facility]  Check BMP and Mag, Phos  labs tomorrow.  Pharmacist will continue to follow the patient's lab results, clinical status and blood glucose results and make adjustments as appropriate.     Thank you for the consult.  Blaire Gray RPH  10/21/2023 10:54 AM

## 2023-10-21 NOTE — PROGRESS NOTES
Respiratory Care    Pt on 4L NC. With a good waveform, sats in the high 90s. RR 30. LS diminished, but clear. Diminished wheezes post aerosol therapy. Has moments of high anxiety RR >30, increased WOB, but able to calm down after awhile with redirection. Will continue to follow.      Guillermo Keyes, RT

## 2023-10-21 NOTE — PROGRESS NOTES
CARDIOLOGY PROGRESS NOTE      Assessment/Plan:  1.  Cardiomyopathy-ejection fraction of 30 to 35%, uncertain etiology.  No prior history of coronary artery disease, but given body size, hypertension, hypercholesterolemia, would need to rule out coronary artery disease and consider pharmacological stress nuclear as an outpatient.  2.  Heart failure-acute in the setting of diminished ejection fraction of 30 to 35%.  Volume overload.  Diuretics under the direction of nephrology.  3.  Benign essential hypertension-antihypertensive therapy, will add some low-dose hydralazine.  4.  Acute kidney injury-preop creatinine was 0.79 but creatinine after surgery was 3.75.  Defer to nephrology team, possibly due to nonsteroidal anti-inflammatories as well as heart failure.  5.  Morbid (severe) obesity due to excess calories (H)-status post laparoscopic sleeve with single anastomosis duodenal switch on .  6.  Intra-abdominal abscess (H)-she had small bowel enterotomy, and had second surgery for this on .  7.  Respiratory failure-has just been extubated.  8.  Frequent PVCs-currently on amiodarone.    Plan:  1.  Add hydralazine for blood pressure management if okay with nephrology.  2.  Continue diuretics at the discretion of nephrology.  3.  Might consider discontinuing amiodarone in the near future.    Discharge Plannin.  Barrier to discharge is strengthening.  2.  Can follow with Dr. Jerome Urbina primary cardiologist.  3.  We will need ischemic evaluation eventually.     LOS: 12 days     Subjective:  Interval History:    53-year-old white female being seen on 13th day of hospitalization.  Daughter and aunt are at bedside.  Patient complains of extreme thirst and wants some ice chips.  No shortness of breath, PND, orthopnea, chest pains, palpitations, syncope, dizziness or significant abdominal discomfort.    Medications   amiodarone  400 mg Oral or Feeding Tube BID    bumetanide  2 mg Oral or Feeding  "Tube Daily    chlorhexidine  15 mL Mouth/Throat Q12H    sennosides  5 mL Oral or Feeding Tube BID    And    docusate  50 mg Oral or Feeding Tube BID    heparin ANTICOAGULANT  5,000 Units Subcutaneous Q8H    insulin aspart  1-12 Units Subcutaneous Q4H    ipratropium - albuterol 0.5 mg/2.5 mg/3 mL  3 mL Nebulization 4x daily    lipids plant base  250 mL Intravenous Once per day on Monday Wednesday Friday    methylPREDNISolone  40 mg Intravenous Q12H    metroNIDAZOLE  500 mg Intravenous Q8H    micafungin  100 mg Intravenous Q24H    pantoprazole  40 mg Intravenous QAM AC    piperacillin-tazobactam  3.375 g Intravenous Q8H    polyethylene glycol  17 g Oral or Feeding Tube Daily    QUEtiapine  50 mg Oral or Feeding Tube BID    sodium chloride (PF)  3 mL Intracatheter Q8H     Objective:   Vital signs in last 24 hours:  Temp:  [97.8  F (36.6  C)-100.9  F (38.3  C)] 97.8  F (36.6  C)  Pulse:  [] 71  Resp:  [21-54] 41  BP: ()/() 162/85  FiO2 (%):  [35 %] 35 %  SpO2:  [91 %-100 %] 95 %    Physical Exam:  BP (!) 162/85   Pulse 71   Temp 97.8  F (36.6  C)   Resp (!) 41   Ht 1.6 m (5' 3\")   Wt 131.5 kg (289 lb 14.5 oz)   LMP 09/09/2023 (Exact Date)   SpO2 95%   BMI 51.35 kg/m      General Appearance:    Alert, cooperative, no distress, appears stated age   Head:    Normocephalic, without obvious abnormality, atraumatic   Throat:   Lips, mucosa, and tongue normal; teeth and gums normal   Neck:   Supple, symmetrical, trachea midline, no adenopathy;        thyroid:  No enlargement/tenderness/nodules; no carotid    bruit, to jaw at 30 degrees JVD   Back:     Symmetric, no curvature, ROM normal, no CVA tenderness   Lungs:     Clear to auscultation bilaterally, respirations unlabored   Chest wall:    No tenderness or deformity   Heart:    Regular rate and rhythm, S1 and S2 normal, no murmur, rub   or gallop   Abdomen:     Soft, non-tender, bowel sounds active all four quadrants,     no masses, no organomegaly " "  Extremities:   Normal, atraumatic, no cyanosis or edema   Pulses:   2+ and symmetric all extremities   Skin:   Skin color, texture, turgor normal, no rashes or lesions     Cardiographics:      ECG: Personally reviewed by myself shows sinus rhythm, rightward axis, poor R wave progression.    Echocardiogram: Technically difficult study.  Limited views were obtained.  The left ventricle is normal in size.  Left ventricular function is decreased. The ejection fraction is 30-35%  (moderately reduced).  There is moderate global hypokinesia of the left ventricle.      Lab Results:   Recent Labs   Lab 10/21/23  0341   WBC 16.0*   HGB 8.2*   HCT 26.8*        Recent Labs   Lab 10/21/23  0341   *   CO2 24   BUN 92.0*   .       No results found for: \"CKTOTAL\", \"CKMB\", \"TROPONINI\"        "

## 2023-10-21 NOTE — PLAN OF CARE
Problem: Plan of Care - These are the overarching goals to be used throughout the patient stay.    Goal: Plan of Care Review  Description: The Plan of Care Review/Shift note should be completed every shift.  The Outcome Evaluation is a brief statement about your assessment that the patient is improving, declining, or no change.  This information will be displayed automatically on your shift note.  Outcome: Unable to Meet   Goal Outcome Evaluation:  [facility] - ICU    RN Progress Note:            Pertinent Assessments:      Please refer to flowsheet rows for full assessment     Febrile, Tmax of 102.4. PRN Tylenol given and cooling blanket placed. Pt very agitated and restless throughout shift. PRN Fentanyl and PRN Versed given. Attempted to wean on ventilator but pt becomes very agitated and respiratory rate increases in the 40's and 50's.            Key Events - This Shift:   Bronch completed, specimen collected and sent to lab. Not extubating dt vocal cord swelling, started on Solu-medrol. Febrile. MICKIE drains x3 removed by NP. Trickle tube feedings started. Bowel movement x3. Obtained order for calmoseptine and miconazole powder for redness under bilateral breasts and in groin, perineum area.            Barriers to Discharge / Downgrade:     Intubated         Point of Contact Update YES-OR-NO: Yes  If No, reason:   Name:Charis, daughter  Phone Number:On file  Summary of Conversation: Updated at bedside

## 2023-10-21 NOTE — PROGRESS NOTES
"Care Management Follow Up    Length of Stay (days): 12    Expected Discharge Date: 10/25/2023     Concerns to be Addressed: discharge planning   Patient plan of care discussed at interdisciplinary rounds: Yes    Anticipated Discharge Disposition:       Anticipated Discharge Services:    Education Provided on the Discharge Plan:  Per Care Team   Patient/Family in Agreement with the Plan:      Referrals Placed by CM/SW:    Private pay costs discussed: Not applicable    Additional Information:  Chart reviewed.    CM updates:  Vent Day #10  Per rounds pt restless overnight.  Fetanyl gtt off       Social Hx:  \"From home with adult daughter and grandchild in an apartment. Independent at baseline and drives. Patient currently unemployed; saved up for procedures, plans to resume work once recovered. No community programs. Has a neb machine available for use due to Asthma. Goal to return home with family support. Family to transport if able. \"        Cm will continue to follow plan of care, review recommendations, and assist with any discharge needs anticipated.        Whit Malik RN      "

## 2023-10-21 NOTE — PROGRESS NOTES
PROVIDER RESTRAINT FOR NON-VIOLENT BEHAVIOR FACE TO FACE EVALUATION    Patient's Immediate Situation:  Patient demonstrated the following behaviors: pulling out lines    Patient's Reaction to the intervention:  Does patient understand the reason for restraint/seclusion? unable to express    Medical Condition:  Is there any evidence of compromise of Skin integrity, Respiratory, Cardiovascular, Musculoskeletal, Hydration?   Yes    Behavioral Condition:  In consultation with the RN, is there a need to continue this restraint or seclusion? Yes    See Restraint Flowsheet for complete restraint documentation and assessment.    James Borrero MD  Critical Care Medicine  10/21/23

## 2023-10-21 NOTE — PROGRESS NOTES
Nutrition Therapy  Brief Parenteral Nutrition Note         Dietitian to Pharmacy     No changes  Rate of TPN: 50 ml/hr  Grams Dextrose: 150  Grams Amino Acid: 65     Lipids: 50 g 3 days/wk.     Enteral Nutrition:    Continue trickle Promote at 10 ml/hr with 30 ml free water flush every 4 hrs.       RD will monitor TPN and Enteral Nutrition tolerance and adjust daily.  Expect will need continued TPN even when starts po diet as patient has increased nutrition needs for healing s/p surgery but also will have malabsorption due to bariatric surgery.        Patient remains intubated this am, working on vent wean.           TPN : 65 g AA, 150 g DEX at 50 ml/hr continuous.  50 g lipid 3 days/wk.    This to provide avg 984 kcals, 65 g protein, 150 g carb, avg 21 g lipid, 1200 ml fluid daily     Enteral:  Promote at 10 ml/hr continuous, trickle rate.  Free water flush 30 ml every 4 hrs.  This to provide 240 ml formula, 240 kcals, 15g protein, 31 g carb, 0 fiber, 201 ml free water from formula, 180 ml flushes/day.     TPN and TF meet estimated energy and protein needs.    Per chart, NG advanced back to landmark and abdominal xray performed early this morning, okay to use tube.     Tolerating TF.  Loose brown stool overnight.    Will continue to monitor.

## 2023-10-21 NOTE — PROGRESS NOTES
ASSESSMENT:  1. Intra-abdominal abscess (H)    2. Perimenopausal symptoms        Mojgan Jimenez is a 53 year old female who is s/p laparoscopic sleeve gastrectomy with KO on 10/9 post-op course complicated by small enterotomy x2 s/p laparoscopy converted to laparotomy with washout and enterotomy closure x2 on 10/12.     Patient still having fevers overnight.  The patient is weaning from the ventilation.  She still little anxious during the weaning process.      PLAN:  -Continue to trend the patient's fever curve.  -diagnostic bronch?   - IR drain management per radiology  - trickle feeds into NG tube; continue TPN; RD consulted  - Appreciate ICU team and nephrology for help with this complex patient    SUBJECTIVE:   She is intubated. Pt is doing better today.  She remains anxious during weaning trial.  Pt states that she has some eye dryness.  Per RN, no further complaints at this time.      Patient Vitals for the past 24 hrs:   BP Temp Temp src Pulse Resp SpO2 Weight   10/21/23 1100 (!) 159/86 98.3  F (36.8  C) Oral 77 (!) 39 95 % --   10/21/23 1038 -- -- -- 87 (!) 41 93 % --   10/21/23 1000 (!) 181/100 99.1  F (37.3  C) Oral 79 (!) 41 92 % --   10/21/23 0901 128/68 -- -- 79 (!) 37 96 % --   10/21/23 0900 128/68 98.1  F (36.7  C) Esophageal 80 (!) 49 93 % --   10/21/23 0800 (!) 164/88 98.3  F (36.8  C) Esophageal 68 (!) 35 98 % --   10/21/23 0700 (!) 163/92 98.1  F (36.7  C) Esophageal 65 (!) 42 96 % --   10/21/23 0630 (!) 155/111 -- -- 65 (!) 44 96 % --   10/21/23 0600 (!) 158/83 98.4  F (36.9  C) Esophageal 64 (!) 35 97 % --   10/21/23 0530 (!) 152/76 -- -- 67 (!) 39 95 % 131.5 kg (289 lb 14.5 oz)   10/21/23 0500 (!) 157/79 -- -- 69 30 95 % --   10/21/23 0430 (!) 151/71 -- -- 68 (!) 35 95 % --   10/21/23 0400 (!) 150/75 98.6  F (37  C) Esophageal 71 29 94 % --   10/21/23 0330 138/68 -- -- 75 (!) 33 96 % --   10/21/23 0300 (!) 145/70 98.7  F (37.1  C) Esophageal 70 21 95 % --   10/21/23 0230 (!) 144/69 99  F  (37.2  C) Esophageal 73 26 93 % --   10/21/23 0200 (!) 144/68 99.2  F (37.3  C) Esophageal 76 30 92 % --   10/21/23 0130 (!) 154/83 -- -- 80 (!) 36 93 % --   10/21/23 0100 (!) 150/73 98.9  F (37.2  C) -- 73 (!) 31 93 % --   10/21/23 0030 (!) 152/73 -- -- 73 30 94 % --   10/21/23 0000 (!) 142/70 99  F (37.2  C) Esophageal 76 29 94 % --   10/20/23 2330 (!) 144/71 -- -- 74 (!) 44 93 % --   10/20/23 2300 (!) 145/68 99.5  F (37.5  C) Esophageal 77 (!) 51 93 % --   10/20/23 2230 (!) 145/69 -- -- 80 26 92 % --   10/20/23 2200 (!) 153/72 99.9  F (37.7  C) Esophageal 82 (!) 44 93 % --   10/20/23 2130 (!) 143/67 -- -- 83 (!) 54 92 % --   10/20/23 2100 (!) 141/65 100.2  F (37.9  C) Esophageal 84 (!) 37 92 % --   10/20/23 2030 (!) 145/70 -- -- 87 (!) 38 91 % --   10/20/23 2000 (!) 142/69 (!) 100.5  F (38.1  C) Esophageal 87 (!) 37 91 % --   10/20/23 1940 -- -- -- -- -- 93 % --   10/20/23 1930 (!) 147/76 -- -- 84 (!) 37 92 % --   10/20/23 1915 -- -- -- 92 (!) 39 93 % --   10/20/23 1900 (!) 154/73 -- -- 87 (!) 42 92 % --   10/20/23 1845 -- -- -- 91 (!) 31 93 % --   10/20/23 1832 (!) 175/88 (!) 100.9  F (38.3  C) Esophageal 93 (!) 35 93 % --   10/20/23 1831 -- -- -- 95 (!) 43 94 % --   10/20/23 1815 -- -- -- 91 (!) 34 91 % --   10/20/23 1800 138/65 -- -- 88 (!) 51 92 % --   10/20/23 1745 -- -- -- 86 (!) 42 92 % --   10/20/23 1730 129/59 -- -- 77 (!) 42 93 % --   10/20/23 1715 -- -- -- 86 (!) 39 93 % --   10/20/23 1700 136/68 -- -- 80 (!) 37 94 % --   10/20/23 1645 -- -- -- 86 28 94 % --   10/20/23 1632 (!) 169/78 -- -- 90 30 96 % --   10/20/23 1630 -- -- -- 91 29 97 % --   10/20/23 1615 -- -- -- 91 29 97 % --   10/20/23 1600 (!) 179/81 -- -- 101 (!) 37 95 % --   10/20/23 1545 (!) 180/77 -- -- 115 (!) 45 94 % --   10/20/23 1530 (!) 189/96 -- -- 113 (!) 44 96 % --   10/20/23 1515 (!) 218/92 -- -- 112 (!) 43 92 % --   10/20/23 1500 96/68 -- -- 87 (!) 53 100 % --   10/20/23 1445 -- -- -- 89 (!) 40 98 % --   10/20/23 1430 123/66 -- --  93 (!) 46 98 % --   10/20/23 1415 -- -- -- 91 (!) 42 97 % --   10/20/23 1400 (!) 150/75 -- -- 90 (!) 46 96 % --   10/20/23 1345 -- -- -- 92 (!) 41 97 % --   10/20/23 1330 (!) 148/68 -- -- 92 (!) 43 96 % --   10/20/23 1320 -- -- -- 99 (!) 31 96 % --   10/20/23 1315 -- -- -- 96 (!) 34 95 % --   10/20/23 1300 (!) 147/73 (!) 101.9  F (38.8  C) Esophageal 116 (!) 43 91 % --   10/20/23 1245 -- -- -- 87 (!) 36 96 % --   10/20/23 1230 (!) 145/71 -- -- 87 (!) 38 96 % --   10/20/23 1215 -- -- -- 87 (!) 31 94 % --   10/20/23 1200 (!) 142/74 (!) 102.3  F (39.1  C) Esophageal 85 (!) 39 95 % --   10/20/23 1150 -- -- -- 103 (!) 41 96 % --   10/20/23 1145 -- -- -- 85 (!) 39 95 % --   10/20/23 1130 138/75 -- -- 85 (!) 44 95 % --         PHYSICAL EXAM:  GEN:  comfortable  LUNGS: CTA bilaterally, patient remains intubated with vent support  CV:RRR  ABD:soft, appropriate tenderness to palpation, surgical incisions well approximated.  The patient does have an IR guided drain with serous output.  Drains:     Output by Drain (mL) 10/19/23 0700 - 10/19/23 1459 10/19/23 1500 - 10/19/23 2259 10/19/23 2300 - 10/20/23 0659 10/20/23 0700 - 10/20/23 1459 10/20/23 1500 - 10/20/23 2259 10/20/23 2300 - 10/21/23 0659 10/21/23 0700 - 10/21/23 1124   Closed/Suction Drain 4 Right;Anterior Abdomen Bulb 10 [place]  5 9 0 10 4 2      EXT:No cyanosis, edema or obvious abnormalities    10/20 0700 - 10/21 0659  In: 2789.21 [I.V.:1447.08]  Out: 3039 [Urine:3000; Drains:39]    No results displayed because visit has over 200 results.             Burke Whittaker DO

## 2023-10-21 NOTE — PROGRESS NOTES
RT PROGRESS NOTE    VENT DAY# 10    CURRENT SETTINGS:   Vent Mode: CMV/AC  (Continuous Mandatory Ventilation/ Assist Control)  FiO2 (%): 35 %  Resp Rate (Set): 18 breaths/min  Tidal Volume (Set, mL): 400 mL  PEEP (cm H2O): 6 cmH2O  Resp: 21      PATIENT PARAMETERS:  PIP 17  Pplat:  22  Pmean:  15  Compliance: 72  Secretions:  small thick pink/white  02 Sats:  94%  BS: coarse/diminished    ETT SIZE 7.5 Secured at 20 cm at teeth/gums    Respiratory Medications: Duoneb QID         NOTE / SHIFT SUMMARY:   Patient on full vent support all night. No vent changes made. RT will continue to follow.    Emmanuelle Hernández, RT

## 2023-10-21 NOTE — PROGRESS NOTES
Respiratory Care    Pt weaned for 1 hour. Tolerating fairly well. No cuff leak. MD aware. Plan to proceed with extubation. No post-extubation stridor heard. LS clear/diminished.       Guillermo Keyes, RT

## 2023-10-22 ENCOUNTER — APPOINTMENT (OUTPATIENT)
Dept: CT IMAGING | Facility: HOSPITAL | Age: 53
End: 2023-10-22
Attending: INTERNAL MEDICINE
Payer: COMMERCIAL

## 2023-10-22 ENCOUNTER — APPOINTMENT (OUTPATIENT)
Dept: RADIOLOGY | Facility: HOSPITAL | Age: 53
End: 2023-10-22
Attending: INTERNAL MEDICINE
Payer: COMMERCIAL

## 2023-10-22 LAB
ALBUMIN SERPL BCG-MCNC: 3.7 G/DL (ref 3.5–5.2)
ALP SERPL-CCNC: 106 U/L (ref 35–104)
ALT SERPL W P-5'-P-CCNC: 33 U/L (ref 0–50)
ANION GAP SERPL CALCULATED.3IONS-SCNC: 14 MMOL/L (ref 7–15)
AST SERPL W P-5'-P-CCNC: 25 U/L (ref 0–45)
BACTERIA BRONCH: ABNORMAL
BASE EXCESS BLDA CALC-SCNC: -1.7 MMOL/L
BASE EXCESS BLDV CALC-SCNC: -1.6 MMOL/L
BASE EXCESS BLDV CALC-SCNC: -2 MMOL/L
BASO+EOS+MONOS # BLD AUTO: ABNORMAL 10*3/UL
BASO+EOS+MONOS NFR BLD AUTO: ABNORMAL %
BASOPHILS # BLD AUTO: ABNORMAL 10*3/UL
BASOPHILS # BLD MANUAL: 0 10E3/UL (ref 0–0.2)
BASOPHILS NFR BLD AUTO: ABNORMAL %
BASOPHILS NFR BLD MANUAL: 0 %
BILIRUB SERPL-MCNC: 0.3 MG/DL
BUN SERPL-MCNC: 104.1 MG/DL (ref 6–20)
CALCIUM SERPL-MCNC: 9 MG/DL (ref 8.6–10)
CHLORIDE SERPL-SCNC: 112 MMOL/L (ref 98–107)
COHGB MFR BLD: 98.8 % (ref 96–97)
CREAT SERPL-MCNC: 2.76 MG/DL (ref 0.51–0.95)
DEPRECATED HCO3 PLAS-SCNC: 24 MMOL/L (ref 22–29)
EGFRCR SERPLBLD CKD-EPI 2021: 20 ML/MIN/1.73M2
EOSINOPHIL # BLD AUTO: ABNORMAL 10*3/UL
EOSINOPHIL # BLD MANUAL: 0 10E3/UL (ref 0–0.7)
EOSINOPHIL NFR BLD AUTO: ABNORMAL %
EOSINOPHIL NFR BLD MANUAL: 0 %
ERYTHROCYTE [DISTWIDTH] IN BLOOD BY AUTOMATED COUNT: 14.8 % (ref 10–15)
GALACTOMANNAN AG BAL QL: NEGATIVE
GALACTOMANNAN AG SPEC IA-ACNC: 0.17
GLUCOSE BLDC GLUCOMTR-MCNC: 137 MG/DL (ref 70–99)
GLUCOSE BLDC GLUCOMTR-MCNC: 139 MG/DL (ref 70–99)
GLUCOSE BLDC GLUCOMTR-MCNC: 144 MG/DL (ref 70–99)
GLUCOSE BLDC GLUCOMTR-MCNC: 145 MG/DL (ref 70–99)
GLUCOSE BLDC GLUCOMTR-MCNC: 158 MG/DL (ref 70–99)
GLUCOSE BLDC GLUCOMTR-MCNC: 161 MG/DL (ref 70–99)
GLUCOSE BLDC GLUCOMTR-MCNC: 163 MG/DL (ref 70–99)
GLUCOSE BLDC GLUCOMTR-MCNC: 170 MG/DL (ref 70–99)
GLUCOSE BLDC GLUCOMTR-MCNC: 170 MG/DL (ref 70–99)
GLUCOSE BLDC GLUCOMTR-MCNC: 173 MG/DL (ref 70–99)
GLUCOSE BLDC GLUCOMTR-MCNC: 175 MG/DL (ref 70–99)
GLUCOSE BLDC GLUCOMTR-MCNC: 178 MG/DL (ref 70–99)
GLUCOSE BLDC GLUCOMTR-MCNC: 183 MG/DL (ref 70–99)
GLUCOSE BLDC GLUCOMTR-MCNC: 185 MG/DL (ref 70–99)
GLUCOSE BLDC GLUCOMTR-MCNC: 191 MG/DL (ref 70–99)
GLUCOSE BLDC GLUCOMTR-MCNC: 192 MG/DL (ref 70–99)
GLUCOSE BLDC GLUCOMTR-MCNC: 255 MG/DL (ref 70–99)
GLUCOSE SERPL-MCNC: 242 MG/DL (ref 70–99)
HCO3 BLD-SCNC: 24 MMOL/L (ref 23–29)
HCO3 BLDV-SCNC: 27 MMOL/L (ref 24–30)
HCO3 BLDV-SCNC: 27 MMOL/L (ref 24–30)
HCT VFR BLD AUTO: 31.7 % (ref 35–47)
HGB BLD-MCNC: 9.2 G/DL (ref 11.7–15.7)
IMM GRANULOCYTES # BLD: ABNORMAL 10*3/UL
IMM GRANULOCYTES NFR BLD: ABNORMAL %
LYMPHOCYTES # BLD AUTO: ABNORMAL 10*3/UL
LYMPHOCYTES # BLD MANUAL: 1.4 10E3/UL (ref 0.8–5.3)
LYMPHOCYTES NFR BLD AUTO: ABNORMAL %
LYMPHOCYTES NFR BLD MANUAL: 5 %
MAGNESIUM SERPL-MCNC: 2.2 MG/DL (ref 1.7–2.3)
MCH RBC QN AUTO: 29.1 PG (ref 26.5–33)
MCHC RBC AUTO-ENTMCNC: 29 G/DL (ref 31.5–36.5)
MCV RBC AUTO: 100 FL (ref 78–100)
METAMYELOCYTES # BLD MANUAL: 0.3 10E3/UL
METAMYELOCYTES NFR BLD MANUAL: 1 %
MONOCYTES # BLD AUTO: ABNORMAL 10*3/UL
MONOCYTES # BLD MANUAL: 2.2 10E3/UL (ref 0–1.3)
MONOCYTES NFR BLD AUTO: ABNORMAL %
MONOCYTES NFR BLD MANUAL: 8 %
MYELOCYTES # BLD MANUAL: 0.3 10E3/UL
MYELOCYTES NFR BLD MANUAL: 1 %
NEUTROPHILS # BLD AUTO: ABNORMAL 10*3/UL
NEUTROPHILS # BLD MANUAL: 23.1 10E3/UL (ref 1.6–8.3)
NEUTROPHILS NFR BLD AUTO: ABNORMAL %
NEUTROPHILS NFR BLD MANUAL: 85 %
NRBC # BLD AUTO: 0 10E3/UL
NRBC BLD AUTO-RTO: 0 /100
OXYHGB MFR BLD: 97.8 % (ref 96–97)
OXYHGB MFR BLDV: 76 % (ref 70–75)
OXYHGB MFR BLDV: 76.6 % (ref 70–75)
PCO2 BLD: 46 MM HG (ref 35–45)
PCO2 BLDV: 70 MM HG (ref 35–50)
PCO2 BLDV: 76 MM HG (ref 35–50)
PH BLD: 7.33 [PH] (ref 7.37–7.44)
PH BLDV: 7.16 [PH] (ref 7.35–7.45)
PH BLDV: 7.19 [PH] (ref 7.35–7.45)
PHOSPHATE SERPL-MCNC: 6.1 MG/DL (ref 2.5–4.5)
PLAT MORPH BLD: ABNORMAL
PLATELET # BLD AUTO: 460 10E3/UL (ref 150–450)
PO2 BLD: 110 MM HG (ref 80–90)
PO2 BLDV: 50 MM HG (ref 25–47)
PO2 BLDV: 51 MM HG (ref 25–47)
POTASSIUM SERPL-SCNC: 5 MMOL/L (ref 3.4–5.3)
PROT SERPL-MCNC: 7.7 G/DL (ref 6.4–8.3)
RBC # BLD AUTO: 3.16 10E6/UL (ref 3.8–5.2)
RBC MORPH BLD: ABNORMAL
SAO2 % BLDV: 76.8 % (ref 70–75)
SAO2 % BLDV: 77.5 % (ref 70–75)
SODIUM SERPL-SCNC: 148 MMOL/L (ref 135–145)
SODIUM SERPL-SCNC: 150 MMOL/L (ref 135–145)
TEMPERATURE: 37 DEGREES C
TRIGL SERPL-MCNC: 271 MG/DL
WBC # BLD AUTO: 27.2 10E3/UL (ref 4–11)

## 2023-10-22 PROCEDURE — 99222 1ST HOSP IP/OBS MODERATE 55: CPT | Performed by: INTERNAL MEDICINE

## 2023-10-22 PROCEDURE — 99207 PR APP CREDIT; MD BILLING SHARED VISIT: CPT | Performed by: INTERNAL MEDICINE

## 2023-10-22 PROCEDURE — 99232 SBSQ HOSP IP/OBS MODERATE 35: CPT | Performed by: INTERNAL MEDICINE

## 2023-10-22 PROCEDURE — 71045 X-RAY EXAM CHEST 1 VIEW: CPT

## 2023-10-22 PROCEDURE — 99291 CRITICAL CARE FIRST HOUR: CPT | Performed by: INTERNAL MEDICINE

## 2023-10-22 PROCEDURE — 85007 BL SMEAR W/DIFF WBC COUNT: CPT | Performed by: INTERNAL MEDICINE

## 2023-10-22 PROCEDURE — 5A1945Z RESPIRATORY VENTILATION, 24-96 CONSECUTIVE HOURS: ICD-10-PCS | Performed by: SURGERY

## 2023-10-22 PROCEDURE — 82805 BLOOD GASES W/O2 SATURATION: CPT | Performed by: INTERNAL MEDICINE

## 2023-10-22 PROCEDURE — 84100 ASSAY OF PHOSPHORUS: CPT | Performed by: SURGERY

## 2023-10-22 PROCEDURE — 999N000287 HC ICU ADULT ROUNDING, EACH 10 MINS

## 2023-10-22 PROCEDURE — 999N000157 HC STATISTIC RCP TIME EA 10 MIN

## 2023-10-22 PROCEDURE — 250N000009 HC RX 250: Performed by: INTERNAL MEDICINE

## 2023-10-22 PROCEDURE — 258N000003 HC RX IP 258 OP 636: Performed by: INTERNAL MEDICINE

## 2023-10-22 PROCEDURE — 36600 WITHDRAWAL OF ARTERIAL BLOOD: CPT

## 2023-10-22 PROCEDURE — 84478 ASSAY OF TRIGLYCERIDES: CPT | Performed by: INTERNAL MEDICINE

## 2023-10-22 PROCEDURE — 94640 AIRWAY INHALATION TREATMENT: CPT | Mod: 76

## 2023-10-22 PROCEDURE — 250N000013 HC RX MED GY IP 250 OP 250 PS 637: Performed by: INTERNAL MEDICINE

## 2023-10-22 PROCEDURE — 999N000055 HC STATISTIC END TITIAL CO2 MONITORING

## 2023-10-22 PROCEDURE — 250N000011 HC RX IP 250 OP 636: Performed by: NURSE PRACTITIONER

## 2023-10-22 PROCEDURE — 36415 COLL VENOUS BLD VENIPUNCTURE: CPT | Performed by: INTERNAL MEDICINE

## 2023-10-22 PROCEDURE — 80053 COMPREHEN METABOLIC PANEL: CPT | Performed by: INTERNAL MEDICINE

## 2023-10-22 PROCEDURE — 87040 BLOOD CULTURE FOR BACTERIA: CPT | Performed by: INTERNAL MEDICINE

## 2023-10-22 PROCEDURE — 94660 CPAP INITIATION&MGMT: CPT

## 2023-10-22 PROCEDURE — 250N000013 HC RX MED GY IP 250 OP 250 PS 637: Performed by: NURSE PRACTITIONER

## 2023-10-22 PROCEDURE — 84295 ASSAY OF SERUM SODIUM: CPT | Performed by: INTERNAL MEDICINE

## 2023-10-22 PROCEDURE — 999N000185 HC STATISTIC TRANSPORT TIME EA 15 MIN

## 2023-10-22 PROCEDURE — 94640 AIRWAY INHALATION TREATMENT: CPT

## 2023-10-22 PROCEDURE — 85027 COMPLETE CBC AUTOMATED: CPT | Performed by: INTERNAL MEDICINE

## 2023-10-22 PROCEDURE — 250N000011 HC RX IP 250 OP 636: Mod: JZ | Performed by: INTERNAL MEDICINE

## 2023-10-22 PROCEDURE — 71250 CT THORAX DX C-: CPT

## 2023-10-22 PROCEDURE — 999N000009 HC STATISTIC AIRWAY CARE

## 2023-10-22 PROCEDURE — 250N000013 HC RX MED GY IP 250 OP 250 PS 637: Performed by: SURGERY

## 2023-10-22 PROCEDURE — 258N000003 HC RX IP 258 OP 636: Performed by: NURSE PRACTITIONER

## 2023-10-22 PROCEDURE — 250N000009 HC RX 250: Performed by: SURGERY

## 2023-10-22 PROCEDURE — 83735 ASSAY OF MAGNESIUM: CPT | Performed by: SURGERY

## 2023-10-22 PROCEDURE — 99024 POSTOP FOLLOW-UP VISIT: CPT | Performed by: PHYSICIAN ASSISTANT

## 2023-10-22 PROCEDURE — 94002 VENT MGMT INPAT INIT DAY: CPT

## 2023-10-22 PROCEDURE — 200N000001 HC R&B ICU

## 2023-10-22 PROCEDURE — 87449 NOS EACH ORGANISM AG IA: CPT | Performed by: INTERNAL MEDICINE

## 2023-10-22 PROCEDURE — 999N000065 XR CHEST PORT 1 VIEW

## 2023-10-22 RX ORDER — HYDRALAZINE HYDROCHLORIDE 20 MG/ML
5 INJECTION INTRAMUSCULAR; INTRAVENOUS 3 TIMES DAILY
Status: DISCONTINUED | OUTPATIENT
Start: 2023-10-22 | End: 2023-10-23

## 2023-10-22 RX ORDER — NICOTINE POLACRILEX 4 MG
15-30 LOZENGE BUCCAL
Status: DISCONTINUED | OUTPATIENT
Start: 2023-10-22 | End: 2023-10-22

## 2023-10-22 RX ORDER — CHLORHEXIDINE GLUCONATE ORAL RINSE 1.2 MG/ML
15 SOLUTION DENTAL EVERY 12 HOURS
Status: DISCONTINUED | OUTPATIENT
Start: 2023-10-22 | End: 2023-10-22

## 2023-10-22 RX ORDER — DEXTROSE MONOHYDRATE 50 MG/ML
INJECTION, SOLUTION INTRAVENOUS CONTINUOUS
Status: ACTIVE | OUTPATIENT
Start: 2023-10-22 | End: 2023-10-22

## 2023-10-22 RX ORDER — MEROPENEM 1 G/1
1 INJECTION, POWDER, FOR SOLUTION INTRAVENOUS EVERY 12 HOURS
Status: DISCONTINUED | OUTPATIENT
Start: 2023-10-22 | End: 2023-10-25

## 2023-10-22 RX ORDER — PROPOFOL 10 MG/ML
5-75 INJECTION, EMULSION INTRAVENOUS CONTINUOUS
Status: DISCONTINUED | OUTPATIENT
Start: 2023-10-22 | End: 2023-10-23

## 2023-10-22 RX ORDER — CLINDAMYCIN PHOSPHATE 900 MG/50ML
900 INJECTION, SOLUTION INTRAVENOUS EVERY 8 HOURS
Status: DISCONTINUED | OUTPATIENT
Start: 2023-10-22 | End: 2023-10-24

## 2023-10-22 RX ORDER — DEXTROSE MONOHYDRATE 50 MG/ML
INJECTION, SOLUTION INTRAVENOUS CONTINUOUS
Status: DISCONTINUED | OUTPATIENT
Start: 2023-10-22 | End: 2023-10-23

## 2023-10-22 RX ORDER — VANCOMYCIN HYDROCHLORIDE 1 G/200ML
1000 INJECTION, SOLUTION INTRAVENOUS EVERY 24 HOURS
Status: DISCONTINUED | OUTPATIENT
Start: 2023-10-22 | End: 2023-10-22 | Stop reason: DRUGHIGH

## 2023-10-22 RX ORDER — DEXTROSE MONOHYDRATE 100 MG/ML
INJECTION, SOLUTION INTRAVENOUS CONTINUOUS PRN
Status: DISCONTINUED | OUTPATIENT
Start: 2023-10-22 | End: 2023-10-28 | Stop reason: ALTCHOICE

## 2023-10-22 RX ORDER — BUMETANIDE 0.25 MG/ML
2 INJECTION INTRAMUSCULAR; INTRAVENOUS EVERY 12 HOURS
Status: DISCONTINUED | OUTPATIENT
Start: 2023-10-22 | End: 2023-10-27

## 2023-10-22 RX ORDER — DEXTROSE MONOHYDRATE 25 G/50ML
25-50 INJECTION, SOLUTION INTRAVENOUS
Status: DISCONTINUED | OUTPATIENT
Start: 2023-10-22 | End: 2023-10-22

## 2023-10-22 RX ORDER — CEFAZOLIN SODIUM 1 G/50ML
2500 SOLUTION INTRAVENOUS ONCE
Status: COMPLETED | OUTPATIENT
Start: 2023-10-22 | End: 2023-10-22

## 2023-10-22 RX ADMIN — PROPOFOL 75 MCG/KG/MIN: 10 INJECTION, EMULSION INTRAVENOUS at 16:17

## 2023-10-22 RX ADMIN — AMIODARONE HYDROCHLORIDE 400 MG: 200 TABLET ORAL at 21:19

## 2023-10-22 RX ADMIN — HYDRALAZINE HYDROCHLORIDE 10 MG: 20 INJECTION INTRAMUSCULAR; INTRAVENOUS at 00:11

## 2023-10-22 RX ADMIN — METRONIDAZOLE 500 MG: 500 INJECTION, SOLUTION INTRAVENOUS at 03:00

## 2023-10-22 RX ADMIN — DIPHENHYDRAMINE HYDROCHLORIDE 25 MG: 50 INJECTION, SOLUTION INTRAMUSCULAR; INTRAVENOUS at 22:20

## 2023-10-22 RX ADMIN — PROPOFOL 75 MCG/KG/MIN: 10 INJECTION, EMULSION INTRAVENOUS at 12:45

## 2023-10-22 RX ADMIN — QUETIAPINE FUMARATE 100 MG: 25 TABLET ORAL at 21:12

## 2023-10-22 RX ADMIN — PROPOFOL 75 MCG/KG/MIN: 10 INJECTION, EMULSION INTRAVENOUS at 18:00

## 2023-10-22 RX ADMIN — LABETALOL HYDROCHLORIDE 10 MG: 5 INJECTION, SOLUTION INTRAVENOUS at 02:00

## 2023-10-22 RX ADMIN — PROPOFOL 75 MCG/KG/MIN: 10 INJECTION, EMULSION INTRAVENOUS at 09:33

## 2023-10-22 RX ADMIN — QUETIAPINE FUMARATE 100 MG: 25 TABLET ORAL at 09:10

## 2023-10-22 RX ADMIN — LORAZEPAM 1 MG: 2 CONCENTRATE ORAL at 01:04

## 2023-10-22 RX ADMIN — AMIODARONE HYDROCHLORIDE 400 MG: 200 TABLET ORAL at 09:10

## 2023-10-22 RX ADMIN — IPRATROPIUM BROMIDE AND ALBUTEROL SULFATE 3 ML: .5; 3 SOLUTION RESPIRATORY (INHALATION) at 19:38

## 2023-10-22 RX ADMIN — PROPOFOL 70 MCG/KG/MIN: 10 INJECTION, EMULSION INTRAVENOUS at 20:12

## 2023-10-22 RX ADMIN — PROPOFOL 75 MCG/KG/MIN: 10 INJECTION, EMULSION INTRAVENOUS at 14:41

## 2023-10-22 RX ADMIN — MEROPENEM 1 G: 1 INJECTION, POWDER, FOR SOLUTION INTRAVENOUS at 20:22

## 2023-10-22 RX ADMIN — MEROPENEM 1 G: 1 INJECTION, POWDER, FOR SOLUTION INTRAVENOUS at 09:10

## 2023-10-22 RX ADMIN — METRONIDAZOLE 500 MG: 500 INJECTION, SOLUTION INTRAVENOUS at 13:47

## 2023-10-22 RX ADMIN — HEPARIN SODIUM 5000 UNITS: 5000 INJECTION, SOLUTION INTRAVENOUS; SUBCUTANEOUS at 13:46

## 2023-10-22 RX ADMIN — HYDRALAZINE HYDROCHLORIDE 10 MG: 20 INJECTION INTRAMUSCULAR; INTRAVENOUS at 00:08

## 2023-10-22 RX ADMIN — HEPARIN SODIUM 5000 UNITS: 5000 INJECTION, SOLUTION INTRAVENOUS; SUBCUTANEOUS at 06:06

## 2023-10-22 RX ADMIN — HEPARIN SODIUM 5000 UNITS: 5000 INJECTION, SOLUTION INTRAVENOUS; SUBCUTANEOUS at 21:18

## 2023-10-22 RX ADMIN — CLINDAMYCIN PHOSPHATE 900 MG: 900 INJECTION, SOLUTION INTRAVENOUS at 16:14

## 2023-10-22 RX ADMIN — BUMETANIDE 2 MG: 2 TABLET ORAL at 09:20

## 2023-10-22 RX ADMIN — NALOXONE HYDROCHLORIDE 0.4 MG: 0.4 INJECTION, SOLUTION INTRAMUSCULAR; INTRAVENOUS; SUBCUTANEOUS at 06:17

## 2023-10-22 RX ADMIN — INSULIN ASPART 5 UNITS: 100 INJECTION, SOLUTION INTRAVENOUS; SUBCUTANEOUS at 05:05

## 2023-10-22 RX ADMIN — Medication 40 MG: at 09:20

## 2023-10-22 RX ADMIN — PROPOFOL 5 MCG/KG/MIN: 10 INJECTION, EMULSION INTRAVENOUS at 08:34

## 2023-10-22 RX ADMIN — MAGNESIUM SULFATE HEPTAHYDRATE: 500 INJECTION, SOLUTION INTRAMUSCULAR; INTRAVENOUS at 20:15

## 2023-10-22 RX ADMIN — CHLORHEXIDINE GLUCONATE 15 ML: 1.2 RINSE ORAL at 20:22

## 2023-10-22 RX ADMIN — LABETALOL HYDROCHLORIDE 20 MG: 5 INJECTION, SOLUTION INTRAVENOUS at 08:42

## 2023-10-22 RX ADMIN — IPRATROPIUM BROMIDE AND ALBUTEROL SULFATE 3 ML: .5; 3 SOLUTION RESPIRATORY (INHALATION) at 10:52

## 2023-10-22 RX ADMIN — DEXTROSE MONOHYDRATE 100 ML/HR: 50 INJECTION, SOLUTION INTRAVENOUS at 13:43

## 2023-10-22 RX ADMIN — DEXMEDETOMIDINE HYDROCHLORIDE 1.2 MCG/KG/HR: 4 INJECTION, SOLUTION INTRAVENOUS at 02:59

## 2023-10-22 RX ADMIN — PROPOFOL 75 MCG/KG/MIN: 10 INJECTION, EMULSION INTRAVENOUS at 11:13

## 2023-10-22 RX ADMIN — INSULIN HUMAN 1 UNITS/HR: 1 INJECTION, SOLUTION INTRAVENOUS at 09:15

## 2023-10-22 RX ADMIN — Medication 200 MCG/HR: at 17:30

## 2023-10-22 RX ADMIN — HYDROMORPHONE HYDROCHLORIDE 0.5 MG: 1 INJECTION, SOLUTION INTRAMUSCULAR; INTRAVENOUS; SUBCUTANEOUS at 00:28

## 2023-10-22 RX ADMIN — HYDRALAZINE HYDROCHLORIDE 5 MG: 20 INJECTION INTRAMUSCULAR; INTRAVENOUS at 13:51

## 2023-10-22 RX ADMIN — CLINDAMYCIN PHOSPHATE 900 MG: 900 INJECTION, SOLUTION INTRAVENOUS at 08:48

## 2023-10-22 RX ADMIN — PROPOFOL 60 MCG/KG/MIN: 10 INJECTION, EMULSION INTRAVENOUS at 21:16

## 2023-10-22 RX ADMIN — MICAFUNGIN SODIUM 100 MG: 50 INJECTION, POWDER, LYOPHILIZED, FOR SOLUTION INTRAVENOUS at 11:48

## 2023-10-22 RX ADMIN — VANCOMYCIN HYDROCHLORIDE 2500 MG: 5 INJECTION, POWDER, LYOPHILIZED, FOR SOLUTION INTRAVENOUS at 09:37

## 2023-10-22 RX ADMIN — LABETALOL HYDROCHLORIDE 10 MG: 5 INJECTION, SOLUTION INTRAVENOUS at 00:30

## 2023-10-22 RX ADMIN — IPRATROPIUM BROMIDE AND ALBUTEROL SULFATE 3 ML: .5; 3 SOLUTION RESPIRATORY (INHALATION) at 15:46

## 2023-10-22 RX ADMIN — PROPOFOL 65 MCG/KG/MIN: 10 INJECTION, EMULSION INTRAVENOUS at 23:05

## 2023-10-22 RX ADMIN — BUMETANIDE 2 MG: 0.25 INJECTION INTRAMUSCULAR; INTRAVENOUS at 21:18

## 2023-10-22 RX ADMIN — IPRATROPIUM BROMIDE AND ALBUTEROL SULFATE 3 ML: .5; 3 SOLUTION RESPIRATORY (INHALATION) at 07:17

## 2023-10-22 RX ADMIN — CHLORHEXIDINE GLUCONATE 15 ML: 1.2 RINSE ORAL at 09:10

## 2023-10-22 RX ADMIN — DEXMEDETOMIDINE HYDROCHLORIDE 1.2 MCG/KG/HR: 4 INJECTION, SOLUTION INTRAVENOUS at 01:04

## 2023-10-22 RX ADMIN — HYDRALAZINE HYDROCHLORIDE 5 MG: 20 INJECTION INTRAMUSCULAR; INTRAVENOUS at 20:32

## 2023-10-22 RX ADMIN — HALOPERIDOL LACTATE 5 MG: 5 INJECTION, SOLUTION INTRAMUSCULAR at 00:06

## 2023-10-22 RX ADMIN — INSULIN ASPART 3 UNITS: 100 INJECTION, SOLUTION INTRAVENOUS; SUBCUTANEOUS at 00:09

## 2023-10-22 ASSESSMENT — ACTIVITIES OF DAILY LIVING (ADL)
ADLS_ACUITY_SCORE: 34
ADLS_ACUITY_SCORE: 36
ADLS_ACUITY_SCORE: 36
ADLS_ACUITY_SCORE: 34
ADLS_ACUITY_SCORE: 37
ADLS_ACUITY_SCORE: 36
ADLS_ACUITY_SCORE: 34
ADLS_ACUITY_SCORE: 37

## 2023-10-22 NOTE — PROGRESS NOTES
CARDIOLOGY PROGRESS NOTE      Assessment/Plan:  1.  Cardiomyopathy-ejection fraction of 30 to 35%, uncertain etiology.  No prior history of coronary artery disease, but given body size, hypertension, hypercholesterolemia, would need to rule out coronary artery disease and consider pharmacological stress nuclear as an outpatient.  In the interim, we will add some IV hydralazine as a vasodilator and lieu of ACE inhibitor given increased creatinine.  2.  Heart failure-acute in the setting of diminished ejection fraction of 30 to 35%.  Volume overload.  Diuretics under the direction of nephrology, Bumex.  3.  Benign essential hypertension-antihypertensive therapy, will add some low-dose hydralazine IV.  4.  Acute kidney injury-preop creatinine was 0.79 but creatinine after surgery was 3.75.  Defer to nephrology team, possibly due to nonsteroidal anti-inflammatories as well as heart failure.  Current creatinine 2.76 with GFR of 20.  5.  Morbid (severe) obesity due to excess calories (H)-status post laparoscopic sleeve with single anastomosis duodenal switch on .  6.  Intra-abdominal abscess (H)-she had small bowel enterotomy, and had second surgery for this on .  7.  Respiratory failure-has just been extubated yesterday  and reintubated today , , defer to intensivist.  8.  Frequent PVCs-currently on amiodarone.    Plan:  1.  Add hydralazine for blood pressure management if okay with nephrology.  2.  Continue diuretics at the discretion of nephrology.  3.  Might consider discontinuing amiodarone in the near future.    Discharge Plannin.  Barrier to discharge is strengthening and extubation.  2.  Can follow with Dr. Jerome Urbina primary cardiologist.  3.  We will need ischemic evaluation eventually.     LOS: 13 days     Subjective:  Interval History:    53-year-old white female being seen on 14th day of hospitalization.  Mother's sister, aunt, at bedside.  Patient  "intubated on propofol, no history available.    Medications   amiodarone  400 mg Oral or Feeding Tube BID    bumetanide  2 mg Intravenous Q12H    chlorhexidine  15 mL Mouth/Throat Q12H    clindamycin  900 mg Intravenous Q8H    sennosides  5 mL Oral or Feeding Tube BID    And    docusate  50 mg Oral or Feeding Tube BID    heparin ANTICOAGULANT  5,000 Units Subcutaneous Q8H    insulin aspart  1-12 Units Subcutaneous Q4H    ipratropium - albuterol 0.5 mg/2.5 mg/3 mL  3 mL Nebulization 4x daily    [Held by provider] lipids plant base  250 mL Intravenous Once per day on Monday Wednesday Friday    meropenem  1 g Intravenous Q12H    metroNIDAZOLE  500 mg Intravenous Q8H    micafungin  100 mg Intravenous Q24H    pantoprazole  40 mg Per Feeding Tube QAM AC    Or    pantoprazole  40 mg Intravenous QAM AC    polyethylene glycol  17 g Oral or Feeding Tube Daily    QUEtiapine  100 mg Oral or Feeding Tube BID    sodium chloride (PF)  3 mL Intracatheter Q8H    vancomycin place alegria - receiving intermittent dosing  1 each Intravenous See Admin Instructions     Objective:   Vital signs in last 24 hours:  Temp:  [97.8  F (36.6  C)-98.6  F (37  C)] 98.6  F (37  C)  Pulse:  [] 71  Resp:  [19-63] 22  BP: ()/() 128/72  FiO2 (%):  [45 %-100 %] 50 %  SpO2:  [88 %-100 %] 100 %    Physical Exam:  /72   Pulse 71   Temp 98.6  F (37  C) (Oral)   Resp 22   Ht 1.6 m (5' 3\")   Wt 131.5 kg (289 lb 14.5 oz)   LMP 09/09/2023 (Exact Date)   SpO2 100%   BMI 51.35 kg/m      General Appearance:    Somnolent on ventilator, no distress, appears stated age   Head:    Normocephalic, without obvious abnormality, atraumatic   Throat:   Lips, mucosa, and tongue normal; teeth and gums normal   Neck:   Supple, symmetrical, trachea midline, no adenopathy;        thyroid:  No enlargement/tenderness/nodules; no carotid    bruit, to jaw at 30 degrees JVD   Back:     Unable to examine   Lungs:     Clear to auscultation bilaterally, " "respirations unlabored   Chest wall:    No tenderness or deformity   Heart:    Regular rate and rhythm, S1 and S2 normal, no murmur, rub   or gallop   Abdomen:     Soft, non-tender, bowel sounds active all four quadrants,     no masses, no organomegaly   Extremities:   Normal, atraumatic, no cyanosis or edema   Pulses:   2+ and symmetric all extremities   Skin:   Skin color, texture, turgor normal, no rashes or lesions     Cardiographics:      ECG: Personally reviewed by myself shows sinus rhythm, rightward axis, poor R wave progression.    Echocardiogram: Technically difficult study.  Limited views were obtained.  The left ventricle is normal in size.  Left ventricular function is decreased. The ejection fraction is 30-35%  (moderately reduced).  There is moderate global hypokinesia of the left ventricle.      Lab Results:   Recent Labs   Lab 10/22/23  0520   WBC 27.2*   HGB 9.2*   HCT 31.7*   *     Recent Labs   Lab 10/22/23  0520   *   CO2 24   .1*   .       No results found for: \"CKTOTAL\", \"CKMB\", \"TROPONINI\"        "

## 2023-10-22 NOTE — PROGRESS NOTES
Respiratory Care    Pt was on BiPAP this morning VBG showed some improvement from previous gas. Decision made to intubate due to respiratory failure. She has a 7.5 ETT 20 at the teeth. Placed on previous tidal volume of 400, but PIP and Pplat were in the 40s. Decreased VT to 250 and RR set at 30. PIP 35 Pplat 32 on this. MD aware. Will continue to reassess as needed.      Guillermo Keyes, RT

## 2023-10-22 NOTE — PROGRESS NOTES
Called about positive blood cultures for gram-negative bacilli.  Patient is on Zosyn.  This should cover.  A.m. team to follow

## 2023-10-22 NOTE — PROGRESS NOTES
Patient having increased WOB seen on 4L nc. Spo2 94%. Anxious and agitated noted. Due to Patient not tolerating Bipap and NG tube. Placed on HFNC 40L 50% spo2 96%. Respirations in low 30s. RN gave medication for anxiety. RT will continue to follow.     0420 Patient currently on 10 L oxymask . Spo2 95% . RR mid to upper 20's .HFNC and Bipap on standby.     0615 Patient placed on Bipap 14/8 RR 24 Fio2 50%.  Due to Pattern of breathing, eyes open . Not alert . VBG drawn. RN and Intensivist notified.     Emmanuelle Hernández, RT

## 2023-10-22 NOTE — CONSULTS
Infectious Disease Consultation:  Requesting MD:  Reason for consult:      HISTORY:  Dr Knutson was kind enough to discuss the whole history with me prior to seeing the pt.  This is pasted from his note:  Mojgan Jimenez is a 53 year old female with history of asthma, severe JARVIS, (AHI of 41.7, supine AHI 41.6, REM AHI 85.7, and 19.8 minutes with O2 saturations less than 88%), stimulant use disorder, generalized anxiety disorder, depression, GERD, morbid obesity Body mass index is 53.74 kg/m .  Presented on 10/9/23 for scheduled laparoscopic sleeve gastrectomy with single anastomosis duodenal switch.   Postprocedure, patient complained of abdominal pain. Gastrografin leak test was negative. Rapid response was called due to encephalopathy, acute renal failure. Patient was admitted to ICU, intubated and started on pressors on 10/12. Patient was taken to OR.   S/p closure of two small bowel enterotomies, drain placement and wash out. Peritoneal fluid culture (+) strep anginosus. Ongoing fever. Follow up abdomen CT scan showed fluid collection in RUQ, s/p drain placement 10/19. Negative culture.   Hospital course complicated with agitation / delirium. Kidney function started to recover. Received steroids due to no cuff leak. Patient was extubated on 10/21. Unfortunately , raising WBC, developed skin rash.      Acute respiratory failure  Intubated from 10/12 to 10/21.   Received steroids due to no cuff leak.   Unfortunately ongoing agitation, delirium. Raising WBC. ABG showed respiratory acidosis.   Plan for re-intubation   Sepsis due to peritonitis secondary to bowel perforation  Peritoneal fluid culture (+) streptococcus anginosus.   Abdomen CT scan 10/18 showed a layer of fluid between between right hemidiaphragm and liver.   IR was consulted for drain tube placement. Procedure was done on 10/19. Cultures are negative    Due to raising WBC, new set of blood cultures, follow  up CT scan.   Currently on zosyn, flagyl and  micafungin.   Change zosyn to vancomycin, continue flagyl, micafungin  Skin rash   Raise the possibility of toxic shock syndrome.   Blood cultures   Add vancomycin and clindamycin.       MY HISTORY  The pt is intubated, aunt and daughter here.  She is flushed.  The rash on her R torso is not classic for scalded skin or toxic shock.  A CT was just done, I looked at it and do not see signs of fasciitis.            Pertinent past history, past infectious disease history:  Past Medical History           Past Medical History:   Diagnosis Date    LINA (generalized anxiety disorder) 11/2/2017    Hyperlipidemia LDL goal <160 1/20/2019    Major depressive disorder      Methanol abuse (H)      Mild persistent asthma without complication 11/2/2017    Vitamin D deficiency 11/2/2017            Past Surgical History             Past Surgical History:   Procedure Laterality Date    MAMMOPLASTY REDUCTION         reduction            CURRENT MEDS:        Family History             Family History   Problem Relation Age of Onset    Cancer Mother      Unknown/Adopted Father      Alcoholism Father      Substance Abuse Sister      Diabetes No family hx of      Hypertension No family hx of               reports that she has never smoked. She has never used smokeless tobacco. She reports that she does not drink alcohol and does not use drugs.     Review of Systems - 12 point Review of Systems is negative except for the issues mentioned above.     PSYCHIATRIC: she has undergone a lifestyle assessment and has been deemed a good candidate for bariatric surgery by the psychologist.    Medications:  Reviewed prior to admission meds as applicable in chart review.  Current meds are reviewed in the EMR listed MAR.     ANTIBIOTICS:    Current:vanco, clinda, flagyl, micamine, merrem   Prior:   Allergy to:    SH/FH and  travel history(if applicable to consult):above    REVIEW OF SYSTEMS:  All other systems negative    EXAMINATION:  /69   Pulse  "74   Temp 98.6  F (37  C) (Oral)   Resp 22   Ht 1.6 m (5' 3\")   Wt 131.5 kg (289 lb 14.5 oz)   LMP 09/09/2023 (Exact Date)   SpO2 100%   BMI 51.35 kg/m    Alert, awake  Vitals tabulated above, reviewed  HEENT:intubated, facies flushed reddish  Neck supple without lymphadenopathy  Sclera clear  CARDIOVASCULAR regular rate and rhythm, no murmur  Lungs CLEAR TO AUSCULTATION   Abdomen soft, NT/ND, absent HEPATOSPLENOMEGALY  Skin normal  Joints normal  Neurologic exam non focal  Wound:  mild incisional stitch redness, no flank or rib cage air, crepitance etc  Skin mottled some R flank      CLINICAL DATABASE FOR---LAB/MICRO/CULTURES/IMAGING STUDIES:  Lab Results   Component Value Date    WBC 27.2 10/22/2023    WBC 5.5 11/17/2020     Lab Results   Component Value Date    RBC 3.16 10/22/2023    RBC 4.13 11/17/2020     Lab Results   Component Value Date    HGB 9.2 10/22/2023    HGB 12.3 11/17/2020     Lab Results   Component Value Date    HCT 31.7 10/22/2023    HCT 37.4 11/17/2020     No components found for: \"MCT\"  Lab Results   Component Value Date     10/22/2023    MCV 91 11/17/2020     Lab Results   Component Value Date    MCH 29.1 10/22/2023    MCH 29.8 11/17/2020     Lab Results   Component Value Date    MCHC 29.0 10/22/2023    MCHC 32.9 11/17/2020     Lab Results   Component Value Date    RDW 14.8 10/22/2023    RDW 12.7 11/17/2020     Lab Results   Component Value Date     10/22/2023     11/17/2020       Last Comprehensive Metabolic Panel:  Sodium   Date Value Ref Range Status   10/22/2023 150 (H) 135 - 145 mmol/L Final     Comment:     Reference intervals for this test were updated on 09/26/2023 to more accurately reflect our healthy population. There may be differences in the flagging of prior results with similar values performed with this method. Interpretation of those prior results can be made in the context of the updated reference intervals.    11/17/2020 141 133 - 144 mmol/L Final " "    Potassium   Date Value Ref Range Status   10/22/2023 5.0 3.4 - 5.3 mmol/L Final   05/17/2022 4.5 3.4 - 5.3 mmol/L Final   11/17/2020 4.1 3.4 - 5.3 mmol/L Final     Chloride   Date Value Ref Range Status   10/22/2023 112 (H) 98 - 107 mmol/L Final   05/17/2022 102 94 - 109 mmol/L Final   11/17/2020 109 94 - 109 mmol/L Final     Carbon Dioxide   Date Value Ref Range Status   11/17/2020 31 20 - 32 mmol/L Final     Carbon Dioxide (CO2)   Date Value Ref Range Status   10/22/2023 24 22 - 29 mmol/L Final   05/17/2022 32 20 - 32 mmol/L Final     Anion Gap   Date Value Ref Range Status   10/22/2023 14 7 - 15 mmol/L Final   05/17/2022 2 (L) 3 - 14 mmol/L Final   11/17/2020 1 (L) 3 - 14 mmol/L Final     Glucose   Date Value Ref Range Status   05/17/2022 110 (H) 70 - 99 mg/dL Final   11/17/2020 84 70 - 99 mg/dL Final     Comment:     Fasting specimen     GLUCOSE BY METER POCT   Date Value Ref Range Status   10/22/2023 144 (H) 70 - 99 mg/dL Final     Urea Nitrogen   Date Value Ref Range Status   10/22/2023 104.1 (H) 6.0 - 20.0 mg/dL Final   05/17/2022 16 7 - 30 mg/dL Final   11/17/2020 9 7 - 30 mg/dL Final     Creatinine   Date Value Ref Range Status   10/22/2023 2.76 (H) 0.51 - 0.95 mg/dL Final   11/17/2020 0.79 0.52 - 1.04 mg/dL Final     GFR Estimate   Date Value Ref Range Status   10/22/2023 20 (L) >60 mL/min/1.73m2 Final   11/17/2020 88 >60 mL/min/[1.73_m2] Final     Comment:     Non  GFR Calc  Starting 12/18/2018, serum creatinine based estimated GFR (eGFR) will be   calculated using the Chronic Kidney Disease Epidemiology Collaboration   (CKD-EPI) equation.       Calcium   Date Value Ref Range Status   10/22/2023 9.0 8.6 - 10.0 mg/dL Final   11/17/2020 8.9 8.5 - 10.1 mg/dL Final       Liver Function Studies -   Recent Labs   Lab Test 10/22/23  0520   PROTTOTAL 7.7   ALBUMIN 3.7   BILITOTAL 0.3   ALKPHOS 106*   AST 25   ALT 33       No results found for: \"SED\"    No results found for: " "\"CRP\"            IMPRESSION:  Pertionitis, streptococcal when sampled many days ago  Sepsis  Post obesity surgery, elective    PLAN:  Ok with abx, I did stop the flagyl as not additive or needed  Clinically doubt TSS but will follow closely  Source control will continue to be question if white count is rising etc.           CLEM RAMOS MD  [facility]  Office [phone]      "

## 2023-10-22 NOTE — PROGRESS NOTES
PULMONARY / CRITICAL CARE PROGRESS NOTE    Date / Time of Admission:  10/9/2023  5:26 AM    Assessment:     Mojgan Jimenez is a 53 year old female with history of asthma, severe JARVIS, (AHI of 41.7, supine AHI 41.6, REM AHI 85.7, and 19.8 minutes with O2 saturations less than 88%), stimulant use disorder, generalized anxiety disorder, depression, GERD, morbid obesity Body mass index is 53.74 kg/m .  Presented on 10/9/23 for scheduled laparoscopic sleeve gastrectomy with single anastomosis duodenal switch.   Postprocedure, patient complained of abdominal pain. Gastrografin leak test was negative. Rapid response was called due to encephalopathy, acute renal failure. Patient was admitted to ICU, intubated and started on pressors on 10/12. Patient was taken to OR.   S/p closure of two small bowel enterotomies, drain placement and wash out. Peritoneal fluid culture (+) strep anginosus. Ongoing fever. Follow up abdomen CT scan showed fluid collection in RUQ, s/p drain placement 10/19. Negative culture.   Hospital course complicated with agitation / delirium. Kidney function started to recover. Received steroids due to no cuff leak. Patient was extubated on 10/21. Unfortunately , raising WBC, developed skin rash.     Acute respiratory failure  Intubated from 10/12 to 10/21.   Received steroids due to no cuff leak.   Unfortunately ongoing agitation, delirium. Raising WBC. ABG showed respiratory acidosis.   Plan for re-intubation   Sepsis due to peritonitis secondary to bowel perforation  Peritoneal fluid culture (+) streptococcus anginosus.   Abdomen CT scan 10/18 showed a layer of fluid between between right hemidiaphragm and liver.   IR was consulted for drain tube placement. Procedure was done on 10/19. Cultures are negative    Due to raising WBC, new set of blood cultures, follow  up CT scan.   Currently on zosyn, flagyl and micafungin.   Change zosyn to vancomycin, continue flagyl, micafungin  Skin rash   Raise the  possibility of toxic shock syndrome.   Blood cultures   Add vancomycin and clindamycin.   ID consult.    Pneumonia   CT scan showed infiltrate / atelectasis right base.   S/p diagnostic bronchoscopy 10/20, bloody secretions, cell count showed elevated Neutrophils.   Cultures pending. Continue Abx.   S/p closure of two small bowel enterotomies, drain placement and wash out on 10/12  Acute kidney injury   Volume up   Hypernatremia   Cardiomyopathy   Echocardiogram showed EF 30-35%, moderate global hypokinesia of the left ventricle.   Frequent PVCs  Hypernatremia  S/p laparoscopic sleeve gastrectomy with single anastomosis duodenal switch 10/9  Anemia   Anxiety / delirium   Hx stimulant use disorder, LINA, depression  JARVIS   Morbid obesity Body mass index is 51.35 kg/m .    Advance Directives:  Full code    Plan:   Mechanical intubation   Titrate vent settings A/C 20/380/10/60%, keep SpO2 > 90%, plateau pressure < 30  Schedule bronchodilators  Sedation to keep RASS -2 to -3, propofol , precedex and fentanyl drip  Pressors to keep MAP > 65, add NE / vasopressin if needed  On enteric amiodarone   New set of blood cultures   B-D glucan  Chest / abdomen pelvis CT scan   Change Abx to meropenem,  micafungin, metronidazole   In view of skin rash, ? Toxic shock syndrome, add vanco and clindamycin  ID consult   Monitor kidney function   Continue diuresis   Supplement electrolytes   On TPN  NPO for now  Monitor TG level   PPI IV for GI prophylaxis   Glucose level monitoring , start insulin drip   Venous US LEs  DVT prophylaxis SCDs, heparin SQ    Please contact me if you have any questions.  Total critical care time, not including separately billable procedure time: 45 minutes  This patient had a high probability of imminent or life threatening deterioration due to acute respiratory failure which required my direct attention, intervention and personal management.     James Borrero  Pulmonary / Critical  Care  10/22/2023  7:31 AM          ICU DAILY CHECKLIST                           Can patient transfer out of MICU? no    FAST HUG:    Feeding:  Feeding: yes.  Patient is receiving TPN    Patiño: yes  Analgesia/Sedation:yes  precedex, fentanyl, propofol  Thromboembolic prophylaxis: Yes; Mode:  Heparin and SCDs  HOB>30:  Yes  Stress Ulcer Protocol Active: Yes; Mode: PPI  Glycemic Control: Any glucose > 180 no; Mode of Insulin Therapy: Sliding Scale Insulin    INTUBATED:  Can patient have daily waking:  No  Can patient have spontaneous breathing trial:  No    Restraints? yes    PHYSICAL THERAPY AND MOBILITY:  Can patient have PT and mobility trial: no  Activity: bedrest    Subjective:   HPI:  Mojgan Jimenez is a 53 year old female with history of asthma, severe JARVIS, (AHI of 41.7, supine AHI 41.6, REM AHI 85.7, and 19.8 minutes with O2 saturations less than 88%), stimulant use disorder, generalized anxiety disorder, depression, GERD, morbid obesity Body mass index is 51.35 kg/m .  Presented on 10/9/23 for scheduled laparoscopic sleeve gastrectomy with single anastomosis duodenal switch.   Postprocedure, patient complained of abdominal pain. Gastrografin leak test was negative. Rapid response was called due to encephalopathy, acute renal failure. Patient was admitted to ICU, intubated and started on pressors on 10/12. Patient was taken to OR. S/p closure of two small bowel enterotomies, drain placement and wash out. Peritoneal fluid culture (+) strep anginosus. Ongoing fever. Follow up abdomen CT scan showed fluid collection in RUQ, s/p drain placement. Negative culture.   Ongoing agitation / delirium. Kidney function started to recover. Received steroids due to no cuff leak. Patient was extubated on 10/21.     Events overnight  - Extubated on 10/21  - Persistent agitation / delirium  - On TPN and started on OG tube feedings   - Less responsive in AM, started on BiPAP    Allergies: Patient has no known allergies.      MEDS:  Current Facility-Administered Medications   Medication    acetaminophen (TYLENOL) solution 650 mg    acetaminophen (TYLENOL) tablet 650 mg    Or    acetaminophen (TYLENOL) Suppository 650 mg    albuterol (PROVENTIL HFA/VENTOLIN HFA) inhaler    albuterol (PROVENTIL) neb solution 2.5 mg    amiodarone (PACERONE) tablet 400 mg    bumetanide (BUMEX) tablet 2 mg    chlorhexidine (PERIDEX) 0.12 % solution 15 mL    dexmedeTOMIDine (PRECEDEX) 4 mcg/mL in NS infusion    dextrose 10% infusion    glucose gel 15-30 g    Or    dextrose 50 % injection 25-50 mL    Or    glucagon injection 1 mg    diphenhydrAMINE (BENADRYL) capsule 25 mg    Or    diphenhydrAMINE (BENADRYL) injection 25 mg    sennosides (SENOKOT) syrup 5 mL    And    docusate (COLACE) 50 MG/5ML liquid 50 mg    fentaNYL (SUBLIMAZE) 50 mcg/mL bolus from pump    fentaNYL (SUBLIMAZE) infusion    haloperidol lactate (HALDOL) injection 5 mg    heparin ANTICOAGULANT injection 5,000 Units    hydrALAZINE (APRESOLINE) injection 20 mg    HYDROmorphone (DILAUDID) injection 0.2 mg    Or    HYDROmorphone (PF) (DILAUDID) injection 0.5 mg    insulin aspart (NovoLOG) injection (RAPID ACTING)    insulin glargine (LANTUS PEN) injection 10 Units    ipratropium - albuterol 0.5 mg/2.5 mg/3 mL (DUONEB) neb solution 3 mL    labetalol (NORMODYNE/TRANDATE) injection 20 mg    lidocaine (LMX4) cream    lidocaine 1 % 0.1-1 mL    lipids plant base (CLINOLIPID) 20 % infusion 250 mL    LORazepam (ATIVAN) 2 MG/ML (HIGH CONC) oral solution 1 mg    menthol-zinc oxide (CALMOSEPTINE) 0.44-20.6 % ointment OINT    metroNIDAZOLE (FLAGYL) infusion 500 mg    micafungin (MYCAMINE) 100 mg in sodium chloride 0.9 % 100 mL intermittent infusion    miconazole (MICATIN) 2 % powder    naloxone (NARCAN) injection 0.2 mg    Or    naloxone (NARCAN) injection 0.4 mg    Or    naloxone (NARCAN) injection 0.2 mg    Or    naloxone (NARCAN) injection 0.4 mg    norepinephrine (LEVOPHED) 4 mg in  mL infusion  "PREMIX    OLANZapine (zyPREXA) IV injection 5 mg    ondansetron (ZOFRAN ODT) ODT tab 4 mg    Or    ondansetron (ZOFRAN) injection 4 mg    pantoprazole (PROTONIX) IV push injection 40 mg    parenteral nutrition - ADULT compounded formula    phenol (CHLORASEPTIC) 1.4 % spray 1-2 mL    piperacillin-tazobactam (ZOSYN) 3.375 g vial to attach to  mL bag    polyethylene glycol (MIRALAX) Packet 17 g    prochlorperazine (COMPAZINE) injection 10 mg    Or    prochlorperazine (COMPAZINE) tablet 10 mg    propranolol (INDERAL) tablet 10 mg    QUEtiapine (SEROquel) tablet 100 mg    sodium chloride (PF) 0.9% PF flush 3 mL    sodium chloride (PF) 0.9% PF flush 3 mL    traMADol (ULTRAM) tablet 50 mg    vasopressin (VASOSTRICT) 20 Units in sodium chloride 0.9 % 100 mL standard conc infusion     Objective:   VITALS:  BP (!) 191/112   Pulse 109   Temp 97.9  F (36.6  C) (Axillary)   Resp (!) 37   Ht 1.6 m (5' 3\")   Wt 131.5 kg (289 lb 14.5 oz)   LMP 09/09/2023 (Exact Date)   SpO2 98%   BMI 51.35 kg/m    VENT:  Vent Mode: CPAP/PS  (Continuous positive airway pressure with Pressure Support)  FiO2 (%): 45 %  Resp Rate (Set): 14 breaths/min  Tidal Volume (Set, mL): 400 mL  PEEP (cm H2O): 5 cmH2O  Pressure Support (cm H2O): 6 cmH2O  Resp: (!) 37    EXAM:   Gen: obese, severe respiratory distress, on BiPAP  HEENT: pink conjunctiva, intubated  Neck: no thyromegaly, masses or JVD  Lungs: decrease breath sounds both bases, discrete ronchi both HT  CV: regular, no murmurs or gallops appreciated  Abdomen: distended, abdominal drain tubes, decrease bowel sounds  Ext: trace edema  Skin. Skin rash right side of chest / flank   Neuro: arousable, withdraws to painful stimulus    Data Review:  Recent Labs   Lab 10/22/23  0520 10/22/23  0502 10/22/23  0004 10/21/23  2017 10/21/23  1616 10/21/23  1129   * 255* 192* 178* 190* 174*      10/22/23 05:20   Sodium 150 (H)   Potassium 5.0   Chloride 112 (H)   Carbon Dioxide (CO2) 24   Urea " Nitrogen 104.1 (H)   Creatinine 2.76 (H)   GFR Estimate 20 (L)   Calcium 9.0   Anion Gap 14   Magnesium 2.2   Phosphorus 6.1 (H)   Albumin 3.7   Protein Total 7.7   Alkaline Phosphatase 106 (H)   ALT 33   AST 25   Bilirubin Total 0.3   Glucose 242 (H)      10/22/23 05:20   WBC 27.2 (H)   Hemoglobin 9.2 (L)   Hematocrit 31.7 (L)   Platelet Count 460 (H)   RBC Count 3.16 (L)      MCH 29.1   MCHC 29.0 (L)   RDW 14.8   % Neutrophils 85   % Lymphocytes 5   % Monocytes 8   % Eosinophils 0   % Basophils 0   % Metamyelocytes 1   % Myelocytes 1      10/20/23 05:09   CRP Inflammation 341.20 (H)     Blood Culture 10/18 No growth after 2 days        RUQ drain 10/19  Gram Stain Result No organisms seen      3+ WBC seen   Predominantly PMNs        Peritoneal fluid Culture 10/12 1+ Streptococcus anginosus Abnormal         XR CHEST PORT 1 VIEW  LOCATION: [facility]  DATE: 10/16/2023  INDICATION: hypoxia  COMPARISON: Portable AP view of the chest 10/14/2023                                          IMPRESSION:   Tip of the endotracheal tube is in satisfactory position. Esophageal temperature probe is in the lower third of the esophagus. Gastric tube courses below the diaphragmatic hiatus and outside the field-of-view. Telemetry leads overlie the chest.  Persistent shallow lung inflation. The right hemidiaphragm is indistinct and there is graded opacity in the infrahilar right chest either related to adjacent basilar atelectasis and/or layering pleural fluid. Focal atelectasis in the medial left base is not increased. The vessels within the aerated portions of the lungs are normal. No clear change from 2 days earlier. Unchanged heart and mediastinal contours.    Echocardiogram 10/14/2023  Technically difficult study.  Limited views were obtained.  The left ventricle is normal in size.  Left ventricular function is decreased. The ejection fraction is 30-35%  (moderately reduced).  There is moderate  global hypokinesia of the left ventricle.    CT ABDOMEN PELVIS W/O CONTRAST  LOCATION: [facility]  DATE: 10/18/2023     INDICATION: Perforated bowel, ongoing fever. Status post 10/09/2023 sleeve gastrectomy with single anastomosis duodenal switch complicated by bowel perforation, peritonitis and sepsis status post 10/12/2023 washout and enterotomy closure. Cardiomyopathy.   JARVIS.  COMPARISON: 10/16/2023 CXR and 10/10/2023 Gastrografin upper GI.  FINDINGS:   LOWER CHEST: Complete right lower lobe atelectasis, small volume of right-sided pleural fluid and mild elevation right hemidiaphragm. Trace left pleural effusion and mild platelike atelectasis left lower lobe. Heart size within normal limits with no pericardial effusion.  HEPATOBILIARY: Liver is normal.  No calcified gallstones or bile duct dilatation.   PANCREAS: Normal.  SPLEEN: Spleen size normal.  ADRENAL GLANDS: Normal.  KIDNEYS/BLADDER: Bladder is decompressed by well-positioned Patiño catheter. Kidneys, ureters and bladder are otherwise normal.  BOWEL: Sleeve gastrectomy and proximal duodenal to jejunal anastomosis, a well-positioned nasogastric tube, and upper anterior abdominal surgical drain from the right and a second left anterior abdominal and pelvic surgical drain. A thin layer of fluid   about 1 - 2 cm radial thickness between the right hemidiaphragm and right hepatic lobe contains a few foci of gas and there is a small amount of nongas-containing fluid in pelvic cul-de-sac. Residual iodinated contrast material from 10/10/2023 upper GI   within the normal caliber appendix, colon and rectum.  LYMPH NODES: No lymphadenopathy.  VASCULATURE: Normal caliber abdominal aorta.    PELVIC ORGANS: A 5 cm fibroid.  MUSCULOSKELETAL:Unremarkable.                                                      IMPRESSION:   1.  Sleeve gastrectomy and proximal duodenal to jejunal anastomosis, malpositioned nasogastric tube, and upper anterior abdominal  surgical drain from the right and a second left anterior abdominal and pelvic surgical drain.   2.  A thin layer of fluid about 1 - 2 cm radial thickness between the right hemidiaphragm and right hepatic lobe contains a few foci of gas and there is a small amount of nongas-containing fluid in pelvic cul-de-sac.   3.  Complete right lower lobe atelectasis, small volume of right-sided pleural fluid and mild elevation right hemidiaphragm.     R CHEST PORT 1 VIEW  LOCATION: [facility]  DATE: 10/22/2023  INDICATION: concern for possible aspiration, assess for HAP  COMPARISON: CT 10/19/2023         IMPRESSION: Perihepatic drain overlies the right upper abdomen. Small right pleural effusion with compressive atelectasis. Infiltrate of the right lung base is not excluded. Left basilar and midlung atelectasis. Left internal jugular central venous catheter with tip overlying the innominate confluence region. Nasogastric tube which courses below the diaphragm though the tip is excluded by collimation. No pneumothorax. Upper limits normal heart size.    By:  James Borrero MD, 10/22/2023  7:31 AM    Primary Care Physician:  Franca Mishra

## 2023-10-22 NOTE — PHARMACY-VANCOMYCIN DOSING SERVICE
Pharmacy Vancomycin Initial Note  Date of Service 2023  Patient's  1970  53 year old, female    Indication: Sepsis    Current estimated CrCl = Estimated Creatinine Clearance: 31.3 mL/min (A) (based on SCr of 2.76 mg/dL (H)).    Creatinine for last 3 days  10/20/2023:  5:09 AM Creatinine 2.87 mg/dL  10/21/2023:  3:41 AM Creatinine 2.84 mg/dL  10/22/2023:  5:20 AM Creatinine 2.76 mg/dL    Recent Vancomycin Level(s) for last 3 days  No results found for requested labs within last 3 days.      Vancomycin IV Administrations (past 72 hours)        No vancomycin orders with administrations in past 72 hours.                    Nephrotoxins and other renal medications (From now, onward)      Start     Dose/Rate Route Frequency Ordered Stop    10/22/23 0830  vancomycin (VANCOCIN) 2,500 mg in sodium chloride 0.9 % 500 mL intermittent infusion         2,500 mg  over 2 Hours Intravenous ONCE 10/22/23 0806      10/22/23 0805  vancomycin place alegria - receiving intermittent dosing         1 each Intravenous SEE ADMIN INSTRUCTIONS 10/22/23 0806      10/21/23 0900  bumetanide (BUMEX) tablet 2 mg         2 mg Oral or Feeding Tube DAILY 10/20/23 1344      10/12/23 1630  norepinephrine (LEVOPHED) 4 mg in  mL infusion PREMIX         0.01-0.6 mcg/kg/min × 130.7 kg  4.9-294.1 mL/hr  Intravenous CONTINUOUS 10/12/23 1614      10/12/23 1030  vasopressin (VASOSTRICT) 20 Units in sodium chloride 0.9 % 100 mL standard conc infusion         2.4 Units/hr  12 mL/hr  Intravenous CONTINUOUS 10/12/23 1028              Contrast Orders - past 72 hours (72h ago, onward)      None            InsightRX Prediction of Planned Initial Vancomycin Regimen  Unable to make a prediction currently  Plan:  Start vancomycin  2500 mg IV once, followed by intermittent dosing based on levels.  Vancomycin monitoring method: Trough (Method 2 = manual dose calculation)  Vancomycin therapeutic monitoring goal: 15-20 mg/L  Pharmacy will check  vancomycin levels as appropriate in 1-3 Days.    Serum creatinine levels will be ordered daily for the first week of therapy and at least twice weekly for subsequent weeks.      Blaire Gray RPH

## 2023-10-22 NOTE — PLAN OF CARE
Problem: Bariatric Surgery  Goal: Fluid and Electrolyte Balance  Outcome: Not Progressing  Goal: Blood Glucose Level Within Desired Range  Outcome: Not Progressing  Goal: Absence of Infection Signs and Symptoms  Outcome: Not Progressing  Intervention: Prevent or Manage Infection  Recent Flowsheet Documentation  Taken 10/22/2023 1200 by Marguerite Alves RN  Infection Management:   cultures obtained and sent to lab   aseptic technique maintained  Taken 10/22/2023 0900 by Marguerite Alves RN  Infection Management:   cultures obtained and sent to lab   aseptic technique maintained     Problem: Bariatric Surgery  Goal: Effective Oxygenation and Ventilation  Outcome: Not Progressing  Intervention: Optimize Oxygenation and Ventilation  Recent Flowsheet Documentation  Taken 10/22/2023 1400 by Marguerite Alves RN  Head of Bed (HOB) Positioning: HOB at 30 degrees  Taken 10/22/2023 1200 by Marguerite Alves RN  Head of Bed (HOB) Positioning: HOB at 30 degrees  Taken 10/22/2023 0900 by Marguerite Alves RN  Head of Bed (HOB) Positioning: HOB at 30 degrees   Goal Outcome Evaluation:

## 2023-10-22 NOTE — PHARMACY-CONSULT NOTE
"Pharmacy Note: Parenteral Nutrition (PN) Management    Pharmacist consulted to dose PN for Mojgan Jimenez, a 53 year old female by Dr. Cruz.     Subjective:     The patient is a new PN start.     The patient was started on PN in the hospital on 10/14/23.     Indication for PN therapy:  peritonitis     Inadequate nutrition anticipated for > 7 days.      Enteral nutrition contraindicated due to: peritonitis.     Pertinent diseases and other special considerations  10/9/23 s/p sleeve gastrectomy    Social History     Tobacco Use    Smoking status: Never    Smokeless tobacco: Never   Vaping Use    Vaping Use: Never used   Substance Use Topics    Alcohol use: Not Currently     Comment: Sober x 8 months    Drug use: Not Currently     Types: Methamphetamines, \"Crack\" cocaine     Comment: in remision      Objective:    Ht Readings from Last 1 Encounters:   10/09/23 1.6 m (5' 3\")     Wt Readings from Last 1 Encounters:   10/21/23 131.5 kg (289 lb 14.5 oz)       Body mass index is 51.35 kg/m .    Patient Vitals for the past 96 hrs:   Weight   10/21/23 0530 131.5 kg (289 lb 14.5 oz)   10/20/23 0500 129.8 kg (286 lb 2.5 oz)       Labs:  Last 3 days:  Recent Labs     10/20/23  0509 10/21/23  0341 10/22/23  0520   * 147* 150*   POTASSIUM 4.1 4.4 5.0   CHLORIDE 110* 110* 112*   CO2 23 24 24   BUN 89.8* 92.0* 104.1*   CR 2.87* 2.84* 2.76*   PALAK 9.5 8.9 9.0   MAG 1.9 2.0 2.2   PHOS 3.5 3.0 6.1*   PROTTOTAL 7.6 7.4 7.7   ALBUMIN 3.4* 3.5 3.7   TRIG  --   --  271*   HGB 9.6* 8.2* 9.2*   HCT 30.9* 26.8* 31.7*    340 460*   BILITOTAL 0.6 0.5 0.3   AST 27 20 25   ALT 34 30 33   ALKPHOS 90 87 106*       Glucose (past 48 hours):   Recent Labs     10/21/23  0341 10/21/23  0738 10/21/23  1129 10/21/23  1616 10/21/23  2017 10/22/23  0004 10/22/23  0502 10/22/23  0520 10/22/23  0835   *  265* 213* 174* 190* 178* 192* 255* 242* 175*       Intake/Output (last 24 hours): I/O last 3 completed shifts:  In: 2532.3 [I.V.:1096.2; " NG/GT:480]  Out: 4809 [Urine:4805; Drains:4]    Estimated CrCl: Estimated Creatinine Clearance: 31.3 mL/min (A) (based on SCr of 2.76 mg/dL (H)).    Assessment:  Continue patient on PN therapy as a continuous central therapy.      Given the patient's current condition/oral intake, PN is still indicated.     Lab results reviewed: Na high,only 10 meq in the TPN but will remove from TPN.  Chloride high, already on max acetate  K continuing to rise, reduce K in TPN  BG elevated but just completed 3 doses of methylprednisolone. Lantus added last night and started on insulin drip today.  Mg continues to climb, reduce in TPN  Propofol started so lipids held.    Plan:  Rate of PN: 50 mL/hr   Formula:   Amino Acids 65 grams  Dextrose 150 grams  Sodium 0 mEq/day  Potassium 40 mEq/day  Calcium 5 mEq/day  Magnesium 6 mEq/day  Phosphorus 0 mMol/day  Chloride: Acetate Ratio Max acetate  Standard Multivitamins w/Vitamin K  Trace Elements  Vitamin b12 100 mcg  Zinc 5mg  Fat Emulsion: 20%, 250 mL IV 3 times weekly on MWF - ON HOLD as propofol started.   Check BMP, Mag, Phos and ical labs tomorrow.  Pharmacist will continue to follow the patient's lab results, clinical status and blood glucose results and make adjustments as appropriate.    Thank you for the consult.  Blaire Gray RPH  10/22/2023 10:13 AM

## 2023-10-22 NOTE — PLAN OF CARE
[facility] - ICU    RN Progress Note:            Pertinent Assessments:      Please refer to flowsheet rows for full assessment     - RASS score of -1 to +1, +2. Follows commands at times however gets anxious, panic attack and gets frustrated about her situation.         Key Events - This Shift:     - Patient is on Precedex drip tung gets Anxious/irritated/frustrated/panic attack when she wakes and not redirectable most of the times. M.D notifed. PRN Zyprexa, PRN ativan, PRN haldol, PRN propranolol given in different times (SEE MAR) and effective for a few hours however patient's situation comes back after a few hours.  - Patient keeps trying to remove oxymask and picking on her oxymask when she wakes ups and tries to get out of bed at times. 1:1 sitter for safety.  - PRN dilaudid and PRN tramadol given for pain and helpful.  - Patient was on HFNC however patient is a mouth breather and transitioned her to oxymask at 10L. 0xygen sats of 93% to 94%. Less tachypnic when on Oxymask. Intensivist aware.  - SBP >180. PRN hydralazine 10mg + 10mg given but not effective. PRN labatalol 10mg + 10mg given and effective.  - Around 5am. Patient started to get more lethargic. Not following command. Only reactive to lights. Mouth breatherer and tachypnic. M.D notified. Chest xray ordered. VGB oredered. Stopped Precedex. Narcan given. Put her to BIPAP.            Barriers to Discharge / Downgrade:     - On Precedex drip. Monitoring oxygenation needs. ICU level of cares.

## 2023-10-22 NOTE — PROGRESS NOTES
Respiratory Care    Vent day 1    Vent Mode: CMV/AC  (Continuous Mandatory Ventilation/ Assist Control)  FiO2 (%): 50 %  Resp Rate (Set): 22 breaths/min  Tidal Volume (Set, mL): 340 mL  PEEP (cm H2O): 5 cmH2O  Pressure Support (cm H2O): 6 cmH2O  Resp: 22    PIP 28  Pplat 27  Secretions: scant    ABG     Latest Reference Range & Units 10/22/23 15:48   pH Arterial 7.37 - 7.44  7.33 (L)   pCO2 Arterial 35 - 45 mm Hg 46 (H)   PO2 Arterial 80 - 90 mm Hg 110 (H)   Bicarbonate Arterial 23 - 29 mmol/L 24   (L): Data is abnormally low  (H): Data is abnormally high    Notes/plan: no weaning. Continue full vent support. Will reassess tomorrow morning.       Guillermo Keyes, RT

## 2023-10-22 NOTE — PROGRESS NOTES
[facility]  [facility]   Follow up    Mojgan Jimenez   MRN: [id number]  : 1970   DOA: 10/9/2023   CC: ARF      Assessment and Plan:  53 year old female s/p gastric b/p 10/9/23 c/w leak, s/p repair, peritonitis, perihepatic abscess. On abx vanco zosyn. Repeat CT today.     ARF;  hemodynamic exacerbated by IV NSAIDS (lowish bp, vasodilatory drugs, mild intravascular depletion)==>ATN, now in recovery phase and creatinine improved though leveling off high 2's last several days. Hope to see more improvement with time. Creat was normal (0.7) PTA.   Lytes ok but mild hypernatremia with diuresis and current obligatory IVF. Na up now as off FWF. Give some D5 to prevent further water deficit.   Volume overload, now s/p diuresis, prob near euvolemia, bumex 2 mg/day is keeping her even. Given failed extubation, see if we can dry her out more, inc bumex to 2mg bid.   HoTN; infection/SIIRS resolved and now labile bp, very high when awake. BP good with sedation. May need to use IV anti HTN with sedation vacations/ in future.   Hyperlipid; on statin  Elevated LFTs better.  Resp failure; failed extubation. ?PNA, ?Volume. Await CT.  Acidosis; resolved   Nutrition; on TPN   Cardiomyopathy, depressed EF on ECHO per cards.    D/w Dr Knutson.    Subjective  Extubated yesterday, reintubated this am, with resp acidosis and struggling.   Now on vent and sedated.   WBC inc further, going to CT later.   S/p abscess drain  (perihepatic)    Very agitated when awake on vent, with assoc bp spikes.      Objective    Vital signs in last 24 hours  Temp:  [97.8  F (36.6  C)-98.6  F (37  C)] 98.6  F (37  C)  Pulse:  [] 71  Resp:  [19-63] 22  BP: ()/() 128/72  FiO2 (%):  [45 %-100 %] 50 %  SpO2:  [88 %-100 %] 100 %      Intake/Output last 3 shifts  I/O last 3 completed shifts:  In: 2532.3 [I.V.:1096.2; NG/GT:480]  Out: 4809 [Urine:4805; Drains:4]  Intake/Output  this shift:  I/O this shift:  In: 100 [NG/GT:100]  Out: 500 [Urine:500]    Physical Exam  Intubated /sedated, does not arouse with exam today.   CV: RRR without murmur or rub  Lung: clear and equal; no extra sounds  Ab:soft incision closed with staples. +RUQ drain  Ext:much less MARIA EUGENIA and well perfused  Skin; no rash    Pertinent Labs     Last Renal Panel:  Sodium   Date Value Ref Range Status   10/22/2023 150 (H) 135 - 145 mmol/L Final     Comment:     Reference intervals for this test were updated on 09/26/2023 to more accurately reflect our healthy population. There may be differences in the flagging of prior results with similar values performed with this method. Interpretation of those prior results can be made in the context of the updated reference intervals.    11/17/2020 141 133 - 144 mmol/L Final     Potassium   Date Value Ref Range Status   10/22/2023 5.0 3.4 - 5.3 mmol/L Final   05/17/2022 4.5 3.4 - 5.3 mmol/L Final   11/17/2020 4.1 3.4 - 5.3 mmol/L Final     Chloride   Date Value Ref Range Status   10/22/2023 112 (H) 98 - 107 mmol/L Final   05/17/2022 102 94 - 109 mmol/L Final   11/17/2020 109 94 - 109 mmol/L Final     Carbon Dioxide   Date Value Ref Range Status   11/17/2020 31 20 - 32 mmol/L Final     Carbon Dioxide (CO2)   Date Value Ref Range Status   10/22/2023 24 22 - 29 mmol/L Final   05/17/2022 32 20 - 32 mmol/L Final     Anion Gap   Date Value Ref Range Status   10/22/2023 14 7 - 15 mmol/L Final   05/17/2022 2 (L) 3 - 14 mmol/L Final   11/17/2020 1 (L) 3 - 14 mmol/L Final     Glucose   Date Value Ref Range Status   05/17/2022 110 (H) 70 - 99 mg/dL Final   11/17/2020 84 70 - 99 mg/dL Final     Comment:     Fasting specimen     GLUCOSE BY METER POCT   Date Value Ref Range Status   10/22/2023 139 (H) 70 - 99 mg/dL Final     Urea Nitrogen   Date Value Ref Range Status   10/22/2023 104.1 (H) 6.0 - 20.0 mg/dL Final   05/17/2022 16 7 - 30 mg/dL Final   11/17/2020 9 7 - 30 mg/dL Final     Creatinine    Date Value Ref Range Status   10/22/2023 2.76 (H) 0.51 - 0.95 mg/dL Final   11/17/2020 0.79 0.52 - 1.04 mg/dL Final     GFR Estimate   Date Value Ref Range Status   10/22/2023 20 (L) >60 mL/min/1.73m2 Final   11/17/2020 88 >60 mL/min/[1.73_m2] Final     Comment:     Non  GFR Calc  Starting 12/18/2018, serum creatinine based estimated GFR (eGFR) will be   calculated using the Chronic Kidney Disease Epidemiology Collaboration   (CKD-EPI) equation.       Calcium   Date Value Ref Range Status   10/22/2023 9.0 8.6 - 10.0 mg/dL Final   11/17/2020 8.9 8.5 - 10.1 mg/dL Final     Phosphorus   Date Value Ref Range Status   10/22/2023 6.1 (H) 2.5 - 4.5 mg/dL Final     Albumin   Date Value Ref Range Status   10/22/2023 3.7 3.5 - 5.2 g/dL Final   05/17/2022 4.0 3.4 - 5.0 g/dL Final   12/03/2018 3.6 3.4 - 5.0 g/dL Final     Recent Labs   Lab 10/22/23  0520 10/21/23  0341 10/20/23  0509 10/19/23  0553 10/18/23  0439   HGB 9.2* 8.2* 9.6* 9.1* 9.2*          I reviewed all lab results  Simi Shin MD  [facility]  [phone]

## 2023-10-22 NOTE — PROGRESS NOTES
PROVIDER RESTRAINT FOR NON-VIOLENT BEHAVIOR FACE TO FACE EVALUATION    Patient's Immediate Situation:  Patient demonstrated the following behaviors: pulling out lines    Patient's Reaction to the intervention:  Does patient understand the reason for restraint/seclusion? unable to express    Medical Condition:  Is there any evidence of compromise of Skin integrity, Respiratory, Cardiovascular, Musculoskeletal, Hydration?   Yes    Behavioral Condition:  In consultation with the RN, is there a need to continue this restraint or seclusion? Yes    See Restraint Flowsheet for complete restraint documentation and assessment.    James Borrero MD  Critical Care Medicine  10/22/23

## 2023-10-22 NOTE — PROGRESS NOTES
Respiratory Care    ETT noted to be 1 cm above emilia on CT. Discussed with intensivist. OK to withdraw 1 cm. ETT withdrawn from 20 at the teeth to 19 (oral). Bilateral LS auscultated, pt getting volumes, no immediate adverse effects noted.       Guillermo Keyes, RT

## 2023-10-22 NOTE — PROGRESS NOTES
General Surgery Progress Note    POST OP DAY  # S/P Lap Sleeve with Single Anastomosis DS 10/9/23 and then a Re-Operation on 10/12/2023 where it was found that she had 2 small enterotomies in the small bowel, likely form the traction of making the Duodenal anastomosis.  These were oversewn and the pt has had an NGT and drains since.    She was extubated yesterday but then required reintubation overnight and today her WBC has jumped up to 27 from 16.  She had a Chest/Ab/Pelvis CT and it shows consolidation in the Right Chest.  There also is a collection of fluid in the pelvis which is unchanged from the last CT.    Subjective:   Pt is intubated currently,     Vitals:    10/22/23 1510 10/22/23 1515 10/22/23 1556 10/22/23 1600   BP: 137/69 137/69 132/75 132/75   BP Location:       Pulse: 76 76 77 77   Resp: 22 22 24 22   Temp:    98.2  F (36.8  C)   TempSrc:    Oral   SpO2: 100% 100% 100% 100%   Weight:       Height:           Physical Exam:  Lungs:  CTA, bronchial breath sounds in the Right Lower Lung Fields.  CV:       RRR  Ab:       Soft, + BS, Staples in tact.     Recent Labs   Lab 10/22/23  0520   WBC 27.2*   HGB 9.2*   HCT 31.7*   *                  Component  Ref Range & Units  5:20 AM 1 d ago 2 d ago 3 d ago 4 d ago 5 d ago 6 d ago    Sodium  135 - 145 mmol/L 150 High  147 High   High   High     CM   Comment: Reference intervals for this test were updated on 09/26/2023 to more accurately reflect our healthy population. There may be differences in the flagging of prior results with similar values performed with this method. Interpretation of those prior results can be made in the context of the updated reference intervals.    Potassium  3.4 - 5.3 mmol/L 5.0 4.4 4.1 3.5 3.2 Low  3.1 Low  3.5    Carbon Dioxide (CO2)  22 - 29 mmol/L 24 24 23 25 27 24 23    Anion Gap  7 - 15 mmol/L 14 13 14 16 High  11 16 High  17 High     Urea Nitrogen  6.0 - 20.0 mg/dL 104.1 High  92.0 High   89.8 High  91.2 High  97.4 High  99.7 High  99.7 High     Creatinine  0.51 - 0.95 mg/dL 2.76 High  2.84 High  2.87 High  2.84 High  3.02 High  3.55 High  4.38 High     GFR Estimate  >60 mL/min/1.73m2 20 Low  19 Low  19 Low  19 Low  18 Low  15 Low  11 Low     Calcium  8.6 - 10.0 mg/dL 9.0 8.9 9.5 9.2 8.7 9.2 9.3    Chloride  98 - 107 mmol/L 112 High  110 High  110 High  107 105 101 97 Low     Glucose  70 - 99 mg/dL 242 High  265 High  146 High  188 High  153 High  144 High  143 High     Alkaline Phosphatase  35 - 104 U/L 106 High  87 90 102   82    AST  0 - 45 U/L 25 20 CM 27 CM 25 CM   71 High  CM   Comment: Reference intervals for this test were updated on 6/12/2023 to more accurately reflect our healthy population. There may be differences in the flagging of prior results with similar values performed with this method. Interpretation of those prior results can be made in the context of the updated reference intervals.    ALT  0 - 50 U/L 33 30 CM 34 CM 40 CM   114 High  CM   Comment: Reference intervals for this test were updated on 6/12/2023 to more accurately reflect our healthy population. There may be differences in the flagging of prior results with similar values performed with this method. Interpretation of those prior results can be made in the context of the updated reference intervals.      Protein Total  6.4 - 8.3 g/dL 7.7 7.4 7.6 6.9   6.7    Albumin  3.5 - 5.2 g/dL 3.7 3.5 3.4 Low  3.1 Low    2.8 Low     Bilirubin Total  <=1.2 mg/dL 0.3 0.5 0.6 0.5   0.4            Assessment:  Pt with a rising WBC.  A new CT shows that the problems are likely in with the Right Chest and Lung.  The intra-abdominal cavity looks clear except for the fluid collection previously seen in the pelvis.    Plan: Work with ID and the ICU staff to optimize medical therapy of her pneumonia.  We should place R Chest tube to drain plural fluid as best possible and to assess in Micro.    We can ask IR if they think they can drain or  sample the pelvic fluid.      Hoang Worthy MD  [facility]  [phone]

## 2023-10-22 NOTE — PROGRESS NOTES
Nutrition Therapy   Nutrition Support follow-up          RECOMMENDATIONS FOR MDs/PROVIDERS TO ORDER:   Please let RD know when to reorder tube feeding and water flushes    Dietitian to Pharmacy     Changes for today  Rate of TPN: 50 ml/hr  - no change  Grams Dextrose: 150 - no change  Grams Amino Acid: 65 - no change     Discontinue lipids - pt is on propofol  Pharmacy adjusting electrolytes    Provides: 1200 mL, 770 calories, 65 gm protein, 150 gm cho/day     Enteral Nutrition:    Discontinue tube feeding and flushes for now per ICU Rounds (Trickle feeds started Friday afternoon Promote at 10 mL/hr)  Pt is strict NPO    RE-intubated this morning after extubation yesterday. (ARF, Sepsis, Skin rash, Pneumonia)    Future/Additional Recommendations:   Will monitor ability to restart TF, when flushes restart will likely order every 2 hours due to limited stomach capacity              TPN : 65 g AA, 150 g DEX at 50 ml/hr continuous.  50 g lipid 3 days/wk.    This to provide avg 984 kcals, 65 g protein, 150 g carb, avg 21 g lipid, 1200 ml fluid daily     Enteral:  on hold, nursing noted flushes were decreased yesterday because pt is post gastric bypass and does not have the stomach capacity     Propofol at current rate provides 1562 calories daily - just started this am (expect this will gradually be weaned down once pt is at desired level of sedation)    TPN and propofol meeting 100+% energy needs and 100% of estimated protein needs.    TPN start 10/14/23.  EN start 10/20/23     Weight Trends  Admission wt: 130.5 kg (287 lb 12.8 oz) 10/9/23   Current wt:  131.5 kg (289 lb 14.5 oz)  I/O -2.2 L in 24 hours, urine output is good  +0.386 since admit    Dosing Weight: 52.3 kg (IBW)      ESTIMATED NUTRITION NEEDS   Energy: 0399-9555 kcals/day (22 - 25 kcals/kg)   Obese, Post-op, and Vented   Protein : 63-78 grams protein/day (1.2 - 1.5 grams of pro/kg)   Obesity guidelines and Post-op, elevated creatinine   Fluid:  7374-5373+ mL/day (1 mL/kcal)   Maintenance     Labs, reviewed:  Na 150 H  .1  Creat 2.76 H  Phos 6.1 H  Alk phos 106 H  Glucose 242 H  Triglycerides 271 H  FSB    Medications, reviewed:  Bumex  IV ABX  Novolog  Pantoprazole  Miralax  Senokot  Colace  Cont IV,  Fentanyl, Propofol    GI:  Large, loose, brown, green BM recorded at 10 PM last night.    Malnutrition Diagnosis: Patient does not meet two of the established criteria necessary for diagnosing malnutrition      Nutrition Diagnosis  Inadequate oral intake related to intubated and sedated as evidenced by NPO and need for TPN     Evaluation: Re -intubated today     Goals   Meet nutrition needs with TPN- progressing  New-electrolytes WNL-not met  Tolerate TPN - met  Tolerate TF - was on trickle tube feeding for almost 2 days - off for now  Transition to po diet after extubation and when appropriate.-unable, pt remains intubated.      INTERVENTIONS  Parenteral Nutrition/IV Fluids - Discontinue lipids - pt on propofol  Discontinued TF/flushes for now     Monitoring/Evaluation  Progress toward goals will be monitored and evaluated per protocol.

## 2023-10-22 NOTE — PROGRESS NOTES
ASSESSMENT:  1. Intra-abdominal abscess (H)    2. Perimenopausal symptoms        Mojgan Jimenez is a 53 year old female who is s/p laparoscopic sleeve gastrectomy with KO on 10/9 post-op course complicated by small enterotomy x2 s/p laparoscopy converted to laparotomy with washout and enterotomy closure x2 on 10/12.     Extubated 10/21, re-intubated 10/22    PLAN:  -Appreciate ICU cares  -TPN and trickle feeds  -CT CAP ordered, will follow up once complete  -Drain cares per IR    SUBJECTIVE:   Intubated, not on pressors      Patient Vitals for the past 24 hrs:   BP Temp Temp src Pulse Resp SpO2   10/22/23 1030 101/59 -- -- 69 22 95 %   10/22/23 1015 97/56 -- -- 69 22 94 %   10/22/23 1000 96/52 -- -- 68 22 94 %   10/22/23 0945 -- -- -- -- -- 94 %   10/22/23 0930 101/56 -- -- 69 22 94 %   10/22/23 0915 -- -- -- -- -- 100 %   10/22/23 0900 111/64 -- Oral 72 30 100 %   10/22/23 0815 (!) 180/107 -- -- 80 26 100 %   10/22/23 0810 (!) 145/84 -- -- 92 19 100 %   10/22/23 0800 (!) 189/107 -- -- 100 (!) 40 99 %   10/22/23 0717 -- -- -- 109 (!) 37 98 %   10/22/23 0700 (!) 191/112 -- -- 107 (!) 36 98 %   10/22/23 0630 (!) 166/77 -- -- 104 (!) 31 97 %   10/22/23 0600 (!) 182/102 -- -- 107 29 94 %   10/22/23 0500 (!) 171/89 -- -- 92 24 93 %   10/22/23 0419 -- -- -- -- -- 95 %   10/22/23 0400 (!) 180/97 97.9  F (36.6  C) Axillary 89 29 94 %   10/22/23 0300 (!) 151/83 -- -- 83 28 94 %   10/22/23 0230 -- -- -- 81 (!) 33 93 %   10/22/23 0205 (!) 145/67 -- -- 91 29 91 %   10/22/23 0200 (!) 181/87 -- -- 96 (!) 31 93 %   10/22/23 0100 (!) 168/84 -- -- 83 (!) 34 93 %   10/22/23 0035 (!) 150/78 -- -- 82 30 94 %   10/22/23 0030 (!) 202/96 -- -- 103 (!) 33 95 %   10/22/23 0010 (!) 182/92 -- -- 86 29 95 %   10/22/23 0000 (!) 178/100 97.8  F (36.6  C) Axillary 75 28 95 %   10/21/23 2330 -- -- -- 79 22 95 %   10/21/23 2307 -- -- -- 79 24 95 %   10/21/23 2300 (!) 183/112 -- -- 86 (!) 33 90 %   10/21/23 2230 -- -- -- 81 (!) 55 96 %   10/21/23  2210 (!) 177/100 -- -- 74 (!) 53 97 %   10/21/23 2200 -- -- -- 102 (!) 63 91 %   10/21/23 2100 (!) 162/88 -- -- 70 (!) 33 95 %   10/21/23 2056 (!) 164/87 -- -- 70 (!) 32 95 %   10/21/23 2000 (!) 201/96 98.4  F (36.9  C) Axillary 79 (!) 37 95 %   10/21/23 1930 (!) 230/110 -- -- 96 (!) 38 93 %   10/21/23 1825 (!) 181/110 -- -- 93 (!) 31 95 %   10/21/23 1815 (!) 217/126 97.9  F (36.6  C) -- 98 (!) 41 (!) 88 %   10/21/23 1800 -- -- -- 84 (!) 49 95 %   10/21/23 1730 (!) 167/106 -- -- 77 (!) 40 97 %   10/21/23 1720 -- -- -- 76 (!) 43 97 %   10/21/23 1710 (!) 156/89 -- -- 74 (!) 37 96 %   10/21/23 1700 (!) 171/101 97.9  F (36.6  C) Oral 75 (!) 46 97 %   10/21/23 1600 (!) 150/84 97.9  F (36.6  C) Oral 78 (!) 40 94 %   10/21/23 1500 (!) 206/95 98  F (36.7  C) -- 85 (!) 47 95 %   10/21/23 1400 (!) 162/85 97.8  F (36.6  C) -- 71 (!) 41 95 %   10/21/23 1300 (!) 164/77 -- -- 73 (!) 36 92 %   10/21/23 1213 -- -- -- -- -- 96 %   10/21/23 1200 (!) 165/74 98.6  F (37  C) Oral 77 (!) 50 96 %         PHYSICAL EXAM:  GEN: intubated and sedated  ABD: soft, incision c/d/I with staples, local erythema seems more irritation from staples, drain serous  Drains:     Output by Drain (mL) 10/20/23 0700 - 10/20/23 1459 10/20/23 1500 - 10/20/23 2259 10/20/23 2300 - 10/21/23 0659 10/21/23 0700 - 10/21/23 1459 10/21/23 1500 - 10/21/23 2259 10/21/23 2300 - 10/22/23 0659 10/22/23 0700 - 10/22/23 1105   Closed/Suction Drain 4 Right;Anterior Abdomen Bulb 10 Czech 0 10 4 4 0 0 0      EXT:No cyanosis, edema or obvious abnormalities    10/21 0700 - 10/22 0659  In: 2532.3 [I.V.:1096.2]  Out: 4809 [Urine:4805; Drains:4]    No results displayed because visit has over 200 results.             Raymon Linares PA-C

## 2023-10-22 NOTE — PROGRESS NOTES
Brief intensivist update  10/22/2023     Called to the bedside d/t concern over patient's breathing pattern and somnolence. For context, Ms. Jimenez has been quite anxious & restless over the course of the evening, & required several PRN medications in addition to Precedex for symptom management.     At the bedside, patient is hypertensive & only responding (slowly) to painful stimuli  Precedex drip stopped  A VBG was sent before BiPAP was placed   She was already on BiPAP when I arrived to the room, w/ O2 sats in the upper 90s on 50% FiO2  We switched her to AVAPS to ensure adequate ventilation  Called by the lab w/ report that the VBG that was sent became un-capped en route to the lab -- could not be analyzed  VBG re-drawn (at about 10 minutes after BIPAP initiation)  Given Narcan 0.4 mg x1 for concern for possible opioid component  CXR done -- no obvious consolidations    Patient is becoming a bit more awake, clearly protecting her airway. Her clinical presentation is very consistent w/ hypoventilation & CO2 narcosis. Plan to repeat VBG 30 minutes after the first one results to monitor for effects. If she fails AVAPS, will need to intubate.    Critical care time of 20 minutes spent directly at the bedside.     Francoise Newsome MD  Pulmonary and Critical Care Medicine

## 2023-10-23 ENCOUNTER — APPOINTMENT (OUTPATIENT)
Dept: CARDIOLOGY | Facility: HOSPITAL | Age: 53
End: 2023-10-23
Attending: INTERNAL MEDICINE
Payer: COMMERCIAL

## 2023-10-23 LAB
ALBUMIN SERPL BCG-MCNC: 3.2 G/DL (ref 3.5–5.2)
ALP SERPL-CCNC: 96 U/L (ref 35–104)
ALT SERPL W P-5'-P-CCNC: 26 U/L (ref 0–50)
ANION GAP SERPL CALCULATED.3IONS-SCNC: 14 MMOL/L (ref 7–15)
AST SERPL W P-5'-P-CCNC: 19 U/L (ref 0–45)
ATRIAL RATE - MUSE: 86 BPM
BACTERIA BLD CULT: NO GROWTH
BACTERIA BLD CULT: NO GROWTH
BILIRUB SERPL-MCNC: 0.3 MG/DL
BUN SERPL-MCNC: 104.2 MG/DL (ref 6–20)
CALCIUM SERPL-MCNC: 8.7 MG/DL (ref 8.6–10)
CALCIUM, IONIZED MEASURED: 1.14 MMOL/L (ref 1.11–1.3)
CHLORIDE SERPL-SCNC: 111 MMOL/L (ref 98–107)
CREAT SERPL-MCNC: 2.65 MG/DL (ref 0.51–0.95)
DEPRECATED HCO3 PLAS-SCNC: 24 MMOL/L (ref 22–29)
DIASTOLIC BLOOD PRESSURE - MUSE: NORMAL MMHG
EGFRCR SERPLBLD CKD-EPI 2021: 21 ML/MIN/1.73M2
ERYTHROCYTE [DISTWIDTH] IN BLOOD BY AUTOMATED COUNT: 14.9 % (ref 10–15)
GLUCOSE BLDC GLUCOMTR-MCNC: 112 MG/DL (ref 70–99)
GLUCOSE BLDC GLUCOMTR-MCNC: 119 MG/DL (ref 70–99)
GLUCOSE BLDC GLUCOMTR-MCNC: 130 MG/DL (ref 70–99)
GLUCOSE BLDC GLUCOMTR-MCNC: 131 MG/DL (ref 70–99)
GLUCOSE BLDC GLUCOMTR-MCNC: 137 MG/DL (ref 70–99)
GLUCOSE BLDC GLUCOMTR-MCNC: 142 MG/DL (ref 70–99)
GLUCOSE BLDC GLUCOMTR-MCNC: 143 MG/DL (ref 70–99)
GLUCOSE BLDC GLUCOMTR-MCNC: 143 MG/DL (ref 70–99)
GLUCOSE BLDC GLUCOMTR-MCNC: 148 MG/DL (ref 70–99)
GLUCOSE BLDC GLUCOMTR-MCNC: 149 MG/DL (ref 70–99)
GLUCOSE BLDC GLUCOMTR-MCNC: 152 MG/DL (ref 70–99)
GLUCOSE BLDC GLUCOMTR-MCNC: 153 MG/DL (ref 70–99)
GLUCOSE BLDC GLUCOMTR-MCNC: 158 MG/DL (ref 70–99)
GLUCOSE BLDC GLUCOMTR-MCNC: 161 MG/DL (ref 70–99)
GLUCOSE BLDC GLUCOMTR-MCNC: 165 MG/DL (ref 70–99)
GLUCOSE SERPL-MCNC: 126 MG/DL (ref 70–99)
GRAM STAIN RESULT: NORMAL
GRAM STAIN RESULT: NORMAL
HCT VFR BLD AUTO: 30.9 % (ref 35–47)
HGB BLD-MCNC: 9.2 G/DL (ref 11.7–15.7)
INR PPP: 1.49 (ref 0.85–1.15)
INTERPRETATION ECG - MUSE: NORMAL
ION CA PH 7.4: 1.09 MMOL/L (ref 1.11–1.3)
LVEF ECHO: NORMAL
MAGNESIUM SERPL-MCNC: 1.9 MG/DL (ref 1.7–2.3)
MCH RBC QN AUTO: 29.1 PG (ref 26.5–33)
MCHC RBC AUTO-ENTMCNC: 29.8 G/DL (ref 31.5–36.5)
MCV RBC AUTO: 98 FL (ref 78–100)
P AXIS - MUSE: -37 DEGREES
PH: 7.33 (ref 7.35–7.45)
PHOSPHATE SERPL-MCNC: 4.2 MG/DL (ref 2.5–4.5)
PLATELET # BLD AUTO: 427 10E3/UL (ref 150–450)
POTASSIUM SERPL-SCNC: 3.7 MMOL/L (ref 3.4–5.3)
PR INTERVAL - MUSE: 158 MS
PREALB SERPL IA-MCNC: 16 MG/DL (ref 15–45)
PROT SERPL-MCNC: 7.1 G/DL (ref 6.4–8.3)
QRS DURATION - MUSE: 88 MS
QT - MUSE: 348 MS
QTC - MUSE: 416 MS
R AXIS - MUSE: 89 DEGREES
RBC # BLD AUTO: 3.16 10E6/UL (ref 3.8–5.2)
SODIUM SERPL-SCNC: 146 MMOL/L (ref 135–145)
SODIUM SERPL-SCNC: 146 MMOL/L (ref 135–145)
SODIUM SERPL-SCNC: 149 MMOL/L (ref 135–145)
SODIUM SERPL-SCNC: 149 MMOL/L (ref 135–145)
SYSTOLIC BLOOD PRESSURE - MUSE: NORMAL MMHG
T AXIS - MUSE: 46 DEGREES
TRIGL SERPL-MCNC: 485 MG/DL
VENTRICULAR RATE- MUSE: 86 BPM
WBC # BLD AUTO: 14.5 10E3/UL (ref 4–11)

## 2023-10-23 PROCEDURE — 250N000011 HC RX IP 250 OP 636: Mod: JZ | Performed by: STUDENT IN AN ORGANIZED HEALTH CARE EDUCATION/TRAINING PROGRAM

## 2023-10-23 PROCEDURE — 93005 ELECTROCARDIOGRAM TRACING: CPT

## 2023-10-23 PROCEDURE — 258N000003 HC RX IP 258 OP 636: Performed by: STUDENT IN AN ORGANIZED HEALTH CARE EDUCATION/TRAINING PROGRAM

## 2023-10-23 PROCEDURE — 255N000002 HC RX 255 OP 636: Performed by: SURGERY

## 2023-10-23 PROCEDURE — 84100 ASSAY OF PHOSPHORUS: CPT | Performed by: INTERNAL MEDICINE

## 2023-10-23 PROCEDURE — 250N000013 HC RX MED GY IP 250 OP 250 PS 637: Performed by: NURSE PRACTITIONER

## 2023-10-23 PROCEDURE — 200N000001 HC R&B ICU

## 2023-10-23 PROCEDURE — 93321 DOPPLER ECHO F-UP/LMTD STD: CPT

## 2023-10-23 PROCEDURE — 250N000013 HC RX MED GY IP 250 OP 250 PS 637: Performed by: SURGERY

## 2023-10-23 PROCEDURE — 84295 ASSAY OF SERUM SODIUM: CPT | Performed by: INTERNAL MEDICINE

## 2023-10-23 PROCEDURE — 82330 ASSAY OF CALCIUM: CPT | Performed by: SURGERY

## 2023-10-23 PROCEDURE — 80053 COMPREHEN METABOLIC PANEL: CPT | Performed by: INTERNAL MEDICINE

## 2023-10-23 PROCEDURE — 87205 SMEAR GRAM STAIN: CPT | Performed by: STUDENT IN AN ORGANIZED HEALTH CARE EDUCATION/TRAINING PROGRAM

## 2023-10-23 PROCEDURE — 36415 COLL VENOUS BLD VENIPUNCTURE: CPT | Performed by: SURGERY

## 2023-10-23 PROCEDURE — 250N000011 HC RX IP 250 OP 636: Mod: JZ | Performed by: INTERNAL MEDICINE

## 2023-10-23 PROCEDURE — 85027 COMPLETE CBC AUTOMATED: CPT | Performed by: STUDENT IN AN ORGANIZED HEALTH CARE EDUCATION/TRAINING PROGRAM

## 2023-10-23 PROCEDURE — 250N000011 HC RX IP 250 OP 636: Mod: JZ | Performed by: NURSE PRACTITIONER

## 2023-10-23 PROCEDURE — 31622 DX BRONCHOSCOPE/WASH: CPT | Performed by: STUDENT IN AN ORGANIZED HEALTH CARE EDUCATION/TRAINING PROGRAM

## 2023-10-23 PROCEDURE — 250N000013 HC RX MED GY IP 250 OP 250 PS 637: Performed by: INTERNAL MEDICINE

## 2023-10-23 PROCEDURE — 94640 AIRWAY INHALATION TREATMENT: CPT | Mod: 76

## 2023-10-23 PROCEDURE — 93321 DOPPLER ECHO F-UP/LMTD STD: CPT | Mod: 26 | Performed by: INTERNAL MEDICINE

## 2023-10-23 PROCEDURE — 99233 SBSQ HOSP IP/OBS HIGH 50: CPT | Mod: 25 | Performed by: INTERNAL MEDICINE

## 2023-10-23 PROCEDURE — 99232 SBSQ HOSP IP/OBS MODERATE 35: CPT | Performed by: INTERNAL MEDICINE

## 2023-10-23 PROCEDURE — 258N000003 HC RX IP 258 OP 636: Performed by: INTERNAL MEDICINE

## 2023-10-23 PROCEDURE — 94003 VENT MGMT INPAT SUBQ DAY: CPT

## 2023-10-23 PROCEDURE — 999N000157 HC STATISTIC RCP TIME EA 10 MIN

## 2023-10-23 PROCEDURE — 0B968ZZ DRAINAGE OF RIGHT LOWER LOBE BRONCHUS, VIA NATURAL OR ARTIFICIAL OPENING ENDOSCOPIC: ICD-10-PCS | Performed by: STUDENT IN AN ORGANIZED HEALTH CARE EDUCATION/TRAINING PROGRAM

## 2023-10-23 PROCEDURE — 258N000003 HC RX IP 258 OP 636: Performed by: NURSE PRACTITIONER

## 2023-10-23 PROCEDURE — 84478 ASSAY OF TRIGLYCERIDES: CPT | Performed by: SURGERY

## 2023-10-23 PROCEDURE — 93308 TTE F-UP OR LMTD: CPT | Mod: 26 | Performed by: INTERNAL MEDICINE

## 2023-10-23 PROCEDURE — 999N000009 HC STATISTIC AIRWAY CARE

## 2023-10-23 PROCEDURE — 250N000009 HC RX 250: Performed by: SURGERY

## 2023-10-23 PROCEDURE — 272N000220 HC BRONCHOSCOPE, DISPOSABLE

## 2023-10-23 PROCEDURE — 250N000011 HC RX IP 250 OP 636: Performed by: INTERNAL MEDICINE

## 2023-10-23 PROCEDURE — 250N000009 HC RX 250: Performed by: INTERNAL MEDICINE

## 2023-10-23 PROCEDURE — 999N000287 HC ICU ADULT ROUNDING, EACH 10 MINS

## 2023-10-23 PROCEDURE — 93005 ELECTROCARDIOGRAM TRACING: CPT | Performed by: STUDENT IN AN ORGANIZED HEALTH CARE EDUCATION/TRAINING PROGRAM

## 2023-10-23 PROCEDURE — 93010 ELECTROCARDIOGRAM REPORT: CPT | Performed by: INTERNAL MEDICINE

## 2023-10-23 PROCEDURE — 84134 ASSAY OF PREALBUMIN: CPT | Performed by: INTERNAL MEDICINE

## 2023-10-23 PROCEDURE — 94640 AIRWAY INHALATION TREATMENT: CPT

## 2023-10-23 PROCEDURE — 87070 CULTURE OTHR SPECIMN AEROBIC: CPT | Performed by: STUDENT IN AN ORGANIZED HEALTH CARE EDUCATION/TRAINING PROGRAM

## 2023-10-23 PROCEDURE — 31624 DX BRONCHOSCOPE/LAVAGE: CPT

## 2023-10-23 PROCEDURE — 83735 ASSAY OF MAGNESIUM: CPT | Performed by: INTERNAL MEDICINE

## 2023-10-23 PROCEDURE — 85610 PROTHROMBIN TIME: CPT | Performed by: INTERNAL MEDICINE

## 2023-10-23 PROCEDURE — 0B948ZZ DRAINAGE OF RIGHT UPPER LOBE BRONCHUS, VIA NATURAL OR ARTIFICIAL OPENING ENDOSCOPIC: ICD-10-PCS | Performed by: STUDENT IN AN ORGANIZED HEALTH CARE EDUCATION/TRAINING PROGRAM

## 2023-10-23 PROCEDURE — 999N000127 HC STATISTIC PERIPHERAL IV START W US GUIDANCE

## 2023-10-23 PROCEDURE — 84295 ASSAY OF SERUM SODIUM: CPT | Performed by: SURGERY

## 2023-10-23 PROCEDURE — 93325 DOPPLER ECHO COLOR FLOW MAPG: CPT | Mod: 26 | Performed by: INTERNAL MEDICINE

## 2023-10-23 RX ORDER — AMIODARONE HYDROCHLORIDE 200 MG/1
200 TABLET ORAL 2 TIMES DAILY
Status: DISCONTINUED | OUTPATIENT
Start: 2023-10-23 | End: 2023-10-24

## 2023-10-23 RX ORDER — DEXTROSE MONOHYDRATE 50 MG/ML
INJECTION, SOLUTION INTRAVENOUS CONTINUOUS
Status: DISCONTINUED | OUTPATIENT
Start: 2023-10-23 | End: 2023-10-24

## 2023-10-23 RX ORDER — HYDRALAZINE HYDROCHLORIDE 20 MG/ML
10 INJECTION INTRAMUSCULAR; INTRAVENOUS 3 TIMES DAILY
Status: DISCONTINUED | OUTPATIENT
Start: 2023-10-23 | End: 2023-10-24

## 2023-10-23 RX ORDER — OLANZAPINE 10 MG/1
2.5-5 INJECTION, POWDER, LYOPHILIZED, FOR SOLUTION INTRAMUSCULAR EVERY 8 HOURS PRN
Status: DISCONTINUED | OUTPATIENT
Start: 2023-10-23 | End: 2023-11-21 | Stop reason: HOSPADM

## 2023-10-23 RX ORDER — METOPROLOL TARTRATE 25 MG/1
25 TABLET, FILM COATED ORAL 2 TIMES DAILY
Status: DISCONTINUED | OUTPATIENT
Start: 2023-10-23 | End: 2023-10-23

## 2023-10-23 RX ORDER — MIDAZOLAM HCL IN 0.9 % NACL/PF 1 MG/ML
1-8 PLASTIC BAG, INJECTION (ML) INTRAVENOUS CONTINUOUS
Status: DISCONTINUED | OUTPATIENT
Start: 2023-10-23 | End: 2023-10-23

## 2023-10-23 RX ORDER — CEFAZOLIN SODIUM 1 G/50ML
1250 SOLUTION INTRAVENOUS
Status: DISCONTINUED | OUTPATIENT
Start: 2023-10-24 | End: 2023-10-24

## 2023-10-23 RX ORDER — NITROGLYCERIN 80 MG/1
1 PATCH TRANSDERMAL DAILY
Status: DISCONTINUED | OUTPATIENT
Start: 2023-10-23 | End: 2023-11-11

## 2023-10-23 RX ORDER — PROPOFOL 10 MG/ML
5-75 INJECTION, EMULSION INTRAVENOUS CONTINUOUS
Status: DISCONTINUED | OUTPATIENT
Start: 2023-10-23 | End: 2023-10-27

## 2023-10-23 RX ORDER — DEXTROSE MONOHYDRATE 50 MG/ML
INJECTION, SOLUTION INTRAVENOUS CONTINUOUS
Status: DISCONTINUED | OUTPATIENT
Start: 2023-10-23 | End: 2023-10-23

## 2023-10-23 RX ORDER — HYDRALAZINE HYDROCHLORIDE 20 MG/ML
10 INJECTION INTRAMUSCULAR; INTRAVENOUS EVERY 4 HOURS PRN
Status: DISCONTINUED | OUTPATIENT
Start: 2023-10-23 | End: 2023-11-05

## 2023-10-23 RX ADMIN — CHLORHEXIDINE GLUCONATE 15 ML: 1.2 RINSE ORAL at 20:10

## 2023-10-23 RX ADMIN — MAGNESIUM SULFATE HEPTAHYDRATE: 500 INJECTION, SOLUTION INTRAMUSCULAR; INTRAVENOUS at 21:10

## 2023-10-23 RX ADMIN — AMIODARONE HYDROCHLORIDE 400 MG: 200 TABLET ORAL at 08:34

## 2023-10-23 RX ADMIN — HEPARIN SODIUM 5000 UNITS: 5000 INJECTION, SOLUTION INTRAVENOUS; SUBCUTANEOUS at 05:49

## 2023-10-23 RX ADMIN — CLINDAMYCIN PHOSPHATE 900 MG: 900 INJECTION, SOLUTION INTRAVENOUS at 00:38

## 2023-10-23 RX ADMIN — PROPOFOL 45 MCG/KG/MIN: 10 INJECTION, EMULSION INTRAVENOUS at 16:56

## 2023-10-23 RX ADMIN — IPRATROPIUM BROMIDE AND ALBUTEROL SULFATE 3 ML: .5; 3 SOLUTION RESPIRATORY (INHALATION) at 20:51

## 2023-10-23 RX ADMIN — LABETALOL HYDROCHLORIDE 10 MG: 5 INJECTION, SOLUTION INTRAVENOUS at 00:53

## 2023-10-23 RX ADMIN — MEROPENEM 1 G: 1 INJECTION, POWDER, FOR SOLUTION INTRAVENOUS at 20:18

## 2023-10-23 RX ADMIN — HEPARIN SODIUM 5000 UNITS: 5000 INJECTION, SOLUTION INTRAVENOUS; SUBCUTANEOUS at 14:29

## 2023-10-23 RX ADMIN — PROPOFOL 45 MCG/KG/MIN: 10 INJECTION, EMULSION INTRAVENOUS at 13:53

## 2023-10-23 RX ADMIN — PROPOFOL 45 MCG/KG/MIN: 10 INJECTION, EMULSION INTRAVENOUS at 10:58

## 2023-10-23 RX ADMIN — MIDAZOLAM HYDROCHLORIDE 4 MG: 1 INJECTION, SOLUTION INTRAMUSCULAR; INTRAVENOUS at 16:09

## 2023-10-23 RX ADMIN — HYDRALAZINE HYDROCHLORIDE 10 MG: 20 INJECTION INTRAMUSCULAR; INTRAVENOUS at 20:10

## 2023-10-23 RX ADMIN — MEROPENEM 1 G: 1 INJECTION, POWDER, FOR SOLUTION INTRAVENOUS at 08:29

## 2023-10-23 RX ADMIN — Medication 50 MCG: at 16:11

## 2023-10-23 RX ADMIN — DEXTROSE MONOHYDRATE: 50 INJECTION, SOLUTION INTRAVENOUS at 18:20

## 2023-10-23 RX ADMIN — QUETIAPINE FUMARATE 100 MG: 25 TABLET ORAL at 20:16

## 2023-10-23 RX ADMIN — CHLORHEXIDINE GLUCONATE 15 ML: 1.2 RINSE ORAL at 08:29

## 2023-10-23 RX ADMIN — IPRATROPIUM BROMIDE AND ALBUTEROL SULFATE 3 ML: .5; 3 SOLUTION RESPIRATORY (INHALATION) at 15:59

## 2023-10-23 RX ADMIN — PROPOFOL 45 MCG/KG/MIN: 10 INJECTION, EMULSION INTRAVENOUS at 22:24

## 2023-10-23 RX ADMIN — Medication 150 MCG/HR: at 06:09

## 2023-10-23 RX ADMIN — HYDRALAZINE HYDROCHLORIDE 5 MG: 20 INJECTION INTRAMUSCULAR; INTRAVENOUS at 13:56

## 2023-10-23 RX ADMIN — PROPOFOL 45 MCG/KG/MIN: 10 INJECTION, EMULSION INTRAVENOUS at 19:27

## 2023-10-23 RX ADMIN — MIDAZOLAM HYDROCHLORIDE 2 MG: 1 INJECTION, SOLUTION INTRAMUSCULAR; INTRAVENOUS at 11:12

## 2023-10-23 RX ADMIN — LABETALOL HYDROCHLORIDE 10 MG: 5 INJECTION, SOLUTION INTRAVENOUS at 06:16

## 2023-10-23 RX ADMIN — Medication 10 MG: at 16:09

## 2023-10-23 RX ADMIN — IPRATROPIUM BROMIDE AND ALBUTEROL SULFATE 3 ML: .5; 3 SOLUTION RESPIRATORY (INHALATION) at 08:03

## 2023-10-23 RX ADMIN — Medication 50 MCG: at 08:10

## 2023-10-23 RX ADMIN — HYDRALAZINE HYDROCHLORIDE 10 MG: 20 INJECTION INTRAMUSCULAR; INTRAVENOUS at 00:40

## 2023-10-23 RX ADMIN — HYDRALAZINE HYDROCHLORIDE 10 MG: 20 INJECTION INTRAMUSCULAR; INTRAVENOUS at 00:33

## 2023-10-23 RX ADMIN — NITROGLYCERIN 1 PATCH: 0.4 PATCH TRANSDERMAL at 14:29

## 2023-10-23 RX ADMIN — IPRATROPIUM BROMIDE AND ALBUTEROL SULFATE 3 ML: .5; 3 SOLUTION RESPIRATORY (INHALATION) at 12:29

## 2023-10-23 RX ADMIN — ONDANSETRON 4 MG: 2 INJECTION INTRAMUSCULAR; INTRAVENOUS at 08:34

## 2023-10-23 RX ADMIN — HYDRALAZINE HYDROCHLORIDE 5 MG: 20 INJECTION INTRAMUSCULAR; INTRAVENOUS at 08:35

## 2023-10-23 RX ADMIN — CLINDAMYCIN PHOSPHATE 900 MG: 900 INJECTION, SOLUTION INTRAVENOUS at 09:28

## 2023-10-23 RX ADMIN — QUETIAPINE FUMARATE 100 MG: 25 TABLET ORAL at 08:29

## 2023-10-23 RX ADMIN — PROPOFOL 55 MCG/KG/MIN: 10 INJECTION, EMULSION INTRAVENOUS at 03:06

## 2023-10-23 RX ADMIN — Medication 150 MCG/HR: at 21:21

## 2023-10-23 RX ADMIN — BUMETANIDE 2 MG: 0.25 INJECTION INTRAMUSCULAR; INTRAVENOUS at 20:10

## 2023-10-23 RX ADMIN — CLINDAMYCIN PHOSPHATE 900 MG: 900 INJECTION, SOLUTION INTRAVENOUS at 16:55

## 2023-10-23 RX ADMIN — OLANZAPINE 5 MG: 10 INJECTION, POWDER, LYOPHILIZED, FOR SOLUTION INTRAMUSCULAR at 09:28

## 2023-10-23 RX ADMIN — PROPOFOL 45 MCG/KG/MIN: 10 INJECTION, EMULSION INTRAVENOUS at 05:25

## 2023-10-23 RX ADMIN — AMIODARONE HYDROCHLORIDE 200 MG: 200 TABLET ORAL at 20:17

## 2023-10-23 RX ADMIN — PROPOFOL 45 MCG/KG/MIN: 10 INJECTION, EMULSION INTRAVENOUS at 07:59

## 2023-10-23 RX ADMIN — MICAFUNGIN SODIUM 100 MG: 50 INJECTION, POWDER, LYOPHILIZED, FOR SOLUTION INTRAVENOUS at 11:55

## 2023-10-23 RX ADMIN — ACETAMINOPHEN 650 MG: 160 SOLUTION ORAL at 08:29

## 2023-10-23 RX ADMIN — HYDRALAZINE HYDROCHLORIDE 20 MG: 20 INJECTION INTRAMUSCULAR; INTRAVENOUS at 11:58

## 2023-10-23 RX ADMIN — PROPOFOL 65 MCG/KG/MIN: 10 INJECTION, EMULSION INTRAVENOUS at 00:57

## 2023-10-23 RX ADMIN — Medication 40 MG: at 08:34

## 2023-10-23 RX ADMIN — DEXTROSE MONOHYDRATE: 50 INJECTION, SOLUTION INTRAVENOUS at 00:45

## 2023-10-23 RX ADMIN — BUMETANIDE 2 MG: 0.25 INJECTION INTRAMUSCULAR; INTRAVENOUS at 08:29

## 2023-10-23 RX ADMIN — PERFLUTREN 2 ML: 6.52 INJECTION, SUSPENSION INTRAVENOUS at 10:58

## 2023-10-23 RX ADMIN — ACETAMINOPHEN 650 MG: 160 SOLUTION ORAL at 22:26

## 2023-10-23 ASSESSMENT — ACTIVITIES OF DAILY LIVING (ADL)
ADLS_ACUITY_SCORE: 35
ADLS_ACUITY_SCORE: 37
ADLS_ACUITY_SCORE: 35
ADLS_ACUITY_SCORE: 37
ADLS_ACUITY_SCORE: 35
ADLS_ACUITY_SCORE: 37
ADLS_ACUITY_SCORE: 37
ADLS_ACUITY_SCORE: 35

## 2023-10-23 NOTE — PROCEDURES
Procedure: Fiberoptic Bronchoscopy &  Bronchial Wash    Indication: Respiratory failure. Right lower lobe atelectasis    Consent: Informed consent obtained. The indications, risks, benefits, and alternatives of the procedure were thoroughly discussed with the patient's family. All questions were answered. Written consent was obtained and placed in the patient's chart.  Performed by: AUNG To MD  Anesthesia: The patient was intubated & sedated prior to the procedure. The trachea was anesthetized with 1% lidocaine. Additional 4 mg Midazolam, 50 mcg Fentanyl was administered     Universal Protocol:  Timeout was performed immediately prior to the procedure. Everyone in the room was in agreement with the patient identify (verified by name, wrist brand, date of birth, and medical record number), the procedure to be performed, and the procedure site.    Summary: The bronchoscope was introduced through the endotracheal tube and advanced through the trachea to the emilia. The endotracheal tube was in good position. The trachea has normal caliber with a sharp emilia. The airways were inspected to the level of the subsegmental level. Moderate amount of thick blood-tinged secretions cleared from the right lower lobe & right upper lobe. Otherwise, airways exhibited small amount of thin mucoid secretions. Right middle lobe & lower lobe bronchi appeared erythematous & edematous which inhibited advancement of the bronchoscope     Impression: Moderate amount of thick blood-tinged secretions cleared from the right lower lobe & right upper lobe. Right middle lobe & lower lobe bronchi appeared erythematous & edematous which inhibited advancement of the bronchoscope. Bronchial wash specimen sent for aerobic bacterial culture & gram stain     AUNG To MD  Critical Care Medicine

## 2023-10-23 NOTE — PROGRESS NOTES
[facility]  Critical Care Progress Note    Summary:   Mojgan Jimenez 52 yo F PMH obesity, severe JARVIS on CPAP, asthma (no PFTs), stimulant use disorder, stimulant induced psychosis, mood & anxiety disorders       Presented 10/9/23 for scheduled laparoscopic sleeve gastrectomy with single anastomosis duodenal switch. She was later found to have two small bowel injuries with peritonitis. 10/12/23 returned to OR for small bowel interotomy closure, clean-out, & drain placement; 10/19 found to have RUQ fluid collection s/p drain placement. Course complicated by encephalopathy, delirium, septic shock, suspected takotsubo syndrome, acute respiratory failure due to loss of protective airway reflexes & increased metabolic load requiring intubation (10/12-10/21; reintubated 10/21-current for respiratory acidosis), & oliguric SUSSY with renal recovery.     Interval Events:  No events overnight. HTN getting PRNs. Minimal support from the vent, minimal secretions, ETT retracted 1 cm lat night. Afebrile. Good UO 3 L or net +440 mL, since admission net +1L. Minimal drain output, no stool. Na stable 149, Cr stable 2.65, BG 120s, Tgs 480s, WC down to 14.5, Hgb & plts stable. No new micro. CT yesterday the RUQ fluid/gas collection 1-2 cm thickness remains despite drain     Stopping midazolam, plan to get off propofol after procedures (thora vs chest tube, pelvic drain placement, FT placement), favoring precedex / quetiapine / prn olanzapine, bronchoscopy for significant atelectasis RLL, D5FW at 25 ml/h increasing to 100 ml/h     Assessment & Plan:   Goals of Care:  Life prolonging without limits        Neurology, Psychiatry, Sedation, Analgesia:  Acute metabolic encephalopathy secondary to sepsis, shock, renal failure      ICU acquired delirium with agitation   - quetiapine 50mg bid   - olanzapine 2.5-5mg q8h prn    Sedation & Analgesia   - daily spontaneous awakening trials as able, RASS goal 0 to -1  - continue  dexmedetomidine & antipsychotics as above   - wean fentanyl infusion   - stop propofol after procedures today       Cardiovascular:  Suspected takotsubo syndrome   1014/23 TTE demonstrated LVEF 30-35%, moderate global hypokinesia of the LV, RV & TV not well visualized  - cardiology was consulted   - TTE today   - cardiology considering afterload reduction options     Hypertension   - prn hydralazine & labetalol      Atrial fibrillation RVR  - amiodarone bid     Septic shock - resolved       Respiratory, Airway:  Acute hypoxemic-hypercapnic respiratory failure   Suspected ventilator associated pneumonia   10/22/23 CT demonstrated complete RLL atelectasis, milder RUL & RBML atelectasis or consolidation.   - 10/22 bronchoscopy today    - supplemental O2 to maintain spO2 >= 90-96% & PaO2 >= 60 mmHg  - lung protective ventilation (Pplat <30, driving pressure <15)  - daily spontaneous breathing trials as able     History of JARVIS, allusions to asthma in the chart  She does exhibit expiratory wheezing, obstruction on the flow-volume loop, minimally prolonged expiratory phase, no measurable resistive pressure during inspiratory pause   - NIPPV at extubation with sleep/naps   - continue duonebs qid   - consider steroid course     Gastrointestinal:  10/9 s/p sleeve gastrectomy complicated by small bowel injury & peritonitis   10/12 returned to OR for closure, clean-out, & drain placement (now removed)  10/19 s/p RUQ fluid collection s/p drain placement   - surgery consulted  - NPO, on TPN   - flush drain at regular intervals      Renal, Acid-base, Electrolytes, Volume:  Oliguric SUSSY - improved   - nephrology was consulted     Hypervolemia  - bumex 2mg qd     Hypernatremia   - FW D5 at 100 ml/h       Infectious Disease:  Small bowel injury with peritonitis s/p closure, clean-out, & drain placement   RUQ fluid collection s/p drain placement   - ID was consulted   - 10/12 - intra-operative cultures + Streptococcus anginosus, mixed  aerobic & anaerobic kamla   - continue vanc, margo, metronidazole, micafungin, clinda    - drain placement for pelvic fluid     Suspected ventilator associated pneumonia   - 10/20 BAL studies S epidermidis    - antimicrobials as above     Hematology, Oncology:  Neutrophilic leukocytosis secondary to infection & steroids     Thrombocytosis, reactive due to infection     Anemia - stable  Suspect multifactorial secondary to sepsis & surgical blood loss   - monitor      Endocrine:  No active issues      Checklist:  FEN: TPN  VTE ppx: sqh  GI ppx: pantoprazole   Bowel regimen: PEG     VAP ppx: Head of bed >30 degrees, daily oral care  Lines/tube-size: L-internal jugular CVC (10/12), PIVs, arevalo (10/12), ETT 7.5 (10/12)  Skin: no lesions    PT/OT/SLP, early mobility: not consulted   Code Status: Full      Physical Exam:  Neurologic: Sedated. Does not open eyes, track, or follow commands   HEENT: Head and face normal. No nasal discharge. Oropharynx normal. Eyelids, conjunctiva, & sclera normal.   Neck: Neck appearance normal. No neck masses. Thyroid not enlarged.  Respiratory: Bilateral expiratory wheezing. Lungs clear bilaterally.   Cardiovascular: Regular rate & rhythm  Normal S1 & S2. No murmurs, rubs. or gallops. No elevated JVP.    Gastrointestinal: Obese. Abdominal staples clean, dry, intact. RUQ drain.  Musculoskeletal: Skeletal configuration normal and muscle mass normal for age. Joint appearance overall normal.  Skin, Hair, & Nails: Skin color normal. No skin lesions. Hair & nails normal.  Extremities: 1+ lower extremity pitting edema    All pertinent vital signs, ventilator settings, I&Os, laboratory, microbiology, ECGs, & imaging data has been personally reviewed. Total Critical Care time, excluding procedures, was 50 minutes     AUNG To MD  Critical Care Medicine

## 2023-10-23 NOTE — PROGRESS NOTES
"Care Management Follow Up    Length of Stay (days): 14    Expected Discharge Date: 10/29/2023     Concerns to be Addressed: Vent Day#2   Patient plan of care discussed at interdisciplinary rounds: Yes    Anticipated Discharge Disposition:  TBD     Anticipated Discharge Services:    Anticipated Discharge DME:      Patient/family educated on Medicare website which has current facility and service quality ratings:    Education Provided on the Discharge Plan:    Patient/Family in Agreement with the Plan:      Referrals Placed by CM/SW:  none at this time  Private pay costs discussed: Not applicable    Additional Information:  Social History per previous CM note:  \"From home with adult daughter and grandchild in an apartment. Independent at baseline and drives. Patient currently unemployed; saved up for procedures, plans to resume work once recovered. No community programs. Has a neb machine available for use due to Asthma. Goal to return home with family support. Family to transport if able. \"      Chart reviewed and plan of care discussed in ICU rounds. Pt presented on 10/9/23 for scheduled lap sleeve gastrectomy. Post procedure with complicated by abd pain. A rapid response was called due to encephalopathy, and ARF. Pt was admitted to ICU and intubated and started on pressors on 10/12. Pt was then taken to OR. S/p closure of two small bowel enterotomies, drain placement and wash out. Extubated on 10/21 then re-intubated 10/22 due to resp failure. Pt now Vent Day#2. Plan for today is get off propofol. Pelvic drain. FT placement. Bronchoscopy.    RNCM to follow for medical progression, recommendations, and final discharge plan.      Rosario Shea RN      "

## 2023-10-23 NOTE — PROGRESS NOTES
Imp/plan:  CM - EF 30-35% 10/14, not clear etiology, in setting ARF (Cr down 2.65), on hydralazine 5mg  tid, labetolol/hydralazine prn iv, bumex 2mg q12, bedside echo possible improvement in EF will obtain limited echo and CVP to better eval.  Plan ischemic eval as outpatient, if EF improved, possible stress CM as culprit. Noted hx of asthma, will hold on beta blockers at this time, if EF still low and CVP high, consider isordil 10mg tid with hydralazine.   PVC - on amiodarone 400mg bid oral, no PVC on monitor with , wean down 200mg bid today  Pt hypertensive - will add NTG patch, increase hydralazine and consider iv metoprolol given was on labetalol to see if bronchospasm IF BP cont to be elevated    Addendum: Echo normal EF today    Current Facility-Administered Medications   Medication    acetaminophen (TYLENOL) solution 650 mg    acetaminophen (TYLENOL) tablet 650 mg    Or    acetaminophen (TYLENOL) Suppository 650 mg    albuterol (PROVENTIL HFA/VENTOLIN HFA) inhaler    albuterol (PROVENTIL) neb solution 2.5 mg    amiodarone (PACERONE) tablet 400 mg    bumetanide (BUMEX) injection 2 mg    chlorhexidine (PERIDEX) 0.12 % solution 15 mL    clindamycin (CLEOCIN) 900 mg in 50 mL NS intermittent infusion    dexmedeTOMIDine (PRECEDEX) 4 mcg/mL in NS infusion    dextrose 10% infusion    dextrose 10% infusion    dextrose 5% infusion    glucose gel 15-30 g    Or    dextrose 50 % injection 25-50 mL    Or    glucagon injection 1 mg    diphenhydrAMINE (BENADRYL) capsule 25 mg    Or    diphenhydrAMINE (BENADRYL) injection 25 mg    sennosides (SENOKOT) syrup 5 mL    And    docusate (COLACE) 50 MG/5ML liquid 50 mg    fentaNYL (SUBLIMAZE) 50 mcg/mL bolus from pump    fentaNYL (SUBLIMAZE) infusion    heparin ANTICOAGULANT injection 5,000 Units    hydrALAZINE (APRESOLINE) injection 20 mg    hydrALAZINE (APRESOLINE) injection 5 mg    [Held by provider] insulin aspart (NovoLOG) injection (RAPID ACTING)    insulin regular  (MYXREDLIN) 1 unit/mL infusion    ipratropium - albuterol 0.5 mg/2.5 mg/3 mL (DUONEB) neb solution 3 mL    labetalol (NORMODYNE/TRANDATE) injection 20 mg    lidocaine (LMX4) cream    lidocaine 1 % 0.1-1 mL    [Held by provider] lipids plant base (CLINOLIPID) 20 % infusion 250 mL    propofol (DIPRIVAN) infusion    And    propofol (DIPRIVAN) bolus from bag or syringe pump    And    Medication Instruction    menthol-zinc oxide (CALMOSEPTINE) 0.44-20.6 % ointment OINT    meropenem (MERREM) 1 g vial to attach to  mL bag    micafungin (MYCAMINE) 100 mg in sodium chloride 0.9 % 100 mL intermittent infusion    miconazole (MICATIN) 2 % powder    midazolam (VERSED) injection 1-2 mg    naloxone (NARCAN) injection 0.2 mg    Or    naloxone (NARCAN) injection 0.4 mg    Or    naloxone (NARCAN) injection 0.2 mg    Or    naloxone (NARCAN) injection 0.4 mg    norepinephrine (LEVOPHED) 4 mg in  mL infusion PREMIX    OLANZapine (zyPREXA) IV injection 2.5-5 mg    ondansetron (ZOFRAN ODT) ODT tab 4 mg    Or    ondansetron (ZOFRAN) injection 4 mg    pantoprazole (PROTONIX) 2 mg/mL suspension 40 mg    Or    pantoprazole (PROTONIX) IV push injection 40 mg    parenteral nutrition - ADULT compounded formula    phenol (CHLORASEPTIC) 1.4 % spray 1-2 mL    polyethylene glycol (MIRALAX) Packet 17 g    prochlorperazine (COMPAZINE) injection 10 mg    Or    prochlorperazine (COMPAZINE) tablet 10 mg    QUEtiapine (SEROquel) tablet 100 mg    sodium chloride (PF) 0.9% PF flush 3 mL    sodium chloride (PF) 0.9% PF flush 3 mL    vancomycin place alegria - receiving intermittent dosing     Past Medical History:   Diagnosis Date    LINA (generalized anxiety disorder) 11/02/2017    Hyperlipidemia LDL goal <160 01/20/2019    Major depressive disorder     Methanol abuse (H)     Mild persistent asthma without complication 11/02/2017    Obese     JARVIS on CPAP     Cpap not working    Paranoia (H)     resolved due to drugs    Vitamin D deficiency  "11/02/2017        Review of Systems: 12 points negative other than above    BP (!) 177/93   Pulse 84   Temp 99.1  F (37.3  C)   Resp 12   Ht 1.6 m (5' 3\")   Wt 124 kg (273 lb 5.9 oz)   LMP 09/09/2023 (Exact Date)   SpO2 98%   BMI 48.43 kg/m    JVP<7cm, carotids normal  Lungs clear  Cor RRR no c,r,m  Abs soft +BS, no mass  Ext no c,c,e    Lab Results   Component Value Date    HGB 9.2 (L) 10/23/2023     Lab Results   Component Value Date     10/23/2023     No results found for: \"CREATININE\"  No components found for: \"K\"          Yohan Srinivasan MD  Interventional Cardiology   [facility]  [phone]    "

## 2023-10-23 NOTE — PROGRESS NOTES
Nutrition Therapy  Parenteral Nutrition follow-up       Dietitian to Pharmacy    Rate of TPN: 50 ml/hr   Grams Dextrose: 150 g  Grams amino acid :63 g     Lipids: None, patient on propofol    Future/Additional Recommendations:   Monitor TPN daily and adjust  Monitor hydration, renal function  Monitor for ability to start trickle tube feeding after postpyloric feeding tube placed.    Consider water flush every 2 hrs to due to limited stomach capacity.       Patient extubated 10/21 but re-intubated 10/22.  TF off  (trickle was started 10/20)    Discussed patient in team rounds:  On high dose propofol, will try to wean this as patient's triglyceride is high today.  Patient has a low grade fever.  Patient is on Dextrose infusion with elevated sodium noted.  Patient to go to IR today for chest tube placement, remove MICKIE drain and place post pyloric feeding tube.  May attempt trickle tube feeding after PPFT placed.  TPN continues.      Current Nutrition Intake:  Diet: NPO  Custom TPN per central line:  65 g AA, 150 g DEX at 50 ml/hr continuous.  No lipid as patient on propofol.  TPN provides 770 kcals, 65 g protein, 150 g carb, 1200 ml fluid.  Propofol at current rate providing ~930 kcals.    Calorie provisions exceed estimated needs at this time.  Propofol weaning down, this will decrease kcals provided.      S/p gastrectomy sleeve-duodenal switch on 10/9.  S/p drain placed, closure of interotomies and NG placed on 10/12.   TPN start 10/14/23  Propofol stopped 10/15 due to elevated Triglyceride.  Propofol restarted 10/22 with re-intubation.    Trickle TF start 10/20.  Trickle TF stopped 10/21.    Extubated 10/21, Re-intubated 10/22.     Weight Trends  Admission wt: 130.5 kg (287 lb 12.8 oz) 10/9/23    Current wt: 124 kb (273 lb 5.9 oz) 10/23/23  +473 ml 10/22/23  +1.18 L since admit.     Dosing Weight: 52.3 kg (IBW)      ESTIMATED NUTRITION NEEDS   Energy: 8452-4553 kcals/day (22 - 25 kcals/kg)   Obese, Post-op, and  Vented   Protein : 63-78 grams protein/day (1.2 - 1.5 grams of pro/kg)   Obesity guidelines and Post-op, elevated creatinine   Fluid: 0644-8256+ mL/day (1 mL/kcal)   Maintenance     GI:  NG clamped.  TF stopped 10/21.  Last BM 10/23    Labs:   Sodium 149 (H)  Potassium 3.7  Creatinine 2.65 (H) though improved  Glucose 126  Triglyceride 485 (H)  Labs reviewed by dietitian    Medications:    amiodarone  400 mg Oral or Feeding Tube BID    bumetanide  2 mg Intravenous Q12H    chlorhexidine  15 mL Mouth/Throat Q12H    clindamycin  900 mg Intravenous Q8H    sennosides  5 mL Oral or Feeding Tube BID    And    docusate  50 mg Oral or Feeding Tube BID    heparin ANTICOAGULANT  5,000 Units Subcutaneous Q8H    hydrALAZINE  5 mg Intravenous TID    [Held by provider] insulin aspart  1-12 Units Subcutaneous Q4H    ipratropium - albuterol 0.5 mg/2.5 mg/3 mL  3 mL Nebulization 4x daily    [Held by provider] lipids plant base  250 mL Intravenous Once per day on Monday Wednesday Friday    meropenem  1 g Intravenous Q12H    micafungin  100 mg Intravenous Q24H    pantoprazole  40 mg Per Feeding Tube QAM AC    Or    pantoprazole  40 mg Intravenous QAM AC    polyethylene glycol  17 g Oral or Feeding Tube Daily    QUEtiapine  100 mg Oral or Feeding Tube BID    sodium chloride (PF)  3 mL Intracatheter Q8H    vancomycin place alegria - receiving intermittent dosing  1 each Intravenous See Admin Instructions       dexmedeTOMIDine Stopped (10/22/23 0833)    dextrose      dextrose      D5W 25 mL/hr at 10/23/23 0600    fentaNYL 150 mcg/hr (10/23/23 0609)    insulin regular 0.5 Units/hr (10/23/23 0612)    propofol 45 mcg/kg/min (10/23/23 6197)    And    - MEDICATION INSTRUCTIONS -      norepinephrine Stopped (10/13/23 5094)    parenteral nutrition - ADULT compounded formula 50 mL/hr at 10/23/23 0600      acetaminophen, acetaminophen **OR** acetaminophen, albuterol, albuterol, dextrose, dextrose, glucose **OR** dextrose **OR** glucagon,  diphenhydrAMINE **OR** diphenhydrAMINE, fentaNYL, hydrALAZINE, labetalol, lidocaine 4%, lidocaine (buffered or not buffered), propofol **AND** propofol **AND** CK total **AND** Triglycerides **AND** - MEDICATION INSTRUCTIONS - **AND** Notify Physician, menthol-zinc oxide, miconazole, midazolam, naloxone **OR** naloxone **OR** naloxone **OR** naloxone, OLANZapine, ondansetron **OR** ondansetron, phenol, prochlorperazine **OR** prochlorperazine, sodium chloride (PF)   Reviewed by dietitian       Malnutrition Diagnosis: Patient does not meet two of the established criteria necessary for diagnosing malnutrition     Nutrition Diagnosis  Inadequate oral intake related to intubated and sedated as evidenced by NPO and need for TPN      Evaluation: continues    Goals   Meet nutrition needs with TPN- progressing  New-electrolytes WNL-sodium elevated.  Dextrose infusion started per MD  Tolerate TPN - met  Tolerate TF - unable as TF off.  Transition to po diet after extubation and when appropriate.-unable, pt remains intubated.      INTERVENTIONS  Implementation  Continue same TPN at this time.    Also getting calories from Dextrose infusion and propofol-Anticipate propofol wean.  Monitor fluid status, I/Os, ability to start enteral.  Plan TPN to bridge nutrition needs for a while for patient's healing needs as patient has some malabsorption with bariatric surgery.       Monitoring/Evaluation  Progress toward goals will be monitored and evaluated per protocol.

## 2023-10-23 NOTE — PROGRESS NOTES
General Surgery Progress Note    POST OP DAY  # S/P Lap Sleeve with Single Anastomosis DS 10/9/23 and then a Re-Operation on 10/12/2023 where it was found that she had 2 small enterotomies in the small bowel, likely form the traction of making the Duodenal anastomosis.  These were oversewn and the pt has had an NGT and drains since.    She was extubated Saturday 10/21 but then required reintubation overnight and today her WBC had jumped up to 27 and is now back down at 14.5.  She had a Chest/Ab/Pelvis CT and it shows consolidation in the Right Chest.  There also is a collection of fluid in the pelvis which is unchanged from the last CT.  She was stable overnight without any obvious issues.    She had been having a Rash on her right side that looks to be stable or perhaps a little better.    Subjective:   Pt is intubated currently,     Vitals:    10/23/23 0500 10/23/23 0600 10/23/23 0615 10/23/23 0620   BP: (!) 149/73 (!) 193/97 (!) 192/91 (!) 144/70   BP Location:       Pulse: 91 109 113 84   Resp: 20 21 13 12   Temp: 98.2  F (36.8  C)  99.1  F (37.3  C) 99.1  F (37.3  C)   TempSrc:       SpO2: 99% 97% 97% 98%   Weight:       Height:         I's  1397  O's 1525    Net - 127    Physical Exam:  Lungs:  CTA, bronchial breath sounds in the Right Lower Lung Fields.  CV:       RRR  Ab:       Soft, + BS, Staples in place;  Passing stool    Recent Labs   Lab 10/23/23  0530   WBC 14.5*   HGB 9.2*   HCT 30.9*          Component  Ref Range & Units  5:30 AM  (10/23/23)  5:30 AM  (10/23/23) 12:18 AM  (10/23/23) 1 d ago  (10/22/23) 1 d ago  (10/22/23) 2 d ago  (10/21/23) 3 d ago  (10/20/23)     Sodium  135 - 145 mmol/L 149 High  149 High   High   High   High   High   High  CM   Comment: Reference intervals for this test were updated on 09/26/2023 to more accurately reflect our healthy population. There may be differences in the flagging of prior results with similar values performed with this  method. Interpretation of those prior results can be made in the context of the updated reference intervals.    Potassium  3.4 - 5.3 mmol/L 3.7    5.0 4.4 4.1    Carbon Dioxide (CO2)  22 - 29 mmol/L 24    24 24 23    Anion Gap  7 - 15 mmol/L 14    14 13 14    Urea Nitrogen  6.0 - 20.0 mg/dL 104.2 High     104.1 High  92.0 High  89.8 High     Creatinine  0.51 - 0.95 mg/dL 2.65 High     2.76 High  2.84 High  2.87 High     GFR Estimate  >60 mL/min/1.73m2 21 Low     20 Low  19 Low  19 Low     Calcium  8.6 - 10.0 mg/dL 8.7    9.0 8.9 9.5    Chloride  98 - 107 mmol/L 111 High     112 High  110 High  110 High     Glucose  70 - 99 mg/dL 126 High     242 High  265 High  146 High     Alkaline Phosphatase  35 - 104 U/L 96    106 High  87 90    AST  0 - 45 U/L 19    25 CM 20 CM 27 CM   Comment: Reference intervals for this test were updated on 6/12/2023 to more accurately reflect our healthy population. There may be differences in the flagging of prior results with similar values performed with this method. Interpretation of those prior results can be made in the context of the updated reference intervals.    ALT  0 - 50 U/L 26    33 CM 30 CM 34 CM   Comment: Reference intervals for this test were updated on 6/12/2023 to more accurately reflect our healthy population. There may be differences in the flagging of prior results with similar values performed with this method. Interpretation of those prior results can be made in the context of the updated reference intervals.      Protein Total  6.4 - 8.3 g/dL 7.1    7.7 7.4 7.6    Albumin  3.5 - 5.2 g/dL 3.2 Low     3.7 3.5 3.4 Low     Bilirubin Total  <=1.2 mg/dL 0.3    0.3 0.5 0.6         Assessment:   WBC is back down at 14.  A new CT shows that the problems are likely in with the Right Chest and Lung.  The intra-abdominal cavity looks clear except for the fluid collection previously seen in the pelvis.    Plan: Work with ID and the ICU staff to optimize medical therapy of her  pneumonia.  We should place R Chest tube to drain plural fluid as best possible and to assess in Micro.    We can ask IR if they think they can drain or sample the pelvic fluid.      Hoang Worthy MD  [facility]  [phone]

## 2023-10-23 NOTE — PROGRESS NOTES
RT PROGRESS NOTE    VENT DAY# 2     CURRENT SETTINGS:   Vent Mode: CMV/AC  (Continuous Mandatory Ventilation/ Assist Control)  FiO2 (%): 30 %  Resp Rate (Set): 22 breaths/min  Tidal Volume (Set, mL): 380 mL  PEEP (cm H2O): 8 cmH2O  Pressure Support (cm H2O): 5 cmH2O  Resp: 20      PATIENT PARAMETERS:  PIP 28  Pplat:  22  Pmean:  12  Compliance: 25.2  Secretions:  scant pink thick  02 Sats:  99%  BS: diminished    ETT SIZE 7.5 Secured at 19 cm at teeth/gums    Respiratory Medications: Duoneb QID     ABG:10/22/23 @1548 pH 7.33; pCO2 46; pO2 110; HCO3 24, %O2 Sat 97.8, BE -1.7 on 50%    NOTE / SHIFT SUMMARY:   Patient on full support. No vent changes made with exception of decreasing Fio2. RT will continue to follow.    Emmanulele Hernández, RT

## 2023-10-23 NOTE — PHARMACY-CONSULT NOTE
"Pharmacy Note: Parenteral Nutrition (PN) Management    Pharmacist consulted to dose PN for Mojgan Jimenez, a 53 year old female by Dr. Cruz.     Subjective:     The patient is a new PN start.     The patient was started on PN in the hospital on 10/14/23.     Indication for PN therapy:  peritonitis     Inadequate nutrition anticipated for > 7 days.      Enteral nutrition contraindicated due to: peritonitis.     Pertinent diseases and other special considerations  10/9/23 s/p sleeve gastrectomy    Social History     Tobacco Use    Smoking status: Never    Smokeless tobacco: Never   Vaping Use    Vaping Use: Never used   Substance Use Topics    Alcohol use: Not Currently     Comment: Sober x 8 months    Drug use: Not Currently     Types: Methamphetamines, \"Crack\" cocaine     Comment: in remision      Objective:    Ht Readings from Last 1 Encounters:   10/09/23 1.6 m (5' 3\")     Wt Readings from Last 1 Encounters:   10/23/23 124 kg (273 lb 5.9 oz)       Body mass index is 48.43 kg/m .    Patient Vitals for the past 96 hrs:   Weight   10/23/23 0620 124 kg (273 lb 5.9 oz)   10/21/23 0530 131.5 kg (289 lb 14.5 oz)   10/20/23 0500 129.8 kg (286 lb 2.5 oz)       Labs:  Last 3 days:  Recent Labs     10/21/23  0341 10/22/23  0520 10/22/23  1724 10/23/23  0018 10/23/23  0530   * 150* 148* 146* 149*  149*   POTASSIUM 4.4 5.0  --   --  3.7   CHLORIDE 110* 112*  --   --  111*   CO2 24 24  --   --  24   BUN 92.0* 104.1*  --   --  104.2*   CR 2.84* 2.76*  --   --  2.65*   PALAK 8.9 9.0  --   --  8.7   [id number]  --   --   --   --  1.09*   MAG 2.0 2.2  --   --  1.9   PHOS 3.0 6.1*  --   --  4.2   PROTTOTAL 7.4 7.7  --   --  7.1   ALBUMIN 3.5 3.7  --   --  3.2*   TRIG  --  271*  --   --  485*   HGB 8.2* 9.2*  --   --  9.2*   HCT 26.8* 31.7*  --   --  30.9*    460*  --   --  427   BILITOTAL 0.5 0.3  --   --  0.3   AST 20 25  --   --  19   ALT 30 33  --   --  26   ALKPHOS 87 106*  --   --  96   INR  --   --   --   --  1.49* "       Glucose (past 48 hours):   Recent Labs     10/22/23  2010 10/22/23  2102 10/22/23  2211 10/22/23  2308 10/23/23  0010 10/23/23  0201 10/23/23  0421 10/23/23  0530 10/23/23  0611 10/23/23  0920   * 170* 158* 145* 143* 130* 119* 126* 112* 131*       Intake/Output (last 24 hours): I/O last 3 completed shifts:  In: 4123.71 [I.V.:3284.11; NG/GT:130]  Out: 3452 [Urine:3450; Drains:2]    Estimated CrCl: Estimated Creatinine Clearance: 31.4 mL/min (A) (based on SCr of 2.65 mg/dL (H)).    Assessment:  Continue patient on PN therapy as a continuous central therapy.      Given the patient's current condition/oral intake, PN is still indicated.     Lab results reviewed: Na high - none in TPN, on D5W  Chloride high, already on max acetate  Re-started on IV Bumex, increase K in TPN  Mg - increase slightly in TPN  Propofol started so lipids held. TG currently 485. MD hopes to have propofol reduced or off later today.  iCal on low end of normal, will increase slightly  Add back some phos now level has improved.     Plan:  Rate of PN: 50 mL/hr   Formula:   Amino Acids 65 grams  Dextrose 150 grams  Sodium 0 mEq/day  Potassium 60 mEq/day  Calcium 6 mEq/day  Magnesium 7 mEq/day  Phosphorus 2 mMol/day  Chloride: Acetate Ratio Max acetate  Standard Multivitamins w/Vitamin K  Trace Elements  Vitamin b12 100 mcg  Zinc 5mg  Fat Emulsion: 20%, 250 mL IV 3 times weekly on MWF - ON HOLD as propofol started.   Check BMP, Mag, Phos and ical labs tomorrow.  Pharmacist will continue to follow the patient's lab results, clinical status and blood glucose results and make adjustments as appropriate.      Thank you for the consult.  Blaire Gray RPH  10/23/2023 10:46 AM

## 2023-10-23 NOTE — PLAN OF CARE
[facility] - ICU    RN Progress Note:            Pertinent Assessments:      Please refer to flowsheet rows for full assessment     - Very agitated and restless  - watery brown stool  - Systolic blood pressure was between 190s to 200s           Key Events - This Shift:   - Versed PRN was added. Given as ordered  - Dr. To was informed regarding the watery stool, had 4 times of watery stool from 8am today. Dr. To said there is no need to send stool sample but we should insert Dignicare. Dignicare inserted, no bleeding at the site  - As per IR, they would just observe the output of the MICKIE drain and ordered 10 ml of saline flush every 8 hours. They advised to keep for now and re-assess it tomorrow.  - Dr. Srinivasan (Cardiologist) was paged regarding the CVP readings and hypertension. He ordered NTG patch, increased Hydralaziine scheduled doses and adjusted the PRN doses.  - Based on the conversation with ultrasound staff, the thoracentesis/chest tube insertion and pelvic fluid drain to be done at CT scan will be done tomorrow at around 0930H. Xray staff was called and the post pyloric tube placement under fluoroscopy will be placed tomorrow either before or after ultrasound procedure.  - Report given to Phil,RN at around 1500H      DANIEL SAT (Sedation Awakening Trial): For use ONLY if intubated    SAT Safety Screen NA   If FAILED why? Will do at least 3 procedures today   SAT Performed NA    If FAILED why?               Barriers to Discharge / Downgrade:     - intubated and sedated         Point of Contact Update   Name: Ernestine  Phone Number:in file  Summary of Conversation: updated regarding the patient possible procedures (pelvic drain, throracentesis/chest tube insertion, post pyloric tube insertion)

## 2023-10-23 NOTE — PROGRESS NOTES
[facility]  [facility]   Follow up    Mojgan Jimenez   MRN: [id number]  : 1970   DOA: 10/9/2023   CC: ARF      Assessment and Plan:  53 year old female s/p gastric b/p 10/9/23 c/w leak, s/p repair, peritonitis, perihepatic abscess. On abx vanco zosyn.     ARF;  hemodynamic exacerbated by IV NSAIDS (lowish bp, vasodilatory drugs, mild intravascular depletion)==>ATN, now in recovery phase and creatinine improved though leveling off high 2's last several days. Hope to see more improvement with time. Creat was normal (0.7) PTA.  Urinary output remains in nonoliguric range with IV diuretics.  Lytes ok but mild hypernatremia with diuresis and current obligatory IVF.  Calculated free water deficit is about 4 L .  I agree with increased D5 W rate to 300 mils per minute.  Monitor serum sodium daily.  Volume overload, now s/p diuresis, prob near euvolemia, bumex 2 mg/day is keeping her even. Given failed extubation, see if we can dry her out more, On bumex to 2mg bid.  No changes.  HoTN; infection/SIIRS resolved and now labile bp, very high when awake. BP good with sedation. May need to use IV anti HTN with sedation vacations/ in future.   Hyperlipid; on statin  Elevated LFTs better.  Resp failure; failed extubation. ?PNA, ?Volume. Await CT.  Acidosis; resolved   Nutrition; on TPN   Cardiomyopathy, depressed EF on ECHO per cards.    Discussed with  Intensivist Dr. To.    Subjective  She was seen at the bedside and events were reviewed with nurses.  Patient reminds intubated on vent and sedated.  Patient is scheduled for tracheostomy and possible chest tube and pelvic drain placement this morning.  Patient urinary output remains in nonoliguric range with help of IV Bumex.  At the time of physical exam the patient is sedated and intubated therefore I was not able obtained review of systems.      Objective    Vital signs in last 24 hours  Temp:  [97.8  F  (36.6  C)-99.8  F (37.7  C)] 99.1  F (37.3  C)  Pulse:  [] 84  Resp:  [12-30] 12  BP: (108-193)/(57-98) 177/93  FiO2 (%):  [30 %-50 %] 40 %  SpO2:  [95 %-100 %] 98 %      Intake/Output last 3 shifts  I/O last 3 completed shifts:  In: 4123.71 [I.V.:3284.11; NG/GT:130]  Out: 3452 [Urine:3450; Drains:2]  Intake/Output this shift:  No intake/output data recorded.    Physical Exam  Intubated /sedated, overly obese  CV: RRR without murmur or rub  Lung: clear and equal; no extra sounds  Ab:soft incision closed with staples. +RUQ drain  Ext:trace edema and well perfused  Skin; no rash    Pertinent Labs     Last Renal Panel:  Sodium   Date Value Ref Range Status   10/23/2023 149 (H) 135 - 145 mmol/L Final     Comment:     Reference intervals for this test were updated on 09/26/2023 to more accurately reflect our healthy population. There may be differences in the flagging of prior results with similar values performed with this method. Interpretation of those prior results can be made in the context of the updated reference intervals.    10/23/2023 149 (H) 135 - 145 mmol/L Final     Comment:     Reference intervals for this test were updated on 09/26/2023 to more accurately reflect our healthy population. There may be differences in the flagging of prior results with similar values performed with this method. Interpretation of those prior results can be made in the context of the updated reference intervals.    11/17/2020 141 133 - 144 mmol/L Final     Potassium   Date Value Ref Range Status   10/23/2023 3.7 3.4 - 5.3 mmol/L Final   05/17/2022 4.5 3.4 - 5.3 mmol/L Final   11/17/2020 4.1 3.4 - 5.3 mmol/L Final     Chloride   Date Value Ref Range Status   10/23/2023 111 (H) 98 - 107 mmol/L Final   05/17/2022 102 94 - 109 mmol/L Final   11/17/2020 109 94 - 109 mmol/L Final     Carbon Dioxide   Date Value Ref Range Status   11/17/2020 31 20 - 32 mmol/L Final     Carbon Dioxide (CO2)   Date Value Ref Range Status    10/23/2023 24 22 - 29 mmol/L Final   05/17/2022 32 20 - 32 mmol/L Final     Anion Gap   Date Value Ref Range Status   10/23/2023 14 7 - 15 mmol/L Final   05/17/2022 2 (L) 3 - 14 mmol/L Final   11/17/2020 1 (L) 3 - 14 mmol/L Final     Glucose   Date Value Ref Range Status   10/23/2023 126 (H) 70 - 99 mg/dL Final   05/17/2022 110 (H) 70 - 99 mg/dL Final   11/17/2020 84 70 - 99 mg/dL Final     Comment:     Fasting specimen     GLUCOSE BY METER POCT   Date Value Ref Range Status   10/23/2023 131 (H) 70 - 99 mg/dL Final     Urea Nitrogen   Date Value Ref Range Status   10/23/2023 104.2 (H) 6.0 - 20.0 mg/dL Final   05/17/2022 16 7 - 30 mg/dL Final   11/17/2020 9 7 - 30 mg/dL Final     Creatinine   Date Value Ref Range Status   10/23/2023 2.65 (H) 0.51 - 0.95 mg/dL Final   11/17/2020 0.79 0.52 - 1.04 mg/dL Final     GFR Estimate   Date Value Ref Range Status   10/23/2023 21 (L) >60 mL/min/1.73m2 Final   11/17/2020 88 >60 mL/min/[1.73_m2] Final     Comment:     Non  GFR Calc  Starting 12/18/2018, serum creatinine based estimated GFR (eGFR) will be   calculated using the Chronic Kidney Disease Epidemiology Collaboration   (CKD-EPI) equation.       Calcium   Date Value Ref Range Status   10/23/2023 8.7 8.6 - 10.0 mg/dL Final   11/17/2020 8.9 8.5 - 10.1 mg/dL Final     Phosphorus   Date Value Ref Range Status   10/23/2023 4.2 2.5 - 4.5 mg/dL Final     Albumin   Date Value Ref Range Status   10/23/2023 3.2 (L) 3.5 - 5.2 g/dL Final   05/17/2022 4.0 3.4 - 5.0 g/dL Final   12/03/2018 3.6 3.4 - 5.0 g/dL Final     Recent Labs   Lab 10/23/23  0530 10/22/23  0520 10/21/23  0341 10/20/23  0509 10/19/23  0553   HGB 9.2* 9.2* 8.2* 9.6* 9.1*          I reviewed all lab results  Georgette Garnica MD  [facility]  [address] [place] [address], [address]  [place] [address]  Phone# [phone]  Fax# [phone]

## 2023-10-23 NOTE — PROGRESS NOTES
"  Interventional Radiology - Progress Note  Inpatient - [facility]  10/23/2023     S:  POD 4 CT guided drain placement into perihepatic collection. Remains vented and sedated in ICU.    Antibiotic: Clindamycin q8h, Meropenem q12h, Micafungin daily  Flushing: Not flushing per RN    Culture:  Culture No growth after 3 days               Gram Stain Result No organisms seen      3+ WBC seen   Predominantly PMNs           Culture Culture in progress      Isolated in broth only Gram positive bacilli Abnormal    On day 3 of incubation          O:  BP (!) 177/93   Pulse 84   Temp 99.1  F (37.3  C)   Resp 12   Ht 1.6 m (5' 3\")   Wt 124 kg (273 lb 5.9 oz)   LMP 09/09/2023 (Exact Date)   SpO2 98%   BMI 48.43 kg/m    General:  Stable. In no acute distress.    Neuro:  Sedated.  Resp:  Vented.   Skin:  Drain CDI, flushed at bedside. No leaking with flushing. Small amount of serous drainage noted in compressed bulb.    IMAGING:  CT Chest Abdomen Pelvis w/o Contrast 10/22/2023  EXAM: CT CHEST ABDOMEN PELVIS W/O CONTRAST  LOCATION: [facility]  DATE: 10/22/2023     INDICATION: Sepsis. treated for peritonitis and pneumonia.  10/09/2023 sleeve gastrectomy with single anastomosis duodenal switch complicated by bowel perforation status post 10/12/2023 washout and enterotomy closure and 10/19/2023 CT-guided drain   placement right subdiaphragmatic abscess.  COMPARISON: 10/18/2023 CT AP.  TECHNIQUE: CT scan of the chest, abdomen, and pelvis was performed without IV contrast. Multiplanar reformats were obtained. Dose reduction techniques were used.   CONTRAST: None.     FINDINGS:   LUNGS AND PLEURA: Complete right lower lobe atelectasis, mild passive atelectasis right upper and middle lobes posteriorly, small/moderate volume right pleural effusion and mild elevation right hemidiaphragm. Trace left pleural effusion and mild   platelike atelectasis left lower lobe unchanged.   "   MEDIASTINUM/AXILLAE: Heart size within normal limits with no pericardial effusion. Endotracheal tube tip is only 1 cm above the emilia.     CORONARY ARTERY CALCIFICATION: Trace calcified atheromatous plaque LAD coronary artery.     HEPATOBILIARY: Liver is normal.  No calcified gallstones or bile duct dilatation.      PANCREAS: Normal.     SPLEEN: Normal.     ADRENAL GLANDS: Normal.     KIDNEYS/BLADDER: Bladder is decompressed by well-positioned Patiño catheter. Kidneys, ureters and bladder are otherwise normal.     BOWEL: Sleeve gastrectomy and proximal duodenal to jejunal anastomosis with a well-positioned nasogastric tube and interval removal of the surgical drains.     Interval placement of percutaneous drainage catheter lateral aspect right subdiaphragmatic air-fluid collection surrounding the right hepatic lobe which has not changed significantly and continues to measure about 1 - 2 cm radial thickness. A 7 x 5 x 4   cm volume of nongas-containing fluid in the pelvic cul-de-sac is unchanged.     LYMPH NODES: No lymphadenopathy.     VASCULATURE: Normal caliber abdominal aorta.       PELVIC ORGANS: A 5 cm fibroid.     MUSCULOSKELETAL: Unremarkable.                                                                      IMPRESSION:  1.  Sleeve gastrectomy and proximal duodenal to jejunal anastomosis with a well-positioned nasogastric tube and interval removal of the 2 surgical drains.  2.  Interval placement of a percutaneous drainage catheter with distal loop in the lateral aspect of the right subdiaphragmatic air-fluid collection surrounding the right hepatic lobe which has not changed significantly and continues to measure about 1 -   2 cm radial thickness.   3.  A 7 x 5 x 4 cm volume of nongas-containing fluid in the pelvic cul-de-sac is unchanged.  4.  Complete right lower lobe atelectasis, mild passive atelectasis right upper and middle lobes posteriorly, small/moderate volume right pleural effusion and mild  elevation right hemidiaphragm.   5.  Endotracheal tube tip is only 1 cm above the emilia.    LABS:  Recent Labs   Lab 10/23/23  0530 10/22/23  0520 10/21/23  0341 10/20/23  0509   WBC 14.5* 27.2* 16.0* 17.4*   HGB 9.2* 9.2* 8.2* 9.6*    460* 340 318   INR 1.49*  --   --   --    CR 2.65* 2.76* 2.84* 2.87*   BILITOTAL 0.3 0.3 0.5 0.6   ALKPHOS 96 106* 87 90   AST 19 25 20 27   ALT 26 33 30 34       Drain Outputs (in mL):  10/19 5   10/20 19   10/21 8   10/22 2   10/23 0       A:  53 year old yo female with a PMH of LINA, HLD, methanol abuse, asthma, and JARVIS who was admitted to [facility] on 10/09/2023 for a planned sleeve gastrectomy with single anastomosis duodenal switch. Hospital course c/b encephalopathy, SUSSY, intra-abdominal abscesses, and delirium. Transferred to ICU, intubated, vasopressors initiated 10/12. Back to OR for closure of two small bowel enterotomies, drain placement, and wash out on 10/12. F/U CT demonstrated RUQ fluid collection - drain placed 10/19. Extubated 10/21 and reintubated 10/22. Reconsulted for possible drain removal today as output is minimal - drain not being flushed per bedside RN. Flushed at bedside with small amount of serous output. CT from 10/22 demonstrated no significant change in RIGHT subdiaphragmatic air-fluid collection where the drain is located.    Also consulted for possible chest tube placement, pelvic drain placement, and post-pyloric NG - these may take place in CT/US/fluoroscopy. Defer these procedures to body radiologist.    P:    - Do not recommend checking current drain today as it has not been flushed regularly and the fluid collection is no different on CT.  - Will reassess outputs tomorrow to determine if a drain check is warranted.  - Order placed for flushing drain 10 ml NS q8h.  - Continue to follow WBC and Cultures, drain OPs and patient's clinical signs/symptoms for improvement.   - Continue present drain cares. Continue drain to MICKIE suction drainage.  -  Antibiotics per ID.   - Drain discharge instructions previously entered into D/C navigator.  - Patient to flush drain with 10 mL NS daily upon discharge. NS flushes ordered in D/C navigator.  - Will continue to follow patient's clinical status, imaging, drain(s) function and drain(s) OPs to determine drain follow up plan. Anticipating follow-up CT/abscessogram approximately 7-14 days from drain placement date, once drain outputs are <20mL/day for a consecutive 2 days, and/or pending patient's clinical status and drain function.   - Drain follow up can occur as inpatient or outpatient, most often the first drain check will occur as outpatient. Outpatient drain follow up orders have been entered.   - IR will continue to follow loosely. Please contact IR with drain related questions or concerns.      Total time spent on the date of the encounter: 60 minutes.    JUSTINE MOE [facility]  Interventional Radiology  [phone]

## 2023-10-23 NOTE — PROGRESS NOTES
"[facility] Progress Note    SUBJECTIVE:  intubated unable to get ROS.        REVIEW OF SYSTEMS:  Negative unless as listed above.  Social history, Family history, Medications: reviewed.    OBJECTIVE:  BP (!) 177/93   Pulse 84   Temp 99.1  F (37.3  C)   Resp 12   Ht 1.6 m (5' 3\")   Wt 124 kg (273 lb 5.9 oz)   LMP 09/09/2023 (Exact Date)   SpO2 98%   BMI 48.43 kg/m                PHYSICAL EXAM:  Alert, awake  Vitals tabulated above, reviewed  Neck supple without lymphadenopathy  Sclera normal color, not injected  CARDIOVASCULAR regular rate and rhythm, no murmur  Lungs CLEAR TO AUSCULTATION   Abdomen soft, NT/ND, absent HEPATOSPLENOMEGALY  Skin normal  Joints normal  Neurologic exam non focal  Rash R torso is improved    Antibiotics:  See MAR,multiple   Pertinent labs:  Lab Results   Component Value Date    WBC 14.5 10/23/2023    WBC 5.5 11/17/2020     Lab Results   Component Value Date    RBC 3.16 10/23/2023    RBC 4.13 11/17/2020     Lab Results   Component Value Date    HGB 9.2 10/23/2023    HGB 12.3 11/17/2020     Lab Results   Component Value Date    HCT 30.9 10/23/2023    HCT 37.4 11/17/2020     No components found for: \"MCT\"  Lab Results   Component Value Date    MCV 98 10/23/2023    MCV 91 11/17/2020     Lab Results   Component Value Date    MCH 29.1 10/23/2023    MCH 29.8 11/17/2020     Lab Results   Component Value Date    MCHC 29.8 10/23/2023    MCHC 32.9 11/17/2020     Lab Results   Component Value Date    RDW 14.9 10/23/2023    RDW 12.7 11/17/2020     Lab Results   Component Value Date     10/23/2023     11/17/2020       Last Comprehensive Metabolic Panel:  Sodium   Date Value Ref Range Status   10/23/2023 149 (H) 135 - 145 mmol/L Final     Comment:     Reference intervals for this test were updated on 09/26/2023 to more accurately reflect our healthy population. There may be differences in the flagging of prior results with similar values performed with this method. " Interpretation of those prior results can be made in the context of the updated reference intervals.    10/23/2023 149 (H) 135 - 145 mmol/L Final     Comment:     Reference intervals for this test were updated on 09/26/2023 to more accurately reflect our healthy population. There may be differences in the flagging of prior results with similar values performed with this method. Interpretation of those prior results can be made in the context of the updated reference intervals.    11/17/2020 141 133 - 144 mmol/L Final     Potassium   Date Value Ref Range Status   10/23/2023 3.7 3.4 - 5.3 mmol/L Final   05/17/2022 4.5 3.4 - 5.3 mmol/L Final   11/17/2020 4.1 3.4 - 5.3 mmol/L Final     Chloride   Date Value Ref Range Status   10/23/2023 111 (H) 98 - 107 mmol/L Final   05/17/2022 102 94 - 109 mmol/L Final   11/17/2020 109 94 - 109 mmol/L Final     Carbon Dioxide   Date Value Ref Range Status   11/17/2020 31 20 - 32 mmol/L Final     Carbon Dioxide (CO2)   Date Value Ref Range Status   10/23/2023 24 22 - 29 mmol/L Final   05/17/2022 32 20 - 32 mmol/L Final     Anion Gap   Date Value Ref Range Status   10/23/2023 14 7 - 15 mmol/L Final   05/17/2022 2 (L) 3 - 14 mmol/L Final   11/17/2020 1 (L) 3 - 14 mmol/L Final     Glucose   Date Value Ref Range Status   10/23/2023 126 (H) 70 - 99 mg/dL Final   05/17/2022 110 (H) 70 - 99 mg/dL Final   11/17/2020 84 70 - 99 mg/dL Final     Comment:     Fasting specimen     GLUCOSE BY METER POCT   Date Value Ref Range Status   10/23/2023 131 (H) 70 - 99 mg/dL Final     Urea Nitrogen   Date Value Ref Range Status   10/23/2023 104.2 (H) 6.0 - 20.0 mg/dL Final   05/17/2022 16 7 - 30 mg/dL Final   11/17/2020 9 7 - 30 mg/dL Final     Creatinine   Date Value Ref Range Status   10/23/2023 2.65 (H) 0.51 - 0.95 mg/dL Final   11/17/2020 0.79 0.52 - 1.04 mg/dL Final     GFR Estimate   Date Value Ref Range Status   10/23/2023 21 (L) >60 mL/min/1.73m2 Final   11/17/2020 88 >60 mL/min/[1.73_m2] Final      "Comment:     Non  GFR Calc  Starting 12/18/2018, serum creatinine based estimated GFR (eGFR) will be   calculated using the Chronic Kidney Disease Epidemiology Collaboration   (CKD-EPI) equation.       Calcium   Date Value Ref Range Status   10/23/2023 8.7 8.6 - 10.0 mg/dL Final   11/17/2020 8.9 8.5 - 10.1 mg/dL Final       Liver Function Studies -   Recent Labs   Lab Test 10/23/23  0530   PROTTOTAL 7.1   ALBUMIN 3.2*   BILITOTAL 0.3   ALKPHOS 96   AST 19   ALT 26       No results found for: \"SED\"    No results found for: \"CRP\"            MICROBIOLOGY DATA:        IMAGING/RADIOLOGY:          ASSESSMENT:  Post gastric surgery  C/b HCAP,sepsis due to peritonitis post washout  Probably does not have TSS or TEN or DRESS based on totality of evidence    RECOMMENDATION:  Same abx  Chore now will be tapering these down over time.  CLEM Wilkerson MD  Office [phone] option 2 to desk staff  "

## 2023-10-23 NOTE — PLAN OF CARE
[facility] - ICU    RN Progress Note:            Pertinent Assessments:      Please refer to flowsheet rows for full assessment     - RASS of -3 to -4 or +1. Challenge to meet RASS goal due to agitation/anxiety attack. Titrate propofol drip and fentanyl drip as able.        Key Events - This Shift:     - SBP >180. PRN hyrdalazine 20mg given and not effective. PRN labatalol 10mg given and effective. SBP of 140's to 150's now.  - NG tube measure at 70. NG tube on clamped. Ok to give meds per M.D. Tube feeding off.   - Fi02 of 30%. PEEP of 8. Minimal secretion from ET tube.  - PRN benadryl given x1 for rash and effective.         Barriers to Discharge / Downgrade:     - remains intubated/sedated. ICU level of cares.

## 2023-10-23 NOTE — PHARMACY-VANCOMYCIN DOSING SERVICE
Pharmacy Vancomycin Note  Date of Service 2023  Patient's  1970   53 year old, female    Indication: Sepsis  Day of Therapy: 2  Current vancomycin regimen:  Intermittent dosing  Current vancomycin monitoring method: Trough (Method 2 = manual dose calculation)  Current vancomycin therapeutic monitoring goal: 15-20 mg/L      Current estimated CrCl = Estimated Creatinine Clearance: 31.4 mL/min (A) (based on SCr of 2.65 mg/dL (H)).    Creatinine for last 3 days  10/21/2023:  3:41 AM Creatinine 2.84 mg/dL  10/22/2023:  5:20 AM Creatinine 2.76 mg/dL  10/23/2023:  5:30 AM Creatinine 2.65 mg/dL    Recent Vancomycin Levels (past 3 days)  No results found for requested labs within last 3 days.    Vancomycin IV Administrations (past 72 hours)                     vancomycin (VANCOCIN) 2,500 mg in sodium chloride 0.9 % 500 mL intermittent infusion (mg) 2,500 mg New Bag 10/22/23 0937                    Nephrotoxins and other renal medications (From now, onward)      Start     Dose/Rate Route Frequency Ordered Stop    10/24/23 1000  vancomycin (VANCOCIN) 1,250 mg in sodium chloride 0.9 % 250 mL intermittent infusion         1,250 mg  over 90 Minutes Intravenous EVERY 48 HOURS 10/23/23 1006      10/22/23 2100  bumetanide (BUMEX) injection 2 mg         2 mg Intravenous EVERY 12 HOURS 10/22/23 0927      10/12/23 1630  norepinephrine (LEVOPHED) 4 mg in  mL infusion PREMIX         0.01-0.6 mcg/kg/min × 130.7 kg  4.9-294.1 mL/hr  Intravenous CONTINUOUS 10/12/23 1614                 Contrast Orders - past 72 hours (72h ago, onward)      None              Has serum creatinine changed greater than 50% in last 72 hours: No    Urine output:  good urine output    Renal Function: Stable    InsightRX Prediction of Planned New Vancomycin Regimen    Regimen: 1250 mg IV every 48 hours.  Start time: 09:57 on 10/23/2023  Exposure target: AUC24 (range)400-600 mg/L.hr   AUC24,ss: 507 mg/L.hr  Probability of AUC24 > 400: 69  %  Ctrough,ss: 12.9 mg/L  Probability of Ctrough,ss > 20: 30 %  Probability of nephrotoxicity (Lodise FABIENNE 2009): 8 %      Plan:  Change from intermittent dosing to vancomycin 1250 mg q48h based on insights prediction. Renal function is stable/slightly improving and has good documented urine output .   Vancomycin monitoring method: AUC  Vancomycin therapeutic monitoring goal: 400-600 mg*h/L  Pharmacy will check vancomycin levels with am labs to ensure accurate prediction.  Serum creatinine levels will be ordered daily for the first week of therapy and at least twice weekly for subsequent weeks.    Blaire Gray, [facility]

## 2023-10-24 ENCOUNTER — APPOINTMENT (OUTPATIENT)
Dept: CT IMAGING | Facility: HOSPITAL | Age: 53
End: 2023-10-24
Payer: COMMERCIAL

## 2023-10-24 ENCOUNTER — APPOINTMENT (OUTPATIENT)
Dept: RADIOLOGY | Facility: HOSPITAL | Age: 53
End: 2023-10-24
Attending: NURSE PRACTITIONER
Payer: COMMERCIAL

## 2023-10-24 ENCOUNTER — APPOINTMENT (OUTPATIENT)
Dept: ULTRASOUND IMAGING | Facility: HOSPITAL | Age: 53
End: 2023-10-24
Attending: NURSE PRACTITIONER
Payer: COMMERCIAL

## 2023-10-24 LAB
1,3 BETA GLUCAN SER-MCNC: 403 PG/ML
AMYLASE BODY FLUID SOURCE: NORMAL
AMYLASE FLD-CCNC: 23 U/L
ANION GAP SERPL CALCULATED.3IONS-SCNC: 13 MMOL/L (ref 7–15)
BACTERIA ASPIRATE CULT: NO GROWTH
BUN SERPL-MCNC: 92.2 MG/DL (ref 6–20)
CALCIUM SERPL-MCNC: 8.7 MG/DL (ref 8.6–10)
CHLORIDE SERPL-SCNC: 107 MMOL/L (ref 98–107)
CREAT SERPL-MCNC: 2.52 MG/DL (ref 0.51–0.95)
DEPRECATED HCO3 PLAS-SCNC: 24 MMOL/L (ref 22–29)
EGFRCR SERPLBLD CKD-EPI 2021: 22 ML/MIN/1.73M2
ERYTHROCYTE [DISTWIDTH] IN BLOOD BY AUTOMATED COUNT: 14.7 % (ref 10–15)
GLUCOSE BLDC GLUCOMTR-MCNC: 112 MG/DL (ref 70–99)
GLUCOSE BLDC GLUCOMTR-MCNC: 128 MG/DL (ref 70–99)
GLUCOSE BLDC GLUCOMTR-MCNC: 132 MG/DL (ref 70–99)
GLUCOSE BLDC GLUCOMTR-MCNC: 137 MG/DL (ref 70–99)
GLUCOSE BLDC GLUCOMTR-MCNC: 139 MG/DL (ref 70–99)
GLUCOSE BLDC GLUCOMTR-MCNC: 153 MG/DL (ref 70–99)
GLUCOSE BLDC GLUCOMTR-MCNC: 154 MG/DL (ref 70–99)
GLUCOSE BLDC GLUCOMTR-MCNC: 159 MG/DL (ref 70–99)
GLUCOSE BLDC GLUCOMTR-MCNC: 161 MG/DL (ref 70–99)
GLUCOSE BLDC GLUCOMTR-MCNC: 172 MG/DL (ref 70–99)
GLUCOSE BLDC GLUCOMTR-MCNC: 173 MG/DL (ref 70–99)
GLUCOSE BODY FLUID SOURCE: NORMAL
GLUCOSE FLD-MCNC: 130 MG/DL
GLUCOSE SERPL-MCNC: 119 MG/DL (ref 70–99)
GRAM STAIN RESULT: NORMAL
GRAM STAIN RESULT: NORMAL
HCT VFR BLD AUTO: 27.9 % (ref 35–47)
HGB BLD-MCNC: 8.5 G/DL (ref 11.7–15.7)
LD BODY BODY FLUID SOURCE: NORMAL
LDH FLD L TO P-CCNC: 389 U/L
LDH SERPL L TO P-CCNC: 257 U/L (ref 0–250)
MCH RBC QN AUTO: 29.3 PG (ref 26.5–33)
MCHC RBC AUTO-ENTMCNC: 30.5 G/DL (ref 31.5–36.5)
MCV RBC AUTO: 96 FL (ref 78–100)
OBSERVATION IMP: POSITIVE
PATH REPORT.COMMENTS IMP SPEC: NORMAL
PATH REPORT.FINAL DX SPEC: NORMAL
PATH REPORT.GROSS SPEC: NORMAL
PATH REPORT.MICROSCOPIC SPEC OTHER STN: NORMAL
PATH REPORT.RELEVANT HX SPEC: NORMAL
PLATELET # BLD AUTO: 358 10E3/UL (ref 150–450)
POTASSIUM SERPL-SCNC: 3.8 MMOL/L (ref 3.4–5.3)
PROT FLD-MCNC: 3.8 G/DL
PROT SERPL-MCNC: 6.4 G/DL (ref 6.4–8.3)
PROTEIN BODY FLUID SOURCE: NORMAL
RBC # BLD AUTO: 2.9 10E6/UL (ref 3.8–5.2)
SODIUM SERPL-SCNC: 142 MMOL/L (ref 135–145)
SODIUM SERPL-SCNC: 144 MMOL/L (ref 135–145)
SODIUM SERPL-SCNC: 144 MMOL/L (ref 135–145)
VANCOMYCIN SERPL-MCNC: 13.9 UG/ML
WBC # BLD AUTO: 12.3 10E3/UL (ref 4–11)

## 2023-10-24 PROCEDURE — 250N000009 HC RX 250: Performed by: SURGERY

## 2023-10-24 PROCEDURE — 94640 AIRWAY INHALATION TREATMENT: CPT

## 2023-10-24 PROCEDURE — 258N000003 HC RX IP 258 OP 636: Performed by: SURGERY

## 2023-10-24 PROCEDURE — 94003 VENT MGMT INPAT SUBQ DAY: CPT

## 2023-10-24 PROCEDURE — 87070 CULTURE OTHR SPECIMN AEROBIC: CPT | Performed by: NURSE PRACTITIONER

## 2023-10-24 PROCEDURE — 87077 CULTURE AEROBIC IDENTIFY: CPT | Performed by: RADIOLOGY

## 2023-10-24 PROCEDURE — 250N000012 HC RX MED GY IP 250 OP 636 PS 637: Performed by: STUDENT IN AN ORGANIZED HEALTH CARE EDUCATION/TRAINING PROGRAM

## 2023-10-24 PROCEDURE — 999N000157 HC STATISTIC RCP TIME EA 10 MIN

## 2023-10-24 PROCEDURE — 74340 X-RAY GUIDE FOR GI TUBE: CPT

## 2023-10-24 PROCEDURE — 94640 AIRWAY INHALATION TREATMENT: CPT | Mod: 76

## 2023-10-24 PROCEDURE — 250N000011 HC RX IP 250 OP 636: Mod: JZ | Performed by: NURSE PRACTITIONER

## 2023-10-24 PROCEDURE — 99232 SBSQ HOSP IP/OBS MODERATE 35: CPT | Performed by: INTERNAL MEDICINE

## 2023-10-24 PROCEDURE — 83880 ASSAY OF NATRIURETIC PEPTIDE: CPT | Performed by: STUDENT IN AN ORGANIZED HEALTH CARE EDUCATION/TRAINING PROGRAM

## 2023-10-24 PROCEDURE — 250N000011 HC RX IP 250 OP 636: Performed by: SURGERY

## 2023-10-24 PROCEDURE — 0W993ZZ DRAINAGE OF RIGHT PLEURAL CAVITY, PERCUTANEOUS APPROACH: ICD-10-PCS | Performed by: RADIOLOGY

## 2023-10-24 PROCEDURE — 84295 ASSAY OF SERUM SODIUM: CPT | Performed by: STUDENT IN AN ORGANIZED HEALTH CARE EDUCATION/TRAINING PROGRAM

## 2023-10-24 PROCEDURE — 250N000009 HC RX 250: Performed by: INTERNAL MEDICINE

## 2023-10-24 PROCEDURE — 272N000710 US THORACENTESIS

## 2023-10-24 PROCEDURE — 999N000185 HC STATISTIC TRANSPORT TIME EA 15 MIN

## 2023-10-24 PROCEDURE — C9113 INJ PANTOPRAZOLE SODIUM, VIA: HCPCS | Mod: JZ | Performed by: INTERNAL MEDICINE

## 2023-10-24 PROCEDURE — 36415 COLL VENOUS BLD VENIPUNCTURE: CPT | Performed by: INTERNAL MEDICINE

## 2023-10-24 PROCEDURE — 83615 LACTATE (LD) (LDH) ENZYME: CPT | Performed by: STUDENT IN AN ORGANIZED HEALTH CARE EDUCATION/TRAINING PROGRAM

## 2023-10-24 PROCEDURE — 84999 UNLISTED CHEMISTRY PROCEDURE: CPT | Performed by: STUDENT IN AN ORGANIZED HEALTH CARE EDUCATION/TRAINING PROGRAM

## 2023-10-24 PROCEDURE — 258N000003 HC RX IP 258 OP 636: Performed by: NURSE PRACTITIONER

## 2023-10-24 PROCEDURE — 82945 GLUCOSE OTHER FLUID: CPT | Performed by: NURSE PRACTITIONER

## 2023-10-24 PROCEDURE — 999N000254 HC STATISTIC VENTILATOR TRANSFER

## 2023-10-24 PROCEDURE — 82150 ASSAY OF AMYLASE: CPT | Performed by: NURSE PRACTITIONER

## 2023-10-24 PROCEDURE — 200N000001 HC R&B ICU

## 2023-10-24 PROCEDURE — 250N000013 HC RX MED GY IP 250 OP 250 PS 637: Performed by: NURSE PRACTITIONER

## 2023-10-24 PROCEDURE — 258N000003 HC RX IP 258 OP 636: Performed by: STUDENT IN AN ORGANIZED HEALTH CARE EDUCATION/TRAINING PROGRAM

## 2023-10-24 PROCEDURE — 999N000009 HC STATISTIC AIRWAY CARE

## 2023-10-24 PROCEDURE — 85027 COMPLETE CBC AUTOMATED: CPT | Performed by: STUDENT IN AN ORGANIZED HEALTH CARE EDUCATION/TRAINING PROGRAM

## 2023-10-24 PROCEDURE — 0DP0XUZ REMOVAL OF FEEDING DEVICE FROM UPPER INTESTINAL TRACT, EXTERNAL APPROACH: ICD-10-PCS | Performed by: RADIOLOGY

## 2023-10-24 PROCEDURE — 84155 ASSAY OF PROTEIN SERUM: CPT | Performed by: STUDENT IN AN ORGANIZED HEALTH CARE EDUCATION/TRAINING PROGRAM

## 2023-10-24 PROCEDURE — 0DH68UZ INSERTION OF FEEDING DEVICE INTO STOMACH, VIA NATURAL OR ARTIFICIAL OPENING ENDOSCOPIC: ICD-10-PCS | Performed by: RADIOLOGY

## 2023-10-24 PROCEDURE — 84157 ASSAY OF PROTEIN OTHER: CPT | Performed by: NURSE PRACTITIONER

## 2023-10-24 PROCEDURE — 272N000431 CT ABDOMEN PERITONEUM ABSCESS ASPIRATION

## 2023-10-24 PROCEDURE — 87205 SMEAR GRAM STAIN: CPT | Performed by: NURSE PRACTITIONER

## 2023-10-24 PROCEDURE — 250N000011 HC RX IP 250 OP 636: Performed by: STUDENT IN AN ORGANIZED HEALTH CARE EDUCATION/TRAINING PROGRAM

## 2023-10-24 PROCEDURE — 83615 LACTATE (LD) (LDH) ENZYME: CPT | Performed by: NURSE PRACTITIONER

## 2023-10-24 PROCEDURE — 99233 SBSQ HOSP IP/OBS HIGH 50: CPT | Performed by: INTERNAL MEDICINE

## 2023-10-24 PROCEDURE — 250N000011 HC RX IP 250 OP 636: Mod: JZ | Performed by: INTERNAL MEDICINE

## 2023-10-24 PROCEDURE — 80202 ASSAY OF VANCOMYCIN: CPT | Performed by: INTERNAL MEDICINE

## 2023-10-24 PROCEDURE — 87075 CULTR BACTERIA EXCEPT BLOOD: CPT | Performed by: NURSE PRACTITIONER

## 2023-10-24 PROCEDURE — 84295 ASSAY OF SERUM SODIUM: CPT | Performed by: INTERNAL MEDICINE

## 2023-10-24 PROCEDURE — 32555 ASPIRATE PLEURA W/ IMAGING: CPT

## 2023-10-24 PROCEDURE — 250N000013 HC RX MED GY IP 250 OP 250 PS 637: Performed by: INTERNAL MEDICINE

## 2023-10-24 PROCEDURE — 999N000253 HC STATISTIC WEANING TRIALS

## 2023-10-24 RX ORDER — HYDRALAZINE HYDROCHLORIDE 10 MG/1
10 TABLET, FILM COATED ORAL 3 TIMES DAILY
Status: DISCONTINUED | OUTPATIENT
Start: 2023-10-24 | End: 2023-10-27

## 2023-10-24 RX ORDER — AMIODARONE HYDROCHLORIDE 200 MG/1
200 TABLET ORAL DAILY
Status: DISCONTINUED | OUTPATIENT
Start: 2023-10-25 | End: 2023-10-26

## 2023-10-24 RX ORDER — DEXTROSE MONOHYDRATE 25 G/50ML
25-50 INJECTION, SOLUTION INTRAVENOUS
Status: DISCONTINUED | OUTPATIENT
Start: 2023-10-24 | End: 2023-11-21 | Stop reason: HOSPADM

## 2023-10-24 RX ORDER — NICOTINE POLACRILEX 4 MG
15-30 LOZENGE BUCCAL
Status: DISCONTINUED | OUTPATIENT
Start: 2023-10-24 | End: 2023-11-21 | Stop reason: HOSPADM

## 2023-10-24 RX ORDER — METOPROLOL TARTRATE 1 MG/ML
2.5 INJECTION, SOLUTION INTRAVENOUS ONCE
Status: COMPLETED | OUTPATIENT
Start: 2023-10-24 | End: 2023-10-24

## 2023-10-24 RX ORDER — DEXTROSE MONOHYDRATE 100 MG/ML
INJECTION, SOLUTION INTRAVENOUS CONTINUOUS PRN
Status: DISCONTINUED | OUTPATIENT
Start: 2023-10-24 | End: 2023-10-28 | Stop reason: ALTCHOICE

## 2023-10-24 RX ORDER — METOPROLOL TARTRATE 25 MG/1
25 TABLET, FILM COATED ORAL 2 TIMES DAILY
Status: DISCONTINUED | OUTPATIENT
Start: 2023-10-24 | End: 2023-10-25

## 2023-10-24 RX ADMIN — MEROPENEM 1 G: 1 INJECTION, POWDER, FOR SOLUTION INTRAVENOUS at 08:38

## 2023-10-24 RX ADMIN — HEPARIN SODIUM 5000 UNITS: 5000 INJECTION, SOLUTION INTRAVENOUS; SUBCUTANEOUS at 21:26

## 2023-10-24 RX ADMIN — ACETAMINOPHEN 650 MG: 160 SOLUTION ORAL at 04:06

## 2023-10-24 RX ADMIN — MAGNESIUM SULFATE HEPTAHYDRATE: 500 INJECTION, SOLUTION INTRAMUSCULAR; INTRAVENOUS at 20:31

## 2023-10-24 RX ADMIN — PROPOFOL 45 MCG/KG/MIN: 10 INJECTION, EMULSION INTRAVENOUS at 03:31

## 2023-10-24 RX ADMIN — PROPOFOL 40 MCG/KG/MIN: 10 INJECTION, EMULSION INTRAVENOUS at 12:17

## 2023-10-24 RX ADMIN — QUETIAPINE FUMARATE 100 MG: 25 TABLET ORAL at 20:57

## 2023-10-24 RX ADMIN — AMIODARONE HYDROCHLORIDE 200 MG: 200 TABLET ORAL at 08:06

## 2023-10-24 RX ADMIN — INSULIN HUMAN 3 UNITS/HR: 1 INJECTION, SOLUTION INTRAVENOUS at 00:53

## 2023-10-24 RX ADMIN — Medication 150 MCG/HR: at 13:24

## 2023-10-24 RX ADMIN — ACETAMINOPHEN 650 MG: 160 SOLUTION ORAL at 08:03

## 2023-10-24 RX ADMIN — IPRATROPIUM BROMIDE AND ALBUTEROL SULFATE 3 ML: .5; 3 SOLUTION RESPIRATORY (INHALATION) at 11:32

## 2023-10-24 RX ADMIN — HYDRALAZINE HYDROCHLORIDE 10 MG: 20 INJECTION INTRAMUSCULAR; INTRAVENOUS at 08:36

## 2023-10-24 RX ADMIN — PROPOFOL 45 MCG/KG/MIN: 10 INJECTION, EMULSION INTRAVENOUS at 00:55

## 2023-10-24 RX ADMIN — DEXTROSE MONOHYDRATE: 50 INJECTION, SOLUTION INTRAVENOUS at 04:22

## 2023-10-24 RX ADMIN — MICAFUNGIN SODIUM 100 MG: 50 INJECTION, POWDER, LYOPHILIZED, FOR SOLUTION INTRAVENOUS at 13:56

## 2023-10-24 RX ADMIN — IPRATROPIUM BROMIDE AND ALBUTEROL SULFATE 3 ML: .5; 3 SOLUTION RESPIRATORY (INHALATION) at 07:56

## 2023-10-24 RX ADMIN — IPRATROPIUM BROMIDE AND ALBUTEROL SULFATE 3 ML: .5; 3 SOLUTION RESPIRATORY (INHALATION) at 19:43

## 2023-10-24 RX ADMIN — METOPROLOL TARTRATE 25 MG: 25 TABLET, FILM COATED ORAL at 20:57

## 2023-10-24 RX ADMIN — IPRATROPIUM BROMIDE AND ALBUTEROL SULFATE 3 ML: .5; 3 SOLUTION RESPIRATORY (INHALATION) at 16:38

## 2023-10-24 RX ADMIN — DEXMEDETOMIDINE HYDROCHLORIDE 0.2 MCG/KG/HR: 4 INJECTION, SOLUTION INTRAVENOUS at 13:26

## 2023-10-24 RX ADMIN — Medication 50 MCG: at 20:42

## 2023-10-24 RX ADMIN — DEXMEDETOMIDINE HYDROCHLORIDE 1.2 MCG/KG/HR: 4 INJECTION, SOLUTION INTRAVENOUS at 16:42

## 2023-10-24 RX ADMIN — HYDRALAZINE HYDROCHLORIDE 10 MG: 20 INJECTION INTRAMUSCULAR; INTRAVENOUS at 08:03

## 2023-10-24 RX ADMIN — ACETAMINOPHEN 650 MG: 160 SOLUTION ORAL at 21:13

## 2023-10-24 RX ADMIN — CHLORHEXIDINE GLUCONATE 15 ML: 1.2 RINSE ORAL at 20:57

## 2023-10-24 RX ADMIN — PROPOFOL 40 MCG/KG/MIN: 10 INJECTION, EMULSION INTRAVENOUS at 06:17

## 2023-10-24 RX ADMIN — DEXMEDETOMIDINE HYDROCHLORIDE 1.2 MCG/KG/HR: 4 INJECTION, SOLUTION INTRAVENOUS at 18:49

## 2023-10-24 RX ADMIN — INSULIN ASPART 2 UNITS: 100 INJECTION, SOLUTION INTRAVENOUS; SUBCUTANEOUS at 16:18

## 2023-10-24 RX ADMIN — HYDRALAZINE HYDROCHLORIDE 10 MG: 10 TABLET ORAL at 13:59

## 2023-10-24 RX ADMIN — QUETIAPINE FUMARATE 100 MG: 25 TABLET ORAL at 08:06

## 2023-10-24 RX ADMIN — PANTOPRAZOLE SODIUM 40 MG: 40 INJECTION, POWDER, FOR SOLUTION INTRAVENOUS at 06:36

## 2023-10-24 RX ADMIN — BUMETANIDE 2 MG: 0.25 INJECTION INTRAMUSCULAR; INTRAVENOUS at 08:07

## 2023-10-24 RX ADMIN — ACETAMINOPHEN 650 MG: 160 SOLUTION ORAL at 13:59

## 2023-10-24 RX ADMIN — BUMETANIDE 2 MG: 0.25 INJECTION INTRAMUSCULAR; INTRAVENOUS at 20:57

## 2023-10-24 RX ADMIN — METOPROLOL TARTRATE 2.5 MG: 5 INJECTION INTRAVENOUS at 09:11

## 2023-10-24 RX ADMIN — CHLORHEXIDINE GLUCONATE 15 ML: 1.2 RINSE ORAL at 08:07

## 2023-10-24 RX ADMIN — METOPROLOL TARTRATE 25 MG: 25 TABLET, FILM COATED ORAL at 12:21

## 2023-10-24 RX ADMIN — NITROGLYCERIN 1 PATCH: 0.4 PATCH TRANSDERMAL at 08:42

## 2023-10-24 RX ADMIN — Medication 50 MCG: at 10:07

## 2023-10-24 RX ADMIN — INSULIN ASPART 2 UNITS: 100 INJECTION, SOLUTION INTRAVENOUS; SUBCUTANEOUS at 11:54

## 2023-10-24 RX ADMIN — CLINDAMYCIN PHOSPHATE 900 MG: 900 INJECTION, SOLUTION INTRAVENOUS at 01:49

## 2023-10-24 RX ADMIN — Medication 50 MCG: at 09:51

## 2023-10-24 RX ADMIN — CLINDAMYCIN PHOSPHATE 900 MG: 900 INJECTION, SOLUTION INTRAVENOUS at 11:15

## 2023-10-24 RX ADMIN — MEROPENEM 1 G: 1 INJECTION, POWDER, FOR SOLUTION INTRAVENOUS at 20:57

## 2023-10-24 RX ADMIN — HYDRALAZINE HYDROCHLORIDE 10 MG: 10 TABLET ORAL at 21:14

## 2023-10-24 RX ADMIN — INSULIN GLARGINE 10 UNITS: 100 INJECTION, SOLUTION SUBCUTANEOUS at 20:55

## 2023-10-24 RX ADMIN — DIATRIZOATE MEGLUMINE AND DIATRIZOATE SODIUM 1 ML: 660; 100 SOLUTION ORAL; RECTAL at 10:51

## 2023-10-24 RX ADMIN — DEXMEDETOMIDINE HYDROCHLORIDE 1.2 MCG/KG/HR: 4 INJECTION, SOLUTION INTRAVENOUS at 21:34

## 2023-10-24 RX ADMIN — INSULIN ASPART 1 UNITS: 100 INJECTION, SOLUTION INTRAVENOUS; SUBCUTANEOUS at 20:54

## 2023-10-24 RX ADMIN — VANCOMYCIN HYDROCHLORIDE 1500 MG: 5 INJECTION, POWDER, LYOPHILIZED, FOR SOLUTION INTRAVENOUS at 12:18

## 2023-10-24 RX ADMIN — HEPARIN SODIUM 5000 UNITS: 5000 INJECTION, SOLUTION INTRAVENOUS; SUBCUTANEOUS at 14:04

## 2023-10-24 ASSESSMENT — ACTIVITIES OF DAILY LIVING (ADL)
ADLS_ACUITY_SCORE: 31

## 2023-10-24 NOTE — PROGRESS NOTES
General Surgery Progress Note    POST OP DAY  # S/P Lap Sleeve with Single Anastomosis DS 10/9/23 and then a Re-Operation on 10/12/2023 where it was found that she had 2 small enterotomies in the small bowel, likely form the traction of making the Duodenal anastomosis.  These were oversewn and the pt has had an NGT and drains since.    She was extubated Saturday 10/21 but then required reintubation overnight.  She had a Bronchoscopy yesterday    Today she had a Thoracentesis of the R chest and 400 ml of clear fluid was aspirated   Her pelvic fluid collection was aspirated and 75 ml of yellow fluid was aspirated    Her heart rate was in the 80's this morning and now she is tachy at 120.      Subjective:   Pt is intubated and sedated.     Vitals:    10/24/23 1100 10/24/23 1132 10/24/23 1145 10/24/23 1200   BP: (!) 173/78 (!) 157/74 (!) 162/77 (!) 150/67   BP Location:       Pulse: 120 120 (!) 122 (!) 123   Resp: 21 20 21   Temp:   99.5  F (37.5  C) 99.7  F (37.6  C)   TempSrc:       SpO2: 95% 95% 95% 95%   Weight:       Height:         I's  1397  O's 1525    Net - 127    Physical Exam:  Lungs:  CTA, bronchial breath sounds in the Right Lower Lung Fields.  CV:       RRR  Ab:       Soft, + BS, Staples in place;  Passing stool    Recent Labs   Lab 10/24/23  0333   WBC 12.3*   HGB 8.5*   HCT 27.9*          Component  Ref Range & Units  3:33 AM  (10/24/23)  3:33 AM  (10/24/23) 1 d ago  (10/23/23) 1 d ago  (10/23/23) 1 d ago  (10/23/23) 1 d ago  (10/23/23) 2 d ago  (10/22/23)     Sodium  135 - 145 mmol/L 144 144  High   High   High   High   High  CM   Comment: Reference intervals for this test were updated on 09/26/2023 to more accurately reflect our healthy population. There may be differences in the flagging of prior results with similar values performed with this method. Interpretation of those prior results can be made in the context of the updated reference intervals.     Potassium  3.4 - 5.3 mmol/L 3.8    3.7      Chloride  98 - 107 mmol/L 107    111 High       Carbon Dioxide (CO2)  22 - 29 mmol/L 24    24      Anion Gap  7 - 15 mmol/L 13    14      Urea Nitrogen  6.0 - 20.0 mg/dL 92.2 High     104.2 High       Creatinine  0.51 - 0.95 mg/dL 2.52 High     2.65 High       GFR Estimate  >60 mL/min/1.73m2 22 Low     21 Low       Calcium  8.6 - 10.0 mg/dL 8.7    8.7      Glucose  70 - 99 mg/dL 119 High     126 High      Resulting Agency [facility]         Assessment:   WBC continues down.  The Right Chest and the intra-abdominal fluid collection has been tapped and will be looked at in Micro    Plan: Work with ID and the ICU staff to optimize medical therapy of her pneumonia.             Work to transition over to Parenteral Nutrition and get off the TPN.  We will discontinue the NGT and place a Dobb Chelsy Feeding tube under Fluoro Guidance..    Hoang Worthy MD  [facility]  [phone]

## 2023-10-24 NOTE — PROGRESS NOTES
RT PROGRESS NOTE    VENT DAY# Ventilation Day(s): 3    CURRENT SETTINGS:   Vent Mode: CMV/AC  (Continuous Mandatory Ventilation/ Assist Control)  FiO2 (%): 40 %  Resp Rate (Set): 20 breaths/min  Tidal Volume (Set, mL): 380 mL  PEEP (cm H2O): 8 cmH2O  Resp: 19      PATIENT PARAMETERS:  Ventilator - Patient   Patient Resp Rate : 21 breaths/min  Expiratory Vt (ml): 596  Minute Volume (ml): 8.15 L/min  Peak Inspiratory Pressure (cm H2O): 29 cmH2O  Mean Airway Pressure (cm H2O): 14 cmH2O  Plateau Pressure (cm H2O): 25 cmH2O  Dynamic Compliance (mL/cm H2O): 20.8 mL/cm H2O  Airway Resistance: 26  Auto/ Intrinsic PEEP (cm H2O): 7.3 cmH2O       ETT Cuffed Oral 7.5 mm-Secured at (cm): 19 cm  Emergency Ambu bag, mask and peep valve at bedside.     Respiratory Medications: Duoneb QID       NOTE / SHIFT SUMMARY:     Today's Changes      Patient remains on full vent support. No changes to vent settings. Patient does not have a cuff leak, MD notified and is aware.    Leigh Whittaker, RT

## 2023-10-24 NOTE — PROVIDER NOTIFICATION
Patient placed on wean per Physician at 1600 with settings of PEEP 8cmH2O/5 Pressure support. Tolerating well at this time.   10/24/23 1647   Ventilator - Patient    Patient Resp Rate  11 breaths/min   Expiratory Vt (ml) 659   Minute Volume (ml) 8 L/min   Peak Inspiratory Pressure (cm H2O) 20 cmH2O   Dynamic Compliance (mL/cm H2O) 66 mL/cm H2O   Airway Resistance 8

## 2023-10-24 NOTE — PROGRESS NOTES
Patient Name: Mojgan Jimenez  Medical Record Number: [id number]  Today's Date: 10/24/2023          Sedation medications administered:  Pt on continuous Fentanyl and Propofol .   100 mcg fentanyl bolus given during procedure  Sedation time: Continuous ICU sedation    Report given to: Phil NOGUEIRA       Other Notes: Pt completed Thoracentesis, abscess drainage and Post pyloric feeding tube placement.  Remained stable throughout.  Monitored throughout by RN x2.  Handoff given to Phil NOGUEIRA

## 2023-10-24 NOTE — PROVIDER NOTIFICATION
Physician placed patient on wean at about 1600 with settings of PEEP 8cmH2O/5 Pressure Support. Tolerating well at this time.

## 2023-10-24 NOTE — PLAN OF CARE
[facility] - ICU    RN Progress Note:    Over NOC, Pt remains on full vent support. SaO2 maintained within ordered parameters with 40 % FiO2 and 8 cmH2O of PEEP. Mechanical ventilation well tolerated with titration of sedation (Propofol, fentanyl) to RASS Goal - 2 to -3. Tylenol administration PRN s/t low grade fever with T-.6 degrees F. Blood pressure stable. CVP 11 - 15 mmHg. IV Bumex administered, as scheduled. Cr. 2.52 (2.65, day prior). Right abdominal juancho-hepatic drain remains in place, and to bulb suction. Drain produced 2.5 mL serous/yellow output. Juancho-hepatic drain flushed, as ordered. IV ABX (meropenem, clindamycin) administered, as scheduled. WBCs 12.3 (14.5, day prior). Pt remains NPO. TPN appears well tolerated. Blood glucose well managed with insulin drip. Na 144 (146, day prior) Bellow the knee sequential compression devices in place, bilaterally. Plan for ABD CT, and US Thoracentesis in am. Will continue to monitor. Cooper Parnell RN             Pertinent Assessments:      Please refer to flowsheet rows for full assessment     Vital signs.            Key Events - This Shift:       No overnight events.              Barriers to Discharge / Downgrade:     Ineffective breathing pattern/impaired gas exchange requiring mechanical ventilation.

## 2023-10-24 NOTE — PROCEDURES
[facility]    Procedure: Imaging Procedure Note    Date/Time: 10/24/2023 10:28 AM    Performed by: Cr Mena MD  Authorized by: Cr Mena MD      UNIVERSAL PROTOCOL   Site Marked: Yes  Prior Images Obtained and Reviewed:  Yes  Required items: Required blood products, implants, devices and special equipment available    Patient identity confirmed:  Verbally with patient  Patient was reevaluated immediately before administering moderate or deep sedation or anesthesia  Confirmation Checklist:  Patient's identity using two indicators, relevant allergies, procedure was appropriate and matched the consent or emergent situation and correct equipment/implants were available  Time out: Immediately prior to the procedure a time out was called    Universal Protocol: the Joint Commission Universal Protocol was followed    Preparation: Patient was prepped and draped in usual sterile fashion      SEDATION    Patient Sedated: No    See dictated procedure note for full details.    PROCEDURE  Describe Procedure: Right thoracentesis.  400ml clear yellow fluid.    Pelvic fluid aspiration.  75 ml yellow slightly degree filled but not yesica pus.  No drain placed.  Irrigated x4    Patient Tolerance:  Patient tolerated the procedure well with no immediate complications  Length of time physician/provider present for 1:1 monitoring during sedation: 0

## 2023-10-24 NOTE — PRE-PROCEDURE
GENERAL PRE-PROCEDURE:   Procedure:  Pelvic abscess drain with sedation  Date/Time:  10/24/2023 9:37 AM    Written consent obtained?: Yes    Risks and benefits: Risks, benefits and alternatives were discussed    Consent given by:  Patient  Patient states understanding of procedure being performed: Yes    Patient's understanding of procedure matches consent: Yes    Procedure consent matches procedure scheduled: Yes    Expected level of sedation:  Moderate  Appropriately NPO:  Yes  Mallampati  :  Grade 1- soft palate, uvula, tonsillar pillars, and posterior pharyngeal wall visible  Lungs:  Lungs clear with good breath sounds bilaterally  Heart:  Normal heart sounds and rate  History & Physical reviewed:  History and physical reviewed and no updates needed  Statement of review:  I have reviewed the lab findings, diagnostic data, medications, and the plan for sedation

## 2023-10-24 NOTE — PROGRESS NOTES
Imp/plan:  1.CM resolved, EF yesterday normal, tolerated iv dosing metoprolol,on hydralazine 10mg iv tid, NTG patch 0.4mg/24 hours and will add metoprolol tartrate 25mg bid and change hydralazine to 10mg po tid.    2. PVC - wean down on amiodarone 200mg daily,given will be starting metoprolol      Current Facility-Administered Medications   Medication    acetaminophen (TYLENOL) solution 650 mg    acetaminophen (TYLENOL) tablet 650 mg    Or    acetaminophen (TYLENOL) Suppository 650 mg    albuterol (PROVENTIL HFA/VENTOLIN HFA) inhaler    albuterol (PROVENTIL) neb solution 2.5 mg    amiodarone (PACERONE) tablet 200 mg    bumetanide (BUMEX) injection 2 mg    chlorhexidine (PERIDEX) 0.12 % solution 15 mL    clindamycin (CLEOCIN) 900 mg in 50 mL NS intermittent infusion    dexmedeTOMIDine (PRECEDEX) 4 mcg/mL in NS infusion    dextrose 10% infusion    dextrose 10% infusion    glucose gel 15-30 g    Or    dextrose 50 % injection 25-50 mL    Or    glucagon injection 1 mg    diphenhydrAMINE (BENADRYL) capsule 25 mg    Or    diphenhydrAMINE (BENADRYL) injection 25 mg    [Held by provider] sennosides (SENOKOT) syrup 5 mL    And    [Held by provider] docusate (COLACE) 50 MG/5ML liquid 50 mg    fentaNYL (SUBLIMAZE) 50 mcg/mL bolus from pump    fentaNYL (SUBLIMAZE) infusion    heparin ANTICOAGULANT injection 5,000 Units    hydrALAZINE (APRESOLINE) injection 10 mg    hydrALAZINE (APRESOLINE) injection 10 mg    insulin aspart (NovoLOG) injection (RAPID ACTING)    insulin regular (MYXREDLIN) 1 unit/mL infusion    ipratropium - albuterol 0.5 mg/2.5 mg/3 mL (DUONEB) neb solution 3 mL    lidocaine (LMX4) cream    lidocaine 1 % 0.1-1 mL    [Held by provider] lipids plant base (CLINOLIPID) 20 % infusion 250 mL    propofol (DIPRIVAN) infusion    And    propofol (DIPRIVAN) bolus from bag or syringe pump    And    Medication Instruction    menthol-zinc oxide (CALMOSEPTINE) 0.44-20.6 % ointment OINT    meropenem (MERREM) 1 g vial to attach  "to  mL bag    micafungin (MYCAMINE) 100 mg in sodium chloride 0.9 % 100 mL intermittent infusion    miconazole (MICATIN) 2 % powder    naloxone (NARCAN) injection 0.2 mg    Or    naloxone (NARCAN) injection 0.4 mg    Or    naloxone (NARCAN) injection 0.2 mg    Or    naloxone (NARCAN) injection 0.4 mg    nitroGLYcerin (NITRODUR) 0.4 MG/HR 24 hr patch 1 patch    norepinephrine (LEVOPHED) 4 mg in  mL infusion PREMIX    OLANZapine (zyPREXA) IV injection 2.5-5 mg    ondansetron (ZOFRAN ODT) ODT tab 4 mg    Or    ondansetron (ZOFRAN) injection 4 mg    pantoprazole (PROTONIX) 2 mg/mL suspension 40 mg    Or    pantoprazole (PROTONIX) IV push injection 40 mg    parenteral nutrition - ADULT compounded formula    parenteral nutrition - ADULT compounded formula    phenol (CHLORASEPTIC) 1.4 % spray 1-2 mL    [Held by provider] polyethylene glycol (MIRALAX) Packet 17 g    prochlorperazine (COMPAZINE) injection 10 mg    Or    prochlorperazine (COMPAZINE) tablet 10 mg    QUEtiapine (SEROquel) tablet 100 mg    sodium chloride (PF) 0.9% PF flush 3 mL    sodium chloride (PF) 0.9% PF flush 3 mL    vancomycin (VANCOCIN) 1,500 mg in sodium chloride 0.9 % 250 mL intermittent infusion     Past Medical History:   Diagnosis Date    LINA (generalized anxiety disorder) 11/02/2017    Hyperlipidemia LDL goal <160 01/20/2019    Major depressive disorder     Methanol abuse (H)     Mild persistent asthma without complication 11/02/2017    Obese     JARVIS on CPAP     Cpap not working    Paranoia (H)     resolved due to drugs    Vitamin D deficiency 11/02/2017        Review of Systems: 12 points negative other than above    BP (!) 173/78   Pulse 120   Temp 100  F (37.8  C)   Resp 21   Ht 1.6 m (5' 3\")   Wt 123.2 kg (271 lb 9.7 oz)   LMP 09/09/2023 (Exact Date)   SpO2 95%   BMI 48.11 kg/m    JVP<7cm, carotids normal  Lungs clear  Cor RRR no c,r,m  Abs soft +BS, no mass  Ext no c,c,e    Lab Results   Component Value Date    HGB 8.5 (L) " "10/24/2023     Lab Results   Component Value Date     10/24/2023     No results found for: \"CREATININE\"  No components found for: \"K\"          Yohan Srinivasan MD  Interventional Cardiology   [facility]  [phone]    "

## 2023-10-24 NOTE — PROGRESS NOTES
Patient transported to CT and X-Ray then back to  without incident. Patient transported on Portable Ventilator. Patient tolerated transport well with no adverse reaction. Patient placed back on ventilator upon arrival to ICU with settings of 20,380,8, 40% FiO2.

## 2023-10-24 NOTE — PROVIDER NOTIFICATION
10/24/23 0800   Ventilator - Patient    Patient Resp Rate  23 breaths/min   Expiratory Vt (ml) 382   Minute Volume (ml) 9 L/min   Peak Inspiratory Pressure (cm H2O) 28 cmH2O   Mean Airway Pressure (cm H2O) 16 cmH2O   Plateau Pressure (cm H2O) 26 cmH2O   Dynamic Compliance (mL/cm H2O) 25 mL/cm H2O   Airway Resistance 18   Auto/ Intrinsic PEEP (cm H2O)   (UNABLE TO OBTAIN)

## 2023-10-24 NOTE — PROGRESS NOTES
"Care Management Follow Up    Length of Stay (days): 15    Expected Discharge Date: 10/29/2023     Concerns to be Addressed:  Vent Day#3   Patient plan of care discussed at interdisciplinary rounds: Yes    Anticipated Discharge Disposition:       Anticipated Discharge Services:    Anticipated Discharge DME:      Patient/family educated on Medicare website which has current facility and service quality ratings:    Education Provided on the Discharge Plan:    Patient/Family in Agreement with the Plan:      Referrals Placed by CM/SW:    Private pay costs discussed: Not applicable    Additional Information:  Social History per previous CM note:  \"From home with adult daughter and grandchild in an apartment. Independent at baseline and drives. Patient currently unemployed; saved up for procedures, plans to resume work once recovered. No community programs. Has a neb machine available for use due to Asthma. Goal to return home with family support. Family to transport if able. \"      Chart reviewed and plan of care discussed in ICU rounds. Pt presented on 10/9/23 for scheduled lap sleeve gastrectomy. Post procedure with complicated by abd pain. A rapid response was called due to encephalopathy, and ARF. Pt was admitted to ICU and intubated and started on pressors on 10/12. Pt was then taken to OR. S/p closure of two small bowel enterotomies, drain placement and wash out. Extubated on 10/21 then re-intubated 10/22 due to resp failure. Pt now Vent Day#3. Plan for today is to get drains placed and discontinue insulin gtt.    RNCM to follow for medical progression, recommendations, and final discharge plan.      Rosario Shea RN      "

## 2023-10-24 NOTE — PROGRESS NOTES
[facility]  Critical Care Progress Note    Summary:   Mojgan Jimenez 54 yo F PMH obesity, severe JARVIS on CPAP, asthma (no PFTs), stimulant use disorder, stimulant induced psychosis, mood & anxiety disorders       Presented 10/9/23 for scheduled laparoscopic sleeve gastrectomy with single anastomosis duodenal switch. She was later found to have two small bowel injuries with peritonitis. 10/12/23 returned to OR for small bowel interotomy closure, clean-out, & drain placement; 10/19 found to have RUQ fluid collection s/p drain placement; 10/24 s/p pelvic fluid aspiration. Course complicated by encephalopathy, delirium, septic shock, suspected takotsubo syndrome, acute respiratory failure due to loss of protective airway reflexes & increased metabolic load requiring intubation (10/12-10/21; reintubated 10/21-current for respiratory acidosis), & oliguric SUSSY with renal recovery.     Interval Events:  No events overnight. Fever overnight. Ongoing moderate HTN. Good UO, net +365 yesterday. Minimal drain output. BMP improved renal indices. WC down to 12.3, waxing waning Hgb, plts stable. No new micro. 10/23 TTE improved ventricular function.     Drains placement today, stop D5FW infusion for hyper-Na, after procedures wean propofol & fentanyl, favor precedex & antipsychotics       Assessment & Plan:   Goals of Care:  Life prolonging without limits        Neurology, Psychiatry, Sedation, Analgesia:  Acute metabolic encephalopathy secondary to sepsis, shock, renal failure      ICU acquired delirium with agitation   - quetiapine 50mg bid   - olanzapine 2.5-5mg q8h prn    Sedation & Analgesia   - daily spontaneous awakening trials as able, RASS goal 0 to -1  - continue dexmedetomidine & antipsychotics as above   - wean fentanyl infusion   - wean propofol after procedures today       Cardiovascular:  Suspected takotsubo syndrome - resolved   1014/23 TTE demonstrated LVEF 30-35%, global hypokinesia. 10/23  TTE LVEF 60-65%, normal RV size & systolic function.   - cardiology was consulted   - cardiology considering afterload reduction options     Hypertension   - prn & scheduled hydralazine  - nitro patch q12h     Atrial fibrillation RVR  - amiodarone bid     Septic shock - resolved       Respiratory, Airway:  Acute hypoxemic-hypercapnic respiratory failure   Suspected ventilator associated pneumonia   10/22/23 CT demonstrated complete RLL atelectasis, milder RUL & RBML atelectasis or consolidation. 10/22 bronchoscopy moderate RLL blood tinged secretions cleared.   - supplemental O2 to maintain spO2 >= 90-96% & PaO2 >= 60 mmHg  - lung protective ventilation (Pplat <30, driving pressure <15)  - daily spontaneous breathing trials as able    R-pleural effusion   10/24 s/p thoracentesis, 400 mL   - pleural fluid studies pending      History of JARVIS, allusions to asthma in the chart  She does exhibit expiratory wheezing, obstruction on the flow-volume loop, minimally prolonged expiratory phase, no measurable resistive pressure during inspiratory pause   - NIPPV at extubation with sleep/naps   - continue duonebs qid   - consider steroid course     Gastrointestinal:  10/9 s/p sleeve gastrectomy complicated by small bowel injury & peritonitis   10/12 returned to OR for closure, clean-out, & drain placement (now removed)  10/19 s/p RUQ fluid collection drain placement   10/24 s/p pelvic fluid aspiration (75 ml)  - surgery consulted  - NPO, on TPN   - flush drain at regular intervals      Renal, Acid-base, Electrolytes, Volume:  Oliguric SUSSY - improved   - nephrology was consulted     Hypervolemia  - bumex 2mg qd     Hypernatremia   - FW D5 at 100 ml/h       Infectious Disease:  Small bowel injury with peritonitis s/p closure, clean-out, & drain placement   RUQ fluid collection s/p drain placement   Pelvic fluid collection s/p aspiration (75 ml)  - ID was consulted   - 10/12 - intra-operative cultures + Streptococcus anginosus,  mixed aerobic & anaerobic kamla   - pending - 10/24 pleural fluid studies, pelvic fluid aspirate   - continue vanc, margo, micafungin  - drain placement for pelvic fluid     Suspected ventilator associated pneumonia   - 10/20 BAL studies S epidermidis    - antimicrobials as above     Hematology, Oncology:  Neutrophilic leukocytosis secondary to infection & steroids     Thrombocytosis, reactive due to infection     Anemia - stable  Suspect multifactorial secondary to sepsis & surgical blood loss   - monitor      Endocrine:  Hyperglycemia of critical illness   - glargine 10 units every day  - LDSSI       Checklist:  FEN: TPN, trickle TF  VTE ppx: sqh  GI ppx: pantoprazole   Bowel regimen: PEG     VAP ppx: Head of bed >30 degrees, daily oral care  Lines/tube-size: L-internal jugular CVC (10/12), PIVs, arevalo (10/12), post pyloric FT (1024), ETT 7.5 (10/12)  Skin: no lesions    PT/OT/SLP, early mobility: not consulted   Code Status: Full      Physical Exam:  Neurologic: Sedated. Does not open eyes, track, or follow commands   HEENT: Head and face normal. No nasal discharge. Oropharynx normal. Eyelids, conjunctiva, & sclera normal.   Neck: Neck appearance normal. No neck masses. Thyroid not enlarged.  Respiratory: Lungs clear bilaterally.   Cardiovascular: Regular rate & rhythm  Normal S1 & S2. No murmurs, rubs. or gallops. No elevated JVP.    Gastrointestinal: Obese. Abdominal staples clean, dry, intact. RUQ drain.  Musculoskeletal: Skeletal configuration normal and muscle mass normal for age. Joint appearance overall normal.  Skin, Hair, & Nails: Skin color normal. No skin lesions. Hair & nails normal.  Extremities: 1+ lower extremity pitting edema    All pertinent vital signs, ventilator settings, I&Os, laboratory, microbiology, ECGs, & imaging data has been personally reviewed. Total Critical Care time, excluding procedures, was 50 minutes     AUNG To MD  Critical Care Medicine

## 2023-10-24 NOTE — PHARMACY-CONSULT NOTE
"Pharmacy Note: Parenteral Nutrition (PN) Management    Pharmacist consulted to dose PN for Mojgan Jimenez, a 53 year old female by Dr. Cruz.    Subjective:    The patient is a new PN start.    The patient was started on PN in the hospital on 10/14/23.    Indication for PN therapy:  peritonitis    Inadequate nutrition anticipated for > 7 days.     Enteral nutrition contraindicated due to: peritonitis.    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    Patient Vitals for the past 96 hrs:   Weight   10/24/23 0000 123.2 kg (271 lb 9.7 oz)   10/23/23 0620 124 kg (273 lb 5.9 oz)   10/21/23 0530 131.5 kg (289 lb 14.5 oz)       Labs:  Last 3 days:  Recent Labs     10/22/23  0520 10/22/23  1724 10/23/23  0018 10/23/23  0530 10/23/23  1837 10/24/23  0333   * 148* 146* 149*  149* 146* 144  144   POTASSIUM 5.0  --   --  3.7  --  3.8   CHLORIDE 112*  --   --  111*  --  107   CO2 24  --   --  24  --  24   .1*  --   --  104.2*  --  92.2*   CR 2.76*  --   --  2.65*  --  2.52*   PALAK 9.0  --   --  8.7  --  8.7   [id number]  --   --   --  1.09*  --   --    MAG 2.2  --   --  1.9  --   --    PHOS 6.1*  --   --  4.2  --   --    PROTTOTAL 7.7  --   --  7.1  --   --    ALBUMIN 3.7  --   --  3.2*  --   --    PREALB  --   --   --  16  --   --    TRIG 271*  --   --  485*  --   --    HGB 9.2*  --   --  9.2*  --  8.5*   HCT 31.7*  --   --  30.9*  --  27.9*   *  --   --  427  --  358   BILITOTAL " 0.3  --   --  0.3  --   --    AST 25  --   --  19  --   --    ALT 33  --   --  26  --   --    ALKPHOS 106*  --   --  96  --   --    INR  --   --   --  1.49*  --   --        Glucose (past 48 hours):   Recent Labs     10/23/23  2328 10/24/23  0038 10/24/23  0155 10/24/23  0313 10/24/23  0333 10/24/23  0412 10/24/23  0513 10/24/23  0603 10/24/23  0651 10/24/23  0812   * 153* 137* 132* 119* 112* 159* 154* 139* 128*       Intake/Output (last 24 hours): I/O last 3 completed shifts:  In: 4476.29 [I.V.:3126.29; NG/GT:150]  Out: 3664.5 [Urine:3470; Drains:19.5; Stool:175]    Estimated CrCl: Estimated Creatinine Clearance: 32.9 mL/min (A) (based on SCr of 2.52 mg/dL (H)).    Assessment:    Continue patient on PN therapy as a continuous central therapy.     Given the patient's current condition/oral intake, PN is still indicated. Will attempt post-pyloric feeding tube placement today and restart tube feeding if successful.     Lab results reviewed: Labs WNL today, will not make any changes to electrolytes in TPN.     Still on propofol for procedures today, plan to turn it off afterward.     Plan:  Rate of PN: 50 mL/hr.  Formula:   Amino Acids 65 grams  Dextrose 150 grams  Sodium 0 mEq/day  Potassium 60 mEq/day  Calcium 6 mEq/day  Magnesium 7 mEq/day  Phosphorus 2 mMol/day  Chloride: Acetate Ratio = max acetate  Standard Multivitamins w/Vitamin K  Trace Elements  Other Vitamin B12 100 mcg, Zinc 5 mg  Fat Emulsion: none - on propofol   Check BMP, Mg and Phos labs tomorrow.  Pharmacist will continue to follow the patient's lab results, clinical status and blood glucose results and make adjustments as appropriate.    Thank you for the consult.  Blossom Sanchez [facility]  10/24/2023 11:44 AM

## 2023-10-24 NOTE — CONSULTS
"CLINICAL NUTRITION SERVICES - SHORT NOTE     Nutrition Prescription    Recommendations already ordered by Registered Dietitian (RD):  Initiate EN- trickle TF Promote at 10 ml/hr  FWF: 30 ml q 4 hrs      Future/Additional Recommendations:  Monitor TF carlota, I/Os, lab, wt.   Adjust TPN and EN daily.      Nutrition Consult- Provider Order- Registered Dietitian to order TF per Medical Nutrition Therapy Guidelines     NEW FINDINGS   Consult to initiate TF following PPTF placement. Per chart review- TF Xray placement confirmed \"Enteric tube placed without complication. Tip in the jejunum just post anastomosis.\"    See previous RD note for full assessment.     INTERVENTIONS  Implementation  Continue TPN as ordered     Initiate EN- trickle TF Promote at 10 ml/hr  FWF: 30 ml q 4 hrs      Monitoring/Evaluation  Progress toward goals will be monitored and evaluated per protocol.   "

## 2023-10-24 NOTE — PROGRESS NOTES
RENAL PROGRESS NOTE      Assessment and Plan:  53 year old female s/p gastric b/p 10/9/23 c/w leak, s/p repair, peritonitis, perihepatic abscess. Nephrology is following for SUSSY,    ARF;  hemodynamic exacerbated by IV NSAIDS (lowish bp, vasodilatory drugs, mild intravascular depletion)==>ATN, now in recovery phase and creatinine improved though leveling off high 2's last several days. Hope to see more improvement with time. Creat was normal (0.7) PTA.  Urinary output remains in nonoliguric range with IV diuretics.  Mild hypernatremia-improved. Serum sodium is trending down to 144 this am.  Monitor serum sodium daily.  Volume overload, now s/p diuresis, prob near euvolemia, bumex 2 mg/day is keeping her even. Given failed extubation, see if we can dry her out more. Net 3.1L negative since admission. Weight is trending down.  On IV bumex to 2mg bid.  No changes.  HoTN; infection/SIIRS resolved and now labile bp, very high when awake. Started on Metoprolol and Hydralazine as per cardiology.   Hyperlipid; on statin  Elevated LFTs Resolved  Resp failure; failed extubation. ?PNA, ?Volume. S/p right thoracocentesis, 400 ml removed. Management as per ICU team.   Acidosis; resolved   Nutrition; on TPN , started on TF at 10 ml/h this afternoon  Cardiomyopathy-resolved, EF 10/23 was normal. I reviewed cardiology note from today.  Sepsis d/t peritonitis s/p washout. On IV antbx. I reveiwed ID note from today.     We will follow.    Subjective  She was seen at the bedside and events were reviewed with nurses.  This am the patient underwent right thoracocentesis ( 400 ml of clear fluid removed) and pelvic fluid aspiration (75 ml of yellow fluid removed)   Patient reminds intubated on vent and sedated.  Started on TF at 10 ml/h.  Patient urinary output remains in nonoliguric range ~ 100 ml/h with help of IV Bumex.  At the time of physical exam the patient is sedated and intubated therefore I was not able obtained review of  systems.      Objective    Vital signs in last 24 hours  Temp:  [98.8  F (37.1  C)-100.8  F (38.2  C)] 99.9  F (37.7  C)  Pulse:  [] 120  Resp:  [11-33] 18  BP: (116-212)/(55-94) 157/72  FiO2 (%):  [40 %-100 %] 40 %  SpO2:  [94 %-100 %] 98 %      Intake/Output last 3 shifts  I/O last 3 completed shifts:  In: 4476.29 [I.V.:3126.29; NG/GT:150]  Out: 3664.5 [Urine:3470; Drains:19.5; Stool:175]  Intake/Output this shift:  I/O this shift:  In: 264.17 [I.V.:164.17]  Out: 178 [Urine:175; Drains:3]    Physical Exam  Intubated /sedated, overly obese  HEENT: AT/NC   CV: RRR without murmur or rub  Lung: Coarse breath sounds in anterior fields b/l   Ab:distended incision closed with staples. Rectal tube in place, green color stool in bag.  Ext: edema of dorsum of both hands b/l and well perfused  : Patiño catheter in place, making yellow color urine.  Skin; no rash  Neuro: sedated.    Pertinent Labs     Last Renal Panel:  Sodium   Date Value Ref Range Status   10/24/2023 144 135 - 145 mmol/L Final     Comment:     Reference intervals for this test were updated on 09/26/2023 to more accurately reflect our healthy population. There may be differences in the flagging of prior results with similar values performed with this method. Interpretation of those prior results can be made in the context of the updated reference intervals.    10/24/2023 144 135 - 145 mmol/L Final     Comment:     Reference intervals for this test were updated on 09/26/2023 to more accurately reflect our healthy population. There may be differences in the flagging of prior results with similar values performed with this method. Interpretation of those prior results can be made in the context of the updated reference intervals.  Reference intervals for this test were updated on 09/26/2023 to more accurately reflect our healthy population. There may be differences in the flagging of prior results with similar values performed with this method.  Interpretation of those prior results can be made in the context of the updated reference intervals.    11/17/2020 141 133 - 144 mmol/L Final     Potassium   Date Value Ref Range Status   10/24/2023 3.8 3.4 - 5.3 mmol/L Final   05/17/2022 4.5 3.4 - 5.3 mmol/L Final   11/17/2020 4.1 3.4 - 5.3 mmol/L Final     Chloride   Date Value Ref Range Status   10/24/2023 107 98 - 107 mmol/L Final   05/17/2022 102 94 - 109 mmol/L Final   11/17/2020 109 94 - 109 mmol/L Final     Carbon Dioxide   Date Value Ref Range Status   11/17/2020 31 20 - 32 mmol/L Final     Carbon Dioxide (CO2)   Date Value Ref Range Status   10/24/2023 24 22 - 29 mmol/L Final   05/17/2022 32 20 - 32 mmol/L Final     Anion Gap   Date Value Ref Range Status   10/24/2023 13 7 - 15 mmol/L Final   05/17/2022 2 (L) 3 - 14 mmol/L Final   11/17/2020 1 (L) 3 - 14 mmol/L Final     Glucose   Date Value Ref Range Status   10/24/2023 119 (H) 70 - 99 mg/dL Final   05/17/2022 110 (H) 70 - 99 mg/dL Final   11/17/2020 84 70 - 99 mg/dL Final     Comment:     Fasting specimen     GLUCOSE BY METER POCT   Date Value Ref Range Status   10/24/2023 128 (H) 70 - 99 mg/dL Final     Urea Nitrogen   Date Value Ref Range Status   10/24/2023 92.2 (H) 6.0 - 20.0 mg/dL Final   05/17/2022 16 7 - 30 mg/dL Final   11/17/2020 9 7 - 30 mg/dL Final     Creatinine   Date Value Ref Range Status   10/24/2023 2.52 (H) 0.51 - 0.95 mg/dL Final   11/17/2020 0.79 0.52 - 1.04 mg/dL Final     GFR Estimate   Date Value Ref Range Status   10/24/2023 22 (L) >60 mL/min/1.73m2 Final   11/17/2020 88 >60 mL/min/[1.73_m2] Final     Comment:     Non  GFR Calc  Starting 12/18/2018, serum creatinine based estimated GFR (eGFR) will be   calculated using the Chronic Kidney Disease Epidemiology Collaboration   (CKD-EPI) equation.       Calcium   Date Value Ref Range Status   10/24/2023 8.7 8.6 - 10.0 mg/dL Final   11/17/2020 8.9 8.5 - 10.1 mg/dL Final     Phosphorus   Date Value Ref Range Status    10/23/2023 4.2 2.5 - 4.5 mg/dL Final     Albumin   Date Value Ref Range Status   10/23/2023 3.2 (L) 3.5 - 5.2 g/dL Final   05/17/2022 4.0 3.4 - 5.0 g/dL Final   12/03/2018 3.6 3.4 - 5.0 g/dL Final     Recent Labs   Lab 10/24/23  0333 10/23/23  0530 10/22/23  0520 10/21/23  0341 10/20/23  0509   HGB 8.5* 9.2* 9.2* 8.2* 9.6*          I reviewed all lab results  Georgette Garnica MD  [facility]  [address] [place] [address], [address]  [place] [address]  Phone# [phone]  Fax# [phone]

## 2023-10-24 NOTE — PROGRESS NOTES
RT PROGRESS NOTE    VENT DAY# 3    CURRENT SETTINGS:   Vent Mode: CPAP/PS  (Continuous positive airway pressure with Pressure Support)  FiO2 (%): 40 %  Resp Rate (Set): 20 breaths/min  Tidal Volume (Set, mL): 380 mL  PEEP (cm H2O): 8 cmH2O  Pressure Support (cm H2O): 5 cmH2O  Resp: 15      PATIENT PARAMETERS:  PIP 21-28  Pplat:  20-26  Pmean:  13-16  Compliance: 25-84  SBT: YES    Wean time: 1600   RSBI 33    Still weaning   Secretions:  small amount thick cloudy  02 Sats:  95-98%  BS: clear diminished throughout    ETT SIZE 7.5 Secured at 19 cm at teeth/gums    Respiratory Medications: Duoneb QID     ABG: No recent          NOTE / SHIFT SUMMARY:   Patient continues on full ventilator support with above settings. Tolerating her wean well. Duoneb given x 3. BS clear diminished throughout before and after. Patient tolerated treatments well with no adverse reaction. Continue weaning trials as tolerated.          Hortencia Last, RT, NPS   clear

## 2023-10-24 NOTE — PROGRESS NOTES
Respiratory Care    Vent day 2    Vent Mode: CMV/AC  (Continuous Mandatory Ventilation/ Assist Control)  FiO2 (%): 100 % (Bronch)  Resp Rate (Set): 20 breaths/min  Tidal Volume (Set, mL): 380 mL  PEEP (cm H2O): 8 cmH2O  Pressure Support (cm H2O): 5 cmH2O  Resp: 17    PIP 30  Pplat: unable to obtain. Pt fighting vent  Secretions: scant to small    Notes/plan: no recent ABGs. Continue full vent support for now. Will wean when pt ready. Pt had bedside bronch today for right lower lobe atelectasis. Tolerated well.       Guillermo Keyes, RT

## 2023-10-24 NOTE — PHARMACY-VANCOMYCIN DOSING SERVICE
Pharmacy Vancomycin Note  Date of Service 2023  Patient's  1970   53 year old, female    Indication: Sepsis  Day of Therapy: 3  Current vancomycin regimen:  1250 mg IV q48h  Current vancomycin monitoring method: AUC  Current vancomycin therapeutic monitoring goal: 400-600 mg*h/L    InsightRX Prediction of Current Vancomycin Regimen  Loading dose: N/A  Regimen: 1250 mg IV every 48 hours.  Start time: 11:06 on 10/24/2023  Exposure target: AUC24 (range)400-600 mg/L.hr   AUC24,ss: 389 mg/L.hr  Probability of AUC24 > 400: 38 %  Ctrough,ss: 8.3 mg/L  Probability of Ctrough,ss > 20: 4 %  Probability of nephrotoxicity (Lodise FABIENNE ): 5 %    Current estimated CrCl = Estimated Creatinine Clearance: 32.9 mL/min (A) (based on SCr of 2.52 mg/dL (H)).    Creatinine for last 3 days  10/22/2023:  5:20 AM Creatinine 2.76 mg/dL  10/23/2023:  5:30 AM Creatinine 2.65 mg/dL  10/24/2023:  3:33 AM Creatinine 2.52 mg/dL    Recent Vancomycin Levels (past 3 days)  10/24/2023:  3:33 AM Vancomycin 13.9 ug/mL    Vancomycin IV Administrations (past 72 hours)                     vancomycin (VANCOCIN) 2,500 mg in sodium chloride 0.9 % 500 mL intermittent infusion (mg) 2,500 mg New Bag 10/22/23 0937                    Nephrotoxins and other renal medications (From now, onward)      Start     Dose/Rate Route Frequency Ordered Stop    10/24/23 1000  vancomycin (VANCOCIN) 1,500 mg in sodium chloride 0.9 % 250 mL intermittent infusion         1,500 mg  over 90 Minutes Intravenous EVERY 48 HOURS 10/24/23 0732      10/22/23 2100  bumetanide (BUMEX) injection 2 mg         2 mg Intravenous EVERY 12 HOURS 10/22/23 0927      10/12/23 1630  norepinephrine (LEVOPHED) 4 mg in  mL infusion PREMIX         0.01-0.6 mcg/kg/min × 130.7 kg  4.9-294.1 mL/hr  Intravenous CONTINUOUS 10/12/23 1614                 Contrast Orders - past 72 hours (72h ago, onward)      Start     Dose/Rate Route Frequency Stop    10/24/23 1100  diatrizoate  meglumine-sodium (GASTROGRAFIN/GASTROVIEW) 66-10 % solution 120 mL         120 mL Oral ONCE 10/24/23 1051    10/23/23 1038  perflutren lipid microsphere (DEFINITY) injection SUSP 2 mL         2 mL Intravenous ONCE 10/23/23 1058            Interpretation of levels and current regimen:  Vancomycin level is reflective of AUC less than 400    Has serum creatinine changed greater than 50% in last 72 hours: No    Urine output:  good urine output    Renal Function: Improving    InsightRX Prediction of Planned New Vancomycin Regimen  Loading dose: N/A  Regimen: 1500 mg IV every 48 hours.  Start time: 07:28 on 10/24/2023  Exposure target: AUC24 (range)400-600 mg/L.hr   AUC24,ss: 466 mg/L.hr  Probability of AUC24 > 400: 92 %  Ctrough,ss: 9.9 mg/L  Probability of Ctrough,ss > 20: 8 %  Probability of nephrotoxicity (Lodise FABIENNE 2009): 6 %    Plan:  Increase Dose to 1500 mg IV q48h  Vancomycin monitoring method: AUC  Vancomycin therapeutic monitoring goal: 400-600 mg*h/L  Pharmacy will check vancomycin levels as appropriate in 1-3 Days.  Serum creatinine levels will be ordered daily for the first week of therapy and at least twice weekly for subsequent weeks.    Blossom Sanchez, [facility]

## 2023-10-24 NOTE — PROGRESS NOTES
Nutrition Therapy  Parenteral Nutrition follow-up       Dietitian to Pharmacy    Rate of TPN: 50 ml/hr No changes   Grams Dextrose: 150   Grams Protein: 65     Lipids: None, on continuous propofol     Future/Additional Recommendations:  Monitor TPN daily, hydration, renal function   Following TF placement postpyloric and verification: Initiate trickle TF Promote at 10 ml/hr, FWF: 30 ml q 4 hrs        Current Nutrition Intake:  Diet: NPO  Nutrition Support: Continuous Custom TPN   50 ml/hr, 150 g Dex, 65 g AA     Provides:  770 kcals, 65 g protein, 150 g cho, 1200 ml fluid   Propofol at 35.5 ml/hr, providing additional ~930-1040 kcals/day   Meets >100% estimated calorie and protein needs. Plan for propofol wean today.     New Findings:  Discussed pt during care rounds this AM. Per MD/nursing plan to place TF postpyloric today and start trickle TF. Triglycerides elevated yesterday, plan for decreasing propofol following planned procedures today. IVF D5 stopped, continuous insulin stopped.     S/p gastrectomy sleeve-duodenal switch on 10/9.    S/p drain placed, closure of interotomies and NG placed on 10/12.   TPN start 10/14/23  Propofol stopped 10/15 due to elevated Triglyceride  Trickle TF start 10/20  Trickle TF stopped 10/21  Extubated 10/21  Re-intubated 10/22, Propofol restarted 10/22 with re-intubation.      Weight Trends  Current Weight: 123.2 kg  Weight Hx: Wt down since admit     Dosing Weight: 52.3 kg (IBW)      ESTIMATED NUTRITION NEEDS   Energy: 2407-4416 kcals/day (22 - 25 kcals/kg)   Obese, Post-op, and Vented   Protein : 63-78 grams protein/day (1.2 - 1.5 grams of pro/kg)   Obesity guidelines and Post-op, elevated creatinine   Fluid: 5998-7499+ mL/day (1 mL/kcal)   Maintenance     Physical Findings/GI:  +UOP 3.7L 10/23   17 ml op via R-abdomen drain 10/23   Last BM this AM, 25 ml op via rectal tube   Mild/Trace edema   Obese abdomen   NG clamped    Labs:   Reviewed   BUN 92.2(H) - decreased  Creat  2.52(H) - decreased   Glucose 112-159 this AM  10/23: (H)     Medications:   Reviewed   Continuous fentanyl, TPN, propofol   Scheduled pacerone, bumex, peridex, novolog, merrem, protonix, seroquel, vancomycin     MALNUTRITION  Malnutrition Diagnosis: Patient does not meet two of the established criteria necessary for diagnosing malnutrition    Nutrition Diagnosis  Inadequate oral intake related to intubated and sedated as evidenced by NPO and need for TPN       Evaluation: Ongoing    Goals  Meet nutrition needs with TPN- progressing   Electrolytes WNL - met  Tolerate TPN - met  Tolerate TF - unable, plan for TF placement today   Transition to po diet after extubation and when appropriate.-unable, pt intubated  TG <150 - NEW  BG <180 - NEW    INTERVENTIONS  Implementation  Continue current TPN regimen, no lipids  Plan TPN to bridge nutrition needs for a while for patient's healing needs as patient has some malabsorption with bariatric surgery.       Monitoring/Evaluation  Progress toward goals will be monitored and evaluated per protocol.

## 2023-10-24 NOTE — PROGRESS NOTES
"[facility] Progress Note    SUBJECTIVE:  complex pt.  Procedures done.  This part of the note is for my own memory:    Lap portals on belly  Open large incision for the washout  2 prior L sided MICKIE sites these tubes are out  RUQ drain placed oct 19--this pus had lacto  Today R lung tap, R deep pelvic collection also aspirated but no tube placed        REVIEW OF SYSTEMS:  Negative unless as listed above.  Social history, Family history, Medications: reviewed.    OBJECTIVE:  BP (!) 157/72   Pulse 120   Temp 99.9  F (37.7  C)   Resp 18   Ht 1.6 m (5' 3\")   Wt 123.2 kg (271 lb 9.7 oz)   LMP 09/09/2023 (Exact Date)   SpO2 98%   BMI 48.11 kg/m                PHYSICAL EXAM:  Alert, awake  Vitals tabulated above, reviewed  Neck supple without lymphadenopathy  Sclera normal color, not injected  CARDIOVASCULAR regular rate and rhythm, no murmur  Lungs CLEAR TO AUSCULTATION   Abdomen soft, NT/ND, absent HEPATOSPLENOMEGALY  Skin normal  Joints normal  Neurologic exam non focal  Rash R torso is improved  See above for tubes, lines, drains and scars      Antibiotics:  See MAR,multiple   Pertinent labs:  Lab Results   Component Value Date    WBC 14.5 10/23/2023    WBC 5.5 11/17/2020     Lab Results   Component Value Date    RBC 3.16 10/23/2023    RBC 4.13 11/17/2020     Lab Results   Component Value Date    HGB 9.2 10/23/2023    HGB 12.3 11/17/2020     Lab Results   Component Value Date    HCT 30.9 10/23/2023    HCT 37.4 11/17/2020     No components found for: \"MCT\"  Lab Results   Component Value Date    MCV 98 10/23/2023    MCV 91 11/17/2020     Lab Results   Component Value Date    MCH 29.1 10/23/2023    MCH 29.8 11/17/2020     Lab Results   Component Value Date    MCHC 29.8 10/23/2023    MCHC 32.9 11/17/2020     Lab Results   Component Value Date    RDW 14.9 10/23/2023    RDW 12.7 11/17/2020     Lab Results   Component Value Date     10/23/2023     11/17/2020       Last Comprehensive " Metabolic Panel:  Sodium   Date Value Ref Range Status   10/24/2023 144 135 - 145 mmol/L Final     Comment:     Reference intervals for this test were updated on 09/26/2023 to more accurately reflect our healthy population. There may be differences in the flagging of prior results with similar values performed with this method. Interpretation of those prior results can be made in the context of the updated reference intervals.    10/24/2023 144 135 - 145 mmol/L Final     Comment:     Reference intervals for this test were updated on 09/26/2023 to more accurately reflect our healthy population. There may be differences in the flagging of prior results with similar values performed with this method. Interpretation of those prior results can be made in the context of the updated reference intervals.  Reference intervals for this test were updated on 09/26/2023 to more accurately reflect our healthy population. There may be differences in the flagging of prior results with similar values performed with this method. Interpretation of those prior results can be made in the context of the updated reference intervals.    11/17/2020 141 133 - 144 mmol/L Final     Potassium   Date Value Ref Range Status   10/24/2023 3.8 3.4 - 5.3 mmol/L Final   05/17/2022 4.5 3.4 - 5.3 mmol/L Final   11/17/2020 4.1 3.4 - 5.3 mmol/L Final     Chloride   Date Value Ref Range Status   10/24/2023 107 98 - 107 mmol/L Final   05/17/2022 102 94 - 109 mmol/L Final   11/17/2020 109 94 - 109 mmol/L Final     Carbon Dioxide   Date Value Ref Range Status   11/17/2020 31 20 - 32 mmol/L Final     Carbon Dioxide (CO2)   Date Value Ref Range Status   10/24/2023 24 22 - 29 mmol/L Final   05/17/2022 32 20 - 32 mmol/L Final     Anion Gap   Date Value Ref Range Status   10/24/2023 13 7 - 15 mmol/L Final   05/17/2022 2 (L) 3 - 14 mmol/L Final   11/17/2020 1 (L) 3 - 14 mmol/L Final     Glucose   Date Value Ref Range Status   10/24/2023 119 (H) 70 - 99 mg/dL Final  "  05/17/2022 110 (H) 70 - 99 mg/dL Final   11/17/2020 84 70 - 99 mg/dL Final     Comment:     Fasting specimen     GLUCOSE BY METER POCT   Date Value Ref Range Status   10/24/2023 128 (H) 70 - 99 mg/dL Final     Urea Nitrogen   Date Value Ref Range Status   10/24/2023 92.2 (H) 6.0 - 20.0 mg/dL Final   05/17/2022 16 7 - 30 mg/dL Final   11/17/2020 9 7 - 30 mg/dL Final     Creatinine   Date Value Ref Range Status   10/24/2023 2.52 (H) 0.51 - 0.95 mg/dL Final   11/17/2020 0.79 0.52 - 1.04 mg/dL Final     GFR Estimate   Date Value Ref Range Status   10/24/2023 22 (L) >60 mL/min/1.73m2 Final   11/17/2020 88 >60 mL/min/[1.73_m2] Final     Comment:     Non  GFR Calc  Starting 12/18/2018, serum creatinine based estimated GFR (eGFR) will be   calculated using the Chronic Kidney Disease Epidemiology Collaboration   (CKD-EPI) equation.       Calcium   Date Value Ref Range Status   10/24/2023 8.7 8.6 - 10.0 mg/dL Final   11/17/2020 8.9 8.5 - 10.1 mg/dL Final       Liver Function Studies -   Recent Labs   Lab Test 10/23/23  0530   PROTTOTAL 7.1   ALBUMIN 3.2*   BILITOTAL 0.3   ALKPHOS 96   AST 19   ALT 26       No results found for: \"SED\"    No results found for: \"CRP\"            MICROBIOLOGY DATA:        IMAGING/RADIOLOGY:          ASSESSMENT:  Post gastric surgery  C/b HCAP,sepsis due to peritonitis post washout  Probably does not have TSS or TEN or DRESS based on totality of evidence  Over weekend her counts were way up, more imaging was done and today had aspirations of pelvic collection and lung  White count better today BEFORE doing them    RECOMMENDATION:  Same abx  See if any new fluid findings are of concern  We ID wise have streptococcal peritonitis and a lactobacillary RUQ abscess   Clinda, merrem, julio, vanco  It might make some sense to change merrem to zosyn, I will think this over more     55 minutes on care today    CLEM Wilkerson MD  Office [phone] option 2 to desk staff  "

## 2023-10-25 ENCOUNTER — APPOINTMENT (OUTPATIENT)
Dept: ULTRASOUND IMAGING | Facility: HOSPITAL | Age: 53
End: 2023-10-25
Attending: STUDENT IN AN ORGANIZED HEALTH CARE EDUCATION/TRAINING PROGRAM
Payer: COMMERCIAL

## 2023-10-25 ENCOUNTER — APPOINTMENT (OUTPATIENT)
Dept: INTERVENTIONAL RADIOLOGY/VASCULAR | Facility: HOSPITAL | Age: 53
End: 2023-10-25
Payer: COMMERCIAL

## 2023-10-25 ENCOUNTER — APPOINTMENT (OUTPATIENT)
Dept: CT IMAGING | Facility: HOSPITAL | Age: 53
End: 2023-10-25
Payer: COMMERCIAL

## 2023-10-25 LAB
AMYLASE SERPL-CCNC: 64 U/L (ref 28–100)
ANION GAP SERPL CALCULATED.3IONS-SCNC: 11 MMOL/L (ref 7–15)
BACTERIA SPEC CULT: NO GROWTH
BUN SERPL-MCNC: 89.5 MG/DL (ref 6–20)
CALCIUM SERPL-MCNC: 8.9 MG/DL (ref 8.6–10)
CHLORIDE SERPL-SCNC: 109 MMOL/L (ref 98–107)
CREAT SERPL-MCNC: 2.51 MG/DL (ref 0.51–0.95)
DEPRECATED HCO3 PLAS-SCNC: 24 MMOL/L (ref 22–29)
EGFRCR SERPLBLD CKD-EPI 2021: 22 ML/MIN/1.73M2
ERYTHROCYTE [DISTWIDTH] IN BLOOD BY AUTOMATED COUNT: 14.2 % (ref 10–15)
GLUCOSE BLDC GLUCOMTR-MCNC: 133 MG/DL (ref 70–99)
GLUCOSE BLDC GLUCOMTR-MCNC: 143 MG/DL (ref 70–99)
GLUCOSE BLDC GLUCOMTR-MCNC: 159 MG/DL (ref 70–99)
GLUCOSE BLDC GLUCOMTR-MCNC: 163 MG/DL (ref 70–99)
GLUCOSE BLDC GLUCOMTR-MCNC: 166 MG/DL (ref 70–99)
GLUCOSE BLDC GLUCOMTR-MCNC: 169 MG/DL (ref 70–99)
GLUCOSE SERPL-MCNC: 143 MG/DL (ref 70–99)
HCT VFR BLD AUTO: 24.7 % (ref 35–47)
HGB BLD-MCNC: 7.7 G/DL (ref 11.7–15.7)
Lab: NORMAL
MAGNESIUM SERPL-MCNC: 1.9 MG/DL (ref 1.7–2.3)
MCH RBC QN AUTO: 29.3 PG (ref 26.5–33)
MCHC RBC AUTO-ENTMCNC: 31.2 G/DL (ref 31.5–36.5)
MCV RBC AUTO: 94 FL (ref 78–100)
PERFORMING LABORATORY: NORMAL
PHOSPHATE SERPL-MCNC: 4.3 MG/DL (ref 2.5–4.5)
PLATELET # BLD AUTO: 354 10E3/UL (ref 150–450)
POTASSIUM SERPL-SCNC: 4.4 MMOL/L (ref 3.4–5.3)
RBC # BLD AUTO: 2.63 10E6/UL (ref 3.8–5.2)
SODIUM SERPL-SCNC: 144 MMOL/L (ref 135–145)
SODIUM SERPL-SCNC: 144 MMOL/L (ref 135–145)
SODIUM SERPL-SCNC: 145 MMOL/L (ref 135–145)
SPECIMEN STATUS: NORMAL
TEST NAME: NORMAL
TRIGL SERPL-MCNC: 253 MG/DL
WBC # BLD AUTO: 12.4 10E3/UL (ref 4–11)

## 2023-10-25 PROCEDURE — 250N000011 HC RX IP 250 OP 636: Mod: JZ | Performed by: NURSE PRACTITIONER

## 2023-10-25 PROCEDURE — 99231 SBSQ HOSP IP/OBS SF/LOW 25: CPT | Performed by: INTERNAL MEDICINE

## 2023-10-25 PROCEDURE — 74150 CT ABDOMEN W/O CONTRAST: CPT

## 2023-10-25 PROCEDURE — 84100 ASSAY OF PHOSPHORUS: CPT | Performed by: INTERNAL MEDICINE

## 2023-10-25 PROCEDURE — 250N000013 HC RX MED GY IP 250 OP 250 PS 637: Performed by: INTERNAL MEDICINE

## 2023-10-25 PROCEDURE — 255N000002 HC RX 255 OP 636: Performed by: SURGERY

## 2023-10-25 PROCEDURE — 83735 ASSAY OF MAGNESIUM: CPT | Performed by: INTERNAL MEDICINE

## 2023-10-25 PROCEDURE — 250N000013 HC RX MED GY IP 250 OP 250 PS 637: Performed by: STUDENT IN AN ORGANIZED HEALTH CARE EDUCATION/TRAINING PROGRAM

## 2023-10-25 PROCEDURE — 250N000011 HC RX IP 250 OP 636: Mod: JZ | Performed by: INTERNAL MEDICINE

## 2023-10-25 PROCEDURE — C1729 CATH, DRAINAGE: HCPCS

## 2023-10-25 PROCEDURE — 250N000009 HC RX 250: Performed by: SURGERY

## 2023-10-25 PROCEDURE — 94640 AIRWAY INHALATION TREATMENT: CPT | Mod: 76

## 2023-10-25 PROCEDURE — 999N000287 HC ICU ADULT ROUNDING, EACH 10 MINS

## 2023-10-25 PROCEDURE — 250N000011 HC RX IP 250 OP 636

## 2023-10-25 PROCEDURE — 999N000157 HC STATISTIC RCP TIME EA 10 MIN

## 2023-10-25 PROCEDURE — 250N000013 HC RX MED GY IP 250 OP 250 PS 637: Performed by: NURSE PRACTITIONER

## 2023-10-25 PROCEDURE — 250N000009 HC RX 250: Performed by: INTERNAL MEDICINE

## 2023-10-25 PROCEDURE — 94640 AIRWAY INHALATION TREATMENT: CPT

## 2023-10-25 PROCEDURE — C1769 GUIDE WIRE: HCPCS

## 2023-10-25 PROCEDURE — 200N000001 HC R&B ICU

## 2023-10-25 PROCEDURE — 82150 ASSAY OF AMYLASE: CPT | Performed by: STUDENT IN AN ORGANIZED HEALTH CARE EDUCATION/TRAINING PROGRAM

## 2023-10-25 PROCEDURE — 75984 XRAY CONTROL CATHETER CHANGE: CPT

## 2023-10-25 PROCEDURE — 94003 VENT MGMT INPAT SUBQ DAY: CPT

## 2023-10-25 PROCEDURE — 272N000119 HC CATH CR4

## 2023-10-25 PROCEDURE — 0W2GX0Z CHANGE DRAINAGE DEVICE IN PERITONEAL CAVITY, EXTERNAL APPROACH: ICD-10-PCS | Performed by: RADIOLOGY

## 2023-10-25 PROCEDURE — 999N000009 HC STATISTIC AIRWAY CARE

## 2023-10-25 PROCEDURE — 99232 SBSQ HOSP IP/OBS MODERATE 35: CPT | Performed by: INTERNAL MEDICINE

## 2023-10-25 PROCEDURE — 999N000185 HC STATISTIC TRANSPORT TIME EA 15 MIN

## 2023-10-25 PROCEDURE — 80048 BASIC METABOLIC PNL TOTAL CA: CPT | Performed by: STUDENT IN AN ORGANIZED HEALTH CARE EDUCATION/TRAINING PROGRAM

## 2023-10-25 PROCEDURE — 84478 ASSAY OF TRIGLYCERIDES: CPT | Performed by: STUDENT IN AN ORGANIZED HEALTH CARE EDUCATION/TRAINING PROGRAM

## 2023-10-25 PROCEDURE — 84295 ASSAY OF SERUM SODIUM: CPT | Performed by: INTERNAL MEDICINE

## 2023-10-25 PROCEDURE — 250N000009 HC RX 250

## 2023-10-25 PROCEDURE — 85014 HEMATOCRIT: CPT | Performed by: STUDENT IN AN ORGANIZED HEALTH CARE EDUCATION/TRAINING PROGRAM

## 2023-10-25 PROCEDURE — 258N000003 HC RX IP 258 OP 636: Performed by: NURSE PRACTITIONER

## 2023-10-25 PROCEDURE — 93970 EXTREMITY STUDY: CPT

## 2023-10-25 PROCEDURE — 999N000253 HC STATISTIC WEANING TRIALS

## 2023-10-25 PROCEDURE — 93970 EXTREMITY STUDY: CPT | Mod: XS

## 2023-10-25 RX ORDER — NALOXONE HYDROCHLORIDE 0.4 MG/ML
0.4 INJECTION, SOLUTION INTRAMUSCULAR; INTRAVENOUS; SUBCUTANEOUS
Status: DISCONTINUED | OUTPATIENT
Start: 2023-10-25 | End: 2023-10-25

## 2023-10-25 RX ORDER — OXYCODONE HYDROCHLORIDE 5 MG/1
5 TABLET ORAL EVERY 12 HOURS
Status: DISCONTINUED | OUTPATIENT
Start: 2023-10-25 | End: 2023-10-27

## 2023-10-25 RX ORDER — PIPERACILLIN SODIUM, TAZOBACTAM SODIUM 3; .375 G/15ML; G/15ML
3.38 INJECTION, POWDER, LYOPHILIZED, FOR SOLUTION INTRAVENOUS ONCE
Qty: 15 ML | Refills: 0 | Status: COMPLETED | OUTPATIENT
Start: 2023-10-25 | End: 2023-10-25

## 2023-10-25 RX ORDER — FLUMAZENIL 0.1 MG/ML
0.2 INJECTION, SOLUTION INTRAVENOUS
Status: DISCONTINUED | OUTPATIENT
Start: 2023-10-25 | End: 2023-10-25

## 2023-10-25 RX ORDER — METOPROLOL TARTRATE 25 MG/1
50 TABLET, FILM COATED ORAL 3 TIMES DAILY
Status: DISCONTINUED | OUTPATIENT
Start: 2023-10-25 | End: 2023-10-27

## 2023-10-25 RX ORDER — QUETIAPINE FUMARATE 25 MG/1
50 TABLET, FILM COATED ORAL 2 TIMES DAILY PRN
Status: DISCONTINUED | OUTPATIENT
Start: 2023-10-25 | End: 2023-10-27

## 2023-10-25 RX ORDER — NALOXONE HYDROCHLORIDE 0.4 MG/ML
0.2 INJECTION, SOLUTION INTRAMUSCULAR; INTRAVENOUS; SUBCUTANEOUS
Status: DISCONTINUED | OUTPATIENT
Start: 2023-10-25 | End: 2023-10-25

## 2023-10-25 RX ORDER — PIPERACILLIN SODIUM, TAZOBACTAM SODIUM 3; .375 G/15ML; G/15ML
3.38 INJECTION, POWDER, LYOPHILIZED, FOR SOLUTION INTRAVENOUS EVERY 8 HOURS
Status: DISCONTINUED | OUTPATIENT
Start: 2023-10-25 | End: 2023-10-25

## 2023-10-25 RX ORDER — PIPERACILLIN SODIUM, TAZOBACTAM SODIUM 3; .375 G/15ML; G/15ML
3.38 INJECTION, POWDER, LYOPHILIZED, FOR SOLUTION INTRAVENOUS EVERY 8 HOURS
Status: COMPLETED | OUTPATIENT
Start: 2023-10-25 | End: 2023-11-07

## 2023-10-25 RX ORDER — FENTANYL CITRATE 50 UG/ML
25-50 INJECTION, SOLUTION INTRAMUSCULAR; INTRAVENOUS EVERY 5 MIN PRN
Status: DISCONTINUED | OUTPATIENT
Start: 2023-10-25 | End: 2023-10-25

## 2023-10-25 RX ADMIN — HEPARIN SODIUM 5000 UNITS: 5000 INJECTION, SOLUTION INTRAVENOUS; SUBCUTANEOUS at 21:56

## 2023-10-25 RX ADMIN — INSULIN ASPART 1 UNITS: 100 INJECTION, SOLUTION INTRAVENOUS; SUBCUTANEOUS at 03:46

## 2023-10-25 RX ADMIN — MIDAZOLAM HYDROCHLORIDE 1 MG: 1 INJECTION, SOLUTION INTRAMUSCULAR; INTRAVENOUS at 14:26

## 2023-10-25 RX ADMIN — INSULIN ASPART 1 UNITS: 100 INJECTION, SOLUTION INTRAVENOUS; SUBCUTANEOUS at 11:44

## 2023-10-25 RX ADMIN — Medication 50 MCG: at 07:01

## 2023-10-25 RX ADMIN — IPRATROPIUM BROMIDE AND ALBUTEROL SULFATE 3 ML: .5; 3 SOLUTION RESPIRATORY (INHALATION) at 08:15

## 2023-10-25 RX ADMIN — DEXMEDETOMIDINE HYDROCHLORIDE 1.2 MCG/KG/HR: 4 INJECTION, SOLUTION INTRAVENOUS at 10:08

## 2023-10-25 RX ADMIN — CHLORHEXIDINE GLUCONATE 15 ML: 1.2 RINSE ORAL at 20:46

## 2023-10-25 RX ADMIN — HYDRALAZINE HYDROCHLORIDE 10 MG: 10 TABLET ORAL at 15:21

## 2023-10-25 RX ADMIN — HEPARIN SODIUM 5000 UNITS: 5000 INJECTION, SOLUTION INTRAVENOUS; SUBCUTANEOUS at 06:37

## 2023-10-25 RX ADMIN — CHLORHEXIDINE GLUCONATE 15 ML: 1.2 RINSE ORAL at 08:29

## 2023-10-25 RX ADMIN — INSULIN ASPART 1 UNITS: 100 INJECTION, SOLUTION INTRAVENOUS; SUBCUTANEOUS at 00:20

## 2023-10-25 RX ADMIN — BUMETANIDE 2 MG: 0.25 INJECTION INTRAMUSCULAR; INTRAVENOUS at 21:55

## 2023-10-25 RX ADMIN — PIPERACILLIN AND TAZOBACTAM 3.38 G: 3; .375 INJECTION, POWDER, LYOPHILIZED, FOR SOLUTION INTRAVENOUS at 15:21

## 2023-10-25 RX ADMIN — DEXMEDETOMIDINE HYDROCHLORIDE 1.2 MCG/KG/HR: 4 INJECTION, SOLUTION INTRAVENOUS at 02:21

## 2023-10-25 RX ADMIN — QUETIAPINE FUMARATE 100 MG: 25 TABLET ORAL at 08:26

## 2023-10-25 RX ADMIN — HEPARIN SODIUM 5000 UNITS: 5000 INJECTION, SOLUTION INTRAVENOUS; SUBCUTANEOUS at 15:21

## 2023-10-25 RX ADMIN — METOPROLOL TARTRATE 50 MG: 25 TABLET, FILM COATED ORAL at 21:55

## 2023-10-25 RX ADMIN — ACETAMINOPHEN 650 MG: 160 SOLUTION ORAL at 08:51

## 2023-10-25 RX ADMIN — DEXMEDETOMIDINE HYDROCHLORIDE 1.2 MCG/KG/HR: 4 INJECTION, SOLUTION INTRAVENOUS at 19:55

## 2023-10-25 RX ADMIN — NITROGLYCERIN 1 PATCH: 0.4 PATCH TRANSDERMAL at 08:27

## 2023-10-25 RX ADMIN — BUMETANIDE 2 MG: 0.25 INJECTION INTRAMUSCULAR; INTRAVENOUS at 08:29

## 2023-10-25 RX ADMIN — IPRATROPIUM BROMIDE AND ALBUTEROL SULFATE 3 ML: .5; 3 SOLUTION RESPIRATORY (INHALATION) at 20:11

## 2023-10-25 RX ADMIN — IOHEXOL 20 ML: 350 INJECTION, SOLUTION INTRAVENOUS at 14:38

## 2023-10-25 RX ADMIN — INSULIN ASPART 1 UNITS: 100 INJECTION, SOLUTION INTRAVENOUS; SUBCUTANEOUS at 20:46

## 2023-10-25 RX ADMIN — QUETIAPINE FUMARATE 50 MG: 25 TABLET ORAL at 19:01

## 2023-10-25 RX ADMIN — DEXMEDETOMIDINE HYDROCHLORIDE 1.2 MCG/KG/HR: 4 INJECTION, SOLUTION INTRAVENOUS at 15:22

## 2023-10-25 RX ADMIN — INSULIN GLARGINE 10 UNITS: 100 INJECTION, SOLUTION SUBCUTANEOUS at 21:57

## 2023-10-25 RX ADMIN — HYDRALAZINE HYDROCHLORIDE 10 MG: 10 TABLET ORAL at 21:56

## 2023-10-25 RX ADMIN — MIDAZOLAM HYDROCHLORIDE 2 MG: 1 INJECTION, SOLUTION INTRAMUSCULAR; INTRAVENOUS at 14:18

## 2023-10-25 RX ADMIN — MEROPENEM 1 G: 1 INJECTION, POWDER, FOR SOLUTION INTRAVENOUS at 08:30

## 2023-10-25 RX ADMIN — Medication 40 MG: at 07:10

## 2023-10-25 RX ADMIN — METOPROLOL TARTRATE 25 MG: 25 TABLET, FILM COATED ORAL at 08:27

## 2023-10-25 RX ADMIN — LIDOCAINE HYDROCHLORIDE 20 ML: 10 INJECTION, SOLUTION INFILTRATION; PERINEURAL at 14:30

## 2023-10-25 RX ADMIN — DEXMEDETOMIDINE HYDROCHLORIDE 1.2 MCG/KG/HR: 4 INJECTION, SOLUTION INTRAVENOUS at 00:24

## 2023-10-25 RX ADMIN — AMIODARONE HYDROCHLORIDE 200 MG: 200 TABLET ORAL at 08:27

## 2023-10-25 RX ADMIN — OXYCODONE HYDROCHLORIDE 5 MG: 5 TABLET ORAL at 21:55

## 2023-10-25 RX ADMIN — PIPERACILLIN AND TAZOBACTAM 3.38 G: 3; .375 INJECTION, POWDER, LYOPHILIZED, FOR SOLUTION INTRAVENOUS at 19:01

## 2023-10-25 RX ADMIN — DEXMEDETOMIDINE HYDROCHLORIDE 1.2 MCG/KG/HR: 4 INJECTION, SOLUTION INTRAVENOUS at 07:57

## 2023-10-25 RX ADMIN — OXYCODONE HYDROCHLORIDE 5 MG: 5 TABLET ORAL at 10:09

## 2023-10-25 RX ADMIN — ACETAMINOPHEN 650 MG: 160 SOLUTION ORAL at 15:20

## 2023-10-25 RX ADMIN — DEXMEDETOMIDINE HYDROCHLORIDE 1.2 MCG/KG/HR: 4 INJECTION, SOLUTION INTRAVENOUS at 17:45

## 2023-10-25 RX ADMIN — ACETAMINOPHEN 650 MG: 160 SOLUTION ORAL at 01:33

## 2023-10-25 RX ADMIN — IPRATROPIUM BROMIDE AND ALBUTEROL SULFATE 3 ML: .5; 3 SOLUTION RESPIRATORY (INHALATION) at 13:12

## 2023-10-25 RX ADMIN — DEXMEDETOMIDINE HYDROCHLORIDE 1.2 MCG/KG/HR: 4 INJECTION, SOLUTION INTRAVENOUS at 22:30

## 2023-10-25 RX ADMIN — MICAFUNGIN SODIUM 100 MG: 50 INJECTION, POWDER, LYOPHILIZED, FOR SOLUTION INTRAVENOUS at 12:29

## 2023-10-25 RX ADMIN — ACETAMINOPHEN 650 MG: 160 SOLUTION ORAL at 19:55

## 2023-10-25 RX ADMIN — Medication 50 MCG: at 01:42

## 2023-10-25 RX ADMIN — DEXMEDETOMIDINE HYDROCHLORIDE 1.2 MCG/KG/HR: 4 INJECTION, SOLUTION INTRAVENOUS at 12:15

## 2023-10-25 RX ADMIN — FENTANYL CITRATE 100 MCG: 50 INJECTION, SOLUTION INTRAMUSCULAR; INTRAVENOUS at 14:22

## 2023-10-25 RX ADMIN — MAGNESIUM SULFATE HEPTAHYDRATE: 500 INJECTION, SOLUTION INTRAMUSCULAR; INTRAVENOUS at 20:58

## 2023-10-25 RX ADMIN — Medication 50 MCG: at 04:37

## 2023-10-25 RX ADMIN — INSULIN ASPART 1 UNITS: 100 INJECTION, SOLUTION INTRAVENOUS; SUBCUTANEOUS at 16:19

## 2023-10-25 RX ADMIN — METOPROLOL TARTRATE 50 MG: 25 TABLET, FILM COATED ORAL at 15:20

## 2023-10-25 RX ADMIN — FENTANYL CITRATE 50 MCG: 50 INJECTION, SOLUTION INTRAMUSCULAR; INTRAVENOUS at 14:29

## 2023-10-25 RX ADMIN — DEXMEDETOMIDINE HYDROCHLORIDE 1.2 MCG/KG/HR: 4 INJECTION, SOLUTION INTRAVENOUS at 04:59

## 2023-10-25 RX ADMIN — HYDRALAZINE HYDROCHLORIDE 10 MG: 10 TABLET ORAL at 08:42

## 2023-10-25 ASSESSMENT — ACTIVITIES OF DAILY LIVING (ADL)
ADLS_ACUITY_SCORE: 31

## 2023-10-25 NOTE — PROGRESS NOTES
RENAL PROGRESS NOTE      Assessment and Plan:  53 year old female s/p gastric b/p 10/9/23 c/w leak, s/p repair, peritonitis, perihepatic abscess. Nephrology is following for SUSSY,    ARF;  hemodynamic exacerbated by IV NSAIDS (lowish bp, vasodilatory drugs, mild intravascular depletion)==>ATN, now in recovery phase and creatinine improved though leveling off high 2's last several days. Hope to see more improvement with time. Creat was normal (0.7) PTA.  Urinary output remains in nonoliguric range with IV diuretics.  Mild hypernatremia-improved. Serum sodium is stable at 144 this am.  Monitor serum sodium daily.  Volume overload, now s/p diuresis, prob near euvolemia, bumex 2 mg/day is keeping her even. Given failed extubation, see if we can dry her out more. Net 3.7L negative since admission. Weight is stable in last 3 days. On IV bumex to 2mg bid.  No changes.  HoTN; infection/SIIRS resolved and now labile bp, very high when awake. Started on Metoprolol and Hydralazine as per cardiology this admission.I reviewed cardiology note from today.  Hyperlipid; on statin  Elevated LFTs Resolved  Resp failure; failed extubation. ?PNA, ?Volume. S/p right thoracocentesis 10/24, 400 ml removed. Tolerating PS trail this am. Management as per ICU team.   Acidosis; resolved   Nutrition; on TPN at 50 ml/h and on TF at 20 ml/h this afternoon  Cardiomyopathy-resolved, EF 10/23 was normal. I reviewed cardiology note from today.  Sepsis d/t peritonitis s/p washout. On IV antbx and Micafunging. ID is following.   We will follow.    Subjective  She was seen at the bedside and events were reviewed with nurses.  Patient had fevers overnight.   Patient is weaning off sedation. She reminds intubated on vent but tolerating weaning trail this am.  TF radha increased to 20 ml/h.  Patient urinary output remains in nonoliguric range ~ 100 ml/h with help of IV Bumex.  The patient is awake, alert. She is nodding yes or no to questions.  Patient  denies:  dizziness, chest pain, abdominal pain.  Objective    Vital signs in last 24 hours  Temp:  [99.7  F (37.6  C)-101.5  F (38.6  C)] 99.7  F (37.6  C)  Pulse:  [] 77  Resp:  [13-61] 28  BP: (118-203)/(56-87) 132/62  FiO2 (%):  [40 %] 40 %  SpO2:  [95 %-98 %] 97 %      Intake/Output last 3 shifts  I/O last 3 completed shifts:  In: 2452.52 [I.V.:1054.52; NG/GT:130]  Out: 3360 [Urine:2950; Drains:10; Stool:400]  Intake/Output this shift:  I/O this shift:  In: 396.67 [I.V.:140; NG/GT:90]  Out: 428 [Urine:375; Drains:3; Stool:50]    Physical Exam  Gen: NAD, awake, alert, Intubated . overly obese  HEENT: AT/NC   CV: RRR without murmur or rub  Lung: Coarse breath sounds in anterior fields b/l   Ab:distended incision closed with staples. Rectal tube in place, green color stool in bag.  Ext: improved edema of dorsum of both hands b/l and well perfused  : Patiño catheter in place, making yellow color urine.  Skin; no rash  Neuro: awake, alert    Pertinent Labs     Last Renal Panel:  Sodium   Date Value Ref Range Status   10/25/2023 144 135 - 145 mmol/L Final     Comment:     Reference intervals for this test were updated on 09/26/2023 to more accurately reflect our healthy population. There may be differences in the flagging of prior results with similar values performed with this method. Interpretation of those prior results can be made in the context of the updated reference intervals.    10/25/2023 144 135 - 145 mmol/L Final     Comment:     Reference intervals for this test were updated on 09/26/2023 to more accurately reflect our healthy population. There may be differences in the flagging of prior results with similar values performed with this method. Interpretation of those prior results can be made in the context of the updated reference intervals.  Reference intervals for this test were updated on 09/26/2023 to more accurately reflect our healthy population. There may be differences in the flagging of  prior results with similar values performed with this method. Interpretation of those prior results can be made in the context of the updated reference intervals.    11/17/2020 141 133 - 144 mmol/L Final     Potassium   Date Value Ref Range Status   10/25/2023 4.4 3.4 - 5.3 mmol/L Final   05/17/2022 4.5 3.4 - 5.3 mmol/L Final   11/17/2020 4.1 3.4 - 5.3 mmol/L Final     Chloride   Date Value Ref Range Status   10/25/2023 109 (H) 98 - 107 mmol/L Final   05/17/2022 102 94 - 109 mmol/L Final   11/17/2020 109 94 - 109 mmol/L Final     Carbon Dioxide   Date Value Ref Range Status   11/17/2020 31 20 - 32 mmol/L Final     Carbon Dioxide (CO2)   Date Value Ref Range Status   10/25/2023 24 22 - 29 mmol/L Final   05/17/2022 32 20 - 32 mmol/L Final     Anion Gap   Date Value Ref Range Status   10/25/2023 11 7 - 15 mmol/L Final   05/17/2022 2 (L) 3 - 14 mmol/L Final   11/17/2020 1 (L) 3 - 14 mmol/L Final     Glucose   Date Value Ref Range Status   05/17/2022 110 (H) 70 - 99 mg/dL Final   11/17/2020 84 70 - 99 mg/dL Final     Comment:     Fasting specimen     GLUCOSE BY METER POCT   Date Value Ref Range Status   10/25/2023 169 (H) 70 - 99 mg/dL Final     Urea Nitrogen   Date Value Ref Range Status   10/25/2023 89.5 (H) 6.0 - 20.0 mg/dL Final   05/17/2022 16 7 - 30 mg/dL Final   11/17/2020 9 7 - 30 mg/dL Final     Creatinine   Date Value Ref Range Status   10/25/2023 2.51 (H) 0.51 - 0.95 mg/dL Final   11/17/2020 0.79 0.52 - 1.04 mg/dL Final     GFR Estimate   Date Value Ref Range Status   10/25/2023 22 (L) >60 mL/min/1.73m2 Final   11/17/2020 88 >60 mL/min/[1.73_m2] Final     Comment:     Non  GFR Calc  Starting 12/18/2018, serum creatinine based estimated GFR (eGFR) will be   calculated using the Chronic Kidney Disease Epidemiology Collaboration   (CKD-EPI) equation.       Calcium   Date Value Ref Range Status   10/25/2023 8.9 8.6 - 10.0 mg/dL Final   11/17/2020 8.9 8.5 - 10.1 mg/dL Final     Phosphorus   Date  Value Ref Range Status   10/25/2023 4.3 2.5 - 4.5 mg/dL Final     Albumin   Date Value Ref Range Status   10/23/2023 3.2 (L) 3.5 - 5.2 g/dL Final   05/17/2022 4.0 3.4 - 5.0 g/dL Final   12/03/2018 3.6 3.4 - 5.0 g/dL Final     Recent Labs   Lab 10/25/23  0350 10/24/23  0333 10/23/23  0530 10/22/23  0520 10/21/23  0341   HGB 7.7* 8.5* 9.2* 9.2* 8.2*          I reviewed all lab results  Georgette Garnica MD  [facility]  [address], [address]  [place] [address]  Phone# [phone]  Fax# [phone]

## 2023-10-25 NOTE — PROGRESS NOTES
Interventional Radiology - Pre-Procedure Note:  Inpatient - [facility]  10/25/2023     Procedure Requested: Abscessogram  Requested by: JUSTINE MOE CNP    Brief HPI: Mojgan Jimenez is a 53 year old female with a PMH of LINA, HLD, methanol abuse, asthma, and JARVIS who was admitted to [facility] on 10/09/2023 for a planned sleeve gastrectomy with single anastomosis duodenal switch. Hospital course c/b encephalopathy, SUSSY, intra-abdominal abscesses, and delirium. Transferred to ICU, intubated, vasopressors initiated 10/12. Back to OR for closure of two small bowel enterotomies, drain placement, and wash out on 10/12. F/U CT demonstrated RUQ fluid collection - drain placed 10/19. Extubated 10/21 and reintubated 10/22. Reconsulted for possible drain removal 10/22 as output was minimal, however, the drain was not being flushed per bedside RN. CT from 10/22 demonstrated no significant change in RIGHT subdiaphragmatic air-fluid collection where the drain is located, see imaging below. Patient also underwent aspiration of pelvic fluid collection on 10/24, no drain placed.    Upon re-assessment of outputs, it appears they have slightly increased, however, when flushes are subtracted, they remain minimal. Requesting CT/abscessogram.     Drain Outputs (in mL with flushes subtracted per RN):  10/21 8   10/22 2   10/23 17   10/24 12.5   10/25 0     Culture:  Lactobacillus from abdomen, pelvic aspirate pending,   Antibiotic: Meropenem q12h, Micafungin daily, Vancomycin q48h  Flushing: 10 ml NS q8h    IMAGING:  CT Chest Abdomen Pelvis w/o Contrast 10/22/2023  EXAM: CT CHEST ABDOMEN PELVIS W/O CONTRAST  LOCATION: [facility]  DATE: 10/22/2023     INDICATION: Sepsis. treated for peritonitis and pneumonia.  10/09/2023 sleeve gastrectomy with single anastomosis duodenal switch complicated by bowel perforation status post 10/12/2023 washout and enterotomy closure and 10/19/2023  CT-guided drain   placement right subdiaphragmatic abscess.  COMPARISON: 10/18/2023 CT AP.  TECHNIQUE: CT scan of the chest, abdomen, and pelvis was performed without IV contrast. Multiplanar reformats were obtained. Dose reduction techniques were used.   CONTRAST: None.     FINDINGS:   LUNGS AND PLEURA: Complete right lower lobe atelectasis, mild passive atelectasis right upper and middle lobes posteriorly, small/moderate volume right pleural effusion and mild elevation right hemidiaphragm. Trace left pleural effusion and mild   platelike atelectasis left lower lobe unchanged.     MEDIASTINUM/AXILLAE: Heart size within normal limits with no pericardial effusion. Endotracheal tube tip is only 1 cm above the emilia.     CORONARY ARTERY CALCIFICATION: Trace calcified atheromatous plaque LAD coronary artery.     HEPATOBILIARY: Liver is normal.  No calcified gallstones or bile duct dilatation.      PANCREAS: Normal.     SPLEEN: Normal.     ADRENAL GLANDS: Normal.     KIDNEYS/BLADDER: Bladder is decompressed by well-positioned Patiño catheter. Kidneys, ureters and bladder are otherwise normal.     BOWEL: Sleeve gastrectomy and proximal duodenal to jejunal anastomosis with a well-positioned nasogastric tube and interval removal of the surgical drains.     Interval placement of percutaneous drainage catheter lateral aspect right subdiaphragmatic air-fluid collection surrounding the right hepatic lobe which has not changed significantly and continues to measure about 1 - 2 cm radial thickness. A 7 x 5 x 4   cm volume of nongas-containing fluid in the pelvic cul-de-sac is unchanged.     LYMPH NODES: No lymphadenopathy.     VASCULATURE: Normal caliber abdominal aorta.       PELVIC ORGANS: A 5 cm fibroid.     MUSCULOSKELETAL: Unremarkable.                                                                IMPRESSION:  1.  Sleeve gastrectomy and proximal duodenal to jejunal anastomosis with a well-positioned nasogastric tube and  "interval removal of the 2 surgical drains.  2.  Interval placement of a percutaneous drainage catheter with distal loop in the lateral aspect of the right subdiaphragmatic air-fluid collection surrounding the right hepatic lobe which has not changed significantly and continues to measure about 1-2 cm radial thickness.   3.  A 7 x 5 x 4 cm volume of nongas-containing fluid in the pelvic cul-de-sac is unchanged.  4.  Complete right lower lobe atelectasis, mild passive atelectasis right upper and middle lobes posteriorly, small/moderate volume right pleural effusion and mild elevation right hemidiaphragm.   5.  Endotracheal tube tip is only 1 cm above the emilia.    NPO: Intubated with NJ TF - can discontinue on call to IR  ANTICOAGULANTS: Subcutaneous heparin q8h, does not need to hold for procedure  ANTIBIOTICS: Not needed    ALLERGIES  No Known Allergies      LABS:  INR   Date Value Ref Range Status   10/23/2023 1.49 (H) 0.85 - 1.15 Final      Hemoglobin   Date Value Ref Range Status   10/25/2023 7.7 (L) 11.7 - 15.7 g/dL Final   11/17/2020 12.3 11.7 - 15.7 g/dL Final     Platelet Count   Date Value Ref Range Status   10/25/2023 354 150 - 450 10e3/uL Final   11/17/2020 228 150 - 450 10e9/L Final     Creatinine   Date Value Ref Range Status   10/25/2023 2.51 (H) 0.51 - 0.95 mg/dL Final   11/17/2020 0.79 0.52 - 1.04 mg/dL Final     Potassium   Date Value Ref Range Status   10/25/2023 4.4 3.4 - 5.3 mmol/L Final   05/17/2022 4.5 3.4 - 5.3 mmol/L Final   11/17/2020 4.1 3.4 - 5.3 mmol/L Final         EXAM:  BP (!) 176/76   Pulse 87   Temp (!) 100.8  F (38.2  C)   Resp 28   Ht 1.6 m (5' 3\")   Wt 123.8 kg (272 lb 14.9 oz)   LMP 09/09/2023 (Exact Date)   SpO2 98%   BMI 48.35 kg/m    General:  Stable. In no acute distress.    Neuro:  Sedated.  Resp:  Intubated. Lungs coarse bilaterally.  Cardio:  S1S2 and reg, without murmur, clicks or rubs.  Skin:  Without excoriations, ecchymosis, erythema, lesions or open sores. " Drain CDI, serous fluid noted in compressed bulb.    Pre-Sedation Assessment:  Mallampati Airway Classification: Intubated  Previous reaction to anesthesia/sedation:  No  Sedation plan based on assessment: Moderate (conscious) sedation as needed  ASA Classification: Class 3 - SEVERE SYSTEMIC DISEASE, DEFINITE FUNCTIONAL LIMITATIONS.   Code Status: FULL CODE      ASSESSMENT/PLAN:   Abscessogram with possible intervention with sedation as needed.    Procedural education reviewed with patient's daughter, Karla Jimenez, via phone, in detail including, but not limited to risks, benefits and alternatives with understanding verbalized by Karla Jimenez.    Total time spent on the date of the encounter: 45 minutes.      JUSTINE MOE CNP  Interventional Radiology

## 2023-10-25 NOTE — PROGRESS NOTES
Nutrition Therapy  Parenteral Nutrition follow-up       Dietitian to Pharmacy    With new bag this evening:    Rate of TPN: 40 ml/hr continuous  Grams Dextrose: 150 g   Grams Amino Acid: 50 g     Lipids: No lipids.     Continue TF at 20 ml/hr today.  Free water flush with meds.     Starting tomorrow consider increasing TF 5 ml/hr every 12 hrs to goal rate 50 ml/hr continuous.         Patient with fever, agitation per RN.   Yesterday patient had postpyloric FT placed, thoracentesis and aspiration of pelvic fluid.  Propofol stopped yesterday.  Bowel meds held as patient having liquid stooling.  MD increased TF rate this am to 20 ml/hr continuous.    Current Nutrition Intake:  Diet: NPO  Nutrition Support: Promote 20 ml/hr per NJ (MD increased rate this am)  No water flush.    TF to provide;  480 ml formula, 480 kcals, 30 g protein, 62 g carb, 0 fiber, 12 g lipid, 403 ml free water from formula.      Custom TPN per Central line:  150 g DEX, 65 g AA at 50 ml/hr continuous.  No lipids.    TPN provides 770 kcals, 65 g protein, 150 g carb, no lipid, 1200 ml fluid/day.    Together to provide:  1250 kcals, 95 g protein, 212 g carb. 1603 ml free water.    S/p gastrectomy sleeve-duodenal switch on 10/9.    S/p drain placed, closure of interotomies and NG placed on 10/12.   TPN start 10/14/23  Propofol stopped 10/15 due to elevated Triglyceride  Trickle TF start 10/20  Trickle TF stopped 10/21  Extubated 10/21  Re-intubated 10/22, Propofol restarted 10/22 with re-intubation.  Trickle TF started per NJ 10/24, Promote at 10 ml/hr continuous.    Propofol stopped 10/24.         Weight Trends  Admission wt: 130.5 kg (287 lb 12.8 oz) 10/9/23     Current wt: 123.8 kg (272 lb 14.9 oz) 10/25/23  Wt down with diuresis.  -3.79 L since 10/11/23.  I/Os: 3069/3412  10/24/23.      Dosing Weight: 52.3 kg (IBW)      ESTIMATED NUTRITION NEEDS   Energy: 0734-0351 kcals/day (22 - 25 kcals/kg)   Obese, Post-op, and Vented   Protein : 63-78 grams  protein/day (1.2 - 1.5 grams of pro/kg)   Obesity guidelines and Post-op, elevated creatinine   Fluid: 7383-9018+ mL/day (1 mL/kcal)   Maintenance      GI:  NJ 10/24/23, tip in jejunum just post anastomosis.  Rectal tube placed 10/23/23.  325 ml, watery out 10/24/23.    Skin-Surgical sites, drain.    Labs:   Sodium 144  Potassium 4.4  Creatinine 2.51 (H) though improving.  Glucose 143 (H)  Triglycerides 253 (H), Propofol stopped. Labs reviewed by dietitian    Medications:    amiodarone  200 mg Oral or Feeding Tube Daily    bumetanide  2 mg Intravenous Q12H    chlorhexidine  15 mL Mouth/Throat Q12H    [Held by provider] sennosides  5 mL Oral or Feeding Tube BID    And    [Held by provider] docusate  50 mg Oral or Feeding Tube BID    heparin ANTICOAGULANT  5,000 Units Subcutaneous Q8H    hydrALAZINE  10 mg Oral or Feeding Tube TID    insulin aspart  1-4 Units Subcutaneous Q4H    insulin glargine  10 Units Subcutaneous At Bedtime    ipratropium - albuterol 0.5 mg/2.5 mg/3 mL  3 mL Nebulization 4x daily    [Held by provider] lipids plant base  250 mL Intravenous Once per day on Monday Wednesday Friday    meropenem  1 g Intravenous Q12H    metoprolol tartrate  50 mg Oral or Feeding Tube TID    micafungin  100 mg Intravenous Q24H    nitroGLYcerin  1 patch Transdermal Daily    oxyCODONE  5 mg Oral or Feeding Tube Q12H    pantoprazole  40 mg Per Feeding Tube QAM AC    Or    pantoprazole  40 mg Intravenous QAM AC    [Held by provider] polyethylene glycol  17 g Oral or Feeding Tube Daily    QUEtiapine  100 mg Oral or Feeding Tube BID    sodium chloride (PF)  3 mL Intracatheter Q8H    vancomycin  1,500 mg Intravenous Q48H       dexmedeTOMIDine 1.2 mcg/kg/hr (10/25/23 0757)    dextrose      dextrose      dextrose      fentaNYL 50 mcg/hr (10/25/23 0639)    propofol Stopped (10/24/23 1446)    And    - MEDICATION INSTRUCTIONS -      norepinephrine Stopped (10/13/23 8401)    parenteral nutrition - ADULT compounded formula 50 mL/hr  at 10/24/23 2031      acetaminophen, acetaminophen **OR** acetaminophen, albuterol, albuterol, dextrose, dextrose, dextrose, glucose **OR** dextrose **OR** glucagon, glucose **OR** dextrose **OR** glucagon, diphenhydrAMINE **OR** diphenhydrAMINE, fentaNYL, hydrALAZINE, lidocaine 4%, lidocaine (buffered or not buffered), propofol **AND** propofol **AND** CK total **AND** Triglycerides **AND** - MEDICATION INSTRUCTIONS - **AND** Notify Physician, menthol-zinc oxide, miconazole, naloxone **OR** naloxone **OR** naloxone **OR** naloxone, OLANZapine, ondansetron **OR** ondansetron, phenol, prochlorperazine **OR** prochlorperazine, sodium chloride (PF)   Reviewed by dietitian       MALNUTRITION  Malnutrition Diagnosis: Patient does not meet two of the established criteria necessary for diagnosing malnutrition    Nutrition Diagnosis  Inadequate oral intake related to intubated and sedated as evidenced by NPO and need for TPN, Tube feeding   Evaluation: ongoing    Goals   Meet nutrition needs with TPN + TF- progressing   Electrolytes WNL - met  Tolerate TPN - met  Tolerate TF - progressing  TG <150 - not met  BG <180 - progressing    INTERVENTIONS  Tube feeding gradually increasing.  Rectal tube with watery output, expect malabsorption with enteral feeding.  Current formula is standard formula without fiber.  Will need TPN to continue to bridge transition to enteral and or po intake with malabsorption.  Continue to monitor for improved renal function.  When creatinine below 2 could increase protein provisions.      Tube feeding-continue at 20 ml/hr today.  Would continue to increase gradually with goal rate of 50 ml/hr continuous.  (Consider increasing 5 ml every 12 hrs to goal rate).  No free water currently.  Free water flush with meds.    TPN:  With new bag this evening:  Decrease Amino Acid to 50 g, continue 150 g Dextrose at 40 ml/hr continuous.    This to provide 710 kcals, 50 g protein, 150 g carb, 960 ml fluid.    TF  + TPN to meet nutrition needs.     Monitor hydration/fluid volumes      Monitoring/Evaluation  Progress toward goals will be monitored and evaluated per protocol.

## 2023-10-25 NOTE — PROGRESS NOTES
Imp/plan:  CM resolved - on metoprolol oral tolerating well (hx of asthma), will increase dose, cont hydralazine 10mg tid,NTG patch 0.4mg/24 hours,bumex 2mg iv q12  PVC - cont amiodarone 200mg daily, plan lower to 100mg on Friday    Current Facility-Administered Medications   Medication    acetaminophen (TYLENOL) solution 650 mg    acetaminophen (TYLENOL) tablet 650 mg    Or    acetaminophen (TYLENOL) Suppository 650 mg    albuterol (PROVENTIL HFA/VENTOLIN HFA) inhaler    albuterol (PROVENTIL) neb solution 2.5 mg    amiodarone (PACERONE) tablet 200 mg    bumetanide (BUMEX) injection 2 mg    chlorhexidine (PERIDEX) 0.12 % solution 15 mL    dexmedeTOMIDine (PRECEDEX) 4 mcg/mL in NS infusion    dextrose 10% infusion    dextrose 10% infusion    dextrose 10% infusion    glucose gel 15-30 g    Or    dextrose 50 % injection 25-50 mL    Or    glucagon injection 1 mg    glucose gel 15-30 g    Or    dextrose 50 % injection 25-50 mL    Or    glucagon injection 1 mg    diphenhydrAMINE (BENADRYL) capsule 25 mg    Or    diphenhydrAMINE (BENADRYL) injection 25 mg    [Held by provider] sennosides (SENOKOT) syrup 5 mL    And    [Held by provider] docusate (COLACE) 50 MG/5ML liquid 50 mg    fentaNYL (SUBLIMAZE) 50 mcg/mL bolus from pump    fentaNYL (SUBLIMAZE) infusion    heparin ANTICOAGULANT injection 5,000 Units    hydrALAZINE (APRESOLINE) injection 10 mg    hydrALAZINE (APRESOLINE) tablet 10 mg    insulin aspart (NovoLOG) injection (RAPID ACTING)    insulin glargine (LANTUS PEN) injection 10 Units    ipratropium - albuterol 0.5 mg/2.5 mg/3 mL (DUONEB) neb solution 3 mL    lidocaine (LMX4) cream    lidocaine 1 % 0.1-1 mL    [Held by provider] lipids plant base (CLINOLIPID) 20 % infusion 250 mL    propofol (DIPRIVAN) infusion    And    propofol (DIPRIVAN) bolus from bag or syringe pump    And    Medication Instruction    menthol-zinc oxide (CALMOSEPTINE) 0.44-20.6 % ointment OINT    meropenem (MERREM) 1 g vial to attach to   "mL bag    metoprolol tartrate (LOPRESSOR) tablet 25 mg    micafungin (MYCAMINE) 100 mg in sodium chloride 0.9 % 100 mL intermittent infusion    miconazole (MICATIN) 2 % powder    naloxone (NARCAN) injection 0.2 mg    Or    naloxone (NARCAN) injection 0.4 mg    Or    naloxone (NARCAN) injection 0.2 mg    Or    naloxone (NARCAN) injection 0.4 mg    nitroGLYcerin (NITRODUR) 0.4 MG/HR 24 hr patch 1 patch    norepinephrine (LEVOPHED) 4 mg in  mL infusion PREMIX    OLANZapine (zyPREXA) IV injection 2.5-5 mg    ondansetron (ZOFRAN ODT) ODT tab 4 mg    Or    ondansetron (ZOFRAN) injection 4 mg    oxyCODONE (ROXICODONE) tablet 5 mg    pantoprazole (PROTONIX) 2 mg/mL suspension 40 mg    Or    pantoprazole (PROTONIX) IV push injection 40 mg    parenteral nutrition - ADULT compounded formula    phenol (CHLORASEPTIC) 1.4 % spray 1-2 mL    [Held by provider] polyethylene glycol (MIRALAX) Packet 17 g    prochlorperazine (COMPAZINE) injection 10 mg    Or    prochlorperazine (COMPAZINE) tablet 10 mg    QUEtiapine (SEROquel) tablet 100 mg    sodium chloride (PF) 0.9% PF flush 3 mL    sodium chloride (PF) 0.9% PF flush 3 mL    vancomycin (VANCOCIN) 1,500 mg in sodium chloride 0.9 % 250 mL intermittent infusion     Past Medical History:   Diagnosis Date    LINA (generalized anxiety disorder) 11/02/2017    Hyperlipidemia LDL goal <160 01/20/2019    Major depressive disorder     Methanol abuse (H)     Mild persistent asthma without complication 11/02/2017    Obese     JARVIS on CPAP     Cpap not working    Paranoia (H)     resolved due to drugs    Vitamin D deficiency 11/02/2017        Review of Systems: 12 points negative other than above    BP (!) 203/87   Pulse 94   Temp (!) 100.9  F (38.3  C)   Resp (!) 41   Ht 1.6 m (5' 3\")   Wt 123.8 kg (272 lb 14.9 oz)   LMP 09/09/2023 (Exact Date)   SpO2 96%   BMI 48.35 kg/m    JVP<7cm, carotids normal  Lungs clear  Cor RRR no c,r,m  Abs soft +BS, no mass  Ext no c,c,e    Lab Results " "  Component Value Date    HGB 7.7 (L) 10/25/2023     Lab Results   Component Value Date     10/25/2023     No results found for: \"CREATININE\"  No components found for: \"K\"          Yohan Srinivasan MD  Interventional Cardiology   [facility]  [phone]    "

## 2023-10-25 NOTE — PHARMACY-CONSULT NOTE
"Pharmacy Note: Parenteral Nutrition (PN) Management    Pharmacist consulted to dose PN for Mojgan Jimenez, a 53 year old female by Dr. Cruz.    Subjective:    The patient is a new PN start.    The patient was started on PN in the hospital on 10/14/23.    Indication for PN therapy:  peritonitis    Inadequate nutrition existing for > 7 days.     Enteral nutrition contraindicated due to: peritonitis.    Pertinent diseases and other special considerations  sleeve gastrectomy 10/9/23    Social History     Tobacco Use    Smoking status: Never    Smokeless tobacco: Never   Vaping Use    Vaping Use: Never used   Substance Use Topics    Alcohol use: Not Currently     Comment: Sober x 8 months    Drug use: Not Currently     Types: Methamphetamines, \"Crack\" cocaine     Comment: in remision      Objective:    Ht Readings from Last 1 Encounters:   10/09/23 1.6 m (5' 3\")     Wt Readings from Last 1 Encounters:   10/25/23 123.8 kg (272 lb 14.9 oz)       Body mass index is 48.35 kg/m .    Patient Vitals for the past 96 hrs:   Weight   10/25/23 0436 123.8 kg (272 lb 14.9 oz)   10/24/23 0000 123.2 kg (271 lb 9.7 oz)   10/23/23 0620 124 kg (273 lb 5.9 oz)       Labs:  Last 3 days:  Recent Labs     10/22/23  1724 10/23/23  0018 10/23/23  0530 10/23/23  1837 10/24/23  0333 10/24/23  1817 10/25/23  0350   * 146* 149*  149* 146* 144  144 142 144  144   POTASSIUM  --   --  3.7  --  3.8  --  4.4   CHLORIDE  --   --  111*  --  107  --  109*   CO2  --   --  24  --  24  --  24   BUN  --   --  104.2*  --  92.2*  --  89.5*   CR  --   --  2.65*  --  2.52*  --  2.51*   PALAK  --   --  8.7  --  8.7  --  8.9   [id number]  --   --  1.09*  --   --   --   --    MAG  --   --  1.9  --   --   --  1.9   PHOS  --   --  4.2  --   --   --  4.3   PROTTOTAL  --   --  7.1  --  6.4  --   --    ALBUMIN  --   --  3.2*  --   --   --   --    PREALB  --   --  16  --   --   --   --    TRIG  --   --  485*  --   --   --  253*   HGB  --   --  9.2*  --  8.5*  --  " 7.7*   HCT  --   --  30.9*  --  27.9*  --  24.7*   PLT  --   --  427  --  358  --  354   BILITOTAL  --   --  0.3  --   --   --   --    AST  --   --  19  --   --   --   --    ALT  --   --  26  --   --   --   --    ALKPHOS  --   --  96  --   --   --   --    INR  --   --  1.49*  --   --   --   --        Glucose (past 48 hours):   Recent Labs     10/24/23  0603 10/24/23  0651 10/24/23  0812 10/24/23  1153 10/24/23  1618 10/24/23  2051 10/25/23  0020 10/25/23  0344 10/25/23  0350 10/25/23  0749   * 139* 128* 172* 173* 161* 159* 143* 143* 133*       Intake/Output (last 24 hours): I/O last 3 completed shifts:  In: 2452.52 [I.V.:1054.52; NG/GT:130]  Out: 3360 [Urine:2950; Drains:10; Stool:400]    Estimated CrCl: Estimated Creatinine Clearance: 33.1 mL/min (A) (based on SCr of 2.51 mg/dL (H)).    Assessment:    Continue patient on PN therapy as a continuous central therapy.     Given the patient's current condition/oral intake, PN is still indicated. Patient has started to receive tube feeding at a low rate, so it is not meeting all of her needs yet.     Lab results reviewed:   Potassium trending upward, will back off a little in TPN.  Magnesium has been at low end of range, will increase.     Plan:  Rate of PN: 40 mL/hr initially,   Formula:   Amino Acids 50 grams  Dextrose 150 grams  Sodium 0 mEq/day  Potassium 55 mEq/day  Calcium 6 mEq/day  Magnesium 10 mEq/day  Phosphorus 2 mMol/day  Chloride: Acetate Ratio Max Acetate  Standard Multivitamins w/Vitamin K  Trace Elements  Other Zinc 5 mg, and B-12 10 mcg  Fat Emulsion: none  Check BMP, Mg and Phos labs tomorrow.  Pharmacist will continue to follow the patient's lab results, clinical status and blood glucose results and make adjustments as appropriate.    Thank you for the consult.  Blossom Sanchez [facility]  10/25/2023 10:40 AM

## 2023-10-25 NOTE — PROGRESS NOTES
RT PROGRESS NOTE    VENT DAY# 4    CURRENT SETTINGS:   Vent Mode: CMV/AC  (Continuous Mandatory Ventilation/ Assist Control)  FiO2 (%): 40 %  Resp Rate (Set): 20 breaths/min  Tidal Volume (Set, mL): 380 mL  PEEP (cm H2O): 8 cmH2O  Pressure Support (cm H2O): 5 cmH2O  Resp: 17      PATIENT PARAMETERS:  PIP 15  Pplat:  20  Pmean:  10  Compliance: 31  SBT: Yes  Wean time: 225 min   RSBI 46   Failed wean due to: end of designated trial  Secretions:  Moderate red-tan thick  02 Sats:  97%  BS: diminished/clear    ETT SIZE 7.5 Secured at 19 cm at teeth/gums    Respiratory Medications: Duoneb QID       NOTE / SHIFT SUMMARY:   Patient weaned for 225 min on PS 5 peep 8 . Then placed on full support to rest for night. RT will continue to follow.     Emmanuelle Hernández, RT

## 2023-10-25 NOTE — PROGRESS NOTES
[facility]  Critical Care Progress Note    Summary:   Mojgan Jimenez 52 yo F PMH obesity, severe JARVIS on CPAP, asthma (no PFTs), stimulant use disorder, stimulant induced psychosis, mood & anxiety disorders       Presented 10/9/23 for scheduled laparoscopic sleeve gastrectomy with single anastomosis duodenal switch. She was later found to have two small bowel injuries with peritonitis. 10/12/23 returned to OR for small bowel interotomy closure, clean-out, & drain placement; 10/19 found to have RUQ fluid collection s/p drain placement; 10/24 s/p pelvic fluid aspiration. Course complicated by encephalopathy, delirium, septic shock, suspected takotsubo syndrome, acute respiratory failure due to loss of protective airway reflexes & increased metabolic load requiring intubation (10/12-10/21; reintubated 10/21-current for respiratory acidosis), & oliguric SUSSY with renal recovery.     Interval Events:  No events overnight. Fever overnight. Ongoing moderate HTN. Good UO, net -343 yesterday. Minimal drain output. BMP stable renal indices. WC stable 12.4, Hgb down 7.7, plts stable. No new micro data. Abscessogram today      Remains on fentanyl 50 mcg/h will wean to off & low dose enteral opioids, remains on precedex & antipsychotics, continued spontaneous breathing/awakening trials.      Assessment & Plan:   Goals of Care:  Life prolonging without limits        Neurology, Psychiatry, Sedation, Analgesia:  Acute metabolic encephalopathy secondary to sepsis, shock, renal failure      ICU acquired delirium with agitation   - quetiapine 50mg bid prn, use 1st   - olanzapine 2.5-5mg q8h prn    Sedation & Analgesia   - daily spontaneous awakening trials as able, RASS goal 0 to -1  - continue dexmedetomidine & antipsychotics as above   - continue oxycodone 5mg q12h   - propofol if needed      Cardiovascular:  Suspected takotsubo syndrome - resolved   1014/23 TTE demonstrated LVEF 30-35%, global hypokinesia.  10/23 TTE LVEF 60-65%, normal RV size & systolic function.   - cardiology was consulted   - cardiology considering afterload reduction options     Hypertension   - prn & scheduled hydralazine  - nitro patch q12h     Atrial fibrillation RVR  - amiodarone bid   - metoprolol tartrate bid     Septic shock - resolved       Respiratory, Airway:  Acute hypoxemic-hypercapnic respiratory failure   Suspected ventilator associated pneumonia   10/22/23 CT demonstrated complete RLL atelectasis, milder RUL & RBML atelectasis or consolidation. 10/22 bronchoscopy moderate RLL blood tinged secretions cleared.   - supplemental O2 to maintain spO2 >= 90-96% & PaO2 >= 60 mmHg  - lung protective ventilation (Pplat <30, driving pressure <15)  - daily spontaneous breathing trials as able    R-pleural effusion, exudative   Suspect simple parapneumonic effusion. 10/24 s/p thoracentesis, 400 mL   - added on effusion cell count, albumin, NT pro BNP; serum amylase     History of JARVIS, allusions to asthma in the chart  - NIPPV at extubation with sleep/naps   - continue duonebs qid   - consider steroid course     Gastrointestinal:  10/9 s/p sleeve gastrectomy complicated by small bowel injury & peritonitis   10/12 returned to OR for closure, clean-out, & drain placement (now removed)  10/19 s/p RUQ fluid collection drain placement   10/24 s/p pelvic fluid aspiration (75 ml)  - surgery consulted  - started enteral TF 10/24, remains on TPN  - flush drain at regular intervals      Renal, Acid-base, Electrolytes, Volume:  Oliguric SUSSY - improved   - nephrology was consulted     Hypervolemia  - bumex 2mg qd     Hypernatremia - improved       Infectious Disease:  Small bowel injury with peritonitis s/p closure, clean-out, & drain placement   RUQ fluid collection s/p drain placement   Pelvic fluid collection s/p aspiration (75 ml)  - ID was consulted   - 10/12 - peritoneal cultures + Streptococcus anginosus, mixed aerobic & anaerobic kamla   - 10/19 -  RUQ fluid cultures + Lactobacillus   - check venous US for thrombus in setting of fevers, consider C diff for loose stools  - pending - 10/24 pleural fluid studies, pelvic fluid aspirate   - continue vanc, margo, micafungin    Suspected ventilator associated pneumonia   - 10/20 BAL studies S epidermidis    - antimicrobials as above     Hematology, Oncology:  Neutrophilic leukocytosis secondary to infection & steroids     Thrombocytosis, reactive due to infection     Anemia - stable  Suspect multifactorial secondary to sepsis & surgical blood loss   - monitor      Endocrine:  Hyperglycemia of critical illness   - glargine 10 units every day  - LDSSI       Checklist:  FEN: TPN, TF  VTE ppx: sqh  GI ppx: pantoprazole   Bowel regimen: PEG     VAP ppx: Head of bed >30 degrees, daily oral care  Lines/tube-size: L-internal jugular CVC (10/12), PIVs, arevalo (10/12), post pyloric FT (1024), ETT 7.5 (10/12)  Skin: no lesions    PT/OT/SLP, early mobility: not consulted   Code Status: Full      Physical Exam:  Neurologic: Sedated. Awake, Alert. Opens eyes tracks & follows commands in all extremities.   HEENT: Head and face normal. No nasal discharge. Oropharynx normal. Eyelids, conjunctiva, & sclera normal.   Neck: Neck appearance normal. No neck masses. Thyroid not enlarged.  Respiratory: Lungs clear bilaterally.   Cardiovascular: Regular rate & rhythm  Normal S1 & S2. No murmurs, rubs. or gallops. No elevated JVP.    Gastrointestinal: Obese. Abdominal staples clean, dry, intact. RUQ drain.  Musculoskeletal: Skeletal configuration normal and muscle mass normal for age. Joint appearance overall normal.  Skin, Hair, & Nails: Skin color normal. No skin lesions. Hair & nails normal.  Extremities: 1+ lower extremity pitting edema    All pertinent vital signs, ventilator settings, I&Os, laboratory, microbiology, ECGs, & imaging data has been personally reviewed. Total Critical Care time, excluding procedures, was 50 minutes     AUNG Ramirez  Yousuf CRAMER  Critical Care Medicine

## 2023-10-25 NOTE — SEDATION DOCUMENTATION
Patient Name: Mojgan Jimenez  Medical Record Number: [id number]  Today's Date: 10/25/2023    Procedure: abscess tube exchange  Proceduralist: Dr. Barber    Procedure Start: 1425  Procedure end: 1431  Sedation medications administered: 3 mg midazolam and 150 mcg fentanyl   Sedation time: 6 minutes    Report given to: Phil Xie RN    Other Notes: Pt arrived to IR room 1 from ICU and CT exam. Consent reviewed. Pt intubated and agitated requiring additional sedation for procedure. Pt positioned supine and monitored per protocol. Pt tolerated procedure without any noted complications. VSS. Pt transferred back to ICU post procedure.

## 2023-10-25 NOTE — PROGRESS NOTES
"Care Management Follow Up    Length of Stay (days): 16    Expected Discharge Date: 10/29/2023     Concerns to be Addressed: Vent Day#3    Patient plan of care discussed at interdisciplinary rounds: Yes    Anticipated Discharge Disposition:  TBD     Anticipated Discharge Services:    Anticipated Discharge DME:      Patient/family educated on Medicare website which has current facility and service quality ratings:    Education Provided on the Discharge Plan:    Patient/Family in Agreement with the Plan:      Referrals Placed by CM/SW:  none at this time  Private pay costs discussed: Not applicable    Additional Information:  Social History per previous CM note:  \"From home with adult daughter and grandchild in an apartment. Independent at baseline and drives. Patient currently unemployed; saved up for procedures, plans to resume work once recovered. No community programs. Has a neb machine available for use due to Asthma. Goal to return home with family support. Family to transport if able. \"      Chart reviewed and plan of care discussed in ICU rounds. Pt presented on 10/9/23 for scheduled lap sleeve gastrectomy. Post procedure with complicated by abd pain. A rapid response was called due to encephalopathy, and ARF. Pt was admitted to ICU and intubated and started on pressors on 10/12. Pt was then taken to OR. S/p closure of two small bowel enterotomies, drain placement and wash out. Extubated on 10/21 then re-intubated 10/22 due to resp failure. Pt now Vent Day#3. Plan for today is an abscessogram. Work on nutrition and get off TPN.    RNCM to follow for medical progression, recommendations, and final discharge plan.      Rosario Shea RN      "

## 2023-10-25 NOTE — PROGRESS NOTES
Dr. Worthy ordered verbally at bedside an increase in tube feedings to a rate of 20 ml per.  Tube feeds now infusing at 20.  Will discuss with JYOTSNA

## 2023-10-25 NOTE — PRE-PROCEDURE
GENERAL PRE-PROCEDURE:   Procedure:  Abscessogram  Date/Time:  10/25/2023 9:41 AM    Written consent obtained?: Yes    Risks and benefits: Risks, benefits and alternatives were discussed    Consent given by: daughter.  Patient states understanding of procedure being performed: Yes    Patient's understanding of procedure matches consent: Yes    Procedure consent matches procedure scheduled: Yes    Expected level of sedation:  Moderate  Appropriately NPO:  Yes  ASA Class:  3  Mallampati  :  N/A- Alternate secured airway  Lungs:  Other (comment)  Lung exam comment:  Coarse bilat  Heart:  Normal heart sounds and rate  History & Physical reviewed:  History and physical reviewed and no updates needed  Statement of review:  I have reviewed the lab findings, diagnostic data, medications, and the plan for sedation

## 2023-10-25 NOTE — PROCEDURES
Interventional Radiology Post-Procedure Note   ?   Brief Procedure Note:   Patient name: Mojgan Jimenez  Pt MRN:[id number]   Date of procedure: 10/25/2023     Procedure(s): Drain exchange and reposition  Sedation method: Patient intubated and sedated  Pre Procedure Diagnosis: Perihepatic abscess  Post Procedure Diagnosis: Same  Indications: Low drain output with persistent collection    ?   Specimen(s) removed: None   Additional studies ordered: None  Drains: 12 fr drain  Estimated Blood Loss: Minimal  Complications: None  Vascular closure method: N/A    Findings/Notes/Comments: Drain repositioned superiorly along the hepatic dome with purulent return. Upsized drain to 12fr.   ?   Please see dictation in PACS or under the Imaging tab in [employer] for detailed procedure note.     Teofilo Barber M.D.   Vascular and Interventional Radiology   Pager: [phone]   After Hours / Scheduling: [phone]     10/25/2023  2:32 PM

## 2023-10-25 NOTE — PLAN OF CARE
[facility] - ICU    RN Progress Note:            Pertinent Assessments:      Please refer to flowsheet rows for full assessment     Fever           Key Events - This Shift:       Pt had a persistent fever which was minimally responsive to tylenol and ice packs. Intensivist notified and cooling blanket applied, temp began to fall after. When repositioning pt would become agitated and breathe over vent. Fentanyl boluses administered to maintain RASS.                     Barriers to Discharge / Downgrade:     Mechanical ventilation. IV sedation.

## 2023-10-25 NOTE — PROGRESS NOTES
"[facility] Progress Note    SUBJECTIVE:  complex pt.  Temps run 100-101        This part of the note is for my own memory:    Lap portals on belly  Open large incision for the washout  2 prior L sided MICKIE sites these tubes are out  RUQ drain placed oct 19--this pus had lacto  Today R lung tap, R deep pelvic collection also aspirated but no tube placed        REVIEW OF SYSTEMS:  Negative unless as listed above.  Social history, Family history, Medications: reviewed.    OBJECTIVE:  BP (!) 141/71   Pulse 74   Temp 99.3  F (37.4  C)   Resp (!) 31   Ht 1.6 m (5' 3\")   Wt 123.8 kg (272 lb 14.9 oz)   LMP 09/09/2023 (Exact Date)   SpO2 97%   BMI 48.35 kg/m                PHYSICAL EXAM:  Alert, awake  Vitals tabulated above, reviewed  Neck supple without lymphadenopathy  Sclera normal color, not injected  CARDIOVASCULAR regular rate and rhythm, no murmur  Lungs CLEAR TO AUSCULTATION   Abdomen soft, NT/ND, absent HEPATOSPLENOMEGALY  Skin normal  Joints normal  Neurologic exam non focal  Rash R torso is improved  See above for tubes, lines, drains and scars      Antibiotics:  See MAR,multiple   Pertinent labs:  Lab Results   Component Value Date    WBC 14.5 10/23/2023    WBC 5.5 11/17/2020     Lab Results   Component Value Date    RBC 3.16 10/23/2023    RBC 4.13 11/17/2020     Lab Results   Component Value Date    HGB 9.2 10/23/2023    HGB 12.3 11/17/2020     Lab Results   Component Value Date    HCT 30.9 10/23/2023    HCT 37.4 11/17/2020     No components found for: \"MCT\"  Lab Results   Component Value Date    MCV 98 10/23/2023    MCV 91 11/17/2020     Lab Results   Component Value Date    MCH 29.1 10/23/2023    MCH 29.8 11/17/2020     Lab Results   Component Value Date    MCHC 29.8 10/23/2023    MCHC 32.9 11/17/2020     Lab Results   Component Value Date    RDW 14.9 10/23/2023    RDW 12.7 11/17/2020     Lab Results   Component Value Date     10/23/2023     11/17/2020       Last " Comprehensive Metabolic Panel:  Sodium   Date Value Ref Range Status   10/25/2023 144 135 - 145 mmol/L Final     Comment:     Reference intervals for this test were updated on 09/26/2023 to more accurately reflect our healthy population. There may be differences in the flagging of prior results with similar values performed with this method. Interpretation of those prior results can be made in the context of the updated reference intervals.    10/25/2023 144 135 - 145 mmol/L Final     Comment:     Reference intervals for this test were updated on 09/26/2023 to more accurately reflect our healthy population. There may be differences in the flagging of prior results with similar values performed with this method. Interpretation of those prior results can be made in the context of the updated reference intervals.  Reference intervals for this test were updated on 09/26/2023 to more accurately reflect our healthy population. There may be differences in the flagging of prior results with similar values performed with this method. Interpretation of those prior results can be made in the context of the updated reference intervals.    11/17/2020 141 133 - 144 mmol/L Final     Potassium   Date Value Ref Range Status   10/25/2023 4.4 3.4 - 5.3 mmol/L Final   05/17/2022 4.5 3.4 - 5.3 mmol/L Final   11/17/2020 4.1 3.4 - 5.3 mmol/L Final     Chloride   Date Value Ref Range Status   10/25/2023 109 (H) 98 - 107 mmol/L Final   05/17/2022 102 94 - 109 mmol/L Final   11/17/2020 109 94 - 109 mmol/L Final     Carbon Dioxide   Date Value Ref Range Status   11/17/2020 31 20 - 32 mmol/L Final     Carbon Dioxide (CO2)   Date Value Ref Range Status   10/25/2023 24 22 - 29 mmol/L Final   05/17/2022 32 20 - 32 mmol/L Final     Anion Gap   Date Value Ref Range Status   10/25/2023 11 7 - 15 mmol/L Final   05/17/2022 2 (L) 3 - 14 mmol/L Final   11/17/2020 1 (L) 3 - 14 mmol/L Final     Glucose   Date Value Ref Range Status   05/17/2022 110 (H) 70  "- 99 mg/dL Final   11/17/2020 84 70 - 99 mg/dL Final     Comment:     Fasting specimen     GLUCOSE BY METER POCT   Date Value Ref Range Status   10/25/2023 169 (H) 70 - 99 mg/dL Final     Urea Nitrogen   Date Value Ref Range Status   10/25/2023 89.5 (H) 6.0 - 20.0 mg/dL Final   05/17/2022 16 7 - 30 mg/dL Final   11/17/2020 9 7 - 30 mg/dL Final     Creatinine   Date Value Ref Range Status   10/25/2023 2.51 (H) 0.51 - 0.95 mg/dL Final   11/17/2020 0.79 0.52 - 1.04 mg/dL Final     GFR Estimate   Date Value Ref Range Status   10/25/2023 22 (L) >60 mL/min/1.73m2 Final   11/17/2020 88 >60 mL/min/[1.73_m2] Final     Comment:     Non  GFR Calc  Starting 12/18/2018, serum creatinine based estimated GFR (eGFR) will be   calculated using the Chronic Kidney Disease Epidemiology Collaboration   (CKD-EPI) equation.       Calcium   Date Value Ref Range Status   10/25/2023 8.9 8.6 - 10.0 mg/dL Final   11/17/2020 8.9 8.5 - 10.1 mg/dL Final       Liver Function Studies -   Recent Labs   Lab Test 10/23/23  0530   PROTTOTAL 7.1   ALBUMIN 3.2*   BILITOTAL 0.3   ALKPHOS 96   AST 19   ALT 26       No results found for: \"SED\"    No results found for: \"CRP\"      NOTE BD glucan test was 406 which is +      MICROBIOLOGY DATA:  Lung fluid 76 wbc, no org  Pelvic fluid pending      IMAGING/RADIOLOGY:          ASSESSMENT:  Post gastric surgery  C/b HCAP,sepsis due to peritonitis post washout  Probably does not have TSS or TEN or DRESS based on totality of evidence  Fungitell + without clear specific site of a pure fungal infection, could be peritonitis though      RECOMMENDATION:  Same abx  See if any new fluid findings are of concern  We ID wise have streptococcal peritonitis and a lactobacillary RUQ abscess   Clinda, merrem, julio, vanco  Will swap zosyn for the merrem as need cover lacto    55 minutes on care today    CLEM Wilkerson MD  Office [phone] option 2 to desk staff  "

## 2023-10-25 NOTE — PROGRESS NOTES
General Surgery Progress Note    POST OP DAY  # S/P Lap Sleeve with Single Anastomosis DS 10/9/23 and then a Re-Operation on 10/12/2023 where it was found that she had 2 small enterotomies in the small bowel, likely form the traction of making the Duodenal anastomosis.  These were oversewn and the pt has had an NGT and drains since.    She was extubated Saturday 10/21 but then required reintubation overnight.  She had a Bronchoscopy yesterday    Yesterdaty she had a Thoracentesis of the R chest and 400 ml of clear fluid was aspirated   Her pelvic fluid collection was aspirated and 75 ml of yellow fluid was aspirated    Her heart rate is back in the 80's.  She continues to have low grade fevers right around 100.      Subjective:   Pt is intubated less sedated and more alert.     Vitals:    10/25/23 0553 10/25/23 0600 10/25/23 0651 10/25/23 0700   BP:  (!) 148/76  (!) 172/83   Pulse:  79  81   Resp:  27  28   Temp: 100.1  F (37.8  C)  99.7  F (37.6  C)    TempSrc: Esophageal      SpO2:  95%     Weight:       Height:         I's   980  O's 1225    Net - 244      Physical Exam:  Lungs:  CTA, bronchial breath sounds in the Right Lower Lung Fields.  CV:       RRR  Ab:       Soft, + BS, Staples in place;  Passing stool    Recent Labs   Lab 10/25/23  0350   WBC 12.4*   HGB 7.7*   HCT 24.7*                      Component  Ref Range & Units  3:50 AM  (10/25/23)  3:50 AM  (10/25/23) 1 d ago  (10/24/23) 1 d ago  (10/24/23) 1 d ago  (10/24/23) 2 d ago  (10/23/23) 2 d ago  (10/23/23) 2 d ago  (10/23/23)    Sodium  135 - 145 mmol/L 144 144     High   High   High  CM   Comment: Reference intervals for this test were updated on 09/26/2023 to more accurately reflect our healthy population. There may be differences in the flagging of prior results with similar values performed with this method. Interpretation of those prior results can be made in the context of the updated reference  intervals.    Potassium  3.4 - 5.3 mmol/L 4.4   3.8    3.7    Chloride  98 - 107 mmol/L 109 High    107    111 High     Carbon Dioxide (CO2)  22 - 29 mmol/L 24   24    24    Anion Gap  7 - 15 mmol/L 11   13    14    Urea Nitrogen  6.0 - 20.0 mg/dL 89.5 High    92.2 High     104.2 High     Creatinine  0.51 - 0.95 mg/dL 2.51 High    2.52 High     2.65 High     GFR Estimate  >60 mL/min/1.73m2 22 Low    22 Low     21 Low     Calcium  8.6 - 10.0 mg/dL 8.9   8.7    8.7    Glucose  70 - 99 mg/dL 143 High    119 High     126 High           Assessment:   WBC continues down.  The Right Chest and the intra-abdominal fluid collection has been tapped and will be looked at in Micro.  Mojgan looks to be improving on all fronts today.    Plan:             Work to transition over to Parenteral Nutrition and get off the TPN.      Hoang Worthy MD  [facility]  [phone]

## 2023-10-26 LAB
ANION GAP SERPL CALCULATED.3IONS-SCNC: 13 MMOL/L (ref 7–15)
BACTERIA ASPIRATE CULT: ABNORMAL
BUN SERPL-MCNC: 84.5 MG/DL (ref 6–20)
CALCIUM SERPL-MCNC: 8.9 MG/DL (ref 8.6–10)
CHLORIDE SERPL-SCNC: 110 MMOL/L (ref 98–107)
CREAT SERPL-MCNC: 2.4 MG/DL (ref 0.51–0.95)
CRP SERPL-MCNC: 282.4 MG/L
DEPRECATED HCO3 PLAS-SCNC: 22 MMOL/L (ref 22–29)
EGFRCR SERPLBLD CKD-EPI 2021: 23 ML/MIN/1.73M2
ERYTHROCYTE [DISTWIDTH] IN BLOOD BY AUTOMATED COUNT: 14.2 % (ref 10–15)
GLUCOSE BLDC GLUCOMTR-MCNC: 131 MG/DL (ref 70–99)
GLUCOSE BLDC GLUCOMTR-MCNC: 135 MG/DL (ref 70–99)
GLUCOSE BLDC GLUCOMTR-MCNC: 164 MG/DL (ref 70–99)
GLUCOSE BLDC GLUCOMTR-MCNC: 174 MG/DL (ref 70–99)
GLUCOSE BLDC GLUCOMTR-MCNC: 189 MG/DL (ref 70–99)
GLUCOSE BLDC GLUCOMTR-MCNC: 190 MG/DL (ref 70–99)
GLUCOSE BLDC GLUCOMTR-MCNC: 202 MG/DL (ref 70–99)
GLUCOSE SERPL-MCNC: 138 MG/DL (ref 70–99)
HCT VFR BLD AUTO: 26.6 % (ref 35–47)
HGB BLD-MCNC: 8.3 G/DL (ref 11.7–15.7)
MAGNESIUM SERPL-MCNC: 2.1 MG/DL (ref 1.7–2.3)
MCH RBC QN AUTO: 29.3 PG (ref 26.5–33)
MCHC RBC AUTO-ENTMCNC: 31.2 G/DL (ref 31.5–36.5)
MCV RBC AUTO: 94 FL (ref 78–100)
PHOSPHATE SERPL-MCNC: 3.2 MG/DL (ref 2.5–4.5)
PLATELET # BLD AUTO: 370 10E3/UL (ref 150–450)
POTASSIUM SERPL-SCNC: 3.9 MMOL/L (ref 3.4–5.3)
PROCALCITONIN SERPL IA-MCNC: 0.84 NG/ML
RBC # BLD AUTO: 2.83 10E6/UL (ref 3.8–5.2)
SODIUM SERPL-SCNC: 145 MMOL/L (ref 135–145)
VANCOMYCIN SERPL-MCNC: 10.3 UG/ML
WBC # BLD AUTO: 14.1 10E3/UL (ref 4–11)

## 2023-10-26 PROCEDURE — 250N000013 HC RX MED GY IP 250 OP 250 PS 637: Performed by: INTERNAL MEDICINE

## 2023-10-26 PROCEDURE — 250N000013 HC RX MED GY IP 250 OP 250 PS 637: Performed by: NURSE PRACTITIONER

## 2023-10-26 PROCEDURE — 99233 SBSQ HOSP IP/OBS HIGH 50: CPT | Performed by: INTERNAL MEDICINE

## 2023-10-26 PROCEDURE — 80048 BASIC METABOLIC PNL TOTAL CA: CPT | Performed by: STUDENT IN AN ORGANIZED HEALTH CARE EDUCATION/TRAINING PROGRAM

## 2023-10-26 PROCEDURE — 258N000003 HC RX IP 258 OP 636: Performed by: SURGERY

## 2023-10-26 PROCEDURE — 94640 AIRWAY INHALATION TREATMENT: CPT | Mod: 76

## 2023-10-26 PROCEDURE — 84145 PROCALCITONIN (PCT): CPT | Performed by: STUDENT IN AN ORGANIZED HEALTH CARE EDUCATION/TRAINING PROGRAM

## 2023-10-26 PROCEDURE — 250N000011 HC RX IP 250 OP 636: Mod: JZ | Performed by: STUDENT IN AN ORGANIZED HEALTH CARE EDUCATION/TRAINING PROGRAM

## 2023-10-26 PROCEDURE — 94640 AIRWAY INHALATION TREATMENT: CPT

## 2023-10-26 PROCEDURE — 250N000013 HC RX MED GY IP 250 OP 250 PS 637: Performed by: STUDENT IN AN ORGANIZED HEALTH CARE EDUCATION/TRAINING PROGRAM

## 2023-10-26 PROCEDURE — 258N000003 HC RX IP 258 OP 636: Performed by: NURSE PRACTITIONER

## 2023-10-26 PROCEDURE — 94003 VENT MGMT INPAT SUBQ DAY: CPT

## 2023-10-26 PROCEDURE — 250N000011 HC RX IP 250 OP 636: Mod: JZ | Performed by: NURSE PRACTITIONER

## 2023-10-26 PROCEDURE — 80202 ASSAY OF VANCOMYCIN: CPT | Performed by: SURGERY

## 2023-10-26 PROCEDURE — 250N000009 HC RX 250: Performed by: SURGERY

## 2023-10-26 PROCEDURE — 85027 COMPLETE CBC AUTOMATED: CPT | Performed by: STUDENT IN AN ORGANIZED HEALTH CARE EDUCATION/TRAINING PROGRAM

## 2023-10-26 PROCEDURE — 5A09457 ASSISTANCE WITH RESPIRATORY VENTILATION, 24-96 CONSECUTIVE HOURS, CONTINUOUS POSITIVE AIRWAY PRESSURE: ICD-10-PCS | Performed by: SURGERY

## 2023-10-26 PROCEDURE — 83735 ASSAY OF MAGNESIUM: CPT | Performed by: SURGERY

## 2023-10-26 PROCEDURE — 99232 SBSQ HOSP IP/OBS MODERATE 35: CPT | Performed by: INTERNAL MEDICINE

## 2023-10-26 PROCEDURE — 250N000009 HC RX 250: Performed by: INTERNAL MEDICINE

## 2023-10-26 PROCEDURE — 250N000011 HC RX IP 250 OP 636: Mod: JZ | Performed by: INTERNAL MEDICINE

## 2023-10-26 PROCEDURE — 999N000253 HC STATISTIC WEANING TRIALS

## 2023-10-26 PROCEDURE — 200N000001 HC R&B ICU

## 2023-10-26 PROCEDURE — 250N000011 HC RX IP 250 OP 636: Performed by: SURGERY

## 2023-10-26 PROCEDURE — 84100 ASSAY OF PHOSPHORUS: CPT | Performed by: SURGERY

## 2023-10-26 PROCEDURE — 86140 C-REACTIVE PROTEIN: CPT | Performed by: STUDENT IN AN ORGANIZED HEALTH CARE EDUCATION/TRAINING PROGRAM

## 2023-10-26 PROCEDURE — 84295 ASSAY OF SERUM SODIUM: CPT | Performed by: INTERNAL MEDICINE

## 2023-10-26 PROCEDURE — 999N000157 HC STATISTIC RCP TIME EA 10 MIN

## 2023-10-26 RX ORDER — METHYLPREDNISOLONE SODIUM SUCCINATE 125 MG/2ML
60 INJECTION, POWDER, LYOPHILIZED, FOR SOLUTION INTRAMUSCULAR; INTRAVENOUS ONCE
Status: COMPLETED | OUTPATIENT
Start: 2023-10-26 | End: 2023-10-26

## 2023-10-26 RX ORDER — HYDROMORPHONE HCL IN WATER/PF 6 MG/30 ML
0.2 PATIENT CONTROLLED ANALGESIA SYRINGE INTRAVENOUS EVERY 4 HOURS PRN
Status: DISCONTINUED | OUTPATIENT
Start: 2023-10-26 | End: 2023-11-21 | Stop reason: HOSPADM

## 2023-10-26 RX ORDER — CEFAZOLIN SODIUM 1 G/50ML
750 SOLUTION INTRAVENOUS EVERY 24 HOURS
Status: DISCONTINUED | OUTPATIENT
Start: 2023-10-27 | End: 2023-10-28

## 2023-10-26 RX ADMIN — INSULIN GLARGINE 10 UNITS: 100 INJECTION, SOLUTION SUBCUTANEOUS at 20:36

## 2023-10-26 RX ADMIN — CHLORHEXIDINE GLUCONATE 15 ML: 1.2 RINSE ORAL at 08:26

## 2023-10-26 RX ADMIN — OLANZAPINE 5 MG: 10 INJECTION, POWDER, LYOPHILIZED, FOR SOLUTION INTRAMUSCULAR at 03:55

## 2023-10-26 RX ADMIN — ACETAMINOPHEN 650 MG: 160 SOLUTION ORAL at 23:23

## 2023-10-26 RX ADMIN — HEPARIN SODIUM 5000 UNITS: 5000 INJECTION, SOLUTION INTRAVENOUS; SUBCUTANEOUS at 14:17

## 2023-10-26 RX ADMIN — VANCOMYCIN HYDROCHLORIDE 1500 MG: 5 INJECTION, POWDER, LYOPHILIZED, FOR SOLUTION INTRAVENOUS at 09:41

## 2023-10-26 RX ADMIN — ACETAMINOPHEN 650 MG: 160 SOLUTION ORAL at 14:17

## 2023-10-26 RX ADMIN — METOPROLOL TARTRATE 50 MG: 25 TABLET, FILM COATED ORAL at 08:26

## 2023-10-26 RX ADMIN — IPRATROPIUM BROMIDE AND ALBUTEROL SULFATE 3 ML: .5; 3 SOLUTION RESPIRATORY (INHALATION) at 16:44

## 2023-10-26 RX ADMIN — INSULIN ASPART 1 UNITS: 100 INJECTION, SOLUTION INTRAVENOUS; SUBCUTANEOUS at 12:38

## 2023-10-26 RX ADMIN — HEPARIN SODIUM 5000 UNITS: 5000 INJECTION, SOLUTION INTRAVENOUS; SUBCUTANEOUS at 22:59

## 2023-10-26 RX ADMIN — NITROGLYCERIN 1 PATCH: 0.4 PATCH TRANSDERMAL at 08:27

## 2023-10-26 RX ADMIN — DEXMEDETOMIDINE HYDROCHLORIDE 1.2 MCG/KG/HR: 4 INJECTION, SOLUTION INTRAVENOUS at 09:41

## 2023-10-26 RX ADMIN — METHYLPREDNISOLONE SODIUM SUCCINATE 62.5 MG: 125 INJECTION, POWDER, FOR SOLUTION INTRAMUSCULAR; INTRAVENOUS at 09:41

## 2023-10-26 RX ADMIN — INSULIN ASPART 1 UNITS: 100 INJECTION, SOLUTION INTRAVENOUS; SUBCUTANEOUS at 00:20

## 2023-10-26 RX ADMIN — DEXMEDETOMIDINE HYDROCHLORIDE 0.8 MCG/KG/HR: 4 INJECTION, SOLUTION INTRAVENOUS at 12:36

## 2023-10-26 RX ADMIN — MAGNESIUM SULFATE HEPTAHYDRATE: 500 INJECTION, SOLUTION INTRAMUSCULAR; INTRAVENOUS at 20:29

## 2023-10-26 RX ADMIN — PIPERACILLIN AND TAZOBACTAM 3.38 G: 3; .375 INJECTION, POWDER, LYOPHILIZED, FOR SOLUTION INTRAVENOUS at 18:49

## 2023-10-26 RX ADMIN — IPRATROPIUM BROMIDE AND ALBUTEROL SULFATE 3 ML: .5; 3 SOLUTION RESPIRATORY (INHALATION) at 20:57

## 2023-10-26 RX ADMIN — AMIODARONE HYDROCHLORIDE 200 MG: 200 TABLET ORAL at 08:26

## 2023-10-26 RX ADMIN — HYDRALAZINE HYDROCHLORIDE 10 MG: 20 INJECTION INTRAMUSCULAR; INTRAVENOUS at 23:33

## 2023-10-26 RX ADMIN — MICAFUNGIN SODIUM 100 MG: 50 INJECTION, POWDER, LYOPHILIZED, FOR SOLUTION INTRAVENOUS at 12:10

## 2023-10-26 RX ADMIN — INSULIN ASPART 1 UNITS: 100 INJECTION, SOLUTION INTRAVENOUS; SUBCUTANEOUS at 20:34

## 2023-10-26 RX ADMIN — HYDRALAZINE HYDROCHLORIDE 10 MG: 10 TABLET ORAL at 14:17

## 2023-10-26 RX ADMIN — PIPERACILLIN AND TAZOBACTAM 3.38 G: 3; .375 INJECTION, POWDER, LYOPHILIZED, FOR SOLUTION INTRAVENOUS at 12:37

## 2023-10-26 RX ADMIN — DEXMEDETOMIDINE HYDROCHLORIDE 1.2 MCG/KG/HR: 4 INJECTION, SOLUTION INTRAVENOUS at 06:57

## 2023-10-26 RX ADMIN — BUMETANIDE 2 MG: 0.25 INJECTION INTRAMUSCULAR; INTRAVENOUS at 20:37

## 2023-10-26 RX ADMIN — HYDRALAZINE HYDROCHLORIDE 10 MG: 10 TABLET ORAL at 08:26

## 2023-10-26 RX ADMIN — INSULIN ASPART 1 UNITS: 100 INJECTION, SOLUTION INTRAVENOUS; SUBCUTANEOUS at 16:14

## 2023-10-26 RX ADMIN — Medication 40 MG: at 08:26

## 2023-10-26 RX ADMIN — IPRATROPIUM BROMIDE AND ALBUTEROL SULFATE 3 ML: .5; 3 SOLUTION RESPIRATORY (INHALATION) at 07:55

## 2023-10-26 RX ADMIN — OXYCODONE HYDROCHLORIDE 5 MG: 5 TABLET ORAL at 20:37

## 2023-10-26 RX ADMIN — INSULIN ASPART 1 UNITS: 100 INJECTION, SOLUTION INTRAVENOUS; SUBCUTANEOUS at 23:26

## 2023-10-26 RX ADMIN — QUETIAPINE FUMARATE 50 MG: 25 TABLET ORAL at 05:34

## 2023-10-26 RX ADMIN — DEXMEDETOMIDINE HYDROCHLORIDE 0.9 MCG/KG/HR: 4 INJECTION, SOLUTION INTRAVENOUS at 04:17

## 2023-10-26 RX ADMIN — PIPERACILLIN AND TAZOBACTAM 3.38 G: 3; .375 INJECTION, POWDER, LYOPHILIZED, FOR SOLUTION INTRAVENOUS at 03:58

## 2023-10-26 RX ADMIN — HYDRALAZINE HYDROCHLORIDE 10 MG: 10 TABLET ORAL at 20:37

## 2023-10-26 RX ADMIN — BUMETANIDE 2 MG: 0.25 INJECTION INTRAMUSCULAR; INTRAVENOUS at 08:26

## 2023-10-26 RX ADMIN — IPRATROPIUM BROMIDE AND ALBUTEROL SULFATE 3 ML: .5; 3 SOLUTION RESPIRATORY (INHALATION) at 11:38

## 2023-10-26 RX ADMIN — METOPROLOL TARTRATE 50 MG: 25 TABLET, FILM COATED ORAL at 20:37

## 2023-10-26 RX ADMIN — ACETAMINOPHEN 650 MG: 160 SOLUTION ORAL at 04:56

## 2023-10-26 RX ADMIN — METOPROLOL TARTRATE 50 MG: 25 TABLET, FILM COATED ORAL at 14:17

## 2023-10-26 RX ADMIN — OXYCODONE HYDROCHLORIDE 5 MG: 5 TABLET ORAL at 08:26

## 2023-10-26 RX ADMIN — DEXMEDETOMIDINE HYDROCHLORIDE 1 MCG/KG/HR: 4 INJECTION, SOLUTION INTRAVENOUS at 01:27

## 2023-10-26 RX ADMIN — ACETAMINOPHEN 650 MG: 160 SOLUTION ORAL at 09:00

## 2023-10-26 RX ADMIN — ACETAMINOPHEN 650 MG: 160 SOLUTION ORAL at 00:24

## 2023-10-26 RX ADMIN — HEPARIN SODIUM 5000 UNITS: 5000 INJECTION, SOLUTION INTRAVENOUS; SUBCUTANEOUS at 05:34

## 2023-10-26 ASSESSMENT — ACTIVITIES OF DAILY LIVING (ADL)
ADLS_ACUITY_SCORE: 35
ADLS_ACUITY_SCORE: 31
ADLS_ACUITY_SCORE: 31
ADLS_ACUITY_SCORE: 35
ADLS_ACUITY_SCORE: 31
ADLS_ACUITY_SCORE: 35
ADLS_ACUITY_SCORE: 35
ADLS_ACUITY_SCORE: 31
ADLS_ACUITY_SCORE: 35
ADLS_ACUITY_SCORE: 31

## 2023-10-26 NOTE — PROGRESS NOTES
Patient was extubated after noon, tolerated bipap and now 3L NC. Voice is quiet and hoarse, tolerated mouth swabs. Patient states she feels hungry, she is aware she has tube feeding and TPN. Encouraged patient to do ROM while in bed, she is moving her arms, still weak grasp and able to move her ankles and toes.     Good urine output, no more bypassed urine since arevalo catheter was repositioned this morning. Balloon previously had 8ml in it reinserted 10ml.

## 2023-10-26 NOTE — PROGRESS NOTES
"[facility] Progress Note    SUBJECTIVE:  complex pt.  Temps are better.  A reposition of pelvic drain back again with same lactobacilli.        This part of the note is for my own memory:    Lap portals on belly  Open large incision for the washout  2 prior L sided MICKIE sites these tubes are out  RUQ drain placed oct 19--this pus had lacto  Today R lung tap, R deep pelvic collection also aspirated but no tube placed        REVIEW OF SYSTEMS:  Negative unless as listed above.  Social history, Family history, Medications: reviewed.    OBJECTIVE:  BP (!) 163/76 (BP Location: Left arm)   Pulse 78   Temp 98.4  F (36.9  C) (Oral)   Resp 28   Ht 1.6 m (5' 3\")   Wt 125.1 kg (275 lb 12.7 oz)   LMP 09/09/2023 (Exact Date)   SpO2 96%   BMI 48.86 kg/m                PHYSICAL EXAM:  Alert, awake  Vitals tabulated above, reviewed  Neck supple without lymphadenopathy  Sclera normal color, not injected  CARDIOVASCULAR regular rate and rhythm, no murmur  Lungs CLEAR TO AUSCULTATION   Abdomen soft, NT/ND, absent HEPATOSPLENOMEGALY  Skin normal  Joints normal  Neurologic exam non focal  Rash R torso is improved  See above for tubes, lines, drains and scars      Antibiotics:  See MAR,multiple   Pertinent labs:  Lab Results   Component Value Date    WBC 14.5 10/23/2023    WBC 5.5 11/17/2020     Lab Results   Component Value Date    RBC 3.16 10/23/2023    RBC 4.13 11/17/2020     Lab Results   Component Value Date    HGB 9.2 10/23/2023    HGB 12.3 11/17/2020     Lab Results   Component Value Date    HCT 30.9 10/23/2023    HCT 37.4 11/17/2020     No components found for: \"MCT\"  Lab Results   Component Value Date    MCV 98 10/23/2023    MCV 91 11/17/2020     Lab Results   Component Value Date    MCH 29.1 10/23/2023    MCH 29.8 11/17/2020     Lab Results   Component Value Date    MCHC 29.8 10/23/2023    MCHC 32.9 11/17/2020     Lab Results   Component Value Date    RDW 14.9 10/23/2023    RDW 12.7 11/17/2020     Lab " Results   Component Value Date     10/23/2023     11/17/2020       Last Comprehensive Metabolic Panel:  Sodium   Date Value Ref Range Status   10/26/2023 145 135 - 145 mmol/L Final     Comment:     Reference intervals for this test were updated on 09/26/2023 to more accurately reflect our healthy population. There may be differences in the flagging of prior results with similar values performed with this method. Interpretation of those prior results can be made in the context of the updated reference intervals.    10/26/2023 145 135 - 145 mmol/L Final     Comment:     Reference intervals for this test were updated on 09/26/2023 to more accurately reflect our healthy population. There may be differences in the flagging of prior results with similar values performed with this method. Interpretation of those prior results can be made in the context of the updated reference intervals.  Reference intervals for this test were updated on 09/26/2023 to more accurately reflect our healthy population. There may be differences in the flagging of prior results with similar values performed with this method. Interpretation of those prior results can be made in the context of the updated reference intervals.    11/17/2020 141 133 - 144 mmol/L Final     Potassium   Date Value Ref Range Status   10/26/2023 3.9 3.4 - 5.3 mmol/L Final   05/17/2022 4.5 3.4 - 5.3 mmol/L Final   11/17/2020 4.1 3.4 - 5.3 mmol/L Final     Chloride   Date Value Ref Range Status   10/26/2023 110 (H) 98 - 107 mmol/L Final   05/17/2022 102 94 - 109 mmol/L Final   11/17/2020 109 94 - 109 mmol/L Final     Carbon Dioxide   Date Value Ref Range Status   11/17/2020 31 20 - 32 mmol/L Final     Carbon Dioxide (CO2)   Date Value Ref Range Status   10/26/2023 22 22 - 29 mmol/L Final   05/17/2022 32 20 - 32 mmol/L Final     Anion Gap   Date Value Ref Range Status   10/26/2023 13 7 - 15 mmol/L Final   05/17/2022 2 (L) 3 - 14 mmol/L Final   11/17/2020 1 (L)  "3 - 14 mmol/L Final     Glucose   Date Value Ref Range Status   05/17/2022 110 (H) 70 - 99 mg/dL Final   11/17/2020 84 70 - 99 mg/dL Final     Comment:     Fasting specimen     GLUCOSE BY METER POCT   Date Value Ref Range Status   10/26/2023 202 (H) 70 - 99 mg/dL Final     Urea Nitrogen   Date Value Ref Range Status   10/26/2023 84.5 (H) 6.0 - 20.0 mg/dL Final   05/17/2022 16 7 - 30 mg/dL Final   11/17/2020 9 7 - 30 mg/dL Final     Creatinine   Date Value Ref Range Status   10/26/2023 2.40 (H) 0.51 - 0.95 mg/dL Final   11/17/2020 0.79 0.52 - 1.04 mg/dL Final     GFR Estimate   Date Value Ref Range Status   10/26/2023 23 (L) >60 mL/min/1.73m2 Final   11/17/2020 88 >60 mL/min/[1.73_m2] Final     Comment:     Non  GFR Calc  Starting 12/18/2018, serum creatinine based estimated GFR (eGFR) will be   calculated using the Chronic Kidney Disease Epidemiology Collaboration   (CKD-EPI) equation.       Calcium   Date Value Ref Range Status   10/26/2023 8.9 8.6 - 10.0 mg/dL Final   11/17/2020 8.9 8.5 - 10.1 mg/dL Final       Liver Function Studies -   Recent Labs   Lab Test 10/23/23  0530   PROTTOTAL 7.1   ALBUMIN 3.2*   BILITOTAL 0.3   ALKPHOS 96   AST 19   ALT 26       No results found for: \"SED\"    No results found for: \"CRP\"      NOTE BD glucan test was 406 which is +      MICROBIOLOGY DATA:  Lung fluid 76 wbc, no org  Pelvic fluid pending      IMAGING/RADIOLOGY:          ASSESSMENT:  Post gastric surgery  C/b HCAP,sepsis due to peritonitis post washout  Probably does not have TSS or TEN or DRESS based on totality of evidence  Fungitell + without clear specific site of a pure fungal infection, could be peritonitis though      RECOMMENDATION:  Same abx  We ID wise have streptococcal peritonitis and a lactobacillary RUQ perihepatic abscess   Clinda, ZOSYN , julio, denise Wilkerson MD  Office [phone] option 2 to desk staff  "

## 2023-10-26 NOTE — PROGRESS NOTES
RENAL PROGRESS NOTE      Assessment and Plan:  53 year old female s/p gastric b/p 10/9/23 c/w leak, s/p repair, peritonitis, perihepatic abscess. Nephrology is following for SUSSY,    ARF;  hemodynamic exacerbated by IV NSAIDS (lowish bp, vasodilatory drugs, mild intravascular depletion)==>ATN, now in recovery phase and creatinine improved to 2.4 mg/dl. Hope to see more improvement with time. Creat was normal (0.7) PTA.  Urinary output remains in nonoliguric range with IV diuretics.  Mild hypernatremia-improved. Serum sodium is stable at 145 this am.  Monitor serum sodium daily.  Volume overload, now s/p diuresis, prob near euvolemia, bumex 2 mg/day is keeping her even. Given failed extubation, see if we can dry her out more. Net 8.1L negative since admission. Weight is stable in last 3 days. On IV bumex to 2mg bid.  No changes.  HoTN; infection/SIIRS resolved and now labile bp, very high when awake. Started on Metoprolol and Hydralazine as per cardiology this admission.I reviewed cardiology note from today.  Hyperlipid; on statin  Elevated LFTs Resolved  Resp failure; failed extubation. ?PNA, ?Volume. S/p right thoracocentesis 10/24, 400 ml removed. Tolerating PS trail this am. Management as per ICU team.   Acidosis; resolved   Nutrition; on TPN at 40 ml/h and on TF at 20 ml/h this afternoon  Cardiomyopathy-resolved, EF 10/23 was normal. I reviewed cardiology note from today.  Sepsis d/t peritonitis s/p washout. On IV antbx and Micafunging. ID is following.   We will follow.    Subjective  She was seen at the bedside and events were reviewed with nurses.  Patient had fevers overnight.   Patient is weaning off sedation. She reminds intubated on vent but tolerating weaning trail this am. Plan is to extubate today. Tolerating  TF at 20 ml/h.  Patient urinary output remains in nonoliguric range with help of IV Bumex. Patiño cath was found to be mispositioned. New Patiño catheter was placed this am.   The patient is  somnolent, opens eyes to name but quickly falls back to sleep.   Patient accompanied by aunt at the bedside.  Updated on labs. All questions answered    Objective    Vital signs in last 24 hours  Temp:  [99.1  F (37.3  C)-101.7  F (38.7  C)] 99.1  F (37.3  C)  Pulse:  [] 71  Resp:  [10-54] 25  BP: (121-219)/() 141/66  FiO2 (%):  [35 %-40 %] 35 %  SpO2:  [90 %-100 %] 98 %      Intake/Output last 3 shifts  I/O last 3 completed shifts:  In: 2734.2 [I.V.:1143.2; NG/GT:628]  Out: 2388 [Urine:2225; Drains:13; Stool:150]  Intake/Output this shift:  I/O this shift:  In: 524.84 [I.V.:159.51; NG/GT:100]  Out: 645 [Urine:625; Drains:20]    Physical Exam  Gen: NAD,  Intubated . overly obese  HEENT: AT/NC   CV: RRR without murmur or rub  Lung: Coarse breath sounds in anterior fields b/l   Ab:distended incision closed with staples. Rectal tube in place, green color stool in bag.  Ext: improved edema of dorsum of both hands b/l and well perfused  : Patiño catheter in place, making yellow color urine.  Skin; no rash  Neuro: somnolent    Pertinent Labs     Last Renal Panel:  Sodium   Date Value Ref Range Status   10/26/2023 145 135 - 145 mmol/L Final     Comment:     Reference intervals for this test were updated on 09/26/2023 to more accurately reflect our healthy population. There may be differences in the flagging of prior results with similar values performed with this method. Interpretation of those prior results can be made in the context of the updated reference intervals.    10/26/2023 145 135 - 145 mmol/L Final     Comment:     Reference intervals for this test were updated on 09/26/2023 to more accurately reflect our healthy population. There may be differences in the flagging of prior results with similar values performed with this method. Interpretation of those prior results can be made in the context of the updated reference intervals.  Reference intervals for this test were updated on 09/26/2023 to more  accurately reflect our healthy population. There may be differences in the flagging of prior results with similar values performed with this method. Interpretation of those prior results can be made in the context of the updated reference intervals.    11/17/2020 141 133 - 144 mmol/L Final     Potassium   Date Value Ref Range Status   10/26/2023 3.9 3.4 - 5.3 mmol/L Final   05/17/2022 4.5 3.4 - 5.3 mmol/L Final   11/17/2020 4.1 3.4 - 5.3 mmol/L Final     Chloride   Date Value Ref Range Status   10/26/2023 110 (H) 98 - 107 mmol/L Final   05/17/2022 102 94 - 109 mmol/L Final   11/17/2020 109 94 - 109 mmol/L Final     Carbon Dioxide   Date Value Ref Range Status   11/17/2020 31 20 - 32 mmol/L Final     Carbon Dioxide (CO2)   Date Value Ref Range Status   10/26/2023 22 22 - 29 mmol/L Final   05/17/2022 32 20 - 32 mmol/L Final     Anion Gap   Date Value Ref Range Status   10/26/2023 13 7 - 15 mmol/L Final   05/17/2022 2 (L) 3 - 14 mmol/L Final   11/17/2020 1 (L) 3 - 14 mmol/L Final     Glucose   Date Value Ref Range Status   10/26/2023 138 (H) 70 - 99 mg/dL Final   05/17/2022 110 (H) 70 - 99 mg/dL Final   11/17/2020 84 70 - 99 mg/dL Final     Comment:     Fasting specimen     GLUCOSE BY METER POCT   Date Value Ref Range Status   10/26/2023 135 (H) 70 - 99 mg/dL Final     Urea Nitrogen   Date Value Ref Range Status   10/26/2023 84.5 (H) 6.0 - 20.0 mg/dL Final   05/17/2022 16 7 - 30 mg/dL Final   11/17/2020 9 7 - 30 mg/dL Final     Creatinine   Date Value Ref Range Status   10/26/2023 2.40 (H) 0.51 - 0.95 mg/dL Final   11/17/2020 0.79 0.52 - 1.04 mg/dL Final     GFR Estimate   Date Value Ref Range Status   10/26/2023 23 (L) >60 mL/min/1.73m2 Final   11/17/2020 88 >60 mL/min/[1.73_m2] Final     Comment:     Non  GFR Calc  Starting 12/18/2018, serum creatinine based estimated GFR (eGFR) will be   calculated using the Chronic Kidney Disease Epidemiology Collaboration   (CKD-EPI) equation.       Calcium   Date  Value Ref Range Status   10/26/2023 8.9 8.6 - 10.0 mg/dL Final   11/17/2020 8.9 8.5 - 10.1 mg/dL Final     Phosphorus   Date Value Ref Range Status   10/26/2023 3.2 2.5 - 4.5 mg/dL Final     Albumin   Date Value Ref Range Status   10/23/2023 3.2 (L) 3.5 - 5.2 g/dL Final   05/17/2022 4.0 3.4 - 5.0 g/dL Final   12/03/2018 3.6 3.4 - 5.0 g/dL Final     Recent Labs   Lab 10/26/23  0435 10/25/23  0350 10/24/23  0333 10/23/23  0530 10/22/23  0520   HGB 8.3* 7.7* 8.5* 9.2* 9.2*          I reviewed all lab results    ~ 50 Minutes today spent in exam, POC, education regarding renal disease and management, counseling and/or discussion with patient's care team.    Georgette Garnica MD  [facility]  [address] [place] [address], [address]  [place] [address]  Phone# [phone]  Fax# [phone]

## 2023-10-26 NOTE — PLAN OF CARE
[facility] - ICU    RN Progress Note:            Pertinent Assessments:      Please refer to flowsheet rows for full assessment     Agitation. Fever.           Key Events - This Shift:     Discussed plan for sedation drip with night intensivist, was instructed to continue precedex rather than switch to propofol. Attempted to wean precedex, got down to 0.9. Around 0345 pt became agitated with RR ~50 and biting the ET tube. IV zyprexa administered with minimal effect, put precedex back to 1.2. Pt still restless so prn seroquel was administered.     At start of shift pt developed a fever. Tylenol and ice packs applied. After administering more tylenol with little effect cooling blanket was eventually applied.                     Barriers to Discharge / Downgrade:     Mechanical ventilation. IV sedation gtt.

## 2023-10-26 NOTE — PHARMACY-CONSULT NOTE
"Pharmacy Note: Parenteral Nutrition (PN) Management    Pharmacist consulted to dose PN for Mojgan Jimenez, a 53 year old female by Dr. Cruz.    Subjective:    The patient is a new PN start.    The patient was started on PN in the hospital on 10/14/23.    Indication for PN therapy:  peritonitis    Inadequate nutrition existing for > 7 days.     Enteral nutrition contraindicated due to: peritonitis.    Pertinent diseases and other special considerations  sleeve gastrectomy on 10/9/23    Social History     Tobacco Use    Smoking status: Never    Smokeless tobacco: Never   Vaping Use    Vaping Use: Never used   Substance Use Topics    Alcohol use: Not Currently     Comment: Sober x 8 months    Drug use: Not Currently     Types: Methamphetamines, \"Crack\" cocaine     Comment: in remision      Objective:    Ht Readings from Last 1 Encounters:   10/09/23 1.6 m (5' 3\")     Wt Readings from Last 1 Encounters:   10/26/23 125.1 kg (275 lb 12.7 oz)       Body mass index is 48.86 kg/m .    Patient Vitals for the past 96 hrs:   Weight   10/26/23 0556 125.1 kg (275 lb 12.7 oz)   10/25/23 0436 123.8 kg (272 lb 14.9 oz)   10/24/23 0000 123.2 kg (271 lb 9.7 oz)   10/23/23 0620 124 kg (273 lb 5.9 oz)       Labs:  Last 3 days:  Recent Labs     10/23/23  1837 10/24/23  0333 10/24/23  1817 10/25/23  0350 10/25/23  1830 10/26/23  0435   * 144  144 142 144  144 145 145  145   POTASSIUM  --  3.8  --  4.4  --  3.9   CHLORIDE  --  107  --  109*  --  110*   CO2  --  24  --  24  --  22   BUN  --  92.2*  --  89.5*  --  84.5*   CR  --  2.52*  --  2.51*  --  2.40*   PALAK  --  8.7  --  8.9  --  8.9   MAG  --   --   --  1.9  --  2.1   PHOS  --   --   --  4.3  --  3.2   PROTTOTAL  --  6.4  --   --   --   --    TRIG  --   --   --  253*  --   --    HGB  --  8.5*  --  7.7*  --  8.3*   HCT  --  27.9*  --  24.7*  --  26.6*   PLT  --  358  --  354  --  370       Glucose (past 48 hours):   Recent Labs     10/25/23  0344 10/25/23  0350 10/25/23  0749 " 10/25/23  1124 10/25/23  1618 10/25/23  2045 10/26/23  0019 10/26/23  0425 10/26/23  0435 10/26/23  0824   * 143* 133* 169* 166* 163* 164* 131* 138* 135*       Intake/Output (last 24 hours): I/O last 3 completed shifts:  In: 2734.2 [I.V.:1143.2; NG/GT:628]  Out: 2388 [Urine:2225; Drains:13; Stool:150]    Estimated CrCl: Estimated Creatinine Clearance: 34.9 mL/min (A) (based on SCr of 2.4 mg/dL (H)).    Assessment:    Continue patient on PN therapy as a continuous central therapy.     Given the patient's current condition/oral intake, PN is still indicated.  RD is changing enteral formula to provide more fiber, and will continue to slowly advance rate. We are decreasing the macronutrient amounts proportionally in TPN.     Lab results reviewed:   Na remains at upper end of range - will increase water in TPN (increase rate), as we do not have any sodium in the formula.   No changes to electrolytes today.     Blood glucose remains controlled with subcutaneous insulins. Due to changing amounts of nutrition from enteral and parenteral sources, will not add any insulin to TPN.     Plan:  Rate of PN: 50 mL/hr   Formula:   Amino Acids 40 grams  Dextrose 140 grams  Sodium 0 mEq/day  Potassium 55 mEq/day  Calcium 6 mEq/day  Magnesium 10 mEq/day  Phosphorus 2 mMol/day  Chloride: Acetate Ratio Max Acetate\  Standard Multivitamins w/Vitamin K  Trace Elements  Other Zinc 5 mg and B-12 100 mcg  Fat Emulsion: none  Check BMP, Mg and Phos labs tomorrow.  Pharmacist will continue to follow the patient's lab results, clinical status and blood glucose results and make adjustments as appropriate.    Thank you for the consult.  Blossom Sanchez [facility]  10/26/2023 11:04 AM

## 2023-10-26 NOTE — PROGRESS NOTES
"Care Management Follow Up    Length of Stay (days): 17    Expected Discharge Date: 10/29/2023     Concerns to be Addressed: plan to extubate today    Patient plan of care discussed at interdisciplinary rounds: Yes    Anticipated Discharge Disposition:  TBD     Anticipated Discharge Services:    Anticipated Discharge DME:      Patient/family educated on Medicare website which has current facility and service quality ratings:    Education Provided on the Discharge Plan:    Patient/Family in Agreement with the Plan:      Referrals Placed by CM/SW:  none at this time  Private pay costs discussed: Not applicable    Additional Information:  Social History per previous CM note:  \"From home with adult daughter and grandchild in an apartment. Independent at baseline and drives. Patient currently unemployed; saved up for procedures, plans to resume work once recovered. No community programs. Has a neb machine available for use due to Asthma. Goal to return home with family support. Family to transport if able. \"      Chart reviewed and plan of care discussed in ICU rounds. Pt presented on 10/9/23 for scheduled lap sleeve gastrectomy. Post procedure with complicated by abd pain. A rapid response was called due to encephalopathy, and ARF. Pt was admitted to ICU and intubated and started on pressors on 10/12. Pt was then taken to OR. S/p closure of two small bowel enterotomies, drain placement and wash out. Extubated on 10/21 then re-intubated 10/22 due to resp failure. Pt now Vent Day#4. Per rounds plan to extubate today and stop amiodarone.    RNCM to follow for medical progression, recommendations, and final discharge plan.    Rosario Shea RN      "

## 2023-10-26 NOTE — PROGRESS NOTES
General Surgery Progress Note    POST OP DAY  # S/P Lap Sleeve with Single Anastomosis DS 10/9/23 and then a Re-Operation on 10/12/2023 where it was found that she had 2 small enterotomies in the small bowel, likely form the traction of making the Duodenal anastomosis.  These were oversewn and the pt has had an NGT and drains since.    She was extubated Saturday 10/21 but then required reintubation overnight.  She had a Bronchoscopy yesterday    They were weaning back on her sedation yesterday but she became agitated  and her RR went up to 50.    Subjective:   Pt is currently sedated and intubated     Vitals:    10/26/23 0621 10/26/23 0630 10/26/23 0659 10/26/23 0700   BP:  (!) 150/69  (!) 145/67   Pulse:  79  73   Resp:  30  28   Temp: 99.8  F (37.7  C)  99.3  F (37.4  C) 99.2  F (37.3  C)   TempSrc: Esophageal  Esophageal Esophageal   SpO2:    97%   Weight:       Height:           Physical Exam:  Lungs:  CTA, bronchial breath sounds in the Right Lower Lung Fields.  CV:       RRR  Ab:       Soft, + BS, Staples in place;  Passing stool    Recent Labs   Lab 10/26/23  0435   WBC 14.1*   HGB 8.3*   HCT 26.6*                      Component  Ref Range & Units  4:35 AM  (10/26/23)  4:35 AM  (10/26/23) 1 d ago  (10/25/23) 1 d ago  (10/25/23) 1 d ago  (10/25/23) 2 d ago  (10/24/23) 2 d ago  (10/24/23) 2 d ago  (10/24/23)    Sodium  135 - 145 mmol/L 145 145       CM   Comment: Reference intervals for this test were updated on 09/26/2023 to more accurately reflect our healthy population. There may be differences in the flagging of prior results with similar values performed with this method. Interpretation of those prior results can be made in the context of the updated reference intervals.    Potassium  3.4 - 5.3 mmol/L 3.9    4.4  3.8     Chloride  98 - 107 mmol/L 110 High     109 High   107     Carbon Dioxide (CO2)  22 - 29 mmol/L 22    24  24     Anion Gap  7 - 15 mmol/L 13    11   13     Urea Nitrogen  6.0 - 20.0 mg/dL 84.5 High     89.5 High   92.2 High      Creatinine  0.51 - 0.95 mg/dL 2.40 High     2.51 High   2.52 High      GFR Estimate  >60 mL/min/1.73m2 23 Low     22 Low   22 Low      Calcium  8.6 - 10.0 mg/dL 8.9    8.9  8.7     Glucose  70 - 99 mg/dL 138 High     143 High   119 High             Assessment:   Attempted to wean on sedation yesterday but that was not well tolerated.  She continues with Low Grade Fever and WBC is up  a bit to 14 today..    Plan:          1.   Work to transition over to Parenteral Nutrition and get off the TPN.           2.   Wean off Vent    Hoang Worthy MD  [facility]  [phone]

## 2023-10-26 NOTE — PROGRESS NOTES
Nutrition Therapy    Parenteral Nutrition follow-up       Dietitian to Pharmacy  With this evening's bag:  Rate of TPN: 50  Grams Dextrose: 140g  Grams Amino Acid:40g    Lipids: none    Tube Feeding  change to Promote with fiber.  Increase 5 ml every 12 hrs to goal 50 ml/hr continuous.  Free water flush 15 ml every 4 hrs.         Current Nutrition Intake:  Diet: NPO  Nutrition Support: Promote at 20 ml/hr continuous.  (TF rate was 10 ml/hr overnight)  Free water with meds  TF to provide;  480 ml formula, 480 kcals, 30 g protein, 62 g carb, 0 fiber, 12 g lipid, 403 ml free water from formula.     Custom TPN per central line:  150 g DEX, 50 g AA at 40 ml/hr continuous.  TPN provides:  710 kcals, 50 g protein, 150 g carb, 960 ml fluid.     S/p gastrectomy sleeve-duodenal switch on 10/9.    S/p drain placed, closure of interotomies and NG placed on 10/12.   TPN start 10/14/23  Propofol stopped 10/15 due to elevated Triglyceride  Trickle TF start 10/20  Trickle TF stopped 10/21  Extubated 10/21  Re-intubated 10/22, Propofol restarted 10/22 with re-intubation.  Trickle TF started per NJ 10/24, Promote at 10 ml/hr continuous.    Propofol stopped 10/24.    New Findings:  Patient awake this am, working on vent wean.  Patient's aunt visiting.    Discussed patient in team rounds.  Pt needing more fluid, plan to increase fluid volume in TPN.      Weight Trends  Admission wt: 130.5 kg (287 lb 12.8 oz) 10/9/23   Current wt: 125.1 kg (275 lb 12.7 oz) 10/26/23.  Overall wt is down with diuresis.     I/Os:  2364/2463  10/25/23.  Outputs include:    Rectal tube 200 ml, liquid (down from 325 ml previous day)  Drains 13 ml 10/25/23  RN reports urine was bypassing arevalo, bypass amount not counted in I/Os.     Skin:  surgical sites, drain.  NJ placed 10/23/23.    Labs:  sodium 145  Creatinine 2.4 (H) though improving  CRP inflammation 282.4 (H)  BG range 131-169  Triglyceride 253 (H) 10/25/23.    Medications:   Drips:  Precedex  Bumex,  hydralazine, lantus and Novolog insulin, Mycamine, nitroglycerin, protonix, zosyn,  vancocin    MALNUTRITION  Malnutrition Diagnosis: Patient does not meet two of the established criteria necessary for diagnosing malnutrition     Nutrition Diagnosis  Inadequate oral intake related to intubated and sedated as evidenced by NPO and need for TPN, Tube feeding   Evaluation: ongoing    Goals   Meet nutrition needs with TPN + TF- progressing   Electrolytes WNL - met  Tolerate TPN - met  Tolerate TF - progressing  TG <150 - not met  BG <180 - progressing    INTERVENTIONS  Tube feeding gradually increasing.  Rectal tube with watery output, expect malabsorption with enteral feeding.  Current formula is standard formula without fiber.  Will need TPN to continue to bridge transition to enteral and or po intake with malabsorption.  Continue to monitor for improved renal function.  When creatinine below 2 could increase protein provisions.      Enteral Nutrition - Modify - change to fiber formula:  Promote with fiber.  increase gradually 5 ml/hr every 12 hrs to goal rate 50 ml/hr continuous.  Free water flush 15 ml every 4 hrs = 90 ml/day.   Patient getting additional water with meds.  To reach goal rate in 3.5 days.     (Promote with fiber @ goal of  50ml/hr  (1200ml/day) provides: 1200 kcals, 75 g PRO, 997 ml free H20, 165 g CHO, 17 g fiber, 33 g fat daily. )    Parenteral Nutrition/IV Fluids - for new bag this evening increase volume and adjust macronutrients:  40 g AA, 140 g DEX at 50 ml/hr continuous.  This to provide 636 kcals, 40 g protein, 140 g carb, no lipid, 1200 ml fluid.     TF + TPN to meet estimated needs.   Continue to monitor TF and TPN tolerance daily.  Adjust TPN daily.    Plan to increase protein provisions when creatinine is below 2.    Monitoring/Evaluation  Progress toward goals will be monitored and evaluated per protocol.

## 2023-10-26 NOTE — PROGRESS NOTES
Respiratory Care    Vent day 4    Vent Mode: CMV/AC  (Continuous Mandatory Ventilation/ Assist Control)  FiO2 (%): 40 %  Resp Rate (Set): 20 breaths/min  Tidal Volume (Set, mL): 380 mL  PEEP (cm H2O): 8 cmH2O  Pressure Support (cm H2O): 8 cmH2O  Resp: (!) 33    Notes/plan: pt weaned for about 5 hours on 8/8. RR varied 16-40. MD aware. OK to continue with SBTs. Weaned stopped for procedure. Suctioned moderate tan secretions. Will continue with daily weaning trials as pt tolerates.      Guillermo Keyes, RT

## 2023-10-26 NOTE — PROGRESS NOTES
Respiratory Care    Pt weaned for almost 3 hours PS 5 PEEP 8 doing well. OK to extubate per MD. No cuff leak. MD aware. Extubated to BiPAP. No acute issues noted. Settings: 14/8 30% sats 93-94. Plan to use with naps and overnight.      Guillermo Keyes, RT

## 2023-10-26 NOTE — PROGRESS NOTES
[facility], [place]  Critical Care Progress Note    Summary:   Mojgan Jimenez 54 yo F PMH obesity, severe JARVIS on CPAP, asthma (no PFTs), stimulant use disorder, stimulant induced psychosis, mood & anxiety disorders       Presented 10/9/23 for scheduled laparoscopic sleeve gastrectomy with single anastomosis duodenal switch. She was later found to have two small bowel injuries with peritonitis. 10/12/23 returned to OR for small bowel interotomy closure, clean-out, & drain placement; 10/19 found to have RUQ fluid collection s/p drain placement; 10/24 s/p pelvic fluid aspiration. Course complicated by encephalopathy, delirium, septic shock, suspected takotsubo syndrome, acute respiratory failure due to loss of protective airway reflexes & increased metabolic load requiring intubation (10/12-10/21; reintubated 10/21-10/26), & oliguric SUSSY with renal recovery.     Interval Events:  Febrile 101.7 overnight, moderate HTN, FiO2 40%,UO 2.2 L or net even, drain output 10-15 mL daily. BMP downtrending Cr, stable lytes, Na 145, WC waxing waning up from previous 2 days to 14.1, procal 0.84, , Hgb & plts stable     Sedated on precedex overnight, increase FW, prn hydromorphone for pain, one time dose of methylpred for absent cuff leak (which was absent at last admission with ETT 7.5 & no stridor at extubation)     Assessment & Plan:  Goals of Care:  Life prolonging without limits        Neurology, Psychiatry, Sedation, Analgesia:  Acute metabolic encephalopathy secondary to sepsis, shock, renal failure      ICU acquired delirium with agitation   - quetiapine 50mg bid prn, use 1st   - olanzapine 2.5-5mg q8h prn    Sedation & Analgesia   - daily spontaneous awakening trials as able, RASS goal 0 to -1  - continue dexmedetomidine & antipsychotics as above   - continue oxycodone 5mg q12h, dillaudid 0.2 mg q4h prn   - propofol if needed      Cardiovascular:  Suspected takotsubo syndrome - resolved   1014/23 TTE  demonstrated LVEF 30-35%, global hypokinesia. 10/23 TTE LVEF 60-65%, normal RV size & systolic function.   - cardiology was consulted     Hypertension   - prn & scheduled hydralazine  - nitro patch q12h     Atrial fibrillation RVR  - stopping amiodarone   - metoprolol tartrate bid     Septic shock - resolved       Respiratory, Airway:  Acute hypoxemic-hypercapnic respiratory failure - improved   Suspected ventilator associated pneumonia   10/22/23 CT demonstrated complete RLL atelectasis, milder RUL & RBML atelectasis or consolidation. 10/22 bronchoscopy moderate RLL blood tinged secretions cleared.   - supplemental O2 to maintain spO2 >= 90-96% & PaO2 >= 60 mmHg  - pulmonary hygiene: acapella QID, IS Q2H while awake, out of bed to chair, PT/OT as able     R-pleural effusion, exudative   Suspect simple parapneumonic effusion. 10/24 s/p thoracentesis, 400 mL. Pleural fluid amylase amylase 23, serum 64.   - added on effusion cell count, albumin, NT pro BNP     History of JARVIS, allusions to asthma in the chart  - NIPPV at extubation with sleep/naps   - continue duonebs qid   - consider steroid course     Gastrointestinal:  10/9 s/p sleeve gastrectomy complicated by small bowel injury & peritonitis   10/12 returned to OR for closure, clean-out, & drain placement (now removed)  10/19 s/p RUQ fluid collection drain placement   10/24 s/p pelvic fluid aspiration (75 ml)  - surgery consulted  - started enteral TF 10/24, remains on TPN  - flush drain at regular intervals      Renal, Acid-base, Electrolytes, Volume:  Oliguric SUSSY - improved   - nephrology was consulted     Hypervolemia  - bumex 2mg qd     Hypernatremia - improved       Infectious Disease:  Small bowel injury with peritonitis s/p closure, clean-out, & drain placement   RUQ fluid collection s/p drain placement   Pelvic fluid collection s/p aspiration (75 ml)  - ID was consulted   - 10/12 - peritoneal cultures + Streptococcus anginosus, mixed aerobic & anaerobic  kamla   - 10/19 - RUQ fluid cultures + Lactobacillus   - 10/22 - BDG blood +  - pending - 10/24 pleural fluid studies, pelvic fluid aspirate   - vanc, pip-tazo, micafungin    Suspected ventilator associated pneumonia   - 10/20 BAL studies S epidermidis    - antimicrobials as above     Ongoing fevers   10/25 venous US upper & lower extremities negative   - consider getting of precedex, C diff testing     Hematology, Oncology:  Neutrophilic leukocytosis secondary to infection & steroids     Thrombocytosis, reactive due to infection     Anemia - stable  Suspect multifactorial secondary to sepsis & surgical blood loss   - monitor      Endocrine:  Hyperglycemia of critical illness   - glargine 10 units every day  - LDSSI       Checklist:  FEN: TPN, TF  VTE ppx: sqh  GI ppx: pantoprazole   Bowel regimen: PEG     VAP ppx: Head of bed >30 degrees, daily oral care  Lines/tube-size: L-internal jugular CVC (10/12), PIVs, arevalo (10/12), post pyloric FT (1024)  Skin: no lesions    PT/OT/SLP, early mobility: consulted PT/OT/SLP  Code Status: Full      Physical Exam:  Neurologic: Sedated. Awake, Alert. Opens eyes tracks & follows commands in all extremities.   HEENT: Head and face normal. No nasal discharge. Oropharynx normal. Eyelids, conjunctiva, & sclera normal.   Neck: Neck appearance normal. No neck masses. Thyroid not enlarged.  Respiratory: Lungs clear bilaterally.   Cardiovascular: Regular rate & rhythm  Normal S1 & S2. No murmurs, rubs. or gallops. No elevated JVP.    Gastrointestinal: Obese. Abdominal staples clean, dry, intact. RUQ drain.  Musculoskeletal: Skeletal configuration normal and muscle mass normal for age. Joint appearance overall normal.  Skin, Hair, & Nails: Skin color normal. No skin lesions. Hair & nails normal.  Extremities: 1+ lower extremity pitting edema    All pertinent vital signs, ventilator settings, I&Os, laboratory, microbiology, ECGs, & imaging data has been personally reviewed. Total Critical  Care time, excluding procedures, was 50 minutes     AUNG To MD  Critical Care Medicine

## 2023-10-26 NOTE — PROGRESS NOTES
Imp/plan:  CM resolved - HTN improved on metoprolol,hydralazine, NTG patch,Cr improving today  PVC - controlled on metoprolol and amiodarone, will stop amiodarone to avoid     Current Facility-Administered Medications   Medication    acetaminophen (TYLENOL) solution 650 mg    acetaminophen (TYLENOL) tablet 650 mg    Or    acetaminophen (TYLENOL) Suppository 650 mg    albuterol (PROVENTIL HFA/VENTOLIN HFA) inhaler    albuterol (PROVENTIL) neb solution 2.5 mg    amiodarone (PACERONE) tablet 200 mg    bumetanide (BUMEX) injection 2 mg    chlorhexidine (PERIDEX) 0.12 % solution 15 mL    dexmedeTOMIDine (PRECEDEX) 4 mcg/mL in NS infusion    dextrose 10% infusion    dextrose 10% infusion    glucose gel 15-30 g    Or    dextrose 50 % injection 25-50 mL    Or    glucagon injection 1 mg    diphenhydrAMINE (BENADRYL) capsule 25 mg    Or    diphenhydrAMINE (BENADRYL) injection 25 mg    [Held by provider] sennosides (SENOKOT) syrup 5 mL    And    [Held by provider] docusate (COLACE) 50 MG/5ML liquid 50 mg    heparin ANTICOAGULANT injection 5,000 Units    hydrALAZINE (APRESOLINE) injection 10 mg    hydrALAZINE (APRESOLINE) tablet 10 mg    insulin aspart (NovoLOG) injection (RAPID ACTING)    insulin glargine (LANTUS PEN) injection 10 Units    ipratropium - albuterol 0.5 mg/2.5 mg/3 mL (DUONEB) neb solution 3 mL    lidocaine (LMX4) cream    lidocaine 1 % 0.1-1 mL    propofol (DIPRIVAN) infusion    And    propofol (DIPRIVAN) bolus from bag or syringe pump    And    Medication Instruction    menthol-zinc oxide (CALMOSEPTINE) 0.44-20.6 % ointment OINT    metoprolol tartrate (LOPRESSOR) tablet 50 mg    micafungin (MYCAMINE) 100 mg in sodium chloride 0.9 % 100 mL intermittent infusion    miconazole (MICATIN) 2 % powder    naloxone (NARCAN) injection 0.2 mg    Or    naloxone (NARCAN) injection 0.4 mg    Or    naloxone (NARCAN) injection 0.2 mg    Or    naloxone (NARCAN) injection 0.4 mg    nitroGLYcerin (NITRODUR) 0.4 MG/HR 24 hr patch  "1 patch    norepinephrine (LEVOPHED) 4 mg in  mL infusion PREMIX    OLANZapine (zyPREXA) IV injection 2.5-5 mg    ondansetron (ZOFRAN ODT) ODT tab 4 mg    Or    ondansetron (ZOFRAN) injection 4 mg    oxyCODONE (ROXICODONE) tablet 5 mg    pantoprazole (PROTONIX) 2 mg/mL suspension 40 mg    Or    pantoprazole (PROTONIX) IV push injection 40 mg    parenteral nutrition - ADULT compounded formula    phenol (CHLORASEPTIC) 1.4 % spray 1-2 mL    piperacillin-tazobactam (ZOSYN) 3.375 g vial to attach to  mL bag    [Held by provider] polyethylene glycol (MIRALAX) Packet 17 g    prochlorperazine (COMPAZINE) injection 10 mg    Or    prochlorperazine (COMPAZINE) tablet 10 mg    QUEtiapine (SEROquel) tablet 50 mg    sodium chloride (PF) 0.9% PF flush 3 mL    sodium chloride (PF) 0.9% PF flush 3 mL    vancomycin (VANCOCIN) 1,500 mg in sodium chloride 0.9 % 250 mL intermittent infusion     Past Medical History:   Diagnosis Date    LINA (generalized anxiety disorder) 11/02/2017    Hyperlipidemia LDL goal <160 01/20/2019    Major depressive disorder     Methanol abuse (H)     Mild persistent asthma without complication 11/02/2017    Obese     JARVIS on CPAP     Cpap not working    Paranoia (H)     resolved due to drugs    Vitamin D deficiency 11/02/2017        Review of Systems: 12 points negative other than above    BP (!) 145/67   Pulse 73   Temp 99.2  F (37.3  C) (Esophageal)   Resp 28   Ht 1.6 m (5' 3\")   Wt 125.1 kg (275 lb 12.7 oz)   LMP 09/09/2023 (Exact Date)   SpO2 97%   BMI 48.86 kg/m    JVP<7cm, carotids normal  Lungs clear  Cor RRR no c,r,m  Abs soft +BS, no mass  Ext no c,c,e    Lab Results   Component Value Date    HGB 8.3 (L) 10/26/2023     Lab Results   Component Value Date     10/26/2023     No results found for: \"CREATININE\"  No components found for: \"K\"          Yohan Srinivasan MD  Interventional Cardiology   [facility]  [phone]    "

## 2023-10-26 NOTE — PHARMACY-VANCOMYCIN DOSING SERVICE
"Pharmacy Vancomycin Note  Date of Service 2023  Patient's  1970   53 year old, female    Indication: Sepsis  Day of Therapy: 5  Current vancomycin regimen:  1500 mg IV q48h  Current vancomycin monitoring method: AUC  Current vancomycin therapeutic monitoring goal: 400-600 mg*h/L    [employer] Prediction of Current Vancomycin Regimen  Regimen: 1500 mg IV every 48 hours.  Start time: 09:41 on 10/28/2023  Exposure target: AUC24 (range)400-600 mg/L.hr   AUC24,ss: 472 mg/L.hr  Probability of AUC24 > 400: 94 %  Ctrough,ss: 8.8 mg/L  Probability of Ctrough,ss > 20: 0 %  Probability of nephrotoxicity (Lodise FABIENNE ): 5 %  ed monitoring)}    Current estimated CrCl = Estimated Creatinine Clearance: 34.9 mL/min (A) (based on SCr of 2.4 mg/dL (H)).    Creatinine for last 3 days  10/24/2023:  3:33 AM Creatinine 2.52 mg/dL  10/25/2023:  3:50 AM Creatinine 2.51 mg/dL  10/26/2023:  4:35 AM Creatinine 2.40 mg/dL    Recent Vancomycin Levels (past 3 days)  10/24/2023:  3:33 AM Vancomycin 13.9 ug/mL  10/26/2023:  8:52 AM Vancomycin 10.3 ug/mL    Vancomycin IV Administrations (past 72 hours)                     vancomycin (VANCOCIN) 1,500 mg in sodium chloride 0.9 % 250 mL intermittent infusion (mg) 1,500 mg New Bag 10/26/23 0941     1,500 mg New Bag 10/24/23 1218                    Nephrotoxins and other renal medications (From now, onward)      Start     Dose/Rate Route Frequency Ordered Stop    10/25/23 1900  piperacillin-tazobactam (ZOSYN) 3.375 g vial to attach to  mL bag        Note to Pharmacy: For [facility], [facility] and [facility]: For Zosyn-naive patients, use the \"Zosyn initial dose + extended infusion\" order panel.    3.375 g  over 240 Minutes Intravenous EVERY 8 HOURS 10/25/23 1222      10/24/23 1000  vancomycin (VANCOCIN) 1,500 mg in sodium chloride 0.9 % 250 mL intermittent infusion         1,500 mg  over 90 Minutes Intravenous EVERY 48 HOURS 10/24/23 0732      10/22/23 2100  bumetanide (BUMEX) injection 2 mg     "     2 mg Intravenous EVERY 12 HOURS 10/22/23 0927      10/12/23 1630  norepinephrine (LEVOPHED) 4 mg in  mL infusion PREMIX         0.01-0.6 mcg/kg/min × 130.7 kg  4.9-294.1 mL/hr  Intravenous CONTINUOUS 10/12/23 1614                 Contrast Orders - past 72 hours (72h ago, onward)      Start     Dose/Rate Route Frequency Stop    10/25/23 1500  iohexol (OMNIPAQUE) 350 MG/ML injectable solution 50 mL         50 mL Intravenous ONCE 10/25/23 1438    10/24/23 1100  diatrizoate meglumine-sodium (GASTROGRAFIN/GASTROVIEW) 66-10 % solution 120 mL         120 mL Oral ONCE 10/24/23 1051            Interpretation of levels and current regimen:  Vancomycin level is reflective of -600    Has serum creatinine changed greater than 50% in last 72 hours: No    Urine output:  good urine output    Renal Function: Improving    Insight[employer] Prediction of Planned New Vancomycin Regimen  Loading dose: N/A  Regimen: 750 mg IV every 24 hours.  Start time: 12:00 on 10/28/2023  Exposure target: AUC24 (range)400-600 mg/L.hr   AUC24,ss: 468 mg/L.hr  Probability of AUC24 > 400: 92 %  Ctrough,ss: 13.3 mg/L  Probability of Ctrough,ss > 20: 0 %  Probability of nephrotoxicity (Lodise FABIENNE 2009): 8 %      Plan:  Change dose to 750 mg IV q24h (same daily dose as previous regimen) for more convenient scheduling  Vancomycin monitoring method: AUC  Vancomycin therapeutic monitoring goal: 400-600 mg*h/L  Pharmacy will check vancomycin levels as appropriate in 1-3 Days.  Serum creatinine levels will be ordered daily for the first week of therapy and at least twice weekly for subsequent weeks.    Blossom Sanchez, [facility]

## 2023-10-27 ENCOUNTER — APPOINTMENT (OUTPATIENT)
Dept: SPEECH THERAPY | Facility: HOSPITAL | Age: 53
End: 2023-10-27
Attending: STUDENT IN AN ORGANIZED HEALTH CARE EDUCATION/TRAINING PROGRAM
Payer: COMMERCIAL

## 2023-10-27 ENCOUNTER — APPOINTMENT (OUTPATIENT)
Dept: OCCUPATIONAL THERAPY | Facility: HOSPITAL | Age: 53
End: 2023-10-27
Attending: STUDENT IN AN ORGANIZED HEALTH CARE EDUCATION/TRAINING PROGRAM
Payer: COMMERCIAL

## 2023-10-27 ENCOUNTER — APPOINTMENT (OUTPATIENT)
Dept: PHYSICAL THERAPY | Facility: HOSPITAL | Age: 53
End: 2023-10-27
Attending: STUDENT IN AN ORGANIZED HEALTH CARE EDUCATION/TRAINING PROGRAM
Payer: COMMERCIAL

## 2023-10-27 LAB
ANION GAP SERPL CALCULATED.3IONS-SCNC: 11 MMOL/L (ref 7–15)
BACTERIA ASPIRATE CULT: ABNORMAL
BACTERIA ASPIRATE CULT: ABNORMAL
BACTERIA BLD CULT: NO GROWTH
BACTERIA BLD CULT: NO GROWTH
BUN SERPL-MCNC: 80.7 MG/DL (ref 6–20)
CALCIUM SERPL-MCNC: 9.5 MG/DL (ref 8.6–10)
CHLORIDE SERPL-SCNC: 111 MMOL/L (ref 98–107)
CREAT SERPL-MCNC: 2.06 MG/DL (ref 0.51–0.95)
DEPRECATED HCO3 PLAS-SCNC: 25 MMOL/L (ref 22–29)
EGFRCR SERPLBLD CKD-EPI 2021: 28 ML/MIN/1.73M2
ERYTHROCYTE [DISTWIDTH] IN BLOOD BY AUTOMATED COUNT: 13.9 % (ref 10–15)
GLUCOSE BLDC GLUCOMTR-MCNC: 123 MG/DL (ref 70–99)
GLUCOSE BLDC GLUCOMTR-MCNC: 145 MG/DL (ref 70–99)
GLUCOSE BLDC GLUCOMTR-MCNC: 147 MG/DL (ref 70–99)
GLUCOSE BLDC GLUCOMTR-MCNC: 170 MG/DL (ref 70–99)
GLUCOSE BLDC GLUCOMTR-MCNC: 176 MG/DL (ref 70–99)
GLUCOSE BLDC GLUCOMTR-MCNC: 183 MG/DL (ref 70–99)
GLUCOSE SERPL-MCNC: 182 MG/DL (ref 70–99)
HCT VFR BLD AUTO: 26.6 % (ref 35–47)
HGB BLD-MCNC: 8.2 G/DL (ref 11.7–15.7)
MAGNESIUM SERPL-MCNC: 2.2 MG/DL (ref 1.7–2.3)
MCH RBC QN AUTO: 28.7 PG (ref 26.5–33)
MCHC RBC AUTO-ENTMCNC: 30.8 G/DL (ref 31.5–36.5)
MCV RBC AUTO: 93 FL (ref 78–100)
MISCELLANEOUS TEST 1 (ARUP): ABNORMAL
PHOSPHATE SERPL-MCNC: 3.4 MG/DL (ref 2.5–4.5)
PLATELET # BLD AUTO: 428 10E3/UL (ref 150–450)
POTASSIUM SERPL-SCNC: 4 MMOL/L (ref 3.4–5.3)
RBC # BLD AUTO: 2.86 10E6/UL (ref 3.8–5.2)
SODIUM SERPL-SCNC: 147 MMOL/L (ref 135–145)
SODIUM SERPL-SCNC: 147 MMOL/L (ref 135–145)
SODIUM SERPL-SCNC: 150 MMOL/L (ref 135–145)
WBC # BLD AUTO: 14.8 10E3/UL (ref 4–11)

## 2023-10-27 PROCEDURE — 200N000001 HC R&B ICU

## 2023-10-27 PROCEDURE — 97530 THERAPEUTIC ACTIVITIES: CPT | Mod: GP

## 2023-10-27 PROCEDURE — 92610 EVALUATE SWALLOWING FUNCTION: CPT | Mod: GN

## 2023-10-27 PROCEDURE — 999N000157 HC STATISTIC RCP TIME EA 10 MIN

## 2023-10-27 PROCEDURE — 84295 ASSAY OF SERUM SODIUM: CPT | Performed by: INTERNAL MEDICINE

## 2023-10-27 PROCEDURE — 258N000003 HC RX IP 258 OP 636: Performed by: SURGERY

## 2023-10-27 PROCEDURE — 258N000003 HC RX IP 258 OP 636: Performed by: HOSPITALIST

## 2023-10-27 PROCEDURE — 99232 SBSQ HOSP IP/OBS MODERATE 35: CPT | Performed by: INTERNAL MEDICINE

## 2023-10-27 PROCEDURE — 250N000011 HC RX IP 250 OP 636: Mod: JZ | Performed by: INTERNAL MEDICINE

## 2023-10-27 PROCEDURE — 80048 BASIC METABOLIC PNL TOTAL CA: CPT | Performed by: STUDENT IN AN ORGANIZED HEALTH CARE EDUCATION/TRAINING PROGRAM

## 2023-10-27 PROCEDURE — 99207 PR NO BILLABLE SERVICE THIS VISIT: CPT | Performed by: HOSPITALIST

## 2023-10-27 PROCEDURE — 999N000287 HC ICU ADULT ROUNDING, EACH 10 MINS

## 2023-10-27 PROCEDURE — 97162 PT EVAL MOD COMPLEX 30 MIN: CPT | Mod: GP

## 2023-10-27 PROCEDURE — 250N000013 HC RX MED GY IP 250 OP 250 PS 637: Performed by: STUDENT IN AN ORGANIZED HEALTH CARE EDUCATION/TRAINING PROGRAM

## 2023-10-27 PROCEDURE — 250N000013 HC RX MED GY IP 250 OP 250 PS 637: Performed by: NURSE PRACTITIONER

## 2023-10-27 PROCEDURE — 97167 OT EVAL HIGH COMPLEX 60 MIN: CPT | Mod: GO

## 2023-10-27 PROCEDURE — 85027 COMPLETE CBC AUTOMATED: CPT | Performed by: STUDENT IN AN ORGANIZED HEALTH CARE EDUCATION/TRAINING PROGRAM

## 2023-10-27 PROCEDURE — 250N000009 HC RX 250: Performed by: INTERNAL MEDICINE

## 2023-10-27 PROCEDURE — 97535 SELF CARE MNGMENT TRAINING: CPT | Mod: GO

## 2023-10-27 PROCEDURE — 250N000011 HC RX IP 250 OP 636: Mod: JZ | Performed by: STUDENT IN AN ORGANIZED HEALTH CARE EDUCATION/TRAINING PROGRAM

## 2023-10-27 PROCEDURE — 250N000013 HC RX MED GY IP 250 OP 250 PS 637: Performed by: INTERNAL MEDICINE

## 2023-10-27 PROCEDURE — 250N000011 HC RX IP 250 OP 636: Mod: JZ | Performed by: NURSE PRACTITIONER

## 2023-10-27 PROCEDURE — 94660 CPAP INITIATION&MGMT: CPT

## 2023-10-27 PROCEDURE — 83735 ASSAY OF MAGNESIUM: CPT | Performed by: INTERNAL MEDICINE

## 2023-10-27 PROCEDURE — 84100 ASSAY OF PHOSPHORUS: CPT | Performed by: INTERNAL MEDICINE

## 2023-10-27 PROCEDURE — 258N000003 HC RX IP 258 OP 636: Performed by: NURSE PRACTITIONER

## 2023-10-27 PROCEDURE — 94640 AIRWAY INHALATION TREATMENT: CPT | Mod: 76

## 2023-10-27 PROCEDURE — 250N000011 HC RX IP 250 OP 636: Performed by: SURGERY

## 2023-10-27 RX ORDER — BUMETANIDE 0.25 MG/ML
2 INJECTION INTRAMUSCULAR; INTRAVENOUS EVERY 24 HOURS
Status: DISCONTINUED | OUTPATIENT
Start: 2023-10-28 | End: 2023-10-31

## 2023-10-27 RX ORDER — DEXTROSE MONOHYDRATE 50 MG/ML
INJECTION, SOLUTION INTRAVENOUS CONTINUOUS
Status: DISCONTINUED | OUTPATIENT
Start: 2023-10-27 | End: 2023-10-31

## 2023-10-27 RX ORDER — METOPROLOL SUCCINATE 100 MG/1
100 TABLET, EXTENDED RELEASE ORAL 2 TIMES DAILY
Status: DISCONTINUED | OUTPATIENT
Start: 2023-10-27 | End: 2023-11-01

## 2023-10-27 RX ORDER — VENLAFAXINE 75 MG/1
75 TABLET ORAL
Status: DISCONTINUED | OUTPATIENT
Start: 2023-10-27 | End: 2023-11-21 | Stop reason: HOSPADM

## 2023-10-27 RX ORDER — HYDRALAZINE HYDROCHLORIDE 25 MG/1
25 TABLET, FILM COATED ORAL 3 TIMES DAILY
Status: DISCONTINUED | OUTPATIENT
Start: 2023-10-27 | End: 2023-10-28

## 2023-10-27 RX ORDER — OXYCODONE HYDROCHLORIDE 5 MG/1
5 TABLET ORAL EVERY 12 HOURS PRN
Status: DISCONTINUED | OUTPATIENT
Start: 2023-10-27 | End: 2023-10-28

## 2023-10-27 RX ADMIN — DEXTROSE MONOHYDRATE: 50 INJECTION, SOLUTION INTRAVENOUS at 18:25

## 2023-10-27 RX ADMIN — HYDRALAZINE HYDROCHLORIDE 10 MG: 20 INJECTION INTRAMUSCULAR; INTRAVENOUS at 10:11

## 2023-10-27 RX ADMIN — IPRATROPIUM BROMIDE AND ALBUTEROL SULFATE 3 ML: .5; 3 SOLUTION RESPIRATORY (INHALATION) at 20:08

## 2023-10-27 RX ADMIN — HEPARIN SODIUM 5000 UNITS: 5000 INJECTION, SOLUTION INTRAVENOUS; SUBCUTANEOUS at 14:13

## 2023-10-27 RX ADMIN — ACETAMINOPHEN 650 MG: 160 SOLUTION ORAL at 18:02

## 2023-10-27 RX ADMIN — INSULIN ASPART 1 UNITS: 100 INJECTION, SOLUTION INTRAVENOUS; SUBCUTANEOUS at 20:01

## 2023-10-27 RX ADMIN — IPRATROPIUM BROMIDE AND ALBUTEROL SULFATE 3 ML: .5; 3 SOLUTION RESPIRATORY (INHALATION) at 17:08

## 2023-10-27 RX ADMIN — HYDRALAZINE HYDROCHLORIDE 25 MG: 25 TABLET, FILM COATED ORAL at 20:04

## 2023-10-27 RX ADMIN — HYDRALAZINE HYDROCHLORIDE 10 MG: 20 INJECTION INTRAMUSCULAR; INTRAVENOUS at 06:05

## 2023-10-27 RX ADMIN — HYDRALAZINE HYDROCHLORIDE 10 MG: 10 TABLET ORAL at 14:13

## 2023-10-27 RX ADMIN — HEPARIN SODIUM 5000 UNITS: 5000 INJECTION, SOLUTION INTRAVENOUS; SUBCUTANEOUS at 06:05

## 2023-10-27 RX ADMIN — BUMETANIDE 2 MG: 0.25 INJECTION INTRAMUSCULAR; INTRAVENOUS at 08:04

## 2023-10-27 RX ADMIN — OLANZAPINE 5 MG: 10 INJECTION, POWDER, LYOPHILIZED, FOR SOLUTION INTRAMUSCULAR at 22:34

## 2023-10-27 RX ADMIN — OLANZAPINE 5 MG: 10 INJECTION, POWDER, LYOPHILIZED, FOR SOLUTION INTRAMUSCULAR at 14:14

## 2023-10-27 RX ADMIN — IPRATROPIUM BROMIDE AND ALBUTEROL SULFATE 3 ML: .5; 3 SOLUTION RESPIRATORY (INHALATION) at 07:59

## 2023-10-27 RX ADMIN — CHLORHEXIDINE GLUCONATE 15 ML: 1.2 RINSE ORAL at 08:05

## 2023-10-27 RX ADMIN — MICAFUNGIN SODIUM 100 MG: 50 INJECTION, POWDER, LYOPHILIZED, FOR SOLUTION INTRAVENOUS at 12:01

## 2023-10-27 RX ADMIN — INSULIN ASPART 1 UNITS: 100 INJECTION, SOLUTION INTRAVENOUS; SUBCUTANEOUS at 03:45

## 2023-10-27 RX ADMIN — IPRATROPIUM BROMIDE AND ALBUTEROL SULFATE 3 ML: .5; 3 SOLUTION RESPIRATORY (INHALATION) at 12:37

## 2023-10-27 RX ADMIN — ONDANSETRON 4 MG: 2 INJECTION INTRAMUSCULAR; INTRAVENOUS at 19:05

## 2023-10-27 RX ADMIN — PIPERACILLIN AND TAZOBACTAM 3.38 G: 3; .375 INJECTION, POWDER, LYOPHILIZED, FOR SOLUTION INTRAVENOUS at 19:05

## 2023-10-27 RX ADMIN — OXYCODONE HYDROCHLORIDE 5 MG: 5 TABLET ORAL at 08:05

## 2023-10-27 RX ADMIN — METOPROLOL TARTRATE 50 MG: 25 TABLET, FILM COATED ORAL at 08:05

## 2023-10-27 RX ADMIN — INSULIN GLARGINE 10 UNITS: 100 INJECTION, SOLUTION SUBCUTANEOUS at 20:01

## 2023-10-27 RX ADMIN — ONDANSETRON 4 MG: 2 INJECTION INTRAMUSCULAR; INTRAVENOUS at 08:05

## 2023-10-27 RX ADMIN — INSULIN ASPART 1 UNITS: 100 INJECTION, SOLUTION INTRAVENOUS; SUBCUTANEOUS at 08:23

## 2023-10-27 RX ADMIN — HYDRALAZINE HYDROCHLORIDE 10 MG: 10 TABLET ORAL at 08:05

## 2023-10-27 RX ADMIN — INSULIN ASPART 1 UNITS: 100 INJECTION, SOLUTION INTRAVENOUS; SUBCUTANEOUS at 15:56

## 2023-10-27 RX ADMIN — VANCOMYCIN HYDROCHLORIDE 750 MG: 500 INJECTION, POWDER, LYOPHILIZED, FOR SOLUTION INTRAVENOUS at 09:25

## 2023-10-27 RX ADMIN — PIPERACILLIN AND TAZOBACTAM 3.38 G: 3; .375 INJECTION, POWDER, LYOPHILIZED, FOR SOLUTION INTRAVENOUS at 03:46

## 2023-10-27 RX ADMIN — NITROGLYCERIN 1 PATCH: 0.4 PATCH TRANSDERMAL at 08:06

## 2023-10-27 RX ADMIN — PIPERACILLIN AND TAZOBACTAM 3.38 G: 3; .375 INJECTION, POWDER, LYOPHILIZED, FOR SOLUTION INTRAVENOUS at 10:46

## 2023-10-27 RX ADMIN — Medication 40 MG: at 08:04

## 2023-10-27 RX ADMIN — HYDRALAZINE HYDROCHLORIDE 10 MG: 20 INJECTION INTRAMUSCULAR; INTRAVENOUS at 16:42

## 2023-10-27 RX ADMIN — INSULIN ASPART 1 UNITS: 100 INJECTION, SOLUTION INTRAVENOUS; SUBCUTANEOUS at 12:00

## 2023-10-27 RX ADMIN — VENLAFAXINE 75 MG: 75 TABLET ORAL at 16:45

## 2023-10-27 ASSESSMENT — ACTIVITIES OF DAILY LIVING (ADL)
ADLS_ACUITY_SCORE: 39
ADLS_ACUITY_SCORE: 39
ADLS_ACUITY_SCORE: 35
ADLS_ACUITY_SCORE: 39
ADLS_ACUITY_SCORE: 39
ADLS_ACUITY_SCORE: 43
ADLS_ACUITY_SCORE: 39
ADLS_ACUITY_SCORE: 35
ADLS_ACUITY_SCORE: 39
ADLS_ACUITY_SCORE: 35

## 2023-10-27 NOTE — PROGRESS NOTES
RENAL PROGRESS NOTE      Assessment and Plan:  53 year old female s/p gastric b/p 10/9/23 c/w leak, s/p repair, peritonitis, perihepatic abscess. Nephrology is following for SUSSY, hyponatremia and hypervolemia management.    ARF;  hemodynamic exacerbated by IV NSAIDS (lowish bp, vasodilatory drugs, mild intravascular depletion)==>ATN, now in recovery phase and creatinine improved to 2.06 mg/dl. Creat was normal (0.7) PTA.  Urinary output remains in nonoliguric range with IV diuretics.  Mild hypernatremia-improved. Serum sodium is is trending up 147 this morning.  Patient currently receiving only 50 mils of free water flushes every every 4 hours for the NG tube.  Calculated free water deficit include insensible losses is about 3 and half liters.  Recommend to increase free water flushes to 200 cc of every 4 hours.  Monitor serum sodium daily.  Volume overload, now s/p diuresis, prob near euvolemia. Net 14L negative since admission? Wt is trending up and likely not accurate. Currently on 4L via NC.  On IV bumex 2 mg Q12H.  Decrease Bumex dose to 2 mg daily.   Hypertension-Started on Metoprolol and Hydralazine as per cardiology this admission.  Increase hydralazine dose to 25 mg 3 times daily.  I reviewed cardiology note from today.  Hyperlipid; on statin  Elevated LFTs Resolved  Resp failure; failed first extubation. ?PNA, ?Volume. S/p right thoracocentesis 10/24, 400 ml removed. Extubated on 10/26. Currently on 4L via NC. Management as per ICU team.   Acidosis; resolved   Nutrition; on TPN at 40 ml/h and on TF at 20 ml/h this afternoon  Cardiomyopathy-resolved, EF 10/23 was normal. I reviewed cardiology note from today.  Sepsis d/t peritonitis s/p washout. On IV antbx and Micafunging. ID is following.     We will follow.    Subjective  She was seen at the bedside and events were reviewed with nurses.  Yesterday patient was extubated to BiPAP. Patient is currently on 4 L supplemental oxygen via nasal cannula.   "  Patient accompanied by aunt at the bedside.  Patient stated that she is feeling tired and wants to go home back to sleep.  She is requesting go back on BiPAP.  Updated on labs. All questions answered    Objective    Vital signs in last 24 hours  Temp:  [98.1  F (36.7  C)-100.4  F (38  C)] 99.5  F (37.5  C)  Pulse:  [] 103  Resp:  [18-49] 19  BP: (141-207)/(65-93) 191/93  FiO2 (%):  [30 %-35 %] 30 %  SpO2:  [92 %-99 %] 95 %      Intake/Output last 3 shifts  I/O last 3 completed shifts:  In: 1879.26 [I.V.:663.93; NG/GT:200]  Out: 4715 [Urine:4475; Drains:40; Stool:200]  Intake/Output this shift:  I/O this shift:  In: 1156 [I.V.:248; NG/GT:60]  Out: 602 [Urine:475; Drains:2; Stool:125]    Physical Exam  Gen: NAD,  overly obese  HEENT: AT/NC , nasal cannula and NJ in place.  CV: RRR without murmur or rub  Lung: Coarse breath sounds in anterior fields b/l   Ab: Less distended ,incision closed with staples. Rectal tube in place, green color stool in bag.  Ext: Trace edema of upper and lower extremities and well perfused  : Patiño catheter in place, making yellow color urine.  Skin; diffuse erythema of the anterior upper chest likely secondary to \"red man\" syndrome patient is currently infusing vancomycin.  Neuro: Awake, alert, following commands    Pertinent Labs     Last Renal Panel:  Sodium   Date Value Ref Range Status   10/27/2023 147 (H) 135 - 145 mmol/L Final     Comment:     Reference intervals for this test were updated on 09/26/2023 to more accurately reflect our healthy population. There may be differences in the flagging of prior results with similar values performed with this method. Interpretation of those prior results can be made in the context of the updated reference intervals.    10/27/2023 147 (H) 135 - 145 mmol/L Final     Comment:     Reference intervals for this test were updated on 09/26/2023 to more accurately reflect our healthy population. There may be differences in the flagging of " prior results with similar values performed with this method. Interpretation of those prior results can be made in the context of the updated reference intervals.  Reference intervals for this test were updated on 09/26/2023 to more accurately reflect our healthy population. There may be differences in the flagging of prior results with similar values performed with this method. Interpretation of those prior results can be made in the context of the updated reference intervals.    11/17/2020 141 133 - 144 mmol/L Final     Potassium   Date Value Ref Range Status   10/27/2023 4.0 3.4 - 5.3 mmol/L Final   05/17/2022 4.5 3.4 - 5.3 mmol/L Final   11/17/2020 4.1 3.4 - 5.3 mmol/L Final     Chloride   Date Value Ref Range Status   10/27/2023 111 (H) 98 - 107 mmol/L Final   05/17/2022 102 94 - 109 mmol/L Final   11/17/2020 109 94 - 109 mmol/L Final     Carbon Dioxide   Date Value Ref Range Status   11/17/2020 31 20 - 32 mmol/L Final     Carbon Dioxide (CO2)   Date Value Ref Range Status   10/27/2023 25 22 - 29 mmol/L Final   05/17/2022 32 20 - 32 mmol/L Final     Anion Gap   Date Value Ref Range Status   10/27/2023 11 7 - 15 mmol/L Final   05/17/2022 2 (L) 3 - 14 mmol/L Final   11/17/2020 1 (L) 3 - 14 mmol/L Final     Glucose   Date Value Ref Range Status   10/27/2023 182 (H) 70 - 99 mg/dL Final   05/17/2022 110 (H) 70 - 99 mg/dL Final   11/17/2020 84 70 - 99 mg/dL Final     Comment:     Fasting specimen     GLUCOSE BY METER POCT   Date Value Ref Range Status   10/27/2023 170 (H) 70 - 99 mg/dL Final     Urea Nitrogen   Date Value Ref Range Status   10/27/2023 80.7 (H) 6.0 - 20.0 mg/dL Final   05/17/2022 16 7 - 30 mg/dL Final   11/17/2020 9 7 - 30 mg/dL Final     Creatinine   Date Value Ref Range Status   10/27/2023 2.06 (H) 0.51 - 0.95 mg/dL Final   11/17/2020 0.79 0.52 - 1.04 mg/dL Final     GFR Estimate   Date Value Ref Range Status   10/27/2023 28 (L) >60 mL/min/1.73m2 Final   11/17/2020 88 >60 mL/min/[1.73_m2] Final      Comment:     Non  GFR Calc  Starting 12/18/2018, serum creatinine based estimated GFR (eGFR) will be   calculated using the Chronic Kidney Disease Epidemiology Collaboration   (CKD-EPI) equation.       Calcium   Date Value Ref Range Status   10/27/2023 9.5 8.6 - 10.0 mg/dL Final   11/17/2020 8.9 8.5 - 10.1 mg/dL Final     Phosphorus   Date Value Ref Range Status   10/27/2023 3.4 2.5 - 4.5 mg/dL Final     Albumin   Date Value Ref Range Status   10/23/2023 3.2 (L) 3.5 - 5.2 g/dL Final   05/17/2022 4.0 3.4 - 5.0 g/dL Final   12/03/2018 3.6 3.4 - 5.0 g/dL Final     Recent Labs   Lab 10/27/23  0348 10/26/23  0435 10/25/23  0350 10/24/23  0333 10/23/23  0530   HGB 8.2* 8.3* 7.7* 8.5* 9.2*          I reviewed all lab results    ~ 50 Minutes today spent in exam, POC, education regarding renal disease and management, counseling and/or discussion with patient's care team.    Georgette Garnica MD  [facility]  [address] [place] [address], [address]  [place] [address]  Phone# [phone]  Fax# [phone]

## 2023-10-27 NOTE — PROGRESS NOTES
10/27/23 0850   Appointment Info   Signing Clinician's Name / Credentials (OT) Mey Pennington OTR/L OTD   Living Environment   People in Home child(jean claude), adult   Current Living Arrangements apartment   Living Environment Comments Tub/shower   Self-Care   Usual Activity Tolerance moderate   Current Activity Tolerance poor   Activity/Exercise/Self-Care Comment Prior to admission pt was completely ind with all ADLs and IADLs, working full time at [employer]   General Information   Onset of Illness/Injury or Date of Surgery 10/09/23   Referring Physician Hoang Worthy MD   Patient/Family Therapy Goal Statement (OT) To get stronger   Additional Occupational Profile Info/Pertinent History of Current Problem # S/P Lap Sleeve with Single Anastomosis DS 10/9/23 and then a Re-Operation on 10/12/2023 where it was found that she had 2 small enterotomies in the small bowel, likely form the traction of making the Duodenal anastomosis.  These were oversewn and the pt has a  carlos feeding tube in place..     She was extubated yesterday,10/26/2023   Existing Precautions/Restrictions abdominal;oxygen therapy device and L/min  (BiPAP)   Cognitive Status Examination   Cognitive Status Comments Difficult to assess with BiPAP mask - attempting to communicate throughout - able to accurately nod yes/no to answer questions   Posture   Posture not impaired   Range of Motion Comprehensive   General Range of Motion bilateral upper extremity ROM WFL   Strength Comprehensive (MMT)   Comment, General Manual Muscle Testing (MMT) Assessment Generalized weakness - prolonged bedrest and intubation   Bed Mobility   Bed Mobility supine-sit   Supine-Sit [place] (Bed Mobility) maximum assist (25% patient effort);2 person assist;1 person to manage equipment   Transfers   Transfers sit-stand transfer;toilet transfer   Sit-Stand Transfer   Sit-Stand [place] (Transfers) unable to assess   Sit/Stand Transfer Comments Dependent per clinical  judgement   Toilet Transfer   [place] Level (Toilet Transfer) dependent (less than 25% patient effort)   Balance   Balance Assessment sitting static balance   Sitting Balance: Static maximum assist   Activities of Daily Living   BADL Assessment/Intervention grooming;lower body dressing;toileting   Lower Body Dressing Assessment/Training   [place] Level (Lower Body Dressing) unable to assess   Grooming Assessment/Training   [place] Level (Grooming) moderate assist (50% patient effort)   Toileting   Comment, (Toileting) Rectal tube currently   [place] Level (Toileting) unable to assess   Clinical Impression   Criteria for Skilled Therapeutic Interventions Met (OT) Yes, treatment indicated   OT Diagnosis Decreased ind with ADLs and safety   Influenced by the following impairments Intra-abdominal abscess   OT Problem List-Impairments impacting ADL activity tolerance impaired;balance;cognition;mobility;strength;pain;post-surgical precautions   Assessment of Occupational Performance 5 or more Performance Deficits   Identified Performance Deficits dressing, toileting, bathing, fxl transfers, home mgmt, cognition   Planned Therapy Interventions (OT) ADL retraining;IADL retraining;balance training;bed mobility training;cognition;strengthening;transfer training;progressive activity/exercise   Clinical Decision Making Complexity (OT) comprehensive assessment/high complexity   Risk & Benefits of therapy have been explained evaluation/treatment results reviewed;care plan/treatment goals reviewed;risks/benefits reviewed;current/potential barriers reviewed;patient   OT Total Evaluation Time   OT Eval, High Complexity Minutes (97431) 10   OT Goals   Therapy Frequency (OT) 5 times/week   OT Predicted Duration/Target Date for Goal Attainment 11/03/23   OT Goals Hygiene/Grooming;Toilet Transfer/Toileting;Cognition;Lower Body Dressing   OT: Hygiene/Grooming minimal assist;while standing   OT: Lower Body Dressing  Minimal assist;using adaptive equipment;within precautions   OT: Toilet Transfer/Toileting Minimal assist;toilet transfer;cleaning and garment management   OT: Cognitive Patient/caregiver will verbalize understanding of cognitive assessment results/recommendations as needed for safe discharge planning   Interventions   Interventions Quick Adds Self-Care/Home Management   Self-Care/Home Management   Self-Care/Home Mgmt/ADL, Compensatory, Meal Prep Minutes (23709) 8   Symptoms Noted During/After Treatment (Meal Preparation/Planning Training) fatigue;significant change in vital signs;increase work of breath   Treatment Detail/Skilled Intervention Evaluation completed, treatment initiated. /88 supine - RN ok'd mobility. Extended time for line mgmt and safe room set up. Supine>sit Max A x2 with increased assist for lines and tubes. Max A x 2 at drawsheet to square LE. Pt demos significant posterior lean in sitting, provided Mod-max A x2 to maintain upright positioning, cueing for leaning forward with blocking at knees. Pt BP increased 195/105 initially upon sitting, increased work of breath. Cueing for PLB, demos improved breathing technique. Tolerated sitting ~ 5 minutes. Returned to supine max A x 2, /80 following dependent supine scoot. Left in bed with alarm on and call light in reach.   OT Discharge Planning   OT Plan Co-tx w/ PT, progress transfers, seated g/h, monitor cognition, UE strength   OT Discharge Recommendation (DC Rec) Transitional Care Facility   OT Rationale for DC Rec Pt very deconditioned from prolonged hospitalization - requires A x 2-3 for all mobility, recommend TCU to progress strength and ind   OT Brief overview of current status Max A x2-3 bed mobility, tolerated sitting EOB 5 minutes   Total Session Time   Timed Code Treatment Minutes 8   Total Session Time (sum of timed and untimed services) 18

## 2023-10-27 NOTE — PROGRESS NOTES
Nutrition Therapy  Parenteral Nutrition follow-up       Dietitian to Pharmacy  With this evening's TPN bag:  Rate of TPN: 60  Grams Dextrose: 80 g  Grams Amino Acid:30 g    Lipids: None    TF:  advancing 5 ml every 12 hrs to goal rate.  Increase free water flush to 30 ml every 4 hrs.  Additional water with meds.        Patient extubated 10/26/23.  Discussed nutrition plan with patient- TPN + TF.    Current Nutrition Intake:  Diet: NPO  Nutrition Support: Promote with fiber currently at 30 ml/hr increasing 5 ml every 12 hrs to goal rate 50 ml/hr continuous.  Free water flush 15 ml every 4 hrs.    (Promote with fiber @ goal of  50ml/hr  (1200ml/day) provides: 1200 kcals, 75 g PRO, 997 ml free H20, 165 g CHO, 17 g fiber, 33 g fat daily. )     Custom TPN per central line 40 g AA, 140 g DEX at 50 ml/hr continuous.  No lipid  TPN provides 636 kcals, 40 g protein, 140 g carb, no lipid, 1200 ml fluid/day.     TPN + TF meeting estimated needs.    S/p gastrectomy sleeve-duodenal switch on 10/9.    S/p drain placed, closure of interotomies and NG placed on 10/12.   TPN start 10/14/23  Propofol stopped 10/15 due to elevated Triglyceride  Trickle TF start 10/20  Trickle TF stopped 10/21  Extubated 10/21  Re-intubated 10/22, Propofol restarted 10/22 with re-intubation.  Trickle TF started per [place] 10/24, Promote at 10 ml/hr continuous.    Propofol stopped 10/24.  10/26-extubated.  TF changed to Promote with fiber to help form stools.  Gradual increase in TF with gradual wean of TPN.    Weight Trends  Admission wt: 130.5 kg (287 lb 12.8 oz) 10/9/23   Current wt: 133 kg (293 lb 14 oz) 10/27/23, 125.1 kg (275 lb 12.7 oz) 10/26/23.  -12.85 l since 10/13/23 per I/Os. ( 287 lb 6.4 oz 10/13/23.)    Dosing Weight: 52.3 kg (IBW)       NUTRITION NEEDS   Energy: 5531-4742 kcals/day (22 - 25 kcals/kg)   Obese, Post-op, and Vented   Protein : 63-78+ grams protein/day (1.2 - 1.5+ grams of pro/kg)   Obesity guidelines, Post-op, elevated  creatinine   As creatinine improves plan to increase protein provided.   Fluid: 1511-3862+ mL/day (1 mL/kcal)   Maintenance     GI:  Rectal tube 250 ml 10/26/23.  Drains 30 ml  Good urine output with diuresis on Bumex.     Skin:  surgical sites, drain.  NJ placed 10/23/23.    Labs:   [facility]  Recent Labs   Lab 10/27/23  0742 10/27/23  0348 10/27/23  0344 10/26/23  2325 10/26/23  2028 10/26/23  1848 10/26/23  0824 10/26/23  0435 10/25/23  2045 10/25/23  1830 10/25/23  0749 10/25/23  0350 10/24/23  0412 10/24/23  0333 10/23/23  0611 10/23/23  0530 10/22/23  0835 10/22/23  0520 10/21/23  0738 10/21/23  0341   NA  --  147*  147*  --   --   --  145  --  145  145  --  145  --  144  144   < > 144  144   < > 149*  149*   < > 150*  --  147*   POTASSIUM  --  4.0  --   --   --   --   --  3.9  --   --   --  4.4  --  3.8  --  3.7  --  5.0  --  4.4   * 182* 176* 189*   < >  --    < > 138*   < >  --    < > 143*   < > 119*   < > 126*   < > 242*   < > 243*  265*   BUN  --  80.7*  --   --   --   --   --  84.5*  --   --   --  89.5*  --  92.2*  --  104.2*  --  104.1*  --  92.0*   CR  --  2.06*  --   --   --   --   --  2.40*  --   --   --  2.51*  --  2.52*  --  2.65*  --  2.76*  --  2.84*   PALAK  --  9.5  --   --   --   --   --  8.9  --   --   --  8.9  --  8.7  --  8.7  --  9.0  --  8.9   MAG  --  2.2  --   --   --   --   --  2.1  --   --   --  1.9  --   --   --  1.9  --  2.2  --  2.0   PHOS  --  3.4  --   --   --   --   --  3.2  --   --   --  4.3  --   --   --  4.2  --  6.1*  --  3.0   ALBUMIN  --   --   --   --   --   --   --   --   --   --   --   --   --   --   --  3.2*  --  3.7  --  3.5   BILITOTAL  --   --   --   --   --   --   --   --   --   --   --   --   --   --   --  0.3  --  0.3  --  0.5   ALKPHOS  --   --   --   --   --   --   --   --   --   --   --   --   --   --   --  96  --  106*  --  87   AST  --   --   --   --   --   --   --   --   --   --   --   --   --   --   --  19  --  25  --  20   ALT  --   --   --   --   --    --   --   --   --   --   --   --   --   --   --  26  --  33  --  30    < > = values in this interval not displayed.     Labs reviewed by dietitian    Medications:    bumetanide  2 mg Intravenous Q12H    chlorhexidine  15 mL Mouth/Throat Q12H    [Held by provider] sennosides  5 mL Oral or Feeding Tube BID    And    [Held by provider] docusate  50 mg Oral or Feeding Tube BID    heparin ANTICOAGULANT  5,000 Units Subcutaneous Q8H    hydrALAZINE  10 mg Oral or Feeding Tube TID    insulin aspart  1-4 Units Subcutaneous Q4H    insulin glargine  10 Units Subcutaneous At Bedtime    ipratropium - albuterol 0.5 mg/2.5 mg/3 mL  3 mL Nebulization 4x daily    metoprolol succinate ER  100 mg Oral BID    micafungin  100 mg Intravenous Q24H    nitroGLYcerin  1 patch Transdermal Daily    pantoprazole  40 mg Per Feeding Tube QAM AC    Or    pantoprazole  40 mg Intravenous QAM AC    piperacillin-tazobactam  3.375 g Intravenous Q8H    [Held by provider] polyethylene glycol  17 g Oral or Feeding Tube Daily    sodium chloride (PF)  3 mL Intracatheter Q8H    vancomycin  750 mg Intravenous Q24H       dexmedeTOMIDine Stopped (10/26/23 1415)    dextrose      dextrose      propofol Stopped (10/24/23 1446)    And    - MEDICATION INSTRUCTIONS -      norepinephrine Stopped (10/13/23 9494)    parenteral nutrition - ADULT compounded formula 50 mL/hr at 10/27/23 0800      acetaminophen, acetaminophen **OR** acetaminophen, albuterol, albuterol, dextrose, dextrose, glucose **OR** dextrose **OR** glucagon, diphenhydrAMINE **OR** diphenhydrAMINE, hydrALAZINE, HYDROmorphone, lidocaine 4%, lidocaine (buffered or not buffered), propofol **AND** propofol **AND** CK total **AND** Triglycerides **AND** - MEDICATION INSTRUCTIONS - **AND** Notify Physician, menthol-zinc oxide, miconazole, naloxone **OR** naloxone **OR** naloxone **OR** naloxone, OLANZapine, ondansetron **OR** ondansetron, oxyCODONE, phenol, prochlorperazine **OR** prochlorperazine,  QUEtiapine, sodium chloride (PF)   Reviewed by dietitian       MALNUTRITION  Malnutrition Diagnosis: Patient does not meet two of the established criteria necessary for diagnosing malnutrition     Nutrition Diagnosis  Inadequate oral intake related to recent surgery as evidenced by NPO and need for TPN and Tube feeding   Evaluation: ongoing    Goals   Meet nutrition needs with TPN + TF- progressing   Electrolytes WNL - met  Tolerate TPN - met  Tolerate TF - progressing  TG <150 - not met  BG <180 - progressing    INTERVENTIONS  Implementation  Enteral Nutrition - gradually increasing 5 ml every 12 hrs.    Increase free water flush to 30 ml every 4 hrs = 180 ml flushes/day.  Additional flushes with meds.      With new bag this evening:  TPN:  30 g AA, 80 g DEX at 60 ml/hr continuous.  This to provide 392 kcals, 30 g Protein, 80 g carb, 1440 ml fluid/day.    Hypernatremia-monitor hydration.  Patient is on diuretic, consider decreasing diuretic   IV fluids have decreased with extubation, monitor need for increasing IV fluids.      TF + TPN meets estimated nutrition needs.      Check swallow eval when appropriate.      Monitoring/Evaluation  Progress toward goals will be monitored and evaluated per protocol.

## 2023-10-27 NOTE — PROGRESS NOTES
"SPEECH PATHOLOGY CLINICAL SWALLOW EVALUATION       10/27/23 1400   Appointment Info   Signing Clinician's Name / Credentials (SLP) Angelito Rosales MA [facility] SLP   General Information   Onset of Illness/Injury or Date of Surgery 10/09/23   Patient/Family Therapy Goal Statement (SLP) to eat/drink   Pertinent History of Current Problem 54 yo F PMH obesity, severe JARVIS on CPAP, asthma (no PFTs), stimulant use disorder, stimulant induced psychosis, mood & anxiety disorders \" & \"S/P Lap Sleeve with Single Anastomosis DS 10/9/23 and then a Re-Operation on 10/12/2023 where it was found that she had 2 small enterotomies in the small bowel, likely form the traction of making the Duodenal anastomosis. Extubated 10/26/23. NG tube in place. Had been on and off BiPAP since extubation. Now on nasal canula at 4 LPM   General Observations Pt is alert, follows commands but does not initiate interaction.   Type of Evaluation   Type of Evaluation Swallow Evaluation   Oral Motor   Oral Musculature generally intact   Structural Abnormalities none present   Mucosal Quality dry   Dentition (Oral Motor)   Dentition (Oral Motor) adequate dentition   Facial Symmetry (Oral Motor)   Facial Symmetry (Oral Motor) WNL   Lip Function (Oral Motor)   Lip Range of Motion (Oral Motor) WNL   Comment, Lip Function (Oral Motor) generalized weakness   Tongue Function (Oral Motor)   Tongue ROM (Oral Motor) WNL   Comment, Tongue Function (Oral Motor) generalized weakness   Jaw Function (Oral Motor)   Jaw Function (Oral Motor) WNL   Cough/Swallow/Gag Reflex (Oral Motor)   Volitional Throat Clear/Cough (Oral Motor) reduced strength   Volitional Swallow (Oral Motor) unable/difficult to assess   Vocal Quality/Secretion Management (Oral Motor)   Vocal Quality (Oral Motor) other (see comments)  (clear, soft vocal quality)   Secretion Management (Oral Motor) WNL   General Swallowing Observations   Past History of Dysphagia none   Respiratory Support nasal cannula "   Current Diet/Method of Nutritional Intake (General Swallowing Observations, NIS) NPO;nasogastric tube (NG)   Swallowing Evaluation Clinical swallow evaluation   Clinical Swallow Evaluation   Feeding Assistance other (see comments)   Adaptive Eating Utensils dependent, lacks initiation   Clinical Swallow Evaluation Textures Trialed thin liquids  (ice chips)   Clinical Swallow Eval: Thin Liquid Texture Trial   Mode of Presentation, Thin Liquids spoon;straw   Volume of Liquid or Food Presented ice chips by spoon, water by spoon then straw +4 oz total   Oral Phase of Swallow WFL   Pharyngeal Phase of Swallow intact;other (see comments)  (weak cough/throat clear x1 with larger sip by straw)   Diagnostic Statement No overt s/s aspiration, appears to do best with small bolus volumes. Voice clear throughout, O2 saturations stable at 98%   Swallowing Recommendations   Diet Consistency Recommendations clear liquid diet;other (see comments)  (focus first on ice chips, popcicles (small volumes))   Supervision Level for Intake 1:1 supervision needed   Mode of Delivery Recommendations bolus size, small;slow rate of intake   Monitoring/Assistance Required (Eating/Swallowing) monitor for cough or change in vocal quality with intake   Recommended Feeding/Eating Techniques (Swallow Eval) maintain upright sitting position for eating;provide assist with feeding   Medication Administration Recommendations, Swallowing (SLP) NG or IV for now   Comment, Swallowing Recommendations May recommend video swallow pending progress and respiratory stability   Clinical Impression   Criteria for Skilled Therapeutic Interventions Met (SLP Eval) Yes, treatment indicated   SLP Diagnosis dysphagia   Activity Limitations Related to Problem List (SLP) risk of aspiration due to intubation   Risks & Benefits of therapy have been explained evaluation/treatment results reviewed;care plan/treatment goals reviewed;risks/benefits reviewed;current/potential  barriers reviewed;participants included;patient   Clinical Impression Comments Pt is alert and followed directions but did not initiation interactions or make eye contact. She actively manipulates ice chips and controls small amounts of water by spoon then straw. Swallows are observed with each trial. She had no overt s/s aspiration with single ice chips or water by spoon. She had weak cough/throat clear x1 when taking water by straw. No oral loss, voice clear throughout PO trials, O2 saturations maintained at 98%. Recommend begin clear liquids (okay with surgery) focus on small amounts such as ice chips and popcicles to start rather than drinking large volumes. SLP will follow for diet advancement and whether video swallow study is needed.   SLP Total Evaluation Time   Eval: oral/pharyngeal swallow function, clinical swallow Minutes (16673) 36

## 2023-10-27 NOTE — PROGRESS NOTES
[facility]  Critical Care Progress Note    Summary:   Mojgan Jimenez 54 yo F PMH obesity, severe JARVIS on CPAP, asthma (no PFTs), stimulant use disorder, stimulant induced psychosis, mood & anxiety disorders       Presented 10/9/23 for scheduled laparoscopic sleeve gastrectomy with single anastomosis duodenal switch. She was later found to have two small bowel injuries with peritonitis. 10/12/23 returned to OR for small bowel interotomy closure, clean-out, & drain placement; 10/19 found to have RUQ fluid collection s/p drain placement; 10/24 s/p pelvic fluid aspiration. Course complicated by encephalopathy/delirium, septic shock, suspected takotsubo syndrome, acute respiratory failure due to loss of protective airway reflexes & increased metabolic load requiring intubation (10/12-10/21; reintubated 10/21-10/26), & oliguric SUSSY with renal recovery. She has significantly improved: mental status cleared, cardiac & renal function recovered, & she was extubated to minimal supplemental oxygen. Fevers & leukocytosis remain presumably due to complicated intra-abdominal infection.      Interval Events:  Febrile yesterday morning, moderate HTN, FiO2 30% BPAP overnight, good UO, net -1.9 L, mild hyper-Na, improved renal indices, CBC waxing waning WC, stable Hgb & plts, pelvic aspirate + Lactobacillus     No sedation, continuing bumex, increase FW. L-internal jugular CVC for TPN; could get a PICC but she may not need central access at all in the near future. Consulting hospitalists      Assessment & Plan:  Goals of Care:  Life prolonging without limits        Neurology, Psychiatry, Sedation, Analgesia:  Acute metabolic encephalopathy secondary to sepsis, shock, renal failure      ICU acquired delirium with agitation - resolved   - quetiapine 50mg bid prn, use 1st   - olanzapine 2.5-5mg q8h prn    Sedation & Analgesia   - oxycodone 5mg q12h prn, dillaudid 0.2 mg q4h prn      Cardiovascular:  Suspected  takotsubo syndrome - resolved   1014/23 TTE demonstrated LVEF 30-35%, global hypokinesia. 10/23 TTE LVEF 60-65%, normal RV size & systolic function.   - cardiology was consulted     Hypertension   - prn & scheduled hydralazine  - nitro patch q12h     Atrial fibrillation RVR  - stopped amiodarone   - metoprolol succinate      Septic shock - resolved       Respiratory, Airway:  Acute hypoxemic-hypercapnic respiratory failure - improved   Suspected ventilator associated pneumonia   10/22/23 CT demonstrated complete RLL atelectasis, milder RUL & RBML atelectasis or consolidation. 10/22 bronchoscopy moderate RLL blood tinged secretions cleared.   - supplemental O2 to maintain spO2 >= 90-96% & PaO2 >= 60 mmHg  - pulmonary hygiene: acapella QID, IS Q2H while awake, out of bed to chair, PT/OT as able     R-pleural effusion, exudative   Suspect simple parapneumonic effusion. 10/24 s/p thoracentesis, 400 mL. Pleural fluid amylase amylase 23, serum 64.   - tired adding on effusion cell count, lab couldn't due to clumping       History of JARVIS, allusions to asthma in the chart no PFTs  - NIPPV with sleep/naps everytime   - continue duonebs qid for now       Gastrointestinal:  10/9 s/p sleeve gastrectomy complicated by small bowel injury & peritonitis   10/12 returned to OR for closure, clean-out, & drain placement (now removed)  10/19 s/p RUQ fluid collection drain placement   10/24 s/p pelvic fluid aspiration (75 ml)  - surgery consulted  - enteral TF since 10/24, remains on TPN  - flush drain at regular intervals      Renal, Acid-base, Electrolytes, Volume:  Oliguric SUSSY - improved   - nephrology was consulted     Hypervolemia  - bumex 2mg q12h      Hypernatremia - improved       Infectious Disease:  Small bowel injury with peritonitis s/p closure, clean-out, & drain placement   RUQ fluid collection s/p drain placement   Pelvic fluid collection s/p aspiration (75 ml)  - ID was consulted   - 10/12 - peritoneal cultures +  Streptococcus anginosus, mixed aerobic & anaerobic kamla   - 10/19 - RUQ fluid cultures & pelvic fluid aspirate + Lactobacillus   - 10/22 - BDG blood +  - pending - 10/24 pleural fluid studies    - vanc, pip-tazo, micafungin    Suspected ventilator associated pneumonia   - 10/20 BAL studies S epidermidis    - antimicrobials as above     Ongoing fevers   10/25 venous US upper & lower extremities negative   - off precedex, consider C diff testing, repeat CT CAP (would send her down on BPAP)    Hematology, Oncology:  Neutrophilic leukocytosis secondary to infection & steroids     Thrombocytosis, reactive due to infection     Anemia - stable  Suspect multifactorial secondary to sepsis & surgical blood loss   - monitor      Endocrine:  Hyperglycemia of critical illness   - glargine 10 units every day  - LDSSI       Checklist:  FEN: TPN, TF  VTE ppx: sqh  GI ppx: pantoprazole   Bowel regimen: PEG & senna on hold for loose stools   VAP ppx: Head of bed >30 degrees, daily oral care  Lines/tube-size: L-internal jugular CVC (10/12), PIVs, arevalo (10/12), post pyloric FT (1024)  Skin: no lesions    PT/OT/SLP, early mobility: consulted PT/OT/SLP  Code Status: Full      Physical Exam:  Neurologic: Awake, Alert. Opens eyes tracks & follows commands in all extremities.   HEENT: Head and face normal. No nasal discharge. Oropharynx normal. Eyelids, conjunctiva, & sclera normal.   Neck: Neck appearance normal. No neck masses. Thyroid not enlarged.  Respiratory: Lungs clear bilaterally.   Cardiovascular: Regular rate & rhythm  Normal S1 & S2. No murmurs, rubs. or gallops. No elevated JVP.    Gastrointestinal: Obese. Abdominal staples clean, dry, intact. RUQ drain.  Musculoskeletal: Skeletal configuration normal and muscle mass normal for age. Joint appearance overall normal.  Skin, Hair, & Nails: Skin color normal. No skin lesions. Hair & nails normal.  Extremities: 1+ lower extremity pitting edema    All pertinent vital signs,  ventilator settings, I&Os, laboratory, microbiology, ECGs, & imaging data has been personally reviewed. Subsequent encounter time, excluding procedures, was 35 minutes     AUNG To MD  Critical Care Medicine

## 2023-10-27 NOTE — PROGRESS NOTES
"ASSESSMENT:  1. Intra-abdominal abscess (H)    2. Perimenopausal symptoms        Mojgan Jimenez is a 53 year old female who is s/p laparoscopic sleeve gastrectomy with KO on 10/9 post-op course complicated by small enterotomy x2 s/p laparoscopy converted to laparotomy with washout and enterotomy closure x2 on 10/12.  She is greatly improved but highly anxious. We will restart her on her venlafaxine and continue to use zyprexa as needed for anxiety. Given lack of pain and patient's narcotic abuse history, please do not use narcotics if avoidable.    PLAN:  Discontinue TPN and tube feeds  Start bariatric full liquid diet; diet does not need to be room temperature at this point but patient should not be using straws  Restart venlafaxine  All medications need to be cut to 1/4\" or crushed if being taken orally until 11/20/23  When transferred from ICU to floor, patient must be placed on P2 as this is the assigned bariatric unit per our [facility]  Surgery will remove staples and stitch on LUQ old drain site tomorrow  IR will manage drain  Increase activity  Can likely remove central line, but we will defer this to ICU  Thank you to ICU team for management of this complex patient    SUBJECTIVE:   She is VERY anxious. She is tachypneic but in no clear respiratory distress currently. She is emotional but denies any pain. She is looking forward to a full liquid diet.      Patient Vitals for the past 24 hrs:   BP Temp Temp src Pulse Resp SpO2 Weight   10/27/23 1413 (!) 180/82 -- -- -- -- -- --   10/27/23 1200 (!) 186/92 99.1  F (37.3  C) Axillary 96 22 94 % --   10/27/23 1100 (!) 183/90 -- -- 98 21 94 % --   10/27/23 1011 (!) 191/93 -- -- -- -- -- --   10/27/23 1000 (!) 181/91 99.5  F (37.5  C) Axillary 103 19 95 % --   10/27/23 0900 (!) 186/88 -- -- 108 (!) 49 92 % --   10/27/23 0805 (!) 207/87 -- -- 114 -- -- --   10/27/23 0800 (!) 207/87 99.8  F (37.7  C) Axillary 117 29 93 % --   10/27/23 0630 " (!) 180/80 -- -- 106 (!) 38 -- --   10/27/23 0615 -- -- -- 112 24 -- --   10/27/23 0600 (!) 180/85 -- -- 106 23 -- --   10/27/23 0446 -- -- -- -- -- -- 133.3 kg (293 lb 14 oz)   10/27/23 0400 (!) 169/78 -- -- 109 22 94 % --   10/27/23 0349 -- 99.7  F (37.6  C) Axillary -- -- -- --   10/27/23 0330 (!) 167/78 -- -- 109 24 94 % --   10/27/23 0320 -- -- -- -- -- 94 % --   10/27/23 0300 (!) 171/77 -- -- 100 24 94 % --   10/27/23 0230 (!) 177/81 -- -- 100 28 93 % --   10/27/23 0200 (!) 175/80 -- -- 101 25 94 % --   10/27/23 0130 (!) 171/79 -- -- 100 24 95 % --   10/27/23 0100 (!) 168/77 -- -- 100 24 95 % --   10/27/23 0030 (!) 169/79 -- -- 100 25 95 % --   10/27/23 0000 (!) 171/79 -- -- 99 23 95 % --   10/26/23 2321 -- 100.4  F (38  C) Axillary -- -- -- --   10/26/23 2300 (!) 181/84 -- -- 88 23 94 % --   10/26/23 2230 (!) 179/82 -- -- 84 24 94 % --   10/26/23 2200 (!) 192/90 -- -- 83 19 94 % --   10/26/23 2130 (!) 171/79 -- -- 80 20 94 % --   10/26/23 2030 (!) 167/71 -- -- 84 22 -- --   10/26/23 2015 (!) 166/77 100.1  F (37.8  C) Axillary 87 (!) 40 -- --   10/26/23 1922 -- -- -- -- -- 94 % --   10/26/23 1900 (!) 150/70 -- -- 86 23 92 % --   10/26/23 1830 (!) 149/68 100  F (37.8  C) Axillary 85 23 92 % --   10/26/23 1800 (!) 155/71 -- -- 85 (!) 45 92 % --   10/26/23 1700 (!) 146/76 -- -- 79 23 93 % --   10/26/23 1600 (!) 163/76 98.4  F (36.9  C) Oral 78 28 96 % --   10/26/23 1530 (!) 165/74 -- -- 79 28 94 % --   10/26/23 1500 (!) 168/78 -- -- 78 25 95 % --         PHYSICAL EXAM:  GEN: No acute distress, comfortable  LUNGS: CTA bilaterally  CV:RRR  ABD:soft, not tender; incisions are CDI  Drains: scant serous    Output by Drain (mL) 10/25/23 0700 - 10/25/23 1459 10/25/23 1500 - 10/25/23 2259 10/25/23 2300 - 10/26/23 0659 10/26/23 0700 - 10/26/23 1459 10/26/23 1500 - 10/26/23 2259 10/26/23 2300 - 10/27/23 0659 10/27/23 0700 - 10/27/23 1456   Closed/Suction Drain 1 Right RUQ Bulb 12 Bengali  8  20 10 10 4      EXT:No cyanosis,  edema or obvious abnormalities    10/26 0700 - 10/27 0659  In: 1879.26 [I.V.:663.93]  Out: 4715 [Urine:4475; Drains:40]    No results displayed because visit has over 200 results.             JUSTINE Woo CNP

## 2023-10-27 NOTE — PROGRESS NOTES
Imp/plan:  CM now resolved presume due to stress CM, now extubated, will change metoprolol to succiante 100mg bid, cont NTG paste and hydralazine give Cr elevated (although improving down to 2.06 from 2.4)  PVC none noted, off amiodarone, on metoprolol   HTN - on bumex, and above medications, chnaging to long acting metoprolol     Current Facility-Administered Medications   Medication    acetaminophen (TYLENOL) solution 650 mg    acetaminophen (TYLENOL) tablet 650 mg    Or    acetaminophen (TYLENOL) Suppository 650 mg    albuterol (PROVENTIL HFA/VENTOLIN HFA) inhaler    albuterol (PROVENTIL) neb solution 2.5 mg    bumetanide (BUMEX) injection 2 mg    chlorhexidine (PERIDEX) 0.12 % solution 15 mL    dexmedeTOMIDine (PRECEDEX) 4 mcg/mL in NS infusion    dextrose 10% infusion    dextrose 10% infusion    glucose gel 15-30 g    Or    dextrose 50 % injection 25-50 mL    Or    glucagon injection 1 mg    diphenhydrAMINE (BENADRYL) capsule 25 mg    Or    diphenhydrAMINE (BENADRYL) injection 25 mg    [Held by provider] sennosides (SENOKOT) syrup 5 mL    And    [Held by provider] docusate (COLACE) 50 MG/5ML liquid 50 mg    heparin ANTICOAGULANT injection 5,000 Units    hydrALAZINE (APRESOLINE) injection 10 mg    hydrALAZINE (APRESOLINE) tablet 10 mg    HYDROmorphone (DILAUDID) injection 0.2 mg    insulin aspart (NovoLOG) injection (RAPID ACTING)    insulin glargine (LANTUS PEN) injection 10 Units    ipratropium - albuterol 0.5 mg/2.5 mg/3 mL (DUONEB) neb solution 3 mL    lidocaine (LMX4) cream    lidocaine 1 % 0.1-1 mL    propofol (DIPRIVAN) infusion    And    propofol (DIPRIVAN) bolus from bag or syringe pump    And    Medication Instruction    menthol-zinc oxide (CALMOSEPTINE) 0.44-20.6 % ointment OINT    metoprolol tartrate (LOPRESSOR) tablet 50 mg    micafungin (MYCAMINE) 100 mg in sodium chloride 0.9 % 100 mL intermittent infusion    miconazole (MICATIN) 2 % powder    naloxone (NARCAN) injection 0.2 mg    Or    naloxone  "(NARCAN) injection 0.4 mg    Or    naloxone (NARCAN) injection 0.2 mg    Or    naloxone (NARCAN) injection 0.4 mg    nitroGLYcerin (NITRODUR) 0.4 MG/HR 24 hr patch 1 patch    norepinephrine (LEVOPHED) 4 mg in  mL infusion PREMIX    OLANZapine (zyPREXA) IV injection 2.5-5 mg    ondansetron (ZOFRAN ODT) ODT tab 4 mg    Or    ondansetron (ZOFRAN) injection 4 mg    oxyCODONE (ROXICODONE) tablet 5 mg    pantoprazole (PROTONIX) 2 mg/mL suspension 40 mg    Or    pantoprazole (PROTONIX) IV push injection 40 mg    parenteral nutrition - ADULT compounded formula    phenol (CHLORASEPTIC) 1.4 % spray 1-2 mL    piperacillin-tazobactam (ZOSYN) 3.375 g vial to attach to  mL bag    [Held by provider] polyethylene glycol (MIRALAX) Packet 17 g    prochlorperazine (COMPAZINE) injection 10 mg    Or    prochlorperazine (COMPAZINE) tablet 10 mg    QUEtiapine (SEROquel) tablet 50 mg    sodium chloride (PF) 0.9% PF flush 3 mL    sodium chloride (PF) 0.9% PF flush 3 mL    vancomycin (VANCOCIN) 750 mg in sodium chloride 0.9 % 250 mL intermittent infusion     Past Medical History:   Diagnosis Date    LINA (generalized anxiety disorder) 11/02/2017    Hyperlipidemia LDL goal <160 01/20/2019    Major depressive disorder     Methanol abuse (H)     Mild persistent asthma without complication 11/02/2017    Obese     JARVIS on CPAP     Cpap not working    Paranoia (H)     resolved due to drugs    Vitamin D deficiency 11/02/2017        Review of Systems: 12 points negative other than above    BP (!) 180/80   Pulse 106   Temp 99.7  F (37.6  C) (Axillary)   Resp (!) 38   Ht 1.6 m (5' 3\")   Wt 133.3 kg (293 lb 14 oz)   LMP 09/09/2023 (Exact Date)   SpO2 94%   BMI 52.06 kg/m    JVP<7cm, carotids normal  Lungs clear  Cor RRR no c,r,m  Abs soft +BS, no mass  Ext no c,c,e    Lab Results   Component Value Date    HGB 8.2 (L) 10/27/2023     Lab Results   Component Value Date     10/27/2023     No results found for: \"CREATININE\"  No " "components found for: \"K\"        Yohan Srinivasan MD  Interventional Cardiology   [facility]  [phone]    "

## 2023-10-27 NOTE — PROGRESS NOTES
General Surgery Progress Note    POST OP DAY  # S/P Lap Sleeve with Single Anastomosis DS 10/9/23 and then a Re-Operation on 10/12/2023 where it was found that she had 2 small enterotomies in the small bowel, likely form the traction of making the Duodenal anastomosis.  These were oversewn and the pt has a  carlos feeding tube in place..    She was extubated yesterday,10/26/2023 and has been on and off Bipap since.  She is more tachy between 100 -110.      Subjective:   Pt is currently on Bipap,  she is alert and nods her head approptiately    Vitals:    10/27/23 0446 10/27/23 0600 10/27/23 0615 10/27/23 0630   BP:  (!) 180/85  (!) 180/80   Pulse:  106 112 106   Resp:  23 24 (!) 38   Temp:       TempSrc:       SpO2:       Weight: 133.3 kg (293 lb 14 oz)      Height:           Physical Exam:  Lungs:  CTA, bronchial breath sounds in the Right Lower Lung Fields.  CV:       RRR  Ab:       Soft, + BS, Staples in place;  Passing stool    Recent Labs   Lab 10/27/23  0348   WBC 14.8*   HGB 8.2*   HCT 26.6*              Component  Ref Range & Units  3:48 AM  (10/27/23)  3:48 AM  (10/27/23) 1 d ago  (10/26/23) 1 d ago  (10/26/23) 1 d ago  (10/26/23) 2 d ago  (10/25/23) 2 d ago  (10/25/23) 2 d ago  (10/25/23)     Sodium  135 - 145 mmol/L 147 High  147 High        CM   Comment: Reference intervals for this test were updated on 2023 to more accurately reflect our healthy population. There may be differences in the flagging of prior results with similar values performed with this method. Interpretation of those prior results can be made in the context of the updated reference intervals.    Potassium  3.4 - 5.3 mmol/L 4.0    3.9   4.4    Chloride  98 - 107 mmol/L 111 High     110 High    109 High     Carbon Dioxide (CO2)  22 - 29 mmol/L 25    22   24    Anion Gap  7 - 15 mmol/L 11    13   11    Urea Nitrogen  6.0 - 20.0 mg/dL 80.7 High     84.5 High    89.5 High      Creatinine  0.51 - 0.95 mg/dL 2.06 High     2.40 High    2.51 High     GFR Estimate  >60 mL/min/1.73m2 28 Low     23 Low    22 Low     Calcium  8.6 - 10.0 mg/dL 9.5    8.9   8.9    Glucose  70 - 99 mg/dL 182 High     138 High    143 High          Assessment:   Successfully extubated,    She continues with Low Grade Fever and WBC is the same at 14 today again... Creatinine continues to slow drop.    Plan:  Continue to support her tenative resp status            Remove staples today.            Hoang Worthy MD  [facility]  [phone]

## 2023-10-27 NOTE — PLAN OF CARE
[facility] - ICU    RN Progress Note:            Pertinent Assessments:      Please refer to flowsheet rows for full assessment     Patient Alert/orientedx4, calm and cooperative. Extubated at 1220 to bipap for couple hours. Then NC 3L. Precedex is off, no Prn's given. Patient did become more fatigued and increased work of breathing with visitors and trying to talk. She asks appropriately for the Bipap when she is struggling. Patient was pulling high volumes 3782-1507 with settings 14/8, decreased to 10/6 and patient much more comfortable, FIO2 30%.            Key Events - This Shift:       Patiño catheter was partially dislogged and urine bypassed, cleaned and repositioned with large output. No more issues with bypass. Rectal tube was patent with only one large bypass this evening due to tubing got kinked.       Extubated today 1220       Barriers to Discharge / Downgrade:     PT/OT, improve respiratory function.          Point of Contact Update YES-OR-NO: Yes  Aunt was here most the day, daughter evening and more visitors following her.

## 2023-10-27 NOTE — PROGRESS NOTES
Pt called out from ICU.  Hospital medicine will assume care with surgery as primary.  Seen by critical care team today and note and plan as per Dr. To today.    Hypernatremia to 150.  Start a little D5W and labs already re-scheduled for AM.      Demetria Bell MD, Critical access hospital  Internal Medicine Hospitalist  10/27/2023

## 2023-10-27 NOTE — PROGRESS NOTES
"[facility] Progress Note    SUBJECTIVE:  trying to talk, hard to with facial breathing device       This part of the note is for my own memory:    Lap portals on belly  Open large incision for the washout  2 prior L sided MICKIE sites these tubes are out  RUQ drain placed oct 19--this pus had lacto  Today R lung tap, R deep pelvic collection also aspirated but no tube placed        REVIEW OF SYSTEMS:  Negative unless as listed above.  Social history, Family history, Medications: reviewed.    OBJECTIVE:  BP (!) 186/92 (BP Location: Right arm)   Pulse 96   Temp 99.1  F (37.3  C) (Axillary)   Resp 22   Ht 1.6 m (5' 3\")   Wt 133.3 kg (293 lb 14 oz)   LMP 09/09/2023 (Exact Date)   SpO2 94%   BMI 52.06 kg/m                PHYSICAL EXAM:  Alert, awake  Vitals tabulated above, reviewed  Neck supple without lymphadenopathy  Sclera normal color, not injected  CARDIOVASCULAR regular rate and rhythm, no murmur  Lungs CLEAR TO AUSCULTATION   Abdomen soft, NT/ND, absent HEPATOSPLENOMEGALY  Skin normal  Joints normal  Neurologic exam non focal  Rash R torso is improved  See above for tubes, lines, drains and scars      Antibiotics:  See MAR,multiple   Pertinent labs:  Lab Results   Component Value Date    WBC 14.5 10/23/2023    WBC 5.5 11/17/2020     Lab Results   Component Value Date    RBC 3.16 10/23/2023    RBC 4.13 11/17/2020     Lab Results   Component Value Date    HGB 9.2 10/23/2023    HGB 12.3 11/17/2020     Lab Results   Component Value Date    HCT 30.9 10/23/2023    HCT 37.4 11/17/2020     No components found for: \"MCT\"  Lab Results   Component Value Date    MCV 98 10/23/2023    MCV 91 11/17/2020     Lab Results   Component Value Date    MCH 29.1 10/23/2023    MCH 29.8 11/17/2020     Lab Results   Component Value Date    MCHC 29.8 10/23/2023    MCHC 32.9 11/17/2020     Lab Results   Component Value Date    RDW 14.9 10/23/2023    RDW 12.7 11/17/2020     Lab Results   Component Value Date     " 10/23/2023     11/17/2020       Last Comprehensive Metabolic Panel:  Sodium   Date Value Ref Range Status   10/27/2023 147 (H) 135 - 145 mmol/L Final     Comment:     Reference intervals for this test were updated on 09/26/2023 to more accurately reflect our healthy population. There may be differences in the flagging of prior results with similar values performed with this method. Interpretation of those prior results can be made in the context of the updated reference intervals.    10/27/2023 147 (H) 135 - 145 mmol/L Final     Comment:     Reference intervals for this test were updated on 09/26/2023 to more accurately reflect our healthy population. There may be differences in the flagging of prior results with similar values performed with this method. Interpretation of those prior results can be made in the context of the updated reference intervals.  Reference intervals for this test were updated on 09/26/2023 to more accurately reflect our healthy population. There may be differences in the flagging of prior results with similar values performed with this method. Interpretation of those prior results can be made in the context of the updated reference intervals.    11/17/2020 141 133 - 144 mmol/L Final     Potassium   Date Value Ref Range Status   10/27/2023 4.0 3.4 - 5.3 mmol/L Final   05/17/2022 4.5 3.4 - 5.3 mmol/L Final   11/17/2020 4.1 3.4 - 5.3 mmol/L Final     Chloride   Date Value Ref Range Status   10/27/2023 111 (H) 98 - 107 mmol/L Final   05/17/2022 102 94 - 109 mmol/L Final   11/17/2020 109 94 - 109 mmol/L Final     Carbon Dioxide   Date Value Ref Range Status   11/17/2020 31 20 - 32 mmol/L Final     Carbon Dioxide (CO2)   Date Value Ref Range Status   10/27/2023 25 22 - 29 mmol/L Final   05/17/2022 32 20 - 32 mmol/L Final     Anion Gap   Date Value Ref Range Status   10/27/2023 11 7 - 15 mmol/L Final   05/17/2022 2 (L) 3 - 14 mmol/L Final   11/17/2020 1 (L) 3 - 14 mmol/L Final     Glucose  "  Date Value Ref Range Status   05/17/2022 110 (H) 70 - 99 mg/dL Final   11/17/2020 84 70 - 99 mg/dL Final     Comment:     Fasting specimen     GLUCOSE BY METER POCT   Date Value Ref Range Status   10/27/2023 183 (H) 70 - 99 mg/dL Final     Urea Nitrogen   Date Value Ref Range Status   10/27/2023 80.7 (H) 6.0 - 20.0 mg/dL Final   05/17/2022 16 7 - 30 mg/dL Final   11/17/2020 9 7 - 30 mg/dL Final     Creatinine   Date Value Ref Range Status   10/27/2023 2.06 (H) 0.51 - 0.95 mg/dL Final   11/17/2020 0.79 0.52 - 1.04 mg/dL Final     GFR Estimate   Date Value Ref Range Status   10/27/2023 28 (L) >60 mL/min/1.73m2 Final   11/17/2020 88 >60 mL/min/[1.73_m2] Final     Comment:     Non  GFR Calc  Starting 12/18/2018, serum creatinine based estimated GFR (eGFR) will be   calculated using the Chronic Kidney Disease Epidemiology Collaboration   (CKD-EPI) equation.       Calcium   Date Value Ref Range Status   10/27/2023 9.5 8.6 - 10.0 mg/dL Final   11/17/2020 8.9 8.5 - 10.1 mg/dL Final       Liver Function Studies -   Recent Labs   Lab Test 10/23/23  0530   PROTTOTAL 7.1   ALBUMIN 3.2*   BILITOTAL 0.3   ALKPHOS 96   AST 19   ALT 26       No results found for: \"SED\"    No results found for: \"CRP\"      NOTE BD glucan test was 406 which is +      MICROBIOLOGY DATA:  Lung fluid 76 wbc, no org  Pelvic fluid pending      IMAGING/RADIOLOGY:          ASSESSMENT:  Post gastric surgery  C/b HCAP,sepsis due to peritonitis post washout  Probably does not have TSS or TEN or DRESS based on totality of evidence  Fungitell + without clear specific site of a pure fungal infection, could be peritonitis though      RECOMMENDATION:  Same abx  We ID wise have streptococcal peritonitis and a lactobacillary RUQ perihepatic abscess   , ZOSYN , julio, vanco  The julio is strictly due to the fungitell, not any known fungal infection proven    Will see again Monday.  Long term probably 14 more days of present treatment, with dropping " antifungal at some fairly soon time I think.      CLEM Wilkerson MD  Office [phone] option 2 to desk staff

## 2023-10-27 NOTE — PLAN OF CARE
Problem: Parenteral Nutrition  Goal: Effective Intravenous Nutrition Therapy Delivery  Outcome: Progressing     Problem: Parenteral Nutrition  Goal: Effective Intravenous Nutrition Therapy Delivery  Outcome: Progressing     Problem: Enteral Nutrition  Goal: Absence of Aspiration Signs and Symptoms  Outcome: Progressing  Goal: Safe, Effective Therapy Delivery  Outcome: Progressing  Goal: Feeding Tolerance  Outcome: Progressing   Goal Outcome Evaluation:    Patient tolerating TF advancement.  TPN weaning gradually to bridge transition to TF.    Consider Swallow eval when appropriate.

## 2023-10-27 NOTE — PROGRESS NOTES
"   10/27/23 0915   Appointment Info   Signing Clinician's Name / Credentials (PT) Lila Pelletier, PT, DPT   Living Environment   People in Home child(jean claude), adult;grandchild(jean claude)   Current Living Arrangements apartment   Home Accessibility no concerns  (there is an elevator)   Self-Care   Current Activity Tolerance poor   General Information   Onset of Illness/Injury or Date of Surgery 10/09/23   Referring Physician Cr To MD   Patient/Family Therapy Goals Statement (PT) Return to home   Pertinent History of Current Problem (include personal factors and/or comorbidities that impact the POC) Per Chart Review - \"52 yo F PMH obesity, severe JARVIS on CPAP, asthma (no PFTs), stimulant use disorder, stimulant induced psychosis, mood & anxiety disorders \" & \"S/P Lap Sleeve with Single Anastomosis DS 10/9/23 and then a Re-Operation on 10/12/2023 where it was found that she had 2 small enterotomies in the small bowel, likely form the traction of making the Duodenal anastomosis.\"   Existing Precautions/Restrictions abdominal   Weight-Bearing Status - LLE full weight-bearing   Weight-Bearing Status - RLE full weight-bearing   Range of Motion (ROM)   Range of Motion ROM deficits secondary to weakness   Strength (Manual Muscle Testing)   Strength (Manual Muscle Testing) Deficits observed during functional mobility   Bed Mobility   Bed Mobility rolling right;supine-sit   Rolling Right [place] (Bed Mobility) maximum assist (25% patient effort);2 person assist   Supine-Sit [place] (Bed Mobility) maximum assist (25% patient effort);2 person assist   Sit-Stand Transfer   Comment, (Sit-Stand Transfer) unable to perform Sit to stand at inital evaluation   Gait/Stairs (Locomotion)   Comment, (Gait/Stairs) unable to perform functional gait at initial evaluation   Clinical Impression   Criteria for Skilled Therapeutic Intervention Yes, treatment indicated   PT Diagnosis (PT) Impaired functional mobility   Influenced by the " following impairments weakness, decreased ROM, pain, impaired balance   Functional limitations due to impairments gait, transfers, bed mobility   Clinical Presentation (PT Evaluation Complexity) evolving   Clinical Presentation Rationale pt presents as medically diagnosed   Clinical Decision Making (Complexity) moderate complexity   Planned Therapy Interventions (PT) balance training;gait training;home exercise program;ROM (range of motion);strengthening;transfer training   Risk & Benefits of therapy have been explained evaluation/treatment results reviewed;patient   PT Total Evaluation Time   PT Eval, Moderate Complexity Minutes (54669) 10   Plan of Care Review   Plan of Care Reviewed With patient   Physical Therapy Goals   PT Frequency Daily   PT Predicted Duration/Target Date for Goal Attainment 11/04/23   PT Goals Bed Mobility;Transfers   PT: Bed Mobility Moderate assist;Supine to/from sit;Within precautions;Rolling  (x2)   PT: Transfers Moderate assist;Sit to/from stand;Within precautions;Assistive device  (x2)   Interventions   Interventions Quick Adds Therapeutic Activity   Therapeutic Activity   Therapeutic Activities: dynamic activities to improve functional performance Minutes (31270) 8   Symptoms Noted During/After Treatment Fatigue;Increased pain   Treatment Detail/Skilled Intervention Sitting balance x 5min: cueing for safety/posture/remaining in midline/core activation, Mod A x3; Sit to supine x1: cueing for safety/technique, Max A x3 - dependent for LE management; Rolling to reposition in bed: cueing for safety/use of bed rails/technqiue, Max A x2; dependent transfer to boost in bed to position; call light within reach at end of session   PT Discharge Planning   PT Plan bed mobility, sitting balance   PT Discharge Recommendation (DC Rec) Transitional Care Facility   PT Rationale for DC Rec pt is mod to max A x3 for mobility, pt is not at baseline mobility; TCU for improving strength/activity  tolerance/balance   PT Brief overview of current status Mod to Max A x3 for bed mobility   Total Session Time   Timed Code Treatment Minutes 8   Total Session Time (sum of timed and untimed services) 18

## 2023-10-27 NOTE — PHARMACY-CONSULT NOTE
"     Pharmacy Note: Parenteral Nutrition (PN) Management     Pharmacist consulted to dose PN for Mojgan Jimenez, a 53 year old female by Dr. Cruz.     Subjective:     The patient is a new PN start.     The patient was started on PN in the hospital on 10/14/23.     Indication for PN therapy:  peritonitis     Inadequate nutrition existing for > 7 days.      Enteral nutrition contraindicated due to: peritonitis.     Pertinent diseases and other special considerations  sleeve gastrectomy on 10/9/23        Social History     Tobacco Use    Smoking status: Never    Smokeless tobacco: Never   Vaping Use    Vaping Use: Never used   Substance Use Topics    Alcohol use: Not Currently     Comment: Sober x 8 months    Drug use: Not Currently     Types: Methamphetamines, \"Crack\" cocaine     Comment: in remision      Objective:    Ht Readings from Last 1 Encounters:   10/09/23 1.6 m (5' 3\")     Wt Readings from Last 1 Encounters:   10/27/23 133.3 kg (293 lb 14 oz)       Body mass index is 52.06 kg/m .    Patient Vitals for the past 96 hrs:   Weight   10/27/23 0446 133.3 kg (293 lb 14 oz)   10/26/23 0556 125.1 kg (275 lb 12.7 oz)   10/25/23 0436 123.8 kg (272 lb 14.9 oz)   10/24/23 0000 123.2 kg (271 lb 9.7 oz)       Labs:  Last 3 days:  Recent Labs     10/24/23  1817 10/25/23  0350 10/25/23  1830 10/26/23  0435 10/26/23  1848 10/27/23  0348    144  144 145 145  145 145 147*  147*   POTASSIUM  --  4.4  --  3.9  --  4.0   CHLORIDE  --  109*  --  110*  --  111*   CO2  --  24  --  22  --  25   BUN  --  89.5*  --  84.5*  --  80.7*   CR  --  2.51*  --  2.40*  --  2.06*   PALAK  --  8.9  --  8.9  --  9.5   MAG  --  1.9  --  2.1  --  2.2   PHOS  --  4.3  --  3.2  --  3.4   TRIG  --  253*  --   --   --   --    HGB  --  7.7*  --  8.3*  --  8.2*   HCT  --  24.7*  --  26.6*  --  26.6*   PLT  --  354  --  370  --  428       Glucose (past 48 hours):   Recent Labs     10/26/23  0425 10/26/23  0435 10/26/23  0824 10/26/23  1125 " 10/26/23  1612 10/26/23  2028 10/26/23  2325 10/27/23  0344 10/27/23  0348 10/27/23  0742   * 138* 135* 174* 202* 190* 189* 176* 182* 170*       Intake/Output (last 24 hours): I/O last 3 completed shifts:  In: 1879.26 [I.V.:663.93; NG/GT:200]  Out: 4715 [Urine:4475; Drains:40; Stool:200]    Estimated CrCl: Estimated Creatinine Clearance: 42.3 mL/min (A) (based on SCr of 2.06 mg/dL (H)).    Assessment:    Continue patient on PN therapy as a continuous central therapy.     Given the patient's current condition/oral intake, PN is still indicated.    Lab results reviewed:     Electrolytes adjusted based on review of labs and rate increase,    Plan:  Rate of PN: 60 mL/hr.  Formula:   Amino Acids 30 grams  Dextrose 80 grams  Sodium 0 mEq/day  Potassium 65 mEq/day  Calcium 7 mEq/day  Magnesium 14 mEq/day  Phosphorus 3 mMol/day  Chloride: Acetate = Max Acetate  Standard Multivitamins w/Vitamin K  Trace Elements  Zinc 5 mg  Cyanocobalamin 100 mcg  Fat Emulsion: none  Check BMP  tomorrow.  Pharmacist will continue to follow the patient's lab results, clinical status and blood glucose results and make adjustments as appropriate.    Thank you for the consult.  Joseph Rubio RPH  10/27/2023 10:43 AM

## 2023-10-28 ENCOUNTER — APPOINTMENT (OUTPATIENT)
Dept: PHYSICAL THERAPY | Facility: HOSPITAL | Age: 53
End: 2023-10-28
Attending: SURGERY
Payer: COMMERCIAL

## 2023-10-28 ENCOUNTER — APPOINTMENT (OUTPATIENT)
Dept: OCCUPATIONAL THERAPY | Facility: HOSPITAL | Age: 53
End: 2023-10-28
Attending: SURGERY
Payer: COMMERCIAL

## 2023-10-28 PROBLEM — I10 ACCELERATED HYPERTENSION: Status: ACTIVE | Noted: 2023-10-28

## 2023-10-28 PROBLEM — G93.41 METABOLIC ENCEPHALOPATHY: Status: ACTIVE | Noted: 2023-10-28

## 2023-10-28 PROBLEM — I42.9 CARDIOMYOPATHY (H): Status: ACTIVE | Noted: 2023-10-28

## 2023-10-28 PROBLEM — F41.1 GAD (GENERALIZED ANXIETY DISORDER): Chronic | Status: ACTIVE | Noted: 2017-11-02

## 2023-10-28 PROBLEM — N17.9 AKI (ACUTE KIDNEY INJURY) (H): Status: ACTIVE | Noted: 2023-10-28

## 2023-10-28 PROBLEM — E66.01 MORBID OBESITY (H): Chronic | Status: ACTIVE | Noted: 2019-04-02

## 2023-10-28 PROBLEM — J96.01 ACUTE RESPIRATORY FAILURE WITH HYPOXIA (H): Status: ACTIVE | Noted: 2023-10-28

## 2023-10-28 PROBLEM — E87.0 HYPERNATREMIA: Status: ACTIVE | Noted: 2023-10-28

## 2023-10-28 LAB
ANION GAP SERPL CALCULATED.3IONS-SCNC: 16 MMOL/L (ref 7–15)
BUN SERPL-MCNC: 76.2 MG/DL (ref 6–20)
CALCIUM SERPL-MCNC: 9.4 MG/DL (ref 8.6–10)
CHLORIDE SERPL-SCNC: 114 MMOL/L (ref 98–107)
CREAT SERPL-MCNC: 2.01 MG/DL (ref 0.51–0.95)
DEPRECATED HCO3 PLAS-SCNC: 23 MMOL/L (ref 22–29)
EGFRCR SERPLBLD CKD-EPI 2021: 29 ML/MIN/1.73M2
ERYTHROCYTE [DISTWIDTH] IN BLOOD BY AUTOMATED COUNT: 14.5 % (ref 10–15)
GLUCOSE BLDC GLUCOMTR-MCNC: 119 MG/DL (ref 70–99)
GLUCOSE BLDC GLUCOMTR-MCNC: 120 MG/DL (ref 70–99)
GLUCOSE BLDC GLUCOMTR-MCNC: 125 MG/DL (ref 70–99)
GLUCOSE BLDC GLUCOMTR-MCNC: 133 MG/DL (ref 70–99)
GLUCOSE BLDC GLUCOMTR-MCNC: 153 MG/DL (ref 70–99)
GLUCOSE SERPL-MCNC: 137 MG/DL (ref 70–99)
HCT VFR BLD AUTO: 27.4 % (ref 35–47)
HGB BLD-MCNC: 8.2 G/DL (ref 11.7–15.7)
MCH RBC QN AUTO: 28.6 PG (ref 26.5–33)
MCHC RBC AUTO-ENTMCNC: 29.9 G/DL (ref 31.5–36.5)
MCV RBC AUTO: 96 FL (ref 78–100)
PLATELET # BLD AUTO: 464 10E3/UL (ref 150–450)
POTASSIUM SERPL-SCNC: 4.2 MMOL/L (ref 3.4–5.3)
RBC # BLD AUTO: 2.87 10E6/UL (ref 3.8–5.2)
SODIUM SERPL-SCNC: 148 MMOL/L (ref 135–145)
SODIUM SERPL-SCNC: 153 MMOL/L (ref 135–145)
SODIUM SERPL-SCNC: 153 MMOL/L (ref 135–145)
VANCOMYCIN SERPL-MCNC: 15.1 UG/ML
WBC # BLD AUTO: 12.8 10E3/UL (ref 4–11)

## 2023-10-28 PROCEDURE — 250N000013 HC RX MED GY IP 250 OP 250 PS 637: Performed by: INTERNAL MEDICINE

## 2023-10-28 PROCEDURE — C9113 INJ PANTOPRAZOLE SODIUM, VIA: HCPCS | Mod: JZ | Performed by: INTERNAL MEDICINE

## 2023-10-28 PROCEDURE — 258N000003 HC RX IP 258 OP 636: Performed by: INTERNAL MEDICINE

## 2023-10-28 PROCEDURE — 272N000452 HC KIT SHRLOCK 5FR POWER PICC TRIPLE LUMEN

## 2023-10-28 PROCEDURE — 99231 SBSQ HOSP IP/OBS SF/LOW 25: CPT | Performed by: INTERNAL MEDICINE

## 2023-10-28 PROCEDURE — 250N000011 HC RX IP 250 OP 636: Mod: JZ | Performed by: INTERNAL MEDICINE

## 2023-10-28 PROCEDURE — 200N000001 HC R&B ICU

## 2023-10-28 PROCEDURE — 250N000009 HC RX 250: Performed by: SURGERY

## 2023-10-28 PROCEDURE — 258N000003 HC RX IP 258 OP 636: Performed by: NURSE PRACTITIONER

## 2023-10-28 PROCEDURE — 94660 CPAP INITIATION&MGMT: CPT

## 2023-10-28 PROCEDURE — 84295 ASSAY OF SERUM SODIUM: CPT | Performed by: INTERNAL MEDICINE

## 2023-10-28 PROCEDURE — 82310 ASSAY OF CALCIUM: CPT | Performed by: STUDENT IN AN ORGANIZED HEALTH CARE EDUCATION/TRAINING PROGRAM

## 2023-10-28 PROCEDURE — 97535 SELF CARE MNGMENT TRAINING: CPT | Mod: GO

## 2023-10-28 PROCEDURE — 250N000011 HC RX IP 250 OP 636: Mod: JZ | Performed by: STUDENT IN AN ORGANIZED HEALTH CARE EDUCATION/TRAINING PROGRAM

## 2023-10-28 PROCEDURE — 99232 SBSQ HOSP IP/OBS MODERATE 35: CPT | Performed by: INTERNAL MEDICINE

## 2023-10-28 PROCEDURE — 250N000011 HC RX IP 250 OP 636: Performed by: STUDENT IN AN ORGANIZED HEALTH CARE EDUCATION/TRAINING PROGRAM

## 2023-10-28 PROCEDURE — 250N000013 HC RX MED GY IP 250 OP 250 PS 637: Performed by: PHYSICIAN ASSISTANT

## 2023-10-28 PROCEDURE — 80202 ASSAY OF VANCOMYCIN: CPT | Performed by: SURGERY

## 2023-10-28 PROCEDURE — 250N000009 HC RX 250: Performed by: INTERNAL MEDICINE

## 2023-10-28 PROCEDURE — 97530 THERAPEUTIC ACTIVITIES: CPT | Mod: GP

## 2023-10-28 PROCEDURE — 94640 AIRWAY INHALATION TREATMENT: CPT | Mod: 76

## 2023-10-28 PROCEDURE — 250N000013 HC RX MED GY IP 250 OP 250 PS 637: Performed by: NURSE PRACTITIONER

## 2023-10-28 PROCEDURE — 94640 AIRWAY INHALATION TREATMENT: CPT

## 2023-10-28 PROCEDURE — 85014 HEMATOCRIT: CPT | Performed by: STUDENT IN AN ORGANIZED HEALTH CARE EDUCATION/TRAINING PROGRAM

## 2023-10-28 PROCEDURE — 250N000011 HC RX IP 250 OP 636: Mod: JZ | Performed by: NURSE PRACTITIONER

## 2023-10-28 PROCEDURE — 999N000157 HC STATISTIC RCP TIME EA 10 MIN

## 2023-10-28 PROCEDURE — 36568 INSJ PICC <5 YR W/O IMAGING: CPT

## 2023-10-28 RX ORDER — HYDRALAZINE HYDROCHLORIDE 50 MG/1
50 TABLET, FILM COATED ORAL 3 TIMES DAILY
Status: DISCONTINUED | OUTPATIENT
Start: 2023-10-28 | End: 2023-10-28

## 2023-10-28 RX ORDER — DILTIAZEM HYDROCHLORIDE 5 MG/ML
10 INJECTION INTRAVENOUS ONCE
Status: DISCONTINUED | OUTPATIENT
Start: 2023-10-28 | End: 2023-10-28

## 2023-10-28 RX ORDER — HYOSCYAMINE SULFATE 0.125 MG
125 TABLET ORAL EVERY 4 HOURS
Status: DISCONTINUED | OUTPATIENT
Start: 2023-10-28 | End: 2023-10-28

## 2023-10-28 RX ORDER — VANCOMYCIN HYDROCHLORIDE 1 G/200ML
1000 INJECTION, SOLUTION INTRAVENOUS EVERY 24 HOURS
Status: DISCONTINUED | OUTPATIENT
Start: 2023-10-28 | End: 2023-10-31

## 2023-10-28 RX ORDER — METOPROLOL TARTRATE 25 MG/1
50 TABLET, FILM COATED ORAL EVERY 6 HOURS
Status: DISCONTINUED | OUTPATIENT
Start: 2023-10-28 | End: 2023-10-29

## 2023-10-28 RX ORDER — TRAMADOL HYDROCHLORIDE 50 MG/1
50 TABLET ORAL EVERY 6 HOURS PRN
Status: DISCONTINUED | OUTPATIENT
Start: 2023-10-28 | End: 2023-11-21 | Stop reason: HOSPADM

## 2023-10-28 RX ORDER — DILTIAZEM HYDROCHLORIDE 5 MG/ML
5 INJECTION INTRAVENOUS ONCE
Status: COMPLETED | OUTPATIENT
Start: 2023-10-28 | End: 2023-10-28

## 2023-10-28 RX ORDER — HYDRALAZINE HYDROCHLORIDE 50 MG/1
50 TABLET, FILM COATED ORAL 4 TIMES DAILY
Status: DISCONTINUED | OUTPATIENT
Start: 2023-10-28 | End: 2023-10-29

## 2023-10-28 RX ORDER — LIDOCAINE 40 MG/G
CREAM TOPICAL
Status: ACTIVE | OUTPATIENT
Start: 2023-10-28 | End: 2023-10-31

## 2023-10-28 RX ADMIN — HYDRALAZINE HYDROCHLORIDE 50 MG: 50 TABLET, FILM COATED ORAL at 18:35

## 2023-10-28 RX ADMIN — PIPERACILLIN AND TAZOBACTAM 3.38 G: 3; .375 INJECTION, POWDER, LYOPHILIZED, FOR SOLUTION INTRAVENOUS at 18:36

## 2023-10-28 RX ADMIN — TRAMADOL HYDROCHLORIDE 50 MG: 50 TABLET, COATED ORAL at 10:00

## 2023-10-28 RX ADMIN — METOPROLOL TARTRATE 50 MG: 25 TABLET, FILM COATED ORAL at 16:04

## 2023-10-28 RX ADMIN — HYOSCYAMINE SULFATE 125 MCG: 0.12 TABLET SUBLINGUAL at 12:10

## 2023-10-28 RX ADMIN — HYOSCYAMINE SULFATE 125 MCG: 0.12 TABLET SUBLINGUAL at 16:03

## 2023-10-28 RX ADMIN — OLANZAPINE 5 MG: 10 INJECTION, POWDER, LYOPHILIZED, FOR SOLUTION INTRAMUSCULAR at 12:36

## 2023-10-28 RX ADMIN — DEXTROSE MONOHYDRATE: 50 INJECTION, SOLUTION INTRAVENOUS at 13:08

## 2023-10-28 RX ADMIN — VANCOMYCIN HYDROCHLORIDE 1000 MG: 1 INJECTION, SOLUTION INTRAVENOUS at 09:05

## 2023-10-28 RX ADMIN — PANTOPRAZOLE SODIUM 40 MG: 40 INJECTION, POWDER, FOR SOLUTION INTRAVENOUS at 06:30

## 2023-10-28 RX ADMIN — QUETIAPINE FUMARATE 12.5 MG: 25 TABLET ORAL at 16:03

## 2023-10-28 RX ADMIN — METOPROLOL TARTRATE 50 MG: 25 TABLET, FILM COATED ORAL at 23:56

## 2023-10-28 RX ADMIN — HEPARIN SODIUM 5000 UNITS: 5000 INJECTION, SOLUTION INTRAVENOUS; SUBCUTANEOUS at 05:06

## 2023-10-28 RX ADMIN — METOPROLOL SUCCINATE 100 MG: 50 TABLET, EXTENDED RELEASE ORAL at 08:21

## 2023-10-28 RX ADMIN — LIDOCAINE HYDROCHLORIDE 2 ML: 10 INJECTION, SOLUTION EPIDURAL; INFILTRATION; INTRACAUDAL; PERINEURAL at 15:04

## 2023-10-28 RX ADMIN — IPRATROPIUM BROMIDE AND ALBUTEROL SULFATE 3 ML: .5; 3 SOLUTION RESPIRATORY (INHALATION) at 12:46

## 2023-10-28 RX ADMIN — ONDANSETRON 4 MG: 2 INJECTION INTRAMUSCULAR; INTRAVENOUS at 10:09

## 2023-10-28 RX ADMIN — VENLAFAXINE 75 MG: 75 TABLET ORAL at 08:21

## 2023-10-28 RX ADMIN — HYDRALAZINE HYDROCHLORIDE 50 MG: 50 TABLET, FILM COATED ORAL at 08:21

## 2023-10-28 RX ADMIN — MICAFUNGIN SODIUM 100 MG: 50 INJECTION, POWDER, LYOPHILIZED, FOR SOLUTION INTRAVENOUS at 12:12

## 2023-10-28 RX ADMIN — HYDRALAZINE HYDROCHLORIDE 50 MG: 50 TABLET, FILM COATED ORAL at 14:05

## 2023-10-28 RX ADMIN — INSULIN ASPART 1 UNITS: 100 INJECTION, SOLUTION INTRAVENOUS; SUBCUTANEOUS at 12:17

## 2023-10-28 RX ADMIN — IPRATROPIUM BROMIDE AND ALBUTEROL SULFATE 3 ML: .5; 3 SOLUTION RESPIRATORY (INHALATION) at 07:36

## 2023-10-28 RX ADMIN — ACETAMINOPHEN 650 MG: 325 TABLET ORAL at 22:40

## 2023-10-28 RX ADMIN — NITROGLYCERIN 1 PATCH: 0.4 PATCH TRANSDERMAL at 08:08

## 2023-10-28 RX ADMIN — PIPERACILLIN AND TAZOBACTAM 3.38 G: 3; .375 INJECTION, POWDER, LYOPHILIZED, FOR SOLUTION INTRAVENOUS at 02:07

## 2023-10-28 RX ADMIN — DEXTROSE MONOHYDRATE: 50 INJECTION, SOLUTION INTRAVENOUS at 19:22

## 2023-10-28 RX ADMIN — TRAMADOL HYDROCHLORIDE 50 MG: 50 TABLET, COATED ORAL at 23:45

## 2023-10-28 RX ADMIN — OLANZAPINE 5 MG: 10 INJECTION, POWDER, LYOPHILIZED, FOR SOLUTION INTRAMUSCULAR at 20:02

## 2023-10-28 RX ADMIN — DILTIAZEM HYDROCHLORIDE 5 MG: 5 INJECTION, SOLUTION INTRAVENOUS at 03:24

## 2023-10-28 RX ADMIN — HYDRALAZINE HYDROCHLORIDE 10 MG: 20 INJECTION INTRAMUSCULAR; INTRAVENOUS at 01:40

## 2023-10-28 RX ADMIN — HYOSCYAMINE SULFATE 125 MCG: 0.12 TABLET SUBLINGUAL at 23:45

## 2023-10-28 RX ADMIN — PIPERACILLIN AND TAZOBACTAM 3.38 G: 3; .375 INJECTION, POWDER, LYOPHILIZED, FOR SOLUTION INTRAVENOUS at 11:10

## 2023-10-28 RX ADMIN — HEPARIN SODIUM 5000 UNITS: 5000 INJECTION, SOLUTION INTRAVENOUS; SUBCUTANEOUS at 14:06

## 2023-10-28 RX ADMIN — VENLAFAXINE 75 MG: 75 TABLET ORAL at 16:03

## 2023-10-28 RX ADMIN — HYDRALAZINE HYDROCHLORIDE 10 MG: 20 INJECTION INTRAMUSCULAR; INTRAVENOUS at 05:06

## 2023-10-28 RX ADMIN — VENLAFAXINE 75 MG: 75 TABLET ORAL at 12:16

## 2023-10-28 ASSESSMENT — ACTIVITIES OF DAILY LIVING (ADL)
ADLS_ACUITY_SCORE: 43
ADLS_ACUITY_SCORE: 37
ADLS_ACUITY_SCORE: 37
ADLS_ACUITY_SCORE: 43
ADLS_ACUITY_SCORE: 37
ADLS_ACUITY_SCORE: 37
ADLS_ACUITY_SCORE: 43
ADLS_ACUITY_SCORE: 37
ADLS_ACUITY_SCORE: 37
ADLS_ACUITY_SCORE: 43

## 2023-10-28 NOTE — PROGRESS NOTES
ASSESSMENT:  1. Intra-abdominal abscess (H)    2. Perimenopausal symptoms        Mojgan Jimenez is a 53 year old female who is s/p laparoscopic sleeve gastrectomy with KO on 10/9 with need for second surgery exploratory laparoscopy converted to laparotomy for small enterotomies x2 on 10/12 .  Patient eventually extubated and continues to improve with discontinuation of TPN and tube feeds and tolerating bariatric full liquid diet that was started yesterday.  Is complaining of some epigastric to right upper quadrant abdominal pain today without any significant findings with decreasing leukocytosis, hemodynamically stable and drain is serous.  Pain most likely spasms and will add hyoscyamine to her regiment    PLAN:  -I removed her staples and the one stitch today without difficulty.  Placed Steri-Strips.  -Okay to continue with her bariatric full liquid and see how she does.  -We will switch her oxycodone to tramadol for pain control  -Make sure all her medications are cut to 1/4 inch or crushed to be taken orally until 11/20  -Once she is transferred off of ICU it is imperative she be placed on P2 and no other floor.  -Drain per IR-continue to monitor for any changes in drain  -Added hyoscyamine every 4 hours this helps.  If she continues to have pain we could bring her back to [place] for today.    SUBJECTIVE:   She is resting and states that she developed some abdominal pain this morning.  At first she said it was yesterday but per RN it was today.  Also reading other notes she had reported sharp pain after drinking juice.  She believes that she got an upset stomach or discomfort after taking oxycodone.  Currently no nausea.  Tolerating full liquid bariatric diet.  Having liquid stools.  She is afebrile.  No significant events overnight.      Patient Vitals for the past 24 hrs:   BP Temp Temp src Pulse Resp SpO2 Weight   10/28/23 1000 (!) 167/76 -- -- 80 (!) 41 97 % --   10/28/23 0900 (!) 177/86 -- -- 84 (!) 32 94  % --   10/28/23 0821 (!) 171/77 -- -- 79 -- -- --   10/28/23 0800 (!) 171/77 98.5  F (36.9  C) Axillary 82 13 100 % --   10/28/23 0506 (!) 176/81 -- -- 79 14 100 % --   10/28/23 0500 (!) 193/85 -- -- 79 16 100 % --   10/28/23 0400 (!) 168/75 99  F (37.2  C) Oral 81 16 98 % 129.9 kg (286 lb 6 oz)   10/28/23 0308 (!) 214/106 -- -- 95 18 99 % --   10/28/23 0305 (!) 191/80 -- -- 92 16 99 % --   10/28/23 0300 (!) 185/82 -- -- 91 15 100 % --   10/28/23 0200 (!) 176/80 -- -- 96 22 99 % --   10/28/23 0100 -- -- -- 87 19 100 % --   10/28/23 0000 -- 98.7  F (37.1  C) Oral 92 (!) 35 99 % --   10/27/23 2300 -- -- -- 95 18 100 % --   10/27/23 2200 (!) 178/81 -- -- 94 20 95 % --   10/27/23 2130 -- -- -- 106 (!) 37 (!) 84 % --   10/27/23 2100 (!) 184/87 -- -- 98 23 91 % --   10/27/23 2030 (!) 174/84 -- -- 98 21 91 % --   10/27/23 2000 (!) 177/84 99  F (37.2  C) Axillary 97 21 91 % --   10/27/23 1930 (!) 162/85 -- -- 103 22 90 % --   10/27/23 1900 (!) 162/88 -- -- 96 19 91 % --   10/27/23 1846 -- -- -- 104 18 93 % --   10/27/23 1830 (!) 161/82 -- -- 107 19 91 % --   10/27/23 1800 (!) 169/80 -- -- 99 21 91 % --   10/27/23 1730 (!) 172/79 -- -- 101 20 91 % --   10/27/23 1714 -- -- -- 96 24 98 % --   10/27/23 1700 (!) 181/85 -- -- 95 28 92 % --   10/27/23 1630 (!) 178/84 -- -- 91 (!) 32 93 % --   10/27/23 1600 (!) 183/92 -- -- 91 23 92 % --   10/27/23 1530 (!) 178/92 -- -- 92 22 92 % --   10/27/23 1500 (!) 180/82 -- -- 92 26 92 % --   10/27/23 1430 (!) 175/83 -- -- 92 21 95 % --   10/27/23 1413 (!) 180/82 -- -- 91 20 95 % --   10/27/23 1400 (!) 180/82 99.1  F (37.3  C) Axillary 90 23 94 % --   10/27/23 1300 (!) 179/84 -- -- 87 19 96 % --   10/27/23 1200 (!) 186/92 99.1  F (37.3  C) Axillary 96 22 94 % --   10/27/23 1100 (!) 183/90 -- -- 98 21 94 % --         PHYSICAL EXAM:  GEN: No acute distress, comfortable    ABD: Soft tenderness epigastric to right upper quadrant without rebound or peritoneal signs present.  Her incision looks  very good and healing nicely.  Removed all staples and then I also removed the stitch that was from her previous drain site.  Drains: Serous  Output by Drain (mL) 10/26/23 0700 - 10/26/23 1459 10/26/23 1500 - 10/26/23 2259 10/26/23 2300 - 10/27/23 0659 10/27/23 0700 - 10/27/23 1459 10/27/23 1500 - 10/27/23 2259 10/27/23 2300 - 10/28/23 0659 10/28/23 0700 - 10/28/23 1047   Closed/Suction Drain 1 Right RUQ Bulb 12 Welsh 20 10 10 4 5 0 0      EXT:No cyanosis, edema or obvious abnormalities    10/27 0700 - 10/28 0659  In: 2329.28 [I.V.:838.18]  Out: 3184 [Urine:3000; Drains:9]    No results displayed because visit has over 200 results.   Recent Results (from the past 24 hour(s))   Glucose by meter    Collection Time: 10/27/23 11:41 AM   Result Value Ref Range    GLUCOSE BY METER POCT 183 (H) 70 - 99 mg/dL   Glucose by meter    Collection Time: 10/27/23  3:54 PM   Result Value Ref Range    GLUCOSE BY METER POCT 145 (H) 70 - 99 mg/dL   Sodium    Collection Time: 10/27/23  5:55 PM   Result Value Ref Range    Sodium 150 (H) 135 - 145 mmol/L   Glucose by meter    Collection Time: 10/27/23  7:59 PM   Result Value Ref Range    GLUCOSE BY METER POCT 147 (H) 70 - 99 mg/dL   Glucose by meter    Collection Time: 10/27/23 11:20 PM   Result Value Ref Range    GLUCOSE BY METER POCT 123 (H) 70 - 99 mg/dL   Glucose by meter    Collection Time: 10/28/23  3:21 AM   Result Value Ref Range    GLUCOSE BY METER POCT 119 (H) 70 - 99 mg/dL   CBC with platelets    Collection Time: 10/28/23  5:19 AM   Result Value Ref Range    WBC Count 12.8 (H) 4.0 - 11.0 10e3/uL    RBC Count 2.87 (L) 3.80 - 5.20 10e6/uL    Hemoglobin 8.2 (L) 11.7 - 15.7 g/dL    Hematocrit 27.4 (L) 35.0 - 47.0 %    MCV 96 78 - 100 fL    MCH 28.6 26.5 - 33.0 pg    MCHC 29.9 (L) 31.5 - 36.5 g/dL    RDW 14.5 10.0 - 15.0 %    Platelet Count 464 (H) 150 - 450 10e3/uL   Basic metabolic panel    Collection Time: 10/28/23  5:19 AM   Result Value Ref Range    Sodium 153 (H) 135 - 145  mmol/L    Potassium 4.2 3.4 - 5.3 mmol/L    Chloride 114 (H) 98 - 107 mmol/L    Carbon Dioxide (CO2) 23 22 - 29 mmol/L    Anion Gap 16 (H) 7 - 15 mmol/L    Urea Nitrogen 76.2 (H) 6.0 - 20.0 mg/dL    Creatinine 2.01 (H) 0.51 - 0.95 mg/dL    GFR Estimate 29 (L) >60 mL/min/1.73m2    Calcium 9.4 8.6 - 10.0 mg/dL    Glucose 137 (H) 70 - 99 mg/dL   Sodium    Collection Time: 10/28/23  5:19 AM   Result Value Ref Range    Sodium 153 (H) 135 - 145 mmol/L   Vancomycin level    Collection Time: 10/28/23  5:19 AM   Result Value Ref Range    Vancomycin 15.1   ug/mL   Glucose by meter    Collection Time: 10/28/23  8:02 AM   Result Value Ref Range    GLUCOSE BY METER POCT 125 (H) 70 - 99 mg/dL               MELRIN Dueñas  [facility]  [address] [place] [address]  Phone- [phone]  Fax- [phone]

## 2023-10-28 NOTE — PROGRESS NOTES
[facility]    PROGRESS NOTE - Hospitalist Service    Assessment and Plan    Principal Problem:    Morbid (severe) obesity due to excess calories (H)  Active Problems:    LINA (generalized anxiety disorder)    Recurrent major depressive disorder, remission status unspecified (H24)    Morbid obesity (H)    Obstructive sleep apnea    Intra-abdominal abscess (H)    Hypernatremia    SUSSY (acute kidney injury) (H24)    Accelerated hypertension    Metabolic encephalopathy    Acute respiratory failure with hypoxia (H)    Cardiomyopathy (H)    Mojgan Jimenez is a 53 year old female with h/o obesity, severe JARVIS on CPAP, asthma (no PFTs), stimulant use disorder, stimulant induced psychosis, mood & anxiety disorder. Admitted 10/9/23 for scheduled laparoscopic sleeve gastrectomy with single anastomosis duodenal switch. She was later found to have two small bowel injuries with peritonitis. 10/12/23 returned to OR for small bowel interotomy closure, clean-out, & drain placement; 10/19 found to have RUQ fluid collection s/p drain placement; 10/24 s/p pelvic fluid aspiration.     Course complicated by encephalopathy/delirium, septic shock, suspected takotsubo syndrome, acute respiratory failure due to loss of protective airway reflexes & increased metabolic load requiring intubation (10/12-10/21; reintubated 10/21-10/26), & oliguric SUSSY with renal recovery. She has significantly improved: mental status cleared, cardiac & renal function recovered, & she was extubated to minimal supplemental oxygen. Fevers & leukocytosis remain presumably due to complicated intra-abdominal infection.       Acute hypoxemic-hypercapnic respiratory failure - improving   - Rapid Response. 10/12 Pt with decreased LOC. Opens eyes to voice. SpO2 89% on 8L NC. BS clear and diminished. Placed on a 15L non-rebreather for SpO2 > 90%. After narcan admin pt became more responsive/agitated. Transferred to ICU and intubated    - 10/22/23 CT  demonstrated complete RLL atelectasis, milder RUL & RBML atelectasis or consolidation. 10/22 bronchoscopy moderate RLL blood tinged secretions cleared.   - supplemental O2 to maintain spO2 >= 90-96% & PaO2 >= 60 mmHg  - pulmonary hygiene: acapella QID, IS Q2H while awake, out of bed to chair,   - R-pleural effusion, exudative Suspect simple parapneumonic effusion. 10/24 s/p thoracentesis, 400 mL. Pleural fluid amylase amylase 23, serum 64.      Gastric bypass  10/9 s/p sleeve gastrectomy complicated by small bowel injury & peritonitis   10/12 returned to OR for closure, clean-out, & drain placement (now removed)  10/19 s/p RUQ fluid collection drain placement   10/24 s/p pelvic fluid aspiration (75 ml)  - surgery primary   - ID consulted   - 10/12 - peritoneal cultures + Streptococcus anginosus, mixed aerobic & anaerobic kamla   - 10/19 - RUQ fluid cultures & pelvic fluid aspirate + Lactobacillus   - 10/22 - BDG blood +  - pending - 10/24 pleural fluid studies    - vanc, pip-tazo, micafungin  - was on TPN previously 10/14  - PPTF started 10/24. TF and TPN weaned of 10/27 currently on bariatric clears, surgery managing   - flush drain at regular intervals   - discussed with surgery and recommend short acting or IR medications and to avoid XR due to gut is not working yet.     Accelerated HTN /Cardiomyopathy   - not on medsat home  - echo down previously and initially decreased LV function but repeat echo shows EF normalized, possible stress induced.   - cards following  - on metoprolol XL 100mg BID but ineffective likely not absorbing, discussed with surgery and recommends avoid XR, changed to metoprolol 50mg Q6hrs. Was previously on TID dose  - hydralazine increase to 50mg TID will change to QID   - nitrobid  - prn IV hydralazine  - possible anxiety adding to this, per surgery she is very anxious and has attacks frequently    History of JARVIS, allusions to asthma in the chart no PFTs  - NIPPV with sleep/naps everytime  "  - continue duonebs qid for now       Oliguric SUSSY - improved   - nephrology was consulted   - slowly improving and creatinine trending down  - avoid nephrotoxic agents      Hypernatremia -   - IV bumex stopped and on D5.   - trend sodium      Anemia - stable  Suspect multifactorial secondary to sepsis & surgical blood loss   - monitor      Hypervolemia  - bumex 2mg q12h       Hyperglycemia of critical illness   - glargine 10 units every day  - LDSSI       Clinically Significant Risk Factors      # Overweight: Estimated body mass index is 28.31 kg/m  as calculated from the following:  Height as of this encounter: 1.626 m (5' 4\").  Weight as of this encounter: 74.8 kg (164 lb 14.4 oz)., PRESENT ON ADMISSION    COVID-19 PCR Results           No data to display              COVID-19 Antibody Results, Testing for Immunity           No data to display               Code Status: Full Code  VTE prophylaxis:  Heparin SQ  DIET: Orders Placed This Encounter      Bariatric Diet Full Liquids    Drains/Lines: central line  Weight bearing status: WBAT     Expected Discharge Date: 10/31/2023      Destination: home with family        Subjective:  Still high Bps, discussed with surgery and recommend short acting meds and not XR     PHYSICAL EXAM  Temp:  [98.5  F (36.9  C)-99.1  F (37.3  C)] 99.1  F (37.3  C)  Pulse:  [] 78  Resp:  [13-41] 19  BP: (161-214)/() 194/94  FiO2 (%):  [30 %] 30 %  SpO2:  [84 %-100 %] 95 %  Wt Readings from Last 1 Encounters:   10/28/23 129.9 kg (286 lb 6 oz)       Intake/Output Summary (Last 24 hours) at 10/28/2023 0755  Last data filed at 10/28/2023 0600  Gross per 24 hour   Intake 2329.28 ml   Output 3184 ml   Net -854.72 ml      Body mass index is 50.73 kg/m .    Physical Exam  Constitutional:       Appearance: She is obese.      Comments: Ill-appearing but nontoxic  Has CPAP mask on   HENT:      Head: Normocephalic and atraumatic.   Neck:      Comments: Thick neck.   Cardiovascular:      " Rate and Rhythm: Normal rate and regular rhythm.      Pulses: Normal pulses.      Heart sounds: Normal heart sounds.   Pulmonary:      Effort: Pulmonary effort is normal.      Comments: Dimished BS at bases, but harsh wet cough  Abdominal:      General: Bowel sounds are normal.      Palpations: Abdomen is soft.      Comments: TTP, incision C/D/I   Musculoskeletal:         General: Normal range of motion.      Right lower leg: No edema.      Left lower leg: No edema.   Skin:     General: Skin is warm and dry.      Capillary Refill: Capillary refill takes less than 2 seconds.   Neurological:      Mental Status: She is alert and oriented to person, place, and time. Mental status is at baseline.       PERTINENT LABS/IMAGING:  Results for orders placed or performed during the hospital encounter of 10/09/23   XR Gastrografin Upper GI w KUB    Impression    FINDINGS AND IMPRESSION:   FLUOROSCOPIC TIME: 1  NUMBER OF IMAGES: 12     Surgical change noted at the stomach and proximal small bowel. No leak appreciated on this study.   XR Chest Port 1 View    Impression    IMPRESSION: Endotracheal tube tip located 1.9 cm above the emilia. Left IJ central catheter with the tip in the upper SVC. No pneumothorax. Moderate asymmetric elevation of the right hemidiaphragm. Mild bibasilar pulmonary opacities likely reflect   atelectasis. No definite pleural effusion. Normal heart size. Spinal degenerative changes.   XR Chest Port 1 View    Impression    IMPRESSION: Low lung volumes. Hazy opacities right lower lung field could be related to overlying soft tissue versus atelectasis or developing infiltrate. This should be reviewed on follow-up imaging. Minimal atelectasis left base. Endotracheal tube tip   in satisfactory position 2.5 cm above the emilia. Enteric tube extends into the stomach. Left IJ central line tip mid SVC. No pneumothorax.   XR Chest Port 1 View    Impression    IMPRESSION: Low lung volumes. Hazy opacities right lower  lung field could be related to overlying soft tissue versus atelectasis or developing infiltrate. This is similar as compared to the prior day chest radiograph but recommend continued attention on   follow-up imaging. Minimal atelectasis left base. Endotracheal tube tip in satisfactory position terminating approximately 4 cm above the emilia. Nasogastric tube courses below the level of the diaphragm though the tip is not well visualized on this   radiograph. Recommend attention on follow-up and consider abdominal radiograph for further evaluation.. Left IJ central line tip overlies the region of the right brachiocephalic vein. No pneumothorax. Trace effusions possible.   XR Chest Port 1 View    Impression    IMPRESSION:     Tip of the endotracheal tube is in satisfactory position. Esophageal temperature probe is in the lower third of the esophagus. Gastric tube courses below the diaphragmatic hiatus and outside the field-of-view. Telemetry leads overlie the chest.    Persistent shallow lung inflation. The right hemidiaphragm is indistinct and there is graded opacity in the infrahilar right chest either related to adjacent basilar atelectasis and/or layering pleural fluid. Focal atelectasis in the medial left base is   not increased. The vessels within the aerated portions of the lungs are normal. No clear change from 2 days earlier.    Unchanged heart and mediastinal contours.   CT Abdomen Pelvis w/o Contrast    Impression    IMPRESSION:   1.  Sleeve gastrectomy and proximal duodenal to jejunal anastomosis, malpositioned nasogastric tube, and upper anterior abdominal surgical drain from the right and a second left anterior abdominal and pelvic surgical drain.   2.  A thin layer of fluid about 1 - 2 cm radial thickness between the right hemidiaphragm and right hepatic lobe contains a few foci of gas and there is a small amount of nongas-containing fluid in pelvic cul-de-sac.   3.  Complete right lower lobe  atelectasis, small volume of right-sided pleural fluid and mild elevation right hemidiaphragm.      CT Retroperitoneal Abscess Drainage    Impression    IMPRESSION:  1.  Successful CT-guided drain placement into perihepatic collection.    Reference CPT Codes: 71296, 01311, 00373, 72871   XR Abdomen Port 1 View    Impression    IMPRESSION: Nasogastric tube terminates in the distal stomach. Surgical staple line of the mid line in the abdomen.   XR Chest Port 1 View    Impression    IMPRESSION: Perihepatic drain overlies the right upper abdomen. Small right pleural effusion with compressive atelectasis. Infiltrate of the right lung base is not excluded. Left basilar and midlung atelectasis. Left internal jugular central venous   catheter with tip overlying the innominate confluence region. Nasogastric tube which courses below the diaphragm though the tip is excluded by collimation. No pneumothorax. Upper limits normal heart size.   XR Chest Port 1 View    Impression    IMPRESSION: Endotracheal tube has been placed with tip 2 cm above the emilia. Remainder unchanged. Enteric tube tip below diaphragm, off the image. Left IJ central line tip in the left brachiocephalic vein. Right upper quadrant percutaneous drain.   Bilateral pleural effusions. Moderate degenerative change thoracic spine.   CT Chest Abdomen Pelvis w/o Contrast    Impression    IMPRESSION:  1.  Sleeve gastrectomy and proximal duodenal to jejunal anastomosis with a well-positioned nasogastric tube and interval removal of the 2 surgical drains.  2.  Interval placement of a percutaneous drainage catheter with distal loop in the lateral aspect of the right subdiaphragmatic air-fluid collection surrounding the right hepatic lobe which has not changed significantly and continues to measure about 1 -   2 cm radial thickness.   3.  A 7 x 5 x 4 cm volume of nongas-containing fluid in the pelvic cul-de-sac is unchanged.  4.  Complete right lower lobe atelectasis,  mild passive atelectasis right upper and middle lobes posteriorly, small/moderate volume right pleural effusion and mild elevation right hemidiaphragm.   5.  Endotracheal tube tip is only 1 cm above the emilia.   US Thoracentesis    Impression    IMPRESSION:  Status post right ultrasound-guided thoracentesis.    Reference CPT Code: 10845   CT Abdomen Peritoneum Abscess Aspiration    Impression    IMPRESSION:  1.  Successful CT-guided aspiration of pelvic fluid collection with yellow slight debris filled fluid aspirated and sent for cultures. This pocket is 20 cm deep in the pelvis. No drain was placed but the pocket was irrigated 4 times with sterile saline..    Reference CPT Codes: 94386,   US Lower Extremity Venous Duplex Bilateral    Impression    IMPRESSION:  1.  No deep venous thrombosis in the bilateral lower extremities.   US Upper Extremity Venous Duplex Bilat    Impression    IMPRESSION:  1.  No deep venous thrombosis in the bilateral upper extremities.    2.  Superficial thrombus within the cephalic veins bilaterally.   IR Abscess Tube Check    Impression    IMPRESSION:    Exchange and repositioning with upsize of the perihepatic drainage catheter to a 12 Welsh catheter.    PLAN:  Continue flushing the catheter and keep to suction drainage.     CT Abdomen w/o Contrast    Impression    IMPRESSION:   1.  Perihepatic drain remains in good position. Residual low-density material persists in this subcapsular perihepatic space which extends to the dome of the liver.  2.  Stable appearance right pleural effusion and bibasilar atelectasis.  3.  Postsurgical changes of the stomach.   Echocardiogram Complete   Result Value Ref Range    LVEF  30-35% (moderately reduced)    Echocardiogram Limited   Result Value Ref Range    LVEF  60-65%        Imaging results reviewed over the past 24 hrs:   No results found for this or any previous visit (from the past 24 hour(s)).  Recent Labs   Lab 10/28/23  1551 10/28/23  1152  10/28/23  0802 10/28/23  0519 10/27/23  1959 10/27/23  1755 10/27/23  0742 10/27/23  0348 10/26/23  0824 10/26/23  0435 10/24/23  0412 10/24/23  0333 10/23/23  0611 10/23/23  0530 10/22/23  0835 10/22/23  0520   WBC  --   --   --  12.8*  --   --   --  14.8*  --  14.1*   < > 12.3*  --  14.5*  --  27.2*   HGB  --   --   --  8.2*  --   --   --  8.2*  --  8.3*   < > 8.5*  --  9.2*  --  9.2*   MCV  --   --   --  96  --   --   --  93  --  94   < > 96  --  98  --  100   PLT  --   --   --  464*  --   --   --  428  --  370   < > 358  --  427  --  460*   INR  --   --   --   --   --   --   --   --   --   --   --   --   --  1.49*  --   --    NA  --   --   --  153*  153*  --  150*  --  147*  147*   < > 145  145   < > 144  144   < > 149*  149*   < > 150*   POTASSIUM  --   --   --  4.2  --   --   --  4.0  --  3.9   < > 3.8  --  3.7  --  5.0   CHLORIDE  --   --   --  114*  --   --   --  111*  --  110*   < > 107  --  111*  --  112*   CO2  --   --   --  23  --   --   --  25  --  22   < > 24  --  24  --  24   BUN  --   --   --  76.2*  --   --   --  80.7*  --  84.5*   < > 92.2*  --  104.2*  --  104.1*   CR  --   --   --  2.01*  --   --   --  2.06*  --  2.40*   < > 2.52*  --  2.65*  --  2.76*   ANIONGAP  --   --   --  16*  --   --   --  11  --  13   < > 13  --  14  --  14   PALAK  --   --   --  9.4  --   --   --  9.5  --  8.9   < > 8.7  --  8.7  --  9.0   * 153* 125* 137*   < >  --    < > 182*   < > 138*   < > 119*   < > 126*   < > 242*   ALBUMIN  --   --   --   --   --   --   --   --   --   --   --   --   --  3.2*  --  3.7   PROTTOTAL  --   --   --   --   --   --   --   --   --   --   --  6.4  --  7.1  --  7.7   BILITOTAL  --   --   --   --   --   --   --   --   --   --   --   --   --  0.3  --  0.3   ALKPHOS  --   --   --   --   --   --   --   --   --   --   --   --   --  96  --  106*   ALT  --   --   --   --   --   --   --   --   --   --   --   --   --  26  --  33   AST  --   --   --   --   --   --   --   --   --   --   --    --   --  19  --  25    < > = values in this interval not displayed.       Recent Labs   Lab Test 10/23/23  0530 10/16/23  0436 10/15/23  0554   INR 1.49* 1.21* 1.19*       40 MINUTES SPENT BY ME on the date of service doing chart review, history, exam, documentation, discussion with nursing staff and specialist, & further activities per the note.  Liz Gillis MD  [facility] Medicine Service  [phone]

## 2023-10-28 NOTE — PROGRESS NOTES
Patient seen on 4L NC spo2 96%. At bedtime Patient went on Bipap 10/6 R 12 30% tolerating well at this time. RT will continue to follow.    Emmanuelle Hernández, RT

## 2023-10-28 NOTE — PLAN OF CARE
[facility] - ICU    RN Progress Note:            Pertinent Assessments:      Please refer to flowsheet rows for full assessment     VSSx - persistent HTN. Hydralazine modified this am (see MAR). Remains afebrile. C/o nausea and abd pain this am. Evaluated by surgery PA (see notes & MAR). Rectal tube and arevalo removed. Up to chair w/ PT. Napping on BiPAP; otherwise on RA. Loose congested cough; pulmonary hygiene encouraged w/ splinted cough. See flowsheets and MAR for complete assessment.            Key Events - This Shift:       OOB to chair w/ PT. Arevalo & Flexiseal d/c'd. Plan for PICC eval and poss transfer from ICU.              Barriers to Discharge / Downgrade:     Left internal jugular line w/ multiple abx, Vanc, and D5. Awaiting PICC.

## 2023-10-28 NOTE — PHARMACY-VANCOMYCIN DOSING SERVICE
Pharmacy Vancomycin Note  Date of Service 2023  Patient's  1970   53 year old, female    Indication: Sepsis  Day of Therapy: 7  Current vancomycin regimen:  750 mg IV q24h  Current vancomycin monitoring method: AUC  Current vancomycin therapeutic monitoring goal: 400-600 mg*h/L    InsightRX Prediction of Current Vancomycin Regimen  Regimen: 750 mg IV every 24 hours.  Start time: 09:25 on 10/28/2023  Exposure target: AUC24 (range)400-600 mg/L.hr   AUC24,ss: 409 mg/L.hr  Probability of AUC24 > 400: 63 %  Ctrough,ss: 12.5 mg/L  Probability of Ctrough,ss > 20: 0 %  Probability of nephrotoxicity (Lodise FABIENNE ): 8 %    Current estimated CrCl = Estimated Creatinine Clearance: 42.6 mL/min (A) (based on SCr of 2.01 mg/dL (H)).    Creatinine for last 3 days  10/26/2023:  4:35 AM Creatinine 2.40 mg/dL  10/27/2023:  3:48 AM Creatinine 2.06 mg/dL  10/28/2023:  5:19 AM Creatinine 2.01 mg/dL    Recent Vancomycin Levels (past 3 days)  10/26/2023:  8:52 AM Vancomycin 10.3 ug/mL  10/28/2023:  5:19 AM Vancomycin 15.1 ug/mL    Vancomycin IV Administrations (past 72 hours)                     vancomycin (VANCOCIN) 750 mg in sodium chloride 0.9 % 250 mL intermittent infusion (mg) 750 mg New Bag 10/27/23 0925    vancomycin (VANCOCIN) 1,500 mg in sodium chloride 0.9 % 250 mL intermittent infusion (mg) 1,500 mg New Bag 10/26/23 0941                    Nephrotoxins and other renal medications (From now, onward)      Start     Dose/Rate Route Frequency Ordered Stop    10/28/23 0900  vancomycin (VANCOCIN) 1,000 mg in 200 mL dextrose intermittent infusion         1,000 mg  200 mL/hr over 1 Hours Intravenous EVERY 24 HOURS 10/28/23 0815      10/28/23 0804  [Held by provider]  bumetanide (BUMEX) injection 2 mg        (On hold since today at 0756 until manually unheld; held by Liz Gillis MDHold Reason: Other)    2 mg Intravenous EVERY 24 HOURS 10/27/23 1039      10/25/23 1900  piperacillin-tazobactam (ZOSYN) 3.375 g  "vial to attach to  mL bag        Note to Pharmacy: For [facility], [facility] and [facility]: For Zosyn-naive patients, use the \"Zosyn initial dose + extended infusion\" order panel.    3.375 g  over 240 Minutes Intravenous EVERY 8 HOURS 10/25/23 1222                 Contrast Orders - past 72 hours (72h ago, onward)      Start     Dose/Rate Route Frequency Stop    10/25/23 1500  iohexol (OMNIPAQUE) 350 MG/ML injectable solution 50 mL         50 mL Intravenous ONCE 10/25/23 1438            Interpretation of levels and current regimen:  Vancomycin level is reflective of -600    Has serum creatinine changed greater than 50% in last 72 hours: No    Urine output:  good urine output    Renal Function: Improving    InsightRX Prediction of Planned New Vancomycin Regimen  Regimen: 1000 mg IV every 24 hours.  Start time: 09:25 on 10/28/2023  Exposure target: AUC24 (range)400-600 mg/L.hr   AUC24,ss: 536 mg/L.hr  Probability of AUC24 > 400: 100 %  Ctrough,ss: 16.5 mg/L  Probability of Ctrough,ss > 20: 0 %  Probability of nephrotoxicity (Lodise FABIENNE 2009): 12 %    Plan:  Increase Dose to 1000 mg IV q24h  Vancomycin monitoring method: AUC  Vancomycin therapeutic monitoring goal: 400-600 mg*h/L  Pharmacy will check vancomycin levels as appropriate in 3-5 Days.  Serum creatinine levels will be ordered a minimum of twice weekly.    Joseph Rubio [facility]    "

## 2023-10-28 NOTE — PROGRESS NOTES
Care Management Follow Up    Length of Stay (days): 19    Expected Discharge Date: 10/31/2023    Concerns to be Addressed:   Care post  laparoscopic sleeve gastrectomy with KO on 10/9 with need for second surgery exploratory laparoscopy converted to laparotomy for small enterotomies x2 on 10/12/23. Nephrology following due to alteration in renal function, cardiology following. Delirium requiring 1:1 supervision noted. IV Zosyn, IV Vancomycin.   Patient plan of care discussed at interdisciplinary rounds: Yes    Anticipated Discharge Disposition:  Transitional care (TCU) is recommended for continued therapy and skilled nursing.     Anticipated Discharge Services:  Continued therapy, skilled nursing and medical management.   Anticipated Discharge DME:  Per therapy (if indicated).    Patient/family educated on Medicare website which has current facility and service quality ratings:  NA; will meet with patient when delirium resolves.   Education Provided on the Discharge Plan:   Per team  Patient/Family in Agreement with the Plan:   Yes    Referrals Placed by CM/SW:   None  Private pay costs discussed: Not applicable     Additional Information:  Per chart review, patient lives with her adult daughter and is independent with all activities of daily living and instrumental activities of daily living (IADLs) at baseline. She is currently unemployed but planned to resume working after recovery. TCU is recommended and care management will follow up once her delirium resolves. However, it is uncertain if her insurance plan covers skilled nursing facilities.   CM will continue to monitor progression of care, review team recommendations and provide discharge planning assist as needed.      Liberty Rose RN

## 2023-10-28 NOTE — PROGRESS NOTES
RENAL PROGRESS NOTE      Assessment and Plan:  53 year old female s/p gastric b/p 10/9/23 c/w leak, s/p repair, peritonitis, perihepatic abscess. Nephrology is following for SUSSY, hyponatremia and hypervolemia management.    ARF;  hemodynamic exacerbated by IV NSAIDS (lowish bp, vasodilatory drugs, mild intravascular depletion)==>ATN, now in recovery phase and creatinine improved to 2.01 mg/dl. Creat was normal (0.7) PTA.  Urinary output remains in nonoliguric range with IV diuretics.  Hypernatremia-Serum sodium is is trending up 153 this morning.  Calculated FWD is ~5 L . Patient currently D5W at 75 ml/h.  Increase D5W rate to 150 ml/h. Monitor serum sodium daily.  Volume overload, now s/p diuresis, prob near euvolemia. Net 16L negative since admission? Wt is trending up and likely not accurate. Currently breathing comfortable on RA.  On IV bumex 2 mg daily. Discontinue Bumex.   Hypertension-Started on Metoprolol and Hydralazine as per cardiology this admission. BP is not controlled this am.  Increase hydralazine dose to 50mg 3 times daily.  I reviewed cardiology note from today.  Hyperlipid; on statin  Elevated LFTs Resolved  Resp failure; failed first extubation. ?PNA, ?Volume. S/p right thoracocentesis 10/24, 400 ml removed. Extubated on 10/26. Currently on RA.   Acidosis; resolved   Nutrition;Tolerating bariatric diet.  Cardiomyopathy-resolved, EF 10/23 was normal. I reviewed cardiology note from today.  Sepsis d/t peritonitis s/p washout.  Patient c/o abdominal pain. Off opioids since 10/27.On IV antbx and Micafunging. Surgery and ID are following.    We will follow.    Subjective  She was seen at the bedside and events were reviewed with nurses.  No acute issues overnight.  Patient was weaned of  supplemental O2 this am. Tolerating bariatric diet.   Patient c/o diffuse sharp abdominal pain after drinking juice this am.   Updated on labs. All questions answered    Objective    Vital signs in last 24  hours  Temp:  [98.7  F (37.1  C)-99.5  F (37.5  C)] 99  F (37.2  C)  Pulse:  [] 79  Resp:  [14-37] 14  BP: (161-214)/() 171/77  FiO2 (%):  [30 %] 30 %  SpO2:  [84 %-100 %] 100 %      Intake/Output last 3 shifts  I/O last 3 completed shifts:  In: 2329.28 [I.V.:838.18; NG/GT:260]  Out: 3184 [Urine:3000; Drains:9; Stool:175]  Intake/Output this shift:  No intake/output data recorded.    Physical Exam  Gen: NAD,  overly obese  HEENT: AT/NC   CV: RRR without murmur or rub  Lung: CTA in anterior fields b/l   Ab: More distended ,incision closed with staples Hypoactive BS, diffuse tenderness to mild palpation, + guarding, no rebound. Rectal tube in place, green color stool in bag.  Ext: Trace edema of upper and lower extremities and well perfused  : Patiño catheter in place, making yellow color urine.  Skin;no rash  Neuro: Awake, alert, following commands    Pertinent Labs     Last Renal Panel:  Sodium   Date Value Ref Range Status   10/28/2023 153 (H) 135 - 145 mmol/L Final     Comment:     Reference intervals for this test were updated on 09/26/2023 to more accurately reflect our healthy population. There may be differences in the flagging of prior results with similar values performed with this method. Interpretation of those prior results can be made in the context of the updated reference intervals.    10/28/2023 153 (H) 135 - 145 mmol/L Final     Comment:     Reference intervals for this test were updated on 09/26/2023 to more accurately reflect our healthy population. There may be differences in the flagging of prior results with similar values performed with this method. Interpretation of those prior results can be made in the context of the updated reference intervals.  Reference intervals for this test were updated on 09/26/2023 to more accurately reflect our healthy population. There may be differences in the flagging of prior results with similar values performed with this method. Interpretation of  those prior results can be made in the context of the updated reference intervals.    11/17/2020 141 133 - 144 mmol/L Final     Potassium   Date Value Ref Range Status   10/28/2023 4.2 3.4 - 5.3 mmol/L Final   05/17/2022 4.5 3.4 - 5.3 mmol/L Final   11/17/2020 4.1 3.4 - 5.3 mmol/L Final     Chloride   Date Value Ref Range Status   10/28/2023 114 (H) 98 - 107 mmol/L Final   05/17/2022 102 94 - 109 mmol/L Final   11/17/2020 109 94 - 109 mmol/L Final     Carbon Dioxide   Date Value Ref Range Status   11/17/2020 31 20 - 32 mmol/L Final     Carbon Dioxide (CO2)   Date Value Ref Range Status   10/28/2023 23 22 - 29 mmol/L Final   05/17/2022 32 20 - 32 mmol/L Final     Anion Gap   Date Value Ref Range Status   10/28/2023 16 (H) 7 - 15 mmol/L Final   05/17/2022 2 (L) 3 - 14 mmol/L Final   11/17/2020 1 (L) 3 - 14 mmol/L Final     Glucose   Date Value Ref Range Status   10/28/2023 137 (H) 70 - 99 mg/dL Final   05/17/2022 110 (H) 70 - 99 mg/dL Final   11/17/2020 84 70 - 99 mg/dL Final     Comment:     Fasting specimen     GLUCOSE BY METER POCT   Date Value Ref Range Status   10/28/2023 125 (H) 70 - 99 mg/dL Final     Urea Nitrogen   Date Value Ref Range Status   10/28/2023 76.2 (H) 6.0 - 20.0 mg/dL Final   05/17/2022 16 7 - 30 mg/dL Final   11/17/2020 9 7 - 30 mg/dL Final     Creatinine   Date Value Ref Range Status   10/28/2023 2.01 (H) 0.51 - 0.95 mg/dL Final   11/17/2020 0.79 0.52 - 1.04 mg/dL Final     GFR Estimate   Date Value Ref Range Status   10/28/2023 29 (L) >60 mL/min/1.73m2 Final   11/17/2020 88 >60 mL/min/[1.73_m2] Final     Comment:     Non  GFR Calc  Starting 12/18/2018, serum creatinine based estimated GFR (eGFR) will be   calculated using the Chronic Kidney Disease Epidemiology Collaboration   (CKD-EPI) equation.       Calcium   Date Value Ref Range Status   10/28/2023 9.4 8.6 - 10.0 mg/dL Final   11/17/2020 8.9 8.5 - 10.1 mg/dL Final     Phosphorus   Date Value Ref Range Status   10/27/2023  3.4 2.5 - 4.5 mg/dL Final     Albumin   Date Value Ref Range Status   10/23/2023 3.2 (L) 3.5 - 5.2 g/dL Final   05/17/2022 4.0 3.4 - 5.0 g/dL Final   12/03/2018 3.6 3.4 - 5.0 g/dL Final     Recent Labs   Lab 10/28/23  0519 10/27/23  0348 10/26/23  0435 10/25/23  0350 10/24/23  0333   HGB 8.2* 8.2* 8.3* 7.7* 8.5*          I reviewed all lab results    ~ 50 Minutes today spent in exam, POC, education regarding renal disease and management, counseling and/or discussion with patient's care team.    Georgette Garnica MD  [facility]  [address], [address]  [place] [address]  Phone# [phone]  Fax# [phone]

## 2023-10-28 NOTE — PROGRESS NOTES
Respiratory Care    Pt has been on and off BiPAP this morning/afternoon. On low flow nasal cannula when off the BiPAP. Tolerating well. Nebs done. LS diminished pre and post. Plan to continue with BiPAP during sleep. No respiratory distress noted.      Guillermo Keyes, RT

## 2023-10-28 NOTE — PROGRESS NOTES
Imp/plan:  CM now resolved presume due to stress CM, now extubated, on metoprolol succiante 100mg bid, cont NTG paste, cont elevated BP, will raise hydralazine 50mg tid give Cr elevated (C rdown 2.1)  PVC none noted, off amiodarone, on metoprolol   HTN - on bumex, and above medications,increased hydralazine    Current Facility-Administered Medications   Medication    acetaminophen (TYLENOL) solution 650 mg    acetaminophen (TYLENOL) tablet 650 mg    Or    acetaminophen (TYLENOL) Suppository 650 mg    albuterol (PROVENTIL HFA/VENTOLIN HFA) inhaler    albuterol (PROVENTIL) neb solution 2.5 mg    [Held by provider] bumetanide (BUMEX) injection 2 mg    dextrose 10% infusion    dextrose 10% infusion    dextrose 5% infusion    glucose gel 15-30 g    Or    dextrose 50 % injection 25-50 mL    Or    glucagon injection 1 mg    diphenhydrAMINE (BENADRYL) capsule 25 mg    Or    diphenhydrAMINE (BENADRYL) injection 25 mg    heparin ANTICOAGULANT injection 5,000 Units    hydrALAZINE (APRESOLINE) injection 10 mg    hydrALAZINE (APRESOLINE) tablet 25 mg    HYDROmorphone (DILAUDID) injection 0.2 mg    insulin aspart (NovoLOG) injection (RAPID ACTING)    insulin glargine (LANTUS PEN) injection 10 Units    ipratropium - albuterol 0.5 mg/2.5 mg/3 mL (DUONEB) neb solution 3 mL    lidocaine (LMX4) cream    lidocaine 1 % 0.1-1 mL    menthol-zinc oxide (CALMOSEPTINE) 0.44-20.6 % ointment OINT    metoprolol succinate ER (TOPROL XL) 24 hr tablet 100 mg    micafungin (MYCAMINE) 100 mg in sodium chloride 0.9 % 100 mL intermittent infusion    miconazole (MICATIN) 2 % powder    naloxone (NARCAN) injection 0.2 mg    Or    naloxone (NARCAN) injection 0.4 mg    Or    naloxone (NARCAN) injection 0.2 mg    Or    naloxone (NARCAN) injection 0.4 mg    nitroGLYcerin (NITRODUR) 0.4 MG/HR 24 hr patch 1 patch    OLANZapine (zyPREXA) IV injection 2.5-5 mg    ondansetron (ZOFRAN ODT) ODT tab 4 mg    Or    ondansetron (ZOFRAN) injection 4 mg    oxyCODONE  "(ROXICODONE) tablet 5 mg    pantoprazole (PROTONIX) 2 mg/mL suspension 40 mg    Or    pantoprazole (PROTONIX) IV push injection 40 mg    phenol (CHLORASEPTIC) 1.4 % spray 1-2 mL    piperacillin-tazobactam (ZOSYN) 3.375 g vial to attach to  mL bag    prochlorperazine (COMPAZINE) injection 10 mg    Or    prochlorperazine (COMPAZINE) tablet 10 mg    sodium chloride (PF) 0.9% PF flush 3 mL    sodium chloride (PF) 0.9% PF flush 3 mL    vancomycin (VANCOCIN) 750 mg in sodium chloride 0.9 % 250 mL intermittent infusion    venlafaxine (EFFEXOR) tablet 75 mg     Past Medical History:   Diagnosis Date    LINA (generalized anxiety disorder) 11/02/2017    Hyperlipidemia LDL goal <160 01/20/2019    Major depressive disorder     Methanol abuse (H)     Mild persistent asthma without complication 11/02/2017    Obese     JARVIS on CPAP     Cpap not working    Paranoia (H)     resolved due to drugs    Vitamin D deficiency 11/02/2017        Review of Systems: 12 points negative other than above    BP (!) 176/81   Pulse 79   Temp 99  F (37.2  C) (Oral)   Resp 14   Ht 1.6 m (5' 3\")   Wt 129.9 kg (286 lb 6 oz)   LMP 09/09/2023 (Exact Date)   SpO2 100%   BMI 50.73 kg/m    JVP<7cm, carotids normal  Lungs clear  Cor RRR no c,r,m  Abs soft +BS, no mass  Ext no c,c,e    Lab Results   Component Value Date    HGB 8.2 (L) 10/28/2023     Lab Results   Component Value Date     (H) 10/28/2023     No results found for: \"CREATININE\"  No components found for: \"K\"    Imp/plan:      Yohan Srinivasan MD  Interventional Cardiology   [facility]  [phone]    "

## 2023-10-28 NOTE — PLAN OF CARE
Problem: Risk for Delirium  Goal: Optimal Coping  Outcome: Not Progressing  Goal: Improved Behavioral Control  Outcome: Not Progressing  Intervention: Minimize Safety Risk  Recent Flowsheet Documentation  Taken 10/27/2023 2000 by Marlen Falcon RN  Trust Relationship/Rapport:   care explained   choices provided   emotional support provided   questions answered  Taken 10/27/2023 1600 by Marlen Falcon RN  Trust Relationship/Rapport:   care explained   choices provided   emotional support provided   questions answered     Problem: Delirium  Goal: Optimal Coping  Outcome: Not Progressing  Goal: Improved Behavioral Control  Outcome: Not Progressing  Intervention: Minimize Safety Risk  Recent Flowsheet Documentation  Taken 10/27/2023 2000 by Marlen Falcon RN  Trust Relationship/Rapport:   care explained   choices provided   emotional support provided   questions answered  Taken 10/27/2023 1600 by Marlen Falcon RN  Trust Relationship/Rapport:   care explained   choices provided   emotional support provided   questions answered     Problem: Risk for Delirium  Goal: Optimal Coping  Outcome: Not Progressing  Goal: Improved Behavioral Control  Outcome: Not Progressing  Intervention: Minimize Safety Risk  Recent Flowsheet Documentation  Taken 10/27/2023 2000 by Marlen Falcon RN  Trust Relationship/Rapport:   care explained   choices provided   emotional support provided   questions answered  Taken 10/27/2023 1600 by Marlen Falcon RN  Trust Relationship/Rapport:   care explained   choices provided   emotional support provided   questions answered     Problem: Delirium  Goal: Optimal Coping  Outcome: Not Progressing  Goal: Improved Behavioral Control  Outcome: Not Progressing  Intervention: Minimize Safety Risk  Recent Flowsheet Documentation  Taken 10/27/2023 2000 by Marlen Falcon RN  Trust Relationship/Rapport:   care explained   choices provided   emotional support provided   questions answered  Taken 10/27/2023 1600 by Marlen Falcon  RN  Trust Relationship/Rapport:   care explained   choices provided   emotional support provided   questions answered   Goal Outcome Evaluation:       [facility] - ICU    RN Progress Note:            Pertinent Assessments:      Please refer to flowsheet rows for full assessment     Pt did well most of the shift. Started having signs of delirium around 930-1000pm. 1025pm went to remove central line and patient refused, said she didn't want to be touched. Attempted to reorientate but pt not cooperative. Charge nurse spoke with patient, ordered sitter for patient safety. PRN olanzapine given.           Key Events - This Shift:       As above                   Barriers to Discharge / Downgrade:     Delirium.         Point of Contact Update YES-OR-NO: Yes  Family visited

## 2023-10-28 NOTE — PLAN OF CARE
Goal Outcome Evaluation:       [facility] - ICU    RN Progress Note:            Pertinent Assessments:      Please refer to flowsheet rows for full assessment     Pt had a 1:1 sitter d/t delirium but gradually improved and became stable by 1am. Pt has been Alert and oriented, elevated BP ON, MD notified and ordered diltiazem 5mg once and administered PRN hydralazine 2x, BP is currently still elevated, MD notified.           Key Events - This Shift:       Refer to note above.                     Barriers to Discharge / Downgrade:

## 2023-10-28 NOTE — PROCEDURES
"PICC Line Insertion Procedure Note  Pt. Name: Mojgan Jimenez  MRN:        [id number]    Procedure: Insertion of a  triple Lumen  5 fr  Bard SOLO (valved) Power PICC, Lot number [id number]    Indications: Vascular access    Contraindications : none    Procedure Details     Patient identified with 2 identifiers and \"Time Out\" conducted.  .     Central line insertion bundle followed: hand hygiene performed prior to procedure, site cleansed with cholraprep, hat, mask, sterile gloves, sterile gown worn, patient draped with maximum barrier head to toe drape, sterile field maintained.    The vein was assessed and found to be compressible and of adequate size.     Lidocaine 1% 2 ml administered sq to the insertion site. A 5 Fr PICC was inserted into the basilic vein of the right arm with ultrasound guidance. 1 attempt(s) required to access vein.   Catheter threaded without difficulty. Good blood return noted.    Modified Seldinger Technique used for insertion.    The 8 sharps that are included in the PICC insertion kit were accounted for and disposed of in the sharps container prior to breakdown of the sterile field.    Catheter secured with Statlock, biopatch and Tegaderm dressing applied.    Findings:    Total catheter length  46 cm, with 0 cm exposed. Mid upper arm circumference is 41 cm. Catheter was flushed with 30 cc NS. Patient  tolerated procedure well.    Tip placement verified by 3CG technology. Tip placement in the SVC.    CLABSI prevention brochure left at bedside.    Patient's primary RN notified PICC is ready for use.      Comments:        Bob SAGASTUMEN,RN,VA-BC  Vascular Access - [facility]      "

## 2023-10-28 NOTE — PROGRESS NOTES
"CLINICAL NUTRITION SERVICES - REASSESSMENT NOTE    EVALUATION OF THE PROGRESS TOWARD GOALS   Diet: Bariatric full liquids  Nutrition Support: TPN and TF discontinued on 10/27/2023  Intake: Not recorded yet  SLP evaluation noted  IVF: D 5% @ 75 ml/hr ( = 90 g CHO, 306 Calories)     ANTHROPOMETRICS  Height: 160 cm (5' 3\")  Most Recent Weight: 129.9 kg (286 lb 6 oz)    Weight History:   Wt Readings from Last 10 Encounters:   10/28/23 129.9 kg (286 lb 6 oz)   08/09/23 133.4 kg (294 lb 3.2 oz)   07/05/23 129.7 kg (286 lb)   03/13/23 129.2 kg (284 lb 12.8 oz)   11/10/22 129.3 kg (285 lb)   11/07/22 129.3 kg (285 lb)   08/30/22 132.6 kg (292 lb 4.8 oz)   08/25/22 131.7 kg (290 lb 6.4 oz)   06/15/22 132 kg (291 lb)   05/17/22 131.8 kg (290 lb 9.6 oz)      GI CONCERNS  Audible BS  Fecal incontinence  Last BM 10/27/2023 per nurse    LABS  Reviewed: Na 153 (H), K 4.2, urea nitrogen 76.2 (H), Cr 2.01 (H), Glu 137 (H)    MEDICATIONS  Reviewed: hydralazine, novolog, lantus pen, metoprolol succinate, micafungin, pantoprazole, zosyn, vancocin    Malnutrition Diagnosis: Patient does not meet two of the above criteria necessary for diagnosing malnutrition    CURRENT NUTRITION DIAGNOSIS  Inadequate oral intake related to recent surgery as evidenced by NPO and need for TF and TPN-resolved: Diet has advanced to full liquids and TF/TPN discontinued    New nutrition diagnosis: Altered nutrition related labs due to ARF as evidenced by elevated BUN/Cr      INTERVENTIONS  Implementation  Add 4 oz of ensure Max with meals to help pt meet protein needs  Follow po intake/tolerance/needs    Goals  Tolerance of po diet ( diet advance per surgery)  Gradual weight loss    Monitoring/Evaluation  Progress toward goals will be monitored and evaluated per protocol.   "

## 2023-10-29 ENCOUNTER — APPOINTMENT (OUTPATIENT)
Dept: PHYSICAL THERAPY | Facility: HOSPITAL | Age: 53
End: 2023-10-29
Attending: SURGERY
Payer: COMMERCIAL

## 2023-10-29 ENCOUNTER — APPOINTMENT (OUTPATIENT)
Dept: SPEECH THERAPY | Facility: HOSPITAL | Age: 53
End: 2023-10-29
Attending: SURGERY
Payer: COMMERCIAL

## 2023-10-29 LAB
ANION GAP SERPL CALCULATED.3IONS-SCNC: 13 MMOL/L (ref 7–15)
BACTERIA PLR CULT: NO GROWTH
BUN SERPL-MCNC: 59.3 MG/DL (ref 6–20)
CALCIUM SERPL-MCNC: 9.3 MG/DL (ref 8.6–10)
CHLORIDE SERPL-SCNC: 111 MMOL/L (ref 98–107)
CREAT SERPL-MCNC: 1.86 MG/DL (ref 0.51–0.95)
DEPRECATED HCO3 PLAS-SCNC: 24 MMOL/L (ref 22–29)
EGFRCR SERPLBLD CKD-EPI 2021: 32 ML/MIN/1.73M2
ERYTHROCYTE [DISTWIDTH] IN BLOOD BY AUTOMATED COUNT: 14.3 % (ref 10–15)
GLUCOSE BLDC GLUCOMTR-MCNC: 100 MG/DL (ref 70–99)
GLUCOSE BLDC GLUCOMTR-MCNC: 123 MG/DL (ref 70–99)
GLUCOSE BLDC GLUCOMTR-MCNC: 124 MG/DL (ref 70–99)
GLUCOSE BLDC GLUCOMTR-MCNC: 127 MG/DL (ref 70–99)
GLUCOSE BLDC GLUCOMTR-MCNC: 138 MG/DL (ref 70–99)
GLUCOSE SERPL-MCNC: 129 MG/DL (ref 70–99)
GRAM STAIN RESULT: NORMAL
GRAM STAIN RESULT: NORMAL
HCT VFR BLD AUTO: 29.7 % (ref 35–47)
HGB BLD-MCNC: 8.8 G/DL (ref 11.7–15.7)
MAGNESIUM SERPL-MCNC: 2.2 MG/DL (ref 1.7–2.3)
MCH RBC QN AUTO: 28.8 PG (ref 26.5–33)
MCHC RBC AUTO-ENTMCNC: 29.6 G/DL (ref 31.5–36.5)
MCV RBC AUTO: 97 FL (ref 78–100)
PLATELET # BLD AUTO: 414 10E3/UL (ref 150–450)
POTASSIUM SERPL-SCNC: 4 MMOL/L (ref 3.4–5.3)
RBC # BLD AUTO: 3.06 10E6/UL (ref 3.8–5.2)
SODIUM SERPL-SCNC: 144 MMOL/L (ref 135–145)
SODIUM SERPL-SCNC: 148 MMOL/L (ref 135–145)
SODIUM SERPL-SCNC: 148 MMOL/L (ref 135–145)
WBC # BLD AUTO: 12 10E3/UL (ref 4–11)

## 2023-10-29 PROCEDURE — 97530 THERAPEUTIC ACTIVITIES: CPT | Mod: GP

## 2023-10-29 PROCEDURE — 258N000003 HC RX IP 258 OP 636: Performed by: INTERNAL MEDICINE

## 2023-10-29 PROCEDURE — 99232 SBSQ HOSP IP/OBS MODERATE 35: CPT | Performed by: INTERNAL MEDICINE

## 2023-10-29 PROCEDURE — 80048 BASIC METABOLIC PNL TOTAL CA: CPT | Performed by: STUDENT IN AN ORGANIZED HEALTH CARE EDUCATION/TRAINING PROGRAM

## 2023-10-29 PROCEDURE — 99231 SBSQ HOSP IP/OBS SF/LOW 25: CPT | Performed by: INTERNAL MEDICINE

## 2023-10-29 PROCEDURE — 97110 THERAPEUTIC EXERCISES: CPT | Mod: GP

## 2023-10-29 PROCEDURE — 250N000013 HC RX MED GY IP 250 OP 250 PS 637: Performed by: INTERNAL MEDICINE

## 2023-10-29 PROCEDURE — 120N000001 HC R&B MED SURG/OB

## 2023-10-29 PROCEDURE — C9113 INJ PANTOPRAZOLE SODIUM, VIA: HCPCS | Mod: JZ | Performed by: INTERNAL MEDICINE

## 2023-10-29 PROCEDURE — 258N000003 HC RX IP 258 OP 636: Performed by: NURSE PRACTITIONER

## 2023-10-29 PROCEDURE — 250N000011 HC RX IP 250 OP 636: Mod: JZ | Performed by: INTERNAL MEDICINE

## 2023-10-29 PROCEDURE — 250N000009 HC RX 250: Performed by: INTERNAL MEDICINE

## 2023-10-29 PROCEDURE — 94640 AIRWAY INHALATION TREATMENT: CPT

## 2023-10-29 PROCEDURE — 999N000157 HC STATISTIC RCP TIME EA 10 MIN

## 2023-10-29 PROCEDURE — 94660 CPAP INITIATION&MGMT: CPT

## 2023-10-29 PROCEDURE — 83735 ASSAY OF MAGNESIUM: CPT | Performed by: INTERNAL MEDICINE

## 2023-10-29 PROCEDURE — 250N000011 HC RX IP 250 OP 636: Mod: JZ | Performed by: NURSE PRACTITIONER

## 2023-10-29 PROCEDURE — 94640 AIRWAY INHALATION TREATMENT: CPT | Mod: 76

## 2023-10-29 PROCEDURE — 84295 ASSAY OF SERUM SODIUM: CPT | Performed by: INTERNAL MEDICINE

## 2023-10-29 PROCEDURE — 250N000009 HC RX 250: Performed by: NURSE PRACTITIONER

## 2023-10-29 PROCEDURE — 250N000013 HC RX MED GY IP 250 OP 250 PS 637: Performed by: NURSE PRACTITIONER

## 2023-10-29 PROCEDURE — 250N000013 HC RX MED GY IP 250 OP 250 PS 637: Performed by: PHYSICIAN ASSISTANT

## 2023-10-29 PROCEDURE — 85014 HEMATOCRIT: CPT | Performed by: STUDENT IN AN ORGANIZED HEALTH CARE EDUCATION/TRAINING PROGRAM

## 2023-10-29 PROCEDURE — 99233 SBSQ HOSP IP/OBS HIGH 50: CPT | Performed by: INTERNAL MEDICINE

## 2023-10-29 PROCEDURE — 92526 ORAL FUNCTION THERAPY: CPT | Mod: GN

## 2023-10-29 RX ORDER — METOPROLOL TARTRATE 25 MG/1
100 TABLET, FILM COATED ORAL 2 TIMES DAILY
Status: DISCONTINUED | OUTPATIENT
Start: 2023-10-29 | End: 2023-11-02

## 2023-10-29 RX ORDER — AMLODIPINE BESYLATE 5 MG/1
5 TABLET ORAL DAILY
Status: DISCONTINUED | OUTPATIENT
Start: 2023-10-29 | End: 2023-10-31

## 2023-10-29 RX ORDER — IPRATROPIUM BROMIDE AND ALBUTEROL SULFATE 2.5; .5 MG/3ML; MG/3ML
3 SOLUTION RESPIRATORY (INHALATION) EVERY 6 HOURS PRN
Status: DISCONTINUED | OUTPATIENT
Start: 2023-10-29 | End: 2023-11-21 | Stop reason: HOSPADM

## 2023-10-29 RX ORDER — METOPROLOL SUCCINATE 50 MG/1
100 TABLET, EXTENDED RELEASE ORAL 2 TIMES DAILY
Status: DISCONTINUED | OUTPATIENT
Start: 2023-10-29 | End: 2023-10-29

## 2023-10-29 RX ORDER — HYDRALAZINE HYDROCHLORIDE 25 MG/1
100 TABLET, FILM COATED ORAL 3 TIMES DAILY
Status: DISCONTINUED | OUTPATIENT
Start: 2023-10-29 | End: 2023-11-10

## 2023-10-29 RX ADMIN — PIPERACILLIN AND TAZOBACTAM 3.38 G: 3; .375 INJECTION, POWDER, LYOPHILIZED, FOR SOLUTION INTRAVENOUS at 11:23

## 2023-10-29 RX ADMIN — PIPERACILLIN AND TAZOBACTAM 3.38 G: 3; .375 INJECTION, POWDER, LYOPHILIZED, FOR SOLUTION INTRAVENOUS at 03:51

## 2023-10-29 RX ADMIN — DEXTROSE MONOHYDRATE: 50 INJECTION, SOLUTION INTRAVENOUS at 02:50

## 2023-10-29 RX ADMIN — IPRATROPIUM BROMIDE AND ALBUTEROL SULFATE 3 ML: .5; 3 SOLUTION RESPIRATORY (INHALATION) at 11:46

## 2023-10-29 RX ADMIN — METOPROLOL TARTRATE 50 MG: 25 TABLET, FILM COATED ORAL at 04:30

## 2023-10-29 RX ADMIN — HYOSCYAMINE SULFATE 125 MCG: 0.12 TABLET SUBLINGUAL at 08:48

## 2023-10-29 RX ADMIN — HEPARIN SODIUM 5000 UNITS: 5000 INJECTION, SOLUTION INTRAVENOUS; SUBCUTANEOUS at 06:46

## 2023-10-29 RX ADMIN — ACETAMINOPHEN 650 MG: 325 TABLET ORAL at 17:17

## 2023-10-29 RX ADMIN — HYOSCYAMINE SULFATE 125 MCG: 0.12 TABLET SUBLINGUAL at 12:14

## 2023-10-29 RX ADMIN — HYOSCYAMINE SULFATE 125 MCG: 0.12 TABLET SUBLINGUAL at 16:46

## 2023-10-29 RX ADMIN — HYOSCYAMINE SULFATE 125 MCG: 0.12 TABLET SUBLINGUAL at 04:29

## 2023-10-29 RX ADMIN — HYDRALAZINE HYDROCHLORIDE 10 MG: 20 INJECTION INTRAMUSCULAR; INTRAVENOUS at 06:49

## 2023-10-29 RX ADMIN — MICAFUNGIN SODIUM 100 MG: 50 INJECTION, POWDER, LYOPHILIZED, FOR SOLUTION INTRAVENOUS at 12:11

## 2023-10-29 RX ADMIN — ALBUTEROL SULFATE 2.5 MG: 2.5 SOLUTION RESPIRATORY (INHALATION) at 19:34

## 2023-10-29 RX ADMIN — DEXTROSE MONOHYDRATE: 50 INJECTION, SOLUTION INTRAVENOUS at 17:10

## 2023-10-29 RX ADMIN — HYDRALAZINE HYDROCHLORIDE 100 MG: 50 TABLET, FILM COATED ORAL at 20:11

## 2023-10-29 RX ADMIN — VENLAFAXINE 75 MG: 75 TABLET ORAL at 12:14

## 2023-10-29 RX ADMIN — INSULIN GLARGINE 10 UNITS: 100 INJECTION, SOLUTION SUBCUTANEOUS at 21:14

## 2023-10-29 RX ADMIN — VENLAFAXINE 75 MG: 75 TABLET ORAL at 18:37

## 2023-10-29 RX ADMIN — HYDRALAZINE HYDROCHLORIDE 10 MG: 20 INJECTION INTRAMUSCULAR; INTRAVENOUS at 16:48

## 2023-10-29 RX ADMIN — IPRATROPIUM BROMIDE AND ALBUTEROL SULFATE 3 ML: .5; 3 SOLUTION RESPIRATORY (INHALATION) at 07:46

## 2023-10-29 RX ADMIN — HEPARIN SODIUM 5000 UNITS: 5000 INJECTION, SOLUTION INTRAVENOUS; SUBCUTANEOUS at 21:14

## 2023-10-29 RX ADMIN — HYDRALAZINE HYDROCHLORIDE 100 MG: 50 TABLET, FILM COATED ORAL at 14:23

## 2023-10-29 RX ADMIN — METOPROLOL TARTRATE 100 MG: 25 TABLET, FILM COATED ORAL at 20:07

## 2023-10-29 RX ADMIN — VENLAFAXINE 75 MG: 75 TABLET ORAL at 08:48

## 2023-10-29 RX ADMIN — HYDRALAZINE HYDROCHLORIDE 10 MG: 20 INJECTION INTRAMUSCULAR; INTRAVENOUS at 01:07

## 2023-10-29 RX ADMIN — HEPARIN SODIUM 5000 UNITS: 5000 INJECTION, SOLUTION INTRAVENOUS; SUBCUTANEOUS at 14:26

## 2023-10-29 RX ADMIN — HYOSCYAMINE SULFATE 125 MCG: 0.12 TABLET SUBLINGUAL at 20:12

## 2023-10-29 RX ADMIN — HYDRALAZINE HYDROCHLORIDE 100 MG: 50 TABLET, FILM COATED ORAL at 08:48

## 2023-10-29 RX ADMIN — AMLODIPINE BESYLATE 5 MG: 5 TABLET ORAL at 08:48

## 2023-10-29 RX ADMIN — NITROGLYCERIN 1 PATCH: 0.4 PATCH TRANSDERMAL at 08:47

## 2023-10-29 RX ADMIN — PIPERACILLIN AND TAZOBACTAM 3.38 G: 3; .375 INJECTION, POWDER, LYOPHILIZED, FOR SOLUTION INTRAVENOUS at 19:58

## 2023-10-29 RX ADMIN — METOPROLOL SUCCINATE 100 MG: 50 TABLET, EXTENDED RELEASE ORAL at 08:48

## 2023-10-29 RX ADMIN — DEXTROSE MONOHYDRATE: 50 INJECTION, SOLUTION INTRAVENOUS at 09:46

## 2023-10-29 RX ADMIN — PANTOPRAZOLE SODIUM 40 MG: 40 INJECTION, POWDER, FOR SOLUTION INTRAVENOUS at 07:53

## 2023-10-29 RX ADMIN — VANCOMYCIN HYDROCHLORIDE 1000 MG: 1 INJECTION, SOLUTION INTRAVENOUS at 09:10

## 2023-10-29 ASSESSMENT — ACTIVITIES OF DAILY LIVING (ADL)
ADLS_ACUITY_SCORE: 39
ADLS_ACUITY_SCORE: 37
ADLS_ACUITY_SCORE: 39
ADLS_ACUITY_SCORE: 39
ADLS_ACUITY_SCORE: 37

## 2023-10-29 NOTE — PROGRESS NOTES
Patient seen on 3L nc. Received duoneb QID. Patient placed on Bipap 10/6 R12 fio2 30% until 0330. Then requested to be on NC. RT will continue to follow.    Emmanuelle Hernández, RT

## 2023-10-29 NOTE — PROGRESS NOTES
Respiratory Care    Pt weaned to RA SpO2 100. Using V60 during sleep. No respiratory distress noted.      Guillermo Keyes, RT

## 2023-10-29 NOTE — TREATMENT PLAN
RCAT Treatment Plan    Patient Score: 6  Patient Acuity: 4    Clinical Indication for Therapy: history of bronchospasm    Therapy Ordered: Duoneb Q6 PRN, FV PRN    Assessment Summary: pt has a history of asthma. NPC, LS diminished. On RA SpO2 high 90s. Will reassess as needed.      Guillermo Keyes, RT  10/29/2023

## 2023-10-29 NOTE — PLAN OF CARE
[facility] - ICU    RN Progress Note:            Pertinent Assessments:      Please refer to flowsheet rows for full assessment     VSSx persistent HTN. See MAR for medication adjustments. Afebrile. Decreased anxiety today per pt. On RA; tolerating BiPAP w/ sleep. Poor appetite and epigastric pain w/ swallowing. On bariatric full liquids. Up to chair w/ PT. Abd binder in place. See flowsheets and MAR for complete assessment           Key Events - This Shift:       Transfer to P2             Barriers to Discharge / Downgrade:     None         Point of Contact Update YES-OR-NO: Yes  If No, reason:   Name:Judy (dtr)  Phone Number: facesheet  Summary of Conversation: Informed of transfer from ICU

## 2023-10-29 NOTE — PROGRESS NOTES
[facility]    Medicine Progress Note - Hospitalist Service    Date of Admission:  10/9/2023    Assessment & Plan     Mogjan Jimenez is a 53 year old female with h/o obesity, severe JARVIS on CPAP, asthma, stimulant use disorder, stimulant induced psychosis, mood & anxiety disorder. Admitted 10/9/23 for scheduled laparoscopic sleeve gastrectomy with single anastomosis duodenal switch. She was later found to have two small bowel injuries with peritonitis. 10/12/23 returned to OR for small bowel interotomy closure, clean-out, & drain placement; 10/19 found to have RUQ fluid collection s/p drain placement; 10/24 s/p pelvic fluid aspiration.      Course complicated by encephalopathy/delirium, septic shock, suspected takotsubo syndrome, acute respiratory failure due to loss of protective airway reflexes & increased metabolic load requiring intubation (10/12-10/21/23; reintubated 10/21-10/26/23), & oliguric SUSSY with renal recovery. She has significantly improved: mental status clearing with some ongoing delirium at night, cardiomyopathy improved, ATN I recovery phase, and ongoing treatment of intra abdominal infection. She was made floor tele status 10/27/23        1.Acute hypoxemic-hypercapnic respiratory failure -now weaned RA  - Rapid Response. 10/12 Pt with decreased LOC. Opens eyes to voice. SpO2 89% on 8L NC. BS clear and diminished. Placed on a 15L non-rebreather for SpO2 > 90%. After narcan admin pt became more responsive/agitated. Transferred to ICU and intubated -10/12-10/21/23; reintubated 10/21-10/26/23. Now extubated and weaned to RA  - 10/22/23 CT demonstrated complete RLL atelectasis, milder RUL & RBML atelectasis or consolidation. 10/22 bronchoscopy moderate RLL blood tinged secretions cleared.   - pulmonary hygiene  - R-pleural effusion, exudative Suspect simple parapneumonic effusion. 10/24 s/p thoracentesis, 400 mL. Pleural fluid amylase amylase 23, serum 64.      2.Gastric  bypass  10/9 s/p sleeve gastrectomy complicated by small bowel injury & peritonitis   10/12 returned to OR for closure, clean-out, & drain placement (now removed)  10/19 s/p RUQ fluid collection drain placement   10/24 s/p pelvic fluid aspiration (75 ml)  - surgery primary   - ID consulted   - 10/12 - peritoneal cultures + Streptococcus anginosus, mixed aerobic & anaerobic kamla   - 10/19 - RUQ fluid cultures & pelvic fluid aspirate + Lactobacillus   - 10/22 - BDG blood +  - 10/24 pleural fluid studies  -#+WBC no pos cx  10/24--aspirate pelvic drain--lacto + candida  - vanc, pip-tazo, micafungin  - was on TPN previously 10/14  - PPTF started 10/24/23. TF and TPN weaned of 10/27 currently on bariatric clears, surgery managing   -10/27--per renal -on D5W for hypernatremia  -10/28 still on D5W  - flush drain at regular intervals      3.Accelerated HTN /Cardiomyopathy   - echo previously and initially decreased LV function but repeat echo shows EF normalized, possible stress induced.   - cards appreciated and still following  - on metoprolol tartrate 100 mg bid per cards  - hydralazine increase to 50mg  QID   - nitroglycerin patch 0.4mg/24  -amlodipine 5 mg daily   - prn IV hydralazine     4.History of JARVIS, allusions to asthma in the chart no PFTs  - NIPPV with sleep/naps everytime   - continue duonebs qid for now       5.Oliguric SUSSY - improved   - nephrology was consulted  for ARF peak creat 6.97 on 10/14  - slowly improving and creatinine trending down--10/29 now 1.86  - avoid nephrotoxic agents      6.Hypernatremia -   - IV bumex stopped and on D5W per renal .   - trend sodium 153 yesterday -->148 today     7.Anemia - stable  Suspect multifactorial secondary to sepsis & surgical blood loss   - monitor ,10/29 hgb 8.8     8.Hypervolemia  - bumex 2mg q12h--holding with hypernatremia       9.Hyperglycemia of critical illness   - glargine 10 units every day  - LDSSI      10.Obesity  -BMI 46    11.Acute metabolic and  "toxic encephalopathy  -related to infection from abd  -related to metabolic , ARF       I called daughter to update at 1509              Diet: Room Service  Bariatric Diet Full Liquids  Snacks/Supplements Adult: Ensure Max Protein (bariatric); With Meals    DVT Prophylaxis: Heparin SQ  Patiño Catheter: Not present  Lines: PRESENT      PICC 10/28/23 Triple Lumen Right Basilic-Site Assessment: WDL  [REMOVED] CVC Triple Lumen Left-Site Assessment: WDL      Cardiac Monitoring: ACTIVE order. Indication: Electrolyte Imbalance (24 hours)- Magnesium <1.3 mg/ml; Potassium < =2.8 or > 5.5 mg/ml  Code Status: Full Code      Clinically Significant Risk Factors         # Hypernatremia: Highest Na = 153 mmol/L in last 2 days, will monitor as appropriate      # Hypoalbuminemia: Lowest albumin = 2.7 g/dL at 10/14/2023  6:29 AM, will monitor as appropriate     # Hypertension: Noted on problem list        # Severe Obesity: Estimated body mass index is 46.71 kg/m  as calculated from the following:    Height as of this encounter: 1.6 m (5' 3\").    Weight as of this encounter: 119.6 kg (263 lb 10.7 oz).      # Asthma: noted on problem list        Disposition Plan      Expected Discharge Date: 11/01/2023      Destination: home with family              Juanita Mayers MD  Hospitalist Service  [facility]  Securely message with [employer] (more info)  Text page via [employer] Paging/Directory   ______________________________________________________________________    Interval History   She notes some pain in abd  Thinks breathing better  Reviewed RN notes still shows some confusion at night  She notes she did get up to chair yesterday  Feels very tired  No nausea this am    Physical Exam   Vital Signs: Temp: 98.2  F (36.8  C) Temp src: Oral BP: (!) 181/90 Pulse: 69   Resp: 16 SpO2: 95 % O2 Device: None (Room air) Oxygen Delivery: 1 LPM  Weight: 263 lbs 10.72 oz    Constitutional: awake, fatigued, alert, cooperative, and no " apparent distress  Respiratory: no increased work of breathing, good air exchange, no retractions, and clear to auscultation  Cardiovascular: displaced and regular rate and rhythm  GI: hypoactive bowel sounds, soft, non-distended, and tenderness noted central  Skin: no bruising or bleeding  Musculoskeletal: trace edema legs  Neurologic: Mental Status Exam:  Level of Alertness:   awake  Attention/Concentration:  normal  Neuropsychiatric: General: normal, calm, and normal eye contact    Medical Decision Making       55 MINUTES SPENT BY ME on the date of service doing chart review, history, exam, documentation & further activities per the note.      Data     I have personally reviewed the following data over the past 24 hrs:    12.0 (H)  \   8.8 (L)   / 414     148 (H); 148 (H) 111 (H) 59.3 (H) /  123 (H)   4.0 24 1.86 (H) \       Imaging results reviewed over the past 24 hrs:   No results found for this or any previous visit (from the past 24 hour(s)).

## 2023-10-29 NOTE — PROGRESS NOTES
Care Management Follow Up    Length of Stay (days): 20    Expected Discharge Date: 11/01/2023    Concerns to be Addressed:   Care post  laparoscopic sleeve gastrectomy with KO on 10/9 with need for second surgery exploratory laparoscopy converted to laparotomy for small enterotomies x2 on 10/12/23. Nephrology following due to alteration in renal function, cardiology following.  IV Zosyn, IV Vancomycin, Micafungin and IV fluid support. Bumex IV.   Patient plan of care discussed at interdisciplinary rounds: Yes    Anticipated Discharge Disposition:  Transitional care (TCU) is recommended for continued therapy and skilled nursing.     Anticipated Discharge Services:  Continued therapy, skilled nursing and medical management.   Anticipated Discharge DME:  Per therapy (if indicated).    Patient/family educated on Medicare website which has current facility and service quality ratings:  Yes  Education Provided on the Discharge Plan:   Per team  Patient/Family in Agreement with the Plan:   Yes    Referrals Placed by CM/SW:   None  Private pay costs discussed: Not applicable     Additional Information:  Per chart review, patient lives with her adult daughter and is independent with all activities of daily living and instrumental activities of daily living (IADLs) at baseline. She is currently unemployed but planned to resume working after recovery. TCU is recommended and care management will follow up once her delirium resolves. However, it is uncertain if her insurance plan covers skilled nursing facilities.   CM will continue to monitor progression of care, review team recommendations and provide discharge planning assist as needed.    9:19 AM:  Met with patient to discuss recommendation of transitional care. She agrees with the recommendation and accepted the list of local skilled nursing facilities (which includes the medicare.gov website) for patient and family to review. Phone numbers provided to call CM with 5-6  facility preferences but CM to check in on Monday.     Liberty Rose RN

## 2023-10-29 NOTE — PROGRESS NOTES
Imp/plan:  CM- now normal, presumed stress CM - pt with HTN on metoprolol tartate 50mg q6 - change to succinate 100mg bid (did not use carvedilol due to hx of asthma), hydralazine 50mg qid, change to 100mg tid, NTG patch 0.4mg/24 hours, will add amlodipine 5mg daily.  Noted Cr improved today down 1.86  PVC resolved with recovery of CM and metoprolol  HTN as above - addition of amlodipine and adjustment of metoprolol and hydralazine.     Current Facility-Administered Medications   Medication    acetaminophen (TYLENOL) solution 650 mg    acetaminophen (TYLENOL) tablet 650 mg    Or    acetaminophen (TYLENOL) Suppository 650 mg    albuterol (PROVENTIL HFA/VENTOLIN HFA) inhaler    albuterol (PROVENTIL) neb solution 2.5 mg    [Held by provider] bumetanide (BUMEX) injection 2 mg    dextrose 5% infusion    glucose gel 15-30 g    Or    dextrose 50 % injection 25-50 mL    Or    glucagon injection 1 mg    diphenhydrAMINE (BENADRYL) capsule 25 mg    heparin ANTICOAGULANT injection 5,000 Units    hydrALAZINE (APRESOLINE) injection 10 mg    hydrALAZINE (APRESOLINE) tablet 50 mg    HYDROmorphone (DILAUDID) injection 0.2 mg    hyoscyamine (LEVSIN/SL) sublingual tablet 125 mcg    insulin aspart (NovoLOG) injection (RAPID ACTING)    insulin glargine (LANTUS PEN) injection 10 Units    ipratropium - albuterol 0.5 mg/2.5 mg/3 mL (DUONEB) neb solution 3 mL    lidocaine (LMX4) cream    lidocaine (LMX4) cream    lidocaine 1 % 0.1-1 mL    lidocaine 1 % 0.1-5 mL    menthol-zinc oxide (CALMOSEPTINE) 0.44-20.6 % ointment OINT    [Held by provider] metoprolol succinate ER (TOPROL XL) 24 hr tablet 100 mg    metoprolol tartrate (LOPRESSOR) tablet 50 mg    micafungin (MYCAMINE) 100 mg in sodium chloride 0.9 % 100 mL intermittent infusion    miconazole (MICATIN) 2 % powder    naloxone (NARCAN) injection 0.2 mg    Or    naloxone (NARCAN) injection 0.4 mg    Or    naloxone (NARCAN) injection 0.2 mg    Or    naloxone (NARCAN) injection 0.4 mg     "nitroGLYcerin (NITRODUR) 0.4 MG/HR 24 hr patch 1 patch    OLANZapine (zyPREXA) IV injection 2.5-5 mg    ondansetron (ZOFRAN ODT) ODT tab 4 mg    Or    ondansetron (ZOFRAN) injection 4 mg    pantoprazole (PROTONIX) 2 mg/mL suspension 40 mg    Or    pantoprazole (PROTONIX) IV push injection 40 mg    phenol (CHLORASEPTIC) 1.4 % spray 1-2 mL    piperacillin-tazobactam (ZOSYN) 3.375 g vial to attach to  mL bag    prochlorperazine (COMPAZINE) injection 10 mg    Or    prochlorperazine (COMPAZINE) tablet 10 mg    sodium chloride (PF) 0.9% PF flush 10-40 mL    sodium chloride (PF) 0.9% PF flush 3 mL    sodium chloride (PF) 0.9% PF flush 3 mL    traMADol (ULTRAM) tablet 50 mg    vancomycin (VANCOCIN) 1,000 mg in 200 mL dextrose intermittent infusion    venlafaxine (EFFEXOR) tablet 75 mg     Past Medical History:   Diagnosis Date    LINA (generalized anxiety disorder) 11/02/2017    Hyperlipidemia LDL goal <160 01/20/2019    Major depressive disorder     Methanol abuse (H)     Mild persistent asthma without complication 11/02/2017    Obese     JARVIS on CPAP     Cpap not working    Paranoia (H)     resolved due to drugs    Vitamin D deficiency 11/02/2017        Review of Systems: 12 points negative other than above    BP (!) 184/91   Pulse 68   Temp 97.5  F (36.4  C) (Oral)   Resp 20   Ht 1.6 m (5' 3\")   Wt 119.6 kg (263 lb 10.7 oz)   LMP 09/09/2023 (Exact Date)   SpO2 95%   BMI 46.71 kg/m    JVP<7cm, carotids normal  Lungs clear  Cor RRR no c,r,m  Abs soft +BS, no mass  Ext no c,c,e    Lab Results   Component Value Date    HGB 8.8 (L) 10/29/2023     Lab Results   Component Value Date     10/29/2023     No results found for: \"CREATININE\"  No components found for: \"K\"          Yohan Srinivasan MD  Interventional Cardiology   [facility]  [phone]    "

## 2023-10-29 NOTE — PROGRESS NOTES
ASSESSMENT:  1. Intra-abdominal abscess (H)    2. Perimenopausal symptoms      Mojgan Jimenez is a 53 year old female who is s/p laparoscopic sleeve gastrectomy with KO on 10/9 with need for second surgery exploratory laparoscopy converted to laparotomy for small enterotomies x2 on 10/12 .  IR drain placement for perihepatic abscess on 10/19.  Ultrasound thoracentesis for pleural effusion on 10/24.  XR feeding tube placement on 10/24 with enteric tube placed at the tip of the jejunum just past the anastomosis.  Patient eventually extubated and continues to improve with discontinuation of TPN and tube feeds two days ago.   Started hyoscyamine yesterday for epigastric pain.  She had a good day yesterday and this morning complaining about increase in sharp pain after drinking taking in some yogurt with her medications.  Overall looks good without any changes hemodynamically, serous fluid in MICKIE, decreasing leukocytosis    PLAN:  -Appreciate supportive cares with nephrology  -Appreciate supportive cares with cardiology  -Patient does need to be on medications that are not long-acting.  And currently placed back on metoprolol XL.  Would appreciate cardiology looking at this and switching back to a short acting at all possible.-I will send a message to the cardiology team  -Appreciate supportive cares from [facility].  -Continue hyoscyamine   -Discussed to go slow with diet and to stick more with clears and occasional fall if her pain has improved throughout the day.  -Drain per IR and continue to monitor for output changes  -Continue to make sure medications are cut to a fourth of an inch or crushed until 11/20  -When patient is transferred please only transfer to P2 as that is standard for bariatric patients.    SUBJECTIVE:   She is complaining of some epigastric pain after taking medications with a small amount of yogurt.  Yesterday she actually had a good day.  Rectal tube is now out.  No other significant events overnight.   Patient no longer requiring IJ line and a PICC line placed yesterday.      Patient Vitals for the past 24 hrs:   BP Temp Temp src Pulse Resp SpO2 Weight   10/29/23 0848 -- -- -- 69 -- -- --   10/29/23 0649 (!) 184/91 -- -- 68 20 95 % --   10/29/23 0630 -- -- -- -- -- 91 % --   10/29/23 0600 -- -- -- 69 20 97 % --   10/29/23 0530 -- -- -- 70 20 97 % --   10/29/23 0500 -- -- -- 72 23 97 % --   10/29/23 0434 -- -- -- -- -- -- 119.6 kg (263 lb 10.7 oz)   10/29/23 0430 (!) 183/57 -- -- 71 -- 97 % --   10/29/23 0400 (!) 183/87 97.5  F (36.4  C) Oral 73 23 95 % --   10/29/23 0300 -- -- -- 72 18 96 % --   10/29/23 0245 (!) 171/81 -- -- 74 18 91 % --   10/29/23 0200 -- -- -- 75 21 91 % --   10/29/23 0115 (!) 186/84 -- -- 72 24 97 % --   10/29/23 0107 (!) 182/96 -- -- 73 29 96 % --   10/29/23 0100 (!) 182/96 -- -- 74 26 95 % --   10/29/23 0000 -- -- -- 75 23 -- --   10/28/23 2345 (!) 184/88 98.1  F (36.7  C) Axillary 72 29 98 % --   10/28/23 2100 -- -- -- 80 23 -- --   10/28/23 2000 -- -- -- 83 (!) 43 -- --   10/28/23 1900 (!) 164/102 -- -- 77 (!) 31 96 % --   10/28/23 1800 (!) 183/99 -- -- 77 (!) 33 90 % --   10/28/23 1709 -- -- -- 73 13 97 % --   10/28/23 1700 (!) 170/80 -- -- 78 17 93 % --   10/28/23 1624 -- -- -- 80 25 95 % --   10/28/23 1604 (!) 174/82 -- -- 81 (!) 35 90 % --   10/28/23 1600 (!) 174/82 -- -- 78 27 90 % --   10/28/23 1547 -- -- -- -- -- 92 % --   10/28/23 1530 (!) 169/86 -- -- 79 (!) 43 91 % --   10/28/23 1405 (!) 194/94 -- -- -- -- -- --   10/28/23 1236 (!) 199/97 -- -- 78 19 95 % --   10/28/23 1230 (!) 194/94 -- -- 77 20 95 % --   10/28/23 1200 (!) 194/94 99.1  F (37.3  C) Axillary 88 22 91 % --   10/28/23 1140 (!) 184/105 -- -- 86 27 91 % --   10/28/23 1050 -- -- -- 83 (!) 36 95 % --         PHYSICAL EXAM:  GEN: No acute distress, comfortable    ABD: Soft and some mild epigastric pain without rebound and her wound looks nice and clean and dry.  Drains: Serous scant  Output by Drain (mL) 10/27/23 0700  - 10/27/23 1459 10/27/23 1500 - 10/27/23 2259 10/27/23 2300 - 10/28/23 0659 10/28/23 0700 - 10/28/23 1459 10/28/23 1500 - 10/28/23 2259 10/28/23 2300 - 10/29/23 0659 10/29/23 0700 - 10/29/23 1037   Closed/Suction Drain 1 Right RUQ Bulb 12 Finnish 4 5 0 0 0 0       EXT:No cyanosis, edema or obvious abnormalities    10/28 0700 - 10/29 0659  In: 4255.7 [I.V.:4255.7]  Out: 2375 [Urine:2350]    No results displayed because visit has over 200 results.   Recent Results (from the past 24 hour(s))   Glucose by meter    Collection Time: 10/28/23 11:52 AM   Result Value Ref Range    GLUCOSE BY METER POCT 153 (H) 70 - 99 mg/dL   Glucose by meter    Collection Time: 10/28/23  3:51 PM   Result Value Ref Range    GLUCOSE BY METER POCT 133 (H) 70 - 99 mg/dL   Sodium    Collection Time: 10/28/23  6:42 PM   Result Value Ref Range    Sodium 148 (H) 135 - 145 mmol/L   Glucose by meter    Collection Time: 10/28/23 11:50 PM   Result Value Ref Range    GLUCOSE BY METER POCT 120 (H) 70 - 99 mg/dL   Glucose by meter    Collection Time: 10/29/23  3:59 AM   Result Value Ref Range    GLUCOSE BY METER POCT 138 (H) 70 - 99 mg/dL   CBC with platelets    Collection Time: 10/29/23  5:45 AM   Result Value Ref Range    WBC Count 12.0 (H) 4.0 - 11.0 10e3/uL    RBC Count 3.06 (L) 3.80 - 5.20 10e6/uL    Hemoglobin 8.8 (L) 11.7 - 15.7 g/dL    Hematocrit 29.7 (L) 35.0 - 47.0 %    MCV 97 78 - 100 fL    MCH 28.8 26.5 - 33.0 pg    MCHC 29.6 (L) 31.5 - 36.5 g/dL    RDW 14.3 10.0 - 15.0 %    Platelet Count 414 150 - 450 10e3/uL   Basic metabolic panel    Collection Time: 10/29/23  5:45 AM   Result Value Ref Range    Sodium 148 (H) 135 - 145 mmol/L    Potassium 4.0 3.4 - 5.3 mmol/L    Chloride 111 (H) 98 - 107 mmol/L    Carbon Dioxide (CO2) 24 22 - 29 mmol/L    Anion Gap 13 7 - 15 mmol/L    Urea Nitrogen 59.3 (H) 6.0 - 20.0 mg/dL    Creatinine 1.86 (H) 0.51 - 0.95 mg/dL    GFR Estimate 32 (L) >60 mL/min/1.73m2    Calcium 9.3 8.6 - 10.0 mg/dL    Glucose 129 (H)  70 - 99 mg/dL   Sodium    Collection Time: 10/29/23  5:45 AM   Result Value Ref Range    Sodium 148 (H) 135 - 145 mmol/L   Magnesium    Collection Time: 10/29/23  5:45 AM   Result Value Ref Range    Magnesium 2.2 1.7 - 2.3 mg/dL   Glucose by meter    Collection Time: 10/29/23  7:51 AM   Result Value Ref Range    GLUCOSE BY METER POCT 124 (H) 70 - 99 mg/dL               MERLIN Dueñas  [facility]  [address] [place] [id number]  Phone- [phone]  Fax- [phone]

## 2023-10-29 NOTE — PLAN OF CARE
Problem: Risk for Delirium  Goal: Improved Behavioral Control  Outcome: Not Progressing  Intervention: Minimize Safety Risk  Recent Flowsheet Documentation  Taken 10/28/2023 2000 by Marlen Falcon RN  Enhanced Safety Measures: room near unit station  Trust Relationship/Rapport:   care explained   choices provided   emotional support provided   questions answered   reassurance provided  Taken 10/28/2023 1600 by Marlen Falcon RN  Enhanced Safety Measures: room near unit station  Trust Relationship/Rapport:   care explained   choices provided   emotional support provided   questions answered   reassurance provided  Goal: Improved Attention and Thought Clarity  Outcome: Not Progressing  Goal: Improved Sleep  Outcome: Not Progressing     Problem: Delirium  Goal: Improved Behavioral Control  Outcome: Not Progressing  Intervention: Minimize Safety Risk  Recent Flowsheet Documentation  Taken 10/28/2023 2000 by Marlen Falcon RN  Enhanced Safety Measures: room near unit station  Trust Relationship/Rapport:   care explained   choices provided   emotional support provided   questions answered   reassurance provided  Taken 10/28/2023 1600 by Marlen Falcon RN  Enhanced Safety Measures: room near unit station  Trust Relationship/Rapport:   care explained   choices provided   emotional support provided   questions answered   reassurance provided  Goal: Improved Attention and Thought Clarity  Outcome: Not Progressing  Goal: Improved Sleep  Outcome: Not Progressing   Goal Outcome Evaluation:       [facility] - ICU    RN Progress Note:            Pertinent Assessments:      Please refer to flowsheet rows for full assessment     Patient sundowning again this shift. 2000 hrs patient became restless and was determined to get out of bed and go home. PRN zyprexa given. Patient calm but still adamant about getting out of bed and leaving.           Key Events - This Shift:       As above                   Barriers to  Discharge / Downgrade:     Internal jugular [id number]. Waiting for bed on P2.         Point of Contact Update YES-OR-NO: Yes  Family visited today.

## 2023-10-29 NOTE — PROGRESS NOTES
RENAL PROGRESS NOTE      Assessment and Plan:  53 year old female s/p gastric b/p 10/9/23 c/w leak, s/p repair, peritonitis, perihepatic abscess. Nephrology is following for SUSSY, hyponatremia and hypervolemia management.    ARF;  hemodynamic exacerbated by IV NSAIDS (lowish bp, vasodilatory drugs, mild intravascular depletion)==>ATN, now in recovery phase and creatinine improved to 1.86 mg/dl. Creat was normal (0.7) PTA.  Urinary output remains in nonoliguric range off IV diuretics.  Hypernatremia-Serum sodium is is trending down from 153 to 148 this morning.  Calculated FWD is ~3 L including insensible losses . Patient currently D5W at 150 ml/h. Continue the same. Monitor serum sodium daily.  Volume overload, now s/p diuresis. Patient appears compensated on exam. Net 11L negative since admission? Wt is trending down. Currently breathing comfortable on 1L via NC. Off diuretics since 10/28.  Hypertension-Started on Metoprolol and Hydralazine as per cardiology this admission. BP is not controlled this am.  Hydralazine dose increased to 100mg 3 times daily this am.  I reviewed cardiology note from today.  Hyperlipid; on statin  Elevated LFTs Resolved  Resp failure; failed first extubation. ?PNA, ?Volume. S/p right thoracocentesis 10/24, 400 ml removed. Extubated on 10/26. Currently on 1L via NC.   Acidosis; resolved   Nutrition;Tolerating bariatric diet.  Cardiomyopathy-resolved, EF 10/23 was normal. I reviewed cardiology note from today.  Sepsis d/t peritonitis s/p washout.  Patient c/o abdominal pain. Off opioids since 10/27.On IV antbx and Micafunging. Surgery and ID are following.    We will follow.    Subjective  She was seen at the bedside and events were reviewed with nurses.  No acute issues overnight.  Patiño catheter and Rectal tube removed. Patient has been voiding in bedpan w/o issues.  Patient c/o 3/10 upper abdominal pain, worse with after swallowing. Her oral intake has been poor due to pain.    Patient denies: sore throat, cough, shortness of breath , chest pain, palpitations, orthopnea, nausea, vomiting, dysuria, urinary frequency, urgency, hematuria, rash    Updated on labs. All questions answered    Objective    Vital signs in last 24 hours  Temp:  [97.5  F (36.4  C)-99.1  F (37.3  C)] 97.5  F (36.4  C)  Pulse:  [68-88] 68  Resp:  [13-43] 20  BP: (164-199)/() 184/91  FiO2 (%):  [30 %] 30 %  SpO2:  [90 %-98 %] 95 %      Intake/Output last 3 shifts  I/O last 3 completed shifts:  In: 4255.7 [I.V.:4255.7]  Out: 2375 [Urine:2350; Stool:25]  Intake/Output this shift:  No intake/output data recorded.    Physical Exam  Gen: NAD,  overly obese, NC in place.  HEENT: AT/NC   CV: RRR without murmur or rub  Lung: CTA in anterior fields b/l   Ab: More distended ,incision closed with staples Hypoactive BS, diffuse tenderness to mild palpation, + guarding, no rebound. Rectal tube in place, green color stool in bag.  Ext: Trace edema of upper and lower extremities and well perfused  : Patiño catheter in place, making yellow color urine.  Skin;no rash  Neuro: Awake, alert, following commands    Pertinent Labs     Last Renal Panel:  Sodium   Date Value Ref Range Status   10/29/2023 148 (H) 135 - 145 mmol/L Final     Comment:     Reference intervals for this test were updated on 09/26/2023 to more accurately reflect our healthy population. There may be differences in the flagging of prior results with similar values performed with this method. Interpretation of those prior results can be made in the context of the updated reference intervals.    10/29/2023 148 (H) 135 - 145 mmol/L Final     Comment:     Reference intervals for this test were updated on 09/26/2023 to more accurately reflect our healthy population. There may be differences in the flagging of prior results with similar values performed with this method. Interpretation of those prior results can be made in the context of the updated reference  intervals.  Reference intervals for this test were updated on 09/26/2023 to more accurately reflect our healthy population. There may be differences in the flagging of prior results with similar values performed with this method. Interpretation of those prior results can be made in the context of the updated reference intervals.    11/17/2020 141 133 - 144 mmol/L Final     Potassium   Date Value Ref Range Status   10/29/2023 4.0 3.4 - 5.3 mmol/L Final   05/17/2022 4.5 3.4 - 5.3 mmol/L Final   11/17/2020 4.1 3.4 - 5.3 mmol/L Final     Chloride   Date Value Ref Range Status   10/29/2023 111 (H) 98 - 107 mmol/L Final   05/17/2022 102 94 - 109 mmol/L Final   11/17/2020 109 94 - 109 mmol/L Final     Carbon Dioxide   Date Value Ref Range Status   11/17/2020 31 20 - 32 mmol/L Final     Carbon Dioxide (CO2)   Date Value Ref Range Status   10/29/2023 24 22 - 29 mmol/L Final   05/17/2022 32 20 - 32 mmol/L Final     Anion Gap   Date Value Ref Range Status   10/29/2023 13 7 - 15 mmol/L Final   05/17/2022 2 (L) 3 - 14 mmol/L Final   11/17/2020 1 (L) 3 - 14 mmol/L Final     Glucose   Date Value Ref Range Status   10/29/2023 129 (H) 70 - 99 mg/dL Final   05/17/2022 110 (H) 70 - 99 mg/dL Final   11/17/2020 84 70 - 99 mg/dL Final     Comment:     Fasting specimen     GLUCOSE BY METER POCT   Date Value Ref Range Status   10/29/2023 124 (H) 70 - 99 mg/dL Final     Urea Nitrogen   Date Value Ref Range Status   10/29/2023 59.3 (H) 6.0 - 20.0 mg/dL Final   05/17/2022 16 7 - 30 mg/dL Final   11/17/2020 9 7 - 30 mg/dL Final     Creatinine   Date Value Ref Range Status   10/29/2023 1.86 (H) 0.51 - 0.95 mg/dL Final   11/17/2020 0.79 0.52 - 1.04 mg/dL Final     GFR Estimate   Date Value Ref Range Status   10/29/2023 32 (L) >60 mL/min/1.73m2 Final   11/17/2020 88 >60 mL/min/[1.73_m2] Final     Comment:     Non  GFR Calc  Starting 12/18/2018, serum creatinine based estimated GFR (eGFR) will be   calculated using the Chronic  Kidney Disease Epidemiology Collaboration   (CKD-EPI) equation.       Calcium   Date Value Ref Range Status   10/29/2023 9.3 8.6 - 10.0 mg/dL Final   11/17/2020 8.9 8.5 - 10.1 mg/dL Final     Phosphorus   Date Value Ref Range Status   10/27/2023 3.4 2.5 - 4.5 mg/dL Final     Albumin   Date Value Ref Range Status   10/23/2023 3.2 (L) 3.5 - 5.2 g/dL Final   05/17/2022 4.0 3.4 - 5.0 g/dL Final   12/03/2018 3.6 3.4 - 5.0 g/dL Final     Recent Labs   Lab 10/29/23  0545 10/28/23  0519 10/27/23  0348 10/26/23  0435 10/25/23  0350   HGB 8.8* 8.2* 8.2* 8.3* 7.7*          I reviewed all lab results    ~ 50 Minutes today spent in exam, POC, education regarding renal disease and management, counseling and/or discussion with patient's care team.    Georgette Garnica MD  [facility]  [address], [address]  [place] [address]  Phone# [phone]  Fax# [phone]

## 2023-10-29 NOTE — PLAN OF CARE
Alert and orientedx4 but forgetful with situation at times and redirectable. SBP of >160's covered with PRN hydralazine and metoprolol scheduled with minimal effect. PRN tramadol given x1. Patient was on CPAP for 5 hours on this shift and on nasal cannula now at 1L. 02 sats of >92%.

## 2023-10-30 ENCOUNTER — APPOINTMENT (OUTPATIENT)
Dept: CT IMAGING | Facility: HOSPITAL | Age: 53
End: 2023-10-30
Payer: COMMERCIAL

## 2023-10-30 LAB
ANION GAP SERPL CALCULATED.3IONS-SCNC: 11 MMOL/L (ref 7–15)
BUN SERPL-MCNC: 39.7 MG/DL (ref 6–20)
CALCIUM SERPL-MCNC: 9.1 MG/DL (ref 8.6–10)
CHLORIDE SERPL-SCNC: 106 MMOL/L (ref 98–107)
CREAT SERPL-MCNC: 1.62 MG/DL (ref 0.51–0.95)
DEPRECATED HCO3 PLAS-SCNC: 25 MMOL/L (ref 22–29)
EGFRCR SERPLBLD CKD-EPI 2021: 38 ML/MIN/1.73M2
ERYTHROCYTE [DISTWIDTH] IN BLOOD BY AUTOMATED COUNT: 13.8 % (ref 10–15)
GLUCOSE BLDC GLUCOMTR-MCNC: 108 MG/DL (ref 70–99)
GLUCOSE BLDC GLUCOMTR-MCNC: 120 MG/DL (ref 70–99)
GLUCOSE BLDC GLUCOMTR-MCNC: 127 MG/DL (ref 70–99)
GLUCOSE BLDC GLUCOMTR-MCNC: 99 MG/DL (ref 70–99)
GLUCOSE SERPL-MCNC: 110 MG/DL (ref 70–99)
HCT VFR BLD AUTO: 28.9 % (ref 35–47)
HGB BLD-MCNC: 8.7 G/DL (ref 11.7–15.7)
MAGNESIUM SERPL-MCNC: 1.9 MG/DL (ref 1.7–2.3)
MCH RBC QN AUTO: 28.5 PG (ref 26.5–33)
MCHC RBC AUTO-ENTMCNC: 30.1 G/DL (ref 31.5–36.5)
MCV RBC AUTO: 95 FL (ref 78–100)
PLATELET # BLD AUTO: 393 10E3/UL (ref 150–450)
POTASSIUM SERPL-SCNC: 3.7 MMOL/L (ref 3.4–5.3)
RBC # BLD AUTO: 3.05 10E6/UL (ref 3.8–5.2)
SODIUM SERPL-SCNC: 141 MMOL/L (ref 135–145)
SODIUM SERPL-SCNC: 142 MMOL/L (ref 135–145)
SODIUM SERPL-SCNC: 142 MMOL/L (ref 135–145)
VANCOMYCIN SERPL-MCNC: 13.2 UG/ML
WBC # BLD AUTO: 12.3 10E3/UL (ref 4–11)

## 2023-10-30 PROCEDURE — 80202 ASSAY OF VANCOMYCIN: CPT | Performed by: SURGERY

## 2023-10-30 PROCEDURE — 250N000009 HC RX 250: Performed by: SURGERY

## 2023-10-30 PROCEDURE — 250N000011 HC RX IP 250 OP 636: Mod: JZ | Performed by: INTERNAL MEDICINE

## 2023-10-30 PROCEDURE — 94660 CPAP INITIATION&MGMT: CPT

## 2023-10-30 PROCEDURE — 99232 SBSQ HOSP IP/OBS MODERATE 35: CPT | Performed by: INTERNAL MEDICINE

## 2023-10-30 PROCEDURE — 94640 AIRWAY INHALATION TREATMENT: CPT | Mod: 76

## 2023-10-30 PROCEDURE — 250N000013 HC RX MED GY IP 250 OP 250 PS 637: Performed by: PHYSICIAN ASSISTANT

## 2023-10-30 PROCEDURE — 999N000157 HC STATISTIC RCP TIME EA 10 MIN

## 2023-10-30 PROCEDURE — 250N000011 HC RX IP 250 OP 636: Mod: JZ | Performed by: SURGERY

## 2023-10-30 PROCEDURE — 74160 CT ABDOMEN W/CONTRAST: CPT

## 2023-10-30 PROCEDURE — 99233 SBSQ HOSP IP/OBS HIGH 50: CPT | Performed by: INTERNAL MEDICINE

## 2023-10-30 PROCEDURE — C9113 INJ PANTOPRAZOLE SODIUM, VIA: HCPCS | Mod: JZ | Performed by: INTERNAL MEDICINE

## 2023-10-30 PROCEDURE — 250N000013 HC RX MED GY IP 250 OP 250 PS 637: Performed by: NURSE PRACTITIONER

## 2023-10-30 PROCEDURE — 120N000001 HC R&B MED SURG/OB

## 2023-10-30 PROCEDURE — 80048 BASIC METABOLIC PNL TOTAL CA: CPT | Performed by: STUDENT IN AN ORGANIZED HEALTH CARE EDUCATION/TRAINING PROGRAM

## 2023-10-30 PROCEDURE — 83735 ASSAY OF MAGNESIUM: CPT | Performed by: INTERNAL MEDICINE

## 2023-10-30 PROCEDURE — 85027 COMPLETE CBC AUTOMATED: CPT | Performed by: STUDENT IN AN ORGANIZED HEALTH CARE EDUCATION/TRAINING PROGRAM

## 2023-10-30 PROCEDURE — 250N000013 HC RX MED GY IP 250 OP 250 PS 637: Performed by: INTERNAL MEDICINE

## 2023-10-30 PROCEDURE — 94640 AIRWAY INHALATION TREATMENT: CPT

## 2023-10-30 PROCEDURE — 250N000011 HC RX IP 250 OP 636: Mod: JZ | Performed by: NURSE PRACTITIONER

## 2023-10-30 PROCEDURE — 250N000009 HC RX 250: Performed by: NURSE PRACTITIONER

## 2023-10-30 PROCEDURE — 84295 ASSAY OF SERUM SODIUM: CPT | Performed by: INTERNAL MEDICINE

## 2023-10-30 PROCEDURE — 258N000003 HC RX IP 258 OP 636: Performed by: SURGERY

## 2023-10-30 PROCEDURE — 258N000003 HC RX IP 258 OP 636: Performed by: INTERNAL MEDICINE

## 2023-10-30 PROCEDURE — 258N000003 HC RX IP 258 OP 636: Performed by: NURSE PRACTITIONER

## 2023-10-30 RX ORDER — IOPAMIDOL 755 MG/ML
75 INJECTION, SOLUTION INTRAVASCULAR ONCE
Status: COMPLETED | OUTPATIENT
Start: 2023-10-30 | End: 2023-10-30

## 2023-10-30 RX ADMIN — DEXTROSE MONOHYDRATE: 50 INJECTION, SOLUTION INTRAVENOUS at 19:56

## 2023-10-30 RX ADMIN — HYOSCYAMINE SULFATE 125 MCG: 0.12 TABLET SUBLINGUAL at 08:22

## 2023-10-30 RX ADMIN — HYOSCYAMINE SULFATE 125 MCG: 0.12 TABLET SUBLINGUAL at 20:22

## 2023-10-30 RX ADMIN — PIPERACILLIN AND TAZOBACTAM 3.38 G: 3; .375 INJECTION, POWDER, LYOPHILIZED, FOR SOLUTION INTRAVENOUS at 20:03

## 2023-10-30 RX ADMIN — VENLAFAXINE 75 MG: 75 TABLET ORAL at 17:16

## 2023-10-30 RX ADMIN — DEXTROSE MONOHYDRATE: 50 INJECTION, SOLUTION INTRAVENOUS at 00:31

## 2023-10-30 RX ADMIN — VANCOMYCIN HYDROCHLORIDE 1000 MG: 1 INJECTION, SOLUTION INTRAVENOUS at 09:48

## 2023-10-30 RX ADMIN — AMLODIPINE BESYLATE 5 MG: 5 TABLET ORAL at 09:30

## 2023-10-30 RX ADMIN — HEPARIN SODIUM 5000 UNITS: 5000 INJECTION, SOLUTION INTRAVENOUS; SUBCUTANEOUS at 13:27

## 2023-10-30 RX ADMIN — HEPARIN SODIUM 5000 UNITS: 5000 INJECTION, SOLUTION INTRAVENOUS; SUBCUTANEOUS at 23:04

## 2023-10-30 RX ADMIN — HYOSCYAMINE SULFATE 125 MCG: 0.12 TABLET SUBLINGUAL at 17:16

## 2023-10-30 RX ADMIN — HYDRALAZINE HYDROCHLORIDE 100 MG: 50 TABLET, FILM COATED ORAL at 11:17

## 2023-10-30 RX ADMIN — ALBUTEROL SULFATE 2 PUFF: 90 AEROSOL, METERED RESPIRATORY (INHALATION) at 11:03

## 2023-10-30 RX ADMIN — ALBUTEROL SULFATE 2.5 MG: 2.5 SOLUTION RESPIRATORY (INHALATION) at 00:12

## 2023-10-30 RX ADMIN — PIPERACILLIN AND TAZOBACTAM 3.38 G: 3; .375 INJECTION, POWDER, LYOPHILIZED, FOR SOLUTION INTRAVENOUS at 03:46

## 2023-10-30 RX ADMIN — MICAFUNGIN SODIUM 100 MG: 50 INJECTION, POWDER, LYOPHILIZED, FOR SOLUTION INTRAVENOUS at 10:12

## 2023-10-30 RX ADMIN — VENLAFAXINE 75 MG: 75 TABLET ORAL at 13:54

## 2023-10-30 RX ADMIN — METOPROLOL TARTRATE 100 MG: 25 TABLET, FILM COATED ORAL at 09:31

## 2023-10-30 RX ADMIN — HYDRALAZINE HYDROCHLORIDE 100 MG: 50 TABLET, FILM COATED ORAL at 13:24

## 2023-10-30 RX ADMIN — IOPAMIDOL 75 ML: 755 INJECTION, SOLUTION INTRAVENOUS at 18:21

## 2023-10-30 RX ADMIN — HYDRALAZINE HYDROCHLORIDE 50 MG: 50 TABLET, FILM COATED ORAL at 22:30

## 2023-10-30 RX ADMIN — HYDRALAZINE HYDROCHLORIDE 10 MG: 20 INJECTION INTRAMUSCULAR; INTRAVENOUS at 00:47

## 2023-10-30 RX ADMIN — HEPARIN SODIUM 5000 UNITS: 5000 INJECTION, SOLUTION INTRAVENOUS; SUBCUTANEOUS at 06:33

## 2023-10-30 RX ADMIN — PIPERACILLIN AND TAZOBACTAM 3.38 G: 3; .375 INJECTION, POWDER, LYOPHILIZED, FOR SOLUTION INTRAVENOUS at 13:44

## 2023-10-30 RX ADMIN — NITROGLYCERIN 1 PATCH: 0.4 PATCH TRANSDERMAL at 09:41

## 2023-10-30 RX ADMIN — PANTOPRAZOLE SODIUM 40 MG: 40 INJECTION, POWDER, FOR SOLUTION INTRAVENOUS at 08:18

## 2023-10-30 RX ADMIN — ONDANSETRON 4 MG: 2 INJECTION INTRAMUSCULAR; INTRAVENOUS at 20:55

## 2023-10-30 RX ADMIN — VENLAFAXINE 75 MG: 75 TABLET ORAL at 08:23

## 2023-10-30 RX ADMIN — IPRATROPIUM BROMIDE AND ALBUTEROL SULFATE 3 ML: .5; 3 SOLUTION RESPIRATORY (INHALATION) at 14:02

## 2023-10-30 RX ADMIN — HYOSCYAMINE SULFATE 125 MCG: 0.12 TABLET SUBLINGUAL at 13:25

## 2023-10-30 ASSESSMENT — ACTIVITIES OF DAILY LIVING (ADL)
ADLS_ACUITY_SCORE: 39
ADLS_ACUITY_SCORE: 43
ADLS_ACUITY_SCORE: 39
ADLS_ACUITY_SCORE: 43
ADLS_ACUITY_SCORE: 41
ADLS_ACUITY_SCORE: 43
ADLS_ACUITY_SCORE: 43

## 2023-10-30 NOTE — PROGRESS NOTES
"Per RN patient is not being compliant with BIPAP and removed mask from her face. RT encouraged her to use it but she wants to \"try again tomorrow\". Patient is currently off BIPAP. RT will follow.    Billy Cassidy, RT    "

## 2023-10-30 NOTE — PROGRESS NOTES
Care Management Follow Up    Length of Stay (days): 21    Expected Discharge Date: 11/01/2023     Concerns to be Addressed: TCU placement - bariatric bed  Patient plan of care discussed at interdisciplinary rounds: Yes    Anticipated Discharge Disposition:  TCU     Anticipated Discharge Services:  Bariatric bed= TCU  Anticipated Discharge DME:  To be determined    Patient/family educated on Medicare website which has current facility and service quality ratings:  yes  Education Provided on the Discharge Plan:  yes  Patient/Family in Agreement with the Plan:  yes    Referrals Placed by CM/SW:  none   Private pay costs discussed: Will discuss at time of facility acceptance    Additional Information:  Previously assessed.   SW followed up with pt and family to get TCU choices. Daughter Jayne was present and reported that she would be helping with decision planning. Patient and daughter did not have the list. They were given the bariatric bed list for TCU placement. They will have choices selected for tomorrow.  Follow up with Flavia at [phone]    LELE Lawler  4:09 PM   10/30/23

## 2023-10-30 NOTE — PROVIDER NOTIFICATION
Discussed D5 IVF rate, sodium levels, and oxygen needs with Nephrology per Dr. Mayers request. Nephrology reports having seen the patient earlier, actively working to compile note.    Per nephrology, continue D5 at 100ml/hr. They will continue to monitor sodium levels and are aware of hallucinations.

## 2023-10-30 NOTE — PLAN OF CARE
Pain: Denies pain  Neuro: Answers orientation questions appropriately. Generally contradicts self in conversation and is often forgetful. Uncooperative with staff at times, insisting cares be completed on their schedule and bargaining with staff to delay necessary medications and treatments. Does not understand deficits, insisting staff allow her to go to the bathroom, but unable to get items to mouth independently. Additionally patient only minimally assisting to bed repositioning, holding side rail once turn has been completed by staff.  Resp: Patient complains of shortness of breath. Lung sounds diminished. Patient self removed BiPap and supplemental oxygen. Saturations 90-93% on room air. PRN inhaler administered by staff per patient request.  Cardio: Blood pressure 193/94. Patient initially refused to take medications, asking staff to return in 1 hour. Patient took half of pills following strong encouragement from staff and family, but would not complete morning medications until inhaler was obtained. Repeat blood pressure 173/79.  GI/: Incontinent of bowel and bladder.   Skin: Red blanchable lines anterior pelvis from brief  Activity: Bedfast. Patient refused to sit at side of bed with therapy.  Nutrition/IV: Limited oral intake. Taking sips of protein shake with medications. Refused all other items on tray.

## 2023-10-30 NOTE — PROGRESS NOTES
[facility]    Medicine Progress Note - Hospitalist Service    Date of Admission:  10/9/2023    Assessment & Plan   Mojgan Jimenez is a 53 year old female with h/o obesity, severe JARVIS on CPAP, asthma, stimulant use disorder, stimulant induced psychosis, mood & anxiety disorder. Admitted 10/9/23 for scheduled laparoscopic sleeve gastrectomy with single anastomosis duodenal switch. She was later found to have two small bowel injuries with peritonitis. 10/12/23 returned to OR for small bowel interotomy closure, clean-out, & drain placement; 10/19 found to have RUQ fluid collection s/p drain placement; 10/24 s/p pelvic fluid aspiration.      Course complicated by encephalopathy/delirium, septic shock, suspected takotsubo syndrome, acute respiratory failure due to loss of protective airway reflexes & increased metabolic load requiring intubation (10/12-10/21/23; reintubated 10/21-10/26/23), & oliguric SUSSY with renal recovery. She has significantly improved: mental status clearing with some ongoing delirium at night, cardiomyopathy improved, ATN I recovery phase, and ongoing treatment of intra abdominal infection. She was made floor tele status 10/27/23        1.Acute hypoxemic-hypercapnic respiratory failure -now weaned RA  - Rapid Response. 10/12 Pt with decreased LOC. Opens eyes to voice. SpO2 89% on 8L NC. BS clear and diminished. Placed on a 15L non-rebreather for SpO2 > 90%. After narcan admin pt became more responsive/agitated. Transferred to ICU and intubated -10/12-10/21/23; reintubated 10/21-10/26/23. Now extubated and weaned to RA  - 10/22/23 CT demonstrated complete RLL atelectasis, milder RUL & RBML atelectasis or consolidation. 10/22 bronchoscopy moderate RLL blood tinged secretions cleared.   - pulmonary hygiene  - R-pleural effusion, exudative Suspect simple parapneumonic effusion. 10/24 s/p thoracentesis, 400 mL. Pleural fluid amylase amylase 23, serum 64.      2.Gastric  bypass  10/9 s/p sleeve gastrectomy complicated by small bowel injury & peritonitis   10/12 returned to OR for closure, clean-out, & drain placement (now removed)  10/19 s/p RUQ fluid collection drain placement   10/24 s/p pelvic fluid aspiration (75 ml)  - surgery primary   - ID consulted   - 10/12 - peritoneal cultures + Streptococcus anginosus, mixed aerobic & anaerobic kamla   - 10/19 - RUQ fluid cultures & pelvic fluid aspirate + Lactobacillus   - 10/22 - BDG blood +  - 10/24 pleural fluid studies  -#+WBC no pos cx  10/24--aspirate pelvic drain--lacto + candida  - vanc, pip-tazo, micafungin  - was on TPN previously 10/14  - PPTF started 10/24/23. TF and TPN weaned of 10/27 currently on bariatric clears, surgery managing   -10/27--per renal -on D5W for hypernatremia  -10/28 still on D5W  -10/29 and 10/30--still on D5W, NA improved by 10/30 to 142, renal managing this  -diet per surg-bariatric full liq diet--not clear she will keep up with this still  - flush drain at regular intervals      3.Accelerated HTN /Cardiomyopathy   - echo previously and initially decreased LV function but repeat echo shows EF normalized, possible stress induced.   - cards appreciated and still following  - on metoprolol tartrate 100 mg bid per cards  - hydralazine increase to 50mg  QID   - nitroglycerin patch 0.4mg/24  -amlodipine 5 mg daily   - prn IV hydralazine  -bp still up     4.History of JARVIS, asthma in the chart no PFTs  - NIPPV with sleep/naps everytime   - continue duonebs     5.Oliguric SUSSY - improved   - nephrology was consulted  for ARF peak creat 6.97 on 10/14  - slowly improving and creatinine trending down--10/30 now 1.62  - avoid nephrotoxic agents      6.Hypernatremia -   - IV bumex stopped and on D5W per renal .   - trend sodium 153 -->148 -->144-->142  -has needed D5W-per renal     7.Anemia - stable  Suspect multifactorial secondary to sepsis & surgical blood loss   - monitor ,10/30 hgb 8.7     8.Hypervolemia  -  "bumex 2mg q12h--holding with hypernatremia       9.Hyperglycemia of critical illness   - glargine 10 units every day--bs good, will stop lantus and see how she does on ss  - LDSSI        10.Obesity  -BMI 46    11.Acute metabolic and toxic encephalopathy  -related to infection from abd  -related to metabolic , ARF   -still fluctuating here   -delirium protocol    I called daughter to update at 1332. Talked about delirium. Daughter notes pt is more anxious by herself down the prater in the room at the end. I called charge RN and she notes already she tried to get OOB by herself and was not steady--will place on 1:1 now and try to see if staff can move closer to nurses station            Diet: Room Service  Bariatric Diet Full Liquids  Snacks/Supplements Adult: Ensure Max Protein (bariatric); With Meals    DVT Prophylaxis: Heparin SQ  Patiño Catheter: Not present  Lines: PRESENT      PICC 10/28/23 Triple Lumen Right Basilic-Site Assessment: WDL      Cardiac Monitoring: ACTIVE order. Indication: cardiomyopathy  Code Status: Full Code      Clinically Significant Risk Factors         # Hypernatremia: Highest Na = 148 mmol/L in last 2 days, will monitor as appropriate      # Hypoalbuminemia: Lowest albumin = 2.7 g/dL at 10/14/2023  6:29 AM, will monitor as appropriate     # Hypertension: Noted on problem list        # Severe Obesity: Estimated body mass index is 51.82 kg/m  as calculated from the following:    Height as of this encounter: 1.6 m (5' 3\").    Weight as of this encounter: 132.7 kg (292 lb 8.8 oz).      # Asthma: noted on problem list        Disposition Plan      Expected Discharge Date: 11/01/2023      Destination: home with family              Juanita Mayers MD  Hospitalist Service  [facility]  Securely message with [employer] (more info)  Text page via [employer] Paging/Directory   ______________________________________________________________________    Interval History   She was awake  She " told me she did not want to take all meds together  Notes pain better in abd  Having gas and stool per her  She then talked about wanting to go to her apartment  I explained she needs to be here still and she agreed      Physical Exam   Vital Signs: Temp: 98  F (36.7  C) Temp src: Axillary BP: (!) 193/94 Pulse: 78   Resp: 22 SpO2: 90 % O2 Device: None (Room air)    Weight: 292 lbs 8.81 oz    Constitutional: awake, fatigued, alert, cooperative, and no apparent distress  Respiratory: No increased work of breathing, good air exchange, clear to auscultation bilaterally, no crackles or wheezing  Cardiovascular: Normal apical impulse, regular rate and rhythm, normal S1 and S2, no S3 or S4, and no murmur noted  GI: hypoactive bowel sounds, soft, non-distended, and non-tender  Skin: no bruising or bleeding  Musculoskeletal: trace edema legs  Neurologic: Mental Status Exam:  Level of Alertness:   awake  Orientation:   person, place  Language:  normal  Neuropsychiatric: General: fidgeting    Medical Decision Making       55 MINUTES SPENT BY ME on the date of service doing chart review, history, exam, documentation & further activities per the note.      Data     I have personally reviewed the following data over the past 24 hrs:    12.3 (H)  \   8.7 (L)   / 393     142; 142 106 39.7 (H) /  127 (H)   3.7 25 1.62 (H) \       Imaging results reviewed over the past 24 hrs:   No results found for this or any previous visit (from the past 24 hour(s)).

## 2023-10-30 NOTE — PROGRESS NOTES
General Surgery Progress Note    POST OP:  S/P Lap Sleeve with Single Anastomosis DS 10/9/23 and then a Re-Operation on 10/12/2023 where it was found that she had 2 small enterotomies in the small bowel, likely form the traction of making the Duodenal anastomosis.      She got out of the ICU over the weekend      Subjective:   Pt is feeling OK today.  She is not in pain.  She is taking full liquids without troubles.  She is weak and not walking on her own yet.      Vitals:    10/29/23 1917 10/29/23 1934 10/29/23 2323 10/30/23 0508   BP: (!) 164/86  (!) 181/84 (!) 172/96   BP Location: Left arm  Left arm Left arm   Pulse: 75  77 78   Resp: 20  20 22   Temp: 98.2  F (36.8  C)   98.2  F (36.8  C)   TempSrc: Oral   Axillary   SpO2: 92% 90% 94% 96%   Weight:       Height:           Physical Exam:  Lungs:  CTA  CV:       RRR  Ab:       Soft, + BS, Wounds looks good, Drain with serous fluid.    Recent Labs   Lab 10/30/23  0612   WBC 12.3*   HGB 8.7*   HCT 28.9*                      Component  Ref Range & Units  6:12 AM  (10/30/23)  6:12 AM  (10/30/23) 1 d ago  (10/29/23) 1 d ago  (10/29/23) 1 d ago  (10/29/23) 2 d ago  (10/28/23) 2 d ago  (10/28/23) 2 d ago  (10/28/23)    Sodium  135 - 145 mmol/L 142 142   High   High   High   High   High  CM   Comment: Reference intervals for this test were updated on 09/26/2023 to more accurately reflect our healthy population. There may be differences in the flagging of prior results with similar values performed with this method. Interpretation of those prior results can be made in the context of the updated reference intervals.    Potassium  3.4 - 5.3 mmol/L 3.7   4.0    4.2    Chloride  98 - 107 mmol/L 106   111 High     114 High     Carbon Dioxide (CO2)  22 - 29 mmol/L 25   24    23    Anion Gap  7 - 15 mmol/L 11   13    16 High     Urea Nitrogen  6.0 - 20.0 mg/dL 39.7 High    59.3 High     76.2 High     Creatinine  0.51 - 0.95 mg/dL 1.62  High    1.86 High     2.01 High     GFR Estimate  >60 mL/min/1.73m2 38 Low    32 Low     29 Low     Calcium  8.6 - 10.0 mg/dL 9.1   9.3    9.4    Glucose  70 - 99 mg/dL 110 High    129 High     137 High             Assessment:  Pt is progressing well.  Respiratory and FEK are clearly improving.  From a GI standpoint she is doing well.  We can work to convert IV fluids over to oral intake.      WBC is stable at 12.  At least it is not increasing.      Plan: Decrease the IV fluids.           Rehab, work on walking and sitting up in a chair.    Hoang Worthy MD  [facility]  [phone]

## 2023-10-30 NOTE — PLAN OF CARE
"  Problem: Plan of Care - These are the overarching goals to be used throughout the patient stay.    Goal: Absence of Hospital-Acquired Illness or Injury  Intervention: Identify and Manage Fall Risk  Recent Flowsheet Documentation  Taken 10/29/2023 1630 by Carli Terrazas RN  Safety Promotion/Fall Prevention: activity supervised     Problem: Plan of Care - These are the overarching goals to be used throughout the patient stay.    Goal: Absence of Hospital-Acquired Illness or Injury  Intervention: Prevent Skin Injury  Recent Flowsheet Documentation  Taken 10/29/2023 1630 by Carli Terrazas RN  Body Position: weight shifting     Problem: Bariatric Surgery  Goal: Blood Glucose Level Within Desired Range  Outcome: Progressing     Problem: Bariatric Surgery  Goal: Optimal Pain Control and Function  Outcome: Progressing     Problem: Bariatric Surgery  Goal: Effective Urinary Elimination  Outcome: Progressing     Problem: Gas Exchange Impaired  Goal: Optimal Gas Exchange  Outcome: Progressing  Intervention: Optimize Oxygenation and Ventilation  Recent Flowsheet Documentation  Taken 10/29/2023 1630 by Carli Terrazas RN  Head of Bed (HOB) Positioning: HOB at 20-30 degrees   Goal Outcome Evaluation:         Transferred from ICU this afternoon around 3PM. A/Ox4 but has intermittent confusion and intermittent hallucinations, reports seeing \"skunk in the ceiling\" and a \"tin man in the door\". VSS ex HTN. On RA during the day. Refused BIPAP at night. Tele NSR. R PICC infusing D5 @150ml/hr and int abx. Purewick in place with adequate output. Internal jugular dressing intact. Midline incision with steristrips, BRET. MICKIE with no output. MICKIE dressing CDI. Jayme full liquid diet with BGM q4h. Poor appetite. Denies pain. Reports some anxiety but does well with calm environment and explanation of cares. Daughter was here for a while at bedside. Nursing to continue to monitor.               "

## 2023-10-30 NOTE — PROGRESS NOTES
ASSESSMENT:  1. Intra-abdominal abscess (H)    2. Perimenopausal symptoms        Mojgan Jimenez is a 53 year old female who is s/p laparoscopic sleeve gastrectomy with KO on 10/9 post-op course complicated by small enterotomy x2 s/p laparoscopy converted to laparotomy with washout and enterotomy closure x2 on 10/12. Patient was intubated multiple times due to respiratory failure likely secondary to infection. She is on antibiotics to cover for her peritonitis and pneumonia. Patient has greatly improved but has continued to experience delirium and hypertension. Her blood sugars have been good and she is tolerating a full liquid diet, so we will decrease the frequency of her blood sugar checks. Her venlafaxine will take time to build back up to therapeutic levels, so she may continue to need a prn anti-anxiety medication for intermittent anxiety episodes/attacks.    PLAN:  Increase oral intake; bariatric full liquid diet  Accuchecks ac &hs with sliding scale coverage  IR to manage drain; likely study early this week  Increase activity; continue with PT/OT and patient should be only in bed for sleeping otherwise. She needs to be ambulating or up in the chair most of the day.  Appreciate cards and [facility] support with cardiac and medical issues    SUBJECTIVE:   She is doing better. States her breathing is better. Pain is controlled and her confusion/delirium is slowly improved. Anxiety is greatly improved since her venlafaxine has been restarted. She has been up with PT/OT but has not been up much more than that. She denies fever or chills.      Patient Vitals for the past 24 hrs:   BP Temp Temp src Pulse Resp SpO2 Height Weight   10/30/23 0508 (!) 172/96 98.2  F (36.8  C) Axillary 78 22 96 % -- --   10/29/23 2323 (!) 181/84 -- -- 77 20 94 % -- --   10/29/23 1934 -- -- -- -- -- 90 % -- --   10/29/23 1917 (!) 164/86 98.2  F (36.8  C) Oral 75 20 92 % -- --   10/29/23 1647 (!) 168/85 -- -- 76 -- 91 % -- --   10/29/23 1630 --  "-- -- -- 20 -- -- --   10/29/23 1539 (!) 176/94 98.4  F (36.9  C) Oral 75 20 92 % 1.6 m (5' 3\") 132.7 kg (292 lb 8.8 oz)   10/29/23 1200 (!) 182/94 98.5  F (36.9  C) Oral 84 24 91 % -- --   10/29/23 0848 -- -- -- 69 -- -- -- --         PHYSICAL EXAM:  GEN: No acute distress, comfortable  LUNGS: CTA bilaterally  CV:RRR  ABD:soft, not tender, incisions CDI; no peritoneal signs  Drains: scant serous   Output by Drain (mL) 10/28/23 0700 - 10/28/23 1459 10/28/23 1500 - 10/28/23 2259 10/28/23 2300 - 10/29/23 0659 10/29/23 0700 - 10/29/23 1459 10/29/23 1500 - 10/29/23 2259 10/29/23 2300 - 10/30/23 0659 10/30/23 0700 - 10/30/23 0814   Closed/Suction Drain 1 Right RUQ Bulb 12 Swedish 0 0 0 5 0 0       EXT:No cyanosis, edema or obvious abnormalities    10/29 0700 - 10/30 0659  In: 1339.5 [P.O.:110; I.V.:1229.5]  Out: 855 [Urine:850; Drains:5]    No results displayed because visit has over 200 results.             JUSTINE Woo CNP     "

## 2023-10-30 NOTE — PROGRESS NOTES
"[facility] Progress Note    SUBJECTIVE:  denies pain. Gets nebs for breathing. Temps ok.           REVIEW OF SYSTEMS:  Negative unless as listed above.  Social history, Family history, Medications: reviewed.    OBJECTIVE:  BP (!) 173/79 (BP Location: Left arm)   Pulse 78   Temp 97.6  F (36.4  C) (Axillary)   Resp 20   Ht 1.6 m (5' 3\")   Wt 132.7 kg (292 lb 8.8 oz)   LMP 09/09/2023 (Exact Date)   SpO2 92%   BMI 51.82 kg/m                PHYSICAL EXAM:  Alert, some tangential speech and forgetful. Room air.   Sclera normal color, not injected  CARDIOVASCULAR regular rate and rhythm, no murmur  Lungs CLEAR TO AUSCULTATION   Abdomen soft, NT, incision midline looks good, steri strips in place; RUQ drain with clear fluid.   Skin normal  Joints normal  Neurologic exam non focal  Rash R torso is improved        Antibiotics:  See MAR,multiple   Pertinent labs:  Lab Results   Component Value Date    WBC 14.5 10/23/2023    WBC 5.5 11/17/2020     Lab Results   Component Value Date    RBC 3.16 10/23/2023    RBC 4.13 11/17/2020     Lab Results   Component Value Date    HGB 9.2 10/23/2023    HGB 12.3 11/17/2020     Lab Results   Component Value Date    HCT 30.9 10/23/2023    HCT 37.4 11/17/2020     No components found for: \"MCT\"  Lab Results   Component Value Date    MCV 98 10/23/2023    MCV 91 11/17/2020     Lab Results   Component Value Date    MCH 29.1 10/23/2023    MCH 29.8 11/17/2020     Lab Results   Component Value Date    MCHC 29.8 10/23/2023    MCHC 32.9 11/17/2020     Lab Results   Component Value Date    RDW 14.9 10/23/2023    RDW 12.7 11/17/2020     Lab Results   Component Value Date     10/23/2023     11/17/2020       Last Comprehensive Metabolic Panel:  Sodium   Date Value Ref Range Status   10/30/2023 142 135 - 145 mmol/L Final     Comment:     Reference intervals for this test were updated on 09/26/2023 to more accurately reflect our healthy population. There may be " differences in the flagging of prior results with similar values performed with this method. Interpretation of those prior results can be made in the context of the updated reference intervals.    10/30/2023 142 135 - 145 mmol/L Final     Comment:     Reference intervals for this test were updated on 09/26/2023 to more accurately reflect our healthy population. There may be differences in the flagging of prior results with similar values performed with this method. Interpretation of those prior results can be made in the context of the updated reference intervals.  Reference intervals for this test were updated on 09/26/2023 to more accurately reflect our healthy population. There may be differences in the flagging of prior results with similar values performed with this method. Interpretation of those prior results can be made in the context of the updated reference intervals.    11/17/2020 141 133 - 144 mmol/L Final     Potassium   Date Value Ref Range Status   10/30/2023 3.7 3.4 - 5.3 mmol/L Final   05/17/2022 4.5 3.4 - 5.3 mmol/L Final   11/17/2020 4.1 3.4 - 5.3 mmol/L Final     Chloride   Date Value Ref Range Status   10/30/2023 106 98 - 107 mmol/L Final   05/17/2022 102 94 - 109 mmol/L Final   11/17/2020 109 94 - 109 mmol/L Final     Carbon Dioxide   Date Value Ref Range Status   11/17/2020 31 20 - 32 mmol/L Final     Carbon Dioxide (CO2)   Date Value Ref Range Status   10/30/2023 25 22 - 29 mmol/L Final   05/17/2022 32 20 - 32 mmol/L Final     Anion Gap   Date Value Ref Range Status   10/30/2023 11 7 - 15 mmol/L Final   05/17/2022 2 (L) 3 - 14 mmol/L Final   11/17/2020 1 (L) 3 - 14 mmol/L Final     Glucose   Date Value Ref Range Status   10/30/2023 110 (H) 70 - 99 mg/dL Final   05/17/2022 110 (H) 70 - 99 mg/dL Final   11/17/2020 84 70 - 99 mg/dL Final     Comment:     Fasting specimen     GLUCOSE BY METER POCT   Date Value Ref Range Status   10/30/2023 127 (H) 70 - 99 mg/dL Final     Urea Nitrogen   Date  "Value Ref Range Status   10/30/2023 39.7 (H) 6.0 - 20.0 mg/dL Final   05/17/2022 16 7 - 30 mg/dL Final   11/17/2020 9 7 - 30 mg/dL Final     Creatinine   Date Value Ref Range Status   10/30/2023 1.62 (H) 0.51 - 0.95 mg/dL Final   11/17/2020 0.79 0.52 - 1.04 mg/dL Final     GFR Estimate   Date Value Ref Range Status   10/30/2023 38 (L) >60 mL/min/1.73m2 Final   11/17/2020 88 >60 mL/min/[1.73_m2] Final     Comment:     Non  GFR Calc  Starting 12/18/2018, serum creatinine based estimated GFR (eGFR) will be   calculated using the Chronic Kidney Disease Epidemiology Collaboration   (CKD-EPI) equation.       Calcium   Date Value Ref Range Status   10/30/2023 9.1 8.6 - 10.0 mg/dL Final   11/17/2020 8.9 8.5 - 10.1 mg/dL Final       Liver Function Studies -   Recent Labs   Lab Test 10/23/23  0530   PROTTOTAL 7.1   ALBUMIN 3.2*   BILITOTAL 0.3   ALKPHOS 96   AST 19   ALT 26       No results found for: \"SED\"    No results found for: \"CRP\"      NOTE BD glucan test was 406 which is +      MICROBIOLOGY DATA:  Lung fluid 76 wbc, no org  Pelvic fluid lactobaccilus, candida dublinensis      IMAGING/RADIOLOGY:          ASSESSMENT:  Post gastric surgery  C/b HCAP,sepsis due to peritonitis post washout   Staph epi grew from BAL - today is day 9 vancomycin in case this was pathogen.  Had rash but TSS or TEN or DRESS not present  Intra-abdominal infection -- perihepatic drain 10/19 -- lactobacillus; 10/24 pelvic fluid aspirate -- lactobacillus and candida   Previous strep anginosus from washout 10/12        RECOMMENDATION:  Micafungin for yeast  Zosyn for strep and lactobacillus  I do not see reason to keep vancomycin beyond tomorrow  Potentially oral regimen augmentin and fluconazole at some point -- duration depends on resolution of fluid collections on imaging    Jose Antonio Bond MD  [facility]  [phone] [facility] " paging  ______________________________________________________________________

## 2023-10-30 NOTE — PROGRESS NOTES
Cardiology Progress Note    Assessment/Plan:  1.  Transient cardiomyopathy, now normalized with initial EF 30 to 35% with global hypokinesis now with EF 60 to 65% and normal wall motion.  Suspect this may have been related to intra abdominal sepsis.  Currently on combination of nitrates/hydralazine for afterload reduction as well as beta-blocker therapy.  No evidence of volume overload.  2.  Essential hypertension, inadequately controlled.  Started on amlodipine with blood pressure still in the 170 range systolic.  May need to increase amlodipine further to 10 mg daily.  3.  Morbid obesity, status post gastric bypass surgery with postoperative complication of pneumonia and peritonitis due to duodenal injury  4.  History of JARVIS    Primary cardiologist: Yohan Srinivasan MD    Subjective:  Patient continues to remain somewhat confused.  Denies any chest pain.  States breathing stable following nebulizer treatment     amLODIPine  5 mg Oral Daily    [Held by provider] bumetanide  2 mg Intravenous Q24H    heparin ANTICOAGULANT  5,000 Units Subcutaneous Q8H    hydrALAZINE  100 mg Oral or Feeding Tube TID    hyoscyamine  125 mcg Sublingual Q4H    insulin aspart  1-4 Units Subcutaneous 4x Daily AC & HS    insulin glargine  10 Units Subcutaneous At Bedtime    [Held by provider] metoprolol succinate ER  100 mg Oral BID    metoprolol tartrate  100 mg Oral BID    micafungin  100 mg Intravenous Q24H    nitroGLYcerin  1 patch Transdermal Daily    pantoprazole  40 mg Per Feeding Tube QAM AC    Or    pantoprazole  40 mg Intravenous QAM AC    piperacillin-tazobactam  3.375 g Intravenous Q8H    sodium chloride (PF)  3 mL Intracatheter Q8H    vancomycin  1,000 mg Intravenous Q24H    venlafaxine  75 mg Oral TID w/meals         Objective:   Vital signs in last 24 hours:  Temp:  [97.6  F (36.4  C)-98.4  F (36.9  C)] 97.6  F (36.4  C)  Pulse:  [75-78] 78  Resp:  [20-22] 20  BP: (164-193)/(79-96) 173/79  SpO2:  [90 %-96 %] 92 %  Weight:  "       Review of Systems:  Negative    Physical Exam:  General appearance: alert, cooperative, no distress   Head: Normocephalic, without obvious abnormality, atraumatic  Neck: no JVD   Lungs: clear to auscultation bilaterally anteriorly  Heart: Regular rate and rhythm.  S1, S2 normal.  No murmur or gallop  Extremities: No peripheral edema bilaterally    Cardiographics (personally reviewed):   Telemetry demonstrates sinus rhythm.    Imaging (personally reviewed):   No new cardiac imaging    Lab Results (personally reviewed):     Recent Labs   Lab Test 10/25/23  0350 10/14/23  0629 05/17/22  1052   CHOL  --   --  176   HDL  --   --  36*   LDL  --   --  101*   TRIG 253*   < > 196*    < > = values in this interval not displayed.     Recent Labs   Lab Test 05/17/22  1052 08/31/21  1415 11/17/20  1317   * 194* 162*     Recent Labs   Lab Test 10/30/23  1106 10/30/23  0612   NA  --  142  142   POTASSIUM  --  3.7   CHLORIDE  --  106   CO2  --  25   * 110*   BUN  --  39.7*   CR  --  1.62*   GFRESTIMATED  --  38*   PALAK  --  9.1     Recent Labs   Lab Test 10/30/23  0612 10/29/23  0545 10/28/23  0519   CR 1.62* 1.86* 2.01*     Recent Labs   Lab Test 10/15/23  0431 05/17/22  1052 08/31/21  1415   A1C 5.8* 5.7* 5.5          Recent Labs   Lab Test 10/30/23  0612   WBC 12.3*   HGB 8.7*   HCT 28.9*   MCV 95        Recent Labs   Lab Test 10/30/23  0612 10/29/23  0545 10/28/23  0519   HGB 8.7* 8.8* 8.2*    No results for input(s): \"TROPONINI\" in the last 14707 hours.  Recent Labs   Lab Test 10/15/23  0431   NTBNPI 4,534*     Recent Labs   Lab Test 09/15/22  1351   TSH 2.56     Recent Labs   Lab Test 10/23/23  0530 10/16/23  0436 10/15/23  0554   INR 1.49* 1.21* 1.19*          Jennifer Maravilla MD          "

## 2023-10-30 NOTE — PROGRESS NOTES
Patient experiencing visual hallucinations, looking intently at right side and asking about a little boy while guarding area in bed. Patient did not have any male visitors today.     Belief persisted for a short duration until staff re-oriented patient and turned in bed for cares.

## 2023-10-30 NOTE — PROGRESS NOTES
RN called for PRN neb, Pt stated she was anxious and sob. Pt on 2 LPM NC, Spo2 low 90's. BS diminished, gave DuoNeb treatment, post BS no change.     Brittnee Terrazas, RT

## 2023-10-30 NOTE — PHARMACY-VANCOMYCIN DOSING SERVICE
Pharmacy Vancomycin Note  Date of Service 2023  Patient's  1970   53 year old, female    Indication: Sepsis  Day of Therapy: 7  Current vancomycin regimen:  1000 mg IV q24h  Current vancomycin monitoring method: AUC  Current vancomycin therapeutic monitoring goal: 400-600 mg*h/L    InsightRX Prediction of Current Vancomycin Regimen    Loading dose: N/A  Regimen: 1000 mg IV every 24 hours.  Start time: 09:10 on 10/30/2023  Exposure target: AUC24 (range)400-600 mg/L.hr   AUC24,ss: 457 mg/L.hr  Probability of AUC24 > 400: 96 %  Ctrough,ss: 14.8 mg/L  Probability of Ctrough,ss > 20: 0 %  Probability of nephrotoxicity (Lodise FABIENNE ): 10 %  Current estimated CrCl = Estimated Creatinine Clearance: 53.6 mL/min (A) (based on SCr of 1.62 mg/dL (H)).    Creatinine for last 3 days  10/28/2023:  5:19 AM Creatinine 2.01 mg/dL  10/29/2023:  5:45 AM Creatinine 1.86 mg/dL  10/30/2023:  6:12 AM Creatinine 1.62 mg/dL    Recent Vancomycin Levels (past 3 days)  10/28/2023:  5:19 AM Vancomycin 15.1 ug/mL  10/30/2023:  6:50 AM Vancomycin 13.2 ug/mL    Vancomycin IV Administrations (past 72 hours)                     vancomycin (VANCOCIN) 1,000 mg in 200 mL dextrose intermittent infusion (mg) 1,000 mg New Bag 10/29/23 0910     1,000 mg New Bag 10/28/23 0905    vancomycin (VANCOCIN) 750 mg in sodium chloride 0.9 % 250 mL intermittent infusion (mg) 750 mg New Bag 10/27/23 0925                    Nephrotoxins and other renal medications (From now, onward)      Start     Dose/Rate Route Frequency Ordered Stop    10/28/23 09  vancomycin (VANCOCIN) 1,000 mg in 200 mL dextrose intermittent infusion         1,000 mg  200 mL/hr over 1 Hours Intravenous EVERY 24 HOURS 10/28/23 0815      10/28/23 0804  [Held by provider]  bumetanide (BUMEX) injection 2 mg        (On hold since Sat 10/28/2023 at 0756 until manually unheld; held by Liz Gillis MDHold Reason: Other)    2 mg Intravenous EVERY 24 HOURS 10/27/23 1037       "10/25/23 1900  piperacillin-tazobactam (ZOSYN) 3.375 g vial to attach to  mL bag        Note to Pharmacy: For [facility], [facility] and [facility]: For Zosyn-naive patients, use the \"Zosyn initial dose + extended infusion\" order panel.    3.375 g  over 240 Minutes Intravenous EVERY 8 HOURS 10/25/23 1222                 Contrast Orders - past 72 hours (72h ago, onward)      None            Interpretation of levels and current regimen:  Vancomycin level is reflective of -600    Has serum creatinine changed greater than 50% in last 72 hours: No    Urine output:  unable to determine    Renal Function: Improving    Plan:  Continue Current Dose  Vancomycin monitoring method: AUC  Vancomycin therapeutic monitoring goal: 400-600 mg*h/L  Pharmacy will check vancomycin levels as appropriate in 1-3 Days.  Serum creatinine levels will be ordered a minimum of twice weekly.    Rafaela Villanueva, PharmD   "

## 2023-10-30 NOTE — PROGRESS NOTES
"Patient found in room with leg over side of bed. \"Because the noise [IV] wouldn't stop.\" Patient reminded of call light within reach to notify staff.     Patient voiced desire to pivot transfer to chair. Initially required moderate assist x1 to achieve standing but was unable to take more than one step forward and was lowered to edge of bed. At this point patient unable to maintain sitting position, neither able to sit safely back deeper in bed or complete transfer to chair.    Additional staff assisted transfer to chair, Max assist x2, blocking knees. Patient briefly experienced a panic attack, but staff were able to re-direct focus and calm breathing.    Patient transferred back to bed with difficulty despite use of E-Z stand.  "

## 2023-10-30 NOTE — PROGRESS NOTES
Put patient on bipap for bedtime. Current settings ST 12/6 12 30%. PRN Albuterol neb given x1. RT will continue to  monitor.

## 2023-10-31 ENCOUNTER — APPOINTMENT (OUTPATIENT)
Dept: RADIOLOGY | Facility: HOSPITAL | Age: 53
End: 2023-10-31
Attending: RADIOLOGY
Payer: COMMERCIAL

## 2023-10-31 ENCOUNTER — APPOINTMENT (OUTPATIENT)
Dept: RADIOLOGY | Facility: HOSPITAL | Age: 53
End: 2023-10-31
Attending: INTERNAL MEDICINE
Payer: COMMERCIAL

## 2023-10-31 LAB
ANION GAP SERPL CALCULATED.3IONS-SCNC: 11 MMOL/L (ref 7–15)
BACTERIA ASPIRATE CULT: ABNORMAL
BACTERIA PLR CULT: NORMAL
BUN SERPL-MCNC: 31.3 MG/DL (ref 6–20)
CALCIUM SERPL-MCNC: 9.1 MG/DL (ref 8.6–10)
CHLORIDE SERPL-SCNC: 104 MMOL/L (ref 98–107)
CREAT SERPL-MCNC: 1.65 MG/DL (ref 0.51–0.95)
DEPRECATED HCO3 PLAS-SCNC: 24 MMOL/L (ref 22–29)
EGFRCR SERPLBLD CKD-EPI 2021: 37 ML/MIN/1.73M2
ERYTHROCYTE [DISTWIDTH] IN BLOOD BY AUTOMATED COUNT: 13.9 % (ref 10–15)
GLUCOSE BLDC GLUCOMTR-MCNC: 119 MG/DL (ref 70–99)
GLUCOSE BLDC GLUCOMTR-MCNC: 123 MG/DL (ref 70–99)
GLUCOSE BLDC GLUCOMTR-MCNC: 132 MG/DL (ref 70–99)
GLUCOSE SERPL-MCNC: 112 MG/DL (ref 70–99)
HCT VFR BLD AUTO: 29.4 % (ref 35–47)
HGB BLD-MCNC: 9.1 G/DL (ref 11.7–15.7)
MAGNESIUM SERPL-MCNC: 1.8 MG/DL (ref 1.7–2.3)
MCH RBC QN AUTO: 29.4 PG (ref 26.5–33)
MCHC RBC AUTO-ENTMCNC: 31 G/DL (ref 31.5–36.5)
MCV RBC AUTO: 95 FL (ref 78–100)
PLATELET # BLD AUTO: 390 10E3/UL (ref 150–450)
POTASSIUM SERPL-SCNC: 3.8 MMOL/L (ref 3.4–5.3)
RBC # BLD AUTO: 3.1 10E6/UL (ref 3.8–5.2)
SODIUM SERPL-SCNC: 139 MMOL/L (ref 135–145)
SODIUM SERPL-SCNC: 139 MMOL/L (ref 135–145)
SODIUM SERPL-SCNC: 140 MMOL/L (ref 135–145)
WBC # BLD AUTO: 14.3 10E3/UL (ref 4–11)

## 2023-10-31 PROCEDURE — 94660 CPAP INITIATION&MGMT: CPT

## 2023-10-31 PROCEDURE — 83735 ASSAY OF MAGNESIUM: CPT | Performed by: INTERNAL MEDICINE

## 2023-10-31 PROCEDURE — 250N000011 HC RX IP 250 OP 636: Mod: JZ | Performed by: INTERNAL MEDICINE

## 2023-10-31 PROCEDURE — C9113 INJ PANTOPRAZOLE SODIUM, VIA: HCPCS | Mod: JZ | Performed by: INTERNAL MEDICINE

## 2023-10-31 PROCEDURE — 99232 SBSQ HOSP IP/OBS MODERATE 35: CPT | Performed by: INTERNAL MEDICINE

## 2023-10-31 PROCEDURE — 99232 SBSQ HOSP IP/OBS MODERATE 35: CPT | Performed by: PHYSICIAN ASSISTANT

## 2023-10-31 PROCEDURE — 250N000011 HC RX IP 250 OP 636: Mod: JZ | Performed by: NURSE PRACTITIONER

## 2023-10-31 PROCEDURE — 250N000009 HC RX 250: Performed by: NURSE PRACTITIONER

## 2023-10-31 PROCEDURE — 250N000013 HC RX MED GY IP 250 OP 250 PS 637: Performed by: NURSE PRACTITIONER

## 2023-10-31 PROCEDURE — 85027 COMPLETE CBC AUTOMATED: CPT | Performed by: STUDENT IN AN ORGANIZED HEALTH CARE EDUCATION/TRAINING PROGRAM

## 2023-10-31 PROCEDURE — 250N000013 HC RX MED GY IP 250 OP 250 PS 637: Performed by: INTERNAL MEDICINE

## 2023-10-31 PROCEDURE — 94640 AIRWAY INHALATION TREATMENT: CPT

## 2023-10-31 PROCEDURE — 999N000157 HC STATISTIC RCP TIME EA 10 MIN

## 2023-10-31 PROCEDURE — 99233 SBSQ HOSP IP/OBS HIGH 50: CPT | Performed by: INTERNAL MEDICINE

## 2023-10-31 PROCEDURE — 120N000001 HC R&B MED SURG/OB

## 2023-10-31 PROCEDURE — 250N000011 HC RX IP 250 OP 636: Mod: JZ | Performed by: PHYSICIAN ASSISTANT

## 2023-10-31 PROCEDURE — 71045 X-RAY EXAM CHEST 1 VIEW: CPT

## 2023-10-31 PROCEDURE — 99223 1ST HOSP IP/OBS HIGH 75: CPT | Performed by: INTERNAL MEDICINE

## 2023-10-31 PROCEDURE — 99207 PR NO BILLABLE SERVICE THIS VISIT: CPT | Performed by: INTERNAL MEDICINE

## 2023-10-31 PROCEDURE — 0DHA7UZ INSERTION OF FEEDING DEVICE INTO JEJUNUM, VIA NATURAL OR ARTIFICIAL OPENING: ICD-10-PCS | Performed by: RADIOLOGY

## 2023-10-31 PROCEDURE — 258N000003 HC RX IP 258 OP 636: Performed by: SURGERY

## 2023-10-31 PROCEDURE — 258N000002 HC RX IP 258 OP 250: Performed by: PHYSICIAN ASSISTANT

## 2023-10-31 PROCEDURE — 84295 ASSAY OF SERUM SODIUM: CPT | Performed by: INTERNAL MEDICINE

## 2023-10-31 PROCEDURE — 80048 BASIC METABOLIC PNL TOTAL CA: CPT | Performed by: STUDENT IN AN ORGANIZED HEALTH CARE EDUCATION/TRAINING PROGRAM

## 2023-10-31 PROCEDURE — 258N000003 HC RX IP 258 OP 636: Performed by: NURSE PRACTITIONER

## 2023-10-31 PROCEDURE — 44500 INTRO GASTROINTESTINAL TUBE: CPT

## 2023-10-31 RX ORDER — DEXTROSE MONOHYDRATE 100 MG/ML
INJECTION, SOLUTION INTRAVENOUS CONTINUOUS PRN
Status: DISCONTINUED | OUTPATIENT
Start: 2023-10-31 | End: 2023-11-21 | Stop reason: HOSPADM

## 2023-10-31 RX ORDER — BUMETANIDE 0.25 MG/ML
0.5 INJECTION INTRAMUSCULAR; INTRAVENOUS EVERY 8 HOURS
Status: DISCONTINUED | OUTPATIENT
Start: 2023-10-31 | End: 2023-11-01

## 2023-10-31 RX ORDER — SODIUM CHLORIDE 450 MG/100ML
INJECTION, SOLUTION INTRAVENOUS CONTINUOUS
Status: DISCONTINUED | OUTPATIENT
Start: 2023-10-31 | End: 2023-10-31

## 2023-10-31 RX ORDER — AMLODIPINE BESYLATE 5 MG/1
5 TABLET ORAL 2 TIMES DAILY
Status: DISCONTINUED | OUTPATIENT
Start: 2023-10-31 | End: 2023-11-08

## 2023-10-31 RX ADMIN — PANTOPRAZOLE SODIUM 40 MG: 40 INJECTION, POWDER, FOR SOLUTION INTRAVENOUS at 06:45

## 2023-10-31 RX ADMIN — VANCOMYCIN HYDROCHLORIDE 1000 MG: 1 INJECTION, SOLUTION INTRAVENOUS at 08:52

## 2023-10-31 RX ADMIN — ALTEPLASE 4 MG: 2.2 INJECTION, POWDER, LYOPHILIZED, FOR SOLUTION INTRAVENOUS at 13:53

## 2023-10-31 RX ADMIN — PIPERACILLIN AND TAZOBACTAM 3.38 G: 3; .375 INJECTION, POWDER, LYOPHILIZED, FOR SOLUTION INTRAVENOUS at 11:07

## 2023-10-31 RX ADMIN — VENLAFAXINE 75 MG: 75 TABLET ORAL at 09:11

## 2023-10-31 RX ADMIN — HYDRALAZINE HYDROCHLORIDE 100 MG: 50 TABLET, FILM COATED ORAL at 13:01

## 2023-10-31 RX ADMIN — PROCHLORPERAZINE EDISYLATE 10 MG: 5 INJECTION INTRAMUSCULAR; INTRAVENOUS at 19:50

## 2023-10-31 RX ADMIN — PIPERACILLIN AND TAZOBACTAM 3.38 G: 3; .375 INJECTION, POWDER, LYOPHILIZED, FOR SOLUTION INTRAVENOUS at 05:05

## 2023-10-31 RX ADMIN — MICAFUNGIN SODIUM 100 MG: 50 INJECTION, POWDER, LYOPHILIZED, FOR SOLUTION INTRAVENOUS at 09:53

## 2023-10-31 RX ADMIN — BUMETANIDE 0.5 MG: 0.25 INJECTION INTRAMUSCULAR; INTRAVENOUS at 16:01

## 2023-10-31 RX ADMIN — HYDRALAZINE HYDROCHLORIDE 10 MG: 20 INJECTION INTRAMUSCULAR; INTRAVENOUS at 16:01

## 2023-10-31 RX ADMIN — DIATRIZOATE MEGLUMINE AND DIATRIZOATE SODIUM 10 ML: 660; 100 SOLUTION ORAL; RECTAL at 14:52

## 2023-10-31 RX ADMIN — VENLAFAXINE 75 MG: 75 TABLET ORAL at 16:02

## 2023-10-31 RX ADMIN — ONDANSETRON 4 MG: 2 INJECTION INTRAMUSCULAR; INTRAVENOUS at 08:06

## 2023-10-31 RX ADMIN — BUMETANIDE 0.5 MG: 0.25 INJECTION INTRAMUSCULAR; INTRAVENOUS at 23:38

## 2023-10-31 RX ADMIN — NITROGLYCERIN 1 PATCH: 0.4 PATCH TRANSDERMAL at 08:39

## 2023-10-31 RX ADMIN — DEXTROSE MONOHYDRATE: 50 INJECTION, SOLUTION INTRAVENOUS at 05:58

## 2023-10-31 RX ADMIN — PIPERACILLIN AND TAZOBACTAM 3.38 G: 3; .375 INJECTION, POWDER, LYOPHILIZED, FOR SOLUTION INTRAVENOUS at 19:39

## 2023-10-31 RX ADMIN — PROCHLORPERAZINE EDISYLATE 10 MG: 5 INJECTION INTRAMUSCULAR; INTRAVENOUS at 11:03

## 2023-10-31 RX ADMIN — ALBUTEROL SULFATE 2.5 MG: 2.5 SOLUTION RESPIRATORY (INHALATION) at 11:24

## 2023-10-31 RX ADMIN — HEPARIN SODIUM 5000 UNITS: 5000 INJECTION, SOLUTION INTRAVENOUS; SUBCUTANEOUS at 21:12

## 2023-10-31 RX ADMIN — HEPARIN SODIUM 5000 UNITS: 5000 INJECTION, SOLUTION INTRAVENOUS; SUBCUTANEOUS at 06:18

## 2023-10-31 RX ADMIN — HYDRALAZINE HYDROCHLORIDE 10 MG: 20 INJECTION INTRAMUSCULAR; INTRAVENOUS at 08:09

## 2023-10-31 RX ADMIN — METOPROLOL TARTRATE 100 MG: 25 TABLET, FILM COATED ORAL at 09:11

## 2023-10-31 RX ADMIN — AMLODIPINE BESYLATE 5 MG: 5 TABLET ORAL at 09:12

## 2023-10-31 RX ADMIN — SODIUM CHLORIDE: 4.5 INJECTION, SOLUTION INTRAVENOUS at 12:24

## 2023-10-31 RX ADMIN — HYDRALAZINE HYDROCHLORIDE 10 MG: 20 INJECTION INTRAMUSCULAR; INTRAVENOUS at 03:17

## 2023-10-31 RX ADMIN — METOPROLOL TARTRATE 100 MG: 25 TABLET, FILM COATED ORAL at 20:04

## 2023-10-31 RX ADMIN — HYDRALAZINE HYDROCHLORIDE 100 MG: 50 TABLET, FILM COATED ORAL at 09:18

## 2023-10-31 RX ADMIN — HEPARIN SODIUM 5000 UNITS: 5000 INJECTION, SOLUTION INTRAVENOUS; SUBCUTANEOUS at 13:02

## 2023-10-31 RX ADMIN — HYDRALAZINE HYDROCHLORIDE 10 MG: 20 INJECTION INTRAMUSCULAR; INTRAVENOUS at 19:43

## 2023-10-31 RX ADMIN — VENLAFAXINE 75 MG: 75 TABLET ORAL at 11:07

## 2023-10-31 ASSESSMENT — ACTIVITIES OF DAILY LIVING (ADL)
ADLS_ACUITY_SCORE: 45
ADLS_ACUITY_SCORE: 41

## 2023-10-31 NOTE — PROVIDER NOTIFICATION
Provider Notification:    Reason for Communication:  Updated general surgery on vitals and patient condition: inability to tolerate HS meds; emesis x2 after attempts to take HS antihypertensives. Patient is now refusing to try pills.   Poor oral intake. Did not eat lunch or dinner; despite multiple attempts to assist her.   She is very weak and refusing to get out of bed.   Level of orientation fluctuates but she has generally been disoriented to place and situation. She sees someone at her bedside and sometimes talks to people that are not present in the room. She also hears things that are not audible.   Bipap is on hold at this time due to intermittent vomiting this evening.   Also updated her on abdominal CT results.     Team Member Name & Role:  Felecia Henderson CNP    Method of Communication:  Page/call    Response:  Hold hyoscyamine. Downgrade diet to bariatric clear liquids. Ok to give her pills as tolerated. Updated surgery team with any change in condition.

## 2023-10-31 NOTE — PROGRESS NOTES
Renal progress note  CC:Hypernatremia , SUSSY  Assessment and Plan:  52-year-old female with history of obesity JARVIS asthma history of mood disorders admitted with scheduled laparoscopic sleeve gastrectomy on 10/9/2023 complicated by peritonitis with small bowel injury will return tomorrow 10/12/2023 for enterotomy closure and cleanout with drain placement.  Pelvic fluid aspiration needed on 10/24/2023 overall course complicated by encephalopathy delirium septic shock with peritoneal leak acute respiratory failure requiring intubation from 10/12 to 10/21/2023 and oliguric SUSSY with very poor oral intakes.  Nephrology following for SUSSY    Oliguric SUSSY  SUSSY likely secondary to hemodynamic injury for acute renal failure  With peritoneal leak, creatinine peaked at 6.97 on 10/14/2023  Likely exacerbated by NSAIDs hypotension vasodilation and intravascular volume depletion leading to hemodynamic ATN currently and slow recovery phase  Creatinine improved to 1.6  Baseline creatinine 0.7 within normal limits no prior history of SUSSY or CKD urine output maintained off diuretics  --> Follow on serial creatinines on daily labs  Strict GLENNA altered mentation and hallucinations seen today are unlikely secondary to metabolic issues its possibility with delirium with prolonged hospitalization and critical illness    Hypertension mild hypervolemia with mostly intravascular volume depletion   No indication for scheduled diuretics  Blood pressure remains on the higher side   continue on hydralazine And metoprolol    Hypernatremia - improved with IV fluids currently on D5 at 100 mL/h; switch to 0.45% NS at 50 ml/hour, will likely need some degree of IVF support until oral intakes improve     Anemia likely.    Currently Hb around 8.7  Transfuse per Surgical team recommendation    History of gastric bypass  Multiple complications post sleeve gastrectomy complicated by peritonitis and small bowel injury with peritoneal leak  Return to  "the OR 10/12/2023 with cleanout and drain placement complicated by other fluid collections requiring aspiration and drain placement  On Vanco Zosyn and micafungin ID following  On tube feeds and TPN weaned off patient's intakes remain pretty poor remains on IV fluids but will decrease rate   Diet management per primary team bariatric surgery    Ongoing metabolic encephalopathy with worsening delirium  Visual hallucinations management per primary hospitalist team    History of cardiomyopathy possible stress-induced Takotsubo  Management per primary team  Currently volume appears slightly elevated, decreasing rate of IVF as above   On hydralazine metoprolol and amlodipine  Blood pressure is borderline controlled    Acute hypoxemic hypercapnic respiratory failure  Currently on room air  Patient does appear significantly encephalopathy and delirious today  Management per primary medicine team    Prior history of JARVIS and asthma  Management per primary team    Thank you for the consultation we will follow  Marlen Cintron PA-C   [facility]  [phone]      Subjective  Seen at bedside, RN present who provides much of history, patient shakes head \"no\" when asked how she is feeling but does not elaborate further. RN noticing some edema and weight is up, I>>>O, wondering about decreasing rate of IVF but notes patient is not drinking at all. O2 needs are stable.      Objective    Vital signs in last 24 hours  Temp:  [97.6  F (36.4  C)-98.7  F (37.1  C)] 97.6  F (36.4  C)  Pulse:  [79-96] 96  Resp:  [18-24] 22  BP: (160-187)/(79-97) 168/85  FiO2 (%):  [30 %] 30 %  SpO2:  [88 %-94 %] 93 %  Weight:   [unfilled]    Intake/Output last 3 shifts  I/O last 3 completed shifts:  In: 5048 [P.O.:350; I.V.:4698]  Out: 1290 [Urine:1275; Drains:15]  Intake/Output this shift:  I/O this shift:  In: 720 [P.O.:10; I.V.:710]  Out: -     Physical Exam  Alert/and awake very confused with visual hallucinations NAD  CV: RRR " without murmur or rub  Lung: clear and equal; no extra sounds, O2 per NC   Ab: soft and NT; + distended; normal bs; midline incision; guarding upper abdomen  Ext: Trace edema and well perfused  Skin; no rash    Pertinent Labs   Lab Results   Component Value Date    WBC 14.3 (H) 10/31/2023    HGB 9.1 (L) 10/31/2023    HCT 29.4 (L) 10/31/2023    MCV 95 10/31/2023     10/31/2023     Lab Results   Component Value Date    BUN 31.3 (H) 10/31/2023     10/31/2023     10/31/2023    CO2 24 10/31/2023       Lab Results   Component Value Date    ALBUMIN 3.2 (L) 10/23/2023     Lab Results   Component Value Date    PHOS 3.4 10/27/2023     I reviewed all lab results    Marlen Cintron PA-C

## 2023-10-31 NOTE — PROGRESS NOTES
[facility]    Medicine Progress Note - Hospitalist Service    Date of Admission:  10/9/2023    Assessment & Plan   Mojgan Jimenez is a 53 year old female with h/o obesity, severe JARVIS on CPAP, asthma, stimulant use disorder, stimulant induced psychosis, mood & anxiety disorder. Admitted 10/9/23 for scheduled laparoscopic sleeve gastrectomy with single anastomosis duodenal switch. She was later found to have two small bowel injuries with peritonitis. 10/12/23 returned to OR for small bowel interotomy closure, clean-out, & drain placement; 10/19 found to have RUQ fluid collection s/p drain placement; 10/24 s/p pelvic fluid aspiration.      Course complicated by encephalopathy/delirium, septic shock, suspected takotsubo syndrome, acute respiratory failure due to loss of protective airway reflexes & increased metabolic load requiring intubation (10/12-10/21/23; reintubated 10/21-10/26/23), & oliguric SUSSY with renal recovery. She has significantly improved: mental status clearing with some ongoing delirium at night, cardiomyopathy improved, ATN I recovery phase, and ongoing treatment of intra abdominal infection. She was made floor tele status 10/27/23        1.Acute hypoxemic-hypercapnic respiratory failure -now weaned RA  - Rapid Response. 10/12 Pt with decreased LOC. Opens eyes to voice. SpO2 89% on 8L NC. BS clear and diminished. Placed on a 15L non-rebreather for SpO2 > 90%. After narcan admin pt became more responsive/agitated. Transferred to ICU and intubated -10/12-10/21/23; reintubated 10/21-10/26/23. Now extubated and weaned to RA  - 10/22/23 CT demonstrated complete RLL atelectasis, milder RUL & RBML atelectasis or consolidation. 10/22 bronchoscopy moderate RLL blood tinged secretions cleared.   - pulmonary hygiene  - R-pleural effusion, exudative Suspect simple parapneumonic effusion. 10/24 s/p thoracentesis, 400 mL. Pleural fluid amylase amylase 23, serum 64.   -today on cxr , 10/31  still has right sided effusion seen, pulm to see again     2.Gastric bypass  10/9 s/p sleeve gastrectomy complicated by small bowel injury & peritonitis   10/12 returned to OR for closure, clean-out, & drain placement (now removed)  10/19 s/p RUQ fluid collection drain placement   10/24 s/p pelvic fluid aspiration (75 ml)  - surgery primary   - ID consulted   - 10/12 - peritoneal cultures + Streptococcus anginosus, mixed aerobic & anaerobic kamla   - 10/19 - RUQ fluid cultures & pelvic fluid aspirate + Lactobacillus   - 10/22 - BDG blood +  - 10/24 pleural fluid studies  -#+WBC no pos cx  10/24--aspirate pelvic drain--lacto + candida  - vanc, pip-tazo, micafungin  - was on TPN previously 10/14  - PPTF started 10/24/23. TF and TPN weaned of 10/27 currently on bariatric clears, surgery managing   -10/27--per renal -on D5W for hypernatremia  -10/28 still on D5W  -10/29 and 10/30--still on D5W, NA improved by 10/30 to 142, renal managing this  -diet per surg-bariatric full liq diet--not clear she will keep up with this still  - flush drain at regular intervals   10/31--overnight last night had emesis, diet cut back to clear, so now surg placing FT     3.Accelerated HTN /Cardiomyopathy   - echo previously and initially decreased LV function but repeat echo shows EF normalized, possible stress induced.   - cards appreciated and still following  - on metoprolol tartrate 100 mg bid per cards  - hydralazine increase to 100 mg tid  - nitroglycerin patch 0.4mg/24  -amlodipine 5 mg daily   - prn IV hydralazine  -bp still up, renal adjusting meds     4.History of JARVIS, asthma in the chart no PFTs  - NIPPV with sleep/naps everytime   - continue duonebs     5.Oliguric SUSSY - improved   - nephrology was consulted  for ARF peak creat 6.97 on 10/14  - slowly improving and creatinine trending down--10/31 now 1.65  - avoid nephrotoxic agents      6.Hypernatremia -   - IV bumex stopped and on D5W per renal .   - trend sodium 153 -->148  "-->144-->142-->139  -has needed D5W-per renal--10/31 switched to 1/2 NS at lower rate     7.Anemia - stable  Suspect multifactorial secondary to sepsis & surgical blood loss   - monitor ,10/30 hgb 9.1     8.Hypervolemia  - bumex 2mg q12h--holding with hypernatremia       9.Hyperglycemia of critical illness   - glargine 10 units every day--bs good, will stopped lantus and see how she does on ss  - LDSSI    -with TF starting later may need to restart lantus      10.Obesity  -BMI 46    11.Acute metabolic and toxic encephalopathy  -related to infection from abd  -related to metabolic , ARF   -still fluctuating here   -delirium protocol  -still having hallucinations  -needs to be up day, sleep night  -up to chair if possible       Social--I called daughter at 1528 and no answer , did not go to voicemail, could not leave message  - I called daughter again and at 1630 and she was updated--she shared concerns about her mom was given some papers to sign since going to [facility] and felt that her mom was still confused and family should be notified for any papers. I will pass this on to care management           Diet: Room Service  Bariatric Diet Clear Liquids    DVT Prophylaxis: Heparin SQ  Patiño Catheter: Not present  Lines: PRESENT      PICC 10/28/23 Triple Lumen Right Basilic-Site Assessment: WDL      Cardiac Monitoring: ACTIVE order. Indication: Acute decompensated heart failure (48 hours)  Code Status: Full Code      Clinically Significant Risk Factors              # Hypoalbuminemia: Lowest albumin = 2.7 g/dL at 10/14/2023  6:29 AM, will monitor as appropriate     # Hypertension: Noted on problem list        # Severe Obesity: Estimated body mass index is 52.96 kg/m  as calculated from the following:    Height as of this encounter: 1.6 m (5' 3\").    Weight as of this encounter: 135.6 kg (298 lb 15.1 oz).      # Asthma: noted on problem list        Disposition Plan      Expected Discharge Date: 11/03/2023      Destination: " nursing home              Juanita Mayers MD  Hospitalist Service  [facility]  Securely message with [employer] (more info)  Text page via [employer] Paging/Directory   ______________________________________________________________________    Interval History   Reviewed events overnight  She had n/v  She continues to have significant delirium, hallucinations  Back on Bipap later    With me she was waking up  Denied pain at that moment  Somewhat slow to answer  Did not seem to be hallucinating at that time    Physical Exam   Vital Signs: Temp: 98.8  F (37.1  C) Temp src: Oral BP: (!) 156/77 Pulse: 93   Resp: 28 SpO2: 93 % O2 Device: Nasal cannula Oxygen Delivery: 2 LPM  Weight: 298 lbs 15.1 oz    Constitutional: awake, fatigued, alert, cooperative, and no apparent distress  Respiratory: was on bipap this am, tachypneic, moderate air exchange, no retractions, and diminished breath sounds right base  Cardiovascular: Normal apical impulse, regular rate and rhythm, normal S1 and S2, no S3 or S4, and no murmur noted  GI: hypoactive bowel sounds, soft, non-distended, and tenderness noted general abd,drain high RUQ  Skin: no bruising or bleeding  Musculoskeletal: no lower extremity pitting edema present  Neurologic: Mental Status Exam:  Level of Alertness:   lethargic  Motor Exam:  moves ext, weak all over  Neuropsychiatric: General: calm    Medical Decision Making       55 MINUTES SPENT BY ME on the date of service doing chart review, history, exam, documentation & further activities per the note.      Data     I have personally reviewed the following data over the past 24 hrs:    14.3 (H)  \   9.1 (L)   / 390     139; 139 104 31.3 (H) /  132 (H)   3.8 24 1.65 (H) \       Imaging results reviewed over the past 24 hrs:   Recent Results (from the past 24 hour(s))   CT Abdomen w Contrast    Narrative    EXAM: CT ABDOMEN W CONTRAST  LOCATION: [facility]  DATE:  10/30/2023    INDICATION: Follow-up drain placement. Determine if patient may need TPA through drain.  COMPARISON: 10/25/2023. Others.  TECHNIQUE: CT scan of the abdomen was performed following injection of IV contrast. Multiplanar reformats were obtained. Dose reduction techniques were used.  CONTRAST: 75 mL Isovue 370    FINDINGS:    LOWER CHEST: Incompletely seen small to moderate right pleural effusion and complete right lower lobe atelectasis.    HEPATOBILIARY: Exchange of prior perihepatic drain was performed on 10/25/2023. Current drain tip coils adjacent to the hepatic dome. The perihepatic fluid collection has minimally improved. No focal hepatic abnormality.    PANCREAS: Normal.    SPLEEN: Normal.    ADRENAL GLANDS: Normal.    KIDNEYS: Normal.    BOWEL: Surgical changes stomach. No evidence for bowel obstruction.    LYMPH NODES: No adenopathy.    VASCULATURE: Nonaneurysmal aorta.    MUSCULOSKELETAL: Nothing acute.      Impression    IMPRESSION:     1.  Perihepatic drain in place with tip adjacent to the hepatic dome. Minimal improvement of the perihepatic collection since 10/25/2023. Small perihepatic collection remains.    2.  Small to moderate sized right pleural effusion and associated collapse of the right lower lobe.     XR Chest Port 1 View    Narrative    EXAM: XR CHEST PORT 1 VIEW  LOCATION: [facility]  DATE: 10/31/2023    INDICATION: 53 y o female s p bariatric surgery this admit, with complications, has drain, ongoing effusion right, last night n v check for infiltrate aspiration  COMPARISON: CT 10/22/2023.      Impression    IMPRESSION: Moderate right pleural effusion with associated compressive atelectasis in the right lower lobe. Left basilar atelectasis or consolidation. Right upper extremity PICC with tip near the cavoatrial junction. Obscured right heart border from the   effusion. Cardiomediastinal silhouette otherwise within normal limits. Tubing projecting over  the right upper quadrant abdomen. No significant interval change.

## 2023-10-31 NOTE — PROGRESS NOTES
General Surgery Progress Note    POST OP:  S/P Lap Sleeve with Single Anastomosis DS 10/9/23 and then a Re-Operation on 10/12/2023 where it was found that she had 2 small enterotomies in the small bowel, likely form the traction of making the Duodenal anastomosis.      She got out of the ICU over the weekend      Subjective:   She vomited up her meds twice last night.  She got a CT that did not look much different, R lower lobe is still consolidated and she has some R pleural effusion.  She has been resistant to actively participating in her recovery.  She is weak and not walking on her own yet.      Vitals:    10/31/23 0743 10/31/23 0836 10/31/23 0930 10/31/23 1021   BP: (!) 187/81 (!) 168/85     BP Location: Left arm      Pulse: 90 96     Resp: 22      Temp: 97.6  F (36.4  C)      TempSrc: Axillary      SpO2: 93%  93%    Weight:    135.6 kg (298 lb 15.1 oz)   Height:           Physical Exam:  Lungs:  CTA  CV:       RRR  Ab:       Soft, + BS, Wounds looks good, Drain with serous fluid.    Recent Labs   Lab 10/31/23  0617   WBC 14.3*   HGB 9.1*   HCT 29.4*                        Component  Ref Range & Units  6:17 AM  (10/31/23)  6:17 AM  (10/31/23) 1 d ago  (10/30/23) 1 d ago  (10/30/23) 1 d ago  (10/30/23) 2 d ago  (10/29/23) 2 d ago  (10/29/23) 2 d ago  (10/29/23)     Sodium  135 - 145 mmol/L 139 139      High   High  CM   Comment: Reference intervals for this test were updated on 09/26/2023 to more accurately reflect our healthy population. There may be differences in the flagging of prior results with similar values performed with this method. Interpretation of those prior results can be made in the context of the updated reference intervals.    Potassium  3.4 - 5.3 mmol/L 3.8    3.7  4.0     Chloride  98 - 107 mmol/L 104    106  111 High      Carbon Dioxide (CO2)  22 - 29 mmol/L 24    25  24     Anion Gap  7 - 15 mmol/L 11    11  13     Urea Nitrogen  6.0 - 20.0 mg/dL  31.3 High     39.7 High   59.3 High      Creatinine  0.51 - 0.95 mg/dL 1.65 High     1.62 High   1.86 High      GFR Estimate  >60 mL/min/1.73m2 37 Low     38 Low   32 Low      Calcium  8.6 - 10.0 mg/dL 9.1    9.1  9.3     Glucose  70 - 99 mg/dL 112 High     110 High   129 High               Assessment:  Progress is slow and her delirium is problematic.    Without taking her oral meds she is now Hypertensive as a result of missing her meds.      WBC is up at 14.      Plan: Decrease the IV fluids.           Rehab, work on walking and sitting up in a chair.           Need to do IS    Hoang Worthy MD  [facility]  [phone]

## 2023-10-31 NOTE — PROVIDER NOTIFICATION
Provider Notification:    Reason for Communication:  2045: Patient was seated upright for medications. Tolerated a sip of Ensure. Attempted to give her one pill with another sip of Ensure. She vomited about one minute later. IV Zofran given. She denies nausea. Had a sip of water since but now will not take her blood pressure meds (hydralazine 100mg, metoprolol 100mg). Is hypertensive so planning to just give the IV hydralazine and hold the BP meds if this is ok? She is pretty drowsy and needs to go back on her bipap soon.     2230: We boosted her up in bed and she agreed to try some of her BP meds. Went slowly and in small amount but she vomited it up right away again. Paging Felecia Frey since she is torie. They placed a drain and did a chest CT this evening. Can you please view these abnormal results?     Team Member Name & Role:  Dr Woodard ([facility] [place])    Method of Communication:  [employer] messaging    Response:  Dr Woodard acknowledged the CT results - No new orders at this time. Further orders given from general surgery, Felecia Frey. See other provider notification note.

## 2023-10-31 NOTE — PROGRESS NOTES
Patient in process of being transferred to room 217. RT moved BIPAP from 228 to 217. RT will follow as needed.    Billy Cassidy, RT

## 2023-10-31 NOTE — PROVIDER NOTIFICATION
Provider Notification:    Reason for Communication:  MD called for update on patient condition.  Updated surgeon - no significant change since last filed note. Patient denies nausea and refuses oral intake or meds. No further emesis.   Bipap remains on hold for now. Advised to encourage bipap as able if absence of nausea/vomiting.     Team Member Name & Role:  Dr Dacosta

## 2023-10-31 NOTE — PROGRESS NOTES
Per MD, RN called for PRN neb due to patient shortness of breath. PRN albuterol neb given with no adverse reactions. RN in room. RT will follow as needed.    Billy Cassidy, RT

## 2023-10-31 NOTE — PROGRESS NOTES
Respiratory Care    Updated that pt having emesis. Will hold on BiPAP for now. CT this evening noted for small to moderate R pleural effusion and complete RLL atelectasis. Tolerating 2L NC. Will reassess as needed.      Guillermo Keyes, RT     Speaking Coherently

## 2023-10-31 NOTE — PROGRESS NOTES
"[facility] Progress Note      ASSESSMENT:  Post gastric surgery  C/b HCAP,sepsis due to peritonitis post washout   Staph epi grew from BAL - today is day 10 vancomycin in case this was pathogen.   Pleural fluid culture negative from 10/24 -- 400cc removed  Had rash but TSS or TEN or DRESS not present  Intra-abdominal infection -- perihepatic drain 10/19 -- lactobacillus; 10/24 pelvic fluid aspirate -- lactobacillus and candida   Previous strep anginosus from washout 10/12   Repeat CT 10/30 -- perihepatic fluid decreased. Drain may be clogged, to get tPA per IR.        RECOMMENDATION:  Micafungin for yeast  Zosyn for strep and lactobacillus  I do not see reason to keep vancomycin beyond today, will stop this.  Potentially oral regimen augmentin and fluconazole at some point -- duration depends on resolution of fluid collections on imaging    Jose Antonio Bond MD  [facility]  [phone] clinic  [employer] paging  ______________________________________________________________________     SUBJECTIVE:  nauseated, vomited. To get feeding tube. Feels like she fills up. Denies pain. Temps ok. Per nurse she seems more alert today.           REVIEW OF SYSTEMS:  Negative unless as listed above.  Social history, Family history, Medications: reviewed.    OBJECTIVE:  BP (!) 156/77 (BP Location: Left arm, Patient Position: Semi-Ballard's)   Pulse 93   Temp 98.8  F (37.1  C) (Oral)   Resp 28   Ht 1.6 m (5' 3\")   Wt 135.6 kg (298 lb 15.1 oz)   LMP 09/09/2023 (Exact Date)   SpO2 93%   BMI 52.96 kg/m                PHYSICAL EXAM:  Alert, no distress  Sclera normal color, not injected  CARDIOVASCULAR regular rate and rhythm, no murmur  Lungs CLEAR TO AUSCULTATION   Abdomen soft, NT, incision midline looks good, steri strips in place; RUQ drain with clear fluid.   Skin normal  Joints normal  Neurologic exam non focal  Rash R torso is improved        Antibiotics:  See MAR,multiple " "  Pertinent labs:  Lab Results   Component Value Date    WBC 14.5 10/23/2023    WBC 5.5 11/17/2020     Lab Results   Component Value Date    RBC 3.16 10/23/2023    RBC 4.13 11/17/2020     Lab Results   Component Value Date    HGB 9.2 10/23/2023    HGB 12.3 11/17/2020     Lab Results   Component Value Date    HCT 30.9 10/23/2023    HCT 37.4 11/17/2020     No components found for: \"MCT\"  Lab Results   Component Value Date    MCV 98 10/23/2023    MCV 91 11/17/2020     Lab Results   Component Value Date    MCH 29.1 10/23/2023    MCH 29.8 11/17/2020     Lab Results   Component Value Date    MCHC 29.8 10/23/2023    MCHC 32.9 11/17/2020     Lab Results   Component Value Date    RDW 14.9 10/23/2023    RDW 12.7 11/17/2020     Lab Results   Component Value Date     10/23/2023     11/17/2020       Last Comprehensive Metabolic Panel:  Sodium   Date Value Ref Range Status   10/31/2023 139 135 - 145 mmol/L Final     Comment:     Reference intervals for this test were updated on 09/26/2023 to more accurately reflect our healthy population. There may be differences in the flagging of prior results with similar values performed with this method. Interpretation of those prior results can be made in the context of the updated reference intervals.    10/31/2023 139 135 - 145 mmol/L Final     Comment:     Reference intervals for this test were updated on 09/26/2023 to more accurately reflect our healthy population. There may be differences in the flagging of prior results with similar values performed with this method. Interpretation of those prior results can be made in the context of the updated reference intervals.  Reference intervals for this test were updated on 09/26/2023 to more accurately reflect our healthy population. There may be differences in the flagging of prior results with similar values performed with this method. Interpretation of those prior results can be made in the context of the updated reference " intervals.    11/17/2020 141 133 - 144 mmol/L Final     Potassium   Date Value Ref Range Status   10/31/2023 3.8 3.4 - 5.3 mmol/L Final   05/17/2022 4.5 3.4 - 5.3 mmol/L Final   11/17/2020 4.1 3.4 - 5.3 mmol/L Final     Chloride   Date Value Ref Range Status   10/31/2023 104 98 - 107 mmol/L Final   05/17/2022 102 94 - 109 mmol/L Final   11/17/2020 109 94 - 109 mmol/L Final     Carbon Dioxide   Date Value Ref Range Status   11/17/2020 31 20 - 32 mmol/L Final     Carbon Dioxide (CO2)   Date Value Ref Range Status   10/31/2023 24 22 - 29 mmol/L Final   05/17/2022 32 20 - 32 mmol/L Final     Anion Gap   Date Value Ref Range Status   10/31/2023 11 7 - 15 mmol/L Final   05/17/2022 2 (L) 3 - 14 mmol/L Final   11/17/2020 1 (L) 3 - 14 mmol/L Final     Glucose   Date Value Ref Range Status   05/17/2022 110 (H) 70 - 99 mg/dL Final   11/17/2020 84 70 - 99 mg/dL Final     Comment:     Fasting specimen     GLUCOSE BY METER POCT   Date Value Ref Range Status   10/31/2023 132 (H) 70 - 99 mg/dL Final     Urea Nitrogen   Date Value Ref Range Status   10/31/2023 31.3 (H) 6.0 - 20.0 mg/dL Final   05/17/2022 16 7 - 30 mg/dL Final   11/17/2020 9 7 - 30 mg/dL Final     Creatinine   Date Value Ref Range Status   10/31/2023 1.65 (H) 0.51 - 0.95 mg/dL Final   11/17/2020 0.79 0.52 - 1.04 mg/dL Final     GFR Estimate   Date Value Ref Range Status   10/31/2023 37 (L) >60 mL/min/1.73m2 Final   11/17/2020 88 >60 mL/min/[1.73_m2] Final     Comment:     Non  GFR Calc  Starting 12/18/2018, serum creatinine based estimated GFR (eGFR) will be   calculated using the Chronic Kidney Disease Epidemiology Collaboration   (CKD-EPI) equation.       Calcium   Date Value Ref Range Status   10/31/2023 9.1 8.6 - 10.0 mg/dL Final   11/17/2020 8.9 8.5 - 10.1 mg/dL Final       Liver Function Studies -   Recent Labs   Lab Test 10/23/23  0530   PROTTOTAL 7.1   ALBUMIN 3.2*   BILITOTAL 0.3   ALKPHOS 96   AST 19   ALT 26       No results found for:  "\"SED\"    No results found for: \"CRP\"      NOTE BD glucan test was 406 which is +      MICROBIOLOGY DATA:  Lung fluid 76 wbc, no org  Pelvic fluid lactobaccilus, candida dublinensis      IMAGING/RADIOLOGY:        "

## 2023-10-31 NOTE — PROGRESS NOTES
"  Interventional Radiology - Progress Note  Inpatient - [facility]  10/31/2023     S:  Patient with delirium and hallucinations overnight per chart review. Sitting up in bed. Denies pain. Presently is on BiPAP. Has been having nausea. CT completed yesterday did not demonstrate much improvement in collection around drain. Worsening leukocytosis 14.3 <12.3.     Noted that surgery requesting NG placement (tammy gonzalez).      O:  BP (!) 187/81 (BP Location: Left arm)   Pulse 90   Temp 97.6  F (36.4  C) (Axillary)   Resp 22   Ht 1.6 m (5' 3\")   Wt 134 kg (295 lb 6.7 oz)   LMP 2023 (Exact Date)   SpO2 93%   BMI 52.33 kg/m    General:  Stable.  In no acute distress.    Neuro:  Alert.  Psychological: flat affect.  Resp:  Normal respirations on BiPAP. Non labored breathing. Equal air entry B/L   Abdomen:  Soft, non-distended, non-tender, perihepatic drain to MICKIE bulb with essentially no OP in bulb or tubing.    IMAGING:  CT abd w 10/30/23:  FINDINGS:    LOWER CHEST: Incompletely seen small to moderate right pleural effusion and complete right lower lobe atelectasis.     HEPATOBILIARY: Exchange of prior perihepatic drain was performed on 10/25/2023. Current drain tip coils adjacent to the hepatic dome. The perihepatic fluid collection has minimally improved. No focal hepatic abnormality.     PANCREAS: Normal.     SPLEEN: Normal.     ADRENAL GLANDS: Normal.     KIDNEYS: Normal.     BOWEL: Surgical changes stomach. No evidence for bowel obstruction.     LYMPH NODES: No adenopathy.     VASCULATURE: Nonaneurysmal aorta.     MUSCULOSKELETAL: Nothing acute.                                                                      IMPRESSION:      1.  Perihepatic drain in place with tip adjacent to the hepatic dome. Minimal improvement of the perihepatic collection since 10/25/2023. Small perihepatic collection remains.     2.  Small to moderate sized right pleural effusion and associated collapse of " the right lower lobe.    LABS:  Recent Labs   Lab 10/31/23  0617 10/30/23  0612 10/29/23  0545 10/28/23  0519   WBC 14.3* 12.3* 12.0* 12.8*   HGB 9.1* 8.7* 8.8* 8.2*    393 414 464*   CR 1.65* 1.62* 1.86* 2.01*     Drain Outputs (in mL):  10/27 19   10/28 0   10/29 5   10/30 0   10/31 15       A:  53 year old yo female with PMH of LINA, HLD, methanol abuse, asthma, and JARVIS who was admitted to [facility] on 10/09/2023 for a planned sleeve gastrectomy with single anastomosis duodenal switch. Hospital course c/b encephalopathy, SUSSY, intra-abdominal abscesses, and delirium. Back to OR for closure of two small bowel enterotomies, surgical drain placement (removed 10/25/23) and wash out on 10/12/23. F/U CT demonstrated RUQ fluid collection - CT guided 10F drain placement 10/19/23 and CT guided aspiration of pelvic fluid collection 10/24/23, no drain placed.     CT 10/30/23 without much improvement in perihepatic collection.    P:    - CT without much improvement in fluid collection. Recommend instillation of tPA into drain to optimize drain OP. X1 order for today. Will evaluate tomorrow for possible repeat or routine tPA instillations would be of benefit.  - Continue to follow WBC, drain OPs and patient's clinical signs/symptoms for improvement.   - Continue present drain cares. Continue drain to MICKIE drainage. Flush as ordered; always flush in toward patient's body to keep internal portion of drain patent. Subtract irrigant from output and record output in I&O every shift/PRN.   - Antibiotics per ID.  - Will continue to follow patient's clinical status, imaging, drain(s) function and drain(s) OPs to determine drain follow up plan. Anticipating follow-up CT/abscessogram once drain outputs are <20mL/day for a consecutive 2 days, and/or pending patient's clinical status and drain function. It is is typically common for patients to discharge with drain in place.   - Case and imaging reviewed with Dr. Valladares.  - Discussed  drain and plans for tPA with floor RN.  - IR will continue to follow loosely. Please contact IR with drain related questions or concerns.          Total time spent on the date of the encounter: 35 minutes.    Mary Echeverria PA-C  Interventional Radiology  [phone]

## 2023-10-31 NOTE — PROGRESS NOTES
Chart check only.  Blood pressure remains poorly controlled despite high-dose metoprolol, hydralazine, nitroglycerin patch and low-dose amlodipine.  Will increase amlodipine to twice daily dosing.  Cardiac status otherwise remains stable.  Nothing further to add.  We will sign off at this time as nephrology can adjust medication doses if they feel this is needed.

## 2023-10-31 NOTE — PROGRESS NOTES
PULMONARY PROGRESS NOTE    Date / Time of Admission:  10/9/2023  5:26 AM  Assessment:   53yoF with JARVIS, morbid obesity s/p sleeve gastrectomy 10/9/23 complicated by perforation and peritonitis s/p washout with subsequent abscess pockets requiring drain placement with recurrent, persistent right-sided pleural effusion considered parapneumonic previously but at high risk of development of trapped lung or empyema given proximity to hepatic fluid collections.     Principal Problem:    Morbid (severe) obesity due to excess calories (H)  Active Problems:    LINA (generalized anxiety disorder)    Recurrent major depressive disorder, remission status unspecified (H24)    Morbid obesity (H)    Obstructive sleep apnea    Intra-abdominal abscess (H)    Hypernatremia    SUSSY (acute kidney injury) (H24)    Accelerated hypertension    Metabolic encephalopathy    Acute respiratory failure with hypoxia (H)    Cardiomyopathy (H)      Plan:   CT guided drain placement. Given body habitus will ask for radiology help.  Send fluid for culture, pH, glucose, LDH and protein.  Hoping we can avoid trapped lung and drain effusion fairly dry. I discussed volume status with Dr. David, renal. Will discontinue 1/2NS and start bumex 0.5mg every 8 hours with goal to keep her Is/Os even.        Subjective:   HPI:  Mojgan Jimenez is a 53 year old female with morbid obesity, JARVIS s/p laparoscopic sleeve gastrectomy 10/9 complicated by small bowel injury and peritonitis causing septic shock. She returned to the OR 10/12 for washout and drain placement She had persistent symptoms so was rescanned with a new RUQ fluid collection 10/19 and 10/24. She also was found to have a pleural effusion and underwent thoracentesis with 400cc removed. This was consistent with parapneumonic effusion, no empyema. She was extubated 10/26 and remains extubated.         Allergies: No Known Allergies     MEDS:  Scheduled Meds:   amLODIPine  5 mg Oral BID    [Held by provider]  "bumetanide  2 mg Intravenous Q24H    heparin ANTICOAGULANT  5,000 Units Subcutaneous Q8H    hydrALAZINE  100 mg Oral or Feeding Tube TID    [Held by provider] hyoscyamine  125 mcg Sublingual Q4H    insulin aspart  1-4 Units Subcutaneous 4x Daily AC & HS    [Held by provider] metoprolol succinate ER  100 mg Oral BID    metoprolol tartrate  100 mg Oral BID    micafungin  100 mg Intravenous Q24H    nitroGLYcerin  1 patch Transdermal Daily    pantoprazole  40 mg Per Feeding Tube QAM AC    Or    pantoprazole  40 mg Intravenous QAM AC    piperacillin-tazobactam  3.375 g Intravenous Q8H    sodium chloride (PF)  3 mL Intracatheter Q8H    venlafaxine  75 mg Oral TID w/meals     Continuous Infusions:   NaCl 50 mL/hr at 10/31/23 1224     PRN Meds:.acetaminophen **OR** acetaminophen, albuterol, albuterol, glucose **OR** dextrose **OR** glucagon, hydrALAZINE, HYDROmorphone, ipratropium - albuterol 0.5 mg/2.5 mg/3 mL, lidocaine 4%, lidocaine (buffered or not buffered), menthol-zinc oxide, miconazole, naloxone **OR** naloxone **OR** naloxone **OR** naloxone, OLANZapine, ondansetron **OR** ondansetron, phenol, prochlorperazine **OR** prochlorperazine, sodium chloride (PF), traMADol    Objective:   VITALS:  BP (!) 156/77 (BP Location: Left arm, Patient Position: Semi-Ballard's)   Pulse 93   Temp 98.8  F (37.1  C) (Oral)   Resp 28   Ht 1.6 m (5' 3\")   Wt 135.6 kg (298 lb 15.1 oz)   LMP 09/09/2023 (Exact Date)   SpO2 93%   BMI 52.96 kg/m    EXAM:    GENERAL APPEARANCE: Alert, no acute distress  HENT: Oral mucosa and posterior oropharynx normal, moist mucous membranes  NECK: No adenopathy,masses or thyromegaly  RESP: decreased breath sounds on right, clear on left.   CV: regular rate and rhythm, no murmur, rub or gallop  ABDOMEN: normal bowel sounds, soft, nontender, no hepatosplenomegaly or other masses  LYMPHATICS: No cervical, or supraclavicular adenopathy  SKIN: no suspicious lesions or rashes  NEURO: awake, responsive but " confused.         I&O:    Date 10/31/23 0700 - 11/01/23 0659   Shift 0199-4901 6541-0789 0257-9210 24 Hour Total   INTAKE   P.O. 110   110   I.V. 1515.83   1515.83   Shift Total(mL/kg) 1625.83(11.99)   1625.83(11.99)   OUTPUT   Urine 600   600   Shift Total(mL/kg) 600(4.42)   600(4.42)   Weight (kg) 135.6 135.6 135.6 135.6       Data Review:    Imaging: personally reviewed  Abdominal CT chest  EXAM: CT ABDOMEN W CONTRAST  LOCATION: [facility]  DATE: 10/30/2023     INDICATION: Follow-up drain placement. Determine if patient may need TPA through drain.  COMPARISON: 10/25/2023. Others.  TECHNIQUE: CT scan of the abdomen was performed following injection of IV contrast. Multiplanar reformats were obtained. Dose reduction techniques were used.  CONTRAST: 75 mL Isovue 370     FINDINGS:    LOWER CHEST: Incompletely seen small to moderate right pleural effusion and complete right lower lobe atelectasis.     HEPATOBILIARY: Exchange of prior perihepatic drain was performed on 10/25/2023. Current drain tip coils adjacent to the hepatic dome. The perihepatic fluid collection has minimally improved. No focal hepatic abnormality.     PANCREAS: Normal.     SPLEEN: Normal.     ADRENAL GLANDS: Normal.     KIDNEYS: Normal.     BOWEL: Surgical changes stomach. No evidence for bowel obstruction.     LYMPH NODES: No adenopathy.     VASCULATURE: Nonaneurysmal aorta.     MUSCULOSKELETAL: Nothing acute.                                                                      IMPRESSION:      1.  Perihepatic drain in place with tip adjacent to the hepatic dome. Minimal improvement of the perihepatic collection since 10/25/2023. Small perihepatic collection remains.     2.  Small to moderate sized right pleural effusion and associated collapse of the right lower lobe.  Results for orders placed or performed during the hospital encounter of 10/09/23   Basic metabolic panel   Result Value Ref Range    Sodium 139 135 - 145  mmol/L    Potassium 3.8 3.4 - 5.3 mmol/L    Chloride 104 98 - 107 mmol/L    Carbon Dioxide (CO2) 24 22 - 29 mmol/L    Anion Gap 11 7 - 15 mmol/L    Urea Nitrogen 31.3 (H) 6.0 - 20.0 mg/dL    Creatinine 1.65 (H) 0.51 - 0.95 mg/dL    GFR Estimate 37 (L) >60 mL/min/1.73m2    Calcium 9.1 8.6 - 10.0 mg/dL    Glucose 112 (H) 70 - 99 mg/dL     Lab Results   Component Value Date    WBC 14.3 (H) 10/31/2023    HGB 9.1 (L) 10/31/2023    HCT 29.4 (L) 10/31/2023    MCV 95 10/31/2023     10/31/2023

## 2023-10-31 NOTE — PROGRESS NOTES
Renal progress note  CC:Hypernatremia , SUSSY  Assessment and Plan:  52-year-old female with history of obesity JARVIS asthma history of mood disorders admitted with scheduled laparoscopic sleeve gastrectomy on 10/9/2023 complicated by peritonitis with small bowel injury will return tomorrow 10/12/2023 for enterotomy closure and cleanout with drain placement.  Pelvic fluid aspiration needed on 10/24/2023 overall course complicated by encephalopathy delirium septic shock with peritoneal leak acute respiratory failure requiring intubation from 10/12 to 10/21/2023 and oliguric SUSSY with very poor oral intakes.  Nephrology following for SUSSY    Oliguric SUSSY  SUSSY likely secondary to hemodynamic injury for acute renal failure  With peritoneal leak, creatinine peaked at 6.97 on 10/14/2023  Likely exacerbated by NSAIDs hypotension vasodilation and intravascular volume depletion leading to hemodynamic ATN currently and slow recovery phase  Creatinine improved to 1.6  Baseline creatinine 0.7 within normal limits no prior history of SUSSY or CKD urine output maintained off diuretics  --> Follow on serial creatinines on daily labs  Strict GLENNA altered mentation and hallucinations seen today are unlikely secondary to metabolic issues its possibility with delirium with prolonged hospitalization and critical illness    Hypertension mild hypervolemia with mostly intravascular volume depletion   No indication for scheduled diuretics  Blood pressure remains on the higher side   continue on hydralazine And metoprolol    Hyponatremia improved with IV fluids currently on D5 at 100 mm/h continue until adequate p.o. intakes can be established    Anemia likely.    Currently Hb around 8.7  Transfuse per Surgical team recommendation    History of gastric bypass  Multiple complications post sleeve gastrectomy complicated by peritonitis and small bowel injury with peritoneal leak  Return to the OR 10/12/2023 with cleanout and drain placement  complicated by other fluid collections requiring aspiration and drain placement  On Vanco Zosyn and micafungin ID following  On tube feeds and TPN weaned off patient's intakes remain pretty poor remains on IV fluids currently on D5 CR  Diet management per primary team bariatric surgery    Ongoing metabolic encephalopathy with worsening delirium  Visual hallucinations management per primary hospitalist team    History of cardiomyopathy possible stress-induced Takotsubo  Management per primary team  Currently volume appears even remains on D5  On hydralazine metoprolol and amlodipine  Blood pressure is borderline controlled    Acute hypoxemic hypercapnic respiratory failure  Currently on room air  Patient does appear significantly encephalopathy and delirious today  Management per primary medicine team    Prior history of JARVIS and asthma  Management per primary team    Thank you for the consultation we will follow  Lisa David MD  [facility]  [phone]      Subjective  Patient was very confused on my visit today appears to be having visual hallucinations regarding a baby at her bedside which is either squeezed by her or is questioning her  She is also concerned about her baby being cold and wants the baby to get all the blankets no meaningful conversations    Lab data was reviewed  Sodium and creatinine on the improving trend at this time the D5 rate is correct follow serial sodiums if the sodium trends down below 140 can consider switching to half NS    Objective    Vital signs in last 24 hours  Temp:  [97.6  F (36.4  C)-98.7  F (37.1  C)] 98.7  F (37.1  C)  Pulse:  [75-82] 82  Resp:  [18-22] 18  BP: (164-193)/(79-96) 167/86  SpO2:  [90 %-96 %] 91 %  Weight:   [unfilled]    Intake/Output last 3 shifts  I/O last 3 completed shifts:  In: 2595 [P.O.:100; I.V.:2495]  Out: 1200 [Urine:1200]  Intake/Output this shift:  I/O this shift:  In: 1153 [P.O.:300; I.V.:853]  Out: 350  [Urine:350]    Physical Exam  Alert/and awake very confused with visual hallucinations NAD  CV: RRR without murmur or rub  Lung: clear and equal; no extra sounds  Ab: soft and NT; + distended; normal bs; midline incision; guarding upper abdomen  Ext: Trace edema and well perfused  Skin; no rash    Pertinent Labs   Lab Results   Component Value Date    WBC 12.3 (H) 10/30/2023    HGB 8.7 (L) 10/30/2023    HCT 28.9 (L) 10/30/2023    MCV 95 10/30/2023     10/30/2023     Lab Results   Component Value Date    BUN 39.7 (H) 10/30/2023     10/30/2023    CO2 25 10/30/2023       Lab Results   Component Value Date    ALBUMIN 3.2 (L) 10/23/2023     Lab Results   Component Value Date    PHOS 3.4 10/27/2023     I reviewed all lab results  Lisa David MD

## 2023-10-31 NOTE — PROGRESS NOTES
"CLINICAL NUTRITION SERVICES - REASSESSMENT NOTE     Nutrition Prescription    RECOMMENDATIONS FOR MDs/PROVIDERS TO ORDER:    Recommendations already ordered by Registered Dietitian (RD):  ? Re start TPN    Future/Additional Recommendations:  Monitor po intake, diet advance, labs, plan of care     EVALUATION OF THE PROGRESS TOWARD GOALS   Diet: Bariatric Clear liquids ( down graded on   Nutrition Support: TPN and TF discontinued on 10/27/2023-TPN was started on 10/14/23  Intake: pt refusing po and noted emesis  IVF: dextrose 5% @ 100 ml/hr ( 120 g CHO and 408 Calories)     NEW FINDINGS   Intermittent confusion and intermittent hallucinations.  Increasingly agitated -hitting at staff and cursing    ANTHROPOMETRICS  Height: 160 cm (5' 3\")  Most Recent Weight: 134 kg (295 lb 6.7 oz)    Weight History:   Wt Readings from Last 10 Encounters:   10/30/23 134 kg (295 lb 6.7 oz)   08/09/23 133.4 kg (294 lb 3.2 oz)   07/05/23 129.7 kg (286 lb)   03/13/23 129.2 kg (284 lb 12.8 oz)   11/10/22 129.3 kg (285 lb)   11/07/22 129.3 kg (285 lb)   08/30/22 132.6 kg (292 lb 4.8 oz)   08/25/22 131.7 kg (290 lb 6.4 oz)   06/15/22 132 kg (291 lb)   05/17/22 131.8 kg (290 lb 9.6 oz)     GI CONCERNS  Hypoactive BS  Passing flatus  Last BM 10/29/2023  Emesis yesterday    LABS  Reviewed: Na 139, K 3.8, urea nitrogen 31.3 (H), cr 1.65 (H), Ca 9.1, Mg 1.8, Glu 112    Estimated nutrition needs:   Dosing weight is 52.3 Kg ( IBW)    Estimated energy needs: 1300 -1569 James/day ( 25-30 James/Kg)  Justification post op, obese  Estimated protein needs 63-78 g/day ( 1.2-1.5 g/Kg)  Justification: obesity guidelines, post op, elevated Cr  Estimated Fluid needs: 4675-5920+ ml/day ( 1+ ml/James)  Justification: maintenance    MEDICATIONS  Reviewed: Norvasc, hydralazine, novolog, lopressor, mycamine, pantoprazole, zosyn, vancocin    CURRENT NUTRITION DIAGNOSIS  Inadequate oral intake related to altered bowel function as evidenced by clear liquid diet  "     INTERVENTIONS  Implementation  ? Re start TPN ( if re started would recommend D 125 AA 63 @ 50 ml/hr to provide: 1200 ml, 677 Calories, 125 g CHO, 63 g protein)   Follow for diet advance    Goals  Diet advancement vs nutrition support within 2-3 days.    Monitoring/Evaluation  Progress toward goals will be monitored and evaluated per protocol.

## 2023-10-31 NOTE — PROGRESS NOTES
Care Management Follow Up    Length of Stay (days): 22    Expected Discharge Date: 11/03/2023     Concerns to be Addressed: discharge planning     Patient plan of care discussed at interdisciplinary rounds: Yes    Anticipated Discharge Disposition: Transitional Care     Anticipated Discharge Services: None  Anticipated Discharge DME: per treatment team     Patient/family educated on Medicare website which has current facility and service quality ratings: yes  Education Provided on the Discharge Plan: Yes  Patient/Family in Agreement with the Plan: yes    Referrals Placed by CM/SW: Post Acute Facilities  Private pay costs discussed: Not applicable    Additional Information:  11:44 AM  SW called Pt's daughter Karla [phone] and left a voicemail requesting a phone call back to discuss discharge planning. Pt and Karla were given a bariatric TCU list to review for facility preferences. Pt and Karla planned to have selected choices for CM to send referrals to today.     CM to attempt to follow up with Pt's daughter.    HILDA Galdamez

## 2023-10-31 NOTE — CONSULTS
CLINICAL NUTRITION SERVICES - REASSESSMENT NOTE     Nutrition Prescription    RECOMMENDATIONS FOR MDs/PROVIDERS TO ORDER:    Recommendations already ordered by Registered Dietitian (RD):  Recommend re starting promote with fiber at 15 ml/hr x 8 hrs and if tolerated increase by 15 ml   q 8 hrs to a goal rate of 55 ml/hr  Recommend H2O flushes of 60 ml three times per day to start ( will monitor fluid status)  TF at goal rate plus H2O flushes will provide 1320 ml/Calories, 82 g protein, 182 g CHO,   18.5 g fiber and 1097 ml free fluid (TF) plus H2O flush=1277ml fluid/day    Future/Additional Recommendations:  Monitor TF tolerance, labs, weight, plan of care     EVALUATION OF THE PROGRESS TOWARD GOALS   Diet: Bariatric clear liquid  Nutrition Support: To start TF today ( tip of TF in small bowel)     INTERVENTIONS  Implementation  Initiate TF of promote with fiber @ 15 ml/hr x 8 hrs and advance by 15 ml q 8 hrs to a goal rate of 50 ml/hr.  H2O flushes of 60 ml tid to start ( monitor fluid status/IVF)    Goals  Tolerate TF  Meet nutritional needs    Monitoring/Evaluation  Progress toward goals will be monitored and evaluated per protocol.

## 2023-10-31 NOTE — PLAN OF CARE
Goal Outcome Evaluation:      Plan of Care Reviewed With: patient    -Denies pain.   -No nausea or emesis since 2230 last evening when attempting to give HS meds.   -Continues to have visual and auditory hallucinations: seeing people in room who are not present; hearing things. Remains intermittently confused; level of orientation fluctuates.   Anxious at times. Needs a lot of reassurance.    Increasingly agitated/confused at 0200. Repeatedly wanted to get up to urinate. Too weak to sit up or turn in bed on her own. Hitting at staff, cursing. Purewick had malfunctioned and she was incontinent of bowel and bladder. Hygiene cares provided in bed. Less agitated after.    -Bowel sounds hypoactive. Had 2 large incontinent bowel movements overnight.   -Refuses oral intake. Refuses mouth cares and having her teeth brushed. Full CHG bath given last evening.   -Placed on BIPAP at 0230, 30% FiO2. Tolerating well. Resting/sleeping between 8628-8176 with BIPAP on. Patient removed BIPAP at 0545. BIPAP reapplied at 0700 when patient started napping.     -PRN IV hydralazine given once. Blood pressures remain elevated. Asymptomatic.  -Note 3lb weight gain. Increase in peripheral edema. Note left for rounding MD.

## 2023-10-31 NOTE — PROVIDER NOTIFICATION
Notified NP at 8:15 PM regarding order clarification.      Spoke with: Felecia Henderson    Orders were obtained.    Comments: Provider notified to clarify if pt needs to sit up at the side of the bed for meds and intake. Per Felecia Henderson, pt needs to be sitting up all the way, either in bed or while sitting at the side of the bed.    Rolando Kahn RN

## 2023-10-31 NOTE — PLAN OF CARE
Shift from 0700 to 1930-      Problem: Bariatric Surgery  Goal: Nausea and Vomiting Relief  Outcome: Progressing  Intervention: Prevent or Manage Nausea and Vomiting  Recent Flowsheet Documentation  Taken 10/31/2023 0813 by Echo Anguiano RN  Nausea/Vomiting Interventions:   antiemetic   nausea triggers minimized     Problem: Bariatric Surgery  Goal: Effective Oxygenation and Ventilation  Outcome: Progressing  Intervention: Optimize Oxygenation and Ventilation  Recent Flowsheet Documentation  Taken 10/31/2023 0813 by Echo Anguiano RN  Head of Bed (HOB) Positioning: HOB at 20-30 degrees     Problem: Risk for Delirium  Goal: Improved Behavioral Control  Intervention: Prevent and Manage Agitation  Recent Flowsheet Documentation  Taken 10/31/2023 0813 by Echo Anguiano RN  Environment Familiarity/Consistency: daily routine followed     Problem: Comorbidity Management  Goal: Blood Pressure in Desired Range  Outcome: Progressing  Intervention: Maintain Blood Pressure Management  Recent Flowsheet Documentation  Taken 10/31/2023 0813 by Echo Anguiano RN  Medication Review/Management: medications reviewed     Problem: Activity Intolerance  Goal: Enhanced Capacity and Energy  Outcome: Progressing  Intervention: Optimize Activity Tolerance  Recent Flowsheet Documentation  Taken 10/31/2023 0813 by Echo Anguiano RN  Activity Management:   activity adjusted per tolerance   activity encouraged   patient refuses activity       Goal Outcome Evaluation:    Patient only confused to situation today.   Refused chair. Moved to a lift room.    Refused to have breakfast and lunch. Nauseated. Given zofran. Then given compazine and nausea resolved. Pt not nauseated for dinner but minimal appetite.    Passing flatus and large BM on NOC shift.     Continued on IV abx. Wt increased. MD updated. IVF stopped and given bumex. Generalized edema.    Hypertension. Given hydralazine in am and pm. Norvasc increased frequency per MD. See VS.      PCXR done. Pt having CT placed tomorrow. Pulmonary MD consulted today.    Alteplase given in MICKIE per IR order. MICKIE to be flushed with NS every 8hrs.     MICKIE drainage is now creamy, thick and serosanguinous after TPA.    Feeding tube placed by xray and confirmed. TF started at 0600 at 15ml/hr; will increase by 15ml per hr for goal rate of 55ml/hr.    NPO after midnight for chest tube tomorrow.    Sodium every 12hrs. This morning was 139 and at 1800 it was 140.

## 2023-11-01 ENCOUNTER — APPOINTMENT (OUTPATIENT)
Dept: CT IMAGING | Facility: HOSPITAL | Age: 53
End: 2023-11-01
Attending: RADIOLOGY
Payer: COMMERCIAL

## 2023-11-01 ENCOUNTER — APPOINTMENT (OUTPATIENT)
Dept: OCCUPATIONAL THERAPY | Facility: HOSPITAL | Age: 53
End: 2023-11-01
Attending: SURGERY
Payer: COMMERCIAL

## 2023-11-01 LAB
% LINING CELLS, BODY FLUID: 1 %
ALBUMIN BODY FLUID SOURCE: NORMAL
ALBUMIN FLD-MCNC: 1.9 G/DL
ANION GAP SERPL CALCULATED.3IONS-SCNC: 14 MMOL/L (ref 7–15)
APPEARANCE FLD: ABNORMAL
BUN SERPL-MCNC: 27.9 MG/DL (ref 6–20)
CALCIUM SERPL-MCNC: 8.7 MG/DL (ref 8.6–10)
CELL COUNT BODY FLUID SOURCE: ABNORMAL
CHLORIDE SERPL-SCNC: 106 MMOL/L (ref 98–107)
COLOR FLD: ABNORMAL
CREAT SERPL-MCNC: 1.82 MG/DL (ref 0.51–0.95)
DEPRECATED HCO3 PLAS-SCNC: 22 MMOL/L (ref 22–29)
EGFRCR SERPLBLD CKD-EPI 2021: 33 ML/MIN/1.73M2
ERYTHROCYTE [DISTWIDTH] IN BLOOD BY AUTOMATED COUNT: 14 % (ref 10–15)
GLUCOSE BLDC GLUCOMTR-MCNC: 111 MG/DL (ref 70–99)
GLUCOSE BLDC GLUCOMTR-MCNC: 117 MG/DL (ref 70–99)
GLUCOSE BLDC GLUCOMTR-MCNC: 117 MG/DL (ref 70–99)
GLUCOSE BODY FLUID SOURCE: NORMAL
GLUCOSE FLD-MCNC: 100 MG/DL
GLUCOSE SERPL-MCNC: 104 MG/DL (ref 70–99)
HCT VFR BLD AUTO: 28.6 % (ref 35–47)
HGB BLD-MCNC: 8.6 G/DL (ref 11.7–15.7)
LD BODY BODY FLUID SOURCE: NORMAL
LDH FLD L TO P-CCNC: 221 U/L
LYMPHOCYTES NFR FLD MANUAL: 20 %
MAGNESIUM SERPL-MCNC: 2 MG/DL (ref 1.7–2.3)
MCH RBC QN AUTO: 28.7 PG (ref 26.5–33)
MCHC RBC AUTO-ENTMCNC: 30.1 G/DL (ref 31.5–36.5)
MCV RBC AUTO: 95 FL (ref 78–100)
MONOS+MACROS NFR FLD MANUAL: 13 %
NEUTS BAND NFR FLD MANUAL: 66 %
PH BODY FLUID SOURCE: NORMAL
PH FLD: 8 PH
PHOSPHATE SERPL-MCNC: 4.5 MG/DL (ref 2.5–4.5)
PLATELET # BLD AUTO: 363 10E3/UL (ref 150–450)
POTASSIUM SERPL-SCNC: 3.8 MMOL/L (ref 3.4–5.3)
PROT FLD-MCNC: 4 G/DL
PROTEIN BODY FLUID SOURCE: NORMAL
RBC # BLD AUTO: 3 10E6/UL (ref 3.8–5.2)
SODIUM SERPL-SCNC: 142 MMOL/L (ref 135–145)
WBC # BLD AUTO: 12.6 10E3/UL (ref 4–11)
WBC # FLD AUTO: 1825 /UL

## 2023-11-01 PROCEDURE — 120N000001 HC R&B MED SURG/OB

## 2023-11-01 PROCEDURE — 250N000013 HC RX MED GY IP 250 OP 250 PS 637: Performed by: INTERNAL MEDICINE

## 2023-11-01 PROCEDURE — 250N000013 HC RX MED GY IP 250 OP 250 PS 637: Performed by: NURSE PRACTITIONER

## 2023-11-01 PROCEDURE — C9113 INJ PANTOPRAZOLE SODIUM, VIA: HCPCS | Mod: JZ | Performed by: INTERNAL MEDICINE

## 2023-11-01 PROCEDURE — 99233 SBSQ HOSP IP/OBS HIGH 50: CPT | Performed by: INTERNAL MEDICINE

## 2023-11-01 PROCEDURE — 84295 ASSAY OF SERUM SODIUM: CPT | Performed by: STUDENT IN AN ORGANIZED HEALTH CARE EDUCATION/TRAINING PROGRAM

## 2023-11-01 PROCEDURE — 84295 ASSAY OF SERUM SODIUM: CPT | Performed by: INTERNAL MEDICINE

## 2023-11-01 PROCEDURE — 84100 ASSAY OF PHOSPHORUS: CPT | Performed by: INTERNAL MEDICINE

## 2023-11-01 PROCEDURE — 250N000011 HC RX IP 250 OP 636: Mod: JZ | Performed by: RADIOLOGY

## 2023-11-01 PROCEDURE — 0W9930Z DRAINAGE OF RIGHT PLEURAL CAVITY WITH DRAINAGE DEVICE, PERCUTANEOUS APPROACH: ICD-10-PCS | Performed by: RADIOLOGY

## 2023-11-01 PROCEDURE — 250N000011 HC RX IP 250 OP 636: Mod: JZ | Performed by: NURSE PRACTITIONER

## 2023-11-01 PROCEDURE — 85027 COMPLETE CBC AUTOMATED: CPT | Performed by: STUDENT IN AN ORGANIZED HEALTH CARE EDUCATION/TRAINING PROGRAM

## 2023-11-01 PROCEDURE — 94660 CPAP INITIATION&MGMT: CPT

## 2023-11-01 PROCEDURE — 82042 OTHER SOURCE ALBUMIN QUAN EA: CPT | Performed by: STUDENT IN AN ORGANIZED HEALTH CARE EDUCATION/TRAINING PROGRAM

## 2023-11-01 PROCEDURE — 99231 SBSQ HOSP IP/OBS SF/LOW 25: CPT | Performed by: INTERNAL MEDICINE

## 2023-11-01 PROCEDURE — 250N000011 HC RX IP 250 OP 636: Mod: JZ | Performed by: INTERNAL MEDICINE

## 2023-11-01 PROCEDURE — 83986 ASSAY PH BODY FLUID NOS: CPT | Performed by: INTERNAL MEDICINE

## 2023-11-01 PROCEDURE — 87070 CULTURE OTHR SPECIMN AEROBIC: CPT | Performed by: INTERNAL MEDICINE

## 2023-11-01 PROCEDURE — 32557 INSERT CATH PLEURA W/ IMAGE: CPT

## 2023-11-01 PROCEDURE — 97110 THERAPEUTIC EXERCISES: CPT | Mod: GO

## 2023-11-01 PROCEDURE — 83615 LACTATE (LD) (LDH) ENZYME: CPT | Performed by: INTERNAL MEDICINE

## 2023-11-01 PROCEDURE — 999N000157 HC STATISTIC RCP TIME EA 10 MIN

## 2023-11-01 PROCEDURE — 99232 SBSQ HOSP IP/OBS MODERATE 35: CPT | Performed by: PHYSICIAN ASSISTANT

## 2023-11-01 PROCEDURE — 83735 ASSAY OF MAGNESIUM: CPT | Performed by: INTERNAL MEDICINE

## 2023-11-01 PROCEDURE — 82945 GLUCOSE OTHER FLUID: CPT | Performed by: INTERNAL MEDICINE

## 2023-11-01 PROCEDURE — 258N000003 HC RX IP 258 OP 636: Performed by: NURSE PRACTITIONER

## 2023-11-01 PROCEDURE — 84157 ASSAY OF PROTEIN OTHER: CPT | Performed by: INTERNAL MEDICINE

## 2023-11-01 PROCEDURE — 89051 BODY FLUID CELL COUNT: CPT | Performed by: INTERNAL MEDICINE

## 2023-11-01 RX ORDER — BUMETANIDE 0.25 MG/ML
0.5 INJECTION INTRAMUSCULAR; INTRAVENOUS EVERY 12 HOURS
Status: DISCONTINUED | OUTPATIENT
Start: 2023-11-01 | End: 2023-11-04

## 2023-11-01 RX ORDER — NALOXONE HYDROCHLORIDE 0.4 MG/ML
0.2 INJECTION, SOLUTION INTRAMUSCULAR; INTRAVENOUS; SUBCUTANEOUS
Status: DISCONTINUED | OUTPATIENT
Start: 2023-11-01 | End: 2023-11-01 | Stop reason: HOSPADM

## 2023-11-01 RX ORDER — NALOXONE HYDROCHLORIDE 0.4 MG/ML
0.4 INJECTION, SOLUTION INTRAMUSCULAR; INTRAVENOUS; SUBCUTANEOUS
Status: DISCONTINUED | OUTPATIENT
Start: 2023-11-01 | End: 2023-11-01 | Stop reason: HOSPADM

## 2023-11-01 RX ORDER — FLUMAZENIL 0.1 MG/ML
0.2 INJECTION, SOLUTION INTRAVENOUS
Status: DISCONTINUED | OUTPATIENT
Start: 2023-11-01 | End: 2023-11-01 | Stop reason: HOSPADM

## 2023-11-01 RX ORDER — FENTANYL CITRATE 50 UG/ML
25-50 INJECTION, SOLUTION INTRAMUSCULAR; INTRAVENOUS EVERY 5 MIN PRN
Status: DISCONTINUED | OUTPATIENT
Start: 2023-11-01 | End: 2023-11-01 | Stop reason: HOSPADM

## 2023-11-01 RX ADMIN — VENLAFAXINE 75 MG: 75 TABLET ORAL at 08:11

## 2023-11-01 RX ADMIN — PIPERACILLIN AND TAZOBACTAM 3.38 G: 3; .375 INJECTION, POWDER, LYOPHILIZED, FOR SOLUTION INTRAVENOUS at 03:36

## 2023-11-01 RX ADMIN — HYDRALAZINE HYDROCHLORIDE 100 MG: 50 TABLET, FILM COATED ORAL at 14:40

## 2023-11-01 RX ADMIN — HEPARIN SODIUM 5000 UNITS: 5000 INJECTION, SOLUTION INTRAVENOUS; SUBCUTANEOUS at 05:50

## 2023-11-01 RX ADMIN — VENLAFAXINE 75 MG: 75 TABLET ORAL at 12:01

## 2023-11-01 RX ADMIN — PIPERACILLIN AND TAZOBACTAM 3.38 G: 3; .375 INJECTION, POWDER, LYOPHILIZED, FOR SOLUTION INTRAVENOUS at 11:47

## 2023-11-01 RX ADMIN — HYDRALAZINE HYDROCHLORIDE 100 MG: 50 TABLET, FILM COATED ORAL at 08:02

## 2023-11-01 RX ADMIN — ONDANSETRON 4 MG: 2 INJECTION INTRAMUSCULAR; INTRAVENOUS at 08:12

## 2023-11-01 RX ADMIN — METOPROLOL TARTRATE 100 MG: 25 TABLET, FILM COATED ORAL at 20:42

## 2023-11-01 RX ADMIN — ALTEPLASE 4 MG: 2.2 INJECTION, POWDER, LYOPHILIZED, FOR SOLUTION INTRAVENOUS at 15:24

## 2023-11-01 RX ADMIN — HEPARIN SODIUM 5000 UNITS: 5000 INJECTION, SOLUTION INTRAVENOUS; SUBCUTANEOUS at 21:17

## 2023-11-01 RX ADMIN — MICAFUNGIN SODIUM 100 MG: 50 INJECTION, POWDER, LYOPHILIZED, FOR SOLUTION INTRAVENOUS at 11:24

## 2023-11-01 RX ADMIN — PANTOPRAZOLE SODIUM 40 MG: 40 INJECTION, POWDER, FOR SOLUTION INTRAVENOUS at 06:34

## 2023-11-01 RX ADMIN — AMLODIPINE BESYLATE 5 MG: 5 TABLET ORAL at 20:42

## 2023-11-01 RX ADMIN — BUMETANIDE 0.5 MG: 0.25 INJECTION INTRAMUSCULAR; INTRAVENOUS at 20:43

## 2023-11-01 RX ADMIN — AMLODIPINE BESYLATE 5 MG: 5 TABLET ORAL at 08:02

## 2023-11-01 RX ADMIN — NITROGLYCERIN 1 PATCH: 0.4 PATCH TRANSDERMAL at 08:25

## 2023-11-01 RX ADMIN — VENLAFAXINE 75 MG: 75 TABLET ORAL at 16:53

## 2023-11-01 RX ADMIN — METOPROLOL TARTRATE 100 MG: 25 TABLET, FILM COATED ORAL at 08:02

## 2023-11-01 RX ADMIN — MIDAZOLAM HYDROCHLORIDE 0.5 MG: 1 INJECTION, SOLUTION INTRAMUSCULAR; INTRAVENOUS at 09:32

## 2023-11-01 RX ADMIN — HYDRALAZINE HYDROCHLORIDE 10 MG: 20 INJECTION INTRAMUSCULAR; INTRAVENOUS at 04:10

## 2023-11-01 RX ADMIN — PIPERACILLIN AND TAZOBACTAM 3.38 G: 3; .375 INJECTION, POWDER, LYOPHILIZED, FOR SOLUTION INTRAVENOUS at 20:39

## 2023-11-01 RX ADMIN — HYDRALAZINE HYDROCHLORIDE 100 MG: 50 TABLET, FILM COATED ORAL at 20:42

## 2023-11-01 RX ADMIN — HEPARIN SODIUM 5000 UNITS: 5000 INJECTION, SOLUTION INTRAVENOUS; SUBCUTANEOUS at 14:40

## 2023-11-01 RX ADMIN — FENTANYL CITRATE 50 MCG: 50 INJECTION, SOLUTION INTRAMUSCULAR; INTRAVENOUS at 09:25

## 2023-11-01 RX ADMIN — BUMETANIDE 0.5 MG: 0.25 INJECTION INTRAMUSCULAR; INTRAVENOUS at 08:06

## 2023-11-01 RX ADMIN — MIDAZOLAM HYDROCHLORIDE 1 MG: 1 INJECTION, SOLUTION INTRAMUSCULAR; INTRAVENOUS at 09:21

## 2023-11-01 RX ADMIN — HYDRALAZINE HYDROCHLORIDE 10 MG: 20 INJECTION INTRAMUSCULAR; INTRAVENOUS at 11:21

## 2023-11-01 RX ADMIN — HYDRALAZINE HYDROCHLORIDE 10 MG: 20 INJECTION INTRAMUSCULAR; INTRAVENOUS at 00:03

## 2023-11-01 ASSESSMENT — ACTIVITIES OF DAILY LIVING (ADL)
ADLS_ACUITY_SCORE: 45
ADLS_ACUITY_SCORE: 41
ADLS_ACUITY_SCORE: 41
ADLS_ACUITY_SCORE: 45
ADLS_ACUITY_SCORE: 41
ADLS_ACUITY_SCORE: 41

## 2023-11-01 NOTE — PROGRESS NOTES
CLINICAL NUTRITION SERVICES - REASSESSMENT NOTE    EVALUATION OF THE PROGRESS TOWARD GOALS   Diet: NPO  Nutrition Support: TF ( currently was on hold for chest tube placement)  Per RN pt tolerated TF @ 15 ml/hr from 4:30 until 12 am and then was made NPO  TF promote with fiber goal rate 55 ml/hr. H2O flushes of 60 ml three times per day   TF at goal rate provides: 1320 ml/Calories, 82 g protein, 182 g CHO, 18.5 g fiber and 1097 ml free fluid (TF) plus water flushes ( 180 ml) =1277 ml fluid/day.  Now that IVF are off will increase H2O flush to 90 ml q 8 hrs     INTERVENTIONS  Implementation  Continue with same TF regimen/progression  Increase H2O flushes to 90 ml tid    Goals  Tolerate TF-progressing  Meet nutritional needs-progressing    Monitoring/Evaluation  Progress toward goals will be monitored and evaluated per protocol.

## 2023-11-01 NOTE — PROGRESS NOTES
"  Interventional Radiology - Progress Note  Inpatient - [facility]  11/01/2023     S:  Patient sleeping. When asked about her drain she nodded her head to that it was ok.     Culture:     1+ Lactobacillus species Abnormal         Antibiotic: IV zosyn   Flushing: NS 10ml Q shift    O:  BP (!) 148/72 (BP Location: Left arm)   Pulse 78   Temp 98.7  F (37.1  C) (Axillary)   Resp 20   Ht 1.6 m (5' 3\")   Wt 131 kg (288 lb 12.8 oz)   LMP 09/09/2023 (Exact Date)   SpO2 94%   BMI 51.16 kg/m    General:  Stable.  In no acute distress.    Neuro:  Sleeping. Generally weak.   Resp:  On Bipap. Non-labored breathing.   Abdomen: pelvic drain intact to MICKIE bulb with thick tan/yellow drainage note in tubing/bulb; no leakage; holding suction.     IMAGING:  EXAM: CT ABDOMEN W CONTRAST  LOCATION: [facility]  DATE: 10/30/2023     INDICATION: Follow-up drain placement. Determine if patient may need TPA through drain.  COMPARISON: 10/25/2023. Others.  TECHNIQUE: CT scan of the abdomen was performed following injection of IV contrast. Multiplanar reformats were obtained. Dose reduction techniques were used.  CONTRAST: 75 mL Isovue 370     FINDINGS:    LOWER CHEST: Incompletely seen small to moderate right pleural effusion and complete right lower lobe atelectasis.     HEPATOBILIARY: Exchange of prior perihepatic drain was performed on 10/25/2023. Current drain tip coils adjacent to the hepatic dome. The perihepatic fluid collection has minimally improved. No focal hepatic abnormality.     PANCREAS: Normal.     SPLEEN: Normal.     ADRENAL GLANDS: Normal.     KIDNEYS: Normal.     BOWEL: Surgical changes stomach. No evidence for bowel obstruction.     LYMPH NODES: No adenopathy.     VASCULATURE: Nonaneurysmal aorta.     MUSCULOSKELETAL: Nothing acute.                                                                      IMPRESSION:      1.  Perihepatic drain in place with tip adjacent " to the hepatic dome. Minimal improvement of the perihepatic collection since 10/25/2023. Small perihepatic collection remains.     2.  Small to moderate sized right pleural effusion and associated collapse of the right lower lobe.    LABS:  Recent Labs   Lab 11/01/23  0547 10/31/23  0617 10/30/23  0612 10/29/23  0545   WBC 12.6* 14.3* 12.3* 12.0*   HGB 8.6* 9.1* 8.7* 8.8*    390 393 414   CR 1.82* 1.65* 1.62* 1.86*     Drain Outputs (in mL): TPA administered on 10/31/23  1028 0   10/29 5   10/30 0   10/31 95   11/1 20       A:  53 year old yo female with PMH of LINA, HLD, methanol abuse, asthma, and JARVIS who was admitted to [facility] on 10/09/2023 for a planned sleeve gastrectomy with single anastomosis duodenal switch. Hospital course c/b encephalopathy, SUSSY, intra-abdominal abscesses, and delirium. Back to OR for closure of two small bowel enterotomies, surgical drain placement (removed 10/25/23) and wash out on 10/12/23. F/U CT demonstrated RUQ fluid collection - CT guided 10F drain placement 10/19/23 and CT guided aspiration of pelvic fluid collection 10/24/23, no drain placed.      CT 10/30/23 without much improvement in perihepatic collection. 10/31/23 s/p tPA administration via drain tubing; noted to have significant increase improvement in drainage output. Drainage output now tapering off. Plan for additional tPA instillation today.     P:    -  CT without much improvement in fluid collection. S/p instillation of tPA into drain to optimize drain OP on 10/31; pt noted to have increased output. Repeat tPA today; discussed with bedside RN. Will re-evaluate tomorrow for possible repeat or routine tPA instillations would be of benefit.  - Continue to follow WBC, drain OPs and patient's clinical signs/symptoms for improvement.   - Continue present drain cares. Continue drain to MICKIE drainage. Flush as ordered; always flush in toward patient's body to keep internal portion of drain patent. Subtract irrigant from  output and record output in I&O every shift/PRN.   - Antibiotics per ID.  - Will continue to follow patient's clinical status, imaging, drain(s) function and drain(s) OPs to determine drain follow up plan. Anticipating follow-up CT/abscessogram once drain outputs are <20mL/day for a consecutive 2 days, and/or pending patient's clinical status and drain function. It is is typically common for patients to discharge with drain in place.         Total time spent on the date of the encounter: 25 minutes.    Marlen Reynaga, APRN CNP  Interventional Radiology  [phone]

## 2023-11-01 NOTE — PLAN OF CARE
Problem: Bariatric Surgery  Goal: Nausea and Vomiting Relief  Outcome: Progressing  Intervention: Prevent or Manage Nausea and Vomiting  Recent Flowsheet Documentation  Taken 10/31/2023 1956 by Corey Pérez RN  Nausea/Vomiting Interventions:   antiemetic   nausea triggers minimized     Problem: Risk for Delirium  Goal: Improved Attention and Thought Clarity  Outcome: Progressing     Problem: Comorbidity Management  Goal: Blood Pressure in Desired Range  Outcome: Progressing  Intervention: Maintain Blood Pressure Management  Recent Flowsheet Documentation  Taken 10/31/2023 1956 by Corey Pérez RN  Medication Review/Management: medications reviewed     Problem: Gas Exchange Impaired  Goal: Optimal Gas Exchange  Outcome: Progressing  Intervention: Optimize Oxygenation and Ventilation  Recent Flowsheet Documentation  Taken 10/31/2023 1956 by Corey Pérez RN  Airway/Ventilation Management:   airway patency maintained   calming measures promoted  Head of Bed (HOB) Positioning: HOB at 30 degrees     Problem: Enteral Nutrition  Goal: Safe, Effective Therapy Delivery  Outcome: Progressing     Goal Outcome Evaluation:         Pt is disoriented to situation. Denies pain.    BP elevated, PRN IV Hydralazine given. Pt was only able to tolerate PO Metoprolol at bedtime. Refused all other PO meds stating she's too full and cannot take the rest. IV Compazine given for nausea. No emesis. Bedtime BG of 119. TF running per order. BiPAP in place.     Will be NPO at midnight.

## 2023-11-01 NOTE — PLAN OF CARE
Problem: Bariatric Surgery  Goal: Optimal Pain Control and Function  Outcome: Progressing  Intervention: Prevent or Manage Pain  Recent Flowsheet Documentation  Taken 11/1/2023 1338 by Carli Terrazas RN  Pain Management Interventions:   medication offered but refused   rest  Taken 11/1/2023 1042 by Carli Terrazas RN  Pain Management Interventions:   medication offered but refused   rest     Problem: Bariatric Surgery  Goal: Nausea and Vomiting Relief  Outcome: Progressing  Intervention: Prevent or Manage Nausea and Vomiting  Recent Flowsheet Documentation  Taken 11/1/2023 0752 by Carli Terrazas RN  Nausea/Vomiting Interventions: antiemetic     Problem: Bariatric Surgery  Goal: Effective Urinary Elimination  Outcome: Progressing     Problem: Bariatric Surgery  Goal: Effective Oxygenation and Ventilation  Outcome: Progressing  Intervention: Optimize Oxygenation and Ventilation  Recent Flowsheet Documentation  Taken 11/1/2023 0752 by Carli Terrazas RN  Head of Bed (HOB) Positioning: HOB at 20-30 degrees   Goal Outcome Evaluation:         A/Ox4. Forgetful. VSS ex HTN. PRN hydalazine given x1. On BIPAP. RT managing.  LS diminished. Infreq cough. C/o of some pain with chest tube, declined pain medication, just wants to rest and has been sleeping throughout shift. Was off unit at 0840 for chest tube placement and back at around 1030. R chest tube dressing in place, CDI. Chest tube to wall suction a -20. Tube patent with serosang output. MICKIE drain with 10 ml milky output. Midline incision with steristrips, BRET. Lap sites approximated and healing, BRET. Triple lumen PICC infusing int abx. NJ tube with tube feeding at 30 ml/hr. Flushed with 60 ml free water prior to starting tube feed again at 1140. RD increased free water flushes to 90 ml tid moving forward. Bariatric full liquid diet per Surg. PRN zofran given x1 for nausea prior to oral meds this morning. No emesis this shift. Purewick in place with adequate output but  patient also had 2 heavily wet briefs changed while repositioning. 1:1 attendant. Nursing to continue to monitor.

## 2023-11-01 NOTE — PROGRESS NOTES
[facility]    Medicine Progress Note - Hospitalist Service    Date of Admission:  10/9/2023    Assessment & Plan   Mojgan Jimenez is a 53 year old female with h/o obesity, severe JARVIS on CPAP, asthma, stimulant use disorder, stimulant induced psychosis, mood & anxiety disorder. Admitted 10/9/23 for scheduled laparoscopic sleeve gastrectomy with single anastomosis duodenal switch. She was later found to have two small bowel injuries with peritonitis. 10/12/23 returned to OR for small bowel interotomy closure, clean-out, & drain placement; 10/19 found to have RUQ fluid collection s/p drain placement; 10/24 s/p pelvic fluid aspiration.      Course complicated by encephalopathy/delirium, septic shock, suspected takotsubo syndrome, acute respiratory failure due to loss of protective airway reflexes & increased metabolic load requiring intubation (10/12-10/21/23; reintubated 10/21-10/26/23), & oliguric SUSSY with renal recovery. She has significantly improved: mental status clearing with some ongoing delirium at night, cardiomyopathy improved, ATN I recovery phase, and ongoing treatment of intra abdominal infection. She was made floor tele status 10/27/23  On 11/1/23 CT guided chest tube placed on right for persistent effusion, concern for parapneumonic one with risk of future trapped lung        1.Acute hypoxemic-hypercapnic respiratory failure -now weaned RA  - Rapid Response. 10/12 Pt with decreased LOC. Opens eyes to voice. SpO2 89% on 8L NC. BS clear and diminished. Placed on a 15L non-rebreather for SpO2 > 90%. After narcan admin pt became more responsive/agitated. Transferred to ICU and intubated -10/12-10/21/23; reintubated 10/21-10/26/23. Now extubated and weaned to RA  - 10/22/23 CT demonstrated complete RLL atelectasis, milder RUL & RBML atelectasis or consolidation. 10/22 bronchoscopy moderate RLL blood tinged secretions cleared.   - pulmonary hygiene  - R-pleural effusion, exudative  Suspect simple parapneumonic effusion. 10/24 s/p thoracentesis, 400 mL. Pleural fluid amylase amylase 23, serum 64.   -today on cxr , 10/31 still has right sided effusion seen, pulm to see again  11/1/23--chest tube placed on right      2.Gastric bypass  10/9 s/p sleeve gastrectomy complicated by small bowel injury & peritonitis   10/12 returned to OR for closure, clean-out, & drain placement (now removed)  10/19 s/p RUQ fluid collection drain placement   10/24 s/p pelvic fluid aspiration (75 ml)  - surgery primary   - ID consulted   - 10/12 - peritoneal cultures + Streptococcus anginosus, mixed aerobic & anaerobic kamla   - 10/19 - RUQ fluid cultures & pelvic fluid aspirate + Lactobacillus   - 10/22 - BDG blood +  - 10/24 pleural fluid studies  -#+WBC no pos cx  10/24--aspirate pelvic drain--lacto + candida  - vanc, pip-tazo, micafungin  - was on TPN previously 10/14  - PPTF started 10/24/23. TF and TPN weaned of 10/27 currently on bariatric clears, surgery managing   -10/27--per renal -on D5W for hypernatremia  -10/28 still on D5W  -10/29 and 10/30--still on D5W, NA improved by 10/30 to 142, renal managing this  -diet per surg-bariatric full liq diet--not clear she will keep up with this still  - flush drain at regular intervals   10/31--overnight last night had emesis, diet cut back to clear, so now surg placing FT for TF  11/1/23- after chest tube, TF     3.Accelerated HTN /Cardiomyopathy   - echo previously and initially decreased LV function but repeat echo shows EF normalized, possible stress induced.   - cards appreciated and still following  - on metoprolol tartrate 100 mg bid per cards  - hydralazine increase to 100 mg tid  - nitroglycerin patch 0.4mg/24  -amlodipine 5 mg bid  -bumex back on per renal  - prn IV hydralazine     4.History of JARVIS, asthma in the chart no PFTs  - NIPPV with sleep/naps everytime   - continue duonebs     5.Oliguric SUSSY - improved   - nephrology was consulted  for ARF peak creat  "6.97 on 10/14  - slowly improving and creatinine trending down--10/31 now 1.82  - avoid nephrotoxic agents      6.Hypernatremia -   - IV bumex stopped and on D5W per renal .   - trend sodium 153 -->148 -->144-->142-->139-->142  -has needed D5W-per renal-off now  -bumex restarted 10/31     7.Anemia - stable  Suspect multifactorial secondary to sepsis & surgical blood loss   - monitor ,11/1/ hgb 8.6     8.Hypervolemia  -back on bumex     9.Hyperglycemia of critical illness   - glargine 10 units every day--bs good, will stopped lantus and see how she does on ss  - LDSSI    -with TF starting later may need to restart lantus      10.Obesity  -BMI 46    11.Acute metabolic and toxic encephalopathy  -related to infection from abd  -related to metabolic , ARF   -still fluctuating here   -delirium protocol  -still having hallucinations  -needs to be up day, sleep night  -up to chair if possible     At 1334 I called daughter Karla to update        Diet: Room Service  Adult Formula Drip Feeding: Continuous Promote w fiber; Nasojejunal; Goal Rate: 55; mL/hr; Start at 15 ml/hr x 8 hrs and advance by 15 ml q 8 hrs to a goal rate of 55 ml/hr  NPO for Medical/Clinical Reasons Except for: Meds    DVT Prophylaxis: Heparin SQ  Patiño Catheter: Not present  Lines: PRESENT      PICC 10/28/23 Triple Lumen Right Basilic-Site Assessment: WDL      Cardiac Monitoring: ACTIVE order. Indication: Acute decompensated heart failure (48 hours)  Code Status: Full Code      Clinically Significant Risk Factors              # Hypoalbuminemia: Lowest albumin = 2.7 g/dL at 10/14/2023  6:29 AM, will monitor as appropriate     # Hypertension: Noted on problem list        # Severe Obesity: Estimated body mass index is 51.16 kg/m  as calculated from the following:    Height as of this encounter: 1.6 m (5' 3\").    Weight as of this encounter: 131 kg (288 lb 12.8 oz).      # Asthma: noted on problem list        Disposition Plan      Expected Discharge Date: " 11/06/2023      Destination: nursing home              Juanita Mayers MD  Hospitalist Service  [facility]  Securely message with [employer] (more info)  Text page via [employer] Paging/Directory   ______________________________________________________________________    Interval History   I saw her this am before chest tube  She was awake and still on bipap and wanted to keep it on  She notes she feels more like herself this am  Still some abd pain , better  Still notes some sob        Physical Exam   Vital Signs: Temp: 98.6  F (37  C) Temp src: Axillary BP: (!) 158/74 Pulse: 78   Resp: 20 SpO2: 94 % O2 Device: BiPAP/CPAP Oxygen Delivery: 3 LPM  Weight: 288 lbs 12.84 oz    Constitutional: awake, fatigued, alert, cooperative, and no apparent distress  Respiratory: no increased work of breathing, moderate air exchange, no retractions, and diminished breath sounds right base and right anterior  Cardiovascular: Normal apical impulse, regular rate and rhythm, normal S1 and S2, no S3 or S4, and no murmur noted  GI: normal bowel sounds, soft, non-distended, and some-tenderness closer to drain area, drain RUQ  Skin: no bruising or bleeding  Musculoskeletal: ankle edema   Neurologic: Mental Status Exam:  Level of Alertness:   awake  Attention/Concentration:  normal  Neuropsychiatric: General: normal, calm, and normal eye contact  Affect: flat    Medical Decision Making       55 MINUTES SPENT BY ME on the date of service doing chart review, history, exam, documentation & further activities per the note.      Data     I have personally reviewed the following data over the past 24 hrs:    12.6 (H)  \   8.6 (L)   / 363     142; 142 106 27.9 (H) /  104 (H)   3.8 22 1.82 (H) \       Imaging results reviewed over the past 24 hrs:   Recent Results (from the past 24 hour(s))   XR Feeding Tube Placement    Narrative    EXAM: XR FEEDING TUBE PLACEMENT  LOCATION: [facility]  DATE:  10/31/2023    INDICATION: Not tolerating oral intake. Place feeding tube. Single Anastamosis Duodenal Switch. Surgery 10 12 for enterotomy and sepsis.  COMPARISON: CT AP 10/30/2023 and older studies, feeding tube placement 10/24/2023.    TECHNIQUE: Routine.    FINDINGS: Enteric tube placed without complication. Tip in the the small bowel.    FLUOROSCOPIC TIME: 0.6 minutes  NUMBER OF IMAGES: 1    Reference CPT Code: 32750     CT Chest Tube with Cath Placement    Narrative    EXAM:  1. PERCUTANEOUS CHEST TUBE PLACEMENT RIGHT PLEURAL SPACE   2. CT GUIDANCE  3. CONSCIOUS SEDATION  LOCATION: [facility]  DATE: 11/1/2023    INDICATION: right pleural fluid increasing in volume  TECHNIQUE: Dose reduction techniques were used.    PROCEDURE: Informed consent obtained. Site marked. Prior images reviewed. Required items made available. Patient identity confirmed verbally and with arm band. Patient reevaluated immediately before administering sedation. Universal protocol was   followed. Time out performed. The site was prepped and draped in sterile fashion. 4 mL of 1% lidocaine was infused into the local soft tissues. Using standard technique and under direct CT guidance, a 10 [place] nonlocking chest tube catheter was inserted   into the pleural space. The catheter was fixed in place with sutures and adhesive device, and the tubing was banded. Chest tube placed to Pleur-evac suction.    SPECIMEN: 1 L of clear yellow fluid was aspirated and sent to lab for testing.    BLOOD LOSS: Minimal.    The patient tolerated the procedure well. No immediate complications.    SEDATION: Versed 1.5  mg. Fentanyl 50 mcg. The procedure was performed with administration intravenous conscious sedation with appropriate preoperative, intraoperative, and postoperative evaluation.    14  minutes of supervised face to face conscious sedation time was provided by a radiology nurse under my direct supervision.      Impression     IMPRESSION:  1.  Successful CT-guided chest tube placement into the right pleural space with moderate sedation .    Reference CPT Codes: 09912, 16745

## 2023-11-01 NOTE — PROGRESS NOTES
RESPIRATORY CARE NOTE    Patient is on V60 BiPAP overnight with settings:     Rate: 14  IPAP: 12  EPAP: 6  FiO2: 30%  SpO2: 96%     BS diminished and clear. Patient tolerated BiPAP well. On 2 lpm nasal cannula when off BiPAP.      Hoang Lisa, RT

## 2023-11-01 NOTE — PROGRESS NOTES
Patient placed on V60 post chest tube placement today ~1020.     Mode: S/T  Ipap: 12  Epap: 6  Rate: 14  FiO2: 30%    Patient tolerated V60 well. Patient is currently on 2 lpm nasal cannula.     Peggy Whittaker, RT

## 2023-11-01 NOTE — PROGRESS NOTES
PULMONARY PROGRESS NOTE    Date / Time of Admission:  10/9/2023  5:26 AM  Assessment:   53yoF with JARVIS, morbid obesity s/p sleeve gastrectomy 10/9/23 complicated by perforation and peritonitis s/p washout with subsequent abscess pockets requiring drain placement with recurrent, persistent right-sided pleural effusion considered parapneumonic previously but at high risk of development of trapped lung or empyema given proximity to hepatic fluid collections.     Principal Problem:    Morbid (severe) obesity due to excess calories (H)  Active Problems:    LINA (generalized anxiety disorder)    Recurrent major depressive disorder, remission status unspecified (H24)    Morbid obesity (H)    Obstructive sleep apnea    Intra-abdominal abscess (H)    Hypernatremia    SUSSY (acute kidney injury) (H24)    Accelerated hypertension    Metabolic encephalopathy    Acute respiratory failure with hypoxia (H)    Cardiomyopathy (H)      Plan:   CT guided drain placement. Done today. So far 200cc out, serous. Given body habitus will ask for radiology help.  Send fluid for culture,  glucose, LDH and protein. Doesn't appear consistent with empyema but will follow up.  Chest tube to suction. May require tPA/DNAse if not draining much with suction.   Goal fluid balance net even but overshot. Will decrease bumex to 0.5mg q12 hours.     Subjective:   HPI:  Mojgan Jimenez is a 53 year old female with morbid obesity, JARVIS s/p laparoscopic sleeve gastrectomy 10/9 complicated by small bowel injury and peritonitis causing septic shock. She returned to the OR 10/12 for washout and drain placement She had persistent symptoms so was rescanned with a new RUQ fluid collection 10/19 and 10/24. She also was found to have a pleural effusion and underwent thoracentesis with 400cc removed. This was consistent with parapneumonic effusion, no empyema. She was extubated 10/26 and remains extubated.         Allergies: No Known Allergies     MEDS:  Scheduled  "Meds:   amLODIPine  5 mg Oral BID    bumetanide  0.5 mg Intravenous Q8H    heparin ANTICOAGULANT  5,000 Units Subcutaneous Q8H    hydrALAZINE  100 mg Oral or Feeding Tube TID    insulin aspart  1-4 Units Subcutaneous 4x Daily AC & HS    metoprolol tartrate  100 mg Oral BID    micafungin  100 mg Intravenous Q24H    nitroGLYcerin  1 patch Transdermal Daily    pantoprazole  40 mg Per Feeding Tube QAM AC    Or    pantoprazole  40 mg Intravenous QAM AC    piperacillin-tazobactam  3.375 g Intravenous Q8H    sodium chloride (PF)  3 mL Intracatheter Q8H    venlafaxine  75 mg Oral TID w/meals     Continuous Infusions:   dextrose       PRN Meds:.acetaminophen **OR** acetaminophen, albuterol, albuterol, dextrose, glucose **OR** dextrose **OR** glucagon, hydrALAZINE, HYDROmorphone, ipratropium - albuterol 0.5 mg/2.5 mg/3 mL, lidocaine 4%, lidocaine (buffered or not buffered), menthol-zinc oxide, miconazole, naloxone **OR** naloxone **OR** naloxone **OR** naloxone, OLANZapine, ondansetron **OR** ondansetron, phenol, prochlorperazine **OR** prochlorperazine, sodium chloride (PF), traMADol    Objective:   VITALS:  BP (!) 168/78 (BP Location: Left arm)   Pulse 72   Temp 98.3  F (36.8  C) (Axillary)   Resp 18   Ht 1.6 m (5' 3\")   Wt 131 kg (288 lb 12.8 oz)   LMP 09/09/2023 (Exact Date)   SpO2 97%   BMI 51.16 kg/m    EXAM:    GENERAL APPEARANCE: Alert, no acute distress  HENT: Oral mucosa and posterior oropharynx normal, moist mucous membranes  NECK: No adenopathy,masses or thyromegaly  RESP: decreased breath sounds bilaterally  CV: regular rate and rhythm, no murmur, rub or gallop  ABDOMEN: normal bowel sounds, soft, nontender, no hepatosplenomegaly or other masses  LYMPHATICS: No cervical, or supraclavicular adenopathy  SKIN: no suspicious lesions or rashes  NEURO: awake, responsive but confused.         I&O:    Date 10/31/23 0700 - 11/01/23 0659   Shift 6962-3290 4900-5885 3096-2243 24 Hour Total   INTAKE   P.O. 110   110 "   I.V. 1515.83   1515.83   Shift Total(mL/kg) 1625.83(11.99)   1625.83(11.99)   OUTPUT   Urine 600   600   Shift Total(mL/kg) 600(4.42)   600(4.42)   Weight (kg) 135.6 135.6 135.6 135.6       Data Review:    Imaging: personally reviewed  Abdominal CT chest  EXAM: CT ABDOMEN W CONTRAST  LOCATION: [facility]  DATE: 10/30/2023     INDICATION: Follow-up drain placement. Determine if patient may need TPA through drain.  COMPARISON: 10/25/2023. Others.  TECHNIQUE: CT scan of the abdomen was performed following injection of IV contrast. Multiplanar reformats were obtained. Dose reduction techniques were used.  CONTRAST: 75 mL Isovue 370     FINDINGS:    LOWER CHEST: Incompletely seen small to moderate right pleural effusion and complete right lower lobe atelectasis.     HEPATOBILIARY: Exchange of prior perihepatic drain was performed on 10/25/2023. Current drain tip coils adjacent to the hepatic dome. The perihepatic fluid collection has minimally improved. No focal hepatic abnormality.     PANCREAS: Normal.     SPLEEN: Normal.     ADRENAL GLANDS: Normal.     KIDNEYS: Normal.     BOWEL: Surgical changes stomach. No evidence for bowel obstruction.     LYMPH NODES: No adenopathy.     VASCULATURE: Nonaneurysmal aorta.     MUSCULOSKELETAL: Nothing acute.                                                                      IMPRESSION:      1.  Perihepatic drain in place with tip adjacent to the hepatic dome. Minimal improvement of the perihepatic collection since 10/25/2023. Small perihepatic collection remains.     2.  Small to moderate sized right pleural effusion and associated collapse of the right lower lobe.  Results for orders placed or performed during the hospital encounter of 10/09/23   Basic metabolic panel   Result Value Ref Range    Sodium 142 135 - 145 mmol/L    Potassium 3.8 3.4 - 5.3 mmol/L    Chloride 106 98 - 107 mmol/L    Carbon Dioxide (CO2) 22 22 - 29 mmol/L    Anion Gap 14 7 - 15  mmol/L    Urea Nitrogen 27.9 (H) 6.0 - 20.0 mg/dL    Creatinine 1.82 (H) 0.51 - 0.95 mg/dL    GFR Estimate 33 (L) >60 mL/min/1.73m2    Calcium 8.7 8.6 - 10.0 mg/dL    Glucose 104 (H) 70 - 99 mg/dL     Lab Results   Component Value Date    WBC 12.6 (H) 11/01/2023    HGB 8.6 (L) 11/01/2023    HCT 28.6 (L) 11/01/2023    MCV 95 11/01/2023     11/01/2023

## 2023-11-01 NOTE — PLAN OF CARE
Problem: Bariatric Surgery  Goal: Optimal Coping with Surgery  Intervention: Support and Optimize Psychosocial Response  Recent Flowsheet Documentation  Taken 11/1/2023 0402 by Corey Pérez RN  Supportive Measures: relaxation techniques promoted  Taken 10/31/2023 2353 by Corey Pérez RN    Problem: Bariatric Surgery  Goal: Nausea and Vomiting Relief  Intervention: Prevent or Manage Nausea and Vomiting  Recent Flowsheet Documentation  Taken 11/1/2023 0402 by Corey Pérez RN  Nausea/Vomiting Interventions:   antiemetic   nausea triggers minimized  Taken 10/31/2023 2353 by Corey Pérez RN  Nausea/Vomiting Interventions:   antiemetic   nausea triggers minimized    Problem: Risk for Delirium  Goal: Improved Attention and Thought Clarity  Outcome: Progressing     Problem: Comorbidity Management  Goal: Blood Pressure in Desired Range  Intervention: Maintain Blood Pressure Management  Recent Flowsheet Documentation  Taken 11/1/2023 0402 by Corey Pérez RN  Medication Review/Management: medications reviewed  Taken 10/31/2023 2353 by Corey Pérez RN      Goal Outcome Evaluation:         Pt is forgetful. No behaviors overnight. Has been calm and cooperative. Denies pain.    BP's elevated as pt did not tolerate oral antihypertensive meds. IV Hydralazine given x 2 overnight. On telemetry. Cardiac monitor showed 8 beats of V-Tach overnight. Order to notify if V-Tach greater than 10 beats.     Tolerated BiPAP well the entire night. Purewick in place with good urine output. No bowel movement. MICKIE drain intact and patent - flushed per orders. NJ tube in tact - flushed per orders. .    Has been NPO since midnight.

## 2023-11-01 NOTE — CONSULTS
Imaging reviewed. Will proceed with imaging-guided right chest tube placement with moderate sedation.

## 2023-11-01 NOTE — PROCEDURES
[facility]    Procedure: CT guided right chest tube placement with moderate sedation    Date/Time: 11/1/2023 10:06 AM    Performed by: Fahrner, Lester, MD  Authorized by: Fahrner, Lester, MD      UNIVERSAL PROTOCOL   Site Marked: Yes  Prior Images Obtained and Reviewed:  Yes  Required items: Required blood products, implants, devices and special equipment available    Patient identity confirmed:  Verbally with patient  Patient was reevaluated immediately before administering moderate or deep sedation or anesthesia  Confirmation Checklist:  Patient's identity using two indicators, relevant allergies, procedure was appropriate and matched the consent or emergent situation and correct equipment/implants were available  Time out: Immediately prior to the procedure a time out was called    Universal Protocol: the Joint Commission Universal Protocol was followed    Preparation: Patient was prepped and draped in usual sterile fashion    ESBL (mL):  0     ANESTHESIA    Anesthesia:  Local infiltration  Local Anesthetic:  Lidocaine 1% without epinephrine  Anesthetic Total (mL):  4      SEDATION  Patient Sedated: Yes    Sedation Type:  Moderate (conscious) sedation  Sedation:  Fentanyl, midazolam and see MAR for details  Vital signs: Vital signs monitored during sedation    See dictated procedure note for full details.    PROCEDURE    Patient Tolerance:  Patient tolerated the procedure well with no immediate complications  Length of time physician/provider present for 1:1 monitoring during sedation: 15

## 2023-11-01 NOTE — DISCHARGE INSTRUCTIONS
Drain Placement Discharge Instructions:  You had a small tube or drain(s) placed. This was placed so the abnormal fluid collection within your body can be drained out (externally). Please follow the below instructions as you recover:    Care Instructions after drain placement:  - Rest after your drain was placed. Avoid strenuous activity and heavy lifting for the next 2 days. Return to your normal activities as you tolerate after the 2 day restriction.  - You may shower; however, you should not submerge site under water like in a tub bath, Jacuzzi or pool.  - Keep dressing clean and dry as long as drain is in place. If you have a gauze dressing, please change the dressing as needed to keep site clean and dry.  - You may eat and drink as normal.  - You may have discomfort after the procedure near the drain exit site. You may take acetaminophen (Tylenol ) or ibuprofen (Motrin ) as needed for any discomfort.  - Inspect the tube often for kinks.  - If you have a gravity bag, keep the bag below the drain exit site to allow for free flow of drainage by gravity.  - Flush your drainage tube with 10mL sterile normal saline daily.  - Record your daily drain outputs and amount flushed on your drainage record. Bring your records to your next radiology appointment.  - Empty your drainage bag/bulb daily or when it is approximately half way fluid. Follow below instructions for emptying your bag/bulb:       - Clean hands well with soap and water.       - Place a measuring container near the outlet valve of the drainage bag/bulb.       - If you have a drainage BAG: Twist the blue valve at the bottom of the bag while holding the valve over your measuring cup and open container to empty. Re-twist the valve closed on the drainage bag once complete.       - If you have a drainage BULB: Open the bulb cap (at the top of the bulb). Empty the fluid into the measuring cup. Squeeze bulb and hold flat. While bulb is squeezed, close the cap.     "   - After draining fluid record your drainage output.         - Discard drainage into toilet once fluid drained.    Flushing Your Drainage Tube:   If you have a 3 way stop cock:     1. Collect flushing supplies: 10mL of sterile normal saline in syringe, alcohol pad.  2. Clean the flushing (center) port with alcohol and attach the flushing syringe by twisting into place (clockwise).  3. Turn the 3 way stopcock valve \"off\" to the drainage bag/bulb. (Valve should be pointed toward the bag/bulb)  4. Gently inject/flush the drain so fluid is moving towards your body through the drainage catheter.   5. Turn the stopcock valve back to its center position to allow for drainage to resume.   6. Remove flushing syringe from flushing port by untwisting the syringe (counter clockwise).  7. Squeeze bulb or accordion to reapply suction if you have one.  8. Record the output from your drain and flush amount on your drainage record.     If you have a Y flushing port / UreSil Flush Adapter:    1. Collect flushing supplies: 10mL of sterile normal saline in syringe, alcohol pad.  2. Clamp off the tubing to the drain by pinching the white clamp closed. Clean the drain port with an alcohol wipe.   3. Attach the saline syringe to the drain port.   4. Twist the syringe (clockwise) to tighten it to the port   5. Flush sterile normal saline from the syringe into the drain. The saline should flow toward your body not toward the drainage bag.   6. Untwist the syringe (counter clockwise) and remove it.   7. Unclamp the white clamp making sure the drainage is able to flow freely into the bag.   8. Squeeze bulb or accordion to reapply suction if you have one.  9. Record the output from your drain and flush amount on your drainage record.    Follow-Up:   - Please contact [facility] Outpatient Scheduling at [phone].    Please seek medical evaluation for:  - Nausea and/or vomiting   - Diffuse abdominal pain  - Fevers (greater than 101 " F)    Call [facility] RN Line at [phone] with tube related questions or concerns:  - Drainage tube falls out, is pulled back or felt to be out of position.   - Unable to flush drainage tube.   - Leakage (or purulent drainage) around drainage tube site.   - Extreme pain at drainage tube site.   - Outputs suddenly stop or significantly reduces.   - Warmth, redness, swelling or tenderness around the drainage tube

## 2023-11-01 NOTE — PRE-PROCEDURE
GENERAL PRE-PROCEDURE:   Procedure:  Imaging guided right chest tube placement with moderate sedation  Date/Time:  11/1/2023 9:16 AM    Verbal consent obtained?: Yes    Written consent obtained?: Yes    Risks and benefits: Risks, benefits and alternatives were discussed    DC Plan: Appropriate discharge home plan in place for patients who are going home after procedure   Consent given by:  Patient  Patient states understanding of procedure being performed: Yes    Patient's understanding of procedure matches consent: Yes    Procedure consent matches procedure scheduled: Yes    Expected level of sedation:  Moderate  Appropriately NPO:  Yes  ASA Class:  3  Mallampati  :  Grade 3- soft palate visible, posterior pharyngeal wall not visible  Lungs:  Lungs clear with good breath sounds bilaterally  Heart:  Normal heart sounds and rate  History & Physical reviewed:  History and physical reviewed and no updates needed  Statement of review:  I have reviewed the lab findings, diagnostic data, medications, and the plan for sedation

## 2023-11-01 NOTE — PROGRESS NOTES
Renal progress note  CC:Hypernatremia , SUSSY  Assessment and Plan:  52-year-old female with history of obesity JARVIS asthma history of mood disorders admitted with scheduled laparoscopic sleeve gastrectomy on 10/9/2023 complicated by peritonitis with small bowel injury will return tomorrow 10/12/2023 for enterotomy closure and cleanout with drain placement.  Pelvic fluid aspiration needed on 10/24/2023 overall course complicated by encephalopathy delirium septic shock with peritoneal leak acute respiratory failure requiring intubation from 10/12 to 10/21/2023 and oliguric SUSSY with very poor oral intakes.  Nephrology following for SUSSY    Oliguric SUSSY  SUSSY likely secondary to hemodynamic injury for acute renal failure  With peritoneal leak, creatinine peaked at 6.97 on 10/14/2023  Likely exacerbated by NSAIDs hypotension vasodilation and intravascular volume depletion leading to hemodynamic ATN currently and slow recovery phase  Creatinine improved to 1.6  Baseline creatinine 0.7 within normal limits no prior history of SUSSY or CKD   --> Follow on serial creatinines on daily labs  Strict I&O altered mentation and hallucinations seen today are unlikely secondary to metabolic issues its possibility with delirium with prolonged hospitalization and critical illness    Hypertension mild hypervolemia with mostly intravascular volume depletion   Blood pressure remains on the higher side, following with addition of IV Bumex 0.5 mg TID    continue on hydralazine And metoprolol and amlodipine     Hypernatremia - improved with hypotonic IVF, loop diuretics now resumed, follow Na+ daily for now     Anemia likely.    Currently Hb around 8.7  Transfuse per Surgical team recommendation    History of gastric bypass  Multiple complications post sleeve gastrectomy complicated by peritonitis and small bowel injury with peritoneal leak  Return to the OR 10/12/2023 with cleanout and drain placement complicated by other fluid  collections requiring aspiration and drain placement  On Vanco Zosyn and micafungin ID following  On tube feeds and TPN weaned off patient's intakes remain pretty poor   Diet management per primary team bariatric surgery    Ongoing metabolic encephalopathy with worsening delirium  Visual hallucinations management per primary hospitalist team    History of cardiomyopathy possible stress-induced Takotsubo  Management per primary team  Resumed IV Bumex 0.5 mg Q8 hours 10/31  On hydralazine metoprolol and amlodipine    Acute hypoxemic hypercapnic respiratory failure  Pulmonary following, right-sided pleural effusion and s/p chest tube 11/1/23     Prior history of JARVIS and asthma  Management per primary team    Thank you for the consultation we will follow  Marlen Cintron PA-C   [facility]  [phone]      Subjective  Seen just returning from IR following chest tube placement, patient is still groggy from sedation and on BiPAP, RN at bedside who reports patient is more comfortable on BiPAP compared to nasal cannula and has been requesting NIPPV. No UO documented today, had good response yesterday but may need to consider increasing diuretics further given ongoing hypertension and respiratory difficulty.      Objective    Vital signs in last 24 hours  Temp:  [97.2  F (36.2  C)-98.8  F (37.1  C)] 97.2  F (36.2  C)  Pulse:  [76-93] 84  Resp:  [16-35] 34  BP: (142-182)/(70-90) 175/79  SpO2:  [93 %-98 %] 94 %  Weight:   [unfilled]    Intake/Output last 3 shifts  I/O last 3 completed shifts:  In: 2487.16 [P.O.:190; I.V.:1959.16; NG/GT:229]  Out: 1920 [Urine:1800; Drains:100; Other:20]  Intake/Output this shift:  No intake/output data recorded.    Physical Exam  Groggy following sedation, opens eyes but does not interact   CV: RRR without murmur or rub  Lung: clear and equal; no extra sounds, + BiPAP, right sided chest tube with serosanguineous drainage   Ab: soft and NT; + distended; normal bs; midline  incision; guarding upper abdomen  Ext: Trace edema and well perfused  Skin; no rash    Pertinent Labs   Lab Results   Component Value Date    WBC 12.6 (H) 11/01/2023    HGB 8.6 (L) 11/01/2023    HCT 28.6 (L) 11/01/2023    MCV 95 11/01/2023     11/01/2023     Lab Results   Component Value Date    BUN 27.9 (H) 11/01/2023     11/01/2023     11/01/2023    CO2 22 11/01/2023       Lab Results   Component Value Date    ALBUMIN 3.2 (L) 10/23/2023     Lab Results   Component Value Date    PHOS 3.4 10/27/2023     I reviewed all lab results    Marlen Cintron PA-C

## 2023-11-01 NOTE — PROGRESS NOTES
"[facility] Progress Note      ASSESSMENT:  Post gastric surgery  C/b HCAP,sepsis due to peritonitis post washout   Staph epi grew from BAL - completed 10 days vancomycin 10/31 in case this was pathogen.   Pleural fluid culture negative from 10/24 -- 400cc removed   Pleural fluid 11/1 with chest tube 1825 WBC, 66% PMNs, await culture.   Had rash but TSS or TEN or DRESS not present  Intra-abdominal infection -- perihepatic drain 10/19 -- lactobacillus; 10/24 pelvic fluid aspirate -- lactobacillus and candida   Previous strep anginosus from washout 10/12   Repeat CT 10/30 -- perihepatic fluid decreased. tPA to drain 10/31.        RECOMMENDATION:  Micafungin for yeast  Zosyn for strep and lactobacillus  Off vancomycin  Potentially oral regimen augmentin and fluconazole at some point -- duration depends on resolution of fluid collections on imaging    Jose Antonio Bond MD  [facility]  [phone] clinic  [employer] paging  ______________________________________________________________________     SUBJECTIVE:  chest tube placed. Had tPA to MICKIE yesterday. NG placed. Has pain at tube sites. On BiPAP when I was by.           REVIEW OF SYSTEMS:  Negative unless as listed above.  Social history, Family history, Medications: reviewed.    OBJECTIVE:  BP (!) 154/71   Pulse 81   Temp 98.2  F (36.8  C) (Oral)   Resp 18   Ht 1.6 m (5' 3\")   Wt 131 kg (288 lb 12.8 oz)   LMP 09/09/2023 (Exact Date)   SpO2 95%   BMI 51.16 kg/m                PHYSICAL EXAM:  bipap  Sclera normal color, not injected  CARDIOVASCULAR regular rate and rhythm, no murmur  Lungs R chest tube  Abdomen soft, NT, incision midline looks good, steri strips in place; RUQ drain with cloudy fluid  Skin normal  Joints normal  Neurologic exam non focal  Rash R torso is improved        Antibiotics:  See MAR,multiple   Pertinent labs:  Lab Results   Component Value Date    WBC 14.5 10/23/2023    WBC 5.5 11/17/2020     Lab " "Results   Component Value Date    RBC 3.16 10/23/2023    RBC 4.13 11/17/2020     Lab Results   Component Value Date    HGB 9.2 10/23/2023    HGB 12.3 11/17/2020     Lab Results   Component Value Date    HCT 30.9 10/23/2023    HCT 37.4 11/17/2020     No components found for: \"MCT\"  Lab Results   Component Value Date    MCV 98 10/23/2023    MCV 91 11/17/2020     Lab Results   Component Value Date    MCH 29.1 10/23/2023    MCH 29.8 11/17/2020     Lab Results   Component Value Date    MCHC 29.8 10/23/2023    MCHC 32.9 11/17/2020     Lab Results   Component Value Date    RDW 14.9 10/23/2023    RDW 12.7 11/17/2020     Lab Results   Component Value Date     10/23/2023     11/17/2020       Last Comprehensive Metabolic Panel:  Sodium   Date Value Ref Range Status   11/01/2023 142 135 - 145 mmol/L Final     Comment:     Reference intervals for this test were updated on 09/26/2023 to more accurately reflect our healthy population. There may be differences in the flagging of prior results with similar values performed with this method. Interpretation of those prior results can be made in the context of the updated reference intervals.    11/01/2023 142 135 - 145 mmol/L Final     Comment:     Reference intervals for this test were updated on 09/26/2023 to more accurately reflect our healthy population. There may be differences in the flagging of prior results with similar values performed with this method. Interpretation of those prior results can be made in the context of the updated reference intervals.  Reference intervals for this test were updated on 09/26/2023 to more accurately reflect our healthy population. There may be differences in the flagging of prior results with similar values performed with this method. Interpretation of those prior results can be made in the context of the updated reference intervals.    11/17/2020 141 133 - 144 mmol/L Final     Potassium   Date Value Ref Range Status   11/01/2023 " "3.8 3.4 - 5.3 mmol/L Final   05/17/2022 4.5 3.4 - 5.3 mmol/L Final   11/17/2020 4.1 3.4 - 5.3 mmol/L Final     Chloride   Date Value Ref Range Status   11/01/2023 106 98 - 107 mmol/L Final   05/17/2022 102 94 - 109 mmol/L Final   11/17/2020 109 94 - 109 mmol/L Final     Carbon Dioxide   Date Value Ref Range Status   11/17/2020 31 20 - 32 mmol/L Final     Carbon Dioxide (CO2)   Date Value Ref Range Status   11/01/2023 22 22 - 29 mmol/L Final   05/17/2022 32 20 - 32 mmol/L Final     Anion Gap   Date Value Ref Range Status   11/01/2023 14 7 - 15 mmol/L Final   05/17/2022 2 (L) 3 - 14 mmol/L Final   11/17/2020 1 (L) 3 - 14 mmol/L Final     Glucose   Date Value Ref Range Status   05/17/2022 110 (H) 70 - 99 mg/dL Final   11/17/2020 84 70 - 99 mg/dL Final     Comment:     Fasting specimen     GLUCOSE BY METER POCT   Date Value Ref Range Status   11/01/2023 117 (H) 70 - 99 mg/dL Final     Urea Nitrogen   Date Value Ref Range Status   11/01/2023 27.9 (H) 6.0 - 20.0 mg/dL Final   05/17/2022 16 7 - 30 mg/dL Final   11/17/2020 9 7 - 30 mg/dL Final     Creatinine   Date Value Ref Range Status   11/01/2023 1.82 (H) 0.51 - 0.95 mg/dL Final   11/17/2020 0.79 0.52 - 1.04 mg/dL Final     GFR Estimate   Date Value Ref Range Status   11/01/2023 33 (L) >60 mL/min/1.73m2 Final   11/17/2020 88 >60 mL/min/[1.73_m2] Final     Comment:     Non  GFR Calc  Starting 12/18/2018, serum creatinine based estimated GFR (eGFR) will be   calculated using the Chronic Kidney Disease Epidemiology Collaboration   (CKD-EPI) equation.       Calcium   Date Value Ref Range Status   11/01/2023 8.7 8.6 - 10.0 mg/dL Final   11/17/2020 8.9 8.5 - 10.1 mg/dL Final       Liver Function Studies -   Recent Labs   Lab Test 10/23/23  0530   PROTTOTAL 7.1   ALBUMIN 3.2*   BILITOTAL 0.3   ALKPHOS 96   AST 19   ALT 26       No results found for: \"SED\"    No results found for: \"CRP\"      NOTE BD glucan test was 406 which is +      MICROBIOLOGY DATA:  Lung " fluid 76 wbc, no org 10/24, culture negative  Pelvic fluid lactobaccilus, candida dublinensis  11/1 pleural fluid 1825 WBC, 66% PMNs      IMAGING/RADIOLOGY:

## 2023-11-01 NOTE — SEDATION DOCUMENTATION
Chest tube in place. Tube hooked up to a vacu container and 900mls of dark yellow fluid removed. Noted pt to cough towards the end and tube clamped. Noted pts Resp rate increased and pt breathing more shallow.

## 2023-11-01 NOTE — SEDATION DOCUMENTATION
Patient Name: Mojgan Jimenez  Medical Record Number: [id number]  Today's Date: 11/1/2023    Procedure: Chest tube placement  Proceduralist: Dr. Fahrner    Procedure Start: 0923  Procedure end: 0937  Sedation medications administered: 1.5 mg midazolam and 50 mcg fentanyl   Sedation time: 14 minutes    Report given to: Primary nurse at bedside once returned to room 217      Other Notes: Pt arrived to IR room 1 from  Room 217 . Consent reviewed. Pt denies any questions or concerns regarding procedure. Pt positioned Right side up and monitored per protocol. Pt tolerated procedure without any noted complications. VSS on Transfer. Pt transferred back to  Room 217 .

## 2023-11-01 NOTE — PROGRESS NOTES
ASSESSMENT:  1. Intra-abdominal abscess (H)    2. Perimenopausal symptoms      Mojgan Jimenez is a 53 year old female who is s/p laparoscopic sleeve gastrectomy with KO on 10/9 post-op course complicated by small enterotomy x2 s/p laparoscopy converted to laparotomy with washout and enterotomy closure x2 on 10/12. Patient was intubated multiple times due to respiratory failure likely secondary to infection. She is on antibiotics to cover for her peritonitis and pneumonia.  Has been having difficulties with oral intake and delirium over the past 2 days, so feeding tube was replaced and thoracentesis with chest tube placement was performed.     Patient mentation has improved over the past 24 hours likely secondary to better compliance with her biPap. Hypertension has improved with better medication compliance as well. Chest tube in place and being managed by pulmonary    PLAN:  Appreciate pulmonary management of respiratory status and chest tube  Continue antibiotics for now  IR to manage perihepatic drain  Continue to increase tube feed to goal; ok to allow full liquid diet as well  OK to administer medications through NJ tube  Increase activity and continue to work with PT/OT    SUBJECTIVE:   She is feeling better. She denies abdominal pain but does report pain in her right chest where the tube was placed this morning. She is more alert and mentation appears much improved. She is still lethargic.       Patient Vitals for the past 24 hrs:   BP Temp Temp src Pulse Resp SpO2 Weight   11/01/23 1338 (!) 153/73 98.4  F (36.9  C) Axillary 74 18 96 % --   11/01/23 1221 (!) 177/83 -- -- 75 18 -- --   11/01/23 1145 (!) 168/78 -- -- 72 -- -- --   11/01/23 1120 -- -- -- 72 18 97 % --   11/01/23 1119 (!) 168/77 98.3  F (36.8  C) Axillary 72 20 -- --   11/01/23 1101 (!) 168/75 97.9  F (36.6  C) Axillary 74 20 97 % --   11/01/23 1042 (!) 158/74 98.6  F (37  C) Axillary 76 20 97 % --   11/01/23 1028 (!) 148/72 98.7  F (37.1  C)  Axillary 78 20 94 % --   11/01/23 1020 -- -- -- 80 24 92 % --   11/01/23 1017 -- -- -- -- -- -- 131 kg (288 lb 12.8 oz)   11/01/23 0945 (!) 175/79 -- -- 84 (!) 34 94 % --   11/01/23 0940 (!) 175/88 -- -- 87 (!) 33 96 % --   11/01/23 0935 (!) 176/79 -- -- 86 20 97 % --   11/01/23 0930 (!) 177/80 -- -- 86 20 97 % --   11/01/23 0925 (!) 182/82 -- -- 85 19 97 % --   11/01/23 0920 (!) 180/83 -- -- 86 26 97 % --   11/01/23 0914 (!) 177/90 -- -- 87 (!) 35 98 % --   11/01/23 0752 -- -- -- -- 20 -- --   11/01/23 0708 (!) 158/73 97.2  F (36.2  C) Oral -- 22 -- --   11/01/23 0402 (!) 171/79 97.6  F (36.4  C) Axillary -- 16 -- --   11/01/23 0049 (!) 165/77 -- -- 85 -- 95 % --   10/31/23 2353 (!) 175/81 97.3  F (36.3  C) Axillary 87 18 95 % --   10/31/23 2003 (!) 142/79 -- -- 88 20 -- --   10/31/23 1846 (!) 172/84 -- Oral 87 22 94 % --   10/31/23 1600 (!) 162/78 -- -- 77 -- -- --   10/31/23 1543 (!) 158/70 97.8  F (36.6  C) Oral 76 24 95 % --         PHYSICAL EXAM:  GEN: No acute distress, comfortable  LUNGS: CTA bilaterally; pleural fluid noted in chest tube with no air leak or tidaling present; currently on waterseal   CV:RRR; hypertensive  ABD:soft, not tender incisions CDI  Drains: scant serous   Output by Drain (mL) 10/30/23 0700 - 10/30/23 1459 10/30/23 1500 - 10/30/23 2259 10/30/23 2300 - 10/31/23 0659 10/31/23 0700 - 10/31/23 1459 10/31/23 1500 - 10/31/23 2259 10/31/23 2300 - 11/01/23 0659 11/01/23 0700 - 11/01/23 1359   Closed/Suction Drain 1 Right RUQ Bulb 12 [place]  0 15  60 40       EXT:No cyanosis, edema or obvious abnormalities    10/31 0700 - 11/01 0659  In: 2487.16 [P.O.:190; I.V.:1959.16]  Out: 1920 [Urine:1800; Drains:100]    No results displayed because visit has over 200 results.             Felecia Henderson, JUSTINE CNP

## 2023-11-02 ENCOUNTER — APPOINTMENT (OUTPATIENT)
Dept: RADIOLOGY | Facility: HOSPITAL | Age: 53
End: 2023-11-02
Attending: INTERNAL MEDICINE
Payer: COMMERCIAL

## 2023-11-02 LAB
ALBUMIN SERPL BCG-MCNC: 2.8 G/DL (ref 3.5–5.2)
ALP SERPL-CCNC: 112 U/L (ref 35–104)
ALT SERPL W P-5'-P-CCNC: 23 U/L (ref 0–50)
ANION GAP SERPL CALCULATED.3IONS-SCNC: 10 MMOL/L (ref 7–15)
AST SERPL W P-5'-P-CCNC: 24 U/L (ref 0–45)
BILIRUB DIRECT SERPL-MCNC: <0.2 MG/DL (ref 0–0.3)
BILIRUB SERPL-MCNC: 0.2 MG/DL
BUN SERPL-MCNC: 28 MG/DL (ref 6–20)
CALCIUM SERPL-MCNC: 9.1 MG/DL (ref 8.6–10)
CHLORIDE SERPL-SCNC: 108 MMOL/L (ref 98–107)
CREAT SERPL-MCNC: 1.62 MG/DL (ref 0.51–0.95)
DEPRECATED HCO3 PLAS-SCNC: 26 MMOL/L (ref 22–29)
EGFRCR SERPLBLD CKD-EPI 2021: 38 ML/MIN/1.73M2
ERYTHROCYTE [DISTWIDTH] IN BLOOD BY AUTOMATED COUNT: 14.2 % (ref 10–15)
GLUCOSE BLDC GLUCOMTR-MCNC: 121 MG/DL (ref 70–99)
GLUCOSE BLDC GLUCOMTR-MCNC: 133 MG/DL (ref 70–99)
GLUCOSE BLDC GLUCOMTR-MCNC: 140 MG/DL (ref 70–99)
GLUCOSE BLDC GLUCOMTR-MCNC: 142 MG/DL (ref 70–99)
GLUCOSE SERPL-MCNC: 145 MG/DL (ref 70–99)
HCT VFR BLD AUTO: 28.2 % (ref 35–47)
HGB BLD-MCNC: 8.3 G/DL (ref 11.7–15.7)
LDH SERPL L TO P-CCNC: 207 U/L (ref 0–250)
MAGNESIUM SERPL-MCNC: 1.7 MG/DL (ref 1.7–2.3)
MCH RBC QN AUTO: 28.4 PG (ref 26.5–33)
MCHC RBC AUTO-ENTMCNC: 29.4 G/DL (ref 31.5–36.5)
MCV RBC AUTO: 97 FL (ref 78–100)
PHOSPHATE SERPL-MCNC: 3 MG/DL (ref 2.5–4.5)
PLATELET # BLD AUTO: 333 10E3/UL (ref 150–450)
POTASSIUM SERPL-SCNC: 4 MMOL/L (ref 3.4–5.3)
PROT SERPL-MCNC: 6.7 G/DL (ref 6.4–8.3)
RBC # BLD AUTO: 2.92 10E6/UL (ref 3.8–5.2)
SODIUM SERPL-SCNC: 144 MMOL/L (ref 135–145)
SODIUM SERPL-SCNC: 144 MMOL/L (ref 135–145)
SODIUM SERPL-SCNC: 145 MMOL/L (ref 135–145)
WBC # BLD AUTO: 11.4 10E3/UL (ref 4–11)

## 2023-11-02 PROCEDURE — 250N000013 HC RX MED GY IP 250 OP 250 PS 637: Performed by: PHYSICIAN ASSISTANT

## 2023-11-02 PROCEDURE — 99232 SBSQ HOSP IP/OBS MODERATE 35: CPT | Performed by: INTERNAL MEDICINE

## 2023-11-02 PROCEDURE — 85027 COMPLETE CBC AUTOMATED: CPT | Performed by: STUDENT IN AN ORGANIZED HEALTH CARE EDUCATION/TRAINING PROGRAM

## 2023-11-02 PROCEDURE — 82248 BILIRUBIN DIRECT: CPT | Performed by: INTERNAL MEDICINE

## 2023-11-02 PROCEDURE — 83615 LACTATE (LD) (LDH) ENZYME: CPT | Performed by: INTERNAL MEDICINE

## 2023-11-02 PROCEDURE — 250N000011 HC RX IP 250 OP 636: Mod: JZ | Performed by: INTERNAL MEDICINE

## 2023-11-02 PROCEDURE — 84295 ASSAY OF SERUM SODIUM: CPT | Performed by: INTERNAL MEDICINE

## 2023-11-02 PROCEDURE — 71045 X-RAY EXAM CHEST 1 VIEW: CPT

## 2023-11-02 PROCEDURE — 99231 SBSQ HOSP IP/OBS SF/LOW 25: CPT | Performed by: INTERNAL MEDICINE

## 2023-11-02 PROCEDURE — 250N000013 HC RX MED GY IP 250 OP 250 PS 637: Performed by: INTERNAL MEDICINE

## 2023-11-02 PROCEDURE — 83735 ASSAY OF MAGNESIUM: CPT | Performed by: INTERNAL MEDICINE

## 2023-11-02 PROCEDURE — 84100 ASSAY OF PHOSPHORUS: CPT | Performed by: INTERNAL MEDICINE

## 2023-11-02 PROCEDURE — 258N000003 HC RX IP 258 OP 636: Performed by: NURSE PRACTITIONER

## 2023-11-02 PROCEDURE — 250N000011 HC RX IP 250 OP 636: Mod: JZ | Performed by: PHYSICIAN ASSISTANT

## 2023-11-02 PROCEDURE — 250N000011 HC RX IP 250 OP 636: Performed by: NURSE PRACTITIONER

## 2023-11-02 PROCEDURE — 120N000001 HC R&B MED SURG/OB

## 2023-11-02 PROCEDURE — 999N000157 HC STATISTIC RCP TIME EA 10 MIN

## 2023-11-02 PROCEDURE — 250N000013 HC RX MED GY IP 250 OP 250 PS 637: Performed by: NURSE PRACTITIONER

## 2023-11-02 PROCEDURE — 99232 SBSQ HOSP IP/OBS MODERATE 35: CPT | Performed by: PHYSICIAN ASSISTANT

## 2023-11-02 PROCEDURE — 80053 COMPREHEN METABOLIC PANEL: CPT | Performed by: STUDENT IN AN ORGANIZED HEALTH CARE EDUCATION/TRAINING PROGRAM

## 2023-11-02 PROCEDURE — 94660 CPAP INITIATION&MGMT: CPT

## 2023-11-02 RX ORDER — CARVEDILOL 12.5 MG/1
12.5 TABLET ORAL 2 TIMES DAILY WITH MEALS
Status: DISCONTINUED | OUTPATIENT
Start: 2023-11-02 | End: 2023-11-03

## 2023-11-02 RX ADMIN — MICAFUNGIN SODIUM 100 MG: 50 INJECTION, POWDER, LYOPHILIZED, FOR SOLUTION INTRAVENOUS at 10:48

## 2023-11-02 RX ADMIN — VENLAFAXINE 75 MG: 75 TABLET ORAL at 17:01

## 2023-11-02 RX ADMIN — PROCHLORPERAZINE EDISYLATE 10 MG: 5 INJECTION INTRAMUSCULAR; INTRAVENOUS at 22:27

## 2023-11-02 RX ADMIN — HEPARIN SODIUM 5000 UNITS: 5000 INJECTION, SOLUTION INTRAVENOUS; SUBCUTANEOUS at 06:26

## 2023-11-02 RX ADMIN — ALTEPLASE 4 MG: 2.2 INJECTION, POWDER, LYOPHILIZED, FOR SOLUTION INTRAVENOUS at 15:04

## 2023-11-02 RX ADMIN — CARVEDILOL 12.5 MG: 12.5 TABLET, FILM COATED ORAL at 16:00

## 2023-11-02 RX ADMIN — VENLAFAXINE 75 MG: 75 TABLET ORAL at 08:07

## 2023-11-02 RX ADMIN — HEPARIN SODIUM 5000 UNITS: 5000 INJECTION, SOLUTION INTRAVENOUS; SUBCUTANEOUS at 14:12

## 2023-11-02 RX ADMIN — AMLODIPINE BESYLATE 5 MG: 5 TABLET ORAL at 20:02

## 2023-11-02 RX ADMIN — HYDRALAZINE HYDROCHLORIDE 100 MG: 50 TABLET, FILM COATED ORAL at 20:01

## 2023-11-02 RX ADMIN — VENLAFAXINE 75 MG: 75 TABLET ORAL at 12:00

## 2023-11-02 RX ADMIN — NITROGLYCERIN 1 PATCH: 0.4 PATCH TRANSDERMAL at 08:04

## 2023-11-02 RX ADMIN — BUMETANIDE 0.5 MG: 0.25 INJECTION INTRAMUSCULAR; INTRAVENOUS at 20:00

## 2023-11-02 RX ADMIN — HEPARIN SODIUM 5000 UNITS: 5000 INJECTION, SOLUTION INTRAVENOUS; SUBCUTANEOUS at 21:56

## 2023-11-02 RX ADMIN — PIPERACILLIN AND TAZOBACTAM 3.38 G: 3; .375 INJECTION, POWDER, LYOPHILIZED, FOR SOLUTION INTRAVENOUS at 12:05

## 2023-11-02 RX ADMIN — AMLODIPINE BESYLATE 5 MG: 5 TABLET ORAL at 08:07

## 2023-11-02 RX ADMIN — PIPERACILLIN AND TAZOBACTAM 3.38 G: 3; .375 INJECTION, POWDER, LYOPHILIZED, FOR SOLUTION INTRAVENOUS at 20:01

## 2023-11-02 RX ADMIN — HYDRALAZINE HYDROCHLORIDE 100 MG: 50 TABLET, FILM COATED ORAL at 08:02

## 2023-11-02 RX ADMIN — METOPROLOL TARTRATE 100 MG: 25 TABLET, FILM COATED ORAL at 08:01

## 2023-11-02 RX ADMIN — Medication 40 MG: at 08:02

## 2023-11-02 RX ADMIN — TRAMADOL HYDROCHLORIDE 50 MG: 50 TABLET, COATED ORAL at 12:00

## 2023-11-02 RX ADMIN — PIPERACILLIN AND TAZOBACTAM 3.38 G: 3; .375 INJECTION, POWDER, LYOPHILIZED, FOR SOLUTION INTRAVENOUS at 03:35

## 2023-11-02 RX ADMIN — HYDRALAZINE HYDROCHLORIDE 100 MG: 50 TABLET, FILM COATED ORAL at 14:11

## 2023-11-02 RX ADMIN — TRAMADOL HYDROCHLORIDE 50 MG: 50 TABLET, COATED ORAL at 20:01

## 2023-11-02 RX ADMIN — BUMETANIDE 0.5 MG: 0.25 INJECTION INTRAMUSCULAR; INTRAVENOUS at 07:55

## 2023-11-02 RX ADMIN — ACETAMINOPHEN 650 MG: 325 TABLET ORAL at 15:41

## 2023-11-02 ASSESSMENT — ACTIVITIES OF DAILY LIVING (ADL)
ADLS_ACUITY_SCORE: 38
ADLS_ACUITY_SCORE: 41

## 2023-11-02 NOTE — PROGRESS NOTES
General Surgery Progress Note  Hospital Day # 24    S/P Lap Sleeve with Single Anastomosis DS 10/9/23 and then a Re-Operation on 10/12/2023 where it was found that she had 2 small enterotomies in the small bowel, likely form the traction of making the Duodenal anastomosis. She has been in and out of the ICU.    Subjective:   Pt is feeling some pain in the RUQ where her chest tube and drain are located.  She is up to goal tube feeds.    Vitals:    11/01/23 1549 11/01/23 1550 11/01/23 2042 11/01/23 2111   BP: (!) 154/71 (!) 154/71 (!) 151/86    BP Location: Left arm      Pulse:  81 85 67   Resp:  18     Temp: 98.2  F (36.8  C)      TempSrc: Oral      SpO2:  95%  96%   Weight:       Height:           Physical Exam:  Lungs:  CTA  CV:       RRR  Ab:       Soft, + BS,     Recent Labs   Lab 11/02/23  0616   WBC 11.4*   HGB 8.3*   HCT 28.2*          Recent Labs   Lab 11/02/23  0616     144   CO2 26   BUN 28.0*   Cr                        1.62  K                          4.0    Assessment:  Pt is progressing slowly.  She continues to have a segment of her R lower lobe of her lung that is consollidated.  Otherwise she is very weak and deconditioned.  She is up to goal tube feed rate.  As she gets stronger we will get the tube out and have her eat on her own.    Plan: Continue Rehab    Hoang Worthy MD  [facility]  [phone]

## 2023-11-02 NOTE — PLAN OF CARE
Problem: Bariatric Surgery  Goal: Optimal Pain Control and Function  Intervention: Prevent or Manage Pain  Recent Flowsheet Documentation  Taken 11/2/2023 1541 by Alexis Huston RN  Pain Management Interventions:   repositioned   medication (see MAR)  Taken 11/2/2023 1200 by Alexis Huston RN  Pain Management Interventions: medication (see MAR)     Problem: Bariatric Surgery  Goal: Effective Oxygenation and Ventilation  Intervention: Optimize Oxygenation and Ventilation  Recent Flowsheet Documentation  Taken 11/2/2023 1615 by Alexis Huston RN  Head of Bed (HOB) Positioning: HOB at 30 degrees  Taken 11/2/2023 1255 by Alexis Huston RN  Head of Bed (HOB) Positioning: HOB at 30 degrees  Taken 11/2/2023 0930 by Alexis Huston RN  Head of Bed (HOB) Positioning: HOB at 30-45 degrees  Taken 11/2/2023 0746 by Alexis Huston RN  Head of Bed (HOB) Positioning: HOB at 30 degrees     Problem: Bariatric Surgery  Goal: Effective Gastrointestinal Motility and Elimination  Outcome: Progressing  Goal: Anesthesia/Sedation Recovery  Intervention: Optimize Anesthesia Recovery  Recent Flowsheet Documentation  Taken 11/2/2023 1615 by Alexis Huston RN  Safety Promotion/Fall Prevention:   activity supervised   lighting adjusted   patient and family education  Administration (IS): (refusing after several attempts) --  Taken 11/2/2023 1255 by Alexis Huston RN  Safety Promotion/Fall Prevention:   activity supervised   lighting adjusted   patient and family education  Taken 11/2/2023 0746 by Alexis Huston RN  Safety Promotion/Fall Prevention:   activity supervised   lighting adjusted   patient and family education  Administration (IS): (pt refused to preform IS) --  Goal: Optimal Pain Control and Function  Intervention: Prevent or Manage Pain  Recent Flowsheet Documentation  Taken 11/2/2023 1541 by Alexis Huston RN  Pain Management Interventions:   repositioned   medication (see MAR)  Taken 11/2/2023 1200  "by Alexis Huston RN  Pain Management Interventions: medication (see MAR)  Goal: Effective Urinary Elimination  Outcome: Progressing  Goal: Effective Oxygenation and Ventilation  Outcome: Progressing  Intervention: Optimize Oxygenation and Ventilation  Recent Flowsheet Documentation  Taken 11/2/2023 1615 by Alexis Huston RN  Head of Bed (HOB) Positioning: HOB at 30 degrees  Taken 11/2/2023 1255 by Alexis Huston RN  Head of Bed (HOB) Positioning: HOB at 30 degrees  Taken 11/2/2023 0930 by Alexis Huston RN  Head of Bed (HOB) Positioning: HOB at 30-45 degrees  Taken 11/2/2023 0746 by Alexis Huston RN  Head of Bed (HOB) Positioning: HOB at 30 degrees     Problem: Bariatric Surgery  Goal: Effective Urinary Elimination  Outcome: Progressing     Problem: Risk for Delirium  Goal: Improved Behavioral Control  Intervention: Minimize Safety Risk  Recent Flowsheet Documentation  Taken 11/2/2023 1615 by Alexis Huston RN  Enhanced Safety Measures:  at bedside  Taken 11/2/2023 1255 by Alexis Huston RN  Enhanced Safety Measures:  at bedside  Taken 11/2/2023 0746 by Alexis Huston RN  Enhanced Safety Measures:  at bedside     Problem: Parenteral Nutrition  Goal: Effective Intravenous Nutrition Therapy Delivery  Intervention: Optimize Intravenous Nutrition Delivery  Recent Flowsheet Documentation  Taken 11/2/2023 1615 by Alexis Huston RN  Medication Review/Management: medications reviewed  Taken 11/2/2023 1255 by Alexis Huston RN  Medication Review/Management: medications reviewed  Taken 11/2/2023 0746 by Alexis Huston RN  Medication Review/Management: medications reviewed    Pt has been alert and oriented.    Turned and repositioned in bed.  Refusing to get up and work with therapies.  \" I will do it tomorrow.\"  Much encouragement and instructed on importance of activity.  Still refusing.     Has been going between Bipap and oxygen at 2 liters " "nasal canula through out the day.  Sats maintaining in mid 90's.  Refusing incentive spirometry.    Tube feeding running at goal rate of 55 ml/hr.  She has had 3 large loose stools.  MICKIE patent.  Alteplase given through tube as ordered.  Pinkish milky output.     Chest tube intact.  10 ml output on day shift.    Complaining of pain on right side.  Did take tylenol and ultram.  With some help.  Declined dilaudid.  \" It may make me nauseous.                          "

## 2023-11-02 NOTE — PROGRESS NOTES
Care Management Follow Up    Length of Stay (days): 24    Expected Discharge Date: 11/08/2023     Concerns to be Addressed: medically complex, chest tubes, IV medication, nurse 1:1  Patient plan of care discussed at interdisciplinary rounds: Yes    Anticipated Discharge Disposition: Transitional Care     Anticipated Discharge Services: None  Anticipated Discharge DME:      Patient/family educated on Medicare website which has current facility and service quality ratings: yes  Education Provided on the Discharge Plan: Yes  Patient/Family in Agreement with the Plan: yes    Referrals Placed by CM/SW: Post Acute Facilities  Private pay costs discussed: Not applicable    Additional Information:  Patient with h/o obesity, severe JARVIS on CPAP, asthma, stimulant use disorder, stimulant induced psychosis, mood & anxiety disorder. Admitted 10/9/23 for scheduled laparoscopic sleeve gastrectomy with single anastomosis duodenal switch. She was later found to have two small bowel injuries with peritonitis. 10/12/23 returned to OR for small bowel interotomy closure, clean-out, & drain placement; 10/19 found to have RUQ fluid collection s/p drain placement; 10/24 s/p pelvic fluid aspiration.   Complicated medical course.  Surgery, Nephrology. Pulmonology and ID following.     Social History:  Patient lives with her adult daughter and is independent with all activities of daily living and instrumental activities of daily living (IADLs) at baseline. She is currently unemployed but planned to resume working after recovery. Daughter Karla is primary family contact.     11/2/23:  Patient is not medically ready for discharge. [facility].    Naomi Smaayoa RN

## 2023-11-02 NOTE — PLAN OF CARE
Goal Outcome Evaluation: Vss. Patient reports intermittent pain that spikes to 4/10 but declines pain medication. MICKIE and chest tube patent and draining. Patient is oriented but then does have some intermittent confusion such as thinking someone was sitting in the chair next to her when there was no one, reoriented. Able to make needs known. Bipap on most of night with one short break. Purewick in place draining yellow urine. TF @ 55/hr which was increased to goal rate of 55 at 0330. SCD's on. O2 sats steady even when bipap was off briefly. Continue to monitor.       Problem: Bariatric Surgery  Goal: Optimal Pain Control and Function  Outcome: Progressing     Problem: Bariatric Surgery  Goal: Nausea and Vomiting Relief  Outcome: Progressing     Problem: Bariatric Surgery  Goal: Effective Urinary Elimination  Outcome: Progressing     Problem: Bariatric Surgery  Goal: Effective Oxygenation and Ventilation  Outcome: Progressing  Intervention: Optimize Oxygenation and Ventilation  Recent Flowsheet Documentation  Taken 11/1/2023 2333 by lEli Mcmahon, RN  Head of Bed (HOB) Positioning: HOB at 20-30 degrees

## 2023-11-02 NOTE — PROGRESS NOTES
PULMONARY PROGRESS NOTE    Date / Time of Admission:  10/9/2023  5:26 AM  Assessment:   53yoF with JARVIS, morbid obesity s/p sleeve gastrectomy 10/9/23 complicated by perforation and peritonitis s/p washout with subsequent abscess pockets requiring drain placement with recurrent, persistent right-sided pleural effusion considered parapneumonic previously but at high risk of development of trapped lung or empyema given proximity to hepatic fluid collections.     Principal Problem:    Morbid (severe) obesity due to excess calories (H)  Active Problems:    LINA (generalized anxiety disorder)    Recurrent major depressive disorder, remission status unspecified (H24)    Morbid obesity (H)    Obstructive sleep apnea    Intra-abdominal abscess (H)    Hypernatremia    SUSSY (acute kidney injury) (H24)    Accelerated hypertension    Metabolic encephalopathy    Acute respiratory failure with hypoxia (H)    Cardiomyopathy (H)      Plan:   11/1: CT guided drain placement.   11/1: 250cc   11/2: 10cc      Protein 4  Albumin 1.9    Pleural effusion s/p drained  Not consistent with empyema  Will get repeat CXR   If minimal output and Xray looks okay can consider removal      Subjective:   HPI:  Mojgan Jimenez is a 53 year old female with morbid obesity, JARVIS s/p laparoscopic sleeve gastrectomy 10/9 complicated by small bowel injury and peritonitis causing septic shock. She returned to the OR 10/12 for washout and drain placement She had persistent symptoms so was rescanned with a new RUQ fluid collection 10/19 and 10/24. She also was found to have a pleural effusion and underwent thoracentesis with 400cc removed. This was consistent with parapneumonic effusion, no empyema. She was extubated 10/26 and remains extubated.     She reports no big changes from yesterday  She does not really have pain at chest tube site.   She is wearing bipap and limited conversation      Allergies: No Known Allergies     MEDS:  Scheduled Meds:    "amLODIPine  5 mg Oral BID    bumetanide  0.5 mg Intravenous Q12H    carvedilol  12.5 mg Oral BID w/meals    heparin ANTICOAGULANT  5,000 Units Subcutaneous Q8H    hydrALAZINE  100 mg Oral or Feeding Tube TID    insulin aspart  1-4 Units Subcutaneous 4x Daily AC & HS    micafungin  100 mg Intravenous Q24H    nitroGLYcerin  1 patch Transdermal Daily    pantoprazole  40 mg Per Feeding Tube QAM AC    Or    pantoprazole  40 mg Intravenous QAM AC    piperacillin-tazobactam  3.375 g Intravenous Q8H    sodium chloride (PF)  3 mL Intracatheter Q8H    venlafaxine  75 mg Oral TID w/meals     Continuous Infusions:   dextrose       PRN Meds:.acetaminophen **OR** acetaminophen, albuterol, albuterol, dextrose, glucose **OR** dextrose **OR** glucagon, hydrALAZINE, HYDROmorphone, ipratropium - albuterol 0.5 mg/2.5 mg/3 mL, lidocaine 4%, lidocaine (buffered or not buffered), menthol-zinc oxide, miconazole, naloxone **OR** naloxone **OR** naloxone **OR** naloxone, OLANZapine, ondansetron **OR** ondansetron, phenol, prochlorperazine **OR** prochlorperazine, sodium chloride (PF), traMADol    Objective:   VITALS:  BP (!) 153/77   Pulse 88   Temp 97.7  F (36.5  C) (Oral)   Resp 20   Ht 1.6 m (5' 3\")   Wt 131 kg (288 lb 12.8 oz)   LMP 09/09/2023 (Exact Date)   SpO2 95%   BMI 51.16 kg/m    EXAM:    GENERAL APPEARANCE: Alert, no acute distress  HENT: bipap mask in place  NECK: large collar size  RESP: decreased breath sounds bilaterally, right sided chest tube in place and bulb drain in place  CV: regular rate and rhythm, no murmur, rub or gallop  ABDOMEN: soft, obese, dressing in place  SKIN: no suspicious lesions or rashes on visible skin  NEURO: awake, responsive          I&O:    Date 10/31/23 0700 - 11/01/23 0659   Shift 6706-0887 7684-9996 3399-5513 24 Hour Total   INTAKE   P.O. 110   110   I.V. 1515.83   1515.83   Shift Total(mL/kg) 1625.83(11.99)   1625.83(11.99)   OUTPUT   Urine 600   600   Shift Total(mL/kg) 600(4.42)   " 600(4.42)   Weight (kg) 135.6 135.6 135.6 135.6       Data Review:    Imaging: personally reviewed  Abdominal CT chest  EXAM: CT ABDOMEN W CONTRAST  LOCATION: [facility]  DATE: 10/30/2023     INDICATION: Follow-up drain placement. Determine if patient may need TPA through drain.  COMPARISON: 10/25/2023. Others.  TECHNIQUE: CT scan of the abdomen was performed following injection of IV contrast. Multiplanar reformats were obtained. Dose reduction techniques were used.  CONTRAST: 75 mL Isovue 370     FINDINGS:    LOWER CHEST: Incompletely seen small to moderate right pleural effusion and complete right lower lobe atelectasis.     HEPATOBILIARY: Exchange of prior perihepatic drain was performed on 10/25/2023. Current drain tip coils adjacent to the hepatic dome. The perihepatic fluid collection has minimally improved. No focal hepatic abnormality.     PANCREAS: Normal.     SPLEEN: Normal.     ADRENAL GLANDS: Normal.     KIDNEYS: Normal.     BOWEL: Surgical changes stomach. No evidence for bowel obstruction.     LYMPH NODES: No adenopathy.     VASCULATURE: Nonaneurysmal aorta.     MUSCULOSKELETAL: Nothing acute.                                                                      IMPRESSION:      1.  Perihepatic drain in place with tip adjacent to the hepatic dome. Minimal improvement of the perihepatic collection since 10/25/2023. Small perihepatic collection remains.     2.  Small to moderate sized right pleural effusion and associated collapse of the right lower lobe.  Results for orders placed or performed during the hospital encounter of 10/09/23   Basic metabolic panel   Result Value Ref Range    Sodium 144 135 - 145 mmol/L    Potassium 4.0 3.4 - 5.3 mmol/L    Chloride 108 (H) 98 - 107 mmol/L    Carbon Dioxide (CO2) 26 22 - 29 mmol/L    Anion Gap 10 7 - 15 mmol/L    Urea Nitrogen 28.0 (H) 6.0 - 20.0 mg/dL    Creatinine 1.62 (H) 0.51 - 0.95 mg/dL    GFR Estimate 38 (L) >60 mL/min/1.73m2     Calcium 9.1 8.6 - 10.0 mg/dL    Glucose 145 (H) 70 - 99 mg/dL     Lab Results   Component Value Date    WBC 11.4 (H) 11/02/2023    HGB 8.3 (L) 11/02/2023    HCT 28.2 (L) 11/02/2023    MCV 97 11/02/2023     11/02/2023

## 2023-11-02 NOTE — PROGRESS NOTES
"  Interventional Radiology - Progress Note  Inpatient - [facility]  11/02/2023     S:  having some pain on right side - chest tube, abscess drain. Continues to be on biPAP. WBC improved today to 11.4 < 12.6 < 14.3. Drain outputs seem to have improved with tPA. No leaking at drain exit site. On I:I.    Culture:     1+ Lactobacillus species Abnormal         Antibiotic: IV zosyn   Flushing: NS 10ml Q shift    O:  BP (!) 162/79 (BP Location: Left arm)   Pulse 82   Temp 99.2  F (37.3  C) (Oral)   Resp 20   Ht 1.6 m (5' 3\")   Wt 131 kg (288 lb 12.8 oz)   LMP 09/09/2023 (Exact Date)   SpO2 99%   BMI 51.16 kg/m    General:  Stable.  In no acute distress.    Neuro:  Sleeping. Generally weak.   Resp:  On Bipap. Non-labored breathing.   Chest: right chest tube to pleura-vac with bloody OP noted.  Abdomen: pelvic drain intact to MICKIE bulb with thick tan/yellow drainage note in tubing/bulb; no leakage; holding suction.     IMAGING:  EXAM: CT ABDOMEN W CONTRAST  LOCATION: [facility]  DATE: 10/30/2023     INDICATION: Follow-up drain placement. Determine if patient may need TPA through drain.  COMPARISON: 10/25/2023. Others.  TECHNIQUE: CT scan of the abdomen was performed following injection of IV contrast. Multiplanar reformats were obtained. Dose reduction techniques were used.  CONTRAST: 75 mL Isovue 370     FINDINGS:    LOWER CHEST: Incompletely seen small to moderate right pleural effusion and complete right lower lobe atelectasis.     HEPATOBILIARY: Exchange of prior perihepatic drain was performed on 10/25/2023. Current drain tip coils adjacent to the hepatic dome. The perihepatic fluid collection has minimally improved. No focal hepatic abnormality.     PANCREAS: Normal.     SPLEEN: Normal.     ADRENAL GLANDS: Normal.     KIDNEYS: Normal.     BOWEL: Surgical changes stomach. No evidence for bowel obstruction.     LYMPH NODES: No adenopathy.     VASCULATURE: " Nonaneurysmal aorta.     MUSCULOSKELETAL: Nothing acute.                                                                      IMPRESSION:      1.  Perihepatic drain in place with tip adjacent to the hepatic dome. Minimal improvement of the perihepatic collection since 10/25/2023. Small perihepatic collection remains.     2.  Small to moderate sized right pleural effusion and associated collapse of the right lower lobe.    LABS:  Recent Labs   Lab 11/02/23  0616 11/01/23  0547 10/31/23  0617 10/30/23  0612   WBC 11.4* 12.6* 14.3* 12.3*   HGB 8.3* 8.6* 9.1* 8.7*    363 390 393   CR 1.62* 1.82* 1.65* 1.62*     Drain Outputs (in mL): TPA administered on 10/31/23  1028 0   10/29 5   10/30 0   10/31 95   11/1 11/2        A:  53 year old yo female with PMH of LINA, HLD, methanol abuse, asthma, and JARVIS who was admitted to [facility] on 10/09/2023 for a planned sleeve gastrectomy with single anastomosis duodenal switch. Hospital course c/b encephalopathy, SUSSY, intra-abdominal abscesses, and delirium. Back to OR for closure of two small bowel enterotomies, surgical drain placement (removed 10/25/23) and wash out on 10/12/23. F/U CT demonstrated RUQ fluid collection - CT guided 10F drain placement 10/19/23 and CT guided aspiration of pelvic fluid collection 10/24/23, no drain placed.      CT 10/30/23 without much improvement in perihepatic collection. 10/31/23 and 11/1/23 s/p tPA administration via drain tubing; noted to have significant increase improvement in drainage output.      P:    -  Case discussed with Dr. Worthy, drain removal vs continuing with tPA. Would recommend continuing with daily assessments for tPA instillation. If there continues to be significant OP following tPA would recommend continuing daily tPA.  - Will proceed with 4mg of tPA in 25mL NS - ORDERED. Discussed with floor RN.  - CT without much improvement in fluid collection. S/p instillation of tPA into drain to optimize drain OP on 10/31; pt noted  to have increased output. Repeat tPA today; discussed with bedside RN. Will re-evaluate tomorrow for possible repeat or routine tPA instillations would be of benefit.  - Continue to follow WBC, drain OPs and patient's clinical signs/symptoms for improvement.   - Continue present drain cares. Continue drain to MICKIE drainage. Flush as ordered; always flush in toward patient's body to keep internal portion of drain patent. Subtract irrigant from output and record output in I&O every shift/PRN.   - Antibiotics per ID.  - Will continue to follow patient's clinical status, imaging, drain(s) function and drain(s) OPs to determine drain follow up plan. Anticipating follow-up CT/abscessogram once drain outputs are <20mL/day for a consecutive 2 days, and/or pending patient's clinical status and drain function. It is is typically common for patients to discharge with drain in place.       Total time spent on the date of the encounter: 40 minutes.    Mary Echeverria PA-C  Interventional Radiology

## 2023-11-02 NOTE — PROGRESS NOTES
Daily Progress Note        CODE STATUS:  Full Code    11/02/23  Assessment/Plan:  Mojgan Jimenez is a 53 year old female with h/o obesity, severe JARVIS on CPAP, asthma, stimulant use disorder, stimulant induced psychosis, mood & anxiety disorder. Admitted 10/9/23 for scheduled laparoscopic sleeve gastrectomy with single anastomosis duodenal switch. She was later found to have two small bowel injuries with peritonitis. 10/12/23 returned to OR for small bowel interotomy closure, clean-out, & drain placement; 10/19 found to have RUQ fluid collection s/p drain placement; 10/24 s/p pelvic fluid aspiration.      Course complicated by encephalopathy/delirium, septic shock, suspected takotsubo syndrome, acute respiratory failure due to loss of protective airway reflexes & increased metabolic load requiring intubation (10/12-10/21/23; reintubated 10/21-10/26/23), & oliguric SUSSY with renal recovery. She has significantly improved: mental status clearing with some ongoing delirium at night, cardiomyopathy improved, ATN I recovery phase, and ongoing treatment of intra abdominal infection. She was made floor tele status 10/27/23. On 11/1/23 CT guided chest tube placed on right for persistent effusion, concern for parapneumonic one with risk of future trapped lung        Acute hypoxemic-hypercapnic respiratory failure -now weaned RA  - Rapid Response on 10/12, Pt with decreased LOC. Opens eyes to voice. SpO2 89% on 8L NC. BS clear and diminished. Placed on a 15L non-rebreather for SpO2 > 90%. After narcan admin pt became more responsive/agitated. Transferred to ICU and intubated -10/12-10/21/23; reintubated 10/21-10/26/23. Now extubated and weaned to RA  - 10/22/23 CT demonstrated complete RLL atelectasis, milder RUL & RBML atelectasis or consolidation. 10/22 bronchoscopy moderate RLL blood tinged secretions cleared.   - Cont pulmonary hygiene  - R-pleural effusion, exudative, suspect simple parapneumonic effusion. 10/24 s/p  thoracentesis, 400 mL. Pleural fluid amylase amylase 23, serum 64. 11/1/23--chest tube placed on right      Gastric bypass  10/9 s/p sleeve gastrectomy complicated by small bowel injury & peritonitis   10/12 returned to OR for closure, clean-out, & drain placement (now removed)  10/19 s/p RUQ fluid collection drain placement   10/24 s/p pelvic fluid aspiration (75 ml)  - Surgery primary   - ID consulted   - 10/12 - peritoneal cultures + Streptococcus anginosus, mixed aerobic & anaerobic kamla   - 10/19 - RUQ fluid cultures & pelvic fluid aspirate + Lactobacillus   - 10/22 - BDG blood +  - 10/24 pleural fluid studies  -#+WBC no pos cx. 10/24--aspirate pelvic drain--lacto + candida  - Was on Vanc, pip-tazo, micafungin. Vanco d/jose 10/31  - Was on TPN previously 10/14  - PPTF started 10/24/23. TF and TPN weaned of 10/27 currently on bariatric clears, surgery managing   - 10/27--per renal -on D5W for hypernatremia  - 10/28 still on D5W  -1 0/29 and 10/30--still on D5W, NA improved by 10/30 to 142, renal managing this  - Diet per surg  - Flush drain at regular intervals      3.Accelerated HTN /Cardiomyopathy   - Echo previously and initially decreased LV function but repeat echo shows EF normalized, possible stress induced.   - Cards appreciated and still following  - On metoprolol tartrate 100 mg bid per cards  - Hydralazine increase to 100 mg tid  - Nitroglycerin patch 0.4mg/24  - Amlodipine 5 mg bid  - Bumex back on per renal  - Prn IV hydralazine     4.History of JARVIS, asthma in the chart no PFTs  - NIPPV with sleep/naps everytime   - Continue duonebs     5.Oliguric SUSSY - improving   - Nephrology was consulted  for ARF peak creat 6.97 on 10/14  - Slowly improving and creatinine trending down--10/31 now 1.6  - Avoid nephrotoxic agents      6.Hypernatremia -   - IV bumex stopped and on D5W per renal .   - Trend sodium 153 -->148 -->144-->142-->139-->142  - Has needed D5W-per renal-off now  - Bumex restarted 10/31    "  7.Anemia - stable  - Suspect multifactorial secondary to sepsis & surgical blood loss   - Monitor ,11/1/ hgb 8.6     8.Hypervolemia  - Back on bumex     9.Hyperglycemia of critical illness   - Glargine 10 units every day--bs good, will stopped lantus and see how she does on ss  - LDSSI    - With TF starting later may need to restart lantus     10.Obesity  - BMI 46     11.Acute metabolic and toxic encephalopathy  - Due to above. Still fluctuating here   - Delirium protocol  - Needs to be up day, sleep night  - Up to chair if possible     12. Severe decondition  - Due to prolong hospitalization  - PT/OT eval         Diet: Room Service  Adult Formula Drip Feeding: Continuous Promote w fiber; Nasojejunal; Goal Rate: 55; mL/hr; Start at 15 ml/hr x 8 hrs and advance by 15 ml q 8 hrs to a goal rate of 55 ml/hr  DVT Prophylaxis: Heparin SQ  Patiño Catheter: Not present  Lines: PRESENT      PICC 10/28/23 Triple Lumen Right Basilic-Site Assessment: WDL    Clinically Significant Risk Factors              # Hypoalbuminemia: Lowest albumin = 2.7 g/dL at 10/14/2023  6:29 AM, will monitor as appropriate     # Hypertension: Noted on problem list        # Severe Obesity: Estimated body mass index is 51.16 kg/m  as calculated from the following:    Height as of this encounter: 1.6 m (5' 3\").    Weight as of this encounter: 131 kg (288 lb 12.8 oz).      # Asthma: noted on problem list        Disposition; TBD, [facility] vs TCU  Barrier to discharge; IV antibiotics.    Daughter. Karla called and updated.     Subjective:  Interval History: Patient seen and examined. Notes, labs, imaging reports personally reviewed. Patient is new to me today. Reports doing fairly well besides some pain in the right upper abdomen and chest wall area.     Review of Systems:   As mentioned in subjective.    Patient Active Problem List   Diagnosis    Mild persistent asthma without complication    Vitamin D deficiency    LINA (generalized anxiety disorder)    " Recurrent major depressive disorder, remission status unspecified (H24)    Methamphetamine abuse, episodic (H)    Hyperlipidemia LDL goal <160    Depression, major, recurrent, severe with psychosis (H)    IUD (intrauterine device) in place    Paranoia (H)    PMDD (premenstrual dysphoric disorder)    Polysubstance overdose    Suicidal ideation    Morbid obesity (H)    Mood disorder (H24)    Obstructive sleep apnea    Morbid (severe) obesity due to excess calories (H)    Intra-abdominal abscess (H)    Hypernatremia    SUSSY (acute kidney injury) (H24)    Accelerated hypertension    Metabolic encephalopathy    Acute respiratory failure with hypoxia (H)    Cardiomyopathy (H)       Scheduled Meds:   alteplase (ACTIVASE) 4 mg in sodium chloride (PF) 0.9% PF 25 mL solution in syringe for intracavitary irrigation  4 mg Intracavitary Once    amLODIPine  5 mg Oral BID    bumetanide  0.5 mg Intravenous Q12H    carvedilol  12.5 mg Oral BID w/meals    heparin ANTICOAGULANT  5,000 Units Subcutaneous Q8H    hydrALAZINE  100 mg Oral or Feeding Tube TID    insulin aspart  1-4 Units Subcutaneous 4x Daily AC & HS    micafungin  100 mg Intravenous Q24H    nitroGLYcerin  1 patch Transdermal Daily    pantoprazole  40 mg Per Feeding Tube QAM AC    Or    pantoprazole  40 mg Intravenous QAM AC    piperacillin-tazobactam  3.375 g Intravenous Q8H    sodium chloride (PF)  3 mL Intracatheter Q8H    venlafaxine  75 mg Oral TID w/meals     Continuous Infusions:   dextrose       PRN Meds:.acetaminophen **OR** acetaminophen, albuterol, albuterol, dextrose, glucose **OR** dextrose **OR** glucagon, hydrALAZINE, HYDROmorphone, ipratropium - albuterol 0.5 mg/2.5 mg/3 mL, lidocaine 4%, lidocaine (buffered or not buffered), menthol-zinc oxide, miconazole, naloxone **OR** naloxone **OR** naloxone **OR** naloxone, OLANZapine, ondansetron **OR** ondansetron, phenol, prochlorperazine **OR** prochlorperazine, sodium chloride (PF), traMADol    Objective:  Vital  signs in last 24 hours:  Temp:  [97.6  F (36.4  C)-99.2  F (37.3  C)] 97.6  F (36.4  C)  Pulse:  [67-95] 95  Resp:  [18-20] 20  BP: (151-162)/(71-86) 162/79  SpO2:  [95 %-99 %] 96 %        Intake/Output Summary (Last 24 hours) at 11/2/2023 1503  Last data filed at 11/2/2023 1435  Gross per 24 hour   Intake 1824.5 ml   Output 1750 ml   Net 74.5 ml       Physical Exam:    General: Not in obvious distress. Morbidly obese  HEENT: NC, AT. NJ in place   Chest: Diminished breath sounds due to obesity. Chest tube on the right side, laterally.  Heart: S1S2 normal, regular. No M/R/G  Abdomen: Soft. NT, ND. Bowel sounds- active.  Extremities: 1+ legs swelling  Neuro: Awake, grossly non-focal      Lab Results:(I have personally reviewed the results)    Recent Results (from the past 24 hour(s))   Glucose by meter    Collection Time: 11/01/23  4:50 PM   Result Value Ref Range    GLUCOSE BY METER POCT 111 (H) 70 - 99 mg/dL   Sodium    Collection Time: 11/01/23  5:42 PM   Result Value Ref Range    Sodium 142 135 - 145 mmol/L   Glucose by meter    Collection Time: 11/01/23  9:16 PM   Result Value Ref Range    GLUCOSE BY METER POCT 117 (H) 70 - 99 mg/dL   CBC with platelets    Collection Time: 11/02/23  6:16 AM   Result Value Ref Range    WBC Count 11.4 (H) 4.0 - 11.0 10e3/uL    RBC Count 2.92 (L) 3.80 - 5.20 10e6/uL    Hemoglobin 8.3 (L) 11.7 - 15.7 g/dL    Hematocrit 28.2 (L) 35.0 - 47.0 %    MCV 97 78 - 100 fL    MCH 28.4 26.5 - 33.0 pg    MCHC 29.4 (L) 31.5 - 36.5 g/dL    RDW 14.2 10.0 - 15.0 %    Platelet Count 333 150 - 450 10e3/uL   Basic metabolic panel    Collection Time: 11/02/23  6:16 AM   Result Value Ref Range    Sodium 144 135 - 145 mmol/L    Potassium 4.0 3.4 - 5.3 mmol/L    Chloride 108 (H) 98 - 107 mmol/L    Carbon Dioxide (CO2) 26 22 - 29 mmol/L    Anion Gap 10 7 - 15 mmol/L    Urea Nitrogen 28.0 (H) 6.0 - 20.0 mg/dL    Creatinine 1.62 (H) 0.51 - 0.95 mg/dL    GFR Estimate 38 (L) >60 mL/min/1.73m2    Calcium 9.1 8.6  "- 10.0 mg/dL    Glucose 145 (H) 70 - 99 mg/dL   Sodium    Collection Time: 11/02/23  6:16 AM   Result Value Ref Range    Sodium 144 135 - 145 mmol/L   Magnesium    Collection Time: 11/02/23  6:16 AM   Result Value Ref Range    Magnesium 1.7 1.7 - 2.3 mg/dL   Phosphorus    Collection Time: 11/02/23  6:16 AM   Result Value Ref Range    Phosphorus 3.0 2.5 - 4.5 mg/dL   Glucose by meter    Collection Time: 11/02/23  7:52 AM   Result Value Ref Range    GLUCOSE BY METER POCT 140 (H) 70 - 99 mg/dL   Glucose by meter    Collection Time: 11/02/23 12:12 PM   Result Value Ref Range    GLUCOSE BY METER POCT 142 (H) 70 - 99 mg/dL       All laboratory and imaging data in the past 24 hours reviewed  Serum Glucose range:   Recent Labs   Lab 11/02/23  1212 11/02/23  0752 11/02/23  0616 11/01/23  2116   * 140* 145* 117*     ABG: No lab results found in last 7 days.  CBC:   Recent Labs   Lab 11/02/23  0616 11/01/23  0547 10/31/23  0617   WBC 11.4* 12.6* 14.3*   HGB 8.3* 8.6* 9.1*   HCT 28.2* 28.6* 29.4*   MCV 97 95 95    363 390     Chemistry:   Recent Labs   Lab 11/02/23  0616 11/01/23  1742 11/01/23  0547 10/31/23  1756 10/31/23  0617     144 142 142  142   < > 139  139   POTASSIUM 4.0  --  3.8  --  3.8   CHLORIDE 108*  --  106  --  104   CO2 26  --  22  --  24   BUN 28.0*  --  27.9*  --  31.3*   CR 1.62*  --  1.82*  --  1.65*   GFRESTIMATED 38*  --  33*  --  37*   PALAK 9.1  --  8.7  --  9.1   MAG 1.7  --  2.0  --  1.8    < > = values in this interval not displayed.     Coags:  No results for input(s): \"INR\", \"PROTIME\", \"PTT\" in the last 168 hours.    Invalid input(s): \"APTT\"  Cardiac Markers:  No results for input(s): \"CKTOTAL\", \"TROPONINI\" in the last 168 hours.       CT Chest Tube with Cath Placement    Result Date: 11/1/2023  EXAM: 1. PERCUTANEOUS CHEST TUBE PLACEMENT RIGHT PLEURAL SPACE 2. CT GUIDANCE 3. CONSCIOUS SEDATION LOCATION: [facility] DATE: 11/1/2023 INDICATION: right " pleural fluid increasing in volume TECHNIQUE: Dose reduction techniques were used. PROCEDURE: Informed consent obtained. Site marked. Prior images reviewed. Required items made available. Patient identity confirmed verbally and with arm band. Patient reevaluated immediately before administering sedation. Universal protocol was followed. Time out performed. The site was prepped and draped in sterile fashion. 4 mL of 1% lidocaine was infused into the local soft tissues. Using standard technique and under direct CT guidance, a 10 Hebrew nonlocking chest tube catheter was inserted  into the pleural space. The catheter was fixed in place with sutures and adhesive device, and the tubing was banded. Chest tube placed to Pleur-evac suction. SPECIMEN: 1 L of clear yellow fluid was aspirated and sent to lab for testing. BLOOD LOSS: Minimal. The patient tolerated the procedure well. No immediate complications. SEDATION: Versed 1.5  mg. Fentanyl 50 mcg. The procedure was performed with administration intravenous conscious sedation with appropriate preoperative, intraoperative, and postoperative evaluation. 14  minutes of supervised face to face conscious sedation time was provided by a radiology nurse under my direct supervision.     IMPRESSION: 1.  Successful CT-guided chest tube placement into the right pleural space with moderate sedation . Reference CPT Codes: 90136, 90492    XR Feeding Tube Placement    Result Date: 10/31/2023  EXAM: XR FEEDING TUBE PLACEMENT LOCATION: [facility] DATE: 10/31/2023 INDICATION: Not tolerating oral intake. Place feeding tube. Single Anastamosis Duodenal Switch. Surgery 10 12 for enterotomy and sepsis. COMPARISON: CT AP 10/30/2023 and older studies, feeding tube placement 10/24/2023. TECHNIQUE: Routine. FINDINGS: Enteric tube placed without complication. Tip in the the small bowel. FLUOROSCOPIC TIME: 0.6 minutes NUMBER OF IMAGES: 1 Reference CPT Code: 09010     XR Chest  Port 1 View    Result Date: 10/31/2023  EXAM: XR CHEST PORT 1 VIEW LOCATION: [facility] DATE: 10/31/2023 INDICATION: 53 y o female s p bariatric surgery this admit, with complications, has drain, ongoing effusion right, last night n v check for infiltrate aspiration COMPARISON: CT 10/22/2023.     IMPRESSION: Moderate right pleural effusion with associated compressive atelectasis in the right lower lobe. Left basilar atelectasis or consolidation. Right upper extremity PICC with tip near the cavoatrial junction. Obscured right heart border from the  effusion. Cardiomediastinal silhouette otherwise within normal limits. Tubing projecting over the right upper quadrant abdomen. No significant interval change.    CT Abdomen w Contrast    Result Date: 10/30/2023  EXAM: CT ABDOMEN W CONTRAST LOCATION: [facility] DATE: 10/30/2023 INDICATION: Follow-up drain placement. Determine if patient may need TPA through drain. COMPARISON: 10/25/2023. Others. TECHNIQUE: CT scan of the abdomen was performed following injection of IV contrast. Multiplanar reformats were obtained. Dose reduction techniques were used. CONTRAST: 75 mL Isovue 370 FINDINGS:  LOWER CHEST: Incompletely seen small to moderate right pleural effusion and complete right lower lobe atelectasis. HEPATOBILIARY: Exchange of prior perihepatic drain was performed on 10/25/2023. Current drain tip coils adjacent to the hepatic dome. The perihepatic fluid collection has minimally improved. No focal hepatic abnormality. PANCREAS: Normal. SPLEEN: Normal. ADRENAL GLANDS: Normal. KIDNEYS: Normal. BOWEL: Surgical changes stomach. No evidence for bowel obstruction. LYMPH NODES: No adenopathy. VASCULATURE: Nonaneurysmal aorta. MUSCULOSKELETAL: Nothing acute.     IMPRESSION: 1.  Perihepatic drain in place with tip adjacent to the hepatic dome. Minimal improvement of the perihepatic collection since 10/25/2023. Small perihepatic  collection remains. 2.  Small to moderate sized right pleural effusion and associated collapse of the right lower lobe.     XR Feeding Tube Placement    Addendum Date: 10/27/2023    Addendum: TECHNIQUE: The existing NG tube in the stomach was removed and a feeding tube was placed with fluoroscopic guidance. Tip was placed in the small bowel post anastomosis. End of addendum    Result Date: 10/27/2023  EXAM: XR FEEDING TUBE PLACEMENT LOCATION: [facility] DATE: 10/24/2023 INDICATION: please exchange Nitza sump for a post pylroic feeding tube; FYI: new anastomosis just past pylorus COMPARISON: CT 10/22/2023 TECHNIQUE: Routine. FINDINGS: Enteric tube placed without complication. Tip in the jejunum just post anastomosis. FLUOROSCOPIC TIME: 1.5 NUMBER OF IMAGES: 3 Reference CPT Code: 44276     US Upper Extremity Venous Duplex Bilat    Result Date: 10/25/2023  EXAM: US UPPER EXTREMITY VENOUS DUPLEX BILATERAL LOCATION: [facility] DATE: 10/25/2023 INDICATION: ongoing fevers, evaluate for thrombus COMPARISON: None. TECHNIQUE: Venous Duplex ultrasound of both upper extremities with (when possible) and without compression, augmentation, and duplex. Color flow and spectral Doppler with waveform analysis performed. FINDINGS: Ultrasound includes evaluation of the internal jugular veins, innominate veins, subclavian veins, axillary veins, and brachial veins. The superficial cephalic and basilic veins were also evaluated where seen. RIGHT: No deep venous thrombosis. Occlusive and partially occlusive thrombus within the cephalic vein extending from the forearm to the antecubital fossa (length of involvement greater than 5 cm). LEFT: No deep venous thrombosis. Partially occlusive thrombus within the cephalic vein in the antecubital fossa near the IV insertion (less than 5 cm).     IMPRESSION: 1.  No deep venous thrombosis in the bilateral upper extremities. 2.  Superficial thrombus  within the cephalic veins bilaterally.    US Lower Extremity Venous Duplex Bilateral    Result Date: 10/25/2023  EXAM: US LOWER EXTREMITY VENOUS DUPLEX BILATERAL LOCATION: [facility] DATE: 10/25/2023 INDICATION: ongoing fevers, evaluate for thrombus COMPARISON: None. TECHNIQUE: Venous Duplex ultrasound of bilateral lower extremities with and without compression, augmentation and duplex. Color flow and spectral Doppler with waveform analysis performed. FINDINGS: Exam includes the common femoral, femoral, popliteal veins as well as segmentally visualized deep calf veins and greater saphenous vein. RIGHT: No deep vein thrombosis. No superficial thrombophlebitis. No popliteal cyst. LEFT: No deep vein thrombosis. No superficial thrombophlebitis. No popliteal cyst.     IMPRESSION: 1.  No deep venous thrombosis in the bilateral lower extremities.    CT Abdomen w/o Contrast    Result Date: 10/25/2023  EXAM: CT ABDOMEN W/O CONTRAST LOCATION: [facility] DATE: 10/25/2023 INDICATION: Perihepatic abscess status post drainage. Follow-up abscess. COMPARISON: 10/22/2023 TECHNIQUE: CT scan of the abdomen was performed without IV contrast. Multiplanar reformats were obtained. Dose reduction techniques were used. CONTRAST: None. FINDINGS:  LOWER CHEST: Stable right pleural effusion. Bibasilar atelectasis. Artifact. HEPATOBILIARY: Perihepatic drain remains in place. There remains a crescentic low-density process involving the subcapsular space at the site of drain which extends to the dome of the liver. PANCREAS: Normal. SPLEEN: Normal. ADRENAL GLANDS: Normal. KIDNEYS: Normal. BOWEL: Postsurgical changes of the stomach with enteric tube noted in small bowel in the right upper quadrant. LYMPH NODES: Normal. VASCULATURE: Minimal arterial plaque. MUSCULOSKELETAL: Normal.     IMPRESSION: 1.  Perihepatic drain remains in good position. Residual low-density material persists in this  subcapsular perihepatic space which extends to the dome of the liver. 2.  Stable appearance right pleural effusion and bibasilar atelectasis. 3.  Postsurgical changes of the stomach.    IR Abscess Tube Check    Result Date: 10/25/2023  [place] [facility] LOCATION: [facility] DATE: 10/25/2023 PROCEDURE: ABSCESS TUBE CHECK AND EXCHANGE/UPSIZING INTERVENTIONAL RADIOLOGIST: Teofilo Barber MD. INDICATION: 53-year-old female with a perihepatic collection status post drain placement presents for drain check due to decreased drain output. CT shows persistent perihepatic fluid collection.. CONSENT: The risks, benefits and alternatives of an abscessogram with possible exchange, manipulation or removal were discussed with the patient's family in detail. All questions were answered. Informed consent was given to proceed with the procedure. SEDATION: Patient is intubated and sedated. CONTRAST: 20 mL Omnipaque into the abscess cavity. ANTIBIOTICS: None. ADDITIONAL MEDICATIONS: None. FLUOROSCOPIC TIME: 3.4 minutes. RADIATION DOSE: Air Kerma: 102 mGy. COMPLICATIONS: No immediate complications. STERILE BARRIER TECHNIQUE: Maximum sterile barrier technique was used. Cutaneous antisepsis was performed at the operative site with application of 2% chlorhexidine and large sterile drape. Prior to the procedure, the  and assistant performed hand hygiene and wore hat, mask, sterile gown, and sterile gloves during the entire procedure. PROCEDURE:  A  image was obtained. The pre-existing drainage catheter was injected with a small amount of contrast and multiple images were obtained. Based on the results of the study it was elected to exchange the catheter. The pre-existing drainage catheter was removed over a wire and exchange was made for a Kumpe catheter. The catheter and wire were negotiated superiorly along the right hepatic dome. The Kumpe catheter was then exchanged for a new 12F locking loop drainage  catheter. The loop was partially formed within the residual abscess cavity. A post placement contrast injection was performed confirming that the new catheter controls the residual fluid collection well. FINDINGS: The indwelling catheter was clogged. Following exchange and repositioning, the new larger drainage catheter lies within the central aspect of the residual abscess cavity along the right hepatic dome and appears to control the fluid collection well.     IMPRESSION:  Exchange and repositioning with upsize of the perihepatic drainage catheter to a 12 Malagasy catheter. PLAN: Continue flushing the catheter and keep to suction drainage.     CT Abdomen Peritoneum Abscess Aspiration    Result Date: 10/24/2023  EXAM: 1. PERCUTANEOUS DRAIN PLACEMENT 2. CT GUIDANCE LOCATION: [facility] DATE: 10/24/2023 INDICATION: fluid collection in pelvis, ? drain placement TECHNIQUE: Dose reduction techniques were used. PROCEDURE: Informed consent obtained. Site marked. Prior images reviewed. Required items made available. Patient identity confirmed verbally and with arm band. Patient reevaluated immediately before administering sedation. Universal protocol was followed. Time out performed. The site was prepped and draped in sterile fashion. 10 mL of 1% lidocaine was infused into the local soft tissues. Using standard technique and under direct CT guidance, an 18-gauge guiding needle was placed into the pelvic fluid collection using a right transfemoral gluteal approach. 75 mL of yellow fluid with slight debris was aspirated. Because this did not have the appearance of yesica pus a drain was not placed. Fluid pocket 20 cm deep in the pelvis. Irrigation 4 times with sterile saline..  SPECIMEN: 75 mL of yellow debris filled fluid was aspirated and sent to lab for cultures and Gram stain. BLOOD LOSS: Minimal. The patient tolerated the procedure well. No immediate complications.     IMPRESSION: 1.  Successful  CT-guided aspiration of pelvic fluid collection with yellow slight debris filled fluid aspirated and sent for cultures. This pocket is 20 cm deep in the pelvis. No drain was placed but the pocket was irrigated 4 times with sterile saline.. Reference CPT Codes: 39173,    US Thoracentesis    Result Date: 10/24/2023  EXAM: 1. RIGHT THORACENTESIS 2. ULTRASOUND GUIDANCE LOCATION: [facility] DATE: 10/24/2023 INDICATION: Pleural effusion. PROCEDURE: Informed consent obtained. Time out performed. The chest was prepped and draped in sterile fashion. 10 mL of 1 % lidocaine was infused into the local soft tissues. Under direct ultrasound guidance, a 5 Upper sorbian catheter system was placed into the pleural effusion. 0.4 liters of clear yellow fluid were removed and sent to lab, if requested. Patient tolerated procedure well. Ultrasound imaging was obtained and placed in the patient's permanent medical record.     IMPRESSION: Status post right ultrasound-guided thoracentesis. Reference CPT Code: 37263    Echocardiogram Limited    Result Date: 10/23/2023  [id number]^MATEO^PATSY^[place] [address]  Name: LY GONZALEZ MRN: [id number] : 1970 Study Date: 10/23/2023 10:27 AM Age: 53 yrs Gender: Female Patient Location: [facility] Reason For Study: Cardiomyopathy Ordering Physician: PATSY GALINDO Performed By: ACE  BSA: 2.2 m2 Height: 63 in Weight: 273 lb HR: 108 BP: 157/72 mmHg ______________________________________________________________________________ Procedure Limited Portable Echo Adult. Definity (NDC #17166-739) given intravenously. ______________________________________________________________________________ Interpretation Summary  The left ventricle is normal in size. The visual ejection fraction is 60-65%. No regional wall motion abnormalities noted. Normal right ventricle size and systolic function. The rhythm was  sinus tachycardia. The study was technically difficult. Compared to echo from 10/14/23 thr LVEF has improved. ______________________________________________________________________________ Left Ventricle The left ventricle is normal in size. There is normal left ventricular wall thickness. The visual ejection fraction is 60-65%. No regional wall motion abnormalities noted.  Right Ventricle Normal right ventricle size and systolic function. TAPSE is normal, which is consistent with normal right ventricular systolic function.  Atria Normal left atrial size. Right atrial size is normal.  Mitral Valve The mitral valve is not well visualized. There is trace mitral regurgitation. There is no mitral valve stenosis.  Tricuspid Valve The tricuspid valve is not well visualized.  Aortic Valve The aortic valve is not well visualized. No aortic regurgitation is present. No aortic stenosis is present.  Pulmonic Valve Normal pulmonic valve.  Vessels IVC diameter and respiratory changes fall into an intermediate range suggesting an RA pressure of 8 mmHg.  Pericardium Trivial pericardial effusion.  Rhythm The rhythm was sinus tachycardia. ______________________________________________________________________________ MMode/2D Measurements & Calculations  IVSd: 0.61 cm LVIDd: 4.9 cm LVIDs: 3.3 cm LVPWd: 0.87 cm FS: 33.3 % LV mass(C)d: 118.8 grams LV mass(C)dI: 53.8 grams/m2 RWT: 0.36 TAPSE: 2.9 cm  Time Measurements MM HR: 108.0 BPM  Doppler Measurements & Calculations Ao V2 max: 214.0 cm/sec Ao max P.0 mmHg Ao V2 mean: 130.0 cm/sec Ao mean P.0 mmHg Ao V2 VTI: 31.1 cm RV S Sami: 19.6 cm/sec  ______________________________________________________________________________ Report approved by: Yakelin García 10/23/2023 12:34 PM       CT Chest Abdomen Pelvis w/o Contrast    Result Date: 10/22/2023  EXAM: CT CHEST ABDOMEN PELVIS W/O CONTRAST LOCATION: [facility] DATE: 10/22/2023 INDICATION: Sepsis.  treated for peritonitis and pneumonia.  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 COMPARISON: 10/18/2023 CT AP. TECHNIQUE: CT scan of the chest, abdomen, and pelvis was performed without IV contrast. Multiplanar reformats were obtained. Dose reduction techniques were used. CONTRAST: None. FINDINGS: LUNGS AND PLEURA: Complete right lower lobe atelectasis, mild passive atelectasis right upper and middle lobes posteriorly, small/moderate volume right pleural effusion and mild elevation right hemidiaphragm. Trace left pleural effusion and mild platelike atelectasis left lower lobe unchanged. MEDIASTINUM/AXILLAE: Heart size within normal limits with no pericardial effusion. Endotracheal tube tip is only 1 cm above the emilia. CORONARY ARTERY CALCIFICATION: Trace calcified atheromatous plaque LAD coronary artery. HEPATOBILIARY: Liver is normal.  No calcified gallstones or bile duct dilatation. PANCREAS: Normal. SPLEEN: Normal. ADRENAL GLANDS: Normal. KIDNEYS/BLADDER: Bladder is decompressed by well-positioned Patiño catheter. Kidneys, ureters and bladder are otherwise normal. BOWEL: Sleeve gastrectomy and proximal duodenal to jejunal anastomosis with a well-positioned nasogastric tube and interval removal of the surgical drains. Interval placement of percutaneous drainage catheter lateral aspect right subdiaphragmatic air-fluid collection surrounding the right hepatic lobe which has not changed significantly and continues to measure about 1 - 2 cm radial thickness. A 7 x 5 x 4 cm volume of nongas-containing fluid in the pelvic cul-de-sac is unchanged. LYMPH NODES: No lymphadenopathy. VASCULATURE: Normal caliber abdominal aorta.  PELVIC ORGANS: A 5 cm fibroid. MUSCULOSKELETAL: Unremarkable.     IMPRESSION: 1.  Sleeve gastrectomy and  proximal duodenal to jejunal anastomosis with a well-positioned nasogastric tube and interval removal of the 2 surgical drains. 2.  Interval placement of a percutaneous drainage catheter with distal loop in the lateral aspect of the right subdiaphragmatic air-fluid collection surrounding the right hepatic lobe which has not changed significantly and continues to measure about 1 -  2 cm radial thickness. 3.  A 7 x 5 x 4 cm volume of nongas-containing fluid in the pelvic cul-de-sac is unchanged. 4.  Complete right lower lobe atelectasis, mild passive atelectasis right upper and middle lobes posteriorly, small/moderate volume right pleural effusion and mild elevation right hemidiaphragm. 5.  Endotracheal tube tip is only 1 cm above the emilia.    XR Chest Port 1 View    Result Date: 10/22/2023  EXAM: XR CHEST PORT 1 VIEW LOCATION: [facility] DATE: 10/22/2023 INDICATION: Endotracheal tube positioning COMPARISON: 11/22/2023     IMPRESSION: Endotracheal tube has been placed with tip 2 cm above the emilia. Remainder unchanged. Enteric tube tip below diaphragm, off the image. Left IJ central line tip in the left brachiocephalic vein. Right upper quadrant percutaneous drain. Bilateral pleural effusions. Moderate degenerative change thoracic spine.    XR Chest Port 1 View    Result Date: 10/22/2023  EXAM: XR CHEST PORT 1 VIEW LOCATION: [facility] DATE: 10/22/2023 INDICATION: concern for possible aspiration, assess for HAP COMPARISON: CT 10/19/2023     IMPRESSION: Perihepatic drain overlies the right upper abdomen. Small right pleural effusion with compressive atelectasis. Infiltrate of the right lung base is not excluded. Left basilar and midlung atelectasis. Left internal jugular central venous catheter with tip overlying the innominate confluence region. Nasogastric tube which courses below the diaphragm though the tip is excluded by collimation. No pneumothorax. Upper  limits normal heart size.    XR Abdomen Port 1 View    Result Date: 10/21/2023  EXAM: XR ABDOMEN PORT 1 VIEW LOCATION: [facility] DATE: 10/21/2023 INDICATION: ng position COMPARISON: CT abdomen 10/18/2023.     IMPRESSION: Nasogastric tube terminates in the distal stomach. Surgical staple line of the mid line in the abdomen.    CT Retroperitoneal Abscess Drainage    Result Date: 10/19/2023  EXAM: 1. PERCUTANEOUS DRAIN PLACEMENT PERIHEPATIC COLLECTION 2. CT GUIDANCE 3. CONSCIOUS SEDATION LOCATION: [facility] DATE: 10/19/2023 INDICATION: Perihepatic colletion. TECHNIQUE: Dose reduction techniques were used. PROCEDURE: Informed consent obtained. Site marked. Prior images reviewed. Required items made available. Patient identity confirmed verbally and with arm band. Patient reevaluated immediately before administering sedation. Universal protocol was followed. Time out performed. The site was prepped and draped in sterile fashion. 10 mL of 1% lidocaine was infused into the local soft tissues. Using standard technique and under direct CT guidance, a 10 [place] drainage catheter was inserted into the fluid collection.  SPECIMEN: 10 mL of thick brownish-red fluid was aspirated and sent to lab for cultures and Gram stain. BLOOD LOSS: Minimal. The patient tolerated the procedure well. No immediate complications. SEDATION: Versed 4 mg. Fentanyl 250 mcg. The procedure was performed with administration intravenous conscious sedation with appropriate preoperative, intraoperative, and postoperative evaluation. 40 minutes of supervised face to face conscious sedation time was provided by a radiology nurse under my direct supervision.     IMPRESSION: 1.  Successful CT-guided drain placement into perihepatic collection. Reference CPT Codes: 39678, 88646, 89235, 08224    CT Abdomen Pelvis w/o Contrast    Addendum Date: 10/18/2023    ADDENDUM dictated by Jose G Birmingham M.D.  10/18/2023  Original report dictated by Jose G Birmingham M.D.  10/18/2023 Addendum to impression #1 IMPRESSION: 1.  Sleeve gastrectomy and proximal duodenal to jejunal anastomosis, WELL-POSITIONED NASOGASTRIC TUBE, and upper anterior abdominal surgical drain from the right and a second left anterior abdominal and pelvic surgical drain.     Result Date: 10/18/2023  EXAM: CT ABDOMEN PELVIS W/O CONTRAST LOCATION: [facility] DATE: 10/18/2023 INDICATION: Perforated bowel, ongoing fever. Status post 10/09/2023 sleeve gastrectomy with single anastomosis duodenal switch complicated by bowel perforation, peritonitis and sepsis status post 10/12/2023 washout and enterotomy closure. Cardiomyopathy.  JARVIS. COMPARISON: 10/16/2023 CXR and 10/10/2023 Gastrografin upper GI. TECHNIQUE: CT scan of the abdomen and pelvis was performed without IV contrast. Multiplanar reformats were obtained. Dose reduction techniques were used. CONTRAST: None. FINDINGS: LOWER CHEST: Complete right lower lobe atelectasis, small volume of right-sided pleural fluid and mild elevation right hemidiaphragm. Trace left pleural effusion and mild platelike atelectasis left lower lobe. Heart size within normal limits with no pericardial effusion. HEPATOBILIARY: Liver is normal.  No calcified gallstones or bile duct dilatation. PANCREAS: Normal. SPLEEN: Spleen size normal. ADRENAL GLANDS: Normal. KIDNEYS/BLADDER: Bladder is decompressed by well-positioned Patiño catheter. Kidneys, ureters and bladder are otherwise normal. BOWEL: Sleeve gastrectomy and proximal duodenal to jejunal anastomosis, a well-positioned nasogastric tube, and upper anterior abdominal surgical drain from the right and a second left anterior abdominal and pelvic surgical drain. A thin layer of fluid about 1 - 2 cm radial thickness between the right hemidiaphragm and right hepatic lobe contains a few foci of gas and there is a small amount of nongas-containing fluid in pelvic  cul-de-sac. Residual iodinated contrast material from 10/10/2023 upper GI within the normal caliber appendix, colon and rectum. LYMPH NODES: No lymphadenopathy. VASCULATURE: Normal caliber abdominal aorta.  PELVIC ORGANS: A 5 cm fibroid. MUSCULOSKELETAL: Unremarkable.     IMPRESSION: 1.  Sleeve gastrectomy and proximal duodenal to jejunal anastomosis, malpositioned nasogastric tube, and upper anterior abdominal surgical drain from the right and a second left anterior abdominal and pelvic surgical drain. 2.  A thin layer of fluid about 1 - 2 cm radial thickness between the right hemidiaphragm and right hepatic lobe contains a few foci of gas and there is a small amount of nongas-containing fluid in pelvic cul-de-sac. 3.  Complete right lower lobe atelectasis, small volume of right-sided pleural fluid and mild elevation right hemidiaphragm.     XR Chest Port 1 View    Result Date: 10/16/2023  EXAM: XR CHEST PORT 1 VIEW LOCATION: [facility] DATE: 10/16/2023 INDICATION: hypoxia COMPARISON: Portable AP view of the chest 10/14/2023     IMPRESSION: Tip of the endotracheal tube is in satisfactory position. Esophageal temperature probe is in the lower third of the esophagus. Gastric tube courses below the diaphragmatic hiatus and outside the field-of-view. Telemetry leads overlie the chest. Persistent shallow lung inflation. The right hemidiaphragm is indistinct and there is graded opacity in the infrahilar right chest either related to adjacent basilar atelectasis and/or layering pleural fluid. Focal atelectasis in the medial left base is not increased. The vessels within the aerated portions of the lungs are normal. No clear change from 2 days earlier. Unchanged heart and mediastinal contours.    Echocardiogram Complete    Result Date: 10/14/2023  [id number]^MANISH^DAMARIS^[place] [address]  Name: LY GONZALEZ MRN: [id number] :  1970 Study Date: 10/14/2023 02:51 PM Age: 53 yrs Gender: Female Patient Location: [facility] Reason For Study: Arrhythmia, Pericardial Effusion Ordering Physician: DAMARIS HAMMOND Performed By: CM  BSA: 2.3 m2 Height: 63 in Weight: 287 lb HR: 104 ______________________________________________________________________________ Procedure Complete Echo Adult. Definity (NDC #88616-737) given intravenously. Poor acoustic windows. Technically difficult study. Limited views were obtained. ______________________________________________________________________________ Interpretation Summary  Technically difficult study. Limited views were obtained. The left ventricle is normal in size. Left ventricular function is decreased. The ejection fraction is 30-35% (moderately reduced). There is moderate global hypokinesia of the left ventricle. ______________________________________________________________________________ Left Ventricle The left ventricle is normal in size. Left ventricular function is decreased. The ejection fraction is 30-35% (moderately reduced). There is normal left ventricular wall thickness. There is moderate global hypokinesia of the left ventricle.  Right Ventricle The right ventricle is not well visualized.  Atria Normal left atrial size. Right atrium not well visualized.  Mitral Valve The mitral valve is not well visualized. There is no mitral regurgitation noted.  Tricuspid Valve The tricuspid valve is not well visualized. No tricuspid regurgitation.  Aortic Valve The aortic valve is not well visualized. No aortic regurgitation is present. No hemodynamically significant valvular aortic stenosis.  Pulmonic Valve The pulmonic valve is not well visualized. There is no pulmonic valvular regurgitation.  Vessels The aorta root is normal. Normal size ascending aorta.  Pericardium There is no pericardial effusion.  ______________________________________________________________________________ MMode/2D  Measurements & Calculations IVSd: 1.1 cm LVIDd: 5.1 cm LVIDs: 3.9 cm LVPWd: 0.81 cm  FS: 23.1 % LV mass(C)d: 168.8 grams LV mass(C)dI: 74.9 grams/m2 Ao root diam: 2.5 cm LA dimension: 3.4 cm asc Aorta Diam: 3.1 cm LA/Ao: 1.4 LVOT diam: 2.0 cm LVOT area: 3.1 cm2 Ao root diam index Ht(cm/m): 1.6 Ao root diam index BSA (cm/m2): 1.1 Asc Ao diam index BSA (cm/m2): 1.4 Asc Ao diam index Ht(cm/m): 1.9 RWT: 0.32  Doppler Measurements & Calculations MV max P.5 mmHg MV mean P.0 mmHg MV V2 VTI: 18.8 cm PA acc time: 0.07 sec  ______________________________________________________________________________ Report approved by: Yakelin Mcadams 10/14/2023 04:40 PM       XR Chest Port 1 View    Result Date: 10/14/2023  EXAM: XR CHEST PORT 1 VIEW LOCATION: [facility] DATE: 10/14/2023 INDICATION: elevated peak pressures COMPARISON: Portable chest radiograph 10/13/2023     IMPRESSION: Low lung volumes. Hazy opacities right lower lung field could be related to overlying soft tissue versus atelectasis or developing infiltrate. This is similar as compared to the prior day chest radiograph but recommend continued attention on follow-up imaging. Minimal atelectasis left base. Endotracheal tube tip in satisfactory position terminating approximately 4 cm above the meilia. Nasogastric tube courses below the level of the diaphragm though the tip is not well visualized on this radiograph. Recommend attention on follow-up and consider abdominal radiograph for further evaluation.. Left IJ central line tip overlies the region of the right brachiocephalic vein. No pneumothorax. Trace effusions possible.    XR Chest Port 1 View    Result Date: 10/13/2023  EXAM: XR CHEST PORT 1 VIEW LOCATION: [facility] DATE: 10/13/2023 INDICATION: Evaluation position of ETT COMPARISON: 10/12/2023     IMPRESSION: Low lung volumes. Hazy opacities right lower lung field could be related to overlying soft  tissue versus atelectasis or developing infiltrate. This should be reviewed on follow-up imaging. Minimal atelectasis left base. Endotracheal tube tip in satisfactory position 2.5 cm above the emilia. Enteric tube extends into the stomach. Left IJ central line tip mid SVC. No pneumothorax.    XR Chest Port 1 View    Result Date: 10/12/2023  EXAM: XR CHEST PORT 1 VIEW LOCATION: [facility] DATE: 10/12/2023 INDICATION: intubation COMPARISON: X-ray 09/14/2023     IMPRESSION: Endotracheal tube tip located 1.9 cm above the emilia. Left IJ central catheter with the tip in the upper SVC. No pneumothorax. Moderate asymmetric elevation of the right hemidiaphragm. Mild bibasilar pulmonary opacities likely reflect atelectasis. No definite pleural effusion. Normal heart size. Spinal degenerative changes.    XR Gastrografin Upper GI w KUB    Result Date: 10/10/2023  EXAM: XR GASTROGRAFIN UPPER GI W KUB LOCATION: [facility] DATE: 10/10/2023 INDICATION: Post op day #1 of Sleeve Gastrectomy with a Single Anastomosis Duodenal Switch.  Please rule out a leak from her Sleeve and from her Duodenal Anastomosis. COMPARISON: None TECHNIQUE: Limited single contrast water-soluble study.     FINDINGS AND IMPRESSION: FLUOROSCOPIC TIME: 1 NUMBER OF IMAGES: 12 Surgical change noted at the stomach and proximal small bowel. No leak appreciated on this study.      Latest radiology report personally reviewed.    Note created using dragon voice recognition software so sounds alike errors may have escaped editing.      11/02/2023   Heath Lancaster MD  Hospitalist, [facility]  Pager: [phone]

## 2023-11-02 NOTE — PROGRESS NOTES
"[facility] Progress Note      ASSESSMENT:  Post gastric surgery  C/b HCAP,sepsis due to peritonitis post washout   Staph epi grew from BAL - completed 10 days vancomycin 10/31 in case this was pathogen.   Pleural fluid culture negative from 10/24 -- 400cc removed   Pleural fluid 11/1 with chest tube 1825 WBC, 66% PMNs, await culture.   Had rash but TSS or TEN or DRESS not present  Intra-abdominal infection -- perihepatic drain 10/19 -- lactobacillus; 10/24 pelvic fluid aspirate -- lactobacillus and candida   Previous strep anginosus from washout 10/12   Repeat CT 10/30 -- perihepatic fluid decreased. tPA to drain 10/31.    WBC better today.    RECOMMENDATION:  Micafungin for yeast  Zosyn for strep and lactobacillus  Potentially oral regimen augmentin and fluconazole at some point -- duration depends on resolution of fluid collections on imaging    Jose Antonio Bond MD  [facility]  [phone] clinic  [employer] paging  ______________________________________________________________________     SUBJECTIVE:  pain on right side -- chest tube, MICKIE, notices breathing with these pains. Temps ok. Taking in some oral intake. Deconditioned, depressed.           REVIEW OF SYSTEMS:  Negative unless as listed above.  Social history, Family history, Medications: reviewed.    OBJECTIVE:  BP (!) 162/79 (BP Location: Left arm)   Pulse 82   Temp 99.2  F (37.3  C) (Oral)   Resp 20   Ht 1.6 m (5' 3\")   Wt 131 kg (288 lb 12.8 oz)   LMP 09/09/2023 (Exact Date)   SpO2 99%   BMI 51.16 kg/m                PHYSICAL EXAM:  O2 NC, NG tube, looks tired.   Sclera normal color, not injected  CARDIOVASCULAR regular rate and rhythm, no murmur  Lungs R chest tube  Abdomen soft, NT, incision midline looks good, steri strips in place; RUQ drain with cloudy fluid  Skin normal  Joints normal  Neurologic exam non focal  Rash R torso is improved        Antibiotics:  See MAR,multiple   Pertinent labs:  Lab " "Results   Component Value Date    WBC 14.5 10/23/2023    WBC 5.5 11/17/2020     Lab Results   Component Value Date    RBC 3.16 10/23/2023    RBC 4.13 11/17/2020     Lab Results   Component Value Date    HGB 9.2 10/23/2023    HGB 12.3 11/17/2020     Lab Results   Component Value Date    HCT 30.9 10/23/2023    HCT 37.4 11/17/2020     No components found for: \"MCT\"  Lab Results   Component Value Date    MCV 98 10/23/2023    MCV 91 11/17/2020     Lab Results   Component Value Date    MCH 29.1 10/23/2023    MCH 29.8 11/17/2020     Lab Results   Component Value Date    MCHC 29.8 10/23/2023    MCHC 32.9 11/17/2020     Lab Results   Component Value Date    RDW 14.9 10/23/2023    RDW 12.7 11/17/2020     Lab Results   Component Value Date     10/23/2023     11/17/2020       Last Comprehensive Metabolic Panel:  Sodium   Date Value Ref Range Status   11/02/2023 144 135 - 145 mmol/L Final     Comment:     Reference intervals for this test were updated on 09/26/2023 to more accurately reflect our healthy population. There may be differences in the flagging of prior results with similar values performed with this method. Interpretation of those prior results can be made in the context of the updated reference intervals.    11/02/2023 144 135 - 145 mmol/L Final     Comment:     Reference intervals for this test were updated on 09/26/2023 to more accurately reflect our healthy population. There may be differences in the flagging of prior results with similar values performed with this method. Interpretation of those prior results can be made in the context of the updated reference intervals.  Reference intervals for this test were updated on 09/26/2023 to more accurately reflect our healthy population. There may be differences in the flagging of prior results with similar values performed with this method. Interpretation of those prior results can be made in the context of the updated reference intervals.    11/17/2020 " "141 133 - 144 mmol/L Final     Potassium   Date Value Ref Range Status   11/02/2023 4.0 3.4 - 5.3 mmol/L Final   05/17/2022 4.5 3.4 - 5.3 mmol/L Final   11/17/2020 4.1 3.4 - 5.3 mmol/L Final     Chloride   Date Value Ref Range Status   11/02/2023 108 (H) 98 - 107 mmol/L Final   05/17/2022 102 94 - 109 mmol/L Final   11/17/2020 109 94 - 109 mmol/L Final     Carbon Dioxide   Date Value Ref Range Status   11/17/2020 31 20 - 32 mmol/L Final     Carbon Dioxide (CO2)   Date Value Ref Range Status   11/02/2023 26 22 - 29 mmol/L Final   05/17/2022 32 20 - 32 mmol/L Final     Anion Gap   Date Value Ref Range Status   11/02/2023 10 7 - 15 mmol/L Final   05/17/2022 2 (L) 3 - 14 mmol/L Final   11/17/2020 1 (L) 3 - 14 mmol/L Final     Glucose   Date Value Ref Range Status   05/17/2022 110 (H) 70 - 99 mg/dL Final   11/17/2020 84 70 - 99 mg/dL Final     Comment:     Fasting specimen     GLUCOSE BY METER POCT   Date Value Ref Range Status   11/02/2023 142 (H) 70 - 99 mg/dL Final     Urea Nitrogen   Date Value Ref Range Status   11/02/2023 28.0 (H) 6.0 - 20.0 mg/dL Final   05/17/2022 16 7 - 30 mg/dL Final   11/17/2020 9 7 - 30 mg/dL Final     Creatinine   Date Value Ref Range Status   11/02/2023 1.62 (H) 0.51 - 0.95 mg/dL Final   11/17/2020 0.79 0.52 - 1.04 mg/dL Final     GFR Estimate   Date Value Ref Range Status   11/02/2023 38 (L) >60 mL/min/1.73m2 Final   11/17/2020 88 >60 mL/min/[1.73_m2] Final     Comment:     Non  GFR Calc  Starting 12/18/2018, serum creatinine based estimated GFR (eGFR) will be   calculated using the Chronic Kidney Disease Epidemiology Collaboration   (CKD-EPI) equation.       Calcium   Date Value Ref Range Status   11/02/2023 9.1 8.6 - 10.0 mg/dL Final   11/17/2020 8.9 8.5 - 10.1 mg/dL Final       Liver Function Studies -   Recent Labs   Lab Test 10/23/23  0530   PROTTOTAL 7.1   ALBUMIN 3.2*   BILITOTAL 0.3   ALKPHOS 96   AST 19   ALT 26       No results found for: \"SED\"    No results found " "for: \"CRP\"      NOTE BD glucan test was 406 which is +      MICROBIOLOGY DATA:  Lung fluid 76 wbc, no org 10/24, culture negative  Pelvic fluid lactobaccilus, candida dublinensis  11/1 pleural fluid 1825 WBC, 66% PMNs      IMAGING/RADIOLOGY:        "

## 2023-11-02 NOTE — PROGRESS NOTES
Renal progress note  CC:Hypernatremia , SUSSY  Assessment and Plan:  52-year-old female with history of obesity JARVIS asthma history of mood disorders admitted with scheduled laparoscopic sleeve gastrectomy on 10/9/2023 complicated by peritonitis with small bowel injury will return tomorrow 10/12/2023 for enterotomy closure and cleanout with drain placement.  Pelvic fluid aspiration needed on 10/24/2023 overall course complicated by encephalopathy delirium septic shock with peritoneal leak acute respiratory failure requiring intubation from 10/12 to 10/21/2023 and oliguric SUSSY with very poor oral intakes.  Nephrology following for SUSSY    Oliguric SUSSY  SUSSY likely secondary to hemodynamic injury for acute renal failure  With peritoneal leak, creatinine peaked at 6.97 on 10/14/2023  Likely exacerbated by NSAIDs hypotension vasodilation and intravascular volume depletion leading to hemodynamic ATN currently and slow recovery phase  Creatinine improved to 1.6  Baseline creatinine 0.7 within normal limits no prior history of SUSSY or CKD   --> Follow on serial creatinines on daily labs  Strict I&O altered mentation and hallucinations seen today are unlikely secondary to metabolic issues its possibility with delirium with prolonged hospitalization and critical illness    Hypertension - BP still suboptimally controlled, currently on metoprolol tartrate 100 mg BID, amlodipine 5 mg BID, and hydralazine 100 mg TID. Will swap metoprolol for carvedilol 12.5 mg BID for greater alpha blockade.     Hypernatremia - improved with hypotonic IVF, loop diuretics now resumed, follow Na+ daily for now     Anemia likely.    Currently Hb around 8.7  Transfuse per Surgical team recommendation    History of gastric bypass  Multiple complications post sleeve gastrectomy complicated by peritonitis and small bowel injury with peritoneal leak  Return to the OR 10/12/2023 with cleanout and drain placement complicated by other fluid collections  "requiring aspiration and drain placement  On Vanco Zosyn and micafungin ID following  On tube feeds and TPN weaned off patient's intakes remain pretty poor   Diet management per primary team bariatric surgery    Ongoing metabolic encephalopathy with worsening delirium  Visual hallucinations management per primary hospitalist team    History of cardiomyopathy possible stress-induced Takotsubo  Management per primary team  Resumed IV Bumex 0.5 mg Q8 hours 10/31  On hydralazine metoprolol and amlodipine    Acute hypoxemic hypercapnic respiratory failure  Pulmonary following, right-sided pleural effusion and s/p chest tube 11/1/23     Prior history of JARVIS and asthma  Management per primary team    Thank you for the consultation we will follow  Marlen Cintron PA-C   [facility]  [phone]      Subjective  Weight down yesterday by bed scale, I=O yesterday. Still hypertensive. Mojgan reports she is not doing well today but unable to give exact complaints. Reports breathing is \"so so,\" using BiPAP today.     Objective    Vital signs in last 24 hours  Temp:  [97.9  F (36.6  C)-99.2  F (37.3  C)] 99.2  F (37.3  C)  Pulse:  [67-85] 82  Resp:  [18-20] 20  BP: (148-177)/(71-86) 162/79  FiO2 (%):  [30 %-40 %] 30 %  SpO2:  [94 %-99 %] 99 %  Weight:   [unfilled]    Intake/Output last 3 shifts  I/O last 3 completed shifts:  In: 1204.5 [P.O.:240; I.V.:172.5; NG/GT:580]  Out: 1275 [Urine:950; Drains:90; Chest Tube:235]  Intake/Output this shift:  No intake/output data recorded.    Physical Exam  Groggy following sedation, opens eyes but does not interact   CV: RRR without murmur or rub  Lung: clear and equal; no extra sounds, + BiPAP, right sided chest tube with serosanguineous drainage   Ab: soft and NT; + distended; normal bs; midline incision; guarding upper abdomen  Ext: Trace edema and well perfused  Skin; no rash    Pertinent Labs   Lab Results   Component Value Date    WBC 11.4 (H) 11/02/2023    HGB 8.3 " (L) 11/02/2023    HCT 28.2 (L) 11/02/2023    MCV 97 11/02/2023     11/02/2023     Lab Results   Component Value Date    BUN 28.0 (H) 11/02/2023     11/02/2023     11/02/2023    CO2 26 11/02/2023       Lab Results   Component Value Date    ALBUMIN 3.2 (L) 10/23/2023     Lab Results   Component Value Date    PHOS 3.0 11/02/2023     I reviewed all lab results    Marlen Cintron PA-C

## 2023-11-02 NOTE — PLAN OF CARE
Problem: Nausea and Vomiting  Goal: Nausea and Vomiting Relief  Outcome: Progressing     Problem: Bariatric Surgery  Goal: Optimal Coping with Surgery  Outcome: Progressing  Goal: Absence of Bleeding  Outcome: Progressing     Problem: Hypertension Acute  Goal: Blood Pressure Within Desired Range  Outcome: Progressing   Goal Outcome Evaluation:       Alert and oriented x4. Slept most of shift. No n/v noted. MICKIE patent. 10mL from chest tube. CT patent. Purewick intact. Medium watery bm noted. Assist of 2 with bed mobility and cares. Twinges of pain noted on right side. Denied need for pain medication despite multiple offers from staff. Sbp consistently under 160.

## 2023-11-03 ENCOUNTER — APPOINTMENT (OUTPATIENT)
Dept: PHYSICAL THERAPY | Facility: HOSPITAL | Age: 53
End: 2023-11-03
Attending: SURGERY
Payer: COMMERCIAL

## 2023-11-03 ENCOUNTER — APPOINTMENT (OUTPATIENT)
Dept: RADIOLOGY | Facility: HOSPITAL | Age: 53
End: 2023-11-03
Attending: INTERNAL MEDICINE
Payer: COMMERCIAL

## 2023-11-03 ENCOUNTER — APPOINTMENT (OUTPATIENT)
Dept: OCCUPATIONAL THERAPY | Facility: HOSPITAL | Age: 53
End: 2023-11-03
Attending: SURGERY
Payer: COMMERCIAL

## 2023-11-03 LAB
ANION GAP SERPL CALCULATED.3IONS-SCNC: 11 MMOL/L (ref 7–15)
BACTERIA BRONCH: NORMAL
BUN SERPL-MCNC: 27.1 MG/DL (ref 6–20)
CALCIUM SERPL-MCNC: 9.1 MG/DL (ref 8.6–10)
CHLORIDE SERPL-SCNC: 107 MMOL/L (ref 98–107)
CREAT SERPL-MCNC: 1.5 MG/DL (ref 0.51–0.95)
DEPRECATED HCO3 PLAS-SCNC: 27 MMOL/L (ref 22–29)
EGFRCR SERPLBLD CKD-EPI 2021: 41 ML/MIN/1.73M2
ERYTHROCYTE [DISTWIDTH] IN BLOOD BY AUTOMATED COUNT: 14.3 % (ref 10–15)
GLUCOSE BLDC GLUCOMTR-MCNC: 122 MG/DL (ref 70–99)
GLUCOSE BLDC GLUCOMTR-MCNC: 132 MG/DL (ref 70–99)
GLUCOSE BLDC GLUCOMTR-MCNC: 143 MG/DL (ref 70–99)
GLUCOSE BLDC GLUCOMTR-MCNC: 146 MG/DL (ref 70–99)
GLUCOSE SERPL-MCNC: 143 MG/DL (ref 70–99)
HCT VFR BLD AUTO: 28.1 % (ref 35–47)
HGB BLD-MCNC: 8.3 G/DL (ref 11.7–15.7)
MCH RBC QN AUTO: 28.4 PG (ref 26.5–33)
MCHC RBC AUTO-ENTMCNC: 29.5 G/DL (ref 31.5–36.5)
MCV RBC AUTO: 96 FL (ref 78–100)
PLATELET # BLD AUTO: 307 10E3/UL (ref 150–450)
POTASSIUM SERPL-SCNC: 3.8 MMOL/L (ref 3.4–5.3)
RBC # BLD AUTO: 2.92 10E6/UL (ref 3.8–5.2)
SODIUM SERPL-SCNC: 145 MMOL/L (ref 135–145)
WBC # BLD AUTO: 9 10E3/UL (ref 4–11)

## 2023-11-03 PROCEDURE — 99231 SBSQ HOSP IP/OBS SF/LOW 25: CPT | Performed by: INTERNAL MEDICINE

## 2023-11-03 PROCEDURE — 99232 SBSQ HOSP IP/OBS MODERATE 35: CPT | Performed by: INTERNAL MEDICINE

## 2023-11-03 PROCEDURE — 999N000157 HC STATISTIC RCP TIME EA 10 MIN

## 2023-11-03 PROCEDURE — 250N000013 HC RX MED GY IP 250 OP 250 PS 637: Performed by: PHYSICIAN ASSISTANT

## 2023-11-03 PROCEDURE — 85027 COMPLETE CBC AUTOMATED: CPT | Performed by: STUDENT IN AN ORGANIZED HEALTH CARE EDUCATION/TRAINING PROGRAM

## 2023-11-03 PROCEDURE — 84295 ASSAY OF SERUM SODIUM: CPT | Performed by: INTERNAL MEDICINE

## 2023-11-03 PROCEDURE — 80048 BASIC METABOLIC PNL TOTAL CA: CPT | Performed by: STUDENT IN AN ORGANIZED HEALTH CARE EDUCATION/TRAINING PROGRAM

## 2023-11-03 PROCEDURE — 250N000013 HC RX MED GY IP 250 OP 250 PS 637: Performed by: INTERNAL MEDICINE

## 2023-11-03 PROCEDURE — 258N000003 HC RX IP 258 OP 636: Performed by: NURSE PRACTITIONER

## 2023-11-03 PROCEDURE — 97110 THERAPEUTIC EXERCISES: CPT | Mod: GO

## 2023-11-03 PROCEDURE — 250N000013 HC RX MED GY IP 250 OP 250 PS 637: Performed by: NURSE PRACTITIONER

## 2023-11-03 PROCEDURE — 250N000011 HC RX IP 250 OP 636: Mod: JZ | Performed by: NURSE PRACTITIONER

## 2023-11-03 PROCEDURE — 94660 CPAP INITIATION&MGMT: CPT

## 2023-11-03 PROCEDURE — 97530 THERAPEUTIC ACTIVITIES: CPT | Mod: GP

## 2023-11-03 PROCEDURE — 99232 SBSQ HOSP IP/OBS MODERATE 35: CPT | Performed by: PHYSICIAN ASSISTANT

## 2023-11-03 PROCEDURE — 120N000001 HC R&B MED SURG/OB

## 2023-11-03 PROCEDURE — 97530 THERAPEUTIC ACTIVITIES: CPT | Mod: GO

## 2023-11-03 PROCEDURE — 250N000011 HC RX IP 250 OP 636: Mod: JZ | Performed by: INTERNAL MEDICINE

## 2023-11-03 PROCEDURE — 71045 X-RAY EXAM CHEST 1 VIEW: CPT

## 2023-11-03 RX ORDER — CARVEDILOL 12.5 MG/1
25 TABLET ORAL 2 TIMES DAILY WITH MEALS
Status: DISCONTINUED | OUTPATIENT
Start: 2023-11-03 | End: 2023-11-09

## 2023-11-03 RX ADMIN — VENLAFAXINE 75 MG: 75 TABLET ORAL at 12:34

## 2023-11-03 RX ADMIN — VENLAFAXINE 75 MG: 75 TABLET ORAL at 16:23

## 2023-11-03 RX ADMIN — VENLAFAXINE 75 MG: 75 TABLET ORAL at 08:32

## 2023-11-03 RX ADMIN — PIPERACILLIN AND TAZOBACTAM 3.38 G: 3; .375 INJECTION, POWDER, LYOPHILIZED, FOR SOLUTION INTRAVENOUS at 04:04

## 2023-11-03 RX ADMIN — HYDRALAZINE HYDROCHLORIDE 10 MG: 20 INJECTION INTRAMUSCULAR; INTRAVENOUS at 00:10

## 2023-11-03 RX ADMIN — CARVEDILOL 12.5 MG: 12.5 TABLET, FILM COATED ORAL at 08:31

## 2023-11-03 RX ADMIN — HEPARIN SODIUM 5000 UNITS: 5000 INJECTION, SOLUTION INTRAVENOUS; SUBCUTANEOUS at 14:07

## 2023-11-03 RX ADMIN — HYDRALAZINE HYDROCHLORIDE 100 MG: 50 TABLET, FILM COATED ORAL at 14:07

## 2023-11-03 RX ADMIN — PIPERACILLIN AND TAZOBACTAM 3.38 G: 3; .375 INJECTION, POWDER, LYOPHILIZED, FOR SOLUTION INTRAVENOUS at 12:33

## 2023-11-03 RX ADMIN — HEPARIN SODIUM 5000 UNITS: 5000 INJECTION, SOLUTION INTRAVENOUS; SUBCUTANEOUS at 06:00

## 2023-11-03 RX ADMIN — ONDANSETRON 4 MG: 2 INJECTION INTRAMUSCULAR; INTRAVENOUS at 18:25

## 2023-11-03 RX ADMIN — BUMETANIDE 0.5 MG: 0.25 INJECTION INTRAMUSCULAR; INTRAVENOUS at 20:14

## 2023-11-03 RX ADMIN — HYDRALAZINE HYDROCHLORIDE 100 MG: 50 TABLET, FILM COATED ORAL at 08:28

## 2023-11-03 RX ADMIN — ONDANSETRON 4 MG: 2 INJECTION INTRAMUSCULAR; INTRAVENOUS at 12:33

## 2023-11-03 RX ADMIN — HEPARIN SODIUM 5000 UNITS: 5000 INJECTION, SOLUTION INTRAVENOUS; SUBCUTANEOUS at 21:43

## 2023-11-03 RX ADMIN — Medication 40 MG: at 08:33

## 2023-11-03 RX ADMIN — HYDRALAZINE HYDROCHLORIDE 100 MG: 50 TABLET, FILM COATED ORAL at 20:21

## 2023-11-03 RX ADMIN — BUMETANIDE 0.5 MG: 0.25 INJECTION INTRAMUSCULAR; INTRAVENOUS at 08:29

## 2023-11-03 RX ADMIN — NITROGLYCERIN 1 PATCH: 0.4 PATCH TRANSDERMAL at 08:33

## 2023-11-03 RX ADMIN — CARVEDILOL 25 MG: 12.5 TABLET, FILM COATED ORAL at 16:22

## 2023-11-03 RX ADMIN — MICAFUNGIN SODIUM 100 MG: 50 INJECTION, POWDER, LYOPHILIZED, FOR SOLUTION INTRAVENOUS at 09:35

## 2023-11-03 RX ADMIN — PIPERACILLIN AND TAZOBACTAM 3.38 G: 3; .375 INJECTION, POWDER, LYOPHILIZED, FOR SOLUTION INTRAVENOUS at 20:01

## 2023-11-03 RX ADMIN — ALTEPLASE 4 MG: 2.2 INJECTION, POWDER, LYOPHILIZED, FOR SOLUTION INTRAVENOUS at 17:36

## 2023-11-03 RX ADMIN — AMLODIPINE BESYLATE 5 MG: 5 TABLET ORAL at 20:20

## 2023-11-03 RX ADMIN — AMLODIPINE BESYLATE 5 MG: 5 TABLET ORAL at 08:32

## 2023-11-03 ASSESSMENT — ACTIVITIES OF DAILY LIVING (ADL)
ADLS_ACUITY_SCORE: 38

## 2023-11-03 NOTE — PROGRESS NOTES
Renal progress note  CC:Hypernatremia , SUSSY  Assessment and Plan:  52-year-old female with history of obesity JARVIS asthma history of mood disorders admitted with scheduled laparoscopic sleeve gastrectomy on 10/9/2023 complicated by peritonitis with small bowel injury will return tomorrow 10/12/2023 for enterotomy closure and cleanout with drain placement.  Pelvic fluid aspiration needed on 10/24/2023 overall course complicated by encephalopathy delirium septic shock with peritoneal leak acute respiratory failure requiring intubation from 10/12 to 10/21/2023 and oliguric SUSSY with very poor oral intakes.  Nephrology following for SUSSY    Oliguric SUSSY  SUSSY likely secondary to hemodynamic injury for acute renal failure  With peritoneal leak, creatinine peaked at 6.97 on 10/14/2023  Likely exacerbated by NSAIDs hypotension vasodilation and intravascular volume depletion leading to hemodynamic ATN currently and slow recovery phase  Creatinine with serial downtrend   Baseline creatinine 0.7 within normal limits no prior history of SUSSY or CKD   --> Follow on serial creatinines on daily labs  Strict I&O altered mentation and hallucinations seen today are unlikely secondary to metabolic issues its possibility with delirium with prolonged hospitalization and critical illness    Hypertension - BP still suboptimally controlled, currently on metoprolol tartrate 100 mg BID, amlodipine 5 mg BID, and hydralazine 100 mg TID and carvedilol 12.5 mg BID -> will increase to 25 mg BID 11/3     Hypernatremia - improved with hypotonic IVF, loop diuretics now resumed, follow Na+ daily for now     Anemia likely.    Currently Hb around 8-9's   Transfuse per Surgical team recommendation    History of gastric bypass  Multiple complications post sleeve gastrectomy complicated by peritonitis and small bowel injury with peritoneal leak  Return to the OR 10/12/2023 with cleanout and drain placement complicated by other fluid collections  requiring aspiration and drain placement  On Vanco Zosyn and micafungin ID following  On tube feeds and TPN weaned off patient's intakes remain pretty poor   Diet management per primary team bariatric surgery    Ongoing metabolic encephalopathy with worsening delirium  Visual hallucinations management per primary hospitalist team    History of cardiomyopathy possible stress-induced Takotsubo  Management per primary team  Resumed IV Bumex 0.5 mg Q8 hours 10/31  On hydralazine metoprolol and amlodipine    Acute hypoxemic hypercapnic respiratory failure  Pulmonary following, right-sided pleural effusion and s/p chest tube 11/1/23     Prior history of JARVIS and asthma  Management per primary team    Thank you for the consultation we will follow  Marlen Cintron PA-C   [facility]  [phone]      Subjective  Good UO yesterday, up in chair today, still with 1:1 sitter and still using BiPAP.     Objective    Vital signs in last 24 hours  Temp:  [97.6  F (36.4  C)-98.6  F (37  C)] 98.1  F (36.7  C)  Pulse:  [88-95] 88  Resp:  [18-22] 21  BP: (153-185)/(73-84) 185/84  SpO2:  [95 %-96 %] 96 %  Weight:   [unfilled]    Intake/Output last 3 shifts  I/O last 3 completed shifts:  In: 1762.3 [P.O.:140; I.V.:367.3; NG/GT:1255]  Out: 2170 [Urine:2100; Drains:60; Chest Tube:10]  Intake/Output this shift:  No intake/output data recorded.    Physical Exam  Up in chair, alert but minimally interactive   CV: RRR without murmur or rub  Lung: clear and equal; no extra sounds, + BiPAP, right sided chest tube with serosanguineous drainage   Ab: soft and NT; + distended; normal bs; midline incision; guarding upper abdomen  Ext: Trace edema and well perfused  Skin; no rash    Pertinent Labs   Lab Results   Component Value Date    WBC 9.0 11/03/2023    HGB 8.3 (L) 11/03/2023    HCT 28.1 (L) 11/03/2023    MCV 96 11/03/2023     11/03/2023     Lab Results   Component Value Date    BUN 27.1 (H) 11/03/2023      11/03/2023     11/03/2023    CO2 27 11/03/2023       Lab Results   Component Value Date    ALBUMIN 2.8 (L) 11/02/2023     Lab Results   Component Value Date    PHOS 3.0 11/02/2023     I reviewed all lab results    Marlen Cintron PA-C

## 2023-11-03 NOTE — PROGRESS NOTES
"General Surgery Progress Note  Hospital Day # 25    S/P Lap Sleeve with Single Anastomosis DS 10/9/23 and then a Re-Operation on 10/12/2023 where it was found that she had 2 small enterotomies in the small bowel, likely form the traction of making the Duodenal anastomosis. She has been in and out of the ICU.    Subjective:   Pt is feeling some pain in the RUQ where her chest tube and drain are located.  She is up to goal tube feeds.    BP (!) 169/77 (BP Location: Left arm, Patient Position: Semi-Ballard's)   Pulse 88   Temp 98.5  F (36.9  C) (Axillary)   Resp 18   Ht 1.6 m (5' 3\")   Wt 131 kg (288 lb 12.8 oz)   LMP 09/09/2023 (Exact Date)   SpO2 95%   BMI 51.16 kg/m     Vitals:    11/02/23 1945 11/02/23 2351 11/03/23 0100 11/03/23 0408   BP: (!) 166/79 (!) 174/77 (!) 160/73 (!) 169/77   BP Location:    Left arm   Patient Position:    Semi-Ballard's   Pulse:       Resp: 22 20  18   Temp: 98.2  F (36.8  C) 98.6  F (37  C)  98.5  F (36.9  C)   TempSrc: Axillary Axillary  Axillary   SpO2:       Weight:       Height:           Physical Exam:  Lungs:  CTA  CV:       RRR, R pigtail chest tube.  Ab:       Soft, + BS, RUQ drain, low output    Recent Labs   Lab 11/03/23  0619   WBC 9.0   HGB 8.3*   HCT 28.1*          Recent Labs   Lab 11/03/23  0619 11/02/23  1814     145 145   CO2 27  --    BUN 27.1*  --    ALBUMIN  --  2.8*   ALKPHOS  --  112*   ALT  --  23   AST  --  24   Cr                        1.50  K                          3.8    Assessment:  Pt is progressing slowly.  She continues to have a segment of her R lower lobe of her lung that is consollidated. (Thank you for your input Pulm.)  Otherwise she is very weak and deconditioned.  She is up to goal tube feed rate.  As she gets stronger we will get the tube out and have her eat on her own.    Plan: Continue Rehab    Hoang Worthy MD  [facility]  [phone]   "

## 2023-11-03 NOTE — PROGRESS NOTES
Daily Progress Note        CODE STATUS:  Full Code    11/02/23  Assessment/Plan:  Mojgan Jimenez is a 53 year old female with h/o obesity, severe JARVIS on CPAP, asthma, stimulant use disorder, stimulant induced psychosis, mood & anxiety disorder. Admitted 10/9/23 for scheduled laparoscopic sleeve gastrectomy with single anastomosis duodenal switch. She was later found to have two small bowel injuries with peritonitis. 10/12/23 returned to OR for small bowel interotomy closure, clean-out, & drain placement; 10/19 found to have RUQ fluid collection s/p drain placement; 10/24 s/p pelvic fluid aspiration.      Course complicated by encephalopathy/delirium, septic shock, suspected takotsubo syndrome, acute respiratory failure due to loss of protective airway reflexes & increased metabolic load requiring intubation (10/12-10/21/23; reintubated 10/21-10/26/23), & oliguric SUSSY with renal recovery. She has significantly improved: mental status clearing with some ongoing delirium at night, cardiomyopathy improved, ATN I recovery phase, and ongoing treatment of intra abdominal infection. She was made floor tele status 10/27/23. On 11/1/23 CT guided chest tube placed on right for persistent effusion, concern for parapneumonic one with risk of future trapped lung        Acute hypoxemic-hypercapnic respiratory failure -now weaned RA  - Rapid Response on 10/12, Pt with decreased LOC. Opens eyes to voice. SpO2 89% on 8L NC. BS clear and diminished. Placed on a 15L non-rebreather for SpO2 > 90%. After narcan admin pt became more responsive/agitated. Transferred to ICU and intubated -10/12-10/21/23; reintubated 10/21-10/26/23. Now extubated and weaned to RA  - 10/22/23 CT demonstrated complete RLL atelectasis, milder RUL & RBML atelectasis or consolidation. 10/22 bronchoscopy moderate RLL blood tinged secretions cleared.   - Cont pulmonary hygiene  - R-pleural effusion, exudative, suspect simple parapneumonic effusion. 10/24 s/p  thoracentesis, 400 mL. Pleural fluid amylase amylase 23, serum 64. 11/1/23--chest tube placed on right. Chest tubes to be clamped today, likely remove tomorrow per pulm.     Gastric bypass  10/9 s/p sleeve gastrectomy complicated by small bowel injury & peritonitis   10/12 returned to OR for closure, clean-out, & drain placement (now removed)  10/19 s/p RUQ fluid collection drain placement   10/24 s/p pelvic fluid aspiration (75 ml)  - Surgery primary   - ID consulted   - 10/12 - peritoneal cultures + Streptococcus anginosus, mixed aerobic & anaerobic kamla   - 10/19 - RUQ fluid cultures & pelvic fluid aspirate + Lactobacillus   - 10/22 - BDG blood +  - 10/24 pleural fluid studies  -#+WBC no pos cx. 10/24--aspirate pelvic drain--lacto + candida  - Was on Vanc, pip-tazo, micafungin. Vanco d/jose 10/31  - Was on TPN previously 10/14  - PPTF started 10/24/23. TF and TPN weaned of 10/27 currently on bariatric clears, surgery managing   - 10/27--per renal -on D5W for hypernatremia  - 10/28 still on D5W  -1 0/29 and 10/30--still on D5W, NA improved by 10/30 to 142, renal managing this  - Diet per surg  - Flush drain at regular intervals      3.Accelerated HTN /Cardiomyopathy   - Echo previously and initially decreased LV function but repeat echo shows EF normalized, possible stress induced.   - Cards appreciated and still following  - Metoprolol switched to coreg; dose increased 11/3/23  - Hydralazine increase to 100 mg tid  - Nitroglycerin patch 0.4mg/24  - Amlodipine 5 mg bid  - Bumex back on per renal  - Prn IV hydralazine     4.History of JARVIS, asthma in the chart no PFTs  - NIPPV with sleep/naps everytime   - Continue duonebs     5.Oliguric SUSSY - improving   - Nephrology was consulted  for ARF peak creat 6.97 on 10/14  - Slowly improving and creatinine trending down--10/31 now 1.5  - Avoid nephrotoxic agents      6.Hypernatremia -   - IV bumex stopped and on D5W per renal .   - Trend sodium 153 -->148  -->144-->142-->139-->142  - Has needed D5W-per renal-off now  - Bumex restarted 10/31     7.Anemia - stable  - Suspect multifactorial secondary to sepsis & surgical blood loss   - Monitor ,11/1/ hgb 8.6     8.Hypervolemia  - Back on bumex     9.Hyperglycemia of critical illness   - Glargine 10 units every day--bs good, stopped lantus   - Glycemic control is optimal. Cont with sliding scale insulin for now     10.Obesity  - BMI 46     11.Acute metabolic and toxic encephalopathy  - Due to above. Still fluctuating here   - Delirium protocol  - Needs to be up day, sleep night  - Up to chair if possible     12. Severe decondition  - Due to prolong hospitalization  - PT/OT eval         Diet: Room Service  Adult Formula Drip Feeding: Continuous Promote w fiber; Nasojejunal; Goal Rate: 55; mL/hr; Start at 15 ml/hr x 8 hrs and advance by 15 ml q 8 hrs to a goal rate of 55 ml/hr  DVT Prophylaxis: Heparin SQ  Patiño Catheter: Not present  Lines: PRESENT      PICC 10/28/23 Triple Lumen Right Basilic-Site Assessment: WDL    Clinically Significant Risk Factors              # Hypoalbuminemia: Lowest albumin = 2.7 g/dL at 10/14/2023  6:29 AM, will monitor as appropriate     # Hypertension: Noted on problem list            # Asthma: noted on problem list        Disposition; TBD, [facility] vs TCU  Barrier to discharge; IV antibiotics.    Subjective:  Interval History: Patient seen and examined this morning. Will still in her bipap. Reported doing better today than yesterday. No fever. The abdominal and chest wall pain was better. Has had a small loose stool this morning per nursing staff.    Review of Systems:   As mentioned in subjective.    Patient Active Problem List   Diagnosis    Mild persistent asthma without complication    Vitamin D deficiency    LINA (generalized anxiety disorder)    Recurrent major depressive disorder, remission status unspecified (H24)    Methamphetamine abuse, episodic (H)    Hyperlipidemia LDL goal <160     Depression, major, recurrent, severe with psychosis (H)    IUD (intrauterine device) in place    Paranoia (H)    PMDD (premenstrual dysphoric disorder)    Polysubstance overdose    Suicidal ideation    Morbid obesity (H)    Mood disorder (H24)    Obstructive sleep apnea    Morbid (severe) obesity due to excess calories (H)    Intra-abdominal abscess (H)    Hypernatremia    SUSSY (acute kidney injury) (H24)    Accelerated hypertension    Metabolic encephalopathy    Acute respiratory failure with hypoxia (H)    Cardiomyopathy (H)       Scheduled Meds:   alteplase  4 mg Intracavitary Daily    amLODIPine  5 mg Oral BID    bumetanide  0.5 mg Intravenous Q12H    carvedilol  25 mg Oral BID w/meals    heparin ANTICOAGULANT  5,000 Units Subcutaneous Q8H    hydrALAZINE  100 mg Oral or Feeding Tube TID    insulin aspart  1-4 Units Subcutaneous 4x Daily AC & HS    micafungin  100 mg Intravenous Q24H    nitroGLYcerin  1 patch Transdermal Daily    pantoprazole  40 mg Per Feeding Tube QAM AC    Or    pantoprazole  40 mg Intravenous QAM AC    piperacillin-tazobactam  3.375 g Intravenous Q8H    sodium chloride (PF)  3 mL Intracatheter Q8H    venlafaxine  75 mg Oral TID w/meals     Continuous Infusions:   dextrose       PRN Meds:.acetaminophen **OR** acetaminophen, albuterol, albuterol, dextrose, glucose **OR** dextrose **OR** glucagon, hydrALAZINE, HYDROmorphone, ipratropium - albuterol 0.5 mg/2.5 mg/3 mL, lidocaine 4%, lidocaine (buffered or not buffered), menthol-zinc oxide, miconazole, naloxone **OR** naloxone **OR** naloxone **OR** naloxone, OLANZapine, ondansetron **OR** ondansetron, phenol, prochlorperazine **OR** prochlorperazine, sodium chloride (PF), traMADol    Objective:  Vital signs in last 24 hours:  Temp:  [97.6  F (36.4  C)-98.6  F (37  C)] 97.6  F (36.4  C)  Pulse:  [88-89] 89  Resp:  [18-22] 20  BP: (160-185)/(73-86) 184/86  SpO2:  [96 %] 96 %        Intake/Output Summary (Last 24 hours) at 11/2/2023 1503  Last data  filed at 11/2/2023 1435  Gross per 24 hour   Intake 1824.5 ml   Output 1750 ml   Net 74.5 ml       Physical Exam:    General: Not in obvious distress. Morbidly obese  HEENT: NC, AT. NJ in place   Chest: Diminished breath sounds due to obesity. Chest tube on the right side, laterally.  Heart: S1S2 normal, regular. No M/R/G  Abdomen: Soft. NT, ND. Bowel sounds- active.  Extremities: 1+ legs swelling  Neuro: Awake, grossly non-focal      Lab Results:(I have personally reviewed the results)    Recent Results (from the past 24 hour(s))   Glucose by meter    Collection Time: 11/02/23  5:00 PM   Result Value Ref Range    GLUCOSE BY METER POCT 121 (H) 70 - 99 mg/dL   Sodium    Collection Time: 11/02/23  6:14 PM   Result Value Ref Range    Sodium 145 135 - 145 mmol/L   Lactate Dehydrogenase    Collection Time: 11/02/23  6:14 PM   Result Value Ref Range    Lactate Dehydrogenase 207 0 - 250 U/L   Hepatic panel    Collection Time: 11/02/23  6:14 PM   Result Value Ref Range    Protein Total 6.7 6.4 - 8.3 g/dL    Albumin 2.8 (L) 3.5 - 5.2 g/dL    Bilirubin Total 0.2 <=1.2 mg/dL    Alkaline Phosphatase 112 (H) 35 - 104 U/L    AST 24 0 - 45 U/L    ALT 23 0 - 50 U/L    Bilirubin Direct <0.20 0.00 - 0.30 mg/dL   Glucose by meter    Collection Time: 11/02/23  8:24 PM   Result Value Ref Range    GLUCOSE BY METER POCT 133 (H) 70 - 99 mg/dL   CBC with platelets    Collection Time: 11/03/23  6:19 AM   Result Value Ref Range    WBC Count 9.0 4.0 - 11.0 10e3/uL    RBC Count 2.92 (L) 3.80 - 5.20 10e6/uL    Hemoglobin 8.3 (L) 11.7 - 15.7 g/dL    Hematocrit 28.1 (L) 35.0 - 47.0 %    MCV 96 78 - 100 fL    MCH 28.4 26.5 - 33.0 pg    MCHC 29.5 (L) 31.5 - 36.5 g/dL    RDW 14.3 10.0 - 15.0 %    Platelet Count 307 150 - 450 10e3/uL   Basic metabolic panel    Collection Time: 11/03/23  6:19 AM   Result Value Ref Range    Sodium 145 135 - 145 mmol/L    Potassium 3.8 3.4 - 5.3 mmol/L    Chloride 107 98 - 107 mmol/L    Carbon Dioxide (CO2) 27 22 - 29  mmol/L    Anion Gap 11 7 - 15 mmol/L    Urea Nitrogen 27.1 (H) 6.0 - 20.0 mg/dL    Creatinine 1.50 (H) 0.51 - 0.95 mg/dL    GFR Estimate 41 (L) >60 mL/min/1.73m2    Calcium 9.1 8.6 - 10.0 mg/dL    Glucose 143 (H) 70 - 99 mg/dL   Sodium    Collection Time: 11/03/23  6:19 AM   Result Value Ref Range    Sodium 145 135 - 145 mmol/L   Glucose by meter    Collection Time: 11/03/23  8:24 AM   Result Value Ref Range    GLUCOSE BY METER POCT 143 (H) 70 - 99 mg/dL   Glucose by meter    Collection Time: 11/03/23 12:31 PM   Result Value Ref Range    GLUCOSE BY METER POCT 132 (H) 70 - 99 mg/dL   Glucose by meter    Collection Time: 11/03/23  4:17 PM   Result Value Ref Range    GLUCOSE BY METER POCT 146 (H) 70 - 99 mg/dL       All laboratory and imaging data in the past 24 hours reviewed  Serum Glucose range:   Recent Labs   Lab 11/03/23  1617 11/03/23  1231 11/03/23  0824 11/03/23  0619   * 132* 143* 143*     ABG: No lab results found in last 7 days.  CBC:   Recent Labs   Lab 11/03/23  0619 11/02/23  0616 11/01/23  0547   WBC 9.0 11.4* 12.6*   HGB 8.3* 8.3* 8.6*   HCT 28.1* 28.2* 28.6*   MCV 96 97 95    333 363     Chemistry:   Recent Labs   Lab 11/03/23  0619 11/02/23  1814 11/02/23  0616 11/01/23  1742 11/01/23  0547 10/31/23  1756 10/31/23  0617     145 145 144  144   < > 142  142   < > 139  139   POTASSIUM 3.8  --  4.0  --  3.8  --  3.8   CHLORIDE 107  --  108*  --  106  --  104   CO2 27  --  26  --  22  --  24   BUN 27.1*  --  28.0*  --  27.9*  --  31.3*   CR 1.50*  --  1.62*  --  1.82*  --  1.65*   GFRESTIMATED 41*  --  38*  --  33*  --  37*   PALAK 9.1  --  9.1  --  8.7  --  9.1   MAG  --   --  1.7  --  2.0  --  1.8   PROTTOTAL  --  6.7  --   --   --   --   --    ALBUMIN  --  2.8*  --   --   --   --   --    AST  --  24  --   --   --   --   --    ALT  --  23  --   --   --   --   --    ALKPHOS  --  112*  --   --   --   --   --    BILITOTAL  --  0.2  --   --   --   --   --     < > = values in this  "interval not displayed.     Coags:  No results for input(s): \"INR\", \"PROTIME\", \"PTT\" in the last 168 hours.    Invalid input(s): \"APTT\"  Cardiac Markers:  No results for input(s): \"CKTOTAL\", \"TROPONINI\" in the last 168 hours.       CT Chest Tube with Cath Placement    Result Date: 11/1/2023  EXAM: 1. PERCUTANEOUS CHEST TUBE PLACEMENT RIGHT PLEURAL SPACE 2. CT GUIDANCE 3. CONSCIOUS SEDATION LOCATION: [facility] DATE: 11/1/2023 INDICATION: right pleural fluid increasing in volume TECHNIQUE: Dose reduction techniques were used. PROCEDURE: Informed consent obtained. Site marked. Prior images reviewed. Required items made available. Patient identity confirmed verbally and with arm band. Patient reevaluated immediately before administering sedation. Universal protocol was followed. Time out performed. The site was prepped and draped in sterile fashion. 4 mL of 1% lidocaine was infused into the local soft tissues. Using standard technique and under direct CT guidance, a 10 Wolof nonlocking chest tube catheter was inserted  into the pleural space. The catheter was fixed in place with sutures and adhesive device, and the tubing was banded. Chest tube placed to Pleur-evac suction. SPECIMEN: 1 L of clear yellow fluid was aspirated and sent to lab for testing. BLOOD LOSS: Minimal. The patient tolerated the procedure well. No immediate complications. SEDATION: Versed 1.5  mg. Fentanyl 50 mcg. The procedure was performed with administration intravenous conscious sedation with appropriate preoperative, intraoperative, and postoperative evaluation. 14  minutes of supervised face to face conscious sedation time was provided by a radiology nurse under my direct supervision.     IMPRESSION: 1.  Successful CT-guided chest tube placement into the right pleural space with moderate sedation . Reference CPT Codes: 92049, 35883    XR Feeding Tube Placement    Result Date: 10/31/2023  EXAM: XR FEEDING TUBE PLACEMENT " LOCATION: [facility] DATE: 10/31/2023 INDICATION: Not tolerating oral intake. Place feeding tube. Single Anastamosis Duodenal Switch. Surgery 10 12 for enterotomy and sepsis. COMPARISON: CT AP 10/30/2023 and older studies, feeding tube placement 10/24/2023. TECHNIQUE: Routine. FINDINGS: Enteric tube placed without complication. Tip in the the small bowel. FLUOROSCOPIC TIME: 0.6 minutes NUMBER OF IMAGES: 1 Reference CPT Code: 67992     XR Chest Port 1 View    Result Date: 10/31/2023  EXAM: XR CHEST PORT 1 VIEW LOCATION: [facility] DATE: 10/31/2023 INDICATION: 53 y o female s p bariatric surgery this admit, with complications, has drain, ongoing effusion right, last night n v check for infiltrate aspiration COMPARISON: CT 10/22/2023.     IMPRESSION: Moderate right pleural effusion with associated compressive atelectasis in the right lower lobe. Left basilar atelectasis or consolidation. Right upper extremity PICC with tip near the cavoatrial junction. Obscured right heart border from the  effusion. Cardiomediastinal silhouette otherwise within normal limits. Tubing projecting over the right upper quadrant abdomen. No significant interval change.    CT Abdomen w Contrast    Result Date: 10/30/2023  EXAM: CT ABDOMEN W CONTRAST LOCATION: [facility] DATE: 10/30/2023 INDICATION: Follow-up drain placement. Determine if patient may need TPA through drain. COMPARISON: 10/25/2023. Others. TECHNIQUE: CT scan of the abdomen was performed following injection of IV contrast. Multiplanar reformats were obtained. Dose reduction techniques were used. CONTRAST: 75 mL Isovue 370 FINDINGS:  LOWER CHEST: Incompletely seen small to moderate right pleural effusion and complete right lower lobe atelectasis. HEPATOBILIARY: Exchange of prior perihepatic drain was performed on 10/25/2023. Current drain tip coils adjacent to the hepatic dome. The perihepatic fluid  collection has minimally improved. No focal hepatic abnormality. PANCREAS: Normal. SPLEEN: Normal. ADRENAL GLANDS: Normal. KIDNEYS: Normal. BOWEL: Surgical changes stomach. No evidence for bowel obstruction. LYMPH NODES: No adenopathy. VASCULATURE: Nonaneurysmal aorta. MUSCULOSKELETAL: Nothing acute.     IMPRESSION: 1.  Perihepatic drain in place with tip adjacent to the hepatic dome. Minimal improvement of the perihepatic collection since 10/25/2023. Small perihepatic collection remains. 2.  Small to moderate sized right pleural effusion and associated collapse of the right lower lobe.     XR Feeding Tube Placement    Addendum Date: 10/27/2023    Addendum: TECHNIQUE: The existing NG tube in the stomach was removed and a feeding tube was placed with fluoroscopic guidance. Tip was placed in the small bowel post anastomosis. End of addendum    Result Date: 10/27/2023  EXAM: XR FEEDING TUBE PLACEMENT LOCATION: [facility] DATE: 10/24/2023 INDICATION: please exchange [place] sump for a post pylroic feeding tube; FYI: new anastomosis just past pylorus COMPARISON: CT 10/22/2023 TECHNIQUE: Routine. FINDINGS: Enteric tube placed without complication. Tip in the jejunum just post anastomosis. FLUOROSCOPIC TIME: 1.5 NUMBER OF IMAGES: 3 Reference CPT Code: 44364     US Upper Extremity Venous Duplex Bilat    Result Date: 10/25/2023  EXAM: US UPPER EXTREMITY VENOUS DUPLEX BILATERAL LOCATION: [facility] DATE: 10/25/2023 INDICATION: ongoing fevers, evaluate for thrombus COMPARISON: None. TECHNIQUE: Venous Duplex ultrasound of both upper extremities with (when possible) and without compression, augmentation, and duplex. Color flow and spectral Doppler with waveform analysis performed. FINDINGS: Ultrasound includes evaluation of the internal jugular veins, innominate veins, subclavian veins, axillary veins, and brachial veins. The superficial cephalic and basilic veins were also  evaluated where seen. RIGHT: No deep venous thrombosis. Occlusive and partially occlusive thrombus within the cephalic vein extending from the forearm to the antecubital fossa (length of involvement greater than 5 cm). LEFT: No deep venous thrombosis. Partially occlusive thrombus within the cephalic vein in the antecubital fossa near the IV insertion (less than 5 cm).     IMPRESSION: 1.  No deep venous thrombosis in the bilateral upper extremities. 2.  Superficial thrombus within the cephalic veins bilaterally.    US Lower Extremity Venous Duplex Bilateral    Result Date: 10/25/2023  EXAM: US LOWER EXTREMITY VENOUS DUPLEX BILATERAL LOCATION: [facility] DATE: 10/25/2023 INDICATION: ongoing fevers, evaluate for thrombus COMPARISON: None. TECHNIQUE: Venous Duplex ultrasound of bilateral lower extremities with and without compression, augmentation and duplex. Color flow and spectral Doppler with waveform analysis performed. FINDINGS: Exam includes the common femoral, femoral, popliteal veins as well as segmentally visualized deep calf veins and greater saphenous vein. RIGHT: No deep vein thrombosis. No superficial thrombophlebitis. No popliteal cyst. LEFT: No deep vein thrombosis. No superficial thrombophlebitis. No popliteal cyst.     IMPRESSION: 1.  No deep venous thrombosis in the bilateral lower extremities.    CT Abdomen w/o Contrast    Result Date: 10/25/2023  EXAM: CT ABDOMEN W/O CONTRAST LOCATION: [facility] DATE: 10/25/2023 INDICATION: Perihepatic abscess status post drainage. Follow-up abscess. COMPARISON: 10/22/2023 TECHNIQUE: CT scan of the abdomen was performed without IV contrast. Multiplanar reformats were obtained. Dose reduction techniques were used. CONTRAST: None. FINDINGS:  LOWER CHEST: Stable right pleural effusion. Bibasilar atelectasis. Artifact. HEPATOBILIARY: Perihepatic drain remains in place. There remains a crescentic low-density process  involving the subcapsular space at the site of drain which extends to the dome of the liver. PANCREAS: Normal. SPLEEN: Normal. ADRENAL GLANDS: Normal. KIDNEYS: Normal. BOWEL: Postsurgical changes of the stomach with enteric tube noted in small bowel in the right upper quadrant. LYMPH NODES: Normal. VASCULATURE: Minimal arterial plaque. MUSCULOSKELETAL: Normal.     IMPRESSION: 1.  Perihepatic drain remains in good position. Residual low-density material persists in this subcapsular perihepatic space which extends to the dome of the liver. 2.  Stable appearance right pleural effusion and bibasilar atelectasis. 3.  Postsurgical changes of the stomach.    IR Abscess Tube Check    Result Date: 10/25/2023  [place] [facility] LOCATION: [facility] DATE: 10/25/2023 PROCEDURE: ABSCESS TUBE CHECK AND EXCHANGE/UPSIZING INTERVENTIONAL RADIOLOGIST: Teofilo Barber MD. INDICATION: 53-year-old female with a perihepatic collection status post drain placement presents for drain check due to decreased drain output. CT shows persistent perihepatic fluid collection.. CONSENT: The risks, benefits and alternatives of an abscessogram with possible exchange, manipulation or removal were discussed with the patient's family in detail. All questions were answered. Informed consent was given to proceed with the procedure. SEDATION: Patient is intubated and sedated. CONTRAST: 20 mL Omnipaque into the abscess cavity. ANTIBIOTICS: None. ADDITIONAL MEDICATIONS: None. FLUOROSCOPIC TIME: 3.4 minutes. RADIATION DOSE: Air Kerma: 102 mGy. COMPLICATIONS: No immediate complications. STERILE BARRIER TECHNIQUE: Maximum sterile barrier technique was used. Cutaneous antisepsis was performed at the operative site with application of 2% chlorhexidine and large sterile drape. Prior to the procedure, the  and assistant performed hand hygiene and wore hat, mask, sterile gown, and sterile gloves during the entire procedure. PROCEDURE:   A  image was obtained. The pre-existing drainage catheter was injected with a small amount of contrast and multiple images were obtained. Based on the results of the study it was elected to exchange the catheter. The pre-existing drainage catheter was removed over a wire and exchange was made for a Kumpe catheter. The catheter and wire were negotiated superiorly along the right hepatic dome. The Kumpe catheter was then exchanged for a new 12F locking loop drainage catheter. The loop was partially formed within the residual abscess cavity. A post placement contrast injection was performed confirming that the new catheter controls the residual fluid collection well. FINDINGS: The indwelling catheter was clogged. Following exchange and repositioning, the new larger drainage catheter lies within the central aspect of the residual abscess cavity along the right hepatic dome and appears to control the fluid collection well.     IMPRESSION:  Exchange and repositioning with upsize of the perihepatic drainage catheter to a 12 Romansh catheter. PLAN: Continue flushing the catheter and keep to suction drainage.     CT Abdomen Peritoneum Abscess Aspiration    Result Date: 10/24/2023  EXAM: 1. PERCUTANEOUS DRAIN PLACEMENT 2. CT GUIDANCE LOCATION: [facility] DATE: 10/24/2023 INDICATION: fluid collection in pelvis, ? drain placement TECHNIQUE: Dose reduction techniques were used. PROCEDURE: Informed consent obtained. Site marked. Prior images reviewed. Required items made available. Patient identity confirmed verbally and with arm band. Patient reevaluated immediately before administering sedation. Universal protocol was followed. Time out performed. The site was prepped and draped in sterile fashion. 10 mL of 1% lidocaine was infused into the local soft tissues. Using standard technique and under direct CT guidance, an 18-gauge guiding needle was placed into the pelvic fluid collection using a right  transfemoral gluteal approach. 75 mL of yellow fluid with slight debris was aspirated. Because this did not have the appearance of yesica pus a drain was not placed. Fluid pocket 20 cm deep in the pelvis. Irrigation 4 times with sterile saline..  SPECIMEN: 75 mL of yellow debris filled fluid was aspirated and sent to lab for cultures and Gram stain. BLOOD LOSS: Minimal. The patient tolerated the procedure well. No immediate complications.     IMPRESSION: 1.  Successful CT-guided aspiration of pelvic fluid collection with yellow slight debris filled fluid aspirated and sent for cultures. This pocket is 20 cm deep in the pelvis. No drain was placed but the pocket was irrigated 4 times with sterile saline.. Reference CPT Codes: 66573,    US Thoracentesis    Result Date: 10/24/2023  EXAM: 1. RIGHT THORACENTESIS 2. ULTRASOUND GUIDANCE LOCATION: [facility] DATE: 10/24/2023 INDICATION: Pleural effusion. PROCEDURE: Informed consent obtained. Time out performed. The chest was prepped and draped in sterile fashion. 10 mL of 1 % lidocaine was infused into the local soft tissues. Under direct ultrasound guidance, a 5 Serbian catheter system was placed into the pleural effusion. 0.4 liters of clear yellow fluid were removed and sent to lab, if requested. Patient tolerated procedure well. Ultrasound imaging was obtained and placed in the patient's permanent medical record.     IMPRESSION: Status post right ultrasound-guided thoracentesis. Reference CPT Code: 61786    Echocardiogram Limited    Result Date: 10/23/2023  [id number]^MATEO^PATSY^[place] [address]  Name: LY GONZALEZ MRN: [id number] : 1970 Study Date: 10/23/2023 10:27 AM Age: 53 yrs Gender: Female Patient Location: [facility] Reason For Study: Cardiomyopathy Ordering Physician: PATSY GALINDO Performed By: ACE  BSA: 2.2 m2 Height: 63 in Weight: 273 lb HR: 108  BP: 157/72 mmHg ______________________________________________________________________________ Procedure Limited Portable Echo Adult. Definity (NDC #75557-644) given intravenously. ______________________________________________________________________________ Interpretation Summary  The left ventricle is normal in size. The visual ejection fraction is 60-65%. No regional wall motion abnormalities noted. Normal right ventricle size and systolic function. The rhythm was sinus tachycardia. The study was technically difficult. Compared to echo from 10/14/23 thr LVEF has improved. ______________________________________________________________________________ Left Ventricle The left ventricle is normal in size. There is normal left ventricular wall thickness. The visual ejection fraction is 60-65%. No regional wall motion abnormalities noted.  Right Ventricle Normal right ventricle size and systolic function. TAPSE is normal, which is consistent with normal right ventricular systolic function.  Atria Normal left atrial size. Right atrial size is normal.  Mitral Valve The mitral valve is not well visualized. There is trace mitral regurgitation. There is no mitral valve stenosis.  Tricuspid Valve The tricuspid valve is not well visualized.  Aortic Valve The aortic valve is not well visualized. No aortic regurgitation is present. No aortic stenosis is present.  Pulmonic Valve Normal pulmonic valve.  Vessels IVC diameter and respiratory changes fall into an intermediate range suggesting an RA pressure of 8 mmHg.  Pericardium Trivial pericardial effusion.  Rhythm The rhythm was sinus tachycardia. ______________________________________________________________________________ MMode/2D Measurements & Calculations  IVSd: 0.61 cm LVIDd: 4.9 cm LVIDs: 3.3 cm LVPWd: 0.87 cm FS: 33.3 % LV mass(C)d: 118.8 grams LV mass(C)dI: 53.8 grams/m2 RWT: 0.36 TAPSE: 2.9 cm  Time Measurements MM HR: 108.0 BPM  Doppler Measurements & Calculations  Ao V2 max: 214.0 cm/sec Ao max P.0 mmHg Ao V2 mean: 130.0 cm/sec Ao mean P.0 mmHg Ao V2 VTI: 31.1 cm RV S Sami: 19.6 cm/sec  ______________________________________________________________________________ Report approved by: Yakelin García 10/23/2023 12:34 PM       CT Chest Abdomen Pelvis w/o Contrast    Result Date: 10/22/2023  EXAM: CT CHEST ABDOMEN PELVIS W/O CONTRAST LOCATION: [facility] DATE: 10/22/2023 INDICATION: Sepsis. treated for peritonitis and pneumonia.  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 COMPARISON: 10/18/2023 CT AP. TECHNIQUE: CT scan of the chest, abdomen, and pelvis was performed without IV contrast. Multiplanar reformats were obtained. Dose reduction techniques were used. CONTRAST: None. FINDINGS: LUNGS AND PLEURA: Complete right lower lobe atelectasis, mild passive atelectasis right upper and middle lobes posteriorly, small/moderate volume right pleural effusion and mild elevation right hemidiaphragm. Trace left pleural effusion and mild platelike atelectasis left lower lobe unchanged. MEDIASTINUM/AXILLAE: Heart size within normal limits with no pericardial effusion. Endotracheal tube tip is only 1 cm above the emilia. CORONARY ARTERY CALCIFICATION: Trace calcified atheromatous plaque LAD coronary artery. HEPATOBILIARY: Liver is normal.  No calcified gallstones or bile duct dilatation. PANCREAS: Normal. SPLEEN: Normal. ADRENAL GLANDS: Normal. KIDNEYS/BLADDER: Bladder is decompressed by well-positioned Patiño catheter. Kidneys, ureters and bladder are otherwise normal. BOWEL: Sleeve gastrectomy and proximal duodenal to jejunal anastomosis with a well-positioned nasogastric tube and interval removal of the surgical drains. Interval placement of percutaneous drainage  catheter lateral aspect right subdiaphragmatic air-fluid collection surrounding the right hepatic lobe which has not changed significantly and continues to measure about 1 - 2 cm radial thickness. A 7 x 5 x 4 cm volume of nongas-containing fluid in the pelvic cul-de-sac is unchanged. LYMPH NODES: No lymphadenopathy. VASCULATURE: Normal caliber abdominal aorta.  PELVIC ORGANS: A 5 cm fibroid. MUSCULOSKELETAL: Unremarkable.     IMPRESSION: 1.  Sleeve gastrectomy and proximal duodenal to jejunal anastomosis with a well-positioned nasogastric tube and interval removal of the 2 surgical drains. 2.  Interval placement of a percutaneous drainage catheter with distal loop in the lateral aspect of the right subdiaphragmatic air-fluid collection surrounding the right hepatic lobe which has not changed significantly and continues to measure about 1 -  2 cm radial thickness. 3.  A 7 x 5 x 4 cm volume of nongas-containing fluid in the pelvic cul-de-sac is unchanged. 4.  Complete right lower lobe atelectasis, mild passive atelectasis right upper and middle lobes posteriorly, small/moderate volume right pleural effusion and mild elevation right hemidiaphragm. 5.  Endotracheal tube tip is only 1 cm above the emilia.    XR Chest Port 1 View    Result Date: 10/22/2023  EXAM: XR CHEST PORT 1 VIEW LOCATION: [facility] DATE: 10/22/2023 INDICATION: Endotracheal tube positioning COMPARISON: 11/22/2023     IMPRESSION: Endotracheal tube has been placed with tip 2 cm above the emilia. Remainder unchanged. Enteric tube tip below diaphragm, off the image. Left IJ central line tip in the left brachiocephalic vein. Right upper quadrant percutaneous drain. Bilateral pleural effusions. Moderate degenerative change thoracic spine.    XR Chest Port 1 View    Result Date: 10/22/2023  EXAM: XR CHEST PORT 1 VIEW LOCATION: [facility] DATE: 10/22/2023 INDICATION: concern for possible aspiration, assess  for HAP COMPARISON: CT 10/19/2023     IMPRESSION: Perihepatic drain overlies the right upper abdomen. Small right pleural effusion with compressive atelectasis. Infiltrate of the right lung base is not excluded. Left basilar and midlung atelectasis. Left internal jugular central venous catheter with tip overlying the innominate confluence region. Nasogastric tube which courses below the diaphragm though the tip is excluded by collimation. No pneumothorax. Upper limits normal heart size.    XR Abdomen Port 1 View    Result Date: 10/21/2023  EXAM: XR ABDOMEN PORT 1 VIEW LOCATION: [facility] DATE: 10/21/2023 INDICATION: ng position COMPARISON: CT abdomen 10/18/2023.     IMPRESSION: Nasogastric tube terminates in the distal stomach. Surgical staple line of the mid line in the abdomen.    CT Retroperitoneal Abscess Drainage    Result Date: 10/19/2023  EXAM: 1. PERCUTANEOUS DRAIN PLACEMENT PERIHEPATIC COLLECTION 2. CT GUIDANCE 3. CONSCIOUS SEDATION LOCATION: [facility] DATE: 10/19/2023 INDICATION: Perihepatic colletion. TECHNIQUE: Dose reduction techniques were used. PROCEDURE: Informed consent obtained. Site marked. Prior images reviewed. Required items made available. Patient identity confirmed verbally and with arm band. Patient reevaluated immediately before administering sedation. Universal protocol was followed. Time out performed. The site was prepped and draped in sterile fashion. 10 mL of 1% lidocaine was infused into the local soft tissues. Using standard technique and under direct CT guidance, a 10 [place]ican drainage catheter was inserted into the fluid collection.  SPECIMEN: 10 mL of thick brownish-red fluid was aspirated and sent to lab for cultures and Gram stain. BLOOD LOSS: Minimal. The patient tolerated the procedure well. No immediate complications. SEDATION: Versed 4 mg. Fentanyl 250 mcg. The procedure was performed with administration intravenous conscious  sedation with appropriate preoperative, intraoperative, and postoperative evaluation. 40 minutes of supervised face to face conscious sedation time was provided by a radiology nurse under my direct supervision.     IMPRESSION: 1.  Successful CT-guided drain placement into perihepatic collection. Reference CPT Codes: 73270, 90617, 55987, 86961    CT Abdomen Pelvis w/o Contrast    Addendum Date: 10/18/2023    ADDENDUM dictated by Jose G Birmingham M.D.  10/18/2023 Original report dictated by Jose G Birmingham M.D.  10/18/2023 Addendum to impression #1 IMPRESSION: 1.  Sleeve gastrectomy and proximal duodenal to jejunal anastomosis, WELL-POSITIONED NASOGASTRIC TUBE, and upper anterior abdominal surgical drain from the right and a second left anterior abdominal and pelvic surgical drain.     Result Date: 10/18/2023  EXAM: CT ABDOMEN PELVIS W/O CONTRAST LOCATION: [facility] DATE: 10/18/2023 INDICATION: Perforated bowel, ongoing fever. Status post 10/09/2023 sleeve gastrectomy with single anastomosis duodenal switch complicated by bowel perforation, peritonitis and sepsis status post 10/12/2023 washout and enterotomy closure. Cardiomyopathy.  JARVIS. COMPARISON: 10/16/2023 CXR and 10/10/2023 Gastrografin upper GI. TECHNIQUE: CT scan of the abdomen and pelvis was performed without IV contrast. Multiplanar reformats were obtained. Dose reduction techniques were used. CONTRAST: None. FINDINGS: LOWER CHEST: Complete right lower lobe atelectasis, small volume of right-sided pleural fluid and mild elevation right hemidiaphragm. Trace left pleural effusion and mild platelike atelectasis left lower lobe. Heart size within normal limits with no pericardial effusion. HEPATOBILIARY: Liver is normal.  No calcified gallstones or bile duct dilatation. PANCREAS: Normal. SPLEEN: Spleen size normal. ADRENAL GLANDS: Normal. KIDNEYS/BLADDER: Bladder is decompressed by well-positioned Patiño catheter. Kidneys, ureters and bladder are  otherwise normal. BOWEL: Sleeve gastrectomy and proximal duodenal to jejunal anastomosis, a well-positioned nasogastric tube, and upper anterior abdominal surgical drain from the right and a second left anterior abdominal and pelvic surgical drain. A thin layer of fluid about 1 - 2 cm radial thickness between the right hemidiaphragm and right hepatic lobe contains a few foci of gas and there is a small amount of nongas-containing fluid in pelvic cul-de-sac. Residual iodinated contrast material from 10/10/2023 upper GI within the normal caliber appendix, colon and rectum. LYMPH NODES: No lymphadenopathy. VASCULATURE: Normal caliber abdominal aorta.  PELVIC ORGANS: A 5 cm fibroid. MUSCULOSKELETAL: Unremarkable.     IMPRESSION: 1.  Sleeve gastrectomy and proximal duodenal to jejunal anastomosis, malpositioned nasogastric tube, and upper anterior abdominal surgical drain from the right and a second left anterior abdominal and pelvic surgical drain. 2.  A thin layer of fluid about 1 - 2 cm radial thickness between the right hemidiaphragm and right hepatic lobe contains a few foci of gas and there is a small amount of nongas-containing fluid in pelvic cul-de-sac. 3.  Complete right lower lobe atelectasis, small volume of right-sided pleural fluid and mild elevation right hemidiaphragm.     XR Chest Port 1 View    Result Date: 10/16/2023  EXAM: XR CHEST PORT 1 VIEW LOCATION: [facility] DATE: 10/16/2023 INDICATION: hypoxia COMPARISON: Portable AP view of the chest 10/14/2023     IMPRESSION: Tip of the endotracheal tube is in satisfactory position. Esophageal temperature probe is in the lower third of the esophagus. Gastric tube courses below the diaphragmatic hiatus and outside the field-of-view. Telemetry leads overlie the chest. Persistent shallow lung inflation. The right hemidiaphragm is indistinct and there is graded opacity in the infrahilar right chest either related to adjacent basilar  atelectasis and/or layering pleural fluid. Focal atelectasis in the medial left base is not increased. The vessels within the aerated portions of the lungs are normal. No clear change from 2 days earlier. Unchanged heart and mediastinal contours.    Echocardiogram Complete    Result Date: 10/14/2023  [id number]^MANISH^[place] [address]  Name: LY GONZALEZ MRN: [id number] : 1970 Study Date: 10/14/2023 02:51 PM Age: 53 yrs Gender: Female Patient Location: [facility] Reason For Study: Arrhythmia, Pericardial Effusion Ordering Physician: DAMARIS HAMMOND Performed By: CM  BSA: 2.3 m2 Height: 63 in Weight: 287 lb HR: 104 ______________________________________________________________________________ Procedure Complete Echo Adult. Definity (NDC #73652-439) given intravenously. Poor acoustic windows. Technically difficult study. Limited views were obtained. ______________________________________________________________________________ Interpretation Summary  Technically difficult study. Limited views were obtained. The left ventricle is normal in size. Left ventricular function is decreased. The ejection fraction is 30-35% (moderately reduced). There is moderate global hypokinesia of the left ventricle. ______________________________________________________________________________ Left Ventricle The left ventricle is normal in size. Left ventricular function is decreased. The ejection fraction is 30-35% (moderately reduced). There is normal left ventricular wall thickness. There is moderate global hypokinesia of the left ventricle.  Right Ventricle The right ventricle is not well visualized.  Atria Normal left atrial size. Right atrium not well visualized.  Mitral Valve The mitral valve is not well visualized. There is no mitral regurgitation noted.  Tricuspid Valve The tricuspid valve is not well visualized. No tricuspid regurgitation.  Aortic  Valve The aortic valve is not well visualized. No aortic regurgitation is present. No hemodynamically significant valvular aortic stenosis.  Pulmonic Valve The pulmonic valve is not well visualized. There is no pulmonic valvular regurgitation.  Vessels The aorta root is normal. Normal size ascending aorta.  Pericardium There is no pericardial effusion.  ______________________________________________________________________________ MMode/2D Measurements & Calculations IVSd: 1.1 cm LVIDd: 5.1 cm LVIDs: 3.9 cm LVPWd: 0.81 cm  FS: 23.1 % LV mass(C)d: 168.8 grams LV mass(C)dI: 74.9 grams/m2 Ao root diam: 2.5 cm LA dimension: 3.4 cm asc Aorta Diam: 3.1 cm LA/Ao: 1.4 LVOT diam: 2.0 cm LVOT area: 3.1 cm2 Ao root diam index Ht(cm/m): 1.6 Ao root diam index BSA (cm/m2): 1.1 Asc Ao diam index BSA (cm/m2): 1.4 Asc Ao diam index Ht(cm/m): 1.9 RWT: 0.32  Doppler Measurements & Calculations MV max P.5 mmHg MV mean P.0 mmHg MV V2 VTI: 18.8 cm PA acc time: 0.07 sec  ______________________________________________________________________________ Report approved by: Yakelin Mcadams 10/14/2023 04:40 PM       XR Chest Port 1 View    Result Date: 10/14/2023  EXAM: XR CHEST PORT 1 VIEW LOCATION: [facility] DATE: 10/14/2023 INDICATION: elevated peak pressures COMPARISON: Portable chest radiograph 10/13/2023     IMPRESSION: Low lung volumes. Hazy opacities right lower lung field could be related to overlying soft tissue versus atelectasis or developing infiltrate. This is similar as compared to the prior day chest radiograph but recommend continued attention on follow-up imaging. Minimal atelectasis left base. Endotracheal tube tip in satisfactory position terminating approximately 4 cm above the emilia. Nasogastric tube courses below the level of the diaphragm though the tip is not well visualized on this radiograph. Recommend attention on follow-up and consider abdominal radiograph for further  evaluation.. Left IJ central line tip overlies the region of the right brachiocephalic vein. No pneumothorax. Trace effusions possible.    XR Chest Port 1 View    Result Date: 10/13/2023  EXAM: XR CHEST PORT 1 VIEW LOCATION: [facility] DATE: 10/13/2023 INDICATION: Evaluation position of ETT COMPARISON: 10/12/2023     IMPRESSION: Low lung volumes. Hazy opacities right lower lung field could be related to overlying soft tissue versus atelectasis or developing infiltrate. This should be reviewed on follow-up imaging. Minimal atelectasis left base. Endotracheal tube tip in satisfactory position 2.5 cm above the emilia. Enteric tube extends into the stomach. Left IJ central line tip mid SVC. No pneumothorax.    XR Chest Port 1 View    Result Date: 10/12/2023  EXAM: XR CHEST PORT 1 VIEW LOCATION: [facility] DATE: 10/12/2023 INDICATION: intubation COMPARISON: X-ray 09/14/2023     IMPRESSION: Endotracheal tube tip located 1.9 cm above the emilia. Left IJ central catheter with the tip in the upper SVC. No pneumothorax. Moderate asymmetric elevation of the right hemidiaphragm. Mild bibasilar pulmonary opacities likely reflect atelectasis. No definite pleural effusion. Normal heart size. Spinal degenerative changes.    XR Gastrografin Upper GI w KUB    Result Date: 10/10/2023  EXAM: XR GASTROGRAFIN UPPER GI W KUB LOCATION: [facility] DATE: 10/10/2023 INDICATION: Post op day #1 of Sleeve Gastrectomy with a Single Anastomosis Duodenal Switch.  Please rule out a leak from her Sleeve and from her Duodenal Anastomosis. COMPARISON: None TECHNIQUE: Limited single contrast water-soluble study.     FINDINGS AND IMPRESSION: FLUOROSCOPIC TIME: 1 NUMBER OF IMAGES: 12 Surgical change noted at the stomach and proximal small bowel. No leak appreciated on this study.      Latest radiology report personally reviewed.    Note created using dragon voice recognition  software so sounds alike errors may have escaped editing.      11/03/2023   Heath Lancaster MD  Hospitalist, [facility]  Pager: [phone]

## 2023-11-03 NOTE — PROGRESS NOTES
Alteplase administered. Patient tolerated well. Chest tube left clamped for 20 minutes. Will follow up with floor nurse.

## 2023-11-03 NOTE — PROGRESS NOTES
CLINICAL NUTRITION SERVICES - REASSESSMENT NOTE    EVALUATION OF THE PROGRESS TOWARD GOALS   Diet: Bariatric Full liquid, started 11/1 (requires assistance)  Intake: Pt saying she is sipping liquids, including Ensure Max supplement.  140 mL po fluids recorded yesterday.    RD confirmed with 1:1 that pt is taking fluids - no always on BiPaP    Nutrition Support:  TF per [place] tube: Promote with fiber goal rate 55 ml/hr. H2O flushes of 90 ml three times per day   TF at goal rate provides: 1320 ml/Calories, 82 g protein, 182 g CHO, 18.5 g fiber and 1097 ml free fluid (TF) plus water flushes ( 270 ml) =1367 ml fluid/day.       Weight: 11/1/23: 131 kg (288 lb 12.8 oz)   chest tube placed 11/1/23, pt down 10 lbs in 1 day  bed scales  10/31/23 135.6 kg (298 lb 15.1 oz)  Admit weight: 10/09/23: 130.5 kg (287 lb 12.8 oz) standing scale    Estimated nutrition needs:   Dosing weight is 52.3 Kg ( IBW)     Estimated energy needs: 1300 -1569 James/day ( 25-30 James/Kg)  Justification post op, obese  Estimated protein needs 63-78 g/day ( 1.2-1.5 g/Kg)  Justification: obesity guidelines, post op, elevated Cr  Estimated Fluid needs: 0710-5786+ ml/day ( 1+ ml/James)  Justification: maintenance    GI: Normoactive BS, tender abdomen;nausea overnight  Last BM: early this morning - loose brown,dark    INTERVENTIONS  Implementation  Continue with same TF regimen    Goals  Tolerate TF- Met  Meet nutritional needs- Met   Diet advancement when possible (new)    Monitoring/Evaluation  Progress toward goals will be monitored and evaluated per protocol.

## 2023-11-03 NOTE — PROGRESS NOTES
"[facility] Progress Note      ASSESSMENT:  Post gastric surgery  C/b HCAP,sepsis due to peritonitis post washout   Staph epi grew from BAL - completed 10 days vancomycin 10/31 in case this was pathogen.   Pleural fluid culture negative from 10/24 -- 400cc removed   Pleural fluid 11/1 with chest tube 1825 WBC, 66% PMNs, await culture.   Had rash but TSS or TEN or DRESS not present  Intra-abdominal infection -- perihepatic drain 10/19 -- lactobacillus; 10/24 pelvic fluid aspirate -- lactobacillus and candida   Previous strep anginosus from washout 10/12   Repeat CT 10/30 -- perihepatic fluid decreased. tPA to drain 10/31.    WBC now normal    RECOMMENDATION:  Micafungin for yeast  Zosyn for strep and lactobacillus  Potentially oral regimen augmentin and fluconazole at some point -- duration depends on resolution of fluid collections on imaging  Please call over weekend if questions. We will check in next week.     Jose Antonio Bond MD  [facility]  [phone] clinic  [employer] paging  ______________________________________________________________________     SUBJECTIVE:  vomited today. Pain improved. Temps ok.           REVIEW OF SYSTEMS:  Negative unless as listed above.  Social history, Family history, Medications: reviewed.    OBJECTIVE:  BP (!) 184/86 (BP Location: Left arm, Cuff Size: Adult Regular)   Pulse 89   Temp 97.6  F (36.4  C) (Oral)   Resp 20   Ht 1.6 m (5' 3\")   Wt 59.1 kg (130 lb 3.2 oz)   LMP 09/09/2023 (Exact Date)   SpO2 96%   BMI 23.06 kg/m                PHYSICAL EXAM:  O2 NC, NG tube, looks tired.   Sclera normal color, not injected  CARDIOVASCULAR regular rate and rhythm, no murmur  Lungs R chest tube  Abdomen soft, NT, incision midline looks good, steri strips in place; RUQ drain with cloudy fluid  Skin normal  Joints normal  Neurologic exam non focal  Rash R torso is improved        Antibiotics:  See MAR,multiple   Pertinent labs:  Lab Results " "  Component Value Date    WBC 14.5 10/23/2023    WBC 5.5 11/17/2020     Lab Results   Component Value Date    RBC 3.16 10/23/2023    RBC 4.13 11/17/2020     Lab Results   Component Value Date    HGB 9.2 10/23/2023    HGB 12.3 11/17/2020     Lab Results   Component Value Date    HCT 30.9 10/23/2023    HCT 37.4 11/17/2020     No components found for: \"MCT\"  Lab Results   Component Value Date    MCV 98 10/23/2023    MCV 91 11/17/2020     Lab Results   Component Value Date    MCH 29.1 10/23/2023    MCH 29.8 11/17/2020     Lab Results   Component Value Date    MCHC 29.8 10/23/2023    MCHC 32.9 11/17/2020     Lab Results   Component Value Date    RDW 14.9 10/23/2023    RDW 12.7 11/17/2020     Lab Results   Component Value Date     10/23/2023     11/17/2020       Last Comprehensive Metabolic Panel:  Sodium   Date Value Ref Range Status   11/03/2023 145 135 - 145 mmol/L Final     Comment:     Reference intervals for this test were updated on 09/26/2023 to more accurately reflect our healthy population. There may be differences in the flagging of prior results with similar values performed with this method. Interpretation of those prior results can be made in the context of the updated reference intervals.    11/03/2023 145 135 - 145 mmol/L Final     Comment:     Reference intervals for this test were updated on 09/26/2023 to more accurately reflect our healthy population. There may be differences in the flagging of prior results with similar values performed with this method. Interpretation of those prior results can be made in the context of the updated reference intervals.  Reference intervals for this test were updated on 09/26/2023 to more accurately reflect our healthy population. There may be differences in the flagging of prior results with similar values performed with this method. Interpretation of those prior results can be made in the context of the updated reference intervals.    11/17/2020 141 133 " "- 144 mmol/L Final     Potassium   Date Value Ref Range Status   11/03/2023 3.8 3.4 - 5.3 mmol/L Final   05/17/2022 4.5 3.4 - 5.3 mmol/L Final   11/17/2020 4.1 3.4 - 5.3 mmol/L Final     Chloride   Date Value Ref Range Status   11/03/2023 107 98 - 107 mmol/L Final   05/17/2022 102 94 - 109 mmol/L Final   11/17/2020 109 94 - 109 mmol/L Final     Carbon Dioxide   Date Value Ref Range Status   11/17/2020 31 20 - 32 mmol/L Final     Carbon Dioxide (CO2)   Date Value Ref Range Status   11/03/2023 27 22 - 29 mmol/L Final   05/17/2022 32 20 - 32 mmol/L Final     Anion Gap   Date Value Ref Range Status   11/03/2023 11 7 - 15 mmol/L Final   05/17/2022 2 (L) 3 - 14 mmol/L Final   11/17/2020 1 (L) 3 - 14 mmol/L Final     Glucose   Date Value Ref Range Status   05/17/2022 110 (H) 70 - 99 mg/dL Final   11/17/2020 84 70 - 99 mg/dL Final     Comment:     Fasting specimen     GLUCOSE BY METER POCT   Date Value Ref Range Status   11/03/2023 146 (H) 70 - 99 mg/dL Final     Urea Nitrogen   Date Value Ref Range Status   11/03/2023 27.1 (H) 6.0 - 20.0 mg/dL Final   05/17/2022 16 7 - 30 mg/dL Final   11/17/2020 9 7 - 30 mg/dL Final     Creatinine   Date Value Ref Range Status   11/03/2023 1.50 (H) 0.51 - 0.95 mg/dL Final   11/17/2020 0.79 0.52 - 1.04 mg/dL Final     GFR Estimate   Date Value Ref Range Status   11/03/2023 41 (L) >60 mL/min/1.73m2 Final   11/17/2020 88 >60 mL/min/[1.73_m2] Final     Comment:     Non  GFR Calc  Starting 12/18/2018, serum creatinine based estimated GFR (eGFR) will be   calculated using the Chronic Kidney Disease Epidemiology Collaboration   (CKD-EPI) equation.       Calcium   Date Value Ref Range Status   11/03/2023 9.1 8.6 - 10.0 mg/dL Final   11/17/2020 8.9 8.5 - 10.1 mg/dL Final       Liver Function Studies -   Recent Labs   Lab Test 10/23/23  0530   PROTTOTAL 7.1   ALBUMIN 3.2*   BILITOTAL 0.3   ALKPHOS 96   AST 19   ALT 26       No results found for: \"SED\"    No results found for: " "\"CRP\"      NOTE BD glucan test was 406 which is +      MICROBIOLOGY DATA:  Lung fluid 76 wbc, no org 10/24, culture negative  Pelvic fluid lactobaccilus, candida dublinensis  11/1 pleural fluid 1825 WBC, 66% PMNs      IMAGING/RADIOLOGY:        "

## 2023-11-03 NOTE — PLAN OF CARE
Goal Outcome Evaluation:      Problem: Nausea and Vomiting  Goal: Nausea and Vomiting Relief  Outcome: Progressing     Pt had 1 episode of nausea and small emesis. PRN Zofran given;   MD notified; no new orders.    Problem: Risk for Delirium  Goal: Improved Behavioral Control  Intervention: Minimize Safety Risk  Recent Flowsheet Documentation  Taken 11/3/2023 0745 by Virgie Carranza, RN  Pt observed closely 1:1. No s/s hallucination or delirium.  Pt is A/O.      Problem: Gas Exchange Impaired  Goal: Optimal Gas Exchange  Outcome: Progressing  Intervention: Optimize Oxygenation and Ventilation  Recent Flowsheet Documentation  Taken 11/3/2023 0930 by Virgie Carranza, RN  Head of Bed (HOB) Positioning: HOB at 30 degrees   Pt had no s/s resp distress. O2 sat 95-96%, on BIPAP and 100% ON 2 n/c.    Pt up in chair for 2 hours; ryne lift; then back in bed.   Chest tube site is clean, dry and intact with dressing; connected to suction per order. No leaks or kinks.  MICKIE output serosanguinous; flushed per order.   BP elevated, see flowsheet; scheduled med given; helpful.    Pt denied pain during this shift. Appeared comfortable.   Pt is resting quietly in bed on the BIPAP.

## 2023-11-03 NOTE — PLAN OF CARE
Problem: Bariatric Surgery  Goal: Optimal Pain Control and Function  Outcome: Progressing  Goal: Nausea and Vomiting Relief  Outcome: Progressing     Problem: Risk for Delirium  Goal: Improved Behavioral Control  Outcome: Progressing     Problem: Delirium  Goal: Improved Behavioral Control  Outcome: Progressing     Problem: Gas Exchange Impaired  Goal: Optimal Gas Exchange  Outcome: Progressing  Intervention: Optimize Oxygenation and Ventilation  Recent Flowsheet Documentation  Taken 11/2/2023 3255 by Corey Pérez RN  Head of Bed (HOB) Positioning: HOB at 30 degrees     Problem: Enteral Nutrition  Goal: Feeding Tolerance  Outcome: Progressing     Goal Outcome Evaluation:         Pt answering questions appropriately. Can be restless and forgetful but easily reoriented.     Reported 6/10 pain. PRN Tramadol given in NJ tube with relief. BP elevated overnight. IV Hydralazine given. Also C/O nausea. IV compazine given. No emesis. Tolerated only 40 mL of apple juice.    Chest tube intact. MICKIE drain intact and patent, flushed MICKIE per order. NJ tube intact with tube feeding running per order. Incontinent of bowel and bladder. Had soft bowel movement x1. Purewick in place with good urine output.     Tolerated BiPAP well overnight with short rest period in the morning.

## 2023-11-03 NOTE — PROGRESS NOTES
PULMONARY PROGRESS NOTE    Date / Time of Admission:  10/9/2023  5:26 AM  Assessment:   53yoF with JARVIS, morbid obesity s/p sleeve gastrectomy 10/9/23 complicated by perforation and peritonitis s/p washout with subsequent abscess pockets requiring drain placement with recurrent, persistent right-sided pleural effusion considered parapneumonic previously but at high risk of development of trapped lung or empyema given proximity to hepatic fluid collections.     Principal Problem:    Morbid (severe) obesity due to excess calories (H)  Active Problems:    LINA (generalized anxiety disorder)    Recurrent major depressive disorder, remission status unspecified (H24)    Morbid obesity (H)    Obstructive sleep apnea    Intra-abdominal abscess (H)    Hypernatremia    SUSSY (acute kidney injury) (H24)    Accelerated hypertension    Metabolic encephalopathy    Acute respiratory failure with hypoxia (H)    Cardiomyopathy (H)      Plan:   11/1: CT guided drain placement.   11/1: 250cc   11/2: 10cc  11/3: 30cc, will clamp and likely pull tomorrow      Protein 4  Albumin 1.9    Pleural effusion s/p drained  Not consistent with empyema  Repeat CXR looks improved  Clamp overnight and repeat Xray in am looks okay can consider removal      Subjective:   HPI:  Mojgan Jimenez is a 53 year old female with morbid obesity, JARVIS s/p laparoscopic sleeve gastrectomy 10/9 complicated by small bowel injury and peritonitis causing septic shock. She returned to the OR 10/12 for washout and drain placement She had persistent symptoms so was rescanned with a new RUQ fluid collection 10/19 and 10/24. She also was found to have a pleural effusion and underwent thoracentesis with 400cc removed. This was consistent with parapneumonic effusion, no empyema. She was extubated 10/26 and remains extubated.     She is doing well  She gives me a thumbs up to the plan to remove the tube  She is on bipap    Allergies: No Known Allergies     MEDS:  Scheduled  "Meds:   amLODIPine  5 mg Oral BID    bumetanide  0.5 mg Intravenous Q12H    carvedilol  25 mg Oral BID w/meals    heparin ANTICOAGULANT  5,000 Units Subcutaneous Q8H    hydrALAZINE  100 mg Oral or Feeding Tube TID    insulin aspart  1-4 Units Subcutaneous 4x Daily AC & HS    micafungin  100 mg Intravenous Q24H    nitroGLYcerin  1 patch Transdermal Daily    pantoprazole  40 mg Per Feeding Tube QAM AC    Or    pantoprazole  40 mg Intravenous QAM AC    piperacillin-tazobactam  3.375 g Intravenous Q8H    sodium chloride (PF)  3 mL Intracatheter Q8H    venlafaxine  75 mg Oral TID w/meals     Continuous Infusions:   dextrose       PRN Meds:.acetaminophen **OR** acetaminophen, albuterol, albuterol, dextrose, glucose **OR** dextrose **OR** glucagon, hydrALAZINE, HYDROmorphone, ipratropium - albuterol 0.5 mg/2.5 mg/3 mL, lidocaine 4%, lidocaine (buffered or not buffered), menthol-zinc oxide, miconazole, naloxone **OR** naloxone **OR** naloxone **OR** naloxone, OLANZapine, ondansetron **OR** ondansetron, phenol, prochlorperazine **OR** prochlorperazine, sodium chloride (PF), traMADol    Objective:   VITALS:  BP (!) 183/83   Pulse 89   Temp 98.1  F (36.7  C) (Axillary)   Resp 21   Ht 1.6 m (5' 3\")   Wt 59.1 kg (130 lb 3.2 oz)   LMP 09/09/2023 (Exact Date)   SpO2 96%   BMI 23.06 kg/m    EXAM:    GENERAL APPEARANCE: Alert, no acute distress  HENT: bipap mask in place  NECK: large collar size  RESP: decreased breath sounds bilaterally, right sided chest tube in place and bulb drain in place  CV: regular rate and rhythm, no murmur, rub or gallop  ABDOMEN: soft, obese, dressing in place  SKIN: no suspicious lesions or rashes on visible skin  NEURO: awake, responsive          I&O:    Date 10/31/23 0700 - 11/01/23 0659   Shift 2641-6386 7326-2680 9654-7748 24 Hour Total   INTAKE   P.O. 110   110   I.V. 1515.83   1515.83   Shift Total(mL/kg) 1625.83(11.99)   1625.83(11.99)   OUTPUT   Urine 600   600   Shift Total(mL/kg) " 600(4.42)   600(4.42)   Weight (kg) 135.6 135.6 135.6 135.6       Data Review:    EXAM: XR CHEST PORT 1 VIEW  LOCATION: [facility]  DATE: 11/3/2023     INDICATION: chjest tube in place and not draining  COMPARISON: 11/02/2023                                                                      IMPRESSION: 2 right chest tubes again seen in the right inferior hemithorax in stable positions. A right basilar consolidation and small right pleural effusion are unchanged. Heart size, cardiomediastinal contour and pulmonary vascularity are normal.   Left lung is clear without consolidation or pleural effusion. No pneumothorax. Right PICC terminates near the superior cavoatrial junction. Enteric feeding tube extends below the diaphragm beyond the field-of-view    Imaging: personally reviewed  Abdominal CT chest  EXAM: CT ABDOMEN W CONTRAST  LOCATION: [facility]  DATE: 10/30/2023     INDICATION: Follow-up drain placement. Determine if patient may need TPA through drain.  COMPARISON: 10/25/2023. Others.  TECHNIQUE: CT scan of the abdomen was performed following injection of IV contrast. Multiplanar reformats were obtained. Dose reduction techniques were used.  CONTRAST: 75 mL Isovue 370     FINDINGS:    LOWER CHEST: Incompletely seen small to moderate right pleural effusion and complete right lower lobe atelectasis.     HEPATOBILIARY: Exchange of prior perihepatic drain was performed on 10/25/2023. Current drain tip coils adjacent to the hepatic dome. The perihepatic fluid collection has minimally improved. No focal hepatic abnormality.     PANCREAS: Normal.     SPLEEN: Normal.     ADRENAL GLANDS: Normal.     KIDNEYS: Normal.     BOWEL: Surgical changes stomach. No evidence for bowel obstruction.     LYMPH NODES: No adenopathy.     VASCULATURE: Nonaneurysmal aorta.     MUSCULOSKELETAL: Nothing acute.                                                                       IMPRESSION:      1.  Perihepatic drain in place with tip adjacent to the hepatic dome. Minimal improvement of the perihepatic collection since 10/25/2023. Small perihepatic collection remains.     2.  Small to moderate sized right pleural effusion and associated collapse of the right lower lobe.  Results for orders placed or performed during the hospital encounter of 10/09/23   Basic metabolic panel   Result Value Ref Range    Sodium 145 135 - 145 mmol/L    Potassium 3.8 3.4 - 5.3 mmol/L    Chloride 107 98 - 107 mmol/L    Carbon Dioxide (CO2) 27 22 - 29 mmol/L    Anion Gap 11 7 - 15 mmol/L    Urea Nitrogen 27.1 (H) 6.0 - 20.0 mg/dL    Creatinine 1.50 (H) 0.51 - 0.95 mg/dL    GFR Estimate 41 (L) >60 mL/min/1.73m2    Calcium 9.1 8.6 - 10.0 mg/dL    Glucose 143 (H) 70 - 99 mg/dL     Lab Results   Component Value Date    WBC 9.0 11/03/2023    HGB 8.3 (L) 11/03/2023    HCT 28.1 (L) 11/03/2023    MCV 96 11/03/2023     11/03/2023

## 2023-11-04 ENCOUNTER — APPOINTMENT (OUTPATIENT)
Dept: PHYSICAL THERAPY | Facility: HOSPITAL | Age: 53
End: 2023-11-04
Attending: SURGERY
Payer: COMMERCIAL

## 2023-11-04 ENCOUNTER — APPOINTMENT (OUTPATIENT)
Dept: RADIOLOGY | Facility: HOSPITAL | Age: 53
End: 2023-11-04
Attending: INTERNAL MEDICINE
Payer: COMMERCIAL

## 2023-11-04 LAB
ANION GAP SERPL CALCULATED.3IONS-SCNC: 9 MMOL/L (ref 7–15)
BUN SERPL-MCNC: 28.2 MG/DL (ref 6–20)
CALCIUM SERPL-MCNC: 9.2 MG/DL (ref 8.6–10)
CHLORIDE SERPL-SCNC: 106 MMOL/L (ref 98–107)
CREAT SERPL-MCNC: 1.48 MG/DL (ref 0.51–0.95)
DEPRECATED HCO3 PLAS-SCNC: 30 MMOL/L (ref 22–29)
EGFRCR SERPLBLD CKD-EPI 2021: 42 ML/MIN/1.73M2
ERYTHROCYTE [DISTWIDTH] IN BLOOD BY AUTOMATED COUNT: 14.3 % (ref 10–15)
GLUCOSE BLDC GLUCOMTR-MCNC: 108 MG/DL (ref 70–99)
GLUCOSE BLDC GLUCOMTR-MCNC: 127 MG/DL (ref 70–99)
GLUCOSE BLDC GLUCOMTR-MCNC: 130 MG/DL (ref 70–99)
GLUCOSE BLDC GLUCOMTR-MCNC: 130 MG/DL (ref 70–99)
GLUCOSE SERPL-MCNC: 118 MG/DL (ref 70–99)
HCT VFR BLD AUTO: 45.6 % (ref 35–47)
HGB BLD-MCNC: 13.7 G/DL (ref 11.7–15.7)
MCH RBC QN AUTO: 28.4 PG (ref 26.5–33)
MCHC RBC AUTO-ENTMCNC: 30 G/DL (ref 31.5–36.5)
MCV RBC AUTO: 95 FL (ref 78–100)
NT-PROBNP SERPL-MCNC: 2241 PG/ML (ref 0–900)
PLATELET # BLD AUTO: 194 10E3/UL (ref 150–450)
POTASSIUM SERPL-SCNC: 3.7 MMOL/L (ref 3.4–5.3)
RBC # BLD AUTO: 4.82 10E6/UL (ref 3.8–5.2)
SODIUM SERPL-SCNC: 143 MMOL/L (ref 135–145)
SODIUM SERPL-SCNC: 145 MMOL/L (ref 135–145)
SODIUM SERPL-SCNC: 145 MMOL/L (ref 135–145)
WBC # BLD AUTO: 4.6 10E3/UL (ref 4–11)

## 2023-11-04 PROCEDURE — 250N000011 HC RX IP 250 OP 636: Mod: JZ | Performed by: INTERNAL MEDICINE

## 2023-11-04 PROCEDURE — C9113 INJ PANTOPRAZOLE SODIUM, VIA: HCPCS | Mod: JZ | Performed by: INTERNAL MEDICINE

## 2023-11-04 PROCEDURE — 250N000013 HC RX MED GY IP 250 OP 250 PS 637: Performed by: NURSE PRACTITIONER

## 2023-11-04 PROCEDURE — 120N000001 HC R&B MED SURG/OB

## 2023-11-04 PROCEDURE — 85027 COMPLETE CBC AUTOMATED: CPT | Performed by: STUDENT IN AN ORGANIZED HEALTH CARE EDUCATION/TRAINING PROGRAM

## 2023-11-04 PROCEDURE — 99233 SBSQ HOSP IP/OBS HIGH 50: CPT | Performed by: INTERNAL MEDICINE

## 2023-11-04 PROCEDURE — 83880 ASSAY OF NATRIURETIC PEPTIDE: CPT | Performed by: INTERNAL MEDICINE

## 2023-11-04 PROCEDURE — 80048 BASIC METABOLIC PNL TOTAL CA: CPT | Performed by: STUDENT IN AN ORGANIZED HEALTH CARE EDUCATION/TRAINING PROGRAM

## 2023-11-04 PROCEDURE — 250N000013 HC RX MED GY IP 250 OP 250 PS 637: Performed by: PHYSICIAN ASSISTANT

## 2023-11-04 PROCEDURE — 97110 THERAPEUTIC EXERCISES: CPT | Mod: GP

## 2023-11-04 PROCEDURE — 999N000157 HC STATISTIC RCP TIME EA 10 MIN

## 2023-11-04 PROCEDURE — 99232 SBSQ HOSP IP/OBS MODERATE 35: CPT | Performed by: INTERNAL MEDICINE

## 2023-11-04 PROCEDURE — 84295 ASSAY OF SERUM SODIUM: CPT | Performed by: INTERNAL MEDICINE

## 2023-11-04 PROCEDURE — 97530 THERAPEUTIC ACTIVITIES: CPT | Mod: GP

## 2023-11-04 PROCEDURE — 250N000013 HC RX MED GY IP 250 OP 250 PS 637: Performed by: INTERNAL MEDICINE

## 2023-11-04 PROCEDURE — 94660 CPAP INITIATION&MGMT: CPT

## 2023-11-04 PROCEDURE — 258N000003 HC RX IP 258 OP 636: Performed by: NURSE PRACTITIONER

## 2023-11-04 PROCEDURE — 250N000011 HC RX IP 250 OP 636: Mod: JZ | Performed by: NURSE PRACTITIONER

## 2023-11-04 PROCEDURE — 71045 X-RAY EXAM CHEST 1 VIEW: CPT

## 2023-11-04 PROCEDURE — 99232 SBSQ HOSP IP/OBS MODERATE 35: CPT | Performed by: PHYSICIAN ASSISTANT

## 2023-11-04 RX ORDER — DOXAZOSIN 1 MG/1
2 TABLET ORAL EVERY 12 HOURS SCHEDULED
Status: DISCONTINUED | OUTPATIENT
Start: 2023-11-04 | End: 2023-11-10

## 2023-11-04 RX ORDER — HYDRALAZINE HYDROCHLORIDE 20 MG/ML
5 INJECTION INTRAMUSCULAR; INTRAVENOUS ONCE
Status: COMPLETED | OUTPATIENT
Start: 2023-11-04 | End: 2023-11-04

## 2023-11-04 RX ORDER — HYDRALAZINE HYDROCHLORIDE 20 MG/ML
5 INJECTION INTRAMUSCULAR; INTRAVENOUS ONCE
Status: DISCONTINUED | OUTPATIENT
Start: 2023-11-04 | End: 2023-11-04

## 2023-11-04 RX ORDER — BUMETANIDE 0.25 MG/ML
0.5 INJECTION INTRAMUSCULAR; INTRAVENOUS EVERY 24 HOURS
Status: DISCONTINUED | OUTPATIENT
Start: 2023-11-05 | End: 2023-11-07

## 2023-11-04 RX ADMIN — HEPARIN SODIUM 5000 UNITS: 5000 INJECTION, SOLUTION INTRAVENOUS; SUBCUTANEOUS at 21:09

## 2023-11-04 RX ADMIN — NITROGLYCERIN 1 PATCH: 0.4 PATCH TRANSDERMAL at 08:28

## 2023-11-04 RX ADMIN — PIPERACILLIN AND TAZOBACTAM 3.38 G: 3; .375 INJECTION, POWDER, LYOPHILIZED, FOR SOLUTION INTRAVENOUS at 20:14

## 2023-11-04 RX ADMIN — AMLODIPINE BESYLATE 5 MG: 5 TABLET ORAL at 20:14

## 2023-11-04 RX ADMIN — HEPARIN SODIUM 5000 UNITS: 5000 INJECTION, SOLUTION INTRAVENOUS; SUBCUTANEOUS at 14:03

## 2023-11-04 RX ADMIN — DOXAZOSIN 2 MG: 1 TABLET ORAL at 11:55

## 2023-11-04 RX ADMIN — VENLAFAXINE 75 MG: 75 TABLET ORAL at 07:51

## 2023-11-04 RX ADMIN — HYDRALAZINE HYDROCHLORIDE 5 MG: 20 INJECTION INTRAMUSCULAR; INTRAVENOUS at 04:39

## 2023-11-04 RX ADMIN — DOXAZOSIN 2 MG: 1 TABLET ORAL at 20:14

## 2023-11-04 RX ADMIN — MICAFUNGIN SODIUM 100 MG: 50 INJECTION, POWDER, LYOPHILIZED, FOR SOLUTION INTRAVENOUS at 10:17

## 2023-11-04 RX ADMIN — PANTOPRAZOLE SODIUM 40 MG: 40 INJECTION, POWDER, FOR SOLUTION INTRAVENOUS at 07:50

## 2023-11-04 RX ADMIN — ALTEPLASE 4 MG: 2.2 INJECTION, POWDER, LYOPHILIZED, FOR SOLUTION INTRAVENOUS at 09:32

## 2023-11-04 RX ADMIN — HYDRALAZINE HYDROCHLORIDE 100 MG: 50 TABLET, FILM COATED ORAL at 14:03

## 2023-11-04 RX ADMIN — HEPARIN SODIUM 5000 UNITS: 5000 INJECTION, SOLUTION INTRAVENOUS; SUBCUTANEOUS at 06:10

## 2023-11-04 RX ADMIN — HYDRALAZINE HYDROCHLORIDE 100 MG: 50 TABLET, FILM COATED ORAL at 07:43

## 2023-11-04 RX ADMIN — VENLAFAXINE 75 MG: 75 TABLET ORAL at 11:55

## 2023-11-04 RX ADMIN — TRAMADOL HYDROCHLORIDE 50 MG: 50 TABLET, COATED ORAL at 09:24

## 2023-11-04 RX ADMIN — HYDRALAZINE HYDROCHLORIDE 10 MG: 20 INJECTION INTRAMUSCULAR; INTRAVENOUS at 06:10

## 2023-11-04 RX ADMIN — HYDRALAZINE HYDROCHLORIDE 10 MG: 20 INJECTION INTRAMUSCULAR; INTRAVENOUS at 00:27

## 2023-11-04 RX ADMIN — AMLODIPINE BESYLATE 5 MG: 5 TABLET ORAL at 08:28

## 2023-11-04 RX ADMIN — VENLAFAXINE 75 MG: 75 TABLET ORAL at 16:48

## 2023-11-04 RX ADMIN — ONDANSETRON 4 MG: 2 INJECTION INTRAMUSCULAR; INTRAVENOUS at 09:32

## 2023-11-04 RX ADMIN — CARVEDILOL 25 MG: 12.5 TABLET, FILM COATED ORAL at 16:48

## 2023-11-04 RX ADMIN — CARVEDILOL 25 MG: 12.5 TABLET, FILM COATED ORAL at 07:44

## 2023-11-04 RX ADMIN — PIPERACILLIN AND TAZOBACTAM 3.38 G: 3; .375 INJECTION, POWDER, LYOPHILIZED, FOR SOLUTION INTRAVENOUS at 11:20

## 2023-11-04 RX ADMIN — PIPERACILLIN AND TAZOBACTAM 3.38 G: 3; .375 INJECTION, POWDER, LYOPHILIZED, FOR SOLUTION INTRAVENOUS at 04:39

## 2023-11-04 RX ADMIN — HYDRALAZINE HYDROCHLORIDE 100 MG: 50 TABLET, FILM COATED ORAL at 20:14

## 2023-11-04 RX ADMIN — BUMETANIDE 0.5 MG: 0.25 INJECTION INTRAMUSCULAR; INTRAVENOUS at 07:52

## 2023-11-04 ASSESSMENT — ACTIVITIES OF DAILY LIVING (ADL)
ADLS_ACUITY_SCORE: 38
ADLS_ACUITY_SCORE: 34
ADLS_ACUITY_SCORE: 37
ADLS_ACUITY_SCORE: 37
ADLS_ACUITY_SCORE: 38
ADLS_ACUITY_SCORE: 43

## 2023-11-04 NOTE — PROGRESS NOTES
ASSESSMENT:  1. Intra-abdominal abscess (H)    2. Perimenopausal symptoms        Mojgan Jimenez is a 53 year old female who is s/p laparoscopic sleeve gastrectomy with single anastomosis duodenal switch on 10/09/23 with return to the OR on 10/12/23 to repair two small enterotomies on the common channel. Patient became septic with ARF and developed pneumonia and pleural effusion necessitating chest tube placement. She is stable and doing fairly well at this point but is very deconditioned. She is tolerating tube feeds and we will start encouraging more oral intake. Pulmonary contemplating chest tube removal today which would likely help with her increased mobility. Patient will likely need acute rehab upon discharge.    PLAN:  Please start looking into acute rehab  Increase oral intake  Increase activity  CT per pulm  Drain per IR  Abx per ID    SUBJECTIVE:   She is doing better. More alert and responsive this morning. She remains on Bipap. She denies pain or nausea.       Patient Vitals for the past 24 hrs:   BP Temp Temp src Pulse Resp SpO2 Weight   11/04/23 0900 (!) 182/78 -- -- -- -- -- --   11/04/23 0800 (!) 196/81 -- -- 101 -- 94 % --   11/04/23 0743 (!) 188/88 -- -- -- -- -- --   11/04/23 0715 (!) 204/87 -- -- 98 -- 94 % --   11/04/23 0705 (!) 200/99 -- -- 97 -- 94 % --   11/04/23 0630 (!) 187/86 -- -- 97 -- 95 % --   11/04/23 0530 (!) 189/88 -- -- 98 -- 96 % --   11/04/23 0500 (!) 189/86 -- -- 90 -- 96 % --   11/04/23 0430 (!) 188/88 -- -- 92 -- 96 % --   11/04/23 0400 (!) 189/84 -- -- 89 -- 96 % 129.1 kg (284 lb 9.8 oz)   11/04/23 0330 (!) 191/86 -- -- 90 -- 96 % --   11/04/23 0300 (!) 185/86 -- -- 91 -- 95 % --   11/04/23 0200 (!) 185/84 -- -- 91 -- 95 % --   11/04/23 0130 (!) 178/83 -- -- 90 -- 96 % --   11/04/23 0100 (!) 173/80 -- -- 88 -- 96 % --   11/04/23 0020 (!) 181/84 98.8  F (37.1  C) Oral 88 -- 96 % --   11/03/23 2300 (!) 172/81 -- -- 88 -- 95 % --   11/03/23 2215 (!) 174/80 -- -- 86 -- 95 % --    11/03/23 2011 (!) 168/80 98.5  F (36.9  C) Axillary -- 20 -- --   11/03/23 1730 (!) 159/83 -- Oral -- 18 -- --   11/03/23 1600 (!) 184/86 97.6  F (36.4  C) Oral -- 20 -- --   11/03/23 1420 -- -- -- -- -- -- 59.1 kg (130 lb 3.2 oz)   11/03/23 1400 (!) 183/83 -- -- 89 -- -- --   11/03/23 1135 -- 98.1  F (36.7  C) Axillary -- 21 -- --         PHYSICAL EXAM:  GEN: No acute distress, comfortable  LUNGS: CTA bilaterally; CT clamped with no air leak noted  CV:RRR  ABD:soft, not tender; incisions well healed  Drains: no output noted   Output by Drain (mL) 11/02/23 0700 - 11/02/23 1459 11/02/23 1500 - 11/02/23 2259 11/02/23 2300 - 11/03/23 0659 11/03/23 0700 - 11/03/23 1459 11/03/23 1500 - 11/03/23 2259 11/03/23 2300 - 11/04/23 0659 11/04/23 0700 - 11/04/23 0934   Closed/Suction Drain 1 Right RUQ Bulb 12 Ivorian 10 40 10          EXT:No cyanosis, edema or obvious abnormalities    11/03 0700 - 11/04 0659  In: 1030 [P.O.:350]  Out: 1435 [Urine:1400]    No results displayed because visit has over 200 results.             Felecia Henderson, JUSTINE CNP

## 2023-11-04 NOTE — PLAN OF CARE
Problem: Plan of Care - These are the overarching goals to be used throughout the patient stay.    Goal: Absence of Hospital-Acquired Illness or Injury  Intervention: Prevent Skin Injury  Recent Flowsheet Documentation  Taken 11/3/2023 1600 by Agatha Louis RN  Body Position: heels elevated     Problem: Bariatric Surgery  Goal: Effective Oxygenation and Ventilation  Intervention: Optimize Oxygenation and Ventilation  Recent Flowsheet Documentation  Taken 11/3/2023 1600 by Agatha Louis RN  Head of Bed (HOB) Positioning: HOB at 30 degrees     Problem: Comorbidity Management  Goal: Blood Pressure in Desired Range  Intervention: Maintain Blood Pressure Management  Recent Flowsheet Documentation  Taken 11/3/2023 1600 by Agatha Louis RN  Medication Review/Management: medications reviewed     Problem: Parenteral Nutrition  Goal: Effective Intravenous Nutrition Therapy Delivery  Intervention: Optimize Intravenous Nutrition Delivery  Recent Flowsheet Documentation  Taken 11/3/2023 1600 by Agatha Louis RN  Medication Review/Management: medications reviewed   Goal Outcome Evaluation:      Problem: Gas Exchange Impaired  Goal: Optimal Gas Exchange  Intervention: Optimize Oxygenation and Ventilation  Recent Flowsheet Documentation  Taken 11/3/2023 1600 by Agatha Louis RN  Head of Bed (HOB) Positioning: HOB at 30 degrees    Patient reported feeling nauseated.    Antiemetic (Zofran) given and was effective.  Patient refused full liquid dinner.  Good fluid intake (water).    Tube feeding (Promote with fiber) infusing at goal rate 55ml/hr.  Blood glucose - 146 and 122.  Na+ at 1800 was 145.    Patient alternates between nasal cannular and BiPap throughout evening.  Oxygen saturation remained within normal limits. Mid 90s - 95%.  Lung sounds diminished.    Alteplase admin/irrigation done by Swat Nurse.  Chest intact and patent.  Nothing out of MICKIE.    Blood pressures elevated scheduled blood pressure  meds given.  Continue to monitor.

## 2023-11-04 NOTE — PLAN OF CARE
Goal Outcome Evaluation:      Plan of Care Reviewed With: patient    Overall Patient Progress: improvingOverall Patient Progress: improving    Outcome Evaluation: TF at 55ml/hr. Needs much encouragement to drink but did take in 150 ml po. endorses intermittent nausea- medicated as needed. Up in chair x2 this shift. @nd time we got into WC and walked outside and she called her family. CT clamped per pulmonary- will potentailly be removed in am. RD to look at converting feeding to nocturnal or at least allowing times off during day. Bipap onm during sleep and naps. O2 2L per NC when off bipap.

## 2023-11-04 NOTE — PROGRESS NOTES
Pt on Bipap 12/5, rate 14, 30% overnight, tolerating well.  No redness or breakdown noted.  Pt on 3 lpm when off Bipap.  RT will continue to monitor.

## 2023-11-04 NOTE — PROGRESS NOTES
Renal progress note  CC:Hypernatremia , SUSSY  Assessment and Plan:  52-year-old female with history of obesity JARVIS asthma history of mood disorders admitted with scheduled laparoscopic sleeve gastrectomy on 10/9/2023 complicated by peritonitis with small bowel injury will return tomorrow 10/12/2023 for enterotomy closure and cleanout with drain placement.  Pelvic fluid aspiration needed on 10/24/2023 overall course complicated by encephalopathy delirium septic shock with peritoneal leak acute respiratory failure requiring intubation from 10/12 to 10/21/2023 and oliguric SUSSY with very poor oral intakes.  Nephrology following for SUSSY    Oliguric SUSSY  SUSSY likely secondary to hemodynamic injury for acute renal failure  With peritoneal leak, creatinine peaked at 6.97 on 10/14/2023  Likely exacerbated by NSAIDs hypotension vasodilation and intravascular volume depletion leading to hemodynamic ATN currently and slow recovery phase  Creatinine with serial downtrend, now stalled ~1.5   Baseline creatinine 0.7 within normal limits no prior history of SUSSY or CKD   --> Follow on serial creatinines on daily labs  Strict I&O altered mentation and hallucinations seen today are unlikely secondary to metabolic issues its possibility with delirium with prolonged hospitalization and critical illness    Hypertension - BP still suboptimally controlled, currently on metoprolol tartrate 100 mg BID, amlodipine 5 mg BID, and hydralazine 100 mg TID and carvedilol 25 mg BID. Will add doxazosin 2 mg BID.     Hypernatremia - improved with hypotonic IVF, loop diuretics now resumed, follow Na+ daily for now     Anemia likely.    Currently Hb around 8-9's   Transfuse per Surgical team recommendation    History of gastric bypass  Multiple complications post sleeve gastrectomy complicated by peritonitis and small bowel injury with peritoneal leak  Return to the OR 10/12/2023 with cleanout and drain placement complicated by other fluid  collections requiring aspiration and drain placement  On Vanco Zosyn and micafungin ID following  On tube feeds and TPN weaned off patient's intakes remain pretty poor   Diet management per primary team bariatric surgery    Ongoing metabolic encephalopathy with worsening delirium  Visual hallucinations management per primary hospitalist team, slowly improving     History of cardiomyopathy possible stress-induced Takotsubo  Management per primary team  Resumed IV Bumex 0.5 mg Q8 hours 10/31, decreasing to 0.5 mg daily 11/4 with CO2 trending up and Cr stalled   BNP elevated but overall trending down   On hydralazine metoprolol and amlodipine    Acute hypoxemic hypercapnic respiratory failure  Pulmonary following, right-sided pleural effusion and s/p chest tube 11/1/23     Prior history of JARVIS and asthma  Management per primary team    Thank you for the consultation we will follow  Marlen Cintron PA-C   [facility]  [phone]      Subjective  Remains very hypertensive, bicarb and Na+ trending up slightly and nursing documentation notes some urinary incontinence so not all UO documented. Showing some improvement with mentation, preparing to walk around the unit at time of visit.     Objective    Vital signs in last 24 hours  Temp:  [97.6  F (36.4  C)-98.9  F (37.2  C)] 98.9  F (37.2  C)  Pulse:  [] 84  Resp:  [18-22] 22  BP: (159-204)/(78-99) 182/78  SpO2:  [94 %-96 %] 94 %  Weight:   [unfilled]    Intake/Output last 3 shifts  I/O last 3 completed shifts:  In: 1030 [P.O.:350; NG/GT:240]  Out: 1435 [Urine:1400; Other:5; Chest Tube:30]  Intake/Output this shift:  I/O this shift:  In: 230 [P.O.:90; I.V.:20; NG/GT:120]  Out: -     Physical Exam  Alert, NAD   CV: RRR without murmur or rub  Lung: clear and equal; no extra sounds, + right sided chest tube, O2 per NC   Ab: soft and NT; + midline incision   Ext: Trace edema and well perfused  Skin; no rash    Pertinent Labs   Lab Results    Component Value Date    WBC 4.6 11/04/2023    HGB 13.7 11/04/2023    HCT 45.6 11/04/2023    MCV 95 11/04/2023     11/04/2023     Lab Results   Component Value Date    BUN 28.2 (H) 11/04/2023     11/04/2023     11/04/2023    CO2 30 (H) 11/04/2023       Lab Results   Component Value Date    ALBUMIN 2.8 (L) 11/02/2023     Lab Results   Component Value Date    PHOS 3.0 11/02/2023     I reviewed all lab results    Marlen Cintron PA-C

## 2023-11-04 NOTE — PLAN OF CARE
Goal Outcome Evaluation:       Pt incontinent of bladder/bowel- after cleaned up was ryne lifted to chair in anticipation of physical therapy. Pt tolerated fairly- was endorsing some nausea and abd pain. Medicated for both with relief. After PT pt was assisted back to bed for rest time. Inc of urine again- Purewick placed.

## 2023-11-04 NOTE — PROGRESS NOTES
PULMONARY PROGRESS NOTE    Date / Time of Admission:  10/9/2023  5:26 AM  Assessment:   53yoF with JARVIS, morbid obesity s/p sleeve gastrectomy 10/9/23 complicated by perforation and peritonitis s/p washout with subsequent abscess pockets requiring drain placement with recurrent, persistent right-sided pleural effusion considered parapneumonic previously but at high risk of development of trapped lung or empyema given proximity to hepatic fluid collections. Pleural fluid not consistent with an empyema. Underwent chest tube guided drainage in the right pleural space with improved lung expansion although today with a small right apical pneumothorax after clamping.        Plan:   Will leave chest tube in place today and repeat CXR in the AM with clamped chest tube.   IF she has chest pain, would unclamp chest tube    Loretta Marx MD  Pulmonary and Critical Care  (P) [phone]      Subjective:   HPI:  Mojgan Jimenez is a 53 year old female with morbid obesity, JARVIS s/p laparoscopic sleeve gastrectomy 10/9 complicated by small bowel injury and peritonitis causing septic shock. She returned to the OR 10/12 for washout and drain placement She had persistent symptoms so was rescanned with a new RUQ fluid collection 10/19 and 10/24. She also was found to have a pleural effusion and underwent thoracentesis with 400cc removed. This was consistent with parapneumonic effusion, no empyema. She was extubated 10/26 and remains extubated.     Allergies: No Known Allergies     MEDS:  Scheduled Meds:   alteplase  4 mg Intracavitary Daily    amLODIPine  5 mg Oral BID    bumetanide  0.5 mg Intravenous Q12H    carvedilol  25 mg Oral BID w/meals    heparin ANTICOAGULANT  5,000 Units Subcutaneous Q8H    hydrALAZINE  100 mg Oral or Feeding Tube TID    insulin aspart  1-4 Units Subcutaneous 4x Daily AC & HS    micafungin  100 mg Intravenous Q24H    nitroGLYcerin  1 patch Transdermal Daily    pantoprazole  40 mg Per Feeding Tube QAM AC     "Or    pantoprazole  40 mg Intravenous QAM AC    piperacillin-tazobactam  3.375 g Intravenous Q8H    sodium chloride (PF)  3 mL Intracatheter Q8H    venlafaxine  75 mg Oral TID w/meals       Objective:   VITALS:  BP (!) 182/78   Pulse 101   Temp 98.8  F (37.1  C) (Oral)   Resp 20   Ht 1.6 m (5' 3\")   Wt 129.1 kg (284 lb 9.8 oz)   LMP 09/09/2023 (Exact Date)   SpO2 94%   BMI 50.42 kg/m    EXAM:    GENERAL APPEARANCE: Alert, no acute distress  HENT: bipap mask in place  NECK: large collar size  RESP: decreased breath sounds bilaterally, right sided chest tube in place and bulb drain in place  CV: regular rate and rhythm, no murmur, rub or gallop  ABDOMEN: soft, obese, dressing in place  SKIN: no suspicious lesions or rashes on visible skin  NEURO: awake, responsive      Data Review:    EXAM: XR CHEST PORT 1 VIEW  LOCATION: [facility]  DATE: 11/3/2023     INDICATION: chjest tube in place and not draining  COMPARISON: 11/02/2023                                                                      IMPRESSION: 2 right chest tubes again seen in the right inferior hemithorax in stable positions. A right basilar consolidation and small right pleural effusion are unchanged. Heart size, cardiomediastinal contour and pulmonary vascularity are normal.   Left lung is clear without consolidation or pleural effusion. No pneumothorax. Right PICC terminates near the superior cavoatrial junction. Enteric feeding tube extends below the diaphragm beyond the field-of-view    Imaging: personally reviewed  Abdominal CT chest  EXAM: CT ABDOMEN W CONTRAST  LOCATION: [facility]  DATE: 10/30/2023     INDICATION: Follow-up drain placement. Determine if patient may need TPA through drain.  COMPARISON: 10/25/2023. Others.  TECHNIQUE: CT scan of the abdomen was performed following injection of IV contrast. Multiplanar reformats were obtained. Dose reduction techniques were used.  CONTRAST: " 75 mL Isovue 370     FINDINGS:    LOWER CHEST: Incompletely seen small to moderate right pleural effusion and complete right lower lobe atelectasis.     HEPATOBILIARY: Exchange of prior perihepatic drain was performed on 10/25/2023. Current drain tip coils adjacent to the hepatic dome. The perihepatic fluid collection has minimally improved. No focal hepatic abnormality.     PANCREAS: Normal.     SPLEEN: Normal.     ADRENAL GLANDS: Normal.     KIDNEYS: Normal.     BOWEL: Surgical changes stomach. No evidence for bowel obstruction.     LYMPH NODES: No adenopathy.     VASCULATURE: Nonaneurysmal aorta.     MUSCULOSKELETAL: Nothing acute.                                                                      IMPRESSION:      1.  Perihepatic drain in place with tip adjacent to the hepatic dome. Minimal improvement of the perihepatic collection since 10/25/2023. Small perihepatic collection remains.     2.  Small to moderate sized right pleural effusion and associated collapse of the right lower lobe.  Results for orders placed or performed during the hospital encounter of 10/09/23   Basic metabolic panel   Result Value Ref Range    Sodium 145 135 - 145 mmol/L    Potassium 3.7 3.4 - 5.3 mmol/L    Chloride 106 98 - 107 mmol/L    Carbon Dioxide (CO2) 30 (H) 22 - 29 mmol/L    Anion Gap 9 7 - 15 mmol/L    Urea Nitrogen 28.2 (H) 6.0 - 20.0 mg/dL    Creatinine 1.48 (H) 0.51 - 0.95 mg/dL    GFR Estimate 42 (L) >60 mL/min/1.73m2    Calcium 9.2 8.6 - 10.0 mg/dL    Glucose 118 (H) 70 - 99 mg/dL     Lab Results   Component Value Date    WBC 4.6 11/04/2023    HGB 13.7 11/04/2023    HCT 45.6 11/04/2023    MCV 95 11/04/2023     11/04/2023

## 2023-11-04 NOTE — PROGRESS NOTES
Daily Progress Note        CODE STATUS:  Full Code    11/02/23  Assessment/Plan:  Mojgan Jimenez is a 53 year old female with h/o obesity, severe JARVIS on CPAP, asthma, stimulant use disorder, stimulant induced psychosis, mood & anxiety disorder. Admitted 10/9/23 for scheduled laparoscopic sleeve gastrectomy with single anastomosis duodenal switch. She was later found to have two small bowel injuries with peritonitis. 10/12/23 returned to OR for small bowel interotomy closure, clean-out, & drain placement; 10/19 found to have RUQ fluid collection s/p drain placement; 10/24 s/p pelvic fluid aspiration.      Course complicated by encephalopathy/delirium, septic shock, suspected takotsubo syndrome, acute respiratory failure due to loss of protective airway reflexes & increased metabolic load requiring intubation (10/12-10/21/23; reintubated 10/21-10/26/23), & oliguric SUSSY with renal recovery. She has significantly improved: mental status clearing with some ongoing delirium at night, cardiomyopathy improved, ATN I recovery phase, and ongoing treatment of intra abdominal infection. She was made floor tele status 10/27/23. On 11/1/23 CT guided chest tube placed on right for persistent effusion, concern for parapneumonic one with risk of future trapped lung        Acute hypoxemic-hypercapnic respiratory failure -now weaned RA  - Rapid Response on 10/12, Pt with decreased LOC. Opens eyes to voice. SpO2 89% on 8L NC. BS clear and diminished. Placed on a 15L non-rebreather for SpO2 > 90%. After narcan admin pt became more responsive/agitated. Transferred to ICU and intubated -10/12-10/21/23; reintubated 10/21-10/26/23. Now extubated and weaned to RA  - 10/22/23 CT demonstrated complete RLL atelectasis, milder RUL & RBML atelectasis or consolidation. 10/22 bronchoscopy moderate RLL blood tinged secretions cleared.   - Cont pulmonary hygiene  - R-pleural effusion, exudative, suspect simple parapneumonic effusion. 10/24 s/p  thoracentesis, 400 mL. Pleural fluid amylase amylase 23, serum 64. 11/1/23--chest tube placed on right. Chest tube removal, likely tomorrow per pulm.      Gastric bypass  10/9 s/p sleeve gastrectomy complicated by small bowel injury & peritonitis   10/12 returned to OR for closure, clean-out, & drain placement (now removed)  10/19 s/p RUQ fluid collection drain placement   10/24 s/p pelvic fluid aspiration (75 ml)  - Surgery primary   - ID consulted   - 10/12 - peritoneal cultures + Streptococcus anginosus, mixed aerobic & anaerobic kamla   - 10/19 - RUQ fluid cultures & pelvic fluid aspirate + Lactobacillus   - 10/22 - BDG blood +  - 10/24 pleural fluid studies  -#+WBC no pos cx. 10/24--aspirate pelvic drain--lacto + candida  - Was on Vanc, pip-tazo, micafungin. Vanco d/jose 10/31  - Was on TPN previously 10/14  - PPTF started 10/24/23. TF and TPN weaned of 10/27 currently on bariatric clears, surgery managing   - Diet per surg  - Flush drain at regular intervals      Accelerated HTN /Cardiomyopathy   - Echo previously and initially decreased LV function but repeat echo shows EF normalized, possible stress induced.   - Cardiology consulted  - Metoprolol switched to coreg; dose increased 11/3/23  - Hydralazine increase to 100 mg tid  - Nitroglycerin patch 0.4mg/24  - Amlodipine 5 mg bid  - Bumex back on per renal  - Prn IV hydralazine     History of JARVIS, asthma in the chart no PFTs  - NIPPV with sleep/naps everytime   - Continue duonebs     Oliguric SUSSY - improving   - Nephrology was consulted  for ARF peak creat 6.97 on 10/14  - Slowly improving and creatinine trending down--10/31 now 1.48  - Avoid nephrotoxic agents      Hypernatremia -   - IV bumex stopped and on D5W per renal .   - Trend sodium 153 -->148 -->144-->142-->139-->142-->145  - Has needed D5W-per renal-off now  - Bumex restarted 10/31     7.Anemia - stable  - Suspect multifactorial secondary to sepsis & surgical blood loss   - Monitor ,11/1/ hgb 8.6    "  8.Hypervolemia  - Back on bumex     9.Hyperglycemia of critical illness   - Glargine 10 units every day--bs good, stopped lantus   - Glycemic control is optimal. Cont with sliding scale insulin for now     10.Obesity  - BMI 46     11.Acute metabolic and toxic encephalopathy  - Due to above. Still fluctuating here   - Delirium protocol  - Needs to be up day, sleep night  - Up to chair if possible     12. Severe decondition  - Due to prolong hospitalization  - PT/OT eval         Diet: Room Service  Adult Formula Drip Feeding: Continuous Promote w fiber; Nasojejunal; Goal Rate: 55; mL/hr; Start at 15 ml/hr x 8 hrs and advance by 15 ml q 8 hrs to a goal rate of 55 ml/hr  DVT Prophylaxis: Heparin SQ  Patiño Catheter: Not present  Lines: PRESENT      PICC 10/28/23 Triple Lumen Right Basilic-Site Assessment: WDL    Clinically Significant Risk Factors           # Hypercalcemia: corrected calcium is >10.1, will monitor as appropriate    # Hypoalbuminemia: Lowest albumin = 2.7 g/dL at 10/14/2023  6:29 AM, will monitor as appropriate     # Hypertension: Noted on problem list        # Severe Obesity: Estimated body mass index is 50.42 kg/m  as calculated from the following:    Height as of this encounter: 1.6 m (5' 3\").    Weight as of this encounter: 129.1 kg (284 lb 9.8 oz).      # Asthma: noted on problem list        Disposition; TBD, [facility] vs TCU  Barrier to discharge; IV antibiotics.    Subjective:  Interval History: Patient seen and examined this morning. Sitting up in a recliner. Patient reported feeling about the same. Reported some nausea. Has had a large loose BM this morning per nursing staff.     Review of Systems:   As mentioned in subjective.    Patient Active Problem List   Diagnosis    Mild persistent asthma without complication    Vitamin D deficiency    LINA (generalized anxiety disorder)    Recurrent major depressive disorder, remission status unspecified (H24)    Methamphetamine abuse, episodic (H)    " Hyperlipidemia LDL goal <160    Depression, major, recurrent, severe with psychosis (H)    IUD (intrauterine device) in place    Paranoia (H)    PMDD (premenstrual dysphoric disorder)    Polysubstance overdose    Suicidal ideation    Morbid obesity (H)    Mood disorder (H24)    Obstructive sleep apnea    Morbid (severe) obesity due to excess calories (H)    Intra-abdominal abscess (H)    Hypernatremia    SUSSY (acute kidney injury) (H24)    Accelerated hypertension    Metabolic encephalopathy    Acute respiratory failure with hypoxia (H)    Cardiomyopathy (H)       Scheduled Meds:   alteplase  4 mg Intracavitary Daily    amLODIPine  5 mg Oral BID    [START ON 11/5/2023] bumetanide  0.5 mg Intravenous Q24H    carvedilol  25 mg Oral BID w/meals    doxazosin  2 mg Oral Q12H [facility] (08[facility])    heparin ANTICOAGULANT  5,000 Units Subcutaneous Q8H    hydrALAZINE  100 mg Oral or Feeding Tube TID    insulin aspart  1-4 Units Subcutaneous 4x Daily AC & HS    micafungin  100 mg Intravenous Q24H    nitroGLYcerin  1 patch Transdermal Daily    pantoprazole  40 mg Per Feeding Tube QAM AC    Or    pantoprazole  40 mg Intravenous QAM AC    piperacillin-tazobactam  3.375 g Intravenous Q8H    sodium chloride (PF)  3 mL Intracatheter Q8H    venlafaxine  75 mg Oral TID w/meals     Continuous Infusions:   dextrose       PRN Meds:.acetaminophen **OR** acetaminophen, albuterol, albuterol, dextrose, glucose **OR** dextrose **OR** glucagon, hydrALAZINE, HYDROmorphone, ipratropium - albuterol 0.5 mg/2.5 mg/3 mL, lidocaine 4%, lidocaine (buffered or not buffered), menthol-zinc oxide, miconazole, naloxone **OR** naloxone **OR** naloxone **OR** naloxone, OLANZapine, ondansetron **OR** ondansetron, phenol, prochlorperazine **OR** prochlorperazine, sodium chloride (PF), traMADol    Objective:  Vital signs in last 24 hours:  Temp:  [97.6  F (36.4  C)-98.9  F (37.2  C)] 98.2  F (36.8  C)  Pulse:  [] 84  Resp:  [18-22] 20  BP: (159-204)/(78-99)  168/81  SpO2:  [94 %-96 %] 94 %        Intake/Output Summary (Last 24 hours) at 11/2/2023 1503  Last data filed at 11/2/2023 1435  Gross per 24 hour   Intake 1824.5 ml   Output 1750 ml   Net 74.5 ml       Physical Exam:    General: Not in obvious distress. Morbidly obese  HEENT: NC, AT. NJ in place   Chest: Diminished breath sounds due to obesity. Chest tube on the right side, laterally.  Heart: S1S2 normal, regular. No M/R/G  Abdomen: Soft. NT, ND. Bowel sounds- active.  Extremities: 1+ legs swelling  Neuro: Awake, grossly non-focal      Lab Results:(I have personally reviewed the results)    Recent Results (from the past 24 hour(s))   Glucose by meter    Collection Time: 11/03/23  4:17 PM   Result Value Ref Range    GLUCOSE BY METER POCT 146 (H) 70 - 99 mg/dL   Sodium    Collection Time: 11/03/23  5:56 PM   Result Value Ref Range    Sodium 145 135 - 145 mmol/L   Glucose by meter    Collection Time: 11/03/23  8:45 PM   Result Value Ref Range    GLUCOSE BY METER POCT 122 (H) 70 - 99 mg/dL   CBC with platelets    Collection Time: 11/04/23  5:03 AM   Result Value Ref Range    WBC Count 4.6 4.0 - 11.0 10e3/uL    RBC Count 4.82 3.80 - 5.20 10e6/uL    Hemoglobin 13.7 11.7 - 15.7 g/dL    Hematocrit 45.6 35.0 - 47.0 %    MCV 95 78 - 100 fL    MCH 28.4 26.5 - 33.0 pg    MCHC 30.0 (L) 31.5 - 36.5 g/dL    RDW 14.3 10.0 - 15.0 %    Platelet Count 194 150 - 450 10e3/uL   Basic metabolic panel    Collection Time: 11/04/23  5:03 AM   Result Value Ref Range    Sodium 145 135 - 145 mmol/L    Potassium 3.7 3.4 - 5.3 mmol/L    Chloride 106 98 - 107 mmol/L    Carbon Dioxide (CO2) 30 (H) 22 - 29 mmol/L    Anion Gap 9 7 - 15 mmol/L    Urea Nitrogen 28.2 (H) 6.0 - 20.0 mg/dL    Creatinine 1.48 (H) 0.51 - 0.95 mg/dL    GFR Estimate 42 (L) >60 mL/min/1.73m2    Calcium 9.2 8.6 - 10.0 mg/dL    Glucose 118 (H) 70 - 99 mg/dL   Sodium    Collection Time: 11/04/23  5:03 AM   Result Value Ref Range    Sodium 145 135 - 145 mmol/L   Nt probnp  "inpatient    Collection Time: 11/04/23  7:59 AM   Result Value Ref Range    N terminal Pro BNP Inpatient 2,241 (H) 0 - 900 pg/mL   Glucose by meter    Collection Time: 11/04/23  8:26 AM   Result Value Ref Range    GLUCOSE BY METER POCT 130 (H) 70 - 99 mg/dL   Glucose by meter    Collection Time: 11/04/23 11:39 AM   Result Value Ref Range    GLUCOSE BY METER POCT 108 (H) 70 - 99 mg/dL       All laboratory and imaging data in the past 24 hours reviewed  Serum Glucose range:   Recent Labs   Lab 11/04/23  1139 11/04/23  0826 11/04/23  0503 11/03/23  2045   * 130* 118* 122*     ABG: No lab results found in last 7 days.  CBC:   Recent Labs   Lab 11/04/23  0503 11/03/23  0619 11/02/23  0616   WBC 4.6 9.0 11.4*   HGB 13.7 8.3* 8.3*   HCT 45.6 28.1* 28.2*   MCV 95 96 97    307 333     Chemistry:   Recent Labs   Lab 11/04/23  0503 11/03/23  1756 11/03/23  0619 11/02/23  1814 11/02/23  0616 11/01/23  1742 11/01/23  0547 10/31/23  1756 10/31/23  0617     145 145 145  145 145 144  144   < > 142  142   < > 139  139   POTASSIUM 3.7  --  3.8  --  4.0  --  3.8  --  3.8   CHLORIDE 106  --  107  --  108*  --  106  --  104   CO2 30*  --  27  --  26  --  22  --  24   BUN 28.2*  --  27.1*  --  28.0*  --  27.9*  --  31.3*   CR 1.48*  --  1.50*  --  1.62*  --  1.82*  --  1.65*   GFRESTIMATED 42*  --  41*  --  38*  --  33*  --  37*   PALAK 9.2  --  9.1  --  9.1  --  8.7  --  9.1   MAG  --   --   --   --  1.7  --  2.0  --  1.8   PROTTOTAL  --   --   --  6.7  --   --   --   --   --    ALBUMIN  --   --   --  2.8*  --   --   --   --   --    AST  --   --   --  24  --   --   --   --   --    ALT  --   --   --  23  --   --   --   --   --    ALKPHOS  --   --   --  112*  --   --   --   --   --    BILITOTAL  --   --   --  0.2  --   --   --   --   --     < > = values in this interval not displayed.     Coags:  No results for input(s): \"INR\", \"PROTIME\", \"PTT\" in the last 168 hours.    Invalid input(s): \"APTT\"  Cardiac Markers:  No " "results for input(s): \"CKTOTAL\", \"TROPONINI\" in the last 168 hours.       CT Chest Tube with Cath Placement    Result Date: 11/1/2023  EXAM: 1. PERCUTANEOUS CHEST TUBE PLACEMENT RIGHT PLEURAL SPACE 2. CT GUIDANCE 3. CONSCIOUS SEDATION LOCATION: [facility] DATE: 11/1/2023 INDICATION: right pleural fluid increasing in volume TECHNIQUE: Dose reduction techniques were used. PROCEDURE: Informed consent obtained. Site marked. Prior images reviewed. Required items made available. Patient identity confirmed verbally and with arm band. Patient reevaluated immediately before administering sedation. Universal protocol was followed. Time out performed. The site was prepped and draped in sterile fashion. 4 mL of 1% lidocaine was infused into the local soft tissues. Using standard technique and under direct CT guidance, a 10 Filipino nonlocking chest tube catheter was inserted  into the pleural space. The catheter was fixed in place with sutures and adhesive device, and the tubing was banded. Chest tube placed to Pleur-evac suction. SPECIMEN: 1 L of clear yellow fluid was aspirated and sent to lab for testing. BLOOD LOSS: Minimal. The patient tolerated the procedure well. No immediate complications. SEDATION: Versed 1.5  mg. Fentanyl 50 mcg. The procedure was performed with administration intravenous conscious sedation with appropriate preoperative, intraoperative, and postoperative evaluation. 14  minutes of supervised face to face conscious sedation time was provided by a radiology nurse under my direct supervision.     IMPRESSION: 1.  Successful CT-guided chest tube placement into the right pleural space with moderate sedation . Reference CPT Codes: 30592, 77444    XR Feeding Tube Placement    Result Date: 10/31/2023  EXAM: XR FEEDING TUBE PLACEMENT LOCATION: [facility] DATE: 10/31/2023 INDICATION: Not tolerating oral intake. Place feeding tube. Single Anastamosis Duodenal " Switch. Surgery 10 12 for enterotomy and sepsis. COMPARISON: CT AP 10/30/2023 and older studies, feeding tube placement 10/24/2023. TECHNIQUE: Routine. FINDINGS: Enteric tube placed without complication. Tip in the the small bowel. FLUOROSCOPIC TIME: 0.6 minutes NUMBER OF IMAGES: 1 Reference CPT Code: 22302     XR Chest Port 1 View    Result Date: 10/31/2023  EXAM: XR CHEST PORT 1 VIEW LOCATION: [facility] DATE: 10/31/2023 INDICATION: 53 y o female s p bariatric surgery this admit, with complications, has drain, ongoing effusion right, last night n v check for infiltrate aspiration COMPARISON: CT 10/22/2023.     IMPRESSION: Moderate right pleural effusion with associated compressive atelectasis in the right lower lobe. Left basilar atelectasis or consolidation. Right upper extremity PICC with tip near the cavoatrial junction. Obscured right heart border from the  effusion. Cardiomediastinal silhouette otherwise within normal limits. Tubing projecting over the right upper quadrant abdomen. No significant interval change.    CT Abdomen w Contrast    Result Date: 10/30/2023  EXAM: CT ABDOMEN W CONTRAST LOCATION: [facility] DATE: 10/30/2023 INDICATION: Follow-up drain placement. Determine if patient may need TPA through drain. COMPARISON: 10/25/2023. Others. TECHNIQUE: CT scan of the abdomen was performed following injection of IV contrast. Multiplanar reformats were obtained. Dose reduction techniques were used. CONTRAST: 75 mL Isovue 370 FINDINGS:  LOWER CHEST: Incompletely seen small to moderate right pleural effusion and complete right lower lobe atelectasis. HEPATOBILIARY: Exchange of prior perihepatic drain was performed on 10/25/2023. Current drain tip coils adjacent to the hepatic dome. The perihepatic fluid collection has minimally improved. No focal hepatic abnormality. PANCREAS: Normal. SPLEEN: Normal. ADRENAL GLANDS: Normal. KIDNEYS: Normal. BOWEL: Surgical  changes stomach. No evidence for bowel obstruction. LYMPH NODES: No adenopathy. VASCULATURE: Nonaneurysmal aorta. MUSCULOSKELETAL: Nothing acute.     IMPRESSION: 1.  Perihepatic drain in place with tip adjacent to the hepatic dome. Minimal improvement of the perihepatic collection since 10/25/2023. Small perihepatic collection remains. 2.  Small to moderate sized right pleural effusion and associated collapse of the right lower lobe.     XR Feeding Tube Placement    Addendum Date: 10/27/2023    Addendum: TECHNIQUE: The existing NG tube in the stomach was removed and a feeding tube was placed with fluoroscopic guidance. Tip was placed in the small bowel post anastomosis. End of addendum    Result Date: 10/27/2023  EXAM: XR FEEDING TUBE PLACEMENT LOCATION: [facility] DATE: 10/24/2023 INDICATION: please exchange Nitza sump for a post pylroic feeding tube; FYI: new anastomosis just past pylorus COMPARISON: CT 10/22/2023 TECHNIQUE: Routine. FINDINGS: Enteric tube placed without complication. Tip in the jejunum just post anastomosis. FLUOROSCOPIC TIME: 1.5 NUMBER OF IMAGES: 3 Reference CPT Code: 85360     US Upper Extremity Venous Duplex Bilat    Result Date: 10/25/2023  EXAM: US UPPER EXTREMITY VENOUS DUPLEX BILATERAL LOCATION: [facility] DATE: 10/25/2023 INDICATION: ongoing fevers, evaluate for thrombus COMPARISON: None. TECHNIQUE: Venous Duplex ultrasound of both upper extremities with (when possible) and without compression, augmentation, and duplex. Color flow and spectral Doppler with waveform analysis performed. FINDINGS: Ultrasound includes evaluation of the internal jugular veins, innominate veins, subclavian veins, axillary veins, and brachial veins. The superficial cephalic and basilic veins were also evaluated where seen. RIGHT: No deep venous thrombosis. Occlusive and partially occlusive thrombus within the cephalic vein extending from the forearm to the  antecubital fossa (length of involvement greater than 5 cm). LEFT: No deep venous thrombosis. Partially occlusive thrombus within the cephalic vein in the antecubital fossa near the IV insertion (less than 5 cm).     IMPRESSION: 1.  No deep venous thrombosis in the bilateral upper extremities. 2.  Superficial thrombus within the cephalic veins bilaterally.    US Lower Extremity Venous Duplex Bilateral    Result Date: 10/25/2023  EXAM: US LOWER EXTREMITY VENOUS DUPLEX BILATERAL LOCATION: [facility] DATE: 10/25/2023 INDICATION: ongoing fevers, evaluate for thrombus COMPARISON: None. TECHNIQUE: Venous Duplex ultrasound of bilateral lower extremities with and without compression, augmentation and duplex. Color flow and spectral Doppler with waveform analysis performed. FINDINGS: Exam includes the common femoral, femoral, popliteal veins as well as segmentally visualized deep calf veins and greater saphenous vein. RIGHT: No deep vein thrombosis. No superficial thrombophlebitis. No popliteal cyst. LEFT: No deep vein thrombosis. No superficial thrombophlebitis. No popliteal cyst.     IMPRESSION: 1.  No deep venous thrombosis in the bilateral lower extremities.    CT Abdomen w/o Contrast    Result Date: 10/25/2023  EXAM: CT ABDOMEN W/O CONTRAST LOCATION: [facility] DATE: 10/25/2023 INDICATION: Perihepatic abscess status post drainage. Follow-up abscess. COMPARISON: 10/22/2023 TECHNIQUE: CT scan of the abdomen was performed without IV contrast. Multiplanar reformats were obtained. Dose reduction techniques were used. CONTRAST: None. FINDINGS:  LOWER CHEST: Stable right pleural effusion. Bibasilar atelectasis. Artifact. HEPATOBILIARY: Perihepatic drain remains in place. There remains a crescentic low-density process involving the subcapsular space at the site of drain which extends to the dome of the liver. PANCREAS: Normal. SPLEEN: Normal. ADRENAL GLANDS: Normal. KIDNEYS:  Normal. BOWEL: Postsurgical changes of the stomach with enteric tube noted in small bowel in the right upper quadrant. LYMPH NODES: Normal. VASCULATURE: Minimal arterial plaque. MUSCULOSKELETAL: Normal.     IMPRESSION: 1.  Perihepatic drain remains in good position. Residual low-density material persists in this subcapsular perihepatic space which extends to the dome of the liver. 2.  Stable appearance right pleural effusion and bibasilar atelectasis. 3.  Postsurgical changes of the stomach.    IR Abscess Tube Check    Result Date: 10/25/2023  [facility] RADIOLOGY LOCATION: [facility] DATE: 10/25/2023 PROCEDURE: ABSCESS TUBE CHECK AND EXCHANGE/UPSIZING INTERVENTIONAL RADIOLOGIST: Teofilo Barber MD. INDICATION: 53-year-old female with a perihepatic collection status post drain placement presents for drain check due to decreased drain output. CT shows persistent perihepatic fluid collection.. CONSENT: The risks, benefits and alternatives of an abscessogram with possible exchange, manipulation or removal were discussed with the patient's family in detail. All questions were answered. Informed consent was given to proceed with the procedure. SEDATION: Patient is intubated and sedated. CONTRAST: 20 mL Omnipaque into the abscess cavity. ANTIBIOTICS: None. ADDITIONAL MEDICATIONS: None. FLUOROSCOPIC TIME: 3.4 minutes. RADIATION DOSE: Air Kerma: 102 mGy. COMPLICATIONS: No immediate complications. STERILE BARRIER TECHNIQUE: Maximum sterile barrier technique was used. Cutaneous antisepsis was performed at the operative site with application of 2% chlorhexidine and large sterile drape. Prior to the procedure, the  and assistant performed hand hygiene and wore hat, mask, sterile gown, and sterile gloves during the entire procedure. PROCEDURE:  A  image was obtained. The pre-existing drainage catheter was injected with a small amount of contrast and multiple images were obtained. Based on the  results of the study it was elected to exchange the catheter. The pre-existing drainage catheter was removed over a wire and exchange was made for a Kumpe catheter. The catheter and wire were negotiated superiorly along the right hepatic dome. The Kumpe catheter was then exchanged for a new 12F locking loop drainage catheter. The loop was partially formed within the residual abscess cavity. A post placement contrast injection was performed confirming that the new catheter controls the residual fluid collection well. FINDINGS: The indwelling catheter was clogged. Following exchange and repositioning, the new larger drainage catheter lies within the central aspect of the residual abscess cavity along the right hepatic dome and appears to control the fluid collection well.     IMPRESSION:  Exchange and repositioning with upsize of the perihepatic drainage catheter to a 12 [place]ib[place]ian catheter. PLAN: Continue flushing the catheter and keep to suction drainage.     CT Abdomen Peritoneum Abscess Aspiration    Result Date: 10/24/2023  EXAM: 1. PERCUTANEOUS DRAIN PLACEMENT 2. CT GUIDANCE LOCATION: [facility] DATE: 10/24/2023 INDICATION: fluid collection in pelvis, ? drain placement TECHNIQUE: Dose reduction techniques were used. PROCEDURE: Informed consent obtained. Site marked. Prior images reviewed. Required items made available. Patient identity confirmed verbally and with arm band. Patient reevaluated immediately before administering sedation. Universal protocol was followed. Time out performed. The site was prepped and draped in sterile fashion. 10 mL of 1% lidocaine was infused into the local soft tissues. Using standard technique and under direct CT guidance, an 18-gauge guiding needle was placed into the pelvic fluid collection using a right transfemoral gluteal approach. 75 mL of yellow fluid with slight debris was aspirated. Because this did not have the appearance of yesica pus a drain was not  placed. Fluid pocket 20 cm deep in the pelvis. Irrigation 4 times with sterile saline..  SPECIMEN: 75 mL of yellow debris filled fluid was aspirated and sent to lab for cultures and Gram stain. BLOOD LOSS: Minimal. The patient tolerated the procedure well. No immediate complications.     IMPRESSION: 1.  Successful CT-guided aspiration of pelvic fluid collection with yellow slight debris filled fluid aspirated and sent for cultures. This pocket is 20 cm deep in the pelvis. No drain was placed but the pocket was irrigated 4 times with sterile saline.. Reference CPT Codes: 39157,    US Thoracentesis    Result Date: 10/24/2023  EXAM: 1. RIGHT THORACENTESIS 2. ULTRASOUND GUIDANCE LOCATION: [facility] DATE: 10/24/2023 INDICATION: Pleural effusion. PROCEDURE: Informed consent obtained. Time out performed. The chest was prepped and draped in sterile fashion. 10 mL of 1 % lidocaine was infused into the local soft tissues. Under direct ultrasound guidance, a 5 Somali catheter system was placed into the pleural effusion. 0.4 liters of clear yellow fluid were removed and sent to lab, if requested. Patient tolerated procedure well. Ultrasound imaging was obtained and placed in the patient's permanent medical record.     IMPRESSION: Status post right ultrasound-guided thoracentesis. Reference CPT Code: 55554    Echocardiogram Limited    Result Date: 10/23/2023  [id number]^MATEO^PATSY^[place] [address]  Name: LY GONZALEZ MRN: [id number] : 1970 Study Date: 10/23/2023 10:27 AM Age: 53 yrs Gender: Female Patient Location: [facility] Reason For Study: Cardiomyopathy Ordering Physician: PATSY GALINDO Performed By: ACE  BSA: 2.2 m2 Height: 63 in Weight: 273 lb HR: 108 BP: 157/72 mmHg ______________________________________________________________________________ Procedure Limited Portable Echo Adult. Definity (NDC  #01084-434) given intravenously. ______________________________________________________________________________ Interpretation Summary  The left ventricle is normal in size. The visual ejection fraction is 60-65%. No regional wall motion abnormalities noted. Normal right ventricle size and systolic function. The rhythm was sinus tachycardia. The study was technically difficult. Compared to echo from 10/14/23 thr LVEF has improved. ______________________________________________________________________________ Left Ventricle The left ventricle is normal in size. There is normal left ventricular wall thickness. The visual ejection fraction is 60-65%. No regional wall motion abnormalities noted.  Right Ventricle Normal right ventricle size and systolic function. TAPSE is normal, which is consistent with normal right ventricular systolic function.  Atria Normal left atrial size. Right atrial size is normal.  Mitral Valve The mitral valve is not well visualized. There is trace mitral regurgitation. There is no mitral valve stenosis.  Tricuspid Valve The tricuspid valve is not well visualized.  Aortic Valve The aortic valve is not well visualized. No aortic regurgitation is present. No aortic stenosis is present.  Pulmonic Valve Normal pulmonic valve.  Vessels IVC diameter and respiratory changes fall into an intermediate range suggesting an RA pressure of 8 mmHg.  Pericardium Trivial pericardial effusion.  Rhythm The rhythm was sinus tachycardia. ______________________________________________________________________________ MMode/2D Measurements & Calculations  IVSd: 0.61 cm LVIDd: 4.9 cm LVIDs: 3.3 cm LVPWd: 0.87 cm FS: 33.3 % LV mass(C)d: 118.8 grams LV mass(C)dI: 53.8 grams/m2 RWT: 0.36 TAPSE: 2.9 cm  Time Measurements MM HR: 108.0 BPM  Doppler Measurements & Calculations Ao V2 max: 214.0 cm/sec Ao max P.0 mmHg Ao V2 mean: 130.0 cm/sec Ao mean P.0 mmHg Ao V2 VTI: 31.1 cm RV S Sami: 19.6 cm/sec   ______________________________________________________________________________ Report approved by: Yakelin García 10/23/2023 12:34 PM       CT Chest Abdomen Pelvis w/o Contrast    Result Date: 10/22/2023  EXAM: CT CHEST ABDOMEN PELVIS W/O CONTRAST LOCATION: [facility] DATE: 10/22/2023 INDICATION: Sepsis. treated for peritonitis and pneumonia.  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 COMPARISON: 10/18/2023 CT AP. TECHNIQUE: CT scan of the chest, abdomen, and pelvis was performed without IV contrast. Multiplanar reformats were obtained. Dose reduction techniques were used. CONTRAST: None. FINDINGS: LUNGS AND PLEURA: Complete right lower lobe atelectasis, mild passive atelectasis right upper and middle lobes posteriorly, small/moderate volume right pleural effusion and mild elevation right hemidiaphragm. Trace left pleural effusion and mild platelike atelectasis left lower lobe unchanged. MEDIASTINUM/AXILLAE: Heart size within normal limits with no pericardial effusion. Endotracheal tube tip is only 1 cm above the emilia. CORONARY ARTERY CALCIFICATION: Trace calcified atheromatous plaque LAD coronary artery. HEPATOBILIARY: Liver is normal.  No calcified gallstones or bile duct dilatation. PANCREAS: Normal. SPLEEN: Normal. ADRENAL GLANDS: Normal. KIDNEYS/BLADDER: Bladder is decompressed by well-positioned Patiño catheter. Kidneys, ureters and bladder are otherwise normal. BOWEL: Sleeve gastrectomy and proximal duodenal to jejunal anastomosis with a well-positioned nasogastric tube and interval removal of the surgical drains. Interval placement of percutaneous drainage catheter lateral aspect right subdiaphragmatic air-fluid collection surrounding the right hepatic lobe which has not changed  significantly and continues to measure about 1 - 2 cm radial thickness. A 7 x 5 x 4 cm volume of nongas-containing fluid in the pelvic cul-de-sac is unchanged. LYMPH NODES: No lymphadenopathy. VASCULATURE: Normal caliber abdominal aorta.  PELVIC ORGANS: A 5 cm fibroid. MUSCULOSKELETAL: Unremarkable.     IMPRESSION: 1.  Sleeve gastrectomy and proximal duodenal to jejunal anastomosis with a well-positioned nasogastric tube and interval removal of the 2 surgical drains. 2.  Interval placement of a percutaneous drainage catheter with distal loop in the lateral aspect of the right subdiaphragmatic air-fluid collection surrounding the right hepatic lobe which has not changed significantly and continues to measure about 1 -  2 cm radial thickness. 3.  A 7 x 5 x 4 cm volume of nongas-containing fluid in the pelvic cul-de-sac is unchanged. 4.  Complete right lower lobe atelectasis, mild passive atelectasis right upper and middle lobes posteriorly, small/moderate volume right pleural effusion and mild elevation right hemidiaphragm. 5.  Endotracheal tube tip is only 1 cm above the emilia.    XR Chest Port 1 View    Result Date: 10/22/2023  EXAM: XR CHEST PORT 1 VIEW LOCATION: [facility] DATE: 10/22/2023 INDICATION: Endotracheal tube positioning COMPARISON: 11/22/2023     IMPRESSION: Endotracheal tube has been placed with tip 2 cm above the emilia. Remainder unchanged. Enteric tube tip below diaphragm, off the image. Left IJ central line tip in the left brachiocephalic vein. Right upper quadrant percutaneous drain. Bilateral pleural effusions. Moderate degenerative change thoracic spine.    XR Chest Port 1 View    Result Date: 10/22/2023  EXAM: XR CHEST PORT 1 VIEW LOCATION: [facility] DATE: 10/22/2023 INDICATION: concern for possible aspiration, assess for HAP COMPARISON: CT 10/19/2023     IMPRESSION: Perihepatic drain overlies the right upper abdomen. Small right pleural  effusion with compressive atelectasis. Infiltrate of the right lung base is not excluded. Left basilar and midlung atelectasis. Left internal jugular central venous catheter with tip overlying the innominate confluence region. Nasogastric tube which courses below the diaphragm though the tip is excluded by collimation. No pneumothorax. Upper limits normal heart size.    XR Abdomen Port 1 View    Result Date: 10/21/2023  EXAM: XR ABDOMEN PORT 1 VIEW LOCATION: [facility] DATE: 10/21/2023 INDICATION: ng position COMPARISON: CT abdomen 10/18/2023.     IMPRESSION: Nasogastric tube terminates in the distal stomach. Surgical staple line of the mid line in the abdomen.    CT Retroperitoneal Abscess Drainage    Result Date: 10/19/2023  EXAM: 1. PERCUTANEOUS DRAIN PLACEMENT PERIHEPATIC COLLECTION 2. CT GUIDANCE 3. CONSCIOUS SEDATION LOCATION: [facility] DATE: 10/19/2023 INDICATION: Perihepatic colletion. TECHNIQUE: Dose reduction techniques were used. PROCEDURE: Informed consent obtained. Site marked. Prior images reviewed. Required items made available. Patient identity confirmed verbally and with arm band. Patient reevaluated immediately before administering sedation. Universal protocol was followed. Time out performed. The site was prepped and draped in sterile fashion. 10 mL of 1% lidocaine was infused into the local soft tissues. Using standard technique and under direct CT guidance, a 10 Mexican drainage catheter was inserted into the fluid collection.  SPECIMEN: 10 mL of thick brownish-red fluid was aspirated and sent to lab for cultures and Gram stain. BLOOD LOSS: Minimal. The patient tolerated the procedure well. No immediate complications. SEDATION: Versed 4 mg. Fentanyl 250 mcg. The procedure was performed with administration intravenous conscious sedation with appropriate preoperative, intraoperative, and postoperative evaluation. 40 minutes of supervised face to  face conscious sedation time was provided by a radiology nurse under my direct supervision.     IMPRESSION: 1.  Successful CT-guided drain placement into perihepatic collection. Reference CPT Codes: 58009, 59684, 31519, 66434    CT Abdomen Pelvis w/o Contrast    Addendum Date: 10/18/2023    ADDENDUM dictated by Jose G Birmingham M.D.  10/18/2023 Original report dictated by Jose G Birmingham M.D.  10/18/2023 Addendum to impression #1 IMPRESSION: 1.  Sleeve gastrectomy and proximal duodenal to jejunal anastomosis, WELL-POSITIONED NASOGASTRIC TUBE, and upper anterior abdominal surgical drain from the right and a second left anterior abdominal and pelvic surgical drain.     Result Date: 10/18/2023  EXAM: CT ABDOMEN PELVIS W/O CONTRAST LOCATION: [facility] DATE: 10/18/2023 INDICATION: Perforated bowel, ongoing fever. Status post 10/09/2023 sleeve gastrectomy with single anastomosis duodenal switch complicated by bowel perforation, peritonitis and sepsis status post 10/12/2023 washout and enterotomy closure. Cardiomyopathy.  JARVIS. COMPARISON: 10/16/2023 CXR and 10/10/2023 Gastrografin upper GI. TECHNIQUE: CT scan of the abdomen and pelvis was performed without IV contrast. Multiplanar reformats were obtained. Dose reduction techniques were used. CONTRAST: None. FINDINGS: LOWER CHEST: Complete right lower lobe atelectasis, small volume of right-sided pleural fluid and mild elevation right hemidiaphragm. Trace left pleural effusion and mild platelike atelectasis left lower lobe. Heart size within normal limits with no pericardial effusion. HEPATOBILIARY: Liver is normal.  No calcified gallstones or bile duct dilatation. PANCREAS: Normal. SPLEEN: Spleen size normal. ADRENAL GLANDS: Normal. KIDNEYS/BLADDER: Bladder is decompressed by well-positioned Patiño catheter. Kidneys, ureters and bladder are otherwise normal. BOWEL: Sleeve gastrectomy and proximal duodenal to jejunal anastomosis, a well-positioned  nasogastric tube, and upper anterior abdominal surgical drain from the right and a second left anterior abdominal and pelvic surgical drain. A thin layer of fluid about 1 - 2 cm radial thickness between the right hemidiaphragm and right hepatic lobe contains a few foci of gas and there is a small amount of nongas-containing fluid in pelvic cul-de-sac. Residual iodinated contrast material from 10/10/2023 upper GI within the normal caliber appendix, colon and rectum. LYMPH NODES: No lymphadenopathy. VASCULATURE: Normal caliber abdominal aorta.  PELVIC ORGANS: A 5 cm fibroid. MUSCULOSKELETAL: Unremarkable.     IMPRESSION: 1.  Sleeve gastrectomy and proximal duodenal to jejunal anastomosis, malpositioned nasogastric tube, and upper anterior abdominal surgical drain from the right and a second left anterior abdominal and pelvic surgical drain. 2.  A thin layer of fluid about 1 - 2 cm radial thickness between the right hemidiaphragm and right hepatic lobe contains a few foci of gas and there is a small amount of nongas-containing fluid in pelvic cul-de-sac. 3.  Complete right lower lobe atelectasis, small volume of right-sided pleural fluid and mild elevation right hemidiaphragm.     XR Chest Port 1 View    Result Date: 10/16/2023  EXAM: XR CHEST PORT 1 VIEW LOCATION: [facility] DATE: 10/16/2023 INDICATION: hypoxia COMPARISON: Portable AP view of the chest 10/14/2023     IMPRESSION: Tip of the endotracheal tube is in satisfactory position. Esophageal temperature probe is in the lower third of the esophagus. Gastric tube courses below the diaphragmatic hiatus and outside the field-of-view. Telemetry leads overlie the chest. Persistent shallow lung inflation. The right hemidiaphragm is indistinct and there is graded opacity in the infrahilar right chest either related to adjacent basilar atelectasis and/or layering pleural fluid. Focal atelectasis in the medial left base is not increased. The  vessels within the aerated portions of the lungs are normal. No clear change from 2 days earlier. Unchanged heart and mediastinal contours.    Echocardiogram Complete    Result Date: 10/14/2023  [id number]^MANISH^DAMARIS^[place] [address]  Name: LY GONZALEZ MRN: [id number] : 1970 Study Date: 10/14/2023 02:51 PM Age: 53 yrs Gender: Female Patient Location: [facility] Reason For Study: Arrhythmia, Pericardial Effusion Ordering Physician: DAMARIS HAMMOND Performed By: CM  BSA: 2.3 m2 Height: 63 in Weight: 287 lb HR: 104 ______________________________________________________________________________ Procedure Complete Echo Adult. Definity (NDC #10299-665) given intravenously. Poor acoustic windows. Technically difficult study. Limited views were obtained. ______________________________________________________________________________ Interpretation Summary  Technically difficult study. Limited views were obtained. The left ventricle is normal in size. Left ventricular function is decreased. The ejection fraction is 30-35% (moderately reduced). There is moderate global hypokinesia of the left ventricle. ______________________________________________________________________________ Left Ventricle The left ventricle is normal in size. Left ventricular function is decreased. The ejection fraction is 30-35% (moderately reduced). There is normal left ventricular wall thickness. There is moderate global hypokinesia of the left ventricle.  Right Ventricle The right ventricle is not well visualized.  Atria Normal left atrial size. Right atrium not well visualized.  Mitral Valve The mitral valve is not well visualized. There is no mitral regurgitation noted.  Tricuspid Valve The tricuspid valve is not well visualized. No tricuspid regurgitation.  Aortic Valve The aortic valve is not well visualized. No aortic regurgitation is present. No hemodynamically  significant valvular aortic stenosis.  Pulmonic Valve The pulmonic valve is not well visualized. There is no pulmonic valvular regurgitation.  Vessels The aorta root is normal. Normal size ascending aorta.  Pericardium There is no pericardial effusion.  ______________________________________________________________________________ MMode/2D Measurements & Calculations IVSd: 1.1 cm LVIDd: 5.1 cm LVIDs: 3.9 cm LVPWd: 0.81 cm  FS: 23.1 % LV mass(C)d: 168.8 grams LV mass(C)dI: 74.9 grams/m2 Ao root diam: 2.5 cm LA dimension: 3.4 cm asc Aorta Diam: 3.1 cm LA/Ao: 1.4 LVOT diam: 2.0 cm LVOT area: 3.1 cm2 Ao root diam index Ht(cm/m): 1.6 Ao root diam index BSA (cm/m2): 1.1 Asc Ao diam index BSA (cm/m2): 1.4 Asc Ao diam index Ht(cm/m): 1.9 RWT: 0.32  Doppler Measurements & Calculations MV max P.5 mmHg MV mean P.0 mmHg MV V2 VTI: 18.8 cm PA acc time: 0.07 sec  ______________________________________________________________________________ Report approved by: Yakelin Mcadams 10/14/2023 04:40 PM       XR Chest Port 1 View    Result Date: 10/14/2023  EXAM: XR CHEST PORT 1 VIEW LOCATION: [facility] DATE: 10/14/2023 INDICATION: elevated peak pressures COMPARISON: Portable chest radiograph 10/13/2023     IMPRESSION: Low lung volumes. Hazy opacities right lower lung field could be related to overlying soft tissue versus atelectasis or developing infiltrate. This is similar as compared to the prior day chest radiograph but recommend continued attention on follow-up imaging. Minimal atelectasis left base. Endotracheal tube tip in satisfactory position terminating approximately 4 cm above the emilia. Nasogastric tube courses below the level of the diaphragm though the tip is not well visualized on this radiograph. Recommend attention on follow-up and consider abdominal radiograph for further evaluation.. Left IJ central line tip overlies the region of the right brachiocephalic vein. No  pneumothorax. Trace effusions possible.    XR Chest Port 1 View    Result Date: 10/13/2023  EXAM: XR CHEST PORT 1 VIEW LOCATION: [facility] DATE: 10/13/2023 INDICATION: Evaluation position of ETT COMPARISON: 10/12/2023     IMPRESSION: Low lung volumes. Hazy opacities right lower lung field could be related to overlying soft tissue versus atelectasis or developing infiltrate. This should be reviewed on follow-up imaging. Minimal atelectasis left base. Endotracheal tube tip in satisfactory position 2.5 cm above the emilia. Enteric tube extends into the stomach. Left IJ central line tip mid SVC. No pneumothorax.    XR Chest Port 1 View    Result Date: 10/12/2023  EXAM: XR CHEST PORT 1 VIEW LOCATION: [facility] DATE: 10/12/2023 INDICATION: intubation COMPARISON: X-ray 09/14/2023     IMPRESSION: Endotracheal tube tip located 1.9 cm above the emilia. Left IJ central catheter with the tip in the upper SVC. No pneumothorax. Moderate asymmetric elevation of the right hemidiaphragm. Mild bibasilar pulmonary opacities likely reflect atelectasis. No definite pleural effusion. Normal heart size. Spinal degenerative changes.    XR Gastrografin Upper GI w KUB    Result Date: 10/10/2023  EXAM: XR GASTROGRAFIN UPPER GI W KUB LOCATION: [facility] DATE: 10/10/2023 INDICATION: Post op day #1 of Sleeve Gastrectomy with a Single Anastomosis Duodenal Switch.  Please rule out a leak from her Sleeve and from her Duodenal Anastomosis. COMPARISON: None TECHNIQUE: Limited single contrast water-soluble study.     FINDINGS AND IMPRESSION: FLUOROSCOPIC TIME: 1 NUMBER OF IMAGES: 12 Surgical change noted at the stomach and proximal small bowel. No leak appreciated on this study.      Latest radiology report personally reviewed.    Note created using dragon voice recognition software so sounds alike errors may have escaped editing.      11/04/2023   Heath Lancaster,  MD  Hospitalist, [facility]  Pager: [phone]

## 2023-11-05 ENCOUNTER — APPOINTMENT (OUTPATIENT)
Dept: OCCUPATIONAL THERAPY | Facility: HOSPITAL | Age: 53
End: 2023-11-05
Attending: SURGERY
Payer: COMMERCIAL

## 2023-11-05 ENCOUNTER — APPOINTMENT (OUTPATIENT)
Dept: PHYSICAL THERAPY | Facility: HOSPITAL | Age: 53
End: 2023-11-05
Attending: SURGERY
Payer: COMMERCIAL

## 2023-11-05 ENCOUNTER — APPOINTMENT (OUTPATIENT)
Dept: RADIOLOGY | Facility: HOSPITAL | Age: 53
End: 2023-11-05
Attending: INTERNAL MEDICINE
Payer: COMMERCIAL

## 2023-11-05 LAB
ANION GAP SERPL CALCULATED.3IONS-SCNC: 10 MMOL/L (ref 7–15)
BUN SERPL-MCNC: 30.8 MG/DL (ref 6–20)
CALCIUM SERPL-MCNC: 8.7 MG/DL (ref 8.6–10)
CHLORIDE SERPL-SCNC: 103 MMOL/L (ref 98–107)
CREAT SERPL-MCNC: 1.4 MG/DL (ref 0.51–0.95)
DEPRECATED HCO3 PLAS-SCNC: 31 MMOL/L (ref 22–29)
EGFRCR SERPLBLD CKD-EPI 2021: 45 ML/MIN/1.73M2
ERYTHROCYTE [DISTWIDTH] IN BLOOD BY AUTOMATED COUNT: 14.3 % (ref 10–15)
GLUCOSE BLDC GLUCOMTR-MCNC: 119 MG/DL (ref 70–99)
GLUCOSE BLDC GLUCOMTR-MCNC: 124 MG/DL (ref 70–99)
GLUCOSE BLDC GLUCOMTR-MCNC: 132 MG/DL (ref 70–99)
GLUCOSE BLDC GLUCOMTR-MCNC: 152 MG/DL (ref 70–99)
GLUCOSE SERPL-MCNC: 130 MG/DL (ref 70–99)
HCT VFR BLD AUTO: 26.8 % (ref 35–47)
HGB BLD-MCNC: 7.9 G/DL (ref 11.7–15.7)
MCH RBC QN AUTO: 28.3 PG (ref 26.5–33)
MCHC RBC AUTO-ENTMCNC: 29.5 G/DL (ref 31.5–36.5)
MCV RBC AUTO: 96 FL (ref 78–100)
PLATELET # BLD AUTO: 234 10E3/UL (ref 150–450)
POTASSIUM SERPL-SCNC: 3.8 MMOL/L (ref 3.4–5.3)
RBC # BLD AUTO: 2.79 10E6/UL (ref 3.8–5.2)
SODIUM SERPL-SCNC: 144 MMOL/L (ref 135–145)
SODIUM SERPL-SCNC: 144 MMOL/L (ref 135–145)
WBC # BLD AUTO: 7.3 10E3/UL (ref 4–11)

## 2023-11-05 PROCEDURE — 999N000157 HC STATISTIC RCP TIME EA 10 MIN

## 2023-11-05 PROCEDURE — 99232 SBSQ HOSP IP/OBS MODERATE 35: CPT | Performed by: INTERNAL MEDICINE

## 2023-11-05 PROCEDURE — 250N000011 HC RX IP 250 OP 636: Mod: JZ | Performed by: INTERNAL MEDICINE

## 2023-11-05 PROCEDURE — 250N000011 HC RX IP 250 OP 636: Mod: JZ | Performed by: NURSE PRACTITIONER

## 2023-11-05 PROCEDURE — 97110 THERAPEUTIC EXERCISES: CPT | Mod: GP

## 2023-11-05 PROCEDURE — 258N000003 HC RX IP 258 OP 636: Performed by: NURSE PRACTITIONER

## 2023-11-05 PROCEDURE — 250N000011 HC RX IP 250 OP 636: Mod: JZ | Performed by: HOSPITALIST

## 2023-11-05 PROCEDURE — 250N000013 HC RX MED GY IP 250 OP 250 PS 637: Performed by: PHYSICIAN ASSISTANT

## 2023-11-05 PROCEDURE — 250N000013 HC RX MED GY IP 250 OP 250 PS 637: Performed by: NURSE PRACTITIONER

## 2023-11-05 PROCEDURE — 97110 THERAPEUTIC EXERCISES: CPT | Mod: GO

## 2023-11-05 PROCEDURE — 250N000013 HC RX MED GY IP 250 OP 250 PS 637: Performed by: INTERNAL MEDICINE

## 2023-11-05 PROCEDURE — C9113 INJ PANTOPRAZOLE SODIUM, VIA: HCPCS | Mod: JZ | Performed by: INTERNAL MEDICINE

## 2023-11-05 PROCEDURE — 80048 BASIC METABOLIC PNL TOTAL CA: CPT | Performed by: STUDENT IN AN ORGANIZED HEALTH CARE EDUCATION/TRAINING PROGRAM

## 2023-11-05 PROCEDURE — 71045 X-RAY EXAM CHEST 1 VIEW: CPT

## 2023-11-05 PROCEDURE — 97530 THERAPEUTIC ACTIVITIES: CPT | Mod: GP

## 2023-11-05 PROCEDURE — 250N000011 HC RX IP 250 OP 636: Mod: JZ | Performed by: PHYSICIAN ASSISTANT

## 2023-11-05 PROCEDURE — 250N000011 HC RX IP 250 OP 636: Mod: JZ | Performed by: STUDENT IN AN ORGANIZED HEALTH CARE EDUCATION/TRAINING PROGRAM

## 2023-11-05 PROCEDURE — 84295 ASSAY OF SERUM SODIUM: CPT | Performed by: INTERNAL MEDICINE

## 2023-11-05 PROCEDURE — 94660 CPAP INITIATION&MGMT: CPT

## 2023-11-05 PROCEDURE — 85027 COMPLETE CBC AUTOMATED: CPT | Performed by: STUDENT IN AN ORGANIZED HEALTH CARE EDUCATION/TRAINING PROGRAM

## 2023-11-05 PROCEDURE — 120N000001 HC R&B MED SURG/OB

## 2023-11-05 RX ORDER — HYDRALAZINE HYDROCHLORIDE 20 MG/ML
20 INJECTION INTRAMUSCULAR; INTRAVENOUS EVERY 4 HOURS PRN
Status: DISCONTINUED | OUTPATIENT
Start: 2023-11-05 | End: 2023-11-21 | Stop reason: HOSPADM

## 2023-11-05 RX ADMIN — HYDRALAZINE HYDROCHLORIDE 100 MG: 50 TABLET, FILM COATED ORAL at 13:58

## 2023-11-05 RX ADMIN — HYDROMORPHONE HYDROCHLORIDE 0.2 MG: 0.2 INJECTION, SOLUTION INTRAMUSCULAR; INTRAVENOUS; SUBCUTANEOUS at 05:32

## 2023-11-05 RX ADMIN — HYDRALAZINE HYDROCHLORIDE 10 MG: 20 INJECTION INTRAMUSCULAR; INTRAVENOUS at 00:41

## 2023-11-05 RX ADMIN — PROCHLORPERAZINE EDISYLATE 10 MG: 5 INJECTION INTRAMUSCULAR; INTRAVENOUS at 06:36

## 2023-11-05 RX ADMIN — AMLODIPINE BESYLATE 5 MG: 5 TABLET ORAL at 08:39

## 2023-11-05 RX ADMIN — HEPARIN SODIUM 5000 UNITS: 5000 INJECTION, SOLUTION INTRAVENOUS; SUBCUTANEOUS at 13:59

## 2023-11-05 RX ADMIN — ALTEPLASE 4 MG: 2.2 INJECTION, POWDER, LYOPHILIZED, FOR SOLUTION INTRAVENOUS at 11:00

## 2023-11-05 RX ADMIN — CARVEDILOL 25 MG: 12.5 TABLET, FILM COATED ORAL at 07:11

## 2023-11-05 RX ADMIN — PIPERACILLIN AND TAZOBACTAM 3.38 G: 3; .375 INJECTION, POWDER, LYOPHILIZED, FOR SOLUTION INTRAVENOUS at 03:10

## 2023-11-05 RX ADMIN — BUMETANIDE 0.5 MG: 0.25 INJECTION INTRAMUSCULAR; INTRAVENOUS at 07:10

## 2023-11-05 RX ADMIN — VENLAFAXINE 75 MG: 75 TABLET ORAL at 07:11

## 2023-11-05 RX ADMIN — VENLAFAXINE 75 MG: 75 TABLET ORAL at 16:22

## 2023-11-05 RX ADMIN — PANTOPRAZOLE SODIUM 40 MG: 40 INJECTION, POWDER, FOR SOLUTION INTRAVENOUS at 06:15

## 2023-11-05 RX ADMIN — DOXAZOSIN 2 MG: 1 TABLET ORAL at 07:11

## 2023-11-05 RX ADMIN — HEPARIN SODIUM 5000 UNITS: 5000 INJECTION, SOLUTION INTRAVENOUS; SUBCUTANEOUS at 21:22

## 2023-11-05 RX ADMIN — DOXAZOSIN 2 MG: 1 TABLET ORAL at 20:11

## 2023-11-05 RX ADMIN — VENLAFAXINE 75 MG: 75 TABLET ORAL at 11:35

## 2023-11-05 RX ADMIN — HYDRALAZINE HYDROCHLORIDE 100 MG: 50 TABLET, FILM COATED ORAL at 20:11

## 2023-11-05 RX ADMIN — AMLODIPINE BESYLATE 5 MG: 5 TABLET ORAL at 20:11

## 2023-11-05 RX ADMIN — HYDRALAZINE HYDROCHLORIDE 20 MG: 20 INJECTION INTRAMUSCULAR; INTRAVENOUS at 22:51

## 2023-11-05 RX ADMIN — HYDRALAZINE HYDROCHLORIDE 20 MG: 20 INJECTION INTRAMUSCULAR; INTRAVENOUS at 03:46

## 2023-11-05 RX ADMIN — PIPERACILLIN AND TAZOBACTAM 3.38 G: 3; .375 INJECTION, POWDER, LYOPHILIZED, FOR SOLUTION INTRAVENOUS at 11:35

## 2023-11-05 RX ADMIN — ONDANSETRON 4 MG: 2 INJECTION INTRAMUSCULAR; INTRAVENOUS at 05:55

## 2023-11-05 RX ADMIN — NITROGLYCERIN 1 PATCH: 0.4 PATCH TRANSDERMAL at 08:22

## 2023-11-05 RX ADMIN — MICAFUNGIN SODIUM 100 MG: 50 INJECTION, POWDER, LYOPHILIZED, FOR SOLUTION INTRAVENOUS at 10:00

## 2023-11-05 RX ADMIN — HYDRALAZINE HYDROCHLORIDE 100 MG: 50 TABLET, FILM COATED ORAL at 08:39

## 2023-11-05 RX ADMIN — HEPARIN SODIUM 5000 UNITS: 5000 INJECTION, SOLUTION INTRAVENOUS; SUBCUTANEOUS at 07:11

## 2023-11-05 RX ADMIN — CARVEDILOL 25 MG: 12.5 TABLET, FILM COATED ORAL at 16:22

## 2023-11-05 RX ADMIN — PIPERACILLIN AND TAZOBACTAM 3.38 G: 3; .375 INJECTION, POWDER, LYOPHILIZED, FOR SOLUTION INTRAVENOUS at 20:06

## 2023-11-05 ASSESSMENT — ACTIVITIES OF DAILY LIVING (ADL)
ADLS_ACUITY_SCORE: 47
ADLS_ACUITY_SCORE: 47
ADLS_ACUITY_SCORE: 43
ADLS_ACUITY_SCORE: 47
ADLS_ACUITY_SCORE: 47
ADLS_ACUITY_SCORE: 43
ADLS_ACUITY_SCORE: 43
ADLS_ACUITY_SCORE: 47
ADLS_ACUITY_SCORE: 43

## 2023-11-05 NOTE — PROGRESS NOTES
Daily Progress Note        CODE STATUS:  Full Code    11/02/23  Assessment/Plan:  Mojgan Jimenez is a 53 year old female with h/o obesity, severe JARVIS on CPAP, asthma, stimulant use disorder, stimulant induced psychosis, mood & anxiety disorder. Admitted 10/9/23 for scheduled laparoscopic sleeve gastrectomy with single anastomosis duodenal switch. She was later found to have two small bowel injuries with peritonitis. 10/12/23 returned to OR for small bowel interotomy closure, clean-out, & drain placement; 10/19 found to have RUQ fluid collection s/p drain placement; 10/24 s/p pelvic fluid aspiration.      Course complicated by encephalopathy/delirium, septic shock, suspected takotsubo syndrome, acute respiratory failure due to loss of protective airway reflexes & increased metabolic load requiring intubation (10/12-10/21/23; reintubated 10/21-10/26/23), & oliguric SUSSY with renal recovery. She has significantly improved: mental status clearing with some ongoing delirium at night, cardiomyopathy improved, ATN I recovery phase, and ongoing treatment of intra abdominal infection. She was made floor tele status 10/27/23. On 11/1/23 CT guided chest tube placed on right for persistent effusion, concern for parapneumonic one with risk of future trapped lung. Chest tubes removed today.         Acute hypoxemic-hypercapnic respiratory failure -now weaned RA  - Rapid Response on 10/12, Pt with decreased LOC. Opens eyes to voice. SpO2 89% on 8L NC. BS clear and diminished. Placed on a 15L non-rebreather for SpO2 > 90%. After narcan admin pt became more responsive/agitated. Transferred to ICU and intubated -10/12-10/21/23; reintubated 10/21-10/26/23. Now extubated and weaned to RA  - 10/22/23 CT demonstrated complete RLL atelectasis, milder RUL & RBML atelectasis or consolidation. 10/22 bronchoscopy moderate RLL blood tinged secretions cleared.   - Cont pulmonary hygiene  - R-pleural effusion, exudative, suspect simple  parapneumonic effusion. 10/24 s/p thoracentesis, 400 mL. Pleural fluid amylase amylase 23, serum 64. 11/1/23--chest tube placed on right. Chest tubes removed today.     Gastric bypass  10/9 s/p sleeve gastrectomy complicated by small bowel injury & peritonitis   10/12 returned to OR for closure, clean-out, & drain placement (now removed)  10/19 s/p RUQ fluid collection drain placement   10/24 s/p pelvic fluid aspiration (75 ml)  - Surgery primary   - ID consulted   - 10/12 - peritoneal cultures + Streptococcus anginosus, mixed aerobic & anaerobic kamla   - 10/19 - RUQ fluid cultures & pelvic fluid aspirate + Lactobacillus   - 10/22 - BDG blood +  - 10/24 pleural fluid studies  -#+WBC no pos cx. 10/24--aspirate pelvic drain--lacto + candida  - Was on Vanc, pip-tazo, micafungin. Vanco d/jose 10/31  - Was on TPN previously 10/14  - PPTF started 10/24/23. TF and TPN weaned of 10/27 currently on bariatric clears, surgery managing   - Diet per surg  - Flush drain at regular intervals      Accelerated HTN /Cardiomyopathy   - Echo previously and initially decreased LV function but repeat echo shows EF normalized, possible stress induced.   - Cardiology consulted  - Metoprolol switched to coreg; dose increased 11/3/23  - Hydralazine increase to 100 mg tid  - Nitroglycerin patch 0.4mg/24  - Amlodipine 5 mg bid  - Bumex back on per renal  - Prn IV hydralazine     History of JARVIS, asthma in the chart no PFTs  - NIPPV with sleep/naps everytime   - Continue duonebs     Oliguric SUSSY - improving   - Nephrology was consulted  for ARF peak creat 6.97 on 10/14  - Slowly improving and creatinine trending down--10/31 now 1.48  - Avoid nephrotoxic agents      Hypernatremia -   - IV bumex stopped and on D5W per renal .   - Trend sodium 153 -->148 -->144-->142-->139-->142-->145  - Has needed D5W-per renal-off now  - Bumex restarted 10/31     7.Anemia - stable  - Suspect multifactorial secondary to sepsis & surgical blood loss   - Monitor  ",11/1/ hgb 8.6     8.Hypervolemia  - Back on bumex     9.Hyperglycemia of critical illness   - Glargine 10 units every day--bs good, stopped lantus   - Glycemic control is optimal. Cont with sliding scale insulin for now     10.Obesity  - BMI 46     11.Acute metabolic and toxic encephalopathy  - Due to above. Still fluctuating here   - Delirium protocol  - Needs to be up day, sleep night  - Up to chair if possible     12. Severe decondition  - Due to prolong hospitalization  - PT/OT eval         Diet: Room Service  Adult Formula Drip Feeding: Continuous Promote w fiber; Nasojejunal; Goal Rate: 55; mL/hr; Start at 15 ml/hr x 8 hrs and advance by 15 ml q 8 hrs to a goal rate of 55 ml/hr  DVT Prophylaxis: Heparin SQ  Patiño Catheter: Not present  Lines: PRESENT      PICC 10/28/23 Triple Lumen Right Basilic-Site Assessment: WDL    Clinically Significant Risk Factors              # Hypoalbuminemia: Lowest albumin = 2.7 g/dL at 10/14/2023  6:29 AM, will monitor as appropriate     # Hypertension: Noted on problem list        # Severe Obesity: Estimated body mass index is 50.42 kg/m  as calculated from the following:    Height as of this encounter: 1.6 m (5' 3\").    Weight as of this encounter: 129.1 kg (284 lb 9.8 oz).      # Asthma: noted on problem list        Disposition; TBD, [facility] vs TCU  Barrier to discharge; IV antibiotics.    Left a voice mail for daughterKarla to call me back for update.   Subjective:  Interval History: No acute issues overnight. Doing fairly well in general. Reports some intermittent nausea. Has had a small emesis this morning per patient.     Review of Systems:   As mentioned in subjective.    Patient Active Problem List   Diagnosis    Mild persistent asthma without complication    Vitamin D deficiency    LINA (generalized anxiety disorder)    Recurrent major depressive disorder, remission status unspecified (H24)    Methamphetamine abuse, episodic (H)    Hyperlipidemia LDL goal <160    " Depression, major, recurrent, severe with psychosis (H)    IUD (intrauterine device) in place    Paranoia (H)    PMDD (premenstrual dysphoric disorder)    Polysubstance overdose    Suicidal ideation    Morbid obesity (H)    Mood disorder (H24)    Obstructive sleep apnea    Morbid (severe) obesity due to excess calories (H)    Intra-abdominal abscess (H)    Hypernatremia    SUSSY (acute kidney injury) (H24)    Accelerated hypertension    Metabolic encephalopathy    Acute respiratory failure with hypoxia (H)    Cardiomyopathy (H)       Scheduled Meds:   amLODIPine  5 mg Oral BID    bumetanide  0.5 mg Intravenous Q24H    carvedilol  25 mg Oral BID w/meals    doxazosin  2 mg Oral Q12H [facility] (08/20)    heparin ANTICOAGULANT  5,000 Units Subcutaneous Q8H    hydrALAZINE  100 mg Oral or Feeding Tube TID    insulin aspart  1-4 Units Subcutaneous 4x Daily AC & HS    micafungin  100 mg Intravenous Q24H    nitroGLYcerin  1 patch Transdermal Daily    pantoprazole  40 mg Per Feeding Tube QAM AC    Or    pantoprazole  40 mg Intravenous QAM AC    piperacillin-tazobactam  3.375 g Intravenous Q8H    sodium chloride (PF)  10-40 mL Intracatheter Q8H    venlafaxine  75 mg Oral TID w/meals     Continuous Infusions:   dextrose       PRN Meds:.acetaminophen **OR** acetaminophen, albuterol, albuterol, dextrose, glucose **OR** dextrose **OR** glucagon, hydrALAZINE, HYDROmorphone, ipratropium - albuterol 0.5 mg/2.5 mg/3 mL, lidocaine 4%, lidocaine (buffered or not buffered), menthol-zinc oxide, miconazole, naloxone **OR** naloxone **OR** naloxone **OR** naloxone, OLANZapine, ondansetron **OR** ondansetron, phenol, prochlorperazine **OR** prochlorperazine, sodium chloride (PF), sodium chloride (PF), sodium chloride (PF), traMADol    Objective:  Vital signs in last 24 hours:  Temp:  [97.7  F (36.5  C)-98.9  F (37.2  C)] 97.8  F (36.6  C)  Pulse:  [] 84  Resp:  [17-26] 18  BP: (154-192)/(72-87) 165/80  FiO2 (%):  [30 %] 30 %  SpO2:  [94 %-95  %] 94 %        Intake/Output Summary (Last 24 hours) at 11/2/2023 1503  Last data filed at 11/2/2023 1435  Gross per 24 hour   Intake 1824.5 ml   Output 1750 ml   Net 74.5 ml       Physical Exam:    General: Not in obvious distress. Morbidly obese  HEENT: NC, AT. NJ in place   Chest: Diminished breath sounds due to obesity. Chest tube on the right side, laterally.  Heart: S1S2 normal, regular. No M/R/G  Abdomen: Soft. NT, ND. Bowel sounds- active.  Extremities: 1+ legs swelling  Neuro: Awake, grossly non-focal      Lab Results:(I have personally reviewed the results)    Recent Results (from the past 24 hour(s))   Glucose by meter    Collection Time: 11/04/23  4:31 PM   Result Value Ref Range    GLUCOSE BY METER POCT 130 (H) 70 - 99 mg/dL   Sodium    Collection Time: 11/04/23  6:15 PM   Result Value Ref Range    Sodium 143 135 - 145 mmol/L   Glucose by meter    Collection Time: 11/04/23  9:07 PM   Result Value Ref Range    GLUCOSE BY METER POCT 127 (H) 70 - 99 mg/dL   Basic metabolic panel    Collection Time: 11/05/23  6:48 AM   Result Value Ref Range    Sodium 144 135 - 145 mmol/L    Potassium 3.8 3.4 - 5.3 mmol/L    Chloride 103 98 - 107 mmol/L    Carbon Dioxide (CO2) 31 (H) 22 - 29 mmol/L    Anion Gap 10 7 - 15 mmol/L    Urea Nitrogen 30.8 (H) 6.0 - 20.0 mg/dL    Creatinine 1.40 (H) 0.51 - 0.95 mg/dL    GFR Estimate 45 (L) >60 mL/min/1.73m2    Calcium 8.7 8.6 - 10.0 mg/dL    Glucose 130 (H) 70 - 99 mg/dL   Sodium    Collection Time: 11/05/23  6:48 AM   Result Value Ref Range    Sodium 144 135 - 145 mmol/L   CBC with platelets    Collection Time: 11/05/23  6:49 AM   Result Value Ref Range    WBC Count 7.3 4.0 - 11.0 10e3/uL    RBC Count 2.79 (L) 3.80 - 5.20 10e6/uL    Hemoglobin 7.9 (L) 11.7 - 15.7 g/dL    Hematocrit 26.8 (L) 35.0 - 47.0 %    MCV 96 78 - 100 fL    MCH 28.3 26.5 - 33.0 pg    MCHC 29.5 (L) 31.5 - 36.5 g/dL    RDW 14.3 10.0 - 15.0 %    Platelet Count 234 150 - 450 10e3/uL   Glucose by meter     "Collection Time: 11/05/23  9:07 AM   Result Value Ref Range    GLUCOSE BY METER POCT 132 (H) 70 - 99 mg/dL   Glucose by meter    Collection Time: 11/05/23 11:58 AM   Result Value Ref Range    GLUCOSE BY METER POCT 152 (H) 70 - 99 mg/dL       All laboratory and imaging data in the past 24 hours reviewed  Serum Glucose range:   Recent Labs   Lab 11/05/23  1158 11/05/23  0907 11/05/23  0648 11/04/23  2107   * 132* 130* 127*     ABG: No lab results found in last 7 days.  CBC:   Recent Labs   Lab 11/05/23  0649 11/04/23  0503 11/03/23  0619   WBC 7.3 4.6 9.0   HGB 7.9* 13.7 8.3*   HCT 26.8* 45.6 28.1*   MCV 96 95 96    194 307     Chemistry:   Recent Labs   Lab 11/05/23  0648 11/04/23  1815 11/04/23  0503 11/03/23  1756 11/03/23  0619 11/02/23  1814 11/02/23  0616 11/01/23  1742 11/01/23  0547 10/31/23  1756 10/31/23  0617     144 143 145  145   < > 145  145 145 144  144   < > 142  142   < > 139  139   POTASSIUM 3.8  --  3.7  --  3.8  --  4.0  --  3.8  --  3.8   CHLORIDE 103  --  106  --  107  --  108*  --  106  --  104   CO2 31*  --  30*  --  27  --  26  --  22  --  24   BUN 30.8*  --  28.2*  --  27.1*  --  28.0*  --  27.9*  --  31.3*   CR 1.40*  --  1.48*  --  1.50*  --  1.62*  --  1.82*  --  1.65*   GFRESTIMATED 45*  --  42*  --  41*  --  38*  --  33*  --  37*   PALAK 8.7  --  9.2  --  9.1  --  9.1  --  8.7  --  9.1   MAG  --   --   --   --   --   --  1.7  --  2.0  --  1.8   PROTTOTAL  --   --   --   --   --  6.7  --   --   --   --   --    ALBUMIN  --   --   --   --   --  2.8*  --   --   --   --   --    AST  --   --   --   --   --  24  --   --   --   --   --    ALT  --   --   --   --   --  23  --   --   --   --   --    ALKPHOS  --   --   --   --   --  112*  --   --   --   --   --    BILITOTAL  --   --   --   --   --  0.2  --   --   --   --   --     < > = values in this interval not displayed.     Coags:  No results for input(s): \"INR\", \"PROTIME\", \"PTT\" in the last 168 hours.    Invalid " "input(s): \"APTT\"  Cardiac Markers:  No results for input(s): \"CKTOTAL\", \"TROPONINI\" in the last 168 hours.       CT Chest Tube with Cath Placement    Result Date: 11/1/2023  EXAM: 1. PERCUTANEOUS CHEST TUBE PLACEMENT RIGHT PLEURAL SPACE 2. CT GUIDANCE 3. CONSCIOUS SEDATION LOCATION: [facility] DATE: 11/1/2023 INDICATION: right pleural fluid increasing in volume TECHNIQUE: Dose reduction techniques were used. PROCEDURE: Informed consent obtained. Site marked. Prior images reviewed. Required items made available. Patient identity confirmed verbally and with arm band. Patient reevaluated immediately before administering sedation. Universal protocol was followed. Time out performed. The site was prepped and draped in sterile fashion. 4 mL of 1% lidocaine was infused into the local soft tissues. Using standard technique and under direct CT guidance, a 10 Bermudian nonlocking chest tube catheter was inserted  into the pleural space. The catheter was fixed in place with sutures and adhesive device, and the tubing was banded. Chest tube placed to Pleur-evac suction. SPECIMEN: 1 L of clear yellow fluid was aspirated and sent to lab for testing. BLOOD LOSS: Minimal. The patient tolerated the procedure well. No immediate complications. SEDATION: Versed 1.5  mg. Fentanyl 50 mcg. The procedure was performed with administration intravenous conscious sedation with appropriate preoperative, intraoperative, and postoperative evaluation. 14  minutes of supervised face to face conscious sedation time was provided by a radiology nurse under my direct supervision.     IMPRESSION: 1.  Successful CT-guided chest tube placement into the right pleural space with moderate sedation . Reference CPT Codes: 40570, 68900    XR Feeding Tube Placement    Result Date: 10/31/2023  EXAM: XR FEEDING TUBE PLACEMENT LOCATION: [facility] DATE: 10/31/2023 INDICATION: Not tolerating oral intake. Place feeding " tube. Single Anastamosis Duodenal Switch. Surgery 10 12 for enterotomy and sepsis. COMPARISON: CT AP 10/30/2023 and older studies, feeding tube placement 10/24/2023. TECHNIQUE: Routine. FINDINGS: Enteric tube placed without complication. Tip in the the small bowel. FLUOROSCOPIC TIME: 0.6 minutes NUMBER OF IMAGES: 1 Reference CPT Code: 23239     XR Chest Port 1 View    Result Date: 10/31/2023  EXAM: XR CHEST PORT 1 VIEW LOCATION: [facility] DATE: 10/31/2023 INDICATION: 53 y o female s p bariatric surgery this admit, with complications, has drain, ongoing effusion right, last night n v check for infiltrate aspiration COMPARISON: CT 10/22/2023.     IMPRESSION: Moderate right pleural effusion with associated compressive atelectasis in the right lower lobe. Left basilar atelectasis or consolidation. Right upper extremity PICC with tip near the cavoatrial junction. Obscured right heart border from the  effusion. Cardiomediastinal silhouette otherwise within normal limits. Tubing projecting over the right upper quadrant abdomen. No significant interval change.    CT Abdomen w Contrast    Result Date: 10/30/2023  EXAM: CT ABDOMEN W CONTRAST LOCATION: [facility] DATE: 10/30/2023 INDICATION: Follow-up drain placement. Determine if patient may need TPA through drain. COMPARISON: 10/25/2023. Others. TECHNIQUE: CT scan of the abdomen was performed following injection of IV contrast. Multiplanar reformats were obtained. Dose reduction techniques were used. CONTRAST: 75 mL Isovue 370 FINDINGS:  LOWER CHEST: Incompletely seen small to moderate right pleural effusion and complete right lower lobe atelectasis. HEPATOBILIARY: Exchange of prior perihepatic drain was performed on 10/25/2023. Current drain tip coils adjacent to the hepatic dome. The perihepatic fluid collection has minimally improved. No focal hepatic abnormality. PANCREAS: Normal. SPLEEN: Normal. ADRENAL GLANDS: Normal.  KIDNEYS: Normal. BOWEL: Surgical changes stomach. No evidence for bowel obstruction. LYMPH NODES: No adenopathy. VASCULATURE: Nonaneurysmal aorta. MUSCULOSKELETAL: Nothing acute.     IMPRESSION: 1.  Perihepatic drain in place with tip adjacent to the hepatic dome. Minimal improvement of the perihepatic collection since 10/25/2023. Small perihepatic collection remains. 2.  Small to moderate sized right pleural effusion and associated collapse of the right lower lobe.     XR Feeding Tube Placement    Addendum Date: 10/27/2023    Addendum: TECHNIQUE: The existing NG tube in the stomach was removed and a feeding tube was placed with fluoroscopic guidance. Tip was placed in the small bowel post anastomosis. End of addendum    Result Date: 10/27/2023  EXAM: XR FEEDING TUBE PLACEMENT LOCATION: [facility] DATE: 10/24/2023 INDICATION: please exchange Baca sump for a post pylroic feeding tube; FYI: new anastomosis just past pylorus COMPARISON: CT 10/22/2023 TECHNIQUE: Routine. FINDINGS: Enteric tube placed without complication. Tip in the jejunum just post anastomosis. FLUOROSCOPIC TIME: 1.5 NUMBER OF IMAGES: 3 Reference CPT Code: 55849     US Upper Extremity Venous Duplex Bilat    Result Date: 10/25/2023  EXAM: US UPPER EXTREMITY VENOUS DUPLEX BILATERAL LOCATION: [facility] DATE: 10/25/2023 INDICATION: ongoing fevers, evaluate for thrombus COMPARISON: None. TECHNIQUE: Venous Duplex ultrasound of both upper extremities with (when possible) and without compression, augmentation, and duplex. Color flow and spectral Doppler with waveform analysis performed. FINDINGS: Ultrasound includes evaluation of the internal jugular veins, innominate veins, subclavian veins, axillary veins, and brachial veins. The superficial cephalic and basilic veins were also evaluated where seen. RIGHT: No deep venous thrombosis. Occlusive and partially occlusive thrombus within the cephalic vein  extending from the forearm to the antecubital fossa (length of involvement greater than 5 cm). LEFT: No deep venous thrombosis. Partially occlusive thrombus within the cephalic vein in the antecubital fossa near the IV insertion (less than 5 cm).     IMPRESSION: 1.  No deep venous thrombosis in the bilateral upper extremities. 2.  Superficial thrombus within the cephalic veins bilaterally.    US Lower Extremity Venous Duplex Bilateral    Result Date: 10/25/2023  EXAM: US LOWER EXTREMITY VENOUS DUPLEX BILATERAL LOCATION: [facility] DATE: 10/25/2023 INDICATION: ongoing fevers, evaluate for thrombus COMPARISON: None. TECHNIQUE: Venous Duplex ultrasound of bilateral lower extremities with and without compression, augmentation and duplex. Color flow and spectral Doppler with waveform analysis performed. FINDINGS: Exam includes the common femoral, femoral, popliteal veins as well as segmentally visualized deep calf veins and greater saphenous vein. RIGHT: No deep vein thrombosis. No superficial thrombophlebitis. No popliteal cyst. LEFT: No deep vein thrombosis. No superficial thrombophlebitis. No popliteal cyst.     IMPRESSION: 1.  No deep venous thrombosis in the bilateral lower extremities.    CT Abdomen w/o Contrast    Result Date: 10/25/2023  EXAM: CT ABDOMEN W/O CONTRAST LOCATION: [facility] DATE: 10/25/2023 INDICATION: Perihepatic abscess status post drainage. Follow-up abscess. COMPARISON: 10/22/2023 TECHNIQUE: CT scan of the abdomen was performed without IV contrast. Multiplanar reformats were obtained. Dose reduction techniques were used. CONTRAST: None. FINDINGS:  LOWER CHEST: Stable right pleural effusion. Bibasilar atelectasis. Artifact. HEPATOBILIARY: Perihepatic drain remains in place. There remains a crescentic low-density process involving the subcapsular space at the site of drain which extends to the dome of the liver. PANCREAS: Normal. SPLEEN: Normal.  ADRENAL GLANDS: Normal. KIDNEYS: Normal. BOWEL: Postsurgical changes of the stomach with enteric tube noted in small bowel in the right upper quadrant. LYMPH NODES: Normal. VASCULATURE: Minimal arterial plaque. MUSCULOSKELETAL: Normal.     IMPRESSION: 1.  Perihepatic drain remains in good position. Residual low-density material persists in this subcapsular perihepatic space which extends to the dome of the liver. 2.  Stable appearance right pleural effusion and bibasilar atelectasis. 3.  Postsurgical changes of the stomach.    IR Abscess Tube Check    Result Date: 10/25/2023  [place] RADIOLOGY LOCATION: [facility] DATE: 10/25/2023 PROCEDURE: ABSCESS TUBE CHECK AND EXCHANGE/UPSIZING INTERVENTIONAL RADIOLOGIST: Teofilo Barber MD. INDICATION: 53-year-old female with a perihepatic collection status post drain placement presents for drain check due to decreased drain output. CT shows persistent perihepatic fluid collection.. CONSENT: The risks, benefits and alternatives of an abscessogram with possible exchange, manipulation or removal were discussed with the patient's family in detail. All questions were answered. Informed consent was given to proceed with the procedure. SEDATION: Patient is intubated and sedated. CONTRAST: 20 mL Omnipaque into the abscess cavity. ANTIBIOTICS: None. ADDITIONAL MEDICATIONS: None. FLUOROSCOPIC TIME: 3.4 minutes. RADIATION DOSE: Air Kerma: 102 mGy. COMPLICATIONS: No immediate complications. STERILE BARRIER TECHNIQUE: Maximum sterile barrier technique was used. Cutaneous antisepsis was performed at the operative site with application of 2% chlorhexidine and large sterile drape. Prior to the procedure, the  and assistant performed hand hygiene and wore hat, mask, sterile gown, and sterile gloves during the entire procedure. PROCEDURE:  A  image was obtained. The pre-existing drainage catheter was injected with a small amount of contrast and multiple images  were obtained. Based on the results of the study it was elected to exchange the catheter. The pre-existing drainage catheter was removed over a wire and exchange was made for a Kumpe catheter. The catheter and wire were negotiated superiorly along the right hepatic dome. The Kumpe catheter was then exchanged for a new 12F locking loop drainage catheter. The loop was partially formed within the residual abscess cavity. A post placement contrast injection was performed confirming that the new catheter controls the residual fluid collection well. FINDINGS: The indwelling catheter was clogged. Following exchange and repositioning, the new larger drainage catheter lies within the central aspect of the residual abscess cavity along the right hepatic dome and appears to control the fluid collection well.     IMPRESSION:  Exchange and repositioning with upsize of the perihepatic drainage catheter to a 12 Maltese catheter. PLAN: Continue flushing the catheter and keep to suction drainage.     CT Abdomen Peritoneum Abscess Aspiration    Result Date: 10/24/2023  EXAM: 1. PERCUTANEOUS DRAIN PLACEMENT 2. CT GUIDANCE LOCATION: [facility] DATE: 10/24/2023 INDICATION: fluid collection in pelvis, ? drain placement TECHNIQUE: Dose reduction techniques were used. PROCEDURE: Informed consent obtained. Site marked. Prior images reviewed. Required items made available. Patient identity confirmed verbally and with arm band. Patient reevaluated immediately before administering sedation. Universal protocol was followed. Time out performed. The site was prepped and draped in sterile fashion. 10 mL of 1% lidocaine was infused into the local soft tissues. Using standard technique and under direct CT guidance, an 18-gauge guiding needle was placed into the pelvic fluid collection using a right transfemoral gluteal approach. 75 mL of yellow fluid with slight debris was aspirated. Because this did not have the appearance  of yesica pus a drain was not placed. Fluid pocket 20 cm deep in the pelvis. Irrigation 4 times with sterile saline..  SPECIMEN: 75 mL of yellow debris filled fluid was aspirated and sent to lab for cultures and Gram stain. BLOOD LOSS: Minimal. The patient tolerated the procedure well. No immediate complications.     IMPRESSION: 1.  Successful CT-guided aspiration of pelvic fluid collection with yellow slight debris filled fluid aspirated and sent for cultures. This pocket is 20 cm deep in the pelvis. No drain was placed but the pocket was irrigated 4 times with sterile saline.. Reference CPT Codes: 38001,    US Thoracentesis    Result Date: 10/24/2023  EXAM: 1. RIGHT THORACENTESIS 2. ULTRASOUND GUIDANCE LOCATION: [facility] System DATE: 10/24/2023 INDICATION: Pleural effusion. PROCEDURE: Informed consent obtained. Time out performed. The chest was prepped and draped in sterile fashion. 10 mL of 1 % lidocaine was infused into the local soft tissues. Under direct ultrasound guidance, a 5 Belarusian catheter system was placed into the pleural effusion. 0.4 liters of clear yellow fluid were removed and sent to lab, if requested. Patient tolerated procedure well. Ultrasound imaging was obtained and placed in the patient's permanent medical record.     IMPRESSION: Status post right ultrasound-guided thoracentesis. Reference CPT Code: 80539    Echocardiogram Limited    Result Date: 10/23/2023  [id number]^MATEO^PATSY^[place] [address]  Name: LY GONZALEZ MRN: [id number] : 1970 Study Date: 10/23/2023 10:27 AM Age: 53 yrs Gender: Female Patient Location: [facility] Reason For Study: Cardiomyopathy Ordering Physician: PATSY GALINDO Performed By: ACE  BSA: 2.2 m2 Height: 63 in Weight: 273 lb HR: 108 BP: 157/72 mmHg ______________________________________________________________________________ Procedure Limited Portable Echo  Adult. Definity (NDC #86580-956) given intravenously. ______________________________________________________________________________ Interpretation Summary  The left ventricle is normal in size. The visual ejection fraction is 60-65%. No regional wall motion abnormalities noted. Normal right ventricle size and systolic function. The rhythm was sinus tachycardia. The study was technically difficult. Compared to echo from 10/14/23 thr LVEF has improved. ______________________________________________________________________________ Left Ventricle The left ventricle is normal in size. There is normal left ventricular wall thickness. The visual ejection fraction is 60-65%. No regional wall motion abnormalities noted.  Right Ventricle Normal right ventricle size and systolic function. TAPSE is normal, which is consistent with normal right ventricular systolic function.  Atria Normal left atrial size. Right atrial size is normal.  Mitral Valve The mitral valve is not well visualized. There is trace mitral regurgitation. There is no mitral valve stenosis.  Tricuspid Valve The tricuspid valve is not well visualized.  Aortic Valve The aortic valve is not well visualized. No aortic regurgitation is present. No aortic stenosis is present.  Pulmonic Valve Normal pulmonic valve.  Vessels IVC diameter and respiratory changes fall into an intermediate range suggesting an RA pressure of 8 mmHg.  Pericardium Trivial pericardial effusion.  Rhythm The rhythm was sinus tachycardia. ______________________________________________________________________________ MMode/2D Measurements & Calculations  IVSd: 0.61 cm LVIDd: 4.9 cm LVIDs: 3.3 cm LVPWd: 0.87 cm FS: 33.3 % LV mass(C)d: 118.8 grams LV mass(C)dI: 53.8 grams/m2 RWT: 0.36 TAPSE: 2.9 cm  Time Measurements MM HR: 108.0 BPM  Doppler Measurements & Calculations Ao V2 max: 214.0 cm/sec Ao max P.0 mmHg Ao V2 mean: 130.0 cm/sec Ao mean P.0 mmHg Ao V2 VTI: 31.1 cm RV S Sami: 19.6  cm/sec  ______________________________________________________________________________ Report approved by: Yakelin García 10/23/2023 12:34 PM       CT Chest Abdomen Pelvis w/o Contrast    Result Date: 10/22/2023  EXAM: CT CHEST ABDOMEN PELVIS W/O CONTRAST LOCATION: [facility] DATE: 10/22/2023 INDICATION: Sepsis. treated for peritonitis and pneumonia.  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 COMPARISON: 10/18/2023 CT AP. TECHNIQUE: CT scan of the chest, abdomen, and pelvis was performed without IV contrast. Multiplanar reformats were obtained. Dose reduction techniques were used. CONTRAST: None. FINDINGS: LUNGS AND PLEURA: Complete right lower lobe atelectasis, mild passive atelectasis right upper and middle lobes posteriorly, small/moderate volume right pleural effusion and mild elevation right hemidiaphragm. Trace left pleural effusion and mild platelike atelectasis left lower lobe unchanged. MEDIASTINUM/AXILLAE: Heart size within normal limits with no pericardial effusion. Endotracheal tube tip is only 1 cm above the emilia. CORONARY ARTERY CALCIFICATION: Trace calcified atheromatous plaque LAD coronary artery. HEPATOBILIARY: Liver is normal.  No calcified gallstones or bile duct dilatation. PANCREAS: Normal. SPLEEN: Normal. ADRENAL GLANDS: Normal. KIDNEYS/BLADDER: Bladder is decompressed by well-positioned Patiño catheter. Kidneys, ureters and bladder are otherwise normal. BOWEL: Sleeve gastrectomy and proximal duodenal to jejunal anastomosis with a well-positioned nasogastric tube and interval removal of the surgical drains. Interval placement of percutaneous drainage catheter lateral aspect right subdiaphragmatic air-fluid collection surrounding the right hepatic lobe which has not changed  significantly and continues to measure about 1 - 2 cm radial thickness. A 7 x 5 x 4 cm volume of nongas-containing fluid in the pelvic cul-de-sac is unchanged. LYMPH NODES: No lymphadenopathy. VASCULATURE: Normal caliber abdominal aorta.  PELVIC ORGANS: A 5 cm fibroid. MUSCULOSKELETAL: Unremarkable.     IMPRESSION: 1.  Sleeve gastrectomy and proximal duodenal to jejunal anastomosis with a well-positioned nasogastric tube and interval removal of the 2 surgical drains. 2.  Interval placement of a percutaneous drainage catheter with distal loop in the lateral aspect of the right subdiaphragmatic air-fluid collection surrounding the right hepatic lobe which has not changed significantly and continues to measure about 1 -  2 cm radial thickness. 3.  A 7 x 5 x 4 cm volume of nongas-containing fluid in the pelvic cul-de-sac is unchanged. 4.  Complete right lower lobe atelectasis, mild passive atelectasis right upper and middle lobes posteriorly, small/moderate volume right pleural effusion and mild elevation right hemidiaphragm. 5.  Endotracheal tube tip is only 1 cm above the emilia.    XR Chest Port 1 View    Result Date: 10/22/2023  EXAM: XR CHEST PORT 1 VIEW LOCATION: [facility] DATE: 10/22/2023 INDICATION: Endotracheal tube positioning COMPARISON: 11/22/2023     IMPRESSION: Endotracheal tube has been placed with tip 2 cm above the emilia. Remainder unchanged. Enteric tube tip below diaphragm, off the image. Left IJ central line tip in the left brachiocephalic vein. Right upper quadrant percutaneous drain. Bilateral pleural effusions. Moderate degenerative change thoracic spine.    XR Chest Port 1 View    Result Date: 10/22/2023  EXAM: XR CHEST PORT 1 VIEW LOCATION: [facility] DATE: 10/22/2023 INDICATION: concern for possible aspiration, assess for HAP COMPARISON: CT 10/19/2023     IMPRESSION: Perihepatic drain overlies the right upper abdomen. Small right pleural  effusion with compressive atelectasis. Infiltrate of the right lung base is not excluded. Left basilar and midlung atelectasis. Left internal jugular central venous catheter with tip overlying the innominate confluence region. Nasogastric tube which courses below the diaphragm though the tip is excluded by collimation. No pneumothorax. Upper limits normal heart size.    XR Abdomen Port 1 View    Result Date: 10/21/2023  EXAM: XR ABDOMEN PORT 1 VIEW LOCATION: [facility] DATE: 10/21/2023 INDICATION: ng position COMPARISON: CT abdomen 10/18/2023.     IMPRESSION: Nasogastric tube terminates in the distal stomach. Surgical staple line of the mid line in the abdomen.    CT Retroperitoneal Abscess Drainage    Result Date: 10/19/2023  EXAM: 1. PERCUTANEOUS DRAIN PLACEMENT PERIHEPATIC COLLECTION 2. CT GUIDANCE 3. CONSCIOUS SEDATION LOCATION: [facility] DATE: 10/19/2023 INDICATION: Perihepatic colletion. TECHNIQUE: Dose reduction techniques were used. PROCEDURE: Informed consent obtained. Site marked. Prior images reviewed. Required items made available. Patient identity confirmed verbally and with arm band. Patient reevaluated immediately before administering sedation. Universal protocol was followed. Time out performed. The site was prepped and draped in sterile fashion. 10 mL of 1% lidocaine was infused into the local soft tissues. Using standard technique and under direct CT guidance, a 10 Belizean drainage catheter was inserted into the fluid collection.  SPECIMEN: 10 mL of thick brownish-red fluid was aspirated and sent to lab for cultures and Gram stain. BLOOD LOSS: Minimal. The patient tolerated the procedure well. No immediate complications. SEDATION: Versed 4 mg. Fentanyl 250 mcg. The procedure was performed with administration intravenous conscious sedation with appropriate preoperative, intraoperative, and postoperative evaluation. 40 minutes of supervised face to  face conscious sedation time was provided by a radiology nurse under my direct supervision.     IMPRESSION: 1.  Successful CT-guided drain placement into perihepatic collection. Reference CPT Codes: 64290, 75136, 32877, 17900    CT Abdomen Pelvis w/o Contrast    Addendum Date: 10/18/2023    ADDENDUM dictated by Jose G Birmingham M.D.  10/18/2023 Original report dictated by Jose G Birmingham M.D.  10/18/2023 Addendum to impression #1 IMPRESSION: 1.  Sleeve gastrectomy and proximal duodenal to jejunal anastomosis, WELL-POSITIONED NASOGASTRIC TUBE, and upper anterior abdominal surgical drain from the right and a second left anterior abdominal and pelvic surgical drain.     Result Date: 10/18/2023  EXAM: CT ABDOMEN PELVIS W/O CONTRAST LOCATION: [facility] DATE: 10/18/2023 INDICATION: Perforated bowel, ongoing fever. Status post 10/09/2023 sleeve gastrectomy with single anastomosis duodenal switch complicated by bowel perforation, peritonitis and sepsis status post 10/12/2023 washout and enterotomy closure. Cardiomyopathy.  JARVIS. COMPARISON: 10/16/2023 CXR and 10/10/2023 Gastrografin upper GI. TECHNIQUE: CT scan of the abdomen and pelvis was performed without IV contrast. Multiplanar reformats were obtained. Dose reduction techniques were used. CONTRAST: None. FINDINGS: LOWER CHEST: Complete right lower lobe atelectasis, small volume of right-sided pleural fluid and mild elevation right hemidiaphragm. Trace left pleural effusion and mild platelike atelectasis left lower lobe. Heart size within normal limits with no pericardial effusion. HEPATOBILIARY: Liver is normal.  No calcified gallstones or bile duct dilatation. PANCREAS: Normal. SPLEEN: Spleen size normal. ADRENAL GLANDS: Normal. KIDNEYS/BLADDER: Bladder is decompressed by well-positioned Patiño catheter. Kidneys, ureters and bladder are otherwise normal. BOWEL: Sleeve gastrectomy and proximal duodenal to jejunal anastomosis, a well-positioned  nasogastric tube, and upper anterior abdominal surgical drain from the right and a second left anterior abdominal and pelvic surgical drain. A thin layer of fluid about 1 - 2 cm radial thickness between the right hemidiaphragm and right hepatic lobe contains a few foci of gas and there is a small amount of nongas-containing fluid in pelvic cul-de-sac. Residual iodinated contrast material from 10/10/2023 upper GI within the normal caliber appendix, colon and rectum. LYMPH NODES: No lymphadenopathy. VASCULATURE: Normal caliber abdominal aorta.  PELVIC ORGANS: A 5 cm fibroid. MUSCULOSKELETAL: Unremarkable.     IMPRESSION: 1.  Sleeve gastrectomy and proximal duodenal to jejunal anastomosis, malpositioned nasogastric tube, and upper anterior abdominal surgical drain from the right and a second left anterior abdominal and pelvic surgical drain. 2.  A thin layer of fluid about 1 - 2 cm radial thickness between the right hemidiaphragm and right hepatic lobe contains a few foci of gas and there is a small amount of nongas-containing fluid in pelvic cul-de-sac. 3.  Complete right lower lobe atelectasis, small volume of right-sided pleural fluid and mild elevation right hemidiaphragm.     XR Chest Port 1 View    Result Date: 10/16/2023  EXAM: XR CHEST PORT 1 VIEW LOCATION: [facility] DATE: 10/16/2023 INDICATION: hypoxia COMPARISON: Portable AP view of the chest 10/14/2023     IMPRESSION: Tip of the endotracheal tube is in satisfactory position. Esophageal temperature probe is in the lower third of the esophagus. Gastric tube courses below the diaphragmatic hiatus and outside the field-of-view. Telemetry leads overlie the chest. Persistent shallow lung inflation. The right hemidiaphragm is indistinct and there is graded opacity in the infrahilar right chest either related to adjacent basilar atelectasis and/or layering pleural fluid. Focal atelectasis in the medial left base is not increased. The  vessels within the aerated portions of the lungs are normal. No clear change from 2 days earlier. Unchanged heart and mediastinal contours.    Echocardiogram Complete    Result Date: 10/14/2023  [id number]^MANISH^DAMARIS^[place] [address]  Name: LY GONZALEZ MRN: [id number] : 1970 Study Date: 10/14/2023 02:51 PM Age: 53 yrs Gender: Female Patient Location: [facility] Reason For Study: Arrhythmia, Pericardial Effusion Ordering Physician: DAMARIS HAMMOND Performed By: CM  BSA: 2.3 m2 Height: 63 in Weight: 287 lb HR: 104 ______________________________________________________________________________ Procedure Complete Echo Adult. Definity (NDC #86153-713) given intravenously. Poor acoustic windows. Technically difficult study. Limited views were obtained. ______________________________________________________________________________ Interpretation Summary  Technically difficult study. Limited views were obtained. The left ventricle is normal in size. Left ventricular function is decreased. The ejection fraction is 30-35% (moderately reduced). There is moderate global hypokinesia of the left ventricle. ______________________________________________________________________________ Left Ventricle The left ventricle is normal in size. Left ventricular function is decreased. The ejection fraction is 30-35% (moderately reduced). There is normal left ventricular wall thickness. There is moderate global hypokinesia of the left ventricle.  Right Ventricle The right ventricle is not well visualized.  Atria Normal left atrial size. Right atrium not well visualized.  Mitral Valve The mitral valve is not well visualized. There is no mitral regurgitation noted.  Tricuspid Valve The tricuspid valve is not well visualized. No tricuspid regurgitation.  Aortic Valve The aortic valve is not well visualized. No aortic regurgitation is present. No hemodynamically  significant valvular aortic stenosis.  Pulmonic Valve The pulmonic valve is not well visualized. There is no pulmonic valvular regurgitation.  Vessels The aorta root is normal. Normal size ascending aorta.  Pericardium There is no pericardial effusion.  ______________________________________________________________________________ MMode/2D Measurements & Calculations IVSd: 1.1 cm LVIDd: 5.1 cm LVIDs: 3.9 cm LVPWd: 0.81 cm  FS: 23.1 % LV mass(C)d: 168.8 grams LV mass(C)dI: 74.9 grams/m2 Ao root diam: 2.5 cm LA dimension: 3.4 cm asc Aorta Diam: 3.1 cm LA/Ao: 1.4 LVOT diam: 2.0 cm LVOT area: 3.1 cm2 Ao root diam index Ht(cm/m): 1.6 Ao root diam index BSA (cm/m2): 1.1 Asc Ao diam index BSA (cm/m2): 1.4 Asc Ao diam index Ht(cm/m): 1.9 RWT: 0.32  Doppler Measurements & Calculations MV max P.5 mmHg MV mean P.0 mmHg MV V2 VTI: 18.8 cm PA acc time: 0.07 sec  ______________________________________________________________________________ Report approved by: Yakelin Mcadams 10/14/2023 04:40 PM       XR Chest Port 1 View    Result Date: 10/14/2023  EXAM: XR CHEST PORT 1 VIEW LOCATION: [facility] DATE: 10/14/2023 INDICATION: elevated peak pressures COMPARISON: Portable chest radiograph 10/13/2023     IMPRESSION: Low lung volumes. Hazy opacities right lower lung field could be related to overlying soft tissue versus atelectasis or developing infiltrate. This is similar as compared to the prior day chest radiograph but recommend continued attention on follow-up imaging. Minimal atelectasis left base. Endotracheal tube tip in satisfactory position terminating approximately 4 cm above the emilia. Nasogastric tube courses below the level of the diaphragm though the tip is not well visualized on this radiograph. Recommend attention on follow-up and consider abdominal radiograph for further evaluation.. Left IJ central line tip overlies the region of the right brachiocephalic vein. No  pneumothorax. Trace effusions possible.    XR Chest Port 1 View    Result Date: 10/13/2023  EXAM: XR CHEST PORT 1 VIEW LOCATION: [facility] DATE: 10/13/2023 INDICATION: Evaluation position of ETT COMPARISON: 10/12/2023     IMPRESSION: Low lung volumes. Hazy opacities right lower lung field could be related to overlying soft tissue versus atelectasis or developing infiltrate. This should be reviewed on follow-up imaging. Minimal atelectasis left base. Endotracheal tube tip in satisfactory position 2.5 cm above the emilia. Enteric tube extends into the stomach. Left IJ central line tip mid SVC. No pneumothorax.    XR Chest Port 1 View    Result Date: 10/12/2023  EXAM: XR CHEST PORT 1 VIEW LOCATION: [facility] DATE: 10/12/2023 INDICATION: intubation COMPARISON: X-ray 09/14/2023     IMPRESSION: Endotracheal tube tip located 1.9 cm above the emilia. Left IJ central catheter with the tip in the upper SVC. No pneumothorax. Moderate asymmetric elevation of the right hemidiaphragm. Mild bibasilar pulmonary opacities likely reflect atelectasis. No definite pleural effusion. Normal heart size. Spinal degenerative changes.    XR Gastrografin Upper GI w KUB    Result Date: 10/10/2023  EXAM: XR GASTROGRAFIN UPPER GI W KUB LOCATION: [facility] DATE: 10/10/2023 INDICATION: Post op day #1 of Sleeve Gastrectomy with a Single Anastomosis Duodenal Switch.  Please rule out a leak from her Sleeve and from her Duodenal Anastomosis. COMPARISON: None TECHNIQUE: Limited single contrast water-soluble study.     FINDINGS AND IMPRESSION: FLUOROSCOPIC TIME: 1 NUMBER OF IMAGES: 12 Surgical change noted at the stomach and proximal small bowel. No leak appreciated on this study.      Latest radiology report personally reviewed.    Note created using dragon voice recognition software so sounds alike errors may have escaped editing.      11/05/2023   Heath Lancaster,  MD  Hospitalist, [facility]  Pager: [phone]

## 2023-11-05 NOTE — PROGRESS NOTES
ASSESSMENT:  1. Intra-abdominal abscess (H)    2. Perimenopausal symptoms        Mojgan Jimenez is a 53 year old female who is s/p laparoscopic sleeve gastrectomy with single anastomosis duodenal switch on 10/09/23 with return to the OR on 10/12/23 to repair two small enterotomies on the common channel. Patient became septic with ARF and developed pneumonia and pleural effusion necessitating chest tube placement. She is stable and demonstrating slow gradual improvement with regard to her renal function, nutrition, and deconditioning.  She is tolerating tube feeds and we will start encouraging more oral intake by transitioning tube feeds to intermittent overnight tube feeds.     Overarching goal at this point is for us to be doing less.  Ideally we would be decreasing her blood draws as her renal function is slowly improving without intervention and her nutrition is stable/improving.  Chest tube removal today given no residual PTX.  Perihepatic drain removal in next 24 hours as well if objections from IR given the lack of output.       PLAN:  -Fewer lab draws; will speak with Nephrology to see if we can decrease to sodium checks  -CBC does not need to be drawn anymore without a clinical change  -Change tube feeds from continuous to intermittent, will target goal rate of 70 ml/hr over 18 hours  -Increase physical activity  -Remove chest tube once ok'd by pulmonary  -Tentative plan for perihepatic drain removal tomorrow  -Abx per ID      SUBJECTIVE:   She is doing better. Alert and appropriately interactive this morning. Some discomfort with swallowing.        Patient Vitals for the past 24 hrs:   BP Temp Temp src Pulse Resp SpO2   11/05/23 0839 (!) 154/72 -- -- -- -- --   11/05/23 0833 -- 98.9  F (37.2  C) Axillary -- 17 --   11/05/23 0700 (!) 185/87 -- -- 103 -- --   11/05/23 0641 (!) 183/83 -- -- 103 24 94 %   11/05/23 0625 (!) 187/83 97.7  F (36.5  C) Oral 101 26 95 %   11/05/23 0624 (!) 187/83 -- -- -- -- --    11/05/23 0529 (!) 189/86 -- -- 110 -- 95 %   11/05/23 0321 (!) 179/81 98.2  F (36.8  C) Axillary 97 21 --   11/05/23 0119 (!) 169/79 -- -- 95 -- 95 %   11/05/23 0103 -- -- -- 94 -- 95 %   11/05/23 0050 -- -- -- 95 -- 95 %   11/05/23 0033 (!) 192/82 -- -- -- -- --   11/05/23 0030 (!) 183/85 97.9  F (36.6  C) Axillary 100 18 95 %   11/04/23 2014 (!) 160/77 -- -- -- -- --   11/04/23 1625 (!) 169/78 97.8  F (36.6  C) Oral -- 20 --   11/04/23 1100 (!) 168/81 98.2  F (36.8  C) Oral -- 20 --         PHYSICAL EXAM:  GEN: No acute distress, comfortable  LUNGS: CTA bilaterally; CT clamped with no air leak noted  CV:RRR  ABD:soft, not tender; incisions well healed  Drains: no output noted   Output by Drain (mL) 11/03/23 0700 - 11/03/23 1459 11/03/23 1500 - 11/03/23 2259 11/03/23 2300 - 11/04/23 0659 11/04/23 0700 - 11/04/23 1459 11/04/23 1500 - 11/04/23 2259 11/04/23 2300 - 11/05/23 0659 11/05/23 0700 - 11/05/23 0909   Closed/Suction Drain 1 Right RUQ Bulb 12 Latvian    45 40 5 3      EXT:No cyanosis, edema or obvious abnormalities    11/04 0700 - 11/05 0659  In: 2440.4 [P.O.:510; I.V.:635.4]  Out: 1310 [Urine:1225; Drains:90]    No results displayed because visit has over 200 results.             Dakota Wallace MD

## 2023-11-05 NOTE — PLAN OF CARE
Goal Outcome Evaluation:  Patient denied pain all day. No prns needed.  Tolerated TF. Dr Wallace switched to 18hr feedings from 4pm-10am so it was stopped around 1200 and restarted at 1640. Patient was up in chair twice today and tolerated full liquid diet with no nausea or pain. Had sips of protein shake, yogurt, ice cream, juice and water slowly. BG levels 132, 152, and 119  Mouth is coated with scabs on tip of tongue and roof of mouth. Did frequent oral cares today and it looks slightly better. Will leave note for MD-may benefit from nystatin rinses.  Had BM x2 today, small incontinent. Purewick in place. Powder to periarea this evening.  TL PICC line. All lumens with good blood return. IV Zosyn and Micafungin.  Na level today 144, orders changed from Q12 to daily. Cr 1.40 but remains stable so Nephrology signed off.  Hgb 7.9, yesterday was 13.7 however not certain of accuracy as all other hgb levels in 8 range. Dr Wallace aware, considered stable at this time. No s/s of bleeding.  Chest tube removed today. Dressing in place is dry and intact. Pulmonary signed off.  MICKIE drain with creamy serosang drainage. Dressing dry and intact.  SBPs remain in the 150-160s today. Scheduled meds only, no prns needed.  Encouraged IS use, could only get to 500 this morning but up to 750 this afternoon. Needs reminders to use. Bipap came off this morning and O2 sats have been 94-95% all day on 2L/NC. HRR.  PT/OT following.   Mel lift to chair x2 for 2 hours each. Requires assist of 2.

## 2023-11-05 NOTE — PROGRESS NOTES
PULMONARY PROGRESS NOTE    Date / Time of Admission:  10/9/2023  5:26 AM  Assessment:   53yoF with JARVIS, morbid obesity s/p sleeve gastrectomy 10/9/23 complicated by perforation and peritonitis s/p washout with subsequent abscess pockets requiring drain placement with recurrent, persistent right-sided pleural effusion considered parapneumonic previously but at high risk of development of trapped lung or empyema given proximity to hepatic fluid collections. Pleural fluid not consistent with an empyema. Underwent chest tube guided drainage in the right pleural space with improved lung expansion although today with a small right apical pneumothorax after clamping.        Plan:   Resolution of PTX.  Have removed chest tube.    We will sign off.    Loretta Marx MD  Pulmonary and Critical Care  (P) [phone]      Subjective:   HPI:  Mojgan Jimenez is a 53 year old female with morbid obesity, JARVIS s/p laparoscopic sleeve gastrectomy 10/9 complicated by small bowel injury and peritonitis causing septic shock. She returned to the OR 10/12 for washout and drain placement She had persistent symptoms so was rescanned with a new RUQ fluid collection 10/19 and 10/24. She also was found to have a pleural effusion and underwent thoracentesis with 400cc removed. This was consistent with parapneumonic effusion, no empyema. She was extubated 10/26 and remains extubated.     Allergies: No Known Allergies     MEDS:  Scheduled Meds:   amLODIPine  5 mg Oral BID    bumetanide  0.5 mg Intravenous Q24H    carvedilol  25 mg Oral BID w/meals    doxazosin  2 mg Oral Q12H [facility] (08/20)    heparin ANTICOAGULANT  5,000 Units Subcutaneous Q8H    hydrALAZINE  100 mg Oral or Feeding Tube TID    insulin aspart  1-4 Units Subcutaneous 4x Daily AC & HS    micafungin  100 mg Intravenous Q24H    nitroGLYcerin  1 patch Transdermal Daily    pantoprazole  40 mg Per Feeding Tube QAM AC    Or    pantoprazole  40 mg Intravenous QAM AC     "piperacillin-tazobactam  3.375 g Intravenous Q8H    sodium chloride (PF)  10-40 mL Intracatheter Q8H    venlafaxine  75 mg Oral TID w/meals       Objective:   VITALS:  BP (!) 165/80 (BP Location: Left arm)   Pulse 84   Temp 97.8  F (36.6  C) (Oral)   Resp 18   Ht 1.6 m (5' 3\")   Wt 129.1 kg (284 lb 9.8 oz)   LMP 09/09/2023 (Exact Date)   SpO2 94%   BMI 50.42 kg/m    EXAM:    GENERAL APPEARANCE: Alert, no acute distress  HENT: bipap mask in place  NECK: large collar size  RESP: decreased breath sounds bilaterally, right sided chest tube in place and bulb drain in place  CV: regular rate and rhythm, no murmur, rub or gallop  ABDOMEN: soft, obese, dressing in place  SKIN: no suspicious lesions or rashes on visible skin  NEURO: awake, responsive      Data Review:  CXR 11/5/23:  Stable cardiac silhouette. Right basilar chest tubes appear in stable position. Right upper extremity PICC with distal tip projecting at the superior cavoatrial junction. Enteric tube courses below the diaphragm with distal tip beyond the inferior field of view. Stable cardiac silhouette. Improved aeration of the right lung base. No significant pleural effusion. Possible trace right apical pneumothorax no longer visualized. Unchanged osseous structures.    CXR: 11/3/2023  Right chest tubes again seen in the right inferior hemithorax in stable positions. A right basilar consolidation and small right pleural effusion are unchanged. Heart size, cardiomediastinal contour and pulmonary vascularity are normal.   Left lung is clear without consolidation or pleural effusion. No pneumothorax. Right PICC terminates near the superior cavoatrial junction. Enteric feeding tube extends below the diaphragm beyond the field-of-view    CT ABDOMEN W CONTRAST 10/30/2023  1.  Perihepatic drain in place with tip adjacent to the hepatic dome. Minimal improvement of the perihepatic collection since 10/25/2023. Small perihepatic collection remains.  2.  Small to " moderate sized right pleural effusion and associated collapse of the right lower lobe.    Results for orders placed or performed during the hospital encounter of 10/09/23   Basic metabolic panel   Result Value Ref Range    Sodium 144 135 - 145 mmol/L    Potassium 3.8 3.4 - 5.3 mmol/L    Chloride 103 98 - 107 mmol/L    Carbon Dioxide (CO2) 31 (H) 22 - 29 mmol/L    Anion Gap 10 7 - 15 mmol/L    Urea Nitrogen 30.8 (H) 6.0 - 20.0 mg/dL    Creatinine 1.40 (H) 0.51 - 0.95 mg/dL    GFR Estimate 45 (L) >60 mL/min/1.73m2    Calcium 8.7 8.6 - 10.0 mg/dL    Glucose 130 (H) 70 - 99 mg/dL     Lab Results   Component Value Date    WBC 7.3 11/05/2023    HGB 7.9 (L) 11/05/2023    HCT 26.8 (L) 11/05/2023    MCV 96 11/05/2023     11/05/2023

## 2023-11-05 NOTE — PLAN OF CARE
Goal Outcome Evaluation: 3-7 pm       Alert &O x4 this evening. Endorses fatigue. Declined to get out of bed another time this evening. Denied pain, except right side discomfort r/t chest tube w/ repositioning.   - NJ present w/ TF running at 55 mL/hr (goal)   - taking sips of clear liquids throughout the shift. Apprehensive taking full liquids. Nausea intermittent w/ one episode of emesis, maybe a few mLs. Resolved as quickly as it came on. No anti-emetics given.   - MICKIE drain patent w/ serous fluid and mucous like clots. Not flushed yet. Updated on-coming nurse.   - Purewick not functioning appropriately. 2 voids missed. Replaced and functioning appropriately. Pt reports slight soreness. Assessment shows red, blanchable intact left inner labia. Barrier cream applied.  - Right chest tube remains clamped. No chest pain or difficulty breathing reported. Lung sounds clear, diminished. 2 L NC 94-9%. BiPaP not applied yet as naps were short before interrupted.  - Blood pressure elevated 169/78. Gave scheduled coreg.  - PICC dressing changed.     Jennifer Yarbrough, RN-BC    -

## 2023-11-05 NOTE — PLAN OF CARE
"Goal Outcome Evaluation:      Plan of Care Reviewed With: patient    Overall Patient Progress: decliningOverall Patient Progress: declining    Outcome Evaluation: BP elevated in evening with headache, given PRN and noted effective. Contacted hospitalist for increased hydralazine with elevated recheck at 0310, denied pain/anxiety. Little relief. Nausea started around 0530 with sudden epigastric pain. Patient stated, \"There's a chunk stuck. I can taste the medicine.\" Only meds given were IV since 2015. NJ flushed slowly at 0310. Given PRNs and scheduled meds as able this AM, in addition to other interventions for relief of pain/nausea by 0700. CXR completed. Sleeping with BiPap at this time.    BP (!) 185/87 (BP Location: Left arm, Patient Position: Semi-Ballard's, Cuff Size: Adult Regular)   Pulse 103   Temp 97.7  F (36.5  C) (Oral)   Resp 24   Ht 1.6 m (5' 3\")   Wt 129.1 kg (284 lb 9.8 oz)   LMP 09/09/2023 (Exact Date)   SpO2 94%   BMI 50.42 kg/m      Ridge Smiley RN       "

## 2023-11-05 NOTE — PROGRESS NOTES
Renal progress note  CC:Hypernatremia , SUSSY  Assessment and Plan:  52-year-old female with history of obesity JARVIS asthma history of mood disorders admitted with scheduled laparoscopic sleeve gastrectomy on 10/9/2023 complicated by peritonitis with small bowel injury will return tomorrow 10/12/2023 for enterotomy closure and cleanout with drain placement.  Pelvic fluid aspiration needed on 10/24/2023 overall course complicated by encephalopathy delirium septic shock with peritoneal leak acute respiratory failure requiring intubation from 10/12 to 10/21/2023 and oliguric SUSSY with very poor oral intakes.  Nephrology following for SUSSY    Oliguric SUSSY  SUSSY likely secondary to hemodynamic injury for acute renal failure  With peritoneal leak, creatinine peaked at 6.97 on 10/14/2023  Likely exacerbated by NSAIDs hypotension vasodilation and intravascular volume depletion leading to hemodynamic ATN currently and slow recovery phase  Creatinine with serial downtrend, now stalled ~1.3-1.5  Baseline creatinine 0.7 within normal limits no prior history of SUSSY or CKD   --> Follow on serial creatinines on daily labs  Strict I&O altered mentation and hallucinations seen today are unlikely secondary to metabolic issues its possibility with delirium with prolonged hospitalization and critical illness  Will sign off , creat with flat trend , plz call with questions , reviewed with Sx team    Hypertension - BP still suboptimally controlled, currently on metoprolol tartrate 100 mg BID, amlodipine 5 mg BID, and hydralazine 100 mg TID and carvedilol 25 mg BID. Added doxazosin 2 mg BID.   --> likely some anxiety playing a role as well  Aim for <140/80s    Hypernatremia - improved with hypotonic IVF, loop diuretics now resumed, follow Na+ daily for now   FW with TF and meds     Anemia likely.    Currently Hb around 8-9's   Transfuse per Surgical team recommendation    History of gastric bypass  Multiple complications post sleeve  gastrectomy complicated by peritonitis and small bowel injury with peritoneal leak  Return to the OR 10/12/2023 with cleanout and drain placement complicated by other fluid collections requiring aspiration and drain placement  On Vanco Zosyn and micafungin ID following  On tube feeds and TPN weaned off patient's intakes remain pretty poor   Diet management per primary team bariatric surgery    Ongoing metabolic encephalopathy with worsening delirium  Visual hallucinations management per primary hospitalist team, slowly improving     History of cardiomyopathy possible stress-induced Takotsubo  Management per primary team  Resumed IV Bumex 0.5 mg Q8 hours 10/31, decreasing to 0.5 mg daily 11/4 with CO2 trending up and Cr stalled   BNP elevated but overall trending down   On hydralazine metoprolol and amlodipine    Acute hypoxemic hypercapnic respiratory failure  Pulmonary following, right-sided pleural effusion and s/p chest tube 11/1/23     Prior history of JARVIS and asthma  Management per primary team    Thank you for the consultation we will follow  Marlen Cintron PA-C   [facility]  [phone]      Subjective  Remains very hypertensive,   Labs stable  Still encephalopathic but better then earlier in the wk  No new events   Case briefly reviewed with Sx team , will sign off .     Objective    Vital signs in last 24 hours  Temp:  [97.7  F (36.5  C)-98.9  F (37.2  C)] 97.8  F (36.6  C)  Pulse:  [] 84  Resp:  [17-26] 18  BP: (154-192)/(72-87) 161/78  FiO2 (%):  [30 %] 30 %  SpO2:  [94 %-95 %] 94 %  Weight:   [unfilled]    Intake/Output last 3 shifts  I/O last 3 completed shifts:  In: 2440.4 [P.O.:510; I.V.:635.4; NG/GT:625]  Out: 1310 [Urine:1225; Emesis/NG output:15; Drains:90]  Intake/Output this shift:  I/O this shift:  In: 570 [I.V.:96; NG/GT:225]  Out: 453 [Urine:450; Drains:3]    Physical Exam  Alert, NAD   CV: RRR without murmur or rub  Lung: clear and equal; no extra sounds, + right  sided chest tube, O2 per NC   Ab: soft and NT; + midline incision   Ext: Trace edema and well perfused  Skin; no rash    Pertinent Labs   Lab Results   Component Value Date    WBC 7.3 11/05/2023    HGB 7.9 (L) 11/05/2023    HCT 26.8 (L) 11/05/2023    MCV 96 11/05/2023     11/05/2023     Lab Results   Component Value Date    BUN 30.8 (H) 11/05/2023     11/05/2023     11/05/2023    CO2 31 (H) 11/05/2023       Lab Results   Component Value Date    ALBUMIN 2.8 (L) 11/02/2023     Lab Results   Component Value Date    PHOS 3.0 11/02/2023     I reviewed all lab results    Marlen Cintron PA-C

## 2023-11-06 ENCOUNTER — APPOINTMENT (OUTPATIENT)
Dept: PHYSICAL THERAPY | Facility: HOSPITAL | Age: 53
End: 2023-11-06
Attending: SURGERY
Payer: COMMERCIAL

## 2023-11-06 ENCOUNTER — APPOINTMENT (OUTPATIENT)
Dept: SPEECH THERAPY | Facility: HOSPITAL | Age: 53
End: 2023-11-06
Attending: SURGERY
Payer: COMMERCIAL

## 2023-11-06 LAB
BACTERIA PLR CULT: NO GROWTH
GLUCOSE BLDC GLUCOMTR-MCNC: 105 MG/DL (ref 70–99)
GLUCOSE BLDC GLUCOMTR-MCNC: 108 MG/DL (ref 70–99)
GLUCOSE BLDC GLUCOMTR-MCNC: 118 MG/DL (ref 70–99)
GLUCOSE BLDC GLUCOMTR-MCNC: 127 MG/DL (ref 70–99)
GRAM STAIN RESULT: NORMAL
GRAM STAIN RESULT: NORMAL

## 2023-11-06 PROCEDURE — 250N000011 HC RX IP 250 OP 636: Mod: JZ | Performed by: INTERNAL MEDICINE

## 2023-11-06 PROCEDURE — 99233 SBSQ HOSP IP/OBS HIGH 50: CPT | Performed by: STUDENT IN AN ORGANIZED HEALTH CARE EDUCATION/TRAINING PROGRAM

## 2023-11-06 PROCEDURE — 97530 THERAPEUTIC ACTIVITIES: CPT | Mod: GP

## 2023-11-06 PROCEDURE — 97110 THERAPEUTIC EXERCISES: CPT | Mod: GP

## 2023-11-06 PROCEDURE — 94660 CPAP INITIATION&MGMT: CPT

## 2023-11-06 PROCEDURE — 250N000013 HC RX MED GY IP 250 OP 250 PS 637: Performed by: INTERNAL MEDICINE

## 2023-11-06 PROCEDURE — 250N000013 HC RX MED GY IP 250 OP 250 PS 637: Performed by: PHYSICIAN ASSISTANT

## 2023-11-06 PROCEDURE — 250N000013 HC RX MED GY IP 250 OP 250 PS 637: Performed by: NURSE PRACTITIONER

## 2023-11-06 PROCEDURE — 250N000011 HC RX IP 250 OP 636: Mod: JZ | Performed by: PHYSICIAN ASSISTANT

## 2023-11-06 PROCEDURE — 99232 SBSQ HOSP IP/OBS MODERATE 35: CPT | Performed by: INTERNAL MEDICINE

## 2023-11-06 PROCEDURE — 120N000001 HC R&B MED SURG/OB

## 2023-11-06 PROCEDURE — 258N000003 HC RX IP 258 OP 636: Performed by: NURSE PRACTITIONER

## 2023-11-06 PROCEDURE — 999N000157 HC STATISTIC RCP TIME EA 10 MIN

## 2023-11-06 PROCEDURE — 92526 ORAL FUNCTION THERAPY: CPT | Mod: GN

## 2023-11-06 PROCEDURE — 250N000011 HC RX IP 250 OP 636: Mod: JZ | Performed by: NURSE PRACTITIONER

## 2023-11-06 RX ORDER — AMOXICILLIN AND CLAVULANATE POTASSIUM 400; 57 MG/5ML; MG/5ML
875 POWDER, FOR SUSPENSION ORAL 2 TIMES DAILY
Status: COMPLETED | OUTPATIENT
Start: 2023-11-07 | End: 2023-11-16

## 2023-11-06 RX ORDER — FLUCONAZOLE 40 MG/ML
400 POWDER, FOR SUSPENSION ORAL DAILY
Status: COMPLETED | OUTPATIENT
Start: 2023-11-07 | End: 2023-11-16

## 2023-11-06 RX ADMIN — HYDRALAZINE HYDROCHLORIDE 100 MG: 50 TABLET, FILM COATED ORAL at 13:01

## 2023-11-06 RX ADMIN — MICAFUNGIN SODIUM 100 MG: 50 INJECTION, POWDER, LYOPHILIZED, FOR SOLUTION INTRAVENOUS at 09:52

## 2023-11-06 RX ADMIN — HEPARIN SODIUM 5000 UNITS: 5000 INJECTION, SOLUTION INTRAVENOUS; SUBCUTANEOUS at 13:18

## 2023-11-06 RX ADMIN — BUMETANIDE 0.5 MG: 0.25 INJECTION INTRAMUSCULAR; INTRAVENOUS at 07:59

## 2023-11-06 RX ADMIN — HEPARIN SODIUM 5000 UNITS: 5000 INJECTION, SOLUTION INTRAVENOUS; SUBCUTANEOUS at 21:55

## 2023-11-06 RX ADMIN — HYDRALAZINE HYDROCHLORIDE 100 MG: 50 TABLET, FILM COATED ORAL at 08:00

## 2023-11-06 RX ADMIN — AMLODIPINE BESYLATE 5 MG: 5 TABLET ORAL at 20:56

## 2023-11-06 RX ADMIN — Medication 40 MG: at 07:58

## 2023-11-06 RX ADMIN — DOXAZOSIN 2 MG: 1 TABLET ORAL at 20:57

## 2023-11-06 RX ADMIN — HYDRALAZINE HYDROCHLORIDE 100 MG: 50 TABLET, FILM COATED ORAL at 20:56

## 2023-11-06 RX ADMIN — DOXAZOSIN 2 MG: 1 TABLET ORAL at 08:00

## 2023-11-06 RX ADMIN — NITROGLYCERIN 1 PATCH: 0.4 PATCH TRANSDERMAL at 08:19

## 2023-11-06 RX ADMIN — CARVEDILOL 25 MG: 12.5 TABLET, FILM COATED ORAL at 08:00

## 2023-11-06 RX ADMIN — PIPERACILLIN AND TAZOBACTAM 3.38 G: 3; .375 INJECTION, POWDER, LYOPHILIZED, FOR SOLUTION INTRAVENOUS at 03:53

## 2023-11-06 RX ADMIN — PROCHLORPERAZINE EDISYLATE 10 MG: 5 INJECTION INTRAMUSCULAR; INTRAVENOUS at 04:41

## 2023-11-06 RX ADMIN — PIPERACILLIN AND TAZOBACTAM 3.38 G: 3; .375 INJECTION, POWDER, LYOPHILIZED, FOR SOLUTION INTRAVENOUS at 12:14

## 2023-11-06 RX ADMIN — PIPERACILLIN AND TAZOBACTAM 3.38 G: 3; .375 INJECTION, POWDER, LYOPHILIZED, FOR SOLUTION INTRAVENOUS at 20:55

## 2023-11-06 RX ADMIN — VENLAFAXINE 75 MG: 75 TABLET ORAL at 08:00

## 2023-11-06 RX ADMIN — VENLAFAXINE 75 MG: 75 TABLET ORAL at 16:25

## 2023-11-06 RX ADMIN — AMLODIPINE BESYLATE 5 MG: 5 TABLET ORAL at 08:00

## 2023-11-06 RX ADMIN — CARVEDILOL 25 MG: 12.5 TABLET, FILM COATED ORAL at 16:25

## 2023-11-06 RX ADMIN — HEPARIN SODIUM 5000 UNITS: 5000 INJECTION, SOLUTION INTRAVENOUS; SUBCUTANEOUS at 07:15

## 2023-11-06 RX ADMIN — VENLAFAXINE 75 MG: 75 TABLET ORAL at 13:02

## 2023-11-06 ASSESSMENT — ACTIVITIES OF DAILY LIVING (ADL)
ADLS_ACUITY_SCORE: 47
ADLS_ACUITY_SCORE: 43
ADLS_ACUITY_SCORE: 43
ADLS_ACUITY_SCORE: 47
ADLS_ACUITY_SCORE: 41
ADLS_ACUITY_SCORE: 47

## 2023-11-06 NOTE — PROGRESS NOTES
"CLINICAL NUTRITION SERVICES - REASSESSMENT NOTE    EVALUATION OF THE PROGRESS TOWARD GOALS   Diet: Bariatric full liquid  Nutrition Support: TF via NJ tube: Promote with fiber @ 70 ml/hr x 18 hr from 4 pm to 10 am  With 90 ml H2O flush three times per day Providin ml/Calories, 78 g protein, 174 g CHO, 18 g fiber and 1047 ml free H2O ( TF) plus H2O flushes ( 270 ml)=1317 ml fluid  Intake: pt currently busy with nursing cares  She took 0% supper meal on , part of a yogurt cup and bites of ice cream at lunch  and 25% breakfast .  (~ 108 Calories and 11 g protein)  Per Felecia Henderson our goal is to meet ~ 80% of her nutritional needs: ~ 9547-6452 Calories and 50-62 g protein ( via enteral nutrition support) and  will do it for 16 hrs today.: A rate of 70 ml/hr x 16 hrs provides: 1120 Calories, 69 g protein, 155 g CHO, 16 g fiber and 931 ml free fluid from TF plus H2O flush ( 270 ml)=1201 ml fluid     ANTHROPOMETRICS  Height: 160 cm (5' 3\")  Most Recent Weight: 129.1 kg (284 lb 9.8 oz)    Weight History:   Wt Readings from Last 10 Encounters:   23 129.1 kg (284 lb 9.8 oz)   23 133.4 kg (294 lb 3.2 oz)   23 129.7 kg (286 lb)   23 129.2 kg (284 lb 12.8 oz)   11/10/22 129.3 kg (285 lb)   22 129.3 kg (285 lb)   22 132.6 kg (292 lb 4.8 oz)   22 131.7 kg (290 lb 6.4 oz)   06/15/22 132 kg (291 lb)   22 131.8 kg (290 lb 9.6 oz)     GI CONCERNS  Abdomen soft/tender  +BS all quadrants  Hypoactive BS ( RUQ)  Nausea-intermittent  Last BM 2023 ( soft brown)    LABS  Reviewed: Na 144, K 3.8, urea nitrogen 30.8 (H), Cr 1.4 (H), Ca 8.7, Glu 130 (H):     MEDICATIONS  Reviewed: norvasc, bumex, coreg, cardura, hydralazine, novolog, micafungin, pantoprazole, zosyn    INTERVENTIONS  Implementation  Change TF regimen to meet ~ 80% of her nutritional needs: run at 70 ml/hr x 16 hrs/day.  Keep H2O flushes at 90 ml tid    Goals  Tolerate TF-met  Meet nutritional " needs-progressing  Diet advancement when possible-(new)    Monitoring/Evaluation  Progress toward goals will be monitored and evaluated per protocol.

## 2023-11-06 NOTE — PROGRESS NOTES
"Continues on BiPAP 12/6, 14, SJN P2, 9 hours , 3 nocturnal and prn day for naps. BS clear and equal bilat. Pt requested to stay on BiPAP at this time. Pt will notify nursing when she wants to come off BiPAP. RT to monitor.    BP (!) 190/86 (BP Location: Left arm)   Pulse 109   Temp 98.2  F (36.8  C) (Oral)   Resp 18   Ht 1.6 m (5' 3\")   Wt 129.1 kg (284 lb 9.8 oz)   LMP 09/09/2023 (Exact Date)   SpO2 95%   BMI 50.42 kg/m        "

## 2023-11-06 NOTE — PROGRESS NOTES
"Care Management Follow Up    Length of Stay (days): 28    Expected Discharge Date: 11/10/2023     Concerns to be Addressed: discharge planning     Patient plan of care discussed at interdisciplinary rounds: Yes    Anticipated Discharge Disposition: Transitional Care     Anticipated Discharge Services: None  Anticipated Discharge DME:      Patient/family educated on Medicare website which has current facility and service quality ratings: yes  Education Provided on the Discharge Plan: Yes  Patient/Family in Agreement with the Plan: yes    Referrals Placed by CM/SW: Post Acute Facilities  Private pay costs discussed: Not applicable    Additional Information:  Chart reviewed.    Cm updates:  Therapy recs TCU. Pts daughter Karla's and pts choice is [facility], it is closest to their home, and they are ok with $25.00 dollar per day private room fee.       Writer asked about other preferences they were ok with a couple other pres homes near them ,  and [place] to [place]. Writer sent TCU referrals today. Pending.       Pt on IV abx still. Surgery following pt.       Social Hx:  \"Patient lives with her adult daughter and is independent with all activities of daily living and instrumental activities of daily living (IADLs) at baseline. She is currently unemployed but planned to resume working after recovery. Daughter Karla is primary family contact. \"      Cm will continue to follow plan of care, review recommendations, and assist with any discharge needs anticipated.     Whit Malik RN      "

## 2023-11-06 NOTE — PLAN OF CARE
Problem: Comorbidity Management  Goal: Blood Pressure in Desired Range  Outcome: Progressing     Problem: Bariatric Surgery  Goal: Nausea and Vomiting Relief  Intervention: Prevent or Manage Nausea and Vomiting  Recent Flowsheet Documentation  Taken 11/6/2023 5855 by Ridge Smiley RN  Nausea/Vomiting Interventions:   acupressure applied   antiemetic   cool cloth applied   nausea triggers minimized   slow deep breathing encouraged   stimuli minimized      Goal Outcome Evaluation:      Plan of Care Reviewed With: patient    Overall Patient Progress: improvingOverall Patient Progress: improving    Outcome Evaluation: VSS with mild intermittent tachycardia and SBP in 150s-160s, controlled with scheduled meds and x1 dose of hydralazine IV 20mg PRN. Denies pain. 1 episode nausea, resolved w/compazine, no emesis. TF tolerated at 70mL/hr. Slept between cares with BiPap, very dry PO mucosa. More hopeful and talkative tonight with chest tube out, site CDI. More accepting of PO cares and repositioning. MICKIE output milky/serosanguinous/mucous, minimal with irrigation. IV antibiotics.    Ridge Smiley, RN

## 2023-11-06 NOTE — PROGRESS NOTES
ASSESSMENT:  1. Intra-abdominal abscess (H)    2. Perimenopausal symptoms        Mojgan Jimenez is a 53 year old female who is s/p laparoscopic sleeve gastrectomy with single anastomosis duodenal switch on 10/09/23 with return to the OR on 10/12/23 to repair two small enterotomies on the common channel. Patient became septic with ARF and developed pneumonia and pleural effusion necessitating chest tube placement. She was noted to have a perihepatic fluid collection that is s/p percutaneous drain placement by IR. This has continued to have increased output after alteplase instillations and IR is monitoring.    Spoke with RD today and they will start to decrease her tube feed to meet 80% of her needs to help with appetite and encourage oral intake. We will also slowly decrease the duration of how long her tube feed runs daily to help her with increasing activity through the day.    PLAN:  RD to adjust tube feeds to cover 80% of caloric needs and decrease duration by 2 hours (16 hour run time)  Continue PT/OT  Patient needs constant encouragement and frequent affirmations that she is doing better  Have patient participate in her cares as much as possible  Start looking at requirements to get patient into acute rehab  Drain per IR    SUBJECTIVE:   She is doing better. More interactive and seems more motivated today. No pain.      Patient Vitals for the past 24 hrs:   BP Temp Temp src Pulse Resp SpO2   11/06/23 0753 (!) 190/86 98.2  F (36.8  C) Oral -- 18 --   11/06/23 0716 -- -- -- 109 -- 95 %   11/06/23 0402 -- -- -- -- 21 94 %   11/05/23 2331 (!) 153/70 -- -- 99 17 93 %   11/05/23 2256 (!) 161/74 98.1  F (36.7  C) Oral 97 -- 93 %   11/05/23 2251 (!) 161/74 -- -- -- -- --   11/05/23 2058 -- -- -- -- 18 94 %   11/05/23 2011 (!) 163/78 -- -- 91 -- --   11/05/23 1527 (!) 158/71 98.2  F (36.8  C) Oral -- 18 --   11/05/23 1345 (!) 161/78 -- -- -- 18 --   11/05/23 1125 (!) 165/80 97.8  F (36.6  C) Oral -- 18 --          PHYSICAL EXAM:  GEN: No acute distress, comfortable  LUNGS: CTA bilaterally  CV:RRR  ABD:soft, not tender, incisions healing well,   Drains: scant serous in bulb  Output by Drain (mL) 11/04/23 0700 - 11/04/23 1459 11/04/23 1500 - 11/04/23 2259 11/04/23 2300 - 11/05/23 0659 11/05/23 0700 - 11/05/23 1459 11/05/23 1500 - 11/05/23 2259 11/05/23 2300 - 11/06/23 0659 11/06/23 0700 - 11/06/23 1017   Closed/Suction Drain 1 Right RUQ Bulb 12 [place] 45 40 5 53 3 10 0      EXT:No cyanosis, edema or obvious abnormalities    11/05 0700 - 11/06 0659  In: 1608 [P.O.:340; I.V.:206]  Out: 816 [Urine:750; Drains:66]    No results displayed because visit has over 200 results.             Felecia Henderson, JUSTINE CNP

## 2023-11-06 NOTE — PROGRESS NOTES
"[facility] Progress Note      ASSESSMENT:  Post gastric surgery  C/b HCAP,sepsis due to peritonitis post washout   Staph epi grew from BAL - completed 10 days vancomycin 10/31 in case this was pathogen.   Pleural fluid culture negative from 10/24 -- 400cc removed   Pleural fluid 11/1 with chest tube 1825 WBC, 66% PMNs, culture negative  Had rash but TSS or TEN or DRESS not present  Intra-abdominal infection -- perihepatic drain 10/19 -- lactobacillus; 10/24 pelvic fluid aspirate -- lactobacillus and candida   Previous strep anginosus from washout 10/12   Repeat CT 10/30 -- perihepatic fluid decreased. tPA to drain 10/31.    WBC now normal    Will plan oral antibiotic/antifungal tomorrow    RECOMMENDATION:  Micafungin for yeast --> fluconazole 10 days   Only interactions I see are coreg (may increase coreg levels), QTC with effexor  Zosyn for strep and lactobacillus --> augmentin 10 days  ID plan in place. I will sign off. Please call if questions.     Jose Antonio Bond MD  [facility]  [phone] clinic  [employer] paging  ______________________________________________________________________     SUBJECTIVE:  vomited today. Pain improved. Temps ok.           REVIEW OF SYSTEMS:  Negative unless as listed above.  Social history, Family history, Medications: reviewed.    OBJECTIVE:  BP (!) 168/80 (BP Location: Left arm)   Pulse 101   Temp 98  F (36.7  C) (Oral)   Resp 18   Ht 1.6 m (5' 3\")   Wt 129.1 kg (284 lb 9.8 oz)   LMP 09/09/2023 (Exact Date)   SpO2 93%   BMI 50.42 kg/m                PHYSICAL EXAM:  O2 NC, NG tube, looks tired.   Sclera normal color, not injected  CARDIOVASCULAR regular rate and rhythm, no murmur  Lungs R chest tube  Abdomen soft, NT, incision midline looks good, steri strips in place; RUQ drain with cloudy fluid  Skin normal  Joints normal  Neurologic exam non focal  Rash R torso is improved        Antibiotics:  See MAR,multiple   Pertinent " "labs:  Lab Results   Component Value Date    WBC 14.5 10/23/2023    WBC 5.5 11/17/2020     Lab Results   Component Value Date    RBC 3.16 10/23/2023    RBC 4.13 11/17/2020     Lab Results   Component Value Date    HGB 9.2 10/23/2023    HGB 12.3 11/17/2020     Lab Results   Component Value Date    HCT 30.9 10/23/2023    HCT 37.4 11/17/2020     No components found for: \"MCT\"  Lab Results   Component Value Date    MCV 98 10/23/2023    MCV 91 11/17/2020     Lab Results   Component Value Date    MCH 29.1 10/23/2023    MCH 29.8 11/17/2020     Lab Results   Component Value Date    MCHC 29.8 10/23/2023    MCHC 32.9 11/17/2020     Lab Results   Component Value Date    RDW 14.9 10/23/2023    RDW 12.7 11/17/2020     Lab Results   Component Value Date     10/23/2023     11/17/2020       Last Comprehensive Metabolic Panel:  Sodium   Date Value Ref Range Status   11/05/2023 144 135 - 145 mmol/L Final     Comment:     Reference intervals for this test were updated on 09/26/2023 to more accurately reflect our healthy population. There may be differences in the flagging of prior results with similar values performed with this method. Interpretation of those prior results can be made in the context of the updated reference intervals.    11/05/2023 144 135 - 145 mmol/L Final     Comment:     Reference intervals for this test were updated on 09/26/2023 to more accurately reflect our healthy population. There may be differences in the flagging of prior results with similar values performed with this method. Interpretation of those prior results can be made in the context of the updated reference intervals.  Reference intervals for this test were updated on 09/26/2023 to more accurately reflect our healthy population. There may be differences in the flagging of prior results with similar values performed with this method. Interpretation of those prior results can be made in the context of the updated reference intervals.  "   11/17/2020 141 133 - 144 mmol/L Final     Potassium   Date Value Ref Range Status   11/05/2023 3.8 3.4 - 5.3 mmol/L Final   05/17/2022 4.5 3.4 - 5.3 mmol/L Final   11/17/2020 4.1 3.4 - 5.3 mmol/L Final     Chloride   Date Value Ref Range Status   11/05/2023 103 98 - 107 mmol/L Final   05/17/2022 102 94 - 109 mmol/L Final   11/17/2020 109 94 - 109 mmol/L Final     Carbon Dioxide   Date Value Ref Range Status   11/17/2020 31 20 - 32 mmol/L Final     Carbon Dioxide (CO2)   Date Value Ref Range Status   11/05/2023 31 (H) 22 - 29 mmol/L Final   05/17/2022 32 20 - 32 mmol/L Final     Anion Gap   Date Value Ref Range Status   11/05/2023 10 7 - 15 mmol/L Final   05/17/2022 2 (L) 3 - 14 mmol/L Final   11/17/2020 1 (L) 3 - 14 mmol/L Final     Glucose   Date Value Ref Range Status   05/17/2022 110 (H) 70 - 99 mg/dL Final   11/17/2020 84 70 - 99 mg/dL Final     Comment:     Fasting specimen     GLUCOSE BY METER POCT   Date Value Ref Range Status   11/06/2023 105 (H) 70 - 99 mg/dL Final     Urea Nitrogen   Date Value Ref Range Status   11/05/2023 30.8 (H) 6.0 - 20.0 mg/dL Final   05/17/2022 16 7 - 30 mg/dL Final   11/17/2020 9 7 - 30 mg/dL Final     Creatinine   Date Value Ref Range Status   11/05/2023 1.40 (H) 0.51 - 0.95 mg/dL Final   11/17/2020 0.79 0.52 - 1.04 mg/dL Final     GFR Estimate   Date Value Ref Range Status   11/05/2023 45 (L) >60 mL/min/1.73m2 Final   11/17/2020 88 >60 mL/min/[1.73_m2] Final     Comment:     Non  GFR Calc  Starting 12/18/2018, serum creatinine based estimated GFR (eGFR) will be   calculated using the Chronic Kidney Disease Epidemiology Collaboration   (CKD-EPI) equation.       Calcium   Date Value Ref Range Status   11/05/2023 8.7 8.6 - 10.0 mg/dL Final   11/17/2020 8.9 8.5 - 10.1 mg/dL Final       Liver Function Studies -   Recent Labs   Lab Test 10/23/23  0530   PROTTOTAL 7.1   ALBUMIN 3.2*   BILITOTAL 0.3   ALKPHOS 96   AST 19   ALT 26       NOTE BD glucan test was 406 which  is +      MICROBIOLOGY DATA:  Lung fluid 76 wbc, no org 10/24, culture negative  Pelvic fluid lactobaccilus, candida dublinensis  11/1 pleural fluid 1825 WBC, 66% PMNs; culture negative      IMAGING/RADIOLOGY:  reviewed

## 2023-11-06 NOTE — PROGRESS NOTES
[facility]    Medicine Progress Note - Hospitalist Service    Date of Admission:  10/9/2023    Assessment & Plan   Assessment:  Mojgan Jimenez is a 53 year old female with h/o obesity, severe JARVIS on CPAP, asthma, stimulant use disorder, stimulant induced psychosis, mood & anxiety disorder. Admitted 10/9/23 for scheduled laparoscopic sleeve gastrectomy with single anastomosis duodenal switch. She was later found to have two small bowel injuries with peritonitis. 10/12/23 returned to OR for small bowel interotomy closure, clean-out, & drain placement; 10/19 found to have RUQ fluid collection s/p drain placement; 10/24 s/p pelvic fluid aspiration.   Course complicated by encephalopathy/delirium, septic shock, suspected takotsubo syndrome, acute respiratory failure due to loss of protective airway reflexes & increased metabolic load requiring intubation (10/12-10/21/23; reintubated 10/21-10/26/23), & oliguric SUSSY with renal recovery. She has significantly improved: mental status clearing with some ongoing delirium at night, cardiomyopathy improved, ATN I recovery phase, and ongoing treatment of intra abdominal infection. She was made floor tele status 10/27/23. On 11/1/23 CT guided chest tube placed on right for persistent effusion, concern for parapneumonic one with risk of future trapped lung. Chest tubes removed 11/5/2023.      Problem list and plan:  Acute hypoxemic-hypercapnic respiratory failure  Was admitted 10/9/2023 for scheduled laparoscopic sleeve gastrectomy with single anastomosis duodenal switch.  Currently on 1 L nasal cannula saturating in the low to mid 90s  Rapid Response on 10/12, Pt with decreased LOC. Opens eyes to voice. SpO2 89% on 8L NC. BS clear and diminished. Placed on a 15L non-rebreather for SpO2 > 90%. After narcan admin pt became more responsive/agitated. Transferred to ICU and intubated 10/12-10/21/23; reintubated 10/21-10/26/23. Now extubated and weaned to 1 to 2  L nasal cannula  10/22/23 CT demonstrated complete RLL atelectasis, milder RUL & RBML atelectasis or consolidation. 10/22 bronchoscopy moderate RLL blood tinged secretions cleared.   Cont pulmonary hygiene  R-pleural effusion, exudative, suspect simple parapneumonic effusion. 10/24 s/p thoracentesis, 400 mL. Pleural fluid amylase amylase 23, serum 64. 11/1/23--chest tube placed on right. Chest tubes removed 11/5/2023     Gastric bypass  10/9 s/p sleeve gastrectomy complicated by small bowel injury & peritonitis   10/12 returned to OR for closure, clean-out, & drain placement (now removed)  10/19 s/p RUQ fluid collection drain placement   10/24 s/p pelvic fluid aspiration (75 ml)  Surgery primary   ID consulted   10/12 - peritoneal cultures + Streptococcus anginosus, mixed aerobic & anaerobic kamla   10/19 - RUQ fluid cultures & pelvic fluid aspirate + Lactobacillus   10/22 - BDG blood +  10/24 pleural fluid studies  -#+WBC no pos cx. 10/24--aspirate pelvic drain--lacto + candida  Was on Vanc, pip-tazo, micafungin. Vanco d/jose 10/31  Was on TPN previously 10/14  PPTF started 10/24/23. TF and TPN weaned of 10/27 currently on bariatric clears, surgery managing   Diet per surg  Flush drain at regular intervals   Possible drain removal by IR soon check    Accelerated HTN /Cardiomyopathy   Echo previously and initially decreased LV function but repeat echo shows EF normalized, possible stress induced.   Cardiology consulted  Metoprolol switched to coreg; dose increased 11/3/23  Hydralazine increase to 100 mg tid  Nitroglycerin patch 0.4mg/24  Amlodipine 5 mg bid  Bumex back on per renal  Prn IV hydralazine     History of JARVIS, asthma in the chart no PFTs  NIPPV with sleep/naps everytime   Continue duonebs     Oliguric SUSSY improving   Nephrology was consulted  for ARF peak creat 6.97 on 10/14  Slowly improving and creatinine trending down--10/31 now 1.40  Avoid nephrotoxic agents      Hypernatremia resolved  IV bumex  "stopped and on D5W per renal .   Trended sodium and currently within normal limits  Has needed D5W-per renal-off now  Bumex restarted 10/31     Anemia stable  Hemoglobin trended up to 13.7 from 8.3 11/4/2023 suspecting lab error, hemoglobin 11/5/2023 was 7.9 which appropriately down trended from 8.3 with subsequent lab draws.  Suspect multifactorial secondary to sepsis & surgical blood loss   Monitored hemoglobin     Hypervolemia  Back on bumex     Hyperglycemia of critical illness   Was initially on insulin glargine which has been stopped.  Glycemic control is optimal. Cont with sliding scale insulin for now     Morbid obesity  BMI 50     Acute metabolic and toxic encephalopathy resolving  Due to above. Still fluctuating here   Delirium protocol  Needs to be up day, sleep night  Up to chair      Severe decondition due to prolong hospitalization  PT/OT eval, PT recommending TCU, OT recommending LTACH/TCU           Diet: Room Service  Bariatric Diet Full Liquids  Adult Formula Drip Feeding: Specified Time Promote w fiber; Nasojejunal; Goal Rate: 70; mL/hr; From: 4:00 PM; To: 8:00 AM    DVT Prophylaxis: Heparin SQ  Patiño Catheter: Not present  Lines: PRESENT      PICC 10/28/23 Triple Lumen Right Basilic-Site Assessment: WDL      Cardiac Monitoring: None  Code Status: Full Code      Clinically Significant Risk Factors              # Hypoalbuminemia: Lowest albumin = 2.7 g/dL at 10/14/2023  6:29 AM, will monitor as appropriate     # Hypertension: Noted on problem list        # Severe Obesity: Estimated body mass index is 50.42 kg/m  as calculated from the following:    Height as of this encounter: 1.6 m (5' 3\").    Weight as of this encounter: 129.1 kg (284 lb 9.8 oz).      # Asthma: noted on problem list        Disposition Plan      Expected Discharge Date: 11/10/2023    Discharge Delays: Other (Add Comment)  IV Medication - consider oral or Home Infusion  Destination: nursing home              Saad J. Gondal, " MD  Hospitalist Service  [facility]  Securely message with [employer] (more info)  Text page via [facility] Paging/Directory   ______________________________________________________________________    Interval History   Patient was seen and examined at bedside, patient denies acute overnight concerns or complaints, discussed further plan with the patient at bedside    Physical Exam   Vital Signs: Temp: 98.1  F (36.7  C) Temp src: Oral BP: (!) 158/76 Pulse: 90   Resp: 18 SpO2: 98 % O2 Device: Nasal cannula Oxygen Delivery: 1 LPM  Weight: 284 lbs 9.82 oz    GENERAL: Patient was seen and examined at bedside with no acute distress, morbidly obese  HENT:  Head is normocephalic, atraumatic.   EYES:  Eyes have anicteric sclerae without conjunctival injection   LUNGS: Bilateral air entry, clear to auscultation bilaterally decreased breath sounds at the bilateral bases  CARDIOVASCULAR:  Regular rate and rhythm with no murmurs, gallops or rubs.  ABDOMEN:  Normal bowel sounds. Soft; nontender   EXT: 1+ lower extremity edema  SKIN:  No acute rashes.   NEUROLOGIC:  Grossly nonfocal.      Medical Decision Making       50 MINUTES SPENT BY ME on the date of service doing chart review, history, exam, documentation & further activities per the note.      Data         Imaging results reviewed over the past 24 hrs:   No results found for this or any previous visit (from the past 24 hour(s)).

## 2023-11-06 NOTE — PROGRESS NOTES
"  Interventional Radiology - Progress Note  Inpatient - [facility]  11/06/2023     S:  resting comfortably in bed. Denies pain at this time. Claims that her drain seems to be working well and still having output. Reports that surgery has already been by today.     Culture:      1+ Lactobacillus species Abnormal          Antibiotic: IV zosyn   Flushing: NS 10ml Q shift       O:  BP (!) 190/86 (BP Location: Left arm)   Pulse 109   Temp 98.2  F (36.8  C) (Oral)   Resp 18   Ht 1.6 m (5' 3\")   Wt 129.1 kg (284 lb 9.8 oz)   LMP 09/09/2023 (Exact Date)   SpO2 95%   BMI 50.42 kg/m    General:  Stable.  In no acute distress.    Neuro:  A&O x 3. Moves all extremities equally.  Resp:  On RA. Non-labored breathing.   Abdomen:  pelvic drain intact to MICKIE bulb with thinning serous drainage noted in tubing/bulb with some mucous like debris; no leakage; holding suction.     IMAGING:  EXAM: CT ABDOMEN W CONTRAST  LOCATION: [facility]  DATE: 10/30/2023     INDICATION: Follow-up drain placement. Determine if patient may need TPA through drain.  COMPARISON: 10/25/2023. Others.  TECHNIQUE: CT scan of the abdomen was performed following injection of IV contrast. Multiplanar reformats were obtained. Dose reduction techniques were used.  CONTRAST: 75 mL Isovue 370     FINDINGS:    LOWER CHEST: Incompletely seen small to moderate right pleural effusion and complete right lower lobe atelectasis.     HEPATOBILIARY: Exchange of prior perihepatic drain was performed on 10/25/2023. Current drain tip coils adjacent to the hepatic dome. The perihepatic fluid collection has minimally improved. No focal hepatic abnormality.     PANCREAS: Normal.     SPLEEN: Normal.     ADRENAL GLANDS: Normal.     KIDNEYS: Normal.     BOWEL: Surgical changes stomach. No evidence for bowel obstruction.     LYMPH NODES: No adenopathy.     VASCULATURE: Nonaneurysmal aorta.     MUSCULOSKELETAL: Nothing acute.   "                                                                    IMPRESSION:      1.  Perihepatic drain in place with tip adjacent to the hepatic dome. Minimal improvement of the perihepatic collection since 10/25/2023. Small perihepatic collection remains.     2.  Small to moderate sized right pleural effusion and associated collapse of the right lower lobe.    LABS:  Recent Labs   Lab 11/05/23  0649 11/05/23  0648 11/04/23  0503 11/03/23  0619 11/02/23  1814 11/02/23  0616   WBC 7.3  --  4.6 9.0  --  11.4*   HGB 7.9*  --  13.7 8.3*  --  8.3*     --  194 307  --  333   CR  --  1.40* 1.48* 1.50*  --  1.62*   BILITOTAL  --   --   --   --  0.2  --    ALKPHOS  --   --   --   --  112*  --    AST  --   --   --   --  24  --    ALT  --   --   --   --  23  --      Drain Outputs (in mL):  10/31 95   11/1 85   11/2 75   11/3 10   11/4 90   11/5 66   11/6 15       A:  53 year old yo female with PMH of LINA, HLD, methanol abuse, asthma, and JARVIS who was admitted to [facility] 10[facility]09/[facility] for a planned sleeve gastrectomy with single anastomosis duodenal switch. Hospital course c/b encephalopathy, SUSSY, intra-abdominal abscesses, and delirium. Back to OR for closure of two small bowel enterotomies, surgical drain placement (removed 10/25/23) and wash out on 10/12/23. F/U CT demonstrated RUQ fluid collection - CT guided 10F drain placement 10/19/23 and CT guided aspiration of pelvic fluid collection 10/24/23, no drain placed.      CT 10/30/23 without much improvement in perihepatic collection. 10/31/23 and 11/1/23 s/p tPA administration via drain tubing; noted to have significant increase/improvement in drainage output.      tPA instilled on 10/31 through 11/5 with improvement of drainage output, which has most likely resolved the patients fluid accumulation.     P:    Case/imaging reviewed/discussed with IR MD Dr. Dudley. Patient's fluid accumulation is most likely close to being resolved; since fluid appears to be thinning; no  more tPA instillation today; continue to monitor drainage output for another 24 hours. Would prefer to have <20ml/24hours prior to drain tubing removal. Will plan on rounding on patient tomorrow. Discussed with patient and bedside RN; agreeable with plan.        Total time spent on the date of the encounter: 25 minutes.    JUSTINE Odell CNP  Interventional Radiology  [phone]

## 2023-11-06 NOTE — PROGRESS NOTES
Pt on Bipap 12/6, rate 14, 30%, tolerating well.  On 3 lpm NC when off Bipap.  RT will continue to monitor.

## 2023-11-07 ENCOUNTER — APPOINTMENT (OUTPATIENT)
Dept: RADIOLOGY | Facility: HOSPITAL | Age: 53
End: 2023-11-07
Attending: STUDENT IN AN ORGANIZED HEALTH CARE EDUCATION/TRAINING PROGRAM
Payer: COMMERCIAL

## 2023-11-07 ENCOUNTER — APPOINTMENT (OUTPATIENT)
Dept: OCCUPATIONAL THERAPY | Facility: HOSPITAL | Age: 53
End: 2023-11-07
Attending: SURGERY
Payer: COMMERCIAL

## 2023-11-07 ENCOUNTER — APPOINTMENT (OUTPATIENT)
Dept: CT IMAGING | Facility: HOSPITAL | Age: 53
End: 2023-11-07
Attending: NURSE PRACTITIONER
Payer: COMMERCIAL

## 2023-11-07 LAB
ANION GAP SERPL CALCULATED.3IONS-SCNC: 10 MMOL/L (ref 7–15)
BUN SERPL-MCNC: 30.8 MG/DL (ref 6–20)
CALCIUM SERPL-MCNC: 8.7 MG/DL (ref 8.6–10)
CHLORIDE SERPL-SCNC: 103 MMOL/L (ref 98–107)
CREAT SERPL-MCNC: 1.33 MG/DL (ref 0.51–0.95)
DEPRECATED HCO3 PLAS-SCNC: 31 MMOL/L (ref 22–29)
EGFRCR SERPLBLD CKD-EPI 2021: 48 ML/MIN/1.73M2
ERYTHROCYTE [DISTWIDTH] IN BLOOD BY AUTOMATED COUNT: 14.8 % (ref 10–15)
GLUCOSE BLDC GLUCOMTR-MCNC: 103 MG/DL (ref 70–99)
GLUCOSE BLDC GLUCOMTR-MCNC: 108 MG/DL (ref 70–99)
GLUCOSE BLDC GLUCOMTR-MCNC: 117 MG/DL (ref 70–99)
GLUCOSE BLDC GLUCOMTR-MCNC: 129 MG/DL (ref 70–99)
GLUCOSE BLDC GLUCOMTR-MCNC: 146 MG/DL (ref 70–99)
GLUCOSE SERPL-MCNC: 146 MG/DL (ref 70–99)
HCT VFR BLD AUTO: 25.5 % (ref 35–47)
HGB BLD-MCNC: 7.7 G/DL (ref 11.7–15.7)
MAGNESIUM SERPL-MCNC: 1.8 MG/DL (ref 1.7–2.3)
MCH RBC QN AUTO: 28.9 PG (ref 26.5–33)
MCHC RBC AUTO-ENTMCNC: 30.2 G/DL (ref 31.5–36.5)
MCV RBC AUTO: 96 FL (ref 78–100)
PHOSPHATE SERPL-MCNC: 3.1 MG/DL (ref 2.5–4.5)
PLATELET # BLD AUTO: 157 10E3/UL (ref 150–450)
POTASSIUM SERPL-SCNC: 4 MMOL/L (ref 3.4–5.3)
RBC # BLD AUTO: 2.66 10E6/UL (ref 3.8–5.2)
SODIUM SERPL-SCNC: 144 MMOL/L (ref 135–145)
WBC # BLD AUTO: 6.3 10E3/UL (ref 4–11)

## 2023-11-07 PROCEDURE — 97110 THERAPEUTIC EXERCISES: CPT | Mod: GO

## 2023-11-07 PROCEDURE — 250N000011 HC RX IP 250 OP 636: Mod: JZ | Performed by: HOSPITALIST

## 2023-11-07 PROCEDURE — 250N000011 HC RX IP 250 OP 636: Mod: JZ | Performed by: INTERNAL MEDICINE

## 2023-11-07 PROCEDURE — 71045 X-RAY EXAM CHEST 1 VIEW: CPT

## 2023-11-07 PROCEDURE — 999N000157 HC STATISTIC RCP TIME EA 10 MIN

## 2023-11-07 PROCEDURE — 99232 SBSQ HOSP IP/OBS MODERATE 35: CPT | Performed by: STUDENT IN AN ORGANIZED HEALTH CARE EDUCATION/TRAINING PROGRAM

## 2023-11-07 PROCEDURE — 250N000011 HC RX IP 250 OP 636: Mod: JZ | Performed by: PHYSICIAN ASSISTANT

## 2023-11-07 PROCEDURE — 250N000013 HC RX MED GY IP 250 OP 250 PS 637: Performed by: HOSPITALIST

## 2023-11-07 PROCEDURE — 83735 ASSAY OF MAGNESIUM: CPT | Performed by: STUDENT IN AN ORGANIZED HEALTH CARE EDUCATION/TRAINING PROGRAM

## 2023-11-07 PROCEDURE — 250N000011 HC RX IP 250 OP 636: Mod: JZ | Performed by: NURSE PRACTITIONER

## 2023-11-07 PROCEDURE — 94660 CPAP INITIATION&MGMT: CPT

## 2023-11-07 PROCEDURE — C9113 INJ PANTOPRAZOLE SODIUM, VIA: HCPCS | Mod: JZ | Performed by: INTERNAL MEDICINE

## 2023-11-07 PROCEDURE — 250N000013 HC RX MED GY IP 250 OP 250 PS 637: Performed by: INTERNAL MEDICINE

## 2023-11-07 PROCEDURE — 74160 CT ABDOMEN W/CONTRAST: CPT

## 2023-11-07 PROCEDURE — 250N000013 HC RX MED GY IP 250 OP 250 PS 637: Performed by: NURSE PRACTITIONER

## 2023-11-07 PROCEDURE — 85027 COMPLETE CBC AUTOMATED: CPT | Performed by: STUDENT IN AN ORGANIZED HEALTH CARE EDUCATION/TRAINING PROGRAM

## 2023-11-07 PROCEDURE — 250N000013 HC RX MED GY IP 250 OP 250 PS 637: Performed by: PHYSICIAN ASSISTANT

## 2023-11-07 PROCEDURE — 120N000001 HC R&B MED SURG/OB

## 2023-11-07 PROCEDURE — 250N000011 HC RX IP 250 OP 636: Mod: JZ | Performed by: SURGERY

## 2023-11-07 PROCEDURE — 250N000013 HC RX MED GY IP 250 OP 250 PS 637: Performed by: STUDENT IN AN ORGANIZED HEALTH CARE EDUCATION/TRAINING PROGRAM

## 2023-11-07 PROCEDURE — 250N000011 HC RX IP 250 OP 636: Mod: JZ | Performed by: STUDENT IN AN ORGANIZED HEALTH CARE EDUCATION/TRAINING PROGRAM

## 2023-11-07 PROCEDURE — 80048 BASIC METABOLIC PNL TOTAL CA: CPT | Performed by: STUDENT IN AN ORGANIZED HEALTH CARE EDUCATION/TRAINING PROGRAM

## 2023-11-07 PROCEDURE — 84100 ASSAY OF PHOSPHORUS: CPT | Performed by: STUDENT IN AN ORGANIZED HEALTH CARE EDUCATION/TRAINING PROGRAM

## 2023-11-07 RX ORDER — FENTANYL CITRATE 50 UG/ML
25-50 INJECTION, SOLUTION INTRAMUSCULAR; INTRAVENOUS EVERY 5 MIN PRN
Status: DISCONTINUED | OUTPATIENT
Start: 2023-11-07 | End: 2023-11-07

## 2023-11-07 RX ORDER — NALOXONE HYDROCHLORIDE 0.4 MG/ML
0.2 INJECTION, SOLUTION INTRAMUSCULAR; INTRAVENOUS; SUBCUTANEOUS
Status: DISCONTINUED | OUTPATIENT
Start: 2023-11-07 | End: 2023-11-18

## 2023-11-07 RX ORDER — LANOLIN ALCOHOL/MO/W.PET/CERES
3 CREAM (GRAM) TOPICAL
Status: DISCONTINUED | OUTPATIENT
Start: 2023-11-07 | End: 2023-11-21 | Stop reason: HOSPADM

## 2023-11-07 RX ORDER — ONDANSETRON 2 MG/ML
4 INJECTION INTRAMUSCULAR; INTRAVENOUS
Status: DISCONTINUED | OUTPATIENT
Start: 2023-11-07 | End: 2023-11-07

## 2023-11-07 RX ORDER — FLUMAZENIL 0.1 MG/ML
0.2 INJECTION, SOLUTION INTRAVENOUS
Status: DISCONTINUED | OUTPATIENT
Start: 2023-11-07 | End: 2023-11-21 | Stop reason: HOSPADM

## 2023-11-07 RX ORDER — IOPAMIDOL 755 MG/ML
75 INJECTION, SOLUTION INTRAVASCULAR ONCE
Status: COMPLETED | OUTPATIENT
Start: 2023-11-07 | End: 2023-11-07

## 2023-11-07 RX ORDER — NALOXONE HYDROCHLORIDE 0.4 MG/ML
0.4 INJECTION, SOLUTION INTRAMUSCULAR; INTRAVENOUS; SUBCUTANEOUS
Status: DISCONTINUED | OUTPATIENT
Start: 2023-11-07 | End: 2023-11-18

## 2023-11-07 RX ORDER — NYSTATIN 100000/ML
500000 SUSPENSION, ORAL (FINAL DOSE FORM) ORAL 4 TIMES DAILY
Status: DISCONTINUED | OUTPATIENT
Start: 2023-11-07 | End: 2023-11-21 | Stop reason: HOSPADM

## 2023-11-07 RX ORDER — BUMETANIDE 0.5 MG/1
0.5 TABLET ORAL DAILY
Status: DISCONTINUED | OUTPATIENT
Start: 2023-11-08 | End: 2023-11-09

## 2023-11-07 RX ADMIN — PANTOPRAZOLE SODIUM 40 MG: 40 INJECTION, POWDER, FOR SOLUTION INTRAVENOUS at 09:23

## 2023-11-07 RX ADMIN — OLANZAPINE 2.5 MG: 10 INJECTION, POWDER, LYOPHILIZED, FOR SOLUTION INTRAMUSCULAR at 08:51

## 2023-11-07 RX ADMIN — Medication 3 MG: at 03:07

## 2023-11-07 RX ADMIN — VENLAFAXINE 75 MG: 75 TABLET ORAL at 12:13

## 2023-11-07 RX ADMIN — AMOXICILLIN AND CLAVULANATE POTASSIUM 875 MG: 400; 57 POWDER, FOR SUSPENSION ORAL at 09:41

## 2023-11-07 RX ADMIN — IOPAMIDOL 75 ML: 755 INJECTION, SOLUTION INTRAVENOUS at 11:45

## 2023-11-07 RX ADMIN — AMOXICILLIN AND CLAVULANATE POTASSIUM 875 MG: 400; 57 POWDER, FOR SUSPENSION ORAL at 20:12

## 2023-11-07 RX ADMIN — HYDRALAZINE HYDROCHLORIDE 100 MG: 50 TABLET, FILM COATED ORAL at 09:11

## 2023-11-07 RX ADMIN — DOXAZOSIN 2 MG: 1 TABLET ORAL at 08:57

## 2023-11-07 RX ADMIN — HYDRALAZINE HYDROCHLORIDE 20 MG: 20 INJECTION INTRAMUSCULAR; INTRAVENOUS at 08:27

## 2023-11-07 RX ADMIN — CARVEDILOL 25 MG: 12.5 TABLET, FILM COATED ORAL at 16:35

## 2023-11-07 RX ADMIN — ACETAMINOPHEN 650 MG: 325 TABLET ORAL at 12:13

## 2023-11-07 RX ADMIN — HYDRALAZINE HYDROCHLORIDE 100 MG: 50 TABLET, FILM COATED ORAL at 13:32

## 2023-11-07 RX ADMIN — VENLAFAXINE 75 MG: 75 TABLET ORAL at 09:06

## 2023-11-07 RX ADMIN — VENLAFAXINE 75 MG: 75 TABLET ORAL at 16:35

## 2023-11-07 RX ADMIN — HEPARIN SODIUM 5000 UNITS: 5000 INJECTION, SOLUTION INTRAVENOUS; SUBCUTANEOUS at 06:41

## 2023-11-07 RX ADMIN — NYSTATIN 500000 UNITS: 100000 SUSPENSION ORAL at 20:14

## 2023-11-07 RX ADMIN — NYSTATIN 500000 UNITS: 100000 SUSPENSION ORAL at 16:34

## 2023-11-07 RX ADMIN — PROCHLORPERAZINE EDISYLATE 10 MG: 5 INJECTION INTRAMUSCULAR; INTRAVENOUS at 23:40

## 2023-11-07 RX ADMIN — HEPARIN SODIUM 5000 UNITS: 5000 INJECTION, SOLUTION INTRAVENOUS; SUBCUTANEOUS at 13:33

## 2023-11-07 RX ADMIN — HYDRALAZINE HYDROCHLORIDE 20 MG: 20 INJECTION INTRAMUSCULAR; INTRAVENOUS at 03:48

## 2023-11-07 RX ADMIN — DOXAZOSIN 2 MG: 1 TABLET ORAL at 20:06

## 2023-11-07 RX ADMIN — ALTEPLASE 4 MG: 2.2 INJECTION, POWDER, LYOPHILIZED, FOR SOLUTION INTRAVENOUS at 13:39

## 2023-11-07 RX ADMIN — HEPARIN SODIUM 5000 UNITS: 5000 INJECTION, SOLUTION INTRAVENOUS; SUBCUTANEOUS at 21:47

## 2023-11-07 RX ADMIN — AMLODIPINE BESYLATE 5 MG: 5 TABLET ORAL at 20:06

## 2023-11-07 RX ADMIN — NYSTATIN 500000 UNITS: 100000 SUSPENSION ORAL at 13:33

## 2023-11-07 RX ADMIN — HYDRALAZINE HYDROCHLORIDE 100 MG: 50 TABLET, FILM COATED ORAL at 20:06

## 2023-11-07 RX ADMIN — ONDANSETRON 4 MG: 2 INJECTION INTRAMUSCULAR; INTRAVENOUS at 07:48

## 2023-11-07 RX ADMIN — AMLODIPINE BESYLATE 5 MG: 5 TABLET ORAL at 09:07

## 2023-11-07 RX ADMIN — BUMETANIDE 0.5 MG: 0.25 INJECTION INTRAMUSCULAR; INTRAVENOUS at 09:27

## 2023-11-07 RX ADMIN — NITROGLYCERIN 1 PATCH: 0.4 PATCH TRANSDERMAL at 09:40

## 2023-11-07 RX ADMIN — CARVEDILOL 25 MG: 12.5 TABLET, FILM COATED ORAL at 09:01

## 2023-11-07 RX ADMIN — FLUCONAZOLE 400 MG: 40 POWDER, FOR SUSPENSION ORAL at 09:18

## 2023-11-07 ASSESSMENT — ACTIVITIES OF DAILY LIVING (ADL)
ADLS_ACUITY_SCORE: 41

## 2023-11-07 NOTE — PLAN OF CARE
Goal Outcome Evaluation:  Denied pain all day.  Up in chair x 3 for all meals. Tolerated full liquid diet in small amounts. TF orders are now for 16 hours, down from 18hrs yesterday. Started at 1600 and can be stopped at 0800.  Had 2 loose Bms this morning but no others all day. Voiding well with purewick in place. Patient knows when she has to go but from a mobility standpoint is still unable to get to BSC or toilet as she is still requiring ryne lift.  ID discontinued IV antibiotics today and will start on PO Fluconozole and Augmentin tomorrow.  Had CHG bath this afternoon.  Still having output from MICKIE. IR prefers to have <20ml out/24hr before removing. They will reassess tomorrow.  PT/OT following. Patient was able to stand briefly today with PT.  Continued to sat 93-94% on 2L/NC all day. Uses Bipap at night. Encouraged IS use-able to get to 750.

## 2023-11-07 NOTE — PROGRESS NOTES
"Care Management Follow Up    Length of Stay (days): 29    Expected Discharge Date: 11/10/2023     Concerns to be Addressed: discharge planning     Patient plan of care discussed at interdisciplinary rounds: Yes    Anticipated Discharge Disposition: Transitional Care     Anticipated Discharge Services: None  Anticipated Discharge DME:      Patient/family educated on Medicare website which has current facility and service quality ratings: yes  Education Provided on the Discharge Plan: Yes  Patient/Family in Agreement with the Plan: yes    Referrals Placed by CM/SW: Post Acute Facilities  Private pay costs discussed: Not applicable    Additional Information:  Chart reviewed.    Cm updates:  Surgery following pt , diet advancement still, pt more anxious today, loose stools.     [facility] reviewing, all others declined due to no bed or not taking pts insurance, writer will work on getting more choices from pts daughter.       Social Hx:  \"Patient lives with her adult daughter and is independent with all activities of daily living and instrumental activities of daily living (IADLs) at baseline. She is currently unemployed but planned to resume working after recovery. Daughter Karla is primary family contact. \"        Cm will continue to follow plan of care, review recommendations, and assist with any discharge needs anticipated.        Whit Malik RN      "

## 2023-11-07 NOTE — PLAN OF CARE
"Problem: Plan of Care - These are the overarching goals to be used throughout the patient stay.    Goal: Optimal Comfort and Wellbeing  Outcome: Progressing  Intervention: Provide Person-Centered Care  Recent Flowsheet Documentation  Taken 11/7/2023 0115 by Corey Pérez RN  Trust Relationship/Rapport:   reassurance provided   choices provided  Taken 11/6/2023 1949 by Corey Pérez RN  Trust Relationship/Rapport:   reassurance provided   choices provided     Problem: Bariatric Surgery  Goal: Nausea and Vomiting Relief  Outcome: Progressing     Problem: Gas Exchange Impaired  Goal: Optimal Gas Exchange  Outcome: Progressing     Problem: Hypertension Acute  Goal: Blood Pressure Within Desired Range  Outcome: Progressing  Intervention: Normalize Blood Pressure  Recent Flowsheet Documentation  Taken 11/7/2023 0115 by Corey Pérez RN  Sensory Stimulation Regulation: quiet environment promoted  Medication Review/Management: medications reviewed  Taken 11/6/2023 1949 by Corey Pérez RN  Sensory Stimulation Regulation: quiet environment promoted  Medication Review/Management: medications reviewed     Problem: Enteral Nutrition  Goal: Feeding Tolerance  Outcome: Progressing     Goal Outcome Evaluation:         Pt denies pain. A&Ox4 and makes needs known. Reported an \"anxiety attack\" at start of shift. Provided reassurance and therapeutic communication.     Pt did not tolerate BiPAP well overnight. Taking off mask frequently sayings it's uncomfortable and too tight. Notified RT. RT loosened mask as much as they could. Pt still refused to wear BiPAP overnight. On 2 L NC. SpO2 above 92%.     Pt also reported insomnia. Notified hospitalist and obtained order for PRN Melatonin.     Tube feeding running per order. Meds through NJ tube. Taking sips of lemon water, denies nausea. MICKIE intact and patent, flushed per order.     BP was elevated at 190/85 overnight. PRN Hydralazine given.               "

## 2023-11-07 NOTE — PRE-PROCEDURE
GENERAL PRE-PROCEDURE:   Procedure:  Abscessogram  Date/Time:  11/7/2023 2:15 PM    Written consent obtained?: Yes    Risks and benefits: Risks, benefits and alternatives were discussed    Consent given by:  Patient  Patient states understanding of procedure being performed: Yes    Patient's understanding of procedure matches consent: Yes    Procedure consent matches procedure scheduled: Yes    Expected level of sedation:  Moderate  Appropriately NPO:  Yes  ASA Class:  3  Mallampati  :  Grade 2- soft palate, base of uvula, tonsillar pillars, and portion of posterior pharyngeal wall visible  Lungs:  Lungs clear with good breath sounds bilaterally  Heart:  Normal heart sounds and rate  History & Physical reviewed:  History and physical reviewed and no updates needed  Statement of review:  I have reviewed the lab findings, diagnostic data, medications, and the plan for sedation

## 2023-11-07 NOTE — PROGRESS NOTES
Pt wore BiPAP for a couple of hours, took it off and has refused it, stating mask is to tight. Rt did adjust mask earlier in shift for same reason, felt it was better after that adjustment.    Nicholas Saunders,  RT

## 2023-11-07 NOTE — PROGRESS NOTES
"CLINICAL NUTRITION SERVICES - REASSESSMENT NOTE    EVALUATION OF THE PROGRESS TOWARD GOALS   Diet: Bariatric full liquids  Nutrition Support: TF via NJ tube of promote with fiber @ 70 ml/hr x 16 hrs providin Calories, 69 g protein, 155 g CHO, 16 g fiber and 931 ml free fluid plus H2O flush  (270 ml)=1201 ml fluid/day  Intake: Pt took ~ 25% lunch  ( a few sips of protein drink and 50% vanilla ice cream) and 25% breakfast  ( sips of protein drink and 50% yogurt cup)  Calorie counts started today     NEW FINDINGS   Pt currently busy with nursing cares  Was able to talk to pt-she states no problems with the TF and states she's been eating ~ 2 meals per day ( encouraged 3 meals/day)    ANTHROPOMETRICS  Height: 160 cm (5' 3\")  Most Recent Weight: 129.1 kg (284 lb 9.8 oz)    Weight History:   Wt Readings from Last 10 Encounters:   23 129.1 kg (284 lb 9.8 oz)   23 133.4 kg (294 lb 3.2 oz)   23 129.7 kg (286 lb)   23 129.2 kg (284 lb 12.8 oz)   11/10/22 129.3 kg (285 lb)   22 129.3 kg (285 lb)   22 132.6 kg (292 lb 4.8 oz)   22 131.7 kg (290 lb 6.4 oz)   06/15/22 132 kg (291 lb)   22 131.8 kg (290 lb 9.6 oz)     GI CONCERNS  Abdomen tender/soft  Fecal incontinence  Last BM 2023 ( brown/loose)  + BS all quadrants    LABS  Reviewed: Na 144, K 4.0, urea nitrogen 30.8 (H), Cr 1.33 (H)), Ca 8.7, Mg 1.8, phos 3.1, Glu 146 (H)    Estimated nutrition needs:   Dosing weight is  52.3 Kg ( IBW)    Estimated energy needs: 3776-1247 Calories/day ( 25-30 James/Kg)  Justification: post op/ obese  Estimated protein needs: 63-78 g/day ( 1.2-1.5 g/Kg)  Justification: obesity guidelines/ post op/ elevated Cr  Estimated fluid needs: 9014-9045+ ml/day ( 1+ ml/James)  Justification: maintenance    80% of estimated needs=~0769-4906 Calories and 50-62 g protein/day  Goal is to decrease TF to 14 hrs but to still meet 80% of her needs    MEDICATIONS  Reviewed: norvasc, augmentin, bumex, " Coreg, cardura, diflucan, hydralazine, novolog, pantoprazole    CURRENT NUTRITION DIAGNOSIS  Inadequate oral intake related to poor appetite/altered GI function as evidenced by not meeting nutritional needs from po intake      INTERVENTIONS  Implementation  Decrease TF rate to a 14 hr regimen, increase TF rate to 80 ml/rh ( from 4 pm to 6 am)    Goals  Tolerate TF  Increase po intake ( meet nutritional needs)  Diet advancement when possible    Monitoring/Evaluation  Progress toward goals will be monitored and evaluated per protocol.

## 2023-11-07 NOTE — PROGRESS NOTES
[facility]    Medicine Progress Note - Hospitalist Service    Date of Admission:  10/9/2023    Assessment & Plan   Assessment:  Mojgan Jimenez is a 53 year old female with h/o obesity, severe JARVIS on CPAP, asthma, stimulant use disorder, stimulant induced psychosis, mood & anxiety disorder. Admitted 10/9/23 for scheduled laparoscopic sleeve gastrectomy with single anastomosis duodenal switch. She was later found to have two small bowel injuries with peritonitis. 10/12/23 returned to OR for small bowel interotomy closure, clean-out, & drain placement; 10/19 found to have RUQ fluid collection s/p drain placement; 10/24 s/p pelvic fluid aspiration.   Course complicated by encephalopathy/delirium, septic shock, suspected takotsubo syndrome, acute respiratory failure due to loss of protective airway reflexes & increased metabolic load requiring intubation (10/12-10/21/23; reintubated 10/21-10/26/23), & oliguric SUSSY with renal recovery. She has significantly improved: mental status clearing with some ongoing delirium at night, cardiomyopathy improved, ATN I recovery phase, and ongoing treatment of intra abdominal infection. She was made floor tele status 10/27/23. On 11/1/23 CT guided chest tube placed on right for persistent effusion, concern for parapneumonic one with risk of future trapped lung. Chest tubes removed 11/5/2023.      Problem list and plan:  Acute hypoxemic-hypercapnic respiratory failure  Was admitted 10/9/2023 for scheduled laparoscopic sleeve gastrectomy with single anastomosis duodenal switch.  Currently on 1 L nasal cannula saturating in the low to mid 90s  Rapid Response on 10/12, Pt with decreased LOC. Opens eyes to voice. SpO2 89% on 8L NC. BS clear and diminished. Placed on a 15L non-rebreather for SpO2 > 90%. After narcan admin pt became more responsive/agitated. Transferred to ICU and intubated 10/12-10/21/23; reintubated 10/21-10/26/23. Now extubated and weaned to 1 to 2  L nasal cannula  10/22/23 CT demonstrated complete RLL atelectasis, milder RUL & RBML atelectasis or consolidation. 10/22 bronchoscopy moderate RLL blood tinged secretions cleared.   Cont pulmonary hygiene  R-pleural effusion, exudative, suspect simple parapneumonic effusion. 10/24 s/p thoracentesis, 400 mL. Pleural fluid amylase amylase 23, serum 64. 11/1/23--chest tube placed on right. Chest tubes removed 11/5/2023     Gastric bypass  10/9 s/p sleeve gastrectomy complicated by small bowel injury & peritonitis   10/12 returned to OR for closure, clean-out, & drain placement (now removed)  10/19 s/p RUQ fluid collection drain placement   10/24 s/p pelvic fluid aspiration (75 ml)  Surgery primary   ID consulted   10/12 - peritoneal cultures + Streptococcus anginosus, mixed aerobic & anaerobic kamla   10/19 - RUQ fluid cultures & pelvic fluid aspirate + Lactobacillus   10/22 - BDG blood +  10/24 pleural fluid studies  -#+WBC no pos cx. 10/24--aspirate pelvic drain--lacto + candida  Was on Vanc, pip-tazo, micafungin. Vanco d/jose 10/31  Was on TPN previously 10/14  PPTF started 10/24/23. TF and TPN weaned of 10/27 currently on bariatric clears, surgery managing   Diet per surg  Flush drain at regular intervals   Possible drain removal by IR soon check  ID recommending switching to Augmentin and fluconazole  CT abdomen with contrast ordered currently pending results to monitor fluid collection  Surgery recommending increased mobility and better oral intake      Accelerated HTN /Cardiomyopathy   Echo previously and initially decreased LV function but repeat echo shows EF normalized, possible stress induced.   Cardiology consulted  Metoprolol switched to coreg; dose increased 11/3/23  Hydralazine increase to 100 mg tid  Nitroglycerin patch 0.4mg/24  Amlodipine 5 mg bid  Bumex back on per renal  Prn IV hydralazine  Discussed with nephrology, as per nephrology may switch to Bumex 0.5 daily oral from IV, also advised  outpatient follow-up with nephrology as outpatient once discharged and repeat BMP in a week to follow-up on sodium and creatinine once discharged.  We will do a chest x-ray to follow-up and rule out any current fluid overload, lower extremity edema has resolved.  Blood pressure still running in the high range on Coreg, hydralazine, amlodipine, Bumex and doxazosin, will switch to nifedipine if BP continues to be high 11/8/2023.    Scaly brown to white mucosal lesion in the mouth  Suspecting secondary to oral thrush  Will need frequent oral cares  Placed order for nystatin swish and swallow     History of JARVIS, asthma in the chart no PFTs  NIPPV with sleep/naps everytime   Continue duonebs     Oliguric SUSSY improving   Nephrology was consulted  for ARF peak creat 6.97 on 10/14  Slowly improving and creatinine trending down--10/31 now 1.33  Avoid nephrotoxic agents      Hypernatremia resolved  IV bumex stopped and on D5W per renal .   Trended sodium and currently within normal limits  Has needed D5W-per renal-off now  Bumex restarted 10/31     Anemia stable  Hemoglobin trended up to 13.7 from 8.3 11/4/2023 suspecting lab error, hemoglobin 11/5/2023 was 7.9 which appropriately down trended from 8.3 with subsequent lab draws.  Hemoglobin currently stable  Suspect multifactorial secondary to sepsis & surgical blood loss   Monitored hemoglobin     Hypervolemia  Back on bumex now switched to oral     Hyperglycemia of critical illness   Was initially on insulin glargine which has been stopped.  Glycemic control is optimal. Cont with sliding scale insulin for now     Morbid obesity  BMI 50     Acute metabolic and toxic encephalopathy resolving  Due to above. Still fluctuating here   Delirium protocol  Needs to be up day, sleep night  Up to chair      Severe decondition due to prolong hospitalization  PT/OT eval, PT recommending TCU, OT recommending LTACH/TCU           Diet: Bariatric Diet Full Liquids  Calorie Counts  Adult  "Formula Drip Feeding: Specified Time Promote w fiber; Nasojejunal; Goal Rate: 80; mL/hr; From: 4:00 PM; To: 6:00 AM  Room Service    DVT Prophylaxis: Heparin SQ  Patiño Catheter: Not present  Lines: PRESENT      PICC 10/28/23 Triple Lumen Right Basilic-Site Assessment: WDL      Cardiac Monitoring: None  Code Status: Full Code      Clinically Significant Risk Factors              # Hypoalbuminemia: Lowest albumin = 2.7 g/dL at 10/14/2023  6:29 AM, will monitor as appropriate     # Hypertension: Noted on problem list        # Severe Obesity: Estimated body mass index is 50.42 kg/m  as calculated from the following:    Height as of this encounter: 1.6 m (5' 3\").    Weight as of this encounter: 129.1 kg (284 lb 9.8 oz).      # Asthma: noted on problem list        Disposition Plan     Expected Discharge Date: 11/10/2023    Discharge Delays: Other (Add Comment)  IV Medication - consider oral or Home Infusion  Destination: nursing home              Saad J. Gondal, MD  Hospitalist Service  [facility]  Securely message with [employer] (more info)  Text page via [employer] Paging/Directory   ______________________________________________________________________    Interval History   Patient seen and examined at bedside, patient denies any acute complaints, discussed plan for switching IV to p.o. diuretics with both patient and nephrology.    Physical Exam   Vital Signs: Temp: 98.5  F (36.9  C) Temp src: Oral BP: (!) 164/74 Pulse: 107   Resp: 20 SpO2: 93 % O2 Device: Nasal cannula Oxygen Delivery: 2 LPM  Weight: 284 lbs 9.82 oz    GENERAL: Patient was seen and examined at bedside with no acute distress, morbidly obese  HENT:  Head is normocephalic, atraumatic.  Mucosal lesions of the tongue and the roof of the mouth  EYES:  Eyes have anicteric sclerae without conjunctival injection   LUNGS: Bilateral air entry, clear to auscultation bilaterally decreased breath sounds at the bilateral bases  CARDIOVASCULAR:  " Regular rate and rhythm with no murmurs, gallops or rubs.  ABDOMEN:  Normal bowel sounds. Soft; nontender   EXT: No edema  SKIN:  No acute rashes.   NEUROLOGIC:  Grossly nonfocal.      Medical Decision Making       45 MINUTES SPENT BY ME on the date of service doing chart review, history, exam, documentation & further activities per the note.      Data     I have personally reviewed the following data over the past 24 hrs:    6.3  \   7.7 (L)   / 157     144 103 30.8 (H) /  108 (H)   4.0 31 (H) 1.33 (H) \       Imaging results reviewed over the past 24 hrs:   No results found for this or any previous visit (from the past 24 hour(s)).

## 2023-11-07 NOTE — PROGRESS NOTES
Interventional Radiology - Pre-Procedure Note:  Inpatient - [facility]  11/07/2023     Procedure Requested: abscessogram  Requested by: Dr. Winslow/Marlen Reynaga CNP    Brief HPI: Mojgan Jimenez is a 53 year old female with PMH of LINA, HLD, methanol abuse, asthma, and JARVIS who was admitted to [facility] on 10/09/2023 for a planned sleeve gastrectomy with single anastomosis duodenal switch. Hospital course c/b encephalopathy, SUSSY, intra-abdominal abscesses, and delirium. Back to OR for closure of two small bowel enterotomies, surgical drain placement (removed 10/25/23) and wash out on 10/12/23. F/U CT demonstrated RUQ fluid collection - CT guided 10F drain placement 10/19/23 and CT guided aspiration of pelvic fluid collection 10/24/23, no drain placed.      CT 10/30/23 without much improvement in perihepatic collection. 10/31/23 and 11/1/23 s/p tPA administration via drain tubing; noted to have significant increase/improvement in drainage output.       tPA instilled on 10/31 through 11/5 with improvement of drainage output, which has most likely resolved the patients fluid accumulation.   11/7/23 abdominal CT obtained and demonstrates appropriate positioning of perihepatic drain with significantly decreased size of the associated fluid collection. TPA 4mg in 15ml NS ordered x1 dose.     Drain Outputs (in mL):  10/31 95   11/1 85   11/2 75   11/3 10   11/4 90   11/5 66   11/6 15   11/7         15         IMAGING:  EXAM: CT ABDOMEN W CONTRAST  LOCATION: [facility]  DATE: 11/7/2023     INDICATION: routine check on post surgical hepatic fluid collection with drain post tPA instillation  COMPARISON: 10/30/2023.  TECHNIQUE: CT scan of the abdomen was performed following injection of IV contrast. Multiplanar reformats were obtained. Dose reduction techniques were used.  CONTRAST: [id number] 75ml  from a single use vial     FINDINGS:    LOWER CHEST: Small right pleural effusion  "with continued bibasilar atelectasis, greater on the right. Improved from prior study. No pneumothorax. Heart is not enlarged.     HEPATOBILIARY: Appropriate positioning of pigtail drainage catheter in perihepatic fluid collection which has significantly improved from the prior study. No new fluid collection identified. Gallbladder is unremarkable.     PANCREAS: Normal.     SPLEEN: Normal.     ADRENAL GLANDS: Normal.     KIDNEYS: Normal.     BOWEL: Appropriate positioning of feeding tube. Postsurgical changes of gastric leak.     LYMPH NODES: Normal.     VASCULATURE: Unremarkable.     MUSCULOSKELETAL: Degenerative disease of the visualized lumbar spine and thoracic spine. The                                                                      IMPRESSION:   1.  Appropriate positioning of perihepatic drain with significantly decreased size of the associated fluid collection.  2.  Right pleural effusion with associated atelectasis, improved from prior study.    NPO: ordered for midnight  ANTICOAGULANTS: subcutaneous heparin  ANTIBIOTICS: PO augmentin     ALLERGIES  No Known Allergies      LABS:  INR   Date Value Ref Range Status   10/23/2023 1.49 (H) 0.85 - 1.15 Final      Hemoglobin   Date Value Ref Range Status   11/07/2023 7.7 (L) 11.7 - 15.7 g/dL Final   11/17/2020 12.3 11.7 - 15.7 g/dL Final     Platelet Count   Date Value Ref Range Status   11/07/2023 157 150 - 450 10e3/uL Final   11/17/2020 228 150 - 450 10e9/L Final     Creatinine   Date Value Ref Range Status   11/07/2023 1.33 (H) 0.51 - 0.95 mg/dL Final   11/17/2020 0.79 0.52 - 1.04 mg/dL Final     Potassium   Date Value Ref Range Status   11/07/2023 4.0 3.4 - 5.3 mmol/L Final   05/17/2022 4.5 3.4 - 5.3 mmol/L Final   11/17/2020 4.1 3.4 - 5.3 mmol/L Final         EXAM:  BP (!) 164/74 (BP Location: Left arm)   Pulse 107   Temp 98.5  F (36.9  C) (Oral)   Resp 20   Ht 1.6 m (5' 3\")   Wt 129.1 kg (284 lb 9.8 oz)   LMP 09/09/2023 (Exact Date)   SpO2 93%   " BMI 50.42 kg/m    General:  Stable.  In no acute distress.    Neuro:  A&O x 3. Moves all extremities equally.  Resp:  Lungs clear to auscultation bilaterally.  Cardio:  S1S2 and reg, without murmur, clicks or rubs  Abdomen: right abdominal drain currently clamped; tPA instilled; minimal output in MICKIE bulb; no leakage noted.     Pre-Sedation Assessment:  Mallampati Airway Classification:  II - Faucial pillars and soft palate may be seen, but uvula is masked by the base of the tongue  Previous reaction to anesthesia/sedation:  Yes: nausea; has zofran on her MAR; instructed bedside RN to given zofran pre-procedure tomorrow.   Sedation plan based on assessment: Moderate (conscious) sedation  ASA Classification: Class 3 - SEVERE SYSTEMIC DISEASE, DEFINITE FUNCTIONAL LIMITATIONS.   Code Status: Full Code intra procedure, per discussion with patient and patient's daughter.       ASSESSMENT/PLAN:   CT of abdomen and case reviewed with IR MD Dr. Winslow.  tPA 4mg in 15ml NS x1 dose today; monitor drainage outputs.   NPO at midnight; plan for AM case.   IV zofran to be given prior to IR procedure.   Abscessogram with sedation and possible reposition, exchange, and/or removal of tubing on Wednesday.     Procedural education reviewed with patient/family; patient's daughter Karla via phone in detail including, but not limited to risks, benefits and alternatives with understanding verbalized by patient/family.    Total time spent on the date of the encounter: 35 minutes.      JUSTINE Odell CNP  Interventional Radiology

## 2023-11-07 NOTE — PROGRESS NOTES
ASSESSMENT/PLAN:  1. Intra-abdominal abscess (H)    2. Perimenopausal symptoms        Mojgan Jimenez is a 53 year old female who is s/p laparoscopic sleeve gastrectomy with single anastomosis duodenal switch on 10/09/23 with return to the OR on 10/12/23 to repair two small enterotomies on the common channel. Patient became septic with ARF and developed pneumonia and pleural effusion necessitating chest tube placement. Chest tube removed on 11/05/23 and pulmonary signed off. Renal function has drastically improved and renal signed off. She was noted to have a perihepatic fluid collection that is s/p percutaneous drain placement by IR. This continued to have increased output after alteplase instillations but has started to decrease drastically again; IR is monitoring with CT study of the drain and fluid collection today. ID has transitioned patient to oral antibiotics and has signed off.    At this point, we need to start to get patient ready for acute rehabilitation. Patient will need to increase activity and will need encouragement and sometimes prodding to do so.     In regard to diet, we will start calorie counts and encourage oral intake. We will continue the 80% of her caloric needs via tube feed but drop this to a 14 hour duration rather than 16 hours. Hopefully, we can continue to decrease her need for tube feed as we increase her oral intake.    Patient needs to be up in the chair at least 2 times per day and this should coincide with meals. Please encourage her to help with ADLs as much as possible (pulling herself over rather than complete reliance on the lift, helping to clean her own upper body with bed baths, etc). We need to engage her to help her mentation and her mood.    Continue to use prn medications for anxiety as well as her scheduled medications.     Regarding her loose stool; in the absence of temperature and abdominal pain, it is not abnormal for her to 5-10 loose stools a day after duodenal  switch. This would likely be     Surgery is primary, but we greatly appreciate HMS management of her HTN and respiratory status (BiPap management).      SUBJECTIVE:   She is more anxious today. She states that she is tolerating oral intake with her full liquid diet but needs to go slow. She did not get much sleep last night and didn't tolerate her BiPap very well. No complaints of pain. Feels weak. Continues to have loose/liquid stools.      Patient Vitals for the past 24 hrs:   BP Temp Temp src Pulse Resp SpO2   11/07/23 0827 (!) 199/89 -- -- -- -- --   11/07/23 0754 -- 98.2  F (36.8  C) Oral -- -- --   11/07/23 0702 -- -- -- 107 -- 93 %   11/07/23 0406 (!) 183/79 -- -- -- -- --   11/07/23 0319 (!) 190/85 97.5  F (36.4  C) Oral -- 20 --   11/07/23 0002 -- -- -- 96 15 95 %   11/06/23 2201 (!) 167/73 -- -- -- 18 --   11/06/23 1542 -- -- -- 101 -- 93 %   11/06/23 1522 (!) 168/80 98  F (36.7  C) Oral -- 18 --   11/06/23 1215 (!) 158/76 98.1  F (36.7  C) Oral -- 18 --         PHYSICAL EXAM:  GEN: No acute distress, comfortable  LUNGS: CTA bilaterally  CV:RRR  ABD:soft, not tender, incisions well healed  Drains: scant serous   Output by Drain (mL) 11/05/23 0700 - 11/05/23 1459 11/05/23 1500 - 11/05/23 2259 11/05/23 2300 - 11/06/23 0659 11/06/23 0700 - 11/06/23 1459 11/06/23 1500 - 11/06/23 2259 11/06/23 2300 - 11/07/23 0659 11/07/23 0700 - 11/07/23 0911   Closed/Suction Drain 1 Right RUQ Bulb 12 Hebrew 53 3 10 15  15       EXT:No cyanosis, edema or obvious abnormalities    11/06 0700 - 11/07 0659  In: 2432 [P.O.:570; I.V.:551]  Out: 2345 [Urine:2300; Drains:30]    No results displayed because visit has over 200 results.             JUSTINE Woo CNP

## 2023-11-08 ENCOUNTER — APPOINTMENT (OUTPATIENT)
Dept: CT IMAGING | Facility: HOSPITAL | Age: 53
End: 2023-11-08
Attending: STUDENT IN AN ORGANIZED HEALTH CARE EDUCATION/TRAINING PROGRAM
Payer: COMMERCIAL

## 2023-11-08 ENCOUNTER — APPOINTMENT (OUTPATIENT)
Dept: INTERVENTIONAL RADIOLOGY/VASCULAR | Facility: HOSPITAL | Age: 53
End: 2023-11-08
Attending: NURSE PRACTITIONER
Payer: COMMERCIAL

## 2023-11-08 ENCOUNTER — APPOINTMENT (OUTPATIENT)
Dept: CARDIOLOGY | Facility: HOSPITAL | Age: 53
End: 2023-11-08
Attending: STUDENT IN AN ORGANIZED HEALTH CARE EDUCATION/TRAINING PROGRAM
Payer: COMMERCIAL

## 2023-11-08 ENCOUNTER — APPOINTMENT (OUTPATIENT)
Dept: ULTRASOUND IMAGING | Facility: HOSPITAL | Age: 53
End: 2023-11-08
Attending: STUDENT IN AN ORGANIZED HEALTH CARE EDUCATION/TRAINING PROGRAM
Payer: COMMERCIAL

## 2023-11-08 ENCOUNTER — APPOINTMENT (OUTPATIENT)
Dept: OCCUPATIONAL THERAPY | Facility: HOSPITAL | Age: 53
End: 2023-11-08
Attending: SURGERY
Payer: COMMERCIAL

## 2023-11-08 LAB
ALBUMIN MFR UR ELPH: 122 MG/DL
ALBUMIN UR-MCNC: 30 MG/DL
ANION GAP SERPL CALCULATED.3IONS-SCNC: 7 MMOL/L (ref 7–15)
APPEARANCE UR: CLEAR
BILIRUB UR QL STRIP: NEGATIVE
BUN SERPL-MCNC: 29.6 MG/DL (ref 6–20)
CALCIUM SERPL-MCNC: 9.5 MG/DL (ref 8.6–10)
CHLORIDE SERPL-SCNC: 103 MMOL/L (ref 98–107)
COLOR UR AUTO: YELLOW
CREAT SERPL-MCNC: 1.38 MG/DL (ref 0.51–0.95)
CREAT UR-MCNC: 78.3 MG/DL
DEPRECATED HCO3 PLAS-SCNC: 32 MMOL/L (ref 22–29)
EGFRCR SERPLBLD CKD-EPI 2021: 46 ML/MIN/1.73M2
GLUCOSE BLDC GLUCOMTR-MCNC: 109 MG/DL (ref 70–99)
GLUCOSE BLDC GLUCOMTR-MCNC: 116 MG/DL (ref 70–99)
GLUCOSE BLDC GLUCOMTR-MCNC: 121 MG/DL (ref 70–99)
GLUCOSE BLDC GLUCOMTR-MCNC: 156 MG/DL (ref 70–99)
GLUCOSE SERPL-MCNC: 107 MG/DL (ref 70–99)
GLUCOSE UR STRIP-MCNC: NEGATIVE MG/DL
HGB UR QL STRIP: NEGATIVE
KETONES UR STRIP-MCNC: NEGATIVE MG/DL
LEUKOCYTE ESTERASE UR QL STRIP: NEGATIVE
LVEF ECHO: NORMAL
NITRATE UR QL: NEGATIVE
NT-PROBNP SERPL-MCNC: 1263 PG/ML (ref 0–900)
PH UR STRIP: 5.5 [PH] (ref 5–7)
POTASSIUM SERPL-SCNC: 4 MMOL/L (ref 3.4–5.3)
PROT/CREAT 24H UR: 1.56 MG/MG CR (ref 0–0.2)
SODIUM SERPL-SCNC: 142 MMOL/L (ref 135–145)
SP GR UR STRIP: 1.01 (ref 1–1.03)
UROBILINOGEN UR STRIP-MCNC: <2 MG/DL

## 2023-11-08 PROCEDURE — 250N000011 HC RX IP 250 OP 636: Mod: JZ | Performed by: HOSPITALIST

## 2023-11-08 PROCEDURE — 84156 ASSAY OF PROTEIN URINE: CPT

## 2023-11-08 PROCEDURE — 250N000013 HC RX MED GY IP 250 OP 250 PS 637: Performed by: PHYSICIAN ASSISTANT

## 2023-11-08 PROCEDURE — 120N000001 HC R&B MED SURG/OB

## 2023-11-08 PROCEDURE — 258N000003 HC RX IP 258 OP 636: Performed by: STUDENT IN AN ORGANIZED HEALTH CARE EDUCATION/TRAINING PROGRAM

## 2023-11-08 PROCEDURE — 250N000011 HC RX IP 250 OP 636: Mod: JZ | Performed by: NURSE PRACTITIONER

## 2023-11-08 PROCEDURE — 250N000011 HC RX IP 250 OP 636: Mod: JZ | Performed by: SURGERY

## 2023-11-08 PROCEDURE — 250N000013 HC RX MED GY IP 250 OP 250 PS 637: Performed by: INTERNAL MEDICINE

## 2023-11-08 PROCEDURE — 99207 PR APP CREDIT; MD BILLING SHARED VISIT: CPT

## 2023-11-08 PROCEDURE — 84244 ASSAY OF RENIN: CPT

## 2023-11-08 PROCEDURE — 0WPGX0Z REMOVAL OF DRAINAGE DEVICE FROM PERITONEAL CAVITY, EXTERNAL APPROACH: ICD-10-PCS | Performed by: RADIOLOGY

## 2023-11-08 PROCEDURE — 99222 1ST HOSP IP/OBS MODERATE 55: CPT | Mod: FS | Performed by: INTERNAL MEDICINE

## 2023-11-08 PROCEDURE — 250N000011 HC RX IP 250 OP 636: Mod: JZ | Performed by: STUDENT IN AN ORGANIZED HEALTH CARE EDUCATION/TRAINING PROGRAM

## 2023-11-08 PROCEDURE — 250N000013 HC RX MED GY IP 250 OP 250 PS 637: Performed by: HOSPITALIST

## 2023-11-08 PROCEDURE — 250N000013 HC RX MED GY IP 250 OP 250 PS 637: Performed by: STUDENT IN AN ORGANIZED HEALTH CARE EDUCATION/TRAINING PROGRAM

## 2023-11-08 PROCEDURE — 82533 TOTAL CORTISOL: CPT

## 2023-11-08 PROCEDURE — 81003 URINALYSIS AUTO W/O SCOPE: CPT

## 2023-11-08 PROCEDURE — 80048 BASIC METABOLIC PNL TOTAL CA: CPT | Performed by: STUDENT IN AN ORGANIZED HEALTH CARE EDUCATION/TRAINING PROGRAM

## 2023-11-08 PROCEDURE — 255N000002 HC RX 255 OP 636: Performed by: SURGERY

## 2023-11-08 PROCEDURE — 97110 THERAPEUTIC EXERCISES: CPT | Mod: GO

## 2023-11-08 PROCEDURE — 83880 ASSAY OF NATRIURETIC PEPTIDE: CPT | Performed by: STUDENT IN AN ORGANIZED HEALTH CARE EDUCATION/TRAINING PROGRAM

## 2023-11-08 PROCEDURE — 93306 TTE W/DOPPLER COMPLETE: CPT | Mod: 26 | Performed by: GENERAL ACUTE CARE HOSPITAL

## 2023-11-08 PROCEDURE — 82088 ASSAY OF ALDOSTERONE: CPT

## 2023-11-08 PROCEDURE — 71275 CT ANGIOGRAPHY CHEST: CPT

## 2023-11-08 PROCEDURE — 250N000011 HC RX IP 250 OP 636: Mod: JZ | Performed by: INTERNAL MEDICINE

## 2023-11-08 PROCEDURE — 99232 SBSQ HOSP IP/OBS MODERATE 35: CPT | Performed by: STUDENT IN AN ORGANIZED HEALTH CARE EDUCATION/TRAINING PROGRAM

## 2023-11-08 PROCEDURE — 84133 ASSAY OF URINE POTASSIUM: CPT

## 2023-11-08 PROCEDURE — C9113 INJ PANTOPRAZOLE SODIUM, VIA: HCPCS | Mod: JZ | Performed by: INTERNAL MEDICINE

## 2023-11-08 PROCEDURE — 250N000013 HC RX MED GY IP 250 OP 250 PS 637: Performed by: NURSE PRACTITIONER

## 2023-11-08 PROCEDURE — 49424 ASSESS CYST CONTRAST INJECT: CPT

## 2023-11-08 PROCEDURE — 93970 EXTREMITY STUDY: CPT

## 2023-11-08 RX ORDER — NIFEDIPINE 60 MG/1
60 TABLET, EXTENDED RELEASE ORAL DAILY
Status: DISCONTINUED | OUTPATIENT
Start: 2023-11-08 | End: 2023-11-08

## 2023-11-08 RX ORDER — SODIUM CHLORIDE 9 MG/ML
INJECTION, SOLUTION INTRAVENOUS CONTINUOUS
Status: DISCONTINUED | OUTPATIENT
Start: 2023-11-08 | End: 2023-11-10

## 2023-11-08 RX ORDER — NIFEDIPINE 30 MG
30 TABLET, EXTENDED RELEASE ORAL DAILY
Status: DISCONTINUED | OUTPATIENT
Start: 2023-11-09 | End: 2023-11-09

## 2023-11-08 RX ORDER — IOPAMIDOL 755 MG/ML
75 INJECTION, SOLUTION INTRAVASCULAR ONCE
Status: COMPLETED | OUTPATIENT
Start: 2023-11-08 | End: 2023-11-08

## 2023-11-08 RX ORDER — MAGNESIUM SULFATE HEPTAHYDRATE 40 MG/ML
2 INJECTION, SOLUTION INTRAVENOUS ONCE
Status: COMPLETED | OUTPATIENT
Start: 2023-11-08 | End: 2023-11-08

## 2023-11-08 RX ORDER — AMLODIPINE BESYLATE 5 MG/1
5 TABLET ORAL DAILY
Status: DISCONTINUED | OUTPATIENT
Start: 2023-11-08 | End: 2023-11-08

## 2023-11-08 RX ADMIN — AMOXICILLIN AND CLAVULANATE POTASSIUM 875 MG: 400; 57 POWDER, FOR SUSPENSION ORAL at 09:48

## 2023-11-08 RX ADMIN — VENLAFAXINE 75 MG: 75 TABLET ORAL at 16:56

## 2023-11-08 RX ADMIN — SODIUM CHLORIDE 500 ML: 9 INJECTION, SOLUTION INTRAVENOUS at 10:56

## 2023-11-08 RX ADMIN — NIFEDIPINE 60 MG: 60 TABLET, FILM COATED, EXTENDED RELEASE ORAL at 09:27

## 2023-11-08 RX ADMIN — NYSTATIN 500000 UNITS: 100000 SUSPENSION ORAL at 21:36

## 2023-11-08 RX ADMIN — HEPARIN SODIUM 5000 UNITS: 5000 INJECTION, SOLUTION INTRAVENOUS; SUBCUTANEOUS at 05:33

## 2023-11-08 RX ADMIN — VENLAFAXINE 75 MG: 75 TABLET ORAL at 08:00

## 2023-11-08 RX ADMIN — PANTOPRAZOLE SODIUM 40 MG: 40 INJECTION, POWDER, FOR SOLUTION INTRAVENOUS at 08:02

## 2023-11-08 RX ADMIN — HEPARIN SODIUM 5000 UNITS: 5000 INJECTION, SOLUTION INTRAVENOUS; SUBCUTANEOUS at 15:08

## 2023-11-08 RX ADMIN — VENLAFAXINE 75 MG: 75 TABLET ORAL at 11:00

## 2023-11-08 RX ADMIN — ONDANSETRON 4 MG: 2 INJECTION INTRAMUSCULAR; INTRAVENOUS at 11:10

## 2023-11-08 RX ADMIN — IOPAMIDOL 75 ML: 755 INJECTION, SOLUTION INTRAVENOUS at 13:35

## 2023-11-08 RX ADMIN — NYSTATIN 500000 UNITS: 100000 SUSPENSION ORAL at 08:01

## 2023-11-08 RX ADMIN — AMOXICILLIN AND CLAVULANATE POTASSIUM 875 MG: 400; 57 POWDER, FOR SUSPENSION ORAL at 21:38

## 2023-11-08 RX ADMIN — FLUCONAZOLE 400 MG: 40 POWDER, FOR SUSPENSION ORAL at 08:02

## 2023-11-08 RX ADMIN — PERFLUTREN 2 ML: 6.52 INJECTION, SUSPENSION INTRAVENOUS at 15:50

## 2023-11-08 RX ADMIN — Medication 3 MG: at 21:30

## 2023-11-08 RX ADMIN — CARVEDILOL 25 MG: 12.5 TABLET, FILM COATED ORAL at 08:03

## 2023-11-08 RX ADMIN — HYDRALAZINE HYDROCHLORIDE 20 MG: 20 INJECTION INTRAMUSCULAR; INTRAVENOUS at 08:04

## 2023-11-08 RX ADMIN — HYDRALAZINE HYDROCHLORIDE 100 MG: 50 TABLET, FILM COATED ORAL at 08:02

## 2023-11-08 RX ADMIN — ALTEPLASE 2 MG: 2.2 INJECTION, POWDER, LYOPHILIZED, FOR SOLUTION INTRAVENOUS at 21:47

## 2023-11-08 RX ADMIN — ALTEPLASE 2 MG: 2.2 INJECTION, POWDER, LYOPHILIZED, FOR SOLUTION INTRAVENOUS at 20:01

## 2023-11-08 RX ADMIN — NYSTATIN 500000 UNITS: 100000 SUSPENSION ORAL at 15:07

## 2023-11-08 RX ADMIN — NITROGLYCERIN 1 PATCH: 0.4 PATCH TRANSDERMAL at 08:01

## 2023-11-08 RX ADMIN — DOXAZOSIN 2 MG: 1 TABLET ORAL at 08:01

## 2023-11-08 RX ADMIN — ACETAMINOPHEN 650 MG: 325 TABLET ORAL at 18:53

## 2023-11-08 RX ADMIN — ACETAMINOPHEN 650 MG: 325 TABLET ORAL at 22:57

## 2023-11-08 RX ADMIN — HYDROMORPHONE HYDROCHLORIDE 0.2 MG: 0.2 INJECTION, SOLUTION INTRAMUSCULAR; INTRAVENOUS; SUBCUTANEOUS at 11:05

## 2023-11-08 RX ADMIN — MAGNESIUM SULFATE HEPTAHYDRATE 2 G: 40 INJECTION, SOLUTION INTRAVENOUS at 15:06

## 2023-11-08 RX ADMIN — BUMETANIDE 0.5 MG: 0.5 TABLET ORAL at 08:01

## 2023-11-08 RX ADMIN — SODIUM CHLORIDE: 9 INJECTION, SOLUTION INTRAVENOUS at 10:55

## 2023-11-08 ASSESSMENT — ACTIVITIES OF DAILY LIVING (ADL)
ADLS_ACUITY_SCORE: 37
ADLS_ACUITY_SCORE: 41
ADLS_ACUITY_SCORE: 39
ADLS_ACUITY_SCORE: 39
ADLS_ACUITY_SCORE: 37
ADLS_ACUITY_SCORE: 41
ADLS_ACUITY_SCORE: 41
ADLS_ACUITY_SCORE: 39
ADLS_ACUITY_SCORE: 39
ADLS_ACUITY_SCORE: 41

## 2023-11-08 NOTE — PROVIDER NOTIFICATION
Patient Name: Mojgan Jimenez  Medical Record Number: [id number]  Today's Date: 11/8/2023    Procedure: abscessogram   Proceduralist: Dr Bose    Sedation medications administered: 0 mg midazolam and 0 mcg fentanyl   Sedation time: 0 minutes    Pt transported back to room 217 post procedure awake and alert and report given to receiving RN without questions or concenrs.

## 2023-11-08 NOTE — PLAN OF CARE
Shift from 0700 to 1930-      Problem: Bariatric Surgery  Goal: Nausea and Vomiting Relief  Outcome: Progressing     Problem: Gas Exchange Impaired  Goal: Optimal Gas Exchange  Outcome: Progressing  Intervention: Optimize Oxygenation and Ventilation  Recent Flowsheet Documentation  Taken 11/8/2023 1525 by Echo Anguiano RN  Head of Bed (HOB) Positioning: HOB at 30-45 degrees  Taken 11/8/2023 1523 by Echo Anguiano RN  Head of Bed (HOB) Positioning: HOB at 30-45 degrees  Taken 11/8/2023 1212 by Echo Anguiano RN  Head of Bed (HOB) Positioning: HOB at 30-45 degrees  Taken 11/8/2023 1026 by Echo Anguiano RN  Head of Bed (HOB) Positioning: HOB flat  Taken 11/8/2023 0840 by Echo Anguiano RN  Head of Bed (HOB) Positioning: HOB at 30-45 degrees  Taken 11/8/2023 0830 by Echo Anguiano RN  Head of Bed (HOB) Positioning: HOB at 30-45 degrees     Problem: Activity Intolerance  Goal: Enhanced Capacity and Energy  Intervention: Optimize Activity Tolerance  Recent Flowsheet Documentation  Taken 11/8/2023 1525 by Echo Anguiano RN  Activity Management:   activity adjusted per tolerance   patient refuses activity  Taken 11/8/2023 1523 by Echo Anguiano RN  Activity Management:   activity adjusted per tolerance   activity encouraged   patient refuses activity  Taken 11/8/2023 1225 by Echo Anguiano RN  Activity Management:   activity adjusted per tolerance   patient refuses activity  Taken 11/8/2023 1212 by Echo Anguiano RN  Activity Management:   activity adjusted per tolerance   activity encouraged   patient refuses activity  Taken 11/8/2023 1026 by Echo Anguiano RN  Activity Management: back to bed  Taken 11/8/2023 0945 by Echo Anguiano RN  Activity Management: up in chair  Taken 11/8/2023 0840 by Echo Anguiano RN  Activity Management: activity adjusted per tolerance  Taken 11/8/2023 0830 by Echo Anguiano RN  Activity Management:   activity adjusted per tolerance   activity encouraged   patient refuses  "activity     Problem: Nausea and Vomiting  Goal: Nausea and Vomiting Relief  Outcome: Progressing     Problem: Hypertension Acute  Goal: Blood Pressure Within Desired Range  Outcome: Progressing       Goal Outcome Evaluation:    Pt NPO overnight for drain study.  Patient went in the morning to have MICKIE drain adjusted/Dc'd. The drain was discontinued by IR.     Patient then was in really good spirits and wanted to get up to chair. \"I feel much better today.\"    See other note. While in chair, pt became hypotensive after being hypertensive in am.  Then pt lifted by ceiling lift back to bed. She also had increased oxygen needs after event. Oxygen increased from 2 liters to 4 liters to keep sats greater than 90%. NS 500ml bolus given and IVF restarted.    Nauseated in am. Zofran given and nausea improved. Also was having left rib pain today. Given IV dilaudid then pain decreased.     Pt went down for CT PE, USG of legs, and had an echo.    She had to decline PT and OT because she was at tests most of the day.     After tests, pt stated she was hungry and thirsty. She increased her intake orally for the first time in days without any nausea.    TF started at 1600. Calorie count done.   Adequate UOP; purewick in place.     Will have a renal USG tonight at 7pm. Seen by nephrology this evening.                      "

## 2023-11-08 NOTE — PLAN OF CARE
Shift from 0700 to 1930-      Problem: Bariatric Surgery  Goal: Fluid and Electrolyte Balance  Intervention: Monitor and Manage Fluid and Electrolyte Balance  Recent Flowsheet Documentation  Taken 11/7/2023 1607 by Echo Anguiano RN  Fluid/Electrolyte Management: fluids provided     Problem: Bariatric Surgery  Goal: Nausea and Vomiting Relief  Outcome: Progressing     Problem: Comorbidity Management  Goal: Blood Pressure in Desired Range  Outcome: Progressing  Intervention: Maintain Blood Pressure Management  Recent Flowsheet Documentation  Taken 11/7/2023 1607 by Echo Anguiano RN  Medication Review/Management: medications reviewed  Taken 11/7/2023 0935 by Echo Anguiano RN  Medication Review/Management: medications reviewed     Problem: Enteral Nutrition  Goal: Absence of Aspiration Signs and Symptoms  Intervention: Minimize Aspiration Risk  Recent Flowsheet Documentation  Taken 11/7/2023 1607 by Echo Anguiano RN  Enteral Feeding Safety: tubing labeled as enteral feeding  Head of Bed (HOB) Positioning: HOB at 30-45 degrees  Taken 11/7/2023 1501 by Echo Anguiano RN  Head of Bed (HOB) Positioning: HOB at 30-45 degrees  Taken 11/7/2023 1320 by Echo Anguiano RN  Head of Bed (HOB) Positioning: HOB at 30-45 degrees  Taken 11/7/2023 1238 by Echo Anguiano RN  Head of Bed (HOB) Positioning: HOB at 30-45 degrees  Taken 11/7/2023 1213 by Echo Anguiano RN  Head of Bed (HOB) Positioning: HOB at 30-45 degrees  Taken 11/7/2023 0935 by Echo Anguiano RN  Head of Bed (HOB) Positioning: HOB at 30-45 degrees  Goal: Safe, Effective Therapy Delivery  Intervention: Prevent Feeding-Related Adverse Events  Recent Flowsheet Documentation  Taken 11/7/2023 1607 by Echo Anguiano RN  Enteral Feeding Safety: tubing labeled as enteral feeding     Problem: Activity Intolerance  Goal: Enhanced Capacity and Energy  Intervention: Optimize Activity Tolerance  Recent Flowsheet Documentation  Taken 11/7/2023 1607 by Echo Anguiano  "RN  Activity Management:   activity adjusted per tolerance   patient refuses activity  Environmental Support:   calm environment promoted   comfort object encouraged   distractions minimized  Taken 11/7/2023 1501 by Echo Anguiano RN  Activity Management:   activity adjusted per tolerance   activity encouraged   patient refuses activity  Taken 11/7/2023 1320 by Echo Anguiano RN  Activity Management:   activity adjusted per tolerance   activity encouraged   patient refuses activity  Taken 11/7/2023 1238 by Echo Anguiano RN  Activity Management:   activity adjusted per tolerance   activity encouraged   patient refuses activity  Taken 11/7/2023 1213 by Echo Anguiano RN  Activity Management:   activity adjusted per tolerance   activity encouraged   patient refuses activity  Taken 11/7/2023 0935 by Echo Anguiano RN  Activity Management:   activity adjusted per tolerance   patient refuses activity  Environmental Support:   calm environment promoted   comfort object encouraged   distractions minimized       Goal Outcome Evaluation:    Patient very defeated today. She would not get in chair. She felt fatigue by going to CT and xray. Doesn't take much to overwhelm her. Tried to reassure her a lot today. Many times asked her to get in chair. Declined PT/OT.     Given Zyprexa in am when she felt like she was getting \"panicky\".    TF started at 1600. Patient drank minimal. Calorie count done.   Nauseated in am. Zofran given and nausea improved.     Adequate UOP; purewick in place.                         "

## 2023-11-08 NOTE — CONSULTS
RENAL CONSULT NOTE      REASON FOR CONSULT: ongoing HTN despite 4 antihypertensive agents.     ASSESSMENT/PLAN:      HTN: Not on antihypertensives PTA.   Now on Carvedilol 25 mg BID, Doxazosin 2 mg Q 12, Hydralazine 100 mg TID, Nifedipine 30 mg daily, and bumex 0.5mg daily. /52 at time of consult today, meds held. Has PRNs.  ?pain and anxiety playing a role here. Hx of JARVIS on CPAP (pt refuses Bipap last evening). Goal BP <140/90. Adrenal gland normal on 11/7/23 imaging. Will work pt up for secondary hypertension. Check ROGER-Renin ratio, UA, urine K, UPCR, cortisol get Renal US with Doppler. Renal will follow. Okay to start ACE/ARB therapy AFTER checking Roger/Renin ratio today if needed to control BP in the future.     Recent Oliguric SUSSY: Baseline creatinine 0.7 within normal limits no prior history of SUSSY or CKD.  Cr peaked at 6.97 on 10/14 now ~1.3. 2/2 Hemodynamic ATN, exacerbated by NSAIDS. Renal signed off for management of SUSSY 11/5/23.avoid nephrotoxins, renal dose medication, daily wt, daily I/O. Follow BMP.    Lytes: stable    Acid/base: stable    Anemia: HG 8-9. Transfuse per Primary team for HG <7    H/O complicated Gastric Bypass: Multiple complications post sleeve gastrectomy complicated by peritonitis and small bowel injury with peritoneal leak. Return to the OR 10/12/2023 with cleanout and drain placement complicated by other fluid collections requiring aspiration and drain placement. S/P ABX, ID following. 11/7/23 TF via NJ 11/7/23 bariatric full liquid diet. NPO for procedure today. Diet management per primary team bariatric surgery      HPI: Mojgan is a 54 y/o F with a PMH of JARVIS, asthma, mood disorders, admitted for Lap gastrectomy sleeve 10/9/23 complicated peritonitis and course complicated by encephalopathy delirium septic shock with peritoneal leak acute resp. Failure requiring intubation from 10/12-10/21 with accompanied oliguric SUSSY. SUSSY improved and renal team signed off 11/5. Renal  re-consulted today for new uncontrolled HTN requiring several gents for management.     11/8 imaging: Chest CT with no acute PEsm R pleural effusion and minimal pneumothorax in setting of R chest tube. No residual abscess cavity with removal of existing drainage catheter. US lower extremities pending.     11/7/23 imaging CT abd with contrast right Pleural effusion associated with atelectasis, improving. Adrenal glands and kidneys normal      Lytes/acid base stable  /52 at time of visit  Pt denies HA/Feeling dizzy, SOB, fever, rash , edema or CP  PT endorses ongoing Left rib pain, controlled  PT refuses to wear Bipap overnight  Discussed case with surgery in person today  Overall she is feeling better  Discussed with RN at bedside  Discussed plan and answered all questions with pt    REVIEW OF SYSTEMS:  Complete 12 point review of systems was negative other than those noted in the HPI      Past Medical History:   Diagnosis Date    LINA (generalized anxiety disorder) 11/02/2017    Hyperlipidemia LDL goal <160 01/20/2019    Major depressive disorder     Methanol abuse (H)     Mild persistent asthma without complication 11/02/2017    Obese     JARVIS on CPAP     Cpap not working    Paranoia (H)     resolved due to drugs    Vitamin D deficiency 11/02/2017       No current facility-administered medications on file prior to encounter.  acetaminophen (TYLENOL) 500 MG tablet, Take 500-1,000 mg by mouth every 6 hours as needed for mild pain  ADVAIR -21 MCG/ACT inhaler, Inhale 2 Puffs by mouth two times daily.*  albuterol (PROAIR HFA) 108 (90 Base) MCG/ACT inhaler, Inhale 1-2 puffs into the lungs every 4 hours as needed for shortness of breath / dyspnea or wheezing  albuterol (PROVENTIL) (2.5 MG/3ML) 0.083% neb solution, Take 1 vial (2.5 mg) by nebulization every 6 hours as needed for shortness of breath or wheezing  cetirizine (ZYRTEC) 10 MG tablet, Take 10 mg by mouth daily  cholecalciferol (VITAMIN D3) 125 mcg  "(5000 units) capsule, Take 125 mcg by mouth daily  cyanocobalamin (VITAMIN B-12) 1000 MCG sublingual tablet, Place 1,000 mcg under the tongue  gabapentin (NEURONTIN) 300 MG capsule, Take 300 mg by mouth daily  norgestrel-ethinyl estradiol (LO/OVRAL) 0.3-30 MG-MCG tablet, Take 1 tablet by mouth daily  propranolol (INDERAL) 10 MG tablet, MAY USE 1 TABLET BY MOUTH UP TO THREE TIMES DAILY AS NEEDED FOR ANXIETY.  rosuvastatin (CRESTOR) 10 MG tablet, Take 1 tablet (10 mg) by mouth daily  venlafaxine (EFFEXOR) 75 MG tablet, Take 75 mg by mouth 3 times daily        No current outpatient medications on file.      ALLERGIES/SENSITIVITIES:  No Known Allergies  Social History     Tobacco Use    Smoking status: Never    Smokeless tobacco: Never   Vaping Use    Vaping Use: Never used   Substance Use Topics    Alcohol use: Not Currently     Comment: Sober x 8 months    Drug use: Not Currently     Types: Methamphetamines, \"Crack\" cocaine     Comment: in remision      I have reviewed this patient's family history and updated it with pertinent information if needed.  Family History   Problem Relation Age of Onset    Cancer Mother     Unknown/Adopted Father     Alcoholism Father     Substance Abuse Sister     Diabetes No family hx of     Hypertension No family hx of          PHYSICAL EXAM:  Physical Exam   Temp: 97.5  F (36.4  C) Temp src: Oral BP: 107/52 Pulse: 95   Resp: 22 SpO2: 93 % O2 Device: None (Room air) Oxygen Delivery: 4 LPM  Vitals:    11/03/23 1420 11/04/23 0400 11/08/23 0720   Weight: 59.1 kg (130 lb 3.2 oz) 129.1 kg (284 lb 9.8 oz) 123.5 kg (272 lb 4.3 oz)     Vital Signs with Ranges  Temp:  [97.5  F (36.4  C)-98.6  F (37  C)] 97.5  F (36.4  C)  Pulse:  [] 95  Resp:  [18-30] 22  BP: ()/(46-94) 107/52  SpO2:  [90 %-95 %] 93 %  I/O last 3 completed shifts:  In: 1330 [P.O.:120; NG/GT:650]  Out: 2145 [Urine:2000; Drains:100; Other:45]      Patient Vitals for the past 72 hrs:   Weight   11/08/23 0720 123.5 kg " (272 lb 4.3 oz)     GEN: NAD, aox3  CV: RRR   Lung: clear and equal  Ab: soft and NT, obese  Ext: ++ edema and well perfused  Skin: no rash    Laboratory:     Recent Labs   Lab 11/07/23  0705 11/05/23  0649 11/04/23  0503 11/03/23  0619 11/02/23  0616   WBC 6.3 7.3 4.6 9.0 11.4*   RBC 2.66* 2.79* 4.82 2.92* 2.92*   HGB 7.7* 7.9* 13.7 8.3* 8.3*   HCT 25.5* 26.8* 45.6 28.1* 28.2*    234 194 307 333       Basic Metabolic Panel:  Recent Labs   Lab 11/08/23  1109 11/08/23  0815 11/08/23  0540 11/07/23  2029 11/07/23  1641 11/07/23  1202 11/07/23  0811 11/07/23  0705 11/05/23  0907 11/05/23  0648 11/04/23  2107 11/04/23  1815 11/04/23  0826 11/04/23  0503 11/03/23  2045 11/03/23  1756 11/03/23  0824 11/03/23  0619 11/02/23  0752 11/02/23  0616   NA  --   --  142  --   --   --   --  144  --  144  144  --  143  --  145  145  --  145  --  145  145   < > 144  144   POTASSIUM  --   --  4.0  --   --   --   --  4.0  --  3.8  --   --   --  3.7  --   --   --  3.8  --  4.0   CHLORIDE  --   --  103  --   --   --   --  103  --  103  --   --   --  106  --   --   --  107  --  108*   CO2  --   --  32*  --   --   --   --  31*  --  31*  --   --   --  30*  --   --   --  27  --  26   BUN  --   --  29.6*  --   --   --   --  30.8*  --  30.8*  --   --   --  28.2*  --   --   --  27.1*  --  28.0*   CR  --   --  1.38*  --   --   --   --  1.33*  --  1.40*  --   --   --  1.48*  --   --   --  1.50*  --  1.62*   * 121* 107* 117* 103* 108*   < > 146*   < > 130*   < >  --    < > 118*   < >  --    < > 143*   < > 145*   PALAK  --   --  9.5  --   --   --   --  8.7  --  8.7  --   --   --  9.2  --   --   --  9.1  --  9.1    < > = values in this interval not displayed.       INRNo lab results found in last 7 days.    Recent Labs   Lab Test 11/08/23  0540 11/07/23  0705   POTASSIUM 4.0 4.0   CHLORIDE 103 103   BUN 29.6* 30.8*      Recent Labs   Lab Test 11/02/23  1814 10/23/23  0530   ALBUMIN 2.8* 3.2*   BILITOTAL 0.2 0.3   ALT 23 26   AST 24  19       Personally reviewed today's laboratory studies      Thank you for involving us in the care of this patient. We will continue to follow along with you.      Carmencita Arguello [facility]  [facility]  [phone]

## 2023-11-08 NOTE — PLAN OF CARE
Problem: Bariatric Surgery  Goal: Optimal Coping with Surgery  Outcome: Progressing  Goal: Effective Gastrointestinal Motility and Elimination  Outcome: Progressing  Goal: Nausea and Vomiting Relief  Outcome: Progressing  Intervention: Prevent or Manage Nausea and Vomiting  Recent Flowsheet Documentation  Taken 11/7/2023 2340 by Corey Pérez RN  Nausea/Vomiting Interventions:   antiemetic   stimuli minimized   cool cloth applied     Problem: Gas Exchange Impaired  Goal: Optimal Gas Exchange  Outcome: Progressing  Intervention: Optimize Oxygenation and Ventilation  Recent Flowsheet Documentation  Taken 11/7/2023 2340 by Corey Pérez RN  Head of Bed (HOB) Positioning: HOB at 30-45 degrees     Goal Outcome Evaluation:         Pt is alert & oriented x 4 and makes needs known. Denies pain. Reported nausea after a sip of water- Compazine given with relief.     Refused BiPAP overnight. On 2L NC overnight tolerated well. MICKIE drain intact and patent. NJ tube intact with tube feeding per order. NPO since midnight.

## 2023-11-08 NOTE — PROGRESS NOTES
ASSESSMENT/PLAN:  1. Intra-abdominal abscess (H)    2. Perimenopausal symptoms        Mojgan Jimenez is a 53 year old female who is s/p laparoscopic sleeve gastrectomy with single anastomosis duodenal switch on 10/09/23 with return to the OR on 10/12/23 to repair two small enterotomies on the common channel. Patient became septic with ARF and developed pneumonia and pleural effusion necessitating chest tube placement. Chest tube removed on 11/05/23 and pulmonary signed off. Renal function recovered.  Suspect today's hypotensive episode was secondary to drain manipulation and SIRS response, do not anticipate this will be an ongoing issue past today.    Plan  -Nocturnal tube feeds.  Would like the tube feeds to meet at a maximum 80% of caloric and protein needs remaining after oral intake is factored out to encourage more transition to oral intake  -Continue encouraging oral intake of protein drinks and water  -Appreciate nephrology input and evaluation for additional antihypertensive medications; suspect hypotension earlier today was a temporary blip and otherwise pretty steady hypertensive state      SUBJECTIVE: Tolerating protein drinks and water better today than yesterday.  Has been off the floor for much of the day due to drain studies and imaging studies.      Patient Vitals for the past 24 hrs:   BP Temp Temp src Pulse Resp SpO2 Weight   11/08/23 1304 107/52 -- -- 95 22 93 % --   11/08/23 1159 100/55 97.5  F (36.4  C) Oral 95 18 92 % --   11/08/23 1153 94/50 -- -- -- -- -- --   11/08/23 1102 96/55 -- -- -- -- 91 % --   11/08/23 1036 91/52 -- -- -- -- -- --   11/08/23 1026 94/50 -- -- -- -- -- --   11/08/23 1015 (!) 83/46 -- -- 93 30 90 % --   11/08/23 0720 (!) 187/84 97.8  F (36.6  C) Oral 108 18 95 % 123.5 kg (272 lb 4.3 oz)   11/07/23 2305 (!) 164/90 -- -- -- -- -- --   11/07/23 2300 (!) 183/94 98.6  F (37  C) Oral 102 18 -- --   11/07/23 1919 (!) 152/78 98.3  F (36.8  C) Oral 85 18 95 % --         PHYSICAL  EXAM:  GEN: No acute distress, comfortable  LUNGS: CTA bilaterally  CV:RRR  ABD:soft, not tender, incisions well healed  EXT:No cyanosis, edema or obvious abnormalities    11/07 0700 - 11/08 0659  In: 1330 [P.O.:120]  Out: 2145 [Urine:2000; Drains:100]    No results displayed because visit has over 200 results.             Dakota Wallace MD

## 2023-11-08 NOTE — PROGRESS NOTES
"Care Management Follow Up    Length of Stay (days): 30    Expected Discharge Date: 11/10/2023     Concerns to be Addressed: discharge planning     Patient plan of care discussed at interdisciplinary rounds: Yes    Anticipated Discharge Disposition: Transitional Care     Anticipated Discharge Services: None  Anticipated Discharge DME:      Patient/family educated on Medicare website which has current facility and service quality ratings: yes  Education Provided on the Discharge Plan: Yes  Patient/Family in Agreement with the Plan: yes    Referrals Placed by CM/SW: Post Acute Facilities  Private pay costs discussed: Not applicable    Additional Information:  Chart reviewed.     Cm updates:  Surgery following pt. Diet advancement.   Pt went to IR today for abscessogram to check for drain placement. Still has NJ in.       Unclear at this time what day pt would be ready for discharge.       One TCU referral pending,writer will call and see once pt is more medically ready. May need to get more choices as well.       Social Hx:  \"Patient lives with her adult daughter and is independent with all activities of daily living and instrumental activities of daily living (IADLs) at baseline. She is currently unemployed but planned to resume working after recovery. Daughter Karla is primary family contact. \"        Cm will continue to follow plan of care, review recommendations, and assist with any discharge needs anticipated.     Whit Malik RN      "

## 2023-11-08 NOTE — PROGRESS NOTES
CALORIE COUNT      Approximate Oral Intake for:     Bariatric full liquid diet  Calories: ~30  Protein: ~5    3 meals slips saved (2 meals, 1 snack of calorie free beverages). Ate bites of yogurt and 30 ml of Ensure Max    Currently NPO/TF off for procedure      Intake from TF/PN:     TF via NJ tube of promote with fiber @ 80 ml/hr x 14 hrs providin Calories, 69 g protein, 155 g CHO, 16 g fiber and 931 ml free fluid plus H2O flush  (270 ml)=1201 ml fluid/day       Estimated Needs:    Calories: 3165-6167 (80% of needs: ~9677-4293)  Protein:63-78 gm (80% of needs:  50-62)      Summary:   Oral intake remains inadequate. Met 88% of kcal and 100% of protein needs from TF + PO

## 2023-11-08 NOTE — PROGRESS NOTES
Pt was up in chair and became lightheaded. BP in the low 80's. Lifted with ceiling lift back to bed. Having left rib pain 5/10; Increase oxygen to 4 liters. MD updated.

## 2023-11-08 NOTE — PROGRESS NOTES
[facility]    Medicine Progress Note - Hospitalist Service    Date of Admission:  10/9/2023    Assessment & Plan   Assessment:  Mojgan Jimenez is a 53 year old female with h/o obesity, severe JARVIS on CPAP, asthma, stimulant use disorder, stimulant induced psychosis, mood & anxiety disorder. Admitted 10/9/23 for scheduled laparoscopic sleeve gastrectomy with single anastomosis duodenal switch. She was later found to have two small bowel injuries with peritonitis. 10/12/23 returned to OR for small bowel interotomy closure, clean-out, & drain placement; 10/19 found to have RUQ fluid collection s/p drain placement; 10/24 s/p pelvic fluid aspiration.   Course complicated by encephalopathy/delirium, septic shock, suspected takotsubo syndrome, acute respiratory failure due to loss of protective airway reflexes & increased metabolic load requiring intubation (10/12-10/21/23; reintubated 10/21-10/26/23), & oliguric SUSSY with renal recovery. She has significantly improved: mental status clearing with some ongoing delirium at night, cardiomyopathy improved, ATN I recovery phase, and ongoing treatment of intra abdominal infection. She was made floor tele status 10/27/23. On 11/1/23 CT guided chest tube placed on right for persistent effusion, concern for parapneumonic one with risk of future trapped lung. Chest tubes removed 11/5/2023.      Problem list and plan:  Acute hypoxemic-hypercapnic respiratory failure  Was admitted 10/9/2023 for scheduled laparoscopic sleeve gastrectomy with single anastomosis duodenal switch.  Rapid Response on 10/12, Pt with decreased LOC. Opens eyes to voice. SpO2 89% on 8L NC. BS clear and diminished. Placed on a 15L non-rebreather for SpO2 > 90%. After narcan admin pt became more responsive/agitated. Transferred to ICU and intubated 10/12-10/21/23; reintubated 10/21-10/26/23. Now extubated and weaned to 1 to 2 L nasal cannula, increase to 4 L 11/8/2023 as patient suddenly  became short of breath and hypertensive will probably titrate back down to patient baseline needs.  10/22/23 CT demonstrated complete RLL atelectasis, milder RUL & RBML atelectasis or consolidation. 10/22 bronchoscopy moderate RLL blood tinged secretions cleared.   Cont pulmonary hygiene  R-pleural effusion, exudative, suspect simple parapneumonic effusion. 10/24 s/p thoracentesis, 400 mL. Pleural fluid amylase amylase 23, serum 64. 11/1/23--chest tube placed on right. Chest tubes removed 11/5/2023     Gastric bypass  10/9 s/p sleeve gastrectomy complicated by small bowel injury & peritonitis   10/12 returned to OR for closure, clean-out, & drain placement (now removed)  10/19 s/p RUQ fluid collection drain placement   10/24 s/p pelvic fluid aspiration (75 ml)  Surgery primary   ID consulted   10/12 - peritoneal cultures + Streptococcus anginosus, mixed aerobic & anaerobic kamla   10/19 - RUQ fluid cultures & pelvic fluid aspirate + Lactobacillus   10/22 - BDG blood +  10/24 pleural fluid studies  -#+WBC no pos cx. 10/24--aspirate pelvic drain--lacto + candida  Was on Vanc, pip-tazo, micafungin. Vanco d/jose 10/31  Was on TPN previously 10/14  PPTF started 10/24/23. TF and TPN weaned of 10/27 currently on bariatric clears, surgery managing   Diet per surg  Flush drain at regular intervals   Possible drain removal by IR soon check  ID recommending switching to Augmentin and fluconazole  CT abdomen with contrast showed Appropriate positioning of perihepatic drain with significantly decreased size of the associated fluid collection.   Surgery recommending increased mobility and better oral intake  As per IR tPA given, monitor output, plan for abscessogram with sedation and possible repositioning, exchange and removal of the tube 11/8/2023  As per calorie count patient oral intake remains inadequate, Met 88% of Kcal and 100% of protein needs from tube feed plus p.o.  S/p abscessogram 11/8/2023 s/p removal of drainage  tube    Lightheadedness associated with hypotension 11/8/2023  Patient has been hypertensive and has been having up titration of blood pressure medication, suddenly after coming back from abscessogram patient was lightheaded felt shortness of breath that has since resolved, also had some left lower chest wall discomfort which has resolved and was found to be hypotensive with systolic in the 80s which has since resolved, patient was placed back in bed, give bolus 500 cc of fluids, held blood pressure medication including Bumex, started on fluids at a slower rate cautiously, will do CTA of the chest and lower extremity duplex to rule out DVT/PE    Accelerated HTN /Cardiomyopathy   Echo previously and initially decreased LV function but repeat echo shows EF normalized, possible stress induced.   Cardiology consulted  Metoprolol switched to coreg; dose increased 11/3/23  Hydralazine increase to 100 mg tid  Nitroglycerin patch 0.4mg/24  Amlodipine 5 mg bid which was switched 11/8/2023 to nifedipine because of persistently elevated blood pressure  As needed IV hydralazine  Bumex back on per renal(Discussed with nephrology, as per nephrology may switch to Bumex 0.5 daily oral from IV, also advised outpatient follow-up with nephrology as once discharged and repeat BMP in a week to follow-up on sodium and creatinine once discharged.  Blood pressure was still running in the high range on Coreg, hydralazine, amlodipine, Bumex and doxazosin, switched to nifedipine and d.cd amlodipine), would be holding Bumex and blood pressure medication for now as patient became hypotensive      Scaly brown to white mucosal lesion in the mouth  Suspecting secondary to oral thrush  Will need frequent oral cares  Placed order for nystatin swish and swallow     History of JARVIS, asthma in the chart no PFTs  NIPPV with sleep/naps everytime   Continue duonebs     Oliguric SUSSY improving   Nephrology was consulted  for ARF peak creat 6.97 on  10/14  Slowly improving and creatinine trending down--10/31 now 1.38  Avoid nephrotoxic agents   No labs in between 9/15/2022 through 10/11/2023  Baseline probably around 1     Hypernatremia resolved  IV bumex stopped initially and patient received D5W per renal .   Trended sodium and currently within normal limits  D5W-per renal-off now  Bumex restarted 10/31     Anemia stable  Hemoglobin trended up to 13.7 from 8.3 11/4/2023 suspecting lab error, hemoglobin 11/5/2023 was 7.9 which appropriately down trended from 8.3 with subsequent lab draws.  Hemoglobin currently stable  Suspect multifactorial secondary to sepsis & surgical blood loss   Monitored hemoglobin     Hypervolemia  Was started Back on bumex, switched to oral, currently on hold as patient became hypotensive suddenly after abscessogram 11/8/2023, may need to touch base with nephrology again prior to restarting if Bumex is even needed as patient currently appears to be euvolemic to slightly hypovolemic  Ordered echo, follow results  As per previous limited echocardiogram does not appear to have any heart failure.  Has a weight loss of around 8Kgs     Hyperglycemia of critical illness   Was initially on insulin glargine which has been stopped.  Glycemic control is optimal. Cont with sliding scale insulin for now     Morbid obesity  BMI 48     Acute metabolic and toxic encephalopathy resolving  Due to above. Still fluctuating but has been stable for the past 3 days.  Delirium protocol  Needs to be up day, sleep night  Up to chair      Severe decondition due to prolong hospitalization  PT/OT eval, PT recommending TCU, OT recommending LTACH/TCU           Diet: Calorie Counts  Adult Formula Drip Feeding: Specified Time Promote w fiber; Nasojejunal; Goal Rate: 80; mL/hr; From: 4:00 PM; To: 6:00 AM  Room Service  Bariatric Diet Full Liquids    DVT Prophylaxis: Heparin SQ  Patiño Catheter: Not present  Lines: PRESENT      PICC 10/28/23 Triple Lumen Right  "Basilic-Site Assessment: WDL      Cardiac Monitoring: None  Code Status: Full Code      Clinically Significant Risk Factors              # Hypoalbuminemia: Lowest albumin = 2.7 g/dL at 10/14/2023  6:29 AM, will monitor as appropriate     # Hypertension: Noted on problem list        # Severe Obesity: Estimated body mass index is 48.23 kg/m  as calculated from the following:    Height as of this encounter: 1.6 m (5' 3\").    Weight as of this encounter: 123.5 kg (272 lb 4.3 oz).      # Asthma: noted on problem list        Disposition Plan     Expected Discharge Date: 11/10/2023    Discharge Delays: Other (Add Comment)  IV Medication - consider oral or Home Infusion  Destination: nursing home              Saad J. Gondal, MD  Hospitalist Service  [facility]  Securely message with [employer] (more info)  Text page via [employer] Paging/Directory   ______________________________________________________________________    Interval History   Patient seen and examined at bedside, patient denies any acute complaints, discussed plan for switching IV to p.o. diuretics with both patient and nephrology.    Physical Exam   Vital Signs: Temp: 97.5  F (36.4  C) Temp src: Oral BP: 107/52 Pulse: 95   Resp: 22 SpO2: 93 % O2 Device: None (Room air) Oxygen Delivery: 4 LPM  Weight: 272 lbs 4.29 oz    GENERAL: Patient was seen and examined at bedside with no acute distress, morbidly obese  HENT:  Head is normocephalic, atraumatic.  Mucosal lesions of the tongue and the roof of the mouth  EYES:  Eyes have anicteric sclerae without conjunctival injection   LUNGS: Bilateral air entry, clear to auscultation bilaterally decreased breath sounds at the bilateral bases  CARDIOVASCULAR:  Regular rate and rhythm with no murmurs, gallops or rubs.  ABDOMEN:  Normal bowel sounds. Soft; nontender   EXT: No edema  SKIN:  No acute rashes.   NEUROLOGIC:  Grossly nonfocal.      Medical Decision Making       45 MINUTES SPENT BY ME on the date " of service doing chart review, history, exam, documentation & further activities per the note.      Data     I have personally reviewed the following data over the past 24 hrs:    N/A  \   N/A   / N/A     142 103 29.6 (H) /  156 (H)   4.0 32 (H) 1.38 (H) \       Imaging results reviewed over the past 24 hrs:   Recent Results (from the past 24 hour(s))   IR Abscess Tube Check    Narrative    [facility]  LOCATION: [facility]  DATE: 11/8/2023    PROCEDURE: ABSCESS TUBE CHECK AND REMOVAL    INTERVENTIONAL RADIOLOGIST: Bhanu Bose MD.    INDICATION: 53-year-old female with a perihepatic fluid collection status post percutaneous drainage. CT shows near complete resolution of the fluid cavity and drain outputs following TPA administration are approximately 15 mL daily.    CONSENT: The risks, benefits and alternatives of an abscessogram with possible exchange, manipulation or removal were discussed with the patient  in detail. All questions were answered. Informed consent was given to proceed with the procedure.    MODERATE SEDATION: None.    CONTRAST: 5 mL Omnipaque into the abscess cavity.   ANTIBIOTICS: None.  ADDITIONAL MEDICATIONS: None.    FLUOROSCOPIC TIME: 0.3 minutes.  RADIATION DOSE: Air Kerma: 10 mGy.    COMPLICATIONS: No immediate complications.    STERILE BARRIER TECHNIQUE: Maximum sterile barrier technique was used. Cutaneous antisepsis was performed at the operative site with application of 2% chlorhexidine and large sterile drape. Prior to the procedure, the  and assistant performed   hand hygiene and wore hat, mask, sterile gown, and sterile gloves during the entire procedure.    PROCEDURE:    A  image was obtained.    The pre-existing drainage catheter was injected with a small amount of contrast and multiple images were obtained. Based on the results of the study the drainage catheter was removed.    FINDINGS:  The  image shows the right upper quadrant  drainage catheter. An injection of contrast shows no residual abscess cavity. The drainage catheter was removed.      Impression    IMPRESSION:    No residual abscess cavity with removal of the existing drainage catheter.    PLAN:  The patient will monitor for signs of infection such as increased pain or fever. Should any of these symptoms develop they will contact us immediately.

## 2023-11-08 NOTE — PROGRESS NOTES
"  Interventional Radiology - Pre Procedure Chart Review  Inpatient - [facility]  11/08/2023       Abscessogram drain check procedure requested by Dr Winslow.    HPI: Patient with Mojgan Jimenez is a 53 year old female with PMH of LINA, HLD, methanol abuse, asthma, and JARVIS who was admitted to [facility] on 10/09/2023 for a planned sleeve gastrectomy with single anastomosis duodenal switch. Hospital course c/b encephalopathy, SUSSY, intra-abdominal abscesses, and delirium. Back to OR for closure of two small bowel enterotomies, surgical drain placement (removed 10/25/23) and wash out on 10/12/23. F/U CT demonstrated RUQ fluid collection - CT guided 10F drain placement 10/19/23 and CT guided aspiration of pelvic fluid collection 10/24/23, no drain placed.      CT 10/30/23 without much improvement in perihepatic collection. 10/31/23 and 11/1/23 s/p tPA administration via drain tubing; noted to have significant increase/improvement in drainage output.       tPA instilled on 10/31 through 11/5 with improvement of drainage output, which has most likely resolved the patients fluid accumulation.   11/7/23 abdominal CT obtained and demonstrates appropriate positioning of perihepatic drain with significantly decreased size of the associated fluid collection. TPA 4mg in 15ml NS ordered x1 dose.     NPO status reviewed: yes since MN    Clinical notes reviewed: yes    Pertinent medications reviewed: heparin subcutaneous 5,000 units TID-last dose 0530 today-no hold required    No Known Allergies      LABS:  INR (no units)   Date Value   10/23/2023 1.49 (H)       Lab Results   Component Value Date    WBC 6.3 11/07/2023    HGB 7.7 (L) 11/07/2023     11/07/2023       No results found for: \"CREATININE\"    No components found for: \"K\"    Drain output:  90/66/15/15cc out in last 4 days    PLAN:  Planning for abscessogram drain check in IR. Radiologist to further review procedure with patient.    EMR reviewed. No " formal assessment completed.     JUSTINE BRAUN [facility]  Interventional Radiology  [phone]

## 2023-11-09 ENCOUNTER — APPOINTMENT (OUTPATIENT)
Dept: PHYSICAL THERAPY | Facility: HOSPITAL | Age: 53
End: 2023-11-09
Attending: SURGERY
Payer: COMMERCIAL

## 2023-11-09 ENCOUNTER — APPOINTMENT (OUTPATIENT)
Dept: ULTRASOUND IMAGING | Facility: HOSPITAL | Age: 53
End: 2023-11-09
Payer: COMMERCIAL

## 2023-11-09 ENCOUNTER — APPOINTMENT (OUTPATIENT)
Dept: OCCUPATIONAL THERAPY | Facility: HOSPITAL | Age: 53
End: 2023-11-09
Attending: SURGERY
Payer: COMMERCIAL

## 2023-11-09 LAB
ANION GAP SERPL CALCULATED.3IONS-SCNC: 11 MMOL/L (ref 7–15)
BUN SERPL-MCNC: 36.8 MG/DL (ref 6–20)
CALCIUM SERPL-MCNC: 9 MG/DL (ref 8.6–10)
CHLORIDE SERPL-SCNC: 104 MMOL/L (ref 98–107)
CORTIS SERPL-MCNC: 22 UG/DL
CREAT SERPL-MCNC: 1.47 MG/DL (ref 0.51–0.95)
CRP SERPL-MCNC: 102.7 MG/L
DEPRECATED HCO3 PLAS-SCNC: 27 MMOL/L (ref 22–29)
EGFRCR SERPLBLD CKD-EPI 2021: 42 ML/MIN/1.73M2
GLUCOSE BLDC GLUCOMTR-MCNC: 113 MG/DL (ref 70–99)
GLUCOSE BLDC GLUCOMTR-MCNC: 119 MG/DL (ref 70–99)
GLUCOSE BLDC GLUCOMTR-MCNC: 74 MG/DL (ref 70–99)
GLUCOSE BLDC GLUCOMTR-MCNC: 78 MG/DL (ref 70–99)
GLUCOSE SERPL-MCNC: 110 MG/DL (ref 70–99)
MAGNESIUM SERPL-MCNC: 2.4 MG/DL (ref 1.7–2.3)
PHOSPHATE SERPL-MCNC: 4.3 MG/DL (ref 2.5–4.5)
POTASSIUM SERPL-SCNC: 3.8 MMOL/L (ref 3.4–5.3)
POTASSIUM UR-SCNC: 37.6 MMOL/L
PROCALCITONIN SERPL IA-MCNC: 0.82 NG/ML
SODIUM SERPL-SCNC: 142 MMOL/L (ref 135–145)

## 2023-11-09 PROCEDURE — 99231 SBSQ HOSP IP/OBS SF/LOW 25: CPT | Performed by: PHYSICIAN ASSISTANT

## 2023-11-09 PROCEDURE — 250N000011 HC RX IP 250 OP 636: Mod: JZ | Performed by: INTERNAL MEDICINE

## 2023-11-09 PROCEDURE — 250N000011 HC RX IP 250 OP 636: Mod: JZ | Performed by: STUDENT IN AN ORGANIZED HEALTH CARE EDUCATION/TRAINING PROGRAM

## 2023-11-09 PROCEDURE — 250N000011 HC RX IP 250 OP 636: Mod: JZ | Performed by: HOSPITALIST

## 2023-11-09 PROCEDURE — 86140 C-REACTIVE PROTEIN: CPT | Performed by: STUDENT IN AN ORGANIZED HEALTH CARE EDUCATION/TRAINING PROGRAM

## 2023-11-09 PROCEDURE — 84145 PROCALCITONIN (PCT): CPT | Performed by: STUDENT IN AN ORGANIZED HEALTH CARE EDUCATION/TRAINING PROGRAM

## 2023-11-09 PROCEDURE — 250N000013 HC RX MED GY IP 250 OP 250 PS 637: Performed by: NURSE PRACTITIONER

## 2023-11-09 PROCEDURE — 250N000013 HC RX MED GY IP 250 OP 250 PS 637: Performed by: STUDENT IN AN ORGANIZED HEALTH CARE EDUCATION/TRAINING PROGRAM

## 2023-11-09 PROCEDURE — 250N000011 HC RX IP 250 OP 636: Mod: JZ | Performed by: NURSE PRACTITIONER

## 2023-11-09 PROCEDURE — 250N000013 HC RX MED GY IP 250 OP 250 PS 637: Performed by: INTERNAL MEDICINE

## 2023-11-09 PROCEDURE — 76770 US EXAM ABDO BACK WALL COMP: CPT

## 2023-11-09 PROCEDURE — 250N000013 HC RX MED GY IP 250 OP 250 PS 637: Performed by: SURGERY

## 2023-11-09 PROCEDURE — C9113 INJ PANTOPRAZOLE SODIUM, VIA: HCPCS | Mod: JZ | Performed by: INTERNAL MEDICINE

## 2023-11-09 PROCEDURE — 99232 SBSQ HOSP IP/OBS MODERATE 35: CPT

## 2023-11-09 PROCEDURE — 82374 ASSAY BLOOD CARBON DIOXIDE: CPT | Performed by: STUDENT IN AN ORGANIZED HEALTH CARE EDUCATION/TRAINING PROGRAM

## 2023-11-09 PROCEDURE — 93975 VASCULAR STUDY: CPT

## 2023-11-09 PROCEDURE — 83735 ASSAY OF MAGNESIUM: CPT | Performed by: STUDENT IN AN ORGANIZED HEALTH CARE EDUCATION/TRAINING PROGRAM

## 2023-11-09 PROCEDURE — 99232 SBSQ HOSP IP/OBS MODERATE 35: CPT | Performed by: STUDENT IN AN ORGANIZED HEALTH CARE EDUCATION/TRAINING PROGRAM

## 2023-11-09 PROCEDURE — 97110 THERAPEUTIC EXERCISES: CPT | Mod: GP

## 2023-11-09 PROCEDURE — 97535 SELF CARE MNGMENT TRAINING: CPT | Mod: GO

## 2023-11-09 PROCEDURE — 97110 THERAPEUTIC EXERCISES: CPT | Mod: GO

## 2023-11-09 PROCEDURE — 97530 THERAPEUTIC ACTIVITIES: CPT | Mod: GP

## 2023-11-09 PROCEDURE — 120N000001 HC R&B MED SURG/OB

## 2023-11-09 PROCEDURE — 82310 ASSAY OF CALCIUM: CPT | Performed by: STUDENT IN AN ORGANIZED HEALTH CARE EDUCATION/TRAINING PROGRAM

## 2023-11-09 PROCEDURE — 999N000157 HC STATISTIC RCP TIME EA 10 MIN

## 2023-11-09 PROCEDURE — 250N000013 HC RX MED GY IP 250 OP 250 PS 637: Performed by: PHYSICIAN ASSISTANT

## 2023-11-09 PROCEDURE — 258N000003 HC RX IP 258 OP 636: Performed by: STUDENT IN AN ORGANIZED HEALTH CARE EDUCATION/TRAINING PROGRAM

## 2023-11-09 PROCEDURE — 84100 ASSAY OF PHOSPHORUS: CPT | Performed by: STUDENT IN AN ORGANIZED HEALTH CARE EDUCATION/TRAINING PROGRAM

## 2023-11-09 RX ORDER — CARVEDILOL 12.5 MG/1
25 TABLET ORAL 2 TIMES DAILY WITH MEALS
Status: DISCONTINUED | OUTPATIENT
Start: 2023-11-09 | End: 2023-11-21 | Stop reason: HOSPADM

## 2023-11-09 RX ADMIN — TRAMADOL HYDROCHLORIDE 50 MG: 50 TABLET, COATED ORAL at 12:26

## 2023-11-09 RX ADMIN — NYSTATIN 500000 UNITS: 100000 SUSPENSION ORAL at 08:26

## 2023-11-09 RX ADMIN — CARVEDILOL 25 MG: 12.5 TABLET, FILM COATED ORAL at 16:27

## 2023-11-09 RX ADMIN — AMOXICILLIN AND CLAVULANATE POTASSIUM 875 MG: 400; 57 POWDER, FOR SUSPENSION ORAL at 08:44

## 2023-11-09 RX ADMIN — HYDRALAZINE HYDROCHLORIDE 20 MG: 20 INJECTION INTRAMUSCULAR; INTRAVENOUS at 06:35

## 2023-11-09 RX ADMIN — PANTOPRAZOLE SODIUM 40 MG: 40 INJECTION, POWDER, FOR SOLUTION INTRAVENOUS at 08:26

## 2023-11-09 RX ADMIN — SODIUM CHLORIDE: 9 INJECTION, SOLUTION INTRAVENOUS at 22:14

## 2023-11-09 RX ADMIN — ACETAMINOPHEN 650 MG: 325 TABLET ORAL at 08:39

## 2023-11-09 RX ADMIN — VENLAFAXINE 75 MG: 75 TABLET ORAL at 08:24

## 2023-11-09 RX ADMIN — VENLAFAXINE 75 MG: 75 TABLET ORAL at 12:21

## 2023-11-09 RX ADMIN — NITROGLYCERIN 1 PATCH: 0.4 PATCH TRANSDERMAL at 08:25

## 2023-11-09 RX ADMIN — ACETAMINOPHEN 650 MG: 325 TABLET ORAL at 16:27

## 2023-11-09 RX ADMIN — TRAMADOL HYDROCHLORIDE 50 MG: 50 TABLET, COATED ORAL at 18:51

## 2023-11-09 RX ADMIN — HEPARIN SODIUM 5000 UNITS: 5000 INJECTION, SOLUTION INTRAVENOUS; SUBCUTANEOUS at 08:25

## 2023-11-09 RX ADMIN — CARVEDILOL 25 MG: 12.5 TABLET, FILM COATED ORAL at 08:25

## 2023-11-09 RX ADMIN — HEPARIN SODIUM 5000 UNITS: 5000 INJECTION, SOLUTION INTRAVENOUS; SUBCUTANEOUS at 16:35

## 2023-11-09 RX ADMIN — NYSTATIN 500000 UNITS: 100000 SUSPENSION ORAL at 12:21

## 2023-11-09 RX ADMIN — VENLAFAXINE 75 MG: 75 TABLET ORAL at 16:27

## 2023-11-09 RX ADMIN — SODIUM CHLORIDE: 9 INJECTION, SOLUTION INTRAVENOUS at 06:37

## 2023-11-09 RX ADMIN — MICONAZOLE NITRATE: 20 POWDER TOPICAL at 12:37

## 2023-11-09 RX ADMIN — AMOXICILLIN AND CLAVULANATE POTASSIUM 875 MG: 400; 57 POWDER, FOR SUSPENSION ORAL at 21:42

## 2023-11-09 RX ADMIN — HYDROMORPHONE HYDROCHLORIDE 0.2 MG: 0.2 INJECTION, SOLUTION INTRAMUSCULAR; INTRAVENOUS; SUBCUTANEOUS at 06:49

## 2023-11-09 RX ADMIN — FLUCONAZOLE 400 MG: 40 POWDER, FOR SUSPENSION ORAL at 08:25

## 2023-11-09 ASSESSMENT — ACTIVITIES OF DAILY LIVING (ADL)
ADLS_ACUITY_SCORE: 37
ADLS_ACUITY_SCORE: 41
ADLS_ACUITY_SCORE: 39
ADLS_ACUITY_SCORE: 39
ADLS_ACUITY_SCORE: 41
ADLS_ACUITY_SCORE: 39

## 2023-11-09 NOTE — PROGRESS NOTES
"Care Management Follow Up    Length of Stay (days): 31    Expected Discharge Date: 11/10/2023     Concerns to be Addressed: discharge planning     Patient plan of care discussed at interdisciplinary rounds: Yes    Anticipated Discharge Disposition: Transitional Care     Anticipated Discharge Services: None  Anticipated Discharge DME:      Patient/family educated on Medicare website which has current facility and service quality ratings: yes  Education Provided on the Discharge Plan: Yes  Patient/Family in Agreement with the Plan: yes    Referrals Placed by CM/SW: Post Acute Facilities  Private pay costs discussed: Not applicable    Additional Information:  Chart reviewed.    CM updates:  Surgery still following pt.Still has [place] tube for nutrition needs.     Per Hosp pts Bps tanked and now working on adding heart medications back on slowly.      Writer will send TCU referrals again and check with pending TCU once more medically ready. Check with daughter for more TCU choices.       Social Hx:  \"Patient lives with her adult daughter and is independent with all activities of daily living and instrumental activities of daily living (IADLs) at baseline. She is currently unemployed but planned to resume working after recovery. Daughter Karla is primary family contact. \"        Cm will continue to follow plan of care, review recommendations, and assist with any discharge needs anticipated.       Whit Malik RN      "

## 2023-11-09 NOTE — PROGRESS NOTES
[facility]    Medicine Progress Note - Hospitalist Service    Date of Admission:  10/9/2023    Assessment & Plan   Assessment:  Mojgan Jimenez is a 53 year old female with h/o obesity, severe JARVIS on CPAP, asthma, stimulant use disorder, stimulant induced psychosis, mood & anxiety disorder. Admitted 10/9/23 for scheduled laparoscopic sleeve gastrectomy with single anastomosis duodenal switch. She was later found to have two small bowel injuries with peritonitis. 10/12/23 returned to OR for small bowel interotomy closure, clean-out, & drain placement; 10/19 found to have RUQ fluid collection s/p drain placement; 10/24 s/p pelvic fluid aspiration.   Course complicated by encephalopathy/delirium, septic shock, suspected takotsubo syndrome, acute respiratory failure due to loss of protective airway reflexes & increased metabolic load requiring intubation (10/12-10/21/23; reintubated 10/21-10/26/23), & oliguric SUSSY with renal recovery. She has significantly improved: mental status clearing with some ongoing delirium at night, cardiomyopathy improved, ATN I recovery phase, and ongoing treatment of intra abdominal infection. She was made floor tele status 10/27/23. On 11/1/23 CT guided chest tube placed on right for persistent effusion, concern for parapneumonic one with risk of future trapped lung. Chest tubes removed 11/5/2023.      Problem list and plan:  Acute hypoxemic-hypercapnic respiratory failure  Was admitted 10/9/2023 for scheduled laparoscopic sleeve gastrectomy with single anastomosis duodenal switch.  Rapid Response on 10/12, Pt with decreased LOC. Opens eyes to voice. SpO2 89% on 8L NC. BS clear and diminished. Placed on a 15L non-rebreather for SpO2 > 90%. After narcan admin pt became more responsive/agitated. Transferred to ICU and intubated 10/12-10/21/23; reintubated 10/21-10/26/23. Now extubated and weaned to 1 to 2 L nasal cannula, increase to 4 L 11/8/2023 as patient suddenly  became short of breath and hypertensive will probably titrate back down to patient baseline needs.  10/22/23 CT demonstrated complete RLL atelectasis, milder RUL & RBML atelectasis or consolidation. 10/22 bronchoscopy moderate RLL blood tinged secretions cleared.   Cont pulmonary hygiene  R-pleural effusion, exudative, suspect simple parapneumonic effusion. 10/24 s/p thoracentesis, 400 mL. Pleural fluid amylase amylase 23, serum 64. 11/1/23--chest tube placed on right. Chest tubes removed 11/5/2023     Gastric bypass  10/9 s/p sleeve gastrectomy complicated by small bowel injury & peritonitis   10/12 returned to OR for closure, clean-out, & drain placement (now removed)  10/19 s/p RUQ fluid collection drain placement   10/24 s/p pelvic fluid aspiration (75 ml)  Surgery primary   ID consulted   10/12 - peritoneal cultures + Streptococcus anginosus, mixed aerobic & anaerobic kamla   10/19 - RUQ fluid cultures & pelvic fluid aspirate + Lactobacillus   10/22 - BDG blood +  10/24 pleural fluid studies  -#+WBC no pos cx. 10/24--aspirate pelvic drain--lacto + candida  Was on Vanc, pip-tazo, micafungin. Vanco d/jose 10/31  Was on TPN previously 10/14  PPTF started 10/24/23. TF and TPN weaned of 10/27 currently on bariatric clears, surgery managing   Diet per surg  Flush drain at regular intervals   Possible drain removal by IR soon check  ID recommending switching to Augmentin and fluconazole  CT abdomen with contrast showed Appropriate positioning of perihepatic drain with significantly decreased size of the associated fluid collection.   Surgery recommending increased mobility and better oral intake  As per IR tPA given, monitor output, plan for abscessogram with sedation and possible repositioning, exchange and removal of the tube 11/8/2023  As per calorie count patient oral intake remains inadequate, Met 88% of Kcal and 100% of protein needs from tube feed plus p.o.  S/p abscessogram 11/8/2023 s/p removal of drainage  tube  CRP and procalcitonin trending down    Lightheadedness associated with hypotension 11/8/2023  Patient has been hypertensive and has been having up titration of blood pressure medication, suddenly after coming back from abscessogram patient was lightheaded felt shortness of breath that has since resolved, also had some left lower chest wall discomfort which has resolved and was found to be hypotensive with systolic in the 80s which has since resolved, patient was placed back in bed, give bolus 500 cc of fluids, held blood pressure medication including Bumex, started on fluids at a slower rate cautiously, will do CTA of the chest and lower extremity duplex to rule out DVT/PE  Currently appears to be dehydrated so we will continue with fluids and monitor renal function  CTA chest negative for PE and venous duplex negative for DVT    Accelerated HTN /Cardiomyopathy   Echo previously and initially decreased LV function but repeat echo shows EF normalized, possible stress induced.   Cardiology consulted  Metoprolol switched to coreg; dose increased 11/3/23  Hydralazine increase to 100 mg tid  Nitroglycerin patch 0.4mg/24  Amlodipine 5 mg bid which was switched 11/8/2023 to nifedipine because of persistently elevated blood pressure  As needed IV hydralazine  Bumex back on per renal(Discussed with nephrology, as per nephrology may switch to Bumex 0.5 daily oral from IV, also advised outpatient follow-up with nephrology as once discharged and repeat BMP in a week to follow-up on sodium and creatinine once discharged.  Blood pressure was still running in the high range on Coreg, hydralazine, amlodipine, Bumex and doxazosin, switched to nifedipine and d.cd amlodipine), would be holding Bumex and blood pressure medication for now as patient became hypotensive  As blood pressure was trending up again 11/9/2023 discussed with nephrology of reincorporating blood pressure medication, also discussed with pharmacy as patient  had hypotensive event after nifedipine use yesterday, as per pharmacy extended release nifedipine needs to be crushed because of recent gastric bypass surgery which makes it excessive and cause hypotension, discussed if regular dose would be appropriate, hold nifedipine for now as blood pressure appropriate even on just Coreg, will continue to monitor blood pressure and reincorporate medication 1 after dinner is appropriate, discussed regarding holding further Bumex and doxazosin along with hydralazine for now which nephrology was in agreement with.  As per nephrology if needed would probably benefit more from ACE/ARB.    Scaly brown to white mucosal lesion in the mouth  Suspecting secondary to oral thrush  Will need frequent oral cares  Placed order for nystatin swish and swallow     History of JARVIS, asthma in the chart no PFTs  NIPPV with sleep/naps everytime   Continue duonebs     Oliguric SUSSY improving   Nephrology was consulted  for ARF peak creat 6.97 on 10/14  Slowly improving and creatinine trending down--10/31 was 1.38 now trended back up to 1.47  Avoid nephrotoxic agents   No labs in between 9/15/2022 through 10/11/2023  Baseline probably around 1  Renal sonogram showed:   Right renal artery unremarkable with no evidence of renal artery stenosis. Left renal artery not visualized due to patient habitus. Elevated resistive indices which can be seen in medical renal disease.     Hypernatremia resolved  IV bumex stopped initially and patient received D5W per renal .   Trended sodium and currently within normal limits  D5W-per renal-off now  Bumex restarted 10/31     Anemia stable  Hemoglobin trended up to 13.7 from 8.3 11/4/2023 suspecting lab error, hemoglobin 11/5/2023 was 7.9 which appropriately down trended from 8.3 with subsequent lab draws.  Hemoglobin currently stable  Suspect multifactorial secondary to sepsis & surgical blood loss   Monitored hemoglobin     Hypervolemia  Was started Back on bumex,  "switched to oral, currently on hold as patient became hypotensive suddenly after abscessogram 11/8/2023, may need to touch base with nephrology again prior to restarting if Bumex is even needed as patient currently appears to be euvolemic to slightly hypovolemic  Ordered echo, follow results  As per previous limited echocardiogram does not appear to have any heart failure.  Has a weight loss of around 8Kgs  Discussed with nephrology this morning who was in agreement to holding further Bumex  BNP improved to 1263  Echo was repeated again 11/8/2023 which does not show any signs of heart failure     Hyperglycemia of critical illness   Was initially on insulin glargine which has been stopped.  Glycemic control is optimal. Cont with sliding scale insulin for now     Morbid obesity  BMI 48     Acute metabolic and toxic encephalopathy resolving  Due to above. Still fluctuating but has been stable for the past 3 days.  Delirium protocol  Needs to be up day, sleep night  Up to chair      Severe decondition due to prolong hospitalization  PT/OT eval, PT recommending TCU, OT recommending LTACH/TCU           Diet: Calorie Counts  Room Service  Bariatric Diet Full Liquids  Adult Formula Drip Feeding: Specified Time Promote w fiber; Nasojejunal; Goal Rate: 80; mL/hr; From: 10:00 PM; To: 5:00 AM    DVT Prophylaxis: Heparin SQ  Patiño Catheter: Not present  Lines: PRESENT      PICC 10/28/23 Triple Lumen Right Basilic-Site Assessment: WDL      Cardiac Monitoring: None  Code Status: Full Code      Clinically Significant Risk Factors              # Hypoalbuminemia: Lowest albumin = 2.7 g/dL at 10/14/2023  6:29 AM, will monitor as appropriate     # Hypertension: Noted on problem list        # Severe Obesity: Estimated body mass index is 49.6 kg/m  as calculated from the following:    Height as of this encounter: 1.6 m (5' 3\").    Weight as of this encounter: 127 kg (279 lb 15.8 oz).      # Asthma: noted on problem list    "     Disposition Plan     Expected Discharge Date: 11/10/2023    Discharge Delays: Other (Add Comment)  IV Medication - consider oral or Home Infusion  Destination: nursing home              Saad J. Gondal, MD  Hospitalist Service  [facility]  Securely message with [employer] (more info)  Text page via [employer] Paging/Directory   ______________________________________________________________________    Interval History   Patient seen and examined at bedside, patient denies any acute complaints, discussed plan for switching IV to p.o. diuretics with both patient and nephrology.    Physical Exam   Vital Signs: Temp: 98  F (36.7  C) Temp src: Oral BP: 120/62 Pulse: 82   Resp: 22 SpO2: 94 % O2 Device: Nasal cannula Oxygen Delivery: 2 LPM  Weight: 279 lbs 15.75 oz    GENERAL: Patient was seen and examined at bedside with no acute distress, morbidly obese  HENT:  Head is normocephalic, atraumatic.  Mucosal lesions of the tongue and the roof of the mouth  EYES:  Eyes have anicteric sclerae without conjunctival injection   LUNGS: Bilateral air entry, clear to auscultation bilaterally decreased breath sounds at the bilateral bases  CARDIOVASCULAR:  Regular rate and rhythm with no murmurs, gallops or rubs.  ABDOMEN:  Normal bowel sounds. Soft; nontender   EXT: No edema  SKIN:  No acute rashes.   NEUROLOGIC:  Grossly nonfocal.      Medical Decision Making       45 MINUTES SPENT BY ME on the date of service doing chart review, history, exam, documentation & further activities per the note.      Data     I have personally reviewed the following data over the past 24 hrs:    N/A  \   N/A   / N/A     142 104 36.8 (H) /  113 (H)   3.8 27 1.47 (H) \     Trop: N/A BNP: 1,263 (H)     Procal: 0.82 (H) CRP: 102.70 (H) Lactic Acid: N/A         Imaging results reviewed over the past 24 hrs:   Recent Results (from the past 24 hour(s))   Echocardiogram Complete   Result Value    LVEF  55-60%    Narrative     [id number]  [id number]  [id number]  [id number]^GONDAL^DE[place] [address]     Name: LY GONZALEZ  MRN: [id number]  : 1970  Study Date: 2023 03:27 PM  Age: 53 yrs  Gender: Female  Patient Location: [facility]  Reason For Study: SOB  Ordering Physician: GONDAL, SAAD J  Performed By: PENNY     BSA: 2.2 m2  Height: 63 in  Weight: 272 lb  HR: 94  BP: 107/52 mmHg  ______________________________________________________________________________  Procedure  Complete Echo Adult. Definity (NDC #48889-401) given intravenously.  Technically difficult study. Compared to the prior study dated 10/23/2023,  there have been no changes.  ______________________________________________________________________________  Interpretation Summary     1. Left ventricular chamber size, wall thickness and systolic function are  normal. The visually estimated left ventricular ejection fraction is 55-60%.  2. Right ventricular chamber size and systolic function are normal.  3. No hemodynamically significant valvular abnormalities.  4. Compared to the prior study dated 10/23/2023, there has been no significant  change.  ______________________________________________________________________________  I      WMSI = 1.00     % Normal = 100     X - Cannot   0 -                      (2) - Mildly 2 -          Segments  Size  Interpret    Hyperkinetic 1 - Normal  Hypokinetic  Hypokinetic  1-2     small                                                     7 -          3-5      moderate  3 - Akinetic 4 -          5 -         6 - Akinetic Dyskinetic   6-14    large               Dyskinetic   Aneurysmal  w/scar       w/scar       15-16   diffuse     Left Ventricle  The left ventricle is normal in size. There is normal left ventricular wall  thickness. Left ventricular systolic function is normal. The visual ejection  fraction is 55-60%. Left ventricular diastolic function is normal. Diastolic  Doppler findings  (E/E' ratio and/or other parameters) suggest left ventricular  filling pressures are normal. No regional wall motion abnormalities noted.     Right Ventricle  The right ventricle is not well visualized. The right ventricle is normal in  size and function.     Atria  Normal left atrial size. Right atrial size is normal.     Mitral Valve  Mitral valve leaflets appear normal. There is trace mitral regurgitation.  There is no mitral valve stenosis.     Tricuspid Valve  Tricuspid valve leaflets appear normal. There is trace tricuspid  regurgitation. Right ventricular systolic pressure could not be approximated  due to inadequate tricuspid regurgitation. There is no tricuspid stenosis.     Aortic Valve  The aortic valve is not well visualized. No aortic regurgitation is present.  No aortic stenosis is present.     Pulmonic Valve  The pulmonic valve is not well visualized. There is trace pulmonic valvular  regurgitation. There is no pulmonic valvular stenosis.     Vessels  The aorta root is normal. The thoracic aorta is normal. IVC diameter <2.1 cm  collapsing >50% with sniff suggests a normal RA pressure of 3 mmHg.     Pericardium  There is no pericardial effusion.     Rhythm  Sinus rhythm was noted.     ______________________________________________________________________________  MMode/2D Measurements & Calculations  IVSd: 0.90 cm  LVIDd: 4.8 cm  LVIDs: 3.3 cm  LVPWd: 0.90 cm  FS: 30.1 %     LV mass(C)d: 145.2 grams  LV mass(C)dI: 65.9 grams/m2  asc Aorta Diam: 3.0 cm  LVOT diam: 2.0 cm  LVOT area: 3.1 cm2  Asc Ao diam index BSA (cm/m2): 1.4  Asc Ao diam index Ht(cm/m): 1.9  LA Volume Index (BP): 30.0 ml/m2  RWT: 0.38  TAPSE: 2.4 cm     Time Measurements  MM HR: 94.0 BPM     Doppler Measurements & Calculations  MV E max levy: 103.0 cm/sec  MV A max levy: 94.7 cm/sec  MV E/A: 1.1  MV max P.9 mmHg  MV mean PG: 3.0 mmHg  MV V2 VTI: 31.3 cm  MVA(VTI): 2.8 cm2  Ao V2 max: 172.0 cm/sec  Ao max P.0 mmHg  Ao V2 mean:  112.0 cm/sec  Ao mean P.0 mmHg  Ao V2 VTI: 33.0 cm  ALEXANDRA(I,D): 2.7 cm2  ALEXANDRA(V,D): 2.6 cm2  LV V1 max P.1 mmHg  LV V1 max: 142.0 cm/sec  LV V1 VTI: 28.2 cm  SV(LVOT): 88.6 ml  SI(LVOT): 40.2 ml/m2  PA V2 max: 119.0 cm/sec  PA max P.7 mmHg  PA acc time: 0.11 sec  AV Sami Ratio (DI): 0.83  ALEXANDRA Index (cm2/m2): 1.2  E/E': 12.3  E/E' avg: 10.2  Lateral E/e': 8.2  Medial E/e': 12.1  Peak E' Sami: 8.4 cm/sec     RV S Sami: 9.8 cm/sec     ______________________________________________________________________________  Report approved by: Yakelin Hernandez 2023 04:47 PM         US Renal Complete w Arterial Duplex    Narrative    EXAM:  1. RENAL ULTRASOUND   2. RENAL DUPLEX  LOCATION: [facility]  DATE: 2023    INDICATION: Assessing for secondary hypertension, now on 4 antihypertensive agents (?AMBER, ?renal artery embolism, adrenal abnormality, etc.)  COMPARISON: None.  TECHNIQUE: Duplex imaging is performed utilizing gray-scale, two-dimensional images, and color-flow imaging. Doppler waveform analysis and spectral Doppler imaging is also performed.    FINDINGS:     RIGHT KIDNEY: 10.6 x 6.9 x 6 cm. Normal without hydronephrosis or masses.    LEFT KIDNEY 10.6 x 6.6 x 5.9 cm. Normal without hydronephrosis or masses..     BLADDER: Normal.    RENAL DUPLEX:  Aortic PSV: 127 cm/s, multiphasic  Right Renal Artery PSV: Normal, less than 200 cm/s (Normal considered less than 200 cm/s.)  Right Intrarenal Resistive Index: Abnormal, greater than 0.8  Left Renal Artery PS: Proximal and mid renal artery not visualized due to body habitus and patient cooperation. Renal artery at the hilum 68 cm/s(Normal considered less than 200 cm/s.)  Left Intrarenal Resistive Index: Abnormal, greater than 0.8      Impression    IMPRESSION:   1.  Right renal artery unremarkable with no evidence of renal artery stenosis. Left renal artery not visualized due to patient habitus.  2.  Elevated resistive indices which can  be seen in medical renal disease.

## 2023-11-09 NOTE — PROGRESS NOTES
General Surgery Progress Note:    Hospital Day # 31    ASSESSMENT:  1. Intra-abdominal abscess (H)    2. Perimenopausal symptoms        Mojgan Jimenez is a 53 year old female who is s/p laparoscopic sleeve gastrectomy with single anastomosis duodenal switch on 10/09/23 with return to the OR on 10/12/23 to repair two small enterotomies on the common channel. Patient became septic with ARF and developed pneumonia and pleural effusion necessitating chest tube placement. Chest tube removed on 11/05/23 and pulmonary signed off. Renal function improved and nephrology signed off but re-consulted 11/10 in setting of persistent hypertension with four agents on board. Hypotension on 11/8 likely SIRS response after IR drain study.     PLAN:  - Continue nocturnal tube feeds. Tube feeds to meet a maximum of 50% of caloric and protein needs. Continue calorie counts.   - Continue to encourage oral intake and protein supplement shakes   - Appreciate nephrology consult  - Patient needs to be out of bed to chair at least twice per day with meals. Continue to encourage ADL's.  - Continue to work toward acute rehab    SUBJECTIVE:   She is feeling better today. Endorse mild left rib pain and headache. Denies abdominal pain, nausea, vomiting, chest pain, dizziness, lightheadedness. Reports she did not do very well yesterday with her fluid intake and she is feeling tired after all of her tests yesterday. Seen sitting up in bed and requesting a protein shake.       Patient Vitals for the past 24 hrs:   BP Temp Temp src Pulse Resp SpO2   11/09/23 0743 (!) 175/87 -- -- 103 22 93 %   11/09/23 0739 (!) 178/82 97.7  F (36.5  C) Oral 102 22 94 %   11/09/23 0608 (!) 169/78 97.6  F (36.4  C) Oral 97 18 94 %   11/09/23 0132 -- -- -- 92 18 94 %   11/08/23 2003 -- -- -- 100 22 93 %   11/08/23 1824 130/69 98.5  F (36.9  C) Axillary 95 20 93 %   11/08/23 1643 128/69 97.4  F (36.3  C) Oral 93 -- 94 %   11/08/23 1304 107/52 -- -- 95 22 93 %   11/08/23  1159 100/55 97.5  F (36.4  C) Oral 95 18 92 %   11/08/23 1153 94/50 -- -- -- -- --   11/08/23 1102 96/55 -- -- -- -- 91 %   11/08/23 1036 91/52 -- -- -- -- --   11/08/23 1026 94/50 -- -- -- -- --   11/08/23 1015 (!) 83/46 -- -- 93 30 90 %         PHYSICAL EXAM:  General: patient seen resting in bed, no acute distress  HEENT: Dobbhoff feeding tube clamped  Resp: no respiratory distress, breathing comfortably on 2L via nasal canula  Abdomen: Soft, non-tender. Well healed midline incision. RUQ prior IR drain site covered with dry gauze.  Extremities: warm and well perfused    11/08 0700 - 11/09 0659  In: 4395 [P.O.:1140; I.V.:1095]  Out: 1425 [Urine:1425; Drains:10]    No results displayed because visit has over 200 results.           Rafaela West PA-C  [facility] General Surgery  [address]  [address] [place] [id number]

## 2023-11-09 NOTE — PLAN OF CARE
Speech Language Therapy Discharge Summary    Reason for therapy discharge:    Patient is unable to advance diet consistency due to surgical/gastric needs. She is tolerating full liquid diet with no concerns for dysphagia or aspiration.    Progress towards therapy goal(s). See goals on Care Plan in [facility] electronic health record for goal details.  Patient has met goal for tolerating current diet of full liquid.    Therapy recommendation(s):    Will discontinue speech therapy for swallow as she is currently unable to advance diet due to medical status. Initial swallow concerns were related to post intubation swallow difficulty which appears to have largely resolved. If patient experiences swallow difficult when she begins to advance diet, please re-consult SLP.

## 2023-11-09 NOTE — PLAN OF CARE
"Goal Outcome Evaluation:      Plan of Care Reviewed With: patient    Overall Patient Progress: improvingOverall Patient Progress: improving    Outcome Evaluation: Tolerated oral fluids with no C/O nausea. Denied any pain this evening. Deconditioned requiring help with sips of water. Purewick in place draining clear yellow urine. Surgical incisions on the abdomen healing nicely. Tube feed going at targeted rate. Pt refused repositioning and writer educated her on the importance of getting off of pressure points. Two of three lumens occluded on right PICC. Currently tPa on tubes and they were still occluded after the first half hour; awaiting an additional 90 minutes now. Still on a 1:1; pt drowsy but oriented x 4. Will continue to monitor.    /69 (BP Location: Left arm, Patient Position: Semi-Ballard's, Cuff Size: Adult Regular)   Pulse 100   Temp 98.5  F (36.9  C) (Axillary)   Resp 22   Ht 1.6 m (5' 3\")   Wt 123.5 kg (272 lb 4.3 oz)   LMP 09/09/2023 (Exact Date)   SpO2 93%   BMI 48.23 kg/m        Problem: Activity Intolerance  Goal: Enhanced Capacity and Energy  Outcome: Not Progressing  Intervention: Optimize Activity Tolerance  Recent Flowsheet Documentation  Taken 11/8/2023 2003 by Andria Yeung, RN  Activity Management:   patient refuses activity   activity adjusted per tolerance     "

## 2023-11-09 NOTE — PROGRESS NOTES
"Pt states that she hasn't use the hospital BiPAP or CPAP  for 2 nights, (pt has her unit here and might like to go back to using her own unit rather than the hospital BiPAP unit. Currently on 3 lpm/NC, SpO2 93 %. RT to assist pt if requested.     BP (!) 156/74   Pulse 82   Temp 98  F (36.7  C) (Oral)   Resp 20   Ht 1.6 m (5' 3\")   Wt 127 kg (279 lb 15.8 oz)   LMP 09/09/2023 (Exact Date)   SpO2 93%   BMI 49.60 kg/m      "

## 2023-11-09 NOTE — PROGRESS NOTES
The grey and the white lumens were occluded last evening. Writer was finally able to get blood return on both lumens and flush them. Will continue to monitor these.

## 2023-11-09 NOTE — PLAN OF CARE
Goal Outcome Evaluation:    Problem: Plan of Care - These are the overarching goals to be used throughout the patient stay.    Goal: Optimal Comfort and Wellbeing  Intervention: Monitor Pain and Promote Comfort  Recent Flowsheet Documentation  Taken 11/9/2023 0825 by Virgie Carranza RN  Pain Management Interventions: medication (see MAR)     Problem: Nausea and Vomiting  Goal: Nausea and Vomiting Relief  Outcome: Progressing  Pt had no nausea or vomiting      Problem: Bariatric Surgery  Goal: Optimal Pain Control and Function  Intervention: Prevent or Manage Pain  Recent Flowsheet Documentation  Taken 11/9/2023 0825 by Virgie Carranza RN  Pain Management Interventions: medication (see MAR)  PRN Tylenol given for headache and left flank pain; PRN Tramadol given; per pt helpful.  No further complaint.    Maintenance IV fluid running at 60 ml/hr.    Staff encouraged Pt to increase oral intakes. Pt taking sips. See flow sheet.   Pt having loose stools;  x 3; MD aware.    Pt  sister visited; Pt is resting quietly.

## 2023-11-09 NOTE — PROGRESS NOTES
RENAL PROGRESS NOTE    CC:  ongoing HTN despite 4 antihypertensive agents     ASSESSMENT & PLAN:     HTN: Not on antihypertensives PTA.   Was on Carvedilol 25 mg BID, Doxazosin 2 mg Q 12, Hydralazine 100 mg TID, Nifedipine 30 mg daily, and bumex 0.5mg daily.     -11/8/23 /52 at time of consult today yesterday and all HTNsive meds held. Had PRN hydralazine. Sig drop in pressure thought to be 2/2 how Nifedipine was administered yesterday.   -11/9/23: Today BP is 120/62, stable. On Carvedilol 25 BID, and resumed Nifedipine 60 mg PO (Discussed with primary okay to resume, will follow alongside). S/P PRN Hydralazine 20 mg  @ 0630 this AM.  -She Has PRN Hydralazine if needed.     -?pain and anxiety playing a role here.   -Hx of JARVIS on CPAP (pt refuses Bipap intermittently).   -Goal BP <140/90. Adrenal gland normal on 11/7/23 imaging.   -Renal US with Doppler unremarkable. UA bland, cortisol okay, UA/UPCR mild proteinuria.   -Secondary hypertension workup thus far unremarkable. .   - ANA PAULA-Renin ratio, pending  -Would HOLD ACE/ARB therapy for now with recent contrast, but can likely resume ACE/ARB with stable Cr in near future if indicated.       Recent Oliguric SUSSY: Baseline creatinine 0.7 within normal limits no prior history of SUSSY or CKD.  Cr peaked at 6.97 on 10/14 now ~1.3. 2/2 Hemodynamic ATN, exacerbated by NSAIDS. Renal signed off for management of SUSSY 11/5/23.avoid nephrotoxins, renal dose medication, daily wt, daily I/O. Follow BMP.     Lytes: stable     Acid/base: stable     Anemia: HG 8-9. Transfuse per Primary team for HG <7     H/O complicated Gastric Bypass: Multiple complications post sleeve gastrectomy complicated by peritonitis and small bowel injury with peritoneal leak. Return to the OR 10/12/2023 with cleanout and drain placement complicated by other fluid collections requiring aspiration and drain placement. S/P ABX, ID following. 11/7/23 TF via NJ 11/7/23 bariatric full liquid diet. NPO for  procedure today. Diet management per primary team bariatric surgery       SUBJECTIVE:   Pt resting comfortable in bed  Denies pain  Denies n,v, c, d, sob, fever, rash or CP  VSS  PT has no concerns at this time  We discussed, labs/Workup and plan and answered all questions    OBJECTIVE:  Physical Exam   Temp: 98  F (36.7  C) Temp src: Oral BP: 120/62 Pulse: 82   Resp: 22 SpO2: 94 % O2 Device: Nasal cannula Oxygen Delivery: 2 LPM  Vitals:    11/04/23 0400 11/08/23 0720 11/09/23 1127   Weight: 129.1 kg (284 lb 9.8 oz) 123.5 kg (272 lb 4.3 oz) 127 kg (279 lb 15.8 oz)     Vital Signs with Ranges  Temp:  [97.4  F (36.3  C)-98.5  F (36.9  C)] 98  F (36.7  C)  Pulse:  [] 82  Resp:  [18-22] 22  BP: (120-178)/(62-87) 120/62  SpO2:  [93 %-94 %] 94 %  I/O last 3 completed shifts:  In: 4395 [P.O.:1140; I.V.:1095; NG/GT:1660; IV Piggyback:500]  Out: 1425 [Urine:1425; Drains:10]      Patient Vitals for the past 72 hrs:   Weight   11/09/23 1127 127 kg (279 lb 15.8 oz)   11/08/23 0720 123.5 kg (272 lb 4.3 oz)     Intake/Output Summary (Last 24 hours) at 11/9/2023 1422  Last data filed at 11/9/2023 1301  Gross per 24 hour   Intake 3735 ml   Output 1000 ml   Net 2735 ml       PHYSICAL EXAM:  GEN: NAD, aox3  CV: RRR   Lung: clear and equal  Ab: soft and NT, obese  Ext: ++ edema and well perfused  Skin: no rash      LABORATORY STUDIES:     Recent Labs   Lab 11/07/23  0705 11/05/23  0649 11/04/23  0503 11/03/23  0619   WBC 6.3 7.3 4.6 9.0   RBC 2.66* 2.79* 4.82 2.92*   HGB 7.7* 7.9* 13.7 8.3*   HCT 25.5* 26.8* 45.6 28.1*    234 194 307       Basic Metabolic Panel:  Recent Labs   Lab 11/09/23  1201 11/09/23  0834 11/09/23  0635 11/08/23  2131 11/08/23  1616 11/08/23  1109 11/08/23  0815 11/08/23  0540 11/07/23  0811 11/07/23  0705 11/05/23  0907 11/05/23  0648 11/04/23  2107 11/04/23  1815 11/04/23  0826 11/04/23  0503 11/03/23  0824 11/03/23  0619   NA  --   --  142  --   --   --   --  142  --  144  --  144  144  --  143   --  145  145   < > 145  145   POTASSIUM  --   --  3.8  --   --   --   --  4.0  --  4.0  --  3.8  --   --   --  3.7  --  3.8   CHLORIDE  --   --  104  --   --   --   --  103  --  103  --  103  --   --   --  106  --  107   CO2  --   --  27  --   --   --   --  32*  --  31*  --  31*  --   --   --  30*  --  27   BUN  --   --  36.8*  --   --   --   --  29.6*  --  30.8*  --  30.8*  --   --   --  28.2*  --  27.1*   CR  --   --  1.47*  --   --   --   --  1.38*  --  1.33*  --  1.40*  --   --   --  1.48*  --  1.50*   * 119* 110* 109* 116* 156*   < > 107*   < > 146*   < > 130*   < >  --    < > 118*   < > 143*   PALAK  --   --  9.0  --   --   --   --  9.5  --  8.7  --  8.7  --   --   --  9.2  --  9.1    < > = values in this interval not displayed.       INRNo lab results found in last 7 days.     Recent Labs   Lab Test 11/07/23  0705 11/05/23  0649 10/24/23  0333 10/23/23  0530 10/16/23  1055 10/16/23  3486   INR  --   --   --  1.49*  --  1.21*   WBC 6.3 7.3   < > 14.5*   < >  --    HGB 7.7* 7.9*   < > 9.2*   < >  --     234   < > 427   < >  --     < > = values in this interval not displayed.       Personally reviewed current labs    Carmencita RITTER-BC  [facility]  [phone]

## 2023-11-09 NOTE — PROGRESS NOTES
CALORIE COUNT/TF Change      Approximate Oral Intake for:     Bariatric full liquid diet  Calories: 84  Protein: 10    1 meal slip saved (only ordered 2 meals). Ate 4 oz of yogurt       Intake from TF/PN:     TF via NJ tube of promote with fiber @ 80 ml/hr x 14 hrs providin Calories, 69 g protein, 155 g CHO, 16 g fiber and 931 ml free fluid plus H2O flush  (270 ml)=1201 ml fluid/day       Estimated Needs:    Calories: 5439-8769 (80% of needs, max from TF per surgery: ~6872-3226)  Protein:63-78 gm (80% of needs, max from TF per surgery: 50-62)      Summary:   Oral intake remains inadequate. Met 92% of kcal and 100% of protein needs from TF + PO    Addendum:  Spoke with Dr. Wallace, states pt drank 1 Ensure Max yesterday (150 kcals 30 gm protein, has several in her room) which makes PO intake 234 kcals 40 gm protein which is 18% of kcal and 63% of protein needs    MD would like to push po intake and requests TF provide ~ 50% of estimated needs and would TF to be 80 mL/hr x 7 hrs     New TF rate provides: 560 mL, 560 kcals, 34 gm protein, 77 gm CHO, 8 gm fiber, 465 mL free water plus H2O flush (270 mL) = 735 mL fluid which is 43% of estimated kcal and 54% of estimated protein needs    Updated TF to run from 10 pm to 5 am

## 2023-11-10 ENCOUNTER — APPOINTMENT (OUTPATIENT)
Dept: OCCUPATIONAL THERAPY | Facility: HOSPITAL | Age: 53
End: 2023-11-10
Attending: SURGERY
Payer: COMMERCIAL

## 2023-11-10 ENCOUNTER — APPOINTMENT (OUTPATIENT)
Dept: PHYSICAL THERAPY | Facility: HOSPITAL | Age: 53
End: 2023-11-10
Attending: SURGERY
Payer: COMMERCIAL

## 2023-11-10 LAB
ALDOST SERPL-MCNC: 5.5 NG/DL (ref 0–31)
ANION GAP SERPL CALCULATED.3IONS-SCNC: 7 MMOL/L (ref 7–15)
BUN SERPL-MCNC: 31.2 MG/DL (ref 6–20)
CALCIUM SERPL-MCNC: 9.1 MG/DL (ref 8.6–10)
CHLORIDE SERPL-SCNC: 105 MMOL/L (ref 98–107)
CREAT SERPL-MCNC: 1.32 MG/DL (ref 0.51–0.95)
DEPRECATED HCO3 PLAS-SCNC: 27 MMOL/L (ref 22–29)
EGFRCR SERPLBLD CKD-EPI 2021: 48 ML/MIN/1.73M2
GLUCOSE BLDC GLUCOMTR-MCNC: 85 MG/DL (ref 70–99)
GLUCOSE BLDC GLUCOMTR-MCNC: 88 MG/DL (ref 70–99)
GLUCOSE BLDC GLUCOMTR-MCNC: 89 MG/DL (ref 70–99)
GLUCOSE BLDC GLUCOMTR-MCNC: 94 MG/DL (ref 70–99)
GLUCOSE BLDC GLUCOMTR-MCNC: 97 MG/DL (ref 70–99)
GLUCOSE SERPL-MCNC: 111 MG/DL (ref 70–99)
HBA1C MFR BLD: 5.9 %
PF4 HEPARIN CMPLX AB SER QL: POSITIVE
PLATELET # BLD AUTO: 106 10E3/UL (ref 150–450)
POTASSIUM SERPL-SCNC: 4.2 MMOL/L (ref 3.4–5.3)
SODIUM SERPL-SCNC: 139 MMOL/L (ref 135–145)

## 2023-11-10 PROCEDURE — 250N000013 HC RX MED GY IP 250 OP 250 PS 637: Performed by: INTERNAL MEDICINE

## 2023-11-10 PROCEDURE — 250N000011 HC RX IP 250 OP 636: Mod: JZ | Performed by: NURSE PRACTITIONER

## 2023-11-10 PROCEDURE — 97535 SELF CARE MNGMENT TRAINING: CPT | Mod: GO

## 2023-11-10 PROCEDURE — 97110 THERAPEUTIC EXERCISES: CPT | Mod: GO

## 2023-11-10 PROCEDURE — 250N000013 HC RX MED GY IP 250 OP 250 PS 637: Performed by: SURGERY

## 2023-11-10 PROCEDURE — 999N000157 HC STATISTIC RCP TIME EA 10 MIN

## 2023-11-10 PROCEDURE — 97110 THERAPEUTIC EXERCISES: CPT | Mod: GP

## 2023-11-10 PROCEDURE — 99232 SBSQ HOSP IP/OBS MODERATE 35: CPT | Performed by: STUDENT IN AN ORGANIZED HEALTH CARE EDUCATION/TRAINING PROGRAM

## 2023-11-10 PROCEDURE — 86022 PLATELET ANTIBODIES: CPT | Performed by: STUDENT IN AN ORGANIZED HEALTH CARE EDUCATION/TRAINING PROGRAM

## 2023-11-10 PROCEDURE — 258N000003 HC RX IP 258 OP 636: Performed by: STUDENT IN AN ORGANIZED HEALTH CARE EDUCATION/TRAINING PROGRAM

## 2023-11-10 PROCEDURE — 250N000013 HC RX MED GY IP 250 OP 250 PS 637

## 2023-11-10 PROCEDURE — 250N000013 HC RX MED GY IP 250 OP 250 PS 637: Performed by: PHYSICIAN ASSISTANT

## 2023-11-10 PROCEDURE — 250N000013 HC RX MED GY IP 250 OP 250 PS 637: Performed by: NURSE PRACTITIONER

## 2023-11-10 PROCEDURE — 250N000011 HC RX IP 250 OP 636: Mod: JZ | Performed by: STUDENT IN AN ORGANIZED HEALTH CARE EDUCATION/TRAINING PROGRAM

## 2023-11-10 PROCEDURE — 120N000001 HC R&B MED SURG/OB

## 2023-11-10 PROCEDURE — 80048 BASIC METABOLIC PNL TOTAL CA: CPT | Performed by: INTERNAL MEDICINE

## 2023-11-10 PROCEDURE — 97116 GAIT TRAINING THERAPY: CPT | Mod: GP

## 2023-11-10 PROCEDURE — 99232 SBSQ HOSP IP/OBS MODERATE 35: CPT

## 2023-11-10 PROCEDURE — 3E0L3GC INTRODUCTION OF OTHER THERAPEUTIC SUBSTANCE INTO PLEURAL CAVITY, PERCUTANEOUS APPROACH: ICD-10-PCS | Performed by: INTERNAL MEDICINE

## 2023-11-10 PROCEDURE — 85049 AUTOMATED PLATELET COUNT: CPT | Performed by: SURGERY

## 2023-11-10 PROCEDURE — 99223 1ST HOSP IP/OBS HIGH 75: CPT | Performed by: INTERNAL MEDICINE

## 2023-11-10 PROCEDURE — 250N000013 HC RX MED GY IP 250 OP 250 PS 637: Performed by: STUDENT IN AN ORGANIZED HEALTH CARE EDUCATION/TRAINING PROGRAM

## 2023-11-10 PROCEDURE — 83036 HEMOGLOBIN GLYCOSYLATED A1C: CPT | Performed by: STUDENT IN AN ORGANIZED HEALTH CARE EDUCATION/TRAINING PROGRAM

## 2023-11-10 PROCEDURE — 250N000011 HC RX IP 250 OP 636: Mod: JZ | Performed by: HOSPITALIST

## 2023-11-10 RX ORDER — SODIUM CHLORIDE 9 MG/ML
INJECTION, SOLUTION INTRAVENOUS CONTINUOUS
Status: DISCONTINUED | OUTPATIENT
Start: 2023-11-10 | End: 2023-11-11

## 2023-11-10 RX ORDER — HYDROCHLOROTHIAZIDE 12.5 MG/1
12.5 TABLET ORAL DAILY
Status: DISCONTINUED | OUTPATIENT
Start: 2023-11-10 | End: 2023-11-11

## 2023-11-10 RX ORDER — FONDAPARINUX SODIUM 10 MG/.8ML
10 INJECTION SUBCUTANEOUS EVERY 24 HOURS
Status: DISCONTINUED | OUTPATIENT
Start: 2023-11-10 | End: 2023-11-21 | Stop reason: HOSPADM

## 2023-11-10 RX ORDER — AMLODIPINE BESYLATE 5 MG/1
5 TABLET ORAL 2 TIMES DAILY
Status: DISCONTINUED | OUTPATIENT
Start: 2023-11-10 | End: 2023-11-15

## 2023-11-10 RX ADMIN — TRAMADOL HYDROCHLORIDE 50 MG: 50 TABLET, COATED ORAL at 16:25

## 2023-11-10 RX ADMIN — CARVEDILOL 25 MG: 12.5 TABLET, FILM COATED ORAL at 08:45

## 2023-11-10 RX ADMIN — VENLAFAXINE 75 MG: 75 TABLET ORAL at 12:32

## 2023-11-10 RX ADMIN — AMLODIPINE BESYLATE 5 MG: 5 TABLET ORAL at 08:59

## 2023-11-10 RX ADMIN — VENLAFAXINE 75 MG: 75 TABLET ORAL at 08:47

## 2023-11-10 RX ADMIN — AMOXICILLIN AND CLAVULANATE POTASSIUM 875 MG: 400; 57 POWDER, FOR SUSPENSION ORAL at 08:46

## 2023-11-10 RX ADMIN — SODIUM CHLORIDE: 9 INJECTION, SOLUTION INTRAVENOUS at 12:40

## 2023-11-10 RX ADMIN — Medication 40 MG: at 08:59

## 2023-11-10 RX ADMIN — SODIUM CHLORIDE: 9 INJECTION, SOLUTION INTRAVENOUS at 09:29

## 2023-11-10 RX ADMIN — CARVEDILOL 25 MG: 12.5 TABLET, FILM COATED ORAL at 16:25

## 2023-11-10 RX ADMIN — AMOXICILLIN AND CLAVULANATE POTASSIUM 875 MG: 400; 57 POWDER, FOR SUSPENSION ORAL at 21:50

## 2023-11-10 RX ADMIN — NYSTATIN 500000 UNITS: 100000 SUSPENSION ORAL at 21:47

## 2023-11-10 RX ADMIN — FLUCONAZOLE 400 MG: 40 POWDER, FOR SUSPENSION ORAL at 08:46

## 2023-11-10 RX ADMIN — TRAMADOL HYDROCHLORIDE 50 MG: 50 TABLET, COATED ORAL at 05:10

## 2023-11-10 RX ADMIN — VENLAFAXINE 75 MG: 75 TABLET ORAL at 16:30

## 2023-11-10 RX ADMIN — HYDROCHLOROTHIAZIDE 12.5 MG: 12.5 TABLET ORAL at 13:20

## 2023-11-10 RX ADMIN — AMLODIPINE BESYLATE 5 MG: 5 TABLET ORAL at 21:49

## 2023-11-10 RX ADMIN — HEPARIN SODIUM 5000 UNITS: 5000 INJECTION, SOLUTION INTRAVENOUS; SUBCUTANEOUS at 00:45

## 2023-11-10 RX ADMIN — HYDRALAZINE HYDROCHLORIDE 20 MG: 20 INJECTION INTRAMUSCULAR; INTRAVENOUS at 14:24

## 2023-11-10 RX ADMIN — NITROGLYCERIN 1 PATCH: 0.4 PATCH TRANSDERMAL at 12:25

## 2023-11-10 RX ADMIN — FONDAPARINUX SODIUM 10 MG: 10 INJECTION, SOLUTION SUBCUTANEOUS at 19:07

## 2023-11-10 ASSESSMENT — ACTIVITIES OF DAILY LIVING (ADL)
ADLS_ACUITY_SCORE: 39
ADLS_ACUITY_SCORE: 41
ADLS_ACUITY_SCORE: 39
ADLS_ACUITY_SCORE: 39
ADLS_ACUITY_SCORE: 41
ADLS_ACUITY_SCORE: 41
ADLS_ACUITY_SCORE: 39
ADLS_ACUITY_SCORE: 41
ADLS_ACUITY_SCORE: 41
ADLS_ACUITY_SCORE: 39
ADLS_ACUITY_SCORE: 39
ADLS_ACUITY_SCORE: 41

## 2023-11-10 NOTE — PROGRESS NOTES
"CLINICAL NUTRITION SERVICES - REASSESSMENT NOTE     Nutrition Prescription    RECOMMENDATIONS FOR MDs/PROVIDERS TO ORDER:    Recommendations already ordered by Registered Dietitian (RD):  Continue TF @ 80 ml/hr x 7 hrs  Continue Calorie counts    Future/Additional Recommendations:  Monitor Calorie counts, TF tolerance, labs, plan of care     EVALUATION OF THE PROGRESS TOWARD GOALS   Diet: Bariatric full liquids  Nutrition Support: TF Promote with fiber @ 80 ml/hr x 7 hrs ( providing 560 ml/Calories, 34 g protein, 77 g CHO, 8 g fiber, 465 ml free water plus H2O flush ( 270 ml)=735 ml ( ~ 43% estimated Calorie needs and ~ 54 % estimated protein needs)  Intake: 1 meal slip saved and pt took nothing  ( supper 11/9) per documentation pt took a couple of bites of yogurt earlier in the day.     ANTHROPOMETRICS  Height: 160 cm (5' 3\")  Most Recent Weight: 127 kg (279 lb 15.8 oz)    Weight History:   Wt Readings from Last 10 Encounters:   11/09/23 127 kg (279 lb 15.8 oz)   08/09/23 133.4 kg (294 lb 3.2 oz)   07/05/23 129.7 kg (286 lb)   03/13/23 129.2 kg (284 lb 12.8 oz)   11/10/22 129.3 kg (285 lb)   11/07/22 129.3 kg (285 lb)   08/30/22 132.6 kg (292 lb 4.8 oz)   08/25/22 131.7 kg (290 lb 6.4 oz)   06/15/22 132 kg (291 lb)   05/17/22 131.8 kg (290 lb 9.6 oz)     GI CONCERNS  Diarrhea, fecal incontinence  Last BM 11/9/2023  Passing flatus    LABS  Reviewed: Na 139, K 4.2, urea nitrogen 31.2 (H), Cr 1.32 (H), Ca 9.1, Glu 111    MEDICATIONS  Reviewed: augmentin, coreg, diflucan, novolog, mycostatin, pantoprazole    CURRENT NUTRITION DIAGNOSIS  Inadequate oral intake related to poor appetite as evidenced by meeting < 50% estimated nutrition needs from po intake      INTERVENTIONS  Implementation  Continue TF promote with fiber @ 80 ml/hr x 7 hrs  Continue Calorie counts    Goals  Increase po intake-not met  Tolerate TF-progressing    Monitoring/Evaluation  Progress toward goals will be monitored and evaluated per protocol.   "

## 2023-11-10 NOTE — PLAN OF CARE
Problem: Bariatric Surgery  Goal: Optimal Pain Control and Function  Outcome: Progressing   Goal Outcome Evaluation:       Pt is alert and oriented, able to make needs known. Tolerated tube feeding overnight.  Pt is incontinent of stool. PRN tramadol given x1 for abdominal discomfort. Pt is A-1 to turn in bed. BG 97. Pt used CPAP overnight, now wearing nasal cannula with 3L of oxygen. Uneventful shift pt has been resting in between cares.  Felecia Hayes RN

## 2023-11-10 NOTE — PROGRESS NOTES
General Surgery Progress Note:    Hospital Day # 32    ASSESSMENT:  1. Intra-abdominal abscess (H)    2. Perimenopausal symptoms        Mojgan Jimenez is a 53 year old female who is s/p laparoscopic sleeve gastrectomy with single anastomosis duodenal switch on 10/09/23 with return to the OR on 10/12/23 to repair two small enterotomies on the common channel. Patient became septic with ARF and developed pneumonia and pleural effusion necessitating chest tube placement. Chest tube removed on 11/05/23 and pulmonary signed off. Renal function improved and nephrology signed off but re-consulted 11/10 in setting of persistent hypertension with four agents on board.     Main barriers to discharge at this point are transition to oral intake and deconditioning    PLAN:  - Continue nocturnal tube feeds over only 7 hours. Tube feeds to meet a maximum of 50% of caloric and protein needs. Continue calorie counts.   - Continue to encourage oral intake and protein supplement shakes with goal of 2 protein shakes per day and 1 L of water/clear liquid per day  -Push towards discontinuing tube feeds over the next 48 hours with improving oral intake  - Appreciate nephrology consult  - Patient needs to be out of bed to chair 3 times per shift. Continue to encourage ADL's.  - Continue to work toward acute rehab    SUBJECTIVE: Had a lot of diarrhea yesterday which diminished her appetite and oral intake.  She feels much better this morning and is already restarting work on protein drinks.        Patient Vitals for the past 24 hrs:   BP Temp Temp src Pulse Resp SpO2 Weight   11/09/23 2318 135/75 97.6  F (36.4  C) Axillary 89 18 94 % --   11/09/23 1900 (!) 146/78 98.3  F (36.8  C) Oral 80 20 94 % --   11/09/23 1611 (!) 156/74 -- -- -- -- -- --   11/09/23 1557 (!) 195/89 98  F (36.7  C) Oral -- 20 93 % --   11/09/23 1127 -- -- -- -- -- -- 127 kg (279 lb 15.8 oz)   11/09/23 1033 120/62 98  F (36.7  C) Oral 82 22 94 % --         PHYSICAL  EXAM:  General: patient seen resting in bed, no acute distress  HEENT: Dobbhoff feeding tube clamped  Resp: no respiratory distress, breathing comfortably on 2L via nasal canula  Abdomen: Soft, non-tender. Well healed midline incision. RUQ prior IR drain site covered with dry gauze.  Extremities: warm and well perfused    11/09 0700 - 11/10 0659  In: 3502 [P.O.:1165; I.V.:1360]  Out: 400 [Urine:400]    No results displayed because visit has over 200 results.         Dakota Wallace MD, FACS  [phone]  [facility]  General and Bariatric Surgery

## 2023-11-10 NOTE — PROGRESS NOTES
"Care Management Progress Note:    Length of Stay (days): 32     Expected Discharge Date: 11/13/2023     Concerns to be Addressed: discharge planning     Patient plan of care discussed at interdisciplinary rounds: Yes     Anticipated Discharge Disposition: Transitional Care/LTACH     Anticipated Discharge Services: None  Anticipated Discharge DME:  to be determined     Patient/family educated on Medicare website which has current facility and service quality ratings: yes  Education Provided on the Discharge Plan: Yes  Patient/Family in Agreement with the Plan: yes     Referrals Placed by CM/SW: Post Acute Facilities  Private pay costs discussed: Not applicable     Additional Information:  Chart reviewed.     CM updates:  Surgery still following pt.Still has NJ tube for nutrition needs.      Per Hosp still working on BP control.     Writer will send TCU referrals again and check with pending TCU once more medically ready. Will check with daughter for more TCU choices.      Social Hx:  \"Patient lives with her adult daughter and is independent with all activities of daily living and instrumental activities of daily living (IADLs) at baseline. She is currently unemployed but planned to resume working after recovery. Daughter Karla is primary family contact.\"     CM will continue to follow plan of care, review recommendations, and assist with any discharge needs anticipated.     ROEL Simmons  "

## 2023-11-10 NOTE — PLAN OF CARE
Problem: Sepsis/Septic Shock  Goal: Absence of Infection Signs and Symptoms  Intervention: Promote Recovery  Recent Flowsheet Documentation  Taken 11/10/2023 0929 by Jyoti Reynaga RN  Activity Management:   activity adjusted per tolerance   up in chair  Taken 11/10/2023 0751 by Jyoti Reynaga RN  Activity Management:   activity adjusted per tolerance   activity encouraged     Problem: Gas Exchange Impaired  Goal: Optimal Gas Exchange  Intervention: Optimize Oxygenation and Ventilation  Recent Flowsheet Documentation  Taken 11/10/2023 0929 by Jyoti Reynaga RN  Head of Bed (HOB) Positioning:   HOB at 30-45 degrees   HOB at 60-90 degrees  Taken 11/10/2023 0751 by Jyoti Reynaga RN  Head of Bed (HOB) Positioning: HOB at 30-45 degrees  Sating 95-96 on 2L  NC  oxygen   Problem: Activity Intolerance  Goal: Enhanced Capacity and Energy  Intervention: Optimize Activity Tolerance  Recent Flowsheet Documentation  Taken 11/10/2023 0929 by Jyoti Reynaga RN  Activity Management:   activity adjusted per tolerance   up in chair  Taken 11/10/2023 0751 by Jyoti Reynaga RN  Activity Management:   activity adjusted per tolerance   activity encouraged  Patient up  with ceiling lift in the recliner early this  morning till noon . Tolerated well.  Worked with PT took about 8 steps.  Patient drinking Protein shake and water. See flowsheet.   Blood glucose 89 and 94  no correction bolus needed.  Triple lumen Picc on the right arm dressing  and caps changed.  NG tube flushed as ordered.   Refusing nystatin switch and swallow.  Watering stool X1.  Purewick in place see flow sheet.

## 2023-11-10 NOTE — PROGRESS NOTES
Pt has declined to use hospital BiPAP x 56 hours. Set up and start pt's home CPAP and mask, bleed in 3 lpm to maintain SpO2 >= 90 %. Appears to be functioning correctly, H2O added per pt request for humidification.BiPAP removed from room. BiPAP order canceled, CPAP order placed. RN informed, RT available as needed.

## 2023-11-10 NOTE — PROGRESS NOTES
[facility]    Medicine Progress Note - Hospitalist Service    Date of Admission:  10/9/2023    Assessment & Plan   Assessment:  Mojgan Jimenez is a 53 year old female with h/o obesity, severe JARVIS on CPAP, asthma, stimulant use disorder, stimulant induced psychosis, mood & anxiety disorder. Admitted 10/9/23 for scheduled laparoscopic sleeve gastrectomy with single anastomosis duodenal switch. She was later found to have two small bowel injuries with peritonitis. 10/12/23 returned to OR for small bowel interotomy closure, clean-out, & drain placement; 10/19 found to have RUQ fluid collection s/p drain placement; 10/24 s/p pelvic fluid aspiration.   Course complicated by encephalopathy/delirium, septic shock, suspected takotsubo syndrome, acute respiratory failure due to loss of protective airway reflexes & increased metabolic load requiring intubation (10/12-10/21/23; reintubated 10/21-10/26/23), & oliguric SUSSY with renal recovery. She has significantly improved: mental status clearing with some ongoing delirium at night, cardiomyopathy improved, ATN I recovery phase, and ongoing treatment of intra abdominal infection. She was made floor tele status 10/27/23. On 11/1/23 CT guided chest tube placed on right for persistent effusion, concern for parapneumonic one with risk of future trapped lung. Chest tubes removed 11/5/2023.      Problem list and plan:  Acute hypoxemic-hypercapnic respiratory failure  Was admitted 10/9/2023 for scheduled laparoscopic sleeve gastrectomy with single anastomosis duodenal switch.  Rapid Response on 10/12, Pt with decreased LOC. Open eyes to voice. SpO2 89% on 8L NC. BS clear and diminished. Placed on a 15L non-rebreather for SpO2 > 90%. After narcan admin pt became more responsive/agitated. Transferred to ICU and intubated 10/12-10/21/23; reintubated 10/21-10/26/23. Extubated and weaned to 1 to 2 L nasal cannula, increase to 4 L 11/8/2023 as patient suddenly became  short of breath and hypertensive will probably titrate back down to patient baseline needs.  Now titrated back down to 2 to 3 L  10/22/23 CT demonstrated complete RLL atelectasis, milder RUL & RBML atelectasis or consolidation. 10/22 bronchoscopy moderate RLL blood tinged secretions cleared.   Cont pulmonary hygiene  R-pleural effusion, exudative, suspect simple parapneumonic effusion. 10/24 s/p thoracentesis, 400 mL. Pleural fluid amylase amylase 23, serum 64. 11/1/23--chest tube placed on right. Chest tubes removed 11/5/2023    Gastric bypass  10/9 s/p sleeve gastrectomy complicated by small bowel injury & peritonitis   10/12 returned to OR for closure, clean-out, & drain placement (now removed)  10/19 s/p RUQ fluid collection drain placement   10/24 s/p pelvic fluid aspiration (75 ml)  Surgery primary   ID consulted   10/12 - peritoneal cultures + Streptococcus anginosus, mixed aerobic & anaerobic kamla   10/19 - RUQ fluid cultures & pelvic fluid aspirate + Lactobacillus   10/22 - BDG blood +  10/24 pleural fluid studies  -#+WBC no pos cx. 10/24--aspirate pelvic drain--lacto + candida  Was on Vanc, pip-tazo, micafungin. Vanco d/jose 10/31  Was on TPN previously 10/14  PPTF started 10/24/23. TPN weaned of 10/27  Diet per surg  ID recommending switching to Augmentin and fluconazole  CT abdomen with contrast showed Appropriate positioning of perihepatic drain with significantly decreased size of the associated fluid collection.   Surgery recommending increased mobility and better oral intake  As per IR tPA given, monitor output, plan for abscessogram with sedation and possible repositioning, exchange and removal of the tube 11/8/2023  S/p abscessogram 11/8/2023 s/p removal of drainage tube  CRP and procalcitonin trending down  As per surgery continue nocturnal tube feeds over 7 hours, tube feeds will be to maintain 50% caloric and protein needs, continue calorie count, continue to encourage oral intake and protein  supplement shakes with goal of 2 protein shakes per day and 1 L of water/clear liquid per day, push towards discontinuing tube feeds over the next 48 hours and improve oral intake, out of bed to chair 2-3 times per day.    Lightheadedness associated with hypotension 11/8/2023  Patient has been hypertensive and has been having up titration of blood pressure medication, suddenly after coming back from abscessogram patient was lightheaded felt shortness of breath that has since resolved, also had some left lower chest wall discomfort which has resolved and was found to be hypotensive with systolic in the 80s which has since resolved, patient was placed back in bed, give bolus 500 cc of fluids, held blood pressure medication including Bumex, started on fluids at a slower rate cautiously, ordered CTA of the chest and lower extremity duplex to rule out DVT/PE  Currently appears to be dehydrated so we will continue with fluids and monitor renal function  CTA chest negative for PE and venous duplex negative for DVT    Accelerated HTN /Cardiomyopathy   Echo previously and initially decreased LV function but repeat echo shows EF normalized, possible stress induced.   Cardiology consulted  Metoprolol switched to coreg; dose increased 11/3/23  Hydralazine increase to 100 mg tid  Nitroglycerin patch 0.4mg/24  Amlodipine 5 mg bid which was switched 11/8/2023 to nifedipine because of persistently elevated blood pressure, now back to amlodipine  As needed IV hydralazine  Bumex initially restarted per renal(Discussed with nephrology, as per nephrology may switch to Bumex 0.5 daily oral from IV, also advised outpatient follow-up with nephrology as once discharged and repeat BMP in a week to follow-up on sodium and creatinine once discharged.  Blood pressure was still running in the high range on Coreg, hydralazine, amlodipine, Bumex and doxazosin, switched to nifedipine and d.cd amlodipine)  Held all blood pressure medication as  patient became hypotensive 11/8/2023  As blood pressure was trending up again 11/9/2023 discussed with nephrology of reincorporating blood pressure medication, also discussed with pharmacy as patient had hypotensive event after nifedipine use 11/8/2023, as per pharmacy extended release nifedipine needs to be crushed, because of recent gastric bypass surgery crushed dose makes it excessive and cause hypotension, hold nifedipine for now as blood pressure appropriate even on just Coreg, will continue to monitor blood pressure and reincorporate medication 1 by 1 as appropriate, discussed regarding holding further Bumex and doxazosin along with hydralazine for now which nephrology was in agreement with.  As per nephrology if needed would probably benefit more from ACE/ARB.  11/10/2023 Blood pressure currently stable to slightly up, added amlodipine 11/10/2023, nephrology stated that they we will start the patient on hydrochlorothiazide low-dose, will continue to hold hydralazine 100 oral 3 times daily and doxazosin 2 every 12 for now as probably would be too many medications for blood pressure control.    Scaly brown to white mucosal lesion in the mouth  Suspecting secondary to oral thrush  Will need frequent oral cares  Placed order for nystatin swish and swallow     History of JARVIS, asthma in the chart no PFTs  NIPPV with sleep/naps everytime   Continue duonebs     Oliguric SUSSY improving   Nephrology was consulted  for ARF peak creat 6.97 on 10/14  Slowly improving and creatinine trending down--10/31 was 1.38, trended back up to 1.47, now trending down to 1.3, will continue with fluid resuscitation for another 48 hours  Avoid nephrotoxic agents   No labs in between 9/15/2022 through 10/11/2023  Baseline probably around 1  Renal sonogram showed:   Right renal artery unremarkable with no evidence of renal artery stenosis. Left renal artery not visualized due to patient habitus. Elevated resistive indices which can be seen  in medical renal disease.     Hypernatremia resolved  IV bumex stopped initially and patient received D5W per renal  Trended sodium and currently within normal limits  D5W-per renal-off now  Bumex restarted 10/31 and now off.     Anemia stable  Hemoglobin trended up to 13.7 from 8.3 11/4/2023 suspecting lab error, hemoglobin 11/5/2023 was 7.9 which appropriately down trended from 8.3 with subsequent lab draws.  Hemoglobin currently stable  Suspect multifactorial secondary to sepsis & surgical blood loss   Monitored hemoglobin    Thrombocytopenia of unclear etiology  Ordered heparin-induced thrombocytopenia screen which turned out to be positive  Held heparin  Follow-up and monitor platelets  Platelet count dropped then improved and then dropped again as per chart review  We will start on fondaparinux  Hematology consulted  Also ordered serotonin release assay  Heparin added as an allergy     Hypervolemia  Was started Back on bumex, switched to oral, currently stopped as patient became hypotensive suddenly after abscessogram 11/8/2023, Discussed with nephrology again if Bumex is even needed as patient currently appears to be euvolemic to slightly hypovolemic, nephrology agreeing to continue to hold Bumex and started on hydrochlorothiazide.  As per previous limited echocardiogram does not appear to have any heart failure.  Weight loss 5 kg's  BNP improved to 1263  Echo was repeated again 11/8/2023 which does not show any signs of heart failure     Hyperglycemia of critical illness   Was initially on insulin glargine which has been stopped.  Glycemic control is optimal. Cont with sliding scale insulin for now  Follow-up hemoglobin A1c  Last hemoglobin A1c 5.8     Morbid obesity  BMI 49     Acute metabolic and toxic encephalopathy Resolved  Due to above. Still fluctuating but has been stable for the past 5 days.  Delirium protocol  Needs to be up day, sleep night  Up to chair      Severe decondition due to prolong  "hospitalization  PT/OT eval, PT recommending TCU, OT recommending LTACH/TCU  Case management working on discharge disposition           Diet: Calorie Counts  Room Service  Bariatric Diet Full Liquids  Adult Formula Drip Feeding: Specified Time Promote w fiber; Nasojejunal; Goal Rate: 80; mL/hr; From: 10:00 PM; To: 5:00 AM    DVT Prophylaxis: Heparin SQ  Patiño Catheter: Not present  Lines: PRESENT      PICC 10/28/23 Triple Lumen Right Basilic-Site Assessment: WDL      Cardiac Monitoring: None  Code Status: Full Code      Clinically Significant Risk Factors              # Hypoalbuminemia: Lowest albumin = 2.7 g/dL at 10/14/2023  6:29 AM, will monitor as appropriate   # Thrombocytopenia: Lowest platelets = 106 in last 2 days, will monitor for bleeding   # Hypertension: Noted on problem list        # Severe Obesity: Estimated body mass index is 49.21 kg/m  as calculated from the following:    Height as of this encounter: 1.6 m (5' 3\").    Weight as of this encounter: 126 kg (277 lb 12.5 oz).      # Asthma: noted on problem list        Disposition Plan      Expected Discharge Date: 11/13/2023    Discharge Delays: Other (Add Comment)  IV Medication - consider oral or Home Infusion  Destination: nursing home              Saad J. Gondal, MD  Hospitalist Service  [facility]  Securely message with popAD (more info)  Text page via eduPad Paging/Directory   ______________________________________________________________________    Interval History   Patient seen and examined at bedside, patient denies any acute complaints, discussed plan for switching IV to p.o. diuretics with both patient and nephrology.    Physical Exam   Vital Signs: Temp: 97.8  F (36.6  C) Temp src: Oral BP: (!) 170/88 Pulse: 86   Resp: 28 SpO2: 98 % O2 Device: Nasal cannula Oxygen Delivery: 3 LPM  Weight: 277 lbs 12.47 oz    GENERAL: Patient was seen and examined at bedside with no acute distress, morbidly obese  HENT:  Head is " normocephalic, atraumatic.  Mucosal lesions of the tongue and the roof of the mouth  EYES:  Eyes have anicteric sclerae without conjunctival injection   LUNGS: Bilateral air entry, clear to auscultation bilaterally decreased breath sounds at the bilateral bases  CARDIOVASCULAR:  Regular rate and rhythm with no murmurs, gallops or rubs.  ABDOMEN:  Normal bowel sounds. Soft; nontender   EXT: No edema  SKIN:  No acute rashes.   NEUROLOGIC:  Grossly nonfocal.      Medical Decision Making       40 MINUTES SPENT BY ME on the date of service doing chart review, history, exam, documentation & further activities per the note.      Data     I have personally reviewed the following data over the past 24 hrs:    N/A  \   N/A   / 106 (L)     139 105 31.2 (H) /  94   4.2 27 1.32 (H) \       Imaging results reviewed over the past 24 hrs:   No results found for this or any previous visit (from the past 24 hour(s)).

## 2023-11-10 NOTE — PLAN OF CARE
Problem: Bariatric Surgery  Goal: Fluid and Electrolyte Balance  Intervention: Monitor and Manage Fluid and Electrolyte Balance  Recent Flowsheet Documentation  Taken 11/9/2023 1700 by Jeannie Pimentel RN  Fluid/Electrolyte Management: fluids provided  Goal: Blood Glucose Level Within Desired Range  Intervention: Optimize Glycemic Control  Recent Flowsheet Documentation  Taken 11/9/2023 1700 by Jeannie Pimentel RN  Glycemic Management: blood glucose monitored  Goal: Anesthesia/Sedation Recovery  Intervention: Optimize Anesthesia Recovery  Recent Flowsheet Documentation  Taken 11/9/2023 1700 by Jeannie Pimentel RN  Safety Promotion/Fall Prevention: bedside attendant  Goal: Optimal Pain Control and Function  Intervention: Prevent or Manage Pain  Recent Flowsheet Documentation  Taken 11/9/2023 1611 by Jeannie Pimentel RN  Pain Management Interventions: (cannot have tramadol yet, didnt want dilaudid and asked for tylenol) medication (see MAR)   Goal Outcome Evaluation:    A/Ox4. VSS ex HTN. 8/10 pain reported. PRN tylenol given-5/10 and then tramadol given and 1/10 pain reported. Patient reported frequent loose stools today and so was refusing to drink anything except for water. Patient also refusing nystatin. Patient educated.  Purewick in place but leaky- patient turned for cleaning.  1:1 attendant.     Jeannie Pimentel RN

## 2023-11-10 NOTE — PROGRESS NOTES
RENAL PROGRESS NOTE    CC:  ongoing HTN despite 4 antihypertensive agents     ASSESSMENT & PLAN:     HTN: Not on antihypertensives PTA.   Was on Carvedilol 25 mg BID, Doxazosin 2 mg Q 12, Hydralazine 100 mg TID, Nifedipine 30 mg daily, and bumex 0.5mg daily.     -11/8/23 /52 at time of consult today yesterday and all HTNsive meds held. Had PRN hydralazine. Sig drop in pressure thought to be 2/2 how Nifedipine was administered yesterday.   -11/9/23: Today BP is 120/62, stable. On Carvedilol 25 BID, and resumed Nifedipine 60 mg PO (Discussed with primary okay to resume, will follow alongside). HELD TID Hydral. S/P PRN Hydralazine 20 mg  @ 0630.  -She Has PRN Hydralazine if needed.   11/10/23: on carvedolol 25 BID, and now amlodipine 5 BID. Has PRN hydralazine (has not used since yesterday AM). Consider ARB/ACE therapy with stable Cr if remains hypertensive, will see in person and recheck BP after medication administration 127/62. Renal will add a small dose of hydrochlorothiazide today for hypervolemia, follow.        -?pain and anxiety playing a role here.   -Hx of JARVIS on CPAP (pt refuses Bipap intermittently).   -Goal BP <140/90. Adrenal gland normal on 11/7/23 imaging.   -Renal US with Doppler unremarkable. UA bland, cortisol okay, UA/UPCR mild proteinuria.   -Secondary hypertension workup thus far unremarkable.    - ANA PAULA-Renin ratio, pending  -Would HOLD ACE/ARB therapy for now with recent contrast (11/7), but can likely resume ACE/ARB in the next week or so with  stable Cr, If needing further HTN agents to control BP.     Recent Oliguric SUSSY: Baseline creatinine 0.7 within normal limits no prior history of SUSSY or CKD.  Cr peaked at 6.97 on 10/14 now ~1.3. 2/2 Hemodynamic ATN, exacerbated by NSAIDS. Renal signed off for management of SUSSY 11/5/23.avoid nephrotoxins, renal dose medication, daily wt, daily I/O. Follow BMP.     Lytes: stable     Acid/base: stable     Anemia: HG 8-9. Transfuse per Primary  team for HG <7     H/O complicated Gastric Bypass: Multiple complications post sleeve gastrectomy complicated by peritonitis and small bowel injury with peritoneal leak. Return to the OR 10/12/2023 with cleanout and drain placement complicated by other fluid collections requiring aspiration and drain placement. S/P ABX, ID following. 11/7/23 TF via NJ 11/7/23 bariatric full liquid diet. NPO for procedure today. Diet management per primary team bariatric surgery       SUBJECTIVE:   Pt resting comfortable, up in chair with Aunt at bedside  Denies pain  Denies n,v, c, d, sob, fever, rash or CP  VSS, wt stable, ongoing edema  PT has no concerns at this time  BP slighly elevated prior to medications this AM, S/P med /62. Pt remains Hypervolemic, will trail adding small dose thiazide diuretic today  We discussed, labs/Workup and plan and answered all questions.  Discussed plan and answered all questions with pt at bedside today.     OBJECTIVE:  Physical Exam   Temp: (P) 97.8  F (36.6  C) Temp src: (P) Oral BP: (!) (P) 189/87 Pulse: (P) 99   Resp: (P) 28 SpO2: (P) 98 % O2 Device: (P) Nasal cannula Oxygen Delivery: (P) 3 LPM  Vitals:    11/04/23 0400 11/08/23 0720 11/09/23 1127   Weight: 129.1 kg (284 lb 9.8 oz) 123.5 kg (272 lb 4.3 oz) 127 kg (279 lb 15.8 oz)     Vital Signs with Ranges  Temp:  [97.6  F (36.4  C)-98.3  F (36.8  C)] (P) 97.8  F (36.6  C)  Pulse:  [80-99] (P) 99  Resp:  [18-28] (P) 28  BP: (120-195)/(62-89) (P) 189/87  SpO2:  [93 %-98 %] (P) 98 %  I/O last 3 completed shifts:  In: 3502 [P.O.:1165; I.V.:1360; NG/GT:420]  Out: 400 [Urine:400]      Patient Vitals for the past 72 hrs:   Weight   11/09/23 1127 127 kg (279 lb 15.8 oz)   11/08/23 0720 123.5 kg (272 lb 4.3 oz)     Intake/Output Summary (Last 24 hours) at 11/9/2023 1422  Last data filed at 11/9/2023 1301  Gross per 24 hour   Intake 3735 ml   Output 1000 ml   Net 2735 ml       PHYSICAL EXAM:  GEN: NAD, aox3, + NG feeding tube  CV: RRR   Lung:  clear and equal  Ab: soft and NT, obese  Ext: ++ edema LLE BL, HAnd edema R>L and well perfused  Skin: no rash      LABORATORY STUDIES:     Recent Labs   Lab 11/10/23  0516 11/07/23  0705 11/05/23  0649 11/04/23  0503   WBC  --  6.3 7.3 4.6   RBC  --  2.66* 2.79* 4.82   HGB  --  7.7* 7.9* 13.7   HCT  --  25.5* 26.8* 45.6   * 157 234 194       Basic Metabolic Panel:  Recent Labs   Lab 11/10/23  0516 11/10/23  0158 11/09/23  2122 11/09/23  1647 11/09/23  1201 11/09/23  0834 11/09/23  0635 11/08/23  0815 11/08/23  0540 11/07/23  0811 11/07/23  0705 11/05/23  0907 11/05/23  0648 11/04/23  2107 11/04/23  1815 11/04/23  0826 11/04/23  0503     --   --   --   --   --  142  --  142  --  144  --  144  144  --  143  --  145  145   POTASSIUM 4.2  --   --   --   --   --  3.8  --  4.0  --  4.0  --  3.8  --   --   --  3.7   CHLORIDE 105  --   --   --   --   --  104  --  103  --  103  --  103  --   --   --  106   CO2 27  --   --   --   --   --  27  --  32*  --  31*  --  31*  --   --   --  30*   BUN 31.2*  --   --   --   --   --  36.8*  --  29.6*  --  30.8*  --  30.8*  --   --   --  28.2*   CR 1.32*  --   --   --   --   --  1.47*  --  1.38*  --  1.33*  --  1.40*  --   --   --  1.48*   * 97 74 78 113* 119* 110*   < > 107*   < > 146*   < > 130*   < >  --    < > 118*   PALAK 9.1  --   --   --   --   --  9.0  --  9.5  --  8.7  --  8.7  --   --   --  9.2    < > = values in this interval not displayed.       INRNo lab results found in last 7 days.     Recent Labs   Lab Test 11/10/23  0516 11/07/23  0705 11/05/23  0649 10/24/23  0333 10/23/23  0530 10/16/23  1055 10/16/23  0436   INR  --   --   --   --  1.49*  --  1.21*   WBC  --  6.3 7.3   < > 14.5*   < >  --    HGB  --  7.7* 7.9*   < > 9.2*   < >  --    * 157 234   < > 427   < >  --     < > = values in this interval not displayed.       Personally reviewed current labs    Carmencita RITTER-BC  [facility]  [phone]

## 2023-11-11 ENCOUNTER — APPOINTMENT (OUTPATIENT)
Dept: PHYSICAL THERAPY | Facility: HOSPITAL | Age: 53
End: 2023-11-11
Attending: SURGERY
Payer: COMMERCIAL

## 2023-11-11 LAB
ANION GAP SERPL CALCULATED.3IONS-SCNC: 8 MMOL/L (ref 7–15)
BUN SERPL-MCNC: 25.9 MG/DL (ref 6–20)
CALCIUM SERPL-MCNC: 9.2 MG/DL (ref 8.6–10)
CHLORIDE SERPL-SCNC: 103 MMOL/L (ref 98–107)
CREAT SERPL-MCNC: 1.24 MG/DL (ref 0.51–0.95)
CRP SERPL-MCNC: 152.2 MG/L
DEPRECATED HCO3 PLAS-SCNC: 27 MMOL/L (ref 22–29)
EGFRCR SERPLBLD CKD-EPI 2021: 52 ML/MIN/1.73M2
ERYTHROCYTE [DISTWIDTH] IN BLOOD BY AUTOMATED COUNT: 15.3 % (ref 10–15)
GLUCOSE BLDC GLUCOMTR-MCNC: 100 MG/DL (ref 70–99)
GLUCOSE BLDC GLUCOMTR-MCNC: 109 MG/DL (ref 70–99)
GLUCOSE BLDC GLUCOMTR-MCNC: 85 MG/DL (ref 70–99)
GLUCOSE BLDC GLUCOMTR-MCNC: 87 MG/DL (ref 70–99)
GLUCOSE SERPL-MCNC: 121 MG/DL (ref 70–99)
HCT VFR BLD AUTO: 24 % (ref 35–47)
HGB BLD-MCNC: 7.2 G/DL (ref 11.7–15.7)
MAGNESIUM SERPL-MCNC: 1.7 MG/DL (ref 1.7–2.3)
MCH RBC QN AUTO: 28.7 PG (ref 26.5–33)
MCHC RBC AUTO-ENTMCNC: 30 G/DL (ref 31.5–36.5)
MCV RBC AUTO: 96 FL (ref 78–100)
PHOSPHATE SERPL-MCNC: 3.5 MG/DL (ref 2.5–4.5)
PLATELET # BLD AUTO: 96 10E3/UL (ref 150–450)
POTASSIUM SERPL-SCNC: 4.3 MMOL/L (ref 3.4–5.3)
PROCALCITONIN SERPL IA-MCNC: 0.56 NG/ML
RBC # BLD AUTO: 2.51 10E6/UL (ref 3.8–5.2)
SODIUM SERPL-SCNC: 138 MMOL/L (ref 135–145)
WBC # BLD AUTO: 7.2 10E3/UL (ref 4–11)

## 2023-11-11 PROCEDURE — 250N000013 HC RX MED GY IP 250 OP 250 PS 637: Performed by: SURGERY

## 2023-11-11 PROCEDURE — 99232 SBSQ HOSP IP/OBS MODERATE 35: CPT | Performed by: INTERNAL MEDICINE

## 2023-11-11 PROCEDURE — 250N000013 HC RX MED GY IP 250 OP 250 PS 637: Performed by: NURSE PRACTITIONER

## 2023-11-11 PROCEDURE — 250N000013 HC RX MED GY IP 250 OP 250 PS 637: Performed by: INTERNAL MEDICINE

## 2023-11-11 PROCEDURE — 80048 BASIC METABOLIC PNL TOTAL CA: CPT | Performed by: STUDENT IN AN ORGANIZED HEALTH CARE EDUCATION/TRAINING PROGRAM

## 2023-11-11 PROCEDURE — 250N000013 HC RX MED GY IP 250 OP 250 PS 637

## 2023-11-11 PROCEDURE — 999N000157 HC STATISTIC RCP TIME EA 10 MIN

## 2023-11-11 PROCEDURE — 250N000013 HC RX MED GY IP 250 OP 250 PS 637: Performed by: HOSPITALIST

## 2023-11-11 PROCEDURE — 99232 SBSQ HOSP IP/OBS MODERATE 35: CPT | Performed by: HOSPITALIST

## 2023-11-11 PROCEDURE — 97116 GAIT TRAINING THERAPY: CPT | Mod: GP

## 2023-11-11 PROCEDURE — 250N000011 HC RX IP 250 OP 636: Mod: JZ | Performed by: NURSE PRACTITIONER

## 2023-11-11 PROCEDURE — 83735 ASSAY OF MAGNESIUM: CPT | Performed by: STUDENT IN AN ORGANIZED HEALTH CARE EDUCATION/TRAINING PROGRAM

## 2023-11-11 PROCEDURE — 250N000013 HC RX MED GY IP 250 OP 250 PS 637: Performed by: PHYSICIAN ASSISTANT

## 2023-11-11 PROCEDURE — 99024 POSTOP FOLLOW-UP VISIT: CPT | Performed by: PHYSICIAN ASSISTANT

## 2023-11-11 PROCEDURE — 85027 COMPLETE CBC AUTOMATED: CPT | Performed by: STUDENT IN AN ORGANIZED HEALTH CARE EDUCATION/TRAINING PROGRAM

## 2023-11-11 PROCEDURE — 84100 ASSAY OF PHOSPHORUS: CPT | Performed by: STUDENT IN AN ORGANIZED HEALTH CARE EDUCATION/TRAINING PROGRAM

## 2023-11-11 PROCEDURE — 250N000013 HC RX MED GY IP 250 OP 250 PS 637: Performed by: STUDENT IN AN ORGANIZED HEALTH CARE EDUCATION/TRAINING PROGRAM

## 2023-11-11 PROCEDURE — 97530 THERAPEUTIC ACTIVITIES: CPT | Mod: GP

## 2023-11-11 PROCEDURE — 84145 PROCALCITONIN (PCT): CPT | Performed by: STUDENT IN AN ORGANIZED HEALTH CARE EDUCATION/TRAINING PROGRAM

## 2023-11-11 PROCEDURE — 250N000011 HC RX IP 250 OP 636: Mod: JZ | Performed by: HOSPITALIST

## 2023-11-11 PROCEDURE — 120N000001 HC R&B MED SURG/OB

## 2023-11-11 PROCEDURE — 250N000011 HC RX IP 250 OP 636: Mod: JZ | Performed by: STUDENT IN AN ORGANIZED HEALTH CARE EDUCATION/TRAINING PROGRAM

## 2023-11-11 PROCEDURE — 86140 C-REACTIVE PROTEIN: CPT | Performed by: STUDENT IN AN ORGANIZED HEALTH CARE EDUCATION/TRAINING PROGRAM

## 2023-11-11 RX ORDER — SPIRONOLACTONE 25 MG
12.5 TABLET ORAL DAILY
Status: DISCONTINUED | OUTPATIENT
Start: 2023-11-11 | End: 2023-11-12

## 2023-11-11 RX ADMIN — SPIRONOLACTONE 12.5 MG: 25 TABLET ORAL at 17:13

## 2023-11-11 RX ADMIN — Medication 40 MG: at 08:48

## 2023-11-11 RX ADMIN — VENLAFAXINE 75 MG: 75 TABLET ORAL at 16:14

## 2023-11-11 RX ADMIN — NYSTATIN 500000 UNITS: 100000 SUSPENSION ORAL at 22:49

## 2023-11-11 RX ADMIN — HYDRALAZINE HYDROCHLORIDE 20 MG: 20 INJECTION INTRAMUSCULAR; INTRAVENOUS at 05:17

## 2023-11-11 RX ADMIN — VENLAFAXINE 75 MG: 75 TABLET ORAL at 13:49

## 2023-11-11 RX ADMIN — AMLODIPINE BESYLATE 5 MG: 5 TABLET ORAL at 08:47

## 2023-11-11 RX ADMIN — ACETAMINOPHEN 650 MG: 325 TABLET ORAL at 08:43

## 2023-11-11 RX ADMIN — CARVEDILOL 25 MG: 12.5 TABLET, FILM COATED ORAL at 08:44

## 2023-11-11 RX ADMIN — ACETAMINOPHEN 650 MG: 325 TABLET ORAL at 16:14

## 2023-11-11 RX ADMIN — AMLODIPINE BESYLATE 5 MG: 5 TABLET ORAL at 22:50

## 2023-11-11 RX ADMIN — AMOXICILLIN AND CLAVULANATE POTASSIUM 875 MG: 400; 57 POWDER, FOR SUSPENSION ORAL at 08:48

## 2023-11-11 RX ADMIN — HYDROCHLOROTHIAZIDE 12.5 MG: 12.5 TABLET ORAL at 08:44

## 2023-11-11 RX ADMIN — TRAMADOL HYDROCHLORIDE 50 MG: 50 TABLET, COATED ORAL at 06:10

## 2023-11-11 RX ADMIN — AMOXICILLIN AND CLAVULANATE POTASSIUM 875 MG: 400; 57 POWDER, FOR SUSPENSION ORAL at 22:49

## 2023-11-11 RX ADMIN — FONDAPARINUX SODIUM 10 MG: 10 INJECTION, SOLUTION SUBCUTANEOUS at 17:18

## 2023-11-11 RX ADMIN — CARVEDILOL 25 MG: 12.5 TABLET, FILM COATED ORAL at 16:14

## 2023-11-11 RX ADMIN — NITROGLYCERIN 1 PATCH: 0.4 PATCH TRANSDERMAL at 08:59

## 2023-11-11 RX ADMIN — FLUCONAZOLE 400 MG: 40 POWDER, FOR SUSPENSION ORAL at 08:43

## 2023-11-11 RX ADMIN — VENLAFAXINE 75 MG: 75 TABLET ORAL at 08:43

## 2023-11-11 RX ADMIN — ONDANSETRON 4 MG: 2 INJECTION INTRAMUSCULAR; INTRAVENOUS at 05:38

## 2023-11-11 ASSESSMENT — ACTIVITIES OF DAILY LIVING (ADL)
ADLS_ACUITY_SCORE: 40
ADLS_ACUITY_SCORE: 41
ADLS_ACUITY_SCORE: 34
ADLS_ACUITY_SCORE: 41
ADLS_ACUITY_SCORE: 41
ADLS_ACUITY_SCORE: 40
ADLS_ACUITY_SCORE: 34
ADLS_ACUITY_SCORE: 41
ADLS_ACUITY_SCORE: 34
ADLS_ACUITY_SCORE: 34
ADLS_ACUITY_SCORE: 41
ADLS_ACUITY_SCORE: 34

## 2023-11-11 NOTE — PROGRESS NOTES
"Care Management Progress Note:     Length of Stay (days): 33     Expected Discharge Date: 11/13/2023     Concerns to be Addressed: discharge planning     Patient plan of care discussed at interdisciplinary rounds: Yes     Anticipated Discharge Disposition: Transitional Care/LTACH     Anticipated Discharge Services: None  Anticipated Discharge DME:  to be determined     Patient/family educated on Medicare website which has current facility and service quality ratings: yes  Education Provided on the Discharge Plan: Yes  Patient/Family in Agreement with the Plan: yes     Referrals Placed by CM/SW: Post Acute Facilities  Private pay costs discussed: Not applicable     Additional Information:  Chart reviewed this a.m. No significant events overnight. Per request of patient, update given to daughter Karla over the phone, [phone].     CM updates:  Surgery still following pt.has NJ tube for nutrition needs.      Per Hosp still working on BP control.     TCU referrals are pending with [facility] and [facility]. SW sent additional referrals today and updated daugther and patient regarding this.     Social Hx:  \"Patient lives with her adult daughter and is independent with all activities of daily living and instrumental activities of daily living (IADLs) at baseline. She is currently unemployed but planned to resume working after recovery. Daughter Karla is primary family contact.\"     CM will continue to follow plan of care, review recommendations, and assist with any discharge needs anticipated.      ROEL Simmons  "

## 2023-11-11 NOTE — PROGRESS NOTES
CALORIE COUNT      Approximate Oral Intake for:    This morning 11/11/23; and Friday, 11/10/23 (Diet Bariatric Full liquid)  Today so far,  Fluid: 150 mL (140 mL so far per pt and 1:1, ~60 mL water and the rest Ensure Max)  Calories: 36  Protein: 7 gm    Yesterday: Lunch meal ticket saved - some water and Ensure Max (ordered tomato soup, Gelatein 20 but, did not eat), only other information is 2 spoonfuls of yogurt.  Fluid: 690 mL  Calories: not enough information to estimate  Protein: not enough information to estimate      Intake from TF:   Per NJ Promote with fiber @ 80 ml/hr x 7 hrs ( providing 560 ml/Calories, 34 g protein, 77 g CHO, 8 g fiber, 465 ml free water plus H2O flush ( 270 ml)=735 ml ( ~ 43% estimated Calorie needs and ~ 54 % estimated protein needs)       Estimated Needs:    Calories: 1300 - 1570  Protein: 63-78      Summary:    Oral intake inadequate.   Of note, each Ensure Max is 330 mL and provides 150 calories and 30 gm protein - pt will need to take in additional food/beverages to meet estimated needs.    Pt says she is aware Surgery wants her to drink 2 bottles of Ensure Max and 1 liter of water per day.  Pt says she will not be ordering other items and she will focus on drinking the Ensure Max and water.  She has a Gatorade on her tray table but, says she has not had any in days - it is a goal of hers to eventually drink some.    Per Surgery note today,  PLAN:  - Continue nocturnal tube feeds over only 7 hours. Tube feeds to meet a maximum of 50% of caloric and protein needs. Continue calorie counts.   - Continue to encourage oral intake and protein supplement shakes with goal of 2 protein shakes per day and 1 L of water/clear liquid per day  - Push towards discontinuing tube feeds over the next 24-48 hours with improving oral intake

## 2023-11-11 NOTE — CONSULTS
[facility] Hematology and Oncology Consult Note    Patient: Mojgan Jimenez  MRN: [id number]  Date of Service: Jul 14, 2023           Reason for consultation      Problem List Items Addressed This Visit          Other    Intra-abdominal abscess (H) - Primary     Other Visit Diagnoses       Perimenopausal symptoms                  Assessment / number of problems addressed      1.  Thrombocytopenia.  Etiology unclear although positive for HIT screen.  Clinically she seems to be pretty stable.  Typically patient with positive fit are much sicker than her.  Considering also the fact that she has been having fair bit of complication from her gastric sleeve surgery.  2.  Positive HIT screen.  Was on heparin.  Due to her prolonged hospitalization, age group, being on heparin, she is somewhat at risk for developing HIT.  3.  Status post gastric sleeve surgery and ensuing complication.    Plan and medical decision making      1.  At this time the management is appropriate.  She is on fondaparinux that should be sufficient.  Obviously platelet count needs to be trended.  If the platelet count continues to drop then she needs to be transition to argatroban.  Argatroban protocol needs to be used if needed.  2.  Monitor platelet count.  3.  Follow-up on the serotonin release assay.  4.  Ambulate the patient if tolerated.  5.  Continue with other supportive care.  We will follow-up while he is hospitalized.  In the meantime as already done heparin should be listed as her allergy.    Clinical/pathological stage      Cancer Staging   No matching staging information was found for the patient.      History of present illness      Ms. Mojgan Jimenez is a 53 year old woman who was admitted in October after gastric sleeve surgery for weight loss purposes.  She has had complications after that.  She has had peritonitis acute renal insufficiency etc.  The details are listed in the hospitalist note.    Recently was found to be having  thrombocytopenia.  Not sure why the platelet count was checked but the platelet count was 106 whereas 3 days ago the platelet count was 157.  During this hospitalization her blood count has been normal.  She has a drop in her hemoglobin as well.      An HIT screen was ordered.  The HIT screen did come back positive at a low level of 2.8.  She has already been.  She was on heparin therefore the heparin was stopped.  She has been put on fondaparinux.  The patient clinically does not appear to have any other thrombotic phenomena.  Although she is definitely at high risk for developing HIT to her prolonged hospitalization, use of heparin her age group etc.    Dr. Newsome her hospitalist has already ordered the serotonin release assay.  The patient is otherwise stable.    Detailed review of systems      A 14 point review of systems was obtained.  Positive findings noted in the history.  Rest of the review of system is otherwise negative.      Past medical/surgical/social/family history        Past Medical History:   Diagnosis Date    LINA (generalized anxiety disorder) 11/02/2017    Hyperlipidemia LDL goal <160 01/20/2019    Major depressive disorder     Methanol abuse (H)     Mild persistent asthma without complication 11/02/2017    Obese     JARVIS on CPAP     Cpap not working    Paranoia (H)     resolved due to drugs    Vitamin D deficiency 11/02/2017     Past Surgical History:   Procedure Laterality Date    GASTRECTOMY, SLEEVE, LAPAROSCOPIC, WITH DUODENAL SWITCH N/A 10/9/2023    Procedure: GASTRECTOMY, SLEEVE, LAPAROSCOPIC, WITH SINGLE ANASTAMOSIS DUODENAL SWITCH;  Surgeon: Hoang Worthy MD;  Location: [facility] OR    LAPAROSCOPY DIAGNOSTIC (GYN) N/A 10/12/2023    Procedure: LAPAROSCOPY,;  Surgeon: Hoang Worthy MD;  Location: [facility] OR    LAPAROTOMY EXPLORATORY N/A 10/12/2023    Procedure: LAPAROTOMY, CLOSURE OF TWO SMALL BOWEL INTEROTOMIES, DRAIN PLACEMENT, INTRA-ABDOMINAL CLEAN OUT;   "Surgeon: Hoang Worthy MD;  Location: [facility] OR    MAMMOPLASTY REDUCTION      reduction    PICC TRIPLE LUMEN PLACEMENT  10/28/2023     Family History   Problem Relation Age of Onset    Cancer Mother     Unknown/Adopted Father     Alcoholism Father     Substance Abuse Sister     Diabetes No family hx of     Hypertension No family hx of      Social History     Socioeconomic History    Marital status: Single     Spouse name: None    Number of children: None    Years of education: None    Highest education level: None   Tobacco Use    Smoking status: Never    Smokeless tobacco: Never   Vaping Use    Vaping Use: Never used   Substance and Sexual Activity    Alcohol use: Not Currently     Comment: Sober x 8 months    Drug use: Not Currently     Types: Methamphetamines, \"Crack\" cocaine     Comment: in remision     Sexual activity: Not Currently     Partners: Male   Other Topics Concern    Parent/sibling w/ CABG, MI or angioplasty before 65F 55M? No           Allergies      Allergies   Allergen Reactions    Heparin Other (See Comments)     HIT         Physical exam        BP (!) 160/85 (BP Location: Left arm)   Pulse 88   Temp 98.2  F (36.8  C) (Oral)   Resp 20   Ht 1.6 m (5' 3\")   Wt 126 kg (277 lb 12.5 oz)   LMP 09/09/2023 (Exact Date)   SpO2 97%   BMI 49.21 kg/m      GENERAL: no acute distress. Cooperative in conversation.  Still quite obese.  HEENT: pupils are equal, round and reactive. Oral mucosa is moist and intact.  NG tube in place.  RESP:Chest symmetric. Regular respiratory rate. No stridor.  ABD: Nondistended, soft.  Well-healed wound.  EXTREMITIES: No lower extremity edema.   NEURO: non focal. Alert and oriented x3.   PSYCH: within normal limits. No depression or anxiety.  SKIN: warm dry intact     Laboratory data      Recent Results (from the past 168 hour(s))   Glucose by meter   Result Value Ref Range    GLUCOSE BY METER POCT 122 (H) 70 - 99 mg/dL   CBC with platelets   Result Value " Ref Range    WBC Count 4.6 4.0 - 11.0 10e3/uL    RBC Count 4.82 3.80 - 5.20 10e6/uL    Hemoglobin 13.7 11.7 - 15.7 g/dL    Hematocrit 45.6 35.0 - 47.0 %    MCV 95 78 - 100 fL    MCH 28.4 26.5 - 33.0 pg    MCHC 30.0 (L) 31.5 - 36.5 g/dL    RDW 14.3 10.0 - 15.0 %    Platelet Count 194 150 - 450 10e3/uL   Basic metabolic panel   Result Value Ref Range    Sodium 145 135 - 145 mmol/L    Potassium 3.7 3.4 - 5.3 mmol/L    Chloride 106 98 - 107 mmol/L    Carbon Dioxide (CO2) 30 (H) 22 - 29 mmol/L    Anion Gap 9 7 - 15 mmol/L    Urea Nitrogen 28.2 (H) 6.0 - 20.0 mg/dL    Creatinine 1.48 (H) 0.51 - 0.95 mg/dL    GFR Estimate 42 (L) >60 mL/min/1.73m2    Calcium 9.2 8.6 - 10.0 mg/dL    Glucose 118 (H) 70 - 99 mg/dL   Sodium   Result Value Ref Range    Sodium 145 135 - 145 mmol/L   Nt probnp inpatient   Result Value Ref Range    N terminal Pro BNP Inpatient 2,241 (H) 0 - 900 pg/mL   Glucose by meter   Result Value Ref Range    GLUCOSE BY METER POCT 130 (H) 70 - 99 mg/dL   Glucose by meter   Result Value Ref Range    GLUCOSE BY METER POCT 108 (H) 70 - 99 mg/dL   Glucose by meter   Result Value Ref Range    GLUCOSE BY METER POCT 130 (H) 70 - 99 mg/dL   Sodium   Result Value Ref Range    Sodium 143 135 - 145 mmol/L   Glucose by meter   Result Value Ref Range    GLUCOSE BY METER POCT 127 (H) 70 - 99 mg/dL   Basic metabolic panel   Result Value Ref Range    Sodium 144 135 - 145 mmol/L    Potassium 3.8 3.4 - 5.3 mmol/L    Chloride 103 98 - 107 mmol/L    Carbon Dioxide (CO2) 31 (H) 22 - 29 mmol/L    Anion Gap 10 7 - 15 mmol/L    Urea Nitrogen 30.8 (H) 6.0 - 20.0 mg/dL    Creatinine 1.40 (H) 0.51 - 0.95 mg/dL    GFR Estimate 45 (L) >60 mL/min/1.73m2    Calcium 8.7 8.6 - 10.0 mg/dL    Glucose 130 (H) 70 - 99 mg/dL   Sodium   Result Value Ref Range    Sodium 144 135 - 145 mmol/L   CBC with platelets   Result Value Ref Range    WBC Count 7.3 4.0 - 11.0 10e3/uL    RBC Count 2.79 (L) 3.80 - 5.20 10e6/uL    Hemoglobin 7.9 (L) 11.7 - 15.7 g/dL     Hematocrit 26.8 (L) 35.0 - 47.0 %    MCV 96 78 - 100 fL    MCH 28.3 26.5 - 33.0 pg    MCHC 29.5 (L) 31.5 - 36.5 g/dL    RDW 14.3 10.0 - 15.0 %    Platelet Count 234 150 - 450 10e3/uL   Glucose by meter   Result Value Ref Range    GLUCOSE BY METER POCT 132 (H) 70 - 99 mg/dL   Glucose by meter   Result Value Ref Range    GLUCOSE BY METER POCT 152 (H) 70 - 99 mg/dL   Glucose by meter   Result Value Ref Range    GLUCOSE BY METER POCT 119 (H) 70 - 99 mg/dL   Glucose by meter   Result Value Ref Range    GLUCOSE BY METER POCT 124 (H) 70 - 99 mg/dL   Glucose by meter   Result Value Ref Range    GLUCOSE BY METER POCT 127 (H) 70 - 99 mg/dL   Glucose by meter   Result Value Ref Range    GLUCOSE BY METER POCT 108 (H) 70 - 99 mg/dL   Glucose by meter   Result Value Ref Range    GLUCOSE BY METER POCT 105 (H) 70 - 99 mg/dL   Glucose by meter   Result Value Ref Range    GLUCOSE BY METER POCT 118 (H) 70 - 99 mg/dL   Glucose by meter   Result Value Ref Range    GLUCOSE BY METER POCT 146 (H) 70 - 99 mg/dL   Basic metabolic panel   Result Value Ref Range    Sodium 144 135 - 145 mmol/L    Potassium 4.0 3.4 - 5.3 mmol/L    Chloride 103 98 - 107 mmol/L    Carbon Dioxide (CO2) 31 (H) 22 - 29 mmol/L    Anion Gap 10 7 - 15 mmol/L    Urea Nitrogen 30.8 (H) 6.0 - 20.0 mg/dL    Creatinine 1.33 (H) 0.51 - 0.95 mg/dL    GFR Estimate 48 (L) >60 mL/min/1.73m2    Calcium 8.7 8.6 - 10.0 mg/dL    Glucose 146 (H) 70 - 99 mg/dL   CBC with platelets   Result Value Ref Range    WBC Count 6.3 4.0 - 11.0 10e3/uL    RBC Count 2.66 (L) 3.80 - 5.20 10e6/uL    Hemoglobin 7.7 (L) 11.7 - 15.7 g/dL    Hematocrit 25.5 (L) 35.0 - 47.0 %    MCV 96 78 - 100 fL    MCH 28.9 26.5 - 33.0 pg    MCHC 30.2 (L) 31.5 - 36.5 g/dL    RDW 14.8 10.0 - 15.0 %    Platelet Count 157 150 - 450 10e3/uL   Magnesium   Result Value Ref Range    Magnesium 1.8 1.7 - 2.3 mg/dL   Phosphorus   Result Value Ref Range    Phosphorus 3.1 2.5 - 4.5 mg/dL   Glucose by meter   Result Value Ref  Range    GLUCOSE BY METER POCT 129 (H) 70 - 99 mg/dL   Glucose by meter   Result Value Ref Range    GLUCOSE BY METER POCT 108 (H) 70 - 99 mg/dL   Glucose by meter   Result Value Ref Range    GLUCOSE BY METER POCT 103 (H) 70 - 99 mg/dL   Glucose by meter   Result Value Ref Range    GLUCOSE BY METER POCT 117 (H) 70 - 99 mg/dL   Basic metabolic panel   Result Value Ref Range    Sodium 142 135 - 145 mmol/L    Potassium 4.0 3.4 - 5.3 mmol/L    Chloride 103 98 - 107 mmol/L    Carbon Dioxide (CO2) 32 (H) 22 - 29 mmol/L    Anion Gap 7 7 - 15 mmol/L    Urea Nitrogen 29.6 (H) 6.0 - 20.0 mg/dL    Creatinine 1.38 (H) 0.51 - 0.95 mg/dL    GFR Estimate 46 (L) >60 mL/min/1.73m2    Calcium 9.5 8.6 - 10.0 mg/dL    Glucose 107 (H) 70 - 99 mg/dL   Glucose by meter   Result Value Ref Range    GLUCOSE BY METER POCT 121 (H) 70 - 99 mg/dL   Glucose by meter   Result Value Ref Range    GLUCOSE BY METER POCT 156 (H) 70 - 99 mg/dL   Echocardiogram Complete   Result Value Ref Range    LVEF  55-60%    Nt probnp inpatient   Result Value Ref Range    N terminal Pro BNP Inpatient 1,263 (H) 0 - 900 pg/mL   Cortisol   Result Value Ref Range    Cortisol 22.0   ug/dL   Aldosterone   Result Value Ref Range    Aldosterone 5.5 0.0 - 31.0 ng/dL   Glucose by meter   Result Value Ref Range    GLUCOSE BY METER POCT 116 (H) 70 - 99 mg/dL   Urinalysis Macroscopic   Result Value Ref Range    Color Urine Yellow Colorless, Straw, Light Yellow, Yellow    Appearance Urine Clear Clear    Glucose Urine Negative Negative mg/dL    Bilirubin Urine Negative Negative    Ketones Urine Negative Negative mg/dL    Specific Gravity Urine 1.015 1.001 - 1.030    Blood Urine Negative Negative    pH Urine 5.5 5.0 - 7.0    Protein Albumin Urine 30 (A) Negative mg/dL    Urobilinogen Urine <2.0 <2.0 mg/dL    Nitrite Urine Negative Negative    Leukocyte Esterase Urine Negative Negative   Protein  random urine   Result Value Ref Range    Total Protein Urine mg/dL 122.0   mg/dL     Total Protein Urine mg/mg Creat 1.56 (H) 0.00 - 0.20 mg/mg Cr    Creatinine Urine mg/dL 78.3 mg/dL   Potassium random urine   Result Value Ref Range    Potassium Urine 37.6 mmol/L   Glucose by meter   Result Value Ref Range    GLUCOSE BY METER POCT 109 (H) 70 - 99 mg/dL   Basic metabolic panel   Result Value Ref Range    Sodium 142 135 - 145 mmol/L    Potassium 3.8 3.4 - 5.3 mmol/L    Chloride 104 98 - 107 mmol/L    Carbon Dioxide (CO2) 27 22 - 29 mmol/L    Anion Gap 11 7 - 15 mmol/L    Urea Nitrogen 36.8 (H) 6.0 - 20.0 mg/dL    Creatinine 1.47 (H) 0.51 - 0.95 mg/dL    GFR Estimate 42 (L) >60 mL/min/1.73m2    Calcium 9.0 8.6 - 10.0 mg/dL    Glucose 110 (H) 70 - 99 mg/dL   Phosphorus   Result Value Ref Range    Phosphorus 4.3 2.5 - 4.5 mg/dL   Magnesium   Result Value Ref Range    Magnesium 2.4 (H) 1.7 - 2.3 mg/dL   CRP inflammation   Result Value Ref Range    CRP Inflammation 102.70 (H) <5.00 mg/L   Procalcitonin   Result Value Ref Range    Procalcitonin 0.82 (H) <0.05 ng/mL   Glucose by meter   Result Value Ref Range    GLUCOSE BY METER POCT 119 (H) 70 - 99 mg/dL   Glucose by meter   Result Value Ref Range    GLUCOSE BY METER POCT 113 (H) 70 - 99 mg/dL   Glucose by meter   Result Value Ref Range    GLUCOSE BY METER POCT 78 70 - 99 mg/dL   Glucose by meter   Result Value Ref Range    GLUCOSE BY METER POCT 74 70 - 99 mg/dL   Glucose by meter   Result Value Ref Range    GLUCOSE BY METER POCT 97 70 - 99 mg/dL   Platelet count   Result Value Ref Range    Platelet Count 106 (L) 150 - 450 10e3/uL   Basic metabolic panel   Result Value Ref Range    Sodium 139 135 - 145 mmol/L    Potassium 4.2 3.4 - 5.3 mmol/L    Chloride 105 98 - 107 mmol/L    Carbon Dioxide (CO2) 27 22 - 29 mmol/L    Anion Gap 7 7 - 15 mmol/L    Urea Nitrogen 31.2 (H) 6.0 - 20.0 mg/dL    Creatinine 1.32 (H) 0.51 - 0.95 mg/dL    GFR Estimate 48 (L) >60 mL/min/1.73m2    Calcium 9.1 8.6 - 10.0 mg/dL    Glucose 111 (H) 70 - 99 mg/dL   Hemoglobin A1c   Result  Value Ref Range    Hemoglobin A1C 5.9 (H) <5.7 %   Glucose by meter   Result Value Ref Range    GLUCOSE BY METER POCT 89 70 - 99 mg/dL   Heparin Induced Thrombocytopenia Screen   Result Value Ref Range    HIT Screen Positive (AA) Negative   Glucose by meter   Result Value Ref Range    GLUCOSE BY METER POCT 94 70 - 99 mg/dL   Glucose by meter   Result Value Ref Range    GLUCOSE BY METER POCT 85 70 - 99 mg/dL       Imaging results        US Renal Complete w Arterial Duplex    Result Date: 11/9/2023  EXAM: 1. RENAL ULTRASOUND 2. RENAL DUPLEX LOCATION: [facility] DATE: 11/9/2023 INDICATION: Assessing for secondary hypertension, now on 4 antihypertensive agents (?AMBER, ?renal artery embolism, adrenal abnormality, etc.) COMPARISON: None. TECHNIQUE: Duplex imaging is performed utilizing gray-scale, two-dimensional images, and color-flow imaging. Doppler waveform analysis and spectral Doppler imaging is also performed. FINDINGS: RIGHT KIDNEY: 10.6 x 6.9 x 6 cm. Normal without hydronephrosis or masses. LEFT KIDNEY 10.6 x 6.6 x 5.9 cm. Normal without hydronephrosis or masses.. BLADDER: Normal. RENAL DUPLEX: Aortic PSV: 127 cm/s, multiphasic Right Renal Artery PSV: Normal, less than 200 cm/s (Normal considered less than 200 cm/s.) Right Intrarenal Resistive Index: Abnormal, greater than 0.8 Left Renal Artery PS: Proximal and mid renal artery not visualized due to body habitus and patient cooperation. Renal artery at the hilum 68 cm/s(Normal considered less than 200 cm/s.) Left Intrarenal Resistive Index: Abnormal, greater than 0.8     IMPRESSION: 1.  Right renal artery unremarkable with no evidence of renal artery stenosis. Left renal artery not visualized due to patient habitus. 2.  Elevated resistive indices which can be seen in medical renal disease.    Echocardiogram Complete    Result Date: 11/8/2023  [id number]^GONDAL^DEANDRE^AUNG  [address]  [place] [address]  Name: LY GONZALEZ MRN: [id number] : 1970 Study Date: 2023 03:27 PM Age: 53 yrs Gender: Female Patient Location: [facility] Reason For Study: SOB Ordering Physician: GONDAL, SAAD J Performed By: PENNY  BSA: 2.2 m2 Height: 63 in Weight: 272 lb HR: 94 BP: 107/52 mmHg ______________________________________________________________________________ Procedure Complete Echo Adult. Definity (NDC #04206-676) given intravenously. Technically difficult study. Compared to the prior study dated 10/23/2023, there have been no changes. ______________________________________________________________________________ Interpretation Summary  1. Left ventricular chamber size, wall thickness and systolic function are normal. The visually estimated left ventricular ejection fraction is 55-60%. 2. Right ventricular chamber size and systolic function are normal. 3. No hemodynamically significant valvular abnormalities. 4. Compared to the prior study dated 10/23/2023, there has been no significant change. ______________________________________________________________________________ I      WMSI = 1.00     % Normal = 100  X - Cannot   0 -                      (2) - Mildly 2 -          Segments  Size Interpret    Hyperkinetic 1 - Normal  Hypokinetic  Hypokinetic  1-2     small                                                    7 -          3-5    moderate 3 - Akinetic 4 -          5 -         6 - Akinetic Dyskinetic   6-14    large              Dyskinetic   Aneurysmal  w/scar       w/scar       15-16   diffuse  Left Ventricle The left ventricle is normal in size. There is normal left ventricular wall thickness. Left ventricular systolic function is normal. The visual ejection fraction is 55-60%. Left ventricular diastolic function is normal. Diastolic Doppler findings (E/E' ratio and/or other parameters) suggest left ventricular filling pressures are normal. No regional wall motion abnormalities noted.  Right Ventricle The  right ventricle is not well visualized. The right ventricle is normal in size and function.  Atria Normal left atrial size. Right atrial size is normal.  Mitral Valve Mitral valve leaflets appear normal. There is trace mitral regurgitation. There is no mitral valve stenosis.  Tricuspid Valve Tricuspid valve leaflets appear normal. There is trace tricuspid regurgitation. Right ventricular systolic pressure could not be approximated due to inadequate tricuspid regurgitation. There is no tricuspid stenosis.  Aortic Valve The aortic valve is not well visualized. No aortic regurgitation is present. No aortic stenosis is present.  Pulmonic Valve The pulmonic valve is not well visualized. There is trace pulmonic valvular regurgitation. There is no pulmonic valvular stenosis.  Vessels The aorta root is normal. The thoracic aorta is normal. IVC diameter <2.1 cm collapsing >50% with sniff suggests a normal RA pressure of 3 mmHg.  Pericardium There is no pericardial effusion.  Rhythm Sinus rhythm was noted.  ______________________________________________________________________________ MMode/2D Measurements & Calculations IVSd: 0.90 cm LVIDd: 4.8 cm LVIDs: 3.3 cm LVPWd: 0.90 cm FS: 30.1 %  LV mass(C)d: 145.2 grams LV mass(C)dI: 65.9 grams/m2 asc Aorta Diam: 3.0 cm LVOT diam: 2.0 cm LVOT area: 3.1 cm2 Asc Ao diam index BSA (cm/m2): 1.4 Asc Ao diam index Ht(cm/m): 1.9 LA Volume Index (BP): 30.0 ml/m2 RWT: 0.38 TAPSE: 2.4 cm  Time Measurements MM HR: 94.0 BPM  Doppler Measurements & Calculations MV E max levy: 103.0 cm/sec MV A max levy: 94.7 cm/sec MV E/A: 1.1 MV max P.9 mmHg MV mean PG: 3.0 mmHg MV V2 VTI: 31.3 cm MVA(VTI): 2.8 cm2 Ao V2 max: 172.0 cm/sec Ao max P.0 mmHg Ao V2 mean: 112.0 cm/sec Ao mean P.0 mmHg Ao V2 VTI: 33.0 cm ALEXANDRA(I,D): 2.7 cm2 ALEXANDRA(V,D): 2.6 cm2 LV V1 max P.1 mmHg LV V1 max: 142.0 cm/sec LV V1 VTI: 28.2 cm SV(LVOT): 88.6 ml SI(LVOT): 40.2 ml/m2 PA V2 max: 119.0 cm/sec PA max P.7 mmHg PA  acc time: 0.11 sec AV Sami Ratio (DI): 0.83 ALEXANDRA Index (cm2/m2): 1.2 E/E': 12.3 E/E' avg: 10.2 Lateral E/e': 8.2 Medial E/e': 12.1 Peak E' Sami: 8.4 cm/sec  RV S Sami: 9.8 cm/sec  ______________________________________________________________________________ Report approved by: Yakelin Hernandez 11/08/2023 04:47 PM       US Lower Extremity Venous Duplex Bilateral    Result Date: 11/8/2023  EXAM: US LOWER EXTREMITY VENOUS DUPLEX BILATERAL LOCATION: [facility] DATE: 11/8/2023 INDICATION: trace edema COMPARISON: None. TECHNIQUE: Venous Duplex ultrasound of bilateral lower extremities with and without compression, augmentation and duplex. Color flow and spectral Doppler with waveform analysis performed. FINDINGS: Exam includes the common femoral, femoral, popliteal veins as well as segmentally visualized deep calf veins and greater saphenous vein. RIGHT: No deep vein thrombosis. No superficial thrombophlebitis. No popliteal cyst. LEFT: No deep vein thrombosis. No superficial thrombophlebitis. No popliteal cyst.     IMPRESSION: 1.  No deep venous thrombosis in the bilateral lower extremities.    CT Chest Pulmonary Embolism w Contrast    Result Date: 11/8/2023  EXAM: CT CHEST PULMONARY EMBOLISM W CONTRAST LOCATION: [facility] DATE: 11/8/2023 INDICATION: sob, hypotension; clinical history includes recent sleeve gastrectomy with reexploration for 2 incidental small bowel enterotomies. COMPARISON: CT of the abdomen and pelvis 11/07/2023 TECHNIQUE: CT chest pulmonary angiogram during arterial phase injection of IV contrast. Multiplanar reformats and MIP reconstructions were performed. Dose reduction techniques were used. CONTRAST: IsoVue 370 75mL FINDINGS: ANGIOGRAM CHEST: Main pulmonary artery is normal caliber. Opacification of the pulmonary arteries is excellent. A small number of lingular branch pulmonary arteries are obscured by cardiac pulsation-related artifact and not well  evaluated. No pulmonary artery filling defects are detected. Thoracic aorta is negative for dissection. LUNGS AND PLEURA: Subsegmental atelectasis of the left lower lobe along the mediastinal pleura with visible endoluminal debris/mucus in subsegmental airways. Multisegment atelectasis of the right lower lobe also with visible debris in subsegmental airways. There is a small right pleural effusion. Contours of the pleural fluid with the visceral pleura of the right lower lobes suggest a degree of organization, but no pleural thickening/enhancement. Minimal right pneumothorax related to recent chest tube. Globule of secretions is present in the trachea. Central airways are otherwise patent. MEDIASTINUM: Cardiac chambers are normal in size. No pericardial effusion. Right PICC terminates at the SVC right atrial junction. Feeding tube passes through the decompressed esophagus and below the diaphragmatic hiatus. No mediastinal fluid collection.  Imaged thyroid gland is normal. CORONARY ARTERY CALCIFICATION: Mild. UPPER ABDOMEN: Minimal free fluid and pneumoperitoneum near the right lobe of the liver from recent surgeries. Staple line from sleeve gastrectomy. Excreted contrast present within the lumen of the nondistended gallbladder. MUSCULOSKELETAL: Disc space narrowing and small marginal osteophytes of the thoracic spine. No aggressive or destructive bone lesions.     IMPRESSION: 1.  No acute pulmonary embolism. 2.  Subsegmental atelectasis left lower lobe along the mediastinal pleura and multisegment right lower lobe atelectasis and retained mucus/debris within subsegmental airways. 3.  Small organized right pleural effusion and minimal pneumothorax in the setting of recent right chest tube.    IR Abscess Tube Check    Result Date: 11/8/2023  [place] [facility] LOCATION: [facility] DATE: 11/8/2023 PROCEDURE: ABSCESS TUBE CHECK AND REMOVAL INTERVENTIONAL RADIOLOGIST: Bhanu Bose MD. INDICATION:  53-year-old female with a perihepatic fluid collection status post percutaneous drainage. CT shows near complete resolution of the fluid cavity and drain outputs following TPA administration are approximately 15 mL daily. CONSENT: The risks, benefits and alternatives of an abscessogram with possible exchange, manipulation or removal were discussed with the patient  in detail. All questions were answered. Informed consent was given to proceed with the procedure. MODERATE SEDATION: None. CONTRAST: 5 mL Omnipaque into the abscess cavity. ANTIBIOTICS: None. ADDITIONAL MEDICATIONS: None. FLUOROSCOPIC TIME: 0.3 minutes. RADIATION DOSE: Air Kerma: 10 mGy. COMPLICATIONS: No immediate complications. STERILE BARRIER TECHNIQUE: Maximum sterile barrier technique was used. Cutaneous antisepsis was performed at the operative site with application of 2% chlorhexidine and large sterile drape. Prior to the procedure, the  and assistant performed hand hygiene and wore hat, mask, sterile gown, and sterile gloves during the entire procedure. PROCEDURE:  A  image was obtained. The pre-existing drainage catheter was injected with a small amount of contrast and multiple images were obtained. Based on the results of the study the drainage catheter was removed. FINDINGS: The  image shows the right upper quadrant drainage catheter. An injection of contrast shows no residual abscess cavity. The drainage catheter was removed.     IMPRESSION:  No residual abscess cavity with removal of the existing drainage catheter. PLAN: The patient will monitor for signs of infection such as increased pain or fever. Should any of these symptoms develop they will contact us immediately.     XR Chest Port 1 View    Result Date: 11/7/2023  EXAM: XR CHEST PORT 1 VIEW LOCATION: [facility] DATE: 11/7/2023 INDICATION: sob COMPARISON: 10/05/2023     IMPRESSION: Feeding tube entering the stomach. Right PICC tip in the low  SVC. Right chest tube. Small right pleural effusion with some atelectasis remains similar to previous. Some minimal atelectasis left lung base. No pneumothorax. No new findings.    CT Abdomen w Contrast    Result Date: 11/7/2023  EXAM: CT ABDOMEN W CONTRAST LOCATION: [facility] DATE: 11/7/2023 INDICATION: routine check on post surgical hepatic fluid collection with drain post tPA instillation COMPARISON: 10/30/2023. TECHNIQUE: CT scan of the abdomen was performed following injection of IV contrast. Multiplanar reformats were obtained. Dose reduction techniques were used. CONTRAST: [id number] 75ml  from a single use vial FINDINGS:  LOWER CHEST: Small right pleural effusion with continued bibasilar atelectasis, greater on the right. Improved from prior study. No pneumothorax. Heart is not enlarged. HEPATOBILIARY: Appropriate positioning of pigtail drainage catheter in perihepatic fluid collection which has significantly improved from the prior study. No new fluid collection identified. Gallbladder is unremarkable. PANCREAS: Normal. SPLEEN: Normal. ADRENAL GLANDS: Normal. KIDNEYS: Normal. BOWEL: Appropriate positioning of feeding tube. Postsurgical changes of gastric leak. LYMPH NODES: Normal. VASCULATURE: Unremarkable. MUSCULOSKELETAL: Degenerative disease of the visualized lumbar spine and thoracic spine. The     IMPRESSION: 1.  Appropriate positioning of perihepatic drain with significantly decreased size of the associated fluid collection. 2.  Right pleural effusion with associated atelectasis, improved from prior study.    XR Chest Port 1 View    Result Date: 11/5/2023  EXAM: XR CHEST PORT 1 VIEW LOCATION: [facility] DATE: 11/5/2023 INDICATION: FU Right PTX s p clamping of chest tube COMPARISON: Portable chest radiograph 11/04/2023     IMPRESSION: Stable cardiac silhouette. Right basilar chest tubes appear in stable position. Right upper extremity PICC with distal  tip projecting at the superior cavoatrial junction. Enteric tube courses below the diaphragm with distal tip beyond the inferior field of view. Stable cardiac silhouette. Improved aeration of the right lung base. No significant pleural effusion. Possible trace right apical pneumothorax no longer visualized. Unchanged osseous structures.    XR Chest Port 1 View    Result Date: 11/4/2023  EXAM: XR CHEST PORT 1 VIEW LOCATION: [facility] DATE: 11/4/2023 INDICATION: clamped chest tube and will likely remove COMPARISON: 11/03/2023.     IMPRESSION: Stable cardiac silhouette. Right basilar chest tube appears in stable position although obscured by overlapping wires. Right upper extremity PICC with distal tip projecting at the superior cavoatrial junction. Enteric tube courses below the diaphragm with distal tip beyond the inferior field of view. Stable cardiac silhouette. Improved aeration of the right lung base. No significant pleural effusion. Possible trace right apical pneumothorax.    XR Chest Port 1 View    Result Date: 11/3/2023  EXAM: XR CHEST PORT 1 VIEW LOCATION: [facility] DATE: 11/3/2023 INDICATION: chjest tube in place and not draining COMPARISON: 11/02/2023     IMPRESSION: 2 right chest tubes again seen in the right inferior hemithorax in stable positions. A right basilar consolidation and small right pleural effusion are unchanged. Heart size, cardiomediastinal contour and pulmonary vascularity are normal. Left lung is clear without consolidation or pleural effusion. No pneumothorax. Right PICC terminates near the superior cavoatrial junction. Enteric feeding tube extends below the diaphragm beyond the field-of-view.    XR Chest Port 1 View    Result Date: 11/2/2023  EXAM: XR CHEST PORT 1 VIEW LOCATION: [facility] DATE: 11/2/2023 INDICATION: Chest tube in place. Not draining. COMPARISON: 10/31/2023     IMPRESSION: Right basilar chest  tubes. Decreased size of small right pleural effusion. Adjacent compressive atelectasis in the right lung. No right pneumothorax. No focal airspace disease in the left lung. The left costophrenic angle is not included in the field-of-view, limiting evaluation. No large left pleural effusion or pneumothorax. Stable cardiomediastinal silhouette. Feeding tube coursing into the stomach with tip out of view. Right PICC with tip near the cavoatrial junction.    CT Chest Tube with Cath Placement    Result Date: 11/1/2023  EXAM: 1. PERCUTANEOUS CHEST TUBE PLACEMENT RIGHT PLEURAL SPACE 2. CT GUIDANCE 3. CONSCIOUS SEDATION LOCATION: [facility] DATE: 11/1/2023 INDICATION: right pleural fluid increasing in volume TECHNIQUE: Dose reduction techniques were used. PROCEDURE: Informed consent obtained. Site marked. Prior images reviewed. Required items made available. Patient identity confirmed verbally and with arm band. Patient reevaluated immediately before administering sedation. Universal protocol was followed. Time out performed. The site was prepped and draped in sterile fashion. 4 mL of 1% lidocaine was infused into the local soft tissues. Using standard technique and under direct CT guidance, a 10 Bulgarian nonlocking chest tube catheter was inserted  into the pleural space. The catheter was fixed in place with sutures and adhesive device, and the tubing was banded. Chest tube placed to Pleur-evac suction. SPECIMEN: 1 L of clear yellow fluid was aspirated and sent to lab for testing. BLOOD LOSS: Minimal. The patient tolerated the procedure well. No immediate complications. SEDATION: Versed 1.5  mg. Fentanyl 50 mcg. The procedure was performed with administration intravenous conscious sedation with appropriate preoperative, intraoperative, and postoperative evaluation. 14  minutes of supervised face to face conscious sedation time was provided by a radiology nurse under my direct supervision.      IMPRESSION: 1.  Successful CT-guided chest tube placement into the right pleural space with moderate sedation . Reference CPT Codes: 22776, 37507    XR Feeding Tube Placement    Result Date: 10/31/2023  EXAM: XR FEEDING TUBE PLACEMENT LOCATION: [facility] DATE: 10/31/2023 INDICATION: Not tolerating oral intake. Place feeding tube. Single Anastamosis Duodenal Switch. Surgery 10 12 for enterotomy and sepsis. COMPARISON: CT AP 10/30/2023 and older studies, feeding tube placement 10/24/2023. TECHNIQUE: Routine. FINDINGS: Enteric tube placed without complication. Tip in the the small bowel. FLUOROSCOPIC TIME: 0.6 minutes NUMBER OF IMAGES: 1 Reference CPT Code: 26153     XR Chest Port 1 View    Result Date: 10/31/2023  EXAM: XR CHEST PORT 1 VIEW LOCATION: [facility] DATE: 10/31/2023 INDICATION: 53 y o female s p bariatric surgery this admit, with complications, has drain, ongoing effusion right, last night n v check for infiltrate aspiration COMPARISON: CT 10/22/2023.     IMPRESSION: Moderate right pleural effusion with associated compressive atelectasis in the right lower lobe. Left basilar atelectasis or consolidation. Right upper extremity PICC with tip near the cavoatrial junction. Obscured right heart border from the  effusion. Cardiomediastinal silhouette otherwise within normal limits. Tubing projecting over the right upper quadrant abdomen. No significant interval change.    CT Abdomen w Contrast    Result Date: 10/30/2023  EXAM: CT ABDOMEN W CONTRAST LOCATION: [facility] DATE: 10/30/2023 INDICATION: Follow-up drain placement. Determine if patient may need TPA through drain. COMPARISON: 10/25/2023. Others. TECHNIQUE: CT scan of the abdomen was performed following injection of IV contrast. Multiplanar reformats were obtained. Dose reduction techniques were used. CONTRAST: 75 mL Isovue 370 FINDINGS:  LOWER CHEST: Incompletely seen small  to moderate right pleural effusion and complete right lower lobe atelectasis. HEPATOBILIARY: Exchange of prior perihepatic drain was performed on 10/25/2023. Current drain tip coils adjacent to the hepatic dome. The perihepatic fluid collection has minimally improved. No focal hepatic abnormality. PANCREAS: Normal. SPLEEN: Normal. ADRENAL GLANDS: Normal. KIDNEYS: Normal. BOWEL: Surgical changes stomach. No evidence for bowel obstruction. LYMPH NODES: No adenopathy. VASCULATURE: Nonaneurysmal aorta. MUSCULOSKELETAL: Nothing acute.     IMPRESSION: 1.  Perihepatic drain in place with tip adjacent to the hepatic dome. Minimal improvement of the perihepatic collection since 10/25/2023. Small perihepatic collection remains. 2.  Small to moderate sized right pleural effusion and associated collapse of the right lower lobe.     XR Feeding Tube Placement    Addendum Date: 10/27/2023    Addendum: TECHNIQUE: The existing NG tube in the stomach was removed and a feeding tube was placed with fluoroscopic guidance. Tip was placed in the small bowel post anastomosis. End of addendum    Result Date: 10/27/2023  EXAM: XR FEEDING TUBE PLACEMENT LOCATION: [facility] DATE: 10/24/2023 INDICATION: please exchange Hopkins sump for a post pylroic feeding tube; FYI: new anastomosis just past pylorus COMPARISON: CT 10/22/2023 TECHNIQUE: Routine. FINDINGS: Enteric tube placed without complication. Tip in the jejunum just post anastomosis. FLUOROSCOPIC TIME: 1.5 NUMBER OF IMAGES: 3 Reference CPT Code: 71338     US Upper Extremity Venous Duplex Bilat    Result Date: 10/25/2023  EXAM: US UPPER EXTREMITY VENOUS DUPLEX BILATERAL LOCATION: [facility] DATE: 10/25/2023 INDICATION: ongoing fevers, evaluate for thrombus COMPARISON: None. TECHNIQUE: Venous Duplex ultrasound of both upper extremities with (when possible) and without compression, augmentation, and duplex. Color flow and spectral Doppler with  waveform analysis performed. FINDINGS: Ultrasound includes evaluation of the internal jugular veins, innominate veins, subclavian veins, axillary veins, and brachial veins. The superficial cephalic and basilic veins were also evaluated where seen. RIGHT: No deep venous thrombosis. Occlusive and partially occlusive thrombus within the cephalic vein extending from the forearm to the antecubital fossa (length of involvement greater than 5 cm). LEFT: No deep venous thrombosis. Partially occlusive thrombus within the cephalic vein in the antecubital fossa near the IV insertion (less than 5 cm).     IMPRESSION: 1.  No deep venous thrombosis in the bilateral upper extremities. 2.  Superficial thrombus within the cephalic veins bilaterally.    US Lower Extremity Venous Duplex Bilateral    Result Date: 10/25/2023  EXAM: US LOWER EXTREMITY VENOUS DUPLEX BILATERAL LOCATION: [facility] DATE: 10/25/2023 INDICATION: ongoing fevers, evaluate for thrombus COMPARISON: None. TECHNIQUE: Venous Duplex ultrasound of bilateral lower extremities with and without compression, augmentation and duplex. Color flow and spectral Doppler with waveform analysis performed. FINDINGS: Exam includes the common femoral, femoral, popliteal veins as well as segmentally visualized deep calf veins and greater saphenous vein. RIGHT: No deep vein thrombosis. No superficial thrombophlebitis. No popliteal cyst. LEFT: No deep vein thrombosis. No superficial thrombophlebitis. No popliteal cyst.     IMPRESSION: 1.  No deep venous thrombosis in the bilateral lower extremities.    CT Abdomen w/o Contrast    Result Date: 10/25/2023  EXAM: CT ABDOMEN W/O CONTRAST LOCATION: [facility] DATE: 10/25/2023 INDICATION: Perihepatic abscess status post drainage. Follow-up abscess. COMPARISON: 10/22/2023 TECHNIQUE: CT scan of the abdomen was performed without IV contrast. Multiplanar reformats were obtained. Dose reduction  techniques were used. CONTRAST: None. FINDINGS:  LOWER CHEST: Stable right pleural effusion. Bibasilar atelectasis. Artifact. HEPATOBILIARY: Perihepatic drain remains in place. There remains a crescentic low-density process involving the subcapsular space at the site of drain which extends to the dome of the liver. PANCREAS: Normal. SPLEEN: Normal. ADRENAL GLANDS: Normal. KIDNEYS: Normal. BOWEL: Postsurgical changes of the stomach with enteric tube noted in small bowel in the right upper quadrant. LYMPH NODES: Normal. VASCULATURE: Minimal arterial plaque. MUSCULOSKELETAL: Normal.     IMPRESSION: 1.  Perihepatic drain remains in good position. Residual low-density material persists in this subcapsular perihepatic space which extends to the dome of the liver. 2.  Stable appearance right pleural effusion and bibasilar atelectasis. 3.  Postsurgical changes of the stomach.    IR Abscess Tube Check    Result Date: 10/25/2023  [facility] LOCATION: [facility] DATE: 10/25/2023 PROCEDURE: ABSCESS TUBE CHECK AND EXCHANGE/UPSIZING INTERVENTIONAL RADIOLOGIST: Teofilo Barber MD. INDICATION: 53-year-old female with a perihepatic collection status post drain placement presents for drain check due to decreased drain output. CT shows persistent perihepatic fluid collection.. CONSENT: The risks, benefits and alternatives of an abscessogram with possible exchange, manipulation or removal were discussed with the patient's family in detail. All questions were answered. Informed consent was given to proceed with the procedure. SEDATION: Patient is intubated and sedated. CONTRAST: 20 mL Omnipaque into the abscess cavity. ANTIBIOTICS: None. ADDITIONAL MEDICATIONS: None. FLUOROSCOPIC TIME: 3.4 minutes. RADIATION DOSE: Air Kerma: 102 mGy. COMPLICATIONS: No immediate complications. STERILE BARRIER TECHNIQUE: Maximum sterile barrier technique was used. Cutaneous antisepsis was performed at the operative site with  application of 2% chlorhexidine and large sterile drape. Prior to the procedure, the  and assistant performed hand hygiene and wore hat, mask, sterile gown, and sterile gloves during the entire procedure. PROCEDURE:  A  image was obtained. The pre-existing drainage catheter was injected with a small amount of contrast and multiple images were obtained. Based on the results of the study it was elected to exchange the catheter. The pre-existing drainage catheter was removed over a wire and exchange was made for a Kumpe catheter. The catheter and wire were negotiated superiorly along the right hepatic dome. The Kumpe catheter was then exchanged for a new 12F locking loop drainage catheter. The loop was partially formed within the residual abscess cavity. A post placement contrast injection was performed confirming that the new catheter controls the residual fluid collection well. FINDINGS: The indwelling catheter was clogged. Following exchange and repositioning, the new larger drainage catheter lies within the central aspect of the residual abscess cavity along the right hepatic dome and appears to control the fluid collection well.     IMPRESSION:  Exchange and repositioning with upsize of the perihepatic drainage catheter to a 12 Armenian catheter. PLAN: Continue flushing the catheter and keep to suction drainage.     CT Abdomen Peritoneum Abscess Aspiration    Result Date: 10/24/2023  EXAM: 1. PERCUTANEOUS DRAIN PLACEMENT 2. CT GUIDANCE LOCATION: [facility] DATE: 10/24/2023 INDICATION: fluid collection in pelvis, ? drain placement TECHNIQUE: Dose reduction techniques were used. PROCEDURE: Informed consent obtained. Site marked. Prior images reviewed. Required items made available. Patient identity confirmed verbally and with arm band. Patient reevaluated immediately before administering sedation. Universal protocol was followed. Time out performed. The site was prepped and draped  in sterile fashion. 10 mL of 1% lidocaine was infused into the local soft tissues. Using standard technique and under direct CT guidance, an 18-gauge guiding needle was placed into the pelvic fluid collection using a right transfemoral gluteal approach. 75 mL of yellow fluid with slight debris was aspirated. Because this did not have the appearance of yesica pus a drain was not placed. Fluid pocket 20 cm deep in the pelvis. Irrigation 4 times with sterile saline..  SPECIMEN: 75 mL of yellow debris filled fluid was aspirated and sent to lab for cultures and Gram stain. BLOOD LOSS: Minimal. The patient tolerated the procedure well. No immediate complications.     IMPRESSION: 1.  Successful CT-guided aspiration of pelvic fluid collection with yellow slight debris filled fluid aspirated and sent for cultures. This pocket is 20 cm deep in the pelvis. No drain was placed but the pocket was irrigated 4 times with sterile saline.. Reference CPT Codes: 42096,    US Thoracentesis    Result Date: 10/24/2023  EXAM: 1. RIGHT THORACENTESIS 2. ULTRASOUND GUIDANCE LOCATION: [facility] DATE: 10/24/2023 INDICATION: Pleural effusion. PROCEDURE: Informed consent obtained. Time out performed. The chest was prepped and draped in sterile fashion. 10 mL of 1 % lidocaine was infused into the local soft tissues. Under direct ultrasound guidance, a 5 Stateless catheter system was placed into the pleural effusion. 0.4 liters of clear yellow fluid were removed and sent to lab, if requested. Patient tolerated procedure well. Ultrasound imaging was obtained and placed in the patient's permanent medical record.     IMPRESSION: Status post right ultrasound-guided thoracentesis. Reference CPT Code: 43053    Echocardiogram Limited    Result Date: 10/23/2023  [id number]^MATEO^PATSY^[place] [id number]  Name: LY GONZALEZ MRN: [id number] : 1970  Study Date: 10/23/2023 10:27 AM Age: 53 yrs Gender: Female Patient Location: [facility] Reason For Study: Cardiomyopathy Ordering Physician: PATSY GALINDO Performed By: ACE  BSA: 2.2 m2 Height: 63 in Weight: 273 lb HR: 108 BP: 157/72 mmHg ______________________________________________________________________________ Procedure Limited Portable Echo Adult. Definity (NDC #46072-049) given intravenously. ______________________________________________________________________________ Interpretation Summary  The left ventricle is normal in size. The visual ejection fraction is 60-65%. No regional wall motion abnormalities noted. Normal right ventricle size and systolic function. The rhythm was sinus tachycardia. The study was technically difficult. Compared to echo from 10/14/23 thr LVEF has improved. ______________________________________________________________________________ Left Ventricle The left ventricle is normal in size. There is normal left ventricular wall thickness. The visual ejection fraction is 60-65%. No regional wall motion abnormalities noted.  Right Ventricle Normal right ventricle size and systolic function. TAPSE is normal, which is consistent with normal right ventricular systolic function.  Atria Normal left atrial size. Right atrial size is normal.  Mitral Valve The mitral valve is not well visualized. There is trace mitral regurgitation. There is no mitral valve stenosis.  Tricuspid Valve The tricuspid valve is not well visualized.  Aortic Valve The aortic valve is not well visualized. No aortic regurgitation is present. No aortic stenosis is present.  Pulmonic Valve Normal pulmonic valve.  Vessels IVC diameter and respiratory changes fall into an intermediate range suggesting an RA pressure of 8 mmHg.  Pericardium Trivial pericardial effusion.  Rhythm The rhythm was sinus tachycardia. ______________________________________________________________________________ MMode/2D Measurements &  Calculations  IVSd: 0.61 cm LVIDd: 4.9 cm LVIDs: 3.3 cm LVPWd: 0.87 cm FS: 33.3 % LV mass(C)d: 118.8 grams LV mass(C)dI: 53.8 grams/m2 RWT: 0.36 TAPSE: 2.9 cm  Time Measurements MM HR: 108.0 BPM  Doppler Measurements & Calculations Ao V2 max: 214.0 cm/sec Ao max P.0 mmHg Ao V2 mean: 130.0 cm/sec Ao mean P.0 mmHg Ao V2 VTI: 31.1 cm RV S Sami: 19.6 cm/sec  ______________________________________________________________________________ Report approved by: Yakelin García 10/23/2023 12:34 PM       CT Chest Abdomen Pelvis w/o Contrast    Result Date: 10/22/2023  EXAM: CT CHEST ABDOMEN PELVIS W/O CONTRAST LOCATION: [facility] DATE: 10/22/2023 INDICATION: Sepsis. treated for peritonitis and pneumonia.  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 COMPARISON: 10/18/2023 CT AP. TECHNIQUE: CT scan of the chest, abdomen, and pelvis was performed without IV contrast. Multiplanar reformats were obtained. Dose reduction techniques were used. CONTRAST: None. FINDINGS: LUNGS AND PLEURA: Complete right lower lobe atelectasis, mild passive atelectasis right upper and middle lobes posteriorly, small/moderate volume right pleural effusion and mild elevation right hemidiaphragm. Trace left pleural effusion and mild platelike atelectasis left lower lobe unchanged. MEDIASTINUM/AXILLAE: Heart size within normal limits with no pericardial effusion. Endotracheal tube tip is only 1 cm above the emilia. CORONARY ARTERY CALCIFICATION: Trace calcified atheromatous plaque LAD coronary artery. HEPATOBILIARY: Liver is normal.  No calcified gallstones or bile duct dilatation. PANCREAS: Normal. SPLEEN: Normal. ADRENAL GLANDS: Normal. KIDNEYS/BLADDER: Bladder is decompressed by well-positioned Patiño catheter. Kidneys, ureters and  bladder are otherwise normal. BOWEL: Sleeve gastrectomy and proximal duodenal to jejunal anastomosis with a well-positioned nasogastric tube and interval removal of the surgical drains. Interval placement of percutaneous drainage catheter lateral aspect right subdiaphragmatic air-fluid collection surrounding the right hepatic lobe which has not changed significantly and continues to measure about 1 - 2 cm radial thickness. A 7 x 5 x 4 cm volume of nongas-containing fluid in the pelvic cul-de-sac is unchanged. LYMPH NODES: No lymphadenopathy. VASCULATURE: Normal caliber abdominal aorta.  PELVIC ORGANS: A 5 cm fibroid. MUSCULOSKELETAL: Unremarkable.     IMPRESSION: 1.  Sleeve gastrectomy and proximal duodenal to jejunal anastomosis with a well-positioned nasogastric tube and interval removal of the 2 surgical drains. 2.  Interval placement of a percutaneous drainage catheter with distal loop in the lateral aspect of the right subdiaphragmatic air-fluid collection surrounding the right hepatic lobe which has not changed significantly and continues to measure about 1 -  2 cm radial thickness. 3.  A 7 x 5 x 4 cm volume of nongas-containing fluid in the pelvic cul-de-sac is unchanged. 4.  Complete right lower lobe atelectasis, mild passive atelectasis right upper and middle lobes posteriorly, small/moderate volume right pleural effusion and mild elevation right hemidiaphragm. 5.  Endotracheal tube tip is only 1 cm above the emilia.    XR Chest Port 1 View    Result Date: 10/22/2023  EXAM: XR CHEST PORT 1 VIEW LOCATION: [facility] DATE: 10/22/2023 INDICATION: Endotracheal tube positioning COMPARISON: 11/22/2023     IMPRESSION: Endotracheal tube has been placed with tip 2 cm above the emilia. Remainder unchanged. Enteric tube tip below diaphragm, off the image. Left IJ central line tip in the left brachiocephalic vein. Right upper quadrant percutaneous drain. Bilateral pleural effusions. Moderate  degenerative change thoracic spine.    XR Chest Port 1 View    Result Date: 10/22/2023  EXAM: XR CHEST PORT 1 VIEW LOCATION: [facility] DATE: 10/22/2023 INDICATION: concern for possible aspiration, assess for HAP COMPARISON: CT 10/19/2023     IMPRESSION: Perihepatic drain overlies the right upper abdomen. Small right pleural effusion with compressive atelectasis. Infiltrate of the right lung base is not excluded. Left basilar and midlung atelectasis. Left internal jugular central venous catheter with tip overlying the innominate confluence region. Nasogastric tube which courses below the diaphragm though the tip is excluded by collimation. No pneumothorax. Upper limits normal heart size.    XR Abdomen Port 1 View    Result Date: 10/21/2023  EXAM: XR ABDOMEN PORT 1 VIEW LOCATION: [facility] DATE: 10/21/2023 INDICATION: ng position COMPARISON: CT abdomen 10/18/2023.     IMPRESSION: Nasogastric tube terminates in the distal stomach. Surgical staple line of the mid line in the abdomen.    CT Retroperitoneal Abscess Drainage    Result Date: 10/19/2023  EXAM: 1. PERCUTANEOUS DRAIN PLACEMENT PERIHEPATIC COLLECTION 2. CT GUIDANCE 3. CONSCIOUS SEDATION LOCATION: [facility] DATE: 10/19/2023 INDICATION: Perihepatic colletion. TECHNIQUE: Dose reduction techniques were used. PROCEDURE: Informed consent obtained. Site marked. Prior images reviewed. Required items made available. Patient identity confirmed verbally and with arm band. Patient reevaluated immediately before administering sedation. Universal protocol was followed. Time out performed. The site was prepped and draped in sterile fashion. 10 mL of 1% lidocaine was infused into the local soft tissues. Using standard technique and under direct CT guidance, a 10 Frisian drainage catheter was inserted into the fluid collection.  SPECIMEN: 10 mL of thick brownish-red fluid was aspirated and sent to lab  for cultures and Gram stain. BLOOD LOSS: Minimal. The patient tolerated the procedure well. No immediate complications. SEDATION: Versed 4 mg. Fentanyl 250 mcg. The procedure was performed with administration intravenous conscious sedation with appropriate preoperative, intraoperative, and postoperative evaluation. 40 minutes of supervised face to face conscious sedation time was provided by a radiology nurse under my direct supervision.     IMPRESSION: 1.  Successful CT-guided drain placement into perihepatic collection. Reference CPT Codes: 53549, 47635, 74449, 30612    CT Abdomen Pelvis w/o Contrast    Addendum Date: 10/18/2023    ADDENDUM dictated by Jose G Birmingham M.D.  10/18/2023 Original report dictated by Jose G Birmingham M.D.  10/18/2023 Addendum to impression #1 IMPRESSION: 1.  Sleeve gastrectomy and proximal duodenal to jejunal anastomosis, WELL-POSITIONED NASOGASTRIC TUBE, and upper anterior abdominal surgical drain from the right and a second left anterior abdominal and pelvic surgical drain.     Result Date: 10/18/2023  EXAM: CT ABDOMEN PELVIS W/O CONTRAST LOCATION: [facility] DATE: 10/18/2023 INDICATION: Perforated bowel, ongoing fever. Status post 10/09/2023 sleeve gastrectomy with single anastomosis duodenal switch complicated by bowel perforation, peritonitis and sepsis status post 10/12/2023 washout and enterotomy closure. Cardiomyopathy.  JARVIS. COMPARISON: 10/16/2023 CXR and 10/10/2023 Gastrografin upper GI. TECHNIQUE: CT scan of the abdomen and pelvis was performed without IV contrast. Multiplanar reformats were obtained. Dose reduction techniques were used. CONTRAST: None. FINDINGS: LOWER CHEST: Complete right lower lobe atelectasis, small volume of right-sided pleural fluid and mild elevation right hemidiaphragm. Trace left pleural effusion and mild platelike atelectasis left lower lobe. Heart size within normal limits with no pericardial effusion. HEPATOBILIARY: Liver is  normal.  No calcified gallstones or bile duct dilatation. PANCREAS: Normal. SPLEEN: Spleen size normal. ADRENAL GLANDS: Normal. KIDNEYS/BLADDER: Bladder is decompressed by well-positioned Patiño catheter. Kidneys, ureters and bladder are otherwise normal. BOWEL: Sleeve gastrectomy and proximal duodenal to jejunal anastomosis, a well-positioned nasogastric tube, and upper anterior abdominal surgical drain from the right and a second left anterior abdominal and pelvic surgical drain. A thin layer of fluid about 1 - 2 cm radial thickness between the right hemidiaphragm and right hepatic lobe contains a few foci of gas and there is a small amount of nongas-containing fluid in pelvic cul-de-sac. Residual iodinated contrast material from 10/10/2023 upper GI within the normal caliber appendix, colon and rectum. LYMPH NODES: No lymphadenopathy. VASCULATURE: Normal caliber abdominal aorta.  PELVIC ORGANS: A 5 cm fibroid. MUSCULOSKELETAL: Unremarkable.     IMPRESSION: 1.  Sleeve gastrectomy and proximal duodenal to jejunal anastomosis, malpositioned nasogastric tube, and upper anterior abdominal surgical drain from the right and a second left anterior abdominal and pelvic surgical drain. 2.  A thin layer of fluid about 1 - 2 cm radial thickness between the right hemidiaphragm and right hepatic lobe contains a few foci of gas and there is a small amount of nongas-containing fluid in pelvic cul-de-sac. 3.  Complete right lower lobe atelectasis, small volume of right-sided pleural fluid and mild elevation right hemidiaphragm.     XR Chest Port 1 View    Result Date: 10/16/2023  EXAM: XR CHEST PORT 1 VIEW LOCATION: [facility] DATE: 10/16/2023 INDICATION: hypoxia COMPARISON: Portable AP view of the chest 10/14/2023     IMPRESSION: Tip of the endotracheal tube is in satisfactory position. Esophageal temperature probe is in the lower third of the esophagus. Gastric tube courses below the diaphragmatic hiatus  and outside the field-of-view. Telemetry leads overlie the chest. Persistent shallow lung inflation. The right hemidiaphragm is indistinct and there is graded opacity in the infrahilar right chest either related to adjacent basilar atelectasis and/or layering pleural fluid. Focal atelectasis in the medial left base is not increased. The vessels within the aerated portions of the lungs are normal. No clear change from 2 days earlier. Unchanged heart and mediastinal contours.    Echocardiogram Complete    Result Date: 10/14/2023  [id number]^MANISH^[place]AMARIS^[place] [address]  Name: LY GONZALEZ MRN: [id number] : 1970 Study Date: 10/14/2023 02:51 PM Age: 53 yrs Gender: Female Patient Location: [facility] Reason For Study: Arrhythmia, Pericardial Effusion Ordering Physician: DAMARIS HAMMOND Performed By: CM  BSA: 2.3 m2 Height: 63 in Weight: 287 lb HR: 104 ______________________________________________________________________________ Procedure Complete Echo Adult. Definity (NDC #39876-713) given intravenously. Poor acoustic windows. Technically difficult study. Limited views were obtained. ______________________________________________________________________________ Interpretation Summary  Technically difficult study. Limited views were obtained. The left ventricle is normal in size. Left ventricular function is decreased. The ejection fraction is 30-35% (moderately reduced). There is moderate global hypokinesia of the left ventricle. ______________________________________________________________________________ Left Ventricle The left ventricle is normal in size. Left ventricular function is decreased. The ejection fraction is 30-35% (moderately reduced). There is normal left ventricular wall thickness. There is moderate global hypokinesia of the left ventricle.  Right Ventricle The right ventricle is not well visualized.  Atria Normal left atrial  size. Right atrium not well visualized.  Mitral Valve The mitral valve is not well visualized. There is no mitral regurgitation noted.  Tricuspid Valve The tricuspid valve is not well visualized. No tricuspid regurgitation.  Aortic Valve The aortic valve is not well visualized. No aortic regurgitation is present. No hemodynamically significant valvular aortic stenosis.  Pulmonic Valve The pulmonic valve is not well visualized. There is no pulmonic valvular regurgitation.  Vessels The aorta root is normal. Normal size ascending aorta.  Pericardium There is no pericardial effusion.  ______________________________________________________________________________ MMode/2D Measurements & Calculations IVSd: 1.1 cm LVIDd: 5.1 cm LVIDs: 3.9 cm LVPWd: 0.81 cm  FS: 23.1 % LV mass(C)d: 168.8 grams LV mass(C)dI: 74.9 grams/m2 Ao root diam: 2.5 cm LA dimension: 3.4 cm asc Aorta Diam: 3.1 cm LA/Ao: 1.4 LVOT diam: 2.0 cm LVOT area: 3.1 cm2 Ao root diam index Ht(cm/m): 1.6 Ao root diam index BSA (cm/m2): 1.1 Asc Ao diam index BSA (cm/m2): 1.4 Asc Ao diam index Ht(cm/m): 1.9 RWT: 0.32  Doppler Measurements & Calculations MV max P.5 mmHg MV mean P.0 mmHg MV V2 VTI: 18.8 cm PA acc time: 0.07 sec  ______________________________________________________________________________ Report approved by: Yakelin Mcadams 10/14/2023 04:40 PM       XR Chest Port 1 View    Result Date: 10/14/2023  EXAM: XR CHEST PORT 1 VIEW LOCATION: [facility] DATE: 10/14/2023 INDICATION: elevated peak pressures COMPARISON: Portable chest radiograph 10/13/2023     IMPRESSION: Low lung volumes. Hazy opacities right lower lung field could be related to overlying soft tissue versus atelectasis or developing infiltrate. This is similar as compared to the prior day chest radiograph but recommend continued attention on follow-up imaging. Minimal atelectasis left base. Endotracheal tube tip in satisfactory position terminating  approximately 4 cm above the emilia. Nasogastric tube courses below the level of the diaphragm though the tip is not well visualized on this radiograph. Recommend attention on follow-up and consider abdominal radiograph for further evaluation.. Left IJ central line tip overlies the region of the right brachiocephalic vein. No pneumothorax. Trace effusions possible.    XR Chest Port 1 View    Result Date: 10/13/2023  EXAM: XR CHEST PORT 1 VIEW LOCATION: [facility] DATE: 10/13/2023 INDICATION: Evaluation position of ETT COMPARISON: 10/12/2023     IMPRESSION: Low lung volumes. Hazy opacities right lower lung field could be related to overlying soft tissue versus atelectasis or developing infiltrate. This should be reviewed on follow-up imaging. Minimal atelectasis left base. Endotracheal tube tip in satisfactory position 2.5 cm above the emilia. Enteric tube extends into the stomach. Left IJ central line tip mid SVC. No pneumothorax.    XR Chest Port 1 View    Result Date: 10/12/2023  EXAM: XR CHEST PORT 1 VIEW LOCATION: [facility] DATE: 10/12/2023 INDICATION: intubation COMPARISON: X-ray 09/14/2023     IMPRESSION: Endotracheal tube tip located 1.9 cm above the emilia. Left IJ central catheter with the tip in the upper SVC. No pneumothorax. Moderate asymmetric elevation of the right hemidiaphragm. Mild bibasilar pulmonary opacities likely reflect atelectasis. No definite pleural effusion. Normal heart size. Spinal degenerative changes.         This note has been dictated using voice recognition software. Any grammatical or context distortions are unintentional and inherent to the software      Signed by: Aftab Herrera MD

## 2023-11-11 NOTE — PROGRESS NOTES
[facility] Hospitalist Progress Note  [facility]  Admission date: 10/9/2023    Summary:     53F year old female with h/o obesity, severe JARVIS on CPAP, asthma, stimulant use disorder, stimulant induced psychosis, mood & anxiety disorder. Admitted 10/9/23 for scheduled laparoscopic sleeve gastrectomy with single anastomosis duodenal switch. She was later found to have two small bowel injuries with peritonitis. 10/12/23 returned to OR for small bowel interotomy closure, clean-out, & drain placement; 10/19 found to have RUQ fluid collection s/p drain placement; 10/24 s/p pelvic fluid aspiration.     Course complicated by encephalopathy/delirium, septic shock, suspected takotsubo syndrome, acute respiratory failure due to loss of protective airway reflexes & increased metabolic load requiring intubation (10/12-10/21/23; reintubated 10/21-10/26/23), & oliguric SUSSY with renal recovery. She has significantly improved: mental status clearing with some ongoing delirium at night, cardiomyopathy improved, ATN I recovery phase, and ongoing treatment of intra abdominal infection. She was made floor tele status 10/27/23. On 11/1/23 CT guided chest tube placed on right for persistent effusion, concern for parapneumonic one with risk of future trapped lung. Chest tubes removed 11/5/2023.     Assessment/Plan    #Thrombocytopenia - +PF4. SSRA pending.  On arixtra for now.    #Acute hypoxemic-hypercapnic respiratory failure - improved.  On 2L with ATX and brochial debris on recent CT.    #s/p Gastric bypass complicated by enterotomy & peritonitis -  -NG, completing Augmentin and fluconzaole course  -monitor CRP, was up today without accompanying fever or symptoms.    #Lightheadedness associated with hypotension 11/8/2023 - ? Vagal event.  Did not recur.       #Accelerated HTN  #Stress Cardiomyopathy with EF recovery  -coreg, Hydralazine, norvasc 5BID  -started on chlorthalidone today.  May benefit from further forced  "diuresis.    -stop nitroglycerin patch, start aldactone.      #Suspected thrush - nystatin, fluconazole     #History of JARVIS, asthma in the chart no PFTs - NIPPV with sleep/naps everytime. Continue duonebs     #Oliguric SUSSY - now possibly CKD3,  Cr stable.    #Anemia - transfuse Hb < 7  #Hypervolemia - would continue with diuresis as tolerated.      -Hypernatremia resolved   -Hyperglycemia of critical illness - improved   -Morbid obesity BMI 49  -Acute metabolic and toxic encephalopathy Resolved  -Severe decondition due to prolong hospitalization  PT/OT eval, PT recommending TCU, OT recommending LTACH/TCU  Case management working on discharge disposition          Checklist:  Code Status: Full Code    Diet: Room Service  Bariatric Diet Full Liquids  Adult Formula Drip Feeding: Specified Time Promote w fiber; Nasojejunal; Goal Rate: 80; mL/hr; From: 10:00 PM; To: 5:00 AM     DVT px:  arixtra    Disposition and Discharge Planning  Auto-populated from discharge tab:     Expected Discharge Date: 11/13/2023    Discharge Delays: Other (Add Comment)  IV Medication - consider oral or Home Infusion  Destination: nursing home           System Identified Risk Variables  Auto-populated based on system request - if needs to be addressed in treatment plan they will be addressed above:  \"  Clinically Significant Risk Factors              # Hypoalbuminemia: Lowest albumin = 2.7 g/dL at 10/14/2023  6:29 AM, will monitor as appropriate   # Thrombocytopenia: Lowest platelets = 96 in last 2 days, will monitor for bleeding   # Hypertension: Noted on problem list        # Severe Obesity: Estimated body mass index is 49.21 kg/m  as calculated from the following:    Height as of this encounter: 1.6 m (5' 3\").    Weight as of this encounter: 126 kg (277 lb 12.5 oz).      # Asthma: noted on problem list        \"    Interval Events/Subjective/Notable results:    Feels volume overloaded, otherwise ok.  Hoping to get out of the hospital next " week.    Reviewed: CRP up today, BMP with stable renal function. CBC with slight dceline in platelets.   BNP, ECHO, cultures, b-glucan, HIT +, SSRA pending    Discussed with: nephrology    Objective    Vital signs in last 24 hours  Temp:  [98.1  F (36.7  C)-98.8  F (37.1  C)] 98.8  F (37.1  C)  Pulse:  [85-96] 96  Resp:  [18-20] 20  BP: (127-180)/(75-89) 169/87  SpO2:  [93 %-97 %] 95 % O2 Device: Nasal cannula    Weight:   277 lbs 12.47 oz  Body mass index is 49.21 kg/m .    Intake/Output last 3 shifts  I/O last 3 completed shifts:  In: 3142 [P.O.:690; I.V.:1428; NG/GT:450]  Out: 2000 [Urine:2000]    Physical Exam  General:  Alert, cooperative, no distress, chronically ill appearing.      Neurologic:  oriented, facial symmetry preserved, fluent speech.  Generalized weakness.  Psych: calm  HEENT:  Anicteric, MMM.  NG  CV: RRR no MRG, normal S1 and S2, ++ extremity edema  Lungs: CTAB.  Easyrespirations  Abd: soft, N. Well healed surgical site  Skin: no rashes noted on exposed skin.    Patiño Catheter: Not present  Lines: PRESENT      PICC 10/28/23 Triple Lumen Right Basilic-Site Assessment: WDL         Medical Decision Making               Demetria Bell MD, [facility]  Internal Medicine Hospitalist

## 2023-11-11 NOTE — PLAN OF CARE
Problem: Bariatric Surgery  Goal: Optimal Coping with Surgery  Outcome: Progressing  Goal: Fluid and Electrolyte Balance  Outcome: Progressing  Goal: Effective Gastrointestinal Motility and Elimination  Outcome: Progressing  Goal: Absence of Infection Signs and Symptoms  Outcome: Progressing   Goal Outcome Evaluation:    Vital signs stable. Pt slept throughout night. Around 0545 pt experienced nausea, Zofran administered. 0600 pt had large BM. No hallucinations noted. A/Ox4.

## 2023-11-11 NOTE — PROGRESS NOTES
RENAL PROGRESS NOTE    CC:  ongoing HTN despite 4 antihypertensive agents     ASSESSMENT & PLAN:     HTN: Not on antihypertensives PTA. Persistent hypertension over the last few weeks despite multiple drugs.  Secondary HTN workup with renal US with Doppler unremarkable. UA bland, cortisol okay, UA/UPCR mild proteinuria. Had sharp drop in BP that was likely secondary to crushed nifedipine.  No obvious improvement in BP with nifedipine when not crushed compared to amlodipine.  Wonder if volume purely being driven but volume with persistent diffuse mild edema  Recs:  - continue amlodipine 5mg bid   - continue coreg 25mg bid   - added thiazide yesterday, favor chlorthalidone vs hydrochlorothiazide for longer acting effect  - trend BMP with this but BP seems to be coming under a little better control   - ANA PAULA-Renin ratio, pending  - consider ACE/ARB     Recent Oliguric SUSSY: Baseline creatinine 0.7 within normal limits no prior history of SUSSY or CKD.  Cr peaked at 6.97 on 10/14.  Had settled to low 1s. Bumped with contrast but trending down again.  Fluids stopped today     Lytes: stable     Acid/base: stable     Anemia: Hgb drifting to 7s. Transfuse per Primary team      H/O complicated Gastric Bypass: Multiple complications post sleeve gastrectomy complicated by peritonitis and small bowel injury with peritoneal leak. Return to the OR 10/12/2023 with cleanout and drain placement complicated by other fluid collections requiring aspiration and drain placement. S/P ABX, ID following. 11/7/23 TF via NJ 11/7/23 bariatric full liquid diet. NPO for procedure today. Diet management per primary team bariatric surgery       SUBJECTIVE:   Seen in room. Discussed attempts to get BP under better control with thiazide diuretics.  Struggling to eat with feeling full all the time.       OBJECTIVE:  Physical Exam   Temp: 98.8  F (37.1  C) Temp src: Oral BP: (!) 185/85 Pulse: 96   Resp: 20 SpO2: 95 % O2 Device: Nasal cannula Oxygen  Delivery: 3 LPM  Vitals:    11/09/23 1127 11/10/23 0751 11/11/23 0826   Weight: 127 kg (279 lb 15.8 oz) 126 kg (277 lb 12.5 oz) 128.2 kg (282 lb 10.1 oz)     Vital Signs with Ranges  Temp:  [98.1  F (36.7  C)-98.8  F (37.1  C)] 98.8  F (37.1  C)  Pulse:  [85-96] 96  Resp:  [18-20] 20  BP: (127-185)/(75-89) 185/85  SpO2:  [93 %-97 %] 95 %  I/O last 3 completed shifts:  In: 3142 [P.O.:690; I.V.:1428; NG/GT:450]  Out: 2000 [Urine:2000]      Patient Vitals for the past 72 hrs:   Weight   11/11/23 0826 128.2 kg (282 lb 10.1 oz)   11/10/23 0751 126 kg (277 lb 12.5 oz)   11/09/23 1127 127 kg (279 lb 15.8 oz)     Intake/Output Summary (Last 24 hours) at 11/9/2023 1422  Last data filed at 11/9/2023 1301  Gross per 24 hour   Intake 3735 ml   Output 1000 ml   Net 2735 ml       PHYSICAL EXAM:  GEN: NAD, aox3, + NG feeding tube  CV: RRR   Lung: clear and equal  Ab: soft and NT, obese  Ext: ++ edema LLE BL, HAnd edema R>L and well perfused  Skin: no rash      LABORATORY STUDIES:     Recent Labs   Lab 11/11/23  0521 11/10/23  0516 11/07/23  0705 11/05/23  0649   WBC 7.2  --  6.3 7.3   RBC 2.51*  --  2.66* 2.79*   HGB 7.2*  --  7.7* 7.9*   HCT 24.0*  --  25.5* 26.8*   PLT 96* 106* 157 234       Basic Metabolic Panel:  Recent Labs   Lab 11/11/23  0825 11/11/23  0521 11/10/23  2112 11/10/23  1709 11/10/23  1231 11/10/23  0858 11/10/23  0516 11/09/23  0834 11/09/23  0635 11/08/23  0815 11/08/23  0540 11/07/23  0811 11/07/23  0705 11/05/23  0907 11/05/23  0648   NA  --  138  --   --   --   --  139  --  142  --  142  --  144  --  144  144   POTASSIUM  --  4.3  --   --   --   --  4.2  --  3.8  --  4.0  --  4.0  --  3.8   CHLORIDE  --  103  --   --   --   --  105  --  104  --  103  --  103  --  103   CO2  --  27  --   --   --   --  27  --  27  --  32*  --  31*  --  31*   BUN  --  25.9*  --   --   --   --  31.2*  --  36.8*  --  29.6*  --  30.8*  --  30.8*   CR  --  1.24*  --   --   --   --  1.32*  --  1.47*  --  1.38*  --  1.33*  --   1.40*   * 121* 88 85 94 89 111*   < > 110*   < > 107*   < > 146*   < > 130*   PALAK  --  9.2  --   --   --   --  9.1  --  9.0  --  9.5  --  8.7  --  8.7    < > = values in this interval not displayed.       INRNo lab results found in last 7 days.     Recent Labs   Lab Test 11/11/23  0521 11/10/23  0516 11/07/23  0705 10/24/23  0333 10/23/23  0530 10/16/23  1055 10/16/23  0436   INR  --   --   --   --  1.49*  --  1.21*   WBC 7.2  --  6.3   < > 14.5*   < >  --    HGB 7.2*  --  7.7*   < > 9.2*   < >  --    PLT 96* 106* 157   < > 427   < >  --     < > = values in this interval not displayed.       Personally reviewed current labs    Mahendra Sibley  [facility]  [phone]

## 2023-11-11 NOTE — PLAN OF CARE
[id number]  Problem: Parenteral Nutrition  Goal: Effective Intravenous Nutrition Therapy Delivery  Intervention: Optimize Intravenous Nutrition Delivery  Recent Flowsheet Documentation  Taken 11/11/2023 0958 by Jyoti Reynaga RN  Medication Review/Management:   medications reviewed   high-risk medications identified  Patient tolerating Ensure Protein Max of 330 mls and water.   1 loose stool this shift.  RUQ MICKIE site is healed and open to air.     Problem: Hypertension Acute  Goal: Blood Pressure Within Desired Range  Outcome: Progressing  Intervention: Normalize Blood Pressure  Recent Flowsheet Documentation  Taken 11/11/2023 0958 by Jyoti Reynaga, RN  Medication Review/Management:   medications reviewed   high-risk medications identified   Goal Outcome Evaluation:  BP of 185/85 after morning BP med's  recheck 137/81 and 140/75.  Up in the chair and ambulated with PT in the room.  Received tylenol X 2 for headache, good relief. 1 large loose stool. Purewick in place good output.   Oxygen titrated to 1L sating at 95%.

## 2023-11-11 NOTE — PROGRESS NOTES
General Surgery Progress Note:    Hospital Day # 33    ASSESSMENT:  1. Intra-abdominal abscess (H)    2. Perimenopausal symptoms        Mojgan Jimenez is a 53 year old female who is s/p laparoscopic sleeve gastrectomy with single anastomosis duodenal switch on 10/09/23 with return to the OR on 10/12/23 to repair two small enterotomies on the common channel. Patient became septic with ARF and developed pneumonia and pleural effusion necessitating chest tube placement. Chest tube removed on 11/05/23 and pulmonary signed off. Renal function improved and nephrology signed off but re-consulted 11/10 in setting of persistent hypertension with four agents on board.  Continues to slowly progress and increase her p.o. intake.  Working towards weaning off tube feeds and acute rehab.    PLAN:  - Continue nocturnal tube feeds over only 7 hours. Tube feeds to meet a maximum of 50% of caloric and protein needs. Continue calorie counts.   - Continue to encourage oral intake and protein supplement shakes with goal of 2 protein shakes per day and 1 L of water/clear liquid per day  - Push towards discontinuing tube feeds over the next 24-48 hours with improving oral intake  - Appreciate nephrology consult  - Patient needs to be out of bed to chair 3 times per shift. Continue to encourage ADL's.  - Continue to work toward acute rehab    SUBJECTIVE:   She is feeling okay this morning.  Reports doing well with protein shake intake yesterday until the afternoon/evening when she felt that she was overdoing it and endorsed feeling full quickly.  Continues to have loose stools.  Was able to take a few steps with physical therapy yesterday.  Working on her first protein shake already this morning.  Goal today is to take in two full protein shakes.      Patient Vitals for the past 24 hrs:   BP Temp Temp src Pulse Resp SpO2 Weight   11/11/23 0958 (!) 140/75 -- -- -- -- -- --   11/11/23 0950 137/81 -- -- -- -- -- --   11/11/23 0844 (!) 185/85  -- -- 96 -- -- --   11/11/23 0826 -- -- -- -- -- -- 128.2 kg (282 lb 10.1 oz)   11/11/23 0710 (!) 169/87 -- -- 96 20 95 % --   11/11/23 0512 (!) 164/89 -- -- -- -- -- --   11/10/23 2331 (!) 160/75 98.8  F (37.1  C) Oral 87 -- 93 % --   11/10/23 2114 138/75 98.1  F (36.7  C) Oral 85 18 96 % --   11/10/23 1710 -- -- -- -- 20 -- --   11/10/23 1625 -- -- -- -- 20 -- --   11/10/23 1549 (!) 160/85 98.2  F (36.8  C) Oral 88 20 97 % --   11/10/23 1424 (!) 170/88 -- -- 86 -- -- --   11/10/23 1318 (!) 180/88 -- -- 88 -- -- --         PHYSICAL EXAM:  General: patient seen resting in bed, no acute distress  HEENT: Dobbhoff feeding tube clamped  Resp: no respiratory distress, breathing comfortably on 2L via nasal canula  Abdomen: Soft, non-tender. Well healed midline incision. RUQ prior IR drain site covered with dry gauze.  Extremities: warm and well perfused      11/10 0700 - 11/11 0659  In: 3142 [P.O.:690; I.V.:1428]  Out: 2000 [Urine:2000]    No results displayed because visit has over 200 results.           MERLIN Moody [facility] General Surgery  [address]  [address]  [place] [address]

## 2023-11-11 NOTE — PROGRESS NOTES
[facility] Hematology and Oncology Inpatient Progress Note    Patient: Mojgan Jimenez  MRN: [id number]  Date of Service: 11/11/2023         Reason for Visit    Cytopenia  + HIT screen    Assessment  and Plan    1.  53 years old woman with prolonged hospitalization testing positive for HIT screen.  Clinically seems to be stable.  Platelet count is slightly lower than yesterday but not less than half of what it was yesterday.  So typically not compatible with HIT.  However she is also on fondaparinux.  2.  Status post gastric sleeve surgery with ensuing complications.  She is slowly recovering from that.  3.  Mild renal insufficiency.      Medical decision Making    Reviewed Labs  Reviewed new imaging results  Notes from other providers and nursing staff    1.  This time we will continue with fondaparinux  2.  Trend her platelet count.  3.  Look for other causes of thrombocytopenia.    Cancer Staging   No matching staging information was found for the patient.      History of Present Illness    Ms. Mojgan Jimenez  is a 53 year old woman who was admitted in October after gastric sleeve surgery for weight loss purposes.  She has had complications after that.  She has had peritonitis acute renal insufficiency etc.  The details are listed in the hospitalist note.     Recently was found to be having thrombocytopenia.  Not sure why the platelet count was checked but the platelet count was 106 whereas 3 days ago the platelet count was 157.  During this hospitalization her blood count has been normal.  She has a drop in her hemoglobin as well.       An HIT screen was ordered.  The HIT screen did come back positive at a low level of 2.8.  She has already been.  She was on heparin therefore the heparin was stopped.  She has been put on fondaparinux.  The patient clinically does not appear to have any other thrombotic phenomena.  Although she is definitely at high risk for developing HIT to her prolonged hospitalization,  "use of heparin her age group etc.     Dr. Newsome her hospitalist has already ordered the serotonin release assay.  The patient is otherwise stable.    Clinically she appears to be stable.  Does not appear to have gotten any worse.    Today the platelet count is slightly lower than before.  Not compatible with HIT type of picture.  We will follow-up on the serotonin release assay.    Review of Systems    Pertinent findings noted in history.    Physical Exam    BP (!) 167/83 (BP Location: Left arm)   Pulse 83   Temp 97.9  F (36.6  C) (Oral)   Resp 18   Ht 1.6 m (5' 3\")   Wt 128.2 kg (282 lb 10.1 oz)   LMP 09/09/2023 (Exact Date)   SpO2 (!) 89%   BMI 50.07 kg/m          GENERAL:   In no distress.    HEAD: Atraumatic and normocephalic.    EYES: JASON, EOMI. No pallor. No icterus.    Oral cavity: no mucosal lesion or tonsillar enlargement.    NECK: supple. JVP normal.No thyroid enlargement.    LYMPH NODES: No palpable, cervical, axillary or inguinal lymphadenopathy.    CHEST: clear to auscultation bilaterally. Symmetrical breath movements bilaterally.    CVS: S1 and S2 are Regular rate and rhythm. No murmur or gallop or rub heard. No peripheral edema.    ABDOMEN: Soft. Not tender. Not distended. No palpable hepatomegaly or splenomegaly. No other mass palpable. Bowel sounds heard.    EXTREMITIES: Warm.    NEURO: Alert and oriented to time place and person.    SKIN: no rash, or bruising or purpura.        Lab Results Reviewed    Recent Results (from the past 24 hour(s))   Glucose by meter    Collection Time: 11/10/23  5:09 PM   Result Value Ref Range    GLUCOSE BY METER POCT 85 70 - 99 mg/dL   Glucose by meter    Collection Time: 11/10/23  9:12 PM   Result Value Ref Range    GLUCOSE BY METER POCT 88 70 - 99 mg/dL   Basic metabolic panel    Collection Time: 11/11/23  5:21 AM   Result Value Ref Range    Sodium 138 135 - 145 mmol/L    Potassium 4.3 3.4 - 5.3 mmol/L    Chloride 103 98 - 107 mmol/L    Carbon Dioxide " (CO2) 27 22 - 29 mmol/L    Anion Gap 8 7 - 15 mmol/L    Urea Nitrogen 25.9 (H) 6.0 - 20.0 mg/dL    Creatinine 1.24 (H) 0.51 - 0.95 mg/dL    GFR Estimate 52 (L) >60 mL/min/1.73m2    Calcium 9.2 8.6 - 10.0 mg/dL    Glucose 121 (H) 70 - 99 mg/dL   CBC with platelets    Collection Time: 11/11/23  5:21 AM   Result Value Ref Range    WBC Count 7.2 4.0 - 11.0 10e3/uL    RBC Count 2.51 (L) 3.80 - 5.20 10e6/uL    Hemoglobin 7.2 (L) 11.7 - 15.7 g/dL    Hematocrit 24.0 (L) 35.0 - 47.0 %    MCV 96 78 - 100 fL    MCH 28.7 26.5 - 33.0 pg    MCHC 30.0 (L) 31.5 - 36.5 g/dL    RDW 15.3 (H) 10.0 - 15.0 %    Platelet Count 96 (L) 150 - 450 10e3/uL   Magnesium    Collection Time: 11/11/23  5:21 AM   Result Value Ref Range    Magnesium 1.7 1.7 - 2.3 mg/dL   Phosphorus    Collection Time: 11/11/23  5:21 AM   Result Value Ref Range    Phosphorus 3.5 2.5 - 4.5 mg/dL   CRP inflammation    Collection Time: 11/11/23  5:21 AM   Result Value Ref Range    CRP Inflammation 152.20 (H) <5.00 mg/L   Procalcitonin    Collection Time: 11/11/23  5:21 AM   Result Value Ref Range    Procalcitonin 0.56 (H) <0.05 ng/mL   Glucose by meter    Collection Time: 11/11/23  8:25 AM   Result Value Ref Range    GLUCOSE BY METER POCT 100 (H) 70 - 99 mg/dL   Glucose by meter    Collection Time: 11/11/23 11:57 AM   Result Value Ref Range    GLUCOSE BY METER POCT 109 (H) 70 - 99 mg/dL        Imaging Reviewed    US Renal Complete w Arterial Duplex    Result Date: 11/9/2023  EXAM: 1. RENAL ULTRASOUND 2. RENAL DUPLEX LOCATION: [facility] DATE: 11/9/2023 INDICATION: Assessing for secondary hypertension, now on 4 antihypertensive agents (?AMBER, ?renal artery embolism, adrenal abnormality, etc.) COMPARISON: None. TECHNIQUE: Duplex imaging is performed utilizing gray-scale, two-dimensional images, and color-flow imaging. Doppler waveform analysis and spectral Doppler imaging is also performed. FINDINGS: RIGHT KIDNEY: 10.6 x 6.9 x 6 cm. Normal without  hydronephrosis or masses. LEFT KIDNEY 10.6 x 6.6 x 5.9 cm. Normal without hydronephrosis or masses.. BLADDER: Normal. RENAL DUPLEX: Aortic PSV: 127 cm/s, multiphasic Right Renal Artery PSV: Normal, less than 200 cm/s (Normal considered less than 200 cm/s.) Right Intrarenal Resistive Index: Abnormal, greater than 0.8 Left Renal Artery PS: Proximal and mid renal artery not visualized due to body habitus and patient cooperation. Renal artery at the hilum 68 cm/s(Normal considered less than 200 cm/s.) Left Intrarenal Resistive Index: Abnormal, greater than 0.8     IMPRESSION: 1.  Right renal artery unremarkable with no evidence of renal artery stenosis. Left renal artery not visualized due to patient habitus. 2.  Elevated resistive indices which can be seen in medical renal disease.       Signed by: Aftab Herrera MD    This note has been dictated using voice recognition software. Any grammatical or context distortions are unintentional and inherent to the software

## 2023-11-11 NOTE — PLAN OF CARE
Problem: Plan of Care - These are the overarching goals to be used throughout the patient stay.    Goal: Absence of Hospital-Acquired Illness or Injury  Intervention: Identify and Manage Fall Risk  Recent Flowsheet Documentation  Taken 11/10/2023 1638 by Carli Terrazas, RN  Safety Promotion/Fall Prevention:   activity supervised   assistive device/personal items within reach   clutter free environment maintained   nonskid shoes/slippers when out of bed   safety round/check completed     Problem: Plan of Care - These are the overarching goals to be used throughout the patient stay.    Goal: Optimal Comfort and Wellbeing  Intervention: Monitor Pain and Promote Comfort  Recent Flowsheet Documentation  Taken 11/10/2023 1710 by Carli Terrazas, RN  Pain Management Interventions:   emotional support   rest  Taken 11/10/2023 1625 by Carli Terrazas, RN  Pain Management Interventions: medication (see MAR)     Problem: Bariatric Surgery  Goal: Blood Glucose Level Within Desired Range  Outcome: Progressing     Problem: Bariatric Surgery  Goal: Effective Urinary Elimination  Outcome: Progressing     Problem: Gas Exchange Impaired  Goal: Optimal Gas Exchange  Outcome: Progressing   Goal Outcome Evaluation:         A/Ox4. VSS ex HTN. On 2L O2 NC. Using CPAP at night. C/o 6/10 pain to her stomach and left side, PRN tramadol given x1. Assist x2/lift. Up in chair this shift. Jayme full liquid diet. 240 ml PO intake this shift. NJ with tube feeds 10pm-5am and 90 ml free water flushes q8h. BGM 85 and 88, no coverage given. Triple lumen PICC with NS @ 60 ml/hr. Purewick in place. BM x1 this shift. 1:1 attendant. Nursing to continue to monitor.

## 2023-11-12 LAB
ANION GAP SERPL CALCULATED.3IONS-SCNC: 8 MMOL/L (ref 7–15)
BUN SERPL-MCNC: 25.1 MG/DL (ref 6–20)
CALCIUM SERPL-MCNC: 8.7 MG/DL (ref 8.6–10)
CHLORIDE SERPL-SCNC: 102 MMOL/L (ref 98–107)
CREAT SERPL-MCNC: 1.21 MG/DL (ref 0.51–0.95)
CRP SERPL-MCNC: 155.2 MG/L
DEPRECATED HCO3 PLAS-SCNC: 30 MMOL/L (ref 22–29)
EGFRCR SERPLBLD CKD-EPI 2021: 53 ML/MIN/1.73M2
ERYTHROCYTE [DISTWIDTH] IN BLOOD BY AUTOMATED COUNT: 15.3 % (ref 10–15)
GLUCOSE BLDC GLUCOMTR-MCNC: 106 MG/DL (ref 70–99)
GLUCOSE BLDC GLUCOMTR-MCNC: 81 MG/DL (ref 70–99)
GLUCOSE BLDC GLUCOMTR-MCNC: 88 MG/DL (ref 70–99)
GLUCOSE BLDC GLUCOMTR-MCNC: 93 MG/DL (ref 70–99)
GLUCOSE SERPL-MCNC: 124 MG/DL (ref 70–99)
HCT VFR BLD AUTO: 24.3 % (ref 35–47)
HGB BLD-MCNC: 7.3 G/DL (ref 11.7–15.7)
MCH RBC QN AUTO: 28.3 PG (ref 26.5–33)
MCHC RBC AUTO-ENTMCNC: 30 G/DL (ref 31.5–36.5)
MCV RBC AUTO: 94 FL (ref 78–100)
PLATELET # BLD AUTO: 117 10E3/UL (ref 150–450)
POTASSIUM SERPL-SCNC: 4.3 MMOL/L (ref 3.4–5.3)
RBC # BLD AUTO: 2.58 10E6/UL (ref 3.8–5.2)
RENIN PLAS-CCNC: 29.2 NG/ML/HR
SODIUM SERPL-SCNC: 140 MMOL/L (ref 135–145)
WBC # BLD AUTO: 8.1 10E3/UL (ref 4–11)

## 2023-11-12 PROCEDURE — 250N000013 HC RX MED GY IP 250 OP 250 PS 637: Performed by: PHYSICIAN ASSISTANT

## 2023-11-12 PROCEDURE — 99232 SBSQ HOSP IP/OBS MODERATE 35: CPT | Performed by: HOSPITALIST

## 2023-11-12 PROCEDURE — 99232 SBSQ HOSP IP/OBS MODERATE 35: CPT | Performed by: INTERNAL MEDICINE

## 2023-11-12 PROCEDURE — 250N000013 HC RX MED GY IP 250 OP 250 PS 637: Performed by: INTERNAL MEDICINE

## 2023-11-12 PROCEDURE — 999N000157 HC STATISTIC RCP TIME EA 10 MIN

## 2023-11-12 PROCEDURE — 85027 COMPLETE CBC AUTOMATED: CPT | Performed by: HOSPITALIST

## 2023-11-12 PROCEDURE — 250N000011 HC RX IP 250 OP 636: Mod: JZ | Performed by: STUDENT IN AN ORGANIZED HEALTH CARE EDUCATION/TRAINING PROGRAM

## 2023-11-12 PROCEDURE — 250N000013 HC RX MED GY IP 250 OP 250 PS 637: Performed by: NURSE PRACTITIONER

## 2023-11-12 PROCEDURE — 250N000011 HC RX IP 250 OP 636: Performed by: NURSE PRACTITIONER

## 2023-11-12 PROCEDURE — 250N000013 HC RX MED GY IP 250 OP 250 PS 637: Performed by: HOSPITALIST

## 2023-11-12 PROCEDURE — 80048 BASIC METABOLIC PNL TOTAL CA: CPT | Performed by: INTERNAL MEDICINE

## 2023-11-12 PROCEDURE — 120N000001 HC R&B MED SURG/OB

## 2023-11-12 PROCEDURE — C9113 INJ PANTOPRAZOLE SODIUM, VIA: HCPCS | Mod: JZ | Performed by: INTERNAL MEDICINE

## 2023-11-12 PROCEDURE — 250N000013 HC RX MED GY IP 250 OP 250 PS 637: Performed by: SURGERY

## 2023-11-12 PROCEDURE — 250N000011 HC RX IP 250 OP 636: Mod: JZ | Performed by: INTERNAL MEDICINE

## 2023-11-12 PROCEDURE — 99024 POSTOP FOLLOW-UP VISIT: CPT | Performed by: PHYSICIAN ASSISTANT

## 2023-11-12 PROCEDURE — 250N000013 HC RX MED GY IP 250 OP 250 PS 637: Performed by: STUDENT IN AN ORGANIZED HEALTH CARE EDUCATION/TRAINING PROGRAM

## 2023-11-12 PROCEDURE — 86140 C-REACTIVE PROTEIN: CPT | Performed by: HOSPITALIST

## 2023-11-12 RX ORDER — SPIRONOLACTONE 25 MG/1
25 TABLET ORAL DAILY
Status: DISCONTINUED | OUTPATIENT
Start: 2023-11-13 | End: 2023-11-14

## 2023-11-12 RX ADMIN — NYSTATIN 500000 UNITS: 100000 SUSPENSION ORAL at 22:19

## 2023-11-12 RX ADMIN — VENLAFAXINE 75 MG: 75 TABLET ORAL at 16:35

## 2023-11-12 RX ADMIN — FLUCONAZOLE 400 MG: 40 POWDER, FOR SUSPENSION ORAL at 08:02

## 2023-11-12 RX ADMIN — CARVEDILOL 25 MG: 12.5 TABLET, FILM COATED ORAL at 16:34

## 2023-11-12 RX ADMIN — ACETAMINOPHEN 650 MG: 325 TABLET ORAL at 02:18

## 2023-11-12 RX ADMIN — AMLODIPINE BESYLATE 5 MG: 5 TABLET ORAL at 08:02

## 2023-11-12 RX ADMIN — AMOXICILLIN AND CLAVULANATE POTASSIUM 875 MG: 400; 57 POWDER, FOR SUSPENSION ORAL at 22:22

## 2023-11-12 RX ADMIN — PANTOPRAZOLE SODIUM 40 MG: 40 INJECTION, POWDER, FOR SOLUTION INTRAVENOUS at 06:46

## 2023-11-12 RX ADMIN — AMLODIPINE BESYLATE 5 MG: 5 TABLET ORAL at 22:19

## 2023-11-12 RX ADMIN — ONDANSETRON 4 MG: 4 TABLET, ORALLY DISINTEGRATING ORAL at 07:58

## 2023-11-12 RX ADMIN — CHLORTHALIDONE 12.5 MG: 25 TABLET ORAL at 08:02

## 2023-11-12 RX ADMIN — ACETAMINOPHEN 650 MG: 325 TABLET ORAL at 08:00

## 2023-11-12 RX ADMIN — PROCHLORPERAZINE EDISYLATE 10 MG: 5 INJECTION INTRAMUSCULAR; INTRAVENOUS at 10:43

## 2023-11-12 RX ADMIN — SPIRONOLACTONE 12.5 MG: 25 TABLET ORAL at 08:00

## 2023-11-12 RX ADMIN — ONDANSETRON 4 MG: 2 INJECTION INTRAMUSCULAR; INTRAVENOUS at 02:15

## 2023-11-12 RX ADMIN — FONDAPARINUX SODIUM 10 MG: 10 INJECTION, SOLUTION SUBCUTANEOUS at 18:27

## 2023-11-12 RX ADMIN — AMOXICILLIN AND CLAVULANATE POTASSIUM 875 MG: 400; 57 POWDER, FOR SUSPENSION ORAL at 08:02

## 2023-11-12 RX ADMIN — TRAMADOL HYDROCHLORIDE 50 MG: 50 TABLET, COATED ORAL at 10:43

## 2023-11-12 RX ADMIN — CARVEDILOL 25 MG: 12.5 TABLET, FILM COATED ORAL at 07:59

## 2023-11-12 RX ADMIN — VENLAFAXINE 75 MG: 75 TABLET ORAL at 08:00

## 2023-11-12 RX ADMIN — VENLAFAXINE 75 MG: 75 TABLET ORAL at 13:08

## 2023-11-12 ASSESSMENT — ACTIVITIES OF DAILY LIVING (ADL)
ADLS_ACUITY_SCORE: 39
ADLS_ACUITY_SCORE: 39
ADLS_ACUITY_SCORE: 36
ADLS_ACUITY_SCORE: 39
ADLS_ACUITY_SCORE: 39
ADLS_ACUITY_SCORE: 38
ADLS_ACUITY_SCORE: 36
ADLS_ACUITY_SCORE: 36
ADLS_ACUITY_SCORE: 39
ADLS_ACUITY_SCORE: 39
ADLS_ACUITY_SCORE: 36
ADLS_ACUITY_SCORE: 40

## 2023-11-12 NOTE — PLAN OF CARE
0700-`1900  Problem: Nausea and Vomiting  Goal: Nausea and Vomiting Relief  Intervention: Prevent and Manage Nausea and Vomiting  Recent Flowsheet Documentation  Taken 11/12/2023 0800 by Jyoti Reynaga RN  Nausea/Vomiting Interventions: antiemetic   Goal Outcome Evaluation:  0758: Patient complained of nausea zofran ODT given   no relief, had emesis after taken protein shake of 30cc.   1043 : Compazine given . Both NP for surgery team and hospitalitist  notified.  Patient having multiple loose stools.  C-diff ordered.   Up in the chair for X2

## 2023-11-12 NOTE — PROGRESS NOTES
General Surgery Progress Note:    Hospital Day # 34    ASSESSMENT:  1. Intra-abdominal abscess (H)    2. Perimenopausal symptoms        Mojgan Jimenez is a 53 year old female who is s/p laparoscopic sleeve gastrectomy with single anastomosis duodenal switch on 10/09/23 with return to the OR on 10/12/23 to repair two small enterotomies on the common channel. Patient became septic with ARF and developed pneumonia and pleural effusion necessitating chest tube placement. Chest tube removed on 11/05/23 and pulmonary signed off. Renal function improved and nephrology signed off but re-consulted 11/10 in setting of persistent hypertension with four agents on board.  Continues to slowly progress and increase her p.o. intake.  Working towards weaning off tube feeds and acute rehab.     PLAN:  - Continue nocturnal tube feeds over only 7 hours. Tube feeds to meet a maximum of 50% of caloric and protein needs. Continue calorie counts.   - Continue to encourage oral intake and protein supplement shakes with goal of 2 protein shakes per day and 1 L of water/clear liquid per day  - Push towards discontinuing tube feeds over the next 24-48 hours with improving oral intake  - Appreciate nephrology consult  - Patient needs to be out of bed to chair 3 times per shift. Continue to encourage ADL's.  - Continue to work toward acute rehab    SUBJECTIVE:   She is feeling okay.  Reports doing well with her protein shake intake yesterday.  This morning having some nausea.  Per RN, she received IV Zofran and then took a sip of her protein shake and subsequently had small-volume emesis.  Patient will continue to try to sip on protein shakes throughout the day with a goal of 2 protein shakes today.  Continues to pass liquid stools, this is expected given tube feeds and KO procedure.      Patient Vitals for the past 24 hrs:   BP Temp Temp src Pulse Resp SpO2 Weight   11/12/23 1008 -- -- -- -- -- -- 125.2 kg (276 lb 0.3 oz)   11/12/23 0721 (!)  182/79 98  F (36.7  C) Oral 95 18 96 % --   11/11/23 2311 128/86 97.8  F (36.6  C) Oral 84 18 93 % --   11/11/23 1936 -- -- -- 78 -- 92 % --   11/11/23 1627 -- -- -- -- -- 95 % --   11/11/23 1614 -- -- -- -- -- 96 % --   11/11/23 1545 (!) 167/83 97.9  F (36.6  C) Oral 83 18 (!) 89 % --         PHYSICAL EXAM:  General: patient seen resting in bed, no acute distress  HEENT: Dobbhoff feeding tube clamped  Resp: no respiratory distress, breathing comfortably on 1L via nasal canula  Abdomen: Soft, non-tender. Well healed midline incision.  Extremities: warm and well perfused    11/11 0700 - 11/12 0659  In: 1801 [P.O.:1150; I.V.:321]  Out: 1550 [Urine:1550]    No results displayed because visit has over 200 results.           Rafaela West PA-C  [facility] General Surgery  [address]  [address]  [place] [address]

## 2023-11-12 NOTE — PROGRESS NOTES
[facility] Hematology and Oncology Inpatient Progress Note    Patient: Mojgan Jimenez  MRN: [id number]  Date of Service: 11/12/2023         Reason for Visit    Cytopenia  + HIT screen    Assessment  and Plan    1.  53 years old woman with prolonged hospitalization testing positive for HIT screen.  Clinically seems to be stable.  Platelet count is improving..  However she is also on fondaparinux.  2.  Status post gastric sleeve surgery with ensuing complications.  She is slowly recovering from that.  3.  Mild renal insufficiency.  4.  Some swallowing weakness.    Medical decision Making    Reviewed Labs.  Notes from other providers and nursing staff    1.  At this time we will continue treatment with fondaparinux.  2.  Trend her platelets and see how they go.  3.  Follow-up on the serotonin release assay.  4.  Advised the patient to ambulate as much as she can.  5.  Continue supportive care.  I think the patient can benefit from chewing gum.  She also needs to be ambulated.       Cancer Staging   No matching staging information was found for the patient.      History of Present Illness    Ms. Mojgan Jimenez  is a 53 year old woman who was admitted in October after gastric sleeve surgery for weight loss purposes.  She has had complications after that.  She has had peritonitis acute renal insufficiency etc.  The details are listed in the hospitalist note.     Recently was found to be having thrombocytopenia.  Not sure why the platelet count was checked but the platelet count was 106 whereas 3 days ago the platelet count was 157.  During this hospitalization her blood count has been normal.  She has a drop in her hemoglobin as well.       An HIT screen was ordered.  The HIT screen did come back positive at a low level of 2.8.  She has already been.  She was on heparin therefore the heparin was stopped.  She has been put on fondaparinux.  The patient clinically does not appear to have any other thrombotic  "phenomena.  Although she is definitely at high risk for developing HIT to her prolonged hospitalization, use of heparin her age group etc.     Dr. Newsome her hospitalist has already ordered the serotonin release assay.  The patient is otherwise stable.    Clinically she continues to say that she is feeling better.  Her platelet count were slightly lower on 11 November 2023.    Today she is feeling about the same may be slightly better.  Her platelet count is also improved a little bit.  She did have an episode of vomiting this morning after trying to eat something.        Review of Systems    Pertinent findings noted in history.    Physical Exam    BP (!) 182/79 (BP Location: Left arm)   Pulse 95   Temp 98  F (36.7  C) (Oral)   Resp 18   Ht 1.6 m (5' 3\")   Wt 125.2 kg (276 lb 0.3 oz)   LMP 09/09/2023 (Exact Date)   SpO2 96%   BMI 48.89 kg/m        GENERAL: no acute distress. Cooperative in conversation.   HEENT: pupils are equal, round and reactive. Oral mucosa is moist and intact.  NG tube in place.  RESP:Chest symmetric. Regular respiratory rate. No stridor.  ABD: Nondistended, soft.  EXTREMITIES: No lower extremity edema.   NEURO: non focal. Alert and oriented x3.   PSYCH: within normal limits. No depression or anxiety.  SKIN: warm dry intact.  Occasional ecchymosis site.      Lab Results Reviewed    Recent Results (from the past 24 hour(s))   Glucose by meter    Collection Time: 11/11/23  4:47 PM   Result Value Ref Range    GLUCOSE BY METER POCT 85 70 - 99 mg/dL   Glucose by meter    Collection Time: 11/11/23  8:56 PM   Result Value Ref Range    GLUCOSE BY METER POCT 87 70 - 99 mg/dL   Basic metabolic panel    Collection Time: 11/12/23  6:45 AM   Result Value Ref Range    Sodium 140 135 - 145 mmol/L    Potassium 4.3 3.4 - 5.3 mmol/L    Chloride 102 98 - 107 mmol/L    Carbon Dioxide (CO2) 30 (H) 22 - 29 mmol/L    Anion Gap 8 7 - 15 mmol/L    Urea Nitrogen 25.1 (H) 6.0 - 20.0 mg/dL    Creatinine 1.21 (H) " 0.51 - 0.95 mg/dL    GFR Estimate 53 (L) >60 mL/min/1.73m2    Calcium 8.7 8.6 - 10.0 mg/dL    Glucose 124 (H) 70 - 99 mg/dL   CBC with platelets    Collection Time: 11/12/23  6:45 AM   Result Value Ref Range    WBC Count 8.1 4.0 - 11.0 10e3/uL    RBC Count 2.58 (L) 3.80 - 5.20 10e6/uL    Hemoglobin 7.3 (L) 11.7 - 15.7 g/dL    Hematocrit 24.3 (L) 35.0 - 47.0 %    MCV 94 78 - 100 fL    MCH 28.3 26.5 - 33.0 pg    MCHC 30.0 (L) 31.5 - 36.5 g/dL    RDW 15.3 (H) 10.0 - 15.0 %    Platelet Count 117 (L) 150 - 450 10e3/uL   CRP inflammation    Collection Time: 11/12/23  6:45 AM   Result Value Ref Range    CRP Inflammation 155.20 (H) <5.00 mg/L   Glucose by meter    Collection Time: 11/12/23  7:47 AM   Result Value Ref Range    GLUCOSE BY METER POCT 106 (H) 70 - 99 mg/dL   Glucose by meter    Collection Time: 11/12/23  1:12 PM   Result Value Ref Range    GLUCOSE BY METER POCT 93 70 - 99 mg/dL        Imaging Reviewed    Total time spent was over 35 minutes.  This includes chart prep time, review of clinical data including notes from outside physicians, labs, review of medical imaging, discussion about  plan of care, documentation and .      Signed by: Aftab Herrera MD    This note has been dictated using voice recognition software. Any grammatical or context distortions are unintentional and inherent to the software

## 2023-11-12 NOTE — PLAN OF CARE
Problem: Enteral Nutrition  Goal: Safe, Effective Therapy Delivery  Outcome: Progressing     Problem: Activity Intolerance  Goal: Enhanced Capacity and Energy  Outcome: Progressing     Problem: Nausea and Vomiting  Goal: Nausea and Vomiting Relief  Outcome: Progressing     Slept well overnight. Tolerating tube feeding with minimal discomfort. Does c/o some stomach discomfort overnight. O2 sats not stable with activity, desats to mid 80s with 1L o2. On 1L o2 baseline. Reports feeling fullness and discomfort with all intake. Is attempting to increase intake with some encouragement. Denies nausea.    Bart Bah RN

## 2023-11-12 NOTE — PROGRESS NOTES
RENAL PROGRESS NOTE    CC:  ongoing HTN despite 4 antihypertensive agents     ASSESSMENT & PLAN:     HTN: Not on antihypertensives PTA. Persistent hypertension over the last few weeks despite multiple drugs.  Secondary HTN workup with renal US with Doppler unremarkable. UA bland, cortisol okay, UA/UPCR mild proteinuria. Had sharp drop in BP that was likely secondary to crushed nifedipine.  No obvious improvement in BP with nifedipine when not crushed compared to amlodipine.  Wonder if volume purely being driven but volume with persistent diffuse mild edema  Recs:  - continue amlodipine 5mg bid   - continue coreg 25mg bid   - 12.5mg chlorthalidone starting today, consider increase if tolerating  - ANA PAULA-Renin ratio, pending  - consider ACE/ARB     Recent Oliguric SUSSY: Baseline creatinine 0.7 within normal limits no prior history of SUSSY or CKD.  Cr peaked at 6.97 on 10/14.  Had settled to low 1s. Bumped with contrast but trending down again.       Lytes: stable     Acid/base: stable     Anemia: Hgb drifting to 7s. Transfuse per Primary team      H/O complicated Gastric Bypass: Multiple complications post sleeve gastrectomy complicated by peritonitis and small bowel injury with peritoneal leak. Return to the OR 10/12/2023 with cleanout and drain placement complicated by other fluid collections requiring aspiration and drain placement. S/P ABX, ID following. 11/7/23 TF via NJ 11/7/23 bariatric full liquid diet.  Diet management per primary team bariatric surgery.  Remains on Tube feeding       SUBJECTIVE:   In bed.  Denies changes in how she feels, trying to work on more nutrition.  BP trending down during the day and then spikes in the am.  Changing to chlorthalidone today to see if we can extend effect of thiazide    OBJECTIVE:  Physical Exam   Temp: 98  F (36.7  C) Temp src: Oral BP: (!) 182/79 Pulse: 95   Resp: 18 SpO2: 96 % O2 Device: Nasal cannula Oxygen Delivery: 1 LPM  Vitals:    11/09/23 1127 11/10/23 0751  11/11/23 0826   Weight: 127 kg (279 lb 15.8 oz) 126 kg (277 lb 12.5 oz) 128.2 kg (282 lb 10.1 oz)     Vital Signs with Ranges  Temp:  [97.8  F (36.6  C)-98  F (36.7  C)] 98  F (36.7  C)  Pulse:  [78-96] 95  Resp:  [18] 18  BP: (128-185)/(75-86) 182/79  SpO2:  [89 %-96 %] 96 %  I/O last 3 completed shifts:  In: 1801 [P.O.:1150; I.V.:321; NG/GT:330]  Out: 1550 [Urine:1550]      Patient Vitals for the past 72 hrs:   Weight   11/11/23 0826 128.2 kg (282 lb 10.1 oz)   11/10/23 0751 126 kg (277 lb 12.5 oz)   11/09/23 1127 127 kg (279 lb 15.8 oz)     Intake/Output Summary (Last 24 hours) at 11/9/2023 1422  Last data filed at 11/9/2023 1301  Gross per 24 hour   Intake 3735 ml   Output 1000 ml   Net 2735 ml       PHYSICAL EXAM:  GEN: NAD, aox3, + NG feeding tube  CV: RRR   Lung: clear and equal  Ab: soft and NT, obese  Ext: ++ edema LLE BL, HAnd edema R>L and well perfused  Skin: no rash      LABORATORY STUDIES:     Recent Labs   Lab 11/12/23  0645 11/11/23  0521 11/10/23  0516 11/07/23  0705   WBC 8.1 7.2  --  6.3   RBC 2.58* 2.51*  --  2.66*   HGB 7.3* 7.2*  --  7.7*   HCT 24.3* 24.0*  --  25.5*   * 96* 106* 157       Basic Metabolic Panel:  Recent Labs   Lab 11/12/23  0747 11/12/23  0645 11/11/23  2056 11/11/23  1647 11/11/23  1157 11/11/23  0825 11/11/23  0521 11/10/23  0858 11/10/23  0516 11/09/23  0834 11/09/23  0635 11/08/23  0815 11/08/23  0540 11/07/23  0811 11/07/23  0705   NA  --  140  --   --   --   --  138  --  139  --  142  --  142  --  144   POTASSIUM  --  4.3  --   --   --   --  4.3  --  4.2  --  3.8  --  4.0  --  4.0   CHLORIDE  --  102  --   --   --   --  103  --  105  --  104  --  103  --  103   CO2  --  30*  --   --   --   --  27  --  27  --  27  --  32*  --  31*   BUN  --  25.1*  --   --   --   --  25.9*  --  31.2*  --  36.8*  --  29.6*  --  30.8*   CR  --  1.21*  --   --   --   --  1.24*  --  1.32*  --  1.47*  --  1.38*  --  1.33*   * 124* 87 85 109* 100* 121*   < > 111*   < > 110*   < >  107*   < > 146*   PALAK  --  8.7  --   --   --   --  9.2  --  9.1  --  9.0  --  9.5  --  8.7    < > = values in this interval not displayed.       INRNo lab results found in last 7 days.     Recent Labs   Lab Test 11/12/23  0645 11/11/23  0521 10/24/23  0333 10/23/23  0530 10/16/23  1055 10/16/23  0436   INR  --   --   --  1.49*  --  1.21*   WBC 8.1 7.2   < > 14.5*   < >  --    HGB 7.3* 7.2*   < > 9.2*   < >  --    * 96*   < > 427   < >  --     < > = values in this interval not displayed.       Personally reviewed current labs    Mahendra Sibley  [facility]  [phone]

## 2023-11-12 NOTE — PROGRESS NOTES
[facility] Hospitalist Progress Note  [facility]  Admission date: 10/9/2023    Summary:     53F year old female with h/o obesity, severe JARVIS on CPAP, asthma, stimulant use disorder, stimulant induced psychosis, mood & anxiety disorder. Admitted 10/9/23 for scheduled laparoscopic sleeve gastrectomy with single anastomosis duodenal switch. She was later found to have two small bowel injuries with peritonitis. 10/12/23 returned to OR for small bowel interotomy closure, clean-out, & drain placement; 10/19 found to have RUQ fluid collection s/p drain placement; 10/24 s/p pelvic fluid aspiration.     Course complicated by encephalopathy/delirium, septic shock, suspected takotsubo syndrome, acute respiratory failure due to loss of protective airway reflexes & increased metabolic load requiring intubation (10/12-10/21/23; reintubated 10/21-10/26/23), & oliguric SUSSY with renal recovery. She has significantly improved: mental status clearing with some ongoing delirium at night, cardiomyopathy improved, ATN I recovery phase, and ongoing treatment of intra abdominal infection. She was made floor tele status 10/27/23. On 11/1/23 CT guided chest tube placed on right for persistent effusion, concern for parapneumonic one with risk of future trapped lung. Chest tubes removed 11/5/2023.     Assessment/Plan    #s/p Gastric bypass complicated by enterotomy & peritonitis -  -NG, completing Augmentin and fluconzaole course (completes 11/16)    #CRP trending up - new abdominal pain this AM.  Diarrhea for several days which was attributed to tube feeds.  -check C.diff.    -May need repeat abdominal imaging if c.diff negative.    #Thrombocytopenia - +PF4. SSRA pending.  On arixtra for now pending SSRA.  Plat 117,    #Acute hypoxemic-hypercapnic respiratory failure - improved.  On 2L with ATX and brochial debris on recent CT.    #Accelerated HTN and resistant  #Stress Cardiomyopathy with EF recovery  -coreg, Hydralazine,  "norvasc 5BID  -started on chlorthalidone (11/11).     -stop nitroglycerin patch, started aldactone and increase to 25 (11/11).      #Lightheadedness associated with hypotension 11/8/2023 - ? Vagal event.  Did not recur.       #Suspected thrush - nystatin, fluconazole     #History of JARVIS, asthma in the chart no PFTs - NIPPV with sleep/naps everytime. Continue duonebs     #Oliguric SUSSY - now possibly CKD3,  Cr stable.    #Anemia - transfuse Hb < 7  #Hypervolemia - would continue with diuresis as tolerated.      -Hypernatremia resolved   -Hyperglycemia of critical illness - improved   -Morbid obesity BMI 49  -Acute metabolic and toxic encephalopathy Resolved  -Severe decondition due to prolong hospitalization  PT/OT eval, PT recommending TCU, OT recommending LTACH/TCU  Case management working on discharge disposition          Checklist:  Code Status: Full Code    Diet: Room Service  Bariatric Diet Full Liquids  Adult Formula Drip Feeding: Specified Time Promote w fiber; Nasojejunal; Goal Rate: 80; mL/hr; From: 10:00 PM; To: 5:00 AM     DVT px:  arixtra    Disposition and Discharge Planning  Auto-populated from discharge tab:    Expected Discharge Date: 11/13/2023    Discharge Delays: Other (Add Comment)  IV Medication - consider oral or Home Infusion  Destination: nursing home           System Identified Risk Variables  Auto-populated based on system request - if needs to be addressed in treatment plan they will be addressed above:  \"  Clinically Significant Risk Factors              # Hypoalbuminemia: Lowest albumin = 2.7 g/dL at 10/14/2023  6:29 AM, will monitor as appropriate   # Thrombocytopenia: Lowest platelets = 96 in last 2 days, will monitor for bleeding   # Hypertension: Noted on problem list        # Severe Obesity: Estimated body mass index is 50.07 kg/m  as calculated from the following:    Height as of this encounter: 1.6 m (5' 3\").    Weight as of this encounter: 128.2 kg (282 lb 10.1 oz).      # Asthma: " "noted on problem list        \"    Interval Events/Subjective/Notable results:    Diarrhea for several days which was attributed to tube feeds  New abdominal pain and emesis today    Reviewed: CRP up today 155, CBC, BMP.  SSRA pending      Objective    Vital signs in last 24 hours  Temp:  [97.8  F (36.6  C)-98  F (36.7  C)] 98  F (36.7  C)  Pulse:  [78-96] 95  Resp:  [18] 18  BP: (128-185)/(75-86) 182/79  SpO2:  [89 %-96 %] 96 % O2 Device: Nasal cannula    Weight:   282 lbs 10.08 oz  Body mass index is 50.07 kg/m .    Intake/Output last 3 shifts  I/O last 3 completed shifts:  In: 1801 [P.O.:1150; I.V.:321; NG/GT:330]  Out: 1550 [Urine:1550]    Physical Exam  General:  Alert, cooperative, no distress, chronically ill appearing.      Neurologic:  oriented, facial symmetry preserved, fluent speech.  Generalized weakness.  Psych: calm  HEENT:  Anicteric, MMM.  NG  CV: RRR no MRG, normal S1 and S2, + extremity edema  Lungs: CTAB.  Easyrespirations  Abd: soft, mild TTP, Well healed surgical site  Skin: no rashes noted on exposed skin.    Patiño Catheter: Not present  Lines: PRESENT      PICC 10/28/23 Triple Lumen Right Basilic-Site Assessment: WDL         Medical Decision Making               Demetria Bell MD, [facility]  Internal Medicine Hospitalist    "

## 2023-11-12 NOTE — PROGRESS NOTES
"Care Management Progress Note:     Length of Stay (days): 34     Expected Discharge Date: 11/13/2023     Concerns to be Addressed: discharge planning     Patient plan of care discussed at interdisciplinary rounds: Yes     Anticipated Discharge Disposition: Transitional Care/LTACH     Anticipated Discharge Services: None  Anticipated Discharge DME:  to be determined     Patient/family educated on Medicare website which has current facility and service quality ratings: yes  Education Provided on the Discharge Plan: Yes  Patient/Family in Agreement with the Plan: yes     Referrals Placed by CM/SW: Post Acute Facilities  Private pay costs discussed: Not applicable     Additional Information:  Chart reviewed this a.m. No significant events overnight. Per request of patient, update given to yamileth Acosta over the phone, [phone].     CM updates:  Surgery still following pt.has NJ tube for nutrition needs.      Per Hosp still working on BP control.     TCU referrals are pending with [facility] [place] and [facility]. SW sent additional referrals on Saturday (yesterday) and updated cody Acosta via phone and patient regarding this.     Social Hx:  \"Patient lives with her adult daughter and is independent with all activities of daily living and instrumental activities of daily living (IADLs) at baseline. She is currently unemployed but planned to resume working after recovery. Daughter Karla is primary family contact.\"     CM will continue to follow plan of care, review recommendations, and assist with any discharge needs anticipated.      ROEL Simmons  "

## 2023-11-12 NOTE — PROGRESS NOTES
CALORIE COUNT      Approximate Oral Intake for:    Saturday, 11/12/23(Diet Bariatric Full liquid)     Yesterday:   Fluid: 1150 mL  Calories: ~400  Protein: ~25 gm      Intake from TF:   Per NJ Promote with fiber @ 80 ml/hr x 7 hrs ( providing 560 ml/Calories, 34 g protein, 77 g CHO, 8 g fiber, 465 ml free water plus H2O flush ( 270 ml)=735 ml ( ~ 43% estimated Calorie needs and ~ 54 % estimated protein needs)       Estimated Needs:    Calories: 1300 - 1570  Protein: 63-78      Summary:    Oral intake inadequate.   Pt reports drinking over 1 bottle of Ensure Max and water yesterday.  Writer encouraged pt to keep up the good work.      Per Surgery note yesterday  PLAN:  - Continue nocturnal tube feeds over only 7 hours. Tube feeds to meet a maximum of 50% of caloric and protein needs. Continue calorie counts.   - Continue to encourage oral intake and protein supplement shakes with goal of 2 protein shakes per day and 1 L of water/clear liquid per day  - Push towards discontinuing tube feeds over the next 24-48 hours with improving oral intake

## 2023-11-13 ENCOUNTER — APPOINTMENT (OUTPATIENT)
Dept: PHYSICAL THERAPY | Facility: HOSPITAL | Age: 53
End: 2023-11-13
Attending: SURGERY
Payer: COMMERCIAL

## 2023-11-13 ENCOUNTER — APPOINTMENT (OUTPATIENT)
Dept: OCCUPATIONAL THERAPY | Facility: HOSPITAL | Age: 53
End: 2023-11-13
Attending: SURGERY
Payer: COMMERCIAL

## 2023-11-13 LAB
ALDOST/RENIN PLAS-RTO: 0.2 {RATIO} (ref 0–25)
ANION GAP SERPL CALCULATED.3IONS-SCNC: 10 MMOL/L (ref 7–15)
BUN SERPL-MCNC: 22.8 MG/DL (ref 6–20)
C DIFF TOX B STL QL: NEGATIVE
CALCIUM SERPL-MCNC: 8.9 MG/DL (ref 8.6–10)
CHLORIDE SERPL-SCNC: 99 MMOL/L (ref 98–107)
CREAT SERPL-MCNC: 1.25 MG/DL (ref 0.51–0.95)
CRP SERPL-MCNC: 162.6 MG/L
DEPRECATED HCO3 PLAS-SCNC: 30 MMOL/L (ref 22–29)
EGFRCR SERPLBLD CKD-EPI 2021: 51 ML/MIN/1.73M2
ERYTHROCYTE [DISTWIDTH] IN BLOOD BY AUTOMATED COUNT: 15.5 % (ref 10–15)
GLUCOSE BLDC GLUCOMTR-MCNC: 105 MG/DL (ref 70–99)
GLUCOSE BLDC GLUCOMTR-MCNC: 73 MG/DL (ref 70–99)
GLUCOSE BLDC GLUCOMTR-MCNC: 83 MG/DL (ref 70–99)
GLUCOSE BLDC GLUCOMTR-MCNC: 85 MG/DL (ref 70–99)
GLUCOSE BLDC GLUCOMTR-MCNC: 95 MG/DL (ref 70–99)
GLUCOSE SERPL-MCNC: 93 MG/DL (ref 70–99)
HCT VFR BLD AUTO: 24.7 % (ref 35–47)
HGB BLD-MCNC: 7.6 G/DL (ref 11.7–15.7)
MCH RBC QN AUTO: 28.6 PG (ref 26.5–33)
MCHC RBC AUTO-ENTMCNC: 30.8 G/DL (ref 31.5–36.5)
MCV RBC AUTO: 93 FL (ref 78–100)
PLATELET # BLD AUTO: 147 10E3/UL (ref 150–450)
POTASSIUM SERPL-SCNC: 4.3 MMOL/L (ref 3.4–5.3)
RBC # BLD AUTO: 2.66 10E6/UL (ref 3.8–5.2)
SODIUM SERPL-SCNC: 139 MMOL/L (ref 135–145)
WBC # BLD AUTO: 7.3 10E3/UL (ref 4–11)

## 2023-11-13 PROCEDURE — 250N000013 HC RX MED GY IP 250 OP 250 PS 637: Performed by: INTERNAL MEDICINE

## 2023-11-13 PROCEDURE — 250N000011 HC RX IP 250 OP 636: Mod: JZ | Performed by: STUDENT IN AN ORGANIZED HEALTH CARE EDUCATION/TRAINING PROGRAM

## 2023-11-13 PROCEDURE — 250N000013 HC RX MED GY IP 250 OP 250 PS 637: Performed by: HOSPITALIST

## 2023-11-13 PROCEDURE — C9113 INJ PANTOPRAZOLE SODIUM, VIA: HCPCS | Mod: JZ | Performed by: INTERNAL MEDICINE

## 2023-11-13 PROCEDURE — 999N000157 HC STATISTIC RCP TIME EA 10 MIN

## 2023-11-13 PROCEDURE — 250N000011 HC RX IP 250 OP 636: Mod: JZ | Performed by: INTERNAL MEDICINE

## 2023-11-13 PROCEDURE — 250N000013 HC RX MED GY IP 250 OP 250 PS 637: Performed by: SURGERY

## 2023-11-13 PROCEDURE — 85027 COMPLETE CBC AUTOMATED: CPT | Performed by: HOSPITALIST

## 2023-11-13 PROCEDURE — 99232 SBSQ HOSP IP/OBS MODERATE 35: CPT | Performed by: NURSE PRACTITIONER

## 2023-11-13 PROCEDURE — 120N000001 HC R&B MED SURG/OB

## 2023-11-13 PROCEDURE — 250N000013 HC RX MED GY IP 250 OP 250 PS 637: Performed by: NURSE PRACTITIONER

## 2023-11-13 PROCEDURE — 97116 GAIT TRAINING THERAPY: CPT | Mod: GP

## 2023-11-13 PROCEDURE — 87493 C DIFF AMPLIFIED PROBE: CPT | Performed by: HOSPITALIST

## 2023-11-13 PROCEDURE — 97530 THERAPEUTIC ACTIVITIES: CPT | Mod: GP

## 2023-11-13 PROCEDURE — 86140 C-REACTIVE PROTEIN: CPT | Performed by: HOSPITALIST

## 2023-11-13 PROCEDURE — 99232 SBSQ HOSP IP/OBS MODERATE 35: CPT | Performed by: INTERNAL MEDICINE

## 2023-11-13 PROCEDURE — 250N000011 HC RX IP 250 OP 636: Performed by: NURSE PRACTITIONER

## 2023-11-13 PROCEDURE — 80048 BASIC METABOLIC PNL TOTAL CA: CPT | Performed by: INTERNAL MEDICINE

## 2023-11-13 PROCEDURE — 97535 SELF CARE MNGMENT TRAINING: CPT | Mod: GO

## 2023-11-13 PROCEDURE — 250N000013 HC RX MED GY IP 250 OP 250 PS 637: Performed by: STUDENT IN AN ORGANIZED HEALTH CARE EDUCATION/TRAINING PROGRAM

## 2023-11-13 RX ADMIN — NYSTATIN 500000 UNITS: 100000 SUSPENSION ORAL at 18:58

## 2023-11-13 RX ADMIN — VENLAFAXINE 75 MG: 75 TABLET ORAL at 09:19

## 2023-11-13 RX ADMIN — VENLAFAXINE 75 MG: 75 TABLET ORAL at 16:59

## 2023-11-13 RX ADMIN — AMLODIPINE BESYLATE 5 MG: 5 TABLET ORAL at 09:21

## 2023-11-13 RX ADMIN — CARVEDILOL 25 MG: 12.5 TABLET, FILM COATED ORAL at 09:20

## 2023-11-13 RX ADMIN — CHLORTHALIDONE 12.5 MG: 25 TABLET ORAL at 09:20

## 2023-11-13 RX ADMIN — SPIRONOLACTONE 25 MG: 25 TABLET, FILM COATED ORAL at 09:21

## 2023-11-13 RX ADMIN — FONDAPARINUX SODIUM 10 MG: 10 INJECTION, SOLUTION SUBCUTANEOUS at 18:47

## 2023-11-13 RX ADMIN — AMLODIPINE BESYLATE 5 MG: 5 TABLET ORAL at 22:09

## 2023-11-13 RX ADMIN — PROCHLORPERAZINE EDISYLATE 10 MG: 5 INJECTION INTRAMUSCULAR; INTRAVENOUS at 06:21

## 2023-11-13 RX ADMIN — NYSTATIN 500000 UNITS: 100000 SUSPENSION ORAL at 22:09

## 2023-11-13 RX ADMIN — FLUCONAZOLE 400 MG: 40 POWDER, FOR SUSPENSION ORAL at 09:18

## 2023-11-13 RX ADMIN — AMOXICILLIN AND CLAVULANATE POTASSIUM 875 MG: 400; 57 POWDER, FOR SUSPENSION ORAL at 22:08

## 2023-11-13 RX ADMIN — AMOXICILLIN AND CLAVULANATE POTASSIUM 875 MG: 400; 57 POWDER, FOR SUSPENSION ORAL at 09:35

## 2023-11-13 RX ADMIN — CARVEDILOL 25 MG: 12.5 TABLET, FILM COATED ORAL at 16:59

## 2023-11-13 RX ADMIN — ACETAMINOPHEN 650 MG: 325 TABLET ORAL at 22:09

## 2023-11-13 RX ADMIN — VENLAFAXINE 75 MG: 75 TABLET ORAL at 12:25

## 2023-11-13 RX ADMIN — PANTOPRAZOLE SODIUM 40 MG: 40 INJECTION, POWDER, FOR SOLUTION INTRAVENOUS at 06:47

## 2023-11-13 RX ADMIN — Medication 3 MG: at 22:09

## 2023-11-13 ASSESSMENT — ACTIVITIES OF DAILY LIVING (ADL)
ADLS_ACUITY_SCORE: 35
ADLS_ACUITY_SCORE: 39
ADLS_ACUITY_SCORE: 35
ADLS_ACUITY_SCORE: 39
ADLS_ACUITY_SCORE: 35
ADLS_ACUITY_SCORE: 39
ADLS_ACUITY_SCORE: 35
ADLS_ACUITY_SCORE: 35

## 2023-11-13 NOTE — PROGRESS NOTES
CALORIE COUNT SUMMARY  Approximate Oral Intake for:   11/10-11/12   3-day average:  Calories: 722 kcal (56% needs)  Protein: 47 gm (75% needs)      Intake from TF:     Per NJ Promote with fiber @ 80 ml/hr x 7 hrs (providing 560 ml/Calories, 34 g protein, 77 g CHO, 8 g fiber, 465 ml free water plus H2O flush ( 270 ml)=735 ml ( ~ 43% estimated Calorie needs and ~ 54 % estimated protein needs)       Estimated Needs:    Calories: 1300 - 1570  Protein: 63-78    Summary:   11/12:   Calories: 645 kcal (po + TF intake)  Protein:  49 gm (po + TF intake)   11/11:   Calories: ~960 kcal (po + TF intake)  Protein: ~59 gm (po + TF intake)  11/10:  Calories: 560 kcal (TF only; po intake was not enough information to estimate)  Protein: 34 g (TF only; po intake was not enough information to estimate)    Per Surgery note 11/10: PLAN  - Continue nocturnal tube feeds over only 7 hours. Tube feeds to meet a maximum of 50% of caloric and protein needs. Continue calorie counts.   - Continue to encourage oral intake and protein supplement shakes with goal of 2 protein shakes per day and 1 L of water/clear liquid per day  - Push towards discontinuing tube feeds over the next 24-48 hours with improving oral intake    CALORIE COUNT (just 11/12)  Approximate Oral Intake for:    11/12  Calories: 85 kcal   Protein: 15 gm    Summary: No meal tray tickets in calorie count folder. PT reported to have drank about 50% of the Ensure Max.     Intake from TF:     Per NJ Promote with fiber @ 80 ml/hr x 7 hrs (providing 560 ml/Calories, 34 g protein, 77 g CHO, 8 g fiber, 465 ml free water plus H2O flush ( 270 ml)=735 ml ( ~ 43% estimated Calorie needs and ~ 54 % estimated protein needs)

## 2023-11-13 NOTE — PLAN OF CARE
Problem: Bariatric Surgery  Goal: Fluid and Electrolyte Balance  Intervention: Monitor and Manage Fluid and Electrolyte Balance  Recent Flowsheet Documentation  Taken 11/12/2023 2350 by Bart Bah, RN  Fluid/Electrolyte Management: fluids provided  Taken 11/12/2023 2050 by Bart Bah RN  Fluid/Electrolyte Management: fluids provided     Problem: Bariatric Surgery  Goal: Effective Gastrointestinal Motility and Elimination  Outcome: Progressing     Problem: Bariatric Surgery  Goal: Nausea and Vomiting Relief  Outcome: Progressing     Patient slept well overnight. Had emesis this AM. Compazine given. Tolerates TF throughout the night without issue. Had BM x1 overnight. Reports severe feeling of fullness and bloating with any PO intake.    Bart Bah, RN

## 2023-11-13 NOTE — PROGRESS NOTES
ASSESSMENT:  1. Intra-abdominal abscess (H)    2. Perimenopausal symptoms        Mojgan Jimenez is a 53 year old female who is s/p laparoscopic sleeve gastrectomy with single anastomosis duodenal switch on 10/09/23 with return to the OR on 10/12/23 to repair two small enterotomies on the common channel. Patient became septic with ARF and developed pneumonia and pleural effusion necessitating chest tube placement. Chest tube removed on 11/05/23 and pulmonary signed off. Renal function improved and nephrology signed off but re-consulted 11/10 in setting of persistent hypertension with four agents on board. Positive for HIT and heme/onc consulted.     Continued nausea noted every morning. We will continue tube feeds at about the same caloric content (around 50% of her needs) but will change duration to run over 9 hours instead of 7. We will also advance patient diet to pureed diet. This should also help with her calorie intake. Calorie count order was not renewed over the weekend, so we will replace this order.     Patient diarrhea is COMPLETELY expected after a single anastomosis duodenal switch. These patients can expect 8-10 loose stools a day when they are not on tube feeds. Adding tube feeds to the shortened digestive tract would increase this. If patient develops abdominal pain, fever or chills, we can investigate further. No good explanation for the CRP increase but patient does not appear sick. Would hold on any imaging for now unless things clinically change.    We would prefer patient go to an acute rehab center instead of a regular TCU. Hopefully, we will have the tube feeds off from her by the end of the week.    PLAN:  Tube feeds to run at 50% caloric needs over 9 hours nightly  Advance diet to bariatric pureed and allow room service  Continue calorie counts until further notice  Daily ADLs to be performed by patient; this includes tooth brushing, face washing, etc every morning  Up in chair for all meals  and at least 1-2 walks per day on top of working with PT/OT  OK to bring patient's home food in like protein shakes, cottage cheese, etc. If it is now sugar, low fat and of a pureed consistency, the patient can have it!      SUBJECTIVE:   She is feeling better today. She reports nausea with occasional vomiting every morning and she is not sure what the reason is. She reports the nausea is before she eats or drinks anything and not related to medication administration. She is tolerating her oral intake well. No pain. Breathing better. Walked the length of the prater today. Very motivated    Patient Vitals for the past 24 hrs:   BP Temp Temp src Pulse Resp SpO2   11/13/23 0721 (!) 135/90 98.1  F (36.7  C) Oral 94 20 97 %   11/12/23 2317 (!) 146/109 98.5  F (36.9  C) Oral 88 21 94 %   11/12/23 1631 133/83 98.1  F (36.7  C) Oral 88 20 95 %         PHYSICAL EXAM:  GEN: No acute distress, comfortable  LUNGS: CTA bilaterally  CV:RRR  ABD:soft, not tender, well healed incisions  EXT:No cyanosis, edema or obvious abnormalities    11/12 1500 - 11/13 1459  In: 350 [P.O.:260; I.V.:30]  Out: 1300 [Urine:1300]    No results displayed because visit has over 200 results.             JUSTINE Woo CNP

## 2023-11-13 NOTE — PROGRESS NOTES
RENAL PROGRESS NOTE    CC:  ongoing HTN despite 4 antihypertensive agents     PLAN:  Rec continue current HTNive regimen (Amlod 5 BID, Coreg 25 BID, chlorthalidone 12.5 daily, and Aldactone 25mg daily)  NO changes today, would prefer to get more BP data points before making any changes  BMP tomorrow (ordered for today, but not done?, pt says labs were drawn)       ASSESSMENT :     HTN:   BP better this a.m., pending kylah  Not on antihypertensives PTA. Persistent hypertension over the last few weeks despite multiple drugs.  Secondary HTN workup with renal US with Doppler unremarkable. UA bland, cortisol okay, UA/UPCR mild proteinuria. Had sharp drop in BP that was likely secondary to crushed nifedipine.  No obvious improvement in BP with nifedipine when not crushed compared to amlodipine.   ANA PAULA-Renin ratio 0.2  consider ACE/ARB     Recent Oliguric SUSSY:   Baseline creatinine 0.7 within normal limits no prior history of SUSSY or CKD.  Cr peaked at 6.97 on 10/14.  Had settled to low 1s.trending around her more recent baseline sCR 1.2.       Lytes:   stable     Acid/base:   Stable, may be mildly contracted, sl rise in C02 with diuresis.      Anemia:   Hgb drifting to 7s. Transfuse per Primary team      H/O complicated Gastric Bypass:   Multiple complications post sleeve gastrectomy complicated by peritonitis and small bowel injury with peritoneal leak. Return to the OR 10/12/2023 with cleanout and drain placement complicated by other fluid collections requiring aspiration and drain placement. S/P ABX, ID following. 11/7/23 TF via NJ 11/7/23 bariatric full liquid diet.  Diet management per primary team bariatric surgery.  Remains on Tube feeding    Diarrhea:  W/up per [facility]    Thrombocytopenia:  HIT w/up pending.       SUBJECTIVE:   This is my first time meeting pt. She is still having some diarrhea.  Taking small sips of fluids, still has NG in place.  Urinating ok, says edema/puffiness hilda in hands is better.      OBJECTIVE:  Physical Exam   Temp: 98.1  F (36.7  C) Temp src: Oral BP: (!) 135/90 Pulse: 94   Resp: 20 SpO2: 97 % O2 Device: Nasal cannula Oxygen Delivery: 3 LPM  Vitals:    11/10/23 0751 11/11/23 0826 11/12/23 1008   Weight: 126 kg (277 lb 12.5 oz) 128.2 kg (282 lb 10.1 oz) 125.2 kg (276 lb 0.3 oz)     Vital Signs with Ranges  Temp:  [98.1  F (36.7  C)-98.5  F (36.9  C)] 98.1  F (36.7  C)  Pulse:  [88-94] 94  Resp:  [20-21] 20  BP: (133-146)/() 135/90  SpO2:  [94 %-97 %] 97 %  I/O last 3 completed shifts:  In: 650 [P.O.:410; I.V.:60; NG/GT:180]  Out: 1525 [Urine:1500; Emesis/NG output:25]      Patient Vitals for the past 72 hrs:   Weight   11/12/23 1008 125.2 kg (276 lb 0.3 oz)   11/11/23 0826 128.2 kg (282 lb 10.1 oz)     Intake/Output Summary (Last 24 hours) at 11/9/2023 1422  Last data filed at 11/9/2023 1301  Gross per 24 hour   Intake 3735 ml   Output 1000 ml   Net 2735 ml       PHYSICAL EXAM:  GEN: NAD, aox3, + NG feeding tube  CV: RRR , SBP  x 1 reading this a.m. is controlled   Lung: clear and equal, on 3L per NC, sats upper 90's   Ab: soft and NT, obese  Ext: ++ edema LLE BL, Hand edema R>L very minimal and well perfused  Skin: no rash  Wt trending down        LABORATORY STUDIES:     Recent Labs   Lab 11/13/23  0636 11/12/23  0645 11/11/23  0521 11/10/23  0516 11/07/23  0705   WBC 7.3 8.1 7.2  --  6.3   RBC 2.66* 2.58* 2.51*  --  2.66*   HGB 7.6* 7.3* 7.2*  --  7.7*   HCT 24.7* 24.3* 24.0*  --  25.5*   * 117* 96* 106* 157       Basic Metabolic Panel:  Recent Labs   Lab 11/13/23  0929 11/12/23  2106 11/12/23  1643 11/12/23  1312 11/12/23  0747 11/12/23  0645 11/11/23  0825 11/11/23  0521 11/10/23  0858 11/10/23  0516 11/09/23  0834 11/09/23  0635 11/08/23  0815 11/08/23  0540 11/07/23  0811 11/07/23  0705   NA  --   --   --   --   --  140  --  138  --  139  --  142  --  142  --  144   POTASSIUM  --   --   --   --   --  4.3  --  4.3  --  4.2  --  3.8  --  4.0  --  4.0   CHLORIDE  --   --    --   --   --  102  --  103  --  105  --  104  --  103  --  103   CO2  --   --   --   --   --  30*  --  27  --  27  --  27  --  32*  --  31*   BUN  --   --   --   --   --  25.1*  --  25.9*  --  31.2*  --  36.8*  --  29.6*  --  30.8*   CR  --   --   --   --   --  1.21*  --  1.24*  --  1.32*  --  1.47*  --  1.38*  --  1.33*   * 81 88 93 106* 124*   < > 121*   < > 111*   < > 110*   < > 107*   < > 146*   PALAK  --   --   --   --   --  8.7  --  9.2  --  9.1  --  9.0  --  9.5  --  8.7    < > = values in this interval not displayed.       INRNo lab results found in last 7 days.     Recent Labs   Lab Test 11/13/23  0636 11/12/23  0645 10/24/23  0333 10/23/23  0530 10/16/23  1055 10/16/23  0516   INR  --   --   --  1.49*  --  1.21*   WBC 7.3 8.1   < > 14.5*   < >  --    HGB 7.6* 7.3*   < > 9.2*   < >  --    * 117*   < > 427   < >  --     < > = values in this interval not displayed.       Personally reviewed current labs    Mahendra Toñito  [facility]  [phone]

## 2023-11-13 NOTE — PROGRESS NOTES
[facility] Hospitalist Progress Note  [facility]  Admission date: 10/9/2023    Summary:  Mojgan Jimenez is a 53-year-old woman with history of morbid obesity status post laparoscopic sleeve gastrectomy, severe JARVIS on CPAP, asthma, stimulant use disorder, stimulant induced psychosis, mood disorder and anxiety disorder who underwent scheduled laparoscopic sleeve gastrectomy on 10/9/2023.     Postoperative course was complicated by acute abdomen/peritonitis.  She subsequently underwent laparotomy with closure of 2 small bowel enterotomies, intra-abdominal cleanout and drain placement on 10/12/2023.  However, fluid collection was detected in the right upper quadrant on 10/19/2023 at which time MICKIE drain was placed and she subsequently underwent pelvic fluid aspiration on 10/24/2023.      She also developed suspected metabolic encephalopathy, septic shock requiring vasopressors, suspected takotsubo syndrome, acute hypoxic respiratory failure requiring intubation with brief ICU stay (10/12-10/21/23; reintubated 10/21-10/26/23), & oliguric SUSSY with renal recovery.  She was transferred to the telemetry floor on 10/27/2023. Due to persistent pleural effusion/concern for parapneumonic with risk of trapped lung, CT guided chest tube placement was performed on 11/1/23 and removed on 11/5/2023.    HIT screen done due to thrombocytopenia was positive, therefore she was placed on fondaparinux.  Serotonin release assay result is pending.  Hematologist is assisting with HIT management.    Per nephrologist, contrast exposure possibly contributed to SUSSY.  Renal function and volume status have improved and mentation has returned to baseline.      Assessment/Plan  Morbid obesity status post sleeve gastrectomy complicated by enterotomy & peritonitis   Elevated CRP  NG, completing Augmentin and fluconzaole course (completes 11/16)  Postoperative surgical management per surgery service.    Diarrhea  Possibly secondary to tube  "feeds versus possible C. difficile infection versus   May need repeat abdominal imaging if c.diff negative.  Follow-up results of stool testing for C. difficile    Thrombocytopenia  Suspected HIT  +PF4. SSRA pending.    Follow-up serotonin release assay results  Continue fondaparinux  Hematology [id number] assistance      Resistant hypertension  Stress Cardiomyopathy with EF recovery  Amlodipine 5 mg twice daily  Carvedilol 25 mg twice daily  Chlorthalidone 12.5 mg daily  Spironolactone 25 mg daily  Nephrology [id number] assistance    Suspected thrush  Nystatin, fluconazole     History of JARVIS, asthma in the chart no PFTs   Acute hypoxemic-hypercapnic respiratory failure  Improved  NIPPV with sleep/naps everytime.   Supplemental oxygen as needed  Continue duonebs     #Oliguric SUSSY - now possibly CKD3,  Cr stable.    #Anemia - transfuse Hb < 7  #Hypervolemia - would continue with diuresis as tolerated.      -Hypernatremia resolved   -Hyperglycemia of critical illness - improved   -Morbid obesity BMI 49  -Acute metabolic and toxic encephalopathy Resolved  -Severe decondition due to prolong hospitalization  PT/OT eval, PT recommending TCU, OT recommending LTACH/TCU  Case management working on discharge disposition          Checklist:  Code Status: Full Code    Diet: Room Service  Bariatric Diet Full Liquids  Adult Formula Drip Feeding: Specified Time Promote w fiber; Nasojejunal; Goal Rate: 80; mL/hr; From: 10:00 PM; To: 5:00 AM     DVT px:  arixtra    Disposition and Discharge Planning  Auto-populated from discharge tab:     Expected Discharge Date: 11/16/2023    Discharge Delays: 1:1 Sitter still ordered - MD to assess  Destination: nursing home           System Identified Risk Variables  Auto-populated based on system request - if needs to be addressed in treatment plan they will be addressed above:  \"  Clinically Significant Risk Factors              # Hypoalbuminemia: Lowest albumin = 2.7 g/dL " "at 10/14/2023  6:29 AM, will monitor as appropriate   # Thrombocytopenia: Lowest platelets = 117 in last 2 days, will monitor for bleeding   # Hypertension: Noted on problem list        # Severe Obesity: Estimated body mass index is 48.89 kg/m  as calculated from the following:    Height as of this encounter: 1.6 m (5' 3\").    Weight as of this encounter: 125.2 kg (276 lb 0.3 oz).      # Asthma: noted on problem list        \"    Interval Events/Subjective/Notable results:  She vomited once earlier today.  Received antiemetics.  She drank a protein shake afterwards without any events.  Also had BM per bedside RN.    Awake alert and interactive       Objective    Vital signs in last 24 hours  Temp:  [98.1  F (36.7  C)-98.5  F (36.9  C)] 98.1  F (36.7  C)  Pulse:  [88-94] 94  Resp:  [20-21] 20  BP: (133-146)/() 135/90  SpO2:  [94 %-97 %] 97 % O2 Device: Nasal cannula    Weight:   276 lbs .25 oz  Body mass index is 48.89 kg/m .    Intake/Output last 3 shifts  I/O last 3 completed shifts:  In: 650 [P.O.:410; I.V.:60; NG/GT:180]  Out: 1525 [Urine:1500; Emesis/NG output:25]    Physical Exam  General appearance: Morbidly obese, deconditioned, awake, Alert, Cooperative, not in any obvious distress and appears stated age   HEENT: Normocephalic, atraumatic, conjunctiva clear without icterus and ears without discharge  Lungs: Clear to auscultation bilaterally, no wheezing, good air exchange, normal work of breathing  Cardiovascular: Regular Rate and Rythm, normal apical impulse, normal S1 and S2, no lower extremity edema bilaterally  Abdomen: Partially healed midline laparotomy wound and trocar incisions.  Nontender.     Skin: Skin color, texture normal and bruising or bleeding. No rashes or lesions over face, neck, arms and legs, turgor normal.  Musculoskeletal: No bony deformities or joint tenderness. Normal ROM upon flexion & extension.   Neurologic: Alert & Oriented X 3, Facial symmetry preserved.  Power 3/5 " globally.  Psychiatric: Calm, normal eye contact and normal affect         Medical Decision Making        Prerna Baumann MD

## 2023-11-13 NOTE — PROGRESS NOTES
"Care Management Follow Up    Length of Stay (days): 35    Expected Discharge Date: 11/16/2023     Concerns to be Addressed: discharge planning     Patient plan of care discussed at interdisciplinary rounds: Yes    Anticipated Discharge Disposition: Transitional Care/ ARU     Anticipated Discharge Services: None  Anticipated Discharge DME:  ABIEL    Patient/family educated on Medicare website which has current facility and service quality ratings: yes  Education Provided on the Discharge Plan: Yes  Patient/Family in Agreement with the Plan: yes    Referrals Placed by CM/SW: Post Acute Facilities  Private pay costs discussed: Not applicable    Additional Information:  Chart reviewed. Patient on a 1:1, has NG with tubefeeding at this time - this would be a barrier to TCUs at this time.    Previous CM sent referrals for TCU per PT/OT discharge recommendations, per MD note plan for Acute rehab. Referral sent to ARU.     Social HX: \"Patient lives with her adult daughter and is independent with all activities of daily living and instrumental activities of daily living (IADLs) at baseline. She is currently unemployed but planned to resume working after recovery. Daughter Karla is primary family contact.\"     CM will continue to follow care progression and aide in discharge planning as needed.     11:08 AM - [facility] is following patient, patient needs to be on oral Protonix and stable on oral antiemetics.     Andria Multani RN      "

## 2023-11-13 NOTE — PLAN OF CARE
Goal Outcome Evaluation:           Problem: Plan of Care - These are the overarching goals to be used throughout the patient stay.    Goal: Optimal Comfort and Wellbeing  Outcome: Progressing  Intervention: Monitor Pain and Promote Comfort  Recent Flowsheet Documentation  Taken 11/13/2023 1500 by Ernestine Diallo RN  Pain Management Interventions:   declines   emotional support  Taken 11/13/2023 0800 by Ernestine Diallo RN  Pain Management Interventions:   declines   emotional support       Pt had large involuntary stool this am, sent specimen checking for Cdiff pending.  Pt up in the chair today, walked with therapy passed her double door and then needed to sit down.  Pt drinking protein shake, needs assist due to the NG in the way.  Pt tolerated a few small bites of pureed chicken and pureed berries, total 30ml food, one bite of yogurt.  210 ml of protein shake and 90 ml water, all documented.  Writer flushing meds with water as well, pt refusing oral meds, so all went down NG tube, no nausea/emesis since the end of night shift.  Last compazine given at 0621 from night shift. Pt appears in good spirits, called her sister to bring in jello and cottage cheese to keep in the refrigerator.  Pt was full from protein shake so only ate a small amount but still tolerated without difficulty. Denies pain.

## 2023-11-14 LAB
ANION GAP SERPL CALCULATED.3IONS-SCNC: 10 MMOL/L (ref 7–15)
BUN SERPL-MCNC: 23 MG/DL (ref 6–20)
CALCIUM SERPL-MCNC: 8.9 MG/DL (ref 8.6–10)
CHLORIDE SERPL-SCNC: 99 MMOL/L (ref 98–107)
CREAT SERPL-MCNC: 1.22 MG/DL (ref 0.51–0.95)
CRP SERPL-MCNC: 137.9 MG/L
DEPRECATED HCO3 PLAS-SCNC: 30 MMOL/L (ref 22–29)
EGFRCR SERPLBLD CKD-EPI 2021: 53 ML/MIN/1.73M2
ERYTHROCYTE [DISTWIDTH] IN BLOOD BY AUTOMATED COUNT: 15.4 % (ref 10–15)
GLUCOSE BLDC GLUCOMTR-MCNC: 100 MG/DL (ref 70–99)
GLUCOSE BLDC GLUCOMTR-MCNC: 108 MG/DL (ref 70–99)
GLUCOSE BLDC GLUCOMTR-MCNC: 82 MG/DL (ref 70–99)
GLUCOSE BLDC GLUCOMTR-MCNC: 92 MG/DL (ref 70–99)
GLUCOSE SERPL-MCNC: 123 MG/DL (ref 70–99)
HCT VFR BLD AUTO: 23.7 % (ref 35–47)
HGB BLD-MCNC: 7.2 G/DL (ref 11.7–15.7)
MCH RBC QN AUTO: 28.6 PG (ref 26.5–33)
MCHC RBC AUTO-ENTMCNC: 30.4 G/DL (ref 31.5–36.5)
MCV RBC AUTO: 94 FL (ref 78–100)
PLATELET # BLD AUTO: 153 10E3/UL (ref 150–450)
POTASSIUM SERPL-SCNC: 4.4 MMOL/L (ref 3.4–5.3)
RBC # BLD AUTO: 2.52 10E6/UL (ref 3.8–5.2)
SODIUM SERPL-SCNC: 139 MMOL/L (ref 135–145)
WBC # BLD AUTO: 6.1 10E3/UL (ref 4–11)

## 2023-11-14 PROCEDURE — 250N000013 HC RX MED GY IP 250 OP 250 PS 637: Performed by: INTERNAL MEDICINE

## 2023-11-14 PROCEDURE — 250N000013 HC RX MED GY IP 250 OP 250 PS 637: Performed by: HOSPITALIST

## 2023-11-14 PROCEDURE — 250N000013 HC RX MED GY IP 250 OP 250 PS 637: Performed by: NURSE PRACTITIONER

## 2023-11-14 PROCEDURE — 86140 C-REACTIVE PROTEIN: CPT | Performed by: HOSPITALIST

## 2023-11-14 PROCEDURE — 99024 POSTOP FOLLOW-UP VISIT: CPT | Performed by: NURSE PRACTITIONER

## 2023-11-14 PROCEDURE — 250N000011 HC RX IP 250 OP 636: Mod: JZ | Performed by: STUDENT IN AN ORGANIZED HEALTH CARE EDUCATION/TRAINING PROGRAM

## 2023-11-14 PROCEDURE — 99232 SBSQ HOSP IP/OBS MODERATE 35: CPT | Performed by: PHYSICIAN ASSISTANT

## 2023-11-14 PROCEDURE — 250N000011 HC RX IP 250 OP 636: Performed by: NURSE PRACTITIONER

## 2023-11-14 PROCEDURE — 120N000001 HC R&B MED SURG/OB

## 2023-11-14 PROCEDURE — 85027 COMPLETE CBC AUTOMATED: CPT | Performed by: HOSPITALIST

## 2023-11-14 PROCEDURE — 99232 SBSQ HOSP IP/OBS MODERATE 35: CPT | Performed by: INTERNAL MEDICINE

## 2023-11-14 PROCEDURE — 250N000013 HC RX MED GY IP 250 OP 250 PS 637: Performed by: SURGERY

## 2023-11-14 PROCEDURE — 80048 BASIC METABOLIC PNL TOTAL CA: CPT | Performed by: NURSE PRACTITIONER

## 2023-11-14 PROCEDURE — 250N000013 HC RX MED GY IP 250 OP 250 PS 637: Performed by: STUDENT IN AN ORGANIZED HEALTH CARE EDUCATION/TRAINING PROGRAM

## 2023-11-14 RX ORDER — SPIRONOLACTONE 25 MG/1
25 TABLET ORAL
Status: DISCONTINUED | OUTPATIENT
Start: 2023-11-14 | End: 2023-11-21 | Stop reason: HOSPADM

## 2023-11-14 RX ORDER — CHLORTHALIDONE 25 MG/1
25 TABLET ORAL DAILY
Status: DISCONTINUED | OUTPATIENT
Start: 2023-11-15 | End: 2023-11-16

## 2023-11-14 RX ADMIN — VENLAFAXINE 75 MG: 75 TABLET ORAL at 13:36

## 2023-11-14 RX ADMIN — AMLODIPINE BESYLATE 5 MG: 5 TABLET ORAL at 08:28

## 2023-11-14 RX ADMIN — SPIRONOLACTONE 25 MG: 25 TABLET ORAL at 18:22

## 2023-11-14 RX ADMIN — ACETAMINOPHEN 650 MG: 325 TABLET ORAL at 20:37

## 2023-11-14 RX ADMIN — Medication 3 MG: at 20:37

## 2023-11-14 RX ADMIN — AMOXICILLIN AND CLAVULANATE POTASSIUM 875 MG: 400; 57 POWDER, FOR SUSPENSION ORAL at 20:37

## 2023-11-14 RX ADMIN — AMOXICILLIN AND CLAVULANATE POTASSIUM 875 MG: 400; 57 POWDER, FOR SUSPENSION ORAL at 08:36

## 2023-11-14 RX ADMIN — CARVEDILOL 25 MG: 12.5 TABLET, FILM COATED ORAL at 18:18

## 2023-11-14 RX ADMIN — CARVEDILOL 25 MG: 12.5 TABLET, FILM COATED ORAL at 08:28

## 2023-11-14 RX ADMIN — FONDAPARINUX SODIUM 10 MG: 10 INJECTION, SOLUTION SUBCUTANEOUS at 18:17

## 2023-11-14 RX ADMIN — Medication 40 MG: at 08:27

## 2023-11-14 RX ADMIN — PROCHLORPERAZINE EDISYLATE 10 MG: 5 INJECTION INTRAMUSCULAR; INTRAVENOUS at 10:44

## 2023-11-14 RX ADMIN — VENLAFAXINE 75 MG: 75 TABLET ORAL at 08:28

## 2023-11-14 RX ADMIN — SPIRONOLACTONE 25 MG: 25 TABLET, FILM COATED ORAL at 08:31

## 2023-11-14 RX ADMIN — CHLORTHALIDONE 12.5 MG: 25 TABLET ORAL at 08:28

## 2023-11-14 RX ADMIN — AMLODIPINE BESYLATE 5 MG: 5 TABLET ORAL at 20:37

## 2023-11-14 RX ADMIN — FLUCONAZOLE 400 MG: 40 POWDER, FOR SUSPENSION ORAL at 08:27

## 2023-11-14 RX ADMIN — VENLAFAXINE 75 MG: 75 TABLET ORAL at 18:19

## 2023-11-14 RX ADMIN — NYSTATIN 500000 UNITS: 100000 SUSPENSION ORAL at 20:37

## 2023-11-14 ASSESSMENT — ACTIVITIES OF DAILY LIVING (ADL)
ADLS_ACUITY_SCORE: 39
ADLS_ACUITY_SCORE: 38
ADLS_ACUITY_SCORE: 39
ADLS_ACUITY_SCORE: 35
ADLS_ACUITY_SCORE: 38
ADLS_ACUITY_SCORE: 35
ADLS_ACUITY_SCORE: 39
ADLS_ACUITY_SCORE: 35
ADLS_ACUITY_SCORE: 39
ADLS_ACUITY_SCORE: 38
ADLS_ACUITY_SCORE: 39
ADLS_ACUITY_SCORE: 39

## 2023-11-14 NOTE — PROGRESS NOTES
"    RENAL PROGRESS NOTE    CC:  ongoing HTN despite 4 antihypertensive agents       ASSESSMENT :     HTN:   Not on antihypertensives PTA. Persistent hypertension over the last few weeks despite multiple drugs.  Secondary HTN workup with renal US with Doppler unremarkable. UA bland, cortisol okay, UA/UPCR mild proteinuria. Had sharp drop in BP that was likely secondary to crushed nifedipine.  No obvious improvement in BP with nifedipine when not crushed compared to amlodipine.   ANA PAULA-Renin ratio 0.2  Currently on amlodipine 5 mg BID, carvedilol 25 mg BID, chlorthalidone 12.5 mg daily, spironolactone 25 mg daily  Had some BP improvement but now trending high again     Recs:   - Increase chlorthalidone to 25 mg daily   - Spironolactone increased to 25 mg BID 11/14   - Follow these changes and RF before consideration of additional agents      Recent Oliguric SUSSY:   Baseline creatinine 0.7 within normal limits no prior history of SUSSY or CKD.  Cr peaked at 6.97 on 10/14.  Had settled to low 1s.trending around her more recent baseline sCR 1.2.       Lytes:   stable     Acid/base:   Stable, may be mildly contracted, sl rise in C02 with diuresis.      Anemia:   Hgb drifting to 7s. Transfuse per Primary team      H/O complicated Gastric Bypass:   Multiple complications post sleeve gastrectomy complicated by peritonitis and small bowel injury with peritoneal leak. Return to the OR 10/12/2023 with cleanout and drain placement complicated by other fluid collections requiring aspiration and drain placement. S/P ABX, ID following. 11/7/23 TF via [place] 11/7/23 bariatric full liquid diet.  Diet management per primary team bariatric surgery.  Remains on Tube feeding    Diarrhea:  W/up per [facility]    Thrombocytopenia:  + HIT, seen by  hematology and transitioned to fondaparinux, plt count now improving     SUBJECTIVE: Seen at bedside, RN present, declining to work with therapies today d/t feeling \"blue\". RN noticing some more dyspnea, " patient admits to a little more SOB today also. Discussed increasing her diuretics, using IS, and working with therapies.     OBJECTIVE:  Physical Exam   Temp: 98.1  F (36.7  C) Temp src: Oral BP: (!) 164/89 Pulse: 84   Resp: 18 SpO2: 95 % O2 Device: Nasal cannula Oxygen Delivery: 2 LPM  Vitals:    11/10/23 0751 11/11/23 0826 11/12/23 1008   Weight: 126 kg (277 lb 12.5 oz) 128.2 kg (282 lb 10.1 oz) 125.2 kg (276 lb 0.3 oz)     Vital Signs with Ranges  Temp:  [98.1  F (36.7  C)-98.5  F (36.9  C)] 98.1  F (36.7  C)  Pulse:  [70-91] 84  Resp:  [18-20] 18  BP: (141-180)/(77-89) 164/89  SpO2:  [94 %-96 %] 95 %  I/O last 3 completed shifts:  In: 1584 [P.O.:630; NG/GT:954]  Out: 600 [Urine:600]      Patient Vitals for the past 72 hrs:   Weight   11/12/23 1008 125.2 kg (276 lb 0.3 oz)     Intake/Output Summary (Last 24 hours) at 11/9/2023 1422  Last data filed at 11/9/2023 1301  Gross per 24 hour   Intake 3735 ml   Output 1000 ml   Net 2735 ml       PHYSICAL EXAM:  GEN: NAD, A&Ox3, + NG feeding tube  CV: RRR , SBP  x 1 reading this a.m. is controlled   Lung: clear and equal, on 3L per NC, sats upper 90's   Ab: soft and NT, obese  Ext: + edema bilateral LE, Hand edema R>L very minimal and well perfused  Skin: no rash  Wt trending down        LABORATORY STUDIES:     Recent Labs   Lab 11/14/23  0613 11/13/23  0636 11/12/23  0645 11/11/23  0521 11/10/23  0516   WBC 6.1 7.3 8.1 7.2  --    RBC 2.52* 2.66* 2.58* 2.51*  --    HGB 7.2* 7.6* 7.3* 7.2*  --    HCT 23.7* 24.7* 24.3* 24.0*  --     147* 117* 96* 106*       Basic Metabolic Panel:  Recent Labs   Lab 11/14/23  1152 11/14/23  0824 11/14/23  0613 11/13/23  2207 11/13/23  2103 11/13/23  1704 11/13/23  1404 11/12/23  0747 11/12/23  0645 11/11/23  0825 11/11/23  0521 11/10/23  0858 11/10/23  0516 11/09/23  0834 11/09/23  0635   NA  --   --  139  --   --   --  139  --  140  --  138  --  139  --  142   POTASSIUM  --   --  4.4  --   --   --  4.3  --  4.3  --  4.3  --  4.2  --   3.8   CHLORIDE  --   --  99  --   --   --  99  --  102  --  103  --  105  --  104   CO2  --   --  30*  --   --   --  30*  --  30*  --  27  --  27  --  27   BUN  --   --  23.0*  --   --   --  22.8*  --  25.1*  --  25.9*  --  31.2*  --  36.8*   CR  --   --  1.22*  --   --   --  1.25*  --  1.21*  --  1.24*  --  1.32*  --  1.47*   * 100* 123* 85 73 83 93   < > 124*   < > 121*   < > 111*   < > 110*   PALAK  --   --  8.9  --   --   --  8.9  --  8.7  --  9.2  --  9.1  --  9.0    < > = values in this interval not displayed.       INRNo lab results found in last 7 days.     Recent Labs   Lab Test 11/14/23  0613 11/13/23  0636 10/24/23  0333 10/23/23  0530 10/16/23  1055 10/16/23  0436   INR  --   --   --  1.49*  --  1.21*   WBC 6.1 7.3   < > 14.5*   < >  --    HGB 7.2* 7.6*   < > 9.2*   < >  --     147*   < > 427   < >  --     < > = values in this interval not displayed.       Personally reviewed current labs    Marlen Cintron PA-C   [facility]  [phone]

## 2023-11-14 NOTE — PROGRESS NOTES
ASSESSMENT:  1. Intra-abdominal abscess (H)    2. Perimenopausal symptoms        Mojgan Jimenez is a 53 year old female who is s/p laparoscopic sleeve gastrectomy with single anastomosis duodenal switch on 10/09/23 with return to the OR on 10/12/23 to repair two small enterotomies on the common channel. Patient became septic with ARF and developed pneumonia and pleural effusion necessitating chest tube placement. Chest tube removed on 11/05/23 and pulmonary signed off. Renal function improved and nephrology signed off but re-consulted 11/10 in setting of persistent hypertension with four agents on board. Positive for HIT and heme/onc consulted. Hemoglobin low but stable. [facility] has asked about blood transfusion and surgery has no objection to the patient getting blood. CRP decrease noted today.    Patient continues to have trouble with oral intake but continues to improve with this. We will not make any adjustments to her tube feeds today and continue to work on her oral intake. If she continues to improve, the hope is to drop her tube feeds to 25% of her caloric intake tomorrow and hopefully discontinue tube feeds by the end of the week. This should help with getting her into a rehab/TCU.        PLAN:  Continue nocturnal tube feeds and pureed diet.  Continue calorie counts until further notice  Daily ADLs to be performed by patient; this includes tooth brushing, face washing, etc every morning  Up in chair for all meals and at least 1-2 walks per day on top of working with PT/OT  OK to bring patient's home food in like protein shakes, cottage cheese, etc. If it is now sugar, low fat and of a pureed consistency, the patient can have it!  Appreciate [facility] and nephrology help with HTN and medical issues    SUBJECTIVE:   She is more depressed today with less motivation. Nausea this morning after breakfast with small emesis. Tolerated more protein shake and some cottage cheese from home yesterday. Walked a bit and worked  with therapy yesterday.       Patient Vitals for the past 24 hrs:   BP Temp Temp src Pulse Resp SpO2   11/14/23 1531 (!) 164/79 98  F (36.7  C) Oral 84 20 98 %   11/14/23 1318 (!) 140/81 -- -- 85 -- --   11/14/23 0900 (!) 164/89 -- -- 84 18 95 %   11/14/23 0711 (!) 180/85 98.1  F (36.7  C) Oral 91 18 96 %   11/13/23 2334 (!) 163/85 98.5  F (36.9  C) Oral 80 18 94 %   11/13/23 2209 -- -- -- 70 20 --         PHYSICAL EXAM:  GEN: No acute distress, comfortable  LUNGS: CTA bilaterally  CV:RRR  ABD:soft, not tender, incisions healing well  EXT:No cyanosis, edema or obvious abnormalities    11/13 1500 - 11/14 1459  In: 1464 [P.O.:540]  Out: 1025 [Urine:1025]    No results displayed because visit has over 200 results.             Felecia Henderson, APRN CNP

## 2023-11-14 NOTE — PROGRESS NOTES
"Care Management Follow Up    Length of Stay (days): 36    Expected Discharge Date: 11/16/2023     Concerns to be Addressed: discharge planning     Patient plan of care discussed at interdisciplinary rounds: Yes    Anticipated Discharge Disposition: Transitional Care/ ARU     Anticipated Discharge Services: None  Anticipated Discharge DME:  NA    Patient/family educated on Medicare website which has current facility and service quality ratings: yes  Education Provided on the Discharge Plan: Yes  Patient/Family in Agreement with the Plan: yes    Referrals Placed by CM/SW: Post Acute Facilities  Private pay costs discussed: Not applicable    Additional Information:  Discussed with MD. Patient Hgb low, may need transfusion today, patient being primary by surgery will await their clearance.     Previous CM sent referrals for TCU per PT/OT discharge recommendations, per MD note plan for Acute rehab. Referral sent to ARU. ARU stated patient is appropriate and following.     Social HX: \"Patient lives with her adult daughter and is independent with all activities of daily living and instrumental activities of daily living (IADLs) at baseline. She is currently unemployed but planned to resume working after recovery. Daughter Karla is primary family contact.\"     CM will continue to follow care progression and aide in discharge planning as needed.     Andria Multani RN      "

## 2023-11-14 NOTE — PLAN OF CARE
"Goal Outcome Evaluation:      Plan of Care Reviewed With: patient    Outcome Evaluation: Pt drowsy and oriented. Denied any pain and/or nausea. Purewick in place for bed. NG tube flushed after medication administration and tube feed started at target rate of 60 mL/hour. F-tad changed. All three lumens flushed and patent. Blood sugar at 2100 was 73; pt given apple juice and blood sugar after was 85. BP elevated but not to the point of requiring prn. Surgical incisions on the abdomen continue to heal nicely. Will continue to monitor through shift.    BP (!) 163/85 (BP Location: Left arm)   Pulse 80   Temp 98.5  F (36.9  C) (Oral)   Resp 18   Ht 1.6 m (5' 3\")   Wt 125.2 kg (276 lb 0.3 oz)   LMP 09/09/2023 (Exact Date)   SpO2 94%   BMI 48.89 kg/m      "

## 2023-11-14 NOTE — PROGRESS NOTES
[facility] Hospitalist Progress Note  [facility]  Admission date: 10/9/2023    Summary:  Mojgan Jimenez is a 53-year-old woman with history of morbid obesity status post laparoscopic sleeve gastrectomy, severe JARVIS on CPAP, asthma, stimulant use disorder, stimulant induced psychosis, mood disorder and anxiety disorder who underwent scheduled laparoscopic sleeve gastrectomy on 10/9/2023.     Postoperative course was complicated by acute abdomen/peritonitis.  She subsequently underwent laparotomy with closure of 2 small bowel enterotomies, intra-abdominal cleanout and drain placement on 10/12/2023.  However, fluid collection was detected in the right upper quadrant on 10/19/2023 at which time MICKIE drain was placed and she subsequently underwent pelvic fluid aspiration on 10/24/2023.      She also developed suspected metabolic encephalopathy, septic shock requiring vasopressors, suspected takotsubo syndrome, acute hypoxic respiratory failure requiring intubation with brief ICU stay (10/12-10/21/23; reintubated 10/21-10/26/23), & oliguric SUSSY with renal recovery.  She was transferred to the telemetry floor on 10/27/2023. Due to persistent pleural effusion/concern for parapneumonic with risk of trapped lung, CT guided chest tube placement was performed on 11/1/23 and removed on 11/5/2023.    HIT screen done due to thrombocytopenia was positive, therefore she was placed on fondaparinux.  Serotonin release assay result is pending.  Hematologist is assisting with HIT management.    Per nephrologist, contrast exposure possibly contributed to SUSSY.  Renal function and volume status have improved and mentation has returned to baseline.      Assessment/Plan  Morbid obesity status post sleeve gastrectomy complicated by enterotomy & peritonitis   Elevated CRP  NG, completing Augmentin and fluconzaole course (completes 11/16)  Postoperative surgical management per surgery service.    Diarrhea  Possibly secondary to tube  "feeds   Stool testing for C. difficile was negative.        Thrombocytopenia  Suspected HIT  +PF4. SSRA pending.    Follow-up serotonin release assay results  Continue fondaparinux  Hematology [id number] assistance      Resistant hypertension  Stress Cardiomyopathy with EF recovery  Amlodipine 5 mg twice daily  Carvedilol 25 mg twice daily  Chlorthalidone 12.5 mg daily  Spironolactone 25 mg daily  Nephrology [id number] assistance    Suspected thrush  Nystatin, fluconazole     History of JARVIS, asthma in the chart no PFTs   Acute hypoxemic-hypercapnic respiratory failure  Improved  NIPPV with sleep/naps everytime.   Supplemental oxygen as needed  Continue duonebs     #Oliguric SUSSY - now possibly CKD3,  Cr stable.    #Anemia  Hemoglobin is trending down-currently 7.2  Consider transfusion with 1 unit of PRBC- will defer to primary team  Continue to monitor hemoglobin    #Hypervolemia - would continue with diuresis as tolerated.      -Hypernatremia resolved   -Hyperglycemia of critical illness - improved   -Morbid obesity BMI 49  -Acute metabolic and toxic encephalopathy Resolved  -Severe decondition due to prolong hospitalization  PT/OT eval, PT recommending TCU, OT recommending LTACH/TCU  Case management working on discharge disposition          Checklist:  Code Status: Full Code    Diet: Bariatric Diet Pureed  Adult Formula Drip Feeding: Specified Time Promote w fiber; Nasojejunal; Goal Rate: 60; mL/hr; From: 8:00 PM; To: 5:00 AM  Calorie Counts     DVT px:  arixtra    Disposition and Discharge Planning  Auto-populated from discharge tab:     Expected Discharge Date: 11/16/2023      Destination: nursing home           System Identified Risk Variables  Auto-populated based on system request - if needs to be addressed in treatment plan they will be addressed above:  \"  Clinically Significant Risk Factors              # Hypoalbuminemia: Lowest albumin = 2.7 g/dL at 10/14/2023  6:29 AM, will monitor as " "appropriate     # Hypertension: Noted on problem list        # Severe Obesity: Estimated body mass index is 48.89 kg/m  as calculated from the following:    Height as of this encounter: 1.6 m (5' 3\").    Weight as of this encounter: 125.2 kg (276 lb 0.3 oz).      # Asthma: noted on problem list        \"    Interval Events/Subjective/Notable results:  She had large loose BM twice earlier this morning.  She denies abdominal pain, fever, chills, or dizziness.  She states she feels better than yesterday.  Hemoglobin is low at 7.2.        Objective    Vital signs in last 24 hours  Temp:  [98.1  F (36.7  C)-98.5  F (36.9  C)] 98.1  F (36.7  C)  Pulse:  [70-91] 85  Resp:  [18-20] 18  BP: (140-180)/(77-89) 140/81  SpO2:  [94 %-96 %] 95 % O2 Device: Nasal cannula    Weight:   276 lbs .25 oz  Body mass index is 48.89 kg/m .    Intake/Output last 3 shifts  I/O last 3 completed shifts:  In: 1584 [P.O.:630; NG/GT:954]  Out: 600 [Urine:600]    Physical Exam  General appearance: Morbidly obese, deconditioned, awake, Alert, Cooperative, not in any obvious distress and appears stated age   HEENT: Normocephalic, atraumatic, conjunctiva clear without icterus and ears without discharge  Lungs: Clear to auscultation bilaterally, no wheezing, good air exchange, normal work of breathing  Cardiovascular: Regular Rate and Rythm, normal apical impulse, normal S1 and S2, no lower extremity edema bilaterally  Abdomen: Partially healed midline laparotomy wound and trocar incisions.  Nontender.     Skin: Skin color, texture normal and bruising or bleeding. No rashes or lesions over face, neck, arms and legs, turgor normal.  Musculoskeletal: No bony deformities or joint tenderness. Normal ROM upon flexion & extension.   Neurologic: Alert & Oriented X 3, Facial symmetry preserved.  Power 3/5 globally.  Psychiatric: Calm, normal eye contact and normal affect         Medical Decision Making        Prerna Baumann MD      "

## 2023-11-14 NOTE — PROGRESS NOTES
Pt having loose stools x 2 after tube feed started. NG/NJ flushed per orders and clamped. Pt tolerated with no C/O nausea.

## 2023-11-14 NOTE — PROGRESS NOTES
CALORIE COUNT      Approximate Oral Intake for:    11/13  Calories: 250 (19% estimated needs)  Protein: 25 gm (40% estimated needs)      Intake from TF:     Per NJ Promote with fiber @ 60 ml/hr x 9 hrs (providing 540 ml/Calories, 33.5 g protein, 75 g CHO, 7.5 g fiber, 449 ml free water plus H2O flush (270ml)= 719ml ( ~ 42% estimated Calorie needs and ~ 53 % estimated protein needs)        Estimated Needs:    Calories: 1300 - 1570  Protein: 63-78      Summary: Handwritten nursing note shows po intake of approximately 250 kcal and 25 g protein. Pt reported emesis after eating her cream of wheat this morning.     Daily intake total: 790 kcal and 58.5 gm protein (61% energy and 93% protein estimated needs)       Per Surgery note 11/13 (only po intake related): PLAN  Tube feeds to run at 50% caloric needs over 9 hours nightly  Advance diet to bariatric pureed and allow room service  Continue calorie counts until further notice  OK to bring patient's home food in like protein shakes, cottage cheese, etc. If it is now sugar, low fat and of a pureed consistency, the patient can have it!    Arun Kahn RD, LD

## 2023-11-15 ENCOUNTER — APPOINTMENT (OUTPATIENT)
Dept: PHYSICAL THERAPY | Facility: HOSPITAL | Age: 53
End: 2023-11-15
Attending: SURGERY
Payer: COMMERCIAL

## 2023-11-15 ENCOUNTER — APPOINTMENT (OUTPATIENT)
Dept: OCCUPATIONAL THERAPY | Facility: HOSPITAL | Age: 53
End: 2023-11-15
Attending: SURGERY
Payer: COMMERCIAL

## 2023-11-15 LAB
ABO/RH(D): NORMAL
ANTIBODY SCREEN: NEGATIVE
BLD PROD TYP BPU: NORMAL
BLOOD COMPONENT TYPE: NORMAL
CODING SYSTEM: NORMAL
CROSSMATCH: NORMAL
GLUCOSE BLDC GLUCOMTR-MCNC: 104 MG/DL (ref 70–99)
GLUCOSE BLDC GLUCOMTR-MCNC: 87 MG/DL (ref 70–99)
GLUCOSE BLDC GLUCOMTR-MCNC: 88 MG/DL (ref 70–99)
GLUCOSE BLDC GLUCOMTR-MCNC: 97 MG/DL (ref 70–99)
HGB BLD-MCNC: 7.8 G/DL (ref 11.7–15.7)
SCANNED LAB RESULT: NORMAL
SPECIMEN EXPIRATION DATE: NORMAL
UNIT ABO/RH: NORMAL
UNIT NUMBER: NORMAL
UNIT STATUS: NORMAL
UNIT TYPE ISBT: 5100

## 2023-11-15 PROCEDURE — 120N000001 HC R&B MED SURG/OB

## 2023-11-15 PROCEDURE — 250N000013 HC RX MED GY IP 250 OP 250 PS 637: Performed by: INTERNAL MEDICINE

## 2023-11-15 PROCEDURE — 99233 SBSQ HOSP IP/OBS HIGH 50: CPT | Performed by: INTERNAL MEDICINE

## 2023-11-15 PROCEDURE — 97116 GAIT TRAINING THERAPY: CPT | Mod: GP

## 2023-11-15 PROCEDURE — 86923 COMPATIBILITY TEST ELECTRIC: CPT | Performed by: INTERNAL MEDICINE

## 2023-11-15 PROCEDURE — 250N000013 HC RX MED GY IP 250 OP 250 PS 637: Performed by: PHYSICIAN ASSISTANT

## 2023-11-15 PROCEDURE — 99232 SBSQ HOSP IP/OBS MODERATE 35: CPT | Performed by: PHYSICIAN ASSISTANT

## 2023-11-15 PROCEDURE — 86850 RBC ANTIBODY SCREEN: CPT | Performed by: INTERNAL MEDICINE

## 2023-11-15 PROCEDURE — 97110 THERAPEUTIC EXERCISES: CPT | Mod: GO

## 2023-11-15 PROCEDURE — 86901 BLOOD TYPING SEROLOGIC RH(D): CPT | Performed by: INTERNAL MEDICINE

## 2023-11-15 PROCEDURE — 97530 THERAPEUTIC ACTIVITIES: CPT | Mod: GO

## 2023-11-15 PROCEDURE — 97530 THERAPEUTIC ACTIVITIES: CPT | Mod: GP

## 2023-11-15 PROCEDURE — 250N000013 HC RX MED GY IP 250 OP 250 PS 637: Performed by: NURSE PRACTITIONER

## 2023-11-15 PROCEDURE — 99232 SBSQ HOSP IP/OBS MODERATE 35: CPT | Performed by: INTERNAL MEDICINE

## 2023-11-15 PROCEDURE — 97110 THERAPEUTIC EXERCISES: CPT | Mod: GP

## 2023-11-15 PROCEDURE — 250N000013 HC RX MED GY IP 250 OP 250 PS 637: Performed by: SURGERY

## 2023-11-15 PROCEDURE — 85018 HEMOGLOBIN: CPT | Performed by: INTERNAL MEDICINE

## 2023-11-15 PROCEDURE — 250N000011 HC RX IP 250 OP 636: Mod: JZ | Performed by: STUDENT IN AN ORGANIZED HEALTH CARE EDUCATION/TRAINING PROGRAM

## 2023-11-15 PROCEDURE — 250N000013 HC RX MED GY IP 250 OP 250 PS 637: Performed by: STUDENT IN AN ORGANIZED HEALTH CARE EDUCATION/TRAINING PROGRAM

## 2023-11-15 RX ORDER — BUMETANIDE 2 MG/1
2 TABLET ORAL DAILY
Status: DISCONTINUED | OUTPATIENT
Start: 2023-11-15 | End: 2023-11-21 | Stop reason: HOSPADM

## 2023-11-15 RX ORDER — CLONIDINE HYDROCHLORIDE 0.1 MG/1
0.1 TABLET ORAL 2 TIMES DAILY
Status: DISCONTINUED | OUTPATIENT
Start: 2023-11-15 | End: 2023-11-16

## 2023-11-15 RX ORDER — AMLODIPINE BESYLATE 5 MG/1
10 TABLET ORAL DAILY
Status: DISCONTINUED | OUTPATIENT
Start: 2023-11-15 | End: 2023-11-21 | Stop reason: HOSPADM

## 2023-11-15 RX ADMIN — FONDAPARINUX SODIUM 10 MG: 10 INJECTION, SOLUTION SUBCUTANEOUS at 17:35

## 2023-11-15 RX ADMIN — AMOXICILLIN AND CLAVULANATE POTASSIUM 875 MG: 400; 57 POWDER, FOR SUSPENSION ORAL at 20:12

## 2023-11-15 RX ADMIN — FLUCONAZOLE 400 MG: 40 POWDER, FOR SUSPENSION ORAL at 09:12

## 2023-11-15 RX ADMIN — AMOXICILLIN AND CLAVULANATE POTASSIUM 875 MG: 400; 57 POWDER, FOR SUSPENSION ORAL at 09:12

## 2023-11-15 RX ADMIN — AMLODIPINE BESYLATE 10 MG: 5 TABLET ORAL at 09:13

## 2023-11-15 RX ADMIN — NYSTATIN 500000 UNITS: 100000 SUSPENSION ORAL at 20:14

## 2023-11-15 RX ADMIN — SPIRONOLACTONE 25 MG: 25 TABLET ORAL at 17:35

## 2023-11-15 RX ADMIN — CLONIDINE HYDROCHLORIDE 0.1 MG: 0.1 TABLET ORAL at 13:20

## 2023-11-15 RX ADMIN — CHLORTHALIDONE 25 MG: 25 TABLET ORAL at 09:10

## 2023-11-15 RX ADMIN — Medication 40 MG: at 09:12

## 2023-11-15 RX ADMIN — CLONIDINE HYDROCHLORIDE 0.1 MG: 0.1 TABLET ORAL at 20:12

## 2023-11-15 RX ADMIN — SPIRONOLACTONE 25 MG: 25 TABLET ORAL at 09:12

## 2023-11-15 RX ADMIN — VENLAFAXINE 75 MG: 75 TABLET ORAL at 09:11

## 2023-11-15 RX ADMIN — BUMETANIDE 2 MG: 2 TABLET ORAL at 13:30

## 2023-11-15 RX ADMIN — ACETAMINOPHEN 650 MG: 325 TABLET ORAL at 13:56

## 2023-11-15 RX ADMIN — CARVEDILOL 25 MG: 12.5 TABLET, FILM COATED ORAL at 17:35

## 2023-11-15 RX ADMIN — CARVEDILOL 25 MG: 12.5 TABLET, FILM COATED ORAL at 09:12

## 2023-11-15 RX ADMIN — ACETAMINOPHEN 650 MG: 325 TABLET ORAL at 17:34

## 2023-11-15 RX ADMIN — VENLAFAXINE 75 MG: 75 TABLET ORAL at 17:35

## 2023-11-15 RX ADMIN — VENLAFAXINE 75 MG: 75 TABLET ORAL at 13:20

## 2023-11-15 ASSESSMENT — ACTIVITIES OF DAILY LIVING (ADL)
ADLS_ACUITY_SCORE: 34

## 2023-11-15 NOTE — PLAN OF CARE
Pt stated she feels better today compared to yesterday.  Pt participated with therapy this morning and sat up in the chair for over 2 hours.  Diet upgraded to bariatric soft. Pt able to eat a small amount of ground turkey for lunch.  TF rate will be decreased starting tonight.  Bumex ordered daily by nephrology.  Hgb 7.2 yesterday. New order this afternoon for pt to receive 1 unit of PRBC however an order to check hgb was put in this afternoon and results are still pending.   Norvasc dose increased. Clonidine started.  Pt refusing oral nystatin. She says she only takes this at bedtime.

## 2023-11-15 NOTE — PROGRESS NOTES
"Last night pt was withdrawn and lethargic. Admitted being depressed having a \"blue day\".     Declined oral fluids. Medications administered via NJ tube; 90 mL flush completed; tube feeding started last evening at 2030.  "

## 2023-11-15 NOTE — PROGRESS NOTES
[facility] Hematology and Oncology Inpatient Progress Note    Patient: Mojgan Jimenez  MRN: [id number]  Date of Service: 11/15/2023         Reason for Visit    Cytopenia  + HIT screen    Assessment  and Plan    1.  53 years old woman with prolonged hospitalization testing positive for HIT screen.  Her serotonin release assay has come back negative.  Clinically seems to be stable.  Platelet count is improving. However she is also on fondaparinux.  Although at this time she does not have any HIT, at this time I would recommend continuing on fondaparinux.  Do not mess with what is working.  2.  Status post gastric sleeve surgery with ensuing complications.  She is slowly recovering from that.  3.  Mild renal insufficiency.  4.  Some swallowing weakness.    Medical decision Making    Reviewed Labs.  Her serotonin release assay is negative.  Notes from other providers and nursing staff    1.  At this time we will continue treatment with fondaparinux.  She does not need to be on anticoagulation at the time of discharge.  2.  Minimize lab draws  3.  Aggressive PT OT  4.  Advised the patient to ambulate as much as she can.  5.  Try to get the patient out of the hospital.  The longer she stays the longer she risks having complications.    We will sign off.  Please call if needed.       Cancer Staging   No matching staging information was found for the patient.      History of Present Illness    Ms. Mojgan Jimenez  is a 53 year old woman who was admitted in October after gastric sleeve surgery for weight loss purposes.  She has had complications after that.  She has had peritonitis acute renal insufficiency etc.  The details are listed in the hospitalist note.     Recently was found to be having thrombocytopenia.  Not sure why the platelet count was checked but the platelet count was 106 whereas 3 days ago the platelet count was 157.  During this hospitalization her blood count has been normal.  She has a drop in her  "hemoglobin as well.       An HIT screen was ordered.  The HIT screen did come back positive at a low level of 2.8.  She has already been.  She was on heparin therefore the heparin was stopped.  She has been put on fondaparinux.  The patient clinically does not appear to have any other thrombotic phenomena.  Although she is definitely at high risk for developing HIT to her prolonged hospitalization, use of heparin her age group etc.     Dr. Newsome her hospitalist has already ordered the serotonin release assay.  The patient is otherwise stable.    Clinically she continues to say that she is feeling better.  Her platelet count were slightly lower on 11 November 2023.    Patient appears to be more or less stable from what I can see.  She is quite weak and deconditioned.  I think requires a significant PT OT.  Having trouble eating.  On NG tube.    Her platelet count are stable.  Tolerating fondaparinux well.    Review of Systems    Pertinent findings noted in history.    Physical Exam    BP (!) 146/85 (BP Location: Left arm)   Pulse 85   Temp 98.6  F (37  C) (Oral)   Resp 18   Ht 1.6 m (5' 3\")   Wt 119 kg (262 lb 5.6 oz)   LMP 09/09/2023 (Exact Date)   SpO2 100%   BMI 46.47 kg/m      GENERAL: no acute distress. Cooperative in conversation.   HEENT: pupils are equal, round and reactive. Oral mucosa is moist and intact.  RESP:Chest symmetric. Regular respiratory rate. No stridor.  ABD: Nondistended, soft.  EXTREMITIES: No lower extremity edema.   NEURO: non focal. Alert and oriented x3.   PSYCH: within normal limits. No depression or anxiety.  SKIN: warm dry intact         Lab Results Reviewed    Recent Results (from the past 24 hour(s))   Glucose by meter    Collection Time: 11/14/23  9:19 PM   Result Value Ref Range    GLUCOSE BY METER POCT 92 70 - 99 mg/dL   Glucose by meter    Collection Time: 11/15/23  7:42 AM   Result Value Ref Range    GLUCOSE BY METER POCT 97 70 - 99 mg/dL   Glucose by meter    Collection " Time: 11/15/23 11:52 AM   Result Value Ref Range    GLUCOSE BY METER POCT 104 (H) 70 - 99 mg/dL   Adult Type and Screen    Collection Time: 11/15/23  1:47 PM   Result Value Ref Range    ABO/RH(D) O POS     Antibody Screen Negative Negative    SPECIMEN EXPIRATION DATE [id number]    Hemoglobin    Collection Time: 11/15/23  3:25 PM   Result Value Ref Range    Hemoglobin 7.8 (L) 11.7 - 15.7 g/dL   Glucose by meter    Collection Time: 11/15/23  5:14 PM   Result Value Ref Range    GLUCOSE BY METER POCT 88 70 - 99 mg/dL        Imaging Reviewed    Total time spent was over 35 minutes.  This includes chart prep time, review of clinical data including notes from outside physicians, labs, review of medical imaging, discussion about  plan of care, documentation and .      Signed by: Aftab Herrera MD    This note has been dictated using voice recognition software. Any grammatical or context distortions are unintentional and inherent to the software

## 2023-11-15 NOTE — PROGRESS NOTES
ASSESSMENT:  1. Intra-abdominal abscess (H)    2. Perimenopausal symptoms        Mojgan Jimenez is a 53 year old female who is s/p laparoscopic sleeve gastrectomy with single anastomosis duodenal switch on 10/09/23 with return to the OR on 10/12/23 to repair two small enterotomies on the common channel. Patient became septic with ARF and developed pneumonia and pleural effusion necessitating chest tube placement. Chest tube removed on 11/05/23 and pulmonary signed off. Renal function improved and nephrology signed off but re-consulted 11/10 in setting of persistent hypertension with four agents on board. Positive for HIT and heme/onc consulted. Hemoglobin low but stable. [facility] has asked about blood transfusion and surgery has no objection to the patient getting blood.     Patient has been doing well with pureed diet but feels very limited. We will advance her to a phase 4 diet (bariatric soft) and drop her tube feeds to 25% of her caloric need. Hopefully, we can stop her tube feeds in the next 1-2 days if she tolerates diet advancement. We have asked the bariatric dietician to stop in to see the patient and advise her on what the phase 4 diet includes. She is welcome to have food brought in from outside the hospital as the variety of foods with the bariatric diets is VERY limited here in the hospital.     Continue to work toward rehab placement    PLAN:  Continue nocturnal tube feeds at 25% if caloric needs and advance to bariatric soft diet.  Continue calorie counts until further notice  Daily ADLs to be performed by patient; this includes tooth brushing, face washing, etc every morning  Up in chair for all meals and at least 1-2 walks per day on top of working with PT/OT  In chair for all meals  OK to bring patient's home food in like protein shakes, cottage cheese, etc. If it is now sugar, low fat and of a pureed consistency, the patient can have it!  Appreciate [facility] and nephrology help with HTN and medical  "issues    SUBJECTIVE:   She is feeling better today. Acknowledges that she had a \"down\" emotional day yesterday but today is better. She reports no pain. Continues to have loose stools. Nausea is better and oral intake has increased. She enjoyed the cottage cheese from home and would like to try some fruit cups. Refused therapies yesterday      Patient Vitals for the past 24 hrs:   BP Temp Temp src Pulse Resp SpO2   11/15/23 0730 (!) 167/80 97.8  F (36.6  C) Oral 89 18 96 %   11/14/23 2348 138/74 97.4  F (36.3  C) Oral 83 16 96 %   11/14/23 2037 -- -- -- 80 22 --   11/14/23 1531 (!) 164/79 98  F (36.7  C) Oral 84 20 98 %   11/14/23 1318 (!) 140/81 -- -- 85 -- --         PHYSICAL EXAM:  GEN: No acute distress, comfortable  LUNGS: CTA bilaterally  CV:RRR  ABD:soft, not tender, incisions healing well  EXT:No cyanosis, edema or obvious abnormalities    11/14 0700 - 11/15 0659  In: 2361 [P.O.:440; I.V.:30]  Out: 975 [Urine:975]    No results displayed because visit has over 200 results.             JUSTINE Woo CNP     "

## 2023-11-15 NOTE — PROGRESS NOTES
RENAL PROGRESS NOTE    CC:  ongoing HTN despite 4 antihypertensive agents       ASSESSMENT :     HTN:   Not on antihypertensives PTA. Persistent hypertension over the last few weeks despite multiple drugs.  Secondary HTN workup with renal US with Doppler unremarkable. UA bland, cortisol okay, UA/UPCR mild proteinuria. Had sharp drop in BP that was likely secondary to crushed nifedipine.  No obvious improvement in BP with nifedipine when not crushed compared to amlodipine.   ANA PAULA-Renin ratio 0.2  Currently on amlodipine 5 mg BID, carvedilol 25 mg BID, chlorthalidone 25 mg daily, spironolactone 25 mg twice daily  Had some BP improvement but now trending high again     Recs:   - Start clonidine 0.1 mg BID   - Start Bumex 2 mg daily       Recent Oliguric SUSSY:   Baseline creatinine 0.7 within normal limits no prior history of SUSSY or CKD.  Cr peaked at 6.97 on 10/14.  Had settled to low 1s.trending around her more recent baseline sCR 1.2.       Lytes:   stable     Acid/base:   Stable, may be mildly contracted, sl rise in C02 with diuresis.      Anemia:   Hgb drifting to 7s. Transfusing today per Primary team      H/O complicated Gastric Bypass:   Multiple complications post sleeve gastrectomy complicated by peritonitis and small bowel injury with peritoneal leak. Return to the OR 10/12/2023 with cleanout and drain placement complicated by other fluid collections requiring aspiration and drain placement. S/P ABX, ID following. 11/7/23 TF via NJ 11/7/23 bariatric full liquid diet.  Diet management per primary team bariatric surgery.  Remains on Tube feeding    Diarrhea:  W/up per [facility]    Thrombocytopenia:  + HIT, seen by  hematology and transitioned to fondaparinux, plt count now improving     SUBJECTIVE: Feeling more SOB with exertion, does feel she is urinating a lot but net + by I&O; will also be getting blood transfusion today. Discussed plan with RN at bedside.       OBJECTIVE:  Physical Exam   Temp: 97.8  F (36.6   C) Temp src: Oral BP: (!) 167/80 Pulse: 89   Resp: 18 SpO2: 96 % O2 Device: Nasal cannula Oxygen Delivery: 2 LPM  Vitals:    11/10/23 0751 11/11/23 0826 11/12/23 1008   Weight: 126 kg (277 lb 12.5 oz) 128.2 kg (282 lb 10.1 oz) 125.2 kg (276 lb 0.3 oz)     Vital Signs with Ranges  Temp:  [97.4  F (36.3  C)-98  F (36.7  C)] 97.8  F (36.6  C)  Pulse:  [80-89] 89  Resp:  [16-22] 18  BP: (138-167)/(74-81) 167/80  SpO2:  [96 %-98 %] 96 %  I/O last 3 completed shifts:  In: 2361 [P.O.:440; I.V.:30; NG/GT:1110]  Out: 975 [Urine:975]      No data found.    Intake/Output Summary (Last 24 hours) at 11/9/2023 1422  Last data filed at 11/9/2023 1301  Gross per 24 hour   Intake 3735 ml   Output 1000 ml   Net 2735 ml       PHYSICAL EXAM:  GEN: NAD, A&Ox3, + NG feeding tube  CV: RRR, BP elevated   Lung: clear and equal, on 3L per NC, sats upper 90's   Ab: soft and NT, obese  Ext: + edema bilateral LE, Hand edema R>L very minimal and well perfused  Skin: no rash  Wt trending down        LABORATORY STUDIES:     Recent Labs   Lab 11/14/23  0613 11/13/23  0636 11/12/23  0645 11/11/23  0521 11/10/23  0516   WBC 6.1 7.3 8.1 7.2  --    RBC 2.52* 2.66* 2.58* 2.51*  --    HGB 7.2* 7.6* 7.3* 7.2*  --    HCT 23.7* 24.7* 24.3* 24.0*  --     147* 117* 96* 106*       Basic Metabolic Panel:  Recent Labs   Lab 11/15/23  1152 11/15/23  0742 11/14/23  2119 11/14/23  1719 11/14/23  1152 11/14/23  0824 11/14/23  0613 11/13/23  1704 11/13/23  1404 11/12/23  0747 11/12/23  0645 11/11/23  0825 11/11/23  0521 11/10/23  0858 11/10/23  0516 11/09/23  0834 11/09/23  0635   NA  --   --   --   --   --   --  139  --  139  --  140  --  138  --  139  --  142   POTASSIUM  --   --   --   --   --   --  4.4  --  4.3  --  4.3  --  4.3  --  4.2  --  3.8   CHLORIDE  --   --   --   --   --   --  99  --  99  --  102  --  103  --  105  --  104   CO2  --   --   --   --   --   --  30*  --  30*  --  30*  --  27  --  27  --  27   BUN  --   --   --   --   --   --  23.0*   --  22.8*  --  25.1*  --  25.9*  --  31.2*  --  36.8*   CR  --   --   --   --   --   --  1.22*  --  1.25*  --  1.21*  --  1.24*  --  1.32*  --  1.47*   * 97 92 82 108* 100* 123*   < > 93   < > 124*   < > 121*   < > 111*   < > 110*   PALAK  --   --   --   --   --   --  8.9  --  8.9  --  8.7  --  9.2  --  9.1  --  9.0    < > = values in this interval not displayed.       INRNo lab results found in last 7 days.     Recent Labs   Lab Test 11/14/23  0613 11/13/23  0636 10/24/23  0333 10/23/23  0530 10/16/23  1055 10/16/23  0436   INR  --   --   --  1.49*  --  1.21*   WBC 6.1 7.3   < > 14.5*   < >  --    HGB 7.2* 7.6*   < > 9.2*   < >  --     147*   < > 427   < >  --     < > = values in this interval not displayed.       Personally reviewed current labs    Marlen Cintron PA-C   [facility]  [phone]

## 2023-11-15 NOTE — PLAN OF CARE
"  Problem: Risk for Delirium  Goal: Improved Sleep  Outcome: Progressing  Intervention: Promote Sleep  Recent Flowsheet Documentation  Taken 11/15/2023 0655 by Terri Berger, RN  Sleep/Rest Enhancement:   awakenings minimized   medication   room darkened   Goal Outcome Evaluation:         Pt is A/O X4.VS are ../74 (BP Location: Left arm)   Pulse 83   Temp 97.4  F (36.3  C) (Oral)   Resp 16   Ht 1.6 m (5' 3\")   Wt 125.2 kg (276 lb 0.3 oz)   LMP 09/09/2023 (Exact Date)   SpO2 96%   BMI 48.89 kg/m  ,pt slept well at night,pt is in NG tube feeding.  .Terri Berger RN                     "

## 2023-11-15 NOTE — PROGRESS NOTES
[facility] Hospitalist Progress Note  [facility]  Admission date: 10/9/2023    Summary:  Mojgan Jimenez is a 53-year-old woman with history of morbid obesity status post laparoscopic sleeve gastrectomy, severe JARVIS on CPAP, asthma, stimulant use disorder, stimulant induced psychosis, mood disorder and anxiety disorder who underwent scheduled laparoscopic sleeve gastrectomy on 10/9/2023.     Postoperative course was complicated by acute abdomen/peritonitis.  She subsequently underwent laparotomy with closure of 2 small bowel enterotomies, intra-abdominal cleanout and drain placement on 10/12/2023.  However, fluid collection was detected in the right upper quadrant on 10/19/2023 at which time MICKIE drain was placed and she subsequently underwent pelvic fluid aspiration on 10/24/2023.      She also developed suspected metabolic encephalopathy, septic shock requiring vasopressors, suspected takotsubo syndrome, acute hypoxic respiratory failure requiring intubation with brief ICU stay (10/12-10/21/23; reintubated 10/21-10/26/23), & oliguric SUSSY with renal recovery.  She was transferred to the telemetry floor on 10/27/2023. Due to persistent pleural effusion/concern for parapneumonic with risk of trapped lung, CT guided chest tube placement was performed on 11/1/23 and removed on 11/5/2023.    HIT screen done due to thrombocytopenia was positive, therefore she was placed on fondaparinux.  Serotonin release assay result is pending.  Hematologist is assisting with HIT management.    Per nephrologist, contrast exposure possibly contributed to SUSSY.  Renal function and volume status have improved and mentation has returned to baseline.      Assessment/Plan  Morbid obesity status post sleeve gastrectomy complicated by enterotomy & peritonitis   Elevated CRP  NG, completing Augmentin and fluconzaole course (completes 11/16)  Postoperative surgical management per surgery service.    Diarrhea  Possibly secondary to tube  feeds   Stool testing for C. difficile was negative.        Thrombocytopenia  Suspected HIT  +PF4. SSRA pending.    Follow-up serotonin release assay results  Continue fondaparinux  Hematology mjlguf-em-igizlatldt assistance      Resistant hypertension  Stress Cardiomyopathy with EF recovery  Amlodipine 5 mg twice daily  Carvedilol 25 mg twice daily  Chlorthalidone 12.5 mg daily  Spironolactone 25 mg daily  Nephrology omowpx-gf-krkztspmyr assistance    Suspected thrush  Nystatin, fluconazole     History of JARVIS, asthma in the chart no PFTs   Acute hypoxemic-hypercapnic respiratory failure  Improved  NIPPV with sleep/naps everytime.   Supplemental oxygen as needed  Continue duonebs     #Oliguric SUSSY - now possibly CKD3,  Cr stable.    #Anemia  Hemoglobin is trending down  Transfuse with 1 unit of PRBC  Continue to monitor hemoglobin    Addendum 4:20 PM  Repeat hemoglobin is 7.8.  Will continue to monitor hemoglobin for now and consider blood transfusion if hemoglobin is trending low closer to 7 or below 7.    #Hypervolemia  Improved with diuretics-continue chlorthalidone spironolactone     Hypernatremia resolved   Hyperglycemia of critical illness - improved   -Morbid obesity BMI 49  Acute metabolic and toxic encephalopathy Resolved  Severe decondition due to prolong hospitalization  PT/OT eval, PT recommending TCU, OT recommending LTACH/TCU  Case management working on discharge disposition          Checklist:  Code Status: Full Code    Diet: Calorie Counts  Bariatric Diet Soft  Adult Formula Drip Feeding: Specified Time Promote w fiber; Nasojejunal; Goal Rate: 35; mL/hr; From: 8:00 PM; To: 5:00 AM     DVT px:  arixtra    Disposition and Discharge Planning  Auto-populated from discharge tab:     Expected Discharge Date: 11/16/2023      Destination: nursing home           System Identified Risk Variables  Auto-populated based on system request - if needs to be addressed in treatment plan they will be addressed  "above:  \"  Clinically Significant Risk Factors              # Hypoalbuminemia: Lowest albumin = 2.7 g/dL at 10/14/2023  6:29 AM, will monitor as appropriate     # Hypertension: Noted on problem list        # Severe Obesity: Estimated body mass index is 46.47 kg/m  as calculated from the following:    Height as of this encounter: 1.6 m (5' 3\").    Weight as of this encounter: 119 kg (262 lb 5.6 oz).      # Asthma: noted on problem list        \"    Interval Events/Subjective/Notable results:  No new complaints today.  She had loose BM twice this morning.  She also had loose BM twice throughout yesterday.  She denies abdominal pain.  She denies dizziness, palpitation, chest pain or shortness of breath.        Objective    Vital signs in last 24 hours  Temp:  [97.4  F (36.3  C)-98  F (36.7  C)] 97.8  F (36.6  C)  Pulse:  [80-89] 89  Resp:  [16-22] 18  BP: (138-167)/(74-81) 167/80  SpO2:  [96 %-98 %] 96 % O2 Device: Nasal cannula    Weight:   262 lbs 5.56 oz  Body mass index is 46.47 kg/m .    Intake/Output last 3 shifts  I/O last 3 completed shifts:  In: 2361 [P.O.:440; I.V.:30; NG/GT:1110]  Out: 975 [Urine:975]    Physical Exam  General appearance: Morbidly obese, deconditioned, awake, Alert, Cooperative, not in any obvious distress and appears stated age   HEENT: Normocephalic, atraumatic, conjunctiva clear without icterus and ears without discharge  Lungs: Clear to auscultation bilaterally, no wheezing, good air exchange, normal work of breathing  Cardiovascular: Regular Rate and Rythm, normal apical impulse, normal S1 and S2, no lower extremity edema bilaterally  Abdomen: Partially healed midline laparotomy wound and trocar incisions.  Nontender.     Skin: Skin color, texture normal and bruising or bleeding. No rashes or lesions over face, neck, arms and legs, turgor normal.  Musculoskeletal: No bony deformities or joint tenderness. Normal ROM upon flexion & extension.   Neurologic: Alert & Oriented X 3, Facial " symmetry preserved.  Power 3/5 globally.  Psychiatric: Calm, normal eye contact and normal affect         Medical Decision Making        Prerna Baumann MD

## 2023-11-15 NOTE — PLAN OF CARE
Goal Outcome Evaluation:      Plan of Care Reviewed With: patient        Pt reported she felt down today, cried with her sister who was at bedside, supportive.  Pt refused PT today but promised she would work with them tomorrow. Pt declined getting out of bed but would shift and turn in bed.  Pt denies pain.  Poor oral intake, meds given through NG tube with flushes.  Pt had large bm this am, no other significant bm's, occasional smear during 12 hour shift.  Purewick in place, good urine output, see I&O.  Pt fed herself, cottage cheese and a few bites of jello, calorie count tallied for 12 hour shift.  Pt not liking pureed bariatric diet, meat pureed is too dry and doesn't care for the pureed berries.  Pt's sister bought cottage cheese and jello, labelled and dated, in the bottom drawer of refrigerator of the P2 kitchen, in a cub foods bag.

## 2023-11-15 NOTE — PROGRESS NOTES
CALORIE COUNT      Approximate Oral Intake for:    11/14  Calories: ~95 (7% estimated needs)  Protein: 17 gm (27% estimated needs)      Intake from TF:     Per NJ Promote with fiber @ 60 ml/hr x 9 hrs (providing 540 ml/Calories, 33.5 g protein, 75 g CHO, 7.5 g fiber, 449 ml free water plus H2O flush (270ml)= 719ml ( ~ 42% estimated Calorie needs and ~ 53 % estimated protein needs)        Estimated Needs:    Calories: 1300 - 1570  Protein: 63-78      Summary: Handwritten nursing note shows pt drank 4 oz Ensure Max and 1 oz cottage cheese (+ water and SF jello)     Daily intake total: 635 kcal and 50.5 gm protein (49% energy and 80% protein estimated needs)     Per Surgery note 11/15: PLAN:  Continue nocturnal tube feeds at 25% if caloric needs and advance to bariatric soft diet   Continue calorie counts until further notice  OK to bring patient's home food in like protein shakes, cottage cheese, etc. If it is now sugar, low fat and of a pureed consistency, the patient can have it!    Implementation:  Message to surgery asking about TF plan and wondering if should start Prosource TF20 to meet protein needs. Response: Drop TF to 25%-pt needs to be pushed more to eat and not rely on TF. Wants to see how she does today and tomorrow with expanding what she can eat, then decide from there. Bariatric RD saw her today and provided a list of what she can have.    Changed TF to 35 mL/hr x 9 hrs to provide: 315 mL, 315 kcals, 19.5 gm protein, 43 gm CHO, 4.4 gm fiber, and 261 mL free water which is 24% of estimated kcal and 31% of protein needs

## 2023-11-16 ENCOUNTER — APPOINTMENT (OUTPATIENT)
Dept: PHYSICAL THERAPY | Facility: HOSPITAL | Age: 53
End: 2023-11-16
Attending: SURGERY
Payer: COMMERCIAL

## 2023-11-16 LAB
ALBUMIN SERPL BCG-MCNC: 3.6 G/DL (ref 3.5–5.2)
ANION GAP SERPL CALCULATED.3IONS-SCNC: 11 MMOL/L (ref 7–15)
BUN SERPL-MCNC: 18.7 MG/DL (ref 6–20)
CALCIUM SERPL-MCNC: 9.2 MG/DL (ref 8.6–10)
CHLORIDE SERPL-SCNC: 93 MMOL/L (ref 98–107)
CREAT SERPL-MCNC: 1.25 MG/DL (ref 0.51–0.95)
DEPRECATED HCO3 PLAS-SCNC: 30 MMOL/L (ref 22–29)
EGFRCR SERPLBLD CKD-EPI 2021: 51 ML/MIN/1.73M2
GLUCOSE BLDC GLUCOMTR-MCNC: 104 MG/DL (ref 70–99)
GLUCOSE BLDC GLUCOMTR-MCNC: 83 MG/DL (ref 70–99)
GLUCOSE BLDC GLUCOMTR-MCNC: 84 MG/DL (ref 70–99)
GLUCOSE BLDC GLUCOMTR-MCNC: 91 MG/DL (ref 70–99)
GLUCOSE SERPL-MCNC: 106 MG/DL (ref 70–99)
HGB BLD-MCNC: 7.3 G/DL (ref 11.7–15.7)
PHOSPHATE SERPL-MCNC: 3.9 MG/DL (ref 2.5–4.5)
PLATELET # BLD AUTO: 184 10E3/UL (ref 150–450)
POTASSIUM SERPL-SCNC: 4 MMOL/L (ref 3.4–5.3)
SODIUM SERPL-SCNC: 134 MMOL/L (ref 135–145)

## 2023-11-16 PROCEDURE — 250N000013 HC RX MED GY IP 250 OP 250 PS 637: Performed by: INTERNAL MEDICINE

## 2023-11-16 PROCEDURE — 250N000013 HC RX MED GY IP 250 OP 250 PS 637: Performed by: SURGERY

## 2023-11-16 PROCEDURE — 120N000001 HC R&B MED SURG/OB

## 2023-11-16 PROCEDURE — 250N000013 HC RX MED GY IP 250 OP 250 PS 637: Performed by: NURSE PRACTITIONER

## 2023-11-16 PROCEDURE — 99232 SBSQ HOSP IP/OBS MODERATE 35: CPT | Performed by: PHYSICIAN ASSISTANT

## 2023-11-16 PROCEDURE — 85018 HEMOGLOBIN: CPT | Performed by: INTERNAL MEDICINE

## 2023-11-16 PROCEDURE — 99232 SBSQ HOSP IP/OBS MODERATE 35: CPT | Performed by: INTERNAL MEDICINE

## 2023-11-16 PROCEDURE — 80069 RENAL FUNCTION PANEL: CPT | Performed by: INTERNAL MEDICINE

## 2023-11-16 PROCEDURE — 999N000157 HC STATISTIC RCP TIME EA 10 MIN

## 2023-11-16 PROCEDURE — 85049 AUTOMATED PLATELET COUNT: CPT | Performed by: SURGERY

## 2023-11-16 PROCEDURE — 250N000013 HC RX MED GY IP 250 OP 250 PS 637: Performed by: PHYSICIAN ASSISTANT

## 2023-11-16 PROCEDURE — 250N000013 HC RX MED GY IP 250 OP 250 PS 637: Performed by: STUDENT IN AN ORGANIZED HEALTH CARE EDUCATION/TRAINING PROGRAM

## 2023-11-16 PROCEDURE — 250N000011 HC RX IP 250 OP 636: Mod: JZ | Performed by: STUDENT IN AN ORGANIZED HEALTH CARE EDUCATION/TRAINING PROGRAM

## 2023-11-16 PROCEDURE — 97110 THERAPEUTIC EXERCISES: CPT | Mod: GP

## 2023-11-16 PROCEDURE — 97116 GAIT TRAINING THERAPY: CPT | Mod: GP

## 2023-11-16 RX ORDER — CHLORTHALIDONE 25 MG/1
50 TABLET ORAL DAILY
Status: DISCONTINUED | OUTPATIENT
Start: 2023-11-17 | End: 2023-11-21 | Stop reason: HOSPADM

## 2023-11-16 RX ORDER — CLONIDINE 0.1 MG/24H
1 PATCH, EXTENDED RELEASE TRANSDERMAL WEEKLY
Status: DISCONTINUED | OUTPATIENT
Start: 2023-11-16 | End: 2023-11-21 | Stop reason: HOSPADM

## 2023-11-16 RX ADMIN — Medication 40 MG: at 08:16

## 2023-11-16 RX ADMIN — BUMETANIDE 2 MG: 2 TABLET ORAL at 08:16

## 2023-11-16 RX ADMIN — AMOXICILLIN AND CLAVULANATE POTASSIUM 875 MG: 400; 57 POWDER, FOR SUSPENSION ORAL at 08:16

## 2023-11-16 RX ADMIN — VENLAFAXINE 75 MG: 75 TABLET ORAL at 12:52

## 2023-11-16 RX ADMIN — CARVEDILOL 25 MG: 12.5 TABLET, FILM COATED ORAL at 17:18

## 2023-11-16 RX ADMIN — FONDAPARINUX SODIUM 10 MG: 10 INJECTION, SOLUTION SUBCUTANEOUS at 17:18

## 2023-11-16 RX ADMIN — NYSTATIN 500000 UNITS: 100000 SUSPENSION ORAL at 20:04

## 2023-11-16 RX ADMIN — SPIRONOLACTONE 25 MG: 25 TABLET ORAL at 17:18

## 2023-11-16 RX ADMIN — ACETAMINOPHEN 650 MG: 325 TABLET ORAL at 12:57

## 2023-11-16 RX ADMIN — AMLODIPINE BESYLATE 10 MG: 5 TABLET ORAL at 08:16

## 2023-11-16 RX ADMIN — FLUCONAZOLE 400 MG: 40 POWDER, FOR SUSPENSION ORAL at 08:16

## 2023-11-16 RX ADMIN — CHLORTHALIDONE 25 MG: 25 TABLET ORAL at 08:16

## 2023-11-16 RX ADMIN — CARVEDILOL 25 MG: 12.5 TABLET, FILM COATED ORAL at 08:16

## 2023-11-16 RX ADMIN — AMOXICILLIN AND CLAVULANATE POTASSIUM 875 MG: 400; 57 POWDER, FOR SUSPENSION ORAL at 20:04

## 2023-11-16 RX ADMIN — SPIRONOLACTONE 25 MG: 25 TABLET ORAL at 08:16

## 2023-11-16 RX ADMIN — CLONIDINE 1 PATCH: 0.1 PATCH TRANSDERMAL at 16:22

## 2023-11-16 RX ADMIN — VENLAFAXINE 75 MG: 75 TABLET ORAL at 17:18

## 2023-11-16 RX ADMIN — VENLAFAXINE 75 MG: 75 TABLET ORAL at 08:16

## 2023-11-16 RX ADMIN — CLONIDINE HYDROCHLORIDE 0.1 MG: 0.1 TABLET ORAL at 08:16

## 2023-11-16 ASSESSMENT — ACTIVITIES OF DAILY LIVING (ADL)
ADLS_ACUITY_SCORE: 34
ADLS_ACUITY_SCORE: 32
ADLS_ACUITY_SCORE: 34
ADLS_ACUITY_SCORE: 32
ADLS_ACUITY_SCORE: 34
ADLS_ACUITY_SCORE: 34
ADLS_ACUITY_SCORE: 32
ADLS_ACUITY_SCORE: 32

## 2023-11-16 NOTE — PROGRESS NOTES
[facility] Hospitalist Progress Note  [facility]  Admission date: 10/9/2023    Summary:  Mojgan Jimenez is a 53-year-old woman with history of morbid obesity status post laparoscopic sleeve gastrectomy, severe JARVIS on CPAP, asthma, stimulant use disorder, stimulant induced psychosis, mood disorder and anxiety disorder who underwent scheduled laparoscopic sleeve gastrectomy on 10/9/2023.     Postoperative course was complicated by acute abdomen/peritonitis.  She subsequently underwent laparotomy with closure of 2 small bowel enterotomies, intra-abdominal cleanout and drain placement on 10/12/2023.  However, fluid collection was detected in the right upper quadrant on 10/19/2023 at which time MICKIE drain was placed and she subsequently underwent pelvic fluid aspiration on 10/24/2023.      She also developed suspected metabolic encephalopathy, septic shock requiring vasopressors, suspected takotsubo syndrome, acute hypoxic respiratory failure requiring intubation with brief ICU stay (10/12-10/21/23; reintubated 10/21-10/26/23), & oliguric SUSSY with renal recovery.  She was transferred to the telemetry floor on 10/27/2023. Due to persistent pleural effusion/concern for parapneumonic with risk of trapped lung, CT guided chest tube placement was performed on 11/1/23 and removed on 11/5/2023.    HIT screen done due to thrombocytopenia was positive, therefore she was placed on fondaparinux.  Serotonin release assay result is pending.  Hematologist is assisting with HIT management.    Per nephrologist, contrast exposure possibly contributed to SUSSY.  Renal function and volume status have improved and mentation has returned to baseline.      Assessment/Plan  Morbid obesity status post sleeve gastrectomy complicated by enterotomy & peritonitis   Elevated CRP  NG, completing Augmentin and fluconzaole course (completes 11/16)  Postoperative surgical management per surgery service.    Diarrhea  Possibly secondary to tube  "feeds   Stool testing for C. difficile was negative.        Thrombocytopenia  Suspected HIT  +PF4. SSRA pending.    Follow-up serotonin release assay results  Continue fondaparinux  Hematology iuxlll-ra-iahkggbxbl assistance      Resistant hypertension  Stress Cardiomyopathy with EF recovery  Amlodipine 5 mg twice daily  Carvedilol 25 mg twice daily  Chlorthalidone 12.5 mg daily  Spironolactone 25 mg daily  Nephrology hktvov-xi-phhsdkdgub assistance    Suspected thrush  Nystatin, fluconazole     History of JARVIS, asthma in the chart no PFTs   Acute hypoxemic-hypercapnic respiratory failure  Improved  NIPPV with sleep/naps everytime.   Supplemental oxygen as needed  Continue duonebs     #Oliguric SUSSY - now possibly CKD3,  Cr stable.    #Anemia  Continue to monitor hemoglobin    #Hypervolemia  Improved with diuretics-continue chlorthalidone spironolactone     Hypernatremia resolved   Hyperglycemia of critical illness - improved   -Morbid obesity BMI 49  Acute metabolic and toxic encephalopathy Resolved  Severe decondition due to prolong hospitalization  PT/OT eval, PT recommending TCU, OT recommending LTACH/TCU  Case management working on discharge disposition          Checklist:  Code Status: Full Code    Diet: Calorie Counts  Bariatric Diet Soft  Adult Formula Drip Feeding: Specified Time Promote w fiber; Nasojejunal; Goal Rate: 35; mL/hr; From: 8:00 PM; To: 5:00 AM     DVT px:  arixtra    Disposition and Discharge Planning  Auto-populated from discharge tab:     Expected Discharge Date: 11/17/2023      Destination: nursing home           System Identified Risk Variables  Auto-populated based on system request - if needs to be addressed in treatment plan they will be addressed above:  \"  Clinically Significant Risk Factors              # Hypoalbuminemia: Lowest albumin = 2.7 g/dL at 10/14/2023  6:29 AM, will monitor as appropriate     # Hypertension: Noted on problem list        # Severe Obesity: Estimated body mass " "index is 43.98 kg/m  as calculated from the following:    Height as of this encounter: 1.6 m (5' 3\").    Weight as of this encounter: 112.6 kg (248 lb 4 oz).      # Asthma: noted on problem list        \"    Interval Events/Subjective/Notable results:  No new complaints today and no acute events overnight.  She states she is now passing formed stool. She does not want blood transfusion unless there is overt evidence of bleeding.  Denies abdominal pain.. Tolerating oral intake     Objective    Vital signs in last 24 hours  Temp:  [98.1  F (36.7  C)-98.8  F (37.1  C)] 98.8  F (37.1  C)  Pulse:  [82-86] 86  Resp:  [16-20] 16  BP: (131-167)/(72-92) 167/91  SpO2:  [97 %-100 %] 97 % O2 Device: Nasal cannula    Weight:   248 lbs 4 oz  Body mass index is 43.98 kg/m .    Intake/Output last 3 shifts  I/O last 3 completed shifts:  In: 1211 [P.O.:530; NG/GT:360]  Out: 1700 [Urine:1700]    Physical Exam  General appearance: Morbidly obese, deconditioned, awake, Alert, Cooperative, not in any obvious distress and appears stated age   HEENT: Normocephalic, atraumatic, conjunctiva clear without icterus and ears without discharge  Lungs: Clear to auscultation bilaterally, no wheezing, good air exchange, normal work of breathing  Cardiovascular: Regular Rate and Rythm, normal apical impulse, normal S1 and S2, no lower extremity edema bilaterally  Abdomen: Partially healed midline laparotomy wound and trocar incisions.  Nontender.     Skin: Skin color, texture normal and bruising or bleeding. No rashes or lesions over face, neck, arms and legs, turgor normal.  Musculoskeletal: No bony deformities or joint tenderness. Normal ROM upon flexion & extension.   Neurologic: Alert & Oriented X 3, Facial symmetry preserved.  Power 3/5 globally.  Psychiatric: Calm, normal eye contact and normal affect         Medical Decision Making        Prerna Baumann MD      "

## 2023-11-16 NOTE — PROGRESS NOTES
RENAL PROGRESS NOTE    CC:  ongoing HTN despite 4 antihypertensive agents     ASSESSMENT :     HTN:   Not on antihypertensives PTA. Persistent hypertension over the last few weeks despite multiple drugs.  Secondary HTN workup with renal US with Doppler unremarkable. UA bland, cortisol okay, UA/UPCR mild proteinuria. Had sharp drop in BP that was likely secondary to crushed nifedipine.  No obvious improvement in BP with nifedipine when not crushed compared to amlodipine.   ANA PAULA-Renin ratio 0.2  Currently on amlodipine 5 mg BID, carvedilol 25 mg BID, chlorthalidone 25 mg daily, spironolactone 25 mg twice daily, Bumex 2 mg daily, clonidine 0.1 mg BID  BP has remained elevated despite daily BP med adjustments     Recs:   - ? Absorption of oral antihypertensives with recent gastric bypass   - Stop oral clonidine  - Start 0.1 mg clonidine patch   - Increase chlorthalidone to 50 mg daily   - Continue Bumex      Recent Oliguric SUSSY:   Baseline creatinine 0.7 within normal limits no prior history of SUSSY or CKD.  Cr peaked at 6.97 on 10/14.  Had settled to low 1s.trending around her more recent baseline sCR 1.2.       Lytes:   stable     Acid/base:   Stable, serum bicarb trending slightly higher but stable      Anemia:   Hgb drifting to 7s. S/p pRBC's 11/15.      H/O complicated Gastric Bypass:   Multiple complications post sleeve gastrectomy complicated by peritonitis and small bowel injury with peritoneal leak. Return to the OR 10/12/2023 with cleanout and drain placement complicated by other fluid collections requiring aspiration and drain placement. S/P ABX, ID following. 11/7/23 TF via NJ 11/7/23 bariatric full liquid diet.  Diet management per primary team bariatric surgery.  Remains on Tube feeding    Diarrhea:  W/up per HS    Thrombocytopenia:  + HIT screen but serotonin assay negative, seen by  hematology and transitioned to fondaparinux, plt count now improving     SUBJECTIVE: BP remains elevated. Had better  UO with Bumex started. Wt is down significantly by standing scale today. Discussed case with Dr. Botello today and will start clonidine patch.     OBJECTIVE:  Physical Exam   Temp: 98.8  F (37.1  C) Temp src: Oral BP: (!) 167/91 Pulse: 86   Resp: 16 SpO2: 97 % O2 Device: Nasal cannula Oxygen Delivery: 2 LPM  Vitals:    11/12/23 1008 11/15/23 1249 11/16/23 0939   Weight: 125.2 kg (276 lb 0.3 oz) 119 kg (262 lb 5.6 oz) 112.6 kg (248 lb 4 oz)     Vital Signs with Ranges  Temp:  [98.1  F (36.7  C)-98.8  F (37.1  C)] 98.8  F (37.1  C)  Pulse:  [82-86] 86  Resp:  [16-20] 16  BP: (131-167)/(72-92) 167/91  SpO2:  [97 %-100 %] 97 %  I/O last 3 completed shifts:  In: 1211 [P.O.:530; NG/GT:360]  Out: 1700 [Urine:1700]      Patient Vitals for the past 72 hrs:   Weight   11/16/23 0939 112.6 kg (248 lb 4 oz)   11/15/23 1249 119 kg (262 lb 5.6 oz)       Intake/Output Summary (Last 24 hours) at 11/9/2023 1422  Last data filed at 11/9/2023 1301  Gross per 24 hour   Intake 3735 ml   Output 1000 ml   Net 2735 ml       PHYSICAL EXAM:  GEN: NAD, A&Ox3, + NG feeding tube  CV: RRR, BP elevated   Lung: clear and equal, on 2L per NC, sats upper 90's   Ab: soft and NT, obese  Ext: + edema bilateral LE, Hand edema R>L very minimal and well perfused  Skin: no rash  Wt trending down      LABORATORY STUDIES:     Recent Labs   Lab 11/16/23  0543 11/15/23  1525 11/14/23  0613 11/13/23  0636 11/12/23  0645 11/11/23  0521 11/10/23  0516   WBC  --   --  6.1 7.3 8.1 7.2  --    RBC  --   --  2.52* 2.66* 2.58* 2.51*  --    HGB 7.3* 7.8* 7.2* 7.6* 7.3* 7.2*  --    HCT  --   --  23.7* 24.7* 24.3* 24.0*  --      --  153 147* 117* 96* 106*       Basic Metabolic Panel:  Recent Labs   Lab 11/16/23  1015 11/16/23  0806 11/15/23  2105 11/15/23  1714 11/15/23  1152 11/15/23  0742 11/14/23  0824 11/14/23  0613 11/13/23  1704 11/13/23  1404 11/12/23  0747 11/12/23  0645 11/11/23  0825 11/11/23  0521 11/10/23  0858 11/10/23  0516   *  --   --   --   --    --   --  139  --  139  --  140  --  138  --  139   POTASSIUM 4.0  --   --   --   --   --   --  4.4  --  4.3  --  4.3  --  4.3  --  4.2   CHLORIDE 93*  --   --   --   --   --   --  99  --  99  --  102  --  103  --  105   CO2 30*  --   --   --   --   --   --  30*  --  30*  --  30*  --  27  --  27   BUN 18.7  --   --   --   --   --   --  23.0*  --  22.8*  --  25.1*  --  25.9*  --  31.2*   CR 1.25*  --   --   --   --   --   --  1.22*  --  1.25*  --  1.21*  --  1.24*  --  1.32*   * 91 87 88 104* 97   < > 123*   < > 93   < > 124*   < > 121*   < > 111*   PALAK 9.2  --   --   --   --   --   --  8.9  --  8.9  --  8.7  --  9.2  --  9.1    < > = values in this interval not displayed.       INRNo lab results found in last 7 days.     Recent Labs   Lab Test 11/16/23  0543 11/15/23  1525 11/14/23  0613 11/13/23  0636 10/24/23  0333 10/23/23  0530 10/16/23  1055 10/16/23  0436   INR  --   --   --   --   --  1.49*  --  1.21*   WBC  --   --  6.1 7.3   < > 14.5*   < >  --    HGB 7.3* 7.8* 7.2* 7.6*   < > 9.2*   < >  --      --  153 147*   < > 427   < >  --     < > = values in this interval not displayed.       Personally reviewed current labs    Marlen Cintron PA-C   [facility]  [phone]

## 2023-11-16 NOTE — PROGRESS NOTES
"ASSESSMENT:  1. Intra-abdominal abscess (H)    2. Perimenopausal symptoms        Mojgan Jimenez is a 53 year old female who is s/p laparoscopic sleeve gastrectomy with single anastomosis duodenal switch on 10/09/23 with return to the OR on 10/12/23 to repair two small enterotomies on the common channel. Patient became septic with ARF and developed pneumonia and pleural effusion necessitating chest tube placement. Chest tube removed on 11/05/23 and pulmonary signed off. Renal function improved and nephrology signed off but re-consulted 11/10 in setting of persistent hypertension with four agents on board.     Patient tolerating oral intake without nausea but continues to worry about eating too much. There is no anatomical or physical reason she can't take in her calories orally, but there are some challenges with tolerance of the pressure/full feeling of her new anatomy that she needs to get used to. This will come with increased oral intake and identification of what she tolerates and what she doesn't. Some of this is simply trial and error while assuring the patient that this is actually a normal part of the process post-bariatric surgery.     Family to bring in food. List of approved foods was given to patient yesterday by bariatric dietician    PLAN:  Continue 25% tube feeds tonight; Anticipate that we will discontinue to tube feeds tomorrow and remove the Dobb Chelsy tube  Increase activity  Get patient up to the shower and have her participate in her own ADLs  Get patient up to commode rather than using a brief or purewick whenever possible; she will need to be able to do this at rehab  From a surgical standpoint, she is ready for TCU    SUBJECTIVE:   She is feeling better today. Did well with food yesterday. Is a bit worried about eating \"too much\". Denies any nausea or vomiting with oral intake. She does report nausea with movement in the morning. She walked down the prater yesterday and is excited to continue to " increase her activity. Would like to try to get in the shower.      Patient Vitals for the past 24 hrs:   BP Temp Temp src Pulse Resp SpO2 Weight   11/16/23 0939 -- -- -- -- -- -- 112.6 kg (248 lb 4 oz)   11/16/23 0732 (!) 167/91 98.8  F (37.1  C) Oral 86 16 97 % --   11/16/23 0330 131/72 -- -- -- -- -- --   11/15/23 2357 (!) 148/76 98.1  F (36.7  C) Oral 82 20 97 % --   11/15/23 2012 (!) 151/82 -- -- -- -- -- --   11/15/23 1732 (!) 146/85 -- -- 85 -- -- --   11/15/23 1722 (!) 162/92 98.6  F (37  C) Oral 84 18 100 % --   11/15/23 1320 (!) 158/80 -- -- -- -- -- --   11/15/23 1249 -- -- -- -- -- -- 119 kg (262 lb 5.6 oz)         PHYSICAL EXAM:  GEN: No acute distress, comfortable  LUNGS: CTA bilaterally  CV:RRR  ABD:soft, not tender, incisions healing well  EXT:No cyanosis, edema or obvious abnormalities    11/15 0700 - 11/16 0659  In: 1211 [P.O.:530]  Out: 1700 [Urine:1700]    No results displayed because visit has over 200 results.             JUSTINE Woo CNP

## 2023-11-16 NOTE — PLAN OF CARE
Goal Outcome Evaluation:                      Problem: Plan of Care - These are the overarching goals to be used throughout the patient stay.    Goal: Optimal Comfort and Wellbeing  Outcome: Progressing   Pt is alert and oriented, able to make needs known. Pt denies any pain or discomfort overnight. NJ flushed per orders, tolerated tube feeding overnight. Pt continues to have loose stools. Purewick in place for incontience 500ml output. Pt has been resting inbetween cares.  Felecia Hayes RN

## 2023-11-16 NOTE — PROGRESS NOTES
"Care Management Follow Up    Length of Stay (days): 38    Expected Discharge Date: 11/16/2023     Concerns to be Addressed: discharge planning     Patient plan of care discussed at interdisciplinary rounds: Yes    Anticipated Discharge Disposition: [facility]     Anticipated Discharge Services: None  Anticipated Discharge DME:  NA    Patient/family educated on Medicare website which has current facility and service quality ratings: yes  Education Provided on the Discharge Plan: Yes  Patient/Family in Agreement with the Plan: yes    Referrals Placed by CM/SW: Post Acute Facilities  Private pay costs discussed: Not applicable    Additional Information:  ARU stated patient is appropriate and following. Awaiting Medically Cleared.     Social HX: \"Patient lives with her adult daughter and is independent with all activities of daily living and instrumental activities of daily living (IADLs) at baseline. She is currently unemployed but planned to resume working after recovery. Daughter Karla is primary family contact.\"     CM will continue to follow care progression and aide in discharge planning as needed.     11:07 AM - RNCM discussed with NP from Surgery, also discussed with ARU liaison, no bed anticipated available until early next week. Liaison will review for eligibility.     Andria Multani RN      "

## 2023-11-16 NOTE — PLAN OF CARE
Problem: Plan of Care - These are the overarching goals to be used throughout the patient stay.    Goal: Absence of Hospital-Acquired Illness or Injury  Intervention: Identify and Manage Fall Risk  Recent Flowsheet Documentation  Taken 11/15/2023 1732 by Naty Nguyen RN  Safety Promotion/Fall Prevention: safety round/check completed   Pt has been alert and oriented x4.  Problem: Plan of Care - These are the overarching goals to be used throughout the patient stay.    Goal: Optimal Comfort and Wellbeing  Outcome: Progressing   PRN TY given for headache with good relief.    Problem: Hypertension Acute  Goal: Blood Pressure Within Desired Range  Intervention: Normalize Blood Pressure  Recent Flowsheet Documentation  Taken 11/15/2023 1732 by Naty Nguyen RN  Medication Review/Management: medications reviewed  Latest AL=971/82. Scheduled BP meds given.  Problem: Activity Intolerance  Goal: Enhanced Capacity and Energy  Intervention: Optimize Activity Tolerance  Recent Flowsheet Documentation  Taken 11/15/2023 1732 by Naty Nguyen RN  Activity Management:   back to bed   up in chair  Environmental Support:   calm environment promoted   comfort object encouraged   distractions minimized   Up in chair for 3 hours.   Problem: Bariatric Surgery  Goal: Blood Glucose Level Within Desired Range  Intervention: Optimize Glycemic Control  Recent Flowsheet Documentation  Taken 11/15/2023 1732 by Naty Nguyen RN  Glycemic Management: blood glucose monitored   BG=88 and 87.  Problem: Bariatric Surgery  Goal: Effective Urinary Elimination  Outcome: Progressing   Voiding well. Purewick on.  Problem: Enteral Nutrition  Goal: Absence of Aspiration Signs and Symptoms  Outcome: Progressing  Feeding tube flushed and enteral feeding started at 2030h.

## 2023-11-16 NOTE — PROGRESS NOTES
CALORIE COUNT      Approximate Oral Intake for:    11/15 and breakfast today/ 23  Breakfast today: 4 bites of omelet and 120 mL of 1% milk  (91 calories, 7 gm protein)    11/15/23  Breakfast: 2 oz cream of wheat - no meal ticket, % not recorded  Lunch:30 mL cup of turkey burger sybil, other items 0%, 52 calories, 4 gm protein  Supper: turkey burger, mashed potatoes,gravy, ns pudding -only carbohydrates recorded not,%'s of items eaten - calories and protein estimated = 100 calories, 5 gm protein  Supplement: 50 mL Ensure Max ~54 calories, 8 gm protein    Calories: ~206 (16% estimated needs)  Protein: 17 gm (27% estimated needs)  530 mL fluids per I/O      Intake from TF:     Per NJ Promote with fiber @ 35 mL/hr x 9 hrs to provide: 315 mL, 315 kcals, 19.5 gm protein, 43 gm CHO, 4.4 gm fiber, and 261 mL free water which is 24% of estimated kcal and 31% of protein needs      Estimated Needs:    Calories: 1300 - 1570  Protein: 63-78      Summary: Flowsheet nursing note shows pt drank 50 mL Ensure Max.  Daily intake total oral and tube feedin kcal and 37 gm protein (40% energy and 59% protein estimated needs)     Pt busy with Surgery when RD visited.    Per Surgery note 11/15: PLAN:  Continue nocturnal tube feeds at 25% if caloric needs and advance to bariatric soft diet   Continue calorie counts until further notice  OK to bring patient's home food in like protein shakes, cottage cheese, etc. If it is now sugar, low fat and of a pureed consistency, the patient can have it!    Surgery to let RD know if Prosource TF 20 should be added per tube.    Implementation:  Continue TF as ordered, unless Surgery would like RD to make changes.

## 2023-11-16 NOTE — PLAN OF CARE
Pt reported feeling exhausted today. She did participate with PT this morning. She was able to ambulate in her room and was able to have a BM on the toilet. Pt then sat up in the chair for 20 minutes before asking to go back to bed.   Pt reports feeling the NJ tube in the back of her throat more today and that it sometimes gives her the sensation that she's going to throw up although she did not end up having an emesis.   Pt able to eat bites of scrambled eggs with breakfast and bites of ground turkey with lunch. She was also able to drink a small amount of 1% milk.   Tylenol given this afternoon for a headache. Pt reports this was helpful.  Chlorthalidone dose increased beginning tomorrow am.

## 2023-11-17 ENCOUNTER — APPOINTMENT (OUTPATIENT)
Dept: PHYSICAL THERAPY | Facility: HOSPITAL | Age: 53
End: 2023-11-17
Attending: SURGERY
Payer: COMMERCIAL

## 2023-11-17 ENCOUNTER — APPOINTMENT (OUTPATIENT)
Dept: OCCUPATIONAL THERAPY | Facility: HOSPITAL | Age: 53
End: 2023-11-17
Attending: SURGERY
Payer: COMMERCIAL

## 2023-11-17 ENCOUNTER — APPOINTMENT (OUTPATIENT)
Dept: RADIOLOGY | Facility: HOSPITAL | Age: 53
End: 2023-11-17
Attending: INTERNAL MEDICINE
Payer: COMMERCIAL

## 2023-11-17 LAB
ANION GAP SERPL CALCULATED.3IONS-SCNC: 9 MMOL/L (ref 7–15)
BACTERIA BRONCH: ABNORMAL
BACTERIA BRONCH: NO GROWTH
BASE EXCESS BLDV CALC-SCNC: 8.5 MMOL/L
BUN SERPL-MCNC: 19.9 MG/DL (ref 6–20)
CALCIUM SERPL-MCNC: 9.3 MG/DL (ref 8.6–10)
CHLORIDE SERPL-SCNC: 93 MMOL/L (ref 98–107)
CREAT SERPL-MCNC: 1.3 MG/DL (ref 0.51–0.95)
DEPRECATED HCO3 PLAS-SCNC: 33 MMOL/L (ref 22–29)
EGFRCR SERPLBLD CKD-EPI 2021: 49 ML/MIN/1.73M2
ERYTHROCYTE [DISTWIDTH] IN BLOOD BY AUTOMATED COUNT: 14.7 % (ref 10–15)
GLUCOSE BLDC GLUCOMTR-MCNC: 100 MG/DL (ref 70–99)
GLUCOSE BLDC GLUCOMTR-MCNC: 117 MG/DL (ref 70–99)
GLUCOSE BLDC GLUCOMTR-MCNC: 87 MG/DL (ref 70–99)
GLUCOSE BLDC GLUCOMTR-MCNC: 99 MG/DL (ref 70–99)
GLUCOSE SERPL-MCNC: 104 MG/DL (ref 70–99)
HCO3 BLDV-SCNC: 32 MMOL/L (ref 24–30)
HCT VFR BLD AUTO: 25 % (ref 35–47)
HGB BLD-MCNC: 7.9 G/DL (ref 11.7–15.7)
HOLD SPECIMEN: NORMAL
MCH RBC QN AUTO: 28.5 PG (ref 26.5–33)
MCHC RBC AUTO-ENTMCNC: 31.6 G/DL (ref 31.5–36.5)
MCV RBC AUTO: 90 FL (ref 78–100)
OXYHGB MFR BLDV: 80.4 % (ref 70–75)
PCO2 BLDV: 46 MM HG (ref 35–50)
PH BLDV: 7.46 [PH] (ref 7.35–7.45)
PLATELET # BLD AUTO: 220 10E3/UL (ref 150–450)
PO2 BLDV: 46 MM HG (ref 25–47)
POTASSIUM SERPL-SCNC: 3.9 MMOL/L (ref 3.4–5.3)
PREALB SERPL-MCNC: 21.1 MG/DL (ref 20–40)
PROCALCITONIN SERPL IA-MCNC: 0.35 NG/ML
RBC # BLD AUTO: 2.77 10E6/UL (ref 3.8–5.2)
SAO2 % BLDV: 81.8 % (ref 70–75)
SODIUM SERPL-SCNC: 135 MMOL/L (ref 135–145)
WBC # BLD AUTO: 6.8 10E3/UL (ref 4–11)

## 2023-11-17 PROCEDURE — 120N000001 HC R&B MED SURG/OB

## 2023-11-17 PROCEDURE — 82805 BLOOD GASES W/O2 SATURATION: CPT | Performed by: INTERNAL MEDICINE

## 2023-11-17 PROCEDURE — 36415 COLL VENOUS BLD VENIPUNCTURE: CPT | Performed by: NURSE PRACTITIONER

## 2023-11-17 PROCEDURE — 97110 THERAPEUTIC EXERCISES: CPT | Mod: GO

## 2023-11-17 PROCEDURE — 97535 SELF CARE MNGMENT TRAINING: CPT | Mod: GO

## 2023-11-17 PROCEDURE — 97110 THERAPEUTIC EXERCISES: CPT | Mod: GP

## 2023-11-17 PROCEDURE — 250N000011 HC RX IP 250 OP 636: Mod: JZ | Performed by: STUDENT IN AN ORGANIZED HEALTH CARE EDUCATION/TRAINING PROGRAM

## 2023-11-17 PROCEDURE — 250N000013 HC RX MED GY IP 250 OP 250 PS 637: Performed by: INTERNAL MEDICINE

## 2023-11-17 PROCEDURE — 250N000013 HC RX MED GY IP 250 OP 250 PS 637: Performed by: NURSE PRACTITIONER

## 2023-11-17 PROCEDURE — 250N000013 HC RX MED GY IP 250 OP 250 PS 637: Performed by: SURGERY

## 2023-11-17 PROCEDURE — 80048 BASIC METABOLIC PNL TOTAL CA: CPT | Performed by: NURSE PRACTITIONER

## 2023-11-17 PROCEDURE — 36415 COLL VENOUS BLD VENIPUNCTURE: CPT | Performed by: INTERNAL MEDICINE

## 2023-11-17 PROCEDURE — 84134 ASSAY OF PREALBUMIN: CPT | Performed by: NURSE PRACTITIONER

## 2023-11-17 PROCEDURE — 250N000013 HC RX MED GY IP 250 OP 250 PS 637: Performed by: STUDENT IN AN ORGANIZED HEALTH CARE EDUCATION/TRAINING PROGRAM

## 2023-11-17 PROCEDURE — 99232 SBSQ HOSP IP/OBS MODERATE 35: CPT | Performed by: PHYSICIAN ASSISTANT

## 2023-11-17 PROCEDURE — 250N000013 HC RX MED GY IP 250 OP 250 PS 637: Performed by: PHYSICIAN ASSISTANT

## 2023-11-17 PROCEDURE — 99232 SBSQ HOSP IP/OBS MODERATE 35: CPT | Performed by: INTERNAL MEDICINE

## 2023-11-17 PROCEDURE — 84145 PROCALCITONIN (PCT): CPT | Performed by: INTERNAL MEDICINE

## 2023-11-17 PROCEDURE — 85027 COMPLETE CBC AUTOMATED: CPT | Performed by: NURSE PRACTITIONER

## 2023-11-17 PROCEDURE — 71045 X-RAY EXAM CHEST 1 VIEW: CPT

## 2023-11-17 RX ADMIN — VENLAFAXINE 75 MG: 75 TABLET ORAL at 11:55

## 2023-11-17 RX ADMIN — CARVEDILOL 25 MG: 12.5 TABLET, FILM COATED ORAL at 16:50

## 2023-11-17 RX ADMIN — ALBUTEROL SULFATE 2 PUFF: 90 AEROSOL, METERED RESPIRATORY (INHALATION) at 21:42

## 2023-11-17 RX ADMIN — CARVEDILOL 25 MG: 12.5 TABLET, FILM COATED ORAL at 08:20

## 2023-11-17 RX ADMIN — ALBUTEROL SULFATE 2 PUFF: 90 AEROSOL, METERED RESPIRATORY (INHALATION) at 16:49

## 2023-11-17 RX ADMIN — FONDAPARINUX SODIUM 10 MG: 10 INJECTION, SOLUTION SUBCUTANEOUS at 18:15

## 2023-11-17 RX ADMIN — NYSTATIN 500000 UNITS: 100000 SUSPENSION ORAL at 21:35

## 2023-11-17 RX ADMIN — ALBUTEROL SULFATE 2 PUFF: 90 AEROSOL, METERED RESPIRATORY (INHALATION) at 10:15

## 2023-11-17 RX ADMIN — VENLAFAXINE 75 MG: 75 TABLET ORAL at 16:50

## 2023-11-17 RX ADMIN — CHLORTHALIDONE 50 MG: 25 TABLET ORAL at 08:20

## 2023-11-17 RX ADMIN — SPIRONOLACTONE 25 MG: 25 TABLET ORAL at 08:20

## 2023-11-17 RX ADMIN — BUMETANIDE 2 MG: 2 TABLET ORAL at 08:20

## 2023-11-17 RX ADMIN — SPIRONOLACTONE 25 MG: 25 TABLET ORAL at 18:15

## 2023-11-17 RX ADMIN — VENLAFAXINE 75 MG: 75 TABLET ORAL at 08:20

## 2023-11-17 RX ADMIN — ACETAMINOPHEN 650 MG: 325 TABLET ORAL at 08:30

## 2023-11-17 RX ADMIN — AMLODIPINE BESYLATE 10 MG: 5 TABLET ORAL at 08:20

## 2023-11-17 RX ADMIN — Medication 40 MG: at 08:19

## 2023-11-17 ASSESSMENT — ACTIVITIES OF DAILY LIVING (ADL)
ADLS_ACUITY_SCORE: 32
ADLS_ACUITY_SCORE: 38
ADLS_ACUITY_SCORE: 37
ADLS_ACUITY_SCORE: 38
ADLS_ACUITY_SCORE: 32
ADLS_ACUITY_SCORE: 38
ADLS_ACUITY_SCORE: 37
ADLS_ACUITY_SCORE: 32
ADLS_ACUITY_SCORE: 32
ADLS_ACUITY_SCORE: 38
ADLS_ACUITY_SCORE: 32
ADLS_ACUITY_SCORE: 38

## 2023-11-17 NOTE — PROGRESS NOTES
General Surgery Progress Note  Hospital Day # 39    Mojgan Jimenez is a 53 year old female who is s/p laparoscopic sleeve gastrectomy with single anastomosis duodenal switch on 10/09/23 with return to the OR on 10/12/23 to repair two small enterotomies on the common channel. Patient became septic with ARF and developed pneumonia and pleural effusion necessitating chest tube placement. Chest tube removed on 11/05/23 and pulmonary signed off. Renal function improved and nephrology signed off but re-consulted 11/10 in setting of persistent hypertension with four agents on board     Subjective:   Pt is feeling better today but she very much wants the Dobbhoff tube out and to move along with taking in food on her own.    Vitals:    11/16/23 1504 11/16/23 1622 11/16/23 1718 11/17/23 0055   BP: (!) 143/81 139/74 (!) 142/73 138/73   BP Location: Left arm Left arm Left arm Left arm   Pulse: 87  84 79   Resp: 20   22   Temp: 97.9  F (36.6  C)      TempSrc: Oral      SpO2: 97%   99%   Weight:       Height:           Physical Exam:  Lungs:  CTA  CV:       RRR  Ab:       Soft, + BS,            Component  Ref Range & Units 1 d ago    Prealbumin  20.0 - 40.0 mg/dL 21.1          Recent Labs   Lab 11/16/23  0543 11/15/23  1525 11/14/23  0613   WBC  --   --  6.1   HGB 7.3*   < > 7.2*   HCT  --   --  23.7*     --  153    < > = values in this interval not displayed.       Recent Labs   Lab 11/16/23  1015   *   CO2 30*   BUN 18.7   ALBUMIN 3.6       Assessment:  Pt is progressing well, and nutritional status looks solid.  We will plan to remove her Dobbhoff tube and work on oral feeding and continue rehabilitation.    Plan: Discontinue Dobbhoff tube.    Hoang Worthy MD  [facility]  [phone]

## 2023-11-17 NOTE — PROGRESS NOTES
"Care Management Follow Up    Length of Stay (days): 39    Expected Discharge Date: 11/20/2023     Concerns to be Addressed: discharge planning     Patient plan of care discussed at interdisciplinary rounds: Yes    Anticipated Discharge Disposition: [facility]     Anticipated Discharge Services: None  Anticipated Discharge DME:  NA    Patient/family educated on Medicare website which has current facility and service quality ratings: yes  Education Provided on the Discharge Plan: Yes  Patient/Family in Agreement with the Plan: yes    Referrals Placed by CM/SW: Post Acute Facilities  Private pay costs discussed: Not applicable    Additional Information:  ARU stated patient is appropriate and following, no bed available until next week. Awaiting medically clearance.     Social HX: \"Patient lives with her adult daughter and is independent with all activities of daily living and instrumental activities of daily living (IADLs) at baseline. She is currently unemployed but planned to resume working after recovery. Daughter Karla is primary family contact.\"     CM will continue to follow care progression and aide in discharge planning as needed.     Andria Multani RN      "

## 2023-11-17 NOTE — PROGRESS NOTES
"Rehab Admissions:  Met with patient this am as PT is recommending Acute Rehab and patient is agreeable to [facility]. Provided education on ELOS, therapy expectations, goal for getting home, items to bring, visiting hours, room set up, physician involvement, and rehab nursing. Patient was very grateful for the opportunity of going to Acute Rehab as he goal is to \"walk normal again\". Left brochure with patient.     Thank you for the referral, we will continue to follow this patient for post acute placement. Please call if questions.     Determination of admission is based upon the patient's need for an intensive, interdisciplinary approach to rehabilitation, their ability to progress, their ability to tolerate intensive therapies, their need for daily physician supervision, their need for twenty four hour nursing assistance, and their ability and willingness to participate in such a program.    Shawna Wise MS, OTR/L  Rehab Liaison/  [facility]  11/17/2023    12:11 PM    "

## 2023-11-17 NOTE — PROGRESS NOTES
RENAL PROGRESS NOTE    CC:  ongoing HTN despite 4 antihypertensive agents     ASSESSMENT :     HTN:   Not on antihypertensives PTA. Persistent hypertension over the last few weeks despite multiple drugs.  Secondary HTN workup with renal US with Doppler unremarkable. UA bland, cortisol okay, UA/UPCR mild proteinuria. Had sharp drop in BP that was likely secondary to crushed nifedipine.  No obvious improvement in BP with nifedipine when not crushed compared to amlodipine.   ANA PAULA-Renin ratio 0.2  Currently on amlodipine 5 mg BID, carvedilol 25 mg BID, chlorthalidone 50 mg daily, spironolactone 25 mg twice daily, Bumex 2 mg daily, clonidine 0.1 mg patch  BP has been quite refractory to medication adjustment, ?absorption of oral medications with recent gastric bypass, finally making some headway now with clonidine patch and BP's improved today     Recs:   - No change to BP medications today   - Ok to discharge with current regimen from nephrology standpoint  - Will need close monitoring of BP at TCU, expect/hope with ongoing weight loss that she will be able to discontinue some meds eventually   - Nephrology will sign off, please re-consult if additional concerns/questions      Recent Oliguric SUSSY:   Baseline creatinine 0.7 within normal limits no prior history of SUSSY or CKD.  Cr peaked at 6.97 on 10/14.  Had settled to low 1s.trending around her more recent baseline sCR 1.2.       Lytes:   stable     Acid/base:   Stable, serum bicarb trending slightly higher but stable      Anemia:   Hgb drifting to 7s. S/p pRBC's 11/15.      H/O complicated Gastric Bypass:   Multiple complications post sleeve gastrectomy complicated by peritonitis and small bowel injury with peritoneal leak. Return to the OR 10/12/2023 with cleanout and drain placement complicated by other fluid collections requiring aspiration and drain placement. S/P ABX, ID following. 11/7/23 TF via NJ 11/7/23 bariatric full liquid diet.  Diet management per  primary team bariatric surgery.  Remains on Tube feeding    Diarrhea:  W/up per [facility]    Thrombocytopenia:  + HIT screen but serotonin assay negative, seen by  hematology and transitioned to fondaparinux, plt count now improving     SUBJECTIVE: Has a plate of food today, hopeful that she will get NG tube out possibly today. Breathing is ok but still on O2, hasn't tried to come off O2 yet but wondering if she could try when they pull NG tube.      OBJECTIVE:  Physical Exam   Temp: 98.2  F (36.8  C) Temp src: Oral BP: 137/77 Pulse: 77   Resp: 20 SpO2: 97 % O2 Device: Nasal cannula Oxygen Delivery: 2 LPM  Vitals:    11/12/23 1008 11/15/23 1249 11/16/23 0939   Weight: 125.2 kg (276 lb 0.3 oz) 119 kg (262 lb 5.6 oz) 112.6 kg (248 lb 4 oz)     Vital Signs with Ranges  Temp:  [97.9  F (36.6  C)-98.2  F (36.8  C)] 98.2  F (36.8  C)  Pulse:  [77-87] 77  Resp:  [20-22] 20  BP: (137-143)/(73-81) 137/77  SpO2:  [97 %-99 %] 97 %  I/O last 3 completed shifts:  In: 1096 [P.O.:470; NG/GT:270]  Out: 650 [Urine:650]      Patient Vitals for the past 72 hrs:   Weight   11/16/23 0939 112.6 kg (248 lb 4 oz)   11/15/23 1249 119 kg (262 lb 5.6 oz)       Intake/Output Summary (Last 24 hours) at 11/9/2023 1422  Last data filed at 11/9/2023 1301  Gross per 24 hour   Intake 3735 ml   Output 1000 ml   Net 2735 ml       PHYSICAL EXAM:  GEN: NAD, A&Ox3, + NG feeding tube  CV: RRR, BP elevated   Lung: clear and equal, on 2L per NC, sats upper 90's   Ab: soft and NT, obese  Ext: + edema bilateral LE   Skin: no rash  Wt trending down      LABORATORY STUDIES:     Recent Labs   Lab 11/17/23  0813 11/16/23  0543 11/15/23  1525 11/14/23  0613 11/13/23  0636 11/12/23  0645 11/11/23  0521   WBC 6.8  --   --  6.1 7.3 8.1 7.2   RBC 2.77*  --   --  2.52* 2.66* 2.58* 2.51*   HGB 7.9* 7.3* 7.8* 7.2* 7.6* 7.3* 7.2*   HCT 25.0*  --   --  23.7* 24.7* 24.3* 24.0*    184  --  153 147* 117* 96*       Basic Metabolic Panel:  Recent Labs   Lab 11/17/23  0814  11/17/23  0813 11/16/23 2011 11/16/23  1714 11/16/23  1140 11/16/23  1015 11/14/23  0824 11/14/23  0613 11/13/23  1704 11/13/23  1404 11/12/23  0747 11/12/23  0645 11/11/23  0825 11/11/23  0521   NA  --  135  --   --   --  134*  --  139  --  139  --  140  --  138   POTASSIUM  --  3.9  --   --   --  4.0  --  4.4  --  4.3  --  4.3  --  4.3   CHLORIDE  --  93*  --   --   --  93*  --  99  --  99  --  102  --  103   CO2  --  33*  --   --   --  30*  --  30*  --  30*  --  30*  --  27   BUN  --  19.9  --   --   --  18.7  --  23.0*  --  22.8*  --  25.1*  --  25.9*   CR  --  1.30*  --   --   --  1.25*  --  1.22*  --  1.25*  --  1.21*  --  1.24*   * 104* 83 84 104* 106*   < > 123*   < > 93   < > 124*   < > 121*   PALAK  --  9.3  --   --   --  9.2  --  8.9  --  8.9  --  8.7  --  9.2    < > = values in this interval not displayed.       INRNo lab results found in last 7 days.     Recent Labs   Lab Test 11/17/23  0813 11/16/23  0543 11/15/23  1525 11/14/23  0613 10/24/23  0333 10/23/23  0530 10/16/23  1055 10/16/23  0436   INR  --   --   --   --   --  1.49*  --  1.21*   WBC 6.8  --   --  6.1   < > 14.5*   < >  --    HGB 7.9* 7.3*   < > 7.2*   < > 9.2*   < >  --     184  --  153   < > 427   < >  --     < > = values in this interval not displayed.       Personally reviewed current labs    Marlen Cintron PA-C   [facility]  [phone]

## 2023-11-17 NOTE — PROGRESS NOTES
ASSESSMENT:  1. Intra-abdominal abscess (H)    2. Perimenopausal symptoms        Mojgan Jimenez is a 53 year old female who is s/p laparoscopic sleeve gastrectomy with single anastomosis duodenal switch on 10/09/23 with return to the OR on 10/12/23 to repair two small enterotomies on the common channel. Patient became septic with ARF and developed pneumonia and pleural effusion necessitating chest tube placement. Chest tube removed on 11/05/23 and pulmonary signed off. Renal function improved and nephrology signed off but re-consulted 11/10 in setting of persistent hypertension with four agents on board.      Patient tolerating oral intake without nausea but continues to worry about eating too much. There is no anatomical or physical reason she can't take in her calories orally, but there are some challenges with tolerance of the pressure/full feeling of her new anatomy that she needs to get used to. This will come with increased oral intake and identification of what she tolerates and what she doesn't. Some of this is simply trial and error while assuring the patient that this is actually a normal part of the process post-bariatric surgery. She has been able to increase her intake today and this is right where she should be at this point after a KO. Her albumin was 3.6 yesterday. We are awaiting a prealbumin to come back and if that is at an acceptable level, we will remove the NJ tube and discontinue her tube feeds.     Family to bring in food. List of approved foods was given to patient by bariatric dietician       PLAN:  Await prealbumin to determine if we will run tube feeds tonight or discontinue  Increase activity  Get patient up to the shower and have her participate in her own ADLs  Get patient up to commode rather than using a brief or purewick whenever possible; she will need to be able to do this at rehab  From a surgical standpoint, she is ready for ARU    SUBJECTIVE:   She is doing well. Tube is  "making her \"gag\" with eating and movement. She feels good other than the tube and wants it out as soon as possible.      Patient Vitals for the past 24 hrs:   BP Temp Temp src Pulse Resp SpO2   11/17/23 0732 137/77 98.2  F (36.8  C) Oral 77 20 97 %   11/17/23 0055 138/73 -- -- 79 22 99 %   11/16/23 1718 (!) 142/73 -- -- 84 -- --   11/16/23 1622 139/74 -- -- -- -- --   11/16/23 1504 (!) 143/81 97.9  F (36.6  C) Oral 87 20 97 %         PHYSICAL EXAM:  GEN: No acute distress, comfortable  LUNGS: CTA bilaterally  CV:RRR  ABD:soft, not tender, incisions healing well  EXT:No cyanosis, edema or obvious abnormalities    11/16 0700 - 11/17 0659  In: 1096 [P.O.:470]  Out: 650 [Urine:650]    No results displayed because visit has over 200 results.             Felecia Henderson, APRN CNP     "

## 2023-11-17 NOTE — PLAN OF CARE
"  Problem: Bariatric Surgery  Goal: Effective Oxygenation and Ventilation  Outcome: Progressing  Intervention: Optimize Oxygenation and Ventilation  Recent Flowsheet Documentation  Taken 11/17/2023 0300 by Terri Berger, RN  Head of Bed (HOB) Positioning: HOB at 30 degrees   Goal Outcome Evaluation:       Pt is A/OX4. VS are ./73 (BP Location: Left arm)   Pulse 79   Temp 97.9  F (36.6  C) (Oral)   Resp 22   Ht 1.6 m (5' 3\")   Wt 112.6 kg (248 lb 4 oz)   LMP 09/09/2023 (Exact Date)   SpO2 99%   BMI 43.98 kg/m    Pt is in O2 at 2.5 lit NC. Pt is in Bariatric soft diet.  .Terri Berger, RN                   "

## 2023-11-17 NOTE — PROGRESS NOTES
"CLINICAL NUTRITION SERVICES - REASSESSMENT NOTE     Nutrition Prescription    RECOMMENDATIONS FOR MDs/PROVIDERS TO ORDER:  Will follow for ? TF discontinuation    Recommendations already ordered by Registered Dietitian (RD):  Continue high protein supplements ( ensure max)    Future/Additional Recommendations:  Continue Calorie counts, encourage po intake, monitor labs     EVALUATION OF THE PROGRESS TOWARD GOALS   Diet: Bariatric soft  Nutrition Support: NJ TF: Promote with fiber @ 35 ml/hr x 9 hrs ( 8 pm to 5 am)  Providin Calories, 19.5 g protein, 4.4 g fiber, 43 g CHO, 261 ml free fluid which is 24% of estimated Calorie needs and 31% of estimated protein needs  Intake: 1 meal ticket saved for Calorie counts plus documentation included 4 bites of cottage cheese and 4 bites of scrambled eg ml 1% milk, bites of ground turkey, 1 bite mashed potato: ~ 177+ Calories and 14+ grams of protein ( pt may have had additional po intake that wasn't recorded ie ensure max ?)    Per surgery may discontinue TF today ( Felecia Henderson, APRN CNP)    ANTHROPOMETRICS  Height: 160 cm (5' 3\")  Most Recent Weight: 112.6 kg (248 lb 4 oz)    Weight History:   Wt Readings from Last 10 Encounters:   23 112.6 kg (248 lb 4 oz)   23 133.4 kg (294 lb 3.2 oz)   23 129.7 kg (286 lb)   23 129.2 kg (284 lb 12.8 oz)   11/10/22 129.3 kg (285 lb)   22 129.3 kg (285 lb)   22 132.6 kg (292 lb 4.8 oz)   22 131.7 kg (290 lb 6.4 oz)   06/15/22 132 kg (291 lb)   22 131.8 kg (290 lb 9.6 oz)     GI CONCERNS  Some abdominal discomfort when pt turns side to side  Normoactive BS  Last BM 2023 ( loose brown, diarrhea)    LABS  Reviewed ( ) Na 134 (L), K 4.0, Urea nitrogen 18.7, Cr 1.25 (H), phos 3.9, alb 3.6, Glu 106    Estimated nutrition needs:  Calories: 8116-5125 James/day  Protein 63-78 g/day    MEDICATIONS  Reviewed: norvasc, bumex, coreg, chlorthalidone, clonidine, fondaparinux, " novolog, nystatin, pantoprazole, spironolactone    CURRENT NUTRITION DIAGNOSIS  Inadequate oral intake related to poor appetite as evidenced by meeting < 50% Calorie and protein needs via po intake      INTERVENTIONS  Implementation  Will follow for surgery recommendations re: ? Discontinue TF today  Monitor po intake    Goals  Increase po intake    Monitoring/Evaluation  Progress toward goals will be monitored and evaluated per protocol.

## 2023-11-17 NOTE — PLAN OF CARE
Problem: Plan of Care - These are the overarching goals to be used throughout the patient stay.    Goal: Absence of Hospital-Acquired Illness or Injury  Intervention: Identify and Manage Fall Risk  Recent Flowsheet Documentation  Taken 11/16/2023 1540 by Naty Nguyen RN  Safety Promotion/Fall Prevention:   nonskid shoes/slippers when out of bed   patient and family education   safety round/check completed     Problem: Bariatric Surgery  Goal: Optimal Coping with Surgery  Intervention: Support and Optimize Psychosocial Response  Recent Flowsheet Documentation  Taken 11/16/2023 1540 by Naty Nguyen RN  Supportive Measures: active listening utilized   Pt refused to get out of bed.   Problem: Bariatric Surgery  Goal: Blood Glucose Level Within Desired Range  Intervention: Optimize Glycemic Control  Recent Flowsheet Documentation  Taken 11/16/2023 1540 by Naty Nguyen RN  Glycemic Management: blood glucose monitored  BG=84 and 83. NJ tube intact. Enteral tube feeding started at 2015h.

## 2023-11-17 NOTE — PROGRESS NOTES
[facility] Hospitalist Progress Note  [facility]  Admission date: 10/9/2023    Summary:  Mojgan Jimenez is a 53-year-old woman with history of morbid obesity status post laparoscopic sleeve gastrectomy, severe JARVIS on CPAP, asthma, stimulant use disorder, stimulant induced psychosis, mood disorder and anxiety disorder who underwent scheduled laparoscopic sleeve gastrectomy on 10/9/2023.     Postoperative course was complicated by acute abdomen/peritonitis.  She subsequently underwent laparotomy with closure of 2 small bowel enterotomies, intra-abdominal cleanout and drain placement on 10/12/2023.  However, fluid collection was detected in the right upper quadrant on 10/19/2023 at which time MICKIE drain was placed and she subsequently underwent pelvic fluid aspiration on 10/24/2023.      She also developed suspected metabolic encephalopathy, septic shock requiring vasopressors, suspected takotsubo syndrome, acute hypoxic respiratory failure requiring intubation with brief ICU stay (10/12-10/21/23; reintubated 10/21-10/26/23), & oliguric SUSSY with renal recovery.  She was transferred to the telemetry floor on 10/27/2023. Due to persistent pleural effusion/concern for parapneumonic with risk of trapped lung, CT guided chest tube placement was performed on 11/1/23 and removed on 11/5/2023.    HIT screen done due to thrombocytopenia was positive, therefore she was placed on fondaparinux.  Serotonin release assay result is pending.  Hematologist is assisting with HIT management.    Per nephrologist, contrast exposure possibly contributed to SUSSY.  Renal function and volume status have improved and mentation has returned to baseline.      Assessment/Plan  Morbid obesity status post sleeve gastrectomy complicated by enterotomy & peritonitis   Elevated CRP  Augmentin and fluconzaole course-completed 11/16/2023.  Postoperative surgical management per surgery service.  Continue tube feeding via NG tube-Will likely  "discontinue today    Diarrhea  Possibly secondary to tube feeds   Stool testing for C. difficile was negative.        Thrombocytopenia  Suspected HIT  +PF4. SSRA pending.    Follow-up serotonin release assay results  Continue fondaparinux  No indication for anticoagulation therapy at the time of discharge per hematologist.  Hematology [id number] assistance      Resistant hypertension  Stress Cardiomyopathy with EF recovery  Amlodipine 5 mg twice daily  Carvedilol 25 mg twice daily  Chlorthalidone 12.5 mg daily  Spironolactone 25 mg daily  Nephrologist signed off-11/17/2023    Suspected thrush  Nystatin, fluconazole     History of JARVIS, asthma in the chart no PFTs   Acute hypoxemic-hypercapnic respiratory failure  Improved  NIPPV with sleep/naps everytime.   Supplemental oxygen as needed  Continue duonebs  Follow-up chest x-ray     #Oliguric SUSSY - now possibly CKD3,  Cr stable.    #Anemia  Continue to monitor hemoglobin    #Hypervolemia  Improved with diuretics-continue chlorthalidone spironolactone     Hypernatremia resolved   Hyperglycemia of critical illness - improved   -Morbid obesity BMI 49  Acute metabolic and toxic encephalopathy Resolved  Severe decondition due to prolong hospitalization  PT/OT eval, PT recommending TCU, OT recommending LTACH/TCU  Case management working on discharge disposition              Checklist:  Code Status: Full Code    Diet: Bariatric Diet Soft  Adult Formula Drip Feeding: Specified Time Promote w fiber; Nasojejunal; Goal Rate: 35; mL/hr; From: 8:00 PM; To: 5:00 AM     DVT px:  arixtra    Disposition and Discharge Planning  Auto-populated from discharge tab:     Expected Discharge Date: 11/20/2023      Destination: nursing home           System Identified Risk Variables  Auto-populated based on system request - if needs to be addressed in treatment plan they will be addressed above:  \"  Clinically Significant Risk Factors              # Hypoalbuminemia: Lowest albumin = " "2.7 g/dL at 10/14/2023  6:29 AM, will monitor as appropriate     # Hypertension: Noted on problem list        # Severe Obesity: Estimated body mass index is 43.98 kg/m  as calculated from the following:    Height as of this encounter: 1.6 m (5' 3\").    Weight as of this encounter: 112.6 kg (248 lb 4 oz).      # Asthma: noted on problem list        \"    Interval Events/Subjective/Notable results:  She complained of shortness of breath with activity.  Seen during physical therapy session.  She also complained of throat discomfort attributed to the NG tube.      Objective    Vital signs in last 24 hours  Temp:  [97.9  F (36.6  C)-98.2  F (36.8  C)] 98.2  F (36.8  C)  Pulse:  [77-87] 77  Resp:  [20-22] 20  BP: (137-143)/(73-81) 137/77  SpO2:  [97 %-99 %] 97 % O2 Device: Nasal cannula    Weight:   248 lbs 4 oz  Body mass index is 43.98 kg/m .    Intake/Output last 3 shifts  I/O last 3 completed shifts:  In: 1096 [P.O.:470; NG/GT:270]  Out: 650 [Urine:650]    Physical Exam  General appearance: Morbidly obese, deconditioned, awake, Alert, Cooperative, not in any obvious distress and appears stated age   HEENT: Normocephalic, atraumatic, conjunctiva clear without icterus and ears without discharge  Lungs: Clear to auscultation bilaterally, no wheezing, good air exchange, normal work of breathing  Cardiovascular: Regular Rate and Rythm, normal apical impulse, normal S1 and S2, no lower extremity edema bilaterally  Abdomen: Partially healed midline laparotomy wound and trocar incisions.  Nontender.     Skin: Skin color, texture normal and bruising or bleeding. No rashes or lesions over face, neck, arms and legs, turgor normal.  Musculoskeletal: No bony deformities or joint tenderness. Normal ROM upon flexion & extension.   Neurologic: Alert & Oriented X 3, Facial symmetry preserved.  Power 3/5 globally.  Psychiatric: Calm, normal eye contact and normal affect         Medical Decision Making        Prerna Baumann MD      "

## 2023-11-17 NOTE — PLAN OF CARE
"Goal Outcome Evaluation:      Plan of Care Reviewed With: patient    Overall Patient Progress: improvingOverall Patient Progress: improving    Outcome Evaluation: c/o \"gagging\" feeling with NJ tube in place. No actual emesis. Tolerating bariatric soft diet- ate 25-30@ of meals and 3/4 of protein shake. Very irritated reagrding delay off lab results so we can remove NJ tube. Chair x1 hours. ARU met with pt in room to dicsuss next step.      "

## 2023-11-18 ENCOUNTER — APPOINTMENT (OUTPATIENT)
Dept: PHYSICAL THERAPY | Facility: HOSPITAL | Age: 53
End: 2023-11-18
Attending: SURGERY
Payer: COMMERCIAL

## 2023-11-18 LAB
GLUCOSE BLDC GLUCOMTR-MCNC: 115 MG/DL (ref 70–99)
GLUCOSE BLDC GLUCOMTR-MCNC: 96 MG/DL (ref 70–99)

## 2023-11-18 PROCEDURE — 97116 GAIT TRAINING THERAPY: CPT | Mod: GP

## 2023-11-18 PROCEDURE — 250N000011 HC RX IP 250 OP 636: Mod: JZ | Performed by: INTERNAL MEDICINE

## 2023-11-18 PROCEDURE — 250N000013 HC RX MED GY IP 250 OP 250 PS 637: Performed by: PHYSICIAN ASSISTANT

## 2023-11-18 PROCEDURE — 250N000013 HC RX MED GY IP 250 OP 250 PS 637: Performed by: STUDENT IN AN ORGANIZED HEALTH CARE EDUCATION/TRAINING PROGRAM

## 2023-11-18 PROCEDURE — 97110 THERAPEUTIC EXERCISES: CPT | Mod: GP

## 2023-11-18 PROCEDURE — 250N000013 HC RX MED GY IP 250 OP 250 PS 637: Performed by: INTERNAL MEDICINE

## 2023-11-18 PROCEDURE — 99232 SBSQ HOSP IP/OBS MODERATE 35: CPT | Performed by: INTERNAL MEDICINE

## 2023-11-18 PROCEDURE — 250N000013 HC RX MED GY IP 250 OP 250 PS 637: Performed by: NURSE PRACTITIONER

## 2023-11-18 PROCEDURE — 250N000011 HC RX IP 250 OP 636: Mod: JZ | Performed by: STUDENT IN AN ORGANIZED HEALTH CARE EDUCATION/TRAINING PROGRAM

## 2023-11-18 PROCEDURE — 120N000001 HC R&B MED SURG/OB

## 2023-11-18 PROCEDURE — 250N000011 HC RX IP 250 OP 636: Mod: JZ | Performed by: NURSE PRACTITIONER

## 2023-11-18 PROCEDURE — C9113 INJ PANTOPRAZOLE SODIUM, VIA: HCPCS | Mod: JZ | Performed by: INTERNAL MEDICINE

## 2023-11-18 PROCEDURE — 250N000013 HC RX MED GY IP 250 OP 250 PS 637: Performed by: SURGERY

## 2023-11-18 RX ADMIN — SPIRONOLACTONE 25 MG: 25 TABLET ORAL at 08:53

## 2023-11-18 RX ADMIN — ALBUTEROL SULFATE 2 PUFF: 90 AEROSOL, METERED RESPIRATORY (INHALATION) at 13:36

## 2023-11-18 RX ADMIN — PANTOPRAZOLE SODIUM 40 MG: 40 INJECTION, POWDER, FOR SOLUTION INTRAVENOUS at 10:04

## 2023-11-18 RX ADMIN — SPIRONOLACTONE 25 MG: 25 TABLET ORAL at 17:01

## 2023-11-18 RX ADMIN — VENLAFAXINE 75 MG: 75 TABLET ORAL at 11:25

## 2023-11-18 RX ADMIN — NYSTATIN 500000 UNITS: 100000 SUSPENSION ORAL at 21:15

## 2023-11-18 RX ADMIN — VENLAFAXINE 75 MG: 75 TABLET ORAL at 16:46

## 2023-11-18 RX ADMIN — CARVEDILOL 25 MG: 12.5 TABLET, FILM COATED ORAL at 08:51

## 2023-11-18 RX ADMIN — AMLODIPINE BESYLATE 10 MG: 5 TABLET ORAL at 08:53

## 2023-11-18 RX ADMIN — BUMETANIDE 2 MG: 2 TABLET ORAL at 08:54

## 2023-11-18 RX ADMIN — CHLORTHALIDONE 50 MG: 25 TABLET ORAL at 08:53

## 2023-11-18 RX ADMIN — VENLAFAXINE 75 MG: 75 TABLET ORAL at 08:52

## 2023-11-18 RX ADMIN — ONDANSETRON 4 MG: 2 INJECTION INTRAMUSCULAR; INTRAVENOUS at 10:03

## 2023-11-18 RX ADMIN — FONDAPARINUX SODIUM 10 MG: 10 INJECTION, SOLUTION SUBCUTANEOUS at 17:01

## 2023-11-18 RX ADMIN — CARVEDILOL 25 MG: 12.5 TABLET, FILM COATED ORAL at 16:46

## 2023-11-18 RX ADMIN — ALBUTEROL SULFATE 1 PUFF: 90 AEROSOL, METERED RESPIRATORY (INHALATION) at 08:53

## 2023-11-18 ASSESSMENT — ACTIVITIES OF DAILY LIVING (ADL)
ADLS_ACUITY_SCORE: 37
ADLS_ACUITY_SCORE: 37
ADLS_ACUITY_SCORE: 36
ADLS_ACUITY_SCORE: 37
ADLS_ACUITY_SCORE: 36
ADLS_ACUITY_SCORE: 37
ADLS_ACUITY_SCORE: 37

## 2023-11-18 NOTE — PROGRESS NOTES
"CALORIE COUNT      Approximate Oral Intake for:    11/17  No meal tickets saved.     Per flowsheets at 30% of mashed potatoes ~35 kcals and 1 gm protein    Per RN note, \"Tolerating bariatric soft diet- ate 25-30@ of meals and 3/4 of protein shake.\" Shake 112 kcals, 22.5 gm protein        Intake from TF:     NJ removed 11/17      Estimated Needs:    Calories: 1300 - 1570  Protein: 63-78      Summary: incomplete records. Review meal orders today-seems to be ordering more now that tube out, not \"gagging\" on tube.    "

## 2023-11-18 NOTE — PLAN OF CARE
Problem: Plan of Care - These are the overarching goals to be used throughout the patient stay.    Goal: Plan of Care Review  Description: The Plan of Care Review/Shift note should be completed every shift.  The Outcome Evaluation is a brief statement about your assessment that the patient is improving, declining, or no change.  This information will be displayed automatically on your shift note.  Outcome: Progressing     Problem: Bariatric Surgery  Goal: Nausea and Vomiting Relief  Intervention: Prevent or Manage Nausea and Vomiting  Recent Flowsheet Documentation  Taken 11/18/2023 0845 by Echo Anguiano, RN  Nausea/Vomiting Interventions: antiemetic     Problem: Activity Intolerance  Goal: Enhanced Capacity and Energy  Outcome: Progressing  Intervention: Optimize Activity Tolerance  Recent Flowsheet Documentation  Taken 11/18/2023 1022 by Echo Anguiano, RN  Activity Management: (walked only to door and refused chair)   ambulated outside room   patient refuses activity  Taken 11/18/2023 0845 by Echo Anguiano, RN  Activity Management: activity adjusted per tolerance       Goal Outcome Evaluation:    Patient saw PT in am; only walked to the door; declined to sit in chair then went back to bed. Teaching done.     Tolerated meals; Pt on calorie count.   Pt on bariatric soft diet. Poor appetite but it is improving.     First day that patient agreed to using a commode to void verus being incontinent .     Need to encourage patient to use commode only, because she had poor walking endurance.    Denies pain.     Nauseated once this morning and given zofran. Had a BM today and passing flatus.     She often refuses to ambulate; teaching done. She gets SOB with activity. Using inhaler prn.

## 2023-11-18 NOTE — PLAN OF CARE
Problem: Plan of Care - These are the overarching goals to be used throughout the patient stay.    Goal: Optimal Comfort and Wellbeing  Outcome: Progressing  Problem: Enteral Nutrition  Goal: Absence of Aspiration Signs and Symptoms  Outcome: Progressing  Problem: Comorbidity Management  Goal: Maintenance of Asthma Control  Outcome: Progressing  Intervention: Maintain Asthma Symptom Control      Goal Outcome Evaluation:     The NJ tube was removed this afternoon, pt denied pain, she refused to get out of bed for toileting, although she was encouraged to do so. Pt has some redness in her juancho area and requested of cream instead of the nystatin powder, the purewick was removed. She was incontinent of bowel and bladder x2. Please continue to encourage pt to use the toilet.

## 2023-11-18 NOTE — PROGRESS NOTES
"General Surgery Progress Note  Hospital Day # 40    Mojgan Jimenez is a 53 year old female who is s/p laparoscopic sleeve gastrectomy with single anastomosis duodenal switch on 10/09/23 with return to the OR on 10/12/23 to repair two small enterotomies on the common channel. Patient became septic with ARF and developed pneumonia and pleural effusion necessitating chest tube placement. Chest tube removed on 11/05/23 and pulmonary signed off. Renal function improved and nephrology signed off but re-consulted 11/10 in setting of persistent hypertension with four agents on board     Subjective:   Pt is feeling better today, she is happy to get the Dobbhoff tube out and is doing reasonably well with her oral intake.    BP (!) 135/96 (BP Location: Left arm)   Pulse 94   Temp 98.2  F (36.8  C) (Oral)   Resp 16   Ht 1.6 m (5' 3\")   Wt 112.6 kg (248 lb 4 oz)   LMP 09/09/2023 (Exact Date)   SpO2 96%   BMI 43.98 kg/m     Vitals:    11/17/23 1539 11/17/23 2004 11/17/23 2342 11/18/23 0739   BP: (!) 140/84 127/73 132/81 (!) 135/96   BP Location: Left arm Left arm Left arm Left arm   Pulse: 83 83 85 94   Resp: 20 20 20 16   Temp: 97.8  F (36.6  C) 98  F (36.7  C) 98  F (36.7  C) 98.2  F (36.8  C)   TempSrc: Oral Oral Oral Oral   SpO2: 96% 90% 95% 96%   Weight:       Height:           Physical Exam:  Lungs:  CTA  CV:       RRR  Ab:       Soft, + BS,            Component  Ref Range & Units 1 d ago    Prealbumin  20.0 - 40.0 mg/dL 21.1          Recent Labs   Lab 11/17/23  0813   WBC 6.8   HGB 7.9*   HCT 25.0*          Recent Labs   Lab 11/17/23  0813 11/16/23  1015    134*   CO2 33* 30*   BUN 19.9 18.7   ALBUMIN  --  3.6       Assessment:  Pt is progressing well, we need to continue to work on rehabilitation and taking in sufficient oral nutrition.    Plan: Continue treatment and work to get stronger with rehabilitation.    Hoang Worthy MD  [facility]  [phone]   "

## 2023-11-18 NOTE — PROGRESS NOTES
[facility] Hospitalist Progress Note  [facility]  Admission date: 10/9/2023    Summary:  Mojgan Jimenez is a 53-year-old woman with history of morbid obesity status post laparoscopic sleeve gastrectomy, severe JARVIS on CPAP, asthma, stimulant use disorder, stimulant induced psychosis, mood disorder and anxiety disorder who underwent scheduled laparoscopic sleeve gastrectomy on 10/9/2023.     Postoperative course was complicated by acute abdomen/peritonitis.  She subsequently underwent laparotomy with closure of 2 small bowel enterotomies, intra-abdominal cleanout and drain placement on 10/12/2023.  However, fluid collection was detected in the right upper quadrant on 10/19/2023 at which time MICKIE drain was placed and she subsequently underwent pelvic fluid aspiration on 10/24/2023.      She also developed suspected metabolic encephalopathy, septic shock requiring vasopressors, suspected takotsubo syndrome, acute hypoxic respiratory failure requiring intubation with brief ICU stay (10/12-10/21/23; reintubated 10/21-10/26/23), & oliguric SUSSY with renal recovery.  She was transferred to the telemetry floor on 10/27/2023. Due to persistent pleural effusion/concern for parapneumonic with risk of trapped lung, CT guided chest tube placement was performed on 11/1/23 and removed on 11/5/2023.    HIT screen done due to thrombocytopenia was positive, therefore she was placed on fondaparinux.  Serotonin release assay result is pending.  Hematologist is assisting with HIT management.    Per nephrologist, contrast exposure possibly contributed to SUSSY.  Renal function and volume status have improved and mentation has returned to baseline.      Assessment/Plan  Morbid obesity status post sleeve gastrectomy complicated by enterotomy & peritonitis   Elevated CRP  Augmentin and fluconzaole course-completed 11/16/2023.  Postoperative surgical management per surgery service.  Continue tube feeding via NG tube-Will likely  "discontinue today    Diarrhea  Possibly secondary to tube feeds   Stool testing for C. difficile was negative.        Thrombocytopenia  Suspected HIT  +PF4. SSRA pending.    Follow-up serotonin release assay results  Continue fondaparinux  No indication for anticoagulation therapy at the time of discharge per hematologist.  Hematology [id number] assistance      Resistant hypertension  Stress Cardiomyopathy with EF recovery  Amlodipine 5 mg twice daily  Carvedilol 25 mg twice daily  Chlorthalidone 12.5 mg daily  Spironolactone 25 mg daily  Nephrologist signed off-11/17/2023    Suspected thrush  Nystatin, fluconazole     History of JARVIS, asthma in the chart no PFTs   Acute hypoxemic-hypercapnic respiratory failure  Improved  NIPPV with sleep/naps everytime.   Supplemental oxygen as needed  Continue duonebs  Follow-up chest x-ray     #Oliguric SUSSY - now possibly CKD3,  Cr stable.    #Anemia  Continue to monitor hemoglobin    #Hypervolemia  Improved with diuretics-continue chlorthalidone spironolactone     Hypernatremia resolved   Hyperglycemia of critical illness - improved   -Morbid obesity BMI 49  Acute metabolic and toxic encephalopathy Resolved  Severe decondition due to prolong hospitalization  PT/OT eval, PT recommending TCU, OT recommending LTACH/TCU  Case management working on discharge disposition              Checklist:  Code Status: Full Code    Diet:       DVT px:  arixtra    Disposition and Discharge Planning  Auto-populated from discharge tab:     Expected Discharge Date: 11/20/2023, 12:00 PM    Destination: nursing home           System Identified Risk Variables  Auto-populated based on system request - if needs to be addressed in treatment plan they will be addressed above:  \"  Clinically Significant Risk Factors              # Hypoalbuminemia: Lowest albumin = 2.7 g/dL at 10/14/2023  6:29 AM, will monitor as appropriate     # Hypertension: Noted on problem list        # Severe Obesity: " "Estimated body mass index is 43.68 kg/m  as calculated from the following:    Height as of this encounter: 1.6 m (5' 3\").    Weight as of this encounter: 111.9 kg (246 lb 9.6 oz).      # Asthma: noted on problem list        \"    Interval Events/Subjective/Notable results:  No new complaints  No acute events overnight.  NG tube has been removed.  Tolerating oral intake.  She wants glucose checks discontinued.    Objective    Vital signs in last 24 hours  Temp:  [97.8  F (36.6  C)-98.2  F (36.8  C)] 98.2  F (36.8  C)  Pulse:  [83-94] 94  Resp:  [16-20] 16  BP: (127-140)/(73-96) 135/96  SpO2:  [90 %-96 %] 96 % O2 Device: Nasal cannula    Weight:   246 lbs 9.6 oz  Body mass index is 43.68 kg/m .    Intake/Output last 3 shifts  I/O last 3 completed shifts:  In: 640 [P.O.:550; NG/GT:90]  Out: 400 [Urine:400]    Physical Exam  General appearance: Morbidly obese, deconditioned, awake, Alert, Cooperative, not in any obvious distress and appears stated age   HEENT: Normocephalic, atraumatic, conjunctiva clear without icterus and ears without discharge  Lungs: Clear to auscultation bilaterally, no wheezing, good air exchange, normal work of breathing  Cardiovascular: Regular Rate and Rythm, normal apical impulse, normal S1 and S2, no lower extremity edema bilaterally  Abdomen: Partially healed midline laparotomy wound and trocar incisions.  Nontender.     Skin: Skin color, texture normal and bruising or bleeding. No rashes or lesions over face, neck, arms and legs, turgor normal.  Musculoskeletal: No bony deformities or joint tenderness. Normal ROM upon flexion & extension.   Neurologic: Alert & Oriented X 3, Facial symmetry preserved.  Power 3/5 globally.  Psychiatric: Calm, normal eye contact and normal affect         Medical Decision Making        Prerna Baumann MD      "

## 2023-11-18 NOTE — PLAN OF CARE
Goal Outcome Evaluation:              Problem: Plan of Care - These are the overarching goals to be used throughout the patient stay.    Goal: Optimal Comfort and Wellbeing  Outcome: Progressing   Pt is alert and oriented, able to make needs known. Pt denies any pain or discomfort overnight. Pt is incontinent of urine and stool.  Uneventful shift pt resting in between cares.  Felecia Hayes RN

## 2023-11-19 ENCOUNTER — APPOINTMENT (OUTPATIENT)
Dept: OCCUPATIONAL THERAPY | Facility: HOSPITAL | Age: 53
End: 2023-11-19
Attending: SURGERY
Payer: COMMERCIAL

## 2023-11-19 ENCOUNTER — APPOINTMENT (OUTPATIENT)
Dept: PHYSICAL THERAPY | Facility: HOSPITAL | Age: 53
End: 2023-11-19
Attending: SURGERY
Payer: COMMERCIAL

## 2023-11-19 LAB — PLATELET # BLD AUTO: 298 10E3/UL (ref 150–450)

## 2023-11-19 PROCEDURE — 99232 SBSQ HOSP IP/OBS MODERATE 35: CPT | Performed by: INTERNAL MEDICINE

## 2023-11-19 PROCEDURE — 250N000011 HC RX IP 250 OP 636: Mod: JZ | Performed by: STUDENT IN AN ORGANIZED HEALTH CARE EDUCATION/TRAINING PROGRAM

## 2023-11-19 PROCEDURE — 97110 THERAPEUTIC EXERCISES: CPT | Mod: GO

## 2023-11-19 PROCEDURE — 85049 AUTOMATED PLATELET COUNT: CPT | Performed by: SURGERY

## 2023-11-19 PROCEDURE — 250N000013 HC RX MED GY IP 250 OP 250 PS 637: Performed by: INTERNAL MEDICINE

## 2023-11-19 PROCEDURE — 97116 GAIT TRAINING THERAPY: CPT | Mod: GP

## 2023-11-19 PROCEDURE — 250N000013 HC RX MED GY IP 250 OP 250 PS 637: Performed by: NURSE PRACTITIONER

## 2023-11-19 PROCEDURE — 120N000001 HC R&B MED SURG/OB

## 2023-11-19 PROCEDURE — 250N000013 HC RX MED GY IP 250 OP 250 PS 637: Performed by: PHYSICIAN ASSISTANT

## 2023-11-19 PROCEDURE — 250N000013 HC RX MED GY IP 250 OP 250 PS 637: Performed by: STUDENT IN AN ORGANIZED HEALTH CARE EDUCATION/TRAINING PROGRAM

## 2023-11-19 PROCEDURE — 97110 THERAPEUTIC EXERCISES: CPT | Mod: GP

## 2023-11-19 PROCEDURE — 250N000013 HC RX MED GY IP 250 OP 250 PS 637: Performed by: SURGERY

## 2023-11-19 RX ADMIN — AMLODIPINE BESYLATE 10 MG: 5 TABLET ORAL at 08:59

## 2023-11-19 RX ADMIN — FONDAPARINUX SODIUM 10 MG: 10 INJECTION, SOLUTION SUBCUTANEOUS at 17:07

## 2023-11-19 RX ADMIN — CHLORTHALIDONE 50 MG: 25 TABLET ORAL at 09:00

## 2023-11-19 RX ADMIN — BUMETANIDE 2 MG: 2 TABLET ORAL at 08:59

## 2023-11-19 RX ADMIN — ALBUTEROL SULFATE 2 PUFF: 90 AEROSOL, METERED RESPIRATORY (INHALATION) at 09:02

## 2023-11-19 RX ADMIN — VENLAFAXINE 75 MG: 75 TABLET ORAL at 12:49

## 2023-11-19 RX ADMIN — VENLAFAXINE 75 MG: 75 TABLET ORAL at 09:00

## 2023-11-19 RX ADMIN — VENLAFAXINE 75 MG: 75 TABLET ORAL at 17:06

## 2023-11-19 RX ADMIN — CARVEDILOL 25 MG: 12.5 TABLET, FILM COATED ORAL at 17:07

## 2023-11-19 RX ADMIN — NYSTATIN 500000 UNITS: 100000 SUSPENSION ORAL at 21:10

## 2023-11-19 RX ADMIN — SPIRONOLACTONE 25 MG: 25 TABLET ORAL at 17:07

## 2023-11-19 RX ADMIN — SPIRONOLACTONE 25 MG: 25 TABLET ORAL at 08:59

## 2023-11-19 RX ADMIN — CARVEDILOL 25 MG: 12.5 TABLET, FILM COATED ORAL at 08:59

## 2023-11-19 ASSESSMENT — ACTIVITIES OF DAILY LIVING (ADL)
ADLS_ACUITY_SCORE: 35
ADLS_ACUITY_SCORE: 36
ADLS_ACUITY_SCORE: 35
ADLS_ACUITY_SCORE: 35
ADLS_ACUITY_SCORE: 36
ADLS_ACUITY_SCORE: 35
ADLS_ACUITY_SCORE: 36
ADLS_ACUITY_SCORE: 36
ADLS_ACUITY_SCORE: 35
ADLS_ACUITY_SCORE: 36

## 2023-11-19 NOTE — PLAN OF CARE
Goal Outcome Evaluation:      Plan of Care Reviewed With: patient    Overall Patient Progress: improvingOverall Patient Progress: improving    Outcome Evaluation: tolerating small soft meals. Good oral fluid intake. Continuing to encourage activty. Using BSC today as able. Denies pain and/or nausea.

## 2023-11-19 NOTE — PROGRESS NOTES
[facility] Hospitalist Progress Note  [facility]  Admission date: 10/9/2023    Summary:  Mojgan Jimenez is a 53-year-old woman with history of morbid obesity status post laparoscopic sleeve gastrectomy, severe JARVIS on CPAP, asthma, stimulant use disorder, stimulant induced psychosis, mood disorder and anxiety disorder who underwent scheduled laparoscopic sleeve gastrectomy on 10/9/2023.     Postoperative course was complicated by acute abdomen/peritonitis.  She subsequently underwent laparotomy with closure of 2 small bowel enterotomies, intra-abdominal cleanout and drain placement on 10/12/2023.  However, fluid collection was detected in the right upper quadrant on 10/19/2023 at which time MICKIE drain was placed and she subsequently underwent pelvic fluid aspiration on 10/24/2023.      She also developed suspected metabolic encephalopathy, septic shock requiring vasopressors, suspected takotsubo syndrome, acute hypoxic respiratory failure requiring intubation with brief ICU stay (10/12-10/21/23; reintubated 10/21-10/26/23), & oliguric SUSSY with renal recovery.  She was transferred to the telemetry floor on 10/27/2023. Due to persistent pleural effusion/concern for parapneumonic with risk of trapped lung, CT guided chest tube placement was performed on 11/1/23 and removed on 11/5/2023.    HIT screen done due to thrombocytopenia was positive, therefore she was placed on fondaparinux.  Serotonin release assay was negative.  Per hematologist, no indication for anticoagulation therapy at the time of discharge.     Per nephrologist, contrast exposure possibly contributed to SUSSY.  Renal function and volume status have improved and mentation has returned to baseline.  She is waiting for TCU placement.      Assessment/Plan  Morbid obesity status post sleeve gastrectomy complicated by enterotomy & peritonitis   Elevated CRP  Augmentin and fluconzaole course-completed 11/16/2023.  Postoperative surgical management per  "surgery service.  No longer requiring NG tube    Diarrhea  Possibly secondary to tube feeds   Stool testing for C. difficile was negative.        Thrombocytopenia  Suspected HIT  +PF4. SSRA pending.    Follow-up serotonin release assay results  Continue fondaparinux  No indication for anticoagulation therapy at the time of discharge per hematologist.  Hematology [id number] assistance      Resistant hypertension  Stress Cardiomyopathy with EF recovery  Amlodipine 5 mg twice daily  Carvedilol 25 mg twice daily  Chlorthalidone 12.5 mg daily  Spironolactone 25 mg daily  Nephrologist signed off-11/17/2023    Suspected thrush  Nystatin, fluconazole     History of JARVIS, asthma in the chart no PFTs   Acute hypoxemic-hypercapnic respiratory failure  Improved  NIPPV with sleep/naps everytime.   Supplemental oxygen as needed  Continue duonebs     #Oliguric SUSSY - now possibly CKD3,  Cr stable.    #Anemia  Continue to monitor hemoglobin    #Hypervolemia  Improved with diuretics-continue chlorthalidone spironolactone     Hypernatremia resolved   Hyperglycemia of critical illness - improved   -Morbid obesity BMI 49  Acute metabolic and toxic encephalopathy Resolved  Severe decondition due to prolong hospitalization  PT/OT eval, PT recommending TCU, OT recommending LTACH/TCU  Case management working on discharge disposition      Checklist:  Code Status: Full Code    Diet:       DVT px:  arixtra    Disposition and Discharge Planning  Auto-populated from discharge tab:     Expected Discharge Date: 11/20/2023, 12:00 PM    Destination: nursing home           System Identified Risk Variables  Auto-populated based on system request - if needs to be addressed in treatment plan they will be addressed above:  \"  Clinically Significant Risk Factors              # Hypoalbuminemia: Lowest albumin = 2.7 g/dL at 10/14/2023  6:29 AM, will monitor as appropriate     # Hypertension: Noted on problem list        # Severe Obesity: Estimated " "body mass index is 44.83 kg/m  as calculated from the following:    Height as of this encounter: 1.6 m (5' 3\").    Weight as of this encounter: 114.8 kg (253 lb 1.6 oz).      # Asthma: noted on problem list        \"    Interval Events/Subjective/Notable results:  No new complaints today and no acute events overnight.  Endorsed regular BM.  Tolerating oral intake; about to start eating breakfast.     11/19/23 11:56 AM  Attempt to reach patient's daughter on the phone was unsuccessful.  Left voice message.    Objective    Vital signs in last 24 hours  Temp:  [97.3  F (36.3  C)-98.3  F (36.8  C)] 97.3  F (36.3  C)  Pulse:  [78-94] 78  Resp:  [18-20] 20  BP: (151-173)/(75-80) 173/80  SpO2:  [93 %-94 %] 94 % O2 Device: Nasal cannula    Weight:   253 lbs 1.6 oz  Body mass index is 44.83 kg/m .    Intake/Output last 3 shifts  I/O last 3 completed shifts:  In: 310 [P.O.:310]  Out: 1040 [Urine:1040]    Physical Exam  General appearance: Morbidly obese, deconditioned, awake, Alert, Cooperative, not in any obvious distress and appears stated age   HEENT: Normocephalic, atraumatic, conjunctiva clear without icterus and ears without discharge  Lungs: Clear to auscultation bilaterally, no wheezing, good air exchange, normal work of breathing  Cardiovascular: Regular Rate and Rythm, normal apical impulse, normal S1 and S2, no lower extremity edema bilaterally  Abdomen: Partially healed midline laparotomy wound and trocar incisions.  Nontender.     Skin: Skin color, texture normal and bruising or bleeding. No rashes or lesions over face, neck, arms and legs, turgor normal.  Musculoskeletal: No bony deformities or joint tenderness. Normal ROM upon flexion & extension.   Neurologic: Alert & Oriented X 3, Facial symmetry preserved.  Power 3/5 globally.  Psychiatric: Flat affect       Medical Decision Making        Prerna Baumann MD      "

## 2023-11-19 NOTE — PROGRESS NOTES
"General Surgery Progress Note    Mojgan Jimenez is a 53 year old female who is s/p laparoscopic sleeve gastrectomy with single anastomosis duodenal switch on 10/09/23 with return to the OR on 10/12/23 to repair two small enterotomies on the common channel. Patient became septic with ARF and developed pneumonia and pleural effusion necessitating chest tube placement. Chest tube removed on 11/05/23 and pulmonary signed off. Renal function improved and nephrology signed off but re-consulted 11/10 in setting of persistent hypertension with four agents on board     Subjective:   Doing well, denies n/v, tolerating PO intake, minimal pain    BP (!) 173/80 (BP Location: Left arm)   Pulse 78   Temp 97.3  F (36.3  C) (Oral)   Resp 20   Ht 1.6 m (5' 3\")   Wt 111.9 kg (246 lb 9.6 oz)   LMP 09/09/2023 (Exact Date)   SpO2 94%   BMI 43.68 kg/m     Vitals:    11/18/23 0739 11/18/23 1017 11/18/23 1522 11/19/23 0726   BP: (!) 135/96  (!) 151/75 (!) 173/80   BP Location: Left arm  Left arm Left arm   Pulse: 94  94 78   Resp: 16  18 20   Temp: 98.2  F (36.8  C)  98.3  F (36.8  C) 97.3  F (36.3  C)   TempSrc: Oral  Oral Oral   SpO2: 96%  93% 94%   Weight:  111.9 kg (246 lb 9.6 oz)     Height:           Physical Exam:    Ab:       Soft, + BS, incisions healed           Component  Ref Range & Units 1 d ago    Prealbumin  20.0 - 40.0 mg/dL 21.1          Recent Labs   Lab 11/19/23  0542 11/17/23  0813   WBC  --  6.8   HGB  --  7.9*   HCT  --  25.0*    220       Recent Labs   Lab 11/17/23  0813 11/16/23  1015    134*   CO2 33* 30*   BUN 19.9 18.7   ALBUMIN  --  3.6       Assessment:  Pt is progressing well, we need to continue to work on rehabilitation and taking in sufficient oral nutrition.    Plan: Continue treatment and work to get stronger with rehabilitation.    Raymon Linares PA-C  [facility]  Surgery [facility]  [address] [place] [address] [address]  [place] [address]?  Office: " [phone]

## 2023-11-19 NOTE — PROGRESS NOTES
CALORIE COUNT      Approximate Oral Intake for:    11/18  Calories: 520  Protein: 38 gm    From 3 meals      Intake from TF:     NJ removed 11/17      Estimated Needs:    Calories: 1300 - 1570  Protein: 63-78      Summary: intake improving, ordering yogurt and milk every meal

## 2023-11-19 NOTE — PLAN OF CARE
Problem: Plan of Care - These are the overarching goals to be used throughout the patient stay.    Goal: Optimal Comfort and Wellbeing  Outcome: Progressing  Intervention: Monitor Pain and Promote Comfort  Recent Flowsheet Documentation  Taken 11/19/2023 0629 by Fran Whatley RN  Pain Management Interventions:   declines   repositioned  Taken 11/19/2023 0409 by Fran Whatley RN  Pain Management Interventions: rest  Taken 11/19/2023 0200 by Fran Whatley RN  Pain Management Interventions: rest  Taken 11/18/2023 2000 by Fran Whatley RN  Pain Management Interventions: declines     Problem: Risk for Delirium  Goal: Improved Sleep  Outcome: Progressing     Problem: Delirium  Goal: Improved Sleep  Outcome: Progressing     Problem: Comorbidity Management  Goal: Maintenance of Asthma Control  Outcome: Progressing  Intervention: Maintain Asthma Symptom Control  Recent Flowsheet Documentation  Taken 11/19/2023 0300 by Fran Whatley RN  Medication Review/Management: medications reviewed  Taken 11/18/2023 2000 by Fran Whatley RN  Medication Review/Management: medications reviewed    Goal Outcome Evaluation:  A&O x4. VSS ex HTN. Denies pain, nausea, and SOB. Up to commode at beginning of shift but wore brief overnight despite encouragement to continue ambulating to commode. 2x urinary incontinence. Removed brief for day shift, pt agreeable to using commode. Took Nystatin. Blood return noted in PICC except for red port. Uneventful shift.    Fran Whatley RN

## 2023-11-20 PROCEDURE — 250N000013 HC RX MED GY IP 250 OP 250 PS 637: Performed by: INTERNAL MEDICINE

## 2023-11-20 PROCEDURE — 250N000013 HC RX MED GY IP 250 OP 250 PS 637: Performed by: NURSE PRACTITIONER

## 2023-11-20 PROCEDURE — 250N000013 HC RX MED GY IP 250 OP 250 PS 637: Performed by: PHYSICIAN ASSISTANT

## 2023-11-20 PROCEDURE — 250N000013 HC RX MED GY IP 250 OP 250 PS 637: Performed by: STUDENT IN AN ORGANIZED HEALTH CARE EDUCATION/TRAINING PROGRAM

## 2023-11-20 PROCEDURE — 250N000013 HC RX MED GY IP 250 OP 250 PS 637: Performed by: SURGERY

## 2023-11-20 PROCEDURE — 250N000011 HC RX IP 250 OP 636: Mod: JZ | Performed by: STUDENT IN AN ORGANIZED HEALTH CARE EDUCATION/TRAINING PROGRAM

## 2023-11-20 PROCEDURE — 99232 SBSQ HOSP IP/OBS MODERATE 35: CPT | Performed by: STUDENT IN AN ORGANIZED HEALTH CARE EDUCATION/TRAINING PROGRAM

## 2023-11-20 PROCEDURE — 120N000001 HC R&B MED SURG/OB

## 2023-11-20 PROCEDURE — 250N000011 HC RX IP 250 OP 636: Performed by: NURSE PRACTITIONER

## 2023-11-20 RX ADMIN — CARVEDILOL 25 MG: 12.5 TABLET, FILM COATED ORAL at 17:50

## 2023-11-20 RX ADMIN — VENLAFAXINE 75 MG: 75 TABLET ORAL at 08:17

## 2023-11-20 RX ADMIN — NYSTATIN 500000 UNITS: 100000 SUSPENSION ORAL at 17:51

## 2023-11-20 RX ADMIN — FONDAPARINUX SODIUM 10 MG: 10 INJECTION, SOLUTION SUBCUTANEOUS at 17:51

## 2023-11-20 RX ADMIN — ONDANSETRON 4 MG: 4 TABLET, ORALLY DISINTEGRATING ORAL at 13:26

## 2023-11-20 RX ADMIN — ALBUTEROL SULFATE 1 PUFF: 90 AEROSOL, METERED RESPIRATORY (INHALATION) at 08:04

## 2023-11-20 RX ADMIN — SPIRONOLACTONE 25 MG: 25 TABLET ORAL at 17:51

## 2023-11-20 RX ADMIN — VENLAFAXINE 75 MG: 75 TABLET ORAL at 13:19

## 2023-11-20 RX ADMIN — CHLORTHALIDONE 50 MG: 25 TABLET ORAL at 08:04

## 2023-11-20 RX ADMIN — SPIRONOLACTONE 25 MG: 25 TABLET ORAL at 08:02

## 2023-11-20 RX ADMIN — VENLAFAXINE 75 MG: 75 TABLET ORAL at 17:51

## 2023-11-20 RX ADMIN — AMLODIPINE BESYLATE 10 MG: 5 TABLET ORAL at 08:03

## 2023-11-20 RX ADMIN — BUMETANIDE 2 MG: 2 TABLET ORAL at 08:02

## 2023-11-20 RX ADMIN — CARVEDILOL 25 MG: 12.5 TABLET, FILM COATED ORAL at 08:03

## 2023-11-20 RX ADMIN — NYSTATIN 500000 UNITS: 100000 SUSPENSION ORAL at 08:05

## 2023-11-20 ASSESSMENT — ACTIVITIES OF DAILY LIVING (ADL)
ADLS_ACUITY_SCORE: 34

## 2023-11-20 NOTE — PLAN OF CARE
Problem: Plan of Care - These are the overarching goals to be used throughout the patient stay.    Goal: Optimal Comfort and Wellbeing  Outcome: Progressing  Intervention: Monitor Pain and Promote Comfort  Recent Flowsheet Documentation  Taken 11/20/2023 0411 by Fran Whatley RN  Pain Management Interventions: rest  Taken 11/19/2023 2305 by Fran Whatley RN  Pain Management Interventions: declines     Problem: Bariatric Surgery  Goal: Optimal Coping with Surgery  Outcome: Progressing  Goal: Absence of Infection Signs and Symptoms  Outcome: Progressing  Goal: Effective Urinary Elimination  Outcome: Progressing  Goal: Effective Oxygenation and Ventilation  Outcome: Progressing  Intervention: Optimize Oxygenation and Ventilation  Recent Flowsheet Documentation  Taken 11/20/2023 0000 by Fran Whatley RN  Head of Bed (HOB) Positioning: HOB at 20-30 degrees    Goal Outcome Evaluation:  Denies pain. Requested to wear brief overnight. Up to BSC in morning, SOB and weakness noted. No blood return in red PICC port. No acute events.    Fran Whatley RN

## 2023-11-20 NOTE — INTERIM SUMMARY
[facility] Pre-Admission Screen    Referral Source:  [facility] P2 -49  Admit date to referring facility: 10/9/2023    Physical Medicine and Rehab Consult Completed: No    Rehab Diagnosis:    16 Debility (non-cardiac, non-pulmonary) - s/p laparoscopic sleeve gastrectomy with multiple medical complications including an ICU stay    Justification for Acute Inpatient Rehabilitation  Mojgan Jimenez is a 53-year-old woman with PMH of morbid obesity (BMI 44.83), severe JARVIS on CPAP, asthma, mood disorder and anxiety disorder who underwent scheduled laparoscopic sleeve gastrectomy on 10/9/2023. Her post-operative course was complicated by acute abdomen/peritonitis.   She subsequently underwent laparotomy with closure of 2 small bowel enterotomies, intra-abdominal cleanout and drain placement on 10/12/2023.  However, fluid collection was detected in the right upper quadrant on 10/19/2023 at which time MICKIE drain was placed and she subsequently underwent pelvic fluid aspiration on 10/24/2023.  She also developed suspected metabolic encephalopathy, septic shock requiring vasopressors, suspected takotsubo syndrome, acute hypoxic respiratory failure requiring intubation with brief ICU stay (10/12-10/21/23; reintubated 10/21-10/26/23), & oliguric SUSSY with renal recovery.  Due to persistent pleural effusion, a chest tube was placed 11/1-11/5.  She has also required medical mgmt of her persistent HTN despite four agents on board. She is now medically stable and ready to discharge to acute inpatient rehab.    At baseline pt works full time and is independent with mobility/cares. The patient requires transfer to [facility] for intensive therapies not available in a lesser level of care including PT/OT, ongoing medical management at least 3 days per week, and rehabilitative nursing care. The patient requires an intensive inpatient rehab program to address the following acute  impairments:impaired activity tolerance, impaired balance, impaired strength, and impaired weight shifting. Mojgan is an excellent ARC candidate due to her PLOF, motivation/participation and accessible home environment.     Current Active Medical Management Needs/Risks for Clinical Complications  The patient requires the high level of rehabilitation physician supervision that accompanies the provision of intensive rehabilitation therapy.  The patient needs the services of the rehabilitation physician to assess the patient medically and functionally and to modify the course of treatment as needed to maximize the patient's capacity to benefit from the rehabilitation process. Mojgan requires physician oversight at least 3x/week to manage the following:   Morbid obesity status post sleeve gastrectomy complicated by enterotomy & peritonitis: BMI of 41.90. Increased body habitus places the patient at increased risk for complications and mortality. Obesity is clinically significant d/t increased RN cares, use of resources, and specialty equipment. Augmentin and fluconzaole course-completed 11/16/2023.   Thrombocytopenia, Suspected HIT: +PF4. SSRA pending.Follow-up serotonin release assay results. Continue fondaparinux. No indication for anticoagulation therapy at the time of discharge per hematologist.  Resistant hypertension, stress Cardiomyopathy with EF recovery:  Nephrologist was following, signed off 11/17. Continue Amlodipine, carvedilol, chlorthaliadone, spironolactone.   Suspected thrush: Nystatin, fluconazole  History of JARVIS, asthma in the chart no PFTs, Acute hypoxemic-hypercapnic respiratory failure:  CPAP with sleep/naps everytime.  Continue duonebs. At risk for respiratory decompensation with progressive mobility. Continue to assess.   Oliguric SUSSY: now possibly CKD3,  Cr stable.     Hypervolemia: Improved with diuretics-continue chlorthalidone spironolactone   Fall risk: Patient with severe deconditioning due to  prolonged hospitalization. At risk for falls with or without injury due to gait/balance impairments.   DVT Prophylaxis: Arixtra     Past Medical/Surgical History  Surgery in the past 100 days: Yes  Additional relevant past medical history: morbid obesity s/p laparoscopic sleeve gastrectomy, severe JARVIS on CPAP, asthma, stimulant use disorder, stimulant induced psychosis, mood disorder and anxiety disorder    Level of Functioning Prior to Admission:  LIVING ENVIRONMENT  People in Home: child(jean claude), adult, grandchild(jean claude)  Current Living Arrangements: apartment  Home Accessibility: no concerns (there is an elevator)  Transportation Anticipated: family or friend will provide  Living Environment Comments: Tub/shower    SELF-CARE  Usual Activity Tolerance: moderate  Equipment Currently Used at Home: none  Activity/Exercise/Self-Care Comment: Prior to admission pt was completely ind with all ADLs and IADLs, working full time at [employer]    Level of Function: GG Scale (Section GG Functional Ability and Goals; CMS's WALDRON Version 3.0 Manual effective 10.1.2019):  PT Current Function Goals for Rehab   Bed Rolling 4 Supervision or touching assitance 6 Independent   Supine to Sit 4 Supervision or touching assitance 6 Independent   Sit to Stand 4 Supervision or touching assitance 6 Independent   Transfer 4 Supervision or touching assitance 6 Independent   Ambulation 4 Supervision or touching assitance 6 Independent   Stairs Not completed Not Applicable     OT Current Function Goals for Rehab   Feeding 5 Setup or clean-up assistance 6 Independent   Grooming Not completed 6 Independent   Bathing Not completed 6 Independent   Upper Body Dressing Not completed 6 Independent   Lower Body Dressing 2 Substantial/maximal assistance 6 Independent   Toileting Not completed 6 Independent   Toilet Transfer 4 Supervision or touching assitance 6 Independent   Tub/Shower Transfer 88 Not attempted due to safety 6 Independent   Cognition Not  Assessed Independent     SLP Current Function Goals for Rehab   Swallow Not Impaired Not applicable   Communication Not Impaired Not applicable     Current Diet:  Bariatric Soft diet    Summary Statement:  Mojgan currently has physical therapy and occupational therapy needs. She currently needs CGA to [facility] for STS transfers. Able to walk 30' with a FWW and CGA. Limited by fatigue. Requiring maxA for LB dressing. Patient will need a full ADL assessment upon admission to [facility].     Expected Therapies and Services Required During Inpatient Rehab Admission  Intensity of Therapy: Patient requires intensive therapies not available in a lesser level of care. Patient is motivated, making gains, and can tolerate 3 hours of therapy a day.  Physical Therapy: 90 minutes per day, 7 days a week for 7 days  Occupational Therapy: 90 minutes per day, 7 days a week for 7 days  Speech and Language Therapy: No SLP needs identified.   Rehabilitation Nursing Needs: Patient requires 24 hour Rehab Nursing to manage vitals, medication education, positioning, carryover of new rehab techniques, care coordination, assess neurologic status, post-surgical incision care to promote healing and prevent infection, provide safe environment for patient at falls risk, and monitor nutritional intake.    Precautions/restrictions/special needs:  Precautions: fall precautions and abdominal precautions  Restrictions: N/A  Special Needs: bariatric equipment    Expected Level of Improvement: Anticipate with intensive therapies, close medical management, and rehabilitative nursing care the patient will improve strength, balance, tolerance to activity, safety to ensure Mod I with basic mobility and ADL performance to allow return home with family assistance.   Expected Length of time to achieve: 7 days    Anticipated Discharge Needs:  Anticipated Discharge Destination: Home  Anticipated Discharge Support: Family member  24/7 support available : Unknown  Identified  caregiver(s):  Adult children  Anticipated Discharge Needs: Home with outpatient therapy    Identified challenges/barriers:  None anticipated.     Liaison signature/date/time:    Physician statement of review and agreement:  I have reviewed and am in agreement of the need for IRF stay to address above functional and medical needs. In addition to above statements address, Patient requires intensive active and ongoing therapeutic intervention and multiple therapies; Patient requires medical supervision; Expected to actively participate in the intensive rehab program; Sufficiently stable to actively participate; Expectation for measurable improvement in functional capacity or adaption to impairments.    MD signature/date/time:

## 2023-11-20 NOTE — PROGRESS NOTES
CALORIE COUNT      Approximate Oral Intake for:    11/19  No meal slips saved, information based on flow sheet %'s  Calories: 928  Protein: 57 gm    From 2 meals    400 mL po fluids recorded      Intake from TF:     NJ removed 11/17      Estimated Needs:    Calories: 1300 - 1570  Protein: 63-78      Summary: intake improving, pt says its easier to eat and drink without the feeding tube.

## 2023-11-20 NOTE — PROGRESS NOTES
"General Surgery Progress Note  Hospital Day # 40    Mojgan Jimenez is a 53 year old female who is s/p laparoscopic sleeve gastrectomy with single anastomosis duodenal switch on 10/09/23 with return to the OR on 10/12/23 to repair two small enterotomies on the common channel. Patient became septic with ARF and developed pneumonia and pleural effusion necessitating chest tube placement. Chest tube removed on 11/05/23 and pulmonary signed off. Renal function improved and nephrology signed off but re-consulted 11/10 in setting of persistent hypertension with four agents on board     Subjective:   Patient is managing well without the tube feedings.  She has some abdominal discomfort after taking her pills this morning.  That is been several hours ago now and she thinks she just starting to feel better from that.    /87 (BP Location: Right arm)   Pulse 78   Temp 97.6  F (36.4  C) (Oral)   Resp 20   Ht 1.6 m (5' 3\")   Wt 114.8 kg (253 lb 1.6 oz)   LMP 09/09/2023 (Exact Date)   SpO2 97%   BMI 44.83 kg/m     Vitals:    11/19/23 1521 11/19/23 2100 11/19/23 2305 11/20/23 0733   BP: (!) 174/82 (!) 151/82 (!) 141/81 139/87   BP Location: Right arm Right arm Right arm Right arm   Patient Position:  Semi-Ballard's     Cuff Size:  Adult Regular     Pulse: 78 76 74 78   Resp: 20 20 16 20   Temp: 98.2  F (36.8  C) 98.3  F (36.8  C) 98.1  F (36.7  C) 97.6  F (36.4  C)   TempSrc: Oral Oral Oral Oral   SpO2: 96%  98% 97%   Weight:       Height:           Physical Exam:  Lungs:  CTA  CV:       RRR  Ab:       Soft, + BS,            Component  Ref Range & Units 1 d ago    Prealbumin  20.0 - 40.0 mg/dL 21.1          Recent Labs   Lab 11/19/23  0542 11/17/23  0813   WBC  --  6.8   HGB  --  7.9*   HCT  --  25.0*    220       Recent Labs   Lab 11/17/23  0813 11/16/23  1015    134*   CO2 33* 30*   BUN 19.9 18.7   ALBUMIN  --  3.6       Assessment: Slow steady progress but she is now ambulatory on her own and managing " to keep yourself hydrated and get food in orally..    Plan: Continue treatment and work to get stronger with rehabilitation.  Discharge to rehab center when a space becomes available    Hoang Worthy MD  [facility]  [phone]

## 2023-11-20 NOTE — PLAN OF CARE
Problem: Plan of Care - These are the overarching goals to be used throughout the patient stay.    Goal: Absence of Hospital-Acquired Illness or Injury  Intervention: Identify and Manage Fall Risk  Recent Flowsheet Documentation  Taken 11/20/2023 0800 by Yamila Ramirez RN  Safety Promotion/Fall Prevention:   clutter free environment maintained   nonskid shoes/slippers when out of bed   supervised activity     Problem: Plan of Care - These are the overarching goals to be used throughout the patient stay.    Goal: Optimal Comfort and Wellbeing  Outcome: Progressing  Intervention: Provide Person-Centered Care  Recent Flowsheet Documentation  Taken 11/20/2023 0800 by Yamila Ramirez RN  Trust Relationship/Rapport: care explained     Problem: Plan of Care - These are the overarching goals to be used throughout the patient stay.    Goal: Readiness for Transition of Care  Outcome: Progressing   Goal Outcome Evaluation:  Patient waiting on TCU room for transfer. SBA in room. PT/OT.  Denies pain. Incision healing well. Tolerating diet. Cutting medications.

## 2023-11-20 NOTE — PROGRESS NOTES
[facility]    Medicine Progress Note - Hospitalist Service    Date of Admission:  10/9/2023    Assessment & Plan    Mojgan Jimenez is a 53-year-old woman with history of morbid obesity status post laparoscopic sleeve gastrectomy, severe JARVIS on CPAP, asthma, stimulant use disorder, stimulant induced psychosis, mood disorder and anxiety disorder who underwent scheduled laparoscopic sleeve gastrectomy on 10/9/2023.      Postoperative course was complicated by acute abdomen/peritonitis.  She subsequently underwent laparotomy with closure of 2 small bowel enterotomies, intra-abdominal cleanout and drain placement on 10/12/2023.  However, fluid collection was detected in the right upper quadrant on 10/19/2023 at which time MICKIE drain was placed and she subsequently underwent pelvic fluid aspiration on 10/24/2023.       She also developed suspected metabolic encephalopathy, septic shock requiring vasopressors, suspected takotsubo syndrome, acute hypoxic respiratory failure requiring intubation with brief ICU stay (10/12-10/21/23; reintubated 10/21-10/26/23), & oliguric SUSSY with renal recovery.  She was transferred to the telemetry floor on 10/27/2023. Due to persistent pleural effusion/concern for parapneumonic with risk of trapped lung, CT guided chest tube placement was performed on 11/1/23 and removed on 11/5/2023.     HIT screen done due to thrombocytopenia was positive, therefore she was placed on fondaparinux.  Serotonin release assay was negative.  Per hematologist, no indication for anticoagulation therapy at the time of discharge.      Per nephrologist, contrast exposure possibly contributed to SUSSY.  Renal function and volume status have improved and mentation has returned to baseline.  She is waiting for TCU placement.     Morbid obesity status post sleeve gastrectomy complicated by enterotomy & peritonitis   Elevated CRP  -- Augmentin and fluconzaole course-completed 11/16/2023.  --  Postoperative surgical management per surgery service.  -- No longer requiring NG tube     Diarrhea  -- Possibly secondary to tube feeds   -- Stool testing for C. difficile was negative.       Thrombocytopenia  Suspected HIT  -- +PF4. SSRA pending.    -- Follow-up serotonin release assay results  -- Continue fondaparinux  -- No indication for anticoagulation therapy at the time of discharge per hematologist.  -- Hematology [id number] assistance       Resistant hypertension  Stress Cardiomyopathy with EF recovery  -- Amlodipine 5 mg twice daily  -- Carvedilol 25 mg twice daily  -- Chlorthalidone 12.5 mg daily  -- Spironolactone 25 mg daily  -- Nephrologist signed off-11/17/2023     Suspected thrush  -- Nystatin, fluconazole     History of JARVIS, asthma in the chart no PFTs   Acute hypoxemic-hypercapnic respiratory failure  -- Improved  -- CPAP with sleep/naps everytime.   -- Supplemental oxygen as needed  Continue duonebs     Oliguric SUSSY - now possibly CKD3,  Cr stable.    Anemia  Continue to monitor hemoglobin     Hypervolemia  -- Improved with diuretics-continue chlorthalidone spironolactone      Hypernatremia resolved   Hyperglycemia of critical illness - improved   -Morbid obesity BMI 49  Acute metabolic and toxic encephalopathy Resolved  Severe decondition due to prolong hospitalization  -- PT/OT eval, PT recommending TCU, OT recommending LTACH/TCU  Case management working on discharge disposition          Diet:  Bariatric diet soft  DVT Prophylaxis: Arixtra  Patiño Catheter: Not present  Lines: PRESENT      PICC 10/28/23 Triple Lumen Right Basilic-Site Assessment: WDL      Cardiac Monitoring: None  Code Status: Full Code      Clinically Significant Risk Factors              # Hypoalbuminemia: Lowest albumin = 2.7 g/dL at 10/14/2023  6:29 AM, will monitor as appropriate     # Hypertension: Noted on problem list        # Severe Obesity: Estimated body mass index is 44.83 kg/m  as calculated from the  "following:    Height as of this encounter: 1.6 m (5' 3\").    Weight as of this encounter: 114.8 kg (253 lb 1.6 oz).      # Asthma: noted on problem list        Disposition Plan     Expected Discharge Date: 11/20/2023, 12:00 PM    Destination: nursing home              Arpita Deluca MD  Hospitalist Service  [facility]  Securely message with [employer] (more info)  Text page via [employer] Paging/Directory   ______________________________________________________________________    Interval History   Patient is new to me today.  Patient is seen and examined at bedside.  Felt a little nauseated but no vomiting or diarrhea. No acute complaints.    Physical Exam   Vital Signs: Temp: 97.6  F (36.4  C) Temp src: Oral BP: 139/87 Pulse: 78   Resp: 20 SpO2: 97 % O2 Device: None (Room air)    Weight: 253 lbs 1.6 oz        Medical Decision Making             Data     "

## 2023-11-20 NOTE — PLAN OF CARE
Problem: Hypertension Acute  Goal: Blood Pressure Within Desired Range  Outcome: Progressing  Intervention: Normalize Blood Pressure  Recent Flowsheet Documentation  Taken 11/19/2023 1700 by Lauren Pérez, RN  Sensory Stimulation Regulation:   television on   lighting decreased  Medication Review/Management: medications reviewed   Goal Outcome Evaluation:    Pt denies pain.  Meds was cut and pt took them without any difficulty.  Ate small amount of Yogurt. Good fluid intake. Pt uses the BSC with 1 assist.

## 2023-11-20 NOTE — PROGRESS NOTES
"Care Management Follow Up    Length of Stay (days): 42    Expected Discharge Date: 11/20/2023     Concerns to be Addressed: discharge planning     Patient plan of care discussed at interdisciplinary rounds: Yes    Anticipated Discharge Disposition: Acute Rehab Center     Anticipated Discharge Services: Acute Rehab Center  Anticipated Discharge DME:  NA    Patient/family educated on Medicare website which has current facility and service quality ratings: yes  Education Provided on the Discharge Plan: Yes per team  Patient/Family in Agreement with the Plan: yes    Referrals Placed by CM/SW: Post Acute Facilities  Private pay costs discussed: Not applicable    Additional Information:  Called Acute Rehab Center and left a message for Mayra regarding placement timeframe.    Social Hx: \"Lives w/adult daughter Karla. Karla is primary family contact. Ind and drives at baseline. [facility] at discharge. ARU - clinically accepted, will need to update when medically clear, anticipate bed early next week. Mary in [place] stated to follow up with her if ARU falls through.\"     RNCM to follow for medical progression, recommendations, and final discharge plan.     Peggy Encinas RN     Rec'd message from Mayra, \"I haven't reviewed [facility] yet but as of this AM, She is #7 on the [facility] waitlist. We have a total of 7 discharges coming up on Tues/Wed. I'm off site at rounds right now - I can give you a more specific update later today.\"     10:55 AM per provider, Dr. Deluca, patient ready, just need placement.  Updated provider, sent message to [facility] and waiting for acceptance.    2:55 PM Rec'd message to call Mayra regarding patient.   RNCM called [phone] and spoke with Mayra. Patient is #5 on the waiting list. Will be able to accept for Wed 11/22, but patient has to be able to participate more with PT/OT. Mayra will call tomorrow regarding transport time for Wed.    Updated provider, rehab and patient  "

## 2023-11-21 ENCOUNTER — HOSPITAL ENCOUNTER (INPATIENT)
Facility: CLINIC | Age: 53
LOS: 8 days | Discharge: HOME | End: 2023-11-29
Attending: PHYSICAL MEDICINE & REHABILITATION | Admitting: STUDENT IN AN ORGANIZED HEALTH CARE EDUCATION/TRAINING PROGRAM
Payer: COMMERCIAL

## 2023-11-21 VITALS
RESPIRATION RATE: 20 BRPM | SYSTOLIC BLOOD PRESSURE: 132 MMHG | DIASTOLIC BLOOD PRESSURE: 85 MMHG | TEMPERATURE: 97.6 F | OXYGEN SATURATION: 93 % | HEART RATE: 92 BPM | BODY MASS INDEX: 41.91 KG/M2 | WEIGHT: 236.55 LBS | HEIGHT: 63 IN

## 2023-11-21 DIAGNOSIS — I42.9 CARDIOMYOPATHY, UNSPECIFIED TYPE (H): ICD-10-CM

## 2023-11-21 DIAGNOSIS — F41.1 GAD (GENERALIZED ANXIETY DISORDER): Chronic | ICD-10-CM

## 2023-11-21 DIAGNOSIS — R53.81 DEBILITY: Primary | ICD-10-CM

## 2023-11-21 DIAGNOSIS — G93.41 METABOLIC ENCEPHALOPATHY: ICD-10-CM

## 2023-11-21 DIAGNOSIS — M54.2 NECK PAIN: ICD-10-CM

## 2023-11-21 DIAGNOSIS — J96.01 ACUTE RESPIRATORY FAILURE WITH HYPOXIA (H): ICD-10-CM

## 2023-11-21 DIAGNOSIS — I10 HYPERTENSION, UNSPECIFIED TYPE: ICD-10-CM

## 2023-11-21 DIAGNOSIS — E66.01 MORBID OBESITY (H): Chronic | ICD-10-CM

## 2023-11-21 PROCEDURE — 250N000011 HC RX IP 250 OP 636: Mod: JZ | Performed by: INTERNAL MEDICINE

## 2023-11-21 PROCEDURE — 99231 SBSQ HOSP IP/OBS SF/LOW 25: CPT | Performed by: PHYSICIAN ASSISTANT

## 2023-11-21 PROCEDURE — 250N000013 HC RX MED GY IP 250 OP 250 PS 637: Performed by: NURSE PRACTITIONER

## 2023-11-21 PROCEDURE — 128N000003 HC R&B REHAB

## 2023-11-21 PROCEDURE — 250N000013 HC RX MED GY IP 250 OP 250 PS 637: Performed by: INTERNAL MEDICINE

## 2023-11-21 PROCEDURE — 99222 1ST HOSP IP/OBS MODERATE 55: CPT | Mod: AI | Performed by: PHYSICIAN ASSISTANT

## 2023-11-21 PROCEDURE — 250N000013 HC RX MED GY IP 250 OP 250 PS 637: Performed by: PHYSICIAN ASSISTANT

## 2023-11-21 PROCEDURE — 250N000011 HC RX IP 250 OP 636: Performed by: NURSE PRACTITIONER

## 2023-11-21 PROCEDURE — 250N000013 HC RX MED GY IP 250 OP 250 PS 637: Performed by: SURGERY

## 2023-11-21 PROCEDURE — C9113 INJ PANTOPRAZOLE SODIUM, VIA: HCPCS | Mod: JZ | Performed by: INTERNAL MEDICINE

## 2023-11-21 PROCEDURE — 99239 HOSP IP/OBS DSCHRG MGMT >30: CPT | Performed by: STUDENT IN AN ORGANIZED HEALTH CARE EDUCATION/TRAINING PROGRAM

## 2023-11-21 RX ORDER — BUMETANIDE 1 MG/1
2 TABLET ORAL DAILY
Status: DISCONTINUED | OUTPATIENT
Start: 2023-11-22 | End: 2023-11-21

## 2023-11-21 RX ORDER — CARVEDILOL 25 MG/1
25 TABLET ORAL 2 TIMES DAILY WITH MEALS
Status: ON HOLD | DISCHARGE
Start: 2023-11-21 | End: 2023-11-28

## 2023-11-21 RX ORDER — URSODIOL 300 MG/1
300 CAPSULE ORAL 2 TIMES DAILY
Status: DISCONTINUED | OUTPATIENT
Start: 2023-11-21 | End: 2023-11-29 | Stop reason: HOSPADM

## 2023-11-21 RX ORDER — CLONIDINE 0.1 MG/24H
1 PATCH, EXTENDED RELEASE TRANSDERMAL WEEKLY
Status: DISCONTINUED | OUTPATIENT
Start: 2023-11-23 | End: 2023-11-29 | Stop reason: HOSPADM

## 2023-11-21 RX ORDER — CETIRIZINE HYDROCHLORIDE 10 MG/1
10 TABLET ORAL DAILY
Status: DISCONTINUED | OUTPATIENT
Start: 2023-11-22 | End: 2023-11-29 | Stop reason: HOSPADM

## 2023-11-21 RX ORDER — ONDANSETRON 4 MG/1
4 TABLET, ORALLY DISINTEGRATING ORAL EVERY 8 HOURS PRN
Status: DISCONTINUED | OUTPATIENT
Start: 2023-11-21 | End: 2023-11-29 | Stop reason: HOSPADM

## 2023-11-21 RX ORDER — VENLAFAXINE 75 MG/1
75 TABLET ORAL 3 TIMES DAILY
Status: DISCONTINUED | OUTPATIENT
Start: 2023-11-21 | End: 2023-11-29 | Stop reason: HOSPADM

## 2023-11-21 RX ORDER — ALBUTEROL SULFATE 90 UG/1
1-2 AEROSOL, METERED RESPIRATORY (INHALATION)
Status: DISCONTINUED | OUTPATIENT
Start: 2023-11-21 | End: 2023-11-29 | Stop reason: HOSPADM

## 2023-11-21 RX ORDER — FLUTICASONE FUROATE AND VILANTEROL 100; 25 UG/1; UG/1
1 POWDER RESPIRATORY (INHALATION) DAILY
Status: DISCONTINUED | OUTPATIENT
Start: 2023-11-21 | End: 2023-11-29 | Stop reason: HOSPADM

## 2023-11-21 RX ORDER — AMLODIPINE BESYLATE 10 MG/1
10 TABLET ORAL DAILY
Status: ON HOLD | DISCHARGE
Start: 2023-11-22 | End: 2023-11-28

## 2023-11-21 RX ORDER — BUMETANIDE 2 MG/1
2 TABLET ORAL DAILY
Status: ON HOLD | DISCHARGE
Start: 2023-11-22 | End: 2023-11-28

## 2023-11-21 RX ORDER — ROSUVASTATIN CALCIUM 10 MG/1
10 TABLET, COATED ORAL DAILY
Status: DISCONTINUED | OUTPATIENT
Start: 2023-11-22 | End: 2023-11-29 | Stop reason: HOSPADM

## 2023-11-21 RX ORDER — SPIRONOLACTONE 25 MG/1
25 TABLET ORAL
Status: ON HOLD | DISCHARGE
Start: 2023-11-21 | End: 2023-11-28

## 2023-11-21 RX ORDER — AMLODIPINE BESYLATE 10 MG/1
10 TABLET ORAL DAILY
Status: DISCONTINUED | OUTPATIENT
Start: 2023-11-22 | End: 2023-11-29 | Stop reason: HOSPADM

## 2023-11-21 RX ORDER — ACETAMINOPHEN 500 MG
500-1000 TABLET ORAL EVERY 6 HOURS PRN
Status: DISCONTINUED | OUTPATIENT
Start: 2023-11-21 | End: 2023-11-29 | Stop reason: HOSPADM

## 2023-11-21 RX ORDER — SPIRONOLACTONE 25 MG
25 TABLET ORAL
Status: DISCONTINUED | OUTPATIENT
Start: 2023-11-21 | End: 2023-11-26

## 2023-11-21 RX ORDER — ALBUTEROL SULFATE 0.83 MG/ML
2.5 SOLUTION RESPIRATORY (INHALATION)
Status: DISCONTINUED | OUTPATIENT
Start: 2023-11-21 | End: 2023-11-29 | Stop reason: HOSPADM

## 2023-11-21 RX ORDER — AMOXICILLIN 250 MG
1 CAPSULE ORAL 2 TIMES DAILY PRN
Status: DISCONTINUED | OUTPATIENT
Start: 2023-11-21 | End: 2023-11-29 | Stop reason: HOSPADM

## 2023-11-21 RX ORDER — CLONIDINE 0.1 MG/24H
1 PATCH, EXTENDED RELEASE TRANSDERMAL WEEKLY
Status: ON HOLD | DISCHARGE
Start: 2023-11-23 | End: 2023-11-28

## 2023-11-21 RX ORDER — BISACODYL 10 MG
10 SUPPOSITORY, RECTAL RECTAL DAILY PRN
Status: DISCONTINUED | OUTPATIENT
Start: 2023-11-21 | End: 2023-11-29 | Stop reason: HOSPADM

## 2023-11-21 RX ORDER — GABAPENTIN 300 MG/1
300 CAPSULE ORAL AT BEDTIME
Status: DISCONTINUED | OUTPATIENT
Start: 2023-11-21 | End: 2023-11-29 | Stop reason: HOSPADM

## 2023-11-21 RX ORDER — CHLORTHALIDONE 50 MG/1
50 TABLET ORAL DAILY
Status: ON HOLD | DISCHARGE
Start: 2023-11-22 | End: 2023-11-28

## 2023-11-21 RX ORDER — CARVEDILOL 12.5 MG/1
25 TABLET ORAL 2 TIMES DAILY WITH MEALS
Status: DISCONTINUED | OUTPATIENT
Start: 2023-11-21 | End: 2023-11-29 | Stop reason: HOSPADM

## 2023-11-21 RX ADMIN — VENLAFAXINE 75 MG: 75 TABLET ORAL at 11:36

## 2023-11-21 RX ADMIN — BUMETANIDE 2 MG: 2 TABLET ORAL at 08:32

## 2023-11-21 RX ADMIN — SPIRONOLACTONE 25 MG: 25 TABLET ORAL at 16:20

## 2023-11-21 RX ADMIN — AMLODIPINE BESYLATE 10 MG: 5 TABLET ORAL at 08:32

## 2023-11-21 RX ADMIN — CARVEDILOL 25 MG: 25 TABLET, FILM COATED ORAL at 16:20

## 2023-11-21 RX ADMIN — Medication 1 TABLET: at 20:50

## 2023-11-21 RX ADMIN — ONDANSETRON 4 MG: 4 TABLET, ORALLY DISINTEGRATING ORAL at 13:00

## 2023-11-21 RX ADMIN — CHLORTHALIDONE 50 MG: 25 TABLET ORAL at 08:32

## 2023-11-21 RX ADMIN — GABAPENTIN 300 MG: 300 CAPSULE ORAL at 20:48

## 2023-11-21 RX ADMIN — PANTOPRAZOLE SODIUM 40 MG: 40 INJECTION, POWDER, FOR SOLUTION INTRAVENOUS at 08:32

## 2023-11-21 RX ADMIN — FLUTICASONE FUROATE AND VILANTEROL TRIFENATATE 1 PUFF: 100; 25 POWDER RESPIRATORY (INHALATION) at 17:21

## 2023-11-21 RX ADMIN — VENLAFAXINE 75 MG: 75 TABLET ORAL at 20:48

## 2023-11-21 RX ADMIN — URSODIOL 300 MG: 300 CAPSULE ORAL at 20:48

## 2023-11-21 RX ADMIN — VENLAFAXINE 75 MG: 75 TABLET ORAL at 08:32

## 2023-11-21 RX ADMIN — CARVEDILOL 25 MG: 12.5 TABLET, FILM COATED ORAL at 08:32

## 2023-11-21 RX ADMIN — SPIRONOLACTONE 25 MG: 25 TABLET ORAL at 08:32

## 2023-11-21 ASSESSMENT — ACTIVITIES OF DAILY LIVING (ADL)
ADLS_ACUITY_SCORE: 35
ADLS_ACUITY_SCORE: 33
ADLS_ACUITY_SCORE: 35
ADLS_ACUITY_SCORE: 34
ADLS_ACUITY_SCORE: 18
ADLS_ACUITY_SCORE: 34
ADLS_ACUITY_SCORE: 21
ADLS_ACUITY_SCORE: 18
ADLS_ACUITY_SCORE: 33
ADLS_ACUITY_SCORE: 33

## 2023-11-21 NOTE — PROGRESS NOTES
Physical Therapy Discharge Summary    Reason for therapy discharge:    Discharged to ARU.    Progress towards therapy goal(s). See goals on Care Plan in [facility] electronic health record for goal details.  Goals partially met.  Barriers to achieving goals:   discharge from facility.    Therapy recommendation(s):    Further recommended therapy is related to documented deficits, and is necessary to maximize functional independence in order for patient to return to prior level of function.      Minnie Myrick DPT 11/21/2023

## 2023-11-21 NOTE — PLAN OF CARE
Problem: Plan of Care - These are the overarching goals to be used throughout the patient stay.    Goal: Plan of Care Review  Description: The Plan of Care Review/Shift note should be completed every shift.  The Outcome Evaluation is a brief statement about your assessment that the patient is improving, declining, or no change.  This information will be displayed automatically on your shift note.  Outcome: Met     Problem: Plan of Care - These are the overarching goals to be used throughout the patient stay.    Goal: Readiness for Transition of Care  Outcome: Met   Goal Outcome Evaluation:  Patient reports 4/10 abdominal discomfort today. Tolerating diet. Incision CDI. Discharging to TCU around 1330 via transport vehicle. Patient stated feeling happy about the move.     Remove PICC per DR. Deluca

## 2023-11-21 NOTE — PLAN OF CARE
Problem: Plan of Care - These are the overarching goals to be used throughout the patient stay.    Goal: Optimal Comfort and Wellbeing  Outcome: Progressing   Denies pain.  Problem: Risk for Delirium  Goal: Improved Behavioral Control  Intervention: Minimize Safety Risk  Recent Flowsheet Documentation  Taken 11/20/2023 1750 by Naty Nguyen RN  Communication Enhancement Strategies: call light answered in person  Trust Relationship/Rapport: care explained   Pt has been alert and coherent.  Problem: Obstructive Sleep Apnea Risk or Actual  Goal: Unobstructed Breathing During Sleep  Intervention: Monitor and Manage Obstructive Sleep Apnea  Recent Flowsheet Documentation  Taken 11/20/2023 1750 by Naty Nguyen RN  Medication Review/Management: medications reviewed   Refused to use her CPAP per RT.    Problem: Hypertension Acute  Goal: Blood Pressure Within Desired Range  Intervention: Normalize Blood Pressure  Recent Flowsheet Documentation  Taken 11/20/2023 1750 by Naty Nguyen RN  Sensory Stimulation Regulation:   television on   lighting decreased  Medication Review/Management: medications reviewed   Scheduled meds given. Clonidine patch in place.

## 2023-11-21 NOTE — PROGRESS NOTES
Care Management Discharge Note    Discharge Date: 11/21/2023       Discharge Disposition: Acute Rehab Center    Discharge Services: Acute Rehab Center, Transportation Services    Discharge DME: None    Discharge Transportation:  [facility]    Private pay costs discussed: transportation costs    Does the patient's insurance plan have a 3 day qualifying hospital stay waiver?  No    PAS Confirmation Code:  NA  Patient/family educated on Medicare website which has current facility and service quality ratings: yes    Education Provided on the Discharge Plan: Yes per team  Persons Notified of Discharge Plans: Patient per team  Patient/Family in Agreement with the Plan: yes    Handoff Referral Completed: Yes    Additional Information:  Patient discharge to Acute Rehab Center via [facility] 1:40 - 2:25 PM.    Peggy Encinas RN

## 2023-11-21 NOTE — PLAN OF CARE
"Goal Outcome Evaluation:         VSS.   Denies pain.  Incontinent during noc.   Pad changed.  Up to commode in am. Picc with blood return , flushed and SL all three lumens.  No labs this am.  Tolerating po intake.  Uneventful shift.   Care plan reviewed.   Problem: Plan of Care - These are the overarching goals to be used throughout the patient stay.    Goal: Plan of Care Review  Description: The Plan of Care Review/Shift note should be completed every shift.  The Outcome Evaluation is a brief statement about your assessment that the patient is improving, declining, or no change.  This information will be displayed automatically on your shift note.  Outcome: Progressing  Goal: Patient-Specific Goal (Individualized)  Description: You can add care plan individualizations to a care plan. Examples of Individualization might be:  \"Parent requests to be called daily at 9am for status\", \"I have a hard time hearing out of my right ear\", or \"Do not touch me to wake me up as it startles me\".  Outcome: Progressing  Goal: Absence of Hospital-Acquired Illness or Injury  Outcome: Progressing  Goal: Optimal Comfort and Wellbeing  Outcome: Progressing  Goal: Readiness for Transition of Care  Outcome: Progressing     Problem: Nausea and Vomiting  Goal: Nausea and Vomiting Relief  Outcome: Progressing  Intervention: Prevent and Manage Nausea and Vomiting  Recent Flowsheet Documentation  Taken 11/21/2023 0200 by Whit Anne RN  Fluid/Electrolyte Management: fluids provided     Problem: Bariatric Surgery  Goal: Optimal Coping with Surgery  Outcome: Progressing  Goal: Fluid and Electrolyte Balance  Outcome: Progressing  Intervention: Monitor and Manage Fluid and Electrolyte Balance  Recent Flowsheet Documentation  Taken 11/21/2023 0200 by Whit Anne RN  Fluid/Electrolyte Management: fluids provided  Goal: Effective Gastrointestinal Motility and Elimination  Outcome: Progressing  Goal: Absence of Infection Signs " and Symptoms  Outcome: Progressing  Goal: Nausea and Vomiting Relief  Outcome: Progressing  Goal: Effective Urinary Elimination  Outcome: Progressing  Goal: Effective Oxygenation and Ventilation  Outcome: Progressing     Problem: Comorbidity Management  Goal: Maintenance of Asthma Control  Outcome: Progressing  Goal: Maintenance of Behavioral Health Symptom Control  Outcome: Progressing  Goal: Blood Pressure in Desired Range  Outcome: Progressing  Goal: Bariatric Home Regimen Maintained  Outcome: Progressing     Problem: Gas Exchange Impaired  Goal: Optimal Gas Exchange  Outcome: Progressing     Problem: Obstructive Sleep Apnea Risk or Actual  Goal: Unobstructed Breathing During Sleep  Outcome: Progressing     Problem: Activity Intolerance  Goal: Enhanced Capacity and Energy  Outcome: Progressing  Intervention: Optimize Activity Tolerance  Recent Flowsheet Documentation  Taken 11/21/2023 0200 by Whit Anne RN  Activity Management: activity adjusted per tolerance     Problem: Hypertension Acute  Goal: Blood Pressure Within Desired Range  Outcome: Progressing     Problem: Enteral Nutrition  Goal: Absence of Aspiration Signs and Symptoms  Outcome: Progressing  Goal: Safe, Effective Therapy Delivery  Outcome: Progressing  Goal: Feeding Tolerance  Outcome: Progressing

## 2023-11-21 NOTE — PROGRESS NOTES
General Surgery Progress Note:    Hospital Day # 43    ASSESSMENT:   1. Intra-abdominal abscess (H)    2. Perimenopausal symptoms    Mojgan Jimenez is a 53 year old female who is s/p laparoscopic sleeve gastrectomy with single anastomosis duodenal switch on 10/09/23 with return to the OR on 10/12/23 to repair two small enterotomies on the common channel. Patient became septic with ARF and developed pneumonia and pleural effusion necessitating chest tube placement. Chest tube removed on 11/05/23 . Renal function improved. She is feeling much better today with improved intake.       PLAN:   -Continue with current plan of discharge to TCU. Plans for discharge tomorrow.   -We will set up timing for follow up with Dr Worthy      SUBJECTIVE:   Mojgan Jimenez is feeling much better. She has to space her medications a bit as she gets full quickly.     Patient Vitals for the past 24 hrs:   BP Temp Temp src Pulse Resp SpO2 Weight   11/21/23 0711 132/85 97.6  F (36.4  C) Oral 92 20 93 % 107.3 kg (236 lb 8.9 oz)   11/20/23 2324 135/72 97.6  F (36.4  C) Oral 84 18 92 % --   11/20/23 1900 (!) 142/74 98.7  F (37.1  C) Oral 76 18 98 % --   11/20/23 1750 139/69 -- -- 88 -- -- --   11/20/23 1458 -- -- -- -- -- -- 113.1 kg (249 lb 4.8 oz)       Physical Exam:  General: NAD, pleasant  Deferred today    No results displayed because visit has over 200 results.   No results found for this or any previous visit (from the past 24 hour(s)).          MERLIN Dueñas  [facility]  [address] [place] [address]  Phone- [phone]  Fax- [phone]

## 2023-11-21 NOTE — DISCHARGE SUMMARY
"[facility]  Hospitalist Discharge Summary      Date of Admission:  10/9/2023  Date of Discharge:  11/21/2023  Discharging Provider: Arpita Deluca MD  Discharge Service: Hospitalist Service    Discharge Diagnoses   status post sleeve gastrectomy complicated by enterotomy & peritonitis       Clinically Significant Risk Factors     # Severe Obesity: Estimated body mass index is 41.9 kg/m  as calculated from the following:    Height as of this encounter: 1.6 m (5' 3\").    Weight as of this encounter: 107.3 kg (236 lb 8.9 oz).       Follow-ups Needed After Discharge   Follow-up Appointments     Follow Up and recommended labs and tests      Follow up with Nursing home physician:  Monitor BP and adjust medications   as needed.  Follow up with primary care provider in 1-2 weeks.  No follow up labs or   test are needed.  Follow up with surgery as scheduled. Follow with your cardiologist as   scheduled.            Unresulted Labs Ordered in the Past 30 Days of this Admission       Date and Time Order Name Status Description    11/15/2023 12:54 PM Prepare red blood cells (unit) Preliminary     10/20/2023  3:24 PM Acid-Fast Bacilli Culture and Stain with AFB Stain In process         These results will be followed up by NH provider and/or PCP    Discharge Disposition   Discharged to rehabilitation facility  Condition at discharge: Stable    Hospital Course   Mojgan Jimenez is a 53-year-old woman with history of morbid obesity status post laparoscopic sleeve gastrectomy, severe JARVIS on CPAP, asthma, stimulant use disorder, stimulant induced psychosis, mood disorder and anxiety disorder who underwent scheduled laparoscopic sleeve gastrectomy on 10/9/2023.      Postoperative course was complicated by acute abdomen/peritonitis.  She subsequently underwent laparotomy with closure of 2 small bowel enterotomies, intra-abdominal cleanout and drain placement on 10/12/2023.  However, fluid collection was " detected in the right upper quadrant on 10/19/2023 at which time MICKIE drain was placed and she subsequently underwent pelvic fluid aspiration on 10/24/2023.       She also developed suspected metabolic encephalopathy, septic shock requiring vasopressors, suspected takotsubo syndrome, acute hypoxic respiratory failure requiring intubation with brief ICU stay (10/12-10/21/23; reintubated 10/21-10/26/23), & oliguric SUSSY with renal recovery.  She was transferred to the telemetry floor on 10/27/2023. Due to persistent pleural effusion/concern for parapneumonic with risk of trapped lung, CT guided chest tube placement was performed on 11/1/23 and removed on 11/5/2023.     HIT screen done due to thrombocytopenia was positive, therefore she was placed on fondaparinux.  Serotonin release assay was negative.  Per hematologist, no indication for anticoagulation therapy at the time of discharge.      Per nephrologist, contrast exposure possibly contributed to SUSSY.  Renal function and volume status have improved and mentation has returned to baseline.  She is waiting for TCU placement.     Morbid obesity status post sleeve gastrectomy complicated by enterotomy & peritonitis   Elevated CRP  -- Augmentin and fluconzaole course-completed 11/16/2023.  -- Postoperative surgical management per surgery service.  -- No longer requiring NG tube  Diarrhea-resolved  -- Possibly secondary to tube feeds   -- Stool testing for C. difficile was negative.    Thrombocytopenia  Suspected HIT- ruled out  -- +PF4. SSRA pending.    -- Follow-up serotonin release assay results  -- Continue fondaparinux  -- No indication for anticoagulation therapy at the time of discharge per hematologist.  Resistant hypertension  Stress Cardiomyopathy with EF recovery  -- Amlodipine 5 mg twice daily  -- Carvedilol 25 mg twice daily  -- Chlorthalidone 12.5 mg daily  -- Spironolactone 25 mg daily  -- Nephrologist signed off-11/17/2023  Suspected thrush  -- s/p Nystatin,  fluconazole  History of JARVIS, asthma in the chart no PFTs   Acute hypoxemic-hypercapnic respiratory failure  -- Improved  -- NIPPV with sleep/naps everytime.   -- Supplemental oxygen as needed  Continue duonebs  Oliguric SUSSY - now possibly CKD3,  Cr stable.    Anemia  Continue to monitor hemoglobin  Hypervolemia  -- Improved with diuretics-continue chlorthalidone spironolactone   Hypernatremia resolved   Hyperglycemia of critical illness - improved   -Morbid obesity BMI 49  Acute metabolic and toxic encephalopathy Resolved  Severe decondition due to prolong hospitalization  -- PT/OT- acute rehab  Patient is clinically and hemodynamically stable for discharge to acute rehab. Medication reconciliation was done. Patient verbalized understanding and agreed to plan of care. Follow up appointments and recommendations as shown below. All questions answered.    Consultations This Hospital Stay   NEPHROLOGY IP CONSULT  PHARMACY TO DOSE VANCO  CARE MANAGEMENT / SOCIAL WORK IP CONSULT  PHARMACY/NUTRITION TO START AND MANAGE TPN  PHARMACY IP CONSULT  PHARMACY IP CONSULT  CARDIOLOGY IP CONSULT  PHARMACY IP CONSULT  PHARMACY IP CONSULT  PHARMACY IP CONSULT  INTERVENTIONAL RADIOLOGY ADULT/PEDS IP CONSULT  PHARMACY IP CONSULT  PHARMACY IP CONSULT  NUTRITION SERVICES ADULT IP CONSULT  PHARMACY IP CONSULT  PHARMACY IP CONSULT  PHARMACY TO DOSE MEDICATION  INFECTIOUS DISEASES IP CONSULT  PHARMACY IP CONSULT  INTERVENTIONAL RADIOLOGY ADULT/PEDS IP CONSULT  INTERVENTIONAL RADIOLOGY ADULT/PEDS IP CONSULT  PHARMACY IP CONSULT  PHARMACY IP CONSULT  NUTRITION SERVICES ADULT IP CONSULT  PHARMACY IP CONSULT  PHARMACY IP CONSULT  PHARMACY IP CONSULT  PHYSICAL THERAPY ADULT IP CONSULT  OCCUPATIONAL THERAPY ADULT IP CONSULT  SPEECH LANGUAGE PATH ADULT IP CONSULT  PHARMACY IP CONSULT  PHARMACY IP CONSULT  HOSPITALIST IP CONSULT  VASCULAR ACCESS ADULT IP CONSULT  VASCULAR ACCESS ADULT IP CONSULT  PHARMACY IP CONSULT  PULMONARY IP  CONSULT  INTERVENTIONAL RADIOLOGY ADULT/PEDS IP CONSULT  NUTRITION SERVICES ADULT IP CONSULT  PHARMACY IP CONSULT  INTERVENTIONAL RADIOLOGY ADULT/PEDS IP CONSULT  SOCIAL WORK IP CONSULT  HEMATOLOGY & ONCOLOGY IP CONSULT  PHYSICAL THERAPY ADULT IP CONSULT  OCCUPATIONAL THERAPY ADULT IP CONSULT  RESPIRATORY CARE IP CONSULT    Code Status   Full Code    Time Spent on this Encounter   I, Arpita Deluca MD, personally saw the patient today and spent greater than 30 minutes discharging this patient.       Arpita Deluca MD  [address] [address]  Phone: [phone]  Fax: [phone]  ______________________________________________________________________    Physical Exam   Vital Signs: Temp: 97.6  F (36.4  C) Temp src: Oral BP: 132/85 Pulse: 92   Resp: 20 SpO2: 93 % O2 Device: None (Room air)    Weight: 236 lbs 8.86 oz  GEN: Alert and oriented. Not in acute distress.  HEENT: Atraumatic, mucous membrane- moist and pink.  Chest: Bilateral air entry.  CVS: S1S2 regular.   Abdomen: Soft. Non-tender, non-distended. No organomegaly. No guarding or rigidity. Bowel sounds active.   Extremities: No pedal edema.  CNS: No involuntary movements.  Skin: no cyanosis or clubbing.        Primary Care Physician   Franca Mishra    Discharge Orders      General info for SNF    Length of Stay Estimate: Short Term Care: Estimated # of Days <30  Condition at Discharge: Stable  Level of care:skilled   Rehabilitation Potential: Good  Admission H&P remains valid and up-to-date: Yes  Recent Chemotherapy: N/A  Use Nursing Home Standing Orders: Yes     Mantoux instructions    Give two-step Mantoux (PPD) Per Facility Policy Yes     Follow Up and recommended labs and tests    Follow up with Nursing home physician:  Monitor BP and adjust medications as needed.  Follow up with primary care provider in 1-2 weeks.  No follow up labs or test are needed.  Follow up with surgery as  scheduled. Follow with your cardiologist as scheduled.     Reason for your hospital stay    status post sleeve gastrectomy complicated by enterotomy & peritonitis     Activity - Up with nursing assistance     Additional Discharge Instructions    Continue home NIPPV during night and naps     Physical Therapy Adult Consult    Evaluate and treat as clinically indicated.    Reason:  Impaired functional mobility.     Occupational Therapy Adult Consult    Evaluate and treat as clinically indicated.    Reason:  Inability to complete ADLs/IADLs     Fall precautions     Diet    Follow this diet upon discharge: Orders Placed This Encounter      Calorie Counts      Calorie Counts      Bariatric Diet Soft       Significant Results and Procedures       Discharge Medications   Current Discharge Medication List        START taking these medications    Details   amLODIPine (NORVASC) 10 MG tablet Take 1 tablet (10 mg) by mouth daily    Associated Diagnoses: Benign essential hypertension      bumetanide (BUMEX) 2 MG tablet Take 1 tablet (2 mg) by mouth daily    Associated Diagnoses: Cardiomyopathy, unspecified type (H)      carvedilol (COREG) 25 MG tablet Take 1 tablet (25 mg) by mouth 2 times daily (with meals)    Associated Diagnoses: Benign essential hypertension; Cardiomyopathy, unspecified type (H)      chlorthalidone (HYGROTON) 50 MG tablet Take 1 tablet (50 mg) by mouth daily    Associated Diagnoses: Benign essential hypertension      cloNIDine (CATAPRES-TTS1) 0.1 MG/24HR WK patch Place 1 patch onto the skin once a week    Associated Diagnoses: Benign essential hypertension      miconazole (MICATIN) 2 % external powder Apply topically 3 times daily as needed for other (apply to rash areas)    Associated Diagnoses: Intertrigo      spironolactone (ALDACTONE) 25 MG tablet Take 1 tablet (25 mg) by mouth 2 times daily    Associated Diagnoses: Benign essential hypertension; Cardiomyopathy, unspecified type (H)           CONTINUE  these medications which have NOT CHANGED    Details   acetaminophen (TYLENOL) 500 MG tablet Take 500-1,000 mg by mouth every 6 hours as needed for mild pain      ADVAIR -21 MCG/ACT inhaler Inhale 2 Puffs by mouth two times daily.*      albuterol (PROAIR HFA) 108 (90 Base) MCG/ACT inhaler Inhale 1-2 puffs into the lungs every 4 hours as needed for shortness of breath / dyspnea or wheezing  Qty: 8.5 g, Refills: 11    Comments: Pharmacy may dispense brand covered by insurance (Proair, or proventil or ventolin or generic albuterol inhaler)  Associated Diagnoses: Moderate persistent asthma without complication      albuterol (PROVENTIL) (2.5 MG/3ML) 0.083% neb solution Take 1 vial (2.5 mg) by nebulization every 6 hours as needed for shortness of breath or wheezing  Qty: 3 mL, Refills: 4    Associated Diagnoses: Moderate persistent asthma without complication      cetirizine (ZYRTEC) 10 MG tablet Take 10 mg by mouth daily      cholecalciferol (VITAMIN D3) 125 mcg (5000 units) capsule Take 125 mcg by mouth daily      cyanocobalamin (VITAMIN B-12) 1000 MCG sublingual tablet Place 1,000 mcg under the tongue      gabapentin (NEURONTIN) 300 MG capsule Take 300 mg by mouth daily      norgestrel-ethinyl estradiol (LO/OVRAL) 0.3-30 MG-MCG tablet Take 1 tablet by mouth daily  Qty: 28 tablet, Refills: 6    Associated Diagnoses: Perimenopausal symptoms      omeprazole (PRILOSEC) 20 MG DR capsule Take 1 capsule (20 mg) by mouth daily for 90 days Start day after surgery, open contents and sprinkle on food for first 6 weeks. Take daily for 3 months after surgery.  Qty: 90 capsule, Refills: 0    Comments: Start day after surgery, open contents and sprinkle on food for first 6 weeks. Take daily for 3 months after surgery.  Associated Diagnoses: Morbid obesity (H); S/P bariatric surgery; Acid reflux      Pediatric Multivitamins-Iron (MULTIVITAMINS PLUS IRON CHILD) 18 MG CHEW Take 1 chew tab by mouth 2 times daily Ok to substitute  with any chewable that contains 18 mg of iron, Vitamin A, Thiamine and Zinc.  Qty: 180 tablet, Refills: 3    Comments: Ok to substitute with any chewable that contains 18 mg of iron, Vitamin A, Thiamine and Zinc.  Associated Diagnoses: Morbid obesity (H); S/P bariatric surgery; Intestinal malabsorption, unspecified type      rosuvastatin (CRESTOR) 10 MG tablet Take 1 tablet (10 mg) by mouth daily  Qty: 90 tablet, Refills: 9    Associated Diagnoses: Hyperlipidemia LDL goal <160      ursodiol (ACTIGALL) 300 MG capsule Take 1 capsule (300 mg) by mouth 2 times daily for 180 days Start 2 weeks after surgery, do not open-take with warm liquid. Take twice a day for 6 months.  Qty: 180 capsule, Refills: 1    Comments: Start 2 weeks after surgery, do not open-take with warm liquid. Take twice a day for 6 months.  Associated Diagnoses: Morbid obesity (H); S/P bariatric surgery; Rapid weight loss      venlafaxine (EFFEXOR) 75 MG tablet Take 75 mg by mouth 3 times daily           STOP taking these medications       propranolol (INDERAL) 10 MG tablet Comments:   Reason for Stopping:             Allergies   Allergies   Allergen Reactions    Heparin Other (See Comments)     HIT

## 2023-11-21 NOTE — PLAN OF CARE
Occupational Therapy Discharge Summary    Reason for therapy discharge:    Discharged to acute rehabilitation facility.    Progress towards therapy goal(s). See goals on Care Plan in [facility] electronic health record for goal details.  Goals partially met.  Barriers to achieving goals:   discharge from facility.    Therapy recommendation(s):    Continued therapy is recommended.  Rationale/Recommendations:  to fully maximize ADL independence for safe return home.

## 2023-11-21 NOTE — PROGRESS NOTES
"Care Management Follow Up    Length of Stay (days): 43    Expected Discharge Date: 11/22/2023     Concerns to be Addressed: discharge planning     Patient plan of care discussed at interdisciplinary rounds: Yes    Anticipated Discharge Disposition: Acute Rehab Center     Anticipated Discharge Services: Acute Rehab Center  Anticipated Discharge DME:  NA    Patient/family educated on Medicare website which has current facility and service quality ratings: yes  Education Provided on the Discharge Plan: Yes per team  Patient/Family in Agreement with the Plan: yes    Referrals Placed by CM/SW: Post Acute Facilities  Private pay costs discussed: Transportation costs    Additional Information:  Acute Rehab Center - [place] called [phone]. [facility] has an open bed today and can take patient around 2:00 PM and would like ride set up.  Met with patient at bedside to discuss the above. Patient in agreement. Patient thought her daughter can transport, but she was not able to reach the daughter. Patient agreed for [facility].    Discussed out of pocket cost of [facility] medical transportation by wheelchair with patient. Patient with the plan to have transportation arranged by [facility].      Called [facility] transport and set up ride between 1:40 - 2:25 PM.    Updated HUC, bedside nurse and provider, Dr. Deluca    Social Hx: \"Lives w/adult daughter Karla. Karla is primary family contact. Ind and drives at baseline. [facility] at discharge. ARU - clinically accepted, will need to update when medically clear, anticipate bed early next week. Mary in [place] stated to follow up with her if ARU falls through.\"     RNCM to follow for medical progression, recommendations, and final discharge plan.     JERO Nye called and said provider screened and patient has been accepted to [facility].    Per provider, Dr. Deluca, patient is ready for discharge and will put " the discharge orders in.    Updated [place]

## 2023-11-21 NOTE — H&P
"    [facility]   Acute Rehabilitation Unit  Admission History and Physical    CHIEF COMPLAINT   debility     HISTORY OF PRESENT ILLNESS  Mojgan Jimenez is a 53 year old woman with past medical history of HLD, polysubstance abuse with reported 5 years of sobriety, anxiety, asthma, JARVIS and morbid obesity who was admitted for laparoscopic sleeve with single anastomosis duodenal switch which she underwent per DR. Worthy 10/09/23. She returned to OR 10/12 for laparotomy and closure of 2 small bowel enterotomies and drain placement.     Complicated by peritonitis, SUSSY, was made NPO initiated on TPN, started on IV antibiotics, with ongoing fevers and WBC elevation perihepatic fluid collection s/p IR drain, Right pleural effusion and Hospital acquired pneumonia, s/p thoracentesis 10/23/23.  She was extubated 10/26/23.  She underwent  chest tube placement 11/1/23 chest tube removed 11/5/23.     Course additionally complicated by  metabolic encephalopathy, HTN, She was seen by Nephrology for HTN and SUSSY. Seen by cardiology in setting of VTach with troponin elevation and EF 35% in setting of sepsis felt to be stress cardiomyopathy and improved to 60-65%.  Infectious disease involved patient has completed all anti infective agents prior to rehab stay.     Functionally noted to have impaired strength, impaired balance, and impaired activity tolerance.  She is currently requiring cga- min assist for sit to stand transfers.  She is walking up to 30 feet with walker and contact guard assist.  CGA for toilet transfer, max assist for lower body dressing, set up assist for eating, goals for home MOD I with adls and mobility.    On arrival to rehab reports she is overall doing better, ongoing intermittent shortness of breath, reports therapy has been challenging as becomes very dizzy with position changes particularly sit to stand.  Has some intermittent nausea and vomiting, \"if I eat too much or " "take all my pills at once\".  Denies fevers, edema improved, denies new sensation changes, denies new vision changes.  Denies chest pain or heart palpitations.  Mojgan reports she is sleeping pretty well, and mood is \"okay\".  Motivated to get home to family.          PAST MEDICAL HISTORY   Reviewed and updated in Epic.  Past Medical History:   Diagnosis Date    LINA (generalized anxiety disorder) 11/02/2017    Hyperlipidemia LDL goal <160 01/20/2019    Major depressive disorder     Methanol abuse (H)     Mild persistent asthma without complication 11/02/2017    Obese     JARVIS on CPAP     Cpap not working    Paranoia (H)     resolved due to drugs    Vitamin D deficiency 11/02/2017       SURGICAL HISTORY  Reviewed and updated in Epic.  Past Surgical History:   Procedure Laterality Date    GASTRECTOMY, SLEEVE, LAPAROSCOPIC, WITH DUODENAL SWITCH N/A 10/9/2023    Procedure: GASTRECTOMY, SLEEVE, LAPAROSCOPIC, WITH SINGLE ANASTAMOSIS DUODENAL SWITCH;  Surgeon: Hoang Worthy MD;  Location[facility]    LAPAROSCOPY DIAGNOSTIC (GYN) N/A 10/12/2023    Procedure: LAPAROSCOPY,;  Surgeon: Hoang Worthy MD;  Location: [facility] OR    LAPAROTOMY EXPLORATORY N/A 10/12/2023    Procedure: LAPAROTOMY, CLOSURE OF TWO SMALL BOWEL INTEROTOMIES, DRAIN PLACEMENT, INTRA-ABDOMINAL CLEAN OUT;  Surgeon: Hoang Worthy MD;  Location: [facility] OR    MAMMOPLASTY REDUCTION      reduction    PICC TRIPLE LUMEN PLACEMENT  10/28/2023       SOCIAL HISTORY  Reviewed and updated in Epic.  Living situation: lives in [facility] with daughter and grandson no stairs to enter and elevator access.   Family support: supportive  Vocational History: was in between jobs  Tobacco use: none  Alcohol use: reports 5 years sober  Illicit drug use: reports 5 years sober  Social History     Socioeconomic History    Marital status: Single     Spouse name: Not on file    Number of children: Not on file    Years of education: Not on file    " "Highest education level: Not on file   Occupational History    Not on file   Tobacco Use    Smoking status: Never    Smokeless tobacco: Never   Vaping Use    Vaping Use: Never used   Substance and Sexual Activity    Alcohol use: Not Currently     Comment: Sober x 8 months    Drug use: Not Currently     Types: Methamphetamines, \"Crack\" cocaine     Comment: in remision     Sexual activity: Not Currently     Partners: Male   Other Topics Concern    Parent/sibling w/ CABG, MI or angioplasty before 65F 55M? No   Social History Narrative    Not on file     Social Determinants of Health     Financial Resource Strain: Not on file   Food Insecurity: Not on file   Transportation Needs: Not on file   Physical Activity: Not on file   Stress: Not on file   Social Connections: Not on file   Interpersonal Safety: Not on file   Housing Stability: Not on file       FAMILY HISTORY  Reviewed and updated in Epic.  Family History   Problem Relation Age of Onset    Cancer Mother     Unknown/Adopted Father     Alcoholism Father     Substance Abuse Sister     Diabetes No family hx of     Hypertension No family hx of        PRIOR FUNCTIONAL HISTORY   Pt was independent with all ADLs/IADLs, transfers, mobility and gait.      MEDICATIONS  Scheduled meds  Medications Prior to Admission   Medication Sig Dispense Refill Last Dose    acetaminophen (TYLENOL) 500 MG tablet Take 500-1,000 mg by mouth every 6 hours as needed for mild pain       ADVAIR -21 MCG/ACT inhaler Inhale 2 Puffs by mouth two times daily.*       albuterol (PROAIR HFA) 108 (90 Base) MCG/ACT inhaler Inhale 1-2 puffs into the lungs every 4 hours as needed for shortness of breath / dyspnea or wheezing 8.5 g 11     albuterol (PROVENTIL) (2.5 MG/3ML) 0.083% neb solution Take 1 vial (2.5 mg) by nebulization every 6 hours as needed for shortness of breath or wheezing 3 mL 4     cetirizine (ZYRTEC) 10 MG tablet Take 10 mg by mouth daily       cholecalciferol (VITAMIN D3) 125 mcg " "(5000 units) capsule Take 125 mcg by mouth daily       cyanocobalamin (VITAMIN B-12) 1000 MCG sublingual tablet Place 1,000 mcg under the tongue       gabapentin (NEURONTIN) 300 MG capsule Take 300 mg by mouth daily       norgestrel-ethinyl estradiol (LO/OVRAL) 0.3-30 MG-MCG tablet Take 1 tablet by mouth daily 28 tablet 6     omeprazole (PRILOSEC) 20 MG DR capsule Take 1 capsule (20 mg) by mouth daily for 90 days Start day after surgery, open contents and sprinkle on food for first 6 weeks. Take daily for 3 months after surgery. 90 capsule 0     Pediatric Multivitamins-Iron (MULTIVITAMINS PLUS IRON CHILD) 18 MG CHEW Take 1 chew tab by mouth 2 times daily Ok to substitute with any chewable that contains 18 mg of iron, Vitamin A, Thiamine and Zinc. 180 tablet 3     rosuvastatin (CRESTOR) 10 MG tablet Take 1 tablet (10 mg) by mouth daily 90 tablet 9     ursodiol (ACTIGALL) 300 MG capsule Take 1 capsule (300 mg) by mouth 2 times daily for 180 days Start 2 weeks after surgery, do not open-take with warm liquid. Take twice a day for 6 months. 180 capsule 1     venlafaxine (EFFEXOR) 75 MG tablet Take 75 mg by mouth 3 times daily       [DISCONTINUED] propranolol (INDERAL) 10 MG tablet MAY USE 1 TABLET BY MOUTH UP TO THREE TIMES DAILY AS NEEDED FOR ANXIETY.          ALLERGIES     Allergies   Allergen Reactions    Heparin Other (See Comments)     HIT         REVIEW OF SYSTEMS  A 10 point ROS was performed and negative unless otherwise noted in HPI.     PHYSICAL EXAM  VITAL SIGNS:  /76 (BP Location: Left arm, Patient Position: Supine, Cuff Size: Adult Large)   Pulse 88   Temp 97.6  F (36.4  C) (Oral)   Resp 18   Ht 1.6 m (5' 3\")   Wt 113.3 kg (249 lb 12.5 oz)   LMP 09/09/2023 (Exact Date)   SpO2 93%   BMI 44.25 kg/m    BMI:  Estimated body mass index is 44.25 kg/m  as calculated from the following:    Height as of this encounter: 1.6 m (5' 3\").    Weight as of this encounter: 113.3 kg (249 lb 12.5 oz). "     General: awake alert nad  HEENT: yellow film coating posterior aspect of tongue  Pulmonary: non labored clear diminished on room air  Cardiovascular: rrr  Abdominal: obese soft non distended non tender  Extremities: warm, well perfused, no edema in bilateral lower extremities, no tenderness in calves   MSK/neuro:   Mental Status:  alert and oriented    Cranial Nerves: grossly normal    Sensory: Normal to light touch in bilateral upper and lower extremities, reported chronic numbness left lateral femoral cutaneous distribution   Strength: 4/5 in all muscle groups of the upper and lower extremities    Abnormal movements: None    Coordination: No dysmetria on finger to nose b/l    Speech:clear coherent   Cognition:linear logical   Gait: not tested  Skin: abdominal incision cdi well healed, visualized skin without redness/swelling      LABS  CBC RESULTS:   Recent Labs   Lab Test 11/19/23  0542 11/17/23  0813 11/16/23  0543 11/15/23  1525 11/14/23  0613 11/13/23  0636   WBC  --  6.8  --   --  6.1 7.3   RBC  --  2.77*  --   --  2.52* 2.66*   HGB  --  7.9* 7.3* 7.8* 7.2* 7.6*   HCT  --  25.0*  --   --  23.7* 24.7*   MCV  --  90  --   --  94 93   MCH  --  28.5  --   --  28.6 28.6   MCHC  --  31.6  --   --  30.4* 30.8*   RDW  --  14.7  --   --  15.4* 15.5*    220 184  --  153 147*     Last Basic Metabolic Panel:  Recent Labs   Lab Test 11/18/23  1129 11/18/23  0733 11/17/23  2112 11/17/23  0820 11/17/23  0813 11/16/23  1140 11/16/23  1015 11/14/23  0824 11/14/23  0613   NA  --   --   --   --  135  --  134*  --  139   POTASSIUM  --   --   --   --  3.9  --  4.0  --  4.4   CHLORIDE  --   --   --   --  93*  --  93*  --  99   CO2  --   --   --   --  33*  --  30*  --  30*   ANIONGAP  --   --   --   --  9  --  11  --  10   * 96 87   < > 104*   < > 106*   < > 123*   BUN  --   --   --   --  19.9  --  18.7  --  23.0*   CR  --   --   --   --  1.30*  --  1.25*  --  1.22*   GFRESTIMATED  --   --   --   --  49*  --  51*   --  53*   PALAK  --   --   --   --  9.3  --  9.2  --  8.9    < > = values in this interval not displayed.       IMPRESSION/PLAN:  Mojgan Jimenez is a 53 year old woman with past medical history of substance abuse (sober x 5 years), anxiety, JARVIS, asthma, HLD, and morbid obesity who was admitted for planned gastric sleeve 10/9/23 course was complicated by peritonitis with return to OR 10/12/23 for with enterotomies repair, wash out and drain placement, course further complicated by acute hypoxic respiratory failure, HCAP, pleural effusion,  USSSY, resistant HTN, stress cardiomyopathy, anemia and debility.  Admitted to rehab 11/21/23 with impaired strength and activity tolerance.       Admission to acute inpatient rehab debility.    Impairment group code: 16      PT, OT 90 minutes of each on a daily basis, in addition to rehab nursing and close management of physiatrist.      Impairment of ADL's: Noted to have impaired strength and impaired activity tolerance  leading to decreased ability to independently complete ADL's.  Will benefit from ongoing OT with goal for MOD I with basic ADLs.     Impairment of mobility:  Noted to have impaired strength and  impaired activity tolerance leading to decreased mobility.  Will benefit from ongoing PT with goal for ISI with basic mobility .       Medical Conditions    Severe decondition due to prolong hospitalization  - PT/OT- acute rehab    Morbid obesity status post sleeve gastrectomy complicated by enterotomy & peritonitis   Admitted for planned gastric sleeve which she underwent 10/9/23, complicated by enterotomies s/p return to OR 10/12 with wash out, repair and drain placement. Peritonitis, seen by ID   Completed course of antibiotics 11/16/2023.  -bariatric diet  -nutrition consult  -D3, B12, ppi, chewable multivit,    Resistant hypertension  Stress Cardiomyopathy with EF recovery  In setting of sepsis, post operative course EF 30-35 10/14 repeat Echo 10/23 with recovered EF,  "not on medications prior to admission. Underwent workup per nephrology for secondary hypertension. -- Nephrologist & cardiology involved during hospitalization  -- Amlodipine 10 mg daily  -- Carvedilol 25 mg twice daily  -- Chlorthalidone 50 mg daily  -- Spironolactone 25 mg twice daily  -clonidine patch 0.1  mg/week  -discontinue bumex 2 mg daily in setting of reported symptomatic position changes- /60 in supine and 100/50 sitting- did not test standing.   -monitor BP- & orthostatic BP- titrate meds/ simplify regimen as indicated.     Possible CKD stage 3.   Kidney function wnl prior to admission, with SUSSY in setting of sepsis Cr peaked 6.97 10/14/23 followed by nephrology, now stable 1.2-1.4 since 11/5/23  -trend    Acute Anemia  Acute illness, blood loss, preop hgb wnl.  Stable 11/17 7.9  Continue to monitor hemoglobin    Thrombocytopenia (resolved)   Suspected HIT- ruled out  Concern for HIT transitioned to fondaparinux, seen by hematology HIT ruled out per hematology \"No indication for anticoagulation therapy at the time of discharge\"  PLTs WNL since 11/14/23. Prefers to avoid heparin products.     Oral thrush   -continue nystatin- dislikes taste- consider alternative agent in upcoming days if ongoing issue  -completed course of fluconazole 11/16/23    History of JARVIS,  asthma in the chart no PFTs   Acute hypoxemic-hypercapnic respiratory failure (resolved)   Intubated 10/12-10/26) complicated by HCAP, pleural effusion, stress cardiomyopathy  Resume home inhaler (auto sub breo)  -prn albuterol neb/ inhaler (home regimen)  -cpap at bedtime   -monitor respiratory status    Hypervolemia   With recent pleural effusion s/p thoracentesis 10/23 and  chest tube removal 11/5  -- Improved with diuretics-continue chlorthalidone spironolactone   -discontinue bumex  -monitor volume status    Anxiety  -pta effexor  -prn atarax    HLd  -resume prior to admission crestor    Adjustment to disability:  Clinical psychology " to eval and treat as indicated  FEN: bariatric  Bowel: monitor  Bladder: monitor  DVT Prophylaxis: mechanical  GI Prophylaxis: ppi  Code: full confirmed on admission.   Disposition: goal for home.   ELOS:  7 days.  Rehab prognosis:  fair  Follow up Appointments on Discharge: pcp, bariatric surgery, nephrology (for HTN &? CKD),        discussed with Dr. Wilkins , PM&R staff physician     Rowan MAXWELL-SHANI  Rehab Service

## 2023-11-22 ENCOUNTER — APPOINTMENT (OUTPATIENT)
Dept: PHYSICAL THERAPY | Facility: CLINIC | Age: 53
End: 2023-11-22
Attending: PHYSICAL MEDICINE & REHABILITATION
Payer: COMMERCIAL

## 2023-11-22 ENCOUNTER — APPOINTMENT (OUTPATIENT)
Dept: OCCUPATIONAL THERAPY | Facility: CLINIC | Age: 53
End: 2023-11-22
Attending: PHYSICAL MEDICINE & REHABILITATION
Payer: COMMERCIAL

## 2023-11-22 LAB
ANION GAP SERPL CALCULATED.3IONS-SCNC: 14 MMOL/L (ref 7–15)
BASOPHILS # BLD AUTO: 0 10E3/UL (ref 0–0.2)
BASOPHILS NFR BLD AUTO: 0 %
BUN SERPL-MCNC: 36.5 MG/DL (ref 6–20)
CALCIUM SERPL-MCNC: 10 MG/DL (ref 8.6–10)
CHLORIDE SERPL-SCNC: 89 MMOL/L (ref 98–107)
CREAT SERPL-MCNC: 1.78 MG/DL (ref 0.51–0.95)
DEPRECATED HCO3 PLAS-SCNC: 30 MMOL/L (ref 22–29)
EGFRCR SERPLBLD CKD-EPI 2021: 34 ML/MIN/1.73M2
EOSINOPHIL # BLD AUTO: 0.3 10E3/UL (ref 0–0.7)
EOSINOPHIL NFR BLD AUTO: 3 %
ERYTHROCYTE [DISTWIDTH] IN BLOOD BY AUTOMATED COUNT: 14.6 % (ref 10–15)
GLUCOSE SERPL-MCNC: 93 MG/DL (ref 70–99)
HCT VFR BLD AUTO: 30.2 % (ref 35–47)
HGB BLD-MCNC: 9.5 G/DL (ref 11.7–15.7)
IMM GRANULOCYTES # BLD: 0 10E3/UL
IMM GRANULOCYTES NFR BLD: 1 %
LYMPHOCYTES # BLD AUTO: 3.6 10E3/UL (ref 0.8–5.3)
LYMPHOCYTES NFR BLD AUTO: 48 %
MAGNESIUM SERPL-MCNC: 1.6 MG/DL (ref 1.7–2.3)
MCH RBC QN AUTO: 27.7 PG (ref 26.5–33)
MCHC RBC AUTO-ENTMCNC: 31.5 G/DL (ref 31.5–36.5)
MCV RBC AUTO: 88 FL (ref 78–100)
MONOCYTES # BLD AUTO: 1.2 10E3/UL (ref 0–1.3)
MONOCYTES NFR BLD AUTO: 16 %
NEUTROPHILS # BLD AUTO: 2.5 10E3/UL (ref 1.6–8.3)
NEUTROPHILS NFR BLD AUTO: 32 %
NRBC # BLD AUTO: 0 10E3/UL
NRBC BLD AUTO-RTO: 0 /100
PLATELET # BLD AUTO: 382 10E3/UL (ref 150–450)
POTASSIUM SERPL-SCNC: 3.6 MMOL/L (ref 3.4–5.3)
RBC # BLD AUTO: 3.43 10E6/UL (ref 3.8–5.2)
SODIUM SERPL-SCNC: 133 MMOL/L (ref 135–145)
WBC # BLD AUTO: 7.7 10E3/UL (ref 4–11)

## 2023-11-22 PROCEDURE — 250N000013 HC RX MED GY IP 250 OP 250 PS 637: Performed by: PHYSICIAN ASSISTANT

## 2023-11-22 PROCEDURE — 999N000125 HC STATISTIC PATIENT MED CONFERENCE < 30 MIN

## 2023-11-22 PROCEDURE — 36415 COLL VENOUS BLD VENIPUNCTURE: CPT | Performed by: PHYSICIAN ASSISTANT

## 2023-11-22 PROCEDURE — 97110 THERAPEUTIC EXERCISES: CPT | Mod: GO

## 2023-11-22 PROCEDURE — 999N000150 HC STATISTIC PT MED CONFERENCE < 30 MIN: Performed by: PHYSICAL THERAPIST

## 2023-11-22 PROCEDURE — 97535 SELF CARE MNGMENT TRAINING: CPT | Mod: GO

## 2023-11-22 PROCEDURE — 80048 BASIC METABOLIC PNL TOTAL CA: CPT | Performed by: PHYSICIAN ASSISTANT

## 2023-11-22 PROCEDURE — 97530 THERAPEUTIC ACTIVITIES: CPT | Mod: GP | Performed by: PHYSICAL THERAPIST

## 2023-11-22 PROCEDURE — 97166 OT EVAL MOD COMPLEX 45 MIN: CPT | Mod: GO

## 2023-11-22 PROCEDURE — 250N000013 HC RX MED GY IP 250 OP 250 PS 637: Performed by: PHYSICAL MEDICINE & REHABILITATION

## 2023-11-22 PROCEDURE — 85004 AUTOMATED DIFF WBC COUNT: CPT | Performed by: PHYSICIAN ASSISTANT

## 2023-11-22 PROCEDURE — 128N000003 HC R&B REHAB

## 2023-11-22 PROCEDURE — 83735 ASSAY OF MAGNESIUM: CPT | Performed by: PHYSICIAN ASSISTANT

## 2023-11-22 PROCEDURE — 97162 PT EVAL MOD COMPLEX 30 MIN: CPT | Mod: GP | Performed by: PHYSICAL THERAPIST

## 2023-11-22 PROCEDURE — 99232 SBSQ HOSP IP/OBS MODERATE 35: CPT | Performed by: PHYSICAL MEDICINE & REHABILITATION

## 2023-11-22 RX ORDER — CALCIUM CARBONATE 500 MG/1
500 TABLET, CHEWABLE ORAL DAILY
Status: DISCONTINUED | OUTPATIENT
Start: 2023-11-22 | End: 2023-11-25

## 2023-11-22 RX ORDER — MAGNESIUM OXIDE 400 MG/1
400 TABLET ORAL DAILY
Status: DISCONTINUED | OUTPATIENT
Start: 2023-11-22 | End: 2023-11-29 | Stop reason: HOSPADM

## 2023-11-22 RX ADMIN — CETIRIZINE HYDROCHLORIDE 10 MG: 5 TABLET ORAL at 11:12

## 2023-11-22 RX ADMIN — CHLORTHALIDONE 50 MG: 25 TABLET ORAL at 09:53

## 2023-11-22 RX ADMIN — AMLODIPINE BESYLATE 10 MG: 10 TABLET ORAL at 09:53

## 2023-11-22 RX ADMIN — VENLAFAXINE 75 MG: 75 TABLET ORAL at 20:38

## 2023-11-22 RX ADMIN — VENLAFAXINE 75 MG: 75 TABLET ORAL at 11:12

## 2023-11-22 RX ADMIN — OMEPRAZOLE 20 MG: 20 CAPSULE, DELAYED RELEASE ORAL at 11:13

## 2023-11-22 RX ADMIN — URSODIOL 300 MG: 300 CAPSULE ORAL at 20:38

## 2023-11-22 RX ADMIN — Medication 1 TABLET: at 20:38

## 2023-11-22 RX ADMIN — FLUTICASONE FUROATE AND VILANTEROL TRIFENATATE 1 PUFF: 100; 25 POWDER RESPIRATORY (INHALATION) at 09:50

## 2023-11-22 RX ADMIN — VENLAFAXINE 75 MG: 75 TABLET ORAL at 14:44

## 2023-11-22 RX ADMIN — ROSUVASTATIN CALCIUM 10 MG: 5 TABLET, FILM COATED ORAL at 11:12

## 2023-11-22 RX ADMIN — SPIRONOLACTONE 25 MG: 25 TABLET ORAL at 11:13

## 2023-11-22 RX ADMIN — GABAPENTIN 300 MG: 300 CAPSULE ORAL at 20:38

## 2023-11-22 RX ADMIN — CALCIUM CARBONATE (ANTACID) CHEW TAB 500 MG 500 MG: 500 CHEW TAB at 16:41

## 2023-11-22 RX ADMIN — MAGNESIUM OXIDE TAB 400 MG (241.3 MG ELEMENTAL MG) 400 MG: 400 (241.3 MG) TAB at 16:41

## 2023-11-22 RX ADMIN — CARVEDILOL 25 MG: 25 TABLET, FILM COATED ORAL at 09:52

## 2023-11-22 RX ADMIN — URSODIOL 300 MG: 300 CAPSULE ORAL at 11:13

## 2023-11-22 ASSESSMENT — ACTIVITIES OF DAILY LIVING (ADL)
IADLS,_PREVIOUS_FUNCTIONAL_LEVEL: INDEPENDENT
ADLS_ACUITY_SCORE: 21
BADLS,_PREVIOUS_FUNCTIONAL_LEVEL: INDEPENDENT
ADLS_ACUITY_SCORE: 26
ADLS_ACUITY_SCORE: 21
BADLS,_PREVIOUS_FUNCTIONAL_LEVEL: INDEPENDENT
IADLS,_PREVIOUS_FUNCTIONAL_LEVEL: INDEPENDENT
ADLS_ACUITY_SCORE: 21
ADLS_ACUITY_SCORE: 21
ADLS_ACUITY_SCORE: 26
ADLS_ACUITY_SCORE: 21
PREVIOUS_RESPONSIBILITIES: MEAL PREP;HOUSEKEEPING;LAUNDRY;SHOPPING;MEDICATION MANAGEMENT;FINANCES;DRIVING;WORK;CHILD CARE
ADLS_ACUITY_SCORE: 21

## 2023-11-22 NOTE — PROGRESS NOTES
"Brief Progress note.      Notified by Nursing Sodium 133, Mag 1.6-    Opened chart to review labs,   Na 133 <--135 overall stable  K 3.6- monitor for need for replacement  CR & BUN up from 11/17  Cr 1.7 8 11/22 from 1.3 11/17.  BUN 36.5 11/22  Mag 1.6    /62 (BP Location: Right arm, Patient Position: Fowlers)   Pulse 81   Temp 98.1  F (36.7  C) (Oral)   Resp 16   Ht 1.6 m (5' 3\")   Wt 113.3 kg (249 lb 12.5 oz)   LMP 09/09/2023 (Exact Date)   SpO2 96%   BMI 44.25 kg/m    Patient was seen sitting up in bed, nad, non labored breathing, without noted edema to bilateral lower extremities.     Mojgan reports therapy went better today, less dizziness though did have some dizziness occur with prolonged activity.  Denies n/v/d, sob, headache, chest pain, fever.  Denies concerns with urination.     -we discussed low blood pressure and elevated CR  -note patient was on Bumex, chlorthalidone and aldactone through 11/21- bumex held starting 11/22.  Diuretics started in setting of hypervolemia, pleural effusion, and hypertension, all which are improved/ resolved at this time.    -encourage po hydration  -start oral mag 400 mg daily  -repeat BMP in am  -hold chlorithalidone, aldactone- consider restart/reduce- pending am labs/ bp etc.    Rowan Nance PA-C  "

## 2023-11-22 NOTE — CONSULTS
Social Work: Initial Assessment with Discharge Plan    Patient Name: Mojgan Jimenez  : 1970  Age: 53 year old  MRN: [id number]  Completed assessment with: Chart review and interview with patient   Admitted to ARU: 2023    Presenting Information   Date of SW assessment: 2023  Health Care Directive: Patient considering completing, Provided education, and Health Care Directive Agent (if patient not able to make decisions)  Primary Health Care Agent: Patient/self   Secondary Health Care Agent: Adult children NOK   Living Situation: Lives in an apt with adult dtr and grandchildren.   Previous Functional Status: Indep with ADLs and IADLs at baseline. Drives. Unemployed. No community programs.   DME available: Neb machine for asthma. No other DME.   Patient and family understanding of hospitalization: Appropriate.   Cultural/Language/Spiritual Considerations: 54 y/o woman, english-speaking, and Scientologist not listed or discussed.     Physical Health  Reason for admission: 16 Debility (non-cardiac, non-pulmonary) - s/p laparoscopic sleeve gastrectomy with multiple medical complications including an ICU stay     Justification for Acute Inpatient Rehabilitation  Mojgan Jimenez is a 53-year-old woman with PMH of morbid obesity (BMI 44.83), severe JARVIS on CPAP, asthma, mood disorder and anxiety disorder who underwent scheduled laparoscopic sleeve gastrectomy on 10/9/2023. Her post-operative course was complicated by acute abdomen/peritonitis.   She subsequently underwent laparotomy with closure of 2 small bowel enterotomies, intra-abdominal cleanout and drain placement on 10/12/2023.  However, fluid collection was detected in the right upper quadrant on 10/19/2023 at which time MICKIE drain was placed and she subsequently underwent pelvic fluid aspiration on 10/24/2023.  She also developed suspected metabolic encephalopathy, septic shock requiring vasopressors, suspected takotsubo syndrome, acute hypoxic  respiratory failure requiring intubation with brief ICU stay (10/12-10/21/23; reintubated 10/21-10/26/23), & oliguric SUSSY with renal recovery.  Due to persistent pleural effusion, a chest tube was placed 11/1-11/5.  She has also required medical mgmt of her persistent HTN despite four agents on board. She is now medically stable and ready to discharge to acute inpatient rehab.     At baseline pt works full time and is independent with mobility/cares. The patient requires transfer to [facility] for intensive therapies not available in a lesser level of care including PT/OT, ongoing medical management at least 3 days per week, and rehabilitative nursing care. The patient requires an intensive inpatient rehab program to address the following acute impairments:impaired activity tolerance, impaired balance, impaired strength, and impaired weight shifting. Mojgan is an excellent ARC candidate due to her PLOF, motivation/participation and accessible home environment.     Provider Information   [place] [facility] Phys[place] [place].   : Unknown     Mental Health/Chemical Dependency:   Diagnosis: Depression and anxiety. Medication-management.  Pt reported that he depression is well controlled. Pt reported feeling like anxiety has increased as she is anxious about progress in therapy. Denied needing any additional support, services, or resources at this time.   Alcohol/Tobacco/Narcotis: No concerns reported   Support/Services in Place: Medication-management.   Services Needed/Recommended: [place] and Health Psychology support while on ARU available.   Sexuality/Intimacy: Not discussed     Support System  Marital Status:  and single.   Family support: Dtr Karla (only child) and sister Ernestine.   Other support available: Friend Teressa.     Community Resources  Current in home services: None reported   Previous services: None reported  "    Financial/Employment/Education  Employment Status: Unemployed   Income Source: Family A, no income at this time. Friend Teressa has helped pt apply for assistance through the [employer] and short term disability.   Education: Not discussed   Financial Concerns:  None reported at this time   Insurance: UCARE/UCARE PMAP     Discharge Plan   Patient and family discharge goal: TBD, pending progress  Provided Education on discharge plan: Evaluations and discharge recommendations pending.   Patient agreeable to discharge plan:  Pending further discussion. Evaluations and discharge recommendations pending.   Provided education and attained signature for Medicare IM and IRF Patient Rights and Privacy Information provided to patient : N/A  Provided patient with [facility] [place] Resources: N/A  Barriers to discharge: None identified     Discharge Recommendations   Disposition: See above   Transportation Needs: Patient, family/friends, paid transport, insurance transport (if applicable)     Additional comments   Discharge Wed 11/29/23 with OP therapy. SW will remain available and continue to follow as needs arise.     -------------------------------------------------------------------------------------------------------------  KERVIN Pain Assessment    Pain Effect on Sleep  Over the past 5 days, how much of the time has pain made it hard for you to sleep at night?\"    0. Does not apply - I have not had any pain or hurting in the past 5 days  -------------------------------------------------------------------------------------------------------------    Radha French [facility], Acute Inpatient Rehab Unit   [facility] [address] [place] [address]  Phone: [phone], Fax: [phone], Pager: [phone]             "

## 2023-11-22 NOTE — PLAN OF CARE
Goal Outcome Evaluation:      Plan of Care Reviewed With: patient    Overall Patient Progress: no changeOverall Patient Progress: no change    Outcome Evaluation: AXOx4. Denied of pain, SOB and N/V. Patient has hx of JARVIS; refused to use cpap. She also refused to use Oxygen per nc when offered. Patient with catapres patch on right shoulder area. Transfers with SBA with walker. Fall precaution in place. Safety checks done. Appears asleep during rounds.

## 2023-11-22 NOTE — PLAN OF CARE
Goal Outcome Evaluation:      Plan of Care Reviewed With: patient    Overall Patient Progress: no changeOverall Patient Progress: no change    Patient was admitted today from [facility]. Is alert and oriented able to use a call light and make her needs known. Is assist of x 1 with walker CGA. Is continent of BB. LBM 11/20. On soft diet r/t recent surgery d/t Morbid obesity, thin liquids, takes pills whole. Patient uses a CPAP at [facility] at home settings, But refused to put it on tonight. Has an abdominal incision which is healing well with no dressing or sutures/stapples it is open to air. Denies pain or any discomfort at this time. Call light within reach. Nursing staff will continue with POC.

## 2023-11-22 NOTE — PHARMACY-ADMISSION MEDICATION HISTORY
Admission medication history completed at [facility]. Please see Pharmacist Admission Medication History note from 10/09/2023.

## 2023-11-22 NOTE — PROGRESS NOTES
"  [facility]   Acute Rehabilitation Unit  Daily progress note  CC:     16 Debility (non-cardiac, non-pulmonary) - s/p laparoscopic sleeve gastrectomy with multiple medical complications including an ICU stay     S: Has had some dizziness. Stockings added. Diuretics held. May need Binder. Changed diet to Regular bariatric. Tums added. No SOB, or HA.     TR held and reviewed.       Functionally, see separate note for details.      Medications  Scheduled meds   amLODIPine  10 mg Oral Daily    calcium carbonate  500 mg Oral Daily    carvedilol  25 mg Oral BID w/meals    cetirizine  10 mg Oral Daily    childrens multivitamin with iron  1 tablet Oral BID    [Held by provider] chlorthalidone  50 mg Oral Daily    cholecalciferol  125 mcg Oral Daily    [START ON 11/23/2023] cloNIDine  1 patch Transdermal Weekly    And    cloNIDine   Transdermal Q8H KAYLA    cyanocobalamin  1,000 mcg Sublingual Daily    fluticasone-vilanterol  1 puff Inhalation Daily    gabapentin  300 mg Oral At Bedtime    magnesium oxide  400 mg Oral Daily    omeprazole  20 mg Oral Daily    rosuvastatin  10 mg Oral Daily    [Held by provider] spironolactone  25 mg Oral BID    ursodiol  300 mg Oral BID    venlafaxine  75 mg Oral TID       PRN meds:  acetaminophen, albuterol, albuterol, bisacodyl, miconazole, ondansetron, senna-docusate      PHYSICAL EXAM  /62 (BP Location: Right arm, Patient Position: Fowlers)   Pulse 81   Temp 98.1  F (36.7  C) (Oral)   Resp 16   Ht 1.6 m (5' 3\")   Wt 113.3 kg (249 lb 12.5 oz)   LMP 09/09/2023 (Exact Date)   SpO2 96%   BMI 44.25 kg/m    Gen: Alert and in NAD.  HEENT: MMM  CV: S1, S2 RRR  Pulm: Clear to auscultation  Abd: Obese, non tender  Ext: Calves non tender  Neuro/MSK: Moves all four  Responds to touch.    LABS  Last Comprehensive Metabolic Panel:  Sodium   Date Value Ref Range Status   11/22/2023 133 (L) 135 - 145 mmol/L Final     Comment:     Reference intervals for " this test were updated on 09/26/2023 to more accurately reflect our healthy population. There may be differences in the flagging of prior results with similar values performed with this method. Interpretation of those prior results can be made in the context of the updated reference intervals.    11/17/2020 141 133 - 144 mmol/L Final     Potassium   Date Value Ref Range Status   11/22/2023 3.6 3.4 - 5.3 mmol/L Final   05/17/2022 4.5 3.4 - 5.3 mmol/L Final   11/17/2020 4.1 3.4 - 5.3 mmol/L Final     Chloride   Date Value Ref Range Status   11/22/2023 89 (L) 98 - 107 mmol/L Final   05/17/2022 102 94 - 109 mmol/L Final   11/17/2020 109 94 - 109 mmol/L Final     Carbon Dioxide   Date Value Ref Range Status   11/17/2020 31 20 - 32 mmol/L Final     Carbon Dioxide (CO2)   Date Value Ref Range Status   11/22/2023 30 (H) 22 - 29 mmol/L Final   05/17/2022 32 20 - 32 mmol/L Final     Anion Gap   Date Value Ref Range Status   11/22/2023 14 7 - 15 mmol/L Final   05/17/2022 2 (L) 3 - 14 mmol/L Final   11/17/2020 1 (L) 3 - 14 mmol/L Final     Glucose   Date Value Ref Range Status   11/22/2023 93 70 - 99 mg/dL Final   05/17/2022 110 (H) 70 - 99 mg/dL Final   11/17/2020 84 70 - 99 mg/dL Final     Comment:     Fasting specimen     GLUCOSE BY METER POCT   Date Value Ref Range Status   11/18/2023 115 (H) 70 - 99 mg/dL Final     Urea Nitrogen   Date Value Ref Range Status   11/22/2023 36.5 (H) 6.0 - 20.0 mg/dL Final   05/17/2022 16 7 - 30 mg/dL Final   11/17/2020 9 7 - 30 mg/dL Final     Creatinine   Date Value Ref Range Status   11/22/2023 1.78 (H) 0.51 - 0.95 mg/dL Final   11/17/2020 0.79 0.52 - 1.04 mg/dL Final     GFR Estimate   Date Value Ref Range Status   11/22/2023 34 (L) >60 mL/min/1.73m2 Final   11/17/2020 88 >60 mL/min/[1.73_m2] Final     Comment:     Non  GFR Calc  Starting 12/18/2018, serum creatinine based estimated GFR (eGFR) will be   calculated using the Chronic Kidney Disease Epidemiology Collaboration    (CKD-EPI) equation.       Calcium   Date Value Ref Range Status   11/22/2023 10.0 8.6 - 10.0 mg/dL Final   11/17/2020 8.9 8.5 - 10.1 mg/dL Final        CBC RESULTS:   Recent Labs   Lab Test 11/22/23  0538   WBC 7.7   RBC 3.43*   HGB 9.5*   HCT 30.2*   MCV 88   MCH 27.7   MCHC 31.5   RDW 14.6           ASSESSMENT AND PLAN    Mojgan Jimenez is a 53 year old woman with past medical history of substance abuse (sober x 5 years), anxiety, JARVIS, asthma, HLD, and morbid obesity who was admitted for planned gastric sleeve 10/9/23 course was complicated by peritonitis with return to OR 10/12/23 for with enterotomies repair, wash out and drain placement, course further complicated by acute hypoxic respiratory failure, HCAP, pleural effusion,  SUSSY, resistant HTN, stress cardiomyopathy, anemia and debility.  Admitted to rehab 11/21/23 with impaired strength and activity tolerance.         Admission to acute inpatient rehab debility.    Impairment group code: 16        PT, OT 90 minutes of each on a daily basis, in addition to rehab nursing and close management of physiatrist.       Impairment of ADL's: Noted to have impaired strength and impaired activity tolerance  leading to decreased ability to independently complete ADL's.  Will benefit from ongoing OT with goal for MOD I with basic ADLs.      Impairment of mobility:  Noted to have impaired strength and  impaired activity tolerance leading to decreased mobility.  Will benefit from ongoing PT with goal for ISI with basic mobility .         Medical Conditions     Severe decondition due to prolong hospitalization  - PT/OT- acute rehab     Morbid obesity status post sleeve gastrectomy complicated by enterotomy & peritonitis   Admitted for planned gastric sleeve which she underwent 10/9/23, complicated by enterotomies s/p return to OR 10/12 with wash out, repair and drain placement. Peritonitis, seen by ID   Completed course of antibiotics 11/16/2023.  -bariatric  "diet  -nutrition consult  -D3, B12, ppi, chewable multivit,     Resistant hypertension  Stress Cardiomyopathy with EF recovery  In setting of sepsis, post operative course EF 30-35 10/14 repeat Echo 10/23 with recovered EF, not on medications prior to admission. Underwent workup per nephrology for secondary hypertension. -- Nephrologist & cardiology involved during hospitalization Parameters for hold added.   -- Amlodipine 10 mg daily  -- Carvedilol 25 mg twice daily  -- Chlorthalidone 50 mg daily, HOLD  -- Spironolactone 25 mg twice daily, HOLD  -clonidine patch 0.1  mg/week  -discontinue bumex 2 mg daily in setting of reported symptomatic position changes- /60 in supine and 100/50 sitting- did not test standing.   -monitor BP- & orthostatic BP- titrate meds/ simplify regimen as indicated.      Possible CKD stage 3.   Kidney function wnl prior to admission, with SUSSY in setting of sepsis Cr peaked 6.97 10/14/23 followed by nephrology, now stable 1.2-1.4 since 11/5/23  -trend     Acute Anemia  Acute illness, blood loss, preop hgb wnl.  Stable 11/17 7.9  Continue to monitor hemoglobin     Thrombocytopenia (resolved)   Suspected HIT- ruled out  Concern for HIT transitioned to fondaparinux, seen by hematology HIT ruled out per hematology \"No indication for anticoagulation therapy at the time of discharge\"  PLTs WNL since 11/14/23. Prefers to avoid heparin products.      Oral thrush   -continue nystatin- dislikes taste- consider alternative agent in upcoming days if ongoing issue  -completed course of fluconazole 11/16/23     History of JARVIS,  asthma in the chart no PFTs   Acute hypoxemic-hypercapnic respiratory failure (resolved)   Intubated 10/12-10/26) complicated by HCAP, pleural effusion, stress cardiomyopathy  Resume home inhaler (auto sub breo)  -prn albuterol neb/ inhaler (home regimen)  -cpap at bedtime   -monitor respiratory status     Hypervolemia   With recent pleural effusion s/p thoracentesis 10/23 and "  chest tube removal 11/5  -- Improved with diuretics-continue chlorthalidone spironolactone   -discontinue bumex  -monitor volume status     Anxiety  -pta effexor  -prn atarax     HLd  -resume prior to admission crestor     Adjustment to disability:  Clinical psychology to eval and treat as indicated  FEN: bariatric  Bowel: monitor  Bladder: monitor  DVT Prophylaxis: mechanical  GI Prophylaxis: ppi  Code: full confirmed on admission.   Disposition: goal for home.   ELOS:  7 days.  Rehab prognosis:  fair  Follow up Appointments on Discharge: pcp, bariatric surgery, nephrology (for HTN &? CKD),          Constantino Johnson MD   Physical Medicine & Rehabilitation

## 2023-11-22 NOTE — PROGRESS NOTES
11/22/23 1000   Appointment Info   Signing Clinician's Name / Credentials (OT) Jayda Mcclendon OTR-L   Living Environment   People in Home child(jean claude), adult   Current Living Arrangements apartment   Home Accessibility no concerns   Transportation Anticipated family or friend will provide   Living Environment Comments Pt lives with adult daughter and grandson. Elevator access with 0 JERED or within. standard toilet, flat bed pt I with all adls and Iadls   Self-Care   Usual Activity Tolerance good   Current Activity Tolerance fair   Equipment Currently Used at Home none   Fall history within last six months no   Instrumental Activities of Daily Living (IADL)   Previous Responsibilities meal prep;housekeeping;laundry;shopping;medication management;finances;driving;work;   Post-Acute Assessment Only   Post-Acute Functional Assessment See below   Previous Level of Function/Home Environm   Bathing, Previous Functional Level independent   Grooming, Previous Functional Level independent   Dressing, Previous Functional Level independent   Eating/Feeding, Previous Functional Level independent   Toileting, Previous Functional Level independent   BADLs, Previous Functional Level independent   IADLs, Previous Functional Level independent   Bed Mobility, Previous Functional Level independent   Transfers, Previous Functional Level independent   Household Ambulation, Previous Functional Level independent   Stairs, Previous Functional Level independent   Community Ambulation, Previous Functional Level independent   Functional Cognition, Previous Functional Level wfl   General Information   Onset of Illness/Injury or Date of Surgery 10/12/23   Referring Physician Alex Meeks   Patient/Family Therapy Goal Statement (OT) to return home with daughter and grandson   Additional Occupational Profile Info/Pertinent History of Current Problem per MD report is a 53 year old woman with past medical history of HLD,  polysubstance abuse with reported 5 years of sobriety, anxiety, asthma, JARVIS and morbid obesity who was admitted for laparoscopic sleeve with single anastomosis duodenal switch which she underwent per DR. Worthy 10/09/23. She returned to OR 10/12 for laparotomy and closure of 2 small bowel enterotomies and drain placement.      Complicated by peritonitis, SUSSY, was made NPO initiated on TPN, started on IV antibiotics, with ongoing fevers and WBC elevation perihepatic fluid collection s/p IR drain, Right pleural effusion and Hospital acquired pneumonia, s/p thoracentesis 10/23/23.  She was extubated 10/26/23.  She underwent  chest tube placement 11/1/23 chest tube removed 11/5/23.   Existing Precautions/Restrictions   (orthostaic Bllod presure)   Left Upper Extremity (Weight-bearing Status) full weight-bearing (FWB)   Right Upper Extremity (Weight-bearing Status) full weight-bearing (FWB)   Left Lower Extremity (Weight-bearing Status) full weight-bearing (FWB)   Right Lower Extremity (Weight-bearing Status) full weight-bearing (FWB)   Cognitive Status Examination   Orientation Status orientation to person, place and time   Cognitive Status Comments wfl at eval  assess higher cog function   Visual Perception   Visual Impairment/Limitations WNL   Sensory   Sensory Quick Adds sensation intact   Posture   Posture not impaired   Range of Motion Comprehensive   General Range of Motion bilateral upper extremity ROM WFL   Strength Comprehensive (MMT)   Comment, General Manual Muscle Testing (MMT) Assessment r arm 3+/5mmg L 4/5 mmg   Coordination   Coordination Comments functional for adls will assess formally as needed   Clinical Impression   Criteria for Skilled Therapeutic Interventions Met (OT) Yes, treatment indicated   OT Diagnosis decrease adls and Iadls   OT Problem List-Impairments impacting ADL activity tolerance impaired;balance;cognition;mobility;strength;pain;post-surgical precautions   Assessment of Occupational  Performance 3-5 Performance Deficits   Identified Performance Deficits bathing dressing toileting meal prep hame management   Planned Therapy Interventions (OT) ADL retraining;IADL retraining;balance training;bed mobility training;cognition;strengthening;transfer training;progressive activity/exercise;home program guidelines;risk factor education   Clinical Decision Making Complexity (OT) detailed assessment/moderate complexity   Risk & Benefits of therapy have been explained evaluation/treatment results reviewed;care plan/treatment goals reviewed;risks/benefits reviewed;current/potential barriers reviewed;patient;participants voiced agreement with care plan;participants included   Clinical Impression Comments pt is a 53 year old female s/p laparoscopic sleeve with single anastomosis duodenal switch which she underwent per DR. Worthy 10/09/23 with complications and long acute hospital stay will benifit from ot per ot goals   OT Total Evaluation Time   OT Eval, Moderate Complexity Minutes (83204) 15   OT Goals   Therapy Frequency (OT) Daily   OT Predicted Duration/Target Date for Goal Attainment 11/29/23   OT Goals Hygiene/Grooming;Upper Body Dressing;Lower Body Dressing;Upper Body Bathing;Lower Body Bathing;Bed Mobility;Transfers;Toilet Transfer/Toileting;Meal Preparation;Home Management;Cognition   OT: Hygiene/Grooming modified independent   OT: Upper Body Dressing Modified independent   OT: Lower Body Dressing Modified independent   OT: Upper Body Bathing Modified independent   OT: Lower Body Bathing Modified independent   OT: Bed Mobility Modified independent   OT: Transfer Supervision/stand-by assist  (shower)   OT: Toilet Transfer/Toileting Modified independent   OT: Meal Preparation Supervision/stand-by assist   OT: Home Management Supervision/stand-by assist   OT: Cognitive Patient/caregiver will verbalize understanding of cognitive assessment results/recommendations as needed for safe discharge planning    Self-Care/Home Management   Self-Care/Home Mgmt/ADL, Compensatory, Meal Prep Minutes (39594) 60   Treatment Detail/Skilled Intervention ot see below foro shower and full body dressing   Total Session Time   Timed Code Treatment Minutes 60   Total Session Time (sum of timed and untimed services) 75   Post Acute Settings Only   What unit is patient on? Acute Rehab   OT - Acute Rehab Center Time   Individual Time (minutes) - enter zero if not applicable - OT 75   Group Time (minutes) - enter zero if not applicable  - OT 0   Concurrent Time (minutes) - enter zero if not applicable  - OT 0   Co-Treatment Time (minutes) - enter zero if not applicable  - OT 0   ARC Total Session Time (minutes) - OT 75   ARC Total Session Time   OT/PT/SLP ARC Total Session Time 75   Oral Hygiene   Describe performance ot sba sitting   Grooming (except oral cares)   Grooming Comment ot sba sitting   Upper Body Dressing   Describe performance ot pt able todress in shirt after clothes retrievel   Lower Body Dressing (Pants/Undergarments)   Describe performance short and underwear max a sitting and standing to adjust over hips   Lower Body Dressing putting on/taking off footwear   Describe performance dep   Shower/Bathe self   Describe performance pt able to wash 8/10 areas  assit with pericares  while standing and feet   Tub / Shower Transfer   Tub/Shower Transfer Comment ot cga w/c to venancio shower extended tubbebch   Chair/bed-to-chair Transfer   Describe performance cga with fww   Roll Left and Right   Assistance Needed Independent   Roll Left to Right CARE Score 6   Sit to Lying   Comment needed rail   Lying to Sitting on Side of Bed   Comment mod I with rail   Sit to Stand   Assistance Needed Supervision   Sitting to Standing CARE Score 4

## 2023-11-22 NOTE — PHARMACY-MEDICATION REGIMEN REVIEW
Pharmacy Medication Regimen Review  Mojgan Jimenez is a 53 year old female who is currently in the Acute Rehab Unit.    Assessment: All medications have an appropriate indications, durations and no unnecessary use was found    Plan:   Continue scheduled medications.  Attending provider will be sent this note for review.  If there are any emergent issues noted above, pharmacist will contact provider directly by phone.      Pharmacy will periodically review the resident's medication regimen for any PRN medications not administered in > 72 hours and discontinue them. The pharmacist will discuss gradual dose reductions of psychopharmacologic medications with interdisciplinary team on a regular basis.    Please contact pharmacy if the above does not answer specific medication questions/concerns.    Background:  A pharmacist has reviewed all medications and pertinent medical history today.  Medications were reviewed for appropriate use and any irregularities found are listed with recommendations.      Current Facility-Administered Medications:     acetaminophen (TYLENOL) tablet 500-1,000 mg, 500-1,000 mg, Oral, Q6H PRN, Rowan Nance PA    albuterol (PROVENTIL HFA/VENTOLIN HFA) inhaler, 1-2 puff, Inhalation, Q2H PRN, Rowan Nance PA    albuterol (PROVENTIL) neb solution 2.5 mg, 2.5 mg, Nebulization, Q2H PRN, Rowan Nance PA    amLODIPine (NORVASC) tablet 10 mg, 10 mg, Oral, Daily, Rowan Nance PA, 10 mg at 11/22/23 0953    bisacodyl (DULCOLAX) suppository 10 mg, 10 mg, Rectal, Daily PRN, Rowan Nance PA    carvedilol (COREG) tablet 25 mg, 25 mg, Oral, BID w/meals, Rowan Nance PA, 25 mg at 11/22/23 0952    cetirizine (zyrTEC) tablet 10 mg, 10 mg, Oral, Daily, Rowan Nance PA, 10 mg at 11/22/23 1112    childrens multivitamin with iron (FLINTSTONES COMPLETE) chewable tablet 1 tablet, 1 tablet, Oral, BID, Rowan Nance PA, 1 tablet at 11/21/23 2050    chlorthalidone  (HYGROTON) tablet 50 mg, 50 mg, Oral, Daily, Rowan Nance PA, 50 mg at 11/22/23 0953    cholecalciferol (VITAMIN D3) capsule 125 mcg, 125 mcg, Oral, Daily, Rowan Nance PA    [START ON 11/23/2023] cloNIDine (CATAPRES-TTS1) 0.1 MG/24HR WK patch 1 patch, 1 patch, Transdermal, Weekly **AND** cloNIDine (CATAPRES-TTS1) Patch in Place, , Transdermal, Q8H KALYA, Rowan Nance PA    cyanocobalamin (VITAMIN B-12) sublingual tablet 1,000 mcg, 1,000 mcg, Sublingual, Daily, Rowan Nance PA    fluticasone-vilanterol (BREO ELLIPTA) 100-25 MCG/ACT inhaler 1 puff, 1 puff, Inhalation, Daily, Rowan Nance PA, 1 puff at 11/22/23 0950    gabapentin (NEURONTIN) capsule 300 mg, 300 mg, Oral, At Bedtime, Rowan Nance PA, 300 mg at 11/21/23 2048    miconazole (MICATIN) 2 % powder, , Topical, TID PRN, Rowan Nance PA    omeprazole (PriLOSEC) CR capsule 20 mg, 20 mg, Oral, Daily, Rowan Nance PA, 20 mg at 11/22/23 1113    ondansetron (ZOFRAN ODT) ODT tab 4 mg, 4 mg, Oral, Q8H PRN, Rowan Nance PA    rosuvastatin (CRESTOR) tablet 10 mg, 10 mg, Oral, Daily, Rowan Nance PA, 10 mg at 11/22/23 1112    senna-docusate (SENOKOT-S/PERICOLACE) 8.6-50 MG per tablet 1 tablet, 1 tablet, Oral, BID PRN, Rowan Nance PA    spironolactone (ALDACTONE) tablet 25 mg, 25 mg, Oral, BID, Rowan Nance PA, 25 mg at 11/22/23 1113    ursodiol (ACTIGALL) capsule 300 mg, 300 mg, Oral, BID, Rowan Nance PA, 300 mg at 11/22/23 1113    venlafaxine (EFFEXOR) tablet 75 mg, 75 mg, Oral, TID, Rowan Nance PA, 75 mg at 11/22/23 1112  No current outpatient prescriptions on file.   PMH: HLD, polysubstance abuse, anxiety, asthma, JARVIS, mood disorder, and morbid obesity

## 2023-11-22 NOTE — PROGRESS NOTES
"   11/22/23 0815   Appointment Info   Signing Clinician's Name / Credentials (PT) Hemant Castillo, DPT; Shanel Diaz, CAIO       Present no   Language English   Living Environment   People in Home child(jean claude), adult  (adult daughter and grandson)   Current Living Arrangements apartment   Home Accessibility no concerns   Transportation Anticipated family or friend will provide   Living Environment Comments Pt lives with adult daughter and grandson. Elevator access with 0 JERED or within.   Self-Care   Usual Activity Tolerance good   Current Activity Tolerance fair   Equipment Currently Used at Home none   Fall history within last six months no   Post-Acute Assessment Only   Post-Acute Functional Assessment See below   Previous Level of Function/Home Environm   Bathing, Previous Functional Level independent   Grooming, Previous Functional Level independent   Dressing, Previous Functional Level independent   Eating/Feeding, Previous Functional Level independent   Toileting, Previous Functional Level independent   BADLs, Previous Functional Level independent   IADLs, Previous Functional Level independent   Bed Mobility, Previous Functional Level independent   Transfers, Previous Functional Level independent   Household Ambulation, Previous Functional Level independent   Stairs, Previous Functional Level independent   Community Ambulation, Previous Functional Level independent   Functional Cognition, Previous Functional Level WNL   General Information   Onset of Illness/Injury or Date of Surgery 10/09/23   Referring Physician Constantino Johnson MD   Patient/Family Therapy Goals Statement (PT) Has long term goals to get back to work, but in the short term, \"learn how to walk again, get dressed on my own, and get back to my normal\"   Pertinent History of Current Problem (include personal factors and/or comorbidities that impact the POC) PER EMR: \"Mojgan Jimenze is a 53 year old woman with past " "medical history of substance abuse (sober x 5 years), anxiety, JARVIS, asthma, HLD, and morbid obesity who was admitted for planned gastric sleeve 10/9/23 course was complicated by peritonitis with return to OR 10/12/23 for with enterotomies repair, wash out and drain placement, course further complicated by acute hypoxic respiratory failure, HCAP, pleural effusion,  SUSSY, resistant HTN, stress cardiomyopathy, anemia and debility.  Admitted to rehab 11/21/23 with impaired strength and activity tolerance.\"   Existing Precautions/Restrictions fall  (orthostatic hypotension)   Weight-Bearing Status - LUE full weight-bearing   Weight-Bearing Status - RUE full weight-bearing   Weight-Bearing Status - LLE full weight-bearing   Weight-Bearing Status - RLE full weight-bearing   Heart Disease Risk Factors Dislipidemia;Lack of physical activity;Overweight;Gender   Cognition   Affect/Mental Status (Cognition) WFL   Follows Commands (Cognition) follows multi-step commands;over 90% accuracy   Behavioral Issues   (None noted)   Safety Deficit (Cognition)   (None immediately noted. Has good safety awareness surrounding current dizziness and knowing when she needs to sit and rest.)   Memory Deficit (Cognition)   (None immediately noted, but defer to OT/SLP)   Pain Assessment   Patient Currently in Pain   (Reports pain in neck/upper back with mobility: 0/10 at best, 5/10 at worst.)   Left Hip (Manual Muscle Testing)   Hip Flexion - Left Side (4/5) good, left   Right Hip (Manual Muscle Testing)   Hip Flexion - Right Side (4/5) good, right   Left Knee (Manual Muscle Testing)   Knee Flexion - Left Side (4/5) good, left   Knee Extension - Left Side (4/5) good, left   Right Knee (Manual Muscle Testing)   Knee Flexion - Right Side (4/5) good, right   Knee Extension - Right Side (4/5) good, right   Left Ankle/Foot (Manual Muscle Testing)   Ankle Dorsiflexion - Left Side 5/5) normal,left   Ankle Plantarflexion - Left Side 5/5) normal,left "   Right Ankle/Foot (Manual Muscle Testing)   Ankle Dorsiflexion - Right Side (4/5) good, right   Ankle Plantarflexion - Right Side (4/5) good, right   Balance   Balance Comments Good dynamic seated balance. Fair static, dynamic standing balance without UE support.   Sensory Examination   Sensory Perception Comments Denies paresthesias in bilat LE, but not formally tested.   Clinical Impression   Criteria for Skilled Therapeutic Intervention Yes, treatment indicated   PT Diagnosis (PT) unsteadiness on feet, generalized weakness and deconditioning s/p complicated acute care stay   Influenced by the following impairments neck pain, generalized weakness and deconditioning, orthostatic hypotension, impaired static, dynamic standing balance   Functional limitations due to impairments transfers, ambulation, stairs   Clinical Presentation (PT Evaluation Complexity) evolving   Clinical Presentation Rationale Labile BP requiring close monitoring with increased risk for falls   Clinical Decision Making (Complexity) moderate complexity   Planned Therapy Interventions (PT) balance training;gait training;groups;home exercise program;neuromuscular re-education;patient/family education;strengthening;stretching;transfer training;progressive activity/exercise;home program guidelines   Risk & Benefits of therapy have been explained evaluation/treatment results reviewed;care plan/treatment goals reviewed;risks/benefits reviewed;current/potential barriers reviewed;participants voiced agreement with care plan;participants included;patient   Clinical Impression Comments Pt presents to PT with unsteadiness on feet and generalized weakness and deconditioning s/p planned gastric sleeve 10/9/23 with course complicated by peritonitis with return to OR 10/12/23 for with enterotomies repair, wash out and drain placement, further complicated by acute hypoxic respiratory failure, HCAP, pleural effusion,  SUSSY, resistant HTN, stress cardiomyopathy,  anemia and debility. At this time, while mobilizing fairly well, she is limited in progression of mobility by orthostatic hypotension. She should benefit well from skilled PT, with goals for mod I mobility and ADLs at discharge. ELOS 7 days.   PT Total Evaluation Time   PT Eval, Moderate Complexity Minutes (87106) 20   Physical Therapy Goals   PT Frequency Daily   PT Predicted Duration/Target Date for Goal Attainment 23   PT: Bed Mobility Independent;Supine to/from sit;Rolling;Bridging  (w/out rail)   PT: Transfers Modified independent;Sit to/from stand;Bed to/from chair  (with LRAD)   PT: Gait Modified independent;Greater than 200 feet  (with LRAD)   PT: Goal 1 Pt will be able to complete car transfer with SBA and LRAD in order to access home and medical appts.   PT: Goal 2 Pt will be IND with HEP for functional strength, endurance, and standing balance to increase IND with IADLs and reduce risk of future falls.   Therapeutic Activity   Therapeutic Activities: dynamic activities to improve functional performance Minutes (30399) 25   Treatment Detail/Skilled Intervention See below GG completion and vitals flow sheet. BP in standin/103 with concurrent dizzy sx. WC obtained for transport on unit, along with bedside chair to encourage increased time upright/OOB for BP mgmt.   PT Discharge Planning   PT Plan Nickerson and TUG. Amb 50/150/uneven and car transfer GG items.   Total Session Time   Timed Code Treatment Minutes 25   Total Session Time (sum of timed and untimed services) 45   Post Acute Settings Only   What unit is patient on? Acute Rehab   PT - Acute Rehab Center Time   Individual Time (minutes) - enter zero if not applicable - PT 45   Group Time (minutes) - enter zero if not applicable  - PT 0   Concurrent Time (minutes) - enter zero if not applicable  - PT 0   Co-Treatment Time (minutes) - enter zero if not applicable  - PT 0   ARC Total Session Time (minutes) - PT 45   ARC Total Session Time    OT/PT/SLP ARC Total Session Time 45   Toilet Transfer   Describe performance SBA with FWW and use of grab bar   Chair/bed-to-chair Transfer   Describe performance SBA with FWW   Roll Left and Right   Assistance Needed Independent;Adaptive equipment   Roll Left to Right CARE Score 6   Sit to Lying   Assistance Needed Adaptive equipment;Independent   Sit to Lying CARE Score 6   Lying to Sitting on Side of Bed   Assistance Needed Independent;Adaptive equipment   Comment Mod I with rails   Lying to Sitting CARE Score 6   Sit to Stand   Assistance Needed Supervision   Sitting to Standing CARE Score 4   Locomotion   Locomotion Comment Pt ambulated 2x~15 ft with FWW and SBA EOB<>toilet, with SBA for toilet transfer.   Walk 10 Feet   Assistance Needed Adaptive equipment;Supervision   Walk 10 Ft. CARE Score 4   Wheel 50 Feet with Two Turns   Reason if not Attempted Activity not applicable   Wheel 50 Feet with Two Turns CARE Score 9   Wheel 150 Feet   Reason if not Attempted Activity not applicable   Wheel 150 Feet CARE Score 9   Stairs   Stairs Comment Unsafe today due to orthostatic hypotension.   1 Step (Curb)   Reason if not Attempted Safety concerns   1 Step CARE Score 88   4 Steps   Reason if not Attempted Safety concerns   4 Steps CARE Score 88   12 Steps   Reason if not Attempted Safety concerns   12 Steps CARE Score 88   Picking Up Object   Physical Assistance Level Contact guard assist   Comment CGA with FWW    CARE Score 4

## 2023-11-22 NOTE — PROGRESS NOTES
Patient is alert and oriented.  Bowel sounds Within normal limits. Patient continent of bowel and bladder. Last bowel movement yesterday.   Pt denied pain but had intermittant nausia / 'fullness' with PO intake. This is recent baseline due to bariatric surgery.   Pt had shower with therapy today.   Refused CPAP last night but CPAP from home was brought in today.   Vital signs this shift B/P: 112/68, T: 98.2, P: 80, R: 16   Patient is able to make needs known, uses the call light appropriately. Continue with the plan of care.   .

## 2023-11-22 NOTE — CARE CONFERENCE
Acute Rehab Care Conference/Team Rounds      Type: Team Rounds    Present: Dr. Constantino Johnson PM&R, Resident MD Jackie Abdi, Dr. Arielle Rose Neuropsychologist, Hemant Castillo PT, Jayda Tay OT, Radha French [facility], iY Worthington RD, Samson Joy RN, and Mojgan Jimenez Patient.      Discharge Barriers/Treatment/Education    Rehab Diagnosis: 16 Debility (non-cardiac, non-pulmonary) - s/p laparoscopic sleeve gastrectomy with multiple medical complications including an ICU stay     Active Medical Co-morbidities/Prognosis:   Patient Active Problem List   Diagnosis    Mild persistent asthma without complication    Vitamin D deficiency    LINA (generalized anxiety disorder)    Recurrent major depressive disorder, remission status unspecified (H24)    Methamphetamine abuse, episodic (H)    Hyperlipidemia LDL goal <160    Depression, major, recurrent, severe with psychosis (H)    IUD (intrauterine device) in place    Paranoia (H)    PMDD (premenstrual dysphoric disorder)    Polysubstance overdose    Suicidal ideation    Morbid obesity (H)    Mood disorder (H24)    Obstructive sleep apnea    Morbid (severe) obesity due to excess calories (H)    Intra-abdominal abscess (H)    Hypernatremia    SUSSY (acute kidney injury) (H24)    Accelerated hypertension    Metabolic encephalopathy    Acute respiratory failure with hypoxia (H)    Cardiomyopathy (H)    Debility        Safety:    Pain:    Medications, Skin, Tubes/Lines:    Swallowing/Nutrition:    Bowel/Bladder:    Psychosocial: SW assessment pending.     ADLs/IADLs:    Mobility:   Bed Mobility: Mod I with bedrail  Transfer: SBA with FWW  Gait: SBA with FWW in room  Stairs: N/T d/t orthostatics  Balance: Fair static, dynamic standing balance without UE support.  Therapy evaluation underway today. Despite complicated acute car stay, pt mobilizing well. Limited primarily by orthostatic hypotension. Will complete further objective balance and gait  assessments to determine anticipated equipment for home, with plan for family training prior to and outpatient therapy post ARU discharge.    Cognition/Language:    Community Re-Entry: Plan for ongoing therapies to progress community mobility tolerance and safety.    Transportation: Family to provide. Car transfer not an anticipated barrier.    Decision maker: self    Plan of Care and goals reviewed and updated.    Discharge Plan/Recommendations    Fall Precautions: continue    Patient/Family input to goals: Yes    Anticipated rehab needs following discharge: Home with family    Anticipated care giver support after discharge: Family    Estimated length of stay: 7 days    Overall plan for the patient: Continue IP Rehabilitation.       Utilization Review and Continued Stay Justification    Medical Necessity Criteria:    For any criteria that is not met, please document reason and plan for discharge, transfer, or modification of plan of care to address.    Requires intensive rehabilitation program to treat functional deficits?: Yes    Requires 3x per week or greater involvement of rehabilitation physician to oversee rehabilitation program?: Yes    Requires rehabilitation nursing interventions?: Yes    Patient is making functional progress?: Yes    There is a potential for additional functional progress? Yes    Patient is participating in therapy 3 hours per day a minimum of 5 days per week or 15 hours per week in 7 day period?:Yes    Has discharge needs that require coordinated discharge planning approach?:Yes          Final Physician Sign off    Statement of Approval: I approve the plan of care.     Patient Goals  Social Work Goals: Confirm discharge recommendations with therapy, coordinate safe discharge plan and remain available to support and assist as needed.                                                                        PT Predicted Duration/Target Date for Goal Attainment: 11/29/23  PT Frequency:  Daily  PT: Bed Mobility: Independent, Supine to/from sit, Rolling, Bridging (w/out rail)  PT: Transfers: Modified independent, Sit to/from stand, Bed to/from chair (with LRAD)  PT: Gait: Modified independent, Greater than 200 feet (with LRAD)  PT: Goal 1: Pt will be able to complete car transfer with SBA and LRAD in order to access home and medical appts.  PT: Goal 2: Pt will be IND with HEP for functional strength, endurance, and standing balance to increase IND with IADLs and reduce risk of future falls.                                                                           Patient/Family Goal: Medication Management: Patient will be able to identify dose and time of taking medication while in the ARU        Goal: Skin Integrity: Patient's skin will be free of any skin breakdown while in the ARU                    Goal: Safety Management: Patient will be able to use a call light and wait for assistance appropriately.

## 2023-11-22 NOTE — PLAN OF CARE
Discharge Planner Post-Acute Rehab PT:     Discharge Plan: Home (apt with elevator access) with IADL support from adult dgtr. OP PT.    Precautions: Falls, abdominal (s/p sleeve surgery), orthostatic    Current Status:  Bed Mobility: Mod I with bedrail  Transfer: SBA with FWW  Gait: SBA with FWW, up to 85ft. WC follow for hallway distances  Stairs: N/T d/t dizziness  Balance: Fair static, dynamic standing balance without UE support.    Outcome Measures:   TUG (11/22): 18.5 sec with FWW, 23.3 sec w/out device and SBA.  Nickerson (11/**    Assessment:  Pt presents to PT with unsteadiness on feet and generalized weakness and deconditioning s/p planned gastric sleeve 10/9/23 with course complicated by peritonitis with return to OR 10/12/23 for with enterotomies repair, wash out and drain placement, further complicated by acute hypoxic respiratory failure, HCAP, pleural effusion,  SUSSY, resistant HTN, stress cardiomyopathy, anemia and debility. At this time, while mobilizing fairly well, she is limited in progression of mobility by dizziness, appearing to be partly orthostatic hypotension with significant anxiety overlap at risk for development of persistent postural perceptual dizziness (3PD). She should benefit well from skilled PT, with goals for mod I mobility and ADLs at discharge. ELOS 7 days.    Other Barriers to Discharge (DME, Family Training, etc):   Equipment TBD pending progress.  Family training to be scheduled closer to discharge.

## 2023-11-22 NOTE — PROGRESS NOTES
"   11/22/23 0815   Appointment Info   Signing Clinician's Name / Credentials (PT) Hemant Castillo, DPT; Shanel Diaz, CAIO       Present no   Language English   Living Environment   People in Home child(jean claude), adult  (adult daughter and grandson)   Current Living Arrangements apartment   Home Accessibility no concerns   Transportation Anticipated family or friend will provide   Living Environment Comments Pt lives with adult daughter and grandson. Elevator access with 0 JERED or within.   Self-Care   Usual Activity Tolerance good   Current Activity Tolerance fair   Equipment Currently Used at Home none   Fall history within last six months no   Post-Acute Assessment Only   Post-Acute Functional Assessment See below   Previous Level of Function/Home Environm   Bathing, Previous Functional Level independent   Grooming, Previous Functional Level independent   Dressing, Previous Functional Level independent   Eating/Feeding, Previous Functional Level independent   Toileting, Previous Functional Level independent   BADLs, Previous Functional Level independent   IADLs, Previous Functional Level independent   Bed Mobility, Previous Functional Level independent   Transfers, Previous Functional Level independent   Household Ambulation, Previous Functional Level independent   Stairs, Previous Functional Level independent   Community Ambulation, Previous Functional Level independent   Functional Cognition, Previous Functional Level WNL   General Information   Onset of Illness/Injury or Date of Surgery 10/09/23   Referring Physician Constantino Johnson MD   Patient/Family Therapy Goals Statement (PT) Has long term goals to get back to work, but in the short term, \"learn how to walk again, get dressed on my own, and get back to my normal\"   Pertinent History of Current Problem (include personal factors and/or comorbidities that impact the POC) PER EMR: \"Mojgan Jimenez is a 53 year old woman with past " "medical history of substance abuse (sober x 5 years), anxiety, JARVIS, asthma, HLD, and morbid obesity who was admitted for planned gastric sleeve 10/9/23 course was complicated by peritonitis with return to OR 10/12/23 for with enterotomies repair, wash out and drain placement, course further complicated by acute hypoxic respiratory failure, HCAP, pleural effusion,  SUSSY, resistant HTN, stress cardiomyopathy, anemia and debility.  Admitted to rehab 11/21/23 with impaired strength and activity tolerance.\"   Existing Precautions/Restrictions fall  (orthostatic hypotension)   Weight-Bearing Status - LUE full weight-bearing   Weight-Bearing Status - RUE full weight-bearing   Weight-Bearing Status - LLE full weight-bearing   Weight-Bearing Status - RLE full weight-bearing   Heart Disease Risk Factors Dislipidemia;Lack of physical activity;Overweight;Gender   Cognition   Affect/Mental Status (Cognition) WFL   Follows Commands (Cognition) follows multi-step commands;over 90% accuracy   Behavioral Issues   (None noted)   Safety Deficit (Cognition)   (None immediately noted. Has good safety awareness surrounding current dizziness and knowing when she needs to sit and rest.)   Memory Deficit (Cognition)   (None immediately noted, but defer to OT/SLP)   Pain Assessment   Patient Currently in Pain   (Reports pain in neck/upper back with mobility: 0/10 at best, 5/10 at worst.)   Left Hip (Manual Muscle Testing)   Hip Flexion - Left Side (4/5) good, left   Right Hip (Manual Muscle Testing)   Hip Flexion - Right Side (4/5) good, right   Left Knee (Manual Muscle Testing)   Knee Flexion - Left Side (4/5) good, left   Knee Extension - Left Side (4/5) good, left   Right Knee (Manual Muscle Testing)   Knee Flexion - Right Side (4/5) good, right   Knee Extension - Right Side (4/5) good, right   Left Ankle/Foot (Manual Muscle Testing)   Ankle Dorsiflexion - Left Side 5/5) normal,left   Ankle Plantarflexion - Left Side 5/5) normal,left "   Right Ankle/Foot (Manual Muscle Testing)   Ankle Dorsiflexion - Right Side (4/5) good, right   Ankle Plantarflexion - Right Side (4/5) good, right   Balance   Balance Comments Good dynamic seated balance. Fair static, dynamic standing balance without UE support.   Sensory Examination   Sensory Perception Comments Denies paresthesias in bilat LE, but not formally tested.   Clinical Impression   Criteria for Skilled Therapeutic Intervention Yes, treatment indicated   Influenced by the following impairments neck pain, generalized weakness and deconditioning, orthostatic hypotension, impaired static, dynamic standing balance   Functional limitations due to impairments transfers, ambulation, stairs   Clinical Presentation (PT Evaluation Complexity) evolving   Clinical Presentation Rationale Labile BP requiring close monitoring with increased risk for falls   Clinical Decision Making (Complexity) moderate complexity   Planned Therapy Interventions (PT) balance training;gait training;groups;home exercise program;neuromuscular re-education;patient/family education;strengthening;stretching;transfer training;progressive activity/exercise;home program guidelines   Risk & Benefits of therapy have been explained evaluation/treatment results reviewed;care plan/treatment goals reviewed;risks/benefits reviewed;current/potential barriers reviewed;participants voiced agreement with care plan;participants included;patient   PT Total Evaluation Time   PT Eval, Moderate Complexity Minutes (12708) 20   Physical Therapy Goals   PT Frequency Daily   PT Predicted Duration/Target Date for Goal Attainment 11/29/23   PT: Bed Mobility Independent;Supine to/from sit;Rolling;Bridging  (w/out rail)   PT: Transfers Modified independent;Sit to/from stand;Bed to/from chair  (with LRAD)   PT: Gait Modified independent;Greater than 200 feet  (with LRAD)   PT: Goal 1 Pt will be able to complete car transfer with SBA and LRAD in order to access home  and medical appts.   PT: Goal 2 Pt will be IND with HEP for functional strength, endurance, and standing balance to increase IND with IADLs and reduce risk of future falls.   Therapeutic Activity   Therapeutic Activities: dynamic activities to improve functional performance Minutes (39193) 25   Treatment Detail/Skilled Intervention See below GG completion and vitals flow sheet. BP in standin/103 with concurrent dizzy sx. WC obtained for transport on unit, along with bedside chair to encourage increased time upright/OOB for BP mgmt.   PT Discharge Planning   PT Plan [place] and TUG. Amb 50/150/uneven and car transfer GG items.   Total Session Time   Timed Code Treatment Minutes 25   Total Session Time (sum of timed and untimed services) 45   Post Acute Settings Only   What unit is patient on? Acute Rehab   PT - Acute Rehab Center Time   Individual Time (minutes) - enter zero if not applicable - PT 45   Group Time (minutes) - enter zero if not applicable  - PT 0   Concurrent Time (minutes) - enter zero if not applicable  - PT 0   Co-Treatment Time (minutes) - enter zero if not applicable  - PT 0   ARC Total Session Time (minutes) - PT 45   ARC Total Session Time   OT/PT/SLP ARC Total Session Time 45   Toilet Transfer   Describe performance SBA with FWW and use of grab bar   Chair/bed-to-chair Transfer   Describe performance SBA with FWW   Roll Left and Right   Assistance Needed Independent;Adaptive equipment   Roll Left to Right CARE Score 6   Sit to Lying   Assistance Needed Adaptive equipment;Independent   Sit to Lying CARE Score 6   Lying to Sitting on Side of Bed   Assistance Needed Independent;Adaptive equipment   Comment Mod I with rails   Lying to Sitting CARE Score 6   Sit to Stand   Assistance Needed Supervision   Sitting to Standing CARE Score 4   Locomotion   Locomotion Comment Pt ambulated 2x~15 ft with FWW and SBA EOB<>toilet, with SBA for toilet transfer.   Walk 10 Feet   Assistance Needed Adaptive  equipment;Supervision   Walk 10 Ft. CARE Score 4   Wheel 50 Feet with Two Turns   Reason if not Attempted Activity not applicable   Wheel 50 Feet with Two Turns CARE Score 9   Wheel 150 Feet   Reason if not Attempted Activity not applicable   Wheel 150 Feet CARE Score 9   Stairs   Stairs Comment Unsafe today due to orthostatic hypotension.   1 Step (Curb)   Reason if not Attempted Safety concerns   1 Step CARE Score 88   4 Steps   Reason if not Attempted Safety concerns   4 Steps CARE Score 88   12 Steps   Reason if not Attempted Safety concerns   12 Steps CARE Score 88   Picking Up Object   Physical Assistance Level Contact guard assist   Comment CGA with FWW    CARE Score 4

## 2023-11-22 NOTE — PROGRESS NOTES
CLINICAL NUTRITION SERVICES - ASSESSMENT NOTE     Nutrition Prescription    RECOMMENDATIONS FOR MDs/PROVIDERS TO ORDER:  Recommend Bariatric Regular diet - discussed with attending    Take the following after a Sleeve Gastrectomy or Single Anastomosis Duodenal Switch:  - Multivitamin/minerals: adult dose 1-2 times daily  Ideally want one that provides:   5,000 - 10,000 international units vitamin A daily   800 mg oral folate daily  8 - 22 mg zinc and 1 - 2 mg copper daily  12 mg Thiamin  - Iron: 45-60 mg elemental (18-36 mg if low risk) - may partly or fully be covered in multivitamin   - Calcium Citrate containing vitamin D: 500 mg 3 times daily or 600 mg 2 times daily     - Separate the calcium from your multivitamin or iron by at least 2 hours.     - Must be a chewable calcium citrate until post-op 3 months     - Options for calcium citrate: Luis calcium citrate chewable, bariatric advantage calcium citrate chewable, Celebrate vitamins calcium citrate chewable, Bariatric Fusion calcium citrate chewable  - Vitamin D - at least 3,000 international units/day between all supplements  - Vitamin B12: sublingual form of at least 500 mcg daily or injection of 1000 mcg monthly      Malnutrition Status:    Patient does not meet two of the established criteria necessary for diagnosing malnutrition but is at risk for malnutrition    Recommendations already ordered by Registered Dietitian (RD):  - Snacks/supplements PRN  - Provided handouts: Bariatric Surgery Soft Diet Stage Nutrition Therapy, Supplements After Weight-Loss Surgery    Future/Additional Recommendations:  Monitor PO intake, GI symptoms, supplement use, labs, and weight trends     REASON FOR ASSESSMENT  Mojgan COLLINS Kellensamantha is a/an 53 year old female assessed by the dietitian for Provider Order - assist with diet recommendations/ vitamins/ supplement recs     PMH  Past medical history of substance abuse (sober x 5 years), anxiety, JARVIS, asthma, HLD, and morbid obesity  "who was admitted for planned gastric sleeve 10/9/23 course was complicated by peritonitis with return to OR 10/12/23 for with enterotomies repair, wash out and drain placement, course further complicated by acute hypoxic respiratory failure, HCAP, pleural effusion,  SUSSY, resistant HTN, stress cardiomyopathy, anemia and debility.  Admitted to rehab 23 with impaired strength and activity tolerance.     NUTRITION HISTORY  Per chart review:  Pt followed by RD during hospitalization. NJ removed . Pt was receiving Ensure Max supplements.     Calorie Counts  : 147 kcals, 24 g protein  : 520 kcals, 38 g protein   : 928 kcals, 57 g protein    Per pt visit:  Met with pt at bedside. Pt reports eating is going very well. Pt was able to eat all of her omelet for breakfast this morning. She ate 50% of turkey and green beans for lunch. She has been tolerating meals much better over the past day.     Discussed tips for bariatric diet and available snacks/supplements to help meet protein needs. Recommended Ensure Max, pt may order PRN as she did not want this supplement scheduled at this time. Discussed vitamin recommendations s/p bariatric surgery.     CURRENT NUTRITION ORDERS  Diet: Bariatric Diet Soft    Intake/Tolerance: 50% per flowsheets     LABS  Labs reviewed  Na: 133 (L)  BUN: 36.5 (H)  Cr: 1.78 (H)  M.6 (L)  Hemoglobin A1C: 5.9 (11/10)    MEDICATIONS  Medications reviewed  Flintstones Complete, 1 tablet BID  Hygroton  Vitamin D3, 125 mcg  Vitamin B-12, sublingual tablet 1,000 mcg  Omeprazole  Aldactone, BID  Ursodiol    ANTHROPOMETRICS  Height: 160 cm (5' 3\")  Most Recent Weight: 113.3 kg (249 lb 12.5 oz)    IBW: 52.4 kg  BMI: Obesity Grade III BMI >40  Weight History:   Wt Readings from Last 10 Encounters:   23 113.3 kg (249 lb 12.5 oz)   23 107.3 kg (236 lb 8.9 oz)   23 133.4 kg (294 lb 3.2 oz)   23 129.7 kg (286 lb)   23 129.2 kg (284 lb 12.8 oz)   11/10/22 " 129.3 kg (285 lb)   11/07/22 129.3 kg (285 lb)   08/30/22 132.6 kg (292 lb 4.8 oz)   08/25/22 131.7 kg (290 lb 6.4 oz)   06/15/22 132 kg (291 lb)   15% wt loss in 3.5 months. Pt had bariatric surgery 10/9 - expect weight loss    Dosing Weight: 68 kg (adjusted BW)    ASSESSED NUTRITION NEEDS  Estimated Energy Needs: 3570-3681 kcals/day (25 - 30 kcals/kg)  Justification: Maintenance  Estimated Protein Needs:  grams protein/day (1.2 - 1.5 grams of pro/kg)  Justification: Maintenance, preservation of lean body mass  Estimated Fluid Needs: 1 mL/kcal  Justification: Maintenance    PHYSICAL FINDINGS  See malnutrition section below.  [address]   Incision/Surgical Site - Abdomen    MALNUTRITION  % Intake: < 75% for > 7 days (moderate)  % Weight Loss: Weight loss does not meet criteria  Subcutaneous Fat Loss: None observed  Muscle Loss: None observed  Fluid Accumulation/Edema: None noted  Malnutrition Diagnosis: Patient does not meet two of the established criteria necessary for diagnosing malnutrition but is at risk for malnutrition    NUTRITION DIAGNOSIS  Inadequate oral intake related to low appetite, early satiety as evidenced by pt report and documented intakes.       INTERVENTIONS  Implementation  Nutrition Education: Provided education on role of RD in care. Discussed available snacks/supplements. Encouraged pt to separate liquids from meal times. Encouraged small, frequent meals. Discussed pt may order half portions of items off menu and order meal trays more frequently. Provided handouts: Bariatric Surgery Soft Diet Stage Nutrition Therapy, Supplements After Weight-Loss Surgery  Collaboration with other providers - discussed with attending  Medical food supplement therapy - PRN    Goals  Patient to consume % of nutritionally adequate meal trays TID, or the equivalent with supplements/snacks.     Monitoring/Evaluation  Progress toward goals will be monitored and evaluated per protocol.   Yi Worthington RD,  SOURAV POMPA RD pager: [phone]  Weekend/Holiday RD pager: [phone]

## 2023-11-22 NOTE — CARE PLAN
Discharge Planner Post-Acute Rehab OT:     Discharge Plan: home with daughter and grandson in apt with elevator  los 7 days    Precautions: Falls, abdominal (s/p sleeve surgery), orthostatic     Current Status:  ADLs:  Mobility: bed mobility mod I with bed rail  Grooming: sba sitting  Dressing:   U/b dressing: pullover shirt able to dress after clothes retrievel  L/B dressing: underwear/ shorts max a  Feet: dep  Bathing: pt able to wash 8/10 areas assit with pericares while standing and feet  transfer:ot cga w/c to venancio shower extended tubbebch   Toileting: cga to handicap height toilet  with fww  IADLs: to be assessed   Vision/Cognition:  higher cog assessment     Assessment: pt is a 53 year old female s/p laparoscopic sleeve with single anastomosis duodenal switch which she underwent per DR. Worthy 10/09/23 with complications and long acute hospital stay will benifit from ot per ot goals     Other Barriers to Discharge (DME, Family Training, etc): closer to discharge with daughter

## 2023-11-23 ENCOUNTER — APPOINTMENT (OUTPATIENT)
Dept: OCCUPATIONAL THERAPY | Facility: CLINIC | Age: 53
End: 2023-11-23
Attending: PHYSICAL MEDICINE & REHABILITATION
Payer: COMMERCIAL

## 2023-11-23 LAB
ALBUMIN UR-MCNC: 30 MG/DL
ANION GAP SERPL CALCULATED.3IONS-SCNC: 14 MMOL/L (ref 7–15)
APPEARANCE UR: ABNORMAL
BILIRUB UR QL STRIP: NEGATIVE
BUN SERPL-MCNC: 42.7 MG/DL (ref 6–20)
CALCIUM SERPL-MCNC: 9.7 MG/DL (ref 8.6–10)
CHLORIDE SERPL-SCNC: 88 MMOL/L (ref 98–107)
COLOR UR AUTO: YELLOW
CREAT SERPL-MCNC: 1.87 MG/DL (ref 0.51–0.95)
DEPRECATED HCO3 PLAS-SCNC: 28 MMOL/L (ref 22–29)
EGFRCR SERPLBLD CKD-EPI 2021: 32 ML/MIN/1.73M2
GLUCOSE SERPL-MCNC: 112 MG/DL (ref 70–99)
GLUCOSE UR STRIP-MCNC: NEGATIVE MG/DL
HGB UR QL STRIP: NEGATIVE
HYALINE CASTS: 17 /LPF
KETONES UR STRIP-MCNC: NEGATIVE MG/DL
LEUKOCYTE ESTERASE UR QL STRIP: ABNORMAL
MAGNESIUM SERPL-MCNC: 1.6 MG/DL (ref 1.7–2.3)
MUCOUS THREADS #/AREA URNS LPF: PRESENT /LPF
NITRATE UR QL: NEGATIVE
OSMOLALITY UR: 461 MMOL/KG (ref 100–1200)
PH UR STRIP: 5 [PH] (ref 5–7)
POTASSIUM SERPL-SCNC: 3.6 MMOL/L (ref 3.4–5.3)
RBC URINE: 2 /HPF
SODIUM SERPL-SCNC: 130 MMOL/L (ref 135–145)
SP GR UR STRIP: 1.02 (ref 1–1.03)
SQUAMOUS EPITHELIAL: 14 /HPF
TRANSITIONAL EPI: 1 /HPF
UROBILINOGEN UR STRIP-MCNC: NORMAL MG/DL
WBC URINE: 16 /HPF

## 2023-11-23 PROCEDURE — 83735 ASSAY OF MAGNESIUM: CPT | Performed by: PHYSICAL MEDICINE & REHABILITATION

## 2023-11-23 PROCEDURE — 82310 ASSAY OF CALCIUM: CPT | Performed by: PHYSICIAN ASSISTANT

## 2023-11-23 PROCEDURE — 83935 ASSAY OF URINE OSMOLALITY: CPT | Performed by: NURSE PRACTITIONER

## 2023-11-23 PROCEDURE — 36415 COLL VENOUS BLD VENIPUNCTURE: CPT | Performed by: PHYSICIAN ASSISTANT

## 2023-11-23 PROCEDURE — 250N000013 HC RX MED GY IP 250 OP 250 PS 637

## 2023-11-23 PROCEDURE — 128N000003 HC R&B REHAB

## 2023-11-23 PROCEDURE — 99223 1ST HOSP IP/OBS HIGH 75: CPT | Performed by: NURSE PRACTITIONER

## 2023-11-23 PROCEDURE — 97535 SELF CARE MNGMENT TRAINING: CPT | Mod: GO

## 2023-11-23 PROCEDURE — 84540 ASSAY OF URINE/UREA-N: CPT | Performed by: NURSE PRACTITIONER

## 2023-11-23 PROCEDURE — 250N000013 HC RX MED GY IP 250 OP 250 PS 637: Performed by: PHYSICAL MEDICINE & REHABILITATION

## 2023-11-23 PROCEDURE — 81001 URINALYSIS AUTO W/SCOPE: CPT | Performed by: NURSE PRACTITIONER

## 2023-11-23 PROCEDURE — 99232 SBSQ HOSP IP/OBS MODERATE 35: CPT | Mod: GC | Performed by: STUDENT IN AN ORGANIZED HEALTH CARE EDUCATION/TRAINING PROGRAM

## 2023-11-23 PROCEDURE — 250N000013 HC RX MED GY IP 250 OP 250 PS 637: Performed by: PHYSICIAN ASSISTANT

## 2023-11-23 PROCEDURE — 82570 ASSAY OF URINE CREATININE: CPT | Performed by: NURSE PRACTITIONER

## 2023-11-23 PROCEDURE — 87086 URINE CULTURE/COLONY COUNT: CPT | Performed by: NURSE PRACTITIONER

## 2023-11-23 RX ORDER — LIDOCAINE 4 G/G
1 PATCH TOPICAL ONCE
Status: COMPLETED | OUTPATIENT
Start: 2023-11-23 | End: 2023-11-23

## 2023-11-23 RX ORDER — LIDOCAINE 4 G/G
1 PATCH TOPICAL
Status: DISCONTINUED | OUTPATIENT
Start: 2023-11-24 | End: 2023-11-29 | Stop reason: HOSPADM

## 2023-11-23 RX ORDER — SALIVA STIMULANT COMB. NO.3
2 SPRAY, NON-AEROSOL (ML) MUCOUS MEMBRANE
Status: DISCONTINUED | OUTPATIENT
Start: 2023-11-23 | End: 2023-11-29 | Stop reason: HOSPADM

## 2023-11-23 RX ADMIN — FLUTICASONE FUROATE AND VILANTEROL TRIFENATATE 1 PUFF: 100; 25 POWDER RESPIRATORY (INHALATION) at 08:11

## 2023-11-23 RX ADMIN — URSODIOL 300 MG: 300 CAPSULE ORAL at 20:11

## 2023-11-23 RX ADMIN — ROSUVASTATIN CALCIUM 10 MG: 5 TABLET, FILM COATED ORAL at 08:10

## 2023-11-23 RX ADMIN — VENLAFAXINE 75 MG: 75 TABLET ORAL at 08:10

## 2023-11-23 RX ADMIN — OMEPRAZOLE 20 MG: 20 CAPSULE, DELAYED RELEASE ORAL at 08:10

## 2023-11-23 RX ADMIN — CETIRIZINE HYDROCHLORIDE 10 MG: 5 TABLET ORAL at 08:11

## 2023-11-23 RX ADMIN — AMLODIPINE BESYLATE 10 MG: 10 TABLET ORAL at 08:10

## 2023-11-23 RX ADMIN — LIDOCAINE 1 PATCH: 4 PATCH TOPICAL at 10:24

## 2023-11-23 RX ADMIN — Medication 1000 MCG: at 08:10

## 2023-11-23 RX ADMIN — Medication 125 MCG: at 08:10

## 2023-11-23 RX ADMIN — VENLAFAXINE 75 MG: 75 TABLET ORAL at 20:11

## 2023-11-23 RX ADMIN — Medication 1 TABLET: at 08:10

## 2023-11-23 RX ADMIN — URSODIOL 300 MG: 300 CAPSULE ORAL at 08:10

## 2023-11-23 RX ADMIN — Medication 1 TABLET: at 20:11

## 2023-11-23 RX ADMIN — VENLAFAXINE 75 MG: 75 TABLET ORAL at 13:52

## 2023-11-23 RX ADMIN — CLONIDINE 1 PATCH: 0.1 PATCH TRANSDERMAL at 17:32

## 2023-11-23 RX ADMIN — CALCIUM CARBONATE (ANTACID) CHEW TAB 500 MG 500 MG: 500 CHEW TAB at 08:10

## 2023-11-23 RX ADMIN — CARVEDILOL 25 MG: 25 TABLET, FILM COATED ORAL at 08:10

## 2023-11-23 RX ADMIN — CARVEDILOL 25 MG: 25 TABLET, FILM COATED ORAL at 17:32

## 2023-11-23 RX ADMIN — MAGNESIUM OXIDE TAB 400 MG (241.3 MG ELEMENTAL MG) 400 MG: 400 (241.3 MG) TAB at 08:10

## 2023-11-23 RX ADMIN — GABAPENTIN 300 MG: 300 CAPSULE ORAL at 20:11

## 2023-11-23 ASSESSMENT — ACTIVITIES OF DAILY LIVING (ADL)
ADLS_ACUITY_SCORE: 27
ADLS_ACUITY_SCORE: 26
ADLS_ACUITY_SCORE: 27
ADLS_ACUITY_SCORE: 26
ADLS_ACUITY_SCORE: 27
ADLS_ACUITY_SCORE: 27
ADLS_ACUITY_SCORE: 26
ADLS_ACUITY_SCORE: 26
ADLS_ACUITY_SCORE: 27
ADLS_ACUITY_SCORE: 26

## 2023-11-23 NOTE — CARE PLAN
Discharge Planner Post-Acute Rehab OT:     Discharge Plan: home with daughter and grandson in apt with elevator  los 7 days    Precautions: Falls, abdominal (s/p sleeve [id number] but greater than 6weeks so providers to clarify if they can be discontinued), orthostatic and SOB w/ activty, c/o R UE weakness/intermittent pain, neck pain    Current Status:  ADLs:  Mobility: bed mobility mod I with bed rail, sba to cga w/ fww to ambulate in room but monitor for BP issues and neck pain interfering w/ performance  Grooming: sba sitting  Dressing:   U/b dressing: pullover shirt able to dress after clothes retrievel  L/B dressing: min A w/ reacher but may not have need for abdominal precautions   Feet: min A w/ sock aide  Bathing: pt able to wash 8/10 areas assit with pericares while standing and feet  transfer:ot cga w/c to venancio shower extended tubbebch   Toileting: cga to handicap height toilet  with fww  IADLs: to be assessed   Vision/Cognition:  higher cog assessment     Assessment: OT:focused on educ on use of AE for LB drg, reviewed abdom precautions and approach w/ abdom precautions and current deficits , pt questions where she still has abdom preautions or not, surgery was >6weeks ago , th notifed providers to clarify, neck pain, SOB and BP issues limit indep,pt improving w/ adls/mob, cont w/ POC    Other Barriers to Discharge (DME, Family Training, etc): closer to discharge with daughter

## 2023-11-23 NOTE — PLAN OF CARE
Goal Outcome Evaluation:    Overall Patient Progress: no changeOverall Patient Progress: no change    Patient is alert and oriented x4. Calls for needs appropriately. Denied any pain tonight. Looked comfortable in bed. Slept good. Calls for assistance to the bathroom, A1 with the walker. Continent of bladder, no bm tonight. Refused the cpap and O2 overnight. No observed difficulty breathing throughout sleep. Tolerated PCD overnight. Continue poc.

## 2023-11-23 NOTE — CONSULTS
[facility], [place]    Internal Medicine Consult Note       Date of Admission: 11/21/2023  Consult Requested by: Sandip Wilkins MD  Reason for Consult: Medical co-management, SUSSY, and hyponatremia    Assessment & Plan   Mojgan Jimenez is a 53 year old female with past medical history significant for polysubstance use disorder (sober x 5 years), anxiety, JARVIS, HLD, and class III obesity admitted to [facility] for planned gastric sleeve surgery on 10/9/23.  Hospital course complicated by peritonitis with RTOR 10/12/23 for repair of 2 small bowel enterotomies, intra-abdominal wash out and drain placement, acute hypoxic respiratory failure requiring intubation 10/21-10/26, HCAP, pleural effusion, oliguric SUSSY with renal recovery, resistant HTN, stress cardiomyopathy, persistent pleural effusion vs parapneumonic effusion requiring chest tube placement, thrombocytopenia w positive HIT screen, and debility.  She is admitted to ARU for therapies due to impaired strength and activity intolerance.  Internal medicine is consulted for medical co-management, SUSSY, and hyponatremia.      # SUSSY in setting of recent acute renal failure   # Possible new dx CKD stage III   Developed acute oliguric renal failure post op with peak Cr at 6.97 on 10/14.  Renal function improved without HD/CRRT.  Cr gradually downtrending since then and has remained below 2 since 10/29.  Last renal US on 11/9 with no acute findings.  Cr elevated to 1.87 (1.78) today.  Expect 3 months for renal function to fully recover.    - Check UA/UC   - Check FEUrea (FENa of low utility given dose of hydrochlorothiazide yesterday)   - Monitor I/Os   - Continue to hold hydrochlorothiazide and spironolactone   - BMP in AM  - Avoid/minimize nephrotoxic agents     # Hyponatremia - Likely hypovolemic.  Na gradually downtrending since 11/14, low at 130 (133) today.  Suspect multifactorial in setting of recent gastric surgery and SUSSY,  "diuretic use, and possible low solute intake.  On Bumex, hydrochlorothiazide, and spironolactone at time of admission to ARU as above.    - Urine lytes, FE urea as above  - Repeat in AM   - Holding diuretics as above   - Encourage PO intake   - No need for FR at the moment     # Stress cardiomyopathy   # HFrEF   # Resistant HTN   - Continue Amlodipine 5 mg twice daily  - Continue Carvedilol 25 mg twice daily  - Hold hydrochlorothiazide and spironolactone as above for now     # Hypomagnesemia - Mag 1.6 on 11/22.  Likely absorption issue following gastric surgery. Per chart review, was started on oral MagOx 400mg daily.   - Added Mag replacement protocol  - Agree w/ scheduled supplementation    # Vitamin D deficiency - Continue PTA cholecalciferol.     # Anxiety - Continue PTA Effexor.    Resolved hospital issues:   # S/p sleeve gastrectomy c/b enterotomy repair x 2 and peritonitis - Complex postop course as above.  Treated with Augmentin and Fluconazole through 11/16/23.  Briefly required NG tube, now removed.  Continue PPI.    # Acute hypoxic respiratory failure, pleural effusion vs parapneumonic effusion - Intubated 10/12-10/21, then re-intubated 10/21-10/26.  Chest tube placed 11/1-11/5.   # Thrombocytopenia with positive HIT screen - Maintained on fondaparinux at OSH.  Stopped at discharge, no need to resume at this point.    # Acute toxic and metabolic encephalopathy - Appropriate mentation on exam today.  Occurred during hospitalization.  No acute issues.    # Thrush - Resolved.  S/p treatment with Nystatin and Fluconazole.          Rowan Busch CNP  Hospitalist Service  [facility]  Pager: [phone]    ______________________________________________________________________    Chief Complaint   \"My mouth feels like cotton\"     History is obtained from the patient and chart review.      History of Present Illness   Mojgan Jimenez is a 53 year old female with past medical history significant for " polysubstance use disorder, anxiety, JARVIS, HLD, and class III obesity admitted to [facility] for planned gastric sleeve surgery on 10/9/23.  Hospital course complicated by peritonitis with RTOR 10/12/23 for repair of 2 small bowel enterotomies, intra-abdominal wash out and drain placement, acute hypoxic respiratory failure requiring intubation 10/21-10/26, HCAP, pleural effusion, oliguric SUSSY with renal recovery, resistant HTN, stress cardiomyopathy, persistent pleural effusion vs parapneumonic effusion requiring chest tube placement, thrombocytopenia w positive HIT screen, and debility.  She is admitted to ARU for therapies due to impaired strength and activity intolerance.  Internal medicine is consulted for medical co-management, SUSSY, and hyponatremia.      Mojgan is seen in her room.  She is feeling well today other than dry mouth.  She has been eating and drinking well since admission.  She has not noticed any urinary pain, frequency, or burning.  She denies vomiting.  Feels that she is making her usual amount of urine.  She denies chest pain or feeling short of breath, but has noted some dizziness when getting out of bed.  She has not noticed any new swelling.        Review of Systems   10 point ROS performed and negative unless otherwise noted in HPI     Past Medical History    I have reviewed this patient's medical history and updated it with pertinent information if needed.   Past Medical History:   Diagnosis Date    LINA (generalized anxiety disorder) 11/02/2017    Hyperlipidemia LDL goal <160 01/20/2019    Major depressive disorder     Methanol abuse (H)     Mild persistent asthma without complication 11/02/2017    Obese     JARVIS on CPAP     Cpap not working    Paranoia (H)     resolved due to drugs    Vitamin D deficiency 11/02/2017        Past Surgical History   I have reviewed this patient's surgical history and updated it with pertinent information if needed.  Past Surgical History:   Procedure Laterality  "Date    GASTRECTOMY, SLEEVE, LAPAROSCOPIC, WITH DUODENAL SWITCH N/A 10/9/2023    Procedure: GASTRECTOMY, SLEEVE, LAPAROSCOPIC, WITH SINGLE ANASTAMOSIS DUODENAL SWITCH;  Surgeon: Hoang Worthy MD;  Location: [facility] OR    LAPAROSCOPY DIAGNOSTIC (GYN) N/A 10/12/2023    Procedure: LAPAROSCOPY,;  Surgeon: Hoang Worthy MD;  Location: [facility] OR    LAPAROTOMY EXPLORATORY N/A 10/12/2023    Procedure: LAPAROTOMY, CLOSURE OF TWO SMALL BOWEL INTEROTOMIES, DRAIN PLACEMENT, INTRA-ABDOMINAL CLEAN OUT;  Surgeon: Hoang Worthy MD;  Location: [facility] OR    MAMMOPLASTY REDUCTION      reduction    PICC TRIPLE LUMEN PLACEMENT  10/28/2023      Social History   Social History     Tobacco Use    Smoking status: Never    Smokeless tobacco: Never   Vaping Use    Vaping Use: Never used   Substance Use Topics    Alcohol use: Not Currently     Comment: Sober x 8 months    Drug use: Not Currently     Types: Methamphetamines, \"Crack\" cocaine     Comment: in remision      Family History   I have reviewed this patient's family history and updated it with pertinent information if needed.   Family History   Problem Relation Age of Onset    Cancer Mother     Unknown/Adopted Father     Alcoholism Father     Substance Abuse Sister     Diabetes No family hx of     Hypertension No family hx of        Medications   Current Facility-Administered Medications   Medication    acetaminophen (TYLENOL) tablet 500-1,000 mg    albuterol (PROVENTIL HFA/VENTOLIN HFA) inhaler    albuterol (PROVENTIL) neb solution 2.5 mg    amLODIPine (NORVASC) tablet 10 mg    bisacodyl (DULCOLAX) suppository 10 mg    calcium carbonate (TUMS) chewable tablet 500 mg    carvedilol (COREG) tablet 25 mg    cetirizine (zyrTEC) tablet 10 mg    childrens multivitamin with iron (FLINTSTONES COMPLETE) chewable tablet 1 tablet    [Held by provider] chlorthalidone (HYGROTON) tablet 50 mg    cholecalciferol (VITAMIN D3) capsule 125 mcg    " "cloNIDine (CATAPRES-TTS1) 0.1 MG/24HR WK patch 1 patch    And    cloNIDine (CATAPRES-TTS1) Patch in Place    cyanocobalamin (VITAMIN B-12) sublingual tablet 1,000 mcg    fluticasone-vilanterol (BREO ELLIPTA) 100-25 MCG/ACT inhaler 1 puff    gabapentin (NEURONTIN) capsule 300 mg    [START ON 11/24/2023] Lidocaine (LIDOCARE) 4 % Patch 1 patch    Lidocaine (LIDOCARE) 4 % Patch 1 patch    magnesium oxide (MAG-OX) tablet 400 mg    miconazole (MICATIN) 2 % powder    omeprazole (PriLOSEC) CR capsule 20 mg    ondansetron (ZOFRAN ODT) ODT tab 4 mg    rosuvastatin (CRESTOR) tablet 10 mg    senna-docusate (SENOKOT-S/PERICOLACE) 8.6-50 MG per tablet 1 tablet    [Held by provider] spironolactone (ALDACTONE) tablet 25 mg    ursodiol (ACTIGALL) capsule 300 mg    venlafaxine (EFFEXOR) tablet 75 mg       Allergies    No Active Allergies    Physical Exam   /77 (BP Location: Right arm, Patient Position: Supine, Cuff Size: Adult Large)   Pulse 83   Temp 97.8  F (36.6  C) (Oral)   Resp 18   Ht 1.6 m (5' 3\")   Wt 113.3 kg (249 lb 12.5 oz)   LMP 09/09/2023 (Exact Date)   SpO2 96%   BMI 44.25 kg/m       GENERAL: Alert and oriented x 3. Well nourished, well developed.  Obese body habitus. No acute distress.    HEENT: Normocephalic, atraumatic. Anicteric sclera. Mucous membranes moist.   CV: RRR. S1, S2. No murmurs appreciated.   RESPIRATORY: Effort normal on room air. Lungs CTAB with no wheezing, rales, or rhonchi.   GI: Abdomen soft and non distended, bowel sounds present x all 4 quadrants. No tenderness, rebound, or guarding.  Surgical incision on abdomen is healing well, c/d/I.   NEUROLOGICAL: No focal deficits. Follows commands.  Strength equal in upper and lower extremities.   MUSCULOSKELETAL: No joint swelling or tenderness. Moves all extremities.   EXTREMITIES: No gross deformities. Dependent non pitting edema in BL lower extremities.   SKIN: Grossly warm, dry, and intact. No jaundice. No rashes.     Data   Data " reviewed today: I reviewed all medications, new labs and imaging results over the last 24 hours.     ROUTINE IP LABS (Last four results)  CMP   Recent Labs   Lab 11/23/23  0944 11/22/23  0538 11/18/23  1129 11/18/23  0733 11/17/23  0820 11/17/23  0813   * 133*  --   --   --  135   POTASSIUM 3.6 3.6  --   --   --  3.9   CHLORIDE 88* 89*  --   --   --  93*   CO2 28 30*  --   --   --  33*   ANIONGAP 14 14  --   --   --  9   * 93 115* 96   < > 104*   BUN 42.7* 36.5*  --   --   --  19.9   CR 1.87* 1.78*  --   --   --  1.30*   PALAK 9.7 10.0  --   --   --  9.3   MAG  --  1.6*  --   --   --   --     < > = values in this interval not displayed.     CBC   Recent Labs   Lab 11/22/23  0538 11/19/23  0542 11/17/23  0813   WBC 7.7  --  6.8   RBC 3.43*  --  2.77*   HGB 9.5*  --  7.9*   HCT 30.2*  --  25.0*   MCV 88  --  90   MCH 27.7  --  28.5   MCHC 31.5  --  31.6   RDW 14.6  --  14.7    298 220     INR No lab results found in last 7 days.

## 2023-11-23 NOTE — PLAN OF CARE
Goal Outcome Evaluation:    Plan of Care Reviewed With: patient    Overall Patient Progress: improving    Outcome Evaluation: Pt was able to reposition herself in bed, no skin concerns were noted. She continues to use the call light appropriately as the need arises. She still needs to pass MAP prior to discharge.

## 2023-11-23 NOTE — PROGRESS NOTES
"  [facility]   Acute Rehabilitation Unit  Daily progress note  CC:     Notes and labs reviewed over past 24 hours. No acute overnight events reported    Patient was seen and examined at bedside. She has no new complaints and concerns. She does report some lightheadedness when ambulating, but reports this is a chronic issue and is not any worse than prior to admission. She also reports some minor neck pain for which she is requesting a patch to help relieve pain. There is no radiation of pain or other symptoms, and she attributes it to \"being sore\". She reports no BM in past 2 days but denies any feelings of constipation or abdominal pain. Denies painful urination and reports she feels she is fully voiding her bladder. Given the complexity of her medical history and rising creatinine, we explained our plan to consult internal medicine, she is in agreement.     ROS: 10 point ROS was negative unless otherwise stated above        Medications  Scheduled meds   amLODIPine  10 mg Oral Daily    calcium carbonate  500 mg Oral Daily    carvedilol  25 mg Oral BID w/meals    cetirizine  10 mg Oral Daily    childrens multivitamin with iron  1 tablet Oral BID    [Held by provider] chlorthalidone  50 mg Oral Daily    cholecalciferol  125 mcg Oral Daily    cloNIDine  1 patch Transdermal Weekly    And    cloNIDine   Transdermal Q8H KAYLA    cyanocobalamin  1,000 mcg Sublingual Daily    fluticasone-vilanterol  1 puff Inhalation Daily    gabapentin  300 mg Oral At Bedtime    [START ON 11/24/2023] lidocaine  1 patch Transdermal Q24H    lidocaine  1 patch Transdermal Once    magnesium oxide  400 mg Oral Daily    omeprazole  20 mg Oral Daily    rosuvastatin  10 mg Oral Daily    [Held by provider] spironolactone  25 mg Oral BID    ursodiol  300 mg Oral BID    venlafaxine  75 mg Oral TID       PRN meds:  acetaminophen, albuterol, albuterol, bisacodyl, miconazole, ondansetron, senna-docusate      PHYSICAL " "EXAM  /77 (BP Location: Right arm, Patient Position: Supine, Cuff Size: Adult Large)   Pulse 83   Temp 97.8  F (36.6  C) (Oral)   Resp 18   Ht 1.6 m (5' 3\")   Wt 113.3 kg (249 lb 12.5 oz)   LMP 09/09/2023 (Exact Date)   SpO2 96%   BMI 44.25 kg/m    Gen: NAD, pleasant and cooperative  HEENT: Normocephalic, atraumatic, extra-ocular movements appear intact  Pulm: Lungs clear to auscultation bilateral, non-labored breathing  Ext: no edema in BLE, no tenderness in calves  GI: Soft, non-tender, non-distended. Well healed incisions covering abdomen. Hypoactive bowel sounds in all four quadrants.  MSK: Moves all extremities against gravity.   Neuro: Orientation: Oriented to person, place, time, situation. Exhibits good insight into his/her condition and ongoing management/symptoms. Strength as described in MSK          LABS  Last Comprehensive Metabolic Panel:  Sodium   Date Value Ref Range Status   11/23/2023 130 (L) 135 - 145 mmol/L Final     Comment:     Reference intervals for this test were updated on 09/26/2023 to more accurately reflect our healthy population. There may be differences in the flagging of prior results with similar values performed with this method. Interpretation of those prior results can be made in the context of the updated reference intervals.    11/17/2020 141 133 - 144 mmol/L Final     Potassium   Date Value Ref Range Status   11/23/2023 3.6 3.4 - 5.3 mmol/L Final   05/17/2022 4.5 3.4 - 5.3 mmol/L Final   11/17/2020 4.1 3.4 - 5.3 mmol/L Final     Chloride   Date Value Ref Range Status   11/23/2023 88 (L) 98 - 107 mmol/L Final   05/17/2022 102 94 - 109 mmol/L Final   11/17/2020 109 94 - 109 mmol/L Final     Carbon Dioxide   Date Value Ref Range Status   11/17/2020 31 20 - 32 mmol/L Final     Carbon Dioxide (CO2)   Date Value Ref Range Status   11/23/2023 28 22 - 29 mmol/L Final   05/17/2022 32 20 - 32 mmol/L Final     Anion Gap   Date Value Ref Range Status   11/23/2023 14 7 - 15 " mmol/L Final   05/17/2022 2 (L) 3 - 14 mmol/L Final   11/17/2020 1 (L) 3 - 14 mmol/L Final     Glucose   Date Value Ref Range Status   11/23/2023 112 (H) 70 - 99 mg/dL Final   05/17/2022 110 (H) 70 - 99 mg/dL Final   11/17/2020 84 70 - 99 mg/dL Final     Comment:     Fasting specimen     GLUCOSE BY METER POCT   Date Value Ref Range Status   11/18/2023 115 (H) 70 - 99 mg/dL Final     Urea Nitrogen   Date Value Ref Range Status   11/23/2023 42.7 (H) 6.0 - 20.0 mg/dL Final   05/17/2022 16 7 - 30 mg/dL Final   11/17/2020 9 7 - 30 mg/dL Final     Creatinine   Date Value Ref Range Status   11/23/2023 1.87 (H) 0.51 - 0.95 mg/dL Final   11/17/2020 0.79 0.52 - 1.04 mg/dL Final     GFR Estimate   Date Value Ref Range Status   11/23/2023 32 (L) >60 mL/min/1.73m2 Final   11/17/2020 88 >60 mL/min/[1.73_m2] Final     Comment:     Non  GFR Calc  Starting 12/18/2018, serum creatinine based estimated GFR (eGFR) will be   calculated using the Chronic Kidney Disease Epidemiology Collaboration   (CKD-EPI) equation.       Calcium   Date Value Ref Range Status   11/23/2023 9.7 8.6 - 10.0 mg/dL Final   11/17/2020 8.9 8.5 - 10.1 mg/dL Final              ASSESSMENT AND PLAN    Mojgan Jimenez is a 53 year old woman with past medical history of substance abuse (sober x 5 years), anxiety, JARVIS, asthma, HLD, and morbid obesity who was admitted for planned gastric sleeve 10/9/23 course was complicated by peritonitis with return to OR 10/12/23 for with enterotomies repair, wash out and drain placement, course further complicated by acute hypoxic respiratory failure, HCAP, pleural effusion,  SUSSY, resistant HTN, stress cardiomyopathy, anemia and debility.  Admitted to rehab 11/21/23 with impaired strength and activity tolerance.           --Vitals stable.  --Ordered daily lidocaine patch for neck pain.   --Given rising creatinine and hyponatremia in setting of complex medical history, Will consult medicine for  co-management.  --Patient wanting to continue increasing oral intake, but will likely need IVF for repletion.   --Continue therapies and plan of care.        Seen and discussed with Dr. Hankins, PM&R staff physician       Francesco Mcgill DO  PGY2  Physical Medicine and Rehabilitation-[facility]  Pager: [phone]

## 2023-11-24 ENCOUNTER — APPOINTMENT (OUTPATIENT)
Dept: OCCUPATIONAL THERAPY | Facility: CLINIC | Age: 53
End: 2023-11-24
Attending: PHYSICAL MEDICINE & REHABILITATION
Payer: COMMERCIAL

## 2023-11-24 ENCOUNTER — APPOINTMENT (OUTPATIENT)
Dept: PHYSICAL THERAPY | Facility: CLINIC | Age: 53
End: 2023-11-24
Attending: PHYSICAL MEDICINE & REHABILITATION
Payer: COMMERCIAL

## 2023-11-24 LAB
ANION GAP SERPL CALCULATED.3IONS-SCNC: 15 MMOL/L (ref 7–15)
BUN SERPL-MCNC: 40.3 MG/DL (ref 6–20)
CALCIUM SERPL-MCNC: 10 MG/DL (ref 8.6–10)
CHLORIDE SERPL-SCNC: 90 MMOL/L (ref 98–107)
CREAT SERPL-MCNC: 1.51 MG/DL (ref 0.51–0.95)
CREAT UR-MCNC: 189.4 MG/DL
DEPRECATED HCO3 PLAS-SCNC: 26 MMOL/L (ref 22–29)
EGFRCR SERPLBLD CKD-EPI 2021: 41 ML/MIN/1.73M2
GLUCOSE SERPL-MCNC: 106 MG/DL (ref 70–99)
HOLD SPECIMEN: NORMAL
MAGNESIUM SERPL-MCNC: 1.7 MG/DL (ref 1.7–2.3)
POTASSIUM SERPL-SCNC: 3.3 MMOL/L (ref 3.4–5.3)
SODIUM SERPL-SCNC: 131 MMOL/L (ref 135–145)
UUN UR-MCNC: 683 MG/DL (ref 801–1666)

## 2023-11-24 PROCEDURE — 128N000003 HC R&B REHAB

## 2023-11-24 PROCEDURE — 36415 COLL VENOUS BLD VENIPUNCTURE: CPT

## 2023-11-24 PROCEDURE — 97535 SELF CARE MNGMENT TRAINING: CPT | Mod: GO

## 2023-11-24 PROCEDURE — 250N000013 HC RX MED GY IP 250 OP 250 PS 637: Performed by: PHYSICIAN ASSISTANT

## 2023-11-24 PROCEDURE — 80048 BASIC METABOLIC PNL TOTAL CA: CPT

## 2023-11-24 PROCEDURE — 97530 THERAPEUTIC ACTIVITIES: CPT | Mod: GP | Performed by: PHYSICAL THERAPIST

## 2023-11-24 PROCEDURE — 250N000013 HC RX MED GY IP 250 OP 250 PS 637: Performed by: NURSE PRACTITIONER

## 2023-11-24 PROCEDURE — 97110 THERAPEUTIC EXERCISES: CPT | Mod: GP | Performed by: PHYSICAL THERAPIST

## 2023-11-24 PROCEDURE — 250N000013 HC RX MED GY IP 250 OP 250 PS 637: Performed by: STUDENT IN AN ORGANIZED HEALTH CARE EDUCATION/TRAINING PROGRAM

## 2023-11-24 PROCEDURE — 97530 THERAPEUTIC ACTIVITIES: CPT | Mod: GO

## 2023-11-24 PROCEDURE — 250N000013 HC RX MED GY IP 250 OP 250 PS 637: Performed by: PHYSICAL MEDICINE & REHABILITATION

## 2023-11-24 PROCEDURE — 99232 SBSQ HOSP IP/OBS MODERATE 35: CPT | Performed by: PHYSICAL MEDICINE & REHABILITATION

## 2023-11-24 PROCEDURE — 83735 ASSAY OF MAGNESIUM: CPT | Performed by: PHYSICAL MEDICINE & REHABILITATION

## 2023-11-24 PROCEDURE — 99233 SBSQ HOSP IP/OBS HIGH 50: CPT | Performed by: NURSE PRACTITIONER

## 2023-11-24 RX ORDER — POTASSIUM CHLORIDE 1500 MG/1
20 TABLET, EXTENDED RELEASE ORAL ONCE
Status: COMPLETED | OUTPATIENT
Start: 2023-11-24 | End: 2023-11-24

## 2023-11-24 RX ADMIN — CARVEDILOL 25 MG: 25 TABLET, FILM COATED ORAL at 07:35

## 2023-11-24 RX ADMIN — Medication 1 TABLET: at 20:28

## 2023-11-24 RX ADMIN — CALCIUM CARBONATE (ANTACID) CHEW TAB 500 MG 500 MG: 500 CHEW TAB at 07:35

## 2023-11-24 RX ADMIN — POTASSIUM CHLORIDE 20 MEQ: 1500 TABLET, EXTENDED RELEASE ORAL at 11:30

## 2023-11-24 RX ADMIN — GABAPENTIN 300 MG: 300 CAPSULE ORAL at 20:28

## 2023-11-24 RX ADMIN — VENLAFAXINE 75 MG: 75 TABLET ORAL at 14:05

## 2023-11-24 RX ADMIN — Medication 1000 MCG: at 07:34

## 2023-11-24 RX ADMIN — Medication 125 MCG: at 07:36

## 2023-11-24 RX ADMIN — VENLAFAXINE 75 MG: 75 TABLET ORAL at 07:35

## 2023-11-24 RX ADMIN — MAGNESIUM OXIDE TAB 400 MG (241.3 MG ELEMENTAL MG) 400 MG: 400 (241.3 MG) TAB at 07:36

## 2023-11-24 RX ADMIN — Medication 2 SPRAY: at 11:36

## 2023-11-24 RX ADMIN — Medication 1 TABLET: at 07:34

## 2023-11-24 RX ADMIN — VENLAFAXINE 75 MG: 75 TABLET ORAL at 20:28

## 2023-11-24 RX ADMIN — ROSUVASTATIN CALCIUM 10 MG: 5 TABLET, FILM COATED ORAL at 07:35

## 2023-11-24 RX ADMIN — AMLODIPINE BESYLATE 10 MG: 10 TABLET ORAL at 07:36

## 2023-11-24 RX ADMIN — CARVEDILOL 25 MG: 25 TABLET, FILM COATED ORAL at 16:07

## 2023-11-24 RX ADMIN — FLUTICASONE FUROATE AND VILANTEROL TRIFENATATE 1 PUFF: 100; 25 POWDER RESPIRATORY (INHALATION) at 07:45

## 2023-11-24 RX ADMIN — OMEPRAZOLE 20 MG: 20 CAPSULE, DELAYED RELEASE ORAL at 07:36

## 2023-11-24 RX ADMIN — LIDOCAINE 1 PATCH: 4 PATCH TOPICAL at 07:39

## 2023-11-24 RX ADMIN — URSODIOL 300 MG: 300 CAPSULE ORAL at 20:28

## 2023-11-24 RX ADMIN — CETIRIZINE HYDROCHLORIDE 10 MG: 5 TABLET ORAL at 07:36

## 2023-11-24 RX ADMIN — URSODIOL 300 MG: 300 CAPSULE ORAL at 07:35

## 2023-11-24 ASSESSMENT — ACTIVITIES OF DAILY LIVING (ADL)
ADLS_ACUITY_SCORE: 27

## 2023-11-24 NOTE — PLAN OF CARE
Discharge Planner Post-Acute Rehab PT:     Discharge Plan: Home (apt with elevator access) with IADL support from adult dgtr. OP PT.    Precautions: Falls, abdominal (s/p sleeve surgery), orthostatic    Current Status:  Bed Mobility: Ind but prefers bed rail  Transfer: SBA with FWW  Gait: SBA with FWW, up to 85ft. WC follow for hallway distances  Stairs: N/T d/t dizziness  Balance: Fair static, dynamic standing balance without UE support.    Outcome Measures:   TUG (11/22): 18.5 sec with FWW, 23.3 sec w/out device and SBA.  [place] (11/**:  11/24-unable to do test, unable to stand statically for 2 minutes due to anxiety manifesting as lightheadedness)    Assessment:  she is limited in progression of mobility by lightheadedness and anxiety with lightheadedness manifesting within 10-15 seconds of rising to stand. Today denies symptoms of vertigo.   Today in therapy gym did experience an episode (~4 minutes) of right face redness/heat, no additional symptoms, resolved spontaneously. Checked CN's 2, 3, 4, 5, 6 and all were normal at that time. No pain or lasting sensory changes, no mobility changes. Discussed briefly with Dr. Godfrey who suggested that Dr. Hankins be notified of situation. Did do this.  Other Barriers to Discharge (DME, Family Training, etc):   Equipment TBD pending progress.  Family training to be scheduled closer to discharge.

## 2023-11-24 NOTE — PLAN OF CARE
Goal Outcome Evaluation:      Plan of Care Reviewed With: patient    Overall Patient Progress: improvingOverall Patient Progress: improving    Patient is alert and oriented. Able to use a call light and make her needs known. Is assist of x 1 with a walker. Is continent of BB. LBM 11/23. On soft diet r/t recent surgery d/t Morbid obesity, thin liquids, takes pills whole. Patient uses a CPAP at [facility] at home settings, But refused to put it on tonight and also refused to have oxygen. Has an abdominal incision with no dressing and it is open to air. Patient requested for lidocaine patch for posterior neck pain. Patient has catapres patch to the left anterior shoulder Call light within reach. Nursing staff will continue with POC.

## 2023-11-24 NOTE — PROGRESS NOTES
[facility]   Acute Rehabilitation Unit  Daily progress note  CC:     16 Debility (non-cardiac, non-pulmonary) - s/p laparoscopic sleeve gastrectomy with multiple medical complications including an ICU stay     S:Feels better. Stockings added. Diuretics held. May need Binder. Changed diet to Regular bariatric. Tums added. No SOB, or HA.     TR held and reviewed.       Functionally,     Discharge Plan: home with daughter and grandson in apt with elevator  los 7 days     Precautions: Falls, abdominal (s/p sleeve [id number] but greater than 6weeks so providers to clarify if they can be discontinued), orthostatic and SOB w/ activty, c/o R UE weakness/intermittent pain, neck pain     Current Status:  ADLs:  Mobility: bed mobility mod I with bed rail, sba to cga w/ fww to ambulate in room but monitor for BP issues and neck pain interfering w/ performance  Grooming: sba sitting  Dressing:   U/b dressing: pullover shirt able to dress after clothes retrievel  L/B dressing: min A w/ reacher but may not have need for abdominal precautions   Feet: min A w/ sock aide  Bathing: pt able to wash 8/10 areas assit with pericares while standing and feet  transfer:ot cga w/c to venancio shower extended tubbebch   Toileting: cga to handicap height toilet  with fww  IADLs: to be assessed   Vision/Cognition:  higher cog assessment      Assessment: OT:focused on educ on use of AE for LB drg, reviewed abdom precautions and approach w/ abdom precautions and current deficits , pt questions where she still has abdom preautions or not, surgery was >6weeks ago , th notifed providers to clarify, neck pain, SOB and BP issues limit indep,pt improving w/ adls/mob, cont w/ POC     Other Barriers to Discharge (DME, Family Training, etc): closer to discharge with daughter      Medications  Scheduled meds   amLODIPine  10 mg Oral Daily    calcium carbonate  500 mg Oral Daily    carvedilol  25 mg Oral BID w/meals     "cetirizine  10 mg Oral Daily    childrens multivitamin with iron  1 tablet Oral BID    [Held by provider] chlorthalidone  50 mg Oral Daily    cholecalciferol  125 mcg Oral Daily    cloNIDine  1 patch Transdermal Weekly    And    cloNIDine   Transdermal Q8H KAYLA    cyanocobalamin  1,000 mcg Sublingual Daily    fluticasone-vilanterol  1 puff Inhalation Daily    gabapentin  300 mg Oral At Bedtime    lidocaine  1 patch Transdermal Q24H    magnesium oxide  400 mg Oral Daily    omeprazole  20 mg Oral Daily    potassium chloride  20 mEq Oral Once    rosuvastatin  10 mg Oral Daily    [Held by provider] spironolactone  25 mg Oral BID    ursodiol  300 mg Oral BID    venlafaxine  75 mg Oral TID       PRN meds:  acetaminophen, albuterol, albuterol, artificial saliva, bisacodyl, miconazole, ondansetron, senna-docusate      PHYSICAL EXAM  /70 (BP Location: Right arm, Patient Position: Supine, Cuff Size: Adult Large)   Pulse 89   Temp 98.4  F (36.9  C) (Oral)   Resp 16   Ht 1.6 m (5' 3\")   Wt 113.3 kg (249 lb 12.5 oz)   LMP 09/09/2023 (Exact Date)   SpO2 97%   BMI 44.25 kg/m    Gen: Alert and in NAD.  HEENT: MMM  CV: S1, S2 RRR  Pulm: Clear to auscultation  Abd: Obese, non tender  Ext: Calves non tender  Neuro/MSK: Moves all four  Responds to touch.    LABS  Last Comprehensive Metabolic Panel:  Sodium   Date Value Ref Range Status   11/24/2023 131 (L) 135 - 145 mmol/L Final     Comment:     Reference intervals for this test were updated on 09/26/2023 to more accurately reflect our healthy population. There may be differences in the flagging of prior results with similar values performed with this method. Interpretation of those prior results can be made in the context of the updated reference intervals.    11/17/2020 141 133 - 144 mmol/L Final     Potassium   Date Value Ref Range Status   11/24/2023 3.3 (L) 3.4 - 5.3 mmol/L Final   05/17/2022 4.5 3.4 - 5.3 mmol/L Final   11/17/2020 4.1 3.4 - 5.3 mmol/L Final "     Chloride   Date Value Ref Range Status   11/24/2023 90 (L) 98 - 107 mmol/L Final   05/17/2022 102 94 - 109 mmol/L Final   11/17/2020 109 94 - 109 mmol/L Final     Carbon Dioxide   Date Value Ref Range Status   11/17/2020 31 20 - 32 mmol/L Final     Carbon Dioxide (CO2)   Date Value Ref Range Status   11/24/2023 26 22 - 29 mmol/L Final   05/17/2022 32 20 - 32 mmol/L Final     Anion Gap   Date Value Ref Range Status   11/24/2023 15 7 - 15 mmol/L Final   05/17/2022 2 (L) 3 - 14 mmol/L Final   11/17/2020 1 (L) 3 - 14 mmol/L Final     Glucose   Date Value Ref Range Status   11/24/2023 106 (H) 70 - 99 mg/dL Final   05/17/2022 110 (H) 70 - 99 mg/dL Final   11/17/2020 84 70 - 99 mg/dL Final     Comment:     Fasting specimen     GLUCOSE BY METER POCT   Date Value Ref Range Status   11/18/2023 115 (H) 70 - 99 mg/dL Final     Urea Nitrogen   Date Value Ref Range Status   11/24/2023 40.3 (H) 6.0 - 20.0 mg/dL Final   05/17/2022 16 7 - 30 mg/dL Final   11/17/2020 9 7 - 30 mg/dL Final     Creatinine   Date Value Ref Range Status   11/24/2023 1.51 (H) 0.51 - 0.95 mg/dL Final   11/17/2020 0.79 0.52 - 1.04 mg/dL Final     GFR Estimate   Date Value Ref Range Status   11/24/2023 41 (L) >60 mL/min/1.73m2 Final   11/17/2020 88 >60 mL/min/[1.73_m2] Final     Comment:     Non  GFR Calc  Starting 12/18/2018, serum creatinine based estimated GFR (eGFR) will be   calculated using the Chronic Kidney Disease Epidemiology Collaboration   (CKD-EPI) equation.       Calcium   Date Value Ref Range Status   11/24/2023 10.0 8.6 - 10.0 mg/dL Final   11/17/2020 8.9 8.5 - 10.1 mg/dL Final        CBC RESULTS:   Recent Labs   Lab Test 11/22/23  0538   WBC 7.7   RBC 3.43*   HGB 9.5*   HCT 30.2*   MCV 88   MCH 27.7   MCHC 31.5   RDW 14.6           ASSESSMENT AND PLAN    Mojgan Jimenez is a 53 year old woman with past medical history of substance abuse (sober x 5 years), anxiety, JARVIS, asthma, HLD, and morbid obesity who was admitted  for planned gastric sleeve 10/9/23 course was complicated by peritonitis with return to OR 10/12/23 for with enterotomies repair, wash out and drain placement, course further complicated by acute hypoxic respiratory failure, HCAP, pleural effusion,  SUSSY, resistant HTN, stress cardiomyopathy, anemia and debility.  Admitted to rehab 11/21/23 with impaired strength and activity tolerance.         Admission to acute inpatient rehab debility.    Impairment group code: 16        PT, OT 90 minutes of each on a daily basis, in addition to rehab nursing and close management of physiatrist.       Impairment of ADL's: Noted to have impaired strength and impaired activity tolerance  leading to decreased ability to independently complete ADL's.  Will benefit from ongoing OT with goal for MOD I with basic ADLs.      Impairment of mobility:  Noted to have impaired strength and  impaired activity tolerance leading to decreased mobility.  Will benefit from ongoing PT with goal for ISI with basic mobility .         Medical Conditions     Severe decondition due to prolong hospitalization  - PT/OT- acute rehab     Morbid obesity status post sleeve gastrectomy complicated by enterotomy & peritonitis   Admitted for planned gastric sleeve which she underwent 10/9/23, complicated by enterotomies s/p return to OR 10/12 with wash out, repair and drain placement. Peritonitis, seen by ID   Completed course of antibiotics 11/16/2023.  -bariatric diet  -nutrition consult  -D3, B12, ppi, chewable multivit,     Resistant hypertension  Stress Cardiomyopathy with EF recovery  In setting of sepsis, post operative course EF 30-35 10/14 repeat Echo 10/23 with recovered EF, not on medications prior to admission. Underwent workup per nephrology for secondary hypertension. -- Nephrologist & cardiology involved during hospitalization Parameters for hold added.   -- Amlodipine 10 mg daily  -- Carvedilol 25 mg twice daily  -- Chlorthalidone 50 mg daily,  "HOLD  -- Spironolactone 25 mg twice daily, HOLD  -clonidine patch 0.1  mg/week  -discontinue bumex 2 mg daily in setting of reported symptomatic position changes- /60 in supine and 100/50 sitting-    -monitor BP- & orthostatic BP- titrate meds/ simplify regimen as indicated.      Possible CKD stage 3.   Kidney function wnl prior to admission, with SUSSY in setting of sepsis Cr peaked 6.97 10/14/23 followed by nephrology, Cr Improving.  -trend     Acute Anemia  Acute illness, blood loss, preop hgb wnl.  Stable 11/17 7.9  Continue to monitor hemoglobin     Thrombocytopenia (resolved)   Suspected HIT- ruled out  Concern for HIT transitioned to fondaparinux, seen by hematology HIT ruled out per hematology \"No indication for anticoagulation therapy at the time of discharge\"  PLTs WNL since 11/14/23. Prefers to avoid heparin products.      Oral thrush   -continue nystatin- dislikes taste- consider alternative agent in upcoming days if ongoing issue  -completed course of fluconazole 11/16/23     History of JARVIS,  asthma in the chart no PFTs   Acute hypoxemic-hypercapnic respiratory failure (resolved)   Intubated 10/12-10/26) complicated by HCAP, pleural effusion, stress cardiomyopathy  Resume home inhaler (auto sub breo)  -prn albuterol neb/ inhaler (home regimen)  -cpap at bedtime   -monitor respiratory status     Hypervolemia   With recent pleural effusion s/p thoracentesis 10/23 and  chest tube removal 11/5  -- Improved with diuretics-holding chlorthalidone dueto BP. Resume when able.spironolactone   -discontinue bumex  -monitor volume status     Anxiety  -pta effexor  -prn atarax     HLd  -resume prior to admission crestor     Adjustment to disability:  Clinical psychology to eval and treat as indicated  FEN: bariatric  Bowel: monitor  Bladder: monitor  DVT Prophylaxis: mechanical  GI Prophylaxis: ppi  Code: full confirmed on admission.   Disposition: goal for home.   ELOS:  7 days.  Rehab prognosis:  fair  Follow " up Appointments on Discharge: pcp, bariatric surgery, nephrology (for HTN &? CKD),          Constantino Johnson MD   Physical Medicine & Rehabilitation

## 2023-11-24 NOTE — CARE PLAN
Discharge Planner Post-Acute Rehab OT:     Discharge Plan: home with daughter and grandson in apt with elevator  los 7 days    Precautions: Falls, abdominal (s/p sleeve [id number] but greater than 6weeks so providers to clarify if they can be discontinued), orthostatic and SOB w/ activty, c/o R UE weakness/intermittent pain, neck pain    Current Status:  ADLs:  Mobility: bed mobility mod I with bed rail, CGA flat bed no rail, sba to cga w/ fww to ambulate in room but monitor for BP issues and neck pain interfering w/ performance  Grooming: sba sitting  Dressing:   U/b dressing: pullover shirt able to dress after clothes retrievel  L/B dressing: min A w/ reacher but may not have need for abdominal precautions   Feet: min A w/ sock aide  Bathing: pt able to wash 8/10 areas assit with pericares while standing and feet  transfer:ot cga w/c to venancio shower extended tubbebch   Toileting: cga to handicap height toilet  with fww  IADLs: to be assessed   Vision/Cognition:  higher cog assessment     Assessment: am tx focus functional mobility and upper body ADLs at sink. Pt does not tolerate standing due to orthostatic symptoms and completes tasks seated with assist for combing hair due to increasing fatigue. Focus on pacing tasks and conserving energy. PM session discussed bed mobility, pt notes she does not have a bedrail at home and does not want to purchase one if is able to complete without rail. Instructed pt in bed mobility flat surface and no rail, with practice by end of session pt able to complete successfully with CGA. Noted some redness on RUE elbow, pt notes she leans on it a lot when sitting up. Notified RN whom placed protective dressing. Pt somewhat limited in general due to fatigue and orthostatic symtoms only tolerating standing approx. 15 seconds at a time.    Other Barriers to Discharge (DME, Family Training, etc): closer to discharge with daughter

## 2023-11-24 NOTE — PLAN OF CARE
Goal Outcome Evaluation:    Overall Patient Progress: no changeOverall Patient Progress: no change    Pt is alert and oriented x4. Make needs known.  Denies pain, sob, dizziness, fever, nausea or new weakness.  Continues to refuse the CPAP and O2 at night for Hx of JARVIS.   Transfers as A1 w/ the walker. Independent in bed mobility.  Continent of bladder to the bathroom. No Bm tonight.  Fluids encouraged. UC pending.   Sleeping during hourly rounds. No expressed issues overnight.   Tolerated PCD. Continue poc.

## 2023-11-24 NOTE — PLAN OF CARE
Individualized Overall Plan Of Care (IOPOC)      Rehab diagnosis/Impairment Group Code: 16 debility (non-cardiac, non-pulmonary) - s/p laparoscopic sleeve gastrectomy with multiple medical complications including an icu stay  Debility       Expected functional outcome: Anticipate with intensive therapies, close medical management, and rehabilitative nursing care the patient will improve strength, balance, tolerance to activity, safety to ensure Mod I with basic mobility and ADL performance to allow return home with family assistance.     Clinical Impression Comments:  53-year-old woman with PMH of morbid obesity (BMI 44.83), severe JARVIS on CPAP, asthma, mood disorder and anxiety disorder who underwent scheduled laparoscopic sleeve gastrectomy on 10/9/2023. Her post-operative course was complicated by acute abdomen/peritonitis.   She subsequently underwent laparotomy with closure of 2 small bowel enterotomies, intra-abdominal cleanout and drain placement on 10/12/2023.  However, fluid collection was detected in the right upper quadrant on 10/19/2023 at which time MICKIE drain was placed and she subsequently underwent pelvic fluid aspiration on 10/24/2023.  She also developed suspected metabolic encephalopathy, septic shock requiring vasopressors, suspected takotsubo syndrome, acute hypoxic respiratory failure requiring intubation with brief ICU stay (10/12-10/21/23; reintubated 10/21-10/26/23), & oliguric SUSSY with renal recovery.  Due to persistent pleural effusion, a chest tube was placed 11/1-11/5.  She has also required medical mgmt of her persistent HTN despite four agents on board     Mobility: Pt presents to PT with unsteadiness on feet and generalized weakness and deconditioning s/p planned gastric sleeve 10/9/23 with course complicated by peritonitis with return to OR 10/12/23 for with enterotomies repair, wash out and drain placement, further complicated by acute hypoxic respiratory failure, HCAP, pleural  effusion,  SUSSY, resistant HTN, stress cardiomyopathy, anemia and debility. At this time, while mobilizing fairly well, she is limited in progression of mobility by orthostatic hypotension. She should benefit well from skilled PT, with goals for mod I mobility and ADLs at discharge. ELOS 7 days.    ADL: pt is a 53 year old female s/p laparoscopic sleeve with single anastomosis duodenal switch which she underwent per DR. Worthy 10/09/23 with complications and long acute hospital stay will benifit from ot per ot goals    Communication/Cognition/Swallow:       Intensity of therapy:   PT 90 minutes, Daily, for 7 days  OT 90 minutes, Daily, for 7 days      Orthotics  None  Education medication education, positioning, carryover of new rehab techniques, care coordination, assess neurologic status, post-surgical incision care to promote healing and prevent infection, provide safe environment for patient at falls risk, and monitor nutritional intake.   Neuropsychology Testing: No  Other:  None      Medical Prognosis: Fair      Physician summary statement: In addition to above statements address, Patient requires intensive active and ongoing therapeutic intervention and multiple therapies; Patient requires medical supervision; Expected to actively participate in the intensive rehab program; Sufficiently stable to actively participate; Expectation for measurable improvement in functional capacity or adaption to impairments.      Discharge destination: prior home  Discharge rehabilitation needs: home care vs OP      Estimated length of stay: 7 days      Rehabilitation Physician Constantino Johnson MD

## 2023-11-24 NOTE — PLAN OF CARE
FOCUS/GOAL  Medical management    ASSESSMENT, INTERVENTIONS AND CONTINUING PLAN FOR GOAL:  Patient is stable. Denied pain the entire shift. Good appetite. Fluid encouraged. Continent of bladder this shift. No BM to report. Will continue with POC.   Goal Outcome Evaluation:           Overall Patient Progress: improvingOverall Patient Progress: improving    Outcome Evaluation: Patient is stable,  Fluid encouraged.

## 2023-11-24 NOTE — PROGRESS NOTES
ARU Progress Note          Assessment & Plan:   Mojgan Jimenez is a 53 year old female with past medical history significant for polysubstance use disorder (sober x 5 years), anxiety, JARVIS, HLD, and class III obesity admitted to [facility] for planned gastric sleeve surgery on 10/9/23.  Hospital course complicated by peritonitis with RTOR 10/12/23 for repair of 2 small bowel enterotomies, intra-abdominal wash out and drain placement, acute hypoxic respiratory failure requiring intubation 10/21-10/26, HCAP, pleural effusion, oliguric SUSSY with renal recovery, resistant HTN, stress cardiomyopathy, persistent pleural effusion vs parapneumonic effusion requiring chest tube placement, thrombocytopenia w positive HIT screen, and debility.  She is admitted to ARU for therapies due to impaired strength and activity intolerance.  Internal medicine is consulted for medical co-management, SUSSY, and hyponatremia.       # SUSSY in setting of recent acute renal failure, improving   # Possible new dx CKD stage III   Developed acute oliguric renal failure post op with peak Cr at 6.97 on 10/14.  Renal function improved without HD/CRRT.  Cr gradually downtrending since then and has remained below 2 since 10/29.  Last renal US on 11/9 with no acute findings.  Cr elevated to 1.87 on 11/23, improved to 1.51 today.  BUN downtrending as well.  FEUrea 15.8%, c/w pre-renal etiology likely 2/2 overdiuresis.  UA with proteinuria, small LE, 16 WBC, and hyaline casts, UC pending.  Expect 3 months for renal function to fully recover.    - Continue to hold hydrochlorothiazide and spironolactone for now, likely can resume with dose adjustments next 1-2 days   - Await final UC, though suspect UTI is less likely   - Monitor I/Os   - Check BMP in AM  - Avoid/minimize nephrotoxic agents     # Dizziness - Likely 2/2 dehydration, medication side effects.  Reports improvement since holding diuretics.  No chest pain or heart palpitations.   - Check  orthostatic VS   - Change positions slowly and carefully  - CTM for now     # Hyponatremia - Likely hypovolemic hyponatremia.  Na gradually downtrending since 11/14, improved to 131 (130) today. Suspect multifactorial in setting of recent gastric surgery and SUSSY, diuretic use, and possible low solute intake/absorption.  On Bumex, hydrochlorothiazide, and spironolactone at time of admission to ARU as above.    - Repeat in AM   - Holding diuretics as above   - Encourage PO intake   - No need for FR at the moment     # Stress cardiomyopathy   # HFrEF   # Resistant HTN   - Continue Amlodipine 5 mg twice daily  - Continue Carvedilol 25 mg twice daily  - Hold hydrochlorothiazide and spironolactone as above for now     # Hypokalemia - K 3.3 today.  Ordered replacement x 1 dose 20mEq.       # Hypomagnesemia - Mag 1.6 on 11/22.  Likely absorption issue following gastric surgery. Per chart review, was started on oral MagOx 400mg daily. Improved to 1.7.   - Added Mag replacement protocol  - Agree w/ scheduled supplementation     # Vitamin D deficiency - Continue PTA cholecalciferol.      # Anxiety - Continue PTA Effexor.     Resolved hospital issues:   # S/p sleeve gastrectomy c/b enterotomy repair x 2 and peritonitis - Complex postop course as above.  Treated with Augmentin and Fluconazole through 11/16/23.  Briefly required NG tube, now removed.  Continue PPI.    # Acute hypoxic respiratory failure, pleural effusion vs parapneumonic effusion - Intubated 10/12-10/21, then re-intubated 10/21-10/26.  Chest tube placed 11/1-11/5.   # Thrombocytopenia with positive HIT screen - Maintained on fondaparinux at OSH.  Stopped at discharge, no need to resume at this point.    # Acute toxic and metabolic encephalopathy - Appropriate mentation on exam today.  Occurred during hospitalization.  No acute issues.    # Thrush - Resolved.  S/p treatment with Nystatin and Fluconazole.         Rowan Busch, CNP, APRN  Internal Medicine [facility]  "[facility]  Pager [phone]            Interval History:   Mojgan is seen in her room.  She is feeling better today.  Still having some dry mouth.  She reports some dizziness when getting out of bed, but no dyspnea, chest pain, nausea, or vomiting.  She denies abdominal pain.  Had bowel movements yesterday without pain or discomfort.           Physical Exam:   Blood pressure 129/70, pulse 89, temperature 98.4  F (36.9  C), temperature source Oral, resp. rate 16, height 1.6 m (5' 3\"), weight 113.3 kg (249 lb 12.5 oz), last menstrual period 09/09/2023, SpO2 97%, not currently breastfeeding.    GENERAL: Alert and oriented x 3. Well nourished, well developed.  Obese body habitus.  No acute distress.    HEENT: Normocephalic, atraumatic. Anicteric sclera. Mucous membranes moist.   CV: RRR. S1, S2. No murmurs appreciated.   RESPIRATORY: Effort normal on room air. Lungs CTAB with no wheezing, rales, or rhonchi.   GI: Abdomen soft and non distended, bowel sounds present x all 4 quadrants. No tenderness, rebound, or guarding.   NEUROLOGICAL: No focal deficits. Follows commands.  Strength equal in upper and lower extremities.   MUSCULOSKELETAL: No joint swelling or tenderness. Moves all extremities.   EXTREMITIES: No gross deformities. No peripheral edema.   SKIN: Grossly warm, dry, and intact. No jaundice. No rashes.       ROUTINE IP LABS (Last four results)  CMP   Recent Labs   Lab 11/23/23  0944 11/22/23  0538 11/18/23  1129 11/18/23  0733   * 133*  --   --    POTASSIUM 3.6 3.6  --   --    CHLORIDE 88* 89*  --   --    CO2 28 30*  --   --    ANIONGAP 14 14  --   --    * 93 115* 96   BUN 42.7* 36.5*  --   --    CR 1.87* 1.78*  --   --    PALAK 9.7 10.0  --   --    MAG 1.6* 1.6*  --   --      CBC   Recent Labs   Lab 11/22/23  0538 11/19/23  0542   WBC 7.7  --    RBC 3.43*  --    HGB 9.5*  --    HCT 30.2*  --    MCV 88  --    MCH 27.7  --    MCHC 31.5  --    RDW 14.6  --     298     INR " No lab results found in last 7 days.

## 2023-11-25 ENCOUNTER — APPOINTMENT (OUTPATIENT)
Dept: OCCUPATIONAL THERAPY | Facility: CLINIC | Age: 53
End: 2023-11-25
Attending: PHYSICAL MEDICINE & REHABILITATION
Payer: COMMERCIAL

## 2023-11-25 ENCOUNTER — APPOINTMENT (OUTPATIENT)
Dept: PHYSICAL THERAPY | Facility: CLINIC | Age: 53
End: 2023-11-25
Attending: PHYSICAL MEDICINE & REHABILITATION
Payer: COMMERCIAL

## 2023-11-25 LAB
ANION GAP SERPL CALCULATED.3IONS-SCNC: 11 MMOL/L (ref 7–15)
BUN SERPL-MCNC: 35.7 MG/DL (ref 6–20)
CALCIUM SERPL-MCNC: 10.3 MG/DL (ref 8.6–10)
CHLORIDE SERPL-SCNC: 93 MMOL/L (ref 98–107)
CREAT SERPL-MCNC: 1.38 MG/DL (ref 0.51–0.95)
DEPRECATED HCO3 PLAS-SCNC: 29 MMOL/L (ref 22–29)
EGFRCR SERPLBLD CKD-EPI 2021: 46 ML/MIN/1.73M2
GLUCOSE SERPL-MCNC: 106 MG/DL (ref 70–99)
MAGNESIUM SERPL-MCNC: 1.9 MG/DL (ref 1.7–2.3)
POTASSIUM SERPL-SCNC: 3.6 MMOL/L (ref 3.4–5.3)
SODIUM SERPL-SCNC: 133 MMOL/L (ref 135–145)

## 2023-11-25 PROCEDURE — 80048 BASIC METABOLIC PNL TOTAL CA: CPT

## 2023-11-25 PROCEDURE — 36415 COLL VENOUS BLD VENIPUNCTURE: CPT

## 2023-11-25 PROCEDURE — 128N000003 HC R&B REHAB

## 2023-11-25 PROCEDURE — 97535 SELF CARE MNGMENT TRAINING: CPT | Mod: GO | Performed by: OCCUPATIONAL THERAPIST

## 2023-11-25 PROCEDURE — 250N000013 HC RX MED GY IP 250 OP 250 PS 637: Performed by: STUDENT IN AN ORGANIZED HEALTH CARE EDUCATION/TRAINING PROGRAM

## 2023-11-25 PROCEDURE — 250N000013 HC RX MED GY IP 250 OP 250 PS 637: Performed by: PHYSICIAN ASSISTANT

## 2023-11-25 PROCEDURE — 97110 THERAPEUTIC EXERCISES: CPT | Mod: GO | Performed by: OCCUPATIONAL THERAPIST

## 2023-11-25 PROCEDURE — 97110 THERAPEUTIC EXERCISES: CPT | Mod: GP | Performed by: PHYSICAL THERAPIST

## 2023-11-25 PROCEDURE — 83735 ASSAY OF MAGNESIUM: CPT | Performed by: NURSE PRACTITIONER

## 2023-11-25 PROCEDURE — 97750 PHYSICAL PERFORMANCE TEST: CPT | Mod: GP | Performed by: PHYSICAL THERAPIST

## 2023-11-25 PROCEDURE — 250N000013 HC RX MED GY IP 250 OP 250 PS 637

## 2023-11-25 RX ORDER — CALCIUM CARBONATE 500 MG/1
500 TABLET, CHEWABLE ORAL DAILY PRN
Status: DISCONTINUED | OUTPATIENT
Start: 2023-11-25 | End: 2023-11-29 | Stop reason: HOSPADM

## 2023-11-25 RX ADMIN — Medication 1000 MCG: at 10:06

## 2023-11-25 RX ADMIN — VENLAFAXINE 75 MG: 75 TABLET ORAL at 20:24

## 2023-11-25 RX ADMIN — LIDOCAINE 1 PATCH: 4 PATCH TOPICAL at 10:00

## 2023-11-25 RX ADMIN — CARVEDILOL 25 MG: 25 TABLET, FILM COATED ORAL at 10:04

## 2023-11-25 RX ADMIN — CETIRIZINE HYDROCHLORIDE 10 MG: 5 TABLET ORAL at 10:04

## 2023-11-25 RX ADMIN — URSODIOL 300 MG: 300 CAPSULE ORAL at 20:24

## 2023-11-25 RX ADMIN — CALCIUM CARBONATE (ANTACID) CHEW TAB 500 MG 500 MG: 500 CHEW TAB at 10:07

## 2023-11-25 RX ADMIN — URSODIOL 300 MG: 300 CAPSULE ORAL at 10:04

## 2023-11-25 RX ADMIN — FLUTICASONE FUROATE AND VILANTEROL TRIFENATATE 1 PUFF: 100; 25 POWDER RESPIRATORY (INHALATION) at 10:00

## 2023-11-25 RX ADMIN — VENLAFAXINE 75 MG: 75 TABLET ORAL at 10:04

## 2023-11-25 RX ADMIN — OMEPRAZOLE 20 MG: 20 CAPSULE, DELAYED RELEASE ORAL at 10:04

## 2023-11-25 RX ADMIN — ROSUVASTATIN CALCIUM 10 MG: 5 TABLET, FILM COATED ORAL at 10:04

## 2023-11-25 RX ADMIN — Medication 1 TABLET: at 20:24

## 2023-11-25 RX ADMIN — Medication 125 MCG: at 10:05

## 2023-11-25 RX ADMIN — VENLAFAXINE 75 MG: 75 TABLET ORAL at 13:15

## 2023-11-25 RX ADMIN — CARVEDILOL 25 MG: 25 TABLET, FILM COATED ORAL at 16:22

## 2023-11-25 RX ADMIN — GABAPENTIN 300 MG: 300 CAPSULE ORAL at 20:24

## 2023-11-25 RX ADMIN — MAGNESIUM OXIDE TAB 400 MG (241.3 MG ELEMENTAL MG) 400 MG: 400 (241.3 MG) TAB at 10:05

## 2023-11-25 RX ADMIN — AMLODIPINE BESYLATE 10 MG: 10 TABLET ORAL at 10:04

## 2023-11-25 RX ADMIN — Medication 1 TABLET: at 10:04

## 2023-11-25 ASSESSMENT — ACTIVITIES OF DAILY LIVING (ADL)
ADLS_ACUITY_SCORE: 25
ADLS_ACUITY_SCORE: 27
ADLS_ACUITY_SCORE: 25
ADLS_ACUITY_SCORE: 27
ADLS_ACUITY_SCORE: 25
ADLS_ACUITY_SCORE: 25

## 2023-11-25 NOTE — PROGRESS NOTES
11/25/23 0800   Signing Clinician's Name / Credentials   Signing clinician's name / credentials Hemant Castillo DPT   Nickerson Balance Scale (LEANNE العراقي, JER S, MURTAZA DOTY, FRANCES LOO: MEASURING BALANCE IN THE ELDERLY: VALIDATION OF AN INSTRUMENT. CAN. J. PUB. HEALTH, JULY/AUGUST SUPPLEMENT 2:S7-11, 1992.)   Sit To Stand 3   Standing Unsupported 4   Sitting Unsupported 4   Stand to Sit 4   Transfers 4   Standing with Eyes Closed 4   Standing Unsupported, Feet Together 4   Reach Forward With Outstretched Arm 3   Retrieve Object From Floor 3   Turning to Look Behind 4   Turn 360 Degrees 4   Placing Alternate Foot on Stool (4-6 inches) 1   Unsupported Tandem Stand (Demonstrate to Subject) 3   One Leg Stand 1   Total Score (A score of 45 or less has been correlated with an increased risk of falls)   Total Score (out of 56) 46     Nickerson Balance Scale (BBS) Cutoff Scores: A score of < 45 /56 indicates an increased risk for falls.     The BBS is a measure of static and dynamic standing balance that has been validated in community dwelling elderly individuals and individuals who have Parkinson's Disease, MS, and those who are s/p CVA and TBI. The test is administered without an assistive device. Scores from the Nickerson are used to determine the probability of falling based on the patient's previous history of falls and their test performance.     Minimal Detectable Change = 6.5   & Minimal Detectable Change (Parkinson's Disease) = 5 according to Marciano & Aleida 2008

## 2023-11-25 NOTE — PROGRESS NOTES
ARU Progress Note          Assessment & Plan:   Mojgan Jimenez is a 53 year old female with past medical history significant for polysubstance use disorder (sober x 5 years), anxiety, JARVIS, HLD, and class III obesity admitted to [facility] for planned gastric sleeve surgery on 10/9/23.  Hospital course complicated by peritonitis with RTOR 10/12/23 for repair of 2 small bowel enterotomies, intra-abdominal wash out and drain placement, acute hypoxic respiratory failure requiring intubation 10/21-10/26, HCAP, pleural effusion, oliguric SUSSY with renal recovery, resistant HTN, stress cardiomyopathy, persistent pleural effusion vs parapneumonic effusion requiring chest tube placement, thrombocytopenia w positive HIT screen, and debility.  She is admitted to ARU for therapies due to impaired strength and activity intolerance.  Internal medicine is consulted for medical co-management, SUSSY, and hyponatremia.      Updates today, 11/25:   - Cr is improving; UA with likely contamination vs colonization, UC with 10-50K GNRs and Enterococcus faecium VRE, no indication to treat  - BPs improving - recommend orthostatic VS with next check   - Last 24 vitals and labs review, no other changes today     # SUSSY in setting of recent acute renal failure, improving   # Possible new dx CKD stage III   Developed acute oliguric renal failure post op with peak Cr at 6.97 on 10/14.  Renal function improved without HD/CRRT.  Cr gradually downtrending since then and has remained below 2 since 10/29.  Last renal US on 11/9 with no acute findings.  Cr elevated to 1.87 on 11/23, improved to 1.51 today.  BUN downtrending as well.  FEUrea 15.8%, c/w pre-renal etiology likely 2/2 overdiuresis.  UA with proteinuria, small LE, 16 WBC, and hyaline casts, UC pending.  Expect 3 months for renal function to fully recover.    - Continue to hold hydrochlorothiazide and spironolactone for now, likely can resume with dose adjustments next 1-2 days   - Await  final UC, though suspect UTI is less likely   - Monitor I/Os   - Check BMP in AM  - Avoid/minimize nephrotoxic agents     # Dizziness - Likely 2/2 dehydration, medication side effects.  Reports improvement since holding diuretics.  No chest pain or heart palpitations.   - Check orthostatic VS   - Change positions slowly and carefully  - CTM for now     # Hyponatremia - Likely hypovolemic hyponatremia.  Na gradually downtrending since 11/14, improved to 131 (130) today. Suspect multifactorial in setting of recent gastric surgery and SUSSY, diuretic use, and possible low solute intake/absorption.  On Bumex, hydrochlorothiazide, and spironolactone at time of admission to ARU as above.    - Repeat in AM   - Holding diuretics as above   - Encourage PO intake   - No need for FR at the moment     # Stress cardiomyopathy   # HFrEF   # Resistant HTN   - Continue Amlodipine 5 mg twice daily  - Continue Carvedilol 25 mg twice daily  - Hold hydrochlorothiazide and spironolactone as above for now     # Hypokalemia - K 3.3 today.  Ordered replacement x 1 dose 20mEq.       # Hypomagnesemia - Mag 1.6 on 11/22.  Likely absorption issue following gastric surgery. Per chart review, was started on oral MagOx 400mg daily. Improved to 1.7.   - Added Mag replacement protocol  - Agree w/ scheduled supplementation     # Vitamin D deficiency - Continue PTA cholecalciferol.      # Anxiety - Continue PTA Effexor.     Resolved hospital issues:   # S/p sleeve gastrectomy c/b enterotomy repair x 2 and peritonitis - Complex postop course as above.  Treated with Augmentin and Fluconazole through 11/16/23.  Briefly required NG tube, now removed.  Continue PPI.    # Acute hypoxic respiratory failure, pleural effusion vs parapneumonic effusion - Intubated 10/12-10/21, then re-intubated 10/21-10/26.  Chest tube placed 11/1-11/5.   # Thrombocytopenia with positive HIT screen - Maintained on fondaparinux at OSH.  Stopped at discharge, no need to resume at  "this point.    # Acute toxic and metabolic encephalopathy - Appropriate mentation on exam today.  Occurred during hospitalization.  No acute issues.    # Thrush - Resolved.  S/p treatment with Nystatin and Fluconazole.         Rowan Busch, CNP, APRN  Internal Medicine NAIN Hospitalist  [facility]  Pager [phone]            Interval History:   Mojgan is seen in her room.  She is feeling better today.  Still having some dry mouth.  She reports some dizziness when getting out of bed, but no dyspnea, chest pain, nausea, or vomiting.  She denies abdominal pain.  Had bowel movements yesterday without pain or discomfort.           Physical Exam:   Blood pressure 138/79, pulse 98, temperature 97.5  F (36.4  C), temperature source Oral, resp. rate 16, height 1.6 m (5' 3\"), weight 113.3 kg (249 lb 12.5 oz), last menstrual period 09/09/2023, SpO2 96%, not currently breastfeeding.    GENERAL: Alert and oriented x 3. Well nourished, well developed.  Obese body habitus.  No acute distress.    HEENT: Normocephalic, atraumatic. Anicteric sclera. Mucous membranes moist.   CV: RRR. S1, S2. No murmurs appreciated.   RESPIRATORY: Effort normal on room air. Lungs CTAB with no wheezing, rales, or rhonchi.   GI: Abdomen soft and non distended, bowel sounds present x all 4 quadrants. No tenderness, rebound, or guarding.   NEUROLOGICAL: No focal deficits. Follows commands.  Strength equal in upper and lower extremities.   MUSCULOSKELETAL: No joint swelling or tenderness. Moves all extremities.   EXTREMITIES: No gross deformities. No peripheral edema.   SKIN: Grossly warm, dry, and intact. No jaundice. No rashes.       ROUTINE IP LABS (Last four results)  CMP   Recent Labs   Lab 11/25/23  0717 11/24/23  0831 11/23/23  0944 11/22/23  0538   * 131* 130* 133*   POTASSIUM 3.6 3.3* 3.6 3.6   CHLORIDE 93* 90* 88* 89*   CO2 29 26 28 30*   ANIONGAP 11 15 14 14   * 106* 112* 93   BUN 35.7* 40.3* 42.7* 36.5*   CR 1.38* 1.51* " 1.87* 1.78*   PALAK 10.3* 10.0 9.7 10.0   MAG 1.9 1.7 1.6* 1.6*     CBC   Recent Labs   Lab 11/22/23  0538 11/19/23  0542   WBC 7.7  --    RBC 3.43*  --    HGB 9.5*  --    HCT 30.2*  --    MCV 88  --    MCH 27.7  --    MCHC 31.5  --    RDW 14.6  --     298     INR No lab results found in last 7 days.

## 2023-11-25 NOTE — PLAN OF CARE
Goal Outcome Evaluation:      Plan of Care Reviewed With: patient    Overall Patient Progress: improvingOverall Patient Progress: improving    Patient is alert and oriented. Able to use a call light and make her needs needs. Assist of x 1 with a walker. Reg diet thin liquids, takes pills whole. Continent of BB, LBM 11/24. Lidocaine patch off, Clonidine patch on to the left shoulder. Denies pain at this time. Refused to wear CPAP or Oxygen O2 Saturations above 96%. Call light within reach. Nursing staff will continue with POC.

## 2023-11-25 NOTE — CARE PLAN
Patient is AOX4, A1 for /walker for transfer.   Patient denies Nausea Pain and SOB.  Cont of B&B , LBM 11/25  No Acute events noted during shift. Will continue to monitor.

## 2023-11-25 NOTE — PLAN OF CARE
Discharge Planner Post-Acute Rehab OT:      Discharge Plan: home with daughter and grandson in apt with elevator  los 7 days     Precautions: Falls, abdominal (s/p sleeve [id number] but greater than 6weeks so providers to clarify if they can be discontinued), orthostatic and SOB w/ activty, c/o R UE weakness/intermittent pain, neck pain     Current Status:  ADLs:  Mobility: Mod I in room with fww 11/25  Grooming: Mod I sitting in front of sink   Dressing:   U/b dressing: pullover shirt able to dress after clothes retrievel  L/B dressing: min A w/ reacher but may not have need for abdominal precautions   Feet: min A w/ sock aide  Bathing: pt able to wash 8/10 areas assit with pericares while standing and feet  transfer:ot cga w/c to venancio shower extended tubbebch   Toileting: mod I using fww  IADLs: to be assessed   Vision/Cognition:  higher cog assessment      Assessment: AM: Pt now mod I in room with using fww. Pt walks from EOB<>toilet with fww and gets on/off toilet with walker and does well with seated pericare. Starting with SBA and fading to mod I with tasks. PM: Pt is Sba in kitchen today with simple meal prep but needs several breaks d/t fatigue. Pt also shows improvement in bed mob without use of rails. On track to discharge home.     Other Barriers to Discharge (DME, Family Training, etc): closer to discharge with daughter

## 2023-11-25 NOTE — PLAN OF CARE
Goal Outcome Evaluation:         VS:  Temp: 97.5  F (36.4  C) Temp src: Oral BP: 138/79 Pulse: 98   Resp: 16 SpO2: 96 % O2 Device: None (Room air)     O2: Sating 96% on RA, dyspnea with exertion. No cough present.    Output: Voids spontaneously without difficulty. UA collected 11/23 Urine culture in process. Denies pain or burning with urination.    Last BM: 11/25 had 2x medium bowel movements.    Activity: Changed to modified independent in room per PT.    Skin: ABD surgical incision, healed. Otherwise skin intact.     Pain: Mild pain to back of neck, lidocaine patch applied.    CMS: A&O x4. Makes needs known to staff. Denies numbness and tingling to extremities.    Dressing: None    Diet: Bariatric regular diet, good appetite. Pt orders meals herself.    LDA: No lines, no drains.    Equipment: Walker, GB, call light.    Plan: Continue POC.    Additional Info: Clonidine patch to L shoulder, Tegaderm applied over patch to keep in place.  UA culture positive for VRE, contact precautions initiated and pt updated. Provider aware of VRE status (see note from Rowan Robert, CNP 11/25).

## 2023-11-25 NOTE — PLAN OF CARE
Discharge Planner Post-Acute Rehab PT:      Discharge Plan: Home (apt with elevator access) with IADL support from adult dgtr. OP PT.     Precautions: Falls, abdominal (s/p sleeve surgery), orthostatic     Current Status: *Mod I with FWW (current 4WW unable to fit safely in room)  Bed Mobility: IND  Transfer: Mod I with FWW  Gait: Mod I with 4WW, up to 115 ft  Stairs: N/T - does not need to complete for home.  Balance: Fair static, dynamic standing balance without UE support.     Outcome Measures:   TUG (11/22): 18.5 sec with FWW, 23.3 sec w/out device and SBA.  Nickerson (11/25): 46/56     Assessment:  Pt updated to mod I with FWW (current 4WW unable to fit safely in bathroom). Primarily limited by SOB (but with SpO2 >= 96% on RA) with activity during AM session, and denies any bouts of dizziness or lightheadedness, which she is very pleased with. [facility]    Other Barriers to Discharge (DME, Family Training, etc):   Equipment: 4WW orders requested on 11/25.  Family training to be scheduled closer to discharge.

## 2023-11-26 ENCOUNTER — APPOINTMENT (OUTPATIENT)
Dept: PHYSICAL THERAPY | Facility: CLINIC | Age: 53
End: 2023-11-26
Attending: PHYSICAL MEDICINE & REHABILITATION
Payer: COMMERCIAL

## 2023-11-26 ENCOUNTER — APPOINTMENT (OUTPATIENT)
Dept: OCCUPATIONAL THERAPY | Facility: CLINIC | Age: 53
End: 2023-11-26
Attending: PHYSICAL MEDICINE & REHABILITATION
Payer: COMMERCIAL

## 2023-11-26 LAB
BACTERIA UR CULT: ABNORMAL
BACTERIA UR CULT: ABNORMAL
HOLD SPECIMEN: NORMAL
MAGNESIUM SERPL-MCNC: 2 MG/DL (ref 1.7–2.3)

## 2023-11-26 PROCEDURE — 250N000013 HC RX MED GY IP 250 OP 250 PS 637: Performed by: PHYSICIAN ASSISTANT

## 2023-11-26 PROCEDURE — 97110 THERAPEUTIC EXERCISES: CPT | Mod: GP | Performed by: PHYSICAL THERAPIST

## 2023-11-26 PROCEDURE — 128N000003 HC R&B REHAB

## 2023-11-26 PROCEDURE — 83735 ASSAY OF MAGNESIUM: CPT | Performed by: NURSE PRACTITIONER

## 2023-11-26 PROCEDURE — 36415 COLL VENOUS BLD VENIPUNCTURE: CPT | Performed by: NURSE PRACTITIONER

## 2023-11-26 PROCEDURE — 99233 SBSQ HOSP IP/OBS HIGH 50: CPT | Performed by: NURSE PRACTITIONER

## 2023-11-26 PROCEDURE — 250N000013 HC RX MED GY IP 250 OP 250 PS 637: Performed by: NURSE PRACTITIONER

## 2023-11-26 PROCEDURE — 250N000013 HC RX MED GY IP 250 OP 250 PS 637: Performed by: STUDENT IN AN ORGANIZED HEALTH CARE EDUCATION/TRAINING PROGRAM

## 2023-11-26 PROCEDURE — 97535 SELF CARE MNGMENT TRAINING: CPT | Mod: GO | Performed by: OCCUPATIONAL THERAPIST

## 2023-11-26 RX ORDER — SPIRONOLACTONE 25 MG
25 TABLET ORAL DAILY
Status: DISCONTINUED | OUTPATIENT
Start: 2023-11-27 | End: 2023-11-27

## 2023-11-26 RX ADMIN — FLUTICASONE FUROATE AND VILANTEROL TRIFENATATE 1 PUFF: 100; 25 POWDER RESPIRATORY (INHALATION) at 10:05

## 2023-11-26 RX ADMIN — MAGNESIUM OXIDE TAB 400 MG (241.3 MG ELEMENTAL MG) 400 MG: 400 (241.3 MG) TAB at 10:03

## 2023-11-26 RX ADMIN — Medication 1 TABLET: at 10:04

## 2023-11-26 RX ADMIN — Medication 125 MCG: at 10:03

## 2023-11-26 RX ADMIN — ROSUVASTATIN CALCIUM 10 MG: 5 TABLET, FILM COATED ORAL at 10:03

## 2023-11-26 RX ADMIN — Medication 1000 MCG: at 10:04

## 2023-11-26 RX ADMIN — LIDOCAINE 1 PATCH: 4 PATCH TOPICAL at 10:04

## 2023-11-26 RX ADMIN — VENLAFAXINE 75 MG: 75 TABLET ORAL at 20:40

## 2023-11-26 RX ADMIN — Medication 1 TABLET: at 20:40

## 2023-11-26 RX ADMIN — URSODIOL 300 MG: 300 CAPSULE ORAL at 20:40

## 2023-11-26 RX ADMIN — CETIRIZINE HYDROCHLORIDE 10 MG: 5 TABLET ORAL at 10:03

## 2023-11-26 RX ADMIN — VENLAFAXINE 75 MG: 75 TABLET ORAL at 14:59

## 2023-11-26 RX ADMIN — OMEPRAZOLE 20 MG: 20 CAPSULE, DELAYED RELEASE ORAL at 10:03

## 2023-11-26 RX ADMIN — AMLODIPINE BESYLATE 10 MG: 10 TABLET ORAL at 10:03

## 2023-11-26 RX ADMIN — VENLAFAXINE 75 MG: 75 TABLET ORAL at 10:03

## 2023-11-26 RX ADMIN — CARVEDILOL 25 MG: 25 TABLET, FILM COATED ORAL at 16:55

## 2023-11-26 RX ADMIN — GABAPENTIN 300 MG: 300 CAPSULE ORAL at 20:38

## 2023-11-26 RX ADMIN — CARVEDILOL 25 MG: 25 TABLET, FILM COATED ORAL at 10:03

## 2023-11-26 RX ADMIN — Medication 25 MG: at 12:08

## 2023-11-26 RX ADMIN — URSODIOL 300 MG: 300 CAPSULE ORAL at 10:03

## 2023-11-26 ASSESSMENT — ACTIVITIES OF DAILY LIVING (ADL)
ADLS_ACUITY_SCORE: 25

## 2023-11-26 NOTE — PLAN OF CARE
Discharge Planner Post-Acute Rehab PT:      Discharge Plan: Home (apt with elevator access) with IADL support from adult dgtr. OP PT.     Precautions: Falls, abdominal (s/p sleeve surgery), orthostatic+anxiety-related dizziness     Current Status: *Mod I with FWW (current 4WW unable to fit safely in room)  Bed Mobility: IND  Transfer: Mod I with FWW  Gait: Mod I with FWW in room. SBA for hallway up to 115 ft.  Stairs: N/T - does not need to complete for home.  Balance: Fair static, dynamic standing balance without UE support.     Outcome Measures:   TUG (11/22): 18.5 sec with FWW, 23.3 sec w/out device and SBA.  Nickerson (11/25): 46/56     Assessment: Pt continues to be most limited by SOB with activity leading to necessary sit breaks. Continues to increase walking distance with 4ww and SBA<>CGA depending on fatigue level, however experiences frequent SOB. Pt did experience dizziness during in the morning after Nustep and walking bouts, vitals taken: 135/82, 98% on RA, and 97bpm. Plan to continue to increase walking endurance capabilities and set up family training prior to discharge.     Other Barriers to Discharge (DME, Family Training, etc):   Equipment: 4WW orders requested on 11/25.  Family training to be scheduled closer to discharge.

## 2023-11-26 NOTE — PLAN OF CARE
Patient is A&OX4, makes needs known. Denies pain. ISI during day, SBA at [facility]. Continent of bowel and bladder. Lidocaine applied to posterior neck/ upper back. Clonidine patch in place.

## 2023-11-26 NOTE — PLAN OF CARE
Goal Outcome Evaluation:      Plan of Care Reviewed With: patient    Overall Patient Progress: improvingOverall Patient Progress: improving    Patient is alert and oriented. Able to use a call light and make her needs needs. Is ISI during the day and Assist of x 1 with a walker at night. Continent of BB, LBM 11/25. Lidocaine patch off, Clonidine patch on to the left shoulder in in place. Denies pain at this time. Refused to wear CPAP or Oxygen O2 Saturations above 96%. Call light within reach. Nursing staff will continue with POC.

## 2023-11-26 NOTE — PLAN OF CARE
Discharge Planner Post-Acute Rehab OT:      Discharge Plan: home with daughter and grandson in apt with elevator  los 7 days     Precautions: Falls, abdominal (s/p sleeve [id number] but greater than 6weeks so providers to clarify if they can be discontinued), orthostatic and SOB w/ activty, c/o R UE weakness/intermittent pain, neck pain     Current Status:  ADLs:  Mobility: Mod I in room with fww 11/25  Grooming: Mod I sitting in front of sink   Dressing:   U/b dressing: pullover shirt able to dress after clothes retrievel  L/B dressing: min A w/ reacher but may not have need for abdominal precautions   Feet: min A w/ sock aide  Bathing: pt able to wash 8/10 areas d/t fatigue needing help w head and back; supervision to walk w/o device to ETB from doorway  Toileting: mod I using fww  IADLs: to be assessed   Vision/Cognition:  higher cog assessment      Assessment: Pt requesting shower today. Pt is supervision on/off ETB without device and is min A to wash/dry d/t fatigue. Needing help w hair and back. On track to leave. Pt states sister and daughter are able to help at home with things. Need to set up family training before discharge on Wednesday.      Other Barriers to Discharge (DME, Family Training, etc):   FT: Needs to be set up

## 2023-11-26 NOTE — PROGRESS NOTES
ARU Progress Note          Assessment & Plan:   Mojgan Jimenez is a 53 year old female with past medical history significant for polysubstance use disorder (sober x 5 years), anxiety, JARVIS, HLD, and class III obesity admitted to [facility] for planned gastric sleeve surgery on 10/9/23.  Hospital course complicated by peritonitis with RTOR 10/12/23 for repair of 2 small bowel enterotomies, intra-abdominal wash out and drain placement, acute hypoxic respiratory failure requiring intubation 10/21-10/26, HCAP, pleural effusion, oliguric SUSSY with renal recovery, resistant HTN, stress cardiomyopathy, persistent pleural effusion vs parapneumonic effusion requiring chest tube placement, thrombocytopenia w positive HIT screen, and debility.  She is admitted to ARU for therapies due to impaired strength and activity intolerance.  Internal medicine is consulted for medical co-management, SUSSY, and hyponatremia.         # SUSSY in setting of recent acute renal failure, improving   # Possible new dx CKD stage III   Developed acute oliguric renal failure post op with peak Cr at 6.97 on 10/14.  Renal function improved without HD/CRRT.  Cr gradually downtrending since then and has remained below 2 since 10/29.  Last renal US on 11/9 with no acute findings.  Cr elevated to 1.87 on 11/23, continues to downtrend.  BUN downtrending as well.  FEUrea 15.8%, c/w pre-renal etiology likely 2/2 overdiuresis.  UA with proteinuria, small LE, 16 WBC, and hyaline casts, UC with 10-50K Klebsiella aerogenes and 10-50K Enterococcus faecium VRE.  Expect 3 months for renal function to fully recover.    - Resume Hygroton at 25mg daily and Spironolactone 25mg daily (50% of previous doses); caution with increases beyond this given recent dizziness and continued Cr above baseline   - UCx likely contaminant vs colonization; pt asymptomatic as well.  Defer antibiotics for now.    - Monitor I/Os   - Check BMP in AM  - Avoid/minimize nephrotoxic agents     #  Dizziness - Improved. Likely 2/2 dehydration, medication side effects.  Reports improvement since holding diuretics.  No chest pain or heart palpitations.   - Resume diuretics cautiously, as above  - Encourage PO fluids, hydration  - Check orthostatic VS   - Change positions slowly and carefully  - CTM for now     # Hyponatremia - Likely hypovolemic hyponatremia.  Na gradually downtrending since 11/14, improving. Suspect multifactorial in setting of recent gastric surgery and SUSSY, diuretic use, and possible low solute intake/absorption.  On Bumex, hydrochlorothiazide, and spironolactone at time of admission to ARU as above.    - Repeat BMP in AM   - Encourage PO intake   - No need for FR at the moment     # Stress cardiomyopathy   # HFrEF   # Resistant HTN   Last echo on 11/8/23 with EF 55-60%, normal RV and systolic function, and no valvular abnormalities.    - Continue Amlodipine 5 mg twice daily  - Continue Carvedilol 25 mg twice daily  - Resuming Hygroton and Spironolactone as above   - Check daily weights, I/Os     # Hypokalemia - Resolved.  K low to 3.3 on 11/24, ordered replacement x 1.  Protocol available.      # Hypomagnesemia - Mag 1.6 on 11/22.  Likely absorption issue following gastric surgery. Per chart review, was started on oral MagOx 400mg daily.  - Added Mag replacement protocol  - Agree w/ scheduled supplementation     # Vitamin D deficiency - Continue PTA cholecalciferol.      # Anxiety - Continue PTA Effexor.     Resolved hospital issues:   # S/p sleeve gastrectomy c/b enterotomy repair x 2 and peritonitis - Complex postop course as above.  Treated with Augmentin and Fluconazole through 11/16/23.  Briefly required NG tube, now removed.  Continue PPI.    # Acute hypoxic respiratory failure, pleural effusion vs parapneumonic effusion - Intubated 10/12-10/21, then re-intubated 10/21-10/26.  Chest tube placed 11/1-11/5.   # Thrombocytopenia with positive HIT screen - Maintained on fondaparinux at  "OSH.  Stopped at discharge, no need to resume at this point.    # Acute toxic and metabolic encephalopathy - Appropriate mentation on exam today.  Occurred during hospitalization.  No acute issues.    # Thrush - Resolved.  S/p treatment with Nystatin and Fluconazole.         Rowan Busch, CNP, APRN  Internal Medicine NAIN [facility]  Pager [phone]            Interval History:   Mojgan is seen in her room.  She is feeling better today.  Had slight dizziness this morning but quickly went away.  She reported getting out of breath when going to the bathroom, but no chest pain, no hypoxia.  No headaches, nausea, or vomiting.  Overall feels well.           Physical Exam:   Blood pressure 114/62, pulse 96, temperature 97.7  F (36.5  C), temperature source Oral, resp. rate 16, height 1.6 m (5' 3\"), weight 113.3 kg (249 lb 12.5 oz), last menstrual period 09/09/2023, SpO2 96%, not currently breastfeeding.    GENERAL: Alert and oriented x 3. Well nourished, well developed.  Obese body habitus.  No acute distress.    HEENT: Normocephalic, atraumatic. Anicteric sclera. Mucous membranes moist.   CV: RRR. S1, S2. No murmurs appreciated.   RESPIRATORY: Effort normal on room air. Lungs CTAB with no wheezing, rales, or rhonchi.   GI: Abdomen soft and non distended, bowel sounds present x all 4 quadrants. No tenderness, rebound, or guarding.  Incision is healing well.   NEUROLOGICAL: No focal deficits. Follows commands.  Strength equal in upper and lower extremities.   MUSCULOSKELETAL: No joint swelling or tenderness. Moves all extremities.   EXTREMITIES: No gross deformities. No peripheral edema.   SKIN: Grossly warm, dry, and intact. No jaundice. No rashes.       ROUTINE IP LABS (Last four results)  CMP   Recent Labs   Lab 11/26/23  0831 11/25/23  0717 11/24/23  0831 11/23/23  0944 11/22/23  0538   NA  --  133* 131* 130* 133*   POTASSIUM  --  3.6 3.3* 3.6 3.6   CHLORIDE  --  93* 90* 88* 89*   CO2  --  29 " 26 28 30*   ANIONGAP  --  11 15 14 14   GLC  --  106* 106* 112* 93   BUN  --  35.7* 40.3* 42.7* 36.5*   CR  --  1.38* 1.51* 1.87* 1.78*   PALAK  --  10.3* 10.0 9.7 10.0   MAG 2.0 1.9 1.7 1.6* 1.6*     CBC   Recent Labs   Lab 11/22/23  0538   WBC 7.7   RBC 3.43*   HGB 9.5*   HCT 30.2*   MCV 88   MCH 27.7   MCHC 31.5   RDW 14.6        INR No lab results found in last 7 days.

## 2023-11-27 ENCOUNTER — APPOINTMENT (OUTPATIENT)
Dept: OCCUPATIONAL THERAPY | Facility: CLINIC | Age: 53
End: 2023-11-27
Attending: PHYSICAL MEDICINE & REHABILITATION
Payer: COMMERCIAL

## 2023-11-27 ENCOUNTER — APPOINTMENT (OUTPATIENT)
Dept: PHYSICAL THERAPY | Facility: CLINIC | Age: 53
End: 2023-11-27
Attending: PHYSICAL MEDICINE & REHABILITATION
Payer: COMMERCIAL

## 2023-11-27 LAB
ANION GAP SERPL CALCULATED.3IONS-SCNC: 10 MMOL/L (ref 7–15)
BUN SERPL-MCNC: 29.5 MG/DL (ref 6–20)
CALCIUM SERPL-MCNC: 9.9 MG/DL (ref 8.6–10)
CHLORIDE SERPL-SCNC: 95 MMOL/L (ref 98–107)
CREAT SERPL-MCNC: 1.19 MG/DL (ref 0.51–0.95)
DEPRECATED HCO3 PLAS-SCNC: 30 MMOL/L (ref 22–29)
EGFRCR SERPLBLD CKD-EPI 2021: 54 ML/MIN/1.73M2
ERYTHROCYTE [DISTWIDTH] IN BLOOD BY AUTOMATED COUNT: 14.4 % (ref 10–15)
GLUCOSE SERPL-MCNC: 110 MG/DL (ref 70–99)
HCT VFR BLD AUTO: 29.2 % (ref 35–47)
HGB BLD-MCNC: 9.5 G/DL (ref 11.7–15.7)
MAGNESIUM SERPL-MCNC: 1.7 MG/DL (ref 1.7–2.3)
MCH RBC QN AUTO: 28.5 PG (ref 26.5–33)
MCHC RBC AUTO-ENTMCNC: 32.5 G/DL (ref 31.5–36.5)
MCV RBC AUTO: 88 FL (ref 78–100)
PHOSPHATE SERPL-MCNC: 5 MG/DL (ref 2.5–4.5)
PLATELET # BLD AUTO: 298 10E3/UL (ref 150–450)
POTASSIUM SERPL-SCNC: 3.3 MMOL/L (ref 3.4–5.3)
RBC # BLD AUTO: 3.33 10E6/UL (ref 3.8–5.2)
SODIUM SERPL-SCNC: 135 MMOL/L (ref 135–145)
WBC # BLD AUTO: 6.9 10E3/UL (ref 4–11)

## 2023-11-27 PROCEDURE — 97535 SELF CARE MNGMENT TRAINING: CPT | Mod: GO

## 2023-11-27 PROCEDURE — 99233 SBSQ HOSP IP/OBS HIGH 50: CPT | Performed by: PHYSICIAN ASSISTANT

## 2023-11-27 PROCEDURE — 250N000013 HC RX MED GY IP 250 OP 250 PS 637: Performed by: PHYSICIAN ASSISTANT

## 2023-11-27 PROCEDURE — 99232 SBSQ HOSP IP/OBS MODERATE 35: CPT | Performed by: PHYSICAL MEDICINE & REHABILITATION

## 2023-11-27 PROCEDURE — 80048 BASIC METABOLIC PNL TOTAL CA: CPT | Performed by: PHYSICIAN ASSISTANT

## 2023-11-27 PROCEDURE — 97110 THERAPEUTIC EXERCISES: CPT | Mod: GP | Performed by: PHYSICAL THERAPIST

## 2023-11-27 PROCEDURE — 250N000013 HC RX MED GY IP 250 OP 250 PS 637: Performed by: NURSE PRACTITIONER

## 2023-11-27 PROCEDURE — 36415 COLL VENOUS BLD VENIPUNCTURE: CPT | Performed by: PHYSICIAN ASSISTANT

## 2023-11-27 PROCEDURE — 84100 ASSAY OF PHOSPHORUS: CPT | Performed by: PHYSICIAN ASSISTANT

## 2023-11-27 PROCEDURE — 250N000013 HC RX MED GY IP 250 OP 250 PS 637: Performed by: PHYSICAL MEDICINE & REHABILITATION

## 2023-11-27 PROCEDURE — 83735 ASSAY OF MAGNESIUM: CPT | Performed by: PHYSICIAN ASSISTANT

## 2023-11-27 PROCEDURE — 97530 THERAPEUTIC ACTIVITIES: CPT | Mod: GO

## 2023-11-27 PROCEDURE — 128N000003 HC R&B REHAB

## 2023-11-27 PROCEDURE — 85027 COMPLETE CBC AUTOMATED: CPT | Performed by: PHYSICIAN ASSISTANT

## 2023-11-27 PROCEDURE — 97530 THERAPEUTIC ACTIVITIES: CPT | Mod: GP | Performed by: PHYSICAL THERAPIST

## 2023-11-27 RX ORDER — POTASSIUM CHLORIDE 1500 MG/1
20 TABLET, EXTENDED RELEASE ORAL ONCE
Status: COMPLETED | OUTPATIENT
Start: 2023-11-27 | End: 2023-11-27

## 2023-11-27 RX ADMIN — VENLAFAXINE 75 MG: 75 TABLET ORAL at 21:05

## 2023-11-27 RX ADMIN — SPIRONOLACTONE 25 MG: 25 TABLET, FILM COATED ORAL at 09:20

## 2023-11-27 RX ADMIN — VENLAFAXINE 75 MG: 75 TABLET ORAL at 11:34

## 2023-11-27 RX ADMIN — POTASSIUM CHLORIDE 20 MEQ: 1500 TABLET, EXTENDED RELEASE ORAL at 14:50

## 2023-11-27 RX ADMIN — Medication 1000 MCG: at 11:34

## 2023-11-27 RX ADMIN — URSODIOL 300 MG: 300 CAPSULE ORAL at 11:34

## 2023-11-27 RX ADMIN — Medication 1 TABLET: at 11:34

## 2023-11-27 RX ADMIN — OMEPRAZOLE 20 MG: 20 CAPSULE, DELAYED RELEASE ORAL at 11:34

## 2023-11-27 RX ADMIN — MAGNESIUM OXIDE TAB 400 MG (241.3 MG ELEMENTAL MG) 400 MG: 400 (241.3 MG) TAB at 09:20

## 2023-11-27 RX ADMIN — Medication 1 TABLET: at 21:05

## 2023-11-27 RX ADMIN — Medication 125 MCG: at 09:20

## 2023-11-27 RX ADMIN — ROSUVASTATIN CALCIUM 10 MG: 5 TABLET, FILM COATED ORAL at 09:20

## 2023-11-27 RX ADMIN — GABAPENTIN 300 MG: 300 CAPSULE ORAL at 21:05

## 2023-11-27 RX ADMIN — FLUTICASONE FUROATE AND VILANTEROL TRIFENATATE 1 PUFF: 100; 25 POWDER RESPIRATORY (INHALATION) at 09:33

## 2023-11-27 RX ADMIN — URSODIOL 300 MG: 300 CAPSULE ORAL at 21:05

## 2023-11-27 RX ADMIN — CARVEDILOL 25 MG: 25 TABLET, FILM COATED ORAL at 09:20

## 2023-11-27 RX ADMIN — VENLAFAXINE 75 MG: 75 TABLET ORAL at 14:50

## 2023-11-27 RX ADMIN — AMLODIPINE BESYLATE 10 MG: 10 TABLET ORAL at 09:20

## 2023-11-27 RX ADMIN — CETIRIZINE HYDROCHLORIDE 10 MG: 5 TABLET ORAL at 09:20

## 2023-11-27 RX ADMIN — Medication 25 MG: at 09:19

## 2023-11-27 ASSESSMENT — ACTIVITIES OF DAILY LIVING (ADL)
ADLS_ACUITY_SCORE: 25

## 2023-11-27 NOTE — PLAN OF CARE
Goal Outcome Evaluation:  Pt is alert and oriented x 4, she denies pain or discomfort. Pt has been modified independent in the room using a walker during the day and stand by assist at night. She has been using the call light appropriately and as needed. She had medium sized emesis this morning related to taking her medication. That was resolved without intervention. Surgical abdominal incision is healing, it is clean, dry, and intact. It is open to air. We will continue to encourage independence while assisting with activity of daily livings as needed                         Beckie Yates  (RN)  2018 18:09:01 hCerelle Estrada  (NP)  2018 18:40:34

## 2023-11-27 NOTE — PROGRESS NOTES
[facility]    Medicine Progress Note - Hospitalist Service    Date of Admission:  11/21/2023    Assessment & Plan   Mojgan Jimenez is a 53 year old female with past medical history significant for polysubstance use disorder (sober x 5 years), anxiety, JARVIS, HLD, and class III obesity admitted to [facility] for planned gastric sleeve surgery on 10/9/23.  Hospital course complicated by peritonitis with RTOR 10/12/23 for repair of 2 small bowel enterotomies, intra-abdominal wash out and drain placement, acute hypoxic respiratory failure requiring intubation 10/21-10/26, HCAP, pleural effusion, oliguric SUSSY with renal recovery, resistant HTN, stress cardiomyopathy, persistent pleural effusion vs parapneumonic effusion requiring chest tube placement, thrombocytopenia w positive HIT screen, and debility.  She is admitted to ARU for therapies due to impaired strength and activity intolerance.  Internal medicine is consulted for medical co-management, SUSSY, and hyponatremia.      SUSSY in setting of recent acute renal failure, improving   Possible new dx CKD stage III   Developed acute oliguric renal failure post op with peak Cr at 6.97 on 10/14.  Renal function improved without HD/CRRT.  Cr gradually downtrending since then and has remained below 2 since 10/29.  Last renal US on 11/9 with no acute findings.  Cr elevated to 1.87 on 11/23, continues to downtrend.  BUN downtrending as well.  FEUrea 15.8%, c/w pre-renal etiology likely 2/2 overdiuresis. UC 11/23 with 10-50K colonies Klebsiella aerogenes and 10-50K colonies E faecium VRE, patient asymptomatic, growth most likely represents colonization.    - Discontinuing diuretics as below   - Monitor off antibiotics   - Monitor I/Os   - BMP twice weekly   - Avoid/minimize nephrotoxic agents      Dizziness: Improved. Likely 2/2 dehydration, medication side effects.  Reports improvement since holding diuretics.  No chest pain or heart  palpitations.    - Encourage PO fluids, hydration   - Check orthostatic VS    - Change positions slowly and carefully   - CTM for now     Hyponatremia: Likely hypovolemic hyponatremia.  Na gradually downtrending since 11/14, improving. Suspect multifactorial in setting of recent gastric surgery and SUSSY, diuretic use, and possible low solute intake/absorption.  On Bumex, hydrochlorothiazide, and spironolactone at time of admission to ARU as above.     - Discontinue chlorthalidone and spironolactone   - Monitor BMP   - Encourage oral intake    HFrEF 2/2 stress cardiomyopathy, resolved  Refractory HTN  Last echo on 11/8/23 with recovered EF 55-60%, normal RV and systolic function, and no valvular abnormalities. Refractory HTN during hospitalization, Nephrology consulted, discharged on multiple meds (PTA not on any antihypertensives). Anticipate her BP trend will continue to improve as she loses weight.   - Amlodipine 5 mg BID   - Carvedilol 25 mg BID   - Clonidine patch   - Discontinue diuretics d/t normotensive BP, hypoNa, euvolemic state, recovered EF, recent dizziness. Consider resuming if BP above goal.      Hypokalemia: K 3.3 today. Replace with Kcl 20 mEq x1.      Hypomagnesemia: Mag 1.6 on 11/22.  Likely absorption issue following gastric surgery. Per chart review, was started on oral MagOx 400mg daily.   - Continue current replacement     Vitamin D deficiency: Continue PTA cholecalciferol.      LINA: Continue PTA Effexor.     Resolved Hospital Issues:  S/p sleeve gastrectomy c/b enterotomy repair x 2 and peritonitis: Complex postop course as above. Treated with Augmentin and Fluconazole through 11/16/23. Briefly required NG tube, now removed. Continue PPI.    Acute hypoxic respiratory failure, pleural effusion vs parapneumonic effusion: Intubated 10/12-10/21, then re-intubated 10/21-10/26.  Chest tube placed 11/1-11/5.   Thrombocytopenia with positive HIT screen: Maintained on fondaparinux at OSH. Stopped at  discharge, no need to resume at this point.    Acute toxic and metabolic encephalopathy: Appropriate mentation on exam today. Occurred during hospitalization. No acute issues.    Thrush: Resolved. S/p treatment with Nystatin and Fluconazole.          Recommendations were communicated to the primary team via this note. Medicine will continue to follow.       Ynes Navarro PA-C  Hospitalist Service  [facility]  Securely message with [employer] (more info)  Text page via [facility] Paging/Directory   ______________________________________________________________________    Interval History   Feeling overwhelmed by pill burden, wondering why she needs diuretic medications. Leg edema has been resolved for several days. Blood pressures generally within goal. Patient reports she will be discharging on Wednesday.     Physical Exam   Vital Signs: Temp: 98.5  F (36.9  C) Temp src: Oral BP: 129/71 Pulse: 92   Resp: 18 SpO2: 98 % O2 Device: None (Room air)    Weight: 249 lbs 12.5 oz    Constitutional: Awake and alert, in no apparent distress. Sitting up comfortably in bed.   Eyes: Sclera clear, anicteric   Respiratory: Breathing non-labored. CTAB.   Cardiovascular:  RRR, normal S1/S2. No rubs or murmurs. No JVD. No peripheral edema.   GI: Soft, non-tender, non-distended. Normoactive bowel sounds. Incision healing well.   Skin:  Good color. No jaundice. No visible rashes, lesions, or bruising of concern.   Neurologic: Alert and fully oriented. No focal deficits.           Medical Decision Making       50 MINUTES SPENT BY ME on the date of service doing chart review, history, exam, documentation & further activities per the note.      Data   ------------------------- PAST 24 HR DATA REVIEWED -----------------------------------------------    I have personally reviewed the following data over the past 24 hrs:    6.9  \   9.5 (L)   / 298     135 95 (L) 29.5 (H) /  110 (H)   3.3 (L) 30 (H)  1.19 (H) \       Imaging results reviewed over the past 24 hrs:   No results found for this or any previous visit (from the past 24 hour(s)).

## 2023-11-27 NOTE — PLAN OF CARE
"Discharge Planner Post-Acute Rehab OT:      Discharge Plan: home with daughter and grandson in apt with elevator  los 7 days     Precautions: Falls, abdominal (s/p sleeve [id number] but greater than 6weeks so providers to clarify if they can be discontinued), orthostatic and SOB w/ activty, c/o R UE weakness/intermittent pain, neck pain     Current Status:  ADLs:  Mobility: Mod I in room with fww 11/25  Grooming: Mod I sitting in front of sink   Dressing:   U/b dressing: pullover shirt able to dress after clothes retrievel  L/B dressing: min A w/ reacher but may not have need for abdominal precautions   Feet: min A w/ sock aide  Bathing: pt able to wash 8/10 areas d/t fatigue needing help w head and back; supervision to walk w/o device to ETB from doorway  Toileting: mod I using fww  IADLs: to be assessed   Vision/Cognition:  higher cog assessment      Assessment: Facilitated increased activity tolerance and strengthening through completion of morning ADL routine. Completed g/h tasks sinkside while seated on 4ww seat. Th educating on EC/WS techniques including pacing, planning, and integrating frequent rest breaks to reduce fatigue. Pt continues to be limited by reduced activity tolerance but progressing well. Pt declining setting up family training as daughter is PCA and \"knows how to do all of these things already\". Edu on importance of integrating family prior to discharge to ensure smooth return home and safety with all I/ADLs and mobility, pt still declining.     Other Barriers to Discharge (DME, Family Training, etc):   FT: Needs to be set up - pt declined 11/27; reports daughter is PCA and already familiar with how to assist  "

## 2023-11-27 NOTE — PLAN OF CARE
Goal Outcome Evaluation:      Plan of Care Reviewed With: patient    Overall Patient Progress: improvingOverall Patient Progress: improving    Patient is alert and oriented. Able to use a call light and make her needs needs. Is ISI during the day and Assist of x 1 with a walker at night. Continent of BB, LBM 11/26. Lidocaine patch off, Clonidine patch on to the left shoulder in in place. Patient was moved from 507 to 546. Is currently on isolation for VRE. Denies pain at this time. Call light within reach. Nursing staff will continue with POC.

## 2023-11-27 NOTE — PLAN OF CARE
Discharge Planner Post-Acute Rehab PT:      Discharge Plan: Home (apt with elevator access) with IADL support from adult dgtr. OP PT.     Precautions: Falls, abdominal (s/p sleeve surgery), orthostatic+anxiety-related dizziness     Current Status: *Mod I with FWW (current 4WW unable to fit safely in room)  Bed Mobility: IND  Transfer: Mod I with FWW  Gait: Mod I with FWW in room. SBA for hallway up to 140 ft with 4WW.  Stairs: N/T - does not need to complete for home.  Balance: Fair static, dynamic standing balance without UE support.     Outcome Measures:   TUG (11/22): 18.5 sec with FWW, 23.3 sec w/out device and SBA.  Nickerson (11/25): 46/56     Assessment: Pt less limited by SOB and dizziness today, more limited by fatigue. Pt continues to increase walking endurance with 4WW and is Sheyla. Pt experienced less bouts of SOB and dizziness during today's sessions and seems to be less impacted in the mornings compared to the afternoons. Attempted floor recovery transfer in afternoon and pt experienced a moment of fear and frustration at not being able to get knees under her. Pt was extremely discouraged but with a little encouragement was able to fully verbalize steps of floor recovery to PT. Plan to continue increasing endurance in prep for upcoming discharge.      Other Barriers to Discharge (DME, Family Training, etc):   Equipment: 4WW orders requested on 11/25.  Pt declines family training.

## 2023-11-27 NOTE — PROGRESS NOTES
[facility]   Acute Rehabilitation Unit  Daily progress note  CC:     16 Debility (non-cardiac, non-pulmonary) - s/p laparoscopic sleeve gastrectomy with multiple medical complications including an ICU stay     S:Feels better. Getting ready for discharge. Wants medications reviewed.  No SOB, or HA.     TR held and reviewed.       Functionally,     Discharge Plan: home with daughter and grandson in apt with elevator  los 7 days     Precautions: Falls, abdominal (s/p sleeve [id number] but greater than 6weeks so providers to clarify if they can be discontinued), orthostatic and SOB w/ activty, c/o R UE weakness/intermittent pain, neck pain     Current Status: *Mod I with FWW (current 4WW unable to fit safely in room)  Bed Mobility: IND  Transfer: Mod I with FWW  Gait: Mod I with FWW in room. SBA for hallway up to 115 ft.  Stairs: N/T - does not need to complete for home.  Balance: Fair static, dynamic standing balance without UE support.     Outcome Measures:   TUG (11/22): 18.5 sec with FWW, 23.3 sec w/out device and SBA.  Nickerson (11/25): 46/56     Assessment: Pt continues to be most limited by SOB with activity leading to necessary sit breaks. Continues to increase walking distance with 4ww and SBA<>CGA depending on fatigue level, however experiences frequent SOB. Pt did experience dizziness during in the morning after Nustep and walking bouts, vitals taken: 135/82, 98% on RA, and 97bpm. Plan to continue to increase walking endurance capabilities and set up family training prior to discharge.      Other Barriers to Discharge (DME, Family Training, etc):   Equipment: 4WW orders requested on 11/25.  Family training to be scheduled closer to discharge.      Medications  Scheduled meds   amLODIPine  10 mg Oral Daily    carvedilol  25 mg Oral BID w/meals    cetirizine  10 mg Oral Daily    childrens multivitamin with iron  1 tablet Oral BID    chlorthalidone  25 mg Oral Daily     "cholecalciferol  125 mcg Oral Daily    cloNIDine  1 patch Transdermal Weekly    And    cloNIDine   Transdermal Q8H KAYLA    cyanocobalamin  1,000 mcg Sublingual Daily    fluticasone-vilanterol  1 puff Inhalation Daily    gabapentin  300 mg Oral At Bedtime    lidocaine  1 patch Transdermal Q24H    magnesium oxide  400 mg Oral Daily    omeprazole  20 mg Oral Daily    rosuvastatin  10 mg Oral Daily    spironolactone  25 mg Oral Daily    ursodiol  300 mg Oral BID    venlafaxine  75 mg Oral TID       PRN meds:  acetaminophen, albuterol, albuterol, artificial saliva, bisacodyl, calcium carbonate, miconazole, ondansetron, senna-docusate      PHYSICAL EXAM  /71 (BP Location: Right arm)   Pulse 92   Temp 98.5  F (36.9  C) (Oral)   Resp 18   Ht 1.6 m (5' 3\")   Wt 113.3 kg (249 lb 12.5 oz)   LMP 09/09/2023 (Exact Date)   SpO2 98%   BMI 44.25 kg/m    Gen: Alert and in NAD.  HEENT: MMM  CV: S1, S2 RRR  Pulm: Clear to auscultation  Abd: Obese, non tender  Ext: Calves non tender  Neuro/MSK: Moves all four  Responds to touch.    LABS  Last Comprehensive Metabolic Panel:  Sodium   Date Value Ref Range Status   11/27/2023 135 135 - 145 mmol/L Final     Comment:     Reference intervals for this test were updated on 09/26/2023 to more accurately reflect our healthy population. There may be differences in the flagging of prior results with similar values performed with this method. Interpretation of those prior results can be made in the context of the updated reference intervals.    11/17/2020 141 133 - 144 mmol/L Final     Potassium   Date Value Ref Range Status   11/27/2023 3.3 (L) 3.4 - 5.3 mmol/L Final   05/17/2022 4.5 3.4 - 5.3 mmol/L Final   11/17/2020 4.1 3.4 - 5.3 mmol/L Final     Chloride   Date Value Ref Range Status   11/27/2023 95 (L) 98 - 107 mmol/L Final   05/17/2022 102 94 - 109 mmol/L Final   11/17/2020 109 94 - 109 mmol/L Final     Carbon Dioxide   Date Value Ref Range Status   11/17/2020 31 20 - 32 mmol/L " Final     Carbon Dioxide (CO2)   Date Value Ref Range Status   11/27/2023 30 (H) 22 - 29 mmol/L Final   05/17/2022 32 20 - 32 mmol/L Final     Anion Gap   Date Value Ref Range Status   11/27/2023 10 7 - 15 mmol/L Final   05/17/2022 2 (L) 3 - 14 mmol/L Final   11/17/2020 1 (L) 3 - 14 mmol/L Final     Glucose   Date Value Ref Range Status   11/27/2023 110 (H) 70 - 99 mg/dL Final   05/17/2022 110 (H) 70 - 99 mg/dL Final   11/17/2020 84 70 - 99 mg/dL Final     Comment:     Fasting specimen     GLUCOSE BY METER POCT   Date Value Ref Range Status   11/18/2023 115 (H) 70 - 99 mg/dL Final     Urea Nitrogen   Date Value Ref Range Status   11/27/2023 29.5 (H) 6.0 - 20.0 mg/dL Final   05/17/2022 16 7 - 30 mg/dL Final   11/17/2020 9 7 - 30 mg/dL Final     Creatinine   Date Value Ref Range Status   11/27/2023 1.19 (H) 0.51 - 0.95 mg/dL Final   11/17/2020 0.79 0.52 - 1.04 mg/dL Final     GFR Estimate   Date Value Ref Range Status   11/27/2023 54 (L) >60 mL/min/1.73m2 Final   11/17/2020 88 >60 mL/min/[1.73_m2] Final     Comment:     Non  GFR Calc  Starting 12/18/2018, serum creatinine based estimated GFR (eGFR) will be   calculated using the Chronic Kidney Disease Epidemiology Collaboration   (CKD-EPI) equation.       Calcium   Date Value Ref Range Status   11/27/2023 9.9 8.6 - 10.0 mg/dL Final   11/17/2020 8.9 8.5 - 10.1 mg/dL Final        CBC RESULTS:   Recent Labs   Lab Test 11/22/23  0538   WBC 7.7   RBC 3.43*   HGB 9.5*   HCT 30.2*   MCV 88   MCH 27.7   MCHC 31.5   RDW 14.6           ASSESSMENT AND PLAN    Mojgan Jimenez is a 53 year old woman with past medical history of substance abuse (sober x 5 years), anxiety, JARVIS, asthma, HLD, and morbid obesity who was admitted for planned gastric sleeve 10/9/23 course was complicated by peritonitis with return to OR 10/12/23 for with enterotomies repair, wash out and drain placement, course further complicated by acute hypoxic respiratory failure, HCAP, pleural  effusion,  SUSSY, resistant HTN, stress cardiomyopathy, anemia and debility.  Admitted to rehab 11/21/23 with impaired strength and activity tolerance.         Admission to acute inpatient rehab debility.    Impairment group code: 16        PT, OT 90 minutes of each on a daily basis, in addition to rehab nursing and close management of physiatrist.       Impairment of ADL's: Noted to have impaired strength and impaired activity tolerance  leading to decreased ability to independently complete ADL's.  Will benefit from ongoing OT with goal for MOD I with basic ADLs.      Impairment of mobility:  Noted to have impaired strength and  impaired activity tolerance leading to decreased mobility.  Will benefit from ongoing PT with goal for ISI with basic mobility .         Medical Conditions     Severe decondition due to prolong hospitalization  - PT/OT- acute rehab     Morbid obesity status post sleeve gastrectomy complicated by enterotomy & peritonitis   Admitted for planned gastric sleeve which she underwent 10/9/23, complicated by enterotomies s/p return to OR 10/12 with wash out, repair and drain placement. Peritonitis, seen by ID   Completed course of antibiotics 11/16/2023.  -bariatric diet  -nutrition consult  -D3, B12, ppi, chewable multivit,     Resistant hypertension  Stress Cardiomyopathy with EF recovery  In setting of sepsis, post operative course EF 30-35 10/14 repeat Echo 10/23 with recovered EF, not on medications prior to admission. Underwent workup per nephrology for secondary hypertension. -- Nephrologist & cardiology involved during hospitalization Parameters for hold added.   -- Amlodipine 10 mg daily  -- Carvedilol 25 mg twice daily  -- Chlorthalidone 50 mg daily, HOLD  -- Spironolactone 25 mg twice daily, HOLD  -clonidine patch 0.1  mg/week  -discontinue bumex 2 mg daily in setting of reported symptomatic position changes- /60 in supine and 100/50 sitting-    -monitor BP- & orthostatic BP-  "titrate meds/ simplify regimen as indicated.      Possible CKD stage 3.   Kidney function wnl prior to admission, with SUSSY in setting of sepsis Cr peaked 6.97 10/14/23 followed by nephrology, Cr Improving. 0.66 11/27/2023   -trend     Acute Anemia  Acute illness, blood loss, preop hgb wnl.  Stable 11/27/2023 9.5  Continue to monitor hemoglobin     Thrombocytopenia (resolved)   Suspected HIT- ruled out  Concern for HIT transitioned to fondaparinux, seen by hematology HIT ruled out per hematology \"No indication for anticoagulation therapy at the time of discharge\"  PLTs WNL since 11/14/23. Prefers to avoid heparin products.      Oral thrush   -continue nystatin- dislikes taste- consider alternative agent in upcoming days if ongoing issue  -completed course of fluconazole 11/16/23     History of JARVIS,  asthma in the chart no PFTs   Acute hypoxemic-hypercapnic respiratory failure (resolved)   Intubated 10/12-10/26) complicated by HCAP, pleural effusion, stress cardiomyopathy  Resume home inhaler (auto sub breo)  -prn albuterol neb/ inhaler (home regimen)  -cpap at bedtime   -monitor respiratory status     Hypervolemia   With recent pleural effusion s/p thoracentesis 10/23 and  chest tube removal 11/5  -- Improved with diuretics-holding chlorthalidone dueto BP. Resume when able.spironolactone   -discontinue bumex  -monitor volume status     Anxiety  -pta effexor  -prn atarax     HLd  -resume prior to admission crestor     Adjustment to disability:  Clinical psychology to eval and treat as indicated  FEN: bariatric  Bowel: monitor  Bladder: monitor  DVT Prophylaxis: mechanical  GI Prophylaxis: ppi  Code: full confirmed on admission.   Disposition: goal for home.   ELOS:  11/29/23  Rehab prognosis:  fair  Follow up Appointments on Discharge: pcp, bariatric surgery, nephrology (for HTN &? CKD),          Constantino Johnson MD   Physical Medicine & Rehabilitation               "

## 2023-11-27 NOTE — PROGRESS NOTES
"Per pt chart, on track to discharge home Wed 11/29/23 with OP therapy and family support. Anticipate family ride home at discharge. SW met with pt at bedside and completed IRF questions. Pt denied any questions, concerns, or needs at this time. SW available if needs arise.     IRF-KERVIN Pain Assessment  Pain Effect on Sleep  \"Over the past 5 days, how much of the time has pain made it hard for you to sleep at night?\"    0. Does not apply - I have not had any pain or hurting in the past 5 days     Radha French [facility]   [facility]   Direct Phone: [phone]  I   Pager: [phone]  I  Fax: [phone]    "

## 2023-11-28 ENCOUNTER — APPOINTMENT (OUTPATIENT)
Dept: PHYSICAL THERAPY | Facility: CLINIC | Age: 53
End: 2023-11-28
Attending: PHYSICAL MEDICINE & REHABILITATION
Payer: COMMERCIAL

## 2023-11-28 ENCOUNTER — APPOINTMENT (OUTPATIENT)
Dept: OCCUPATIONAL THERAPY | Facility: CLINIC | Age: 53
End: 2023-11-28
Attending: PHYSICAL MEDICINE & REHABILITATION
Payer: COMMERCIAL

## 2023-11-28 LAB — MAGNESIUM SERPL-MCNC: 1.7 MG/DL (ref 1.7–2.3)

## 2023-11-28 PROCEDURE — 97110 THERAPEUTIC EXERCISES: CPT | Mod: GO

## 2023-11-28 PROCEDURE — 83735 ASSAY OF MAGNESIUM: CPT | Performed by: PHYSICAL MEDICINE & REHABILITATION

## 2023-11-28 PROCEDURE — 97535 SELF CARE MNGMENT TRAINING: CPT | Mod: GO

## 2023-11-28 PROCEDURE — 128N000003 HC R&B REHAB

## 2023-11-28 PROCEDURE — 97750 PHYSICAL PERFORMANCE TEST: CPT | Mod: GP | Performed by: PHYSICAL THERAPIST

## 2023-11-28 PROCEDURE — 99232 SBSQ HOSP IP/OBS MODERATE 35: CPT | Performed by: PHYSICIAN ASSISTANT

## 2023-11-28 PROCEDURE — 250N000013 HC RX MED GY IP 250 OP 250 PS 637: Performed by: PHYSICIAN ASSISTANT

## 2023-11-28 PROCEDURE — 36415 COLL VENOUS BLD VENIPUNCTURE: CPT | Performed by: PHYSICAL MEDICINE & REHABILITATION

## 2023-11-28 PROCEDURE — 97110 THERAPEUTIC EXERCISES: CPT | Mod: GP | Performed by: PHYSICAL THERAPIST

## 2023-11-28 PROCEDURE — 99231 SBSQ HOSP IP/OBS SF/LOW 25: CPT | Mod: GC | Performed by: PHYSICAL MEDICINE & REHABILITATION

## 2023-11-28 PROCEDURE — 97530 THERAPEUTIC ACTIVITIES: CPT | Mod: GP | Performed by: PHYSICAL THERAPIST

## 2023-11-28 RX ORDER — GABAPENTIN 300 MG/1
300 CAPSULE ORAL AT BEDTIME
Qty: 30 CAPSULE | Refills: 1 | Status: SHIPPED | OUTPATIENT
Start: 2023-11-28

## 2023-11-28 RX ORDER — CLONIDINE 0.1 MG/24H
1 PATCH, EXTENDED RELEASE TRANSDERMAL WEEKLY
Qty: 4 PATCH | Refills: 1 | Status: SHIPPED | OUTPATIENT
Start: 2023-11-30

## 2023-11-28 RX ORDER — AMLODIPINE BESYLATE 10 MG/1
10 TABLET ORAL DAILY
Qty: 30 TABLET | Refills: 1 | Status: SHIPPED | OUTPATIENT
Start: 2023-11-29

## 2023-11-28 RX ORDER — VENLAFAXINE 75 MG/1
75 TABLET ORAL 3 TIMES DAILY
Qty: 30 TABLET | Refills: 1 | Status: SHIPPED | OUTPATIENT
Start: 2023-11-28 | End: 2024-05-14

## 2023-11-28 RX ORDER — LIDOCAINE 4 G/G
1 PATCH TOPICAL EVERY 24 HOURS
Qty: 15 PATCH | Refills: 1 | Status: SHIPPED | OUTPATIENT
Start: 2023-11-29

## 2023-11-28 RX ORDER — MAGNESIUM OXIDE 400 MG/1
400 TABLET ORAL DAILY
Qty: 30 TABLET | Refills: 1 | Status: SHIPPED | OUTPATIENT
Start: 2023-11-29

## 2023-11-28 RX ORDER — CARVEDILOL 25 MG/1
25 TABLET ORAL 2 TIMES DAILY WITH MEALS
Qty: 60 TABLET | Refills: 1 | Status: SHIPPED | OUTPATIENT
Start: 2023-11-29

## 2023-11-28 RX ADMIN — VENLAFAXINE 75 MG: 75 TABLET ORAL at 14:37

## 2023-11-28 RX ADMIN — URSODIOL 300 MG: 300 CAPSULE ORAL at 21:02

## 2023-11-28 RX ADMIN — CARVEDILOL 25 MG: 25 TABLET, FILM COATED ORAL at 16:43

## 2023-11-28 RX ADMIN — GABAPENTIN 300 MG: 300 CAPSULE ORAL at 21:02

## 2023-11-28 RX ADMIN — Medication 125 MCG: at 12:12

## 2023-11-28 RX ADMIN — CARVEDILOL 25 MG: 25 TABLET, FILM COATED ORAL at 08:24

## 2023-11-28 RX ADMIN — MAGNESIUM OXIDE TAB 400 MG (241.3 MG ELEMENTAL MG) 400 MG: 400 (241.3 MG) TAB at 12:12

## 2023-11-28 RX ADMIN — VENLAFAXINE 75 MG: 75 TABLET ORAL at 08:24

## 2023-11-28 RX ADMIN — Medication 1 TABLET: at 21:02

## 2023-11-28 RX ADMIN — CETIRIZINE HYDROCHLORIDE 10 MG: 5 TABLET ORAL at 08:24

## 2023-11-28 RX ADMIN — VENLAFAXINE 75 MG: 75 TABLET ORAL at 21:02

## 2023-11-28 RX ADMIN — OMEPRAZOLE 20 MG: 20 CAPSULE, DELAYED RELEASE ORAL at 12:12

## 2023-11-28 RX ADMIN — ROSUVASTATIN CALCIUM 10 MG: 5 TABLET, FILM COATED ORAL at 08:24

## 2023-11-28 RX ADMIN — AMLODIPINE BESYLATE 10 MG: 10 TABLET ORAL at 08:24

## 2023-11-28 RX ADMIN — FLUTICASONE FUROATE AND VILANTEROL TRIFENATATE 1 PUFF: 100; 25 POWDER RESPIRATORY (INHALATION) at 08:27

## 2023-11-28 RX ADMIN — Medication 1 TABLET: at 08:29

## 2023-11-28 RX ADMIN — Medication 1000 MCG: at 12:12

## 2023-11-28 RX ADMIN — URSODIOL 300 MG: 300 CAPSULE ORAL at 08:31

## 2023-11-28 ASSESSMENT — ACTIVITIES OF DAILY LIVING (ADL)
ADLS_ACUITY_SCORE: 25

## 2023-11-28 NOTE — PLAN OF CARE
Physical Therapy Discharge Summary    Reason for therapy discharge:    Discharged to home with outpatient therapy.    Progress towards therapy goal(s). See goals on Care Plan in [facility] electronic health record for goal details.  Goals Met. Limited by ongoing fatigue, decreased endurance, experiences of SOB and lightheadedness related to anxiety.    Current Status:   Bed Mobility: IND  Transfer: Mod I with 4WW  Gait: Mod I with 4WW, 300+ ft  Stairs: N/T - does not need to complete for home.  Balance: Fair static, dynamic standing balance without UE support.     Outcome Measures:   TUG (11/22): 18.5 sec with FWW, 23.3 sec w/out device and SBA.   (11/28): 9.84 sec w/o device and SBA  Nickerson (11/25): 46/56  6MWT (11/28): Only able to complete 4:34 with standing rest breaks, distance covered = 385 ft, SBA and 4ww    Therapy recommendation(s):    Continued therapy is recommended.  Rationale/Recommendations: To continue to address deconditioning, strength and endurance tolerance s/p planned gastric sleeve on 10/9/23 complicated by peritonitis with return to OR 10/12/23 with enterotomies repair, washed out drain placement, further complicated by acute hypoxic respiratory failure, HCAP, pleural effusion,  SUSSY, resistant HTN, stress cardiomyopathy, anemia and debility, to improve IND with IADLs, ADLs and reduce risk of future events or falls. Pt has received a 4ww for home.

## 2023-11-28 NOTE — DISCHARGE INSTRUCTIONS
Follow up Appointments on Discharge:     PCP   You are scheduled to see Dr. Mishra on December 8 2023 at 2:00 pm.     Address  [address] [address]  Phone   [phone]     Bariatric surgery  You are scheduled to see Dr. Worthy on December 6 2023 at 10:15 am.     Address  [address]  [place] [address]   Phone   [phone]     Nephrology (for HTN & CKD)  You are scheduled to see :  Referral needed to schedule.     Address  [address] [address]  Phone   [phone]

## 2023-11-28 NOTE — PLAN OF CARE
Goal Outcome Evaluation:  Pt is alert and oriented x 4, she denies pain or discomfort. She has been modified independent in the room using a walker during the day and stand by assist at night. She has been using the call light appropriately as needed. No emesis this shift. Her medication schedule was spaced out to help with nausea and vomiting. Surgical abdominal incision is healing, it is clean, dry, and intact. It is open to air. Pt is planning to discharge to home on 11/29/23. We will continue to encourage independence while assisting with activity of daily livings as needed.

## 2023-11-28 NOTE — PROGRESS NOTES
[facility]    Medicine Progress Note - Hospitalist Service    Date of Admission:  11/21/2023    Assessment & Plan   Mojgan Jimenez is a 53 year old female with past medical history significant for polysubstance use disorder (sober x 5 years), anxiety, JARVIS, HLD, and class III obesity admitted to [facility] for planned gastric sleeve surgery on 10/9/23.  Hospital course complicated by peritonitis with RTOR 10/12/23 for repair of 2 small bowel enterotomies, intra-abdominal wash out and drain placement, acute hypoxic respiratory failure requiring intubation 10/21-10/26, HCAP, pleural effusion, oliguric SUSSY with renal recovery, resistant HTN, stress cardiomyopathy, persistent pleural effusion vs parapneumonic effusion requiring chest tube placement, thrombocytopenia w positive HIT screen, and debility.  She is admitted to ARU for therapies due to impaired strength and activity intolerance.  Internal medicine is consulted for medical co-management, SUSSY, and hyponatremia.      Medication and follow-up recommendations for discharge as outlined below:     SUSSY in setting of recent acute renal failure, improving   Possible new dx CKD stage III   Developed acute oliguric renal failure post op with peak Cr at 6.97 on 10/14.  Renal function improved without HD/CRRT.  Cr gradually downtrending since then and has remained below 2 since 10/29.  Last renal US on 11/9 with no acute findings.  Cr elevated to 1.87 on 11/23, continues to downtrend.  BUN downtrending as well.  FEUrea 15.8%, c/w pre-renal etiology likely 2/2 overdiuresis. UC 11/23 with 10-50K colonies Klebsiella aerogenes and 10-50K colonies E faecium VRE, patient asymptomatic, growth most likely represents colonization.    - Repeat BMP in 1 week with PCP    - Follow-up with Nephrology as needed, can defer to PCP     Dizziness: Improved. Likely 2/2 dehydration, medication side effects.  Reports improvement since holding  diuretics.  No chest pain or heart palpitations.    - Encourage oral fluid intake   - Change positions slowly and carefully     Hyponatremia, resolved: Likely hypovolemic hyponatremia.  Na gradually downtrending since 11/14, improving. Suspect multifactorial in setting of recent gastric surgery and SUSSY, diuretic use, and possible low solute intake/absorption. Chlorthalidone and spironolactone discontinued 11/27.      HFrEF 2/2 stress cardiomyopathy, resolved  Refractory HTN  Last echo on 11/8/23 with recovered EF 55-60%, normal RV and systolic function, and no valvular abnormalities. Refractory HTN during hospitalization, Nephrology consulted, discharged on multiple meds (PTA not on any antihypertensives). Anticipate her BP trend will continue to improve as she loses weight.   - Amlodipine 5 mg BID   - Carvedilol 25 mg BID   - Clonidine patch   - Follow-up with primary care provider for BP recheck in 1 week      Hypomagnesemia: Mag 1.6 on 11/22.  Likely absorption issue following gastric surgery. Per chart review, was started on oral MagOx 400mg daily.   - Continue mag oxide 400 mg daily     Vitamin D deficiency: Continue PTA cholecalciferol.      LINA: Continue PTA Effexor.     Resolved Hospital Issues:  S/p sleeve gastrectomy c/b enterotomy repair x 2 and peritonitis: Complex postop course as above. Treated with Augmentin and Fluconazole through 11/16/23. Briefly required NG tube, now removed. Continue PPI.    Acute hypoxic respiratory failure, pleural effusion vs parapneumonic effusion: Intubated 10/12-10/21, then re-intubated 10/21-10/26.  Chest tube placed 11/1-11/5.   Thrombocytopenia with positive HIT screen: Maintained on fondaparinux at OSH. Stopped at discharge, no need to resume at this point.    Acute toxic and metabolic encephalopathy: Appropriate mentation on exam today. Occurred during hospitalization. No acute issues.    Thrush: Resolved. S/p treatment with Nystatin and Fluconazole.        Recommendations were communicated to the primary team via this note. Medicine will sign off. Please do not hesitate to contact if new questions or concerns arise.     Ynes Navarro PA-C  Hospitalist Service  [facility]  Securely message with [employer] (more info)  Text page via [facility] Paging/Directory   ______________________________________________________________________    Interval History   Doing well overall. Eager to discharge home tomorrow. Relieved that some of her blood pressure medications were reduced.     Physical Exam   Vital Signs: Temp: 98.7  F (37.1  C) Temp src: Oral BP: 128/74 Pulse: 98   Resp: 18 SpO2: 95 % O2 Device: None (Room air)    Weight: 246 lbs 1.6 oz    Constitutional: Awake and alert, in no apparent distress. Sitting up comfortably in bed.   Eyes: Sclera clear, anicteric   Respiratory: Breathing non-labored. CTAB.   Cardiovascular:  RRR, normal S1/S2. No rubs or murmurs. No JVD. No peripheral edema.   GI: Soft, non-tender, non-distended. Normoactive bowel sounds. Incision healing well.   Skin:  Good color. No jaundice. No visible rashes, lesions, or bruising of concern.   Neurologic: Alert and fully oriented. No focal deficits.        Medical Decision Making       35 MINUTES SPENT BY ME on the date of service doing chart review, history, exam, documentation & further activities per the note.      Data   ------------------------- PAST 24 HR DATA REVIEWED -----------------------------------------------        Imaging results reviewed over the past 24 hrs:   No results found for this or any previous visit (from the past 24 hour(s)).

## 2023-11-28 NOTE — PLAN OF CARE
Goal Outcome Evaluation:         Patient is alert and oriented, able to make needs known  Slept most of the night  No complained of pain, headache, chest pain, N&V, no SOB  Continent of B&B, no BM  Safety rounding checked completed, 3 side rails UP, call light/bedside table within reach  Plan of care ongoing.

## 2023-11-28 NOTE — PROGRESS NOTES
"  [facility]   Acute Rehabilitation Unit  Daily progress note       Assessment:   Notes and labs reviewed over past 24 hours. No acute overnight events reported    Patient was seen and examined. She reports she is doing well and has no specific acute concerns or complaints. She is hoping to be taken off some of her BP medications soon    ROS: 10 point ROS was assessed and was negative unless otherwise stated in HPI        Medications  Scheduled meds   amLODIPine  10 mg Oral Daily    carvedilol  25 mg Oral BID w/meals    cetirizine  10 mg Oral Daily    childrens multivitamin with iron  1 tablet Oral BID    cholecalciferol  125 mcg Oral Daily    cloNIDine  1 patch Transdermal Weekly    And    cloNIDine   Transdermal Q8H KAYLA    cyanocobalamin  1,000 mcg Sublingual Daily    fluticasone-vilanterol  1 puff Inhalation Daily    gabapentin  300 mg Oral At Bedtime    lidocaine  1 patch Transdermal Q24H    magnesium oxide  400 mg Oral Daily    omeprazole  20 mg Oral Daily    rosuvastatin  10 mg Oral Daily    ursodiol  300 mg Oral BID    venlafaxine  75 mg Oral TID       PRN meds:  acetaminophen, albuterol, albuterol, artificial saliva, bisacodyl, calcium carbonate, miconazole, ondansetron, senna-docusate      PHYSICAL EXAM  /72 (BP Location: Right arm)   Pulse 90   Temp 98  F (36.7  C) (Oral)   Resp 18   Ht 1.6 m (5' 3\")   Wt 111.6 kg (246 lb 1.6 oz)   LMP 09/09/2023 (Exact Date)   SpO2 97%   BMI 43.59 kg/m    Gen: Alert and in NAD.  HEENT: MMM, NCAT  CV: RRR, no murmurs  Pulm: Clear to auscultation bilaterally  Abd: Obese, non tender, normoactive bowel sounds X4. Surgical incisions well healed  Ext: Calves non tender  Neuro/MSK: Moves all four extremities against gravity  Responds to touch.    LABS  Last Comprehensive Metabolic Panel:  Sodium   Date Value Ref Range Status   11/27/2023 135 135 - 145 mmol/L Final     Comment:     Reference intervals for this test were updated " on 09/26/2023 to more accurately reflect our healthy population. There may be differences in the flagging of prior results with similar values performed with this method. Interpretation of those prior results can be made in the context of the updated reference intervals.    11/17/2020 141 133 - 144 mmol/L Final     Potassium   Date Value Ref Range Status   11/27/2023 3.3 (L) 3.4 - 5.3 mmol/L Final   05/17/2022 4.5 3.4 - 5.3 mmol/L Final   11/17/2020 4.1 3.4 - 5.3 mmol/L Final     Chloride   Date Value Ref Range Status   11/27/2023 95 (L) 98 - 107 mmol/L Final   05/17/2022 102 94 - 109 mmol/L Final   11/17/2020 109 94 - 109 mmol/L Final     Carbon Dioxide   Date Value Ref Range Status   11/17/2020 31 20 - 32 mmol/L Final     Carbon Dioxide (CO2)   Date Value Ref Range Status   11/27/2023 30 (H) 22 - 29 mmol/L Final   05/17/2022 32 20 - 32 mmol/L Final     Anion Gap   Date Value Ref Range Status   11/27/2023 10 7 - 15 mmol/L Final   05/17/2022 2 (L) 3 - 14 mmol/L Final   11/17/2020 1 (L) 3 - 14 mmol/L Final     Glucose   Date Value Ref Range Status   11/27/2023 110 (H) 70 - 99 mg/dL Final   05/17/2022 110 (H) 70 - 99 mg/dL Final   11/17/2020 84 70 - 99 mg/dL Final     Comment:     Fasting specimen     GLUCOSE BY METER POCT   Date Value Ref Range Status   11/18/2023 115 (H) 70 - 99 mg/dL Final     Urea Nitrogen   Date Value Ref Range Status   11/27/2023 29.5 (H) 6.0 - 20.0 mg/dL Final   05/17/2022 16 7 - 30 mg/dL Final   11/17/2020 9 7 - 30 mg/dL Final     Creatinine   Date Value Ref Range Status   11/27/2023 1.19 (H) 0.51 - 0.95 mg/dL Final   11/17/2020 0.79 0.52 - 1.04 mg/dL Final     GFR Estimate   Date Value Ref Range Status   11/27/2023 54 (L) >60 mL/min/1.73m2 Final   11/17/2020 88 >60 mL/min/[1.73_m2] Final     Comment:     Non  GFR Calc  Starting 12/18/2018, serum creatinine based estimated GFR (eGFR) will be   calculated using the Chronic Kidney Disease Epidemiology Collaboration   (CKD-EPI)  equation.       Calcium   Date Value Ref Range Status   11/27/2023 9.9 8.6 - 10.0 mg/dL Final   11/17/2020 8.9 8.5 - 10.1 mg/dL Final        CBC RESULTS:   Recent Labs   Lab Test 11/22/23  0538   WBC 7.7   RBC 3.43*   HGB 9.5*   HCT 30.2*   MCV 88   MCH 27.7   MCHC 31.5   RDW 14.6           ASSESSMENT AND PLAN    Mojgan Jimenez is a 53 year old woman with past medical history of substance abuse (sober x 5 years), anxiety, JARVIS, asthma, HLD, and morbid obesity who was admitted for planned gastric sleeve 10/9/23 course was complicated by peritonitis with return to OR 10/12/23 for with enterotomies repair, wash out and drain placement, course further complicated by acute hypoxic respiratory failure, HCAP, pleural effusion,  SUSSY, resistant HTN, stress cardiomyopathy, anemia and debility.  Admitted to rehab 11/21/23 with impaired strength and activity tolerance.         Admission to acute inpatient rehab debility.    Impairment group code: 16        PT, OT 90 minutes of each on a daily basis, in addition to rehab nursing and close management of physiatrist.       Impairment of ADL's: Noted to have impaired strength and impaired activity tolerance  leading to decreased ability to independently complete ADL's.  Will benefit from ongoing OT with goal for MOD I with basic ADLs.      Impairment of mobility:  Noted to have impaired strength and  impaired activity tolerance leading to decreased mobility.  Will benefit from ongoing PT with goal for ISI with basic mobility .         Medical Conditions     Severe decondition due to prolong hospitalization  - PT/OT- acute rehab     Morbid obesity status post sleeve gastrectomy complicated by enterotomy & peritonitis   Admitted for planned gastric sleeve which she underwent 10/9/23, complicated by enterotomies s/p return to OR 10/12 with wash out, repair and drain placement. Peritonitis, seen by ID   Completed course of antibiotics 11/16/2023.  -bariatric diet  -nutrition  "consult  -D3, B12, ppi, chewable multivit,     Resistant hypertension  Stress Cardiomyopathy with EF recovery  In setting of sepsis, post operative course EF 30-35 10/14 repeat Echo 10/23 with recovered EF, not on medications prior to admission. Underwent workup per nephrology for secondary hypertension. -- Nephrologist & cardiology involved during hospitalization Parameters for hold added.   -- Amlodipine 10 mg daily  -- Carvedilol 25 mg twice daily  -- Chlorthalidone 50 mg daily, discontinued 11/27  -- Spironolactone 25 mg twice daily, discontinue 11/27  -clonidine patch 0.1  mg/week  -on admission discontinued bumex 2 mg daily in setting of reported symptomatic position changes- /60 in supine and 100/50 sitting-    -monitor BP- & orthostatic BP- titrate meds/ simplify regimen as indicated.      Possible CKD stage 3.   Kidney function wnl prior to admission, with SUSSY in setting of sepsis Cr peaked 6.97 10/14/23 followed by nephrology, Cr Improving. 0.66 11/27/2023   -trend     Acute Anemia  Acute illness, blood loss, preop hgb wnl.  Stable 11/27/2023 9.5  Continue to monitor hemoglobin     Thrombocytopenia (resolved)   Suspected HIT- ruled out  Concern for HIT transitioned to fondaparinux, seen by hematology HIT ruled out per hematology \"No indication for anticoagulation therapy at the time of discharge\"  PLTs WNL since 11/14/23. Prefers to avoid heparin products.      Oral thrush   -continue nystatin- dislikes taste- consider alternative agent in upcoming days if ongoing issue  -completed course of fluconazole 11/16/23     History of JARVIS,  asthma in the chart no PFTs   Acute hypoxemic-hypercapnic respiratory failure (resolved)   Intubated 10/12-10/26) complicated by HCAP, pleural effusion, stress cardiomyopathy  Resume home inhaler (auto sub breo)  -prn albuterol neb/ inhaler (home regimen)  -cpap at bedtime   -monitor respiratory status     Hypervolemia , resolved  With recent pleural effusion s/p " thoracentesis 10/23 and  chest tube removal 11/5  -- diuretics discontinued  -monitor volume status     Anxiety  -pta effexor  -prn atarax     HLD  -resume prior to admission crestor     Adjustment to disability:  Clinical psychology to eval and treat as indicated  FEN: bariatric  Bowel: monitor  Bladder: monitor  DVT Prophylaxis: mechanical  GI Prophylaxis: ppi  Code: full confirmed on admission.   Disposition: goal for home.   ELOS:  11/29/23  Rehab prognosis:  fair  Follow up Appointments on Discharge: pcp, bariatric surgery, nephrology (for HTN & CKD),          Seen and discussed with Dr. Johnson, PM&R staff physician         Francesco Mcgill, DO  PGY2  Physical Medicine and Rehabilitation-[facility]  Pager: [phone]

## 2023-11-29 VITALS
SYSTOLIC BLOOD PRESSURE: 126 MMHG | DIASTOLIC BLOOD PRESSURE: 63 MMHG | BODY MASS INDEX: 43.71 KG/M2 | OXYGEN SATURATION: 95 % | WEIGHT: 246.7 LBS | TEMPERATURE: 98.8 F | HEIGHT: 63 IN | RESPIRATION RATE: 18 BRPM | HEART RATE: 93 BPM

## 2023-11-29 LAB — MAGNESIUM SERPL-MCNC: 1.5 MG/DL (ref 1.7–2.3)

## 2023-11-29 PROCEDURE — 99238 HOSP IP/OBS DSCHRG MGMT 30/<: CPT | Mod: GC | Performed by: PHYSICAL MEDICINE & REHABILITATION

## 2023-11-29 PROCEDURE — 36415 COLL VENOUS BLD VENIPUNCTURE: CPT | Performed by: PHYSICAL MEDICINE & REHABILITATION

## 2023-11-29 PROCEDURE — 83735 ASSAY OF MAGNESIUM: CPT | Performed by: PHYSICAL MEDICINE & REHABILITATION

## 2023-11-29 PROCEDURE — 250N000013 HC RX MED GY IP 250 OP 250 PS 637: Performed by: PHYSICIAN ASSISTANT

## 2023-11-29 PROCEDURE — 250N000013 HC RX MED GY IP 250 OP 250 PS 637: Performed by: PHYSICAL MEDICINE & REHABILITATION

## 2023-11-29 RX ORDER — MAGNESIUM OXIDE 400 MG/1
400 TABLET ORAL ONCE
Qty: 1 TABLET | Refills: 0 | Status: DISCONTINUED | OUTPATIENT
Start: 2023-11-29 | End: 2023-11-29

## 2023-11-29 RX ORDER — MAGNESIUM OXIDE 400 MG/1
400 TABLET ORAL ONCE
Status: COMPLETED | OUTPATIENT
Start: 2023-11-29 | End: 2023-11-29

## 2023-11-29 RX ADMIN — CARVEDILOL 25 MG: 25 TABLET, FILM COATED ORAL at 09:06

## 2023-11-29 RX ADMIN — URSODIOL 300 MG: 300 CAPSULE ORAL at 09:07

## 2023-11-29 RX ADMIN — CETIRIZINE HYDROCHLORIDE 10 MG: 5 TABLET ORAL at 09:07

## 2023-11-29 RX ADMIN — Medication 1 TABLET: at 09:08

## 2023-11-29 RX ADMIN — VENLAFAXINE 75 MG: 75 TABLET ORAL at 09:07

## 2023-11-29 RX ADMIN — ROSUVASTATIN CALCIUM 10 MG: 5 TABLET, FILM COATED ORAL at 09:07

## 2023-11-29 RX ADMIN — AMLODIPINE BESYLATE 10 MG: 10 TABLET ORAL at 09:07

## 2023-11-29 RX ADMIN — FLUTICASONE FUROATE AND VILANTEROL TRIFENATATE 1 PUFF: 100; 25 POWDER RESPIRATORY (INHALATION) at 09:12

## 2023-11-29 RX ADMIN — MAGNESIUM OXIDE TAB 400 MG (241.3 MG ELEMENTAL MG) 400 MG: 400 (241.3 MG) TAB at 09:07

## 2023-11-29 ASSESSMENT — ACTIVITIES OF DAILY LIVING (ADL)
ADLS_ACUITY_SCORE: 25

## 2023-11-29 NOTE — DISCHARGE SUMMARY
[facility]   Acute Rehabilitation Unit  Discharge Summary     Date of Admission: 11/21/2023  Date of Discharge: 11/29/2023  Disposition: Home  Primary Care Physician: Franca Mishra  Attending physician: Constantino Johnson MD  Other significant physician provider(s):       Discharge Diagnoses  Severe Deconditioning/Debility.Impairment group code: 16  Morbid obesity status post sleeve gastrectomy complicated by enterotomy & peritonitis     Resistant hypertension, improving  Stress Cardiomyopathy with EF recovery   Acute Kidney Injury in setting of acute renal failure, improving  Possible new diagnosis of CKD III       Medical Course  Mojgan Jimenez is a 53 year old woman with past medical history of substance abuse (sober x 5 years), anxiety, JARVIS, asthma, HLD, and morbid obesity who was admitted for planned gastric sleeve 10/9/23 course was complicated by peritonitis with return to OR 10/12/23 for with enterotomies repair, wash out and drain placement, course further complicated by acute hypoxic respiratory failure, HCAP, pleural effusion,  SUSSY, resistant HTN, stress cardiomyopathy, anemia and debility.  She was admitted to Rehab on 11/21 for debility secondary to severe deconditioning from prolonged and complicated hospital course    Throughout their hospital stay, they worked daily with PT, and OT and were seen daily by PMR physician. Upon admission patient has rising creatine in setting of multiple diuretics. Medicine was consulted for co-management due to complex medical history and hospital course. Creatinine improved upon discontinuation of diuretics and oral hydration, suggesting kidney injury was due to volume depletion. Blood pressure remained stable upon discontinuation of diuretics. She progressed well with therapies     Rehabilitation Course    PT: At admission, cga- min assist for sit to stand transfers , walked 30 feet with walker  On discharge,  independent with bed mobility, Transfer Mod I with 4WW, gait is Mod I with 4WW, ambulating 300+ feet. Outpatient therapy recommended    OT: At admission, CGA for toilet transfers, Max assist for lower body dressing, set up assist for eating  On discharge, Mod I with Grooming, Min A with reacher for lower body dressing. Toileting is Mod I with FWW    SLP:not indicated    DISCHARGE MEDICATIONS  Current Discharge Medication List        START taking these medications    Details   Lidocaine (LIDOCARE) 4 % Patch Place 1 patch onto the skin every 24 hours To prevent lidocaine toxicity, patient should be patch free for 12 hrs daily.Apply patch(s) to neck for neck pain. To prevent lidocaine toxicity, patient should be patch free for 12 hrs daily. Patches may be cut to smaller size prior to removing release liner. Reminder: Remove previous patch before applying new patch.  NEVER APPLY HEAT OVER PATCH which increases absorption and may lead to local anesthetic toxicity. Do not apply over area where liposomal bupivacaine was injected for 96 hours post injection.  Qty: 15 patch, Refills: 1    Associated Diagnoses: Neck pain      magnesium oxide (MAG-OX) 400 MG tablet Take 1 tablet (400 mg) by mouth daily  Qty: 30 tablet, Refills: 1    Associated Diagnoses: Debility           CONTINUE these medications which have CHANGED    Details   amLODIPine (NORVASC) 10 MG tablet Take 1 tablet (10 mg) by mouth daily  Qty: 30 tablet, Refills: 1    Associated Diagnoses: Cardiomyopathy, unspecified type (H); Hypertension, unspecified type      carvedilol (COREG) 25 MG tablet Take 1 tablet (25 mg) by mouth 2 times daily (with meals)  Qty: 60 tablet, Refills: 1    Associated Diagnoses: Cardiomyopathy, unspecified type (H); Hypertension, unspecified type      cholecalciferol (VITAMIN D3) 125 mcg (5000 units) capsule Take 1 capsule (125 mcg) by mouth daily  Qty: 30 capsule, Refills: 1    Associated Diagnoses: Debility      cloNIDine (CATAPRES-TTS1)  0.1 MG/24HR WK patch Place 1 patch onto the skin once a week Reminder: Remove previous patch before applying new patch.  If partial dose is needed, use adhesive bandage to cover/block proportional surface area of the patch. Patches should NOT be cut.  Qty: 4 patch, Refills: 1    Associated Diagnoses: Hypertension, unspecified type      cyanocobalamin (VITAMIN B-12) 1000 MCG sublingual tablet Place 1 tablet (1,000 mcg) under the tongue daily  Qty: 30 tablet, Refills: 1    Associated Diagnoses: Debility      gabapentin (NEURONTIN) 300 MG capsule Take 1 capsule (300 mg) by mouth at bedtime  Qty: 30 capsule, Refills: 1    Associated Diagnoses: LINA (generalized anxiety disorder)      omeprazole (PRILOSEC) 20 MG DR capsule Take 1 capsule (20 mg) by mouth daily for 34 days  Qty: 34 capsule, Refills: 0    Associated Diagnoses: Morbid obesity (H)      venlafaxine (EFFEXOR) 75 MG tablet Take 1 tablet (75 mg) by mouth 3 times daily  Qty: 30 tablet, Refills: 1    Associated Diagnoses: LINA (generalized anxiety disorder)           CONTINUE these medications which have NOT CHANGED    Details   ADVAIR -21 MCG/ACT inhaler Inhale 2 Puffs by mouth two times daily.*      albuterol (PROAIR HFA) 108 (90 Base) MCG/ACT inhaler Inhale 1-2 puffs into the lungs every 4 hours as needed for shortness of breath / dyspnea or wheezing  Qty: 8.5 g, Refills: 11    Comments: Pharmacy may dispense brand covered by insurance (Proair, or proventil or ventolin or generic albuterol inhaler)  Associated Diagnoses: Moderate persistent asthma without complication      albuterol (PROVENTIL) (2.5 MG/3ML) 0.083% neb solution Take 1 vial (2.5 mg) by nebulization every 6 hours as needed for shortness of breath or wheezing  Qty: 3 mL, Refills: 4    Associated Diagnoses: Moderate persistent asthma without complication      cetirizine (ZYRTEC) 10 MG tablet Take 10 mg by mouth daily      Pediatric Multivitamins-Iron (MULTIVITAMINS PLUS IRON CHILD) 18 MG CHEW  Take 1 chew tab by mouth 2 times daily Ok to substitute with any chewable that contains 18 mg of iron, Vitamin A, Thiamine and Zinc.  Qty: 180 tablet, Refills: 3    Comments: Ok to substitute with any chewable that contains 18 mg of iron, Vitamin A, Thiamine and Zinc.  Associated Diagnoses: Morbid obesity (H); S/P bariatric surgery; Intestinal malabsorption, unspecified type      rosuvastatin (CRESTOR) 10 MG tablet Take 1 tablet (10 mg) by mouth daily  Qty: 90 tablet, Refills: 9    Associated Diagnoses: Hyperlipidemia LDL goal <160      ursodiol (ACTIGALL) 300 MG capsule Take 1 capsule (300 mg) by mouth 2 times daily for 180 days Start 2 weeks after surgery, do not open-take with warm liquid. Take twice a day for 6 months.  Qty: 180 capsule, Refills: 1    Comments: Start 2 weeks after surgery, do not open-take with warm liquid. Take twice a day for 6 months.  Associated Diagnoses: Morbid obesity (H); S/P bariatric surgery; Rapid weight loss      acetaminophen (TYLENOL) 500 MG tablet Take 500-1,000 mg by mouth every 6 hours as needed for mild pain           STOP taking these medications       bumetanide (BUMEX) 2 MG tablet Comments:   Reason for Stopping:         chlorthalidone (HYGROTON) 50 MG tablet Comments:   Reason for Stopping:         miconazole (MICATIN) 2 % external powder Comments:   Reason for Stopping:         norgestrel-ethinyl estradiol (LO/OVRAL) 0.3-30 MG-MCG tablet Comments:   Reason for Stopping:         spironolactone (ALDACTONE) 25 MG tablet Comments:   Reason for Stopping:                 DISCHARGE INSTRUCTIONS AND FOLLOW UP  Discharge Procedure Orders   Physical Therapy Referral   Standing Status: Future   Referral Priority: Routine: Next available opening Referral Type: Rehab Therapy Physical Therapy   Number of Visits Requested: 1     Occupational Therapy Referral   Standing Status: Future   Referral Priority: Routine: Next available opening Referral Type: Occupational Therapy   Number of  Visits Requested: 1     Physical Therapy Referral   Standing Status: Future   Referral Priority: Routine: Next available opening Referral Type: Rehab Therapy Physical Therapy   Number of Visits Requested: 1     Occupational Therapy Referral   Standing Status: Future   Referral Priority: Routine: Next available opening Referral Type: Occupational Therapy   Number of Visits Requested: 1     Activity   Order Comments: Your activity upon discharge: Can be modified independent with walker in room     Order Specific Question Answer Comments   Is discharge order? Yes      Follow Up and recommended labs and tests   Order Comments: Follow up with primary care provider, Franca Mishra, within 7 days to evaluate medication change.  The following labs/tests are recommended: BMP.     Reason for your hospital stay   Order Comments: Debility secondary to post op complications following Gastric Sleeve surgery     Diet   Order Comments: Follow this diet upon discharge: Orders Placed This Encounter      Snacks/Supplements Adult: Other; PRN - pt may prefer Ensure Max; Between Meals      Bariatric Diet Regular     Order Specific Question Answer Comments   Is discharge order? Yes           Physical Exam    Most recent Vital Signs:   Vitals:    11/28/23 0703 11/28/23 1557 11/29/23 0529 11/29/23 0700   BP:  110/72  126/63   BP Location:  Right arm  Right arm   Patient Position:       Cuff Size:       Pulse:  90  93   Resp:  18  18   Temp:  98  F (36.7  C)  98.8  F (37.1  C)   TempSrc:  Oral  Oral   SpO2:  97%  95%   Weight: 111.6 kg (246 lb 1.6 oz)  111.9 kg (246 lb 11.2 oz)    Height:           General: in no acute distress, conversational and alert, resting comfortably in bed  HEENT: atraumatic, nares clear, conjunctiva white  Pulmonary: on room air, lungs clear to auscultation bilaterally  Cardiovascular: RRR, no murmur auscultated, well-perfused peripherally  Abdominal: soft, non-tender to palpation, non-distended  Extremities:  warm peripherally, no edema in BLEs  Skin: warm, dry, without erythema, ecchymosis, or rash noted  MSK/neuro:   Mental Status:  alert and oriented x3   Cranial Nerves:   2nd CN: Pupils equal, round, reactive to light and accomodation. Visual fields intact to confrontation.   3rd,4th,6th CN:  EOMI, appropriate pupillary responses  5th CN: facial sensation intact   7th CN: face symmetrical   8th CN: functional hearing bilaterally  9th, 10th CN: palate elevates symmetrically   11th CN: sternocleidomastoids and trapezii strong   12th CN: tongue midline and without fasciculations     Sensory: Normal to light touch in bilateral upper and lower extremities   Strength: Moves all four     Reflexes: Present and symmetrical 2/4   Santos's test: negative bilaterally   Babinski reflex: downgoing bilaterally   Tone per modified Cade Scale (0/4 if not noted):    Abnormal movements: None    Coordination: No dysmetria on finger to nose. Heel-shin unremarkable.    Speech: no dysarthria, speech fluent      Assessment &Plan    Mojgan Jimenez is a 53 year old woman with past medical history of substance abuse (sober x 5 years), anxiety, JARVIS, asthma, HLD, and morbid obesity who was admitted for planned gastric sleeve 10/9/23 course was complicated by peritonitis with return to OR 10/12/23 for with enterotomies repair, wash out and drain placement, course further complicated by acute hypoxic respiratory failure, HCAP, pleural effusion,  SUSSY, resistant HTN, stress cardiomyopathy, anemia and debility.  Admitted to rehab 11/21/23 with impaired strength and activity tolerance.     Severe decondition due to prolong hospitalization  - Has seen great improvement with PT and OT  -Recommend continuing outpatient PT and OT     Morbid obesity status post sleeve gastrectomy complicated by enterotomy & peritonitis   Admitted for planned gastric sleeve which she underwent 10/9/23, complicated by enterotomies s/p return to OR 10/12 with wash out,  "repair and drain placement. Peritonitis, seen by ID   Completed course of antibiotics 11/16/2023.  -bariatric diet  -nutrition consulted during hospital stay  - Continue with D3, B12, ppi, chewable multivit,  -Follow up with PCP and bariatric surgeon     Resistant hypertension, improving  Stress Cardiomyopathy with EF recovery  In setting of sepsis, post operative course EF 30-35 10/14 repeat Echo 10/23 with recovered EF, not on medications prior to admission. Underwent workup per nephrology for secondary hypertension. -- Nephrologist & cardiology involved during hospitalization Parameters for hold added.   -- Continue Amlodipine 10 mg daily  -- Continue Carvedilol 25 mg twice daily  --  Continue clonidine patch 0.1  mg/week  -- Admitted on Chlorthalidone 50 mg daily, discontinued 11/27  -- Admitted on Spironolactone 25 mg twice daily, discontinue 11/27  -Discontinue bumex on admission in setting of reported symptomatic position changes- /60 in supine and 100/50 sitting-    -monitored BP- & orthostatic BP during hospital stay.      Possible CKD stage 3.   Kidney function wnl prior to admission, with SUSSY in setting of sepsis Cr peaked 6.97 10/14/23 followed by nephrology, Cr Improving. 1.19 on 11/27  -Continue to encourage adequate oral hydration  -Follow up PCP in 1 week, to get BMP to trend     Acute Anemia, improving  Acute illness, blood loss, preop hgb wnl.  Trended during hospital stay. Stable 11/27/2023 9.5  -Follow up with PCP as outpatient     Thrombocytopenia (resolved)   Suspected HIT- ruled out  Concern for HIT transitioned to fondaparinux, seen by hematology HIT ruled out per hematology \"No indication for anticoagulation therapy at the time of discharge\"  PLTs WNL since 11/14/23. Prefers to avoid heparin products.      Oral thrush, resolved   -continue nystatin- dislikes taste- consider alternative agent in upcoming days if ongoing issue  -completed course of fluconazole 11/16/23     History of " JARVIS,  asthma in the chart no PFTs   Acute hypoxemic-hypercapnic respiratory failure (resolved)   Intubated 10/12-10/26) complicated by HCAP, pleural effusion, stress cardiomyopathy  Resume home inhaler (auto sub breo)  -prn albuterol neb/ inhaler (home regimen)  -cpap at bedtime   -monitor respiratory status     Hypervolemia , resolved  With recent pleural effusion s/p thoracentesis 10/23 and  chest tube removal 11/5  -- diuretics discontinued  -monitor volume status     Anxiety  -Continue pta effexor  -Continue prn atarax     HLD  -resume prior to admission crestor    Hypomagnesemia  Mag 1.6 on 11/22.  Likely absorption issue following gastric surgery. Per chart review, was started on oral MagOx 400mg daily.   - Continue mag oxide 400 mg daily    Hyponatremia, resolved:   Likely hypovolemic hyponatremia.  Na gradually downtrending since 11/14, improving. Suspect multifactorial in setting of recent gastric surgery and SUSSY, diuretic use, and possible low solute intake/absorption. Chlorthalidone and spironolactone discontinued 11/27     Follow Up Appointments  - PCP in 1 week, will need follow up BMP at that time  -Nephrology follow up for possible CKD and new hypertension  -Bariatric Surgery        I saw the patient and agree with plan of care.    Discharge summary was forwarded to Franca Mishra (PCP) at the time of discharge, so as to bridge from hospital to outpatient care.     It was our pleasure to care for Mojgan Jimenez during this hospitalization. Please do not hesitate to contact me should there be questions regarding the hospital course or discharge plan.        TT  spent by me (Constantino Johnson MD) 35 minutes.    Constantino Johnson MD

## 2023-11-29 NOTE — PLAN OF CARE
Goal Outcome Evaluation:  Pt was discharged to home with discharge instruction. Medication was ordered via out side pharmacy. She is reminded to pick those on her way to home. She left the unit in a wheel chair wheeled by nursing assistant. She was stable up on leaving the unit.

## 2023-11-29 NOTE — PLAN OF CARE
Goal Outcome Evaluation:       Alert and orientedx4, Mod I in room. Makes needs known. Denied pain and nausea. Continent of bowel and bladder, LBM 11/28. Clonodine patch on L upper chest. Slept well at night, no acute changes. Plan to discharge today.

## 2023-12-14 ENCOUNTER — TELEPHONE (OUTPATIENT)
Dept: SURGERY | Facility: CLINIC | Age: 53
End: 2023-12-14
Payer: COMMERCIAL

## 2023-12-14 NOTE — TELEPHONE ENCOUNTER
Patient is unable to come in person prior to 10:30 am tomorrow and she was under the impression that it would be between then and noon.  Will discuss with JM on his availability and get back to her.  She is also going to get back to us with the name of the rehab facility in [place] where she would prefer to go that is not a [facility] location but convenient location for her.  Orders can be faxed with that information.      Shameka Guardado RN    Patient was put on at 11 am per Dr. Worthy and team suggestion. Shameka Guardado RN

## 2023-12-14 NOTE — TELEPHONE ENCOUNTER
Patient called stating that Dr Worthy told her that someone would call with a time for an appt tomorrow because he wants to see her. She has not gotten a call. The 8:45 am opening was offered but she can't make it at that time. She needs a call back today with a time or she won't come in tomorrow. She also states that she's been trying to get an appt for physical therapy at a location closer to her in [place] but the referral has not been sent to them. She's not sure what the name of the facility is but says it's in [place] off [facility]. She said that they have been trying to contact Dr Worthy for the referral but he hasn't responded. She would like to get started on therapy soon. Please call her back at [phone]

## 2023-12-16 LAB
ACID FAST STAIN (ARUP): NORMAL

## 2023-12-18 NOTE — TELEPHONE ENCOUNTER
Talked to pt today. She was asking that PT orders be faxed to [facility] in [place] so she can her her PT/OT closer to home. The referral in the system is from the attending physician at the [facility] that [facility] was at following the hospital so we talked about getting new orders placed by her PCP so that she can follow her progress/updates. Pt agrees with this plan and will let us know if there's anything we can do to help.     Carmencita Suarez RN, CBN  [facility]  P [phone]  F [phone]

## 2024-01-05 ENCOUNTER — APPOINTMENT (OUTPATIENT)
Dept: GENERAL RADIOLOGY | Facility: CLINIC | Age: 54
End: 2024-01-05
Attending: NURSE PRACTITIONER
Payer: COMMERCIAL

## 2024-01-05 ENCOUNTER — APPOINTMENT (OUTPATIENT)
Dept: CT IMAGING | Facility: CLINIC | Age: 54
End: 2024-01-05
Attending: NURSE PRACTITIONER
Payer: COMMERCIAL

## 2024-01-05 ENCOUNTER — HOSPITAL ENCOUNTER (OUTPATIENT)
Facility: CLINIC | Age: 54
Setting detail: OBSERVATION
Discharge: HOME | End: 2024-01-08
Attending: FAMILY MEDICINE | Admitting: STUDENT IN AN ORGANIZED HEALTH CARE EDUCATION/TRAINING PROGRAM
Payer: COMMERCIAL

## 2024-01-05 DIAGNOSIS — J18.9 PNEUMONIA: ICD-10-CM

## 2024-01-05 DIAGNOSIS — J18.9 COMMUNITY ACQUIRED PNEUMONIA OF RIGHT UPPER LOBE OF LUNG: Primary | ICD-10-CM

## 2024-01-05 DIAGNOSIS — R06.09 DOE (DYSPNEA ON EXERTION): ICD-10-CM

## 2024-01-05 LAB
ALBUMIN SERPL BCG-MCNC: 3.5 G/DL (ref 3.5–5.2)
ALBUMIN UR-MCNC: 100 MG/DL
ALP SERPL-CCNC: 90 U/L (ref 40–150)
ALT SERPL W P-5'-P-CCNC: 21 U/L (ref 0–50)
ANION GAP SERPL CALCULATED.3IONS-SCNC: 11 MMOL/L (ref 7–15)
APPEARANCE UR: ABNORMAL
AST SERPL W P-5'-P-CCNC: 24 U/L (ref 0–45)
BACTERIA #/AREA URNS HPF: ABNORMAL /HPF
BASOPHILS # BLD AUTO: 0 10E3/UL (ref 0–0.2)
BASOPHILS NFR BLD AUTO: 0 %
BILIRUB SERPL-MCNC: 0.3 MG/DL
BILIRUB UR QL STRIP: NEGATIVE
BUN SERPL-MCNC: 7 MG/DL (ref 6–20)
CALCIUM SERPL-MCNC: 9.1 MG/DL (ref 8.6–10)
CHLORIDE SERPL-SCNC: 104 MMOL/L (ref 98–107)
COLOR UR AUTO: YELLOW
CREAT SERPL-MCNC: 0.82 MG/DL (ref 0.51–0.95)
D DIMER PPP FEU-MCNC: 0.53 UG/ML FEU (ref 0–0.5)
DEPRECATED HCO3 PLAS-SCNC: 27 MMOL/L (ref 22–29)
EGFRCR SERPLBLD CKD-EPI 2021: 85 ML/MIN/1.73M2
EOSINOPHIL # BLD AUTO: 0.4 10E3/UL (ref 0–0.7)
EOSINOPHIL NFR BLD AUTO: 5 %
ERYTHROCYTE [DISTWIDTH] IN BLOOD BY AUTOMATED COUNT: 12.6 % (ref 10–15)
FLUAV RNA SPEC QL NAA+PROBE: NEGATIVE
FLUBV RNA RESP QL NAA+PROBE: NEGATIVE
GLUCOSE SERPL-MCNC: 101 MG/DL (ref 70–99)
GLUCOSE UR STRIP-MCNC: NEGATIVE MG/DL
HCG SERPL QL: NEGATIVE
HCT VFR BLD AUTO: 35.9 % (ref 35–47)
HGB BLD-MCNC: 11.3 G/DL (ref 11.7–15.7)
HGB UR QL STRIP: NEGATIVE
HOLD SPECIMEN: NORMAL
HOLD SPECIMEN: NORMAL
IMM GRANULOCYTES # BLD: 0 10E3/UL
IMM GRANULOCYTES NFR BLD: 0 %
KETONES UR STRIP-MCNC: ABNORMAL MG/DL
LACTATE SERPL-SCNC: 1 MMOL/L (ref 0.7–2)
LEUKOCYTE ESTERASE UR QL STRIP: ABNORMAL
LYMPHOCYTES # BLD AUTO: 2.1 10E3/UL (ref 0.8–5.3)
LYMPHOCYTES NFR BLD AUTO: 26 %
MAGNESIUM SERPL-MCNC: 1.9 MG/DL (ref 1.7–2.3)
MCH RBC QN AUTO: 28.4 PG (ref 26.5–33)
MCHC RBC AUTO-ENTMCNC: 31.5 G/DL (ref 31.5–36.5)
MCV RBC AUTO: 90 FL (ref 78–100)
MONOCYTES # BLD AUTO: 0.7 10E3/UL (ref 0–1.3)
MONOCYTES NFR BLD AUTO: 8 %
MUCOUS THREADS #/AREA URNS LPF: PRESENT /LPF
NEUTROPHILS # BLD AUTO: 4.8 10E3/UL (ref 1.6–8.3)
NEUTROPHILS NFR BLD AUTO: 61 %
NITRATE UR QL: NEGATIVE
NRBC # BLD AUTO: 0 10E3/UL
NRBC BLD AUTO-RTO: 0 /100
NT-PROBNP SERPL-MCNC: 365 PG/ML (ref 0–900)
PH UR STRIP: 6 [PH] (ref 5–7)
PLATELET # BLD AUTO: 262 10E3/UL (ref 150–450)
POTASSIUM SERPL-SCNC: 3.9 MMOL/L (ref 3.4–5.3)
PROCALCITONIN SERPL IA-MCNC: 0.03 NG/ML
PROT SERPL-MCNC: 7 G/DL (ref 6.4–8.3)
RBC # BLD AUTO: 3.98 10E6/UL (ref 3.8–5.2)
RBC URINE: 3 /HPF
RSV RNA SPEC NAA+PROBE: NEGATIVE
SARS-COV-2 RNA RESP QL NAA+PROBE: NEGATIVE
SODIUM SERPL-SCNC: 142 MMOL/L (ref 135–145)
SP GR UR STRIP: 1.02 (ref 1–1.03)
SQUAMOUS EPITHELIAL: 18 /HPF
TRANSITIONAL EPI: 1 /HPF
TROPONIN T SERPL HS-MCNC: 13 NG/L
TROPONIN T SERPL HS-MCNC: 16 NG/L
UROBILINOGEN UR STRIP-MCNC: 2 MG/DL
WBC # BLD AUTO: 8 10E3/UL (ref 4–11)
WBC URINE: 18 /HPF

## 2024-01-05 PROCEDURE — 83735 ASSAY OF MAGNESIUM: CPT | Performed by: INTERNAL MEDICINE

## 2024-01-05 PROCEDURE — 83880 ASSAY OF NATRIURETIC PEPTIDE: CPT | Performed by: INTERNAL MEDICINE

## 2024-01-05 PROCEDURE — 71275 CT ANGIOGRAPHY CHEST: CPT

## 2024-01-05 PROCEDURE — 85379 FIBRIN DEGRADATION QUANT: CPT | Performed by: NURSE PRACTITIONER

## 2024-01-05 PROCEDURE — 258N000003 HC RX IP 258 OP 636: Performed by: NURSE PRACTITIONER

## 2024-01-05 PROCEDURE — 250N000013 HC RX MED GY IP 250 OP 250 PS 637: Performed by: INTERNAL MEDICINE

## 2024-01-05 PROCEDURE — 96367 TX/PROPH/DG ADDL SEQ IV INF: CPT

## 2024-01-05 PROCEDURE — 71046 X-RAY EXAM CHEST 2 VIEWS: CPT

## 2024-01-05 PROCEDURE — 99285 EMERGENCY DEPT VISIT HI MDM: CPT | Mod: 25 | Performed by: FAMILY MEDICINE

## 2024-01-05 PROCEDURE — 99222 1ST HOSP IP/OBS MODERATE 55: CPT | Performed by: INTERNAL MEDICINE

## 2024-01-05 PROCEDURE — 84484 ASSAY OF TROPONIN QUANT: CPT | Performed by: NURSE PRACTITIONER

## 2024-01-05 PROCEDURE — G0378 HOSPITAL OBSERVATION PER HR: HCPCS

## 2024-01-05 PROCEDURE — 250N000009 HC RX 250: Performed by: FAMILY MEDICINE

## 2024-01-05 PROCEDURE — 84703 CHORIONIC GONADOTROPIN ASSAY: CPT | Performed by: FAMILY MEDICINE

## 2024-01-05 PROCEDURE — 250N000011 HC RX IP 250 OP 636: Performed by: FAMILY MEDICINE

## 2024-01-05 PROCEDURE — 36415 COLL VENOUS BLD VENIPUNCTURE: CPT | Performed by: NURSE PRACTITIONER

## 2024-01-05 PROCEDURE — 96365 THER/PROPH/DIAG IV INF INIT: CPT | Mod: 59 | Performed by: FAMILY MEDICINE

## 2024-01-05 PROCEDURE — 250N000009 HC RX 250: Performed by: NURSE PRACTITIONER

## 2024-01-05 PROCEDURE — 87637 SARSCOV2&INF A&B&RSV AMP PRB: CPT | Performed by: NURSE PRACTITIONER

## 2024-01-05 PROCEDURE — 83605 ASSAY OF LACTIC ACID: CPT | Performed by: NURSE PRACTITIONER

## 2024-01-05 PROCEDURE — 87086 URINE CULTURE/COLONY COUNT: CPT | Performed by: NURSE PRACTITIONER

## 2024-01-05 PROCEDURE — 81001 URINALYSIS AUTO W/SCOPE: CPT | Performed by: NURSE PRACTITIONER

## 2024-01-05 PROCEDURE — 84145 PROCALCITONIN (PCT): CPT | Performed by: INTERNAL MEDICINE

## 2024-01-05 PROCEDURE — 80053 COMPREHEN METABOLIC PANEL: CPT | Performed by: NURSE PRACTITIONER

## 2024-01-05 PROCEDURE — 94640 AIRWAY INHALATION TREATMENT: CPT | Performed by: FAMILY MEDICINE

## 2024-01-05 PROCEDURE — 85025 COMPLETE CBC W/AUTO DIFF WBC: CPT | Performed by: NURSE PRACTITIONER

## 2024-01-05 PROCEDURE — 250N000011 HC RX IP 250 OP 636: Performed by: NURSE PRACTITIONER

## 2024-01-05 PROCEDURE — 93005 ELECTROCARDIOGRAM TRACING: CPT | Performed by: FAMILY MEDICINE

## 2024-01-05 PROCEDURE — 93010 ELECTROCARDIOGRAM REPORT: CPT | Performed by: FAMILY MEDICINE

## 2024-01-05 RX ORDER — IPRATROPIUM BROMIDE AND ALBUTEROL SULFATE 2.5; .5 MG/3ML; MG/3ML
3 SOLUTION RESPIRATORY (INHALATION)
Status: DISCONTINUED | OUTPATIENT
Start: 2024-01-05 | End: 2024-01-08 | Stop reason: HOSPADM

## 2024-01-05 RX ORDER — AZITHROMYCIN 500 MG/5ML
500 INJECTION, POWDER, LYOPHILIZED, FOR SOLUTION INTRAVENOUS ONCE
Status: COMPLETED | OUTPATIENT
Start: 2024-01-05 | End: 2024-01-05

## 2024-01-05 RX ORDER — CARVEDILOL 3.12 MG/1
12.5 TABLET ORAL 2 TIMES DAILY WITH MEALS
Status: DISCONTINUED | OUTPATIENT
Start: 2024-01-05 | End: 2024-01-08 | Stop reason: HOSPADM

## 2024-01-05 RX ORDER — AZITHROMYCIN 500 MG/5ML
500 INJECTION, POWDER, LYOPHILIZED, FOR SOLUTION INTRAVENOUS EVERY 24 HOURS
Qty: 500 ML | Refills: 0 | Status: DISCONTINUED | OUTPATIENT
Start: 2024-01-06 | End: 2024-01-07

## 2024-01-05 RX ORDER — FLUTICASONE FUROATE AND VILANTEROL 100; 25 UG/1; UG/1
1 POWDER RESPIRATORY (INHALATION) DAILY
Status: DISCONTINUED | OUTPATIENT
Start: 2024-01-06 | End: 2024-01-08 | Stop reason: HOSPADM

## 2024-01-05 RX ORDER — AMOXICILLIN 250 MG
2 CAPSULE ORAL 2 TIMES DAILY PRN
Status: DISCONTINUED | OUTPATIENT
Start: 2024-01-05 | End: 2024-01-08 | Stop reason: HOSPADM

## 2024-01-05 RX ORDER — IPRATROPIUM BROMIDE AND ALBUTEROL SULFATE 2.5; .5 MG/3ML; MG/3ML
3 SOLUTION RESPIRATORY (INHALATION) ONCE
Status: COMPLETED | OUTPATIENT
Start: 2024-01-05 | End: 2024-01-05

## 2024-01-05 RX ORDER — CEFTRIAXONE 2 G/1
2 INJECTION, POWDER, FOR SOLUTION INTRAMUSCULAR; INTRAVENOUS EVERY 24 HOURS
Status: DISCONTINUED | OUTPATIENT
Start: 2024-01-06 | End: 2024-01-08

## 2024-01-05 RX ORDER — IOPAMIDOL 755 MG/ML
100 INJECTION, SOLUTION INTRAVASCULAR ONCE
Status: COMPLETED | OUTPATIENT
Start: 2024-01-05 | End: 2024-01-05

## 2024-01-05 RX ORDER — CEFTRIAXONE 2 G/1
2 INJECTION, POWDER, FOR SOLUTION INTRAMUSCULAR; INTRAVENOUS ONCE
Status: COMPLETED | OUTPATIENT
Start: 2024-01-05 | End: 2024-01-05

## 2024-01-05 RX ORDER — AMOXICILLIN 250 MG
1 CAPSULE ORAL 2 TIMES DAILY PRN
Status: DISCONTINUED | OUTPATIENT
Start: 2024-01-05 | End: 2024-01-08 | Stop reason: HOSPADM

## 2024-01-05 RX ORDER — VENLAFAXINE 75 MG/1
75 TABLET ORAL 3 TIMES DAILY
Status: DISCONTINUED | OUTPATIENT
Start: 2024-01-05 | End: 2024-01-08 | Stop reason: HOSPADM

## 2024-01-05 RX ORDER — PROPRANOLOL HYDROCHLORIDE 10 MG/1
10 TABLET ORAL 3 TIMES DAILY PRN
COMMUNITY

## 2024-01-05 RX ORDER — ROSUVASTATIN CALCIUM 10 MG/1
10 TABLET, COATED ORAL DAILY
Status: DISCONTINUED | OUTPATIENT
Start: 2024-01-06 | End: 2024-01-08 | Stop reason: HOSPADM

## 2024-01-05 RX ORDER — ACETAMINOPHEN 650 MG/1
650 SUPPOSITORY RECTAL EVERY 4 HOURS PRN
Status: DISCONTINUED | OUTPATIENT
Start: 2024-01-05 | End: 2024-01-08 | Stop reason: HOSPADM

## 2024-01-05 RX ORDER — ALBUTEROL SULFATE 90 UG/1
2 AEROSOL, METERED RESPIRATORY (INHALATION) EVERY 6 HOURS PRN
Status: DISCONTINUED | OUTPATIENT
Start: 2024-01-05 | End: 2024-01-08 | Stop reason: HOSPADM

## 2024-01-05 RX ORDER — GABAPENTIN 300 MG/1
300 CAPSULE ORAL AT BEDTIME
Status: DISCONTINUED | OUTPATIENT
Start: 2024-01-05 | End: 2024-01-08 | Stop reason: HOSPADM

## 2024-01-05 RX ORDER — CLONIDINE 0.1 MG/24H
1 PATCH, EXTENDED RELEASE TRANSDERMAL
Status: DISCONTINUED | OUTPATIENT
Start: 2024-01-05 | End: 2024-01-08 | Stop reason: HOSPADM

## 2024-01-05 RX ORDER — URSODIOL 300 MG/1
300 CAPSULE ORAL 2 TIMES DAILY
Status: DISCONTINUED | OUTPATIENT
Start: 2024-01-05 | End: 2024-01-08 | Stop reason: HOSPADM

## 2024-01-05 RX ORDER — MAGNESIUM OXIDE 400 MG/1
400 TABLET ORAL DAILY
Status: DISCONTINUED | OUTPATIENT
Start: 2024-01-06 | End: 2024-01-08 | Stop reason: HOSPADM

## 2024-01-05 RX ORDER — ACETAMINOPHEN 325 MG/1
650 TABLET ORAL EVERY 4 HOURS PRN
Status: DISCONTINUED | OUTPATIENT
Start: 2024-01-05 | End: 2024-01-08 | Stop reason: HOSPADM

## 2024-01-05 RX ORDER — ONDANSETRON 4 MG/1
4 TABLET, ORALLY DISINTEGRATING ORAL EVERY 6 HOURS PRN
Status: DISCONTINUED | OUTPATIENT
Start: 2024-01-05 | End: 2024-01-08 | Stop reason: HOSPADM

## 2024-01-05 RX ORDER — ONDANSETRON 2 MG/ML
4 INJECTION INTRAMUSCULAR; INTRAVENOUS EVERY 6 HOURS PRN
Status: DISCONTINUED | OUTPATIENT
Start: 2024-01-05 | End: 2024-01-08 | Stop reason: HOSPADM

## 2024-01-05 RX ADMIN — IPRATROPIUM BROMIDE AND ALBUTEROL SULFATE 3 ML: .5; 3 SOLUTION RESPIRATORY (INHALATION) at 14:49

## 2024-01-05 RX ADMIN — GABAPENTIN 300 MG: 300 CAPSULE ORAL at 23:01

## 2024-01-05 RX ADMIN — CEFTRIAXONE 2 G: 2 INJECTION, POWDER, FOR SOLUTION INTRAMUSCULAR; INTRAVENOUS at 17:57

## 2024-01-05 RX ADMIN — CLONIDINE 1 PATCH: 0.1 PATCH TRANSDERMAL at 23:02

## 2024-01-05 RX ADMIN — SODIUM CHLORIDE 90 ML: 9 INJECTION, SOLUTION INTRAVENOUS at 16:20

## 2024-01-05 RX ADMIN — AZITHROMYCIN MONOHYDRATE 500 MG: 500 INJECTION, POWDER, LYOPHILIZED, FOR SOLUTION INTRAVENOUS at 19:16

## 2024-01-05 RX ADMIN — VENLAFAXINE 75 MG: 75 TABLET ORAL at 23:03

## 2024-01-05 RX ADMIN — IOPAMIDOL 72 ML: 755 INJECTION, SOLUTION INTRAVENOUS at 16:20

## 2024-01-05 ASSESSMENT — ACTIVITIES OF DAILY LIVING (ADL)
ADLS_ACUITY_SCORE: 35

## 2024-01-05 NOTE — ED TRIAGE NOTES
Patient arrives stating she only has one lung, and is struggling to breathe. States that this has been going on for a bit, but she has asthma and her inhalers aren't working.

## 2024-01-05 NOTE — ED PROVIDER NOTES
[facility] - [place], [place] EMERGENCY DEPARTMENT ([facility])    1/05/24      ED PROVIDER NOTE        History     Chief Complaint   Patient presents with    Shortness of Breath     Patient arrives stating she only has one lung, and is struggling to breathe. States that this has been going on for a bit, but she has asthma and her inhalers aren't working.      HPI  Mojgan Jimenez is a 53 year old female with a history of asthma, metabolic encephalopathy, cardiomyopathy, polysubstance abuse, stimulant induced psychosis, LINA, MDD, JARVIS presents to the ED for evaluation of shortness of breath.  Patient presented to triage stating she only has 1 lung and has been struggling to breathe.  She reports the symptoms have been present for around 1 week and have not been getting any better.    EPIC record reviewed.  Patient had a laparoscopic sleeve gastrectomy on 10/9/2023 complicated by acute abdomen/peritonitis, pleural effusion requiring chest tube placement, septic shock requiring vasopressors, hypoxic respiratory failure requiring intubation as well as SUSSY.  She was discharged on 11/21/2023 to a long-term care facility for rehab.    Echocardiogram 10/9/23:  Interpretation Summary  1. Left ventricular chamber size, wall thickness and systolic function are  normal. The visually estimated left ventricular ejection fraction is 55-60%.  2. Right ventricular chamber size and systolic function are normal.  3. No hemodynamically significant valvular abnormalities.  4. Compared to the prior study dated 10/23/2023, there has been no significant  change.      Past Medical History  Past Medical History:   Diagnosis Date    LINA (generalized anxiety disorder) 11/02/2017    Hyperlipidemia LDL goal <160 01/20/2019    Major depressive disorder     Methanol abuse (H)     Mild persistent asthma without complication 11/02/2017    Obese     JARVIS on CPAP     Cpap not working    Paranoia (H)     resolved due to drugs    Vitamin D deficiency 11/02/2017      Past Surgical History:   Procedure Laterality Date    GASTRECTOMY, SLEEVE, LAPAROSCOPIC, WITH DUODENAL SWITCH N/A 10/9/2023    Procedure: GASTRECTOMY, SLEEVE, LAPAROSCOPIC, WITH SINGLE ANASTAMOSIS DUODENAL SWITCH;  Surgeon: Hoang Worthy MD;  Location: [facility] OR    LAPAROSCOPY DIAGNOSTIC (GYN) N/A 10/12/2023    Procedure: LAPAROSCOPY,;  Surgeon: Hoang Worthy MD;  Location: [facility] OR    LAPAROTOMY EXPLORATORY N/A 10/12/2023    Procedure: LAPAROTOMY, CLOSURE OF TWO SMALL BOWEL INTEROTOMIES, DRAIN PLACEMENT, INTRA-ABDOMINAL CLEAN OUT;  Surgeon: Hoang Worthy MD;  Location: [facility] OR    MAMMOPLASTY REDUCTION      reduction    PICC TRIPLE LUMEN PLACEMENT  10/28/2023     ADVAIR -21 MCG/ACT inhaler  albuterol (PROAIR HFA) 108 (90 Base) MCG/ACT inhaler  albuterol (PROVENTIL) (2.5 MG/3ML) 0.083% neb solution  amLODIPine (NORVASC) 10 MG tablet  carvedilol (COREG) 25 MG tablet  cetirizine (ZYRTEC) 10 MG tablet  cholecalciferol (VITAMIN D3) 125 mcg (5000 units) capsule  cloNIDine (CATAPRES-TTS1) 0.1 MG/24HR WK patch  cyanocobalamin (VITAMIN B-12) 1000 MCG sublingual tablet  gabapentin (NEURONTIN) 300 MG capsule  Lidocaine (LIDOCARE) 4 % Patch  magnesium oxide (MAG-OX) 400 MG tablet  Pediatric Multivitamins-Iron (MULTIVITAMINS PLUS IRON CHILD) 18 MG CHEW  propranolol (INDERAL) 10 MG tablet  rosuvastatin (CRESTOR) 10 MG tablet  ursodiol (ACTIGALL) 300 MG capsule  venlafaxine (EFFEXOR) 75 MG tablet  acetaminophen (TYLENOL) 500 MG tablet      No Known Allergies  Family History  Family History   Problem Relation Age of Onset    Cancer Mother     Unknown/Adopted Father     Alcoholism Father     Substance Abuse Sister     Diabetes No family hx of     Hypertension No family hx of      Social History   Social History     Tobacco Use    Smoking status: Never    Smokeless tobacco: Never   Vaping Use    Vaping Use: Never used   Substance Use Topics    Alcohol use: Not Currently  "    Comment: Sober x 8 months    Drug use: Not Currently     Types: Methamphetamines, \"Crack\" cocaine     Comment: in remision          A medically appropriate review of systems was performed with pertinent positives and negatives noted in the HPI, and all other systems negative.    Physical Exam   BP: (!) 77/45  Pulse: 80  Temp: 98.4  F (36.9  C)  Resp: 24  Height: 160 cm (5' 3\")  Weight: 108.9 kg (240 lb)  SpO2: 95 %  Physical Exam  Vitals and nursing note reviewed.   Constitutional:       Appearance: She is obese. She is ill-appearing. She is not toxic-appearing.   HENT:      Head: Normocephalic and atraumatic.   Cardiovascular:      Rate and Rhythm: Normal rate and regular rhythm.   Pulmonary:      Effort: Tachypnea present. No respiratory distress.      Breath sounds: Examination of the right-upper field reveals wheezing and rhonchi. Examination of the right-middle field reveals wheezing and rhonchi. Wheezing and rhonchi present.   Chest:      Chest wall: No tenderness or edema.   Abdominal:      Palpations: Abdomen is soft.   Musculoskeletal:         General: Normal range of motion.      Right lower leg: No tenderness. No edema.      Left lower leg: No tenderness. No edema.   Skin:     General: Skin is warm and dry.      Capillary Refill: Capillary refill takes less than 2 seconds.   Neurological:      General: No focal deficit present.      Mental Status: She is alert and oriented to person, place, and time.   Psychiatric:         Mood and Affect: Mood normal.         Behavior: Behavior normal.           ED Course, Procedures, & Data      Procedures            EKG Interpretation:      Interpreted by JUSTINE Ku CNP  Time reviewed: 2035 (obtained at 2032)  Symptoms at time of EKG: dyspnea   Rhythm: normal sinus   Rate: normal  Axis: normal  Ectopy: occasional PVCs  Conduction: normal  ST Segments/ T Waves: No ST-T wave changes  Q Waves: none  Comparison to prior: No old EKG available    Clinical " Impression: normal EKG                 Results for orders placed or performed during the hospital encounter of 01/05/24   XR Chest 2 Views     Status: None    Narrative    CHEST TWO VIEWS  1/5/2024 2:46 PM     HISTORY:  Shortness of breath.    COMPARISON: 5/8/2018.      Impression    IMPRESSION: Mild opacity at the right lung base is new since the  previous exam, and may be related to atelectasis or infection. The  left lung is clear. No pleural effusions. Heart size and pulmonary  vascularity are within normal limits.     CHEVY HAYDEN MD         SYSTEM ID:  [id number]   CT Chest Pulmonary Embolism w Contrast     Status: None    Narrative    CT CHEST PULMONARY EMBOLISM WITH CONTRAST 1/5/2024 4:25 PM    CLINICAL HISTORY: Shortness of breath.  TECHNIQUE: CT angiogram chest during arterial phase injection IV  contrast. 2D and 3D MIP reconstructions were performed by the CT  technologist. Dose reduction techniques were used.   CONTRAST: 72mL Isovue 370  COMPARISON: Chest CT performed 11/8/2023.    FINDINGS:  ANGIOGRAM CHEST: Pulmonary arteries are normal caliber and negative  for pulmonary emboli. Thoracic aorta is negative for dissection.    LUNGS AND PLEURA: Mild patchy groundglass opacity in the right upper  lobe anteriorly and medially is new since the previous exam. Mild  atelectasis along the right major fissure. The left lung is clear. No  pneumothorax. No pleural effusions.    MEDIASTINUM/AXILLAE: No enlarged lymph nodes in the chest. No  pericardial effusion.    CORONARY ARTERY CALCIFICATION: Mild.    UPPER ABDOMEN: Small hiatal hernia. Postoperative changes of sleeve  gastrectomy.    MUSCULOSKELETAL: Degenerative changes in the thoracic spine.      Impression    IMPRESSION:  1. No evidence for pulmonary embolism.  2. Patchy groundglass opacity in the right upper lobe anteriorly and  medially is new since the previous exam, and may be infectious or  inflammatory.    CHEVY HAYDEN MD         SYSTEM ID:   [id number]   Comprehensive metabolic panel     Status: Abnormal   Result Value Ref Range    Sodium 142 135 - 145 mmol/L    Potassium 3.9 3.4 - 5.3 mmol/L    Carbon Dioxide (CO2) 27 22 - 29 mmol/L    Anion Gap 11 7 - 15 mmol/L    Urea Nitrogen 7.0 6.0 - 20.0 mg/dL    Creatinine 0.82 0.51 - 0.95 mg/dL    GFR Estimate 85 >60 mL/min/1.73m2    Calcium 9.1 8.6 - 10.0 mg/dL    Chloride 104 98 - 107 mmol/L    Glucose 101 (H) 70 - 99 mg/dL    Alkaline Phosphatase 90 40 - 150 U/L    AST 24 0 - 45 U/L    ALT 21 0 - 50 U/L    Protein Total 7.0 6.4 - 8.3 g/dL    Albumin 3.5 3.5 - 5.2 g/dL    Bilirubin Total 0.3 <=1.2 mg/dL   Lactic acid whole blood     Status: Normal   Result Value Ref Range    Lactic Acid 1.0 0.7 - 2.0 mmol/L   Troponin T, High Sensitivity     Status: Abnormal   Result Value Ref Range    Troponin T, High Sensitivity 16 (H) <=14 ng/L   UA with Microscopic reflex to Culture     Status: Abnormal    Specimen: Urine, Clean Catch   Result Value Ref Range    Color Urine Yellow Colorless, Straw, Light Yellow, Yellow    Appearance Urine Slightly Cloudy (A) Clear    Glucose Urine Negative Negative mg/dL    Bilirubin Urine Negative Negative    Ketones Urine Trace (A) Negative mg/dL    Specific Gravity Urine 1.024 1.003 - 1.035    Blood Urine Negative Negative    pH Urine 6.0 5.0 - 7.0    Protein Albumin Urine 100 (A) Negative mg/dL    Urobilinogen Urine 2.0 Normal, 2.0 mg/dL    Nitrite Urine Negative Negative    Leukocyte Esterase Urine Moderate (A) Negative    Bacteria Urine Few (A) None Seen /HPF    Mucus Urine Present (A) None Seen /LPF    RBC Urine 3 (H) <=2 /HPF    WBC Urine 18 (H) <=5 /HPF    Squamous Epithelials Urine 18 (H) <=1 /HPF    Transitional Epithelials Urine 1 <=1 /HPF    Narrative    Urine Culture ordered based on laboratory criteria   Symptomatic Influenza A/B, RSV, & SARS-CoV2 PCR (COVID-19) Nasopharyngeal     Status: Normal    Specimen: Nasopharyngeal; Swab   Result Value Ref Range    Influenza A PCR  Negative Negative    Influenza B PCR Negative Negative    RSV PCR Negative Negative    SARS CoV2 PCR Negative Negative    Narrative    Testing was performed using the Xpert Xpress CoV2/Flu/RSV Assay on the "[employer] GeneXpert Instrument. This test should be ordered for the detection of SARS-CoV-2, influenza, and RSV viruses in individuals who meet clinical and/or epidemiological criteria. Test performance is unknown in asymptomatic patients. This test is for in vitro diagnostic use under the FDA EUA for laboratories certified under CLIA to perform high or moderate complexity testing. This test has not been FDA cleared or approved. A negative result does not rule out the presence of PCR inhibitors in the specimen or target RNA in concentration below the limit of detection for the assay. If only one viral target is positive but coinfection with multiple targets is suspected, the sample should be re-tested with another FDA cleared, approved, or authorized test, if coinfection would change clinical management. This test was validated by the [facility]. These laboratories are certified under the Clinical Laboratory Improvement Amendments of 1988 (CLIA-88) as qualified to perform high complexity laboratory testing.   CBC with platelets and differential     Status: Abnormal   Result Value Ref Range    WBC Count 8.0 4.0 - 11.0 10e3/uL    RBC Count 3.98 3.80 - 5.20 10e6/uL    Hemoglobin 11.3 (L) 11.7 - 15.7 g/dL    Hematocrit 35.9 35.0 - 47.0 %    MCV 90 78 - 100 fL    MCH 28.4 26.5 - 33.0 pg    MCHC 31.5 31.5 - 36.5 g/dL    RDW 12.6 10.0 - 15.0 %    Platelet Count 262 150 - 450 10e3/uL    % Neutrophils 61 %    % Lymphocytes 26 %    % Monocytes 8 %    % Eosinophils 5 %    % Basophils 0 %    % Immature Granulocytes 0 %    NRBCs per 100 WBC 0 <1 /100    Absolute Neutrophils 4.8 1.6 - 8.3 10e3/uL    Absolute Lymphocytes 2.1 0.8 - 5.3 10e3/uL    Absolute Monocytes 0.7 0.0 - 1.3 10e3/uL    Absolute Eosinophils  0.4 0.0 - 0.7 10e3/uL    Absolute Basophils 0.0 0.0 - 0.2 10e3/uL    Absolute Immature Granulocytes 0.0 <=0.4 10e3/uL    Absolute NRBCs 0.0 10e3/uL   D dimer quantitative     Status: Abnormal   Result Value Ref Range    D-Dimer Quantitative 0.53 (H) 0.00 - 0.50 ug/mL FEU    Narrative    This D-dimer assay is intended for use in conjunction with a clinical pretest probability assessment model to exclude pulmonary embolism (PE) and deep venous thrombosis (DVT) in outpatients suspected of PE or DVT. The cut-off value is 0.50 ug/mL FEU.    For patients 50 years of age or older, the application of age-adjusted cut-off values for D-Dimer may increase the specificity without significant effect on sensitivity. The literature suggested calculation age adjusted cut-off in ug/L = age in years x 10 ug/L. The results in this laboratory are reported as ug/mL rather than ug/L. The calculation for age adjusted cut off in ug/mL= age in years x 0.01 ug/mL. For example, the cut off for a 76 year old male is 76 x 0.01 ug/mL = 0.76 ug/mL (760 ug/L).    M Jones et al. Age adjusted D-dimer cut-off levels to rule out pulmonary embolism: The ADJUST-PE Study. GERMAINE 2014;311:5286-3025.; HJ Derrick et al. Diagnostic accuracy of conventional or age adjusted D-dimer cutoff values in older patients with suspected venous thromboembolism. Systemic review and meta-analysis. BMJ 2013:346:f2492.   Extra Tube     Status: None    Narrative    The following orders were created for panel order Extra Tube.  Procedure                               Abnormality         Status                     ---------                               -----------         ------                     Extra Blue Top Tube[[id number]]                              Final result               Extra Red Top Tube[[id number]]                               Final result                 Please view results for these tests on the individual orders.   Extra Blue Top Tube     Status: None    Result Value Ref Range    Hold Specimen JIC    Extra Red Top Tube     Status: None   Result Value Ref Range    Hold Specimen JIC    HCG qualitative pregnancy (blood)     Status: Normal   Result Value Ref Range    hCG Serum Qualitative Negative Negative   Troponin T, High Sensitivity     Status: Normal   Result Value Ref Range    Troponin T, High Sensitivity 13 <=14 ng/L   EKG 12-lead, tracing only     Status: None (Preliminary result)   Result Value Ref Range    Systolic Blood Pressure  mmHg    Diastolic Blood Pressure  mmHg    Ventricular Rate 78 BPM    Atrial Rate 78 BPM    TN Interval 164 ms    QRS Duration 88 ms     ms    QTc 446 ms    P Axis 15 degrees    R AXIS 32 degrees    T Axis 22 degrees    Interpretation ECG       Sinus rhythm with occasional Premature ventricular complexes  Otherwise normal ECG     CBC with platelets differential     Status: Abnormal    Narrative    The following orders were created for panel order CBC with platelets differential.  Procedure                               Abnormality         Status                     ---------                               -----------         ------                     CBC with platelets and d...[[id number]]  Abnormal            Final result                 Please view results for these tests on the individual orders.     Medications   pharmacy alert - intermittent dosing (has no administration in time range)   ipratropium - albuterol 0.5 mg/2.5 mg/3 mL (DUONEB) neb solution 3 mL (3 mLs Nebulization $Given 1/5/24 1449)   iopamidol (ISOVUE-370) solution 100 mL (72 mLs Intravenous $Given 1/5/24 1620)   sodium chloride 0.9 % bag 100mL (90 mLs Intravenous $Given 1/5/24 1620)   cefTRIAXone (ROCEPHIN) 2 g vial to attach to  ml bag for ADULTS or NS 50 ml bag for PEDS (0 g Intravenous Stopped 1/5/24 1916)   azithromycin 500 mg (ZITHROMAX) in 0.9% NaCl 250 mL intermittent infusion 500 mg (0 mg Intravenous Stopped 1/5/24 2029)     Labs Ordered and  Resulted from Time of ED Arrival to Time of ED Departure   COMPREHENSIVE METABOLIC PANEL - Abnormal       Result Value    Sodium 142      Potassium 3.9      Carbon Dioxide (CO2) 27      Anion Gap 11      Urea Nitrogen 7.0      Creatinine 0.82      GFR Estimate 85      Calcium 9.1      Chloride 104      Glucose 101 (*)     Alkaline Phosphatase 90      AST 24      ALT 21      Protein Total 7.0      Albumin 3.5      Bilirubin Total 0.3     TROPONIN T, HIGH SENSITIVITY - Abnormal    Troponin T, High Sensitivity 16 (*)    ROUTINE UA WITH MICROSCOPIC REFLEX TO CULTURE - Abnormal    Color Urine Yellow      Appearance Urine Slightly Cloudy (*)     Glucose Urine Negative      Bilirubin Urine Negative      Ketones Urine Trace (*)     Specific Gravity Urine 1.024      Blood Urine Negative      pH Urine 6.0      Protein Albumin Urine 100 (*)     Urobilinogen Urine 2.0      Nitrite Urine Negative      Leukocyte Esterase Urine Moderate (*)     Bacteria Urine Few (*)     Mucus Urine Present (*)     RBC Urine 3 (*)     WBC Urine 18 (*)     Squamous Epithelials Urine 18 (*)     Transitional Epithelials Urine 1     CBC WITH PLATELETS AND DIFFERENTIAL - Abnormal    WBC Count 8.0      RBC Count 3.98      Hemoglobin 11.3 (*)     Hematocrit 35.9      MCV 90      MCH 28.4      MCHC 31.5      RDW 12.6      Platelet Count 262      % Neutrophils 61      % Lymphocytes 26      % Monocytes 8      % Eosinophils 5      % Basophils 0      % Immature Granulocytes 0      NRBCs per 100 WBC 0      Absolute Neutrophils 4.8      Absolute Lymphocytes 2.1      Absolute Monocytes 0.7      Absolute Eosinophils 0.4      Absolute Basophils 0.0      Absolute Immature Granulocytes 0.0      Absolute NRBCs 0.0     D DIMER QUANTITATIVE - Abnormal    D-Dimer Quantitative 0.53 (*)    LACTIC ACID WHOLE BLOOD - Normal    Lactic Acid 1.0     INFLUENZA A/B, RSV, & SARS-COV2 PCR - Normal    Influenza A PCR Negative      Influenza B PCR Negative      RSV PCR Negative       SARS CoV2 PCR Negative     HCG QUALITATIVE PREGNANCY - Normal    hCG Serum Qualitative Negative     TROPONIN T, HIGH SENSITIVITY - Normal    Troponin T, High Sensitivity 13     PROCALCITONIN   URINE CULTURE     CT Chest Pulmonary Embolism w Contrast   Final Result   IMPRESSION:   1. No evidence for pulmonary embolism.   2. Patchy groundglass opacity in the right upper lobe anteriorly and   medially is new since the previous exam, and may be infectious or   inflammatory.      CHEVY HAYDEN MD            SYSTEM ID:  [id number]      XR Chest 2 Views   Final Result   IMPRESSION: Mild opacity at the right lung base is new since the   previous exam, and may be related to atelectasis or infection. The   left lung is clear. No pleural effusions. Heart size and pulmonary   vascularity are within normal limits.       CHEVY HAYDEN MD            SYSTEM ID:  [id number]             Critical care was not performed.     Medical Decision Making  The patient's presentation was of high complexity (an acute health issue posing potential threat to life or bodily function).    The patient's evaluation involved:  review of external note(s) from 3+ sources (see separate area of note for details)  review of 3+ test result(s) ordered prior to this encounter (see separate area of note for details)  ordering and/or review of 3+ test(s) in this encounter (see separate area of note for details)  independent interpretation of testing performed by another health professional (CXR, CT PE)  discussion of management or test interpretation with another health professional (hospitalist)    The patient's management necessitated moderate risk (prescription drug management including medications given in the ED) and high risk (a decision regarding hospitalization).    Assessment & Plan    Mojgan Jimenez is a 53 year old female with a history of asthma, metabolic encephalopathy, cardiomyopathy, polysubstance abuse, stimulant induced psychosis, LINA,  MDD, JARVIS presents to the ED for evaluation of shortness of breath.  In triage patient was markedly hypotensive but this did improve when she was lying supine, did not require fluid resuscitation.  Patient's dyspnea also improved upon lying down, she is speaking normally and does not appear to be in any acute respiratory distress, oxygenating well 95% on room air mildly tachypneic at 24 breaths/min.  Differential diagnosis includes but is not limited to pneumonia, viral infection, pleural effusion, pneumothorax, PE, ACS amongst others.  Will obtain EKG, comprehensive labs including troponin and D dimer, CXR and consider CT to r/o PE based on D dimer.     EKG showed normal sinus rhythm with occasional PVCs, no ST or T wave changes, no QT prolongation.  Troponin 16, delta troponin reassuringly downtrending at 13.     Chest x-ray imaging showed possible right upper lobe infiltrate.  Given elevated D-dimer will obtain CT PE study, this can also further help differentiate possible pneumonia.  I reviewed the CT as well as read the radiologist report which showed no pulmonary embolism, right upper and middle lobe infiltrates suspicious for pneumonia.    Upon reevaluation patient reports she feels fairly well while lying supine, however states when she got up to transfer to a different bed for the CT scan she became very short of breath and had difficulty transferring.    Will give IV antibiotics in the emergency department and reevaluate patient tolerance and appropriateness for discharge versus admission.    Upon reassessment patient is typically very dyspneic with any activity, maintaining her oxygen saturations appropriately on room air, however tachypneic up to respiratory rate of 30 with increased work of breathing even after eating.  Will admit the patient to the hospital for observation.  Discussed with triage hospitalist who accepted patient onto the medicine service.  Patient in agreement with this plan for  admission.    I have reviewed the nursing notes. I have reviewed the findings, diagnosis, plan and need for follow up with the patient.    New Prescriptions    No medications on file       Final diagnoses:   Pneumonia   GUNDERSON (dyspnea on exertion)       RafaelaJUSTINE Mooney CNP   [facility] EMERGENCY DEPARTMENT  1/5/2024     JonathanNadyaRafaelaJUSTINE colbert CNP  01/05/24 2057  --    ED Attending Physician Attestation    I Sandip Quintana MD, cared for this patient with the Advanced Practice Provider (NAIN). I have performed a history and physical examination of the patient independent of the NAIN. I reviewed the NAIN's documentation above and agree with the documented findings and plan of care. I personally provided a substantive portion of the care for this patient, including the complete Medical Decision Making. Please see the NAIN's documentation for full details.    Summary of HPI, PE, ED Course   Patient is a 53 year old female evaluated in the emergency department for worsening shortness of breath, dyspnea on exertion.. Exam and ED course notable for patient is not hypoxic but becomes tachypneic with even small amounts of exertion.  Initial workup suggested infiltrate on chest x-ray, D-dimer elevated so a chest CT was entertained which confirmed the presence of pulmonary infiltrates.  Her other labs, EKG are unremarkable, however she is significantly symptomatic with respiratory rate into the 30s with even minimal activity.  Given her underlying health status and now new pulmonary infiltrates we will treat and admit for community-acquired pneumonia.. After the completion of care in the emergency department, the patient was admitted to observation status.      Sandip Quintana MD  Emergency Medicine        Sandip Quintana MD  01/06/24 [id number]

## 2024-01-05 NOTE — PROGRESS NOTES
Patient expresses frustration with wait times. Patient reports not having her phone with her to call her daughter who was in the waiting room waiting to be seen. Patient given portable phone and daughter's number to call and update. Patient expresses not wanting to wait for the results of her CT because she thinks it is too much time to wait. Patient encouraged to wait for the results.

## 2024-01-06 ENCOUNTER — APPOINTMENT (OUTPATIENT)
Dept: CARDIOLOGY | Facility: CLINIC | Age: 54
End: 2024-01-06
Attending: INTERNAL MEDICINE
Payer: COMMERCIAL

## 2024-01-06 LAB
ALBUMIN SERPL BCG-MCNC: 3.3 G/DL (ref 3.5–5.2)
ALP SERPL-CCNC: 78 U/L (ref 40–150)
ALT SERPL W P-5'-P-CCNC: 18 U/L (ref 0–50)
ANION GAP SERPL CALCULATED.3IONS-SCNC: 5 MMOL/L (ref 7–15)
AST SERPL W P-5'-P-CCNC: 15 U/L (ref 0–45)
ATRIAL RATE - MUSE: 78 BPM
BACTERIA UR CULT: NORMAL
BASOPHILS # BLD AUTO: 0 10E3/UL (ref 0–0.2)
BASOPHILS NFR BLD AUTO: 0 %
BILIRUB SERPL-MCNC: 0.2 MG/DL
BUN SERPL-MCNC: 7.4 MG/DL (ref 6–20)
CALCIUM SERPL-MCNC: 9.1 MG/DL (ref 8.6–10)
CHLORIDE SERPL-SCNC: 106 MMOL/L (ref 98–107)
CREAT SERPL-MCNC: 0.73 MG/DL (ref 0.51–0.95)
DEPRECATED HCO3 PLAS-SCNC: 31 MMOL/L (ref 22–29)
DIASTOLIC BLOOD PRESSURE - MUSE: NORMAL MMHG
EGFRCR SERPLBLD CKD-EPI 2021: >90 ML/MIN/1.73M2
EOSINOPHIL # BLD AUTO: 0.3 10E3/UL (ref 0–0.7)
EOSINOPHIL NFR BLD AUTO: 6 %
ERYTHROCYTE [DISTWIDTH] IN BLOOD BY AUTOMATED COUNT: 12.6 % (ref 10–15)
GLUCOSE SERPL-MCNC: 89 MG/DL (ref 70–99)
HCT VFR BLD AUTO: 33.5 % (ref 35–47)
HGB BLD-MCNC: 10.2 G/DL (ref 11.7–15.7)
IMM GRANULOCYTES # BLD: 0 10E3/UL
IMM GRANULOCYTES NFR BLD: 0 %
INTERPRETATION ECG - MUSE: NORMAL
LVEF ECHO: NORMAL
LYMPHOCYTES # BLD AUTO: 2.4 10E3/UL (ref 0.8–5.3)
LYMPHOCYTES NFR BLD AUTO: 43 %
MCH RBC QN AUTO: 27.8 PG (ref 26.5–33)
MCHC RBC AUTO-ENTMCNC: 30.4 G/DL (ref 31.5–36.5)
MCV RBC AUTO: 91 FL (ref 78–100)
MONOCYTES # BLD AUTO: 0.6 10E3/UL (ref 0–1.3)
MONOCYTES NFR BLD AUTO: 11 %
NEUTROPHILS # BLD AUTO: 2.3 10E3/UL (ref 1.6–8.3)
NEUTROPHILS NFR BLD AUTO: 40 %
NRBC # BLD AUTO: 0 10E3/UL
NRBC BLD AUTO-RTO: 0 /100
P AXIS - MUSE: 15 DEGREES
PLATELET # BLD AUTO: 209 10E3/UL (ref 150–450)
POTASSIUM SERPL-SCNC: 3.6 MMOL/L (ref 3.4–5.3)
PR INTERVAL - MUSE: 164 MS
PROT SERPL-MCNC: 6.5 G/DL (ref 6.4–8.3)
QRS DURATION - MUSE: 88 MS
QT - MUSE: 392 MS
QTC - MUSE: 446 MS
R AXIS - MUSE: 32 DEGREES
RBC # BLD AUTO: 3.67 10E6/UL (ref 3.8–5.2)
SODIUM SERPL-SCNC: 142 MMOL/L (ref 135–145)
SYSTOLIC BLOOD PRESSURE - MUSE: NORMAL MMHG
T AXIS - MUSE: 22 DEGREES
VENTRICULAR RATE- MUSE: 78 BPM
WBC # BLD AUTO: 5.6 10E3/UL (ref 4–11)

## 2024-01-06 PROCEDURE — G0378 HOSPITAL OBSERVATION PER HR: HCPCS

## 2024-01-06 PROCEDURE — 250N000009 HC RX 250: Performed by: INTERNAL MEDICINE

## 2024-01-06 PROCEDURE — 99232 SBSQ HOSP IP/OBS MODERATE 35: CPT | Performed by: INTERNAL MEDICINE

## 2024-01-06 PROCEDURE — 258N000003 HC RX IP 258 OP 636: Performed by: INTERNAL MEDICINE

## 2024-01-06 PROCEDURE — 250N000013 HC RX MED GY IP 250 OP 250 PS 637: Performed by: INTERNAL MEDICINE

## 2024-01-06 PROCEDURE — 80053 COMPREHEN METABOLIC PANEL: CPT | Performed by: INTERNAL MEDICINE

## 2024-01-06 PROCEDURE — 85025 COMPLETE CBC W/AUTO DIFF WBC: CPT | Performed by: INTERNAL MEDICINE

## 2024-01-06 PROCEDURE — 999N000208 ECHOCARDIOGRAM COMPLETE

## 2024-01-06 PROCEDURE — 255N000002 HC RX 255 OP 636: Performed by: INTERNAL MEDICINE

## 2024-01-06 PROCEDURE — 96376 TX/PRO/DX INJ SAME DRUG ADON: CPT

## 2024-01-06 PROCEDURE — 999N000157 HC STATISTIC RCP TIME EA 10 MIN

## 2024-01-06 PROCEDURE — 36415 COLL VENOUS BLD VENIPUNCTURE: CPT | Performed by: INTERNAL MEDICINE

## 2024-01-06 PROCEDURE — 93306 TTE W/DOPPLER COMPLETE: CPT | Mod: 26 | Performed by: INTERNAL MEDICINE

## 2024-01-06 PROCEDURE — 94640 AIRWAY INHALATION TREATMENT: CPT

## 2024-01-06 PROCEDURE — 250N000011 HC RX IP 250 OP 636: Performed by: INTERNAL MEDICINE

## 2024-01-06 RX ADMIN — AZITHROMYCIN MONOHYDRATE 500 MG: 500 INJECTION, POWDER, LYOPHILIZED, FOR SOLUTION INTRAVENOUS at 18:19

## 2024-01-06 RX ADMIN — IPRATROPIUM BROMIDE AND ALBUTEROL SULFATE 3 ML: .5; 3 SOLUTION RESPIRATORY (INHALATION) at 13:55

## 2024-01-06 RX ADMIN — CARVEDILOL 12.5 MG: 3.12 TABLET, FILM COATED ORAL at 17:27

## 2024-01-06 RX ADMIN — HUMAN ALBUMIN MICROSPHERES AND PERFLUTREN 6 ML: 10; .22 INJECTION, SOLUTION INTRAVENOUS at 12:36

## 2024-01-06 RX ADMIN — IPRATROPIUM BROMIDE AND ALBUTEROL SULFATE 3 ML: .5; 3 SOLUTION RESPIRATORY (INHALATION) at 18:10

## 2024-01-06 RX ADMIN — CEFTRIAXONE SODIUM 2 G: 2 INJECTION, POWDER, FOR SOLUTION INTRAMUSCULAR; INTRAVENOUS at 17:20

## 2024-01-06 RX ADMIN — MAGNESIUM OXIDE TAB 400 MG (241.3 MG ELEMENTAL MG) 400 MG: 400 (241.3 MG) TAB at 09:08

## 2024-01-06 RX ADMIN — Medication 1000 MCG: at 09:08

## 2024-01-06 RX ADMIN — Medication 125 MCG: at 09:08

## 2024-01-06 RX ADMIN — VENLAFAXINE 75 MG: 75 TABLET ORAL at 17:27

## 2024-01-06 RX ADMIN — URSODIOL 300 MG: 300 CAPSULE ORAL at 20:15

## 2024-01-06 RX ADMIN — FLUTICASONE FUROATE AND VILANTEROL TRIFENATATE 1 PUFF: 100; 25 POWDER RESPIRATORY (INHALATION) at 12:11

## 2024-01-06 RX ADMIN — GABAPENTIN 300 MG: 300 CAPSULE ORAL at 20:14

## 2024-01-06 RX ADMIN — URSODIOL 300 MG: 300 CAPSULE ORAL at 09:13

## 2024-01-06 RX ADMIN — ROSUVASTATIN 10 MG: 10 TABLET, FILM COATED ORAL at 09:11

## 2024-01-06 RX ADMIN — VENLAFAXINE 75 MG: 75 TABLET ORAL at 09:13

## 2024-01-06 RX ADMIN — ALBUTEROL SULFATE 2 PUFF: 90 INHALANT RESPIRATORY (INHALATION) at 12:06

## 2024-01-06 RX ADMIN — Medication 1 TABLET: at 09:12

## 2024-01-06 RX ADMIN — VENLAFAXINE 75 MG: 75 TABLET ORAL at 20:31

## 2024-01-06 RX ADMIN — CARVEDILOL 12.5 MG: 3.12 TABLET, FILM COATED ORAL at 09:08

## 2024-01-06 ASSESSMENT — ACTIVITIES OF DAILY LIVING (ADL)
ADLS_ACUITY_SCORE: 20

## 2024-01-06 NOTE — PROGRESS NOTES
Pt states that she is supposed to be on 2 BP meds. Pt requesting that Pharmacist call her daughter, Karla at [phone].     Called Pharmacist who states that MD has not ordered two other BP meds. MD has not ordered Amlodipine and Propranolol as pt's BP has been WNL. Pt was taking Amlodipine scheduled at home and Propranolol PRN at home.     If pt has further questions, Pharmacist can speak with pt.

## 2024-01-06 NOTE — PROGRESS NOTES
This writer gave pts AM meds.  3 minutes afterwards, pt vomited. Pt vomited about 100 ml of fluid. Pt declines to have AM meds reaministered. MD paged.

## 2024-01-06 NOTE — H&P
[facility]    History and Physical - Hospitalist Service, GOLD TEAM        Date of Admission:  1/5/2024    Assessment & Plan      Mojgan Jimenez is a 53 year old female admitted on 1/5/2024. She has a past medical history of anxiety, depression, JARVIS, asthma, hyperlipidemia, hypertension, obesity (s/p recent gastric sleeve which was complicated by peritonitis, SUSSY, hypoxia requiring intubation).  Patient presents with subacute persistent shortness of breath with exertion which improves with rest.  She reports since discharge from TCU, she has struggled with activities and reports being far from her baseline.  Workup this admission is notable for new patchy groundglass opacity in right upper lobe anteriorly and medially.  Patient being admitted for community-acquired pneumonia.    # Shortness of breath  # Concern for community-acquired pneumonia  -Reports difficulty breathing since discharge from TCU; shortness of breath is worse with exertion and improves with rest.  She denies any chest pain.  -Was seen by PCP, this is likely not related to asthma exacerbation.  -She reports 1 week of cough which is nonproductive, but no fever.  -Since arrival in the hospital, she remains afebrile and hemodynamically stable.  -Initial troponin obtained 16 --> 13  -CT chest obtained showed new patchy groundglass opacity in right upper lobe anteriorly and medially.  Very soft call for pneumonia.  -Influenza, RSV and COVID PCR all negative.  -She did receive x 1 ceftriaxone and azithromycin in the ER.  -On exam, she has very mild inspiratory wheezes throughout.  Continue azithromycin and ceftriaxone for presumed CAP  Follow-up BNP, Pro-James.  Of note she appears euvolemic on examination.  Scheduled DuoNebs for now, otherwise resume PTA Advair.  Obtain repeat TTE.      # History of resistant hypertension  # History of stress cardiomyopathy  -PTA medications includes amlodipine 10 mg daily,  "Coreg 25 mg twice daily, she also has propranolol 10 mg 3 times daily as needed.  -Currently, she is normotensive  Start Coreg at a lower dose of 12.5 mg twice daily with holding parameters  Hold PTA amlodipine for now.  No need to order as needed propranolol for now.  Reorder clonidine patch q. weekly.  Patient is due for another dose today.    # Asthma  -Does not appear to be in exacerbation at this time, though she does have some mild expiratory wheezes diffusely.  Scheduled DuoNebs as above  Resume PTA advair     # Anxiety-resume PTA venlafaxine 75 mg 3 times daily  # Hyperlipidemia-resume PTA rosuvastatin  # History of thrombocytopenia with positive HIT screen-avoid heparinoid products; recommend patient ambulates for DVT prophylaxis.  # Recent history of history sleeve gastrectomy-resume multivitamins  # History of vitamin D deficiency-resume PTA vitamin D supplementation.  # Gallstone prevention-continue PTA ursodiol.  # History of hypomagnesemia-resume PTA magnesium supplement            Diet: Combination Diet Low Saturated Fat Na <2400mg Diet, No Caffeine Diet    DVT Prophylaxis: Ambulate every shift  Patiño Catheter: Not present  Lines: None     Cardiac Monitoring: None  Code Status: Full Code    Clinically Significant Risk Factors Present on Admission                  # Hypertension: Noted on problem list      # Severe Obesity: Estimated body mass index is 42.51 kg/m  as calculated from the following:    Height as of this encounter: 1.6 m (5' 3\").    Weight as of this encounter: 108.9 kg (240 lb).       # Asthma: noted on problem list        Disposition Plan anticipate discharge less than 2 midnights     Expected Discharge Date: 01/07/2024                  CARMITA URRUTIA MD  Hospitalist Service, [facility]  Securely message with [employer] (more info)  Text page via [facility] Paging/Directory   See signed in provider for up to date coverage " information    ______________________________________________________________________    Chief Complaint   Shortness of breath    History is obtained from the patient    History of Present Illness   Mojgan Jimenez is a 53 year old female who has a past medical history of anxiety, depression, JARVIS, asthma, hyperlipidemia, hypertension, obesity (s/p recent gastric sleeve which was complicated by peritonitis, SUSSY, hypoxia requiring intubation).  Patient presents with subacute persistent shortness of breath with exertion which improves with rest.  She reports that since being discharged from the hospital ongoing to TCU, she has not felt back to her baseline.  She continues to have trouble breathing with exertion but improves with rest.  During this time, she denies any chest pain.  She also denies any abdominal pain.  Patient reports having to use her rescue inhaler more often due to shortness of breath.  The shortness of breath typically resolves with rest.  She denies fever, chills, headache and palpitations.  She has been having intermittent cough which has been largely nonproductive.  Patient has been compliant with all of her medications.  She decided to come to the hospital for further evaluation as symptoms were not abated.  She had seen her PCP, who thought her symptoms were related to acute asthma exacerbation.      Past Medical History    Past Medical History:   Diagnosis Date    LINA (generalized anxiety disorder) 11/02/2017    Hyperlipidemia LDL goal <160 01/20/2019    Major depressive disorder     Methanol abuse (H)     Mild persistent asthma without complication 11/02/2017    Obese     JARVIS on CPAP     Cpap not working    Paranoia (H)     resolved due to drugs    Vitamin D deficiency 11/02/2017       Past Surgical History   Past Surgical History:   Procedure Laterality Date    GASTRECTOMY, SLEEVE, LAPAROSCOPIC, WITH DUODENAL SWITCH N/A 10/9/2023    Procedure: GASTRECTOMY, SLEEVE, LAPAROSCOPIC, WITH SINGLE  ANASTAMOSIS DUODENAL SWITCH;  Surgeon: Hoang Worthy MD;  Location: [facility] [place]    LAPAROSCOPY DIAGNOSTIC (GYN) N/A 10/12/2023    Procedure: LAPAROSCOPY,;  Surgeon: Hoang Worthy MD;  Location: [facility] [place] OR    LAPAROTOMY EXPLORATORY N/A 10/12/2023    Procedure: LAPAROTOMY, CLOSURE OF TWO SMALL BOWEL INTEROTOMIES, DRAIN PLACEMENT, INTRA-ABDOMINAL CLEAN OUT;  Surgeon: Hoang Worthy MD;  Location: [facility] [place] OR    MAMMOPLASTY REDUCTION      reduction    PICC TRIPLE LUMEN PLACEMENT  10/28/2023       Prior to Admission Medications   Prior to Admission Medications   Prescriptions Last Dose Informant Patient Reported? Taking?   ADVAIR -21 MCG/ACT inhaler 1/5/2024  Yes Yes   Sig: Inhale 2 Puffs by mouth two times daily.*   Lidocaine (LIDOCARE) 4 % Patch Past Month  No Yes   Sig: Place 1 patch onto the skin every 24 hours To prevent lidocaine toxicity, patient should be patch free for 12 hrs daily.Apply patch(s) to neck for neck pain. To prevent lidocaine toxicity, patient should be patch free for 12 hrs daily. Patches may be cut to smaller size prior to removing release liner. Reminder: Remove previous patch before applying new patch.  NEVER APPLY HEAT OVER PATCH which increases absorption and may lead to local anesthetic toxicity. Do not apply over area where liposomal bupivacaine was injected for 96 hours post injection.   Pediatric Multivitamins-Iron (MULTIVITAMINS PLUS IRON CHILD) 18 MG CHEW 1/5/2024 at am  No Yes   Sig: Take 1 chew tab by mouth 2 times daily Ok to substitute with any chewable that contains 18 mg of iron, Vitamin A, Thiamine and Zinc.   acetaminophen (TYLENOL) 500 MG tablet   Yes No   Sig: Take 500-1,000 mg by mouth every 6 hours as needed for mild pain   albuterol (PROAIR HFA) 108 (90 Base) MCG/ACT inhaler 1/5/2024  No Yes   Sig: Inhale 1-2 puffs into the lungs every 4 hours as needed for shortness of breath / dyspnea or wheezing   albuterol (PROVENTIL)  (2.5 MG/3ML) 0.083% neb solution 1/4/2024  No Yes   Sig: Take 1 vial (2.5 mg) by nebulization every 6 hours as needed for shortness of breath or wheezing   amLODIPine (NORVASC) 10 MG tablet 1/5/2024 at am  No Yes   Sig: Take 1 tablet (10 mg) by mouth daily   carvedilol (COREG) 25 MG tablet 1/5/2024 at am  No Yes   Sig: Take 1 tablet (25 mg) by mouth 2 times daily (with meals)   cetirizine (ZYRTEC) 10 MG tablet 1/5/2024 at am  Yes Yes   Sig: Take 10 mg by mouth daily   cholecalciferol (VITAMIN D3) 125 mcg (5000 units) capsule 1/4/2024 at pm  No Yes   Sig: Take 1 capsule (125 mcg) by mouth daily   cloNIDine (CATAPRES-TTS1) 0.1 MG/24HR WK patch 12/29/2023  No Yes   Sig: Place 1 patch onto the skin once a week Reminder: Remove previous patch before applying new patch.  If partial dose is needed, use adhesive bandage to cover/block proportional surface area of the patch. Patches should NOT be cut.   cyanocobalamin (VITAMIN B-12) 1000 MCG sublingual tablet 1/4/2024 at pm  No Yes   Sig: Place 1 tablet (1,000 mcg) under the tongue daily   gabapentin (NEURONTIN) 300 MG capsule 1/4/2024 at pm  No Yes   Sig: Take 1 capsule (300 mg) by mouth at bedtime   magnesium oxide (MAG-OX) 400 MG tablet 1/4/2024 at pm  No Yes   Sig: Take 1 tablet (400 mg) by mouth daily   propranolol (INDERAL) 10 MG tablet Past Week  Yes Yes   Sig: Take 10 mg by mouth 3 times daily as needed   rosuvastatin (CRESTOR) 10 MG tablet 1/5/2024 at am  No Yes   Sig: Take 1 tablet (10 mg) by mouth daily   ursodiol (ACTIGALL) 300 MG capsule 1/5/2024 at am  No Yes   Sig: Take 1 capsule (300 mg) by mouth 2 times daily for 180 days Start 2 weeks after surgery, do not open-take with warm liquid. Take twice a day for 6 months.   venlafaxine (EFFEXOR) 75 MG tablet 1/5/2024 at am  No Yes   Sig: Take 1 tablet (75 mg) by mouth 3 times daily      Facility-Administered Medications: None           Physical Exam   Vital Signs: Temp: 98.4  F (36.9  C) Temp src: Oral BP: 116/71  Pulse: 81   Resp: 16 SpO2: 100 %      Weight: 240 lbs 0 oz    General Appearance: Lying comfortably in bed, on room air, in no acute distress or discomfort.  HEENT: PERRL: EOMI; moist mucous membrane w/o lesions  Neck: No JVD  Pulmonary: Normal work of breathing, largely clear to auscultation bilaterally, she does have some mild expiratory wheezes bilaterally.  CVS: Regular rhythm, no murmurs, rubs or gallops  GI: BS (+), soft nontender, no rebound or guarding   Extremities: No LE edema.  Skin: No rashes or lesions  Neurologic: A&O x3      Medical Decision Making       65 MINUTES SPENT BY ME on the date of service doing chart review, history, exam, documentation & further activities per the note.      Data   ------------------------- PAST 24 HR DATA REVIEWED -----------------------------------------------    I have personally reviewed the following data over the past 24 hrs:    8.0  \   11.3 (L)   / 262     142 104 7.0 /  101 (H)   3.9 27 0.82 \     ALT: 21 AST: 24 AP: 90 TBILI: 0.3   ALB: 3.5 TOT PROTEIN: 7.0 LIPASE: N/A     Trop: 13 BNP: N/A     Procal: 0.03 CRP: N/A Lactic Acid: 1.0       INR:  N/A PTT:  N/A   D-dimer:  0.53 (H) Fibrinogen:  N/A       Imaging results reviewed over the past 24 hrs:   Recent Results (from the past 24 hour(s))   XR Chest 2 Views    Narrative    CHEST TWO VIEWS  1/5/2024 2:46 PM     HISTORY:  Shortness of breath.    COMPARISON: 5/8/2018.      Impression    IMPRESSION: Mild opacity at the right lung base is new since the  previous exam, and may be related to atelectasis or infection. The  left lung is clear. No pleural effusions. Heart size and pulmonary  vascularity are within normal limits.     CHEVY HAYDEN MD         SYSTEM ID:  [id number]   CT Chest Pulmonary Embolism w Contrast    Narrative    CT CHEST PULMONARY EMBOLISM WITH CONTRAST 1/5/2024 4:25 PM    CLINICAL HISTORY: Shortness of breath.  TECHNIQUE: CT angiogram chest during arterial phase injection IV  contrast. 2D and  3D MIP reconstructions were performed by the CT  technologist. Dose reduction techniques were used.   CONTRAST: 72mL Isovue 370  COMPARISON: Chest CT performed 11/8/2023.    FINDINGS:  ANGIOGRAM CHEST: Pulmonary arteries are normal caliber and negative  for pulmonary emboli. Thoracic aorta is negative for dissection.    LUNGS AND PLEURA: Mild patchy groundglass opacity in the right upper  lobe anteriorly and medially is new since the previous exam. Mild  atelectasis along the right major fissure. The left lung is clear. No  pneumothorax. No pleural effusions.    MEDIASTINUM/AXILLAE: No enlarged lymph nodes in the chest. No  pericardial effusion.    CORONARY ARTERY CALCIFICATION: Mild.    UPPER ABDOMEN: Small hiatal hernia. Postoperative changes of sleeve  gastrectomy.    MUSCULOSKELETAL: Degenerative changes in the thoracic spine.      Impression    IMPRESSION:  1. No evidence for pulmonary embolism.  2. Patchy groundglass opacity in the right upper lobe anteriorly and  medially is new since the previous exam, and may be infectious or  inflammatory.    CHEVY HAYDEN MD         SYSTEM ID:  [id number]

## 2024-01-06 NOTE — ED NOTES
[facility]   ED Nurse to Floor Handoff     Mojgan Jimenez is a 53 year old female who speaks English and lives with family members,  in a home  They arrived in the ED by car from home    ED Chief Complaint: Shortness of Breath (Patient arrives stating she only has one lung, and is struggling to breathe. States that this has been going on for a bit, but she has asthma and her inhalers aren't working. )    ED Dx;   Final diagnoses:   Pneumonia   GUNDERSON (dyspnea on exertion)    Patient definitely has two lungs.     Needed?: No    Allergies: No Known Allergies.  Past Medical Hx:   Past Medical History:   Diagnosis Date    LINA (generalized anxiety disorder) 11/02/2017    Hyperlipidemia LDL goal <160 01/20/2019    Major depressive disorder     Methanol abuse (H)     Mild persistent asthma without complication 11/02/2017    Obese     JARVIS on CPAP     Cpap not working    Paranoia (H)     resolved due to drugs    Vitamin D deficiency 11/02/2017      Baseline Mental status: WDL  Current Mental Status changes: at basesline    Infection present or suspected this encounter: yes other Pneumonia. Hx of VRE  Sepsis suspected: No  Isolation type: Contact  Patient tested for COVID 19 prior to admission: YES     Activity level - Baseline/Home:  Walker  Activity Level - Current:   Walker    Bariatric equipment needed?: No    In the ED these meds were given:   Medications   ipratropium - albuterol 0.5 mg/2.5 mg/3 mL (DUONEB) neb solution 3 mL (has no administration in time range)   azithromycin 500 mg (ZITHROMAX) in 0.9% NaCl 250 mL intermittent infusion 500 mg (has no administration in time range)   cefTRIAXone (ROCEPHIN) 2 g vial to attach to  ml bag for ADULTS or NS 50 ml bag for PEDS (has no administration in time range)   senna-docusate (SENOKOT-S/PERICOLACE) 8.6-50 MG per tablet 1 tablet (has no administration in time range)     Or   senna-docusate (SENOKOT-S/PERICOLACE)  8.6-50 MG per tablet 2 tablet (has no administration in time range)   ondansetron (ZOFRAN ODT) ODT tab 4 mg (has no administration in time range)     Or   ondansetron (ZOFRAN) injection 4 mg (has no administration in time range)   melatonin tablet 5 mg (has no administration in time range)   acetaminophen (TYLENOL) tablet 650 mg (has no administration in time range)     Or   acetaminophen (TYLENOL) Suppository 650 mg (has no administration in time range)   fluticasone-vilanterol (BREO ELLIPTA) 100-25 MCG/ACT inhaler 1 puff (has no administration in time range)   albuterol (PROVENTIL HFA/VENTOLIN HFA) inhaler (has no administration in time range)   carvedilol (COREG) tablet 12.5 mg (has no administration in time range)   venlafaxine (EFFEXOR) tablet 75 mg (has no administration in time range)   ursodiol (ACTIGALL) capsule 300 mg (has no administration in time range)   rosuvastatin (CRESTOR) tablet 10 mg (has no administration in time range)   Multivitamins Plus Iron Child CHEW 1 chew tab (has no administration in time range)   magnesium oxide (MAG-OX) tablet 400 mg (has no administration in time range)   gabapentin (NEURONTIN) capsule 300 mg (has no administration in time range)   cyanocobalamin (VITAMIN B-12) sublingual tablet 1,000 mcg (has no administration in time range)   cloNIDine (CATAPRES-TTS1) 0.1 MG/24HR WK patch 1 patch (has no administration in time range)     And   cloNIDine (CATAPRES-TTS1) Patch in Place (has no administration in time range)   cholecalciferol (VITAMIN D3) capsule 125 mcg (has no administration in time range)   ipratropium - albuterol 0.5 mg/2.5 mg/3 mL (DUONEB) neb solution 3 mL (3 mLs Nebulization $Given 1/5/24 1449)   iopamidol (ISOVUE-370) solution 100 mL (72 mLs Intravenous $Given 1/5/24 1620)   sodium chloride 0.9 % bag 100mL (90 mLs Intravenous $Given 1/5/24 1620)   cefTRIAXone (ROCEPHIN) 2 g vial to attach to  ml bag for ADULTS or NS 50 ml bag for PEDS (0 g Intravenous  Stopped 1/5/24 1916)   azithromycin 500 mg (ZITHROMAX) in 0.9% NaCl 250 mL intermittent infusion 500 mg (0 mg Intravenous Stopped 1/5/24 2029)       Drips running?  No    Home pump  No    Current LDAs  Peripheral IV 01/05/24 Right Antecubital fossa (Active)   Site Assessment WDL 01/05/24 1402   Line Status Saline locked 01/05/24 1402   Number of days: 0       Incision/Surgical Site 10/09/23 Abdomen (Active)   Number of days: 88       Labs results:   Labs Ordered and Resulted from Time of ED Arrival to Time of ED Departure   COMPREHENSIVE METABOLIC PANEL - Abnormal       Result Value    Sodium 142      Potassium 3.9      Carbon Dioxide (CO2) 27      Anion Gap 11      Urea Nitrogen 7.0      Creatinine 0.82      GFR Estimate 85      Calcium 9.1      Chloride 104      Glucose 101 (*)     Alkaline Phosphatase 90      AST 24      ALT 21      Protein Total 7.0      Albumin 3.5      Bilirubin Total 0.3     TROPONIN T, HIGH SENSITIVITY - Abnormal    Troponin T, High Sensitivity 16 (*)    ROUTINE UA WITH MICROSCOPIC REFLEX TO CULTURE - Abnormal    Color Urine Yellow      Appearance Urine Slightly Cloudy (*)     Glucose Urine Negative      Bilirubin Urine Negative      Ketones Urine Trace (*)     Specific Gravity Urine 1.024      Blood Urine Negative      pH Urine 6.0      Protein Albumin Urine 100 (*)     Urobilinogen Urine 2.0      Nitrite Urine Negative      Leukocyte Esterase Urine Moderate (*)     Bacteria Urine Few (*)     Mucus Urine Present (*)     RBC Urine 3 (*)     WBC Urine 18 (*)     Squamous Epithelials Urine 18 (*)     Transitional Epithelials Urine 1     CBC WITH PLATELETS AND DIFFERENTIAL - Abnormal    WBC Count 8.0      RBC Count 3.98      Hemoglobin 11.3 (*)     Hematocrit 35.9      MCV 90      MCH 28.4      MCHC 31.5      RDW 12.6      Platelet Count 262      % Neutrophils 61      % Lymphocytes 26      % Monocytes 8      % Eosinophils 5      % Basophils 0      % Immature Granulocytes 0      NRBCs per 100  WBC 0      Absolute Neutrophils 4.8      Absolute Lymphocytes 2.1      Absolute Monocytes 0.7      Absolute Eosinophils 0.4      Absolute Basophils 0.0      Absolute Immature Granulocytes 0.0      Absolute NRBCs 0.0     D DIMER QUANTITATIVE - Abnormal    D-Dimer Quantitative 0.53 (*)    LACTIC ACID WHOLE BLOOD - Normal    Lactic Acid 1.0     INFLUENZA A/B, RSV, & SARS-COV2 PCR - Normal    Influenza A PCR Negative      Influenza B PCR Negative      RSV PCR Negative      SARS CoV2 PCR Negative     HCG QUALITATIVE PREGNANCY - Normal    hCG Serum Qualitative Negative     TROPONIN T, HIGH SENSITIVITY - Normal    Troponin T, High Sensitivity 13     PROCALCITONIN - Normal    Procalcitonin 0.03     NT PROBNP INPATIENT   MAGNESIUM   URINE CULTURE       Imaging Studies:   Recent Results (from the past 24 hour(s))   XR Chest 2 Views    Narrative    CHEST TWO VIEWS  1/5/2024 2:46 PM     HISTORY:  Shortness of breath.    COMPARISON: 5/8/2018.      Impression    IMPRESSION: Mild opacity at the right lung base is new since the  previous exam, and may be related to atelectasis or infection. The  left lung is clear. No pleural effusions. Heart size and pulmonary  vascularity are within normal limits.     CHEVY HAYDEN MD         SYSTEM ID:  [id number]   CT Chest Pulmonary Embolism w Contrast    Narrative    CT CHEST PULMONARY EMBOLISM WITH CONTRAST 1/5/2024 4:25 PM    CLINICAL HISTORY: Shortness of breath.  TECHNIQUE: CT angiogram chest during arterial phase injection IV  contrast. 2D and 3D MIP reconstructions were performed by the CT  technologist. Dose reduction techniques were used.   CONTRAST: 72mL Isovue 370  COMPARISON: Chest CT performed 11/8/2023.    FINDINGS:  ANGIOGRAM CHEST: Pulmonary arteries are normal caliber and negative  for pulmonary emboli. Thoracic aorta is negative for dissection.    LUNGS AND PLEURA: Mild patchy groundglass opacity in the right upper  lobe anteriorly and medially is new since the previous  "exam. Mild  atelectasis along the right major fissure. The left lung is clear. No  pneumothorax. No pleural effusions.    MEDIASTINUM/AXILLAE: No enlarged lymph nodes in the chest. No  pericardial effusion.    CORONARY ARTERY CALCIFICATION: Mild.    UPPER ABDOMEN: Small hiatal hernia. Postoperative changes of sleeve  gastrectomy.    MUSCULOSKELETAL: Degenerative changes in the thoracic spine.      Impression    IMPRESSION:  1. No evidence for pulmonary embolism.  2. Patchy groundglass opacity in the right upper lobe anteriorly and  medially is new since the previous exam, and may be infectious or  inflammatory.    CHEVY HAYDEN MD         SYSTEM ID:  [id number]       Recent vital signs:   /71   Pulse 81   Temp 98.4  F (36.9  C) (Oral)   Resp 16   Ht 1.6 m (5' 3\")   Wt 108.9 kg (240 lb)   LMP 09/09/2023 (Exact Date)   SpO2 100%   BMI 42.51 kg/m      Kera Coma Scale Score: 15 (01/05/24 1347)       Cardiac Rhythm: Normal Sinus  Pt needs tele? No  Skin/wound Issues: None    Code Status: Full Code    Pain control: good    Nausea control: pt had none    Abnormal labs/tests/findings requiring intervention: Iv antibiotics for pneumonia     Family present during ED course? No   Family Comments/Social Situation comments: Patient does not have her cellphone with her. Patient has a piece of paper with her daughter's number on it. Patient has been in contact with her daughter via the emergency room portable phone.     Tasks needing completion: None    Whit Ram RN  [phone] [facility]       "

## 2024-01-06 NOTE — PLAN OF CARE
Goal Outcome Evaluation:      Plan of Care Reviewed With: patient    Overall Patient Progress: improvingOverall Patient Progress: improving       VS: VSS   O2: SpO2 > 90% and stable on RA. LS clear and equal bilaterally. Denies chest pain and SOB.    Output: Voids spontaneously without difficulty to bathroom    Last BM: 1/5/24, denies abdominal discomfort. BS active    Activity: Up with SBA    Skin: Intact   Pain: Denies    CMS: Intact, AOx4. Denies numbness and tingling.   Dressing: None    Diet: Regular diet. Denies nausea/vomiting.    LDA: R PIV SL    Equipment: IV pole, personal belongings,    Plan: Continue with plan of care. Call light within reach, pt able to make needs known.    Additional Info:

## 2024-01-06 NOTE — PLAN OF CARE
Goal Outcome Evaluation:      Plan of Care Reviewed With: patient    Overall Patient Progress: improvingOverall Patient Progress: improving    Outcome Evaluation: pt denies SOB. states was SOB yesterday but no longer today    VS: VSS   O2: SpO2 > 90% and stable on RA. LS clear and equal bilaterally. Denies chest pain and SOB.    Output: Voids spontaneously without difficulty to bathroom    Last BM: 1/5/24, denies abdominal discomfort. BS active    Activity: Up with SBA    Skin: Intact   Pain: Denies    CMS: Intact, AOx4. Denies numbness and tingling.   Dressing: None    Diet: Regular diet.    LDA: R PIV SL    Equipment: IV pole, personal belongings,    Plan: Continue with plan of care. Call light within reach, pt able to make needs known.    Additional Info:  Pt vomited AM medications.   Denies N/V for the remainder of shift. Pt declined antinausea meds.

## 2024-01-06 NOTE — MEDICATION SCRIBE - ADMISSION MEDICATION HISTORY
Medication Scribe Admission Medication History    Admission medication history is complete. The information provided in this note is only as accurate as the sources available at the time of the update.    Information Source(s): Patient and CareEverywhere/SureScripts via in-person    Pertinent Information: Patient & daughter reported she changes clonidine patch every Friday & has not done so yet.     Changes made to PTA medication list:  Added: Propanolol  Deleted: None  Changed: None    Medication Affordability:       Allergies reviewed with patient and updates made in EHR: yes    Medication History Completed By: Scarlet Zheng 1/5/2024 6:51 PM    PTA Med List   Medication Sig Note Last Dose    ADVAIR -21 MCG/ACT inhaler Inhale 2 Puffs by mouth two times daily.*  1/5/2024    albuterol (PROAIR HFA) 108 (90 Base) MCG/ACT inhaler Inhale 1-2 puffs into the lungs every 4 hours as needed for shortness of breath / dyspnea or wheezing  1/5/2024    albuterol (PROVENTIL) (2.5 MG/3ML) 0.083% neb solution Take 1 vial (2.5 mg) by nebulization every 6 hours as needed for shortness of breath or wheezing  1/4/2024    amLODIPine (NORVASC) 10 MG tablet Take 1 tablet (10 mg) by mouth daily  1/5/2024 at am    carvedilol (COREG) 25 MG tablet Take 1 tablet (25 mg) by mouth 2 times daily (with meals)  1/5/2024 at am    cetirizine (ZYRTEC) 10 MG tablet Take 10 mg by mouth daily  1/5/2024 at am    cholecalciferol (VITAMIN D3) 125 mcg (5000 units) capsule Take 1 capsule (125 mcg) by mouth daily  1/4/2024 at pm    cloNIDine (CATAPRES-TTS1) 0.1 MG/24HR WK patch Place 1 patch onto the skin once a week Reminder: Remove previous patch before applying new patch.  If partial dose is needed, use adhesive bandage to cover/block proportional surface area of the patch. Patches should NOT be cut. 1/5/2024: On Friday's 12/29/2023    cyanocobalamin (VITAMIN B-12) 1000 MCG sublingual tablet Place 1 tablet (1,000 mcg) under the tongue daily   1/4/2024 at pm    gabapentin (NEURONTIN) 300 MG capsule Take 1 capsule (300 mg) by mouth at bedtime  1/4/2024 at pm    Lidocaine (LIDOCARE) 4 % Patch Place 1 patch onto the skin every 24 hours To prevent lidocaine toxicity, patient should be patch free for 12 hrs daily.Apply patch(s) to neck for neck pain. To prevent lidocaine toxicity, patient should be patch free for 12 hrs daily. Patches may be cut to smaller size prior to removing release liner. Reminder: Remove previous patch before applying new patch.  NEVER APPLY HEAT OVER PATCH which increases absorption and may lead to local anesthetic toxicity. Do not apply over area where liposomal bupivacaine was injected for 96 hours post injection.  Past Month    magnesium oxide (MAG-OX) 400 MG tablet Take 1 tablet (400 mg) by mouth daily  1/4/2024 at pm    Pediatric Multivitamins-Iron (MULTIVITAMINS PLUS IRON CHILD) 18 MG CHEW Take 1 chew tab by mouth 2 times daily Ok to substitute with any chewable that contains 18 mg of iron, Vitamin A, Thiamine and Zinc.  1/5/2024 at am    propranolol (INDERAL) 10 MG tablet Take 10 mg by mouth 3 times daily as needed  Past Week    rosuvastatin (CRESTOR) 10 MG tablet Take 1 tablet (10 mg) by mouth daily  1/5/2024 at am    ursodiol (ACTIGALL) 300 MG capsule Take 1 capsule (300 mg) by mouth 2 times daily for 180 days Start 2 weeks after surgery, do not open-take with warm liquid. Take twice a day for 6 months.  1/5/2024 at am    venlafaxine (EFFEXOR) 75 MG tablet Take 1 tablet (75 mg) by mouth 3 times daily  1/5/2024 at am

## 2024-01-06 NOTE — PROGRESS NOTES
[facility]    Medicine Progress Note - Hospitalist Service, GOLD TEAM 16    Date of Admission:  1/5/2024    Assessment & Plan     53 year old female admitted on 1/5/2024. She has a past medical history of anxiety, depression, JARVIS, asthma, hyperlipidemia, hypertension, obesity (s/p recent gastric sleeve which was complicated by peritonitis, SUSSY, hypoxia requiring intubation).  Patient presents with subacute persistent shortness of breath with exertion which improves with rest.  She reports since discharge from TCU, she has struggled with activities and reports being far from her baseline.  Workup this admission is notable for new patchy groundglass opacity in right upper lobe anteriorly and medially.  Patient being admitted for community-acquired pneumonia.     # Shortness of breath  # Concern for community-acquired pneumonia  -Reports difficulty breathing since discharge from TCU; shortness of breath is worse with exertion and improves with rest.  She denies any chest pain.  -Was seen by PCP, this is likely not related to asthma exacerbation.  -She reports 1 week of cough which is nonproductive, but no fever.  -Since arrival in the hospital, she remains afebrile and hemodynamically stable.  -Initial troponin obtained 16 --> 13  -CT chest obtained showed new patchy groundglass opacity in right upper lobe anteriorly and medially.  Very soft call for pneumonia.  -Influenza, RSV and COVID PCR all negative.  -She did receive x 1 ceftriaxone and azithromycin in the ER.  -On exam, she has very mild inspiratory wheezes throughout.  Continue azithromycin and ceftriaxone for presumed CAP  She appears euvolemic on examination.  Scheduled DuoNebs for now, otherwise resume PTA Advair.  Obtain repeat TTE.        # History of resistant hypertension  # History of stress cardiomyopathy  -PTA medications includes amlodipine 10 mg daily, Coreg 25 mg twice daily, she also has propranolol 10  mg 3 times daily as needed.  -Currently, she is normotensive  Start Coreg at a lower dose of 12.5 mg twice daily with holding parameters  Hold PTA amlodipine for now.  No need to order as needed propranolol for now.  Reorder clonidine patch q. weekly.  Patient is due for another dose today.     # Asthma  -Does not appear to be in exacerbation at this time, though she does have some mild expiratory wheezes diffusely.  Duo Nebs PRN  Continue on PTA advair      # Anxiety-resume PTA venlafaxine 75 mg 3 times daily  # Hyperlipidemia-resume PTA rosuvastatin  # History of thrombocytopenia with positive HIT screen-avoid heparinoid products; recommend patient ambulates for DVT prophylaxis.  # Recent history of history sleeve gastrectomy-resume multivitamins  # History of vitamin D deficiency-resume PTA vitamin D supplementation.  # Gallstone prevention-continue PTA ursodiol.  # History of hypomagnesemia-resume PTA magnesium supplement          Observation Goals: -diagnostic tests and consults completed and resulted, -vital signs normal or at patient baseline, -tolerating oral intake to maintain hydration, -adequate pain control on oral analgesics, -tolerating oral antibiotics or has plans for home infusion setup, -dyspnea improved and O2 sats greater than 88% on room air or prior home oxygen levels, -returns to baseline functional status, -safe disposition plan has been identified, Nurse to notify provider when observation goals have been met and patient is ready for discharge.  Diet: Combination Diet Low Saturated Fat Na <2400mg Diet, No Caffeine Diet    DVT Prophylaxis: Pneumatic Compression Devices and Anti-embolisim stockings (TEDs)  Patiño Catheter: Not present  Lines: None     Cardiac Monitoring: None  Code Status: Full Code      Clinically Significant Risk Factors Present on Admission              # Hypoalbuminemia: Lowest albumin = 3.3 g/dL at 1/6/2024  6:40 AM, will monitor as appropriate     # Hypertension: Noted on  "problem list      # Severe Obesity: Estimated body mass index is 42.51 kg/m  as calculated from the following:    Height as of this encounter: 1.6 m (5' 3\").    Weight as of this encounter: 108.9 kg (240 lb).       # Asthma: noted on problem list        Disposition Plan      Expected Discharge Date: 01/06/2024                    Venu Mooney MD  Hospitalist Service, GOLD TEAM 16  [facility]  Securely message with [employer] (more info)  Text page via [employer] Paging/Directory   See signed in provider for up to date coverage information  ______________________________________________________________________    Interval History     No acute events overnight  Patient was pleasant this morning  Patient's breathing is improving.  Denies fever or worsening productive cough.  She noticed some wheezing.  Patient denies chest pain, palpitations, fever, chills, abdominal pain, nausea or vomit.      Physical Exam   Vital Signs: Temp: 98.2  F (36.8  C) Temp src: Oral BP: 135/83 Pulse: 76   Resp: 17 SpO2: 94 % O2 Device: None (Room air)    Weight: 240 lbs 0 oz    General Appearance: Lying comfortably in bed, on room air, in no acute distress or discomfort.  HEENT: PERRL: EOMI; moist mucous membrane w/o lesions  Pulmonary: Normal respiratory effort, bilateral wheezing, no crackles.  CVS: Regular rhythm, no murmurs, rubs or gallops  GI: BS (+), soft nontender, no rebound or guarding   Neurologic: A&O x3, moving all extremities    Medical Decision Making       45 MINUTES SPENT BY ME on the date of service doing chart review, history, exam, documentation & further activities per the note.      Data     I have personally reviewed the following data over the past 24 hrs:    5.6  \   10.2 (L)   / 209     142 106 7.4 /  89   3.6 31 (H) 0.73 \     ALT: 18 AST: 15 AP: 78 TBILI: 0.2   ALB: 3.3 (L) TOT PROTEIN: 6.5 LIPASE: N/A     Trop: 13 BNP: 365     Procal: 0.03 CRP: N/A Lactic Acid: N/A "       INR:  N/A PTT:  N/A   D-dimer:  N/A Fibrinogen:  N/A       Imaging results reviewed over the past 24 hrs:   Recent Results (from the past 24 hour(s))   XR Chest 2 Views    Narrative    CHEST TWO VIEWS  1/5/2024 2:46 PM     HISTORY:  Shortness of breath.    COMPARISON: 5/8/2018.      Impression    IMPRESSION: Mild opacity at the right lung base is new since the  previous exam, and may be related to atelectasis or infection. The  left lung is clear. No pleural effusions. Heart size and pulmonary  vascularity are within normal limits.     CHEVY HAYDEN MD         SYSTEM ID:  [id number]   CT Chest Pulmonary Embolism w Contrast    Narrative    CT CHEST PULMONARY EMBOLISM WITH CONTRAST 1/5/2024 4:25 PM    CLINICAL HISTORY: Shortness of breath.  TECHNIQUE: CT angiogram chest during arterial phase injection IV  contrast. 2D and 3D MIP reconstructions were performed by the CT  technologist. Dose reduction techniques were used.   CONTRAST: 72mL Isovue 370  COMPARISON: Chest CT performed 11/8/2023.    FINDINGS:  ANGIOGRAM CHEST: Pulmonary arteries are normal caliber and negative  for pulmonary emboli. Thoracic aorta is negative for dissection.    LUNGS AND PLEURA: Mild patchy groundglass opacity in the right upper  lobe anteriorly and medially is new since the previous exam. Mild  atelectasis along the right major fissure. The left lung is clear. No  pneumothorax. No pleural effusions.    MEDIASTINUM/AXILLAE: No enlarged lymph nodes in the chest. No  pericardial effusion.    CORONARY ARTERY CALCIFICATION: Mild.    UPPER ABDOMEN: Small hiatal hernia. Postoperative changes of sleeve  gastrectomy.    MUSCULOSKELETAL: Degenerative changes in the thoracic spine.      Impression    IMPRESSION:  1. No evidence for pulmonary embolism.  2. Patchy groundglass opacity in the right upper lobe anteriorly and  medially is new since the previous exam, and may be infectious or  inflammatory.    CHEVY HAYDEN MD         SYSTEM ID:   [id number]

## 2024-01-07 PROCEDURE — 99232 SBSQ HOSP IP/OBS MODERATE 35: CPT | Performed by: INTERNAL MEDICINE

## 2024-01-07 PROCEDURE — 258N000003 HC RX IP 258 OP 636: Performed by: INTERNAL MEDICINE

## 2024-01-07 PROCEDURE — 94640 AIRWAY INHALATION TREATMENT: CPT

## 2024-01-07 PROCEDURE — 250N000013 HC RX MED GY IP 250 OP 250 PS 637: Performed by: INTERNAL MEDICINE

## 2024-01-07 PROCEDURE — 96376 TX/PRO/DX INJ SAME DRUG ADON: CPT

## 2024-01-07 PROCEDURE — G0378 HOSPITAL OBSERVATION PER HR: HCPCS

## 2024-01-07 PROCEDURE — 250N000011 HC RX IP 250 OP 636: Performed by: INTERNAL MEDICINE

## 2024-01-07 PROCEDURE — 999N000157 HC STATISTIC RCP TIME EA 10 MIN

## 2024-01-07 PROCEDURE — 250N000009 HC RX 250: Performed by: INTERNAL MEDICINE

## 2024-01-07 RX ORDER — AZITHROMYCIN 250 MG/1
250 TABLET, FILM COATED ORAL ONCE
Status: COMPLETED | OUTPATIENT
Start: 2024-01-07 | End: 2024-01-07

## 2024-01-07 RX ADMIN — VENLAFAXINE 75 MG: 75 TABLET ORAL at 13:11

## 2024-01-07 RX ADMIN — AZITHROMYCIN DIHYDRATE 250 MG: 250 TABLET ORAL at 20:52

## 2024-01-07 RX ADMIN — IPRATROPIUM BROMIDE AND ALBUTEROL SULFATE 3 ML: .5; 3 SOLUTION RESPIRATORY (INHALATION) at 17:08

## 2024-01-07 RX ADMIN — IPRATROPIUM BROMIDE AND ALBUTEROL SULFATE 3 ML: .5; 3 SOLUTION RESPIRATORY (INHALATION) at 07:50

## 2024-01-07 RX ADMIN — MICONAZOLE NITRATE: 20 POWDER TOPICAL at 20:56

## 2024-01-07 RX ADMIN — VENLAFAXINE 75 MG: 75 TABLET ORAL at 20:52

## 2024-01-07 RX ADMIN — URSODIOL 300 MG: 300 CAPSULE ORAL at 09:13

## 2024-01-07 RX ADMIN — Medication 1 TABLET: at 09:12

## 2024-01-07 RX ADMIN — MAGNESIUM OXIDE TAB 400 MG (241.3 MG ELEMENTAL MG) 400 MG: 400 (241.3 MG) TAB at 09:13

## 2024-01-07 RX ADMIN — URSODIOL 300 MG: 300 CAPSULE ORAL at 20:52

## 2024-01-07 RX ADMIN — Medication 125 MCG: at 09:13

## 2024-01-07 RX ADMIN — Medication 1000 MCG: at 09:12

## 2024-01-07 RX ADMIN — VENLAFAXINE 75 MG: 75 TABLET ORAL at 09:13

## 2024-01-07 RX ADMIN — AZITHROMYCIN MONOHYDRATE 500 MG: 500 INJECTION, POWDER, LYOPHILIZED, FOR SOLUTION INTRAVENOUS at 18:07

## 2024-01-07 RX ADMIN — GABAPENTIN 300 MG: 300 CAPSULE ORAL at 20:52

## 2024-01-07 RX ADMIN — CEFTRIAXONE SODIUM 2 G: 2 INJECTION, POWDER, FOR SOLUTION INTRAMUSCULAR; INTRAVENOUS at 16:44

## 2024-01-07 RX ADMIN — IPRATROPIUM BROMIDE AND ALBUTEROL SULFATE 3 ML: .5; 3 SOLUTION RESPIRATORY (INHALATION) at 21:28

## 2024-01-07 RX ADMIN — ROSUVASTATIN 10 MG: 10 TABLET, FILM COATED ORAL at 09:12

## 2024-01-07 RX ADMIN — IPRATROPIUM BROMIDE AND ALBUTEROL SULFATE 3 ML: .5; 3 SOLUTION RESPIRATORY (INHALATION) at 11:27

## 2024-01-07 RX ADMIN — CARVEDILOL 12.5 MG: 3.12 TABLET, FILM COATED ORAL at 09:11

## 2024-01-07 ASSESSMENT — ACTIVITIES OF DAILY LIVING (ADL)
ADLS_ACUITY_SCORE: 20

## 2024-01-07 NOTE — PLAN OF CARE
Goal Outcome Evaluation:      Plan of Care Reviewed With: patient    Overall Patient Progress: no changeOverall Patient Progress: no change.     Outcome Evaluation: pt denies SOB. states was SOB yesterday but no longer today Pt states overall feeling better than yesterday. However, having R sided abdominal pain.    VS: VSS   O2: SpO2 > 90% and stable on RA. LS clear and equal bilaterally. Denies chest pain and SOB.    Output: Voids spontaneously without difficulty to bathroom.   Last BM: LBM 1/7, denies abdominal discomfort. BS active.   Activity: Up independently in room.   Skin: WDL except, scars on abdomen and blanchable redness under abdominal fold.    Pain: Pt. Denies pain.    CMS: Intact, AOx4.pt,  numbness on right thigh baseline   Dressing: None.   Diet: Regular diet. Denies nausea/vomiting.    LDA: R PIV SL    Equipment: IV pole, personal belongings,    Plan: TBD. Continue with plan of care. Call light within reach, pt able to make needs known.    Additional Info:  Gave pt AM meds. Pt became nauseated but did not vomit. Pt declined antiemetic.

## 2024-01-07 NOTE — PROGRESS NOTES
[facility]    Medicine Progress Note - Hospitalist Service, GOLD TEAM 16    Date of Admission:  1/5/2024    Assessment & Plan     53 year old female admitted on 1/5/2024. She has a past medical history of anxiety, depression, JARVIS, asthma, hyperlipidemia, hypertension, obesity (s/p recent gastric sleeve which was complicated by peritonitis, SUSSY, hypoxia requiring intubation). Patient presents with subacute persistent shortness of breath with exertion which improves with rest. She reports since discharge from TCU, she has struggled with activities and reports being far from her baseline. Workup this admission is notable for new patchy groundglass opacity in right upper lobe anteriorly and medially. Patient being admitted for community-acquired pneumonia.     # Shortness of breath  # Concern for community-acquired pneumonia  -Breathing is improving. Clinically improving.   -CT chest obtained showed new patchy groundglass opacity in right upper lobe anteriorly and medially.   -Influenza, RSV and COVID PCR all negative.  -Continue on antibiotics for now.   Continue azithromycin and ceftriaxone for presumed CAP  She appears euvolemic on examination.  Scheduled DuoNebs for now, otherwise resume PTA Advair.  TTE as below showed normal EF.        # History of resistant hypertension  # History of stress cardiomyopathy  -PTA medications includes amlodipine 10 mg daily, Coreg 25 mg twice daily, she also has propranolol 10 mg 3 times daily as needed.  -Currently, she is normotensive  Continue on Coreg at a lower dose of 12.5 mg twice daily with holding parameters  Hold PTA amlodipine for now.  No need to order as needed propranolol for now.  Reorder clonidine patch q. weekly.  Patient is due for another dose today.     # Asthma  -Does not appear to be in exacerbation at this time, though she does have some mild expiratory wheezes diffusely.  Duo Nebs PRN  Continue on PTA advair     "  # Anxiety-resume PTA venlafaxine 75 mg 3 times daily  # Hyperlipidemia-resume PTA rosuvastatin  # History of thrombocytopenia with positive HIT screen-avoid heparinoid products; recommend patient ambulates for DVT prophylaxis.  # Recent history of history sleeve gastrectomy-resume multivitamins  # History of vitamin D deficiency-resume PTA vitamin D supplementation.  # Gallstone prevention-continue PTA ursodiol.  # History of hypomagnesemia-resume PTA magnesium supplement          Observation Goals: -diagnostic tests and consults completed and resulted, -vital signs normal or at patient baseline, -tolerating oral intake to maintain hydration, -adequate pain control on oral analgesics, -tolerating oral antibiotics or has plans for home infusion setup, -dyspnea improved and O2 sats greater than 88% on room air or prior home oxygen levels, -returns to baseline functional status, -safe disposition plan has been identified, Nurse to notify provider when observation goals have been met and patient is ready for discharge.  Diet: Combination Diet Low Saturated Fat Na <2400mg Diet, No Caffeine Diet    DVT Prophylaxis: Pneumatic Compression Devices and Anti-embolisim stockings (TEDs)  Patiño Catheter: Not present  Lines: None     Cardiac Monitoring: None  Code Status: Full Code      Clinically Significant Risk Factors Present on Admission              # Hypoalbuminemia: Lowest albumin = 3.3 g/dL at 1/6/2024  6:40 AM, will monitor as appropriate     # Hypertension: Noted on problem list      # Severe Obesity: Estimated body mass index is 42.51 kg/m  as calculated from the following:    Height as of this encounter: 1.6 m (5' 3\").    Weight as of this encounter: 108.9 kg (240 lb).       # Asthma: noted on problem list        Disposition Plan     Possible discharge home tomorrow.          Venu Mooney MD  Hospitalist Service, GOLD TEAM 16  [facility]  Securely message with " Yoan (more info)  Text page via [facility] Paging/Directory   See signed in provider for up to date coverage information  ______________________________________________________________________    Interval History     No acute events overnight  Patient was pleasant this morning  Patient's breathing is improving.    Denies fever or worsening productive cough.  No wheezing this morning.   Patient denies chest pain, palpitations, fever, chills, abdominal pain, nausea or vomit.      Physical Exam   Vital Signs: Temp: 98.2  F (36.8  C) Temp src: Oral BP: 134/79 Pulse: 86   Resp: 16 SpO2: 95 % O2 Device: None (Room air)    Weight: 240 lbs 0 oz    General Appearance: Lying comfortably in bed, on room air, in no acute distress or discomfort.  Pulmonary: Normal respiratory effort, bilateral wheezing, no crackles.  CVS: Regular rhythm, no murmurs, rubs or gallops  GI: BS (+), soft nontender, no rebound or guarding   Neurologic: A&O x3, moving all extremities    Medical Decision Making       45 MINUTES SPENT BY ME on the date of service doing chart review, history, exam, documentation & further activities per the note.      Data         Imaging results reviewed over the past 24 hrs:   Recent Results (from the past 24 hour(s))   Echo Complete   Result Value    LVEF  55-60%    Narrative    [id number]  [id number]  [id number]  [id number]^RENAN^CARMITA     [facility]  Echocardiography [facility]  [address] [place] [id number]     Name: LY GONZALEZ  MRN: [id number]  : 1970  Study Date: 2024 11:52 AM  Age: 53 yrs  Gender: Female  Patient Location: [facility]  Reason For Study: Dyspnea  Ordering Physician: CARMITA URRUTIA  Performed By: Kate Jordan     BSA: 2.1 m2  Height: 63 in  Weight: 240 lb  BP: 116/71 mmHg  ______________________________________________________________________________  Procedure  Complete Portable Echo Adult. Contrast Optison. Technically difficult  study.  Technically difficult study.Extremely poor acoustic windows. Optison (NDC  #4494-5845-76) given intravenously. Patient was given 6 ml mixture of 3 ml  Optison and 6 ml saline. 3 ml wasted.  ______________________________________________________________________________  Interpretation Summary  Technically difficult study.Extremely poor acoustic windows.     Global and regional left ventricular function is normal with an EF of 55-60%.  Left ventricular diastolic function is not assessable.  Right ventricular function, chamber size, wall motion, and thickness are  normal.  The inferior vena cava was normal in size with preserved respiratory  variability.  No significant valvular abnormalities present.     This study was compared with the study from 11/8/2023 .  No significant changes noted.  ______________________________________________________________________________  Left Ventricle  Global and regional left ventricular function is normal with an EF of 55-60%.  Left ventricular size is normal. Left ventricular wall thickness cannot  evaluate. Left ventricular diastolic function is not assessable.     Right Ventricle  Right ventricular function, chamber size, wall motion, and thickness are  normal.     Atria  The atria cannot be assessed.     Mitral Valve  The mitral valve is normal.     Aortic Valve  The valve leaflets are not well visualized. On Doppler interrogation, there is  no significant stenosis or regurgitation.     Tricuspid Valve  The tricuspid valve is normal.     Pulmonic Valve  The pulmonic valve cannot be assessed.     Vessels  The inferior vena cava was normal in size with preserved respiratory  variability. The aorta root cannot be assessed.     Pericardium  No pericardial effusion is present.     Miscellaneous  No significant valvular abnormalities present.     Compared to Previous Study  This study was compared with the study from 11/8/2023 . No significant  changes  noted.  ______________________________________________________________________________  Doppler Measurements & Calculations  PA acc time: 0.11 sec  TR max levy: 229.0 cm/sec  TR max P.0 mmHg     ______________________________________________________________________________  Report approved by: Jose ESPINOZA 2024 03:00 PM

## 2024-01-07 NOTE — PROGRESS NOTES
Pt states she is having mild R sided pain. Pain began yesterday. Intermittent pain, sharp. Pt states frequency increasing.     Pt also requesting Echo results.    MD paged.

## 2024-01-07 NOTE — UTILIZATION REVIEW
Concurrent stay review; Secondary Review Determination     [facility]          Under the authority of the Utilization Management Committee, the utilization review process indicated a secondary review on the above patient.  The review outcome is based on review of the medical records, discussions with staff, and applying clinical experience noted on the date of the review.          (x) Observation Status Appropriate - Concurrent stay review    RATIONALE FOR DETERMINATION          The Patient is 54 y/o  woman,  with PMHx significant for  anxiety, depression, JARVIS, asthma, hyperlipidemia, hypertension, obesity (s/p recent gastric sleeve which was complicated by peritonitis, SUSSY, hypoxia requiring intubation).  Patient presents with subacute persistent shortness of breath with exertion which improves with rest.  She reports since discharge from TCU, she has struggled with activities and reports being far from her baseline.  Workup this admission is notable for new patchy groundglass opacity in right upper lobe anteriorly and medially.  Patient being admitted for community-acquired pneumonia and she has been receiving treatment with azithromycin and IV ceftriaxone.  The patient did not have fever, did not have leukocytosis.  Patient's breathing is improving.  Denies fever or worsening productive cough.No wheezing this morning.  Echocardiogram with LVEF of 55 to 60%, no changes compared with prior study from November 2023    Patient is clinically improving and there is no clear indication to change patient's status to inpatient. The severity of illness, intensity of service provided, expected LOS and risk for adverse outcome make the care appropriate for observation.      This document was produced using voice recognition software       The information on this document is developed by the utilization review team in order for the business office to ensure compliance.  This only denotes the  appropriateness of proper admission status and does not reflect the quality of care rendered.         The definitions of Inpatient Status and Observation Status used in making the determination above are those provided in the CMS Coverage Manual, Chapter 1 and Chapter 6, section 70.4.      Sincerely,     ROSANA BRITO MD   Utilization Review  Physician Advisor  [facility]

## 2024-01-07 NOTE — PLAN OF CARE
"Goal Outcome Evaluation:      Plan of Care Reviewed With: patient    Overall Patient Progress: improvingOverall Patient Progress: improving    VS: /70   Pulse 74   Temp 98.3  F (36.8  C) (Oral)   Resp 16   Ht 1.6 m (5' 3\")   Wt 108.9 kg (240 lb)   LMP 09/09/2023 (Exact Date)   SpO2 96%   BMI 42.51 kg/m       O2: SpO2 > 90% and stable on RA. LS clear and equal bilaterally. Denies chest pain and SOB. Encouraged IS and pt is at 1750.   Output: Voids spontaneously without difficulty to bathroom.   Last BM: LBM 1/5, denies abdominal discomfort. BS active.   Activity: Up independently in room.   Skin: WDL except, scares on abdomen and blanchable redness under abdominal fold.    Pain: Pt. Denies pain.    CMS: Intact, AOx4.pt c/o numbness on right thigh after surgery per pt report.   Dressing: None.   Diet: Regular diet. Denies nausea/vomiting.    LDA: PIV SL into right AC between abx.    Equipment: IV pole, personal belongings,    Plan: TBD. Continue with plan of care. Call light within reach, pt able to make needs known.    Additional Info:                   "

## 2024-01-07 NOTE — PLAN OF CARE
Goal Outcome Evaluation:      Plan of Care Reviewed With: patient    Overall Patient Progress: improvingOverall Patient Progress: improving       VS: VSS   O2: SpO2 > 90% and stable on RA. LS clear and equal bilaterally. Denies chest pain and SOB. Encouraged IS and pt is at 1750.   Output: Voids spontaneously without difficulty to bathroom.   Last BM: LBM 1/5, denies abdominal discomfort. BS active.   Activity: Up independently in room.   Skin: WDL except, scars on abdomen and blanchable redness under abdominal fold.    Pain: Pt. Denies pain.    CMS: Intact, AOx4.pt,  numbness on right thigh baseline   Dressing: None.   Diet: Regular diet. Denies nausea/vomiting.    LDA: PIV SL into right AC between abx.    Equipment: IV pole, personal belongings,    Plan: TBD. Continue with plan of care. Call light within reach, pt able to make needs known.    Additional Info:

## 2024-01-08 VITALS
TEMPERATURE: 98.5 F | WEIGHT: 240 LBS | HEIGHT: 63 IN | DIASTOLIC BLOOD PRESSURE: 84 MMHG | SYSTOLIC BLOOD PRESSURE: 133 MMHG | BODY MASS INDEX: 42.52 KG/M2 | OXYGEN SATURATION: 96 % | HEART RATE: 90 BPM | RESPIRATION RATE: 16 BRPM

## 2024-01-08 PROCEDURE — G0378 HOSPITAL OBSERVATION PER HR: HCPCS

## 2024-01-08 PROCEDURE — 250N000013 HC RX MED GY IP 250 OP 250 PS 637: Performed by: INTERNAL MEDICINE

## 2024-01-08 PROCEDURE — 999N000157 HC STATISTIC RCP TIME EA 10 MIN

## 2024-01-08 PROCEDURE — 99239 HOSP IP/OBS DSCHRG MGMT >30: CPT | Performed by: INTERNAL MEDICINE

## 2024-01-08 PROCEDURE — 94640 AIRWAY INHALATION TREATMENT: CPT

## 2024-01-08 PROCEDURE — 250N000009 HC RX 250: Performed by: INTERNAL MEDICINE

## 2024-01-08 RX ORDER — DOXYCYCLINE 100 MG/1
100 CAPSULE ORAL EVERY 12 HOURS SCHEDULED
Status: DISCONTINUED | OUTPATIENT
Start: 2024-01-08 | End: 2024-01-08 | Stop reason: HOSPADM

## 2024-01-08 RX ORDER — DOXYCYCLINE 100 MG/1
100 CAPSULE ORAL EVERY 12 HOURS
Qty: 8 CAPSULE | Refills: 0 | Status: ON HOLD | OUTPATIENT
Start: 2024-01-08 | End: 2024-01-12

## 2024-01-08 RX ADMIN — ROSUVASTATIN 10 MG: 10 TABLET, FILM COATED ORAL at 07:41

## 2024-01-08 RX ADMIN — Medication 1 TABLET: at 07:40

## 2024-01-08 RX ADMIN — VENLAFAXINE 75 MG: 75 TABLET ORAL at 07:40

## 2024-01-08 RX ADMIN — URSODIOL 300 MG: 300 CAPSULE ORAL at 07:40

## 2024-01-08 RX ADMIN — MAGNESIUM OXIDE TAB 400 MG (241.3 MG ELEMENTAL MG) 400 MG: 400 (241.3 MG) TAB at 07:40

## 2024-01-08 RX ADMIN — Medication 1000 MCG: at 07:40

## 2024-01-08 RX ADMIN — DOXYCYCLINE HYCLATE 100 MG: 100 CAPSULE ORAL at 10:32

## 2024-01-08 RX ADMIN — Medication 125 MCG: at 07:41

## 2024-01-08 RX ADMIN — IPRATROPIUM BROMIDE AND ALBUTEROL SULFATE 3 ML: .5; 3 SOLUTION RESPIRATORY (INHALATION) at 08:04

## 2024-01-08 RX ADMIN — CARVEDILOL 12.5 MG: 3.12 TABLET, FILM COATED ORAL at 07:40

## 2024-01-08 RX ADMIN — MICONAZOLE NITRATE: 20 POWDER TOPICAL at 07:58

## 2024-01-08 ASSESSMENT — ACTIVITIES OF DAILY LIVING (ADL)
ADLS_ACUITY_SCORE: 20

## 2024-01-08 NOTE — PLAN OF CARE
Patient A/Ox4. VSS. Denies CP, SOB, dizziness/LH. LSCTA. +fl/BS. Voiding independently in bathroom. CMS intact. Tolerating regular diet without NV. Activity level is good, pt independent in room. IV SL. Pain rated as comfortably managed, no PRN requested. Sleeping on routine rounds. Likely discharge today. Patient has demonstrated ability to call appropriately. Patient is resting with call light within reach. Will continue to monitor.

## 2024-01-08 NOTE — PLAN OF CARE
Goal Outcome Evaluation:      Plan of Care Reviewed With: patient    Overall Patient Progress: improving    Outcome Evaluation: Pt feeling better and excited for discharge. Pt already scheduled follow-up appointment with primary provider.    DISCHARGE SUMMARY    Pt discharging to: Home  Transportation: Family  AVS given and discussed: Pt was given AVS and pt states understanding of content. Pt has no further questions.   Stoplight Tool given and discussed: Not applicable.  Medications given: Prescription sent to preferred pharmacy. Pt understands antibiotic schedule.  Belongings returned: Yes, ensured all belongings packed and sent with pt. No items in security.   Comments: Escorted safely to elevators. Pt left at 1055.

## 2024-01-08 NOTE — PLAN OF CARE
1900--2330:    Pt is alert and ox4, VSS, on RA.    Denied pain, chest pain, SOB, N/V,    IND,     Takes pill as whole with water.   No PIV access.    Redness and moisture at abdominal skin fold, managed with antifungal power,     Cont of B/B,    Plan to discharge home tomorrow.

## 2024-01-08 NOTE — DISCHARGE SUMMARY
"[facility]  Hospitalist Discharge Summary      Date of Admission:  1/5/2024  Date of Discharge:  1/8/2024  Discharging Provider: Venu Mooney MD  Discharge Service: Hospitalist Service, GOLD TEAM 16    Discharge Diagnoses     Shortness of breath   Community acquired pneumonia   Hypertension   Hx of stress cardiomyopathy       Clinically Significant Risk Factors     # Severe Obesity: Estimated body mass index is 42.51 kg/m  as calculated from the following:    Height as of this encounter: 1.6 m (5' 3\").    Weight as of this encounter: 108.9 kg (240 lb).       Follow-ups Needed After Discharge   Follow-up Appointments     Adult [facility] Follow-up and recommended labs and tests      Follow up with primary care provider, Franca Mishra, within 7 days   for hospital follow- up.  No follow up labs or test are needed.      Appointments on [place] and/or [facility] (with [facility] or [facility]   provider or service). Call [phone] if you haven't heard regarding   these appointments within 7 days of discharge.            Unresulted Labs Ordered in the Past 30 Days of this Admission       No orders found from 12/6/2023 to 1/6/2024.            Discharge Disposition   Discharged to home  Condition at discharge: Stable    Hospital Course   Mojgan Jimenez is a 53 year old female admitted on 1/5/2024. She has a past medical history of anxiety, depression, JARVIS, asthma, hyperlipidemia, hypertension, obesity (s/p recent gastric sleeve which was complicated by peritonitis, SUSSY, hypoxia requiring intubation). Patient presents with subacute persistent shortness of breath with exertion which improves with rest. She reports since discharge from TCU, she has struggled with activities and reports being far from her baseline. CT chest showed new patchy groundglass opacity in right upper lobe anteriorly and medially. Patient being admitted for community-acquired pneumonia. Patient was " started on Azithromycin / Ceftriaxone. PCR for Influenza, RSV and COVID PCR all negative. Patient felt better after antibiotics were started, respiratory status remained stable. Patient remained hemodynamically stable and afebrile. TTE showed normal EF.   Patient will complete abx regimen as outpatient. I recommended follow-up with PCP and recheck imaging study to ensure groundglass infiltrate.              Consultations This Hospital Stay   None    Code Status   Full Code    Time Spent on this Encounter   I, Venu Mooney MD, personally saw the patient today and spent greater than 30 minutes discharging this patient.       Venu Mooney MD  [facility] MED SURG ORTHOPEDIC  [facility] [address]  Phone: [phone]  Fax: [phone]  ______________________________________________________________________    Physical Exam   Vital Signs: Temp: 98.5  F (36.9  C) Temp src: Oral BP: 133/84 Pulse: 90   Resp: 16 SpO2: 96 % O2 Device: None (Room air)    Weight: 240 lbs 0 oz    General Appearance: Lying comfortably in bed, on room air, in no acute distress or discomfort.  Pulmonary: Normal respiratory effort, no wheezing, no crackles.  CVS: Regular rhythm, no murmurs, rubs or gallops  GI: BS (+), soft nontender, no rebound or guarding   Neurologic: A&O x3, moving all extremities       Primary Care Physician   Franca Mishra    Discharge Orders      Reason for your hospital stay    53 year old female admitted on 1/5/2024. She has a past medical history of anxiety, depression, JARVIS, asthma, hyperlipidemia, hypertension, obesity (s/p recent gastric sleeve which was complicated by peritonitis, SUSSY, hypoxia requiring intubation). Patient presents with subacute persistent shortness of breath with exertion which improves with rest. She reports since discharge from TCU, she has struggled with activities and reports being far from her baseline. Workup this admission is notable  for new patchy groundglass opacity in right upper lobe anteriorly and medially. Patient being admitted for community-acquired pneumonia.     Activity    Your activity upon discharge: activity as tolerated     Adult [facility] Follow-up and recommended labs and tests    Follow up with primary care provider, Franca Mishra, within 7 days for hospital follow- up.  No follow up labs or test are needed.      Appointments on [place] and/or [facility] (with [facility] or [facility] provider or service). Call [phone] if you haven't heard regarding these appointments within 7 days of discharge.     Diet    Follow this diet upon discharge: Orders Placed This Encounter      Combination Diet Low Saturated Fat Na <2400mg Diet, No Caffeine Diet       Significant Results and Procedures   Results for orders placed or performed during the hospital encounter of 01/05/24   XR Chest 2 Views    Narrative    CHEST TWO VIEWS  1/5/2024 2:46 PM     HISTORY:  Shortness of breath.    COMPARISON: 5/8/2018.      Impression    IMPRESSION: Mild opacity at the right lung base is new since the  previous exam, and may be related to atelectasis or infection. The  left lung is clear. No pleural effusions. Heart size and pulmonary  vascularity are within normal limits.     CHEVY HAYDEN MD         SYSTEM ID:  [id number]   CT Chest Pulmonary Embolism w Contrast    Narrative    CT CHEST PULMONARY EMBOLISM WITH CONTRAST 1/5/2024 4:25 PM    CLINICAL HISTORY: Shortness of breath.  TECHNIQUE: CT angiogram chest during arterial phase injection IV  contrast. 2D and 3D MIP reconstructions were performed by the CT  technologist. Dose reduction techniques were used.   CONTRAST: 72mL Isovue 370  COMPARISON: Chest CT performed 11/8/2023.    FINDINGS:  ANGIOGRAM CHEST: Pulmonary arteries are normal caliber and negative  for pulmonary emboli. Thoracic aorta is negative for dissection.    LUNGS AND PLEURA: Mild patchy groundglass opacity in the right upper  lobe  anteriorly and medially is new since the previous exam. Mild  atelectasis along the right major fissure. The left lung is clear. No  pneumothorax. No pleural effusions.    MEDIASTINUM/AXILLAE: No enlarged lymph nodes in the chest. No  pericardial effusion.    CORONARY ARTERY CALCIFICATION: Mild.    UPPER ABDOMEN: Small hiatal hernia. Postoperative changes of sleeve  gastrectomy.    MUSCULOSKELETAL: Degenerative changes in the thoracic spine.      Impression    IMPRESSION:  1. No evidence for pulmonary embolism.  2. Patchy groundglass opacity in the right upper lobe anteriorly and  medially is new since the previous exam, and may be infectious or  inflammatory.    CHEVY HAYDEN MD         SYSTEM ID:  [id number]   Echo Complete     Value    LVEF  55-60%    Narrative    [id number]  [id number]  [id number]  [id number]^RENAN^CARMITA     [facility]  Echocardiography Laboratory  [address] [place] [id number]     Name: LY GONZALEZ  MRN: [id number]  : 1970  Study Date: 2024 11:52 AM  Age: 53 yrs  Gender: Female  Patient Location: [facility]  Reason For Study: Dyspnea  Ordering Physician: CARMITA URRUTIA  Performed By: Kate Jordan     BSA: 2.1 m2  Height: 63 in  Weight: 240 lb  BP: 116/71 mmHg  ______________________________________________________________________________  Procedure  Complete Portable Echo Adult. Contrast Optison. Technically difficult study.  Technically difficult study.Extremely poor acoustic windows. Optison (NDC  #7270-5016-57) given intravenously. Patient was given 6 ml mixture of 3 ml  Optison and 6 ml saline. 3 ml wasted.  ______________________________________________________________________________  Interpretation Summary  Technically difficult study.Extremely poor acoustic windows.     Global and regional left ventricular function is normal with an EF of 55-60%.  Left ventricular diastolic function is not assessable.  Right ventricular  function, chamber size, wall motion, and thickness are  normal.  The inferior vena cava was normal in size with preserved respiratory  variability.  No significant valvular abnormalities present.     This study was compared with the study from 2023 .  No significant changes noted.  ______________________________________________________________________________  Left Ventricle  Global and regional left ventricular function is normal with an EF of 55-60%.  Left ventricular size is normal. Left ventricular wall thickness cannot  evaluate. Left ventricular diastolic function is not assessable.     Right Ventricle  Right ventricular function, chamber size, wall motion, and thickness are  normal.     Atria  The atria cannot be assessed.     Mitral Valve  The mitral valve is normal.     Aortic Valve  The valve leaflets are not well visualized. On Doppler interrogation, there is  no significant stenosis or regurgitation.     Tricuspid Valve  The tricuspid valve is normal.     Pulmonic Valve  The pulmonic valve cannot be assessed.     Vessels  The inferior vena cava was normal in size with preserved respiratory  variability. The aorta root cannot be assessed.     Pericardium  No pericardial effusion is present.     Miscellaneous  No significant valvular abnormalities present.     Compared to Previous Study  This study was compared with the study from 2023 . No significant changes  noted.  ______________________________________________________________________________  Doppler Measurements & Calculations  PA acc time: 0.11 sec  TR max levy: 229.0 cm/sec  TR max P.0 mmHg     ______________________________________________________________________________  Report approved by: Jose ESPINOZA 2024 03:00 PM             Discharge Medications   Current Discharge Medication List        START taking these medications    Details   doxycycline hyclate (VIBRAMYCIN) 100 MG capsule Take 1 capsule (100 mg) by mouth every  12 hours for 4 days  Qty: 8 capsule, Refills: 0    Associated Diagnoses: Community acquired pneumonia of right upper lobe of lung           CONTINUE these medications which have NOT CHANGED    Details   ADVAIR -21 MCG/ACT inhaler Inhale 2 Puffs by mouth two times daily.*      albuterol (PROAIR HFA) 108 (90 Base) MCG/ACT inhaler Inhale 1-2 puffs into the lungs every 4 hours as needed for shortness of breath / dyspnea or wheezing  Qty: 8.5 g, Refills: 11    Comments: Pharmacy may dispense brand covered by insurance (Proair, or proventil or ventolin or generic albuterol inhaler)  Associated Diagnoses: Moderate persistent asthma without complication      albuterol (PROVENTIL) (2.5 MG/3ML) 0.083% neb solution Take 1 vial (2.5 mg) by nebulization every 6 hours as needed for shortness of breath or wheezing  Qty: 3 mL, Refills: 4    Associated Diagnoses: Moderate persistent asthma without complication      amLODIPine (NORVASC) 10 MG tablet Take 1 tablet (10 mg) by mouth daily  Qty: 30 tablet, Refills: 1    Associated Diagnoses: Cardiomyopathy, unspecified type (H); Hypertension, unspecified type      carvedilol (COREG) 25 MG tablet Take 1 tablet (25 mg) by mouth 2 times daily (with meals)  Qty: 60 tablet, Refills: 1    Associated Diagnoses: Cardiomyopathy, unspecified type (H); Hypertension, unspecified type      cetirizine (ZYRTEC) 10 MG tablet Take 10 mg by mouth daily      cholecalciferol (VITAMIN D3) 125 mcg (5000 units) capsule Take 1 capsule (125 mcg) by mouth daily  Qty: 30 capsule, Refills: 1    Associated Diagnoses: Debility      cloNIDine (CATAPRES-TTS1) 0.1 MG/24HR WK patch Place 1 patch onto the skin once a week Reminder: Remove previous patch before applying new patch.  If partial dose is needed, use adhesive bandage to cover/block proportional surface area of the patch. Patches should NOT be cut.  Qty: 4 patch, Refills: 1    Associated Diagnoses: Hypertension, unspecified type      cyanocobalamin  (VITAMIN B-12) 1000 MCG sublingual tablet Place 1 tablet (1,000 mcg) under the tongue daily  Qty: 30 tablet, Refills: 1    Associated Diagnoses: Debility      gabapentin (NEURONTIN) 300 MG capsule Take 1 capsule (300 mg) by mouth at bedtime  Qty: 30 capsule, Refills: 1    Associated Diagnoses: LINA (generalized anxiety disorder)      Lidocaine (LIDOCARE) 4 % Patch Place 1 patch onto the skin every 24 hours To prevent lidocaine toxicity, patient should be patch free for 12 hrs daily.Apply patch(s) to neck for neck pain. To prevent lidocaine toxicity, patient should be patch free for 12 hrs daily. Patches may be cut to smaller size prior to removing release liner. Reminder: Remove previous patch before applying new patch.  NEVER APPLY HEAT OVER PATCH which increases absorption and may lead to local anesthetic toxicity. Do not apply over area where liposomal bupivacaine was injected for 96 hours post injection.  Qty: 15 patch, Refills: 1    Associated Diagnoses: Neck pain      magnesium oxide (MAG-OX) 400 MG tablet Take 1 tablet (400 mg) by mouth daily  Qty: 30 tablet, Refills: 1    Associated Diagnoses: Debility      Pediatric Multivitamins-Iron (MULTIVITAMINS PLUS IRON CHILD) 18 MG CHEW Take 1 chew tab by mouth 2 times daily Ok to substitute with any chewable that contains 18 mg of iron, Vitamin A, Thiamine and Zinc.  Qty: 180 tablet, Refills: 3    Comments: Ok to substitute with any chewable that contains 18 mg of iron, Vitamin A, Thiamine and Zinc.  Associated Diagnoses: Morbid obesity (H); S/P bariatric surgery; Intestinal malabsorption, unspecified type      propranolol (INDERAL) 10 MG tablet Take 10 mg by mouth 3 times daily as needed      rosuvastatin (CRESTOR) 10 MG tablet Take 1 tablet (10 mg) by mouth daily  Qty: 90 tablet, Refills: 9    Associated Diagnoses: Hyperlipidemia LDL goal <160      ursodiol (ACTIGALL) 300 MG capsule Take 1 capsule (300 mg) by mouth 2 times daily for 180 days Start 2 weeks after  surgery, do not open-take with warm liquid. Take twice a day for 6 months.  Qty: 180 capsule, Refills: 1    Comments: Start 2 weeks after surgery, do not open-take with warm liquid. Take twice a day for 6 months.  Associated Diagnoses: Morbid obesity (H); S/P bariatric surgery; Rapid weight loss      venlafaxine (EFFEXOR) 75 MG tablet Take 1 tablet (75 mg) by mouth 3 times daily  Qty: 30 tablet, Refills: 1    Associated Diagnoses: LINA (generalized anxiety disorder)      acetaminophen (TYLENOL) 500 MG tablet Take 500-1,000 mg by mouth every 6 hours as needed for mild pain           Allergies   No Known Allergies

## 2024-01-08 NOTE — PROGRESS NOTES
VS: VSS, AOX4   O2: RA   Output: Continent x2    Last BM: 1/6   Activity: Ind   Skin: Abdominal flap redness; blanchable; provider aware.    Pain: Denies   CMS: Denies   Diet: Regular. Takes pills whole   Plan: Discharge tomorrow   Additional Info: Pt denies pain and chest pain.     No acute changes, call light within reach, continue with plan of care.

## 2024-01-09 ENCOUNTER — HOSPITAL ENCOUNTER (INPATIENT)
Facility: CLINIC | Age: 54
LOS: 3 days | Discharge: HOME | End: 2024-01-12
Attending: FAMILY MEDICINE | Admitting: PEDIATRICS
Payer: COMMERCIAL

## 2024-01-09 ENCOUNTER — MYC MEDICAL ADVICE (OUTPATIENT)
Dept: SURGERY | Facility: CLINIC | Age: 54
End: 2024-01-09
Payer: COMMERCIAL

## 2024-01-09 ENCOUNTER — VIRTUAL VISIT (OUTPATIENT)
Dept: SURGERY | Facility: CLINIC | Age: 54
End: 2024-01-09
Payer: COMMERCIAL

## 2024-01-09 ENCOUNTER — APPOINTMENT (OUTPATIENT)
Dept: GENERAL RADIOLOGY | Facility: CLINIC | Age: 54
End: 2024-01-09
Attending: FAMILY MEDICINE
Payer: COMMERCIAL

## 2024-01-09 ENCOUNTER — PATIENT OUTREACH (OUTPATIENT)
Dept: CARE COORDINATION | Facility: CLINIC | Age: 54
End: 2024-01-09

## 2024-01-09 DIAGNOSIS — J45.901 EXACERBATION OF ASTHMA, UNSPECIFIED ASTHMA SEVERITY, UNSPECIFIED WHETHER PERSISTENT: ICD-10-CM

## 2024-01-09 DIAGNOSIS — Z71.3 NUTRITIONAL COUNSELING: ICD-10-CM

## 2024-01-09 DIAGNOSIS — Z98.84 S/P BARIATRIC SURGERY: Primary | ICD-10-CM

## 2024-01-09 DIAGNOSIS — R06.03 RESPIRATORY DISTRESS: ICD-10-CM

## 2024-01-09 DIAGNOSIS — J18.9 PNEUMONIA OF RIGHT UPPER LOBE DUE TO INFECTIOUS ORGANISM: ICD-10-CM

## 2024-01-09 DIAGNOSIS — R06.09 DOE (DYSPNEA ON EXERTION): Primary | ICD-10-CM

## 2024-01-09 DIAGNOSIS — J45.40 MODERATE PERSISTENT ASTHMA WITHOUT COMPLICATION: ICD-10-CM

## 2024-01-09 DIAGNOSIS — K91.2 POSTOPERATIVE MALABSORPTION: ICD-10-CM

## 2024-01-09 LAB
ALBUMIN SERPL BCG-MCNC: 3.7 G/DL (ref 3.5–5.2)
ALP SERPL-CCNC: 90 U/L (ref 40–150)
ALT SERPL W P-5'-P-CCNC: 20 U/L (ref 0–50)
ANION GAP SERPL CALCULATED.3IONS-SCNC: 10 MMOL/L (ref 7–15)
ANION GAP SERPL CALCULATED.3IONS-SCNC: 10 MMOL/L (ref 7–15)
AST SERPL W P-5'-P-CCNC: 20 U/L (ref 0–45)
ATRIAL RATE - MUSE: 83 BPM
BASOPHILS # BLD AUTO: 0 10E3/UL (ref 0–0.2)
BASOPHILS NFR BLD AUTO: 0 %
BILIRUB SERPL-MCNC: 0.2 MG/DL
BUN SERPL-MCNC: 4.8 MG/DL (ref 6–20)
BUN SERPL-MCNC: 4.9 MG/DL (ref 6–20)
CALCIUM SERPL-MCNC: 9.5 MG/DL (ref 8.6–10)
CALCIUM SERPL-MCNC: 9.5 MG/DL (ref 8.6–10)
CHLORIDE SERPL-SCNC: 104 MMOL/L (ref 98–107)
CHLORIDE SERPL-SCNC: 106 MMOL/L (ref 98–107)
CREAT SERPL-MCNC: 0.75 MG/DL (ref 0.51–0.95)
CREAT SERPL-MCNC: 0.75 MG/DL (ref 0.51–0.95)
DEPRECATED HCO3 PLAS-SCNC: 26 MMOL/L (ref 22–29)
DEPRECATED HCO3 PLAS-SCNC: 26 MMOL/L (ref 22–29)
DIASTOLIC BLOOD PRESSURE - MUSE: NORMAL MMHG
EGFRCR SERPLBLD CKD-EPI 2021: >90 ML/MIN/1.73M2
EGFRCR SERPLBLD CKD-EPI 2021: >90 ML/MIN/1.73M2
EOSINOPHIL # BLD AUTO: 0.4 10E3/UL (ref 0–0.7)
EOSINOPHIL NFR BLD AUTO: 6 %
ERYTHROCYTE [DISTWIDTH] IN BLOOD BY AUTOMATED COUNT: 12.6 % (ref 10–15)
FLUAV RNA SPEC QL NAA+PROBE: NEGATIVE
FLUBV RNA RESP QL NAA+PROBE: NEGATIVE
GLUCOSE SERPL-MCNC: 97 MG/DL (ref 70–99)
GLUCOSE SERPL-MCNC: 97 MG/DL (ref 70–99)
HCT VFR BLD AUTO: 36.8 % (ref 35–47)
HGB BLD-MCNC: 11.4 G/DL (ref 11.7–15.7)
IMM GRANULOCYTES # BLD: 0 10E3/UL
IMM GRANULOCYTES NFR BLD: 0 %
INTERPRETATION ECG - MUSE: NORMAL
LACTATE SERPL-SCNC: 0.9 MMOL/L (ref 0.7–2)
LYMPHOCYTES # BLD AUTO: 2.2 10E3/UL (ref 0.8–5.3)
LYMPHOCYTES NFR BLD AUTO: 34 %
MCH RBC QN AUTO: 27.9 PG (ref 26.5–33)
MCHC RBC AUTO-ENTMCNC: 31 G/DL (ref 31.5–36.5)
MCV RBC AUTO: 90 FL (ref 78–100)
MONOCYTES # BLD AUTO: 0.5 10E3/UL (ref 0–1.3)
MONOCYTES NFR BLD AUTO: 8 %
NEUTROPHILS # BLD AUTO: 3.5 10E3/UL (ref 1.6–8.3)
NEUTROPHILS NFR BLD AUTO: 52 %
NRBC # BLD AUTO: 0 10E3/UL
NRBC BLD AUTO-RTO: 0 /100
NT-PROBNP SERPL-MCNC: 465 PG/ML (ref 0–900)
P AXIS - MUSE: 78 DEGREES
PLATELET # BLD AUTO: 257 10E3/UL (ref 150–450)
POTASSIUM SERPL-SCNC: 3.7 MMOL/L (ref 3.4–5.3)
POTASSIUM SERPL-SCNC: 3.7 MMOL/L (ref 3.4–5.3)
PR INTERVAL - MUSE: 168 MS
PROCALCITONIN SERPL IA-MCNC: 0.03 NG/ML
PROT SERPL-MCNC: 7.2 G/DL (ref 6.4–8.3)
QRS DURATION - MUSE: 76 MS
QT - MUSE: 374 MS
QTC - MUSE: 439 MS
R AXIS - MUSE: 27 DEGREES
RBC # BLD AUTO: 4.09 10E6/UL (ref 3.8–5.2)
RSV RNA SPEC NAA+PROBE: NEGATIVE
SARS-COV-2 RNA RESP QL NAA+PROBE: NEGATIVE
SODIUM SERPL-SCNC: 140 MMOL/L (ref 135–145)
SODIUM SERPL-SCNC: 142 MMOL/L (ref 135–145)
SYSTOLIC BLOOD PRESSURE - MUSE: NORMAL MMHG
T AXIS - MUSE: 22 DEGREES
TROPONIN T SERPL HS-MCNC: 13 NG/L
VENTRICULAR RATE- MUSE: 83 BPM
WBC # BLD AUTO: 6.6 10E3/UL (ref 4–11)

## 2024-01-09 PROCEDURE — 83605 ASSAY OF LACTIC ACID: CPT | Performed by: FAMILY MEDICINE

## 2024-01-09 PROCEDURE — 120N000002 HC R&B MED SURG/OB UMMC

## 2024-01-09 PROCEDURE — 96374 THER/PROPH/DIAG INJ IV PUSH: CPT | Performed by: FAMILY MEDICINE

## 2024-01-09 PROCEDURE — 82565 ASSAY OF CREATININE: CPT | Performed by: FAMILY MEDICINE

## 2024-01-09 PROCEDURE — 82040 ASSAY OF SERUM ALBUMIN: CPT | Performed by: FAMILY MEDICINE

## 2024-01-09 PROCEDURE — 93005 ELECTROCARDIOGRAM TRACING: CPT | Performed by: FAMILY MEDICINE

## 2024-01-09 PROCEDURE — 250N000009 HC RX 250: Performed by: EMERGENCY MEDICINE

## 2024-01-09 PROCEDURE — 99285 EMERGENCY DEPT VISIT HI MDM: CPT | Mod: 25 | Performed by: FAMILY MEDICINE

## 2024-01-09 PROCEDURE — 71046 X-RAY EXAM CHEST 2 VIEWS: CPT

## 2024-01-09 PROCEDURE — 84484 ASSAY OF TROPONIN QUANT: CPT | Performed by: FAMILY MEDICINE

## 2024-01-09 PROCEDURE — 94640 AIRWAY INHALATION TREATMENT: CPT | Performed by: FAMILY MEDICINE

## 2024-01-09 PROCEDURE — 97803 MED NUTRITION INDIV SUBSEQ: CPT | Mod: 95 | Performed by: DIETITIAN, REGISTERED

## 2024-01-09 PROCEDURE — 93010 ELECTROCARDIOGRAM REPORT: CPT | Performed by: FAMILY MEDICINE

## 2024-01-09 PROCEDURE — 82374 ASSAY BLOOD CARBON DIOXIDE: CPT | Performed by: FAMILY MEDICINE

## 2024-01-09 PROCEDURE — 84145 PROCALCITONIN (PCT): CPT | Performed by: FAMILY MEDICINE

## 2024-01-09 PROCEDURE — 99223 1ST HOSP IP/OBS HIGH 75: CPT | Performed by: PEDIATRICS

## 2024-01-09 PROCEDURE — 250N000011 HC RX IP 250 OP 636: Performed by: FAMILY MEDICINE

## 2024-01-09 PROCEDURE — 83880 ASSAY OF NATRIURETIC PEPTIDE: CPT | Performed by: FAMILY MEDICINE

## 2024-01-09 PROCEDURE — 85025 COMPLETE CBC W/AUTO DIFF WBC: CPT | Performed by: EMERGENCY MEDICINE

## 2024-01-09 PROCEDURE — 36415 COLL VENOUS BLD VENIPUNCTURE: CPT | Performed by: EMERGENCY MEDICINE

## 2024-01-09 PROCEDURE — 85025 COMPLETE CBC W/AUTO DIFF WBC: CPT | Performed by: FAMILY MEDICINE

## 2024-01-09 PROCEDURE — 99207 PR NO CHARGE LOS: CPT | Performed by: SURGERY

## 2024-01-09 PROCEDURE — 87637 SARSCOV2&INF A&B&RSV AMP PRB: CPT | Performed by: FAMILY MEDICINE

## 2024-01-09 PROCEDURE — 99418 PROLNG IP/OBS E/M EA 15 MIN: CPT | Performed by: PEDIATRICS

## 2024-01-09 PROCEDURE — 250N000009 HC RX 250: Performed by: FAMILY MEDICINE

## 2024-01-09 RX ORDER — IPRATROPIUM BROMIDE AND ALBUTEROL SULFATE 2.5; .5 MG/3ML; MG/3ML
3 SOLUTION RESPIRATORY (INHALATION) ONCE
Status: COMPLETED | OUTPATIENT
Start: 2024-01-09 | End: 2024-01-09

## 2024-01-09 RX ORDER — DOXYCYCLINE 100 MG/1
100 CAPSULE ORAL EVERY 12 HOURS
Status: DISCONTINUED | OUTPATIENT
Start: 2024-01-10 | End: 2024-01-11

## 2024-01-09 RX ORDER — IPRATROPIUM BROMIDE AND ALBUTEROL SULFATE 2.5; .5 MG/3ML; MG/3ML
3 SOLUTION RESPIRATORY (INHALATION)
Status: COMPLETED | OUTPATIENT
Start: 2024-01-09 | End: 2024-01-09

## 2024-01-09 RX ORDER — CETIRIZINE HYDROCHLORIDE 10 MG/1
10 TABLET ORAL DAILY
Status: DISCONTINUED | OUTPATIENT
Start: 2024-01-10 | End: 2024-01-12 | Stop reason: HOSPADM

## 2024-01-09 RX ORDER — METHYLPREDNISOLONE SODIUM SUCCINATE 125 MG/2ML
125 INJECTION, POWDER, LYOPHILIZED, FOR SOLUTION INTRAMUSCULAR; INTRAVENOUS ONCE
Status: COMPLETED | OUTPATIENT
Start: 2024-01-09 | End: 2024-01-09

## 2024-01-09 RX ORDER — GABAPENTIN 300 MG/1
300 CAPSULE ORAL AT BEDTIME
Status: DISCONTINUED | OUTPATIENT
Start: 2024-01-09 | End: 2024-01-12 | Stop reason: HOSPADM

## 2024-01-09 RX ORDER — AMLODIPINE BESYLATE 10 MG/1
10 TABLET ORAL DAILY
Status: DISCONTINUED | OUTPATIENT
Start: 2024-01-10 | End: 2024-01-12 | Stop reason: HOSPADM

## 2024-01-09 RX ORDER — CARVEDILOL 25 MG/1
25 TABLET ORAL 2 TIMES DAILY WITH MEALS
Status: DISCONTINUED | OUTPATIENT
Start: 2024-01-10 | End: 2024-01-12 | Stop reason: HOSPADM

## 2024-01-09 RX ADMIN — IPRATROPIUM BROMIDE AND ALBUTEROL SULFATE 3 ML: .5; 3 SOLUTION RESPIRATORY (INHALATION) at 15:39

## 2024-01-09 RX ADMIN — IPRATROPIUM BROMIDE AND ALBUTEROL SULFATE 3 ML: .5; 3 SOLUTION RESPIRATORY (INHALATION) at 20:21

## 2024-01-09 RX ADMIN — METHYLPREDNISOLONE SODIUM SUCCINATE 125 MG: 125 INJECTION, POWDER, FOR SOLUTION INTRAMUSCULAR; INTRAVENOUS at 20:58

## 2024-01-09 ASSESSMENT — ACTIVITIES OF DAILY LIVING (ADL)
ADLS_ACUITY_SCORE: 35

## 2024-01-09 NOTE — PROGRESS NOTES
Connected Care Resource Center: [facility]    Background: Transitional Care Management program identified per system criteria and reviewed by [facility] [place] team for possible outreach.    Assessment: Upon chart review, CCR Team member will not proceed with patient outreach related to this episode of Transitional Care Management program due to reason below:    Patient has active communication with a nurse, provider or care team for reason of post-hospital follow up plan.  Outreach call by [facility] team not indicated to minimize duplicative efforts.     Plan: Transitional Care Management episode addressed appropriately per reason noted above.      Anayeli Marie MA  Connected [facility] [place], [facility]    *Connected Care Resource Team does NOT follow patient ongoing. Referrals are identified based on internal discharge reports and the outreach is to ensure patient has an understanding of their discharge instructions.

## 2024-01-09 NOTE — ED TRIAGE NOTES
Was just recently diagnosed with Pneumonia, today having again SOB     Triage Assessment (Adult)       Row Name 01/09/24 1528          Triage Assessment    Airway WDL WDL        Respiratory WDL    Respiratory WDL X        Skin Circulation/Temperature WDL    Skin Circulation/Temperature WDL WDL        Cardiac WDL    Cardiac WDL WDL        Peripheral/Neurovascular WDL    Peripheral Neurovascular WDL WDL        Cognitive/Neuro/Behavioral WDL    Cognitive/Neuro/Behavioral WDL WDL

## 2024-01-09 NOTE — PROGRESS NOTES
Mojgan Jimenez is a 53 year old who is being evaluated via a billable video visit.    How would you like to obtain your AVS? MyChart  If the video visit is dropped, the invitation should be resent by: Send to e-mail at: [email or web address]  Will anyone else be joining your video visit? No  {If patient encounters technical issues they should call [phone]      Post-op Surgical Weight Loss Diet Evaluation     Assessment:  Pt presents for 3 months post-op RD visit, s/p KO on 10/9/2023 with Dr. Worthy. Today we reviewed current eating habits and level of physical activity, and instructed on the changes that are required for successful bariatric outcomes.    Patient Progress: Recently diagnosed with Pneumonia-currently on antibiotics as well as nebs. Has been working with regular textured foods, noting that she is starting to develop a better tolerance for them as well as taking pills.     Initial weight: 288.2 lbs   Current weight: 237 lbs   Weight change: 51.2 lbs (down)     There is no height or weight on file to calculate BMI.    Patient Active Problem List   Diagnosis    Mild persistent asthma without complication    Vitamin D deficiency    LINA (generalized anxiety disorder)    Recurrent major depressive disorder, remission status unspecified (H24)    Methamphetamine abuse, episodic (H)    Hyperlipidemia LDL goal <160    Depression, major, recurrent, severe with psychosis (H)    IUD (intrauterine device) in place    Paranoia (H)    PMDD (premenstrual dysphoric disorder)    Polysubstance overdose    Suicidal ideation    Morbid obesity (H)    Mood disorder (H24)    Obstructive sleep apnea    Morbid (severe) obesity due to excess calories (H)    Intra-abdominal abscess (H)    Hypernatremia    SUSSY (acute kidney injury) (H24)    Accelerated hypertension    Metabolic encephalopathy    Acute respiratory failure with hypoxia (H)    Cardiomyopathy (H)    Debility    Pneumonia    GUNDERSON (dyspnea on exertion)  "      Diabetes: No    Vitamins   Multi Vit with Iron: yes  Calcium Citrate: yes  B12: yes  D3: yes      Do you experience any reflux or discomfort with eating? No  Nausea: yes-on occasion  Vomiting: yes-just once since surgery  Diarrhea: yes-while on antibiotics currently, otherwise no  Constipation: no  Hair loss:yes    Diet Recall/Time:   Breakfast: cornflakes or eggs    Lunch: tuna sandwich or ham and cheese sandwich or salad   Dinner: chicken, green beans or roast beef or pork with veggies     Typical Snacks: Protein Shake or watermelon (3-4 squares) or fruit cup (1/2 cup)    Proteins/Veg/Fruits/CHO (NOT well tolerated): none     Estimated protein intake: 60-80 grams      Meal Duration:15-20 minutes     Fluid-meal separation:  Fluids are  30min before and 30 minutes after meals.    Fluid Intake  Water: at least 40 oz/day, also utilize Zero Sugar Vitamin Water, Body [place], 1/2 cup of lowfat milk       Exercise: no set regimen due to pneumonia, also will start working with therapy      PES statement:      (NC-1.4) Altered GI Function related to Alteration in gastrointestinal tract structure and/or function/ Decreased functional length of the GI tract as evidenced by Weight loss of 18% initial body weight; KO    Intervention    Discussion  Discussed 3 months Post-Op Nutritional Guidelines for KO  Recommended to consume 15-20gm protein at 3 meals daily, along with protein supplement/\"planned protein containing snack\" of 15-30gm protein, to reach goal of 60-80 gm protein daily.  Educated on post-op vitamin regimen: Multi Vit + iron 2x/day, calcium citrate 400-600 mg 2x/day, 6719-2021 mcg of Sublingual B-12 daily, and 5000 IU Vitamin D3 daily (MVI and calcium can be taken at the same time BID)  Reviewed lean protein sources  Bariatric Plate Method-  including lean/low fat protein at each meal, including a vegetable/fruit, and limiting carbohydrate intake to less than 25% of plate volume. " "    Instructions  Include 15-20gm protein at each meal, along with protein supplement/\"planned protein containing snack\" of 15-30gm protein, to reach goal of 60-80 gm protein daily.  Increase fluid intake to 64oz daily: choose plain or calorie/carbonation-free beverages.  Incorporate daily structured activity, 30-60 minutes most days of the week  Recommended pt to start taking: Multi Vit + iron 2x/day, calcium citrate 400-600 mg 2x/day, 3786-0247 mcg of Sublingual B-12 daily, and 5000 IU Vitamin D3 daily. (MVI and calcium can be taken at the same time)  Read food labels more consistently: keeping total fat grams <10, total sugar grams <10, fiber >3gm per serving.  Increase vegetable/fruit intake, by having a vegetable or fruit with each meal daily.  Practice plate method: 1/2 plate lean/low fat protein source, vegetable/fruit, <25% of plate complex carbohydrates.  Separate fluids 30 minutes before/after meal times.  Practice eating off of smaller plates/bowls, chewing to applesauce consistency, taking 20-30 minutes to eat in a calm/relaxed environment without distractions of tv/email/cell phone.    Handouts provided:  3 months Post-Op Nutritional Guidelines for KO    Assessment/Plan:    Pt to follow up for 6 months post-op visit with bariatrician       Video-Visit Details    Type of service:  Video Visit    Video Start Time (time video started):  11:26 am     Video End Time (time video stopped):  11:48 am    Originating Location (pt. Location): Home      Distant Location (provider location):  Off-site    Mode of Communication:  Video Conference via [facility]    Physician has received verbal consent for a Video Visit from the patient? Yes    Kelly Ceballos, RD          "

## 2024-01-09 NOTE — ED PROVIDER NOTES
[facility] [place] EMERGENCY DEPARTMENT ([facility])    1/09/24      ED PROVIDER NOTE   ED 6  History     Chief Complaint   Patient presents with    Shortness of Breath     Was just recently diagnosed with Pneumonia, today having again SOB     HPI  Mojgan Jimenez is a 53 year old female with prior history of anxiety, depression, JARVIS, asthma, hyperlipidemia, hypertension, obesity (s/p recent gastric sleeve which was complicated by bowel perforation, peritonitis, SUSSY, hypoxia requiring intubation), stress cardiomyopathy who presents with shortness of breath in setting of recent hospitalization for community-acquired pneumonia.  She was hospitalized from 1/5-1/8/2024.  Her infectious workup was negative for influenza, RSV and COVID.  She was treated with azithromycin and ceftriaxone with improvement.  Her respiratory status remained stable and she had a negative TTE.  She was discharged yesterday with antibiotics.  She has been experiencing shortness of breath again and so presents for evaluation.      Per [facility] records, she did have a diagnostic bronchoscopy on 10/20/2023 for atelectasis in the right base during hospitalization for bowel perforation and peritonitis.  There is some mildly bronchial secretions aspirated from the right lower lobe, no endobronchial lesions.  The lavage fluid was negative for any viral inclusions, dysplasia or malignant cells.  It was noted that she did have some acute inflammation.    Patient was treated with azithromycin and ceftriaxone while hospitalized but was discharged home on doxycycline she continues to have increasing shortness of breath today was unable to ambulate without becoming acutely short of breath and does not feel as though her symptoms are improving but rather they are not increasing in their intensity patient is not able to manage even basic activities at her home secondary to the shortness of breath.      Past Medical History  Past Medical History:   Diagnosis Date     LINA (generalized anxiety disorder) 11/02/2017    Hyperlipidemia LDL goal <160 01/20/2019    Major depressive disorder     Methanol abuse (H)     Mild persistent asthma without complication 11/02/2017    Obese     JARVIS on CPAP     Cpap not working    Paranoia (H)     resolved due to drugs    Vitamin D deficiency 11/02/2017     Past Surgical History:   Procedure Laterality Date    GASTRECTOMY, SLEEVE, LAPAROSCOPIC, WITH DUODENAL SWITCH N/A 10/9/2023    Procedure: GASTRECTOMY, SLEEVE, LAPAROSCOPIC, WITH SINGLE ANASTAMOSIS DUODENAL SWITCH;  Surgeon: Hoang Worthy MD;  Location: [facility] [place] OR    LAPAROSCOPY DIAGNOSTIC (GYN) N/A 10/12/2023    Procedure: LAPAROSCOPY,;  Surgeon: Hoang Worthy MD;  Location: [facility] [place] OR    LAPAROTOMY EXPLORATORY N/A 10/12/2023    Procedure: LAPAROTOMY, CLOSURE OF TWO SMALL BOWEL INTEROTOMIES, DRAIN PLACEMENT, INTRA-ABDOMINAL CLEAN OUT;  Surgeon: Hoang Worthy MD;  Location: [facility] [place] OR    MAMMOPLASTY REDUCTION      reduction    PICC TRIPLE LUMEN PLACEMENT  10/28/2023     ADVAIR -21 MCG/ACT inhaler  albuterol (PROAIR HFA) 108 (90 Base) MCG/ACT inhaler  albuterol (PROVENTIL) (2.5 MG/3ML) 0.083% neb solution  amLODIPine (NORVASC) 10 MG tablet  carvedilol (COREG) 25 MG tablet  cetirizine (ZYRTEC) 10 MG tablet  cholecalciferol (VITAMIN D3) 125 mcg (5000 units) capsule  cyanocobalamin (VITAMIN B-12) 1000 MCG sublingual tablet  doxycycline hyclate (VIBRAMYCIN) 100 MG capsule  gabapentin (NEURONTIN) 300 MG capsule  Lidocaine (LIDOCARE) 4 % Patch  magnesium oxide (MAG-OX) 400 MG tablet  Pediatric Multivitamins-Iron (MULTIVITAMINS PLUS IRON CHILD) 18 MG CHEW  propranolol (INDERAL) 10 MG tablet  rosuvastatin (CRESTOR) 10 MG tablet  ursodiol (ACTIGALL) 300 MG capsule  venlafaxine (EFFEXOR) 75 MG tablet  acetaminophen (TYLENOL) 500 MG tablet  cloNIDine (CATAPRES-TTS1) 0.1 MG/24HR WK patch      No Known Allergies  Family History  Family History   Problem  "Relation Age of Onset    Cancer Mother     Unknown/Adopted Father     Alcoholism Father     Substance Abuse Sister     Diabetes No family hx of     Hypertension No family hx of      Social History   Social History     Tobacco Use    Smoking status: Never    Smokeless tobacco: Never   Vaping Use    Vaping Use: Never used   Substance Use Topics    Alcohol use: Not Currently     Comment: Sober x 8 months    Drug use: Not Currently     Types: Methamphetamines, \"Crack\" cocaine     Comment: in remision          A medically appropriate review of systems was performed with pertinent positives and negatives noted in the HPI, and all other systems negative.    Physical Exam   BP: 103/71  Pulse: 91  Temp: 97.5  F (36.4  C)  Resp: 24  Height: 160 cm (5' 3\")  Weight: 107.5 kg (237 lb)  SpO2: 93 %  Physical Exam  Constitutional:       General: She is not in acute distress.     Appearance: Normal appearance. She is not diaphoretic.   HENT:      Head: Atraumatic.      Mouth/Throat:      Mouth: Mucous membranes are moist.   Eyes:      General: No scleral icterus.     Conjunctiva/sclera: Conjunctivae normal.   Cardiovascular:      Rate and Rhythm: Normal rate.      Heart sounds: Normal heart sounds.   Pulmonary:      Effort: No respiratory distress.      Breath sounds: Normal breath sounds.   Abdominal:      General: Abdomen is flat.   Musculoskeletal:      Cervical back: Neck supple.   Skin:     General: Skin is warm.      Findings: No rash.   Neurological:      General: No focal deficit present.      Mental Status: She is alert and oriented to person, place, and time.      Sensory: No sensory deficit.      Motor: No weakness.      Coordination: Coordination normal.   Psychiatric:         Mood and Affect: Mood is anxious.           ED Course, Procedures, & Data      Procedures     Results for orders placed or performed during the hospital encounter of 01/09/24   XR Chest 2 Views     Status: None    Narrative    EXAM: XR CHEST 2 " VIEWS  LOCATION: [facility]  DATE: 1/9/2024    INDICATION: Shortness of breath, cough  COMPARISON: 01/05/2024      Impression    IMPRESSION: Mild right basilar atelectasis. No effusions or pneumothorax. Heart size is normal. No acute osseous findings.   Basic metabolic panel     Status: Abnormal   Result Value Ref Range    Sodium 142 135 - 145 mmol/L    Potassium 3.7 3.4 - 5.3 mmol/L    Chloride 106 98 - 107 mmol/L    Carbon Dioxide (CO2) 26 22 - 29 mmol/L    Anion Gap 10 7 - 15 mmol/L    Urea Nitrogen 4.9 (L) 6.0 - 20.0 mg/dL    Creatinine 0.75 0.51 - 0.95 mg/dL    GFR Estimate >90 >60 mL/min/1.73m2    Calcium 9.5 8.6 - 10.0 mg/dL    Glucose 97 70 - 99 mg/dL   Troponin T, High Sensitivity     Status: Normal   Result Value Ref Range    Troponin T, High Sensitivity 13 <=14 ng/L   Lactic Acid STAT     Status: Normal   Result Value Ref Range    Lactic Acid 0.9 0.7 - 2.0 mmol/L   Symptomatic Influenza A/B, RSV, & SARS-CoV2 PCR (COVID-19) Nasopharyngeal     Status: Normal    Specimen: Nasopharyngeal; Swab   Result Value Ref Range    Influenza A PCR Negative Negative    Influenza B PCR Negative Negative    RSV PCR Negative Negative    SARS CoV2 PCR Negative Negative    Narrative    Testing was performed using the Xpert Xpress CoV2/Flu/RSV Assay on the [employer] GeneXpert Instrument. This test should be ordered for the detection of SARS-CoV-2, influenza, and RSV viruses in individuals who meet clinical and/or epidemiological criteria. Test performance is unknown in asymptomatic patients. This test is for in vitro diagnostic use under the FDA EUA for laboratories certified under CLIA to perform high or moderate complexity testing. This test has not been FDA cleared or approved. A negative result does not rule out the presence of PCR inhibitors in the specimen or target RNA in concentration below the limit of detection for the assay. If only one viral target is positive but  coinfection with multiple targets is suspected, the sample should be re-tested with another FDA cleared, approved, or authorized test, if coinfection would change clinical management. This test was validated by the [facility]. These laboratories are certified under the Clinical Laboratory Improvement Amendments of 1988 (CLIA-88) as qualified to perform high complexity laboratory testing.   CBC with platelets and differential     Status: Abnormal   Result Value Ref Range    WBC Count 6.6 4.0 - 11.0 10e3/uL    RBC Count 4.09 3.80 - 5.20 10e6/uL    Hemoglobin 11.4 (L) 11.7 - 15.7 g/dL    Hematocrit 36.8 35.0 - 47.0 %    MCV 90 78 - 100 fL    MCH 27.9 26.5 - 33.0 pg    MCHC 31.0 (L) 31.5 - 36.5 g/dL    RDW 12.6 10.0 - 15.0 %    Platelet Count 257 150 - 450 10e3/uL    % Neutrophils 52 %    % Lymphocytes 34 %    % Monocytes 8 %    % Eosinophils 6 %    % Basophils 0 %    % Immature Granulocytes 0 %    NRBCs per 100 WBC 0 <1 /100    Absolute Neutrophils 3.5 1.6 - 8.3 10e3/uL    Absolute Lymphocytes 2.2 0.8 - 5.3 10e3/uL    Absolute Monocytes 0.5 0.0 - 1.3 10e3/uL    Absolute Eosinophils 0.4 0.0 - 0.7 10e3/uL    Absolute Basophils 0.0 0.0 - 0.2 10e3/uL    Absolute Immature Granulocytes 0.0 <=0.4 10e3/uL    Absolute NRBCs 0.0 10e3/uL   Comprehensive metabolic panel     Status: Abnormal   Result Value Ref Range    Sodium 140 135 - 145 mmol/L    Potassium 3.7 3.4 - 5.3 mmol/L    Carbon Dioxide (CO2) 26 22 - 29 mmol/L    Anion Gap 10 7 - 15 mmol/L    Urea Nitrogen 4.8 (L) 6.0 - 20.0 mg/dL    Creatinine 0.75 0.51 - 0.95 mg/dL    GFR Estimate >90 >60 mL/min/1.73m2    Calcium 9.5 8.6 - 10.0 mg/dL    Chloride 104 98 - 107 mmol/L    Glucose 97 70 - 99 mg/dL    Alkaline Phosphatase 90 40 - 150 U/L    AST 20 0 - 45 U/L    ALT 20 0 - 50 U/L    Protein Total 7.2 6.4 - 8.3 g/dL    Albumin 3.7 3.5 - 5.2 g/dL    Bilirubin Total 0.2 <=1.2 mg/dL   Procalcitonin     Status: Normal   Result Value Ref Range    Procalcitonin  0.03 <0.50 ng/mL   Nt probnp inpatient (BNP)     Status: Normal   Result Value Ref Range    N terminal Pro BNP Inpatient 465 0 - 900 pg/mL   EKG 12 lead     Status: None   Result Value Ref Range    Systolic Blood Pressure  mmHg    Diastolic Blood Pressure  mmHg    Ventricular Rate 83 BPM    Atrial Rate 83 BPM    PA Interval 168 ms    QRS Duration 76 ms     ms    QTc 439 ms    P Axis 78 degrees    R AXIS 27 degrees    T Axis 22 degrees    Interpretation ECG       Sinus rhythm  Possible Anterior infarct , age undetermined  Abnormal ECG  Unconfirmed report - interpretation of this ECG is computer generated - see medical record for final interpretation  Confirmed by - EMERGENCY ROOM, PHYSICIAN ([id number]),  ELEUTERIO WARD ([id number]) on 1/9/2024 4:47:45 PM     CBC with Platelets & Differential     Status: Abnormal    Narrative    The following orders were created for panel order CBC with Platelets & Differential.  Procedure                               Abnormality         Status                     ---------                               -----------         ------                     CBC with platelets and d...[[id number]]  Abnormal            Final result                 Please view results for these tests on the individual orders.     Medications   ipratropium - albuterol 0.5 mg/2.5 mg/3 mL (DUONEB) neb solution 3 mL (3 mLs Nebulization $Given 1/9/24 1539)   ipratropium - albuterol 0.5 mg/2.5 mg/3 mL (DUONEB) neb solution 3 mL (3 mLs Nebulization $Given 1/9/24 2021)   methylPREDNISolone sodium succinate (solu-MEDROL) injection 125 mg (125 mg Intravenous $Given 1/9/24 2058)     Labs Ordered and Resulted from Time of ED Arrival to Time of ED Departure   BASIC METABOLIC PANEL - Abnormal       Result Value    Sodium 142      Potassium 3.7      Chloride 106      Carbon Dioxide (CO2) 26      Anion Gap 10      Urea Nitrogen 4.9 (*)     Creatinine 0.75      GFR Estimate >90      Calcium 9.5      Glucose 97     CBC WITH  PLATELETS AND DIFFERENTIAL - Abnormal    WBC Count 6.6      RBC Count 4.09      Hemoglobin 11.4 (*)     Hematocrit 36.8      MCV 90      MCH 27.9      MCHC 31.0 (*)     RDW 12.6      Platelet Count 257      % Neutrophils 52      % Lymphocytes 34      % Monocytes 8      % Eosinophils 6      % Basophils 0      % Immature Granulocytes 0      NRBCs per 100 WBC 0      Absolute Neutrophils 3.5      Absolute Lymphocytes 2.2      Absolute Monocytes 0.5      Absolute Eosinophils 0.4      Absolute Basophils 0.0      Absolute Immature Granulocytes 0.0      Absolute NRBCs 0.0     COMPREHENSIVE METABOLIC PANEL - Abnormal    Sodium 140      Potassium 3.7      Carbon Dioxide (CO2) 26      Anion Gap 10      Urea Nitrogen 4.8 (*)     Creatinine 0.75      GFR Estimate >90      Calcium 9.5      Chloride 104      Glucose 97      Alkaline Phosphatase 90      AST 20      ALT 20      Protein Total 7.2      Albumin 3.7      Bilirubin Total 0.2     TROPONIN T, HIGH SENSITIVITY - Normal    Troponin T, High Sensitivity 13     LACTIC ACID WHOLE BLOOD - Normal    Lactic Acid 0.9     INFLUENZA A/B, RSV, & SARS-COV2 PCR - Normal    Influenza A PCR Negative      Influenza B PCR Negative      RSV PCR Negative      SARS CoV2 PCR Negative     PROCALCITONIN - Normal    Procalcitonin 0.03     NT PROBNP INPATIENT - Normal    N terminal Pro BNP Inpatient 465       XR Chest 2 Views   Final Result   IMPRESSION: Mild right basilar atelectasis. No effusions or pneumothorax. Heart size is normal. No acute osseous findings.             Critical care was not performed.     Medical Decision Making  The patient's presentation was of high complexity (an acute health issue posing potential threat to life or bodily function).    The patient's evaluation involved:  ordering and/or review of 3+ test(s) in this encounter (see separate area of note for details)    The patient's management necessitated high risk (a decision regarding hospitalization).    Assessment &  Plan        I have reviewed the nursing notes. I have reviewed the findings, diagnosis, plan and need for follow up with the patient.    With ongoing respiratory distress previous diagnosis of pneumonia and now worsening symptoms will be initiated on steroids discuss further antibiotic treatment with the admitting medicine team and will be admitted to the medicine team for further stabilization and treatment.    Final diagnoses:   Respiratory distress   Pneumonia of right upper lobe due to infectious organism         [facility] EMERGENCY DEPARTMENT  1/9/2024     Ellis Chilel MD  01/09/24 1401

## 2024-01-09 NOTE — LETTER
1/9/2024         RE: Mojgan Jimenez  [address]  [facility] [address]        Dear Colleague,    Thank you for referring your patient, Mojgan Jimenez, to the [facility] [place]. Please see a copy of my visit note below.    Mojgan Jimenez is a 53 year old who is being evaluated via a billable video visit.    How would you like to obtain your AVS? MyChart  If the video visit is dropped, the invitation should be resent by: Send to e-mail at: [email or web address]  Will anyone else be joining your video visit? No  {If patient encounters technical issues they should call [phone]      Post-op Surgical Weight Loss Diet Evaluation     Assessment:  Pt presents for 3 months post-op RD visit, s/p KO on 10/9/2023 with Dr. Worthy. Today we reviewed current eating habits and level of physical activity, and instructed on the changes that are required for successful bariatric outcomes.    Patient Progress: Recently diagnosed with Pneumonia-currently on antibiotics as well as nebs. Has been working with regular textured foods, noting that she is starting to develop a better tolerance for them as well as taking pills.     Initial weight: 288.2 lbs   Current weight: 237 lbs   Weight change: 51.2 lbs (down)     There is no height or weight on file to calculate BMI.    Patient Active Problem List   Diagnosis     Mild persistent asthma without complication     Vitamin D deficiency     LINA (generalized anxiety disorder)     Recurrent major depressive disorder, remission status unspecified (H24)     Methamphetamine abuse, episodic (H)     Hyperlipidemia LDL goal <160     Depression, major, recurrent, severe with psychosis (H)     IUD (intrauterine device) in place     Paranoia (H)     PMDD (premenstrual dysphoric disorder)     Polysubstance overdose     Suicidal ideation     Morbid obesity (H)     Mood disorder (H24)     Obstructive sleep apnea     Morbid  "(severe) obesity due to excess calories (H)     Intra-abdominal abscess (H)     Hypernatremia     SUSSY (acute kidney injury) (H24)     Accelerated hypertension     Metabolic encephalopathy     Acute respiratory failure with hypoxia (H)     Cardiomyopathy (H)     Debility     Pneumonia     GUNDERSON (dyspnea on exertion)       Diabetes: No    Vitamins   Multi Vit with Iron: yes  Calcium Citrate: yes  B12: yes  D3: yes      Do you experience any reflux or discomfort with eating? No  Nausea: yes-on occasion  Vomiting: yes-just once since surgery  Diarrhea: yes-while on antibiotics currently, otherwise no  Constipation: no  Hair loss:yes    Diet Recall/Time:   Breakfast: cornflakes or eggs    Lunch: tuna sandwich or ham and cheese sandwich or salad   Dinner: chicken, green beans or roast beef or pork with veggies     Typical Snacks: Protein Shake or watermelon (3-4 squares) or fruit cup (1/2 cup)    Proteins/Veg/Fruits/CHO (NOT well tolerated): none     Estimated protein intake: 60-80 grams      Meal Duration:15-20 minutes     Fluid-meal separation:  Fluids are  30min before and 30 minutes after meals.    Fluid Intake  Water: at least 40 oz/day, also utilize Zero Sugar Vitamin Water, Body Peach Bottom Lyte, 1/2 cup of lowfat milk       Exercise: no set regimen due to pneumonia, also will start working with therapy      PES statement:      (NC-1.4) Altered GI Function related to Alteration in gastrointestinal tract structure and/or function/ Decreased functional length of the GI tract as evidenced by Weight loss of 18% initial body weight; KO    Intervention    Discussion  Discussed 3 months Post-Op Nutritional Guidelines for KO  Recommended to consume 15-20gm protein at 3 meals daily, along with protein supplement/\"planned protein containing snack\" of 15-30gm protein, to reach goal of 60-80 gm protein daily.  Educated on post-op vitamin regimen: Multi Vit + iron 2x/day, calcium citrate 400-600 mg 2x/day, 8551-0981 mcg " "of Sublingual B-12 daily, and 5000 IU Vitamin D3 daily (MVI and calcium can be taken at the same time BID)  Reviewed lean protein sources  Bariatric Plate Method-  including lean/low fat protein at each meal, including a vegetable/fruit, and limiting carbohydrate intake to less than 25% of plate volume.     Instructions  Include 15-20gm protein at each meal, along with protein supplement/\"planned protein containing snack\" of 15-30gm protein, to reach goal of 60-80 gm protein daily.  Increase fluid intake to 64oz daily: choose plain or calorie/carbonation-free beverages.  Incorporate daily structured activity, 30-60 minutes most days of the week  Recommended pt to start taking: Multi Vit + iron 2x/day, calcium citrate 400-600 mg 2x/day, 1460-9639 mcg of Sublingual B-12 daily, and 5000 IU Vitamin D3 daily. (MVI and calcium can be taken at the same time)  Read food labels more consistently: keeping total fat grams <10, total sugar grams <10, fiber >3gm per serving.  Increase vegetable/fruit intake, by having a vegetable or fruit with each meal daily.  Practice plate method: 1/2 plate lean/low fat protein source, vegetable/fruit, <25% of plate complex carbohydrates.  Separate fluids 30 minutes before/after meal times.  Practice eating off of smaller plates/bowls, chewing to applesauce consistency, taking 20-30 minutes to eat in a calm/relaxed environment without distractions of tv/email/cell phone.    Handouts provided:  3 months Post-Op Nutritional Guidelines for KO    Assessment/Plan:    Pt to follow up for 6 months post-op visit with bariatrician       Video-Visit Details    Type of service:  Video Visit    Video Start Time (time video started):  11:26 am     Video End Time (time video stopped):  11:48 am    Originating Location (pt. Location): Home      Distant Location (provider location):  Off-site    Mode of Communication:  Video Conference via [employer]    Physician has received verbal consent for a Video " Visit from the patient? Yes    Kelly Ceballos RD            Again, thank you for allowing me to participate in the care of your patient.        Sincerely,        Kelly Ceballos RD

## 2024-01-10 PROCEDURE — 999N000157 HC STATISTIC RCP TIME EA 10 MIN

## 2024-01-10 PROCEDURE — 250N000013 HC RX MED GY IP 250 OP 250 PS 637: Performed by: FAMILY MEDICINE

## 2024-01-10 PROCEDURE — 250N000011 HC RX IP 250 OP 636: Performed by: PEDIATRICS

## 2024-01-10 PROCEDURE — 94640 AIRWAY INHALATION TREATMENT: CPT | Mod: 76

## 2024-01-10 PROCEDURE — 250N000013 HC RX MED GY IP 250 OP 250 PS 637: Performed by: PEDIATRICS

## 2024-01-10 PROCEDURE — 250N000009 HC RX 250: Performed by: PEDIATRICS

## 2024-01-10 PROCEDURE — 250N000012 HC RX MED GY IP 250 OP 636 PS 637: Performed by: PEDIATRICS

## 2024-01-10 PROCEDURE — 120N000002 HC R&B MED SURG/OB UMMC

## 2024-01-10 PROCEDURE — 99232 SBSQ HOSP IP/OBS MODERATE 35: CPT | Performed by: INTERNAL MEDICINE

## 2024-01-10 RX ORDER — CEFTRIAXONE 1 G/1
1 INJECTION, POWDER, FOR SOLUTION INTRAMUSCULAR; INTRAVENOUS EVERY 24 HOURS
Status: DISCONTINUED | OUTPATIENT
Start: 2024-01-10 | End: 2024-01-11

## 2024-01-10 RX ORDER — LIDOCAINE 40 MG/G
CREAM TOPICAL
Status: DISCONTINUED | OUTPATIENT
Start: 2024-01-10 | End: 2024-01-12 | Stop reason: HOSPADM

## 2024-01-10 RX ORDER — ACETAMINOPHEN 325 MG/1
650 TABLET ORAL EVERY 4 HOURS PRN
Status: DISCONTINUED | OUTPATIENT
Start: 2024-01-10 | End: 2024-01-12 | Stop reason: HOSPADM

## 2024-01-10 RX ORDER — AMOXICILLIN 250 MG
2 CAPSULE ORAL 2 TIMES DAILY PRN
Status: DISCONTINUED | OUTPATIENT
Start: 2024-01-10 | End: 2024-01-12 | Stop reason: HOSPADM

## 2024-01-10 RX ORDER — CLONIDINE 0.1 MG/24H
1 PATCH, EXTENDED RELEASE TRANSDERMAL WEEKLY
Status: DISCONTINUED | OUTPATIENT
Start: 2024-01-12 | End: 2024-01-12 | Stop reason: HOSPADM

## 2024-01-10 RX ORDER — CALCIUM CARBONATE 500 MG/1
1000 TABLET, CHEWABLE ORAL 4 TIMES DAILY PRN
Status: DISCONTINUED | OUTPATIENT
Start: 2024-01-10 | End: 2024-01-12 | Stop reason: HOSPADM

## 2024-01-10 RX ORDER — PREDNISONE 20 MG/1
40 TABLET ORAL DAILY
Status: DISCONTINUED | OUTPATIENT
Start: 2024-01-10 | End: 2024-01-12 | Stop reason: HOSPADM

## 2024-01-10 RX ORDER — VENLAFAXINE 75 MG/1
75 TABLET ORAL 3 TIMES DAILY
Status: DISCONTINUED | OUTPATIENT
Start: 2024-01-10 | End: 2024-01-12 | Stop reason: HOSPADM

## 2024-01-10 RX ORDER — FLUTICASONE FUROATE AND VILANTEROL 100; 25 UG/1; UG/1
1 POWDER RESPIRATORY (INHALATION) DAILY
Status: DISCONTINUED | OUTPATIENT
Start: 2024-01-10 | End: 2024-01-12 | Stop reason: HOSPADM

## 2024-01-10 RX ORDER — ROSUVASTATIN CALCIUM 10 MG/1
10 TABLET, COATED ORAL DAILY
Status: DISCONTINUED | OUTPATIENT
Start: 2024-01-10 | End: 2024-01-12 | Stop reason: HOSPADM

## 2024-01-10 RX ORDER — ALBUTEROL SULFATE 0.83 MG/ML
2.5 SOLUTION RESPIRATORY (INHALATION) EVERY 6 HOURS PRN
Status: DISCONTINUED | OUTPATIENT
Start: 2024-01-10 | End: 2024-01-12 | Stop reason: HOSPADM

## 2024-01-10 RX ORDER — POLYETHYLENE GLYCOL 3350 17 G/17G
17 POWDER, FOR SOLUTION ORAL 2 TIMES DAILY PRN
Status: DISCONTINUED | OUTPATIENT
Start: 2024-01-10 | End: 2024-01-12 | Stop reason: HOSPADM

## 2024-01-10 RX ORDER — URSODIOL 300 MG/1
300 CAPSULE ORAL 2 TIMES DAILY
Status: DISCONTINUED | OUTPATIENT
Start: 2024-01-10 | End: 2024-01-12 | Stop reason: HOSPADM

## 2024-01-10 RX ORDER — MAGNESIUM OXIDE 400 MG/1
400 TABLET ORAL DAILY
Status: DISCONTINUED | OUTPATIENT
Start: 2024-01-10 | End: 2024-01-12 | Stop reason: HOSPADM

## 2024-01-10 RX ORDER — ENOXAPARIN SODIUM 100 MG/ML
40 INJECTION SUBCUTANEOUS EVERY 12 HOURS
Status: DISCONTINUED | OUTPATIENT
Start: 2024-01-10 | End: 2024-01-10

## 2024-01-10 RX ORDER — ACETAMINOPHEN 650 MG/1
650 SUPPOSITORY RECTAL EVERY 4 HOURS PRN
Status: DISCONTINUED | OUTPATIENT
Start: 2024-01-10 | End: 2024-01-12 | Stop reason: HOSPADM

## 2024-01-10 RX ORDER — AMOXICILLIN 250 MG
1 CAPSULE ORAL 2 TIMES DAILY PRN
Status: DISCONTINUED | OUTPATIENT
Start: 2024-01-10 | End: 2024-01-12 | Stop reason: HOSPADM

## 2024-01-10 RX ADMIN — DOXYCYCLINE HYCLATE 100 MG: 100 CAPSULE ORAL at 23:47

## 2024-01-10 RX ADMIN — ROSUVASTATIN 10 MG: 10 TABLET, FILM COATED ORAL at 08:34

## 2024-01-10 RX ADMIN — FLUTICASONE FUROATE AND VILANTEROL TRIFENATATE 1 PUFF: 100; 25 POWDER RESPIRATORY (INHALATION) at 08:33

## 2024-01-10 RX ADMIN — CARVEDILOL 25 MG: 25 TABLET, FILM COATED ORAL at 08:35

## 2024-01-10 RX ADMIN — DOXYCYCLINE HYCLATE 100 MG: 100 CAPSULE ORAL at 00:01

## 2024-01-10 RX ADMIN — ALBUTEROL SULFATE 2.5 MG: 2.5 SOLUTION RESPIRATORY (INHALATION) at 20:06

## 2024-01-10 RX ADMIN — DOXYCYCLINE HYCLATE 100 MG: 100 CAPSULE ORAL at 12:07

## 2024-01-10 RX ADMIN — URSODIOL 300 MG: 300 CAPSULE ORAL at 20:12

## 2024-01-10 RX ADMIN — VENLAFAXINE 75 MG: 75 TABLET ORAL at 20:12

## 2024-01-10 RX ADMIN — AMLODIPINE BESYLATE 10 MG: 10 TABLET ORAL at 08:34

## 2024-01-10 RX ADMIN — CETIRIZINE HYDROCHLORIDE 10 MG: 10 TABLET, FILM COATED ORAL at 08:34

## 2024-01-10 RX ADMIN — VENLAFAXINE 75 MG: 75 TABLET ORAL at 14:09

## 2024-01-10 RX ADMIN — URSODIOL 300 MG: 300 CAPSULE ORAL at 08:36

## 2024-01-10 RX ADMIN — CARVEDILOL 25 MG: 25 TABLET, FILM COATED ORAL at 17:39

## 2024-01-10 RX ADMIN — GABAPENTIN 300 MG: 300 CAPSULE ORAL at 00:01

## 2024-01-10 RX ADMIN — Medication 1 TABLET: at 08:34

## 2024-01-10 RX ADMIN — PREDNISONE 40 MG: 20 TABLET ORAL at 08:34

## 2024-01-10 RX ADMIN — CEFTRIAXONE SODIUM 1 G: 1 INJECTION, POWDER, FOR SOLUTION INTRAMUSCULAR; INTRAVENOUS at 03:25

## 2024-01-10 RX ADMIN — Medication 1000 MCG: at 08:34

## 2024-01-10 RX ADMIN — Medication 125 MCG: at 08:37

## 2024-01-10 RX ADMIN — GABAPENTIN 300 MG: 300 CAPSULE ORAL at 21:50

## 2024-01-10 RX ADMIN — VENLAFAXINE 75 MG: 75 TABLET ORAL at 08:37

## 2024-01-10 ASSESSMENT — ACTIVITIES OF DAILY LIVING (ADL)
ADLS_ACUITY_SCORE: 37
ADLS_ACUITY_SCORE: 35
ADLS_ACUITY_SCORE: 35
ADLS_ACUITY_SCORE: 37
ADLS_ACUITY_SCORE: 35
ADLS_ACUITY_SCORE: 35
ADLS_ACUITY_SCORE: 37
ADLS_ACUITY_SCORE: 35
ADLS_ACUITY_SCORE: 35
ADLS_ACUITY_SCORE: 37

## 2024-01-10 NOTE — MEDICATION SCRIBE - ADMISSION MEDICATION HISTORY
Medication Scribe Admission Medication History    Admission medication history is complete. The information provided in this note is only as accurate as the sources available at the time of the update.    Information Source(s): Patient, Family member, and CareEverywhere/[employer]Script[employer] via in-person and phone    Pertinent Information: Medication history completed with patient and patients daughter. Patient reported starting doxycyline and having first dose last night & second dose this morning.    Changes made to PTA medication list:  Added: None  Deleted: None  Changed: None    Medication Affordability:       Allergies reviewed with patient and updates made in EHR: yes    Medication History Completed By: Scarlet Zheng 1/9/2024 9:56 PM    PTA Med List   Medication Sig Last Dose    ADVAIR -21 MCG/ACT inhaler Inhale 2 Puffs by mouth two times daily.* 1/9/2024    albuterol (PROAIR HFA) 108 (90 Base) MCG/ACT inhaler Inhale 1-2 puffs into the lungs every 4 hours as needed for shortness of breath / dyspnea or wheezing 1/9/2024    albuterol (PROVENTIL) (2.5 MG/3ML) 0.083% neb solution Take 1 vial (2.5 mg) by nebulization every 6 hours as needed for shortness of breath or wheezing 1/9/2024    amLODIPine (NORVASC) 10 MG tablet Take 1 tablet (10 mg) by mouth daily 1/9/2024 at am    carvedilol (COREG) 25 MG tablet Take 1 tablet (25 mg) by mouth 2 times daily (with meals) 1/9/2024 at am    cetirizine (ZYRTEC) 10 MG tablet Take 10 mg by mouth daily 1/9/2024 at am    cholecalciferol (VITAMIN D3) 125 mcg (5000 units) capsule Take 1 capsule (125 mcg) by mouth daily 1/9/2024 at pm    cyanocobalamin (VITAMIN B-12) 1000 MCG sublingual tablet Place 1 tablet (1,000 mcg) under the tongue daily 1/9/2024 at pm    doxycycline hyclate (VIBRAMYCIN) 100 MG capsule Take 1 capsule (100 mg) by mouth every 12 hours for 4 days 1/9/2024 at 10am    gabapentin (NEURONTIN) 300 MG capsule Take 1 capsule (300 mg) by mouth at bedtime 1/8/2024 at pm  Spoke c pt.     He would like to cancel MRI due to cost.     He will keep appt 02/14/17.     Cancelled MRI, pt will r/s after trip to [place] at the end of the month.       Lidocaine (LIDOCARE) 4 % Patch Place 1 patch onto the skin every 24 hours To prevent lidocaine toxicity, patient should be patch free for 12 hrs daily.Apply patch(s) to neck for neck pain. To prevent lidocaine toxicity, patient should be patch free for 12 hrs daily. Patches may be cut to smaller size prior to removing release liner. Reminder: Remove previous patch before applying new patch.  NEVER APPLY HEAT OVER PATCH which increases absorption and may lead to local anesthetic toxicity. Do not apply over area where liposomal bupivacaine was injected for 96 hours post injection. Past Month    magnesium oxide (MAG-OX) 400 MG tablet Take 1 tablet (400 mg) by mouth daily 1/9/2024 at pm    Pediatric Multivitamins-Iron (MULTIVITAMINS PLUS IRON CHILD) 18 MG CHEW Take 1 chew tab by mouth 2 times daily Ok to substitute with any chewable that contains 18 mg of iron, Vitamin A, Thiamine and Zinc. 1/9/2024 at am    propranolol (INDERAL) 10 MG tablet Take 10 mg by mouth 3 times daily as needed Past Month    rosuvastatin (CRESTOR) 10 MG tablet Take 1 tablet (10 mg) by mouth daily 1/9/2024 at am    ursodiol (ACTIGALL) 300 MG capsule Take 1 capsule (300 mg) by mouth 2 times daily for 180 days Start 2 weeks after surgery, do not open-take with warm liquid. Take twice a day for 6 months. 1/9/2024 at am    venlafaxine (EFFEXOR) 75 MG tablet Take 1 tablet (75 mg) by mouth 3 times daily 1/9/2024 at am

## 2024-01-10 NOTE — PROGRESS NOTES
"[facility]    Medicine Progress Note - Hospitalist Service, GOLD TEAM 16    Date of Admission:  1/9/2024    Assessment & Plan   Mojgan Jimenez is a 52 yo F With PMH of asthma, HTN HLD, morbid obesity (BMI 41) s/p recent gastric sleeve (c/b peritonitis, SUSSY hypoxia needing intubation) who was admitted for AHRF and suspected worsened CAP (recent discharge and now bounce back)     #AHRF  #GUNDERSON  #CAP  :: low suspicion for underlying CHF, acute PE, PTX, methemoglobinemia; suspect that pna just needs some more time, IV abtx and perhaps steroids (I.e perhaps asthma component that is contributing)   :: Reassuring admission CXR and POCUS lungs, both suggestive of pna (no evid of CHF)  :: empiric CTX+doxy for now, not clear if doxy alone as PO abtx was truly inadequate or not, was only home for one day and its not clear that this could truly be called \"failure\" of PO abtx at this point  :: monitor, wean O2 as able, given his prev resp hx, may need addnl PT or home O2  Continue prednisone as well for presumed asthma exacerbation      #Mod persistent asthma with flare  :: nebs didn't help at home but nebs here did, steroid likely also helping   :: c/w PO steroid and ICS and FELIPE nebs prn  :: consider IV mag if having more SOB not controlled with nebs  :: needs asthma education prior to discharge     #HTN: c/w home clonidine patch and amlodipine  #HLD; c/w statin  #Allergies: c/w home zyrtex  #Anxiety: c/w home venlafaxine, no propranolol prn for now,  #Gallstone ppx\" c/w home ursidiol  #History of thrombocytopenia with positive HIT screen-avoid heparinoid products; recommend patient ambulates for DVT prophylaxis.      Obesity s/p gastric sleeve  :: c/w home vitamins, Vit D, mag ox          Diet: Combination Diet Regular Diet Adult    DVT Prophylaxis: Ambulate every shift  Patiño Catheter: Not present  Lines: None     Cardiac Monitoring: None  Code Status: Full Code      Clinically " "Significant Risk Factors Present on Admission                  # Hypertension: Noted on problem list      # Severe Obesity: Estimated body mass index is 41.98 kg/m  as calculated from the following:    Height as of this encounter: 1.6 m (5' 3\").    Weight as of this encounter: 107.5 kg (237 lb).       # Asthma: noted on problem list        Disposition Plan     Expected Discharge Date: 01/10/2024                    CARMITA URRUTIA MD  Hospitalist Service, GOLD TEAM [address] [place] [address]  Securely message with Vocera (more info)  Text page via [employer] Paging/Directory   See signed in provider for up to date coverage information  ______________________________________________________________________    Interval History   - reports feeling better today; reports being wheezy prior to presentation to the hospital  - has been afebrile and HDS  - has not required supplemental O2    Physical Exam   Vital Signs: Temp: 97.4  F (36.3  C) Temp src: Oral BP: 128/71 Pulse: 88   Resp: 16 SpO2: 98 % O2 Device: None (Room air) Oxygen Delivery: 2 LPM  Weight: 237 lbs 0 oz    General Appearance: Lying comfortably in bed, on room air, in no acute distress of discomfort  HEENT: PERRL: EOMI; moist mucous membrane w/o lesions  Neck: No JVD  Pulmonary: Clear to auscultation bilaterally, no wheezes or crackles  CVS: Regular rhythm, no murmurs, rubs or gallops  GI: BS (+), soft nontender, no rebound or guarding   Extremities: No LE edema.   Skin: No rashes or lesions  Neurologic: A&O x3      Medical Decision Making       45 MINUTES SPENT BY ME on the date of service doing chart review, history, exam, documentation & further activities per the note.      Data   ------------------------- PAST 24 HR DATA REVIEWED -----------------------------------------------    I have personally reviewed the following data over the past 24 hrs:    N/A  \   N/A   / N/A     N/A N/A N/A /  N/A   N/A N/A N/A \     ALT: N/A AST: " N/A AP: N/A TBILI: N/A   ALB: N/A TOT PROTEIN: N/A LIPASE: N/A     Trop: N/A BNP: N/A     Procal: N/A CRP: N/A Lactic Acid: N/A         Imaging results reviewed over the past 24 hrs:   Recent Results (from the past 24 hour(s))   XR Chest 2 Views    Narrative    EXAM: XR CHEST 2 VIEWS  LOCATION: [facility]  DATE: 1/9/2024    INDICATION: Shortness of breath, cough  COMPARISON: 01/05/2024      Impression    IMPRESSION: Mild right basilar atelectasis. No effusions or pneumothorax. Heart size is normal. No acute osseous findings.   POC US Chest B-Scan    Narrative    Limited Bedside Lung Ultrasound, performed and interpreted by me.   Indication: Dyspnea    With the patient positioned upright, bilateral posterior and lateral lung fields were examined for evidence of thoracic free fluid, pulmonary consolidation, and pulmonary edema.    Findings:    Right [place]: no B-lines, no  pleural fluid pocket, normal lung sliding   Right Mid: no B-lines, no  pleural fluid pocket, normal lung sliding   Right Lower: 3+ B-lines, no  pleural fluid pocket, normal lung sliding   Right Flank: no B-lines, no  pleural fluid pocket, normal lung sliding, mobile diaphragm    __________________________________________________________________  Left [place]: no B-lines, no  pleural fluid pocket, normal lung sliding,    Left Mid: no B-lines, no  pleural fluid pocket, normal lung sliding   Left Lower: no B-lines, no  pleural fluid pocket, normal lung sliding, mobile diaphragm    Left Flank: no B-lines, no  pleural fluid pocket, normal lung sliding, mobile diaphragm      IMPRESSION:   Notable RIGHT LUNG with 3+ B-lines in RLL in 2 contiguous segments suggestive of interstitial syndrome, no  pleural fluid pocket     Rob Braxton MD   1/10/2024

## 2024-01-10 NOTE — H&P
"[facility]    History and Physical - Hospitalist Service, GOLD TEAM        Date of Admission:  1/9/2024    Assessment & Plan      Mojgan Jimenez is a 54 yo F With PMH of asthma, HTN HLD, morbid obesity (BMI 41) s/p recent gastric sleeve (c/b peritonitis, SUSSY hypoxia needing intubation) who was admitted for AHRF and suspected worsened CAP (recent discharge and now bounce back)    #AHRF  #GUNDERSON  #CAP  :: low suspicion for underlying CHF, acute PE, PTX, methemoglobinemia; suspect that pna just needs some more time, IV abtx and perhaps steroids (I.e perhaps asthma component that is contributing)   :: Reassuring admission CXR and POCUS lungs, both suggestive of pna (no evid of CHF)  :: empiric CTX+doxy for now, not clear if doxy alone as PO abtx was truly inadequate or not, was only home for one day and its not clear that this could truly be called \"failure\" of PO abtx at this point  :: monitor, wean O2 as able, given his prev resp hx, may need addnl PT or home O2    #Mod persistent asthma with flare  :: nebs didn't help at home but nebs here did, steroid likely also helping   :: c/w PO steroid and ICS and FELIPE nebs prn  :: consider IV mag if having more SOB not controlled with nebs  :: needs asthma education prior to discharge    #HTN: c/w home clonidine patch and amlodipine  #HLD; c/w statin  #Allergies: c/w home zyrtex  #Anxiety: c/w home venlafaxine, no propranolol prn for now,  #Gallstone ppx\" c/w home ursidiol  #History of thrombocytopenia with positive HIT screen-avoid heparinoid products; recommend patient ambulates for DVT prophylaxis.     Obesity s/p gastric sleeve  :: c/w home vitamins, Vit D, mag ox            Diet: Combination Diet Regular Diet Adult    DVT Prophylaxis: Ambulate every shift  Patiño Catheter: Not present  Lines: None     Cardiac Monitoring: None  Code Status: Full Code      Clinically Significant Risk Factors Present on Admission                  # " "Hypertension: Noted on problem list      # Severe Obesity: Estimated body mass index is 41.98 kg/m  as calculated from the following:    Height as of this encounter: 1.6 m (5' 3\").    Weight as of this encounter: 107.5 kg (237 lb).       # Asthma: noted on problem list        Disposition Plan      Expected Discharge Date: 01/10/2024                  Rob Braxton MD  Hospitalist Service, [facility]  Securely message with [employer] (more info)  Text page via [employer] Paging/Directory   See signed in provider for up to date coverage information    ______________________________________________________________________    Chief Complaint   SOB and GUNDERSON    History is obtained from the patient    History of Present Illness   Mojgan Jimenez is a 53 year old female who presented to [facility] ER for c/o SOB and GUNDERSON  Patient was recently discharged from the [place] [id number] service in the [facility] for community-acquired pneumonia.  She was compliant on her medications including antibiotics at home, had a visit with the dietitian today that was a virtual visit (talking via video) patient noticed afterwards was very short of breath could not catch breath and just felt very tired.  Noted shortly thereafter when she got up to walk around was very winded very shortly this was a change for the worse compared to when she was discharged.  Denies chest pain no fevers no chills no sweats, no nausea vomiting no diarrhea, normal urine output no swelling no rash    Per discharge instructions presented herself back to the emergency department for reevaluation, repeat imaging obtained labs obtained were consistent with previous workup and suspected right lower lobe pneumonia.  Nebs and steroids were started this seemed to lead to significant clinical improvement in symptoms, but given lingering hypoxia plan to admit to hospitalist service    Past Medical History    Past Medical " History:   Diagnosis Date    LINA (generalized anxiety disorder) 11/02/2017    Hyperlipidemia LDL goal <160 01/20/2019    Major depressive disorder     Methanol abuse (H)     Mild persistent asthma without complication 11/02/2017    Obese     JARVIS on CPAP     Cpap not working    Paranoia (H)     resolved due to drugs    Vitamin D deficiency 11/02/2017       Past Surgical History   Past Surgical History:   Procedure Laterality Date    GASTRECTOMY, SLEEVE, LAPAROSCOPIC, WITH DUODENAL SWITCH N/A 10/9/2023    Procedure: GASTRECTOMY, SLEEVE, LAPAROSCOPIC, WITH SINGLE ANASTAMOSIS DUODENAL SWITCH;  Surgeon: Hoang Worthy MD;  Location: [facility] OR    LAPAROSCOPY DIAGNOSTIC (GYN) N/A 10/12/2023    Procedure: LAPAROSCOPY,;  Surgeon: Hoang Worthy MD;  Location: [facility] OR    LAPAROTOMY EXPLORATORY N/A 10/12/2023    Procedure: LAPAROTOMY, CLOSURE OF TWO SMALL BOWEL INTEROTOMIES, DRAIN PLACEMENT, INTRA-ABDOMINAL CLEAN OUT;  Surgeon: Hoang Worthy MD;  Location: [facility] OR    MAMMOPLASTY REDUCTION      reduction    PICC TRIPLE LUMEN PLACEMENT  10/28/2023       Prior to Admission Medications   Prior to Admission Medications   Prescriptions Last Dose Informant Patient Reported? Taking?   ADVAIR -21 MCG/ACT inhaler 1/9/2024  Yes Yes   Sig: Inhale 2 Puffs by mouth two times daily.*   Lidocaine (LIDOCARE) 4 % Patch Past Month  No Yes   Sig: Place 1 patch onto the skin every 24 hours To prevent lidocaine toxicity, patient should be patch free for 12 hrs daily.Apply patch(s) to neck for neck pain. To prevent lidocaine toxicity, patient should be patch free for 12 hrs daily. Patches may be cut to smaller size prior to removing release liner. Reminder: Remove previous patch before applying new patch.  NEVER APPLY HEAT OVER PATCH which increases absorption and may lead to local anesthetic toxicity. Do not apply over area where liposomal bupivacaine was injected for 96 hours post  injection.   Pediatric Multivitamins-Iron (MULTIVITAMINS PLUS IRON CHILD) 18 MG CHEW 1/9/2024 at am  No Yes   Sig: Take 1 chew tab by mouth 2 times daily Ok to substitute with any chewable that contains 18 mg of iron, Vitamin A, Thiamine and Zinc.   acetaminophen (TYLENOL) 500 MG tablet Past Month  Yes No   Sig: Take 500-1,000 mg by mouth every 6 hours as needed for mild pain   albuterol (PROAIR HFA) 108 (90 Base) MCG/ACT inhaler 1/9/2024  No Yes   Sig: Inhale 1-2 puffs into the lungs every 4 hours as needed for shortness of breath / dyspnea or wheezing   albuterol (PROVENTIL) (2.5 MG/3ML) 0.083% neb solution 1/9/2024  No Yes   Sig: Take 1 vial (2.5 mg) by nebulization every 6 hours as needed for shortness of breath or wheezing   amLODIPine (NORVASC) 10 MG tablet 1/9/2024 at am  No Yes   Sig: Take 1 tablet (10 mg) by mouth daily   carvedilol (COREG) 25 MG tablet 1/9/2024 at am  No Yes   Sig: Take 1 tablet (25 mg) by mouth 2 times daily (with meals)   cetirizine (ZYRTEC) 10 MG tablet 1/9/2024 at am  Yes Yes   Sig: Take 10 mg by mouth daily   cholecalciferol (VITAMIN D3) 125 mcg (5000 units) capsule 1/9/2024 at pm  No Yes   Sig: Take 1 capsule (125 mcg) by mouth daily   cloNIDine (CATAPRES-TTS1) 0.1 MG/24HR WK patch 1/5/2024  No No   Sig: Place 1 patch onto the skin once a week Reminder: Remove previous patch before applying new patch.  If partial dose is needed, use adhesive bandage to cover/block proportional surface area of the patch. Patches should NOT be cut.   cyanocobalamin (VITAMIN B-12) 1000 MCG sublingual tablet 1/9/2024 at pm  No Yes   Sig: Place 1 tablet (1,000 mcg) under the tongue daily   doxycycline hyclate (VIBRAMYCIN) 100 MG capsule 1/9/2024 at 10am  No Yes   Sig: Take 1 capsule (100 mg) by mouth every 12 hours for 4 days   gabapentin (NEURONTIN) 300 MG capsule 1/8/2024 at pm  No Yes   Sig: Take 1 capsule (300 mg) by mouth at bedtime   magnesium oxide (MAG-OX) 400 MG tablet 1/9/2024 at pm  No Yes  "  Sig: Take 1 tablet (400 mg) by mouth daily   propranolol (INDERAL) 10 MG tablet Past Month  Yes Yes   Sig: Take 10 mg by mouth 3 times daily as needed   rosuvastatin (CRESTOR) 10 MG tablet 1/9/2024 at am  No Yes   Sig: Take 1 tablet (10 mg) by mouth daily   ursodiol (ACTIGALL) 300 MG capsule 1/9/2024 at am  No Yes   Sig: Take 1 capsule (300 mg) by mouth 2 times daily for 180 days Start 2 weeks after surgery, do not open-take with warm liquid. Take twice a day for 6 months.   venlafaxine (EFFEXOR) 75 MG tablet 1/9/2024 at am  No Yes   Sig: Take 1 tablet (75 mg) by mouth 3 times daily      Facility-Administered Medications: None        Review of Systems    The 5 point Review of Systems is negative other than noted in the HPI or here.     Social History   I have reviewed this patient's social history and updated it with pertinent information if needed.  Social History     Tobacco Use    Smoking status: Never    Smokeless tobacco: Never   Vaping Use    Vaping Use: Never used   Substance Use Topics    Alcohol use: Not Currently     Comment: Sober x 8 months    Drug use: Not Currently     Types: Methamphetamines, \"Crack\" cocaine     Comment: in remision          Allergies   No Known Allergies     Physical Exam   Vital Signs: Temp: 98  F (36.7  C) Temp src: Oral BP: (!) 149/86 Pulse: 103   Resp: 18 SpO2: 96 % O2 Device: None (Room air)    Weight: 237 lbs 0 oz    EXAM  General: tired but well appearing; lying flat in bed  Head: NC, AT  Eye: symm gaze, anicteric sclerae  ENT: patent nares wo drainage/epistaxis, <MM  Pulm: faint wheezes BL bases, comfortable WOB on NC 1L  CV: normal rate, regular rhythm,   GI: soft, NTND  Neuro: awake, alert, hearing speech and phonation, intact grossly       Medical Decision Making       90 MINUTES SPENT BY ME on the date of service doing chart review, history, exam, documentation & further activities per the note.  MANAGEMENT DISCUSSED with the following over the past 24 hours: " Deon   NOTE(S)/MEDICAL RECORDS REVIEWED over the past 24 hours: Dr Chavarrai note, Dr Warrne note and discharge summ, RN notes  Tests ORDERED & REVIEWED in the past 24 hours:  Tests personally interpreted in the past 24 hours:  - CHEST XRAY showing no acute findings, no evifd of vol overload or large loculated pna  - ULTRASOUND showing RLL B Lines suggestive of interstitial syndrome, no effusion  Medical complexity over the past 24 hours:  - Prescription DRUG MANAGEMENT performed      Data     I have personally reviewed the following data over the past 24 hrs:    6.6  \   11.4 (L)   / 257     142; 140 106; 104 4.9 (L); 4.8 (L) /  97; 97   3.7; 3.7 26; 26 0.75; 0.75 \     ALT: 20 AST: 20 AP: 90 TBILI: 0.2   ALB: 3.7 TOT PROTEIN: 7.2 LIPASE: N/A     Trop: 13 BNP: 465     Procal: 0.03 CRP: N/A Lactic Acid: 0.9         Imaging results reviewed over the past 24 hrs:   Recent Results (from the past 24 hour(s))   XR Chest 2 Views    Narrative    EXAM: XR CHEST 2 VIEWS  LOCATION: [facility]  DATE: 1/9/2024    INDICATION: Shortness of breath, cough  COMPARISON: 01/05/2024      Impression    IMPRESSION: Mild right basilar atelectasis. No effusions or pneumothorax. Heart size is normal. No acute osseous findings.

## 2024-01-10 NOTE — PROGRESS NOTES
I followed up with Mojgan about the CPAP order and she says she hasn't used the CPAP she has for a long time and she does not want one during her stay.

## 2024-01-10 NOTE — PROGRESS NOTES
"  VS: /71 (BP Location: Right arm)   Pulse 88   Temp 97.4  F (36.3  C) (Oral)   Resp 16   Ht 1.6 m (5' 3\")   Wt 107.5 kg (237 lb)   LMP 09/09/2023 (Exact Date)   SpO2 98%   BMI 41.98 kg/m      O2: RA to 1L O2 when sleeping   Output: Voids in toilet   Last BM: 1/10   Activity: indep   Skin: intact   Pain: denies   CMS: intact   Dressing: none   Diet: reg   LDA: PIV in LAC   Equipment: none   Plan: Possible discharge home tomorrow 1/11   Additional Info:       "

## 2024-01-11 PROCEDURE — 250N000013 HC RX MED GY IP 250 OP 250 PS 637: Performed by: PEDIATRICS

## 2024-01-11 PROCEDURE — 250N000012 HC RX MED GY IP 250 OP 636 PS 637: Performed by: PEDIATRICS

## 2024-01-11 PROCEDURE — 250N000011 HC RX IP 250 OP 636: Performed by: PEDIATRICS

## 2024-01-11 PROCEDURE — 120N000002 HC R&B MED SURG/OB UMMC

## 2024-01-11 PROCEDURE — 250N000011 HC RX IP 250 OP 636: Performed by: INTERNAL MEDICINE

## 2024-01-11 PROCEDURE — 99232 SBSQ HOSP IP/OBS MODERATE 35: CPT | Performed by: INTERNAL MEDICINE

## 2024-01-11 RX ORDER — METHYLPREDNISOLONE SODIUM SUCCINATE 125 MG/2ML
60 INJECTION, POWDER, LYOPHILIZED, FOR SOLUTION INTRAMUSCULAR; INTRAVENOUS EVERY 8 HOURS
Status: DISCONTINUED | OUTPATIENT
Start: 2024-01-11 | End: 2024-01-12

## 2024-01-11 RX ORDER — CEFTRIAXONE 2 G/1
2 INJECTION, POWDER, FOR SOLUTION INTRAMUSCULAR; INTRAVENOUS EVERY 24 HOURS
Status: DISCONTINUED | OUTPATIENT
Start: 2024-01-12 | End: 2024-01-12

## 2024-01-11 RX ORDER — FUROSEMIDE 10 MG/ML
20 INJECTION INTRAMUSCULAR; INTRAVENOUS ONCE
Status: COMPLETED | OUTPATIENT
Start: 2024-01-11 | End: 2024-01-11

## 2024-01-11 RX ORDER — CEFTRIAXONE 1 G/1
1 INJECTION, POWDER, FOR SOLUTION INTRAMUSCULAR; INTRAVENOUS ONCE
Status: COMPLETED | OUTPATIENT
Start: 2024-01-11 | End: 2024-01-11

## 2024-01-11 RX ADMIN — Medication 1 TABLET: at 08:58

## 2024-01-11 RX ADMIN — URSODIOL 300 MG: 300 CAPSULE ORAL at 20:22

## 2024-01-11 RX ADMIN — PREDNISONE 40 MG: 20 TABLET ORAL at 08:58

## 2024-01-11 RX ADMIN — Medication 1 TABLET: at 20:22

## 2024-01-11 RX ADMIN — CETIRIZINE HYDROCHLORIDE 10 MG: 10 TABLET, FILM COATED ORAL at 08:58

## 2024-01-11 RX ADMIN — CARVEDILOL 25 MG: 25 TABLET, FILM COATED ORAL at 17:48

## 2024-01-11 RX ADMIN — ROSUVASTATIN 10 MG: 10 TABLET, FILM COATED ORAL at 08:58

## 2024-01-11 RX ADMIN — CARVEDILOL 25 MG: 25 TABLET, FILM COATED ORAL at 08:58

## 2024-01-11 RX ADMIN — Medication 125 MCG: at 08:58

## 2024-01-11 RX ADMIN — URSODIOL 300 MG: 300 CAPSULE ORAL at 08:58

## 2024-01-11 RX ADMIN — AMLODIPINE BESYLATE 10 MG: 10 TABLET ORAL at 08:58

## 2024-01-11 RX ADMIN — GABAPENTIN 300 MG: 300 CAPSULE ORAL at 21:12

## 2024-01-11 RX ADMIN — CEFTRIAXONE SODIUM 1 G: 1 INJECTION, POWDER, FOR SOLUTION INTRAMUSCULAR; INTRAVENOUS at 02:58

## 2024-01-11 RX ADMIN — VENLAFAXINE 75 MG: 75 TABLET ORAL at 08:58

## 2024-01-11 RX ADMIN — DOXYCYCLINE HYCLATE 100 MG: 100 CAPSULE ORAL at 12:11

## 2024-01-11 RX ADMIN — VENLAFAXINE 75 MG: 75 TABLET ORAL at 20:22

## 2024-01-11 RX ADMIN — METHYLPREDNISOLONE SODIUM SUCCINATE 62.5 MG: 125 INJECTION INTRAMUSCULAR; INTRAVENOUS at 16:54

## 2024-01-11 RX ADMIN — VENLAFAXINE 75 MG: 75 TABLET ORAL at 13:33

## 2024-01-11 RX ADMIN — Medication 1000 MCG: at 08:58

## 2024-01-11 RX ADMIN — FLUTICASONE FUROATE AND VILANTEROL TRIFENATATE 1 PUFF: 100; 25 POWDER RESPIRATORY (INHALATION) at 13:30

## 2024-01-11 RX ADMIN — CEFTRIAXONE SODIUM 1 G: 1 INJECTION, POWDER, FOR SOLUTION INTRAMUSCULAR; INTRAVENOUS at 09:17

## 2024-01-11 RX ADMIN — FUROSEMIDE 20 MG: 10 INJECTION, SOLUTION INTRAMUSCULAR; INTRAVENOUS at 10:08

## 2024-01-11 RX ADMIN — MAGNESIUM OXIDE TAB 400 MG (241.3 MG ELEMENTAL MG) 400 MG: 400 (241.3 MG) TAB at 08:58

## 2024-01-11 ASSESSMENT — ACTIVITIES OF DAILY LIVING (ADL)
ADLS_ACUITY_SCORE: 20
ADLS_ACUITY_SCORE: 20
DEPENDENT_IADLS:: CLEANING;COOKING;LAUNDRY;SHOPPING;MEAL PREPARATION;MEDICATION MANAGEMENT
ADLS_ACUITY_SCORE: 20
ADLS_ACUITY_SCORE: 37
ADLS_ACUITY_SCORE: 20

## 2024-01-11 ASSESSMENT — ENCOUNTER SYMPTOMS: FEVER: 1

## 2024-01-11 NOTE — PROGRESS NOTES
Did home O2 eval with patient.  When walking in the prater she got very SOB and complained her chest was tightening.      Respiratory home O2 eval was documented in the PCS under respiratory section

## 2024-01-11 NOTE — PROGRESS NOTES
"[facility]    Medicine Progress Note - Hospitalist Service, GOLD TEAM 16    Date of Admission:  1/9/2024    Assessment & Plan   Mojgan Jimenez is a 52 yo F With PMH of asthma, HTN HLD, morbid obesity (BMI 41) s/p recent gastric sleeve (c/b peritonitis, SUSSY hypoxia needing intubation) who was admitted for AHRF and suspected worsened CAP (recent discharge and now bounce back). Presentation this time seems consistent with acute asthma exacerbation.      # Acute hypoxic respiratory failure  # Dyspnea on exertion  # Recent diagnosis of community-acquired pneumonia  # Possible exacerbation of asthma.  - low suspicion for underlying CHF, acute PE, PTX, methemoglobinemia; suspect that pna just needs some more time, IV abtx and perhaps steroids (I.e perhaps asthma component that is contributing)   -Reassuring admission CXR and POCUS lungs,  no evid of CHF  -troponin negative, procal normal, no leukocytosis   -empiric CTX to continue for now. Discontinued doxy as patient already completed course of atypical treatment   :: monitor, wean O2 as able, given his prev resp hx, may need addnl PT or home O2  On steroid for as well for presumed asthma exacerbation      #Mod persistent asthma with flare  -nebs didn't help at home but nebs here did, steroid likely also helping   -received IV solumedrol in the ED x1  -initiated on prednisone, but given persistence of symptoms, restarting IV solumedrol again on 1/11/24  Hold po prednisone  Continue with ICS and FELIPE nebs prn  needs asthma education prior to discharge     #HTN: c/w home clonidine patch and amlodipine  #HLD; c/w statin  #Allergies: c/w home zyrtex  #Anxiety: c/w home venlafaxine, no propranolol prn for now,  #Gallstone ppx\" c/w home ursidiol  #History of thrombocytopenia with positive HIT screen-avoid heparinoid products; recommend patient ambulates for DVT prophylaxis.      Obesity s/p gastric sleeve  :: c/w home vitamins, Vit " "D, mag ox          Diet: Combination Diet Regular Diet Adult    DVT Prophylaxis: Ambulate every shift  Patiño Catheter: Not present  Lines: None     Cardiac Monitoring: None  Code Status: Full Code      Clinically Significant Risk Factors                  # Hypertension: Noted on problem list        # Severe Obesity: Estimated body mass index is 41.98 kg/m  as calculated from the following:    Height as of this encounter: 1.6 m (5' 3\").    Weight as of this encounter: 107.5 kg (237 lb)., PRESENT ON ADMISSION     # Asthma: noted on problem list        Disposition Plan  Likely discharge tomorrow     Expected Discharge Date: 01/11/2024,  3:00 PM    Destination: home with help/services            CARMITA URRUTIA MD  Hospitalist Service, GOLD TEAM 16  [facility]  Securely message with [employer] (more info)  Text page via [employer] Paging/Directory   See signed in provider for up to date coverage information  ______________________________________________________________________    Interval History   - reports feeling better today; reports being wheezy prior to presentation to the hospital  - has been afebrile and HDS  - has not required supplemental O2  - felt sob with ambulatory testing, reported some chest tightness with ambulation.     Physical Exam   Vital Signs: Temp: 98.1  F (36.7  C) Temp src: Oral BP: 122/55 Pulse: 81   Resp: 18 SpO2: 98 % O2 Device: None (Room air) Oxygen Delivery: 1 LPM  Weight: 237 lbs 0 oz    General Appearance: Lying comfortably in bed, on room air, in no acute distress of discomfort  HEENT: PERRL: EOMI; moist mucous membrane w/o lesions  Neck: No JVD  Pulmonary: Clear to auscultation bilaterally, no wheezes or crackles  CVS: Regular rhythm, no murmurs, rubs or gallops  GI: BS (+), soft nontender, no rebound or guarding   Extremities: No LE edema.   Skin: No rashes or lesions  Neurologic: A&O x3      Medical Decision Making       45 MINUTES SPENT BY ME " on the date of service doing chart review, history, exam, documentation & further activities per the note.      Data   ------------------------- PAST 24 HR DATA REVIEWED -----------------------------------------------        Imaging results reviewed over the past 24 hrs:   No results found for this or any previous visit (from the past 24 hour(s)).

## 2024-01-11 NOTE — PROGRESS NOTES
"  VS: BP (!) 145/80 (BP Location: Right arm)   Pulse 79   Temp 97.9  F (36.6  C) (Oral)   Resp 18   Ht 1.6 m (5' 3\")   Wt 107.5 kg (237 lb)   LMP 09/09/2023 (Exact Date)   SpO2 96%   BMI 41.98 kg/m      O2: RA   Output: Voids in toilet   Last BM:    Activity: indep   Skin: intact   Pain: denies   CMS: intact   Dressing: none   Diet: reg   LDA: PIV in LAC   Equipment: none   Plan: Discharge home 1/12   Additional Info:         Mojgan is doing well.  On cont pulse ox, monitoring O2 levels.  Mojgan gets SOB & complains of chest tightness when walking in the prater & with activity.      "

## 2024-01-11 NOTE — PLAN OF CARE
Goal Outcome Evaluation:      Plan of Care Reviewed With: patient    Overall Patient Progress: improvingOverall Patient Progress: improving    Outcome Evaluation: Pt on 1L nc. up independently. Antibiotics given. Potential discharge home 1/11

## 2024-01-11 NOTE — PLAN OF CARE
Problem: Adult Inpatient Plan of Care  Goal: Plan of Care Review  Description: The Plan of Care Review/Shift note should be completed every shift.  The Outcome Evaluation is a brief statement about your assessment that the patient is improving, declining, or no change.  This information will be displayed automatically on your shift  note.  Flowsheets (Taken 1/11/2024 3956)  Outcome Evaluation: Pt plans to discharge home, daughter will provide transportation at discharge  Plan of Care Reviewed With: patient

## 2024-01-11 NOTE — CONSULTS
Care Management Initial Consult    General Information  Assessment completed with: Patient,    Type of CM/SW Visit: Initial Assessment    Primary Care Provider verified and updated as needed: Yes   Readmission within the last 30 days: previous discharge plan unsuccessful      Reason for Consult:  (Elevated Risk Score)  Advance Care Planning: Advance Care Planning Reviewed: no concerns identified        Communication Assessment  Patient's communication style: spoken language (English or Bilingual)    Hearing Difficulty or Deaf: no   Wear Glasses or Blind: no    Cognitive  Cognitive/Neuro/Behavioral: WDL                      Living Environment:   People in home: child(jean claude), adult, grandchild(jean claude)   (Daughter and grandson)  Current living Arrangements: apartment      Able to return to prior arrangements: yes     Family/Social Support:  Care provided by: self, child(jean claude) (Sister)  Provides care for: no one  Marital Status: Single  Children, Sibling(s)          Description of Support System: Supportive, Involved    Support Assessment: Adequate family and caregiver support, Adequate social supports    Current Resources:   Patient receiving home care services: No  Community Resources:  (OP PT)  Equipment currently used at home: shower chair   Supplies currently used at home: Nebulizer tubing    Employment/Financial:  Employment Status: unemployed (Unable to work right now due to health)        Financial Concerns:  (No income to pay her half of the rent, ex- is helping pay it. Denied for emergency assistance due to not working. Recommended applying to county benefits to see if they apply for food stamps and cash assistance)   Referral to Financial Worker: No     Does the patient's insurance plan have a 3 day qualifying hospital stay waiver?  No    Lifestyle & Psychosocial Needs:  Social Determinants of Health     Food Insecurity: Not on file   Depression: At risk (8/25/2022)    PHQ-2     PHQ-2 Score: 5   Housing  Stability: Not on file   Tobacco Use: Low Risk  (10/28/2023)    Patient History     Smoking Tobacco Use: Never     Smokeless Tobacco Use: Never     Passive Exposure: Not on file   Financial Resource Strain: Not on file   Alcohol Use: Not At Risk (4/2/2019)    AUDIT-C     Frequency of Alcohol Consumption: Never     Average Number of Drinks: Not on file     Frequency of Binge Drinking: Not on file   Transportation Needs: Not on file   Physical Activity: Not on file   Interpersonal Safety: Not on file   Stress: Not on file   Social Connections: Not on file     Functional Status:  Prior to admission patient needed assistance:   Dependent ADLs:: Bathing, Dressing  Dependent IADLs:: Cleaning, Cooking, Laundry, Shopping, Meal Preparation, Medication Management     Mental Health Status:  Mental Health Status: No Current Concerns  Mental Health Management: Psychiatrist    Chemical Dependency Status:  Chemical Dependency Status: No Current Concerns           Values/Beliefs:  Spiritual, Cultural Beliefs, [employer]ism Practices, Values that affect care: no               Additional Information:    Met with pt at bedside to complete initial assessment for elevated risk score.    Pt confirmed address and PCP. Pt lives in an apartment with her daughter and grandson. Pt not currently working due to health concerns and having difficulty paying their portion of the rent. Was denied emergency assistance due to not working but has a friend helping pay their portion. SW suggested applying for county benefits to see if they qualify for food and cash support - pt will have a friend assist with the online application. Declined mental and chemical health concerns.    Pt is receiving assistance with some ADLS and IADLs from sister and daughter due to being short of breath. Pt was receiving OP PT after last hospitalization. Daughter will provide transportation home.    Care management will be available as needed.    HILDA Ayon    Covering [facility]  Ph: [phone]  Pager: [phone]

## 2024-01-11 NOTE — PLAN OF CARE
"Goal Outcome Evaluation:      VS: /71 (BP Location: Right arm)   Pulse 88   Temp 97.4  F (36.3  C) (Oral)   Resp 16   Ht 1.6 m (5' 3\")   Wt 107.5 kg (237 lb)   LMP 09/09/2023 (Exact Date)   SpO2 98%   BMI 41.98 kg/m       O2: RA to 1L O2 when sleeping   Output: Voids in toilet   Last BM: 1/10   Activity: indep   Skin: intact   Pain: denies   CMS: intact   Dressing: none   Diet: reg   LDA: PIV in LAC   Equipment: none   Plan: Possible discharge home tomorrow 1/11   Additional Info: Pt report sob and ask for prn neb. RT page and neb treatment done. Pt report feeling fine.       "

## 2024-01-12 VITALS
DIASTOLIC BLOOD PRESSURE: 74 MMHG | HEIGHT: 63 IN | BODY MASS INDEX: 41.99 KG/M2 | SYSTOLIC BLOOD PRESSURE: 120 MMHG | WEIGHT: 237 LBS | OXYGEN SATURATION: 97 % | TEMPERATURE: 97.5 F | HEART RATE: 80 BPM | RESPIRATION RATE: 20 BRPM

## 2024-01-12 PROCEDURE — 250N000011 HC RX IP 250 OP 636: Performed by: PEDIATRICS

## 2024-01-12 PROCEDURE — 250N000011 HC RX IP 250 OP 636: Performed by: INTERNAL MEDICINE

## 2024-01-12 PROCEDURE — 250N000013 HC RX MED GY IP 250 OP 250 PS 637: Performed by: PEDIATRICS

## 2024-01-12 PROCEDURE — 99239 HOSP IP/OBS DSCHRG MGMT >30: CPT | Performed by: INTERNAL MEDICINE

## 2024-01-12 RX ORDER — PREDNISONE 20 MG/1
40 TABLET ORAL DAILY
Qty: 10 TABLET | Refills: 0 | Status: SHIPPED | OUTPATIENT
Start: 2024-01-12 | End: 2024-01-17

## 2024-01-12 RX ORDER — ALBUTEROL SULFATE 90 UG/1
AEROSOL, METERED RESPIRATORY (INHALATION)
Qty: 8.5 G | Refills: 11 | Status: SHIPPED | OUTPATIENT
Start: 2024-01-12

## 2024-01-12 RX ADMIN — Medication 125 MCG: at 11:52

## 2024-01-12 RX ADMIN — MAGNESIUM OXIDE TAB 400 MG (241.3 MG ELEMENTAL MG) 400 MG: 400 (241.3 MG) TAB at 08:45

## 2024-01-12 RX ADMIN — Medication 1000 MCG: at 08:44

## 2024-01-12 RX ADMIN — METHYLPREDNISOLONE SODIUM SUCCINATE 62.5 MG: 125 INJECTION INTRAMUSCULAR; INTRAVENOUS at 08:47

## 2024-01-12 RX ADMIN — CETIRIZINE HYDROCHLORIDE 10 MG: 10 TABLET, FILM COATED ORAL at 09:05

## 2024-01-12 RX ADMIN — CLONIDINE 1 PATCH: 0.1 PATCH TRANSDERMAL at 09:07

## 2024-01-12 RX ADMIN — VENLAFAXINE 75 MG: 75 TABLET ORAL at 08:45

## 2024-01-12 RX ADMIN — CEFTRIAXONE SODIUM 2 G: 2 INJECTION, POWDER, FOR SOLUTION INTRAMUSCULAR; INTRAVENOUS at 03:05

## 2024-01-12 RX ADMIN — Medication 1 TABLET: at 08:45

## 2024-01-12 RX ADMIN — CARVEDILOL 25 MG: 25 TABLET, FILM COATED ORAL at 08:44

## 2024-01-12 RX ADMIN — URSODIOL 300 MG: 300 CAPSULE ORAL at 08:45

## 2024-01-12 RX ADMIN — ROSUVASTATIN 10 MG: 10 TABLET, FILM COATED ORAL at 08:45

## 2024-01-12 RX ADMIN — FLUTICASONE FUROATE AND VILANTEROL TRIFENATATE 1 PUFF: 100; 25 POWDER RESPIRATORY (INHALATION) at 09:06

## 2024-01-12 RX ADMIN — AMLODIPINE BESYLATE 10 MG: 10 TABLET ORAL at 08:44

## 2024-01-12 RX ADMIN — METHYLPREDNISOLONE SODIUM SUCCINATE 62.5 MG: 125 INJECTION INTRAMUSCULAR; INTRAVENOUS at 01:05

## 2024-01-12 ASSESSMENT — ACTIVITIES OF DAILY LIVING (ADL)
ADLS_ACUITY_SCORE: 20

## 2024-01-12 NOTE — PROGRESS NOTES
Oxygen Documentation  I certify that this patient, Mojgan Jimenez has been under my care (or a nurse practitioner or physican's assistant working with me). This is the face-to-face encounter for oxygen medical necessity.      At the time of this encounter, I have reviewed the qualifying testing and have determined that supplemental oxygen is reasonable and necessary and is expected to improve the patient's condition in a home setting.       Patient has continued oxygen desaturation due to Asthma exacerbation.    If portability is ordered, is the patient mobile within the home? yes

## 2024-01-12 NOTE — PLAN OF CARE
"/74 (BP Location: Right arm, Patient Position: Supine, Cuff Size: Adult Regular)   Pulse 80   Temp 97.5  F (36.4  C) (Oral)   Resp 20   Ht 1.6 m (5' 3\")   Wt 107.5 kg (237 lb)   LMP 09/09/2023 (Exact Date)   SpO2 98%   BMI 41.98 kg/m    Pt. Is A & O X 4. Able to verbalize needs appropriately. Denies pain, SOB or acute distress. Pt condition continues to progress. Call button placed within reach. Plan of care is ongoing.   "

## 2024-01-12 NOTE — PROGRESS NOTES
Placed a call to [phone] to see if patient qualified for home Oxygen. Awaiting for feed back and qualifications?? Will continue to monitor.

## 2024-01-12 NOTE — PROGRESS NOTES
Patient has been assessed for Home Oxygen needs. Oxygen readings:    *Pulse oximetry (SpO2) = 98% on room air at rest while awake.    *SpO2 improved to 100% on 1liters/minute at rest.    *SpO2 = 88% on room air during activity/with exercise.    *SpO2 improved to 95% on 1liters/minute during activity/with exercise.     Pt has a lot of SOB when up to bathroom. Recovers when at rest for a few minutes. However pt gets pretty SOB when up and walking.

## 2024-01-12 NOTE — PROGRESS NOTES
"  VS: /74 (BP Location: Right arm, Patient Position: Supine, Cuff Size: Adult Regular)   Pulse 80   Temp 97.5  F (36.4  C) (Oral)   Resp 20   Ht 1.6 m (5' 3\")   Wt 107.5 kg (237 lb)   LMP 09/09/2023 (Exact Date)   SpO2 97%   BMI 41.98 kg/m      O2: Room air saturations 97% when at rest. However pt becomes slightly SOB when up to bathroom and takes a few minutes to recover. Pt was evaluated for home Oxygen use. See home O2 assessment. Called [phone] to place order for home assessment to be evaluated. Pt apparently qualified to home O2. Plan is for Home O2 to be delivered to unit before discharge.    Output: No issues with urine output   Last BM: 1/12/2024 reported patient. Normal in consitency reports patient   Activity: Up to bathroom in room. Likes to sit up in bed or in chair. Pt able to transfer independently.    Skin: Has some skin breakdown in abdominal folds. Powder and hygiene was encouraged.    Pain: Denies   CMS:    Dressing: NA   Diet: Regular   LDA: SL will be discontinued before discharging to home.    Equipment: Awaiting for med's and home 02 to be delivered to unit.    Plan: Plan is for patient to be discharged to home with daughter with whom she lives with.    Additional Info: Taught patient about discharge instructions. Returned all patients belongings to patient. Pt will  have med's filled at her own pharmacy at [facility].  Oxygen was delivered to unit. Pt will be escorted to front door in wheelchair to be discharge to home.       "

## 2024-01-12 NOTE — PLAN OF CARE
Goal Outcome Evaluation: Improving  Pt continue to report GUNDERSON but maintaining O2 sats above 95 %.  No report of chest pain.  Continue emperic IV Abx.  Denies other acute complaints.

## 2024-01-13 NOTE — DISCHARGE SUMMARY
"[facility]  Hospitalist Discharge Summary      Date of Admission:  1/9/2024  Date of Discharge:  1/12/2024  2:15 PM  Discharging Provider: Venu Mooney MD  Discharge Service: Hospitalist Service, GOLD TEAM 16    Discharge Diagnoses     # Acute hypoxic respiratory failure  # Dyspnea on exertion  # Recent diagnosis of community-acquired pneumonia  # Possible exacerbation of asthma.    Clinically Significant Risk Factors     # Severe Obesity: Estimated body mass index is 41.98 kg/m  as calculated from the following:    Height as of this encounter: 1.6 m (5' 3\").    Weight as of this encounter: 107.5 kg (237 lb).       Follow-ups Needed After Discharge   Follow-up Appointments     Adult [facility] Follow-up and recommended labs and tests      Follow up with primary care provider, Franca Mishra, within 7 days   for hospital follow- up.  No follow up labs or test are needed.      Appointments on [place] and/or [facility] (with [facility] or [facility]   provider or service). Call [phone] if you haven't heard regarding   these appointments within 7 days of discharge.            Unresulted Labs Ordered in the Past 30 Days of this Admission       No orders found from 12/10/2023 to 1/10/2024.            Discharge Disposition   Discharged to home  Condition at discharge: Stable    Hospital Course   Mojgan Jimenez is a 52 yo F With PMH of asthma, HTN HLD, morbid obesity (BMI 41) s/p recent gastric sleeve who was admitted for AHRF, she presented to the hospital for evaluation of shortness of breath and GUNDERSON.   She was recently admitted to the hospital and treated for possible community acquired pneumonia.   Patient went home and developed another episode of worsening shortness of breath. Patient was started on IV solumedrol, frequent nebulization's and empiric antibiotics. Presentation this time seems consistent with acute asthma exacerbation. Respiratory status improved " and patient was transitioned to Prednisone burst. Patient qualified for O 2 NC with activity. Respiratory status stable on room air at rest.   Patient was hemodynamically stable on the day of discharge. I recommended close follow-up with her PCP as outpatient.       Consultations This Hospital Stay   CARE MANAGEMENT / SOCIAL WORK IP CONSULT    Code Status   Prior    Time Spent on this Encounter   I, Venu Mooney MD, personally saw the patient today and spent greater than 30 minutes discharging this patient.       Venu Mooney MD  [facility] MED SURG ORTHOPEDIC  [address] [place] [id number]  Phone: [phone]  Fax: [phone]  ______________________________________________________________________    Physical Exam   Vital Signs:       Pulse: 80     SpO2: 97 % O2 Device: None (Room air)    Weight: 237 lbs 0 oz  General Appearance: Lying comfortably in bed, on room air, in no acute distress of discomfort  HEENT: PERRL: EOMI; moist mucous membrane w/o lesions  Pulmonary: Clear to auscultation bilaterally, no wheezes or crackles  CVS: Regular rhythm, no murmurs, rubs or gallops  GI: BS (+), soft nontender, no rebound or guarding   Extremities: No LE edema.   Neurologic: A&O x3       Primary Care Physician   Franca Mishra    Discharge Orders      Reason for your hospital stay    54 yo F With PMH of asthma, HTN HLD, morbid obesity (BMI 41) s/p recent gastric sleeve (c/b peritonitis, SUSSY hypoxia needing intubation) who was admitted for AHRF and suspected worsened CAP (recent discharge and now bounce back). Presentation this time seems consistent with acute asthma exacerbation.     Activity    Your activity upon discharge: activity as tolerated     Adult [facility] Follow-up and recommended labs and tests    Follow up with primary care provider, Franca Mishra, within 7 days for hospital follow- up.  No follow up labs or test are needed.      Appointments on [place]  and/or [facility] (with [email or web address] or [facility] provider or service). Call [phone] if you haven't heard regarding these appointments within 7 days of discharge.     Oxygen Adult/Peds    Oxygen Documentation  I certify that this patient, Mojgan Jimenez has been under my care (or a nurse practitioner or physican's assistant working with me). This is the face-to-face encounter for oxygen medical necessity.      At the time of this encounter, I have reviewed the qualifying testing and have determined that supplemental oxygen is reasonable and necessary and is expected to improve the patient's condition in a home setting.       Patient has continued oxygen desaturation due to Asthma exacerbation.    If portability is ordered, is the patient mobile within the home? yes     Diet    Follow this diet upon discharge: Orders Placed This Encounter      Combination Diet Regular Diet Adult       Significant Results and Procedures   Results for orders placed or performed during the hospital encounter of 01/09/24   XR Chest 2 Views    Narrative    EXAM: XR CHEST 2 VIEWS  LOCATION: [facility]  DATE: 1/9/2024    INDICATION: Shortness of breath, cough  COMPARISON: 01/05/2024      Impression    IMPRESSION: Mild right basilar atelectasis. No effusions or pneumothorax. Heart size is normal. No acute osseous findings.   POC US Chest B-Scan    Narrative    Limited Bedside Lung Ultrasound, performed and interpreted by me.   Indication: Dyspnea    With the patient positioned upright, bilateral posterior and lateral lung fields were examined for evidence of thoracic free fluid, pulmonary consolidation, and pulmonary edema.    Findings:    Right [place]: no B-lines, no  pleural fluid pocket, normal lung sliding   Right Mid: no B-lines, no  pleural fluid pocket, normal lung sliding   Right Lower: 3+ B-lines, no  pleural fluid pocket, normal lung sliding   Right Flank: no B-lines, no  pleural fluid  pocket, normal lung sliding, mobile diaphragm    __________________________________________________________________  Left [place]on: no B-lines, no  pleural fluid pocket, normal lung sliding,    Left Mid: no B-lines, no  pleural fluid pocket, normal lung sliding   Left Lower: no B-lines, no  pleural fluid pocket, normal lung sliding, mobile diaphragm    Left Flank: no B-lines, no  pleural fluid pocket, normal lung sliding, mobile diaphragm      IMPRESSION:   Notable RIGHT LUNG with 3+ B-lines in RLL in 2 contiguous segments suggestive of interstitial syndrome, no  pleural fluid pocket     Rob Braxton MD   1/10/2024                Discharge Medications   Discharge Medication List as of 1/12/2024 12:01 PM        START taking these medications    Details   predniSONE (DELTASONE) 20 MG tablet Take 2 tablets (40 mg) by mouth daily for 5 days, Disp-10 tablet, R-0, E-Prescribe           CONTINUE these medications which have CHANGED    Details   albuterol (PROAIR HFA) 108 (90 Base) MCG/ACT inhaler Inhale 1-2 puffs into the lungs every 4 hours as needed for shortness of breath / dyspnea or wheezing, Disp-8.5 g, R-11, E-PrescribePharmacy may dispense brand covered by insurance (Proair, or proventil or ventolin or generic albuterol inhaler)           CONTINUE these medications which have NOT CHANGED    Details   ADVAIR -21 MCG/ACT inhaler Inhale 2 Puffs by mouth two times daily.*, RAE, Historical      albuterol (PROVENTIL) (2.5 MG/3ML) 0.083% neb solution Take 1 vial (2.5 mg) by nebulization every 6 hours as needed for shortness of breath or wheezing, Disp-3 mL, R-4, E-Prescribe      amLODIPine (NORVASC) 10 MG tablet Take 1 tablet (10 mg) by mouth daily, Disp-30 tablet, R-1, E-Prescribe      carvedilol (COREG) 25 MG tablet Take 1 tablet (25 mg) by mouth 2 times daily (with meals), Disp-60 tablet, R-1, E-Prescribe      cetirizine (ZYRTEC) 10 MG tablet Take 10 mg by mouth daily, Historical      cholecalciferol (VITAMIN  D3) 125 mcg (5000 units) capsule Take 1 capsule (125 mcg) by mouth daily, Disp-30 capsule, R-1, E-Prescribe      cyanocobalamin (VITAMIN B-12) 1000 MCG sublingual tablet Place 1 tablet (1,000 mcg) under the tongue daily, Disp-30 tablet, R-1, E-Prescribe      gabapentin (NEURONTIN) 300 MG capsule Take 1 capsule (300 mg) by mouth at bedtime, Disp-30 capsule, R-1, E-Prescribe      Lidocaine (LIDOCARE) 4 % Patch Place 1 patch onto the skin every 24 hours To prevent lidocaine toxicity, patient should be patch free for 12 hrs daily.Apply patch(s) to neck for neck pain. To prevent lidocaine toxicity, patient should be patch free for 12 hrs daily. Patches may be cut  to smaller size prior to removing release liner. Reminder: Remove previous patch before applying new patch.  NEVER APPLY HEAT OVER PATCH which increases absorption and may lead to local anesthetic toxicity. Do not apply over area where liposomal bupivac tania was injected for 96 hours post injection.Disp-15 patch, [id number]      magnesium oxide (MAG-OX) 400 MG tablet Take 1 tablet (400 mg) by mouth daily, Disp-30 tablet, R-1, E-Prescribe      Pediatric Multivitamins-Iron (MULTIVITAMINS PLUS IRON CHILD) 18 MG CHEW Take 1 chew tab by mouth 2 times daily Ok to substitute with any chewable that contains 18 mg of iron, Vitamin A, Thiamine and Zinc., Disp-180 tablet, R-3, E-PrescribeOk to substitute with any chewable that contains 18 mg of iron, Vitamin A, Thiamine an d Zinc.      propranolol (INDERAL) 10 MG tablet Take 10 mg by mouth 3 times daily as needed, Historical      rosuvastatin (CRESTOR) 10 MG tablet Take 1 tablet (10 mg) by mouth daily, Disp-90 tablet, R-9, E-Prescribe      ursodiol (ACTIGALL) 300 MG capsule Take 1 capsule (300 mg) by mouth 2 times daily for 180 days Start 2 weeks after surgery, do not open-take with warm liquid. Take twice a day for 6 months., Disp-180 capsule, R-1, E-PrescribeStart 2 weeks after surgery, do not open-take with warm  liquid.  Take twice a day for 6 months.      venlafaxine (EFFEXOR) 75 MG tablet Take 1 tablet (75 mg) by mouth 3 times daily, Disp-30 tablet, R-1, E-Prescribe      acetaminophen (TYLENOL) 500 MG tablet Take 500-1,000 mg by mouth every 6 hours as needed for mild pain, Historical      cloNIDine (CATAPRES-TTS1) 0.1 MG/24HR WK patch Place 1 patch onto the skin once a week Reminder: Remove previous patch before applying new patch.  If partial dose is needed, use adhesive bandage to cover/block proportional surface area of the patch. Patches should NOT be cut., Disp-4 patch, R-1, E-Pr escribe           STOP taking these medications       doxycycline hyclate (VIBRAMYCIN) 100 MG capsule Comments:   Reason for Stopping:             Allergies   No Known Allergies

## 2024-01-26 ENCOUNTER — TRANSFERRED RECORDS (OUTPATIENT)
Dept: HEALTH INFORMATION MANAGEMENT | Facility: CLINIC | Age: 54
End: 2024-01-26
Payer: COMMERCIAL

## 2024-02-04 ENCOUNTER — HEALTH MAINTENANCE LETTER (OUTPATIENT)
Age: 54
End: 2024-02-04

## 2024-02-05 ENCOUNTER — VIRTUAL VISIT (OUTPATIENT)
Dept: SURGERY | Facility: CLINIC | Age: 54
End: 2024-02-05
Payer: COMMERCIAL

## 2024-02-05 DIAGNOSIS — E66.01 MORBID OBESITY (H): Primary | Chronic | ICD-10-CM

## 2024-02-05 NOTE — PROGRESS NOTES
Virtual Visit Details    Type of service:  Video Visit   Video Start Time:  1:45 PM  Video End Time: 2:23 PM    Originating Location (pt. Location): Home    Distant Location (provider location):  Off-site  Platform used for Video Visit: Odell Ulrich is 4 months s/p KO and noted that she was hospitalized for an extended period of time due to complications during the surgery procedure requiring her to have an open procedure. She is somewhat distressed about her slow recovery, but we discussed some of the benefits of the weight loss up to this point. She will likely continue to recover, but was encouraged to come to support group and follow up with this clinician as needed. F32.9; F41.9; E66.01

## 2024-02-07 NOTE — TELEPHONE ENCOUNTER
Bariatric Surgery:    Status post laparoscopic single anastomosis duodenal switch on October 9, 2023.  The surgery was complicated by 2 traction perforations of the small intestine resulting in the need for a second surgery and a protracted illness that kept her in the hospital for a long period of time.    I have not seen Mojgan Jimenez in my clinic in quite some time so I called her to see how she was getting along.  She tells me she is doing much better but is still significantly impaired from the complications of her surgery.  She is now in active physical therapy to help her relearn to walk and to climb stairs.  Her progress has been accelerated by her more intensive physical therapy plan.    She still has profound weakness and fatigue with prolonged standing and walking.  She also feels weak in her upper extremities making it difficult for her to lift.  The disability that these issues present will be ongoing for the next 2 months.  In addition to that she has had some pulmonary complications where her lung has been resistant to reopening.  She has been advised to continue to work with incentive spirometry as that lung is healing.  She has home O2 that she uses intermittently.    She tells me that her weight is down 60 pounds and that she continues to lose weight.  I invited her in to see us in surgery clinic for reevaluation and to help out if there is any way that we can be of assistance.    Hoang Wotrhy MD  General Surgeon  [facility]  Surgery Clinic [address]  [place] [address]  Office: [phone]

## 2024-02-08 ENCOUNTER — TELEPHONE (OUTPATIENT)
Dept: SURGERY | Facility: CLINIC | Age: 54
End: 2024-02-08
Payer: COMMERCIAL

## 2024-02-08 NOTE — LETTER
February 9, 2024      Mojgan Jimenez  [address]   [place] [id number]              To Whom it May Concern,     I had the pleasure of meeting and working with Mojgan Jimenez over the course of this last fall.  We had come to the conclusion that to address her problems with morbid obesity, we should do a weight loss surgery. On October 9, 2023 I performed a laparoscopic single anastomosis duodenal switch. In the early postoperative period she was showing signs that things were not going according to plan. On October 12 2023 she was taken back to the operating room where it was found that she had evidence of small bowel leakage in her abdomen. This required an open surgery where we identified 2 very small tears in the wall of the small intestine. These were repaired. The problems related to this complication kept Mojgan in the hospital for an extended period of time dealing with a variety of complications in relation to her initial weight loss surgery.     Given the severity of the complications alongside of the baseline health problems that came with Mrs. Waggoner's into her surgery, she is going to require an extended period of time to rehabilitate after this procedure. I anticipate she will need 6 months to get back to her baseline strength.  This is assuming she remains involved in an active physical therapy program.  At 6 months after her surgery we will need to reevaluate to see how she is progressing and to determine if she will require more time in rehabilitation before she is able to get back to her normal activities.       Sincerely,      Hoang Worthy MD  General/Bariatric Surgeon   [facility]  Surgery [address] [address]  Office: [phone]

## 2024-02-08 NOTE — TELEPHONE ENCOUNTER
General Call    Contacts         Type Contact Phone/Fax    02/08/2024 12:54 PM CST Phone (Incoming) Mojgan Jimenez (Self) [phone] (M)          Reason for Call:  letter    What are your questions or concerns:  pt states she was supposed to receive a letter from provider to the county so she can qualify for assistance since she is unable to work, states she needs at least 3 months off    Could we send this information to you in [place] or would you prefer to receive a phone call?:   Patient would prefer a phone call   Okay to leave a detailed message?: Yes at Cell number on file:    Telephone Information:   Mobile [phone]

## 2024-02-09 NOTE — TELEPHONE ENCOUNTER
Letter written by  and sent to pt via My Chart.      Carmencita Suarez RN, CBN  [facility]  P [phone]  F [phone]

## 2024-03-05 ENCOUNTER — APPOINTMENT (OUTPATIENT)
Dept: GENERAL RADIOLOGY | Facility: CLINIC | Age: 54
End: 2024-03-05
Attending: EMERGENCY MEDICINE
Payer: COMMERCIAL

## 2024-03-05 ENCOUNTER — HOSPITAL ENCOUNTER (INPATIENT)
Facility: CLINIC | Age: 54
LOS: 1 days | Discharge: HOME | End: 2024-03-07
Attending: EMERGENCY MEDICINE | Admitting: FAMILY MEDICINE
Payer: COMMERCIAL

## 2024-03-05 DIAGNOSIS — R50.9 FEVER, UNSPECIFIED FEVER CAUSE: ICD-10-CM

## 2024-03-05 DIAGNOSIS — B34.8 INFECTION DUE TO HUMAN METAPNEUMOVIRUS (HMPV): ICD-10-CM

## 2024-03-05 DIAGNOSIS — Z11.52 ENCOUNTER FOR SCREENING FOR COVID-19: ICD-10-CM

## 2024-03-05 DIAGNOSIS — J45.901 EXACERBATION OF ASTHMA, UNSPECIFIED ASTHMA SEVERITY, UNSPECIFIED WHETHER PERSISTENT: ICD-10-CM

## 2024-03-05 DIAGNOSIS — J12.3 HUMAN METAPNEUMOVIRUS (HMPV) PNEUMONIA: Primary | ICD-10-CM

## 2024-03-05 LAB
ANION GAP SERPL CALCULATED.3IONS-SCNC: 11 MMOL/L (ref 7–15)
BASOPHILS # BLD AUTO: 0 10E3/UL (ref 0–0.2)
BASOPHILS NFR BLD AUTO: 0 %
BUN SERPL-MCNC: 14.6 MG/DL (ref 6–20)
CALCIUM SERPL-MCNC: 9.3 MG/DL (ref 8.6–10)
CHLORIDE SERPL-SCNC: 102 MMOL/L (ref 98–107)
CREAT SERPL-MCNC: 0.83 MG/DL (ref 0.51–0.95)
DEPRECATED HCO3 PLAS-SCNC: 26 MMOL/L (ref 22–29)
EGFRCR SERPLBLD CKD-EPI 2021: 84 ML/MIN/1.73M2
EOSINOPHIL # BLD AUTO: 0.4 10E3/UL (ref 0–0.7)
EOSINOPHIL NFR BLD AUTO: 6 %
ERYTHROCYTE [DISTWIDTH] IN BLOOD BY AUTOMATED COUNT: 12.8 % (ref 10–15)
FLUAV RNA SPEC QL NAA+PROBE: NEGATIVE
FLUBV RNA RESP QL NAA+PROBE: NEGATIVE
GLUCOSE SERPL-MCNC: 91 MG/DL (ref 70–99)
HCT VFR BLD AUTO: 40.3 % (ref 35–47)
HGB BLD-MCNC: 12.6 G/DL (ref 11.7–15.7)
IMM GRANULOCYTES # BLD: 0 10E3/UL
IMM GRANULOCYTES NFR BLD: 1 %
LYMPHOCYTES # BLD AUTO: 1.2 10E3/UL (ref 0.8–5.3)
LYMPHOCYTES NFR BLD AUTO: 19 %
MCH RBC QN AUTO: 26.8 PG (ref 26.5–33)
MCHC RBC AUTO-ENTMCNC: 31.3 G/DL (ref 31.5–36.5)
MCV RBC AUTO: 86 FL (ref 78–100)
MONOCYTES # BLD AUTO: 0.5 10E3/UL (ref 0–1.3)
MONOCYTES NFR BLD AUTO: 8 %
NEUTROPHILS # BLD AUTO: 4.3 10E3/UL (ref 1.6–8.3)
NEUTROPHILS NFR BLD AUTO: 66 %
NRBC # BLD AUTO: 0 10E3/UL
NRBC BLD AUTO-RTO: 0 /100
NT-PROBNP SERPL-MCNC: 268 PG/ML (ref 0–900)
PLATELET # BLD AUTO: 218 10E3/UL (ref 150–450)
POTASSIUM SERPL-SCNC: 3.7 MMOL/L (ref 3.4–5.3)
PROCALCITONIN SERPL IA-MCNC: 0.05 NG/ML
RBC # BLD AUTO: 4.71 10E6/UL (ref 3.8–5.2)
RSV RNA SPEC NAA+PROBE: NEGATIVE
SARS-COV-2 RNA RESP QL NAA+PROBE: NEGATIVE
SODIUM SERPL-SCNC: 139 MMOL/L (ref 135–145)
WBC # BLD AUTO: 6.4 10E3/UL (ref 4–11)

## 2024-03-05 PROCEDURE — 87637 SARSCOV2&INF A&B&RSV AMP PRB: CPT | Performed by: EMERGENCY MEDICINE

## 2024-03-05 PROCEDURE — 94640 AIRWAY INHALATION TREATMENT: CPT | Performed by: EMERGENCY MEDICINE

## 2024-03-05 PROCEDURE — 99285 EMERGENCY DEPT VISIT HI MDM: CPT | Mod: 25 | Performed by: EMERGENCY MEDICINE

## 2024-03-05 PROCEDURE — 80048 BASIC METABOLIC PNL TOTAL CA: CPT | Performed by: EMERGENCY MEDICINE

## 2024-03-05 PROCEDURE — 999N000157 HC STATISTIC RCP TIME EA 10 MIN

## 2024-03-05 PROCEDURE — 85025 COMPLETE CBC W/AUTO DIFF WBC: CPT | Performed by: EMERGENCY MEDICINE

## 2024-03-05 PROCEDURE — 99285 EMERGENCY DEPT VISIT HI MDM: CPT | Performed by: EMERGENCY MEDICINE

## 2024-03-05 PROCEDURE — 84145 PROCALCITONIN (PCT): CPT | Performed by: EMERGENCY MEDICINE

## 2024-03-05 PROCEDURE — 99223 1ST HOSP IP/OBS HIGH 75: CPT | Mod: AI

## 2024-03-05 PROCEDURE — 96361 HYDRATE IV INFUSION ADD-ON: CPT | Performed by: EMERGENCY MEDICINE

## 2024-03-05 PROCEDURE — 36415 COLL VENOUS BLD VENIPUNCTURE: CPT | Performed by: EMERGENCY MEDICINE

## 2024-03-05 PROCEDURE — 94640 AIRWAY INHALATION TREATMENT: CPT

## 2024-03-05 PROCEDURE — 250N000009 HC RX 250

## 2024-03-05 PROCEDURE — 250N000011 HC RX IP 250 OP 636: Performed by: EMERGENCY MEDICINE

## 2024-03-05 PROCEDURE — 250N000013 HC RX MED GY IP 250 OP 250 PS 637: Performed by: EMERGENCY MEDICINE

## 2024-03-05 PROCEDURE — 250N000009 HC RX 250: Performed by: EMERGENCY MEDICINE

## 2024-03-05 PROCEDURE — 83880 ASSAY OF NATRIURETIC PEPTIDE: CPT

## 2024-03-05 PROCEDURE — 96374 THER/PROPH/DIAG INJ IV PUSH: CPT | Performed by: EMERGENCY MEDICINE

## 2024-03-05 PROCEDURE — 258N000003 HC RX IP 258 OP 636: Performed by: EMERGENCY MEDICINE

## 2024-03-05 PROCEDURE — 71046 X-RAY EXAM CHEST 2 VIEWS: CPT

## 2024-03-05 PROCEDURE — 250N000013 HC RX MED GY IP 250 OP 250 PS 637

## 2024-03-05 RX ORDER — LIDOCAINE 40 MG/G
CREAM TOPICAL
Status: DISCONTINUED | OUTPATIENT
Start: 2024-03-05 | End: 2024-03-07 | Stop reason: HOSPADM

## 2024-03-05 RX ORDER — URSODIOL 300 MG/1
300 CAPSULE ORAL 2 TIMES DAILY
Status: DISCONTINUED | OUTPATIENT
Start: 2024-03-05 | End: 2024-03-05

## 2024-03-05 RX ORDER — AMLODIPINE BESYLATE 5 MG/1
10 TABLET ORAL DAILY
Status: DISCONTINUED | OUTPATIENT
Start: 2024-03-06 | End: 2024-03-07 | Stop reason: HOSPADM

## 2024-03-05 RX ORDER — AMOXICILLIN 250 MG
2 CAPSULE ORAL 2 TIMES DAILY PRN
Status: DISCONTINUED | OUTPATIENT
Start: 2024-03-05 | End: 2024-03-07 | Stop reason: HOSPADM

## 2024-03-05 RX ORDER — METHYLPREDNISOLONE SODIUM SUCCINATE 125 MG/2ML
125 INJECTION, POWDER, LYOPHILIZED, FOR SOLUTION INTRAMUSCULAR; INTRAVENOUS DAILY
Status: DISCONTINUED | OUTPATIENT
Start: 2024-03-06 | End: 2024-03-05

## 2024-03-05 RX ORDER — AMOXICILLIN 250 MG
1 CAPSULE ORAL 2 TIMES DAILY PRN
Status: DISCONTINUED | OUTPATIENT
Start: 2024-03-05 | End: 2024-03-07 | Stop reason: HOSPADM

## 2024-03-05 RX ORDER — ONDANSETRON 2 MG/ML
4 INJECTION INTRAMUSCULAR; INTRAVENOUS EVERY 6 HOURS PRN
Status: DISCONTINUED | OUTPATIENT
Start: 2024-03-05 | End: 2024-03-07 | Stop reason: HOSPADM

## 2024-03-05 RX ORDER — URSODIOL 300 MG/1
300 CAPSULE ORAL DAILY
Status: DISCONTINUED | OUTPATIENT
Start: 2024-03-06 | End: 2024-03-07 | Stop reason: HOSPADM

## 2024-03-05 RX ORDER — CLONIDINE 0.1 MG/24H
1 PATCH, EXTENDED RELEASE TRANSDERMAL WEEKLY
Status: DISCONTINUED | OUTPATIENT
Start: 2024-03-08 | End: 2024-03-05

## 2024-03-05 RX ORDER — CALCIUM CARBONATE 500 MG/1
1000 TABLET, CHEWABLE ORAL 4 TIMES DAILY PRN
Status: DISCONTINUED | OUTPATIENT
Start: 2024-03-05 | End: 2024-03-07 | Stop reason: HOSPADM

## 2024-03-05 RX ORDER — IPRATROPIUM BROMIDE AND ALBUTEROL SULFATE 2.5; .5 MG/3ML; MG/3ML
6 SOLUTION RESPIRATORY (INHALATION)
Status: COMPLETED | OUTPATIENT
Start: 2024-03-05 | End: 2024-03-05

## 2024-03-05 RX ORDER — FLUTICASONE FUROATE AND VILANTEROL 100; 25 UG/1; UG/1
1 POWDER RESPIRATORY (INHALATION) DAILY
Status: DISCONTINUED | OUTPATIENT
Start: 2024-03-06 | End: 2024-03-07 | Stop reason: HOSPADM

## 2024-03-05 RX ORDER — MAGNESIUM SULFATE HEPTAHYDRATE 40 MG/ML
2 INJECTION, SOLUTION INTRAVENOUS ONCE
Status: DISCONTINUED | OUTPATIENT
Start: 2024-03-05 | End: 2024-03-05

## 2024-03-05 RX ORDER — ALBUTEROL SULFATE 90 UG/1
1-2 AEROSOL, METERED RESPIRATORY (INHALATION) EVERY 4 HOURS PRN
Status: DISCONTINUED | OUTPATIENT
Start: 2024-03-05 | End: 2024-03-05

## 2024-03-05 RX ORDER — LIDOCAINE 4 G/G
1 PATCH TOPICAL
Status: DISCONTINUED | OUTPATIENT
Start: 2024-03-05 | End: 2024-03-07 | Stop reason: HOSPADM

## 2024-03-05 RX ORDER — GABAPENTIN 300 MG/1
300 CAPSULE ORAL AT BEDTIME
Status: DISCONTINUED | OUTPATIENT
Start: 2024-03-05 | End: 2024-03-07 | Stop reason: HOSPADM

## 2024-03-05 RX ORDER — CARVEDILOL 25 MG/1
25 TABLET ORAL 2 TIMES DAILY WITH MEALS
Status: DISCONTINUED | OUTPATIENT
Start: 2024-03-05 | End: 2024-03-07 | Stop reason: HOSPADM

## 2024-03-05 RX ORDER — ONDANSETRON 4 MG/1
4 TABLET, ORALLY DISINTEGRATING ORAL EVERY 6 HOURS PRN
Status: DISCONTINUED | OUTPATIENT
Start: 2024-03-05 | End: 2024-03-07 | Stop reason: HOSPADM

## 2024-03-05 RX ORDER — MAGNESIUM OXIDE 400 MG/1
400 TABLET ORAL DAILY
Status: DISCONTINUED | OUTPATIENT
Start: 2024-03-05 | End: 2024-03-07 | Stop reason: HOSPADM

## 2024-03-05 RX ORDER — IPRATROPIUM BROMIDE AND ALBUTEROL SULFATE 2.5; .5 MG/3ML; MG/3ML
3 SOLUTION RESPIRATORY (INHALATION) ONCE
Status: COMPLETED | OUTPATIENT
Start: 2024-03-05 | End: 2024-03-05

## 2024-03-05 RX ORDER — ALBUTEROL SULFATE 90 UG/1
1-2 AEROSOL, METERED RESPIRATORY (INHALATION)
Status: DISCONTINUED | OUTPATIENT
Start: 2024-03-05 | End: 2024-03-05

## 2024-03-05 RX ORDER — ACETAMINOPHEN 500 MG
500-1000 TABLET ORAL EVERY 6 HOURS PRN
Status: DISCONTINUED | OUTPATIENT
Start: 2024-03-05 | End: 2024-03-07 | Stop reason: HOSPADM

## 2024-03-05 RX ORDER — VENLAFAXINE 75 MG/1
75 TABLET ORAL 3 TIMES DAILY
Status: DISCONTINUED | OUTPATIENT
Start: 2024-03-05 | End: 2024-03-07 | Stop reason: HOSPADM

## 2024-03-05 RX ORDER — CLONIDINE 0.1 MG/24H
1 PATCH, EXTENDED RELEASE TRANSDERMAL WEEKLY
Status: DISCONTINUED | OUTPATIENT
Start: 2024-03-08 | End: 2024-03-07 | Stop reason: HOSPADM

## 2024-03-05 RX ORDER — IPRATROPIUM BROMIDE AND ALBUTEROL SULFATE 2.5; .5 MG/3ML; MG/3ML
3 SOLUTION RESPIRATORY (INHALATION) EVERY 4 HOURS
Status: DISCONTINUED | OUTPATIENT
Start: 2024-03-05 | End: 2024-03-07 | Stop reason: HOSPADM

## 2024-03-05 RX ORDER — IPRATROPIUM BROMIDE AND ALBUTEROL SULFATE 2.5; .5 MG/3ML; MG/3ML
3 SOLUTION RESPIRATORY (INHALATION)
Status: DISCONTINUED | OUTPATIENT
Start: 2024-03-05 | End: 2024-03-05

## 2024-03-05 RX ORDER — PROPRANOLOL HYDROCHLORIDE 10 MG/1
10 TABLET ORAL 3 TIMES DAILY PRN
Status: DISCONTINUED | OUTPATIENT
Start: 2024-03-05 | End: 2024-03-07 | Stop reason: HOSPADM

## 2024-03-05 RX ORDER — DOXYCYCLINE 100 MG/1
100 CAPSULE ORAL ONCE
Status: COMPLETED | OUTPATIENT
Start: 2024-03-05 | End: 2024-03-05

## 2024-03-05 RX ORDER — CETIRIZINE HYDROCHLORIDE 10 MG/1
10 TABLET ORAL DAILY
Status: DISCONTINUED | OUTPATIENT
Start: 2024-03-06 | End: 2024-03-07 | Stop reason: HOSPADM

## 2024-03-05 RX ORDER — ALBUTEROL SULFATE 0.83 MG/ML
2.5 SOLUTION RESPIRATORY (INHALATION)
Status: DISCONTINUED | OUTPATIENT
Start: 2024-03-05 | End: 2024-03-07 | Stop reason: HOSPADM

## 2024-03-05 RX ORDER — METHYLPREDNISOLONE SODIUM SUCCINATE 125 MG/2ML
125 INJECTION, POWDER, LYOPHILIZED, FOR SOLUTION INTRAMUSCULAR; INTRAVENOUS ONCE
Status: COMPLETED | OUTPATIENT
Start: 2024-03-05 | End: 2024-03-05

## 2024-03-05 RX ORDER — ROSUVASTATIN CALCIUM 10 MG/1
10 TABLET, COATED ORAL DAILY
Status: DISCONTINUED | OUTPATIENT
Start: 2024-03-06 | End: 2024-03-07 | Stop reason: HOSPADM

## 2024-03-05 RX ORDER — PREDNISONE 20 MG/1
40 TABLET ORAL DAILY
Status: DISCONTINUED | OUTPATIENT
Start: 2024-03-06 | End: 2024-03-07 | Stop reason: HOSPADM

## 2024-03-05 RX ADMIN — IPRATROPIUM BROMIDE AND ALBUTEROL SULFATE 3 ML: .5; 3 SOLUTION RESPIRATORY (INHALATION) at 13:04

## 2024-03-05 RX ADMIN — SODIUM CHLORIDE 1000 ML: 9 INJECTION, SOLUTION INTRAVENOUS at 11:27

## 2024-03-05 RX ADMIN — IPRATROPIUM BROMIDE AND ALBUTEROL SULFATE 6 ML: .5; 3 SOLUTION RESPIRATORY (INHALATION) at 12:22

## 2024-03-05 RX ADMIN — Medication 1 TABLET: at 21:14

## 2024-03-05 RX ADMIN — IPRATROPIUM BROMIDE AND ALBUTEROL SULFATE 3 ML: .5; 3 SOLUTION RESPIRATORY (INHALATION) at 19:37

## 2024-03-05 RX ADMIN — IPRATROPIUM BROMIDE AND ALBUTEROL SULFATE 6 ML: .5; 3 SOLUTION RESPIRATORY (INHALATION) at 11:09

## 2024-03-05 RX ADMIN — IPRATROPIUM BROMIDE AND ALBUTEROL SULFATE 3 ML: .5; 3 SOLUTION RESPIRATORY (INHALATION) at 10:48

## 2024-03-05 RX ADMIN — METHYLPREDNISOLONE SODIUM SUCCINATE 125 MG: 125 INJECTION, POWDER, FOR SOLUTION INTRAMUSCULAR; INTRAVENOUS at 11:23

## 2024-03-05 RX ADMIN — VENLAFAXINE 75 MG: 75 TABLET ORAL at 21:13

## 2024-03-05 RX ADMIN — IPRATROPIUM BROMIDE AND ALBUTEROL SULFATE 3 ML: .5; 3 SOLUTION RESPIRATORY (INHALATION) at 16:14

## 2024-03-05 RX ADMIN — DOXYCYCLINE HYCLATE 100 MG: 100 CAPSULE ORAL at 16:13

## 2024-03-05 RX ADMIN — GABAPENTIN 300 MG: 300 CAPSULE ORAL at 21:13

## 2024-03-05 RX ADMIN — IPRATROPIUM BROMIDE AND ALBUTEROL SULFATE 3 ML: .5; 3 SOLUTION RESPIRATORY (INHALATION) at 23:46

## 2024-03-05 ASSESSMENT — ACTIVITIES OF DAILY LIVING (ADL)
ADLS_ACUITY_SCORE: 37
ADLS_ACUITY_SCORE: 24
ADLS_ACUITY_SCORE: 24
ADLS_ACUITY_SCORE: 37
ADLS_ACUITY_SCORE: 24
ADLS_ACUITY_SCORE: 37
ADLS_ACUITY_SCORE: 24
ADLS_ACUITY_SCORE: 37
ADLS_ACUITY_SCORE: 24

## 2024-03-05 ASSESSMENT — COLUMBIA-SUICIDE SEVERITY RATING SCALE - C-SSRS
6. HAVE YOU EVER DONE ANYTHING, STARTED TO DO ANYTHING, OR PREPARED TO DO ANYTHING TO END YOUR LIFE?: NO
2. HAVE YOU ACTUALLY HAD ANY THOUGHTS OF KILLING YOURSELF IN THE PAST MONTH?: NO
1. IN THE PAST MONTH, HAVE YOU WISHED YOU WERE DEAD OR WISHED YOU COULD GO TO SLEEP AND NOT WAKE UP?: NO

## 2024-03-05 NOTE — ED PROVIDER NOTES
ED Provider Note  [facility]      History     Chief Complaint   Patient presents with    Shortness of Breath     Shortness of breath since yesterday, hx of asthma, took her rescue inhaler and a neb but was not helping     The history is provided by the patient and medical records.     Mojgan Jimenez is a 53 year old female with a history of asthma, pneumonia admission 1/5/2024, HTN, cardiomyopathy, and acute respiratory failure with hypoxia presents to the emergency department due to shortness of breath.    Patient states she began coughing yesterday, used her rescue inhaler x 2, took a neb x 1, and used her oxygen, which she typically does not use at baseline.  Patient was admitted for pneumonia 1/5/2024, and patient states that she feels as bad as she did at that time.  Patient has developed a  productive cough with yellow sputum that has become more white in color.  Patient also has a runny nose.  Patient denies smoking, nausea, and does state that she threw up 1 time yesterday after trying to eat a salad.  Symptoms are exacerbated when she walks, and is not sure if it is related to her right-sided lung issues.  Patient is not currently on steroids.    Past Medical History  Past Medical History:   Diagnosis Date    LINA (generalized anxiety disorder) 11/02/2017    Hyperlipidemia LDL goal <160 01/20/2019    Major depressive disorder     Methanol abuse (H)     Mild persistent asthma without complication 11/02/2017    Obese     JARVIS on CPAP     Cpap not working    Paranoia (H)     resolved due to drugs    Vitamin D deficiency 11/02/2017     Past Surgical History:   Procedure Laterality Date    GASTRECTOMY, SLEEVE, LAPAROSCOPIC, WITH DUODENAL SWITCH N/A 10/9/2023    Procedure: GASTRECTOMY, SLEEVE, LAPAROSCOPIC, WITH SINGLE ANASTAMOSIS DUODENAL SWITCH;  Surgeon: Hoang Worthy MD;  [facility]    [facility] DIAGNOSTIC (GYN) N/A 10/12/2023    Procedure: LAPAROSCOPY,;   "Surgeon: Hoang Worthy MD;  Location: [facility] OR    LAPAROTOMY EXPLORATORY N/A 10/12/2023    Procedure: LAPAROTOMY, CLOSURE OF TWO SMALL BOWEL INTEROTOMIES, DRAIN PLACEMENT, INTRA-ABDOMINAL CLEAN OUT;  Surgeon: Hoang Worthy MD;  Location: [facility] OR    MAMMOPLASTY REDUCTION      reduction    PICC TRIPLE LUMEN PLACEMENT  10/28/2023     acetaminophen (TYLENOL) 500 MG tablet  ADVAIR -21 MCG/ACT inhaler  albuterol (PROAIR HFA) 108 (90 Base) MCG/ACT inhaler  albuterol (PROVENTIL) (2.5 MG/3ML) 0.083% neb solution  amLODIPine (NORVASC) 10 MG tablet  carvedilol (COREG) 25 MG tablet  cetirizine (ZYRTEC) 10 MG tablet  cholecalciferol (VITAMIN D3) 125 mcg (5000 units) capsule  cloNIDine (CATAPRES-TTS1) 0.1 MG/24HR WK patch  cyanocobalamin (VITAMIN B-12) 1000 MCG sublingual tablet  gabapentin (NEURONTIN) 300 MG capsule  Lidocaine (LIDOCARE) 4 % Patch  magnesium oxide (MAG-OX) 400 MG tablet  Pediatric Multivitamins-Iron (MULTIVITAMINS PLUS IRON CHILD) 18 MG CHEW  propranolol (INDERAL) 10 MG tablet  rosuvastatin (CRESTOR) 10 MG tablet  ursodiol (ACTIGALL) 300 MG capsule  venlafaxine (EFFEXOR) 75 MG tablet      No Known Allergies  Family History  Family History   Problem Relation Age of Onset    Cancer Mother     Unknown/Adopted Father     Alcoholism Father     Substance Abuse Sister     Diabetes No family hx of     Hypertension No family hx of      Social History   Social History     Tobacco Use    Smoking status: Never    Smokeless tobacco: Never   Vaping Use    Vaping Use: Never used   Substance Use Topics    Alcohol use: Not Currently     Comment: Sober x 8 months    Drug use: Not Currently     Types: Methamphetamines, \"Crack\" cocaine     Comment: in remision          A medically appropriate review of systems was performed with pertinent positives and negatives noted in the HPI, and all other systems negative.    Physical Exam   BP: 129/80  Pulse: 100  Temp: 99.6  F (37.6  C)  Resp: " "16  Height: 160 cm (5' 3\")  Weight: 103.9 kg (229 lb)  SpO2: (!) 87 %  Physical Exam  Constitutional:       General: She is not in acute distress.     Appearance: She is well-developed. She is not ill-appearing or toxic-appearing.   HENT:      Head: Normocephalic and atraumatic.   Cardiovascular:      Rate and Rhythm: Normal rate and regular rhythm.      Heart sounds: Normal heart sounds.   Pulmonary:      Effort: Pulmonary effort is normal. No accessory muscle usage or respiratory distress.      Breath sounds: No stridor. Examination of the right-middle field reveals wheezing. Examination of the left-middle field reveals decreased breath sounds and wheezing. Examination of the right-lower field reveals wheezing. Examination of the left-lower field reveals decreased breath sounds and wheezing. Decreased breath sounds and wheezing present. No rhonchi.      Comments: No conversational dyspnea; sats in mid 90's  Chest:      Chest wall: No tenderness.   Abdominal:      General: There is no distension.      Palpations: Abdomen is soft.      Tenderness: There is no abdominal tenderness. There is no rebound.   Musculoskeletal:         General: No tenderness.      Cervical back: Normal range of motion.   Skin:     General: Skin is warm and dry.   Neurological:      Mental Status: She is alert and oriented to person, place, and time.   Psychiatric:         Behavior: Behavior normal.         Thought Content: Thought content normal.           ED Course, Procedures, & Data      Procedures               No results found for any visits on 03/05/24.  Medications   ipratropium - albuterol 0.5 mg/2.5 mg/3 mL (DUONEB) neb solution 3 mL (3 mLs Nebulization $Given 3/5/24 1048)     Labs Ordered and Resulted from Time of ED Arrival to Time of ED Departure - No data to display  No orders to display          Critical care was not performed.     Medical Decision Making  The patient's presentation was of high complexity (a chronic illness " severe exacerbation, progression, or side effect of treatment).    The patient's evaluation involved:  review of external note(s) from 3+ sources (see separate area of note for details)  review of 3+ test result(s) ordered prior to this encounter (see separate area of note for details)  independent interpretation of testing performed by another health professional (CXR negative)  discussion of management or test interpretation with another health professional (Triage hospitalist)    The patient's management necessitated high risk (a decision regarding hospitalization).    Assessment & Plan    Patient is a 53-year-old female who presents to the ER due to worsening shortness of breath in the setting of known asthma.  Patient does have symptoms started yesterday and became worse today.  Patient was hypoxic on initial presentation.  Says that she was discharged with 1 L of oxygen in January but has not required it.  Patient's medical chart from previous admission was reviewed.  Patient's previous discharge summary was reviewed.  Patient's previous labs and chest x-ray were reviewed.  Patient here has a negative procalcitonin and a chest x-ray with no signs of infection.  Patient's electrolytes are stable.  She received 3 double DuoNebs and is still feeling wheezy.  Patient is room air resting sat is 91%.  She feels like she cannot walk around due to the dyspnea.  Patient was given some IV steroids.  Plan is admit the patient for further care.  Report was given to the triage hospitalist.  I did review the chest x-ray independently.  No other acute abnormalities at this time.  Patient agrees with admission.  Due to the no obvious pneumonia we will give the patient some doxycycline for treatment of bronchitis but no broad-spectrum IV antibiotics.    I have reviewed the nursing notes. I have reviewed the findings, diagnosis, plan and need for follow up with the patient.    New Prescriptions    No medications on file        Final diagnoses:   Exacerbation of asthma, unspecified asthma severity, unspecified whether persistent   Fever, unspecified fever cause   I, Kaveh Hoffman, am serving as a trained medical scribe to document services personally performed by Joanne Paz MD, based to the provider's statements to me.     IJoanne MD, was physically present and have reviewed and verified the accuracy of this note documented by Kaveh Hoffman.       Joanne Paz MD  [facility] EMERGENCY DEPARTMENT  3/5/2024     Joanne Paz MD  03/05/24 8302

## 2024-03-05 NOTE — LETTER
Mojgan Jimenez MRN# [id number]   YOB: 1970 Age: 53 year old     Date of Admission:  3/6/24  Date of Discharge:  3/7/2024  Admitting Physician:  aMrcus Powell MD    Primary Care Provider: Franca Mishra     To Whom it May Concern:            You have been identified as the Primary Care Provider for Mojgan Jimenez, who was recently admitted to the [facility].  Thank you for the referral to our hospital.  It is our goal to provide the highest quality of care for our patients, including planning for seamless continuity of care by providing you with timely, accurate and concise information.  After reviewing the following combined discharge summary and final progress note, please contact us if you have any remaining questions.  The Discharging Physician will be the best informed, with their contact information listed above.  If unable to reach them, or if you have received this letter in error, please call [phone] and someone will try to help you.

## 2024-03-05 NOTE — H&P
[facility]    History and Physical - [facility]       Date of Admission:  3/5/2024    Assessment & Plan      Mojgan Jimenez is a 53 year old female with a past medical history of asthma, hypertension, Hyperlipidemia, morbid obesity (BMI 41) s/p recent gastric sleeve, stress cardiomyopathy/Hfimpef and recent admission for CAP who presents for acute hypoxic respiratory failure concerning for severe asthma exacerbation.     Acute Hypoxic respiratory failure  Asthma Exacerbation  No history of intubation. On Advair on baseline. 87% on room air on ED presentation with increased sputum production and change in color, concerning for viral infection trigger for asthma exacerbation. Negative covid/influenza/rsv panel. Clinical picture is not consistent with bacterial pneumonia, negative ProCal, so can discontinue Doxy. S/p 125 mg IV methylpred, 1 L IVF, and Duonebs. No IV Mag given. Upon interview, the patient had slightly diminished breath sounds, mild wheezing and was requiring up to 7L Oxygen while receiving Duoneb, BP 90/50s, speaking in full sentences but concerning for impending acute respiratory demise. Repeat BP at 139/75 and saturating now at 93% on room air is reassuring that current treatment plan for asthma exacerbation is effective.   -Prednisone 40 mg daily started tomorrow  -Duoneb Q4h Scheduled Nebs by RT  -Albuterol Q2hr PRN Nebs  -Continuous Pulse Ox, Oxygen by NC or Oxy Mask, titrate to 93-95%  -Incentive spirometry when stable   -Respiratory Therapist Consult  -Daily CBC/BMP  -Discontinue Doxycycline  -If requiring Increased O2 needs: ABG and 2g Mag IV Stat, Consider BiPAP vs ICU    Hypertension  Stress cardiomyopathy  10/09/23: Echo with ejection fraction is 30-35% (moderately reduced). 01/05/24 Echo: left ventricular function is normal with an EF of 55-60%. This would meet the criteria of Hfimpef. Presentation is not consistent with a  "heart failure presentation: no crackles on exam, no bilateral lower extremity edema. Will obtain BNP now and consider Echo if worsening hypotension. Holding antihypertensives on admission. Patient still has a clonidine patch in place.   -BNP  -Is/Os  -Holding Coreg, Amlodipine, Propanolol given Hypotension  -Keep Clonidine Patch for now  -Consider Echo    Tachycardia  Hypotension  HR at 100s on admission with borderline hypotension, concerning for hemodynamic instability. Could represent dehydration vs SIRS response vs albuterol effect vs possible PE. Although demonstrating hypoxia on room air, less concerned about PE at this time without asymmetric lower extremity edema or calf tenderness on exam and history/presentation typical for asthma. With repeat evaluation, the patient's BP improved with duonebs. If recurrent hypotension, consider CT PE vs sepsis rule out, stat ECHO, EKG and bolus IV fluids.   -Q4hr Vitals    Chronic conditions    Anxiety  -Holding Propanolol TID PRN    Gastric Sleeve  Morbid Obesity  Morbid obesity status post sleeve gastrectomy complicated by enterotomy & peritonitis 10/2023. Noted to have severe decondition due to prolong hospitalization.  -PTA Urodiol 300 gr BID  -PTA Multivitamins  -PTA Vitamin B12  -PTA Vitamin D        Diet: Regular Diet Adult    DVT Prophylaxis: Pneumatic Compression Devices  Patiño Catheter: Not present  Lines: None   PIV in place  Cardiac Monitoring: None  Code Status:  Full Code    Clinically Significant Risk Factors Present on Admission                  # Hypertension: Noted on problem list      # Severe Obesity: Estimated body mass index is 40.57 kg/m  as calculated from the following:    Height as of this encounter: 1.6 m (5' 3\").    Weight as of this encounter: 103.9 kg (229 lb).       # Asthma: noted on problem list        Disposition Plan      Expected Discharge Date: 03/06/2024                The patient's care was discussed with the Attending Physician, . " Teofilo .    Teofilo Snow MD  [place]  [facility]  Securely message with [employer] (more info)  Text page via [facility] Paging/Directory   See signed in provider for up to date coverage information    ______________________________________________________________________    Chief Complaint   Shortness of breath    History is obtained from the patient    History of Present Illness   Mojgan Jimenez is a 53 year old female who has a history of asthma, HTN, HLD, stress cardiomyopathy following gastric sleeve in 10/2023 and recent admission for pneumonia in Jan, 2023 who was admitted from the ED for asthma exacerbation. The patient reports 2 days of prodromal symptoms starting with cough and runny nose and then progressing to productive cough with green/yellow sputum, myalgias, headache, sore throat and fever. She attempted advair and albuterol and using remaining oxygen @ 1L she had post gastric sleeve surgery with ongoing shortness of breath and low oxygen saturations on home pulse ox which led to her presentation. She was exposed to her grandson who has a cough and runny nose who had symptoms before she did. Chest pain feels like her prior pneumonia and asthma exacerbation episodes, and she reports not requiring intubation for prior asthma admissions. ROS positive for orthopnea mildly increased at baseline. Denies lightheadedness, dizziness, abdominal pain, nausea, diarrhea, rashes and recent weight gain.     In the ED, the patient came in with vitals of 99.6 F, 100/82, , RR 16 and hypoxic to 87% on room air. CXR did not show lobar consolidation -- impression of right base atelectasis. She was started on 2L oxygen, given Duonebs and one  mg methylpred with one dose of Doxycycline 100 mg was given with 1L NS. Blood pressures transiently found to dip to 94/59 during interventions with rebound 139/75, . Transferred to the floor in stable  condition on 2L NC.       Past Medical History    Past Medical History:   Diagnosis Date    LINA (generalized anxiety disorder) 11/02/2017    Hyperlipidemia LDL goal <160 01/20/2019    Major depressive disorder     Methanol abuse (H)     Mild persistent asthma without complication 11/02/2017    Obese     JARVIS on CPAP     Cpap not working    Paranoia (H)     resolved due to drugs    Vitamin D deficiency 11/02/2017       Past Surgical History   Past Surgical History:   Procedure Laterality Date    GASTRECTOMY, SLEEVE, LAPAROSCOPIC, WITH DUODENAL SWITCH N/A 10/9/2023    Procedure: GASTRECTOMY, SLEEVE, LAPAROSCOPIC, WITH SINGLE ANASTAMOSIS DUODENAL SWITCH;  Surgeon: Hoang Worthy MD;  Location: [facility] OR    LAPAROSCOPY DIAGNOSTIC (GYN) N/A 10/12/2023    Procedure: LAPAROSCOPY,;  Surgeon: Hoang Worthy MD;  Location: [facility] OR    LAPAROTOMY EXPLORATORY N/A 10/12/2023    Procedure: LAPAROTOMY, CLOSURE OF TWO SMALL BOWEL INTEROTOMIES, DRAIN PLACEMENT, INTRA-ABDOMINAL CLEAN OUT;  Surgeon: Hoang Worthy MD;  Location: [facility] OR    MAMMOPLASTY REDUCTION      reduction    PICC TRIPLE LUMEN PLACEMENT  10/28/2023       Prior to Admission Medications   Prior to Admission Medications   Prescriptions Last Dose Informant Patient Reported? Taking?   ADVAIR -21 MCG/ACT inhaler   Yes No   Sig: Inhale 2 Puffs by mouth two times daily.*   Lidocaine (LIDOCARE) 4 % Patch   No No   Sig: Place 1 patch onto the skin every 24 hours To prevent lidocaine toxicity, patient should be patch free for 12 hrs daily.Apply patch(s) to neck for neck pain. To prevent lidocaine toxicity, patient should be patch free for 12 hrs daily. Patches may be cut to smaller size prior to removing release liner. Reminder: Remove previous patch before applying new patch.  NEVER APPLY HEAT OVER PATCH which increases absorption and may lead to local anesthetic toxicity. Do not apply over area where liposomal  bupivacaine was injected for 96 hours post injection.   Pediatric Multivitamins-Iron (MULTIVITAMINS PLUS IRON CHILD) 18 MG CHEW   No No   Sig: Take 1 chew tab by mouth 2 times daily Ok to substitute with any chewable that contains 18 mg of iron, Vitamin A, Thiamine and Zinc.   acetaminophen (TYLENOL) 500 MG tablet   Yes No   Sig: Take 500-1,000 mg by mouth every 6 hours as needed for mild pain   albuterol (PROAIR HFA) 108 (90 Base) MCG/ACT inhaler   No No   Sig: Inhale 1-2 puffs into the lungs every 4 hours as needed for shortness of breath / dyspnea or wheezing   albuterol (PROVENTIL) (2.5 MG/3ML) 0.083% neb solution   No No   Sig: Take 1 vial (2.5 mg) by nebulization every 6 hours as needed for shortness of breath or wheezing   amLODIPine (NORVASC) 10 MG tablet   No No   Sig: Take 1 tablet (10 mg) by mouth daily   carvedilol (COREG) 25 MG tablet   No No   Sig: Take 1 tablet (25 mg) by mouth 2 times daily (with meals)   cetirizine (ZYRTEC) 10 MG tablet   Yes No   Sig: Take 10 mg by mouth daily   cholecalciferol (VITAMIN D3) 125 mcg (5000 units) capsule   No No   Sig: Take 1 capsule (125 mcg) by mouth daily   cloNIDine (CATAPRES-TTS1) 0.1 MG/24HR WK patch   No No   Sig: Place 1 patch onto the skin once a week Reminder: Remove previous patch before applying new patch.  If partial dose is needed, use adhesive bandage to cover/block proportional surface area of the patch. Patches should NOT be cut.   cyanocobalamin (VITAMIN B-12) 1000 MCG sublingual tablet   No No   Sig: Place 1 tablet (1,000 mcg) under the tongue daily   gabapentin (NEURONTIN) 300 MG capsule   No No   Sig: Take 1 capsule (300 mg) by mouth at bedtime   magnesium oxide (MAG-OX) 400 MG tablet   No No   Sig: Take 1 tablet (400 mg) by mouth daily   propranolol (INDERAL) 10 MG tablet   Yes No   Sig: Take 10 mg by mouth 3 times daily as needed   rosuvastatin (CRESTOR) 10 MG tablet   No No   Sig: Take 1 tablet (10 mg) by mouth daily   ursodiol (ACTIGALL) 300  "MG capsule   No No   Sig: Take 1 capsule (300 mg) by mouth 2 times daily for 180 days Start 2 weeks after surgery, do not open-take with warm liquid. Take twice a day for 6 months.   venlafaxine (EFFEXOR) 75 MG tablet   No No   Sig: Take 1 tablet (75 mg) by mouth 3 times daily      Facility-Administered Medications: None        Review of Systems    The 10 point Review of Systems is negative other than noted in the HPI or here.     Social History   I have reviewed this patient's social history and updated it with pertinent information if needed.  Social History     Tobacco Use    Smoking status: Never    Smokeless tobacco: Never   Vaping Use    Vaping Use: Never used   Substance Use Topics    Alcohol use: Not Currently     Comment: Sober x 8 months    Drug use: Not Currently     Types: Methamphetamines, \"Crack\" cocaine     Comment: in remision          Family History   I have reviewed this patient's family history and updated it with pertinent information if needed.  Family History   Problem Relation Age of Onset    Cancer Mother     Unknown/Adopted Father     Alcoholism Father     Substance Abuse Sister     Diabetes No family hx of     Hypertension No family hx of          Allergies   No Known Allergies     Physical Exam   Vital Signs: Temp: 100.3  F (37.9  C) Temp src: Oral BP: 94/59 Pulse: 100   Resp: 18 SpO2: 93 % O2 Device: Nasal cannula Oxygen Delivery: 2 LPM  Weight: 229 lbs 0 oz    Constitutional: awake, alert, cooperative,mildly distressed, and appears stated age  Eyes: Lids and lashes normal, pupils equal, round and reactive to light, extra ocular muscles intact, sclera clear, conjunctiva normal  ENT: Normocephalic, without obvious abnormality, atraumatic, sinuses nontender on palpation, external ears without lesions, oral pharynx with tacky mucous membranes, tonsils without erythema or exudates, gums normal   Hematologic / Lymphatic: no cervical lymphadenopathy and no supraclavicular " lymphadenopathy  Respiratory: Tachypneic breathing, prolonged expiratory phase, good air exchange, clear to auscultation bilaterally but slightly diminished, no crackles or wheezing  Cardiovascular: Tachycardic rate, regular rhythm, and no murmur noted. Non elevated JVD, no lower extremity edema.   GI: Well healed midline scar. Normal bowel sounds, soft, non-distended, non-tender, no masses palpated, no hepatosplenomegaly  Skin: no bruising or bleeding, normal skin color, texture, turgor, and no redness, warmth, or swelling  Musculoskeletal: There is no redness, warmth, or swelling of the joints.  Full range of motion noted.  Motor strength is 5 out of 5 all extremities bilaterally.  Tone is normal.  Neurologic: Awake, alert, oriented to name, place and time.  Cranial nerves II-XII are grossly intact.  Motor is 5 out of 5 bilaterally.   Sensory is intact.    Neuropsychiatric: General: normal, calm, and normal eye contact  Level of consciousness: alert / normal  Affect: normal and pleasant  Orientation: oriented to self, place, time and situation  Memory and insight: normal, memory for past and recent events intact, and thought process normal    Medical Decision Making             Data     I have personally reviewed the following data over the past 24 hrs:    6.4  \   12.6   / 218     139 102 14.6 /  91   3.7 26 0.83 \     Procal: 0.05 CRP: N/A Lactic Acid: N/A         Imaging results reviewed over the past 24 hrs:   Recent Results (from the past 24 hour(s))   XR Chest 2 Views    Narrative    CHEST TWO VIEWS 3/5/2024 12:19 PM     HISTORY: sob    COMPARISON: Chest x-ray 1/9/2024.       Impression    IMPRESSION: Cardiopericardial silhouette is within normal limits.  Similar mild opacity of the right lung base which may represent  atelectasis or scarring. No new focal airspace consolidation. No  pleural effusion. No discernible pneumothorax. No acute displaced  fracture.    BEKA AGUILLON MD         SYSTEM ID:   [id number]

## 2024-03-05 NOTE — MEDICATION SCRIBE - ADMISSION MEDICATION HISTORY
Medication Scribe Admission Medication History    Admission medication history is complete. The information provided in this note is only as accurate as the sources available at the time of the update.    Information Source(s): Family member and CareEverywhere/SureScripts via phone    Pertinent Information: Medication history completed with patients yamileth Nicholas at [phone]    Changes made to PTA medication list:  Added: None  Deleted: None  Changed: None    Allergies reviewed with patient and updates made in EHR: yes    Medication History Completed By: Scarlet Zheng 3/5/2024 5:18 PM    PTA Med List   Medication Sig Last Dose    ADVAIR -21 MCG/ACT inhaler Inhale 2 Puffs by mouth two times daily.* 3/5/2024    albuterol (PROAIR HFA) 108 (90 Base) MCG/ACT inhaler Inhale 1-2 puffs into the lungs every 4 hours as needed for shortness of breath / dyspnea or wheezing 3/5/2024    albuterol (PROVENTIL) (2.5 MG/3ML) 0.083% neb solution Take 1 vial (2.5 mg) by nebulization every 6 hours as needed for shortness of breath or wheezing 3/5/2024    amLODIPine (NORVASC) 10 MG tablet Take 1 tablet (10 mg) by mouth daily 3/5/2024 at am    carvedilol (COREG) 25 MG tablet Take 1 tablet (25 mg) by mouth 2 times daily (with meals) 3/5/2024 at am    cetirizine (ZYRTEC) 10 MG tablet Take 10 mg by mouth daily 3/5/2024 at am    cholecalciferol (VITAMIN D3) 125 mcg (5000 units) capsule Take 1 capsule (125 mcg) by mouth daily 3/4/2024 at pm    cloNIDine (CATAPRES-TTS1) 0.1 MG/24HR WK patch Place 1 patch onto the skin once a week Reminder: Remove previous patch before applying new patch.  If partial dose is needed, use adhesive bandage to cover/block proportional surface area of the patch. Patches should NOT be cut. 3/1/2024    cyanocobalamin (VITAMIN B-12) 1000 MCG sublingual tablet Place 1 tablet (1,000 mcg) under the tongue daily 3/4/2024 at pm    gabapentin (NEURONTIN) 300 MG capsule Take 1 capsule (300 mg) by mouth at  bedtime 3/4/2024 at pm    Lidocaine (LIDOCARE) 4 % Patch Place 1 patch onto the skin every 24 hours To prevent lidocaine toxicity, patient should be patch free for 12 hrs daily.Apply patch(s) to neck for neck pain. To prevent lidocaine toxicity, patient should be patch free for 12 hrs daily. Patches may be cut to smaller size prior to removing release liner. Reminder: Remove previous patch before applying new patch.  NEVER APPLY HEAT OVER PATCH which increases absorption and may lead to local anesthetic toxicity. Do not apply over area where liposomal bupivacaine was injected for 96 hours post injection. Past Month    magnesium oxide (MAG-OX) 400 MG tablet Take 1 tablet (400 mg) by mouth daily 3/4/2024 at pm    Pediatric Multivitamins-Iron (MULTIVITAMINS PLUS IRON CHILD) 18 MG CHEW Take 1 chew tab by mouth 2 times daily Ok to substitute with any chewable that contains 18 mg of iron, Vitamin A, Thiamine and Zinc. 3/4/2024 at pm    propranolol (INDERAL) 10 MG tablet Take 10 mg by mouth 3 times daily as needed 3/5/2024 at am    rosuvastatin (CRESTOR) 10 MG tablet Take 1 tablet (10 mg) by mouth daily 3/5/2024 at am    ursodiol (ACTIGALL) 300 MG capsule Take 1 capsule (300 mg) by mouth 2 times daily for 180 days Start 2 weeks after surgery, do not open-take with warm liquid. Take twice a day for 6 months. 3/5/2024 at am    venlafaxine (EFFEXOR) 75 MG tablet Take 1 tablet (75 mg) by mouth 3 times daily 3/5/2024 at am

## 2024-03-05 NOTE — ED TRIAGE NOTES
Patient also has a a cough, runny nose since yesterday.      Triage Assessment (Adult)       Row Name 03/05/24 1039          Triage Assessment    Airway WDL airway symptoms;X        Respiratory WDL    Respiratory WDL cough     Cough Frequency infrequent     Cough Type congested        Skin Circulation/Temperature WDL    Skin Circulation/Temperature WDL WDL        Cardiac WDL    Cardiac WDL WDL

## 2024-03-06 ENCOUNTER — APPOINTMENT (OUTPATIENT)
Dept: PHYSICAL THERAPY | Facility: CLINIC | Age: 54
End: 2024-03-06
Payer: COMMERCIAL

## 2024-03-06 LAB
ANION GAP SERPL CALCULATED.3IONS-SCNC: 12 MMOL/L (ref 7–15)
BUN SERPL-MCNC: 17.2 MG/DL (ref 6–20)
C PNEUM DNA SPEC QL NAA+PROBE: NOT DETECTED
CALCIUM SERPL-MCNC: 10 MG/DL (ref 8.6–10)
CHLORIDE SERPL-SCNC: 104 MMOL/L (ref 98–107)
CREAT SERPL-MCNC: 0.78 MG/DL (ref 0.51–0.95)
DEPRECATED HCO3 PLAS-SCNC: 26 MMOL/L (ref 22–29)
EGFRCR SERPLBLD CKD-EPI 2021: 90 ML/MIN/1.73M2
ERYTHROCYTE [DISTWIDTH] IN BLOOD BY AUTOMATED COUNT: 12.8 % (ref 10–15)
FLUAV H1 2009 PAND RNA SPEC QL NAA+PROBE: NOT DETECTED
FLUAV H1 RNA SPEC QL NAA+PROBE: NOT DETECTED
FLUAV H3 RNA SPEC QL NAA+PROBE: NOT DETECTED
FLUAV RNA SPEC QL NAA+PROBE: NOT DETECTED
FLUBV RNA SPEC QL NAA+PROBE: NOT DETECTED
GLUCOSE SERPL-MCNC: 168 MG/DL (ref 70–99)
HADV DNA SPEC QL NAA+PROBE: NOT DETECTED
HCOV PNL SPEC NAA+PROBE: NOT DETECTED
HCT VFR BLD AUTO: 37.8 % (ref 35–47)
HGB BLD-MCNC: 12 G/DL (ref 11.7–15.7)
HMPV RNA SPEC QL NAA+PROBE: DETECTED
HPIV1 RNA SPEC QL NAA+PROBE: NOT DETECTED
HPIV2 RNA SPEC QL NAA+PROBE: NOT DETECTED
HPIV3 RNA SPEC QL NAA+PROBE: NOT DETECTED
HPIV4 RNA SPEC QL NAA+PROBE: NOT DETECTED
M PNEUMO DNA SPEC QL NAA+PROBE: NOT DETECTED
MCH RBC QN AUTO: 27 PG (ref 26.5–33)
MCHC RBC AUTO-ENTMCNC: 31.7 G/DL (ref 31.5–36.5)
MCV RBC AUTO: 85 FL (ref 78–100)
PLATELET # BLD AUTO: 213 10E3/UL (ref 150–450)
POTASSIUM SERPL-SCNC: 4.3 MMOL/L (ref 3.4–5.3)
RBC # BLD AUTO: 4.45 10E6/UL (ref 3.8–5.2)
RSV RNA SPEC QL NAA+PROBE: NOT DETECTED
RSV RNA SPEC QL NAA+PROBE: NOT DETECTED
RV+EV RNA SPEC QL NAA+PROBE: NOT DETECTED
SODIUM SERPL-SCNC: 142 MMOL/L (ref 135–145)
WBC # BLD AUTO: 10.6 10E3/UL (ref 4–11)

## 2024-03-06 PROCEDURE — 80048 BASIC METABOLIC PNL TOTAL CA: CPT

## 2024-03-06 PROCEDURE — 999N000111 HC STATISTIC OT IP EVAL DEFER

## 2024-03-06 PROCEDURE — 999N000157 HC STATISTIC RCP TIME EA 10 MIN

## 2024-03-06 PROCEDURE — 36415 COLL VENOUS BLD VENIPUNCTURE: CPT

## 2024-03-06 PROCEDURE — 85027 COMPLETE CBC AUTOMATED: CPT

## 2024-03-06 PROCEDURE — 97161 PT EVAL LOW COMPLEX 20 MIN: CPT | Mod: GP | Performed by: PHYSICAL THERAPIST

## 2024-03-06 PROCEDURE — 94640 AIRWAY INHALATION TREATMENT: CPT | Mod: 76

## 2024-03-06 PROCEDURE — 97530 THERAPEUTIC ACTIVITIES: CPT | Mod: GP | Performed by: PHYSICAL THERAPIST

## 2024-03-06 PROCEDURE — 99232 SBSQ HOSP IP/OBS MODERATE 35: CPT | Mod: GC

## 2024-03-06 PROCEDURE — 94799 UNLISTED PULMONARY SVC/PX: CPT

## 2024-03-06 PROCEDURE — 120N000002 HC R&B MED SURG/OB UMMC

## 2024-03-06 PROCEDURE — 250N000012 HC RX MED GY IP 250 OP 636 PS 637

## 2024-03-06 PROCEDURE — 250N000013 HC RX MED GY IP 250 OP 250 PS 637

## 2024-03-06 PROCEDURE — 250N000009 HC RX 250

## 2024-03-06 PROCEDURE — 87633 RESP VIRUS 12-25 TARGETS: CPT | Performed by: STUDENT IN AN ORGANIZED HEALTH CARE EDUCATION/TRAINING PROGRAM

## 2024-03-06 PROCEDURE — 97116 GAIT TRAINING THERAPY: CPT | Mod: GP | Performed by: PHYSICAL THERAPIST

## 2024-03-06 RX ORDER — BENZONATATE 100 MG/1
100 CAPSULE ORAL 3 TIMES DAILY PRN
Status: DISCONTINUED | OUTPATIENT
Start: 2024-03-06 | End: 2024-03-07 | Stop reason: HOSPADM

## 2024-03-06 RX ADMIN — Medication 1000 MCG: at 09:08

## 2024-03-06 RX ADMIN — ROSUVASTATIN 10 MG: 10 TABLET, FILM COATED ORAL at 09:08

## 2024-03-06 RX ADMIN — IPRATROPIUM BROMIDE AND ALBUTEROL SULFATE 3 ML: .5; 3 SOLUTION RESPIRATORY (INHALATION) at 12:19

## 2024-03-06 RX ADMIN — URSODIOL 300 MG: 300 CAPSULE ORAL at 09:08

## 2024-03-06 RX ADMIN — Medication 1 TABLET: at 09:08

## 2024-03-06 RX ADMIN — CETIRIZINE HYDROCHLORIDE 10 MG: 10 TABLET, FILM COATED ORAL at 09:08

## 2024-03-06 RX ADMIN — IPRATROPIUM BROMIDE AND ALBUTEROL SULFATE 3 ML: .5; 3 SOLUTION RESPIRATORY (INHALATION) at 08:20

## 2024-03-06 RX ADMIN — PREDNISONE 40 MG: 20 TABLET ORAL at 09:08

## 2024-03-06 RX ADMIN — Medication 125 MCG: at 09:08

## 2024-03-06 RX ADMIN — FLUTICASONE FUROATE AND VILANTEROL TRIFENATATE 1 PUFF: 100; 25 POWDER RESPIRATORY (INHALATION) at 09:06

## 2024-03-06 RX ADMIN — BENZONATATE 100 MG: 100 CAPSULE ORAL at 09:21

## 2024-03-06 RX ADMIN — VENLAFAXINE 75 MG: 75 TABLET ORAL at 16:21

## 2024-03-06 RX ADMIN — GABAPENTIN 300 MG: 300 CAPSULE ORAL at 20:34

## 2024-03-06 RX ADMIN — IPRATROPIUM BROMIDE AND ALBUTEROL SULFATE 3 ML: .5; 3 SOLUTION RESPIRATORY (INHALATION) at 21:10

## 2024-03-06 RX ADMIN — VENLAFAXINE 75 MG: 75 TABLET ORAL at 09:08

## 2024-03-06 RX ADMIN — Medication 1 TABLET: at 20:34

## 2024-03-06 RX ADMIN — Medication 1 LOZENGE: at 09:22

## 2024-03-06 RX ADMIN — IPRATROPIUM BROMIDE AND ALBUTEROL SULFATE 3 ML: .5; 3 SOLUTION RESPIRATORY (INHALATION) at 16:19

## 2024-03-06 RX ADMIN — IPRATROPIUM BROMIDE AND ALBUTEROL SULFATE 3 ML: .5; 3 SOLUTION RESPIRATORY (INHALATION) at 03:36

## 2024-03-06 RX ADMIN — VENLAFAXINE 75 MG: 75 TABLET ORAL at 20:34

## 2024-03-06 ASSESSMENT — ACTIVITIES OF DAILY LIVING (ADL)
ADLS_ACUITY_SCORE: 24

## 2024-03-06 NOTE — PLAN OF CARE
"Goal Outcome Evaluation:    /78 (BP Location: Left arm)   Pulse 88   Temp 98.3  F (36.8  C) (Oral)   Resp 18   Ht 1.6 m (5' 3\")   Wt 99.8 kg (220 lb)   SpO2 92%   BMI 38.97 kg/m        Plan of Care Reviewed With: patient    Overall Patient Progress: no changeOverall Patient Progress: no change    Outcome Evaluation: Pt up to toilet x 3 this shift independently, requested for snacks, ate snacks and went back to bed. Pt is AOx4, reminds on two litres of oxygen nasal cannula, sating >90%.  Denies SOB,chest pain, numbness and tingling, nausea and vomiting. No change overnight. Pt is able to communicate needs appropriately. Call light is in reach easily, continue with POC.      "

## 2024-03-06 NOTE — PROGRESS NOTES
"[id number]    Pt alert and oriented times 4 and able to make needs known.  Denies pain except mild pain when coughing. Pt is stable on 2 L NC and VS q 4 hrs,    Pt reported that the clonidine patch already in placed at home on Friday and it should be every Friday. Provider and pharmacist notified and said it will be scheduled for every Friday. Pt also reported that she take ursodiol once a day in the morning and carvedilol at night. Provider notified and they respond, they will make the change.      Blood pressure 129/78, pulse 88, temperature 98.3  F (36.8  C), temperature source Oral, resp. rate 18, height 1.6 m (5' 3\"), weight 99.8 kg (220 lb), SpO2 92%, not currently breastfeeding.   "

## 2024-03-06 NOTE — PROGRESS NOTES
"[facility]   BRIEF PROGRESS NOTE    SUBJECTIVE  Assessed patient for baseline. She reports her breathing feels improved from admission. No acute concerns. Per RN, PTA medications need adjusting as below.       OBJECTIVE:  /78 (BP Location: Left arm)   Pulse 88   Temp 98.3  F (36.8  C) (Oral)   Resp 18   Ht 1.6 m (5' 3\")   Wt 99.8 kg (220 lb)   SpO2 92%   BMI 38.97 kg/m      Exam:  General: Alert and oriented, in no acute distress.  Skin: Warm and dry, no abnormalities noted.  Eyes: Extra-ocular muscles intact, pupils equal and reactive.  ENT: Speech intact, nasal passages open, no hearing impairment noted.  CV: No cyanosis or pallor, warm and well perfused.  Respiratory: No respiratory distress, no accessory muscle use, lungs CTAB, good air movement.  Neuro: Gait and station normal, comprehension intact. Gross and fine motor skills intact.   Psychiatric: Mood and affect appear normal.   Extremities: Warm, able to move all four extremities at will.      ASSESSMENT/PLAN:    # Acute hypoxic respiratory failure 2/2 asthma exacerbation with viral trigger   Saturating well on 2L NC. Reassuring exam without wheezing and appropriate air movement. Continue close monitoring.   - Continue O2 PRN for saturations >93%   - Duonebs q4hr scheduled   - Per RN, following changes made to PTA medications: Clonidine patch to start 3/7 (weekly) and Urosdiol daily     Please see daily rounding note for full A/P.  Rafaela Gaspar MD  10:55 PM    "

## 2024-03-06 NOTE — PROGRESS NOTES
. 6 a  ADMISSION to [facility]:    Mojgan Jimenez was admitted from ED for Asthma exacerbation, SOB, running nose and cough  .  2 RN skin assessment: completed by Fina SANTAMARIA  Result of skin assessment and interventions/actions: Skin within normal except abdominal incisional scar from surgery  Height, weight: completed? Yes  Patient belongings & admission documents: see Flowsheets, completed? completed  Pt remains on 2 liters NC continuously, 02 sat us reading 93%

## 2024-03-06 NOTE — PROGRESS NOTES
[facility]    Progress Note - [facility]       Date of Admission:  3/5/2024    Main Plans For Today  RVP   Flutter Valve q4h PRN   Continue to space duonebs per RT recommendations   Continue to hold BP meds     Assessment & Plan   Mojgan Jimenez is a 53 year old female with a past medical history of asthma, hypertension, Hyperlipidemia, morbid obesity (BMI 41) s/p recent gastric sleeve, stress cardiomyopathy/Hfimpef and recent admission for CAP who presents for acute hypoxic respiratory failure concerning for severe asthma exacerbation.     Acute Hypoxic respiratory failure  Asthma Exacerbation c/f for Viral Trigger   History AHRF requiring intubation in 10/2023 for 14 days. Today vitals are stable, continues to saturate low 90s with desaturations in mid 80s with speaking while on 1 L oxygen. No abnormalities on CBC of BMP, although there is notable hyperglycemia likely 2/2 to steroids. Will plan to obtain a RVP to identify whether a viral trigger is truly the cause of sx. Will continue to support patient from a respiratory standpoint. Since patient is not on O2 at home at rest, current presentation is significantly away from baseline. She would benefit from OP pulmonary rehab considering 2 prior hospitalizations (last one was for asthma exacerbation). There is a good chance patient might require some home O2 at baseline while undergoing PT. Dispo for patient involves ability to space out duo nebs and passing WALK test.    -Prednisone 40 mg every day x7 days   -Duoneb Q4h Scheduled Nebs by RT, continue to space   - Flutter Valve per RT   - Albuterol Q2hr PRN Nebs  - Continuous Pulse Ox, Oxygen by NC or Oxy Mask, titrate to 93-95%, consider HFNC   -Incentive spirometry when stable   - AM BMP with VBG  - RVP today   - Consider OP Pulmonary Rehab     Hypertension  Stress cardiomyopathy  10/09/23: Echo with ejection fraction is 30-35% (moderately  "reduced). 01/05/24 Echo: left ventricular function is normal with an EF of 55-60%. This would meet the criteria of Hfimpef. Presentation is not consistent with a heart failure presentation: no crackles on exam, no bilateral lower extremity edema and normal BNP. Low concern for acute heart failure exacerbation.   -CTM  -Holding Coreg, Amlodipine, Propanolol given softer BPs  -Clonidine patch qweekly       Chronic conditions    Anxiety  -Holding Propanolol TID PRN    Gastric Sleeve  Morbid Obesity  S/p sleeve gastrectomy complicated by enterotomy & peritonitis 10/2023. Noted to have severe decondition due to prolong hospitalization requiring a 3 week ICU stay.   -PTA Urodiol 300 gr BID  -PTA Multivitamins  -PTA Vitamin B12  -PTA Vitamin D        Diet: Regular Diet Adult    DVT Prophylaxis: Pneumatic Compression Devices  Patiño Catheter: Not present  Lines: None   PIV in place  Cardiac Monitoring: None  Code Status:  Full Code        Clinically Significant Risk Factors Present on Admission                  # Hypertension: Noted on problem list      # Obesity: Estimated body mass index is 38.97 kg/m  as calculated from the following:    Height as of this encounter: 1.6 m (5' 3\").    Weight as of this encounter: 99.8 kg (220 lb).       # Asthma: noted on problem list        Disposition Plan      Expected Discharge Date: 03/07/2024                The patient's care was discussed with the Attending Physician, Dr. Powell .    [facility]  [facility]  Securely message with [employer] (more info)  Text page via [facility] Paging/Directory   See signed in provider for up to date coverage information  ______________________________________________________________________    Interval History   NAEO     Patient is feeling some dyspnea with ambulation but no dyspnea at baseline. Long coughing fits with feelings of wanting to throw up after, no " emesis. Coughing all night causing HA. Denies post-tussive emesis.     UTD on TDAP     No CP or Abdominal Pain     Physical Exam   Vital Signs: Temp: 98.1  F (36.7  C) Temp src: Oral BP: 125/73 Pulse: 92   Resp: 18 SpO2: 92 % O2 Device: Nasal cannula Oxygen Delivery: 1 LPM  Weight: 220 lbs 0 oz    Physical Exam  Vitals reviewed.   Cardiovascular:      Rate and Rhythm: Normal rate and regular rhythm.   Pulmonary:      Effort: Pulmonary effort is normal. No tachypnea, accessory muscle usage or respiratory distress.      Breath sounds: Normal air entry. Examination of the right-middle field reveals decreased breath sounds and wheezing. Examination of the right-lower field reveals decreased breath sounds and wheezing. Examination of the left-lower field reveals wheezing. Decreased breath sounds and wheezing present.      Comments: Expiratory wheezes in bilateral lower lung fields    Patient is able to speak in full sentences although there is notable desaturation with speaking, she takes in deep breaths to catch her breath in the middle of speaking.     Coughing up mucus during history and coughing induced with deep inhalation.   Abdominal:      General: Abdomen is flat.      Palpations: Abdomen is soft.   Skin:     General: Skin is warm.      Capillary Refill: Capillary refill takes less than 2 seconds.   Neurological:      General: No focal deficit present.      Mental Status: She is alert.   Psychiatric:         Mood and Affect: Mood normal.       Medical Decision Making       Please see A&P for additional details of medical decision making.      Data     I have personally reviewed the following data over the past 24 hrs:    10.6  \   12.0   / 213     142 104 17.2 /  168 (H)   4.3 26 0.78 \     Trop: N/A BNP: N/A

## 2024-03-06 NOTE — PLAN OF CARE
Occupational Therapy: Orders received. Chart reviewed and discussed with care team.? Occupational Therapy not indicated due to pt at baseline for ADL, has been up ind in room. Pt limited by O2 needs, PT following for conditioning.? Defer discharge recommendations to physical therapy.? Will complete orders.

## 2024-03-06 NOTE — PROGRESS NOTES
03/06/24 1000   Appointment Info   Signing Clinician's Name / Credentials (PT) Julianne King   Living Environment   People in Home child(jean claude), adult;grandchild(jean claude)   Current Living Arrangements house;apartment   Home Accessibility no concerns   Living Environment Comments pt does have elevator, otherwise 18 steps to enter, hospital bed, pt mod I wtih mob and ADL's,able to complete household tasks but needs to rest at times.   Self-Care   Usual Activity Tolerance moderate   Current Activity Tolerance fair   Regular Exercise Yes   Activity/Exercise Type strength training;walking   Exercise Amount/Frequency daily;30 mins   Equipment Currently Used at Home walker, rolling;grab bar, toilet;grab bar, tub/shower;tub bench   Fall history within last six months no   Activity/Exercise/Self-Care Comment Pt hospitalized in 10/23 to 12/23 then to rehab and then home with 4 WW and OP PT, prior to hosp this admity has become I with amb without AD, pt's act level varies wtih respiratory status.  Pt reports she does some furniture walking at home, but generally does not need to.   General Information   Onset of Illness/Injury or Date of Surgery 03/05/24   Referring Physician Alycia Red   Patient/Family Therapy Goals Statement (PT) pt plans to dc home   Pertinent History of Current Problem (include personal factors and/or comorbidities that impact the POC) Mojgan Jimenez is a 53 year old female with a past medical history of asthma, hypertension, Hyperlipidemia, morbid obesity (BMI 41) s/p recent gastric sleeve, stress cardiomyopathy/Hfimpef and recent admission for CAP who presents for acute hypoxic respiratory failure concerning for severe asthma exacerbation.   Existing Precautions/Restrictions oxygen therapy device and L/min  (on 1 lpm via NC)   Weight-Bearing Status - LUE full weight-bearing   Weight-Bearing Status - RUE full weight-bearing   Weight-Bearing Status - LLE full weight-bearing   Weight-Bearing Status - RLE full  weight-bearing   Cognition   Affect/Mental Status (Cognition) WNL   Orientation Status (Cognition) oriented x 4   Follows Commands (Cognition) WNL   Pain Assessment   Patient Currently in Pain No   Integumentary/Edema   Integumentary/Edema no deficits were identifed   Posture    Posture Not impaired   Range of Motion (ROM)   Range of Motion ROM is WFL   Strength (Manual Muscle Testing)   Strength (Manual Muscle Testing) strength is WFL;No deficits observed during functional mobility   Bed Mobility   Bed Mobility sit-supine;supine-sit-supine   Sit-Supine [place] (Bed Mobility) independent   Supine-Sit-Supine [place] (Bed Mobility) independent   Transfers   Comment, (Transfers) I sit to stand without AD   Gait/Stairs (Locomotion)   [place] Level (Gait) supervision   Distance in Feet (Gait) 20'   Coordination   Coordination no deficits were identified   Muscle Tone   Muscle Tone no deficits were identified   Clinical Impression   Criteria for Skilled Therapeutic Intervention Yes, treatment indicated   PT Diagnosis (PT) difficulty walking   Influenced by the following impairments SOB   Functional limitations due to impairments impaired functional mob [place], need for supplemental O2   Clinical Presentation (PT Evaluation Complexity) stable   Clinical Presentation Rationale clinical judgement   Clinical Decision Making (Complexity) low complexity   Planned Therapy Interventions (PT) balance training;gait training   Risk & Benefits of therapy have been explained evaluation/treatment results reviewed;current/potential barriers reviewed;care plan/treatment goals reviewed;risks/benefits reviewed;participants voiced agreement with care plan;participants included;patient   PT Total Evaluation Time   PT Eval, Low Complexity Minutes (87311) 10   Physical Therapy Goals   PT Frequency Daily   PT Predicted Duration/Target Date for Goal Attainment 03/08/24   PT Goals Gait;Stairs   PT: Gait Independent;Greater than 200  feet   PT: Stairs Supervision/stand-by assist;6 stairs;Rail on both sides   Interventions   Interventions Quick Adds Therapeutic Activity;Gait Training   Therapeutic Activity   Therapeutic Activities: dynamic activities to improve functional performance Minutes (45341) 25   Symptoms Noted During/After Treatment Fatigue   Treatment Detail/Skilled Intervention PT pt educated in role of PT and POC, discussed dc recs, equipment needs and use, pt on 1 lpm O2, at 91-92% sats.  Trial on RA, stable with bed mob to sit on EOB wtih SBA to I, until cough, then dropped to 86%, recovering in about 1 min.  Transfers with SBA to I sit to stand, stood to acclimate, no desat, progressed to gait.  Upon return pt sats 90% and recovered to 92-93%.  Pt only reporting min SOB by end of long walk.  Returned to bed per pt preference I, states has been up to bathroom I, denies any ADL's concerns.  Left NC with 1 lpm on next to pt in bed if needed and instructed to cont amb with nursing several x day.   Gait Training   Gait Training Minutes (19258) 15   Symptoms Noted During/After Treatment (Gait Training) fatigue   Treatment Detail/Skilled Intervention PT gait training with no AD, pt occasionally using rail in prater, has increased lat sway with gait when no rail but steady, no LOB, min c/o SOB, pt surprised and happy she could cont to amb good distance on RA.   Distance in Feet 340'   PT Discharge Planning   PT Plan see once more visit to ensure safety wtih dc to home with amb without AD consistantly, balance activities   PT Discharge Recommendation (DC Rec) home with assist;home with outpatient physical therapy   PT Rationale for DC Rec pt appears to be at or near baseline with mob, able with PT to day to amb on RA and maintain = or > 90% sats, except for when coughing.  Pt comfortable with dc to home with dtr and to cont OP PT she had prior to admit.   PT Brief overview of current status SBA/I no AD, monitor sats   Total Session Time    Timed Code Treatment Minutes 40   Total Session Time (sum of timed and untimed services) 50

## 2024-03-07 VITALS
OXYGEN SATURATION: 95 % | TEMPERATURE: 98.2 F | RESPIRATION RATE: 22 BRPM | HEIGHT: 63 IN | SYSTOLIC BLOOD PRESSURE: 143 MMHG | HEART RATE: 95 BPM | BODY MASS INDEX: 38.98 KG/M2 | DIASTOLIC BLOOD PRESSURE: 85 MMHG | WEIGHT: 220 LBS

## 2024-03-07 LAB
ANION GAP SERPL CALCULATED.3IONS-SCNC: 11 MMOL/L (ref 7–15)
BASE EXCESS BLDV CALC-SCNC: 3.1 MMOL/L (ref -3–3)
BUN SERPL-MCNC: 20 MG/DL (ref 6–20)
CALCIUM SERPL-MCNC: 9.8 MG/DL (ref 8.6–10)
CHLORIDE SERPL-SCNC: 103 MMOL/L (ref 98–107)
CREAT SERPL-MCNC: 0.72 MG/DL (ref 0.51–0.95)
DEPRECATED HCO3 PLAS-SCNC: 27 MMOL/L (ref 22–29)
EGFRCR SERPLBLD CKD-EPI 2021: >90 ML/MIN/1.73M2
GLUCOSE SERPL-MCNC: 92 MG/DL (ref 70–99)
HCO3 BLDV-SCNC: 28 MMOL/L (ref 21–28)
O2/TOTAL GAS SETTING VFR VENT: 21 %
OXYHGB MFR BLDV: 69 % (ref 70–75)
PCO2 BLDV: 45 MM HG (ref 40–50)
PH BLDV: 7.4 [PH] (ref 7.32–7.43)
PO2 BLDV: 35 MM HG (ref 25–47)
POTASSIUM SERPL-SCNC: 3.4 MMOL/L (ref 3.4–5.3)
SAO2 % BLDV: 69.4 % (ref 70–75)
SODIUM SERPL-SCNC: 141 MMOL/L (ref 135–145)

## 2024-03-07 PROCEDURE — 82805 BLOOD GASES W/O2 SATURATION: CPT | Performed by: STUDENT IN AN ORGANIZED HEALTH CARE EDUCATION/TRAINING PROGRAM

## 2024-03-07 PROCEDURE — 999N000157 HC STATISTIC RCP TIME EA 10 MIN

## 2024-03-07 PROCEDURE — 250N000012 HC RX MED GY IP 250 OP 636 PS 637

## 2024-03-07 PROCEDURE — 94640 AIRWAY INHALATION TREATMENT: CPT

## 2024-03-07 PROCEDURE — 99239 HOSP IP/OBS DSCHRG MGMT >30: CPT | Mod: GC | Performed by: STUDENT IN AN ORGANIZED HEALTH CARE EDUCATION/TRAINING PROGRAM

## 2024-03-07 PROCEDURE — 80048 BASIC METABOLIC PNL TOTAL CA: CPT | Performed by: STUDENT IN AN ORGANIZED HEALTH CARE EDUCATION/TRAINING PROGRAM

## 2024-03-07 PROCEDURE — 94640 AIRWAY INHALATION TREATMENT: CPT | Mod: 76

## 2024-03-07 PROCEDURE — 250N000009 HC RX 250

## 2024-03-07 PROCEDURE — 36415 COLL VENOUS BLD VENIPUNCTURE: CPT | Performed by: STUDENT IN AN ORGANIZED HEALTH CARE EDUCATION/TRAINING PROGRAM

## 2024-03-07 PROCEDURE — 250N000013 HC RX MED GY IP 250 OP 250 PS 637

## 2024-03-07 RX ORDER — GUAIFENESIN 600 MG/1
1200 TABLET, EXTENDED RELEASE ORAL 2 TIMES DAILY
Qty: 10 TABLET | Refills: 0 | Status: SHIPPED | OUTPATIENT
Start: 2024-03-07

## 2024-03-07 RX ORDER — PREDNISONE 20 MG/1
40 TABLET ORAL DAILY
Qty: 10 TABLET | Refills: 0 | Status: SHIPPED | OUTPATIENT
Start: 2024-03-07 | End: 2024-03-12

## 2024-03-07 RX ORDER — ECHINACEA PURPUREA EXTRACT 125 MG
TABLET ORAL
Qty: 15 ML | Refills: 0 | Status: SHIPPED | OUTPATIENT
Start: 2024-03-07

## 2024-03-07 RX ADMIN — PREDNISONE 40 MG: 20 TABLET ORAL at 09:53

## 2024-03-07 RX ADMIN — IPRATROPIUM BROMIDE AND ALBUTEROL SULFATE 3 ML: .5; 3 SOLUTION RESPIRATORY (INHALATION) at 05:05

## 2024-03-07 RX ADMIN — URSODIOL 300 MG: 300 CAPSULE ORAL at 09:52

## 2024-03-07 RX ADMIN — VENLAFAXINE 75 MG: 75 TABLET ORAL at 09:56

## 2024-03-07 RX ADMIN — Medication 1 TABLET: at 09:52

## 2024-03-07 RX ADMIN — ROSUVASTATIN 10 MG: 10 TABLET, FILM COATED ORAL at 09:56

## 2024-03-07 RX ADMIN — CETIRIZINE HYDROCHLORIDE 10 MG: 10 TABLET, FILM COATED ORAL at 09:54

## 2024-03-07 RX ADMIN — Medication 1 LOZENGE: at 06:10

## 2024-03-07 RX ADMIN — IPRATROPIUM BROMIDE AND ALBUTEROL SULFATE 3 ML: .5; 3 SOLUTION RESPIRATORY (INHALATION) at 00:07

## 2024-03-07 RX ADMIN — BENZONATATE 100 MG: 100 CAPSULE ORAL at 06:08

## 2024-03-07 RX ADMIN — Medication 125 MCG: at 09:53

## 2024-03-07 RX ADMIN — IPRATROPIUM BROMIDE AND ALBUTEROL SULFATE 3 ML: .5; 3 SOLUTION RESPIRATORY (INHALATION) at 09:26

## 2024-03-07 RX ADMIN — Medication 1000 MCG: at 09:54

## 2024-03-07 RX ADMIN — FLUTICASONE FUROATE AND VILANTEROL TRIFENATATE 1 PUFF: 100; 25 POWDER RESPIRATORY (INHALATION) at 09:54

## 2024-03-07 ASSESSMENT — ACTIVITIES OF DAILY LIVING (ADL)
ADLS_ACUITY_SCORE: 24
DEPENDENT_IADLS:: CLEANING;COOKING;LAUNDRY;SHOPPING

## 2024-03-07 NOTE — PLAN OF CARE
"Goal Outcome Evaluation:  /76 (BP Location: Right arm)   Pulse 85   Temp 98.1  F (36.7  C) (Oral)   Resp 18   Ht 1.6 m (5' 3\")   Wt 99.8 kg (220 lb)   SpO2 91%   BMI 38.97 kg/m      Patient is alert and oriented x4, able to make needs known.   No acute event this shift, no new skin concerns  2L via N/C sating above 90%  Denies SOB,chest pain, numbness and tingling, nausea and vomiting   Independent in the room  Continent of both, LBM-----   Safety measures in place, call light within reach, continue with plan of care.        Plan of Care Reviewed With: patient    Overall Patient Progress: no changeOverall Patient Progress: no change           "

## 2024-03-07 NOTE — CONSULTS
Care Management Initial Consult    General Information  Assessment completed with: Patient, VM-chart review, Mojgan  Type of CM/SW Visit: Initial Assessment    Primary Care Provider verified and updated as needed: Yes   Readmission within the last 30 days:        Reason for Consult: discharge planning  Advance Care Planning: Advance Care Planning Reviewed: no concerns identified          Communication Assessment  Patient's communication style: spoken language (English or Bilingual)    Hearing Difficulty or Deaf: no   Wear Glasses or Blind: yes    Cognitive  Cognitive/Neuro/Behavioral: WDL                      Living Environment:   People in home: child(jean claude), adult, grandchild(jean claude)     Current living Arrangements: apartment      Able to return to prior arrangements: yes       Family/Social Support:  Care provided by: self, child(jean claude) (Sister)  Provides care for: no one                Description of Support System:           Current Resources:   Patient receiving home care services: No     Community Resources:  (Outpt PT)  Equipment currently used at home: walker, rolling, grab bar, toilet, grab bar, tub/shower, shower chair  Supplies currently used at home: Oxygen Tubing/Supplies (uses PRN)    Employment/Financial:  Employment Status: unemployed (due to healthg issues)        Financial Concerns:  (Per prevoius  note, encouraged and offered info to apply for county benefits)           Does the patient's insurance plan have a 3 day qualifying hospital stay waiver?  No    Lifestyle & Psychosocial Needs:  Social Determinants of Health     Food Insecurity: Not on file   Depression: At risk (8/25/2022)    PHQ-2     PHQ-2 Score: 5   Housing Stability: Not on file   Tobacco Use: Low Risk  (3/5/2024)    Patient History     Smoking Tobacco Use: Never     Smokeless Tobacco Use: Never     Passive Exposure: Not on file   Financial Resource Strain: Not on file   Alcohol Use: Not At Risk (4/2/2019)    AUDIT-C     Frequency of  "Alcohol Consumption: Never     Average Number of Drinks: Not on file     Frequency of Binge Drinking: Not on file   Transportation Needs: Not on file   Physical Activity: Not on file   Interpersonal Safety: Not on file   Stress: Not on file   Social Connections: Not on file       Functional Status:  Prior to admission patient needed assistance:      Dependent IADLs:: Cleaning, Cooking, Laundry, Shopping  Assesssment of Functional Status: At functional baseline    Mental Health Status:  Mental Health Status: No Current Concerns       Chemical Dependency Status:  Chemical Dependency Status: No Current Concerns             Values/Beliefs:  Spiritual, Cultural Beliefs, [employer] Practices, Values that affect care:    Description of Beliefs that Will Affect Care: Marcelo            Additional Information:  Pt is \"a 53 year old female with a past medical history of asthma, hypertension, Hyperlipidemia, morbid obesity (BMI 41) s/p recent gastric sleeve, stress cardiomyopathy/Hfimpef and recent admission for CAP who presents for acute hypoxic respiratory failure concerning for severe asthma exacerbation.\"- Per H&P.     Met with patient to introduce self/role and complete initial assessment.     Pt reported to writer that she would be discharging today in an hour. Writer noted pt is currently on O2. Pt reported she already has home O2 that she uses as needed. Pt reported she did a walk test with her RN and feels comfortable discharging to home without the O2 for transport. Pt reported family would be providing discharge transportation and she had no other discharge needs and concerns.    Lety Lancaster RNCC  Covering for 6 Med/Surg  Phone [phone]  Pager [phone]      RN Care Coordinator     Social Work and Care Management Department      SEARCHABLE in [facility] - search CARE COORDINATOR       [place] [address] [place] ([phone]) Saturday & Sunday; ([phone]) [place] Recognized Holidays     Units: 5A Onc Vocera & " 5C Vocera Pager: [phone]    Units: 6B Vocera & 6C Vocera  Pager: [phone]    Units: 7A SOT RNCC Vocera, 7B Med Surg Vocera, & 7C Med Surg Vocera  Pager: [phone]    Units: 6A Vocera & 4A CVICU Vocera, 4C MICU Vocera, and 4E SICU Vocera   Pager: [phone]    Units: 5 Ortho Vocera & 5 Med Surg Vocera  Pager: [phone]    Units: 6 Med Surg Vocera & 8 Med Surg Vocera  Pager [phone]

## 2024-03-07 NOTE — PLAN OF CARE
Goal Outcome Evaluation:           [facility]

## 2024-03-07 NOTE — PROGRESS NOTES
Care Management Discharge Note    Discharge Date: 03/07/2024       Discharge Disposition: Home    Discharge Services: None    Discharge DME: None (Already has home O2)    Discharge Transportation: family or friend will provide    Private pay costs discussed: Not applicable    Does the patient's insurance plan have a 3 day qualifying hospital stay waiver?  No    PAS Confirmation Code:  n/a  Patient/family educated on Medicare website which has current facility and service quality ratings:  n/a    Education Provided on the Discharge Plan: Yes  Persons Notified of Discharge Plans: Pt, bedside RN  Patient/Family in Agreement with the Plan: yes    Handoff Referral Completed: Yes    Additional Information:  Pt discharging to home today. See previous consult note by writer for additional details.     Lety Lancaster RNCC  Covering for 6 Med/Surg  Phone [phone]  Pager [phone]      RN Care Coordinator     Social Work and Care Management Department      SEARCHABLE in [facility] - search CARE COORDINATOR       [place] [address] [place] ([phone]) Saturday & Sunday; ([phone]) [place] Recognized Holidays     Units: 5A Onc Vocera & 5C Vocera Pager: [phone]    Units: 6B Vocera & 6C Vocera  Pager: [phone]    Units: 7A SOT RNCC Vocera, 7B Med Surg Vocera, & 7C Med Surg Vocera  Pager: [phone]    Units: 6A Vocera & 4A CVICU Vocera, 4C MICU Vocera, and 4E SICU Vocera   Pager: [phone]    Units: 5 Ortho Vocera & 5 Med Surg Vocera  Pager: [phone]    Units: 6 Med Surg Vocera & 8 Med Surg Vocera  Pager [phone]

## 2024-03-07 NOTE — CARE PLAN
Physical Therapy Discharge Summary    Reason for therapy discharge:    Discharged to home.    Progress towards therapy goal(s). See goals on Care Plan in [facility] electronic health record for goal details.  Goals met, has been amb with nursing    Therapy recommendation(s):    Continue home exercise program.walking

## 2024-03-07 NOTE — PLAN OF CARE
Problem: Adult Inpatient Plan of Care  Goal: Absence of Hospital-Acquired Illness or Injury  Intervention: Prevent and Manage VTE (Venous Thromboembolism) Risk  Recent Flowsheet Documentation  Taken 3/6/2024 0900 by Otilia Leiva RN  VTE Prevention/Management: SCDs (sequential compression devices) on     Problem: Adult Inpatient Plan of Care  Goal: Absence of Hospital-Acquired Illness or Injury  Intervention: Identify and Manage Fall Risk  Recent Flowsheet Documentation  Taken 3/6/2024 0900 by Otilia Leiva RN  Safety Promotion/Fall Prevention: activity supervised     Problem: Adult Inpatient Plan of Care  Goal: Absence of Hospital-Acquired Illness or Injury  Intervention: Prevent Infection  Recent Flowsheet Documentation  Taken 3/6/2024 0900 by Otilia Leiva RN  Infection Prevention: hand hygiene promoted

## 2024-03-08 ENCOUNTER — PATIENT OUTREACH (OUTPATIENT)
Dept: CARE COORDINATION | Facility: CLINIC | Age: 54
End: 2024-03-08
Payer: COMMERCIAL

## 2024-03-08 NOTE — PROGRESS NOTES
[facility]    Background: Transitional Care Management program identified per system criteria and reviewed by [facility] team for possible outreach.    Assessment: Upon chart review, CCRC Team member will not proceed with patient outreach related to this episode of Transitional Care Management program due to reason below:    Patient declined to answer all post hospital discharge questions with CCRC team member and disconnected call.    Plan: Transitional Care Management episode addressed appropriately per reason noted above.      ALEJANDRINA Ashby  [facility]    *Connected Care Resource Team does NOT follow patient ongoing. Referrals are identified based on internal discharge reports and the outreach is to ensure patient has an understanding of their discharge instructions.

## 2024-03-20 ENCOUNTER — DOCUMENTATION ONLY (OUTPATIENT)
Dept: SLEEP MEDICINE | Facility: CLINIC | Age: 54
End: 2024-03-20
Payer: COMMERCIAL

## 2024-03-20 DIAGNOSIS — G47.33 OBSTRUCTIVE SLEEP APNEA (ADULT) (PEDIATRIC): Primary | ICD-10-CM

## 2024-03-25 NOTE — PLAN OF CARE
Problem: Safety - Adult  Goal: Free from fall injury  3/25/2024 1503 by Sb Palumbo RN  Outcome: Progressing  3/25/2024 0418 by Veena Tavera RN  Outcome: Progressing     Problem: Chronic Conditions and Co-morbidities  Goal: Patient's chronic conditions and co-morbidity symptoms are monitored and maintained or improved  3/25/2024 1503 by Sb Palumbo RN  Outcome: Progressing  3/25/2024 0418 by Veena Tavera RN  Outcome: Progressing     Problem: Discharge Planning  Goal: Discharge to home or other facility with appropriate resources  3/25/2024 1503 by Sb Palumbo RN  Outcome: Progressing  3/25/2024 0418 by Veena Tavera RN  Outcome: Progressing     Problem: Risk for Elopement  Goal: Patient will not exit the unit/facility without proper excort  3/25/2024 1503 by Sb Palumbo RN  Outcome: Progressing  3/25/2024 0418 by Veena Tavera RN  Outcome: Progressing  Flowsheets (Taken 3/24/2024 1943)  Nursing Interventions for Elopement Risk: Make sure patient has all necessary personal care items     Problem: Skin/Tissue Integrity  Goal: Absence of new skin breakdown  Description: 1.  Monitor for areas of redness and/or skin breakdown  2.  Assess vascular access sites hourly  3.  Every 4-6 hours minimum:  Change oxygen saturation probe site  4.  Every 4-6 hours:  If on nasal continuous positive airway pressure, respiratory therapy assess nares and determine need for appliance change or resting period.  3/25/2024 1503 by Sb Palumbo RN  Outcome: Progressing  3/25/2024 0418 by Veena Tavera RN  Outcome: Progressing      Problem: Risk for Delirium  Goal: Improved Behavioral Control  Outcome: Not Progressing  Intervention: Minimize Safety Risk  Recent Flowsheet Documentation  Taken 10/29/2023 2353 by Corey Pérez RN  Trust Relationship/Rapport:   care explained   empathic listening provided   reassurance provided   thoughts/feelings acknowledged  Goal: Improved Attention and Thought Clarity  Outcome: Not Progressing     Problem: Delirium  Goal: Improved Behavioral Control  Outcome: Not Progressing  Intervention: Minimize Safety Risk  Recent Flowsheet Documentation  Taken 10/29/2023 2353 by Corey Pérez RN  Trust Relationship/Rapport:   care explained   empathic listening provided   reassurance provided   thoughts/feelings acknowledged  Goal: Improved Attention and Thought Clarity  Outcome: Not Progressing     Goal Outcome Evaluation:         Pt alert & oriented to self only at night. Very confused and at times speech is illogical. Insisting that she needs to get out of bed and that she has friends here to pick her up. Refused overnight dose of Hyoscyamine.     She did report that she was having difficulty breathing - notified RT and did neb treatment. Also agreed to putting on BiPAP during the night. Tolerated the BiPAP fairly well - removed mask periodically.     Incisions CDI. MICKIE intact with no output. Incontinent of urine - purewick in place with urine output. No bowel movement overnight.     BP Hypertensive at 181/84. Hydralazine given.

## 2024-04-16 ENCOUNTER — MYC MEDICAL ADVICE (OUTPATIENT)
Dept: SURGERY | Facility: CLINIC | Age: 54
End: 2024-04-16
Payer: COMMERCIAL

## 2024-04-16 DIAGNOSIS — E66.01 MORBID OBESITY (H): ICD-10-CM

## 2024-04-16 DIAGNOSIS — K91.2 POSTOPERATIVE MALABSORPTION: ICD-10-CM

## 2024-04-16 DIAGNOSIS — K90.9 INTESTINAL MALABSORPTION, UNSPECIFIED TYPE: ICD-10-CM

## 2024-04-16 DIAGNOSIS — Z98.84 S/P BARIATRIC SURGERY: Primary | ICD-10-CM

## 2024-04-16 NOTE — TELEPHONE ENCOUNTER
Kelly Ceballos, RD  P Bariatric Surgery Support [facility]  Internal 6 months post-op Single Anastomosis Duodenal Switch labs needed for appointment with Erin Ash MD on 5/14/2024.

## 2024-04-26 ENCOUNTER — LAB (OUTPATIENT)
Dept: LAB | Facility: CLINIC | Age: 54
End: 2024-04-26
Payer: COMMERCIAL

## 2024-04-26 DIAGNOSIS — E66.01 MORBID OBESITY (H): ICD-10-CM

## 2024-04-26 DIAGNOSIS — Z98.84 S/P BARIATRIC SURGERY: ICD-10-CM

## 2024-04-26 DIAGNOSIS — K91.2 POSTOPERATIVE MALABSORPTION: ICD-10-CM

## 2024-04-26 DIAGNOSIS — K90.9 INTESTINAL MALABSORPTION, UNSPECIFIED TYPE: ICD-10-CM

## 2024-04-26 LAB
ALBUMIN SERPL BCG-MCNC: 4.4 G/DL (ref 3.5–5.2)
ALP SERPL-CCNC: 92 U/L (ref 40–150)
ALT SERPL W P-5'-P-CCNC: 13 U/L (ref 0–50)
ANION GAP SERPL CALCULATED.3IONS-SCNC: 12 MMOL/L (ref 7–15)
AST SERPL W P-5'-P-CCNC: 17 U/L (ref 0–45)
BILIRUB SERPL-MCNC: 0.3 MG/DL
BUN SERPL-MCNC: 16.4 MG/DL (ref 6–20)
CALCIUM SERPL-MCNC: 9.5 MG/DL (ref 8.6–10)
CHLORIDE SERPL-SCNC: 102 MMOL/L (ref 98–107)
CHOLEST SERPL-MCNC: 142 MG/DL
CREAT SERPL-MCNC: 0.79 MG/DL (ref 0.51–0.95)
DEPRECATED HCO3 PLAS-SCNC: 26 MMOL/L (ref 22–29)
EGFRCR SERPLBLD CKD-EPI 2021: 89 ML/MIN/1.73M2
ERYTHROCYTE [DISTWIDTH] IN BLOOD BY AUTOMATED COUNT: 14.7 % (ref 10–15)
FASTING STATUS PATIENT QL REPORTED: YES
FERRITIN SERPL-MCNC: 36 NG/ML (ref 11–328)
FOLATE SERPL-MCNC: 35.8 NG/ML (ref 4.6–34.8)
GLUCOSE SERPL-MCNC: 88 MG/DL (ref 70–99)
HBA1C MFR BLD: 5.2 % (ref 0–5.6)
HCT VFR BLD AUTO: 36.4 % (ref 35–47)
HDLC SERPL-MCNC: 36 MG/DL
HGB BLD-MCNC: 11.4 G/DL (ref 11.7–15.7)
LDLC SERPL CALC-MCNC: 75 MG/DL
MCH RBC QN AUTO: 27 PG (ref 26.5–33)
MCHC RBC AUTO-ENTMCNC: 31.3 G/DL (ref 31.5–36.5)
MCV RBC AUTO: 86 FL (ref 78–100)
NONHDLC SERPL-MCNC: 106 MG/DL
PLATELET # BLD AUTO: 237 10E3/UL (ref 150–450)
POTASSIUM SERPL-SCNC: 3.9 MMOL/L (ref 3.4–5.3)
PROT SERPL-MCNC: 7.5 G/DL (ref 6.4–8.3)
PTH-INTACT SERPL-MCNC: 39 PG/ML (ref 15–65)
RBC # BLD AUTO: 4.22 10E6/UL (ref 3.8–5.2)
SODIUM SERPL-SCNC: 140 MMOL/L (ref 135–145)
TRIGL SERPL-MCNC: 155 MG/DL
VIT B12 SERPL-MCNC: 710 PG/ML (ref 232–1245)
VIT D+METAB SERPL-MCNC: 60 NG/ML (ref 20–50)
WBC # BLD AUTO: 5.2 10E3/UL (ref 4–11)

## 2024-04-26 PROCEDURE — 84590 ASSAY OF VITAMIN A: CPT | Mod: 90

## 2024-04-26 PROCEDURE — 82746 ASSAY OF FOLIC ACID SERUM: CPT

## 2024-04-26 PROCEDURE — 36415 COLL VENOUS BLD VENIPUNCTURE: CPT

## 2024-04-26 PROCEDURE — 82306 VITAMIN D 25 HYDROXY: CPT

## 2024-04-26 PROCEDURE — 82607 VITAMIN B-12: CPT

## 2024-04-26 PROCEDURE — 80061 LIPID PANEL: CPT

## 2024-04-26 PROCEDURE — 84425 ASSAY OF VITAMIN B-1: CPT | Mod: 90

## 2024-04-26 PROCEDURE — 84446 ASSAY OF VITAMIN E: CPT | Mod: 90

## 2024-04-26 PROCEDURE — 99000 SPECIMEN HANDLING OFFICE-LAB: CPT

## 2024-04-26 PROCEDURE — 85027 COMPLETE CBC AUTOMATED: CPT

## 2024-04-26 PROCEDURE — 83036 HEMOGLOBIN GLYCOSYLATED A1C: CPT

## 2024-04-26 PROCEDURE — 82728 ASSAY OF FERRITIN: CPT

## 2024-04-26 PROCEDURE — 80053 COMPREHEN METABOLIC PANEL: CPT

## 2024-04-26 PROCEDURE — 84630 ASSAY OF ZINC: CPT | Mod: 90

## 2024-04-26 PROCEDURE — 83970 ASSAY OF PARATHORMONE: CPT

## 2024-04-28 LAB — ZINC SERPL-MCNC: 68 UG/DL

## 2024-04-29 LAB
A-TOCOPHEROL VIT E SERPL-MCNC: 11.1 MG/L
ANNOTATION COMMENT IMP: NORMAL
BETA+GAMMA TOCOPHEROL SERPL-MCNC: 1.5 MG/L
RETINYL PALMITATE SERPL-MCNC: <0.02 MG/L
VIT A SERPL-MCNC: 0.72 MG/L

## 2024-05-01 LAB — VIT B1 PYROPHOSHATE BLD-SCNC: 194 NMOL/L

## 2024-05-02 ENCOUNTER — TELEPHONE (OUTPATIENT)
Dept: SURGERY | Facility: CLINIC | Age: 54
End: 2024-05-02

## 2024-05-02 ENCOUNTER — LAB (OUTPATIENT)
Dept: LAB | Facility: CLINIC | Age: 54
End: 2024-05-02
Payer: COMMERCIAL

## 2024-05-02 DIAGNOSIS — E66.01 MORBID OBESITY (H): Chronic | ICD-10-CM

## 2024-05-02 DIAGNOSIS — Z98.84 S/P BARIATRIC SURGERY: ICD-10-CM

## 2024-05-02 DIAGNOSIS — K90.9 INTESTINAL MALABSORPTION, UNSPECIFIED: ICD-10-CM

## 2024-05-02 DIAGNOSIS — K91.2 POSTOPERATIVE MALABSORPTION: ICD-10-CM

## 2024-05-02 PROCEDURE — 84597 ASSAY OF VITAMIN K: CPT | Mod: 90

## 2024-05-02 PROCEDURE — 36415 COLL VENOUS BLD VENIPUNCTURE: CPT

## 2024-05-02 PROCEDURE — 99000 SPECIMEN HANDLING OFFICE-LAB: CPT

## 2024-05-02 NOTE — TELEPHONE ENCOUNTER
Reason for Call:  Other EKG    Detailed comments: patient called inquiring if she needs to have her EKG done before her 5/14 visit with Dr Ash. Please advise.    Phone Number Patient can be reached at: Cell number on file:    Telephone Information:   Mobile [phone]       Best Time: any    Can we leave a detailed message on this number? YES    Call taken on 5/2/2024 at 8:48 AM by Liberty Maloney

## 2024-05-02 NOTE — TELEPHONE ENCOUNTER
There was an old EKG in the system still from her pre-op orders since she went to an [facility] clinic for her pre-op.  Deleted that order and let the patient know she doesn't need any further testing except for the Vitamin K that was added to the labs which she is aware of.    Shameka Guardado RN

## 2024-05-07 LAB — PHYTONADIONE SERPL-MCNC: 1.68 NMOL/L

## 2024-05-12 NOTE — PROGRESS NOTES
Bariatric Care Clinic Follow Up Visit for Previous Bariatric Surgery   Date of visit: 5/14/2024  Physician: GIRISH Ash MD, MD  Primary Care is Franca Mishra.  Mojgan Jimenez   53 year old  female    Date of Surgery: 10/9/23  Initial Weight: 292#  Initial BMI: 51.78  Today's Weight:   Wt Readings from Last 1 Encounters:   05/14/24 102.1 kg (225 lb)     Body mass index is 39.86 kg/m .       Assessment and Plan   Assessment: Mojgan is a 53 year old year old female who is 6 months s/p KO with Dr. Worthy. Unfortunately had to go back to the OR with an open procedure a few days later where 2 small leaks in the small intestine were found and repaired. She has had a difficult recovery. She was in intensive care for 3 weeks. She then lost all muscle function, thought due to inactivity. She was in the hospital for 2 months then was in a rehab center. They have had a durable weight loss of 73 lbs since surgery.  Overall compliance with the [facility] Bariatric Surgery Program has been excellent.      Plan:    1. Postoperative malabsorption  Patient is taking all vitamins as directed except only one multivitamin. She will increase this to 2 per day.  Recent routine postoperative labs were reviewed, ferritin insufficient. She was reminded to slow down, chew foods thoroughly, and to avoid liquids within 30 minutes of solids. Written information was given. She was congratulated on her success thus far. She will continue her PT. Hopefully she will be able to work again soon as she is struggling financially.     2. Obstructive sleep apnea  If her energy doesn't improve with increased iron she may try using her cPAP again..          Total time spent on the date of this encounter doing: chart review, review of test results, patient visit, physical exam, education, counseling, developing plan of care and documenting = 34 minutes.      Bariatric Surgery Review   Interim History/LifeChanges: She is struggling with poor  "energy. She is going to PT. She continues to have lifting restrictions.  Her arms are getting stronger. She had pneumonia in March. She feels like her breathing is better.     Patient Concerns: see above    GERD no      Vitamin Intake:   Multivitamin   Flintstones complete one per day   Vitamin D  5000 international unit(s) per day   Calcium  citrate   Vit. B-12    Swallow pill     Habits:            Alcohol Intake  none   NSAID Use  none   Caffeine Use  Rare sips of soda   Exercise  Pt exercises   CPAP Use:  Stopped using   Birth Control  menopause   Tobacco Use     none                                      LABS: reviewed, insufficient ferritin      LABS:  Lab Results   Component Value Date    WBC 5.2 04/26/2024    HGB 11.4 (L) 04/26/2024    HCT 36.4 04/26/2024    MCV 86 04/26/2024     04/26/2024      No components found for: \"[id number]\" No results found for: \"HGBA1C\"   Lab Results   Component Value Date    CHOL 142 04/26/2024    No components found for: \"PTH\"      No components found for: \"FERRITIN\"   Lab Results   Component Value Date    HDL 36 (L) 04/26/2024      No components found for: \"WGRPNIVL52\" No components found for: \"24972\"   No components found for: \"LDLCALC\" Lab Results   Component Value Date    TSH 2.56 09/15/2022    No components found for: \"FOLATE\"   Lab Results   Component Value Date    TRIG 155 (H) 04/26/2024    Lab Results   Component Value Date    ALT 13 04/26/2024    AST 17 04/26/2024    ALKPHOS 92 04/26/2024    No results found for: \"TESTOSTERONE\"     No results found for: \"CHOLHDL\" No components found for: \"7597\"   @resusfast(vitamin a: 1)@             Patient Profile   Social History     Social History Narrative    Not on file        Past Medical History   Past Medical History:   Diagnosis Date    LINA (generalized anxiety disorder) 11/02/2017    Hyperlipidemia LDL goal <160 01/20/2019    Major depressive disorder     Methanol abuse (H)     Mild persistent asthma without " "complication 11/02/2017    Obese     JARVIS on CPAP     Cpap not working    Paranoia (H)     resolved due to drugs    Vitamin D deficiency 11/02/2017     Patient Active Problem List   Diagnosis    Mild persistent asthma without complication    Vitamin D deficiency    LINA (generalized anxiety disorder)    Recurrent major depressive disorder, remission status unspecified (H24)    Methamphetamine abuse, episodic (H)    Hyperlipidemia LDL goal <160    Depression, major, recurrent, severe with psychosis (H)    IUD (intrauterine device) in place    Paranoia (H)    PMDD (premenstrual dysphoric disorder)    Polysubstance overdose    Suicidal ideation    Morbid obesity (H)    Mood disorder (H24)    Obstructive sleep apnea    Morbid (severe) obesity due to excess calories (H)    Intra-abdominal abscess (H)    Hypernatremia    SUSSY (acute kidney injury) (H24)    Accelerated hypertension    Metabolic encephalopathy    Acute respiratory failure with hypoxia (H)    Cardiomyopathy (H)    Debility    Pneumonia    GUNDERSON (dyspnea on exertion)    Respiratory distress    Pneumonia of right upper lobe due to infectious organism    Fever, unspecified fever cause    Exacerbation of asthma, unspecified asthma severity, unspecified whether persistent     [unfilled]    Past Surgical History  She has a past surgical history that includes Mammoplasty reduction; Gastrectomy, Sleeve, Laparoscopic, With Duodenal Switch (N/A, 10/9/2023); Laparoscopy diagnostic (gyn) (N/A, 10/12/2023); Laparotomy exploratory (N/A, 10/12/2023); and PICC/Midline Placement (10/28/2023).     Examination   /72   Ht 1.6 m (5' 3\")   Wt 102.1 kg (225 lb)   BMI 39.86 kg/m      Wt Readings from Last 4 Encounters:   05/14/24 102.1 kg (225 lb)   03/05/24 99.8 kg (220 lb)   01/09/24 107.5 kg (237 lb)   01/05/24 108.9 kg (240 lb)      BP Readings from Last 3 Encounters:   05/14/24 112/72   03/07/24 (!) 143/85   01/12/24 120/74       GEN: Alert and oriented in no acute " distress.   HEENT: mucous membranes moist  ABDOMEN: soft, non tender         Counseling:   We reviewed the important post op bariatric recommendations:  -eating 3 meals daily  -eating protein first, getting >60gm protein daily  -eating slowly, chewing food well  -avoiding/limiting calorie containing beverages  -drinking water 15-30 minutes before or after meals  -choosing wheat, not white with breads, crackers, pastas, lucero, bagels, tortillas, rice  -limiting restaurant or cafeteria eating to twice a week or less    We discussed the importance of restorative sleep and stress management in maintaining a healthy weight.  We discussed the National Weight Control Registry healthy weight maintenance strategies and ways to optimize metabolism.  We discussed the importance of physical activity including cardiovascular and strength training in maintaining a healthier weight.  We discussed the importance of life-long vitamin supplementation and life-long  follow-up.    Mojgan was reminded that, to avoid marginal ulcers she should avoid tobacco at all, alcohol in excess, caffeine in excess, and NSAIDS (unless indicated for cardioprotection or othewise and opposed by a PPI).    GIRISH Ash MD  [facility]  5/12/2024  6:28 AM            No images are attached to the encounter.

## 2024-05-14 ENCOUNTER — OFFICE VISIT (OUTPATIENT)
Dept: SURGERY | Facility: CLINIC | Age: 54
End: 2024-05-14
Payer: COMMERCIAL

## 2024-05-14 VITALS
HEIGHT: 63 IN | WEIGHT: 225 LBS | DIASTOLIC BLOOD PRESSURE: 72 MMHG | BODY MASS INDEX: 39.87 KG/M2 | SYSTOLIC BLOOD PRESSURE: 112 MMHG

## 2024-05-14 DIAGNOSIS — K91.2 POSTOPERATIVE MALABSORPTION: Primary | ICD-10-CM

## 2024-05-14 DIAGNOSIS — G47.33 OBSTRUCTIVE SLEEP APNEA: ICD-10-CM

## 2024-05-14 PROCEDURE — 99214 OFFICE O/P EST MOD 30 MIN: CPT | Performed by: FAMILY MEDICINE

## 2024-05-14 RX ORDER — URSODIOL 300 MG/1
300 CAPSULE ORAL DAILY
COMMUNITY

## 2024-05-14 RX ORDER — VENLAFAXINE 100 MG/1
100 TABLET ORAL 3 TIMES DAILY
COMMUNITY
Start: 2024-03-18

## 2024-05-14 RX ORDER — TIOTROPIUM BROMIDE 18 UG/1
18 CAPSULE ORAL; RESPIRATORY (INHALATION) DAILY
COMMUNITY

## 2024-05-14 RX ORDER — CYANOCOBALAMIN 1000 UG/ML
1 INJECTION, SOLUTION INTRAMUSCULAR; SUBCUTANEOUS
Qty: 1 ML | Refills: 3 | Status: SHIPPED | OUTPATIENT
Start: 2024-05-14

## 2024-05-14 NOTE — LETTER
5/14/2024         RE: Mojgan Jimenez  [address]  [facility] [address]        Dear Colleague,    Thank you for referring your patient, Mojgan Jimenez, to the [facility] SURGERY CLINIC AND BARIATRICS CARE [place]. Please see a copy of my visit note below.    Bariatric Care Clinic Follow Up Visit for Previous Bariatric Surgery   Date of visit: 5/14/2024  Physician: GIRISH Ash MD, MD  Primary Care is Franca Mishra.  Mojgan Jimenez   53 year old  female    Date of Surgery: 10/9/23  Initial Weight: 292#  Initial BMI: 51.78  Today's Weight:   Wt Readings from Last 1 Encounters:   05/14/24 102.1 kg (225 lb)     Body mass index is 39.86 kg/m .       Assessment and Plan   Assessment: Mojgan is a 53 year old year old female who is 6 months s/p KO with Dr. Worthy. Unfortunately had to go back to the OR with an open procedure a few days later where 2 small leaks in the small intestine were found and repaired. She has had a difficult recovery. She was in intensive care for 3 weeks. She then lost all muscle function, thought due to inactivity. She was in the hospital for 2 months then was in a rehab center. They have had a durable weight loss of 73 lbs since surgery.  Overall compliance with the [facility] Bariatric Surgery Program has been excellent.      Plan:    1. Postoperative malabsorption  Patient is taking all vitamins as directed except only one multivitamin. She will increase this to 2 per day.  Recent routine postoperative labs were reviewed, ferritin insufficient. She was reminded to slow down, chew foods thoroughly, and to avoid liquids within 30 minutes of solids. Written information was given. She was congratulated on her success thus far. She will continue her PT. Hopefully she will be able to work again soon as she is struggling financially.     2. Obstructive sleep apnea  If her energy doesn't improve with increased iron she may try using her cPAP again..   "        Total time spent on the date of this encounter doing: chart review, review of test results, patient visit, physical exam, education, counseling, developing plan of care and documenting = 34 minutes.      Bariatric Surgery Review   Interim History/LifeChanges: She is struggling with poor energy. She is going to PT. She continues to have lifting restrictions.  Her arms are getting stronger. She had pneumonia in March. She feels like her breathing is better.     Patient Concerns: see above    GERD no      Vitamin Intake:   Multivitamin   Flintstones complete one per day   Vitamin D  5000 international unit(s) per day   Calcium  citrate   Vit. B-12    Swallow pill     Habits:            Alcohol Intake  none   NSAID Use  none   Caffeine Use  Rare sips of soda   Exercise  Pt exercises   CPAP Use:  Stopped using   Birth Control  menopause   Tobacco Use     none                                      LABS: reviewed, insufficient ferritin      LABS:  Lab Results   Component Value Date    WBC 5.2 04/26/2024    HGB 11.4 (L) 04/26/2024    HCT 36.4 04/26/2024    MCV 86 04/26/2024     04/26/2024      No components found for: \"USYPMQTA17OD\" No results found for: \"HGBA1C\"   Lab Results   Component Value Date    CHOL 142 04/26/2024    No components found for: \"PTH\"      No components found for: \"FERRITIN\"   Lab Results   Component Value Date    HDL 36 (L) 04/26/2024      No components found for: \"MZMVARHW73\" No components found for: \"88754\"   No components found for: \"LDLCALC\" Lab Results   Component Value Date    TSH 2.56 09/15/2022    No components found for: \"FOLATE\"   Lab Results   Component Value Date    TRIG 155 (H) 04/26/2024    Lab Results   Component Value Date    ALT 13 04/26/2024    AST 17 04/26/2024    ALKPHOS 92 04/26/2024    No results found for: \"TESTOSTERONE\"     No results found for: \"CHOLHDL\" No components found for: \"7597\"   @resusfast(vitamin a: 1)@             Patient Profile   Social History " "    Social History Narrative     Not on file        Past Medical History   Past Medical History:   Diagnosis Date     LINA (generalized anxiety disorder) 11/02/2017     Hyperlipidemia LDL goal <160 01/20/2019     Major depressive disorder      Methanol abuse (H)      Mild persistent asthma without complication 11/02/2017     Obese      JARVIS on CPAP     Cpap not working     Paranoia (H)     resolved due to drugs     Vitamin D deficiency 11/02/2017     Patient Active Problem List   Diagnosis     Mild persistent asthma without complication     Vitamin D deficiency     LINA (generalized anxiety disorder)     Recurrent major depressive disorder, remission status unspecified (H24)     Methamphetamine abuse, episodic (H)     Hyperlipidemia LDL goal <160     Depression, major, recurrent, severe with psychosis (H)     IUD (intrauterine device) in place     Paranoia (H)     PMDD (premenstrual dysphoric disorder)     Polysubstance overdose     Suicidal ideation     Morbid obesity (H)     Mood disorder (H24)     Obstructive sleep apnea     Morbid (severe) obesity due to excess calories (H)     Intra-abdominal abscess (H)     Hypernatremia     SUSSY (acute kidney injury) (H24)     Accelerated hypertension     Metabolic encephalopathy     Acute respiratory failure with hypoxia (H)     Cardiomyopathy (H)     Debility     Pneumonia     UGNDERSON (dyspnea on exertion)     Respiratory distress     Pneumonia of right upper lobe due to infectious organism     Fever, unspecified fever cause     Exacerbation of asthma, unspecified asthma severity, unspecified whether persistent     [unfilled]    Past Surgical History  She has a past surgical history that includes Mammoplasty reduction; Gastrectomy, Sleeve, Laparoscopic, With Duodenal Switch (N/A, 10/9/2023); Laparoscopy diagnostic (gyn) (N/A, 10/12/2023); Laparotomy exploratory (N/A, 10/12/2023); and PICC/Midline Placement (10/28/2023).     Examination   /72   Ht 1.6 m (5' 3\")   Wt " 102.1 kg (225 lb)   BMI 39.86 kg/m      Wt Readings from Last 4 Encounters:   05/14/24 102.1 kg (225 lb)   03/05/24 99.8 kg (220 lb)   01/09/24 107.5 kg (237 lb)   01/05/24 108.9 kg (240 lb)      BP Readings from Last 3 Encounters:   05/14/24 112/72   03/07/24 (!) 143/85   01/12/24 120/74       GEN: Alert and oriented in no acute distress.   HEENT: mucous membranes moist  ABDOMEN: soft, non tender         Counseling:   We reviewed the important post op bariatric recommendations:  -eating 3 meals daily  -eating protein first, getting >60gm protein daily  -eating slowly, chewing food well  -avoiding/limiting calorie containing beverages  -drinking water 15-30 minutes before or after meals  -choosing wheat, not white with breads, crackers, pastas, lucero, bagels, tortillas, rice  -limiting restaurant or cafeteria eating to twice a week or less    We discussed the importance of restorative sleep and stress management in maintaining a healthy weight.  We discussed the National Weight Control Registry healthy weight maintenance strategies and ways to optimize metabolism.  We discussed the importance of physical activity including cardiovascular and strength training in maintaining a healthier weight.  We discussed the importance of life-long vitamin supplementation and life-long  follow-up.    Mojgan was reminded that, to avoid marginal ulcers she should avoid tobacco at all, alcohol in excess, caffeine in excess, and NSAIDS (unless indicated for cardioprotection or othewise and opposed by a PPI).    GIRISH Ash MD  [facility] Bariatric clinic  5/12/2024  6:28 AM            No images are attached to the encounter.      Again, thank you for allowing me to participate in the care of your patient.        Sincerely,        GIRISH Ash MD

## 2024-05-14 NOTE — PATIENT INSTRUCTIONS
[facility] Bariatric Care  Nutritional Guidelines  Duodenal switch 6 Months Post Op    General Guidelines and Helpful Hints:  Eat 3 meals per day + protein supplement(s). No snacks between meals.  Do not skip meals.  This can cause overeating at the next meal and will prevent adequate protein and nutritional intake.  Aim for 60-80 grams of protein per day.   Always eat your protein first. This assists with optimal nutrition and helps you stay full longer.  To achieve daily protein goals, it is recommended to drink approved protein supplement between meals.  Aim for <10 grams sugar and <5-10 grams of fat per meal  Follow appropriate portion size: Use measuring cups to be accurate.    Months Post Op Portion Size per Meal   6 months 1/2 cup   7-8 months 1/2 - 2/3 cup   8 -9 months 2/3 - 3/4 cup   10-12 months 3/4 - 1 cup   12 months and beyond 1 cup maximum     Continue to use saucer/salad plates, infant/toddler silverware to keep portion sizes small and take small bites.  Eat S-L-O-W-L-Y to make each meal last 20-30 minutes. Always stop eating when satisfied.  Continue to use caution with foods containing skins, peels or membranes. Chew well!  Aim for 64 oz. of calorie-free fluids daily.  Continue to avoid caffeine, carbonation and alcohol.  Remember to avoid drinking during meals, 15-30 minutes before and 30 minutes after.  Aim for 30-60 minutes of physical activity most days of the week.  If having trouble tolerating meat, try using a crock-pot, tinfoil tent, steamer or other moist cooking method to create tender meats. Add broth or low-fat gravy to help meat stay moist.   Avoid high sugar and high fat foods to prevent high calorie intake. This will reduce your rate of weight loss.  Check nutrition labels for less than 10 grams of sugar and less than 10 grams of fat per serving.  Continue Taking Vitamins/Minerals:  3961-2139 mcg of Sublingual B-12 daily  1 Multivitamin with Iron twice daily (chewable or  swallow tabs)  500-600 mg Calcium Citrate twice daily (chewable or swallow tabs)  5000 IU Vitamin D3 daily  One iron tablet per day for menstruating females  You may need an additional B complex or thiamine tablet    Sample Grocery List    Protein:  Fat free Greek or light yogurt (less than 10 grams sugar)  Fat free or low-fat cottage cheese  String cheese or reduced fat cheese slices  Tuna, salmon, crab, egg, or chicken salad made with light or fat free mayonnaise  Egg or Egg Substitute  Lean/extra lean turkey, beef, bison, venison (ground, sirloin, round, flank)  Pork loin or tenderloin (grilled, baked, broiled)  Fish such as salmon, tuna, trout, tilapia, etc. (grilled, baked, broiled)  Tender cuts of lean (skinless) turkey or chicken  Lean deli meats: turkey, lean ham, chicken, lean roast beef  Beans such as kidney, garbanzo, black, samson, or low-fat/fat free refried beans  Peanut butter (natural preferred). Limit to 1 Tbsp. per day.  Low-fat meatloaf (made with lean ground beef or turkey)  Sloppy Joes made with low-sugar ketchup and lean ground beef or turkey  Soy or vegetable protein (i.e. vegan crumbles, soy/veggie burger, tofu)  Hummus    Vegetables:  Fresh: cooked or raw (as tolerated)  Frozen vegetables  Canned vegetables (low sodium or no salt added, rinse before cooking/eating)  (Ok to have skins/peels/membranes/seeds - just chew well)    Fruits:  Fresh fruit  Frozen fruit (no sugar added)  Canned fruit (packed in its own juice, NOT syrup)  (Ok to have skins/peels/membranes/seeds - just chew well)    Starch:  Unsweetened whole-grain hot cereal (or high fiber cold cereal, dry)  Toasted whole wheat bread or Parlin Thins  Whole grain crackers  Baked/boiled/mashed potato/sweet potato  Cooked whole grain pasta, brown rice, or other cooked whole grains  Starchy vegetables: corn, peas, winter squash    Protein Supplement:   Ready to drink protein shake with:  15-30 grams protein per serving  Less than 10 grams  total carbohydrate per serving   Protein powder mixed with:   Skim or 1% milk  Low fat or fat free Lactaid milk, plain or no sugar added soymilk  Water     Fats: (use in moderation)  1 teaspoon of soft tub margarine  1 teaspoon olive oil, canola oil, or peanut oil  1 tablespoon of low-fat blanchard or salad dressing     Sample Menu for 6 months after Gastric Sleeve    You do NOT need to eat/drink the full portion sizes listed below  Always stop when you are satisfied  Breakfast 6 Tbsp. 1% cottage cheese   2 Tbsp. peaches    Lunch 2 oz. lean hamburger or veggie burger  1-2 tsp. salsa   Supplement Approved Protein Shake   (Have between meals throughout the day)   Dinner 6 Tbsp. chicken breast  1-2 Tbsp. green beans     Breakfast 2 Eggs or   cup scrambled egg substitute product   Lunch 7 Tbsp. low-fat Sloppy Juanito mixture   2 high fiber crackers   Supplement Approved Protein Shake   (Have between meals throughout the day)   Dinner 6 Tbsp. grilled, broiled, or baked lemon pepper salmon  2 Tbsp. asparagus     Breakfast 6 Tbsp. light yogurt with 2 Tbsp. Grape Nuts or high fiber cereal    Lunch   cup of chili made with extra lean ground beef and kidney beans   Dinner 5 Tbsp. pork loin made in a crock pot  2 Tbsp. cooked broccoli  1 Tbsp. baked potato with 2 sprays of spray margarine    Supplement Approved Protein Shake   (Have between meals throughout the day)     Breakfast 6 Tbsp. lean ham  2 Tbsp. seedless melon   Lunch 7 Tbsp. diced turkey with 1 teaspoon low-fat gravy  1 Tbsp. green beans   Dinner 6 Tbsp. extra lean ground beef mixed with low sugar spaghetti sauce  2 Tbsp. whole wheat pasta   Supplement Approved Protein Shake   (Have between meals throughout the day)     Breakfast 2 oz turkey or soy based sausage sybil    Lunch 6 Tbsp. low-fat cottage cheese  2 Tbsp. pineapple   Supplement Approved Protein Shake   (Have between meals throughout the day)   Dinner 7 Tbsp. beef tenderloin  1 Tbsp. asparagus     Breakfast 1/2 of  a whole wheat English Muffin (toasted)  1 Tbsp. creamy natural peanut butter   Lunch   cup of black bean soup with 1 Tbsp. low fat shredded cheese   Dinner 7 Tbsp. low-fat turkey meat loaf   1 Tbsp. cooked carrots   Supplement Approved Protein Shake   (Have between meals throughout the day)     Breakfast 6 Tbsp. scrambled egg or scrambled egg substitute  1 Tbsp. finely chopped bell pepper  1 Tbsp. low fat shredded cheese   Lunch 7 Tbsp. lean diced ham  1 Tbsp. mandarin oranges   Supplement Approved Protein Shake   (Have between meals throughout the day)   Dinner 6 Tbsp. flaked fish   2 Tbsp. mashed sweet potato

## 2024-07-15 ENCOUNTER — VIRTUAL VISIT (OUTPATIENT)
Dept: SURGERY | Facility: CLINIC | Age: 54
End: 2024-07-15
Payer: COMMERCIAL

## 2024-07-15 DIAGNOSIS — K91.2 POSTOPERATIVE MALABSORPTION: ICD-10-CM

## 2024-07-15 DIAGNOSIS — Z98.84 S/P BARIATRIC SURGERY: Primary | ICD-10-CM

## 2024-07-15 DIAGNOSIS — K90.9 INTESTINAL MALABSORPTION, UNSPECIFIED TYPE: ICD-10-CM

## 2024-07-15 PROCEDURE — 97803 MED NUTRITION INDIV SUBSEQ: CPT | Mod: 95

## 2024-07-15 NOTE — LETTER
7/15/2024      Mojgan Jimenez  [address]  [facility] [address]      Dear Colleague,    Thank you for referring your patient, Mojgan Jimenez, to the [facility] SURGERY CLINIC AND BARIATRICS CARE [place]. Please see a copy of my visit note below.    Mojgan Jimenez is a 53 year old who is being evaluated via a billable video visit.    How would you like to obtain your AVS? MyChart  If the video visit is dropped, the invitation should be resent by: Send to e-mail at: [email or web address]  Will anyone else be joining your video visit? No  {If patient encounters technical issues they should call [phone] :[phone]      Post-op Surgical Weight Loss Diet Evaluation     Assessment:  Pt presents for  9 month  post-op RD visit, s/p KO on 10/9/2023 with Dr. Worthy. Today we reviewed current eating habits and level of physical activity, and instructed on the changes that are required for successful bariatric outcomes.    Patient Progress: Patient reports she is doing well nutritionally. Has been using some bariatric recipe cooking using bariatric cookbooks to meals. Overall no questions or concerns for today's visit per patient.     Initial weight: 288.2 lbs   Current weight: 222 lbs   Weight change: 66.2 lbs (down), 22.9%    There is no height or weight on file to calculate BMI.    Patient Active Problem List   Diagnosis     Mild persistent asthma without complication     Vitamin D deficiency     LINA (generalized anxiety disorder)     Recurrent major depressive disorder, remission status unspecified (H24)     Methamphetamine abuse, episodic (H)     Hyperlipidemia LDL goal <160     Depression, major, recurrent, severe with psychosis (H)     IUD (intrauterine device) in place     Paranoia (H)     PMDD (premenstrual dysphoric disorder)     Polysubstance overdose     Suicidal ideation     Morbid obesity (H)     Mood disorder (H24)     Obstructive sleep apnea     Morbid (severe) obesity due to excess  "calories (H)     Intra-abdominal abscess (H)     Hypernatremia     SUSSY (acute kidney injury) (H24)     Accelerated hypertension     Metabolic encephalopathy     Acute respiratory failure with hypoxia (H)     Cardiomyopathy (H)     Debility     Pneumonia     GUNDESRON (dyspnea on exertion)     Respiratory distress     Pneumonia of right upper lobe due to infectious organism     Fever, unspecified fever cause     Exacerbation of asthma, unspecified asthma severity, unspecified whether persistent       Diabetes: No    Vitamins   Multi Vit with Iron: yes  Calcium Citrate: yes  B12: yes  D3: yes      Do you experience any reflux or discomfort with eating? No  Nausea: no  Vomiting: no  Diarrhea: yes - occasionally when she eats a lot of vegetables   Constipation: no  Hair loss: no    Diet Recall/Time:   Breakfast: greek yogurt with granola   Lunch:shedded chicken with onions, peppers with cottage cheese s=with small amount of ranch Or leftovers   Dinner: turkey burgers with peppers and onions or asparagus or brussels sprouts     Typical Snacks: Protein Shake on occasion, high protein desserts with protein shakes      Proteins/Veg/Fruits/CHO (NOT well tolerated): none     Estimated protein intake: 60-80 grams      Meal Duration:15-20 minutes     Fluid-meal separation:  Fluids are  30min before and 30 minutes after meals.    Fluid Intake  Water: at least 40 oz/day, also utilize Zero Sugar Vitamin Water, Body [place] Lyte,   1/2 cup of lowfat milk       Exercise: Working with PT at this time       PES statement:      (NC-1.4) Altered GI Function related to Alteration in gastrointestinal tract structure and/or function/ Decreased functional length of the GI tract as evidenced by Weight loss of 22.9% initial body weight; KO    Intervention    Discussion  Discussed 9 months Post-Op Nutritional Guidelines for KO  Recommended to consume 15-20gm protein at 3 meals daily, along with protein supplement/\"planned protein " "containing snack\" of 15-30gm protein, to reach goal of 60-80 gm protein daily.  Educated on post-op vitamin regimen: Multi Vit + iron 2x/day, calcium citrate 400-600 mg 2x/day, 0958-1284 mcg of Sublingual B-12 daily, and 5000 IU Vitamin D3 daily (MVI and calcium can be taken at the same time BID)  Reviewed lean protein sources  Bariatric Plate Method-  including lean/low fat protein at each meal, including a vegetable/fruit, and limiting carbohydrate intake to less than 25% of plate volume.     Instructions  Include 15-20gm protein at each meal, along with protein supplement/\"planned protein containing snack\" of 15-30gm protein, to reach goal of 60-80 gm protein daily.  Increase fluid intake to 64oz daily: choose plain or calorie/carbonation-free beverages.  Incorporate daily structured activity, 30-60 minutes most days of the week  Recommended pt to start taking: Multi Vit + iron 2x/day, calcium citrate 400-600 mg 2x/day, 3167-0459 mcg of Sublingual B-12 daily, and 5000 IU Vitamin D3 daily. (MVI and calcium can be taken at the same time)  Read food labels more consistently: keeping total fat grams <10, total sugar grams <10, fiber >3gm per serving.  Increase vegetable/fruit intake, by having a vegetable or fruit with each meal daily.  Practice plate method: 1/2 plate lean/low fat protein source, vegetable/fruit, <25% of plate complex carbohydrates.  Separate fluids 30 minutes before/after meal times.  Practice eating off of smaller plates/bowls, chewing to applesauce consistency, taking 20-30 minutes to eat in a calm/relaxed environment without distractions of tv/email/cell phone.    Handouts provided:  9 month  Post-Op Nutritional Guidelines for KO    Assessment/Plan:    Pt to follow up for 1 year post-op visit with bariatrician       Video-Visit Details    Type of service:  Video Visit    Video Start Time (time video started):  1:58 PM    Video End Time (time video stopped):  2:18 PM    Originating Location " (pt. Location): Home      Distant Location (provider location):  Off-site    Mode of Communication:  Video Conference via [facility]    Physician has received verbal consent for a Video Visit from the patient? Yes      Sarah Chavez RD              Again, thank you for allowing me to participate in the care of your patient.        Sincerely,        Sarah Chavez RD

## 2024-07-15 NOTE — PATIENT INSTRUCTIONS
[facility] Bariatric Care  Nutritional Guidelines  Duodenal Switch 9 Months Post Op    General Guidelines and Helpful Hints:  Eat 3 meals per day + protein supplement(s). No snacks between meals.  Do not skip meals.  This can cause overeating at the next meal and will prevent adequate protein and nutritional intake.  Aim for  grams of protein per day.  Always eat your protein first. This assists with optimal nutrition and helps you stay full longer.  Depending on your portion size, you may need to drink approved protein supplement between meals to achieve protein goals. Follow recommendations of your Dietitian.   Follow appropriate portion size: Use measuring cups to be accurate.    Months Post Op Portion Size per Meal   9 months 1 cup   12 months and beyond 1 cup maximum     Continue to use saucer/salad plates, infant/toddler silverware to keep portion sizes small and take small bites.  Eat S-L-[id number]-W-L-Y to make each meal last 20-30 minutes. Always stop eating when satisfied.  Continue to use caution with foods containing skins, peels or membranes. Chew well!  Aim for 64 oz. of calorie-free fluids daily.  Continue to avoid caffeine, carbonation and alcohol.  Remember to avoid drinking during meals, 15-30 minutes before and 30 minutes after.  Aim for 30-60 minutes of physical activity most days of the week.  If having trouble tolerating meat, try using a crock-pot, tinfoil tent, steamer or other moist cooking method to create tender meats. Add broth or low-fat gravy to help meat stay moist.   Avoid high sugar and high fat foods to prevent high calorie intake. This will reduce your rate of weight loss and can cause weight regain. High fat items can also cause upset stomach and diarrhea.  Check nutrition labels for less than 10 grams of sugar and less than 10 grams of fat per serving.  Continue Taking Vitamins/Minerals:  0594-5075 mcg of Sublingual B-12 daily  1 Multivitamin with Iron twice daily (chewable  or swallow tabs)  500-600 mg Calcium Citrate twice daily (chewable or swallow tabs)  5000 IU Vitamin D3 daily    Grocery List  Protein:  Fat free Greek or light yogurt (less than 10 grams sugar)  Fat free or low-fat cottage cheese  String cheese or reduced fat cheese slices  Tuna, salmon, crab, egg, or chicken salad made with light or fat free mayonnaise  Egg or Egg Substitute  Lean/extra lean turkey, beef, bison, venison (ground, sirloin, round, flank)  Pork loin or tenderloin (grilled, baked, broiled)  Fish such as salmon, tuna, trout, tilapia, etc. (grilled, baked, broiled)  Tender cuts of lean (skinless) turkey or chicken  Lean deli meats: turkey, lean ham, chicken, lean roast beef  Beans such as kidney, garbanzo, black, samson, or low-fat/fat free refried beans  Peanut butter (natural preferred). Limit to 1 Tbsp. per day.  Low-fat meatloaf (made with lean ground beef or turkey)  Sloppy Joes made with low-sugar ketchup and lean ground beef or turkey  Soy or vegetable protein (i.e. vegan crumbles, soy/veggie burger, tofu)  Hummus    Vegetables:  Fresh: cooked or raw (as tolerated)  Frozen vegetables  Canned vegetables (low sodium or no salt added, rinse before cooking/eating)  (Ok to have skins/peels/membranes/seeds - just chew well)    Fruits:  Fresh fruit  Frozen fruit (no sugar added)  Canned fruit (packed in its own juice, NOT syrup)  (Ok to have skins/peels/membranes/seeds - just chew well)    Starch:  Unsweetened whole-grain hot cereal (or high fiber cold cereal, dry)  Toasted whole wheat bread or [place] Thins  Whole grain crackers  Baked/boiled/mashed potato/sweet potato  Cooked whole grain pasta, brown rice, or other cooked whole grains  Starchy vegetables: corn, peas, winter squash    Protein Supplement:   Ready to drink protein shake with:  15-30 grams protein per serving  Less than 10 grams total carbohydrate per serving   Protein powder mixed with:   Skim or 1% milk  Low fat or fat free Lactaid milk,  plain or no sugar added soymilk  Water   Fats: (use in moderation)  1 teaspoon of soft tub margarine  1 teaspoon olive oil, canola oil, or peanut oil  1 tablespoon of low-fat blanchard or salad dressing    Sample Menu for 9 months after Gastric Sleeve    You do NOT need to eat/drink the full portion sizes listed below  Always stop when you are satisfied    Breakfast 3  diameter whole-wheat waffle with 1 Tbsp. of peanut butter  1 string cheese    Lunch   cup low-fat tuna salad (made with light mayonnaise)  3-4 small strips red pepper   Supplement Approved Protein Supplement  (Have between meals throughout the day)   Dinner   cup low-fat chicken stew      Breakfast   cup egg substitute, scrambled     cup sautéed vegetables in olive oil   Lunch   cup low fat or fat free cottage cheese  4-5  whole wheat crackers   Supplement Approved Protein Supplement   (Have between meals throughout the day)   Dinner 3 oz  grilled, broiled, or baked lemon pepper salmon    cup grilled asparagus     Breakfast   cup diced lean ham    cup mandarin oranges (canned in light juice)    Lunch 3 oz lean turkey lunchmeat    cup fresh tomatoes   Dinner 3 oz pork loin made in a crock pot seasoned with a spice rub    cup unsweetened applesauce   Supplement Approved Protein Supplement  (Have between meals throughout the day)     Breakfast   cup breakfast casserole made with egg substitute & turkey sausage    cup seedless melon   Lunch 3 oz  skinless chicken sautéed in 1 tsp. olive oil and herb seasonings    cup sliced zucchini   Dinner   cup chili made with ground turkey/sirloin or veggie crumbles and beans   3 whole-wheat saltine crackers   Supplement Approved Protein Supplement  (Have between meals throughout the day)     Breakfast 2 Tbsp. whole-grain cereal with   cup plain Greek yogurt   2 Tbsp. fresh berries   Lunch   cup crab mixed with 1-2 tsp. low-fat mayonnaise  4-5 Wheat Thins   Supplement Approved Protein Supplement   (Have between meals  throughout the day)   Dinner 3 oz. tender turkey breast (made in crock pot for extra moisture)  2 Tbsp. green beans  2 Tbsp. sweet potato     Breakfast   whole wheat English muffin, toasted  1 Tbsp. natural peanut butter    cup flavored light Greek yogurt   Lunch 3/4 cup black bean soup   Dinner 3 oz low-fat turkey meat loaf   2 Tbsp. mashed potato   2 Tbsp. cooked carrots   Supplement Approved Protein Supplement   (Have between meals throughout the day)     Breakfast   cup egg substitute  2 Tbsp. chopped bell pepper or mushrooms  2 Tbsp. kiwi   Lunch   cup canned chicken (in water) mixed with light mayonnaise  4-5 Wheat Thins    Supplement Approved Protein Supplement   (Have between meals throughout the day)   Dinner 8 medium shrimp  1 Tbsp. cocktail sauce  3 Tbsp. green beans

## 2024-07-15 NOTE — PROGRESS NOTES
Mojgan Jimenez is a 53 year old who is being evaluated via a billable video visit.    How would you like to obtain your AVS? MyChart  If the video visit is dropped, the invitation should be resent by: Send to e-mail at: [email or web address]  Will anyone else be joining your video visit? No  {If patient encounters technical issues they should call [phone]      Post-op Surgical Weight Loss Diet Evaluation     Assessment:  Pt presents for  9 month  post-op RD visit, s/p KO on 10/9/2023 with Dr. Worthy. Today we reviewed current eating habits and level of physical activity, and instructed on the changes that are required for successful bariatric outcomes.    Patient Progress: Patient reports she is doing well nutritionally. Has been using some bariatric recipe cooking using bariatric cookbooks to meals. Overall no questions or concerns for today's visit per patient.     Initial weight: 288.2 lbs   Current weight: 222 lbs   Weight change: 66.2 lbs (down), 22.9%    There is no height or weight on file to calculate BMI.    Patient Active Problem List   Diagnosis    Mild persistent asthma without complication    Vitamin D deficiency    LINA (generalized anxiety disorder)    Recurrent major depressive disorder, remission status unspecified (H24)    Methamphetamine abuse, episodic (H)    Hyperlipidemia LDL goal <160    Depression, major, recurrent, severe with psychosis (H)    IUD (intrauterine device) in place    Paranoia (H)    PMDD (premenstrual dysphoric disorder)    Polysubstance overdose    Suicidal ideation    Morbid obesity (H)    Mood disorder (H24)    Obstructive sleep apnea    Morbid (severe) obesity due to excess calories (H)    Intra-abdominal abscess (H)    Hypernatremia    SUSSY (acute kidney injury) (H24)    Accelerated hypertension    Metabolic encephalopathy    Acute respiratory failure with hypoxia (H)    Cardiomyopathy (H)    Debility    Pneumonia    GUNDERSON (dyspnea on exertion)    Respiratory  "distress    Pneumonia of right upper lobe due to infectious organism    Fever, unspecified fever cause    Exacerbation of asthma, unspecified asthma severity, unspecified whether persistent       Diabetes: No    Vitamins   Multi Vit with Iron: yes  Calcium Citrate: yes  B12: yes  D3: yes      Do you experience any reflux or discomfort with eating? No  Nausea: no  Vomiting: no  Diarrhea: yes - occasionally when she eats a lot of vegetables   Constipation: no  Hair loss: no    Diet Recall/Time:   Breakfast: greek yogurt with granola   Lunch:shedded chicken with onions, peppers with cottage cheese s=with small amount of ranch Or leftovers   Dinner: turkey burgers with peppers and onions or asparagus or brussels sprouts     Typical Snacks: Protein Shake on occasion, high protein desserts with protein shakes      Proteins/Veg/Fruits/CHO (NOT well tolerated): none     Estimated protein intake: 60-80 grams      Meal Duration:15-20 minutes     Fluid-meal separation:  Fluids are  30min before and 30 minutes after meals.    Fluid Intake  Water: at least 40 oz/day, also utilize Zero Sugar Vitamin Water, Body [place],   1/2 cup of lowfat milk       Exercise: Working with PT at this time       PES statement:      (NC-1.4) Altered GI Function related to Alteration in gastrointestinal tract structure and/or function/ Decreased functional length of the GI tract as evidenced by Weight loss of 22.9% initial body weight; KO    Intervention    Discussion  Discussed 9 months Post-Op Nutritional Guidelines for KO  Recommended to consume 15-20gm protein at 3 meals daily, along with protein supplement/\"planned protein containing snack\" of 15-30gm protein, to reach goal of 60-80 gm protein daily.  Educated on post-op vitamin regimen: Multi Vit + iron 2x/day, calcium citrate 400-600 mg 2x/day, 7569-0019 mcg of Sublingual B-12 daily, and 5000 IU Vitamin D3 daily (MVI and calcium can be taken at the same time BID)  Reviewed " "lean protein sources  Bariatric Plate Method-  including lean/low fat protein at each meal, including a vegetable/fruit, and limiting carbohydrate intake to less than 25% of plate volume.     Instructions  Include 15-20gm protein at each meal, along with protein supplement/\"planned protein containing snack\" of 15-30gm protein, to reach goal of 60-80 gm protein daily.  Increase fluid intake to 64oz daily: choose plain or calorie/carbonation-free beverages.  Incorporate daily structured activity, 30-60 minutes most days of the week  Recommended pt to start taking: Multi Vit + iron 2x/day, calcium citrate 400-600 mg 2x/day, 2217-8732 mcg of Sublingual B-12 daily, and 5000 IU Vitamin D3 daily. (MVI and calcium can be taken at the same time)  Read food labels more consistently: keeping total fat grams <10, total sugar grams <10, fiber >3gm per serving.  Increase vegetable/fruit intake, by having a vegetable or fruit with each meal daily.  Practice plate method: 1/2 plate lean/low fat protein source, vegetable/fruit, <25% of plate complex carbohydrates.  Separate fluids 30 minutes before/after meal times.  Practice eating off of smaller plates/bowls, chewing to applesauce consistency, taking 20-30 minutes to eat in a calm/relaxed environment without distractions of tv/email/cell phone.    Handouts provided:  9 month  Post-Op Nutritional Guidelines for KO    Assessment/Plan:    Pt to follow up for 1 year post-op visit with bariatrician       Video-Visit Details    Type of service:  Video Visit    Video Start Time (time video started):  1:58 PM    Video End Time (time video stopped):  2:18 PM    Originating Location (pt. Location): Home      Distant Location (provider location):  Off-site    Mode of Communication:  Video Conference via [facility]    Physician has received verbal consent for a Video Visit from the patient? Yes      Sarah Chavez RD            "

## 2024-09-16 ENCOUNTER — MYC MEDICAL ADVICE (OUTPATIENT)
Dept: SURGERY | Facility: CLINIC | Age: 54
End: 2024-09-16
Payer: COMMERCIAL

## 2024-09-16 DIAGNOSIS — K90.9 INTESTINAL MALABSORPTION, UNSPECIFIED TYPE: ICD-10-CM

## 2024-09-16 DIAGNOSIS — Z98.84 S/P BARIATRIC SURGERY: Primary | ICD-10-CM

## 2024-09-16 DIAGNOSIS — K91.2 POSTOPERATIVE MALABSORPTION: ICD-10-CM

## 2024-09-16 NOTE — TELEPHONE ENCOUNTER
Sarah Chavez RD  P Bariatric Surgery [facility]  Internal 1 year post-op Single Anastomosis Duodenal Switch labs needed for appointment with Erin Ash MD on 10/15/2024.  
Patient requests all Lab, Cardiology, and Radiology Results on their Discharge Instructions

## 2024-11-10 ENCOUNTER — HEALTH MAINTENANCE LETTER (OUTPATIENT)
Age: 54
End: 2024-11-10

## 2025-07-28 DIAGNOSIS — K91.2 POSTOPERATIVE MALABSORPTION: Primary | ICD-10-CM

## 2025-07-28 RX ORDER — SYRINGE WITH NEEDLE, 1 ML 25GX5/8"
SYRINGE, EMPTY DISPOSABLE MISCELLANEOUS
Qty: 3 EACH | Refills: 0 | Status: SHIPPED | OUTPATIENT
Start: 2025-07-28

## 2025-08-23 ENCOUNTER — HEALTH MAINTENANCE LETTER (OUTPATIENT)
Age: 55
End: 2025-08-23

## (undated) DEVICE — DRSG TEGADERM 2 3/8X2 3/4" 1624W

## (undated) DEVICE — DRESSING PRIMAPORE 4 X 3-1/8 66000317

## (undated) DEVICE — TAPE ADH MEDIPORE 2IN HI SOFT CLOTH 2862

## (undated) DEVICE — POSITIONER ARM BOARD FOAM 7.5X20X2"

## (undated) DEVICE — STPL POWERED ECHELON LONG 60MM PLEE60A

## (undated) DEVICE — ADHESIVE SEAMGUARD BIO STAPLE LINE EC60A

## (undated) DEVICE — ENDO TROCAR SLEEVE KII Z-THREADED 12X100MM CTS22

## (undated) DEVICE — DEVICE ENDO STITCH APPLIER 10MM 173016

## (undated) DEVICE — BLADE KNIFE SURG 11 371111

## (undated) DEVICE — GOWN IMPERVIOUS BREATHABLE SMART LG 89015

## (undated) DEVICE — DRAPE SHEET REV FOLD 3/4 9349

## (undated) DEVICE — SUCTION MANIFOLD NEPTUNE 2 SYS 1 PORT 702-025-000

## (undated) DEVICE — GLOVE BIOGEL PI SZ 8.0 40880

## (undated) DEVICE — ENDO TROCAR OPTICAL ACCESS KII Z-THRD 05X100MM CTR03

## (undated) DEVICE — PREP CHLORAPREP 26ML TINTED HI-LITE ORANGE 930815

## (undated) DEVICE — ESU LIGASURE SEALER/DIVIDER RETRACT L-HOOK 37CM LF5637

## (undated) DEVICE — TUBE COLON 32FR 30IN LNG 080074200

## (undated) DEVICE — SU SILK 2-0 FS-1 18" 685G

## (undated) DEVICE — SU MONOCRYL+ 4-0 18IN PS2 UND MCP496G

## (undated) DEVICE — TAPE ADH POROUS 3IN CURITY STD 7046C

## (undated) DEVICE — TUBING SMOKE EVAC PNEUMOCLEAR HIGH FLOW 0620050250

## (undated) DEVICE — SYSTEM LAPAROVUE VISIBILITY LAPVUE10

## (undated) DEVICE — PLATE GROUNDING ADULT W/CORD 9165L

## (undated) DEVICE — SUTURE VICRYL+ 3-0 18 SH/CR UND VCP864

## (undated) DEVICE — CUSTOM PACK LAP CHOLE SBA5BLCHEA

## (undated) DEVICE — SU ENDO STITCH POLYSORB 2-0 ES-9 48"  170053

## (undated) DEVICE — ENDO TROCAR FIRST ENTRY KII FIOS Z-THRD 12X100MM CTF73

## (undated) DEVICE — SOL WATER IRRIG 1000ML BOTTLE 2F7114

## (undated) DEVICE — GLOVE BIOGEL PI SZ 6.5 40865

## (undated) DEVICE — SU STRATAFIX PDS PLUS 3-0 SPIRAL SH 15CM SXPP1B420

## (undated) DEVICE — DECANTER VIAL 2006S

## (undated) DEVICE — GLOVE BIOGEL PI ULTRATOUCH G SZ 8.0 42180

## (undated) DEVICE — SYR 50ML CATH TIP W/O NDL 309620

## (undated) DEVICE — GLOVE UNDER INDICATOR PI SZ 7.0 LF 41670

## (undated) DEVICE — ENDO TROCAR SLEEVE KII Z-THREADED 05X100MM CTS02

## (undated) DEVICE — GOWN SURGICAL SMARTGOWN 2XL 89075

## (undated) DEVICE — SOL RINGERS LACTATED 1000ML BAG 2B2324X

## (undated) DEVICE — ENDO TROCAR OPTICAL ACCESS KII Z-THRD 15X100MM C0R37

## (undated) DEVICE — SPONGE LAP 18X18" X8435

## (undated) DEVICE — DRSG TELFA 3X4" 1050

## (undated) DEVICE — SUCTION TIP POOLE STERILE 35040

## (undated) DEVICE — ESU PENCIL SMOKE EVAC W/ROCKER SWITCH 0703-047-000

## (undated) DEVICE — DRSG PRIMAPORE 04X11 3/4"

## (undated) DEVICE — Device

## (undated) DEVICE — DRSG TELFA 3X8" 1238

## (undated) DEVICE — ENDO SHEARS RENEW LAP ENDOCUT SCISSOR TIP 16.5MM 3142

## (undated) DEVICE — STPL RELOAD REG TISSUE ECHELON 60 X 3.6MM BLUE GST60B

## (undated) DEVICE — DRSG GAUZE 4X4" TOPPER

## (undated) DEVICE — DRAIN BLAKE 19FR SIL 2231

## (undated) DEVICE — GOWN LG DISP 9515

## (undated) DEVICE — NEEDLE SPINAL DISP 22GA X 3.5" QUINCKE 333320

## (undated) DEVICE — STPL RELOAD LINEAR CUT ENDOPATH ECHELON 60MM BLK GST60T

## (undated) DEVICE — SU VICRYL+ 0 27 UR6 VLT VCP603H

## (undated) DEVICE — DRAPE SURGICAL BARIATRIC 92INW X 110INW X 132INL 94590

## (undated) DEVICE — TUBING LAP/SUCT IRRIG DAVOL 0026880

## (undated) DEVICE — SYR 30ML LL W/O NDL 302832

## (undated) DEVICE — GOWN XLG DISP 9545

## (undated) DEVICE — SUCTION MANIFOLD NEPTUNE 2 SYS 4 PORT 0702-020-000

## (undated) DEVICE — CLIP LIGACLIP LG YELLOW LT400

## (undated) DEVICE — TUBING SUCTION MEDI-VAC 1/4"X20' N620A - HE

## (undated) DEVICE — SUTURE PDS 1 48IN TP1+ LPED VLT PDP880G

## (undated) DEVICE — COUNTER NEEDLE ADH & FOAM 20CT 9106

## (undated) DEVICE — DRAIN RESERVOIR 100ML JP 0070740

## (undated) DEVICE — DRESSING TEGADERM IV 2 3/4 X 3 1/4 1633

## (undated) DEVICE — STPL RELOAD REG/THK TISSUE ECHELON 60 X 3.8MM GOLD GST60D

## (undated) DEVICE — STPL SKIN 35W 6.9MM  PXW35

## (undated) RX ORDER — INDOCYANINE GREEN AND WATER 25 MG
KIT INJECTION
Status: DISPENSED
Start: 2023-10-12

## (undated) RX ORDER — LIDOCAINE HYDROCHLORIDE 20 MG/ML
JELLY TOPICAL
Status: DISPENSED
Start: 2023-10-23

## (undated) RX ORDER — FENTANYL CITRATE 50 UG/ML
INJECTION, SOLUTION INTRAMUSCULAR; INTRAVENOUS
Status: DISPENSED
Start: 2023-10-25

## (undated) RX ORDER — LIDOCAINE HYDROCHLORIDE 20 MG/ML
JELLY TOPICAL
Status: DISPENSED
Start: 2023-10-24

## (undated) RX ORDER — BUPIVACAINE HYDROCHLORIDE 2.5 MG/ML
INJECTION, SOLUTION EPIDURAL; INFILTRATION; INTRACAUDAL
Status: DISPENSED
Start: 2023-10-12

## (undated) RX ORDER — FENTANYL CITRATE 50 UG/ML
INJECTION, SOLUTION INTRAMUSCULAR; INTRAVENOUS
Status: DISPENSED
Start: 2023-11-01

## (undated) RX ORDER — FENTANYL CITRATE-0.9 % NACL/PF 10 MCG/ML
PLASTIC BAG, INJECTION (ML) INTRAVENOUS
Status: DISPENSED
Start: 2023-10-12

## (undated) RX ORDER — BUPIVACAINE HYDROCHLORIDE 2.5 MG/ML
INJECTION, SOLUTION EPIDURAL; INFILTRATION; INTRACAUDAL
Status: DISPENSED
Start: 2023-10-09

## (undated) RX ORDER — FENTANYL CITRATE 50 UG/ML
INJECTION, SOLUTION INTRAMUSCULAR; INTRAVENOUS
Status: DISPENSED
Start: 2023-10-09

## (undated) RX ORDER — LIDOCAINE HYDROCHLORIDE 20 MG/ML
JELLY TOPICAL
Status: DISPENSED
Start: 2023-10-31

## (undated) RX ORDER — LIDOCAINE HYDROCHLORIDE 10 MG/ML
INJECTION, SOLUTION INFILTRATION; PERINEURAL
Status: DISPENSED
Start: 2023-10-25

## (undated) RX ORDER — FENTANYL CITRATE 50 UG/ML
INJECTION, SOLUTION INTRAMUSCULAR; INTRAVENOUS
Status: DISPENSED
Start: 2023-10-19

## (undated) RX ORDER — GLYCOPYRROLATE 0.2 MG/ML
INJECTION, SOLUTION INTRAMUSCULAR; INTRAVENOUS
Status: DISPENSED
Start: 2023-10-12